# Patient Record
Sex: FEMALE | HISPANIC OR LATINO | Employment: OTHER | URBAN - METROPOLITAN AREA
[De-identification: names, ages, dates, MRNs, and addresses within clinical notes are randomized per-mention and may not be internally consistent; named-entity substitution may affect disease eponyms.]

---

## 2017-10-23 ENCOUNTER — GENERIC CONVERSION - ENCOUNTER (OUTPATIENT)
Dept: OTHER | Facility: OTHER | Age: 72
End: 2017-10-23

## 2018-01-12 NOTE — MISCELLANEOUS
Please contact our office at your convenience  It is important that we speak to you  REGARDING:   Porfavor contacte a  nuestra oficina  Es muy importante que hablemos con usted   SOBRE:          Scheduling an Appointment/ Programar shamar Darshana          Rescheduling  an Appointment/ Re-programar shamar Darshana     Cancelling an Appointment/Cancelar zuniga Darshana     Your Lab/ X-ray Results/Resultados         Updating your Phone number or Address/ Actualizar  zuniga Luis Armando [employer]          X Verify your Primary Providor/ Verificar zuniga Proveedor primario     Other/Otro:    Please Contact Our Office to  Schedule Your Appointment  It is Very Important That You See Your Provider for your  Routine Medical Care  If you are seeing a New Providor,  Please Contact our Office so we can update our Records  Thank You  Comuníquese con nuestra oficina para programar zuniga darshana  Es Muy Importante que usted silva zuniga proveedor para zuniga   [address] [facility] Floor de [place]  Si usted esta viendo un Proveedor  [place], [place] con Mas Mehdi oficina para actualizar    nuestro registros  Jennifer

## 2018-01-13 NOTE — RESULT NOTES
Verified Results  * MAMMO SCREENING BILATERAL W CAD [id number] 10:39AM Rubi Hew     Test Name Result Flag Reference   MAMMO SCREENING BILATERAL W CAD (Report)     Patient History:   Patient is postmenopausal    No known family history of cancer  Patient has never smoked  Patient's BMI is 32 0      Reason for exam: screening (asymptomatic)  Screening     Mammo Screening Bilateral W CAD: February 1, 2016 - Check In #:    [de-identified]   Bilateral CC and MLO view(s) were taken  Technologist: ESTEBAN Pete (ESTEBAN)(M)   There are scattered fibroglandular densities  Asymmetry is    identified within the right outer mid breast for which follow-up    mammography in potential ultrasound is recommended  Left breast    is unremarkable  No suspicious microcalcifications are seen  ASSESSMENT: BiRad:0 - Incomplete: needs additional imaging    evaluation     Recommendation:   Further imaging  A breast health care nurse from our facility will be contacting    the patient regarding the need for additional imaging  Analyzed by CAD     8-10% of cancers will be missed on mammography  Management of a    palpable abnormality must be based on clinical grounds  Patients   will be notified of their results via letter from our facility  Accredited by [employer] and FDA       Transcription Location:  [place] [address]: [id number]     Risk Value(s):   Tyrer-Cuzick 10 Year: 2 657%, Tyrer-Cuzick Lifetime: 4 529%,    Myriad Table: 1 5%, JOEY 5 Year: 1 2%, NCI Lifetime: 3 9%       Plan  Mammogram abnormal    · [facility]; Status:Hold For - Scheduling; Requested  for:[id number];

## 2018-01-15 NOTE — RESULT NOTES
Verified Results  MAMMO DIAGNOSTIC RIGHT W CAD [id number] 11:04AM Abdoulaye Person     Test Name Result Flag Reference   [facility] DIAGNOSTIC RIGHT W CAD (Report)     Patient History:   Patient is postmenopausal    No known family history of cancer  Patient has never smoked  Patient's BMI is 32 0      Reason for exam: additional evaluation requested from abnormal    screening  Mammo Diagnostic Right W CAD: February 4, 2016 - Check In #:    [de-identified]   Spot compression CC, spot compression MLO, and ML view(s) were    taken of the right breast      Technologist: ARVIND Clifford   Prior study comparison: February 1, 2016, mammo screening    bilateral W CAD, performed at [facility]  Follow-up images of the right breast reveal dissipation of    asymmetry suggested on screening mammography consistent with    summation artifact  No suspicious masses or architectural    distortion identified  ASSESSMENT: BiRad:1 - Negative     Recommendation:   Return to routine screening mammogram schedule  Analyzed by CAD     8-10% of cancers will be missed on mammography  Management of a    palpable abnormality must be based on clinical grounds  Patients   will be notified of their results via letter from our facility  Accredited by [employer] and FDA       Transcription Location: ESTEBAN [place] [address]: [id number]     Risk Value(s):   Tyrer-Cuzick 10 Year: 2 657%, Tyrer-Cuzick Lifetime: 4 529%,    Myriad Table: 1 5%, JOEY 5 Year: 1 2%, NCI Lifetime: 3 9%

## 2018-01-17 NOTE — RESULT NOTES
Verified Results  (1) CBC/PLT/DIFF [id number] 07:41AM Rubi [employer]     Test Name Result Flag Reference   WBC COUNT 7 92 Thousand/uL  4 31-10 16   RBC COUNT 4 41 Million/uL  3 81-5 12   HEMOGLOBIN 12 8 g/dL  11 5-15 4   HEMATOCRIT 39 5 %  34 8-46  1   MCV 90 fL  82-98   MCH 29 0 pg  26 8-34 3   MCHC 32 4 g/dL  31 4-37 4   RDW 13 7 %  11 6-15 1   MPV 10 2 fL  8 9-12 7   PLATELET COUNT 132 Thousands/uL  149-390   NEUTROPHILS RELATIVE PERCENT 47 %  43-75   LYMPHOCYTES RELATIVE PERCENT 35 %  14-44   MONOCYTES RELATIVE PERCENT 12 %  4-12   EOSINOPHILS RELATIVE PERCENT 5 %  0-6   BASOPHILS RELATIVE PERCENT 1 %  0-1   NEUTROPHILS ABSOLUTE COUNT 3 75 Thousands/?L  1 85-7 62   LYMPHOCYTES ABSOLUTE COUNT 2 80 Thousands/?L  0 60-4 47   MONOCYTES ABSOLUTE COUNT 0 91 Thousand/?L  0 17-1 22   EOSINOPHILS ABSOLUTE COUNT 0 40 Thousand/?L  0 00-0 61   BASOPHILS ABSOLUTE COUNT 0 06 Thousands/?L  0 00-0 10     (1) URINALYSIS w URINE C/S REFLEX (will reflex a microscopy if leukocytes, occult blood, or nitrites are not within normal limits) [id number] 07:41AM [employer]     Test Name Result Flag Reference   COLOR Yellow     CLARITY Cloudy     PH UA 6 0  4 5-8 0   LEUKOCYTE ESTERASE UA Moderate A Negative   NITRITE UA Positive A Negative   PROTEIN UA Negative mg/dl  Negative   GLUCOSE  (1/10%) mg/dl A Negative   KETONES UA Negative mg/dl  Negative   UROBILINOGEN UA 0 2 E U /dl  0 2, 1 0 E U /dl   BILIRUBIN UA Negative  Negative   BLOOD UA Trace A Negative, Trace-Intact   SPECIFIC GRAVITY UA 1 020  1 003-1 030     (1) URINALYSIS w URINE C/S REFLEX (will reflex a microscopy if leukocytes, occult blood, or nitrites are not within normal limits) [id number] 07:41AM [employer]     Test Name Result Flag Reference   BACTERIA Moderate /hpf A None Seen, Occasional   EPITHELIAL CELLS None Seen /hpf  None Seen, Occasional   RBC UA None Seen /hpf  None Seen   WBC UA Innumerable /hpf A None Seen   WBC CLUMPS Present       (1) COMPREHENSIVE METABOLIC PANEL [id number] 94:18NJ Sadia Alvares     Test Name Result Flag Reference   GLUCOSE,RANDM 167 mg/dL H    If the patient is fasting, the ADA then defines impaired fasting glucose as > 100 mg/dL and diabetes as > or equal to 123 mg/dL  SODIUM 140 mmol/L  136-145   POTASSIUM 3 9 mmol/L  3 5-5 3   CHLORIDE 103 mmol/L  100-108   CARBON DIOXIDE 32 mmol/L  21-32   ANION GAP (CALC) 5 mmol/L  4-13   BLOOD UREA NITROGEN 12 mg/dL  5-25   CREATININE 0 70 mg/dL  0 60-1 30   Standardized to IDMS reference method   CALCIUM 8 6 mg/dL  8 3-10 1   BILI, TOTAL 0 17 mg/dL L 0 20-1 00   ALK PHOSPHATAS 124 U/L H    ALT (SGPT) 22 U/L  12-78   AST(SGOT) 14 U/L  5-45   ALBUMIN 3 4 g/dL L 3 5-5 0   TOTAL PROTEIN 7 5 g/dL  6 4-8 2   eGFR Non-African American      >60 0 ml/min/1 73sq [facility] Disease Education Program recommendations are as follows:  GFR calculation is accurate only with a steady state creatinine  Chronic Kidney disease less than 60 ml/min/1 73 sq  meters  Kidney failure less than 15 ml/min/1 73 sq  meters  (1) LIPID PANEL FASTING W DIRECT LDL REFLEX [id number] 07:41AM Sadia Alvares     Test Name Result Flag Reference   CHOLESTEROL 132 mg/dL     LDL CHOLESTEROL CALCULATED 74 mg/dL  0-100   Triglyceride:         Normal              <150 mg/dl       Borderline High    150-199 mg/dl       High               200-499 mg/dl       Very High          >499 mg/dl  Cholesterol:         Desirable        <200 mg/dl      Borderline High  200-239 mg/dl      High             >239 mg/dl  HDL Cholesterol:        High    >59 mg/dL      Low     <41 mg/dL  LDL Cholesterol:        Optimal          <100 mg/dl        Near Optimal     100-129 mg/dl        Above Optimal          Borderline High   130-159 mg/dl          High              160-189 mg/dl          Very High        >189 mg/dl  LDL CALCULATED:    This screening LDL is a calculated result    It does not have the accuracy of the Direct Measured LDL in the monitoring of patients with hyperlipidemia and/or statin therapy  Direct Measure LDL (XWB552) must be ordered separately in these patients  TRIGLYCERIDES 85 mg/dL  <=150   Specimen collection should occur prior to N-Acetylcysteine or Metamizole administration due to the potential for falsely depressed results  HDL,DIRECT 41 mg/dL  40-60   Specimen collection should occur prior to Metamizole administration due to the potential for falsely depressed results  (1) MICROALBUMIN CREATININE RATIO, RANDOM URINE [id number] 07:41AM Sadia Alvares     Test Name Result Flag Reference   MICROALBUMIN/ CREAT R 26 mg/g creatinine  0-30   MICROALBUMIN,URINE 28 9 mg/L H 0 0-20 0   CREATININE URINE 111 0 mg/dL       (1) HEMOGLOBIN A1C [id number] 07:41AM Sadia Alvares     Test Name Result Flag Reference   HEMOGLOBIN A1C 7 9 % H 4 0-5 6   EST  AVG   GLUCOSE 180 mg/dl     5 7-6 4% impaired fasting glucose  >=6 5% diagnosis of diabetes    Falsely low levels are seen in conditions linked to short RBC life span-  hemolytic anemia, and splenomegaly  Falsely elevated levels are seen in situations where there is an increased production of RBC- receipt of erythropoietin or blood transfusions  Adopted from ADA-Clinical Practice Recommendations     (1) URINALYSIS w URINE C/S REFLEX (will reflex a microscopy if leukocytes, occult blood, or nitrites are not within normal limits) [id number] 07:41AM Sadia Alvares     Test Name Result Flag Reference   CLINICAL REPORT (Report)     Test:        Urine culture  Specimen Type:   Urine  Specimen Date:   5/6/2016 7:41 AM  Result Date:    5/8/2016 11:46 AM  Result Status:   Final result  Resulting Lab:    [address] [facility] [address]            Tel: [phone]      CULTURE                                       ------------------                                   >100,000 cfu/ml Escherichia coli      SUSCEPTIBILITY ------------------                                                       Escherichia coli  METHOD                 AYANA  -------------------------------------  --------------------------  AMPICILLIN ($$)             >16 00 ug/ml Resistant  AMPICILLIN + SULBACTAM ($)       16/8 ug/ml  Intermediate  AZTREONAM ($$$)             <=8 ug/ml   Susceptible  CEFAZOLIN ($)              <=8 00 ug/ml Susceptible  CIPROFLOXACIN ($)            <=1 00 ug/ml Susceptible  GENTAMICIN ($$)             <=4 ug/ml   Susceptible  LEVOFLOXACIN ($)            <=2 00 ug/ml Susceptible  NITROFURANTOIN             <=32 ug/ml  Susceptible  PIPERACILLIN + TAZOBACTAM ($$$)     <=16 ug/ml  Susceptible  TETRACYCLINE              <=4 ug/ml   Susceptible  TOBRAMYCIN ($)             <=4 ug/ml   Susceptible  TRIMETHOPRIM + SULFAMETHOXAZOLE ($$$)  <=2/38 ug/ml Susceptible

## 2018-01-18 NOTE — RESULT NOTES
Verified Results  (1) CBC/PLT/DIFF [id number] 10:07AM Raul Monteiro Order Number: [id number]     Order Number: [id number]     Test Name Result Flag Reference   WBC COUNT 7 18 Thousand/uL  4 31-10 16   RBC COUNT 4 49 Million/uL  3 81-5 12   HEMOGLOBIN 13 3 g/dL  11 5-15 4   HEMATOCRIT 40 4 %  34 8-46  1   MCV 90 fL  82-98   MCH 29 6 pg  26 8-34 3   MCHC 32 9 g/dL  31 4-37 4   RDW 13 9 %  11 6-15 1   MPV 10 5 fL  8 9-12 7   PLATELET COUNT 447 Thousands/uL  149-390   nRBC AUTOMATED 0 /100 WBCs     NEUTROPHILS RELATIVE PERCENT 53 %  43-75   LYMPHOCYTES RELATIVE PERCENT 35 %  14-44   MONOCYTES RELATIVE PERCENT 9 %  4-12   EOSINOPHILS RELATIVE PERCENT 2 %  0-6   BASOPHILS RELATIVE PERCENT 1 %  0-1   NEUTROPHILS ABSOLUTE COUNT 3 85 Thousands/µL  1 85-7 62   LYMPHOCYTES ABSOLUTE COUNT 2 52 Thousands/µL  0 60-4 47   MONOCYTES ABSOLUTE COUNT 0 65 Thousand/µL  0 17-1 22   EOSINOPHILS ABSOLUTE COUNT 0 11 Thousand/µL  0 00-0 61   BASOPHILS ABSOLUTE COUNT 0 05 Thousands/µL  0 00-0 10     (1) COMPREHENSIVE METABOLIC PANEL [id number] 70:73IS Raul Plei Order Number: [id number]      National Kidney Disease Education Program recommendations are as follows:  GFR calculation is accurate only with a steady state creatinine  Chronic Kidney disease less than 60 ml/min/1 73 sq  meters  Kidney failure less than 15 ml/min/1 73 sq  meters  Test Name Result Flag Reference   GLUCOSE,RANDM 152 mg/dL H    If the patient is fasting, the ADA then defines impaired fasting glucose as > 100 mg/dL and diabetes as > or equal to 123 mg/dL     SODIUM 138 mmol/L  136-145   POTASSIUM 4 1 mmol/L  3 5-5 3   CHLORIDE 101 mmol/L  100-108   CARBON DIOXIDE 32 mmol/L  21-32   ANION GAP (CALC) 5 mmol/L  4-13   BLOOD UREA NITROGEN 15 mg/dL  5-25   CREATININE 0 63 mg/dL  0 60-1 30   Standardized to IDMS reference method   CALCIUM 8 8 mg/dL  8 3-10 1   BILI, TOTAL 0 26 mg/dL  0 20-1 00   ALK PHOSPHATAS 128 U/L H    ALT (SGPT) 32 U/L 12-78   AST(SGOT) 16 U/L  5-45   ALBUMIN 3 7 g/dL  3 5-5 0   TOTAL PROTEIN 7 6 g/dL  6 4-8 2   eGFR Non-African American      >60 0 ml/min/1 73sq m     (1) LIPID PANEL, FASTING [id number] 10:07AM Gilford Scarpa Order Number: [id number]      Cholesterol:         Desirable        <200 mg/dl      Borderline High  200-239 mg/dl      High             >239 mg/dl  Triglyceride:         Normal              <150 mg/dl       Borderline High    150-199 mg/dl       High               200-499 mg/dl       Very High          >499 mg/dl  HDL Cholesterol:        High    >59 mg/dL      Low     <41 mg/dL  LDL CALCULATED:    This screening LDL is a calculated result  It does not have the accuracy of the Direct Measured LDL in the monitoring of patients with hyperlipidemia and/or statin therapy  Direct Measure LDL ([id number]) must be ordered separately in these patients       Test Name Result Flag Reference   CHOLESTEROL 162 mg/dL     HDL,DIRECT 54 mg/dL  40-60   LDL CHOLESTEROL CALCULATED 88 mg/dL  0-100   TRIGLYCERIDES 101 mg/dL  <=150     (1) URINALYSIS w URINE C/S REFLEX (will reflex a microscopy if leukocytes, occult blood, or nitrites are not within normal limits) [id number] 10:07AM Gilford Scarpa Order Number: [id number]     Test Name Result Flag Reference   COLOR Yellow     CLARITY Clear     PH UA 6 0  4 5-8 0   LEUKOCYTE ESTERASE UA Negative  Negative   NITRITE UA Negative  Negative   PROTEIN UA Negative mg/dl  Negative   GLUCOSE UA Negative mg/dl  Negative   KETONES UA Negative mg/dl  Negative   UROBILINOGEN UA 0 2 E U /dl  0 2, 1 0 E U /dl   BILIRUBIN UA Negative  Negative   BLOOD UA Negative  Negative, Trace-Intact   SPECIFIC GRAVITY UA <=1 005  1 003-1 030     (1) MICROALBUMIN CREATININE RATIO, RANDOM URINE [id number] 10:07AM Gilford Scarpa Order Number: [id number]     Test Name Result Flag Reference   MICROALBUMIN/ CREAT R 30 mg/g creatinine  0-30   MICROALBUMIN,URINE 6 6 mg/L  0 0-20 0   CREATININE URINE 22 1 mg/dL       (1) VITAMIN D 25-HYDROXY [id number] 10:07AM Radha Christelle Order Number: [id number]     Order Number: [id number]     Test Name Result Flag Reference   VIT D 25-HYDROX 25 9 ng/mL L 30 0-100 0     (1) HEMOGLOBIN A1C [id number] 10:07AM Radha Bourgeois Order Number: [id number]      5 7-6 4% impaired fasting glucose  >=6 5% diagnosis of diabetes    Falsely low levels are seen in conditions linked to short RBC life span-  hemolytic anemia, and splenomegaly  Falsely elevated levels are seen in situations where there is an increased production of RBC- receipt of erythropoietin or blood transfusions  Adopted from ADA-Clinical Practice Recommendations     Test Name Result Flag Reference   HEMOGLOBIN A1C 7 7 % H 4 0-5 6   EST  AVG   GLUCOSE 174 mg/dl

## 2018-10-12 LAB
CREAT ?TM UR-SCNC: 48 UMOL/L
HBA1C MFR BLD HPLC: 8.2 %
MICROALBUMIN UR-MCNC: 25 MG/L (ref 0–20)
MICROALBUMIN/CREAT UR: 52.08 MG/G{CREAT}

## 2018-10-24 ENCOUNTER — TELEPHONE (OUTPATIENT)
Dept: FAMILY MEDICINE CLINIC | Facility: CLINIC | Age: 73
End: 2018-10-24

## 2018-10-25 ENCOUNTER — HOSPITAL ENCOUNTER (EMERGENCY)
Facility: HOSPITAL | Age: 73
Discharge: HOME | End: 2018-10-25
Attending: EMERGENCY MEDICINE
Payer: COMMERCIAL

## 2018-10-25 ENCOUNTER — APPOINTMENT (EMERGENCY)
Dept: CT IMAGING | Facility: HOSPITAL | Age: 73
End: 2018-10-25
Payer: COMMERCIAL

## 2018-10-25 VITALS
TEMPERATURE: 98.2 F | DIASTOLIC BLOOD PRESSURE: 70 MMHG | OXYGEN SATURATION: 96 % | SYSTOLIC BLOOD PRESSURE: 159 MMHG | HEART RATE: 57 BPM | RESPIRATION RATE: 18 BRPM

## 2018-10-25 DIAGNOSIS — R13.10 DYSPHAGIA: Primary | ICD-10-CM

## 2018-10-25 LAB
ALBUMIN SERPL BCP-MCNC: 3.3 G/DL (ref 3.5–5)
ALP SERPL-CCNC: 89 U/L (ref 46–116)
ALT SERPL W P-5'-P-CCNC: 17 U/L (ref 12–78)
ANION GAP SERPL CALCULATED.3IONS-SCNC: 6 MMOL/L (ref 4–13)
AST SERPL W P-5'-P-CCNC: 14 U/L (ref 5–45)
BASOPHILS # BLD AUTO: 0.03 THOUSANDS/ΜL (ref 0–0.1)
BASOPHILS NFR BLD AUTO: 0 % (ref 0–1)
BILIRUB DIRECT SERPL-MCNC: 0.06 MG/DL (ref 0–0.2)
BILIRUB SERPL-MCNC: 0.19 MG/DL (ref 0.2–1)
BILIRUB UR QL STRIP: NEGATIVE
BUN SERPL-MCNC: 9 MG/DL (ref 5–25)
CALCIUM SERPL-MCNC: 9.4 MG/DL (ref 8.3–10.1)
CHLORIDE SERPL-SCNC: 97 MMOL/L (ref 100–108)
CLARITY UR: CLEAR
CO2 SERPL-SCNC: 31 MMOL/L (ref 21–32)
COLOR UR: YELLOW
COLOR, POC: YELLOW
CREAT SERPL-MCNC: 0.82 MG/DL (ref 0.6–1.3)
EOSINOPHIL # BLD AUTO: 0.18 THOUSAND/ΜL (ref 0–0.61)
EOSINOPHIL NFR BLD AUTO: 2 % (ref 0–6)
ERYTHROCYTE [DISTWIDTH] IN BLOOD BY AUTOMATED COUNT: 13.2 % (ref 11.6–15.1)
GFR SERPL CREATININE-BSD FRML MDRD: 72 ML/MIN/1.73SQ M
GLUCOSE SERPL-MCNC: 135 MG/DL (ref 65–140)
GLUCOSE UR STRIP-MCNC: NEGATIVE MG/DL
HCT VFR BLD AUTO: 36.2 % (ref 34.8–46.1)
HGB BLD-MCNC: 11.7 G/DL (ref 11.5–15.4)
HGB UR QL STRIP.AUTO: NEGATIVE
IMM GRANULOCYTES # BLD AUTO: 0.03 THOUSAND/UL (ref 0–0.2)
IMM GRANULOCYTES NFR BLD AUTO: 0 % (ref 0–2)
KETONES UR STRIP-MCNC: NEGATIVE MG/DL
LEUKOCYTE ESTERASE UR QL STRIP: NEGATIVE
LYMPHOCYTES # BLD AUTO: 2.87 THOUSANDS/ΜL (ref 0.6–4.47)
LYMPHOCYTES NFR BLD AUTO: 39 % (ref 14–44)
MAGNESIUM SERPL-MCNC: 1.6 MG/DL (ref 1.6–2.6)
MCH RBC QN AUTO: 29.6 PG (ref 26.8–34.3)
MCHC RBC AUTO-ENTMCNC: 32.3 G/DL (ref 31.4–37.4)
MCV RBC AUTO: 92 FL (ref 82–98)
MONOCYTES # BLD AUTO: 0.84 THOUSAND/ΜL (ref 0.17–1.22)
MONOCYTES NFR BLD AUTO: 11 % (ref 4–12)
NEUTROPHILS # BLD AUTO: 3.46 THOUSANDS/ΜL (ref 1.85–7.62)
NEUTS SEG NFR BLD AUTO: 48 % (ref 43–75)
NITRITE UR QL STRIP: NEGATIVE
NRBC BLD AUTO-RTO: 0 /100 WBCS
PH UR STRIP.AUTO: 5.5 [PH] (ref 4.5–8)
PLATELET # BLD AUTO: 285 THOUSANDS/UL (ref 149–390)
PMV BLD AUTO: 9.4 FL (ref 8.9–12.7)
POTASSIUM SERPL-SCNC: 4.1 MMOL/L (ref 3.5–5.3)
PROT SERPL-MCNC: 7.5 G/DL (ref 6.4–8.2)
PROT UR STRIP-MCNC: NEGATIVE MG/DL
RBC # BLD AUTO: 3.95 MILLION/UL (ref 3.81–5.12)
SODIUM SERPL-SCNC: 134 MMOL/L (ref 136–145)
SP GR UR STRIP.AUTO: <=1.005 (ref 1–1.03)
TSH SERPL DL<=0.05 MIU/L-ACNC: 1.63 UIU/ML (ref 0.36–3.74)
UROBILINOGEN UR QL STRIP.AUTO: 0.2 E.U./DL
WBC # BLD AUTO: 7.41 THOUSAND/UL (ref 4.31–10.16)

## 2018-10-25 PROCEDURE — 99284 EMERGENCY DEPT VISIT MOD MDM: CPT

## 2018-10-25 PROCEDURE — 80076 HEPATIC FUNCTION PANEL: CPT | Performed by: EMERGENCY MEDICINE

## 2018-10-25 PROCEDURE — 96360 HYDRATION IV INFUSION INIT: CPT

## 2018-10-25 PROCEDURE — 83735 ASSAY OF MAGNESIUM: CPT | Performed by: EMERGENCY MEDICINE

## 2018-10-25 PROCEDURE — 84443 ASSAY THYROID STIM HORMONE: CPT | Performed by: EMERGENCY MEDICINE

## 2018-10-25 PROCEDURE — 70491 CT SOFT TISSUE NECK W/DYE: CPT

## 2018-10-25 PROCEDURE — 81003 URINALYSIS AUTO W/O SCOPE: CPT

## 2018-10-25 PROCEDURE — 36415 COLL VENOUS BLD VENIPUNCTURE: CPT | Performed by: EMERGENCY MEDICINE

## 2018-10-25 PROCEDURE — 85025 COMPLETE CBC W/AUTO DIFF WBC: CPT | Performed by: EMERGENCY MEDICINE

## 2018-10-25 PROCEDURE — 80048 BASIC METABOLIC PNL TOTAL CA: CPT | Performed by: EMERGENCY MEDICINE

## 2018-10-25 RX ORDER — RANITIDINE 300 MG/1
300 TABLET ORAL
COMMUNITY
End: 2018-10-31 | Stop reason: SDUPTHER

## 2018-10-25 RX ORDER — LOSARTAN POTASSIUM 25 MG/1
25 TABLET ORAL DAILY
COMMUNITY
End: 2018-10-31 | Stop reason: SDUPTHER

## 2018-10-25 RX ORDER — OXYCODONE HYDROCHLORIDE AND ACETAMINOPHEN 5; 325 MG/1; MG/1
1 TABLET ORAL 2 TIMES DAILY
COMMUNITY
End: 2018-10-31 | Stop reason: SDUPTHER

## 2018-10-25 RX ORDER — FENTANYL 25 UG/H
1 PATCH TRANSDERMAL
COMMUNITY
End: 2019-01-28

## 2018-10-25 RX ORDER — HYDROCODONE BITARTRATE AND ACETAMINOPHEN 5; 325 MG/1; MG/1
1 TABLET ORAL EVERY 6 HOURS PRN
Qty: 10 TABLET | Refills: 0 | Status: SHIPPED | OUTPATIENT
Start: 2018-10-25 | End: 2019-01-28

## 2018-10-25 RX ADMIN — LIDOCAINE HYDROCHLORIDE 15 ML: 20 SOLUTION ORAL; TOPICAL at 21:27

## 2018-10-25 RX ADMIN — SODIUM CHLORIDE 1000 ML: 0.9 INJECTION, SOLUTION INTRAVENOUS at 19:07

## 2018-10-25 RX ADMIN — IOHEXOL 85 ML: 350 INJECTION, SOLUTION INTRAVENOUS at 20:23

## 2018-10-25 NOTE — ED PROVIDER NOTES
History  Chief Complaint   Patient presents with    Difficulty Swallowing     Patient presents with family, report patient arrived to the [address] from [facility] 2 weeks ago  Complaining of increased difficulty swallowing, diagnosed with "throat cancer one month ago"  Family also shares that patient has had increased fatigue  67 YO female with Hx of throat CA presents with dysphagia  Granddaughter gives Hx  States she was diagnosed with CA in [place] 1 month prior  She has travelled to the area for treatment but has not yet established care with specialists  Granddaughter has reached out to the [facility]  Granddaughter states the Pt has been eating and drinking less the last 2 days, on questioning the pt states she has been having increased discomfort with swallowing and she has not been able to take PO due to this  Pt has had difficulty for some time, Granddaughter notes she was previously taking viscous lidocaine which helped her symptoms  She has not had difficulty with breathing, no chest pain or shortness of breath  Pt denies CP/SOB/F/C/N/V/D/C, no dysuria, burning on urination or blood in urine            History provided by:  Patient and relative   used: Yes    Neck Pain   Pain location:  L side  Quality:  Aching  Pain radiates to:  Does not radiate  Pain severity:  Moderate  Pain is:  Unable to specify  Onset quality:  Unable to specify  Timing:  Constant  Progression:  Waxing and waning  Chronicity:  New  Relieved by:  Nothing  Worsened by:  Swallowing  Ineffective treatments:  Analgesics  Associated symptoms: weakness and weight loss    Associated symptoms: no chest pain, no fever, no headaches, no numbness, no paresis, no syncope and no visual change    Weakness:     Severity:  Moderate    Onset quality:  Gradual    Chronicity:  New    Progression:  Waxing and waning  Weight Loss:     Change in the fit of clothing: yes    Risk factors: no hx of head and neck radiation        Prior to Admission Medications   Prescriptions Last Dose Informant Patient Reported? Taking? amLODIPine (NORVASC) 10 mg tablet   Yes Yes   Sig: Take 10 mg by mouth daily  carvedilol (COREG) 12 5 mg tablet  Self Yes Yes   Sig: Take 25 mg by mouth 2 (two) times a day with meals     ergocalciferol (VITAMIN D2) 50,000 units   Yes No   Sig: Take 50,000 Units by mouth once a week    ezetimibe (ZETIA) 10 mg tablet   Yes Yes   Sig: Take 10 mg by mouth daily  fentaNYL (DURAGESIC) 25 mcg/hr  Self Yes Yes   Sig: Place 1 patch on the skin every third day   glipiZIDE (GLUCOTROL) 10 mg tablet   Yes No   Sig: Take 10 mg by mouth 2 (two) times a day before meals  losartan (COZAAR) 25 mg tablet  Self Yes Yes   Sig: Take 25 mg by mouth daily   oxyCODONE-acetaminophen (PERCOCET) 5-325 mg per tablet  Self Yes Yes   Sig: Take 1 tablet by mouth 2 (two) times a day   pantoprazole (PROTONIX) 40 mg tablet   Yes No   Sig: Take 40 mg by mouth daily  ranitidine (ZANTAC) 300 MG tablet  Self Yes Yes   Sig: Take 300 mg by mouth daily at bedtime   sitaGLIPtin-metFORMIN (JANUMET)  MG per tablet   Yes Yes   Sig: Take 1 tablet by mouth 2 (two) times a day with meals  Facility-Administered Medications: None       Past Medical History:   Diagnosis Date    Cancer ([facility] )     Throat    Diabetes mellitus ([facility] )     Hyperlipidemia     Hypertension        History reviewed  No pertinent surgical history  History reviewed  No pertinent family history  I have reviewed and agree with the history as documented  Social History   Substance Use Topics    Smoking status: Never Smoker    Smokeless tobacco: Never Used    Alcohol use No        Review of Systems   Constitutional: Positive for weight loss  Negative for chills, fatigue and fever  HENT: Negative for dental problem  Eyes: Negative for visual disturbance  Respiratory: Negative for shortness of breath      Cardiovascular: Negative for chest pain and syncope  Gastrointestinal: Negative for abdominal pain, diarrhea and vomiting  Genitourinary: Negative for dysuria and frequency  Musculoskeletal: Positive for neck pain  Negative for arthralgias  Skin: Negative for rash  Neurological: Positive for weakness  Negative for dizziness, light-headedness, numbness and headaches  Psychiatric/Behavioral: Negative for agitation, behavioral problems and confusion  All other systems reviewed and are negative  Physical Exam  Physical Exam   Constitutional: She is oriented to person, place, and time  She appears well-developed and well-nourished  HENT:   Head: Normocephalic and atraumatic  Mouth/Throat: Oropharynx is clear and moist    Eyes: Pupils are equal, round, and reactive to light  EOM are normal    Neck: Normal range of motion  Neck supple  No thyromegaly present  No swelling, no carotid bruit B/L, tender over the Left neck  Cardiovascular: Normal rate, regular rhythm and normal heart sounds  Pulmonary/Chest: Effort normal and breath sounds normal  She exhibits no tenderness  Abdominal: Soft  Musculoskeletal: Normal range of motion  Neurological: She is alert and oriented to person, place, and time  Skin: Skin is warm and dry  Psychiatric: She has a normal mood and affect  Her behavior is normal  Thought content normal    Nursing note and vitals reviewed        Vital Signs  ED Triage Vitals [10/25/18 1820]   Temperature Pulse Respirations Blood Pressure SpO2   98 2 °F (36 8 °C) 55 18 145/69 97 %      Temp Source Heart Rate Source Patient Position - Orthostatic VS BP Location FiO2 (%)   Temporal Monitor Sitting Right arm --      Pain Score       4           Vitals:    10/25/18 1820 10/25/18 1921 10/25/18 2042   BP: 145/69 139/69 159/70   Pulse: 55 61 57   Patient Position - Orthostatic VS: Sitting  Lying       Visual Acuity  Visual Acuity      Most Recent Value   L Pupil Size (mm)  2   R Pupil Size (mm)  2          ED Medications  Medications   sodium chloride 0 9 % bolus 1,000 mL (0 mL Intravenous Stopped 10/25/18 2007)   iohexol (OMNIPAQUE) 350 MG/ML injection (MULTI-DOSE) 85 mL (85 mL Intravenous Given 10/25/18 2023)   lidocaine viscous (XYLOCAINE) 2 % mucosal solution 15 mL (15 mL Swish & Spit Given 10/25/18 2127)       Diagnostic Studies  Results Reviewed     Procedure Component Value Units Date/Time    Hepatic function panel [[id number]]  (Abnormal) Collected:  10/25/18 1906    Lab Status:  Final result Specimen:  Blood from Arm, Right Updated:  10/25/18 1945     Total Bilirubin 0 19 (L) mg/dL      Bilirubin, Direct 0 06 mg/dL      Alkaline Phosphatase 89 U/L      AST 14 U/L      ALT 17 U/L      Total Protein 7 5 g/dL      Albumin 3 3 (L) g/dL     TSH [[id number]]  (Normal) Collected:  10/25/18 1906    Lab Status:  Final result Specimen:  Blood from Arm, Right Updated:  10/25/18 1945     TSH 3RD GENERATON 1 633 uIU/mL     Narrative:         Patients undergoing fluorescein dye angiography may retain small amounts of fluorescein in the body for 48-72 hours post procedure  Samples containing fluorescein can produce falsely depressed TSH values  If the patient had this procedure,a specimen should be resubmitted post fluorescein clearance            The recommended reference ranges for TSH during pregnancy are as follows:  First trimester 0 1 to 2 5 uIU/mL  Second trimester  0 2 to 3 0 uIU/mL  Third trimester 0 3 to 3 0 uIU/m      Magnesium [[id number]]  (Normal) Collected:  10/25/18 1906    Lab Status:  Final result Specimen:  Blood from Arm, Right Updated:  10/25/18 1945     Magnesium 1 6 mg/dL     Basic metabolic panel [[id number]]  (Abnormal) Collected:  10/25/18 1906    Lab Status:  Final result Specimen:  Blood from Arm, Right Updated:  10/25/18 1941     Sodium 134 (L) mmol/L      Potassium 4 1 mmol/L      Chloride 97 (L) mmol/L      CO2 31 mmol/L      ANION GAP 6 mmol/L      BUN 9 mg/dL      Creatinine 0 82 mg/dL      Glucose 135 mg/dL      Calcium 9 4 mg/dL      eGFR 72 ml/min/1 73sq m     Narrative:         National Kidney Disease Education Program recommendations are as follows:  GFR calculation is accurate only with a steady state creatinine  Chronic Kidney disease less than 60 ml/min/1 73 sq  meters  Kidney failure less than 15 ml/min/1 73 sq  meters      POCT urinalysis dipstick [[id number]]  (Normal) Resulted:  10/25/18 1940    Lab Status:  Final result Specimen:  Urine Updated:  10/25/18 1940     Color, UA yellow    ED Urine Macroscopic [[id number]] Collected:  10/25/18 1950    Lab Status:  Final result Specimen:  Urine Updated:  10/25/18 1938     Color, UA Yellow     Clarity, UA Clear     pH, UA 5 5     Leukocytes, UA Negative     Nitrite, UA Negative     Protein, UA Negative mg/dl      Glucose, UA Negative mg/dl      Ketones, UA Negative mg/dl      Urobilinogen, UA 0 2 E U /dl      Bilirubin, UA Negative     Blood, UA Negative     Specific Gravity, UA <=1 005    Narrative:       CLINITEK RESULT    CBC and differential [[id number]] Collected:  10/25/18 1906    Lab Status:  Final result Specimen:  Blood from Arm, Right Updated:  10/25/18 1916     WBC 7 41 Thousand/uL      RBC 3 95 Million/uL      Hemoglobin 11 7 g/dL      Hematocrit 36 2 %      MCV 92 fL      MCH 29 6 pg      MCHC 32 3 g/dL      RDW 13 2 %      MPV 9 4 fL      Platelets 499 Thousands/uL      nRBC 0 /100 WBCs      Neutrophils Relative 48 %      Immat GRANS % 0 %      Lymphocytes Relative 39 %      Monocytes Relative 11 %      Eosinophils Relative 2 %      Basophils Relative 0 %      Neutrophils Absolute 3 46 Thousands/µL      Immature Grans Absolute 0 03 Thousand/uL      Lymphocytes Absolute 2 87 Thousands/µL      Monocytes Absolute 0 84 Thousand/µL      Eosinophils Absolute 0 18 Thousand/µL      Basophils Absolute 0 03 Thousands/µL                  CT soft tissue neck with contrast   Final Result by Jenifer Petersen MD (10/25 2035)      Approximate 3 cm left soft palate/oropharynx mass in keeping with the patient's history of known malignancy  No apparent airway compromise  The study was marked in [facility] for immediate notification  Workstation performed: [id number]                    Procedures  Procedures       Phone Contacts  ED Phone Contact    ED Course  ED Course as of Oct 26 0014   Thu Oct 25, 2018   2139 Pt currently feeling better, able to swallow after administration of viscous lidocaine  Explained importance of close follow up  Will provide the numbers for oncology and for ENT  Instructed use of viscous lidocaine and need for high protein shakes  Granddaughter states understanding  Educated on reasons for return  MDM  Number of Diagnoses or Management Options  Dysphagia: new and requires workup  Diagnosis management comments: 1  Dysphagia - Pt with known throat CA, will check electrolytes for kidney function, CT neck with contrast  She has not had shortness of breath, oropharynx is clear and patent  Will try viscous lidocaine  Amount and/or Complexity of Data Reviewed  Clinical lab tests: ordered and reviewed  Tests in the radiology section of CPT®: reviewed and ordered  Obtain history from someone other than the patient: yes  Review and summarize past medical records: yes  Independent visualization of images, tracings, or specimens: yes    Patient Progress  Patient progress: improved    CritCare Time    Disposition  Final diagnoses:   Dysphagia     Time reflects when diagnosis was documented in both MDM as applicable and the Disposition within this note     Time User Action Codes Description Comment    10/25/2018  9:41 PM Rolo AZEVEDO Add [R13 10] Dysphagia       ED Disposition     ED Disposition Condition Comment    Discharge  [address] [facility] discharge to home/self care      Condition at discharge: Improved        Follow-up Information     Follow up With Specialties Details Why Huyen Lupis Ocasio,  Otolaryngology Schedule an appointment as soon as possible for a visit  [phone]  [address] [facility],  Hematology and Oncology, Hematology, Oncology Schedule an appointment as soon as possible for a visit  [facility] [address] [facility]  [phone]            Discharge Medication List as of 10/25/2018  9:46 PM      START taking these medications    Details   HYDROcodone-acetaminophen (NORCO) 5-325 mg per tablet Take 1 tablet by mouth every 6 (six) hours as needed for pain for up to 10 doses Max Daily Amount: 4 tablets, Starting Thu 10/25/2018, Print      lidocaine viscous (XYLOCAINE) 2 % mucosal solution Swish and swallow 10 mL 3 (three) times a day as needed for mild pain or moderate pain, Starting Thu 10/25/2018, Print         CONTINUE these medications which have NOT CHANGED    Details   amLODIPine (NORVASC) 10 mg tablet Take 10 mg by mouth daily  , Until Discontinued, Historical Med      carvedilol (COREG) 12 5 mg tablet Take 25 mg by mouth 2 (two) times a day with meals  , Historical Med      ezetimibe (ZETIA) 10 mg tablet Take 10 mg by mouth daily  , Until Discontinued, Historical Med      fentaNYL (DURAGESIC) 25 mcg/hr Place 1 patch on the skin every third day, Historical Med      losartan (COZAAR) 25 mg tablet Take 25 mg by mouth daily, Historical Med      oxyCODONE-acetaminophen (PERCOCET) 5-325 mg per tablet Take 1 tablet by mouth 2 (two) times a day, Historical Med      ranitidine (ZANTAC) 300 MG tablet Take 300 mg by mouth daily at bedtime, Historical Med      sitaGLIPtin-metFORMIN (JANUMET)  MG per tablet Take 1 tablet by mouth 2 (two) times a day with meals  , Until Discontinued, Historical Med      ergocalciferol (VITAMIN D2) 50,000 units Take 50,000 Units by mouth once a week , Until Discontinued, Historical Med      glipiZIDE (GLUCOTROL) 10 mg tablet Take 10 mg by mouth 2 (two) times a day before meals  , Until Discontinued, Historical Med      pantoprazole (PROTONIX) 40 mg tablet Take 40 mg by mouth daily  , Until Discontinued, Historical Med           No discharge procedures on file      ED Provider  Electronically Signed by           Rose Marie Schmitz MD  10/26/18 0583

## 2018-10-26 NOTE — DISCHARGE INSTRUCTIONS
The number for both the oncologist and the ear, nose and throat doctors are included in these discharge instructions  Call the offices in the morning, tell the staff that your grandmother has been diagnosed with Diffuse Large B-Cell Lymphoma and there is recent imaging from the ER  Tell them she does not have established doctors and she needs to be seen for evaluation  Use the viscous lidocaine for discomfort, the Norco can also be used for discomfort  Your grandmother should be drinking high calorie shakes such as Ensure to make sure she is getting nutrition  Return to the ER if your grandmother continues to not be able to drink despite taking the medications  Disfagia   LO QUE NECESITA SABER:   La disfagia es un trastorno de deglución  El trastorno ocurre cuando usted tiene problemas para que la comida o los líquidos pasen de zuniga boca hacia zuniga esófago y lleguen a zuniga estómago  Puede ocurrir cuando usted come, ingiere líquidos o en cualquier momento que trate de deglutir  INSTRUCCIONES SOBRE EL [facility] HOSPITALARIA:   Regrese a la stanley de emergencias si:   · Usted se ahoga con zuniga propia saliva  · Usted tiene dolor en el pecho  · Le falta el aire  · Usted no puede comer nada ni ingerir ningún líquido  Pregúntele a zuniga [place] vitaminas y minerales son adecuados para usted  · Usted pierde peso sin proponérselo  · Diane signos y [address], o usted tiene nuevos signos o síntomas  · Usted tiene signos o síntomas de deshidratación, yoni un aumento en la sed, orina de color amarillo oscuro, poca o ninguna orina  · Usted se resfría con frecuencia  · Usted tiene preguntas o inquietudes acerca de zuniga condición o cuidado  Nutrición:  Es posible que necesite cambiar la textura de los alimentos que consume para evitar el riesgo de atorarse  Zuniga médico podría mostrarle cómo espesar los líquidos o [employer] alimentos para hacerlos más fáciles de deglutir    Acuda a diane consultas de control con wu médico según le indicaron  Anote diane preguntas para que se acuerde de hacerlas kyung diane visitas  © 2017 [address] Information is for End User's use only and may not be sold, redistributed or otherwise used for commercial purposes  All illustrations and images included in CareNotes® are the copyrighted property of A D A M , Inc  or Rocael Cowart  Esta información es sólo para uso en educación  Wu intención no es darle un consejo médico sobre enfermedades o tratamientos  Colsulte con wu Tegan Bourdon farmacéutico antes de seguir cualquier régimen médico para saber si es seguro y efectivo para usted

## 2018-10-31 ENCOUNTER — TELEPHONE (OUTPATIENT)
Dept: FAMILY MEDICINE CLINIC | Facility: CLINIC | Age: 73
End: 2018-10-31

## 2018-10-31 ENCOUNTER — OFFICE VISIT (OUTPATIENT)
Dept: FAMILY MEDICINE CLINIC | Facility: CLINIC | Age: 73
End: 2018-10-31
Payer: COMMERCIAL

## 2018-10-31 VITALS
SYSTOLIC BLOOD PRESSURE: 110 MMHG | HEIGHT: 60 IN | RESPIRATION RATE: 18 BRPM | DIASTOLIC BLOOD PRESSURE: 64 MMHG | TEMPERATURE: 97.5 F | WEIGHT: 145.06 LBS | BODY MASS INDEX: 28.48 KG/M2 | OXYGEN SATURATION: 98 % | HEART RATE: 68 BPM

## 2018-10-31 DIAGNOSIS — K21.9 GASTROESOPHAGEAL REFLUX DISEASE, ESOPHAGITIS PRESENCE NOT SPECIFIED: ICD-10-CM

## 2018-10-31 DIAGNOSIS — C85.91 LYMPHOMA OF LYMPH NODES OF NECK, UNSPECIFIED LYMPHOMA TYPE (HCC): Primary | ICD-10-CM

## 2018-10-31 DIAGNOSIS — E11.9 TYPE 2 DIABETES MELLITUS WITHOUT COMPLICATION, WITHOUT LONG-TERM CURRENT USE OF INSULIN (HCC): ICD-10-CM

## 2018-10-31 DIAGNOSIS — I10 ESSENTIAL HYPERTENSION: ICD-10-CM

## 2018-10-31 LAB — SL AMB POCT HEMOGLOBIN AIC: 7.5

## 2018-10-31 PROCEDURE — 3074F SYST BP LT 130 MM HG: CPT | Performed by: PHYSICIAN ASSISTANT

## 2018-10-31 PROCEDURE — 83036 HEMOGLOBIN GLYCOSYLATED A1C: CPT | Performed by: PHYSICIAN ASSISTANT

## 2018-10-31 PROCEDURE — 3078F DIAST BP <80 MM HG: CPT | Performed by: PHYSICIAN ASSISTANT

## 2018-10-31 PROCEDURE — 4010F ACE/ARB THERAPY RXD/TAKEN: CPT | Performed by: PHYSICIAN ASSISTANT

## 2018-10-31 PROCEDURE — 3045F PR MOST RECENT HEMOGLOBIN A1C LEVEL 7.0-9.0%: CPT | Performed by: PHYSICIAN ASSISTANT

## 2018-10-31 PROCEDURE — 99214 OFFICE O/P EST MOD 30 MIN: CPT | Performed by: PHYSICIAN ASSISTANT

## 2018-10-31 RX ORDER — RANITIDINE 300 MG/1
300 TABLET ORAL
Qty: 90 TABLET | Refills: 1 | Status: SHIPPED | OUTPATIENT
Start: 2018-10-31 | End: 2018-11-09

## 2018-10-31 RX ORDER — LOSARTAN POTASSIUM 25 MG/1
25 TABLET ORAL DAILY
Qty: 90 TABLET | Refills: 1 | Status: SHIPPED | OUTPATIENT
Start: 2018-10-31 | End: 2018-12-11 | Stop reason: SDUPTHER

## 2018-10-31 RX ORDER — FENTANYL 25 UG/H
1 PATCH TRANSDERMAL
Qty: 10 PATCH | Refills: 0 | Status: ON HOLD | OUTPATIENT
Start: 2018-10-31 | End: 2018-11-09

## 2018-10-31 RX ORDER — OXYCODONE HYDROCHLORIDE AND ACETAMINOPHEN 5; 325 MG/1; MG/1
1 TABLET ORAL 2 TIMES DAILY
Qty: 60 TABLET | Refills: 0 | Status: SHIPPED | OUTPATIENT
Start: 2018-10-31 | End: 2019-01-15 | Stop reason: SDUPTHER

## 2018-10-31 NOTE — TELEPHONE ENCOUNTER
Pt daughter requested me to sent records to [facility] for a upcoming appt 11/9/18      Faxed to charu [phone]

## 2018-10-31 NOTE — PROGRESS NOTES
Assessment/Plan:    Lymphoma of lymph nodes of neck ([facility] )  Unfortunately patient does not have insurance at this time  Will have patient and family follow-up with  for assistance in getting insurance so follow-up appointments could be made  Ultimately patient should be following up with ENT and Oncology  Due to patient's current symptoms, will order CT of chest abdomen and pelvis for further evaluation  Discussed patient's pain medication with Dr Marylene Czech will continue with Percocet and fentanyl at this time  Once insurance and treatment have been established, will refer patient to palliative care for further management of pain  Essential hypertension  Blood pressure is stable today  Will continue with losartan 1 mg daily at this time  If there is other medications she is taking, recommend calling the office so we can send in refills  Type 2 diabetes mellitus without complication, without long-term current use of insulin ([facility])  Lab Results   Component Value Date    HGBA1C 8 5 08/03/2016       No results for input(s): POCGLU in the last 72 hours  Blood Sugar Average: Last 72 hrs:   A1c today was 7 4%  At this time will continue with Janumet  Discussed the importance of diet  Will discuss more about diabetes which patient is receiving treatment for her lymphoma  Gastroesophageal reflux disease  At this time will refill ranitidine  May consider follow-up with GI due to patient's the possible bloody vomitus  Will wait from results of CT  Diagnoses and all orders for this visit:    Lymphoma of lymph nodes of neck, unspecified lymphoma type ([facility] )  -     Cancel: Ambulatory referral to Hematology / Oncology; Future  -     Cancel: Ambulatory Referral to Otolaryngology; Future  -     fentaNYL (DURAGESIC) 25 mcg/hr; Place 1 patch on the skin every third day Max Daily Amount: 1 patch  -     oxyCODONE-acetaminophen (PERCOCET) 5-325 mg per tablet;  Take 1 tablet by mouth 2 (two) times a day Max Daily Amount: 2 tablets  -     CT chest abdomen pelvis w contrast; Future  -     Ambulatory Referral to Otolaryngology; Future  -     Ambulatory referral to Hematology / Oncology; Future    Type 2 diabetes mellitus without complication, without long-term current use of insulin (HCC)  -     sitaGLIPtin-metFORMIN (JANUMET)  MG per tablet; Take 1 tablet by mouth 2 (two) times a day with meals  -     POCT hemoglobin A1c    Essential hypertension  -     losartan (COZAAR) 25 mg tablet; Take 1 tablet (25 mg total) by mouth daily    Gastroesophageal reflux disease, esophagitis presence not specified  -     ranitidine (ZANTAC) 300 MG tablet; Take 1 tablet (300 mg total) by mouth daily at bedtime          Subjective:      Patient ID: Diego Guerrero is a 68 y o  female  52-year-old female presenting with daughter to reestablish care  Patient has been living in [facility] for the last 2 years  States about 2 months ago she was admitted to the hospital for a sore throat  Was discovered at that time she had an enlarged lymph node, which was biopsied and found out to be lymphoma  She was told that her best option would be to come to the [place]  for treatment since her family lives here  Patient states that overall she has not been feeling well  She has been experiencing sore throat, dysphagia, swelling in neck and face, generalized fatigue, fevers, chills, headaches, dizziness, abdominal pain with nausea and vomiting  States that the vomiting has been darkish  Patient has not been eating well  Daughter states that she has lost about 35 lb in the last 2 months  Patient has also been diagnosed with diabetes, hypertension, acid reflux  Needs refills of all her current medications  Does not recall what her previous A1c was  Has not been checking blood sugar on a daily basis    Daughter states that patient needs refills of her medications, but does not recall which she is taking at this time         The following portions of the patient's history were reviewed and updated as appropriate:   She  has a past medical history of Cancer ([facility] ); Diabetes mellitus ([facility] ); Hyperlipidemia; and Hypertension  She   Patient Active Problem List    Diagnosis Date Noted    Lymphoma of lymph nodes of neck ([facility] ) 10/31/2018    Type 2 diabetes mellitus without complication, without long-term current use of insulin ([place] [address] ) 10/31/2018    Essential hypertension 10/31/2018    Gastroesophageal reflux disease 10/31/2018     She  has no past surgical history on file  Her family history is not on file  She  reports that she has never smoked  She has never used smokeless tobacco  She reports that she does not drink alcohol or use drugs  Current Outpatient Prescriptions   Medication Sig Dispense Refill    amLODIPine (NORVASC) 10 mg tablet Take 10 mg by mouth daily   carvedilol (COREG) 12 5 mg tablet Take 25 mg by mouth 2 (two) times a day with meals        ergocalciferol (VITAMIN D2) 50,000 units Take 50,000 Units by mouth once a week   ezetimibe (ZETIA) 10 mg tablet Take 10 mg by mouth daily   fentaNYL (DURAGESIC) 25 mcg/hr Place 1 patch on the skin every third day      fentaNYL (DURAGESIC) 25 mcg/hr Place 1 patch on the skin every third day Max Daily Amount: 1 patch 10 patch 0    glipiZIDE (GLUCOTROL) 10 mg tablet Take 10 mg by mouth 2 (two) times a day before meals        HYDROcodone-acetaminophen (NORCO) 5-325 mg per tablet Take 1 tablet by mouth every 6 (six) hours as needed for pain for up to 10 doses Max Daily Amount: 4 tablets 10 tablet 0    lidocaine viscous (XYLOCAINE) 2 % mucosal solution Swish and swallow 10 mL 3 (three) times a day as needed for mild pain or moderate pain 100 mL 2    losartan (COZAAR) 25 mg tablet Take 1 tablet (25 mg total) by mouth daily 90 tablet 1    oxyCODONE-acetaminophen (PERCOCET) 5-325 mg per tablet Take 1 tablet by mouth 2 (two) times a day Max Daily Amount: 2 tablets 60 tablet 0    pantoprazole (PROTONIX) 40 mg tablet Take 40 mg by mouth daily   ranitidine (ZANTAC) 300 MG tablet Take 1 tablet (300 mg total) by mouth daily at bedtime 90 tablet 1    sitaGLIPtin-metFORMIN (JANUMET)  MG per tablet Take 1 tablet by mouth 2 (two) times a day with meals 90 tablet 1     No current facility-administered medications for this visit  She has No Known Allergies       Review of Systems   Constitutional: Positive for activity change, appetite change, chills, fatigue, fever and unexpected weight change  Negative for diaphoresis  HENT: Positive for sore throat and trouble swallowing  Eyes: Negative for visual disturbance  Respiratory: Positive for chest tightness and shortness of breath  Cardiovascular: Positive for chest pain  Gastrointestinal: Positive for abdominal pain, constipation, nausea and vomiting  Genitourinary: Negative for dysuria and hematuria  Musculoskeletal: Positive for myalgias  Negative for back pain  Skin: Negative for pallor, rash and wound  Neurological: Positive for dizziness, weakness and headaches  Negative for numbness  Hematological: Positive for adenopathy  Psychiatric/Behavioral: Negative for dysphoric mood and sleep disturbance  The patient is not nervous/anxious  Objective:      /64 (BP Location: Right arm, Patient Position: Sitting, Cuff Size: Standard)   Pulse 68   Temp 97 5 °F (36 4 °C) (Tympanic)   Resp 18   Ht 5' (1 524 m)   Wt 65 8 kg (145 lb 1 oz)   SpO2 98%   BMI 28 33 kg/m²          Physical Exam   Constitutional: She is oriented to person, place, and time  Vital signs are normal  She appears well-developed  She appears lethargic  She has a sickly appearance  No distress  HENT:   Right Ear: Hearing, tympanic membrane, external ear and ear canal normal    Left Ear: Hearing, tympanic membrane, external ear and ear canal normal    Nose: No mucosal edema or rhinorrhea     Mouth/Throat: Oropharynx is clear and moist and mucous membranes are normal    Neck: Normal range of motion  Neck supple  No thyromegaly present  Cardiovascular: Normal rate, regular rhythm and normal heart sounds  Exam reveals no gallop and no friction rub  No murmur heard  Pulmonary/Chest: Effort normal and breath sounds normal  No respiratory distress  She has no wheezes  She has no rales  Abdominal: Normal appearance  She exhibits no distension  There is no hepatosplenomegaly  There is generalized tenderness  Musculoskeletal: Normal range of motion  Lymphadenopathy:     She has cervical adenopathy  Neurological: She is oriented to person, place, and time  She appears lethargic  No cranial nerve deficit  Skin: She is not diaphoretic  Psychiatric: She has a normal mood and affect  Her behavior is normal  Thought content normal    Nursing note and vitals reviewed

## 2018-10-31 NOTE — ASSESSMENT & PLAN NOTE
Lab Results   Component Value Date    HGBA1C 8 5 08/03/2016       No results for input(s): POCGLU in the last 72 hours  Blood Sugar Average: Last 72 hrs:   A1c today was 7 4%  At this time will continue with Janumet  Discussed the importance of diet  Will discuss more about diabetes which patient is receiving treatment for her lymphoma

## 2018-10-31 NOTE — ASSESSMENT & PLAN NOTE
Blood pressure is stable today  Will continue with losartan 1 mg daily at this time  If there is other medications she is taking, recommend calling the office so we can send in refills

## 2018-10-31 NOTE — ASSESSMENT & PLAN NOTE
Unfortunately patient does not have insurance at this time  Will have patient and family follow-up with  for assistance in getting insurance so follow-up appointments could be made  Ultimately patient should be following up with ENT and Oncology  Due to patient's current symptoms, will order CT of chest abdomen and pelvis for further evaluation  Discussed patient's pain medication with Dr Marylene Czech will continue with Percocet and fentanyl at this time  Once insurance and treatment have been established, will refer patient to palliative care for further management of pain

## 2018-10-31 NOTE — ASSESSMENT & PLAN NOTE
At this time will refill ranitidine  May consider follow-up with GI due to patient's the possible bloody vomitus  Will wait from results of CT

## 2018-11-09 ENCOUNTER — HOSPITAL ENCOUNTER (OUTPATIENT)
Facility: HOSPITAL | Age: 73
Setting detail: OBSERVATION
Discharge: HOME | End: 2018-11-10
Attending: EMERGENCY MEDICINE | Admitting: FAMILY MEDICINE
Payer: COMMERCIAL

## 2018-11-09 ENCOUNTER — APPOINTMENT (INPATIENT)
Dept: CT IMAGING | Facility: HOSPITAL | Age: 73
End: 2018-11-09
Payer: COMMERCIAL

## 2018-11-09 ENCOUNTER — DOCUMENTATION (OUTPATIENT)
Dept: HEMATOLOGY ONCOLOGY | Facility: CLINIC | Age: 73
End: 2018-11-09

## 2018-11-09 ENCOUNTER — OFFICE VISIT (OUTPATIENT)
Dept: HEMATOLOGY ONCOLOGY | Facility: CLINIC | Age: 73
End: 2018-11-09
Payer: COMMERCIAL

## 2018-11-09 ENCOUNTER — HOSPITAL ENCOUNTER (OUTPATIENT)
Dept: CT IMAGING | Facility: HOSPITAL | Age: 73
Discharge: HOME | End: 2018-11-09

## 2018-11-09 ENCOUNTER — ANESTHESIA EVENT (OUTPATIENT)
Dept: PERIOP | Facility: AMBULARY SURGERY CENTER | Age: 73
End: 2018-11-09

## 2018-11-09 VITALS
OXYGEN SATURATION: 92 % | WEIGHT: 142 LBS | RESPIRATION RATE: 18 BRPM | SYSTOLIC BLOOD PRESSURE: 102 MMHG | HEART RATE: 56 BPM | BODY MASS INDEX: 28.63 KG/M2 | HEIGHT: 59 IN | TEMPERATURE: 97.8 F | DIASTOLIC BLOOD PRESSURE: 68 MMHG

## 2018-11-09 DIAGNOSIS — R13.10 DYSPHAGIA, UNSPECIFIED TYPE: ICD-10-CM

## 2018-11-09 DIAGNOSIS — C85.91 LYMPHOMA OF LYMPH NODES OF NECK, UNSPECIFIED LYMPHOMA TYPE (HCC): ICD-10-CM

## 2018-11-09 DIAGNOSIS — C85.90 LYMPHOMA, UNSPECIFIED BODY REGION, UNSPECIFIED LYMPHOMA TYPE (HCC): Primary | ICD-10-CM

## 2018-11-09 DIAGNOSIS — C83.31 DIFFUSE LARGE B-CELL LYMPHOMA OF LYMPH NODES OF NECK (HCC): Primary | ICD-10-CM

## 2018-11-09 DIAGNOSIS — I10 ESSENTIAL HYPERTENSION: ICD-10-CM

## 2018-11-09 PROBLEM — R06.02 SOB (SHORTNESS OF BREATH): Status: ACTIVE | Noted: 2018-11-09

## 2018-11-09 PROBLEM — R13.12 OROPHARYNGEAL DYSPHAGIA: Status: ACTIVE | Noted: 2018-11-09

## 2018-11-09 LAB
ALBUMIN SERPL BCP-MCNC: 4.2 G/DL (ref 3–5.2)
ALP SERPL-CCNC: 81 U/L (ref 43–122)
ALT SERPL W P-5'-P-CCNC: 26 U/L (ref 9–52)
ANION GAP SERPL CALCULATED.3IONS-SCNC: 7 MMOL/L (ref 5–14)
AST SERPL W P-5'-P-CCNC: 27 U/L (ref 14–36)
BASOPHILS # BLD AUTO: 0.1 THOUSANDS/ΜL (ref 0–0.1)
BASOPHILS NFR BLD AUTO: 1 % (ref 0–1)
BILIRUB SERPL-MCNC: 0.5 MG/DL
BUN SERPL-MCNC: 13 MG/DL (ref 5–25)
CALCIUM SERPL-MCNC: 9.4 MG/DL (ref 8.4–10.2)
CHLORIDE SERPL-SCNC: 100 MMOL/L (ref 97–108)
CO2 SERPL-SCNC: 31 MMOL/L (ref 22–30)
CREAT SERPL-MCNC: 0.71 MG/DL (ref 0.6–1.2)
EOSINOPHIL # BLD AUTO: 0.1 THOUSAND/ΜL (ref 0–0.4)
EOSINOPHIL NFR BLD AUTO: 1 % (ref 0–6)
ERYTHROCYTE [DISTWIDTH] IN BLOOD BY AUTOMATED COUNT: 13.6 %
GFR SERPL CREATININE-BSD FRML MDRD: 85 ML/MIN/1.73SQ M
GLUCOSE SERPL-MCNC: 101 MG/DL (ref 70–99)
GLUCOSE SERPL-MCNC: 177 MG/DL (ref 70–99)
GLUCOSE SERPL-MCNC: 213 MG/DL (ref 70–99)
GLUCOSE SERPL-MCNC: 68 MG/DL (ref 70–99)
HCT VFR BLD AUTO: 37.7 % (ref 36–46)
HGB BLD-MCNC: 12.7 G/DL (ref 12–16)
LIPASE SERPL-CCNC: 46 U/L (ref 23–300)
LYMPHOCYTES # BLD AUTO: 2.2 THOUSANDS/ΜL (ref 0.5–4)
LYMPHOCYTES NFR BLD AUTO: 26 % (ref 20–50)
MCH RBC QN AUTO: 30.6 PG (ref 26–34)
MCHC RBC AUTO-ENTMCNC: 33.6 G/DL (ref 31–36)
MCV RBC AUTO: 91 FL (ref 80–100)
MONOCYTES # BLD AUTO: 0.8 THOUSAND/ΜL (ref 0.2–0.9)
MONOCYTES NFR BLD AUTO: 9 % (ref 1–10)
NEUTROPHILS # BLD AUTO: 5.2 THOUSANDS/ΜL (ref 1.8–7.8)
NEUTS SEG NFR BLD AUTO: 63 % (ref 45–65)
PLATELET # BLD AUTO: 290 THOUSANDS/UL (ref 150–450)
PMV BLD AUTO: 7.9 FL (ref 8.9–12.7)
POTASSIUM SERPL-SCNC: 4 MMOL/L (ref 3.6–5)
PROT SERPL-MCNC: 7.6 G/DL (ref 5.9–8.4)
RBC # BLD AUTO: 4.14 MILLION/UL (ref 4–5.2)
SODIUM SERPL-SCNC: 138 MMOL/L (ref 137–147)
TROPONIN I SERPL-MCNC: <0.01 NG/ML (ref 0–0.03)
WBC # BLD AUTO: 8.3 THOUSAND/UL (ref 4.5–11)

## 2018-11-09 PROCEDURE — 80053 COMPREHEN METABOLIC PANEL: CPT | Performed by: EMERGENCY MEDICINE

## 2018-11-09 PROCEDURE — 88341 IMHCHEM/IMCYTCHM EA ADD ANTB: CPT | Performed by: PATHOLOGY

## 2018-11-09 PROCEDURE — 99220 PR INITIAL OBSERVATION CARE/DAY 70 MINUTES: CPT | Performed by: FAMILY MEDICINE

## 2018-11-09 PROCEDURE — 88311 DECALCIFY TISSUE: CPT | Performed by: PATHOLOGY

## 2018-11-09 PROCEDURE — 36415 COLL VENOUS BLD VENIPUNCTURE: CPT | Performed by: EMERGENCY MEDICINE

## 2018-11-09 PROCEDURE — 93005 ELECTROCARDIOGRAM TRACING: CPT

## 2018-11-09 PROCEDURE — 99285 EMERGENCY DEPT VISIT HI MDM: CPT

## 2018-11-09 PROCEDURE — 83690 ASSAY OF LIPASE: CPT | Performed by: EMERGENCY MEDICINE

## 2018-11-09 PROCEDURE — 38222 DX BONE MARROW BX & ASPIR: CPT | Performed by: INTERNAL MEDICINE

## 2018-11-09 PROCEDURE — 87505 NFCT AGENT DETECTION GI: CPT | Performed by: FAMILY MEDICINE

## 2018-11-09 PROCEDURE — 85025 COMPLETE CBC W/AUTO DIFF WBC: CPT | Performed by: EMERGENCY MEDICINE

## 2018-11-09 PROCEDURE — 88185 FLOWCYTOMETRY/TC ADD-ON: CPT | Performed by: INTERNAL MEDICINE

## 2018-11-09 PROCEDURE — 88365 INSITU HYBRIDIZATION (FISH): CPT | Performed by: PATHOLOGY

## 2018-11-09 PROCEDURE — 88184 FLOWCYTOMETRY/ TC 1 MARKER: CPT | Performed by: INTERNAL MEDICINE

## 2018-11-09 PROCEDURE — 96360 HYDRATION IV INFUSION INIT: CPT

## 2018-11-09 PROCEDURE — 88342 IMHCHEM/IMCYTCHM 1ST ANTB: CPT | Performed by: PATHOLOGY

## 2018-11-09 PROCEDURE — 84484 ASSAY OF TROPONIN QUANT: CPT | Performed by: EMERGENCY MEDICINE

## 2018-11-09 PROCEDURE — 85097 BONE MARROW INTERPRETATION: CPT | Performed by: PATHOLOGY

## 2018-11-09 PROCEDURE — 99205 OFFICE O/P NEW HI 60 MIN: CPT | Performed by: INTERNAL MEDICINE

## 2018-11-09 PROCEDURE — 88305 TISSUE EXAM BY PATHOLOGIST: CPT | Performed by: PATHOLOGY

## 2018-11-09 PROCEDURE — 88313 SPECIAL STAINS GROUP 2: CPT | Performed by: PATHOLOGY

## 2018-11-09 PROCEDURE — 1111F DSCHRG MED/CURRENT MED MERGE: CPT | Performed by: INTERNAL MEDICINE

## 2018-11-09 PROCEDURE — 82948 REAGENT STRIP/BLOOD GLUCOSE: CPT

## 2018-11-09 PROCEDURE — 70491 CT SOFT TISSUE NECK W/DYE: CPT

## 2018-11-09 PROCEDURE — 87493 C DIFF AMPLIFIED PROBE: CPT | Performed by: FAMILY MEDICINE

## 2018-11-09 RX ORDER — FENTANYL 25 UG/H
1 PATCH TRANSDERMAL
Status: DISCONTINUED | OUTPATIENT
Start: 2018-11-09 | End: 2018-11-10 | Stop reason: HOSPADM

## 2018-11-09 RX ORDER — GLIPIZIDE 5 MG/1
10 TABLET ORAL
Status: DISCONTINUED | OUTPATIENT
Start: 2018-11-09 | End: 2018-11-10 | Stop reason: HOSPADM

## 2018-11-09 RX ORDER — EZETIMIBE 10 MG/1
10 TABLET ORAL DAILY
Status: DISCONTINUED | OUTPATIENT
Start: 2018-11-10 | End: 2018-11-10 | Stop reason: HOSPADM

## 2018-11-09 RX ORDER — RANITIDINE 150 MG/1
150 TABLET ORAL 2 TIMES DAILY
COMMUNITY
End: 2018-12-11 | Stop reason: SDUPTHER

## 2018-11-09 RX ORDER — LOSARTAN POTASSIUM 25 MG/1
25 TABLET ORAL DAILY
Status: DISCONTINUED | OUTPATIENT
Start: 2018-11-10 | End: 2018-11-10 | Stop reason: HOSPADM

## 2018-11-09 RX ORDER — CARVEDILOL 12.5 MG/1
25 TABLET ORAL 2 TIMES DAILY WITH MEALS
Status: DISCONTINUED | OUTPATIENT
Start: 2018-11-09 | End: 2018-11-10

## 2018-11-09 RX ORDER — ERGOCALCIFEROL 1.25 MG/1
50000 CAPSULE ORAL WEEKLY
Status: DISCONTINUED | OUTPATIENT
Start: 2018-11-09 | End: 2018-11-10 | Stop reason: HOSPADM

## 2018-11-09 RX ORDER — ONDANSETRON 2 MG/ML
4 INJECTION INTRAMUSCULAR; INTRAVENOUS EVERY 6 HOURS PRN
Status: DISCONTINUED | OUTPATIENT
Start: 2018-11-09 | End: 2018-11-10 | Stop reason: HOSPADM

## 2018-11-09 RX ORDER — ACETAMINOPHEN 325 MG/1
650 TABLET ORAL EVERY 6 HOURS PRN
Status: DISCONTINUED | OUTPATIENT
Start: 2018-11-09 | End: 2018-11-10 | Stop reason: HOSPADM

## 2018-11-09 RX ORDER — HYDROCODONE BITARTRATE AND ACETAMINOPHEN 5; 325 MG/1; MG/1
1 TABLET ORAL EVERY 6 HOURS PRN
Status: DISCONTINUED | OUTPATIENT
Start: 2018-11-09 | End: 2018-11-10 | Stop reason: HOSPADM

## 2018-11-09 RX ORDER — OMEPRAZOLE 40 MG/1
40 CAPSULE, DELAYED RELEASE ORAL DAILY
COMMUNITY
End: 2018-12-11 | Stop reason: SDUPTHER

## 2018-11-09 RX ORDER — FENTANYL 25 UG/H
25 PATCH TRANSDERMAL ONCE
Status: DISCONTINUED | OUTPATIENT
Start: 2018-11-09 | End: 2018-11-10 | Stop reason: HOSPADM

## 2018-11-09 RX ORDER — PANTOPRAZOLE SODIUM 40 MG/1
40 TABLET, DELAYED RELEASE ORAL
Status: DISCONTINUED | OUTPATIENT
Start: 2018-11-10 | End: 2018-11-10 | Stop reason: HOSPADM

## 2018-11-09 RX ORDER — SODIUM CHLORIDE 9 MG/ML
125 INJECTION, SOLUTION INTRAVENOUS CONTINUOUS
Status: DISCONTINUED | OUTPATIENT
Start: 2018-11-09 | End: 2018-11-10 | Stop reason: HOSPADM

## 2018-11-09 RX ORDER — FAMOTIDINE 20 MG/1
20 TABLET, FILM COATED ORAL DAILY
Status: DISCONTINUED | OUTPATIENT
Start: 2018-11-10 | End: 2018-11-10 | Stop reason: HOSPADM

## 2018-11-09 RX ORDER — AMLODIPINE BESYLATE 10 MG/1
10 TABLET ORAL DAILY
Status: DISCONTINUED | OUTPATIENT
Start: 2018-11-10 | End: 2018-11-10

## 2018-11-09 RX ADMIN — HYDROCODONE BITARTRATE AND ACETAMINOPHEN 1 TABLET: 5; 325 TABLET ORAL at 21:33

## 2018-11-09 RX ADMIN — SODIUM CHLORIDE 1000 ML: 9 INJECTION, SOLUTION INTRAVENOUS at 14:18

## 2018-11-09 RX ADMIN — FENTANYL 25 MCG: 25 PATCH TRANSDERMAL at 14:35

## 2018-11-09 RX ADMIN — SODIUM CHLORIDE 125 ML/HR: 9 INJECTION, SOLUTION INTRAVENOUS at 17:29

## 2018-11-09 RX ADMIN — LIDOCAINE HYDROCHLORIDE 10 ML: 20 SOLUTION ORAL; TOPICAL at 17:32

## 2018-11-09 RX ADMIN — ERGOCALCIFEROL 50000 UNITS: 1.25 CAPSULE ORAL at 17:29

## 2018-11-09 RX ADMIN — IOHEXOL 85 ML: 350 INJECTION, SOLUTION INTRAVENOUS at 15:38

## 2018-11-09 RX ADMIN — CARVEDILOL 25 MG: 12.5 TABLET, FILM COATED ORAL at 17:29

## 2018-11-09 NOTE — ED PROCEDURE NOTE
PROCEDURE  ECG 12 Lead Documentation  Date/Time: 11/9/2018 2:35 PM  Performed by: Obinna Logan  Authorized by: Obinna Logan     Indications / Diagnosis:  Dysphagia  ECG reviewed by me, the ED Provider: yes    Patient location:  ED  Rate:     ECG rate:  50  Rhythm:     Rhythm: sinus bradycardia    Ectopy:     Ectopy: none    QRS:     QRS axis:  Normal    QRS intervals:  Normal  Conduction:     Conduction: normal    ST segments:     ST segments:  Normal  T waves:     T waves: normal           Devorah Martinez MD  11/09/18 1511

## 2018-11-09 NOTE — ED PROVIDER NOTES
History  Chief Complaint   Patient presents with    Difficulty Swallowing     pt has hx of throat CA, sent down by Dr Rosina Jimenez office for 2 days of difficulty swallowing and not eating/drinking   c/o increase pain in throat     Patient is a 68year old female with a recent diagnosis of lymphoma who was sent down by Dr Lico Lino for a two day history of dysphagia  Family states patient had one episode of vomiting yesterday and one day of diarrhea today  Per Dr Leonardo Fortune, pt was seen by Dr Lico Lino today and concern for airway compromise and sent to the ER over a direct admit  Pt without any cough, chest pain, fever, chills or sweats  No meds given  Pt was scheduled for a port placement and the beginning of chemo next week  Pt here without any airway complaints  Prior to Admission Medications   Prescriptions Last Dose Informant Patient Reported? Taking? HYDROcodone-acetaminophen (NORCO) 5-325 mg per tablet   No No   Sig: Take 1 tablet by mouth every 6 (six) hours as needed for pain for up to 10 doses Max Daily Amount: 4 tablets   amLODIPine (NORVASC) 10 mg tablet   Yes No   Sig: Take 10 mg by mouth daily  carvedilol (COREG) 12 5 mg tablet  Self Yes No   Sig: Take 25 mg by mouth 2 (two) times a day with meals     ergocalciferol (VITAMIN D2) 50,000 units   Yes No   Sig: Take 50,000 Units by mouth once a week    ezetimibe (ZETIA) 10 mg tablet   Yes No   Sig: Take 10 mg by mouth daily  fentaNYL (DURAGESIC) 25 mcg/hr  Self Yes No   Sig: Place 1 patch on the skin every third day   fentaNYL (DURAGESIC) 25 mcg/hr   No No   Sig: Place 1 patch on the skin every third day Max Daily Amount: 1 patch   glipiZIDE (GLUCOTROL) 10 mg tablet   Yes No   Sig: Take 10 mg by mouth 2 (two) times a day before meals     lidocaine viscous (XYLOCAINE) 2 % mucosal solution   No No   Sig: Swish and swallow 10 mL 3 (three) times a day as needed for mild pain or moderate pain   losartan (COZAAR) 25 mg tablet   No No   Sig: Take 1 tablet (25 mg total) by mouth daily   oxyCODONE-acetaminophen (PERCOCET) 5-325 mg per tablet   No No   Sig: Take 1 tablet by mouth 2 (two) times a day Max Daily Amount: 2 tablets   pantoprazole (PROTONIX) 40 mg tablet   Yes No   Sig: Take 40 mg by mouth daily  ranitidine (ZANTAC) 300 MG tablet   No No   Sig: Take 1 tablet (300 mg total) by mouth daily at bedtime   sitaGLIPtin-metFORMIN (JANUMET)  MG per tablet   No No   Sig: Take 1 tablet by mouth 2 (two) times a day with meals      Facility-Administered Medications: None       Past Medical History:   Diagnosis Date    Cancer ([place] [address] )     Throat    Diabetes mellitus ([place] [address] )     Diffuse large B cell lymphoma ([place] [address] )     Dysphagia     GERD without esophagitis     HTN (hypertension)     Hyperlipidemia     Hypertension     MI (myocardial infarction) ([place] [address] )        Past Surgical History:   Procedure Laterality Date    BONE MARROW BIOPSY      BREAST LUMPECTOMY      CHOLECYSTECTOMY      OTHER SURGICAL HISTORY      tendor tear repair to right shoulder    SHOULDER ARTHROSCOPY         Family History   Problem Relation Age of Onset    Diabetes Mother     Heart disease Sister     Hypertension Brother     Cancer Maternal Grandmother     Cancer Maternal Grandfather      I have reviewed and agree with the history as documented  Social History   Substance Use Topics    Smoking status: Never Smoker    Smokeless tobacco: Never Used    Alcohol use No        Review of Systems   Constitutional: Positive for appetite change  Negative for activity change, fatigue and fever  HENT: Positive for trouble swallowing  Eyes: Negative  Respiratory: Negative  Negative for cough and shortness of breath  Cardiovascular: Negative  Gastrointestinal: Positive for diarrhea and vomiting  Negative for abdominal pain and constipation  Endocrine: Negative  Genitourinary: Negative  Musculoskeletal: Negative  Skin: Negative  Allergic/Immunologic: Negative  Neurological: Negative  Hematological: Negative  Psychiatric/Behavioral: Negative  All other systems reviewed and are negative  Physical Exam  Physical Exam   Constitutional: She is oriented to person, place, and time  She appears well-developed and well-nourished  HENT:   Head: Normocephalic and atraumatic  Right Ear: External ear normal    Left Ear: External ear normal    Nose: Nose normal    Mouth/Throat: Oropharynx is clear and moist    Eyes: Pupils are equal, round, and reactive to light  Conjunctivae are normal    Neck: Normal range of motion  Neck supple  Pain with palpation of throat, no tracheal deviation  Cardiovascular: Normal rate, regular rhythm, normal heart sounds and intact distal pulses  Pulmonary/Chest: Effort normal and breath sounds normal  No respiratory distress  She has no wheezes  She has no rales  She exhibits no tenderness  Abdominal: Soft  Bowel sounds are normal    Musculoskeletal: Normal range of motion  Neurological: She is alert and oriented to person, place, and time  Skin: Skin is warm and dry  Capillary refill takes less than 2 seconds  Psychiatric: She has a normal mood and affect  Her behavior is normal    Nursing note and vitals reviewed        Vital Signs  ED Triage Vitals [11/09/18 1310]   Temp Pulse Respirations Blood Pressure SpO2   -- 55 16 140/81 98 %      Temp src Heart Rate Source Patient Position - Orthostatic VS BP Location FiO2 (%)   -- -- Lying Left arm --      Pain Score       --           Vitals:    11/09/18 1310   BP: 140/81   Pulse: 55   Patient Position - Orthostatic VS: Lying       Visual Acuity      ED Medications  Medications   sodium chloride 0 9 % bolus 1,000 mL (1,000 mL Intravenous New Bag 11/9/18 1418)   fentaNYL (DURAGESIC) 25 mcg/hr TD 72 hr patch 25 mcg (25 mcg Transdermal Medication Applied 11/9/18 1435)       Diagnostic Studies  Results Reviewed     Procedure Component Value Units Date/Time    Troponin I [[id number]]  (Normal) Collected:  11/09/18 1418    Lab Status:  Final result Specimen:  Blood from Arm, Right Updated:  11/09/18 1449     Troponin I <0 01 ng/mL     Lipase [[id number]]  (Normal) Collected:  11/09/18 1418    Lab Status:  Final result Specimen:  Blood from Arm, Right Updated:  11/09/18 1439     Lipase 46 u/L     Comprehensive metabolic panel [[id number]]  (Abnormal) Collected:  11/09/18 1418    Lab Status:  Final result Specimen:  Blood from Arm, Right Updated:  11/09/18 1438     Sodium 138 mmol/L      Potassium 4 0 mmol/L      Chloride 100 mmol/L      CO2 31 (H) mmol/L      ANION GAP 7 mmol/L      BUN 13 mg/dL      Creatinine 0 71 mg/dL      Glucose 101 (H) mg/dL      Calcium 9 4 mg/dL      AST 27 U/L      ALT 26 U/L      Alkaline Phosphatase 81 U/L      Total Protein 7 6 g/dL      Albumin 4 2 g/dL      Total Bilirubin 0 50 mg/dL      eGFR 85 ml/min/1 73sq m     Narrative:         National Kidney Disease Education Program recommendations are as follows:  GFR calculation is accurate only with a steady state creatinine  Chronic Kidney disease less than 60 ml/min/1 73 sq  meters  Kidney failure less than 15 ml/min/1 73 sq  meters      CBC and differential [[id number]]  (Abnormal) Collected:  11/09/18 1418    Lab Status:  Final result Specimen:  Blood from Arm, Right Updated:  11/09/18 1431     WBC 8 30 Thousand/uL      RBC 4 14 Million/uL      Hemoglobin 12 7 g/dL      Hematocrit 37 7 %      MCV 91 fL      MCH 30 6 pg      MCHC 33 6 g/dL      RDW 13 6 %      MPV 7 9 (L) fL      Platelets 458 Thousands/uL      Neutrophils Relative 63 %      Lymphocytes Relative 26 %      Monocytes Relative 9 %      Eosinophils Relative 1 %      Basophils Relative 1 %      Neutrophils Absolute 5 20 Thousands/µL      Lymphocytes Absolute 2 20 Thousands/µL      Monocytes Absolute 0 80 Thousand/µL      Eosinophils Absolute 0 10 Thousand/µL      Basophils Absolute 0 10 Thousands/µL                  CT soft tissue neck with contrast    (Results Pending)              Procedures  Procedures       Phone Contacts  ED Phone Contact    ED Course  ED Course as of Nov 09 1536   Fri Nov 09, 2018   1411 Spoke with Dr Lyn Osborne  Hold on imagine at this time  Was called by Jose A Jackson with ?airway compromise and dyspnea  Explained pt in no acute resp distress at this time  Wants call back when labs are back and will admit  [place] [address] with Dr Lyn Osborne  Will admit  CT neck only at this time  Med/surg                                  MDM  Number of Diagnoses or Management Options  Dysphagia, unspecified type:   Lymphoma, unspecified body region, unspecified lymphoma type [facility]):      Amount and/or Complexity of Data Reviewed  Clinical lab tests: ordered  Tests in the medicine section of CPT®: reviewed and ordered  Decide to obtain previous medical records or to obtain history from someone other than the patient: yes  Obtain history from someone other than the patient: yes  Review and summarize past medical records: yes  Discuss the patient with other providers: yes      CritCare Time    Disposition  Final diagnoses:   Lymphoma, unspecified body region, unspecified lymphoma type ([facility] )   Dysphagia, unspecified type     Time reflects when diagnosis was documented in both MDM as applicable and the Disposition within this note     Time User Action Codes Description Comment    11/9/2018  3:01 PM Estefany Smith Add [C85 90] Lymphoma, unspecified body region, unspecified lymphoma type ([facility] )     11/9/2018  3:01 PM Estefany Smith Add [R13 10] Dysphagia, unspecified type       ED Disposition     ED Disposition Condition Comment    Admit  Case was discussed with Dr Lyn Osborne and the patient's admission status was agreed to be Admission Status: inpatient status to the service of Dr Lyn Osborne   Follow-up Information    None         Patient's Medications   Discharge Prescriptions    No medications on file     No discharge procedures on file      ED Provider  Electronically Signed by           Elizabeth Montgomery MD  11/09/18 1331

## 2018-11-09 NOTE — ASSESSMENT & PLAN NOTE
Patient states that she sometimes gets short of breath which has been progressively worsening and becoming more consistent over the last 2-3 days  Will get a chest x-ray to rule out any other etiology  No airway compression noted in the neck as per CT neck  Will do an ambulatory oxygen study tomorrow and and overnight oxygen study tonight and see if any supplemental oxygen is required  She may just be having pressure effect secondary to having lymphoma in the neck

## 2018-11-09 NOTE — PROGRESS NOTES
Bone marrow biopsy procedure completed at 12:10pm   Patient placed in supine position applying direct pressure to the site  At 12:35pm   dressing to the right iliac crest is clean dry and intact no oozing noted from the site  Applied large Band-Aid  Patient denies pain, dizziness or headache  Blood pressure 120/60 , heart rate 54bpm , O2 on room air 94 %  Patient instructed to keep the site clean and dry for 24 hours and may resume bathing and remove bandage in 24 hours  Educated to call if he/she develops severe pain, significant bleeding from the site or infection/fever  Also instructed to take over-the-counter Tylenol as needed for pain and discomfort especially during the 1st 24-48 hours  Patient discharged home

## 2018-11-09 NOTE — ASSESSMENT & PLAN NOTE
Patient states that she has difficulty swallowing and she gets pain in her throat  She has dysphagia to solids and liquids  She also states that sometimes it because she eats only liquid food she gets loose stools  Will place her on a pureed diet and thin liquids and observe her swallowing for now  Reviewed CT of the neck  No significant obstruction noted to the oral passages    Will place on IV fluid hydration for now and monitor her eating

## 2018-11-09 NOTE — ASSESSMENT & PLAN NOTE
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  Mass  of the left soft palate/left oropharynx measuring 3 9 x 2 8 x 2 8 cm in keeping with this patient's known history of malignancy  Patient should be scheduled for Port-A-Cath placement by surgery soon and be started on chemotherapy soon  She has lost over 35 lb in the last 6 months

## 2018-11-09 NOTE — PROGRESS NOTES
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)  The patient seems to have a new diagnosis of diffuse large B-cell lymphoma of the left tonsil with adenopathy of both sides of her neck  She will need further staging with a PET-CT scan as an outpatient as soon as possible  We will also need to review her pathology by our hematopathologist team and check for gene rearrangements for BCL 2, BCL 6 and myc translocation to rule out triple hit diffuse large B-cell lymphoma which has a very poor prognosis  The patient seems to be very symptomatic with severe dysphagia and odynophagia with frequent vomiting and significant weight loss  She also is short of breath with the possibility of airway compromise for that reason I will send her to the emergency room for further evaluation and admission  During the hospital course she will need to be evaluated for upper airway compromise possibly by the ENT team   She also would need a Port-A-Cath placed as soon as possible to be used for chemotherapy in the near future  I did perform a bone marrow biopsy today after the office visit to accelerate her initial workup   - NM PET CT skull base to mid thigh; Future  - CBC and differential; Future  - Comprehensive metabolic panel; Future  - C-reactive protein; Future  - Sedimentation rate, automated; Future  - LD,Blood; Future  - Iron Panel; Future  - Vitamin B12; Future  - Uric acid; Future  - Hemolysis Smear; Future  - TSH, 3rd generation with Free T4 reflex; Future  - Reticulocytes; Future  - Beta 2 microglobulin, serum; Future  - IgG, IgA, IgM; Future  - Protein electrophoresis, serum; Future  - Transfer to other facility  - Chronic Hepatitis Panel; Future  - Echo complete with contrast if indicated;  Future    Bone Marrow Biopsy and Aspiration Procedure Note Informed consent was obtained and potential risks including bleeding, infection and pain were reviewed with the patient  Posterior iliac crest(s) prepped with Betadine  Lidocaine 2% local anesthesia infiltrated into the subcutaneous tissue  Right bone marrow biopsy and right bone marrow aspirate was obtained  The procedure was tolerated well and there were no complications  Specimens sent for: routine histopathologic stains and sectioning, flow cytometry, cytogenetics and molecular analysis    Physician: Janette Interiano MD      HPI:  This is a 23-year-old  female originally from [facility] with history of type 2 diabetes mellitus, hyperlipidemia, hypertension  The patient apparently complained about significant sore throat in May which was treated with antibiotics by her PCP in [facility] which did not improve her sore throat  She then started to notice significant odynophagia and dysphagia for liquids it is and solid nutrition  Eventually, she had a CT scan of the neck on the 20th of September which showed soft tissue lesion in the left palatine tonsil with abnormal lymph nodes bilaterally in the neck compatible with neoplastic process  She then had a biopsy of the left tonsil/left tonsillectomy on the 25th of September 2018 which was compatible with diffuse large B-cell lymphoma germinal center B phenotype  The immunohistochemical staining came back positive for BCL2, BCL 6 and myc with Ki 67 around 70%  No FISH rearrangements gene study was done for BCL2, BCL 6 are myc translocation  [place] evaluation and treatment of her was seems to be aggressive diffuse large B-cell lymphoma of the left tonsil  The patient was scheduled to get a CT scan of the chest abdomen pelvis today however she came to the office prior to that for further evaluation of her lymphoma    She complained today about severe pain when she swallows liquids or solid food and can barely taking her medication  She also complains about frequent nausea and vomiting and significant weight loss  The more concerning symptom was her shortness of breath which could be due to airway compromise  Interval history:    ROS: Review of Systems   Constitutional: Positive for appetite change, fatigue and unexpected weight change  Negative for activity change, chills, diaphoresis and fever  Weight loss   HENT: Positive for mouth sores, sore throat and trouble swallowing  Negative for congestion, dental problem, ear discharge, ear pain, facial swelling, hearing loss, nosebleeds, postnasal drip and tinnitus  Dysphagia and odynophagia to liquid and solid food   Eyes: Negative for discharge, redness, itching and visual disturbance  Respiratory: Positive for shortness of breath  Negative for cough, chest tightness and wheezing  Cardiovascular: Negative for chest pain, palpitations and leg swelling  Gastrointestinal: Positive for diarrhea, nausea and vomiting  Negative for abdominal distention, abdominal pain, anal bleeding, blood in stool and constipation  Genitourinary: Negative for difficulty urinating, dysuria, flank pain, frequency, hematuria and urgency  Musculoskeletal: Positive for neck pain  Negative for arthralgias, back pain, gait problem, joint swelling and myalgias  Skin: Negative for color change, pallor, rash and wound  Neurological: Negative for dizziness, syncope, speech difficulty, weakness, light-headedness, numbness and headaches  Hematological: Negative for adenopathy  Does not bruise/bleed easily  Psychiatric/Behavioral: Positive for sleep disturbance  Negative for agitation, behavioral problems and confusion  Past Medical History:   Diagnosis Date    Cancer [facility])     Throat    Diabetes mellitus ([facility] [address] )     Hyperlipidemia     Hypertension        History reviewed  No pertinent surgical history      Social History     Social History    Marital status:      Spouse name: N/A    Number of children: N/A    Years of education: N/A     Social History Main Topics    Smoking status: Never Smoker    Smokeless tobacco: Never Used    Alcohol use No    Drug use: No    Sexual activity: Not Asked     Other Topics Concern    None     Social History Narrative    None       History reviewed  No pertinent family history  No Known Allergies      Current Outpatient Prescriptions:     amLODIPine (NORVASC) 10 mg tablet, Take 10 mg by mouth daily  , Disp: , Rfl:     carvedilol (COREG) 12 5 mg tablet, Take 25 mg by mouth 2 (two) times a day with meals  , Disp: , Rfl:     ergocalciferol (VITAMIN D2) 50,000 units, Take 50,000 Units by mouth once a week , Disp: , Rfl:     ezetimibe (ZETIA) 10 mg tablet, Take 10 mg by mouth daily  , Disp: , Rfl:     fentaNYL (DURAGESIC) 25 mcg/hr, Place 1 patch on the skin every third day, Disp: , Rfl:     fentaNYL (DURAGESIC) 25 mcg/hr, Place 1 patch on the skin every third day Max Daily Amount: 1 patch, Disp: 10 patch, Rfl: 0    glipiZIDE (GLUCOTROL) 10 mg tablet, Take 10 mg by mouth 2 (two) times a day before meals  , Disp: , Rfl:     HYDROcodone-acetaminophen (NORCO) 5-325 mg per tablet, Take 1 tablet by mouth every 6 (six) hours as needed for pain for up to 10 doses Max Daily Amount: 4 tablets, Disp: 10 tablet, Rfl: 0    lidocaine viscous (XYLOCAINE) 2 % mucosal solution, Swish and swallow 10 mL 3 (three) times a day as needed for mild pain or moderate pain, Disp: 100 mL, Rfl: 2    losartan (COZAAR) 25 mg tablet, Take 1 tablet (25 mg total) by mouth daily, Disp: 90 tablet, Rfl: 1    oxyCODONE-acetaminophen (PERCOCET) 5-325 mg per tablet, Take 1 tablet by mouth 2 (two) times a day Max Daily Amount: 2 tablets, Disp: 60 tablet, Rfl: 0    pantoprazole (PROTONIX) 40 mg tablet, Take 40 mg by mouth daily  , Disp: , Rfl:     ranitidine (ZANTAC) 300 MG tablet, Take 1 tablet (300 mg total) by mouth daily at bedtime, Disp: 90 tablet, Rfl: 1    sitaGLIPtin-metFORMIN (JANUMET)  MG per tablet, Take 1 tablet by mouth 2 (two) times a day with meals, Disp: 90 tablet, Rfl: 1      Physical Exam:  /68 (BP Location: Left arm, Patient Position: Sitting, Cuff Size: Adult)   Pulse 56   Temp 97 8 °F (36 6 °C) (Tympanic)   Resp 18   Ht 4' 11" (1 499 m)   Wt 64 4 kg (142 lb)   SpO2 92%   BMI 28 68 kg/m²     Physical Exam   Constitutional: She is oriented to person, place, and time  She appears well-developed and well-nourished  No distress  HENT:   Head: Normocephalic and atraumatic  Nose: Nose normal    She has distorted anatomy of the left oropharyngeal space with enlargement of the left tonsil   Eyes: Pupils are equal, round, and reactive to light  Conjunctivae and EOM are normal  Right eye exhibits no discharge  Left eye exhibits no discharge  No scleral icterus  Neck: Normal range of motion  Neck supple  No JVD present  No tracheal deviation present  No thyromegaly present  Cardiovascular: Normal rate, regular rhythm and normal heart sounds  Exam reveals no friction rub  No murmur heard  Pulmonary/Chest: Effort normal and breath sounds normal  No stridor  No respiratory distress  She has no wheezes  She has no rales  She exhibits no tenderness  Abdominal: Soft  Bowel sounds are normal  She exhibits no distension and no mass  There is no hepatosplenomegaly, splenomegaly or hepatomegaly  There is no tenderness  There is no rebound and no guarding  Musculoskeletal: Normal range of motion  She exhibits no edema, tenderness or deformity  Lymphadenopathy:     She has no cervical adenopathy  Neurological: She is alert and oriented to person, place, and time  She has normal reflexes  No cranial nerve deficit  Coordination normal    Skin: Skin is warm and dry  No rash noted  She is not diaphoretic  No erythema  No pallor  Psychiatric: She has a normal mood and affect   Her behavior is normal  Judgment and thought content normal          Labs:  Lab Results   Component Value Date    WBC 7 41 10/25/2018    HGB 11 7 10/25/2018    HCT 36 2 10/25/2018    MCV 92 10/25/2018     10/25/2018     Lab Results   Component Value Date    K 4 1 10/25/2018    CL 97 (L) 10/25/2018    CO2 31 10/25/2018    BUN 9 10/25/2018    CREATININE 0 82 10/25/2018    CALCIUM 9 4 10/25/2018    AST 14 10/25/2018    ALT 17 10/25/2018    ALKPHOS 89 10/25/2018    EGFR 72 10/25/2018     No results found for: TSH    Patient voiced understanding and agreement in the above discussion  Aware to contact our office with questions/symptoms in the interim

## 2018-11-09 NOTE — H&P
H&P- [address] [facility] [birth year], 68 y o  female MRN: [id number]    Unit/Bed#: ED 08 Encounter: [id number]    Primary Care Provider: Destiny Michelle PA-C   Date and time admitted to hospital: 11/9/2018  1:07 PM        Oropharyngeal dysphagia   Assessment & Plan    Patient states that she has difficulty swallowing and she gets pain in her throat  She has dysphagia to solids and liquids  She also states that sometimes it because she eats only liquid food she gets loose stools  Will place her on a pureed diet and thin liquids and observe her swallowing for now  Reviewed CT of the neck  No significant obstruction noted to the oral passages  Will place on IV fluid hydration for now and monitor her eating     * SOB (shortness of breath)   Assessment & Plan    Patient states that she sometimes gets short of breath which has been progressively worsening and becoming more consistent over the last 2-3 days  Will get a chest x-ray to rule out any other etiology  No airway compression noted in the neck as per CT neck  Will do an ambulatory oxygen study tomorrow and and overnight oxygen study tonight and see if any supplemental oxygen is required  She may just be having pressure effect secondary to having lymphoma in the neck  Diffuse large 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 [facility])   Assessment & Plan    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  Mass  of the left soft palate/left oropharynx measuring 3 9 x 2 8 x 2 8 cm in keeping with this patient's known history of malignancy  Patient should be scheduled for Port-A-Cath placement by surgery soon and be started on chemotherapy soon  She has lost over 35 lb in the last 6 months  Gastroesophageal reflux disease   Assessment & Plan    Continue proton pump inhibitor     Essential hypertension   Assessment & Plan    Blood pressure well controlled    Continue losartan, Coreg and Norvasc     Type 2 diabetes mellitus without complication, without long-term current use of insulin ([facility] [address] )   Assessment & Plan    Lab Results   Component Value Date    HGBA1C 7 5 10/31/2018       No results for input(s): POCGLU in the last 72 hours  Blood Sugar Average: Last 72 hrs:  Hold metformin as she got IV contrast today  Place the patient on insulin sliding scale  I have discussed the case with her oncologist Dr Naif Ventura  I have also reviewed previous records  VTE Prophylaxis: Enoxaparin (Lovenox)  / sequential compression device   Code Status:  Full code  POLST: There is no POLST form on file for this patient (pre-hospital)  Discussion with family:  Discussed with son at bedside    Anticipated Length of Stay:  Patient will be admitted on an Observation basis with an anticipated length of stay of  Less than 2 midnights  Justification for Hospital Stay:  Shortness of breath and dysphagia    Total Time for Visit, including Counseling / Coordination of Care: 45 minutes  Greater than 50% of this total time spent on direct patient counseling and coordination of care  Chief Complaint:   Shortness of breath and dysphagia    History of Present Illness:    Chino Marcos is a 68 y o  female who presents with shortness of breath and dysphagia  Patient states that for the last 6 months she has been having difficulty swallowing  She has lost around 35 lb in 6 months  She states that initially she started off with dysphagia to solids but now she has dysphagia to both solids and liquids  For the last 2-3 days she has now also developed difficulty breathing and feels like her neck is closing in at times especially when she lays flat  She continues to also have worsening dysphagia and has not had anything to eat or drink for the last 2 days  She was seen by her oncologist today and found to have an oxygen saturation of 92% and some dyspnea and hence sent to the emergency room for further evaluation  She denies any chest pain, fever, chills, cough or cold  No sick contacts  She was recently diagnosed with diffuse B-cell lymphoma     Review of Systems:    Review of Systems   Constitutional: Positive for activity change, appetite change, fatigue and unexpected weight change  Negative for chills and fever  HENT: Positive for sore throat and trouble swallowing  Negative for hearing loss  Eyes: Negative for photophobia, discharge and visual disturbance  Respiratory: Positive for shortness of breath  Negative for chest tightness  Cardiovascular: Negative for chest pain and palpitations  Gastrointestinal: Negative for abdominal pain, blood in stool and vomiting  Endocrine: Negative for polydipsia and polyuria  Genitourinary: Negative for difficulty urinating, dysuria, flank pain and hematuria  Musculoskeletal: Negative for back pain and gait problem  Skin: Negative for rash  Allergic/Immunologic: Negative for environmental allergies and food allergies  Neurological: Positive for weakness  Negative for dizziness, seizures, syncope and headaches  Hematological: Does not bruise/bleed easily  Psychiatric/Behavioral: Negative for behavioral problems  All other systems reviewed and are negative  Past Medical and Surgical History:     Past Medical History:   Diagnosis Date    Cancer ([place] [address] )     Throat    Diabetes mellitus ([place] [address] )     Diffuse large B cell lymphoma ([place] [address] )     Dysphagia     GERD without esophagitis     HTN (hypertension)     Hyperlipidemia     Hypertension     MI (myocardial infarction) ([place] [address] )        Past Surgical History:   Procedure Laterality Date    BONE MARROW BIOPSY      BREAST LUMPECTOMY      CHOLECYSTECTOMY      OTHER SURGICAL HISTORY      tendor tear repair to right shoulder    SHOULDER ARTHROSCOPY         Meds/Allergies:    Prior to Admission medications    Medication Sig Start Date End Date Taking?  Authorizing Provider   omeprazole (PriLOSEC) 40 MG capsule Take 40 mg by mouth daily   Yes Historical Provider, MD   ranitidine (ZANTAC) 150 mg tablet Take 150 mg by mouth 2 (two) times a day   Yes Historical Provider, MD   amLODIPine (NORVASC) 10 mg tablet Take 10 mg by mouth daily  Historical Provider, MD   carvedilol (COREG) 12 5 mg tablet Take 25 mg by mouth 2 (two) times a day with meals      Historical Provider, MD   ergocalciferol (VITAMIN D2) 50,000 units Take 50,000 Units by mouth once a week  Historical Provider, MD   ezetimibe (ZETIA) 10 mg tablet Take 10 mg by mouth daily  Historical Provider, MD   fentaNYL (DURAGESIC) 25 mcg/hr Place 1 patch on the skin every third day    Historical Provider, MD   fentaNYL (DURAGESIC) 25 mcg/hr Place 1 patch on the skin every third day Max Daily Amount: 1 patch 10/31/18   Linda Enriquez MD   glipiZIDE (GLUCOTROL) 10 mg tablet Take 10 mg by mouth 2 (two) times a day before meals  Historical Provider, MD   HYDROcodone-acetaminophen (NORCO) 5-325 mg per tablet Take 1 tablet by mouth every 6 (six) hours as needed for pain for up to 10 doses Max Daily Amount: 4 tablets 10/25/18   Rhys Gibbs MD   lidocaine viscous (XYLOCAINE) 2 % mucosal solution Swish and swallow 10 mL 3 (three) times a day as needed for mild pain or moderate pain 10/25/18   Rhys Gibbs MD   losartan (COZAAR) 25 mg tablet Take 1 tablet (25 mg total) by mouth daily 10/31/18   Macey Lau PA-C   oxyCODONE-acetaminophen (PERCOCET) 5-325 mg per tablet Take 1 tablet by mouth 2 (two) times a day Max Daily Amount: 2 tablets 10/31/18   Linda Enriquez MD   sitaGLIPtin-metFORMIN (JANUMET)  MG per tablet Take 1 tablet by mouth 2 (two) times a day with meals 10/31/18   Macey Lau PA-C   pantoprazole (PROTONIX) 40 mg tablet Take 40 mg by mouth daily    11/9/18  Historical Provider, MD   ranitidine (ZANTAC) 300 MG tablet Take 1 tablet (300 mg total) by mouth daily at bedtime 10/31/18 11/9/18  Macey Lau PA-C     I have reviewed home medications with patient personally  Allergies: No Known Allergies    Social History:     Marital Status:    History   Alcohol Use No     History   Smoking Status    Never Smoker   Smokeless Tobacco    Never Used     History   Drug Use No       Family History:    Family History   Problem Relation Age of Onset    Diabetes Mother     Heart disease Sister     Hypertension Brother     Cancer Maternal Grandmother     Cancer Maternal Grandfather        Physical Exam:     Vitals:   Blood Pressure: 110/66 (11/09/18 1630)  Pulse: 68 (11/09/18 1630)  Respirations: 16 (11/09/18 1630)  Weight - Scale: 64 4 kg (142 lb) (11/09/18 1310)  SpO2: 99 % (11/09/18 1630)    Physical Exam   Constitutional: She is oriented to person, place, and time  She appears well-developed  She appears distressed  HENT:   Head: Normocephalic and atraumatic  Right Ear: External ear normal    Left Ear: External ear normal    Mouth/Throat: Oropharynx is clear and moist    Eyes: Pupils are equal, round, and reactive to light  Conjunctivae and EOM are normal    Neck: Normal range of motion  Neck supple  Cardiovascular: Normal rate, regular rhythm, normal heart sounds and intact distal pulses  Pulmonary/Chest: Effort normal and breath sounds normal    Abdominal: Soft  Bowel sounds are normal  She exhibits no mass  There is no tenderness  There is no rebound and no guarding  Genitourinary:   Genitourinary Comments: deferred   Musculoskeletal: Normal range of motion  Neurological: She is alert and oriented to person, place, and time  Skin: Skin is warm and dry  No rash noted  Psychiatric: She has a normal mood and affect  Nursing note and vitals reviewed  Additional Data:     Lab Results: I have personally reviewed pertinent reports          Results from last 7 days  Lab Units 11/09/18  1418   WBC Thousand/uL 8 30   HEMOGLOBIN g/dL 12 7   HEMATOCRIT % 37 7   PLATELETS Thousands/uL 290   NEUTROS ABS Thousands/µL 5 20 NEUTROS PCT % 63   LYMPHS PCT % 26   MONOS PCT % 9   EOS PCT % 1       Results from last 7 days  Lab Units 11/09/18  1418   POTASSIUM mmol/L 4 0   CHLORIDE mmol/L 100   CO2 mmol/L 31*   BUN mg/dL 13   CREATININE mg/dL 0 71   ANION GAP mmol/L 7   CALCIUM mg/dL 9 4   ALBUMIN g/dL 4 2   TOTAL BILIRUBIN mg/dL 0 50   ALK PHOS U/L 81   ALT U/L 26   AST U/L 27                       Imaging: I have personally reviewed pertinent reports  CT soft tissue neck with contrast   Final Result by Lindsey Ferreira MD (11/09 1617)      1  Compared to recent CT 10/25/2018, no significant interval change in size of large left tonsillar/oropharyngeal wall mass with extension to the tongue base consistent with known history of lymphoma  No significant change in mass effect and    significant oropharyngeal space narrowing  2   Grossly unchanged enlarged left level 2A and right jugulodigastric lymph nodes from 10/25/2018  Workstation performed: [id number]             EKG, Pathology, and Other Studies Reviewed on Admission:   · EKG: ordered    Allscripts / Epic Records Reviewed: Yes     ** Please Note: This note has been constructed using a voice recognition system   **

## 2018-11-09 NOTE — ASSESSMENT & PLAN NOTE
Lab Results   Component Value Date    HGBA1C 7 5 10/31/2018       No results for input(s): POCGLU in the last 72 hours  Blood Sugar Average: Last 72 hrs:  Hold metformin as she got IV contrast today  Place the patient on insulin sliding scale

## 2018-11-10 VITALS
SYSTOLIC BLOOD PRESSURE: 105 MMHG | DIASTOLIC BLOOD PRESSURE: 54 MMHG | WEIGHT: 141.09 LBS | HEART RATE: 55 BPM | TEMPERATURE: 97.7 F | BODY MASS INDEX: 27.7 KG/M2 | OXYGEN SATURATION: 96 % | HEIGHT: 60 IN | RESPIRATION RATE: 18 BRPM

## 2018-11-10 LAB
ANION GAP SERPL CALCULATED.3IONS-SCNC: 5 MMOL/L (ref 5–14)
BUN SERPL-MCNC: 6 MG/DL (ref 5–25)
C DIFF TOX GENS STL QL NAA+PROBE: NORMAL
CALCIUM SERPL-MCNC: 8.5 MG/DL (ref 8.4–10.2)
CAMPYLOBACTER DNA SPEC NAA+PROBE: NORMAL
CHLORIDE SERPL-SCNC: 105 MMOL/L (ref 97–108)
CO2 SERPL-SCNC: 28 MMOL/L (ref 22–30)
CREAT SERPL-MCNC: 0.5 MG/DL (ref 0.6–1.2)
GFR SERPL CREATININE-BSD FRML MDRD: 97 ML/MIN/1.73SQ M
GLUCOSE SERPL-MCNC: 90 MG/DL (ref 70–99)
GLUCOSE SERPL-MCNC: 98 MG/DL (ref 70–99)
MAGNESIUM SERPL-MCNC: 1.5 MG/DL (ref 1.6–2.3)
POTASSIUM SERPL-SCNC: 3.9 MMOL/L (ref 3.6–5)
SALMONELLA DNA SPEC QL NAA+PROBE: NORMAL
SHIGA TOXIN STX GENE SPEC NAA+PROBE: NORMAL
SHIGELLA DNA SPEC QL NAA+PROBE: NORMAL
SODIUM SERPL-SCNC: 138 MMOL/L (ref 137–147)
TSH SERPL DL<=0.05 MIU/L-ACNC: 1.55 UIU/ML (ref 0.47–4.68)

## 2018-11-10 PROCEDURE — 94762 N-INVAS EAR/PLS OXIMTRY CONT: CPT

## 2018-11-10 PROCEDURE — 84443 ASSAY THYROID STIM HORMONE: CPT | Performed by: FAMILY MEDICINE

## 2018-11-10 PROCEDURE — 83735 ASSAY OF MAGNESIUM: CPT | Performed by: NURSE PRACTITIONER

## 2018-11-10 PROCEDURE — 80048 BASIC METABOLIC PNL TOTAL CA: CPT | Performed by: FAMILY MEDICINE

## 2018-11-10 PROCEDURE — 99217 PR OBSERVATION CARE DISCHARGE MANAGEMENT: CPT | Performed by: NURSE PRACTITIONER

## 2018-11-10 PROCEDURE — 82948 REAGENT STRIP/BLOOD GLUCOSE: CPT

## 2018-11-10 RX ORDER — CARVEDILOL 12.5 MG/1
12.5 TABLET ORAL 2 TIMES DAILY WITH MEALS
Status: DISCONTINUED | OUTPATIENT
Start: 2018-11-10 | End: 2018-11-10 | Stop reason: HOSPADM

## 2018-11-10 RX ORDER — AMLODIPINE BESYLATE 10 MG/1
5 TABLET ORAL DAILY
Refills: 0
Start: 2018-11-10 | End: 2018-12-10 | Stop reason: SDUPTHER

## 2018-11-10 RX ORDER — CARVEDILOL 12.5 MG/1
12.5 TABLET ORAL 2 TIMES DAILY WITH MEALS
Refills: 0
Start: 2018-11-10 | End: 2018-12-11 | Stop reason: SDUPTHER

## 2018-11-10 RX ORDER — AMLODIPINE BESYLATE 5 MG/1
5 TABLET ORAL DAILY
Status: DISCONTINUED | OUTPATIENT
Start: 2018-11-11 | End: 2018-11-10 | Stop reason: HOSPADM

## 2018-11-10 RX ADMIN — PANTOPRAZOLE SODIUM 40 MG: 40 TABLET, DELAYED RELEASE ORAL at 06:51

## 2018-11-10 RX ADMIN — EZETIMIBE 10 MG: 10 TABLET ORAL at 08:21

## 2018-11-10 RX ADMIN — GLIPIZIDE 10 MG: 5 TABLET ORAL at 06:51

## 2018-11-10 RX ADMIN — LIDOCAINE HYDROCHLORIDE 10 ML: 20 SOLUTION ORAL; TOPICAL at 08:26

## 2018-11-10 RX ADMIN — LIDOCAINE HYDROCHLORIDE 10 ML: 20 SOLUTION ORAL; TOPICAL at 11:02

## 2018-11-10 RX ADMIN — ENOXAPARIN SODIUM 40 MG: 40 INJECTION SUBCUTANEOUS at 08:21

## 2018-11-10 RX ADMIN — FAMOTIDINE 20 MG: 20 TABLET ORAL at 08:21

## 2018-11-10 NOTE — NURSING NOTE
Patient discharged  IV removed, belongings accounted for  AVS explained to patient and daughter who interpreted  Patient's questions answered and understanding stated  Patient escorted out with  and nursing staff via wheelchair

## 2018-11-10 NOTE — ASSESSMENT & PLAN NOTE
Blood pressure soft during admission while on IVF, likely due to pain medications   Will decrease Coreg and Norvasc   Continue usual dosage of Losartan  Follow-up BP as outpatient and readjust as needed

## 2018-11-10 NOTE — ASSESSMENT & PLAN NOTE
Patient states that she has difficulty swallowing associated with pain  Worse with medications  Reviewed CT of the neck  No significant obstruction noted to the oral passages  Hydrated and trialed pureed diet and thin liquids  Patient swallowing without difficulty and no further shortness of breath   Ate 100% of breakfast and ambulating around room on room air   Follow-up with port-a-cath placement, PET scan, and outpatient oncologist appt this week

## 2018-11-10 NOTE — DISCHARGE INSTRUCTIONS
Disfagia crónica   LO QUE NECESITA SABER:   La disfagia crónica es dificultad para deglutir (tragar)  Ocurre cuando usted tiene dificultad para bajar la comida o los líquidos del esófago al BJURHOLM  Puede ocurrir cuando usted come, ingiere líquidos o en cualquier momento que trate de deglutir  INSTRUCCIONES SOBRE EL [facility]:   Jhonny Evelyns a brit consultas de control con zuniga médico según le indicaron  Anote brit preguntas para que se acuerde de hacerlas kyung brit visitas  Cambios en zuniga dieta:  Los cambios en zuniga dieta podrían ayudarlo a reducir los problemas de atragantamiento  Zuniga médico podría mostrarle cómo espesar los líquidos o [employer] alimentos para hacerlos más fáciles de deglutir  Terapia de deglución:  La terapia de deglución puede enseñarle diferentes maneras de deglutir usando diferentes posiciones de la branden y del cuerpo  Podrían enseñarle ejercicios para fortalecer los músculos que lo ayudan a deglutir  Pregúntele a zuniga Julee Booker vitaminas y minerales son adecuados para usted  · Usted pierde peso sin proponérselo  · Brit signos y [address], o usted tiene nuevos signos o síntomas  · Usted tiene signos o síntomas de deshidratación, yoni un aumento en la sed, orina de color amarillo oscuro, poca o ninguna orina  · Usted se resfría con frecuencia  · Usted tiene preguntas o inquietudes acerca de zuniga condición o cuidado  Busque atención médica de inmediato o llame al 911 si:   · Usted no puede comer nada ni ingerir ningún líquido  · Usted tiene dolor en el pecho  · Le falta el aire  © 2017 [address] Information is for End User's use only and may not be sold, redistributed or otherwise used for commercial purposes  All illustrations and images included in CareNotes® are the copyrighted property of A D A M , Inc  or Rocael Cowart  Esta información es sólo para uso en educación   Zuniga intención no es darle un consejo [employer] enfermedades o tratamientos  Colsulte con zuniga Candy Bjornstad farmacéutico antes de seguir cualquier régimen médico para saber si es seguro y efectivo para usted  Oropharyngeal dysphagia:  Try a pureed diet and thin liquids      Shortness of breath:  No airway compression noted in the neck as per CT neck     Take Proair inhaler 1 puff every 4 hours as needed for shortness of breath      Lymphoma with weight loss:  Keep your appt for port-a-cath placement this coming Tuesday and PET scan this coming Wednesday     Follow-up with your oncologist

## 2018-11-10 NOTE — SOCIAL WORK
SW unable to find home care agency that will accept patient's insurance, patient's family will take patient to outpatient PT with [facility] for gait unsteadiness  SW attempted to have PT evaluate patient but are unable at this time  Family made aware of the same, and are ok with discharge  CRNP provided with ambulatory referral to outpatient PT at this time  No further questions/concerns for discharge

## 2018-11-10 NOTE — ASSESSMENT & PLAN NOTE
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

## 2018-11-10 NOTE — NURSING NOTE
Patient awake, alert and oriented to PPT, states 6/10 throat pain, medicated for such  Denies chest pain or shortness of breath  HRR  Lungs CTA  Abd soft, NT with +BS, denies N/V or diarrhea  No edema, pulses palpable  Bed in lowest position, call bell within reach

## 2018-11-10 NOTE — UTILIZATION REVIEW
Initial Clinical Review    Admission: Date/Time/Statement: 11/9/18 @ 1645 Observation Written     Orders Placed This Encounter   Procedures    Place in Observation     Standing Status:   Standing     Number of Occurrences:   1     Order Specific Question:   Admitting Physician     Answer:   Maricela Harada [[id number]]     Order Specific Question:   Level of Care     Answer:   Med Surg [16]         ED: Date/Time/Mode of Arrival:   ED Arrival Information     Expected Arrival Acuity Means of Arrival Escorted By Service Admission Type    11/9/2018 11/9/2018 13:07 Urgent Walk-In Self General Medicine Urgent    Arrival Complaint    severe dysphagia and odynophagia, sob          Chief Complaint:   Chief Complaint   Patient presents with    Difficulty Swallowing     pt has hx of throat CA, sent down by Dr Carson Left office for 2 days of difficulty swallowing and not eating/drinking   c/o increase pain in throat       History of Illness: Patient is a 68year old female with a recent diagnosis of lymphoma who was sent down by Dr Zeke Myles for a two day history of dysphagia  Family states patient had one episode of vomiting yesterday and one day of diarrhea today  Per Dr Shelley Card, pt was seen by Dr Zeke Myles today and concern for airway compromise and sent to the ER over a direct admit  Pt without any cough, chest pain, fever, chills or sweats  No meds given  Pt was scheduled for a port placement and the beginning of chemo next week  Pt here without any airway complaints       ED Vital Signs:   ED Triage Vitals   Temperature Pulse Respirations Blood Pressure SpO2   11/09/18 1700 11/09/18 1310 11/09/18 1310 11/09/18 1310 11/09/18 1310   97 8 °F (36 6 °C) 55 16 140/81 98 %      Temp Source Heart Rate Source Patient Position - Orthostatic VS BP Location FiO2 (%)   11/09/18 1700 11/09/18 1630 11/09/18 1310 11/09/18 1310 --   Axillary Monitor Lying Left arm       Pain Score       11/09/18 1543       6        Wt Readings from Last 1 Encounters:   11/09/18 64 kg (141 lb 1 5 oz)       Vital Signs (abnormal):   Date/Time  Temp  Pulse  BP   11/10/18 0100  --  56  101/55   11/10/18 0010  --   54  96/51   11/09/18 2349   97 4 °F (36 3 °C)   54   96/49     Abnormal Labs/Diagnostic Test Results:   CO2 31*   Glucose 101    CT soft tissue neck with contrast   1  Compared to recent CT 10/25/2018, no significant interval change in size of large left tonsillar/oropharyngeal wall mass with extension to the tongue base consistent with known history of lymphoma  No significant change in mass effect and    significant oropharyngeal space narrowing        2   Grossly unchanged enlarged left level 2A and right jugulodigastric lymph nodes from 10/25/2018  ED Treatment:   Medication Administration from 11/09/2018 1259 to 11/09/2018 1655       Date/Time Order Dose Route Action     11/09/2018 1418 sodium chloride 0 9 % bolus 1,000 mL 1,000 mL Intravenous New Bag     11/09/2018 1435 fentaNYL (DURAGESIC) 25 mcg/hr TD 72 hr patch 25 mcg 25 mcg Transdermal Medication Applied     11/09/2018 1538 iohexol (OMNIPAQUE) 350 MG/ML injection (MULTI-DOSE) 85 mL 85 mL Intravenous Given          Past Medical/Surgical History:    Active Ambulatory Problems     Diagnosis Date Noted    Lymphoma of lymph nodes of neck ([facility] ) 10/31/2018    Type 2 diabetes mellitus without complication, without long-term current use of insulin ([facility] ) 10/31/2018    Essential hypertension 10/31/2018    Gastroesophageal reflux disease 10/31/2018    Diffuse large 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 ) 11/09/2018     Resolved Ambulatory Problems     Diagnosis Date Noted    No Resolved Ambulatory Problems     Past Medical History:   Diagnosis Date    Cancer [facility]     Diabetes mellitus ([facility] )     Diffuse large B cell lymphoma ([facility] )     Dysphagia     GERD without esophagitis     HTN (hypertension)     Hyperlipidemia     Hypertension     MI (myocardial infarction) ([facility] )        Admitting Diagnosis: Difficulty swallowing [R13 10]  Dysphagia, unspecified type [R13 10]  Lymphoma, unspecified body region, unspecified lymphoma type [facility] ) [C85 90]    Age/Sex: 68 y o  female    Assessment/Plan:   Oropharyngeal dysphagia   Assessment & Plan     Patient states that she has difficulty swallowing and she gets pain in her throat  She has dysphagia to solids and liquids  She also states that sometimes it because she eats only liquid food she gets loose stools  Will place her on a pureed diet and thin liquids and observe her swallowing for now  Reviewed CT of the neck  No significant obstruction noted to the oral passages  Will place on IV fluid hydration for now and monitor her eating      * SOB (shortness of breath)   Assessment & Plan     Patient states that she sometimes gets short of breath which has been progressively worsening and becoming more consistent over the last 2-3 days  Will get a chest x-ray to rule out any other etiology  No airway compression noted in the neck as per CT neck  Will do an ambulatory oxygen study tomorrow and and overnight oxygen study tonight and see if any supplemental oxygen is required  She may just be having pressure effect secondary to having lymphoma in the neck       Diffuse large 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 [facility])   Assessment & Plan     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  Mass  of the left soft palate/left oropharynx measuring 3 9 x 2 8 x 2 8 cm in keeping with this patient's known history of malignancy  Patient should be scheduled for Port-A-Cath placement by surgery soon and be started on chemotherapy soon  She has lost over 35 lb in the last 6 months       Gastroesophageal reflux disease   Assessment & Plan     Continue proton pump inhibitor      Essential hypertension   Assessment & Plan     Blood pressure well controlled    Continue losartan, Coreg and Norvasc      Type 2 diabetes mellitus without complication, without long-term current use of insulin ([facility] )   Assessment & Plan             Lab Results   Component Value Date     HGBA1C 7 5 10/31/2018         No results for input(s): POCGLU in the last 72 hours      Blood Sugar Average: Last 72 hrs:  Hold metformin as she got IV contrast today  Place the patient on insulin sliding scale            VTE Prophylaxis: Enoxaparin (Lovenox)  / sequential compression device   Anticipated Length of Stay:  Patient will be admitted on an Observation basis with an anticipated length of stay of  Less than 2 midnights     Justification for Hospital Stay:  Shortness of breath and dysphagia    Admission Orders:  Cardiac diet  OOB as jennifer  Accuchecks QAC and QHS with coverage  Sequential compression device  Echo  EKG    Scheduled Meds:   Current Facility-Administered Medications:  acetaminophen 650 mg Oral Q6H PRN   amLODIPine 10 mg Oral Daily   carvedilol 25 mg Oral BID With Meals   enoxaparin 40 mg Subcutaneous Daily   ergocalciferol 50,000 Units Oral Weekly   ezetimibe 10 mg Oral Daily   famotidine 20 mg Oral Daily   fentaNYL 1 patch Transdermal Q72H   fentaNYL 25 mcg Transdermal Once   glipiZIDE 10 mg Oral BID AC   HYDROcodone-acetaminophen 1 tablet Oral Q6H PRN   insulin lispro 2-12 Units Subcutaneous TID AC   lidocaine viscous 10 mL Swish & Swallow TID PRN   losartan 25 mg Oral Daily   ondansetron 4 mg Intravenous Q6H PRN   pantoprazole 40 mg Oral Early Morning   sodium chloride 125 mL/hr Intravenous Continuous     Continuous Infusions:   sodium chloride 125 mL/hr Last Rate: 125 mL/hr (11/09/18 1729)     PRN Meds:   acetaminophen    HYDROcodone-acetaminophen 5-325mg PO x1 thus far    lidocaine viscous    ondansetron

## 2018-11-10 NOTE — DISCHARGE SUMMARY
Discharge- [address] [facility] [birth year], 68 y o  female MRN: [id number]    Unit/Bed#: 5T -01 Encounter: [id number]    Primary Care Provider: Xiomara Goldman PA-C   Date and time admitted to hospital: 11/9/2018  1:07 PM        Oropharyngeal dysphagia   Assessment & Plan    Patient states that she has difficulty swallowing associated with pain  Worse with medications  Reviewed CT of the neck  No significant obstruction noted to the oral passages  Hydrated and trialed pureed diet and thin liquids  Patient swallowing without difficulty and no further shortness of breath  Ate 100% of breakfast and ambulating around room on room air   Follow-up with port-a-cath placement, PET scan, and outpatient oncologist appt this week      Diffuse large 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 (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 mass of the left soft palate/left oropharynx measuring 3 9 x 2 8 x 2 8 cm in keeping with this patient's known history of malignancy  Repeat CT soft tissue neck reveal no change in mass  Start pureed foods and thin liquids, crush medications   Patient has an appt this Tuesday, 11/13, for port-a-cath placement   Patient has an appt this Wednesday, 11/14 for PET scan  F/U with oncologist to begin chemotherapy soon  35 lb weight loss in past 6 months     * SOB (shortness of breath)   Assessment & Plan    Patient states that she sometimes gets short of breath which has been progressively worsening and becoming more consistent over the last 2-3 days  Repeat CT neck revealed no significant interval change in size of large left tonsillar/oropharyngeal wall mass with extension to the tongue base consistent with known history of lymphoma   No significant change in mass effect and significant oropharyngeal space narrowing     Presently, ambulating room on room air without complaints of SOB  Will discharge home with Proair inhaler as needed   Needs close follow-up with oncology       Essential hypertension   Assessment & Plan    Blood pressure soft during admission while on IVF, likely due to pain medications   Will decrease Coreg and Norvasc  Continue usual dosage of Losartan  Follow-up BP as outpatient and readjust as needed      Gastroesophageal reflux disease   Assessment & Plan    Continue proton pump inhibitor     Type 2 diabetes mellitus without complication, without long-term current use of insulin [facility])   Assessment & Plan    Lab Results   Component Value Date    HGBA1C 7 5 10/31/2018       Recent Labs      11/09/18   1705  11/09/18   1811  11/09/18   2037  11/10/18   0632   POCGLU  68*  213*  177*  98       Blood Sugar Average: Last 72 hrs:  (P) 139   HGBA1c well controlled  Continue outpatient regimen  Carb restricted diet  Discharging Physician / Practitioner: Mariangel Calderón  PCP: Johny Cedeno PA-C  Admission Date:   Admission Orders     Ordered        11/09/18 1645  Place in Observation  Once         11/09/18 1502  Inpatient Admission (expected length of stay for this patient is greater than two midnights)  Once             Discharge Date: 11/10/18    Resolved Problems  Date Reviewed: 11/10/2018    None          Consultations During Hospital Stay:  · None    Procedures Performed:     · CT soft tissue neck: No significant interval change in size of large left tonsillar/oropharyngeal wall mass with extension to the tongue base consistent with known history of lymphoma   No significant change in mass effect and   significant oropharyngeal space narrowing  Significant Findings / Test Results:     · As above     Incidental Findings:   · None     Test Results Pending at Discharge (will require follow up):    · None     Outpatient Tests Requested:  · None    Complications:  None    Reason for Admission: Shortness of breath, dysphagia     Hospital Course:     [facility] is a 68 y o  female patient with a recent diagnosis of Diffuse B-Cell Lymphoma who originally presented to the hospital on 11/9/2018 due to dysphagia and shortness of breath  Patient reports difficulty swallowing for past 6 months, worse with medications  Reports an unintentional 35 lb weight loss  She is scheduled to have a port-a-cath placed this coming Tuesday and a PET scan this coming Wednesday  She follows with oncology and is to begin chemotherapy soon  Regarding dysphagia, patient trailed on a pureed diet and ate 1005 of breakfast without difficulty or cough  She is tolerating thin liquids  She is ambulating room on room air without complaints of shortness of breath  She will be discharged home with a ProAir inhaler as needed for shortness of breath and recommendations to continue pureed foods and crush medications in applesauce  Her daughter is a bedside to facilitate communication as patient is primarily [place] speaking  Daughter expressed concerns that the patient's gait is unsteady at times  I requested an inpatient PT evaluation, however, the patient does not desire to stay for the evaluation  I have written for outpatient PT  Please see above list of diagnoses and related plan for additional information  Condition at Discharge: stable     Discharge Day Visit / Exam:     Subjective:  Ambulating around room on room air  No SOB  Washing up in bathroom by self  Dtr at bedside  Patient denies HA, dizziness, SOB, chest pain, abdominal pain, N/V/D  Ate 100% of breakfast  Anxious for discharge home today     Vitals: Blood Pressure: 105/54 (11/10/18 0822)  Pulse: 55 (11/10/18 0822)  Temperature: 97 7 °F (36 5 °C) (11/10/18 0735)  Temp Source: Temporal (11/10/18 0735)  Respirations: 18 (11/10/18 0735)  Height: 5' (152 4 cm) (11/09/18 1700)  Weight - Scale: 64 kg (141 lb 1 5 oz) (11/09/18 1700)  SpO2: 96 % (11/10/18 0735)  Exam:   Physical Exam   Constitutional: She is oriented to person, place, and time  She appears well-developed  No distress  HENT:   Head: Normocephalic  Neck: Normal range of motion  Cardiovascular: Normal rate and regular rhythm  Pulmonary/Chest: Effort normal  No accessory muscle usage  No tachypnea  No respiratory distress  She has decreased breath sounds in the right lower field and the left lower field  She has no wheezes  She has no rhonchi  She has no rales  Abdominal: Soft  Bowel sounds are normal  She exhibits no distension  There is no tenderness  Musculoskeletal: Normal range of motion  She exhibits no edema or tenderness  Neurological: She is alert and oriented to person, place, and time  Skin: Skin is warm and dry  She is not diaphoretic  Psychiatric: She has a normal mood and affect  Her behavior is normal    Nursing note and vitals reviewed  Discussion with Family: Dtr at bedside     Discharge instructions/Information to patient and family:   See after visit summary for information provided to patient and family  Provisions for Follow-Up Care:  See after visit summary for information related to follow-up care and any pertinent home health orders  Disposition:     Home    For Discharges to   [address] SNF:   · Not Applicable to this Patient - Not Applicable to this Patient    Planned Readmission: None      Discharge Statement:  I spent > 30 minutes discharging the patient  This time was spent on the day of discharge  I had direct contact with the patient on the day of discharge  Greater than 50% of the total time was spent examining patient, answering all patient questions, arranging and discussing plan of care with patient as well as directly providing post-discharge instructions  Additional time then spent on discharge activities  Discharge Medications:  See after visit summary for reconciled discharge medications provided to patient and family        ** Please Note: This note has been constructed using a voice recognition system **

## 2018-11-10 NOTE — ASSESSMENT & PLAN NOTE
Lab Results   Component Value Date    HGBA1C 7 5 10/31/2018       Recent Labs      11/09/18   1705  11/09/18   1811  11/09/18   2037  11/10/18   0632   POCGLU  68*  213*  177*  98       Blood Sugar Average: Last 72 hrs:  (P) 139   HGBA1c well controlled  Continue outpatient regimen  Carb restricted diet

## 2018-11-10 NOTE — ASSESSMENT & PLAN NOTE
Patient states that she sometimes gets short of breath which has been progressively worsening and becoming more consistent over the last 2-3 days  Repeat CT neck revealed no significant interval change in size of large left tonsillar/oropharyngeal wall mass with extension to the tongue base consistent with known history of lymphoma   No significant change in mass effect and significant oropharyngeal space narrowing     Presently, ambulating room on room air without complaints of SOB  Will discharge home with Proair inhaler as needed   Needs close follow-up with oncology

## 2018-11-11 LAB
ATRIAL RATE: 50 BPM
P AXIS: 64 DEGREES
PR INTERVAL: 132 MS
QRS AXIS: 20 DEGREES
QRSD INTERVAL: 94 MS
QT INTERVAL: 458 MS
QTC INTERVAL: 417 MS
T WAVE AXIS: 26 DEGREES
VENTRICULAR RATE: 50 BPM

## 2018-11-11 PROCEDURE — 93010 ELECTROCARDIOGRAM REPORT: CPT | Performed by: INTERNAL MEDICINE

## 2018-11-12 ENCOUNTER — LAB REQUISITION (OUTPATIENT)
Dept: LAB | Facility: HOSPITAL | Age: 73
End: 2018-11-12
Payer: COMMERCIAL

## 2018-11-12 ENCOUNTER — TELEPHONE (OUTPATIENT)
Dept: HEMATOLOGY ONCOLOGY | Facility: CLINIC | Age: 73
End: 2018-11-12

## 2018-11-12 ENCOUNTER — TRANSITIONAL CARE MANAGEMENT (OUTPATIENT)
Dept: FAMILY MEDICINE CLINIC | Facility: CLINIC | Age: 73
End: 2018-11-12

## 2018-11-12 DIAGNOSIS — C83.30 DIFFUSE LARGE B-CELL LYMPHOMA (HCC): ICD-10-CM

## 2018-11-12 DIAGNOSIS — R19.7 DIARRHEA, UNSPECIFIED TYPE: Primary | ICD-10-CM

## 2018-11-12 RX ORDER — LOPERAMIDE HYDROCHLORIDE 2 MG/1
2 CAPSULE ORAL AS NEEDED
Qty: 60 CAPSULE | Refills: 1 | Status: SHIPPED | OUTPATIENT
Start: 2018-11-12 | End: 2019-08-01 | Stop reason: SDUPTHER

## 2018-11-12 NOTE — TELEPHONE ENCOUNTER
Patient was called with rescheduled apt days/times  Patient is okay with all rescheduled apts  Patient would like something to be called in to her [facility] pharmacy for diarrhea  Pharmacy # [phone]

## 2018-11-13 ENCOUNTER — ANESTHESIA (OUTPATIENT)
Dept: PERIOP | Facility: AMBULARY SURGERY CENTER | Age: 73
End: 2018-11-13

## 2018-11-13 ENCOUNTER — TELEPHONE (OUTPATIENT)
Dept: RADIOLOGY | Facility: HOSPITAL | Age: 73
End: 2018-11-13

## 2018-11-13 RX ORDER — SODIUM CHLORIDE 9 MG/ML
75 INJECTION, SOLUTION INTRAVENOUS CONTINUOUS
Status: CANCELLED | OUTPATIENT
Start: 2018-11-13

## 2018-11-14 ENCOUNTER — HOSPITAL ENCOUNTER (OUTPATIENT)
Dept: NUCLEAR MEDICINE | Facility: HOSPITAL | Age: 73
Discharge: HOME | End: 2018-11-14
Attending: INTERNAL MEDICINE
Payer: COMMERCIAL

## 2018-11-14 ENCOUNTER — HOSPITAL ENCOUNTER (OUTPATIENT)
Dept: NON INVASIVE DIAGNOSTICS | Facility: HOSPITAL | Age: 73
Discharge: HOME | End: 2018-11-14
Attending: INTERNAL MEDICINE
Payer: COMMERCIAL

## 2018-11-14 ENCOUNTER — HOSPITAL ENCOUNTER (EMERGENCY)
Facility: HOSPITAL | Age: 73
Discharge: HOME | End: 2018-11-14
Attending: EMERGENCY MEDICINE | Admitting: EMERGENCY MEDICINE
Payer: COMMERCIAL

## 2018-11-14 VITALS
HEART RATE: 59 BPM | TEMPERATURE: 99.1 F | OXYGEN SATURATION: 95 % | HEIGHT: 60 IN | RESPIRATION RATE: 20 BRPM | SYSTOLIC BLOOD PRESSURE: 122 MMHG | WEIGHT: 141 LBS | DIASTOLIC BLOOD PRESSURE: 62 MMHG | BODY MASS INDEX: 27.68 KG/M2

## 2018-11-14 DIAGNOSIS — C83.31 DIFFUSE LARGE B-CELL LYMPHOMA OF LYMPH NODES OF NECK (HCC): ICD-10-CM

## 2018-11-14 DIAGNOSIS — R09.89 THROAT TIGHTNESS: Primary | ICD-10-CM

## 2018-11-14 LAB
ALBUMIN SERPL BCP-MCNC: 4.2 G/DL (ref 3–5.2)
ALP SERPL-CCNC: 78 U/L (ref 43–122)
ALT SERPL W P-5'-P-CCNC: 26 U/L (ref 9–52)
ANION GAP SERPL CALCULATED.3IONS-SCNC: 9 MMOL/L (ref 5–14)
AST SERPL W P-5'-P-CCNC: 23 U/L (ref 14–36)
BASOPHILS # BLD AUTO: 0.1 THOUSANDS/ΜL (ref 0–0.1)
BASOPHILS NFR BLD AUTO: 1 % (ref 0–1)
BILIRUB SERPL-MCNC: 0.4 MG/DL
BUN SERPL-MCNC: 15 MG/DL (ref 5–25)
CALCIUM SERPL-MCNC: 9.3 MG/DL (ref 8.4–10.2)
CHLORIDE SERPL-SCNC: 97 MMOL/L (ref 97–108)
CO2 SERPL-SCNC: 29 MMOL/L (ref 22–30)
CREAT SERPL-MCNC: 0.53 MG/DL (ref 0.6–1.2)
EOSINOPHIL # BLD AUTO: 0.2 THOUSAND/ΜL (ref 0–0.4)
EOSINOPHIL NFR BLD AUTO: 2 % (ref 0–6)
ERYTHROCYTE [DISTWIDTH] IN BLOOD BY AUTOMATED COUNT: 13.6 %
GFR SERPL CREATININE-BSD FRML MDRD: 95 ML/MIN/1.73SQ M
GLUCOSE SERPL-MCNC: 113 MG/DL (ref 70–99)
GLUCOSE SERPL-MCNC: 121 MG/DL (ref 70–99)
HCT VFR BLD AUTO: 36.2 % (ref 36–46)
HGB BLD-MCNC: 12.2 G/DL (ref 12–16)
LYMPHOCYTES # BLD AUTO: 2.7 THOUSANDS/ΜL (ref 0.5–4)
LYMPHOCYTES NFR BLD AUTO: 31 % (ref 20–50)
MCH RBC QN AUTO: 30.4 PG (ref 26–34)
MCHC RBC AUTO-ENTMCNC: 33.8 G/DL (ref 31–36)
MCV RBC AUTO: 90 FL (ref 80–100)
MONOCYTES # BLD AUTO: 0.9 THOUSAND/ΜL (ref 0.2–0.9)
MONOCYTES NFR BLD AUTO: 10 % (ref 1–10)
NEUTROPHILS # BLD AUTO: 5 THOUSANDS/ΜL (ref 1.8–7.8)
NEUTS SEG NFR BLD AUTO: 56 % (ref 45–65)
PLATELET # BLD AUTO: 286 THOUSANDS/UL (ref 150–450)
PMV BLD AUTO: 7.8 FL (ref 8.9–12.7)
POTASSIUM SERPL-SCNC: 4.8 MMOL/L (ref 3.6–5)
PROT SERPL-MCNC: 7.9 G/DL (ref 5.9–8.4)
RBC # BLD AUTO: 4.02 MILLION/UL (ref 4–5.2)
SCAN RESULT: NORMAL
SODIUM SERPL-SCNC: 135 MMOL/L (ref 137–147)
WBC # BLD AUTO: 8.9 THOUSAND/UL (ref 4.5–11)

## 2018-11-14 PROCEDURE — 96375 TX/PRO/DX INJ NEW DRUG ADDON: CPT

## 2018-11-14 PROCEDURE — 36415 COLL VENOUS BLD VENIPUNCTURE: CPT | Performed by: EMERGENCY MEDICINE

## 2018-11-14 PROCEDURE — 80053 COMPREHEN METABOLIC PANEL: CPT | Performed by: EMERGENCY MEDICINE

## 2018-11-14 PROCEDURE — 93306 TTE W/DOPPLER COMPLETE: CPT

## 2018-11-14 PROCEDURE — 93005 ELECTROCARDIOGRAM TRACING: CPT

## 2018-11-14 PROCEDURE — 93306 TTE W/DOPPLER COMPLETE: CPT | Performed by: INTERNAL MEDICINE

## 2018-11-14 PROCEDURE — 82948 REAGENT STRIP/BLOOD GLUCOSE: CPT

## 2018-11-14 PROCEDURE — A9552 F18 FDG: HCPCS

## 2018-11-14 PROCEDURE — 96374 THER/PROPH/DIAG INJ IV PUSH: CPT

## 2018-11-14 PROCEDURE — 78815 PET IMAGE W/CT SKULL-THIGH: CPT

## 2018-11-14 PROCEDURE — 99285 EMERGENCY DEPT VISIT HI MDM: CPT

## 2018-11-14 PROCEDURE — 85025 COMPLETE CBC W/AUTO DIFF WBC: CPT | Performed by: EMERGENCY MEDICINE

## 2018-11-14 RX ORDER — DEXAMETHASONE SODIUM PHOSPHATE 4 MG/ML
8 INJECTION, SOLUTION INTRA-ARTICULAR; INTRALESIONAL; INTRAMUSCULAR; INTRAVENOUS; SOFT TISSUE ONCE
Status: COMPLETED | OUTPATIENT
Start: 2018-11-14 | End: 2018-11-14

## 2018-11-14 RX ORDER — DIPHENHYDRAMINE HYDROCHLORIDE 50 MG/ML
25 INJECTION INTRAMUSCULAR; INTRAVENOUS ONCE
Status: COMPLETED | OUTPATIENT
Start: 2018-11-14 | End: 2018-11-14

## 2018-11-14 RX ADMIN — DIPHENHYDRAMINE HYDROCHLORIDE 25 MG: 50 INJECTION INTRAMUSCULAR; INTRAVENOUS at 22:08

## 2018-11-14 RX ADMIN — DEXAMETHASONE SODIUM PHOSPHATE 8 MG: 4 INJECTION, SOLUTION INTRAMUSCULAR; INTRAVENOUS at 22:09

## 2018-11-15 NOTE — DISCHARGE INSTRUCTIONS
Dolor de garganta, cuidados ambulatorios   INFORMACIÓN GENERAL:   Un dolor de garganta  es causado por un virus del resfriado o la gripe por lo general  Un dolor de garganta también puede ser causado por bacterias yoni el estreptococo  Otras causas pueden incluir el tabaquismo, un flujo nasal, alergias o reflujo o acidez estomacal    Busque atención médica de inmediato al presentar los siguientes síntomas:   · Dificultad para respirar o tragar debido a que la garganta está inflamada o adolorida    · Babeo debido a que le duele demasiado tragar    · Un bulto doloroso en zuniga garganta que no desaparece al cabo de 5 días    · Fiebre por encima de los 102? F (39?C) o que dura mas de 3 mary jo    · Confusión    · La garganta o los oídos le sangran  El tratamiento para el dolor de garganta  dependerá de la causa y gravedad  Un dolor de garganta causado por un virus desaparecerá por sí solo, sin necesidad de [place]  Usted necesitará antibióticos si el dolor de garganta es causado por bacteria  Zuniga dolor de garganta Estrellita Seek a sentirse mejor dentro de 3 a 5 días tanto para la infecciones [address] [place] [address] bacterianas  El cuidado para zuniga dolor de garganta:   · Las gárgaras de agua con sal:  Mezcle ¼ de cucharadita de sal en un vaso con agua tibia y kendra gárgaras  [place] podría ayudar a reducir la inflamación en zuniga garganta  · [place] ibuprofeno o acetaminofén:  Estos medicamentos disminuyen el dolor y la fiebre y están disponibles sin receta médica  Consulte con zuniga proveedor de [employer] cuáles medicamentos son los más adecuados para usted Pregunte cuál es la cantidad que debe joshua y con qué frecuencia tomarlos  · Ingiera más líquidos:  Las bebidas frías o tibias podrían ayudarle a aliviar zuniga dolor de garganta  El consumir líquidos puede ayudar a prevenir la deshidratación  · Use un humidificador de devin templado  para ayudar a humedecer el aire en zuniga habitación y calmar zuniga dolor de garganta       · Use pastillas para la tos, hielo y paletas de agua  para aliviar zuniga dolor de garganta  · Descanse zuniga garganta lo más que pueda  Trate de no usar zuniga voz  Pyatt podría irritar zuniga garganta y empeorar diane síntomas  Acuda a zuniga consulta de control con zuniga proveedor de mane según le indicaron:  Escriba las preguntas que tenga para que no olvide hacerlas kyung diane consultas médicas  ACUERDOS SOBRE ZUNIGA CUIDADO:   Usted tiene el derecho de participar en la planificación de zuniga cuidado  Aprenda todo lo que pueda sobre zuniga condición y yoni darle tratamiento  Discuta con diane médicos diane opciones de tratamiento para juntos decidir el cuidado que usted quiere recibir  Usted siempre tiene el derecho a rechazar zuniga tratamiento  Esta información es sólo para uso en educación  Zuniga intención no es darle un consejo médico sobre enfermedades o tratamientos  Colsulte con znuiga [employer] antes de seguir cualquier régimen médico para saber si es seguro y efectivo para usted  © 2014 [address] is for End User's use only and may not be sold, redistributed or otherwise used for commercial purposes  All illustrations and images included in CareNotes® are the copyrighted property of A D A M , Inc  or Rocael Cowart

## 2018-11-15 NOTE — ED PROVIDER NOTES
History  Chief Complaint   Patient presents with    Shortness of Breath     Pt had a PET scan today  after going home from scan, she started saying she ccouldnt breathe and then fell to the floor  Pt said to be concious the entire time  Pt states she feels something in her throat  This is a 69 y/o female that presents to the ED with sensation of throat tightness after having a PET scan today  States that she began feeling this approximately an hour after PET scan  Had some slight itching of body earlier that patient's daughter attributes to fentanyl patch  No rash  No tongue swelling  Patient's daughter states that the patient seemed distressed and fell to the floor but did not have syncopal episode  No chest pain  Patient does not describe SOB but more tightness sensation in throat  Patient describes nasal congestion which seems to be making symptoms more pronounced  Prior to Admission Medications   Prescriptions Last Dose Informant Patient Reported? Taking? HYDROcodone-acetaminophen (NORCO) 5-325 mg per tablet   No No   Sig: Take 1 tablet by mouth every 6 (six) hours as needed for pain for up to 10 doses Max Daily Amount: 4 tablets   amLODIPine (NORVASC) 10 mg tablet   No No   Sig: Take 0 5 tablets (5 mg total) by mouth daily   carvedilol (COREG) 12 5 mg tablet   No No   Sig: Take 1 tablet (12 5 mg total) by mouth 2 (two) times a day with meals   ergocalciferol (VITAMIN D2) 50,000 units   Yes No   Sig: Take 50,000 Units by mouth once a week    ezetimibe (ZETIA) 10 mg tablet   Yes No   Sig: Take 10 mg by mouth daily  fentaNYL (DURAGESIC) 25 mcg/hr  Self Yes No   Sig: Place 1 patch on the skin every third day   glipiZIDE (GLUCOTROL) 10 mg tablet   Yes No   Sig: Take 10 mg by mouth 2 (two) times a day before meals     lidocaine viscous (XYLOCAINE) 2 % mucosal solution   No No   Sig: Swish and swallow 10 mL 3 (three) times a day as needed for mild pain or moderate pain   loperamide (IMODIUM) 2 mg capsule   No No   Sig: Take 1 capsule (2 mg total) by mouth as needed for diarrhea Every 1-2 hours as needed for diarrhea   losartan (COZAAR) 25 mg tablet   No No   Sig: Take 1 tablet (25 mg total) by mouth daily   omeprazole (PriLOSEC) 40 MG capsule   Yes No   Sig: Take 40 mg by mouth daily   oxyCODONE-acetaminophen (PERCOCET) 5-325 mg per tablet   No No   Sig: Take 1 tablet by mouth 2 (two) times a day Max Daily Amount: 2 tablets   ranitidine (ZANTAC) 150 mg tablet   Yes No   Sig: Take 150 mg by mouth 2 (two) times a day   sitaGLIPtin-metFORMIN (JANUMET)  MG per tablet   No No   Sig: Take 1 tablet by mouth 2 (two) times a day with meals      Facility-Administered Medications: None       Past Medical History:   Diagnosis Date    Cancer ([place] [address] )     Throat    Diabetes mellitus ([place] [address] )     Diffuse large B cell lymphoma ([place] [address] )     Dysphagia     GERD without esophagitis     HTN (hypertension)     Hyperlipidemia     Hypertension     MI (myocardial infarction) ([place] [address] )        Past Surgical History:   Procedure Laterality Date    BONE MARROW BIOPSY      BREAST LUMPECTOMY      CHOLECYSTECTOMY      OTHER SURGICAL HISTORY      tendor tear repair to right shoulder    SHOULDER ARTHROSCOPY         Family History   Problem Relation Age of Onset    Diabetes Mother     Heart disease Sister     Hypertension Brother     Cancer Maternal Grandmother     Cancer Maternal Grandfather      I have reviewed and agree with the history as documented  Social History   Substance Use Topics    Smoking status: Never Smoker    Smokeless tobacco: Never Used    Alcohol use No        Review of Systems   Constitutional: Negative for activity change, appetite change and fever  HENT: Positive for sore throat and voice change (chronic)  Negative for congestion, ear pain, facial swelling, rhinorrhea and trouble swallowing  Eyes: Negative for pain and redness     Respiratory: Negative for cough, chest tightness, shortness of breath and wheezing  Cardiovascular: Negative for chest pain and palpitations  Gastrointestinal: Negative for abdominal pain, diarrhea, nausea and vomiting  Endocrine: Negative for polyuria  Genitourinary: Negative for difficulty urinating, dysuria, frequency and urgency  Musculoskeletal: Negative for arthralgias and myalgias  Skin: Negative for color change and rash  Allergic/Immunologic: Negative for immunocompromised state  Neurological: Negative for dizziness, syncope and light-headedness  Hematological: Does not bruise/bleed easily  Psychiatric/Behavioral: Negative for confusion  All other systems reviewed and are negative  Physical Exam  Physical Exam   Constitutional: She is oriented to person, place, and time  She appears well-developed  No distress  HENT:   Head: Normocephalic and atraumatic  Nose: Nose normal    Slightly hoarse voice  Patient with prominence of the left soft palate  No stridor  Breathing without difficulty  Handling secretions  Eyes: Conjunctivae are normal  No scleral icterus  Neck: Normal range of motion  Neck supple  Cardiovascular: Normal rate, regular rhythm, normal heart sounds and intact distal pulses  Pulmonary/Chest: Effort normal and breath sounds normal  No stridor  No respiratory distress  She has no wheezes  Abdominal: Soft  She exhibits no distension  There is no tenderness  There is no rebound and no guarding  Musculoskeletal: She exhibits no edema or deformity  Neurological: She is alert and oriented to person, place, and time  Skin: Skin is warm and dry  No rash noted  Psychiatric: She has a normal mood and affect  Thought content normal    Nursing note and vitals reviewed        Vital Signs  ED Triage Vitals [11/14/18 2128]   Temperature Pulse Respirations Blood Pressure SpO2   99 1 °F (37 3 °C) 68 22 136/70 100 %      Temp Source Heart Rate Source Patient Position - Orthostatic VS BP Location FiO2 (%)   Tympanic Monitor Sitting Left arm --      Pain Score       --           Vitals:    11/14/18 2128   BP: 136/70   Pulse: 68   Patient Position - Orthostatic VS: Sitting       Visual Acuity      ED Medications  Medications   dexamethasone (DECADRON) injection 8 mg (8 mg Intravenous Given 11/14/18 2209)   diphenhydrAMINE (BENADRYL) injection 25 mg (25 mg Intravenous Given 11/14/18 2208)       Diagnostic Studies  Results Reviewed     Procedure Component Value Units Date/Time    Comprehensive metabolic panel [[id number]]  (Abnormal) Collected:  11/14/18 2202    Lab Status:  Final result Specimen:  Blood from Arm, Right Updated:  11/14/18 2223     Sodium 135 (L) mmol/L      Potassium 4 8 mmol/L      Chloride 97 mmol/L      CO2 29 mmol/L      ANION GAP 9 mmol/L      BUN 15 mg/dL      Creatinine 0 53 (L) mg/dL      Glucose 113 (H) mg/dL      Calcium 9 3 mg/dL      AST 23 U/L      ALT 26 U/L      Alkaline Phosphatase 78 U/L      Total Protein 7 9 g/dL      Albumin 4 2 g/dL      Total Bilirubin 0 40 mg/dL      eGFR 95 ml/min/1 73sq m     Narrative:       Hemolysis  National Kidney Disease Education Program recommendations are as follows:  GFR calculation is accurate only with a steady state creatinine  Chronic Kidney disease less than 60 ml/min/1 73 sq  meters  Kidney failure less than 15 ml/min/1 73 sq  meters      CBC and differential [[id number]]  (Abnormal) Collected:  11/14/18 2202    Lab Status:  Final result Specimen:  Blood from Arm, Right Updated:  11/14/18 2219     WBC 8 90 Thousand/uL      RBC 4 02 Million/uL      Hemoglobin 12 2 g/dL      Hematocrit 36 2 %      MCV 90 fL      MCH 30 4 pg      MCHC 33 8 g/dL      RDW 13 6 %      MPV 7 8 (L) fL      Platelets 505 Thousands/uL      Neutrophils Relative 56 %      Lymphocytes Relative 31 %      Monocytes Relative 10 %      Eosinophils Relative 2 %      Basophils Relative 1 %      Neutrophils Absolute 5 00 Thousands/µL      Lymphocytes Absolute 2 70 Thousands/µL      Monocytes Absolute 0 90 Thousand/µL      Eosinophils Absolute 0 20 Thousand/µL      Basophils Absolute 0 10 Thousands/µL                  No orders to display              Procedures  ECG 12 Lead Documentation  Date/Time: 11/14/2018 10:30 PM  Performed by: Sebastián Moore  Authorized by: Sebastián Moore     Indications / Diagnosis:  Throat tightness  ECG reviewed by me, the ED Provider: yes    Patient location:  ED  Rate:     ECG rate:  57    ECG rate assessment: bradycardic    Rhythm:     Rhythm: sinus rhythm    Ectopy:     Ectopy: none    QRS:     QRS axis:  Normal    QRS intervals:  Normal  Conduction:     Conduction: normal    ST segments:     ST segments:  Normal  T waves:     T waves: normal             Phone Contacts  ED Phone Contact    ED Course  ED Course as of Nov 14 2301 Wed Nov 14, 2018 2251 Patient is feeling much better  Drinking well  No sob  No throat tightness  MDM    CritCare Time    Disposition  Final diagnoses:   Throat tightness     Time reflects when diagnosis was documented in both MDM as applicable and the Disposition within this note     Time User Action Codes Description Comment    11/14/2018 10:59 PM Deepali Marie Add [R68 [id number]] Throat tightness       ED Disposition     ED Disposition Condition Comment    Discharge  [facility] discharge to home/self care  Condition at discharge: Stable        Follow-up Information     Follow up With Specialties Details Why Contact Info    Johny Cedeno PA-C Family Medicine, Physician Assistant In 1 week  [address]  [phone]            Patient's Medications   Discharge Prescriptions    No medications on file     No discharge procedures on file      ED Provider  Electronically Signed by           Melinda Ashby MD  11/14/18 1386

## 2018-11-16 ENCOUNTER — OFFICE VISIT (OUTPATIENT)
Dept: HEMATOLOGY ONCOLOGY | Facility: CLINIC | Age: 73
End: 2018-11-16
Payer: COMMERCIAL

## 2018-11-16 ENCOUNTER — HOSPITAL ENCOUNTER (OUTPATIENT)
Dept: RADIOLOGY | Facility: HOSPITAL | Age: 73
Discharge: HOME | End: 2018-11-16
Admitting: RADIOLOGY
Payer: COMMERCIAL

## 2018-11-16 VITALS
WEIGHT: 141 LBS | TEMPERATURE: 96.6 F | HEIGHT: 60 IN | OXYGEN SATURATION: 98 % | SYSTOLIC BLOOD PRESSURE: 136 MMHG | BODY MASS INDEX: 27.68 KG/M2 | DIASTOLIC BLOOD PRESSURE: 62 MMHG | HEART RATE: 60 BPM | RESPIRATION RATE: 18 BRPM

## 2018-11-16 DIAGNOSIS — C83.31 DIFFUSE LARGE B-CELL LYMPHOMA OF LYMPH NODES OF NECK (HCC): ICD-10-CM

## 2018-11-16 DIAGNOSIS — C83.31 DIFFUSE LARGE B-CELL LYMPHOMA OF LYMPH NODES OF NECK (HCC): Primary | ICD-10-CM

## 2018-11-16 DIAGNOSIS — Z51.11 ENCOUNTER FOR ANTINEOPLASTIC CHEMOTHERAPY: ICD-10-CM

## 2018-11-16 LAB
INR PPP: 1.08 (ref 0.86–1.17)
PROTHROMBIN TIME: 14.1 SECONDS (ref 11.8–14.2)

## 2018-11-16 PROCEDURE — 77001 FLUOROGUIDE FOR VEIN DEVICE: CPT | Performed by: RADIOLOGY

## 2018-11-16 PROCEDURE — 76937 US GUIDE VASCULAR ACCESS: CPT | Performed by: RADIOLOGY

## 2018-11-16 PROCEDURE — 99153 MOD SED SAME PHYS/QHP EA: CPT

## 2018-11-16 PROCEDURE — 85610 PROTHROMBIN TIME: CPT | Performed by: RADIOLOGY

## 2018-11-16 PROCEDURE — C1788 PORT, INDWELLING, IMP: HCPCS

## 2018-11-16 PROCEDURE — 77001 FLUOROGUIDE FOR VEIN DEVICE: CPT

## 2018-11-16 PROCEDURE — 99211 OFF/OP EST MAY X REQ PHY/QHP: CPT | Performed by: NURSE PRACTITIONER

## 2018-11-16 PROCEDURE — 99152 MOD SED SAME PHYS/QHP 5/>YRS: CPT | Performed by: RADIOLOGY

## 2018-11-16 PROCEDURE — 99152 MOD SED SAME PHYS/QHP 5/>YRS: CPT

## 2018-11-16 PROCEDURE — 36561 INSERT TUNNELED CV CATH: CPT | Performed by: RADIOLOGY

## 2018-11-16 PROCEDURE — 36561 INSERT TUNNELED CV CATH: CPT

## 2018-11-16 PROCEDURE — 76937 US GUIDE VASCULAR ACCESS: CPT

## 2018-11-16 RX ORDER — FENTANYL CITRATE 50 UG/ML
INJECTION, SOLUTION INTRAMUSCULAR; INTRAVENOUS CODE/TRAUMA/SEDATION MEDICATION
Status: COMPLETED | OUTPATIENT
Start: 2018-11-16 | End: 2018-11-16

## 2018-11-16 RX ORDER — SODIUM CHLORIDE 9 MG/ML
75 INJECTION, SOLUTION INTRAVENOUS CONTINUOUS
Status: DISCONTINUED | OUTPATIENT
Start: 2018-11-16 | End: 2018-11-17 | Stop reason: HOSPADM

## 2018-11-16 RX ORDER — SULFAMETHOXAZOLE AND TRIMETHOPRIM 800; 160 MG/1; MG/1
1 TABLET ORAL 3 TIMES WEEKLY
Qty: 15 TABLET | Refills: 5 | Status: SHIPPED | OUTPATIENT
Start: 2018-11-26 | End: 2018-12-06

## 2018-11-16 RX ORDER — MIDAZOLAM HYDROCHLORIDE 1 MG/ML
INJECTION INTRAMUSCULAR; INTRAVENOUS CODE/TRAUMA/SEDATION MEDICATION
Status: COMPLETED | OUTPATIENT
Start: 2018-11-16 | End: 2018-11-16

## 2018-11-16 RX ORDER — OXYCODONE HYDROCHLORIDE AND ACETAMINOPHEN 5; 325 MG/1; MG/1
1 TABLET ORAL EVERY 4 HOURS PRN
Status: DISCONTINUED | OUTPATIENT
Start: 2018-11-16 | End: 2018-11-17 | Stop reason: HOSPADM

## 2018-11-16 RX ORDER — ONDANSETRON 8 MG/1
8 TABLET, ORALLY DISINTEGRATING ORAL EVERY 8 HOURS PRN
Qty: 20 TABLET | Refills: 5 | Status: SHIPPED | OUTPATIENT
Start: 2018-11-16 | End: 2018-11-27 | Stop reason: SDUPTHER

## 2018-11-16 RX ADMIN — FENTANYL CITRATE 25 MCG: 50 INJECTION INTRAMUSCULAR; INTRAVENOUS at 12:41

## 2018-11-16 RX ADMIN — SODIUM CHLORIDE 75 ML/HR: 0.9 INJECTION, SOLUTION INTRAVENOUS at 09:04

## 2018-11-16 RX ADMIN — OXYCODONE HYDROCHLORIDE AND ACETAMINOPHEN 1 TABLET: 5; 325 TABLET ORAL at 15:02

## 2018-11-16 RX ADMIN — MIDAZOLAM 1 MG: 1 INJECTION INTRAMUSCULAR; INTRAVENOUS at 12:29

## 2018-11-16 RX ADMIN — MIDAZOLAM 0.5 MG: 1 INJECTION INTRAMUSCULAR; INTRAVENOUS at 12:41

## 2018-11-16 RX ADMIN — FENTANYL CITRATE 50 MCG: 50 INJECTION INTRAMUSCULAR; INTRAVENOUS at 12:30

## 2018-11-16 NOTE — BRIEF OP NOTE (RAD/CATH)
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

## 2018-11-16 NOTE — PROGRESS NOTES
Patient education given on dose adjusted R-EPOCH  Patients Granddaughter Mitzi Edenilson present  Discussed mechanism of action, administration, adverse effects, and supportive care measures  Patient provided with  educational brochures on each medication and a thermometer  Patient instructed to call if she develops significant adverse effects, temperature over 100° F, or with any further questions or concerns  Patient encouraged to drink 2 lt of fluid per day (excluding caffeine)  Patient is aware of how to contact provider/nurse during and after office hours  Patient and granddaughter's questions answered to their satisfaction and the patient expresses understanding and acceptance of instructions  She will be admitted to [address] [place] [address] on Monday 11/19/18 for her 1st cycle of chemo

## 2018-11-16 NOTE — DISCHARGE INSTRUCTIONS
Implanted Venous Access Port     WHAT YOU NEED TO KNOW:   An implanted venous access port is a device used to give treatments and take blood  It may also be called a central venous access device (CVAD)  The port is a small container that is placed under your skin, usually in your upper chest  The port is attached to a catheter that enters a large vein  DISCHARGE INSTRUCTIONS:   Resume your normal diet  Small sips of flat soda will help with mild nausea  Prevent an infection:   · Wash your hands often  Use soap and water  Clean your hands before and after you care for your port  Remind everyone who cares for your port to wash their hands  · Check your skin for infection every day  Look for redness, swelling, or fluid oozing from the port site  Care for your port:   1  You may shower beginning 48 hours after procedure  2  Change dressing if it becomes wet  3  Remove dressing after 24 hours  Leave glue in place  4  It is normal for some bruising to occur  5  Use Tylenol for pain  6  Limit use of arm on the side that your port was placed  Lift nothing heavier than 5 pounds for 1 week, and then gradually increase activity as tolerated  7  DO NOT apply ointment, lotion or cream to port site until incision is healed  Allow glue to fall off  DO NOT attempt to peel glue from skin even it it begins to flake  8, After the port incision is healed you may swim, bathe  Notify the Interventional Radiologist if you have any of the followin  Fever above 101 F    2  Increased redness or swelling after 1st day  3  Increased pain after 1st day  4  Any sign of infection (drainage from port site, skin separation, hot to touch)  5  Persistent nausea or vomiting  Contact Interventional Radiology at [phone] [facility] PATIENTS: Contact Interventional Radiology at [phone]) ([address]: Contact Interventional Radiology at [phone])

## 2018-11-16 NOTE — H&P (VIEW-ONLY)
H&P- [address] [place] [address] [birth year], 68 y o  female MRN: [id number]    Unit/Bed#: ED 08 Encounter: [id number]    Primary Care Provider: Macey Lau PA-C   Date and time admitted to hospital: 11/9/2018  1:07 PM        Oropharyngeal dysphagia   Assessment & Plan    Patient states that she has difficulty swallowing and she gets pain in her throat  She has dysphagia to solids and liquids  She also states that sometimes it because she eats only liquid food she gets loose stools  Will place her on a pureed diet and thin liquids and observe her swallowing for now  Reviewed CT of the neck  No significant obstruction noted to the oral passages  Will place on IV fluid hydration for now and monitor her eating     * SOB (shortness of breath)   Assessment & Plan    Patient states that she sometimes gets short of breath which has been progressively worsening and becoming more consistent over the last 2-3 days  Will get a chest x-ray to rule out any other etiology  No airway compression noted in the neck as per CT neck  Will do an ambulatory oxygen study tomorrow and and overnight oxygen study tonight and see if any supplemental oxygen is required  She may just be having pressure effect secondary to having lymphoma in the neck  Diffuse large 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 [facility])   Assessment & Plan    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  Mass  of the left soft palate/left oropharynx measuring 3 9 x 2 8 x 2 8 cm in keeping with this patient's known history of malignancy  Patient should be scheduled for Port-A-Cath placement by surgery soon and be started on chemotherapy soon  She has lost over 35 lb in the last 6 months  Gastroesophageal reflux disease   Assessment & Plan    Continue proton pump inhibitor     Essential hypertension   Assessment & Plan    Blood pressure well controlled    Continue losartan, Coreg and Norvasc     Type 2 diabetes mellitus without complication, without long-term current use of insulin ([facility] [address] )   Assessment & Plan    Lab Results   Component Value Date    HGBA1C 7 5 10/31/2018       No results for input(s): POCGLU in the last 72 hours  Blood Sugar Average: Last 72 hrs:  Hold metformin as she got IV contrast today  Place the patient on insulin sliding scale  I have discussed the case with her oncologist Dr Carlos Reyes  I have also reviewed previous records  VTE Prophylaxis: Enoxaparin (Lovenox)  / sequential compression device   Code Status:  Full code  POLST: There is no POLST form on file for this patient (pre-hospital)  Discussion with family:  Discussed with son at bedside    Anticipated Length of Stay:  Patient will be admitted on an Observation basis with an anticipated length of stay of  Less than 2 midnights  Justification for Hospital Stay:  Shortness of breath and dysphagia    Total Time for Visit, including Counseling / Coordination of Care: 45 minutes  Greater than 50% of this total time spent on direct patient counseling and coordination of care  Chief Complaint:   Shortness of breath and dysphagia    History of Present Illness:    Vicki Levin is a 68 y o  female who presents with shortness of breath and dysphagia  Patient states that for the last 6 months she has been having difficulty swallowing  She has lost around 35 lb in 6 months  She states that initially she started off with dysphagia to solids but now she has dysphagia to both solids and liquids  For the last 2-3 days she has now also developed difficulty breathing and feels like her neck is closing in at times especially when she lays flat  She continues to also have worsening dysphagia and has not had anything to eat or drink for the last 2 days  She was seen by her oncologist today and found to have an oxygen saturation of 92% and some dyspnea and hence sent to the emergency room for further evaluation  She denies any chest pain, fever, chills, cough or cold  No sick contacts  She was recently diagnosed with diffuse B-cell lymphoma     Review of Systems:    Review of Systems   Constitutional: Positive for activity change, appetite change, fatigue and unexpected weight change  Negative for chills and fever  HENT: Positive for sore throat and trouble swallowing  Negative for hearing loss  Eyes: Negative for photophobia, discharge and visual disturbance  Respiratory: Positive for shortness of breath  Negative for chest tightness  Cardiovascular: Negative for chest pain and palpitations  Gastrointestinal: Negative for abdominal pain, blood in stool and vomiting  Endocrine: Negative for polydipsia and polyuria  Genitourinary: Negative for difficulty urinating, dysuria, flank pain and hematuria  Musculoskeletal: Negative for back pain and gait problem  Skin: Negative for rash  Allergic/Immunologic: Negative for environmental allergies and food allergies  Neurological: Positive for weakness  Negative for dizziness, seizures, syncope and headaches  Hematological: Does not bruise/bleed easily  Psychiatric/Behavioral: Negative for behavioral problems  All other systems reviewed and are negative  Past Medical and Surgical History:     Past Medical History:   Diagnosis Date    Cancer ([place] [address] )     Throat    Diabetes mellitus ([place] [address] )     Diffuse large B cell lymphoma ([place] [address] )     Dysphagia     GERD without esophagitis     HTN (hypertension)     Hyperlipidemia     Hypertension     MI (myocardial infarction) ([place] [address] )        Past Surgical History:   Procedure Laterality Date    BONE MARROW BIOPSY      BREAST LUMPECTOMY      CHOLECYSTECTOMY      OTHER SURGICAL HISTORY      tendor tear repair to right shoulder    SHOULDER ARTHROSCOPY         Meds/Allergies:    Prior to Admission medications    Medication Sig Start Date End Date Taking?  Authorizing Provider   omeprazole (PriLOSEC) 40 MG capsule Take 40 mg by mouth daily   Yes Historical Provider, MD   ranitidine (ZANTAC) 150 mg tablet Take 150 mg by mouth 2 (two) times a day   Yes Historical Provider, MD   amLODIPine (NORVASC) 10 mg tablet Take 10 mg by mouth daily  Historical Provider, MD   carvedilol (COREG) 12 5 mg tablet Take 25 mg by mouth 2 (two) times a day with meals      Historical Provider, MD   ergocalciferol (VITAMIN D2) 50,000 units Take 50,000 Units by mouth once a week  Historical Provider, MD   ezetimibe (ZETIA) 10 mg tablet Take 10 mg by mouth daily  Historical Provider, MD   fentaNYL (DURAGESIC) 25 mcg/hr Place 1 patch on the skin every third day    Historical Provider, MD   fentaNYL (DURAGESIC) 25 mcg/hr Place 1 patch on the skin every third day Max Daily Amount: 1 patch 10/31/18   Torie Alvarado MD   glipiZIDE (GLUCOTROL) 10 mg tablet Take 10 mg by mouth 2 (two) times a day before meals  Historical Provider, MD   HYDROcodone-acetaminophen (NORCO) 5-325 mg per tablet Take 1 tablet by mouth every 6 (six) hours as needed for pain for up to 10 doses Max Daily Amount: 4 tablets 10/25/18   Natanael Marsh MD   lidocaine viscous (XYLOCAINE) 2 % mucosal solution Swish and swallow 10 mL 3 (three) times a day as needed for mild pain or moderate pain 10/25/18   Natanael Marsh MD   losartan (COZAAR) 25 mg tablet Take 1 tablet (25 mg total) by mouth daily 10/31/18   Williasm Landeros PA-C   oxyCODONE-acetaminophen (PERCOCET) 5-325 mg per tablet Take 1 tablet by mouth 2 (two) times a day Max Daily Amount: 2 tablets 10/31/18   Torie Alvarado MD   sitaGLIPtin-metFORMIN (JANUMET)  MG per tablet Take 1 tablet by mouth 2 (two) times a day with meals 10/31/18   Williams Landeros PA-C   pantoprazole (PROTONIX) 40 mg tablet Take 40 mg by mouth daily    11/9/18  Historical Provider, MD   ranitidine (ZANTAC) 300 MG tablet Take 1 tablet (300 mg total) by mouth daily at bedtime 10/31/18 11/9/18  Williams Landeros PA-C     I have reviewed home medications with patient personally  Allergies: No Known Allergies    Social History:     Marital Status:    History   Alcohol Use No     History   Smoking Status    Never Smoker   Smokeless Tobacco    Never Used     History   Drug Use No       Family History:    Family History   Problem Relation Age of Onset    Diabetes Mother     Heart disease Sister     Hypertension Brother     Cancer Maternal Grandmother     Cancer Maternal Grandfather        Physical Exam:     Vitals:   Blood Pressure: 110/66 (11/09/18 1630)  Pulse: 68 (11/09/18 1630)  Respirations: 16 (11/09/18 1630)  Weight - Scale: 64 4 kg (142 lb) (11/09/18 1310)  SpO2: 99 % (11/09/18 1630)    Physical Exam   Constitutional: She is oriented to person, place, and time  She appears well-developed  She appears distressed  HENT:   Head: Normocephalic and atraumatic  Right Ear: External ear normal    Left Ear: External ear normal    Mouth/Throat: Oropharynx is clear and moist    Eyes: Pupils are equal, round, and reactive to light  Conjunctivae and EOM are normal    Neck: Normal range of motion  Neck supple  Cardiovascular: Normal rate, regular rhythm, normal heart sounds and intact distal pulses  Pulmonary/Chest: Effort normal and breath sounds normal    Abdominal: Soft  Bowel sounds are normal  She exhibits no mass  There is no tenderness  There is no rebound and no guarding  Genitourinary:   Genitourinary Comments: deferred   Musculoskeletal: Normal range of motion  Neurological: She is alert and oriented to person, place, and time  Skin: Skin is warm and dry  No rash noted  Psychiatric: She has a normal mood and affect  Nursing note and vitals reviewed  Additional Data:     Lab Results: I have personally reviewed pertinent reports          Results from last 7 days  Lab Units 11/09/18  1418   WBC Thousand/uL 8 30   HEMOGLOBIN g/dL 12 7   HEMATOCRIT % 37 7   PLATELETS Thousands/uL 290   NEUTROS ABS Thousands/µL 5 20 NEUTROS PCT % 63   LYMPHS PCT % 26   MONOS PCT % 9   EOS PCT % 1       Results from last 7 days  Lab Units 11/09/18  1418   POTASSIUM mmol/L 4 0   CHLORIDE mmol/L 100   CO2 mmol/L 31*   BUN mg/dL 13   CREATININE mg/dL 0 71   ANION GAP mmol/L 7   CALCIUM mg/dL 9 4   ALBUMIN g/dL 4 2   TOTAL BILIRUBIN mg/dL 0 50   ALK PHOS U/L 81   ALT U/L 26   AST U/L 27                       Imaging: I have personally reviewed pertinent reports  CT soft tissue neck with contrast   Final Result by Rashid Walker MD (11/09 1617)      1  Compared to recent CT 10/25/2018, no significant interval change in size of large left tonsillar/oropharyngeal wall mass with extension to the tongue base consistent with known history of lymphoma  No significant change in mass effect and    significant oropharyngeal space narrowing  2   Grossly unchanged enlarged left level 2A and right jugulodigastric lymph nodes from 10/25/2018  Workstation performed: [id number]             EKG, Pathology, and Other Studies Reviewed on Admission:   · EKG: ordered    [facility] Reviewed: Yes     ** Please Note: This note has been constructed using a voice recognition system   **

## 2018-11-16 NOTE — INTERVAL H&P NOTE
Update:    Patient presents for port placement for chemotherapy  Right IJ patent by latest CT  Discussed risks (bleeding, infection), and benefits with patient using   She wishes to proceed  No allergies  MP 2/3  NPO    Patient re-evaluated   Accept as history and physical     Eduardo Vega MD/November 16, 2018/12:13 PM

## 2018-11-19 ENCOUNTER — HOSPITAL ENCOUNTER (INPATIENT)
Facility: HOSPITAL | Age: 73
LOS: 7 days | Discharge: HOME | DRG: 847 | End: 2018-11-26
Attending: INTERNAL MEDICINE | Admitting: INTERNAL MEDICINE
Payer: MEDICARE

## 2018-11-19 ENCOUNTER — APPOINTMENT (INPATIENT)
Dept: RADIOLOGY | Facility: HOSPITAL | Age: 73
DRG: 847 | End: 2018-11-19
Payer: MEDICARE

## 2018-11-19 DIAGNOSIS — K21.9 GASTROESOPHAGEAL REFLUX DISEASE, ESOPHAGITIS PRESENCE NOT SPECIFIED: ICD-10-CM

## 2018-11-19 DIAGNOSIS — I10 ESSENTIAL HYPERTENSION: ICD-10-CM

## 2018-11-19 DIAGNOSIS — E11.9 TYPE 2 DIABETES MELLITUS WITHOUT COMPLICATION, WITHOUT LONG-TERM CURRENT USE OF INSULIN (HCC): ICD-10-CM

## 2018-11-19 DIAGNOSIS — C83.31 DIFFUSE LARGE B-CELL LYMPHOMA OF LYMPH NODES OF NECK (HCC): Primary | ICD-10-CM

## 2018-11-19 PROBLEM — E87.1 HYPONATREMIA: Status: ACTIVE | Noted: 2018-11-19

## 2018-11-19 LAB
ALBUMIN SERPL BCP-MCNC: 3.5 G/DL (ref 3.5–5)
ALP SERPL-CCNC: 93 U/L (ref 46–116)
ALT SERPL W P-5'-P-CCNC: 25 U/L (ref 12–78)
ANION GAP SERPL CALCULATED.3IONS-SCNC: 5 MMOL/L (ref 4–13)
AST SERPL W P-5'-P-CCNC: 23 U/L (ref 5–45)
BASOPHILS # BLD AUTO: 0.06 THOUSANDS/ΜL (ref 0–0.1)
BASOPHILS NFR BLD AUTO: 1 % (ref 0–1)
BILIRUB SERPL-MCNC: 0.48 MG/DL (ref 0.2–1)
BUN SERPL-MCNC: 11 MG/DL (ref 5–25)
CALCIUM SERPL-MCNC: 8.7 MG/DL (ref 8.3–10.1)
CHLORIDE SERPL-SCNC: 100 MMOL/L (ref 100–108)
CO2 SERPL-SCNC: 27 MMOL/L (ref 21–32)
CREAT SERPL-MCNC: 0.92 MG/DL (ref 0.6–1.3)
EOSINOPHIL # BLD AUTO: 0.19 THOUSAND/ΜL (ref 0–0.61)
EOSINOPHIL NFR BLD AUTO: 2 % (ref 0–6)
ERYTHROCYTE [DISTWIDTH] IN BLOOD BY AUTOMATED COUNT: 13.2 % (ref 11.6–15.1)
GFR SERPL CREATININE-BSD FRML MDRD: 62 ML/MIN/1.73SQ M
GLUCOSE SERPL-MCNC: 127 MG/DL (ref 65–140)
GLUCOSE SERPL-MCNC: 174 MG/DL (ref 65–140)
GLUCOSE SERPL-MCNC: 203 MG/DL (ref 65–140)
HCT VFR BLD AUTO: 38.7 % (ref 34.8–46.1)
HGB BLD-MCNC: 12.4 G/DL (ref 11.5–15.4)
IMM GRANULOCYTES # BLD AUTO: 0.04 THOUSAND/UL (ref 0–0.2)
IMM GRANULOCYTES NFR BLD AUTO: 0 % (ref 0–2)
LYMPHOCYTES # BLD AUTO: 2.51 THOUSANDS/ΜL (ref 0.6–4.47)
LYMPHOCYTES NFR BLD AUTO: 21 % (ref 14–44)
MCH RBC QN AUTO: 29.6 PG (ref 26.8–34.3)
MCHC RBC AUTO-ENTMCNC: 32 G/DL (ref 31.4–37.4)
MCV RBC AUTO: 92 FL (ref 82–98)
MONOCYTES # BLD AUTO: 0.97 THOUSAND/ΜL (ref 0.17–1.22)
MONOCYTES NFR BLD AUTO: 8 % (ref 4–12)
NEUTROPHILS # BLD AUTO: 8.36 THOUSANDS/ΜL (ref 1.85–7.62)
NEUTS SEG NFR BLD AUTO: 68 % (ref 43–75)
NRBC BLD AUTO-RTO: 0 /100 WBCS
PLATELET # BLD AUTO: 319 THOUSANDS/UL (ref 149–390)
PMV BLD AUTO: 9.6 FL (ref 8.9–12.7)
POTASSIUM SERPL-SCNC: 4.2 MMOL/L (ref 3.5–5.3)
PROT SERPL-MCNC: 7.5 G/DL (ref 6.4–8.2)
RBC # BLD AUTO: 4.19 MILLION/UL (ref 3.81–5.12)
SODIUM SERPL-SCNC: 132 MMOL/L (ref 136–145)
WBC # BLD AUTO: 12.13 THOUSAND/UL (ref 4.31–10.16)

## 2018-11-19 PROCEDURE — 71045 X-RAY EXAM CHEST 1 VIEW: CPT

## 2018-11-19 PROCEDURE — G8996 SWALLOW CURRENT STATUS: HCPCS

## 2018-11-19 PROCEDURE — 93005 ELECTROCARDIOGRAM TRACING: CPT

## 2018-11-19 PROCEDURE — G8998 SWALLOW D/C STATUS: HCPCS

## 2018-11-19 PROCEDURE — 80053 COMPREHEN METABOLIC PANEL: CPT | Performed by: INTERNAL MEDICINE

## 2018-11-19 PROCEDURE — 82948 REAGENT STRIP/BLOOD GLUCOSE: CPT

## 2018-11-19 PROCEDURE — 85025 COMPLETE CBC W/AUTO DIFF WBC: CPT | Performed by: INTERNAL MEDICINE

## 2018-11-19 PROCEDURE — 99232 SBSQ HOSP IP/OBS MODERATE 35: CPT | Performed by: INTERNAL MEDICINE

## 2018-11-19 PROCEDURE — 99222 1ST HOSP IP/OBS MODERATE 55: CPT | Performed by: INTERNAL MEDICINE

## 2018-11-19 PROCEDURE — G8997 SWALLOW GOAL STATUS: HCPCS

## 2018-11-19 PROCEDURE — 92610 EVALUATE SWALLOWING FUNCTION: CPT

## 2018-11-19 RX ORDER — CARVEDILOL 12.5 MG/1
12.5 TABLET ORAL 2 TIMES DAILY WITH MEALS
Status: DISCONTINUED | OUTPATIENT
Start: 2018-11-19 | End: 2018-11-26 | Stop reason: HOSPADM

## 2018-11-19 RX ORDER — ALLOPURINOL 100 MG/1
100 TABLET ORAL DAILY
Status: DISCONTINUED | OUTPATIENT
Start: 2018-11-19 | End: 2018-11-26 | Stop reason: HOSPADM

## 2018-11-19 RX ORDER — ONDANSETRON 2 MG/ML
4 INJECTION INTRAMUSCULAR; INTRAVENOUS EVERY 6 HOURS PRN
Status: DISCONTINUED | OUTPATIENT
Start: 2018-11-19 | End: 2018-11-26 | Stop reason: HOSPADM

## 2018-11-19 RX ORDER — MAGNESIUM HYDROXIDE/ALUMINUM HYDROXICE/SIMETHICONE 120; 1200; 1200 MG/30ML; MG/30ML; MG/30ML
30 SUSPENSION ORAL EVERY 6 HOURS PRN
Status: DISCONTINUED | OUTPATIENT
Start: 2018-11-19 | End: 2018-11-26 | Stop reason: HOSPADM

## 2018-11-19 RX ORDER — EZETIMIBE 10 MG/1
10 TABLET ORAL
Status: DISCONTINUED | OUTPATIENT
Start: 2018-11-19 | End: 2018-11-26 | Stop reason: HOSPADM

## 2018-11-19 RX ORDER — ZOLPIDEM TARTRATE 5 MG/1
5 TABLET ORAL
Status: DISCONTINUED | OUTPATIENT
Start: 2018-11-19 | End: 2018-11-26 | Stop reason: HOSPADM

## 2018-11-19 RX ORDER — GLIPIZIDE 10 MG/1
10 TABLET ORAL
Status: DISCONTINUED | OUTPATIENT
Start: 2018-11-19 | End: 2018-11-19

## 2018-11-19 RX ORDER — ACETAMINOPHEN 160 MG/5ML
650 SUSPENSION, ORAL (FINAL DOSE FORM) ORAL EVERY 4 HOURS PRN
Status: DISCONTINUED | OUTPATIENT
Start: 2018-11-19 | End: 2018-11-26 | Stop reason: HOSPADM

## 2018-11-19 RX ORDER — OXYCODONE HYDROCHLORIDE AND ACETAMINOPHEN 5; 325 MG/1; MG/1
1 TABLET ORAL EVERY 6 HOURS PRN
Status: DISCONTINUED | OUTPATIENT
Start: 2018-11-19 | End: 2018-11-26 | Stop reason: HOSPADM

## 2018-11-19 RX ORDER — FENTANYL 12 UG/H
12 PATCH TRANSDERMAL
Status: DISCONTINUED | OUTPATIENT
Start: 2018-11-21 | End: 2018-11-21

## 2018-11-19 RX ORDER — FAMOTIDINE 20 MG/1
20 TABLET, FILM COATED ORAL DAILY
Status: DISCONTINUED | OUTPATIENT
Start: 2018-11-19 | End: 2018-11-23

## 2018-11-19 RX ORDER — FENTANYL 12 UG/H
12 PATCH TRANSDERMAL
Status: DISCONTINUED | OUTPATIENT
Start: 2018-11-19 | End: 2018-11-19

## 2018-11-19 RX ORDER — DIPHENHYDRAMINE HCL 25 MG
50 TABLET ORAL
Status: DISCONTINUED | OUTPATIENT
Start: 2018-11-19 | End: 2018-11-26 | Stop reason: HOSPADM

## 2018-11-19 RX ORDER — SULFAMETHOXAZOLE AND TRIMETHOPRIM 800; 160 MG/1; MG/1
1 TABLET ORAL EVERY 12 HOURS SCHEDULED
Status: DISCONTINUED | OUTPATIENT
Start: 2018-11-19 | End: 2018-11-26 | Stop reason: HOSPADM

## 2018-11-19 RX ORDER — ACETAMINOPHEN 325 MG/1
650 TABLET ORAL ONCE
Status: DISCONTINUED | OUTPATIENT
Start: 2018-11-19 | End: 2018-11-19 | Stop reason: ALTCHOICE

## 2018-11-19 RX ORDER — LORAZEPAM 2 MG/ML
0.5 INJECTION INTRAMUSCULAR ONCE
Status: COMPLETED | OUTPATIENT
Start: 2018-11-19 | End: 2018-11-19

## 2018-11-19 RX ORDER — PANTOPRAZOLE SODIUM 40 MG/1
40 TABLET, DELAYED RELEASE ORAL
Status: DISCONTINUED | OUTPATIENT
Start: 2018-11-19 | End: 2018-11-26 | Stop reason: HOSPADM

## 2018-11-19 RX ORDER — LOPERAMIDE HYDROCHLORIDE 2 MG/1
2 CAPSULE ORAL 3 TIMES DAILY PRN
Status: DISCONTINUED | OUTPATIENT
Start: 2018-11-19 | End: 2018-11-26 | Stop reason: HOSPADM

## 2018-11-19 RX ORDER — SODIUM CHLORIDE 9 MG/ML
75 INJECTION, SOLUTION INTRAVENOUS CONTINUOUS
Status: DISCONTINUED | OUTPATIENT
Start: 2018-11-19 | End: 2018-11-26 | Stop reason: HOSPADM

## 2018-11-19 RX ORDER — AMLODIPINE BESYLATE 5 MG/1
5 TABLET ORAL DAILY
Status: DISCONTINUED | OUTPATIENT
Start: 2018-11-19 | End: 2018-11-26 | Stop reason: HOSPADM

## 2018-11-19 RX ORDER — LOSARTAN POTASSIUM 25 MG/1
25 TABLET ORAL DAILY
Status: DISCONTINUED | OUTPATIENT
Start: 2018-11-19 | End: 2018-11-26 | Stop reason: HOSPADM

## 2018-11-19 RX ADMIN — SODIUM CHLORIDE 75 ML/HR: 0.9 INJECTION, SOLUTION INTRAVENOUS at 21:26

## 2018-11-19 RX ADMIN — SODIUM CHLORIDE 75 ML/HR: 0.9 INJECTION, SOLUTION INTRAVENOUS at 10:07

## 2018-11-19 RX ADMIN — LORAZEPAM 0.5 MG: 2 INJECTION INTRAMUSCULAR; INTRAVENOUS at 15:32

## 2018-11-19 RX ADMIN — INSULIN LISPRO 1 UNITS: 100 INJECTION, SOLUTION INTRAVENOUS; SUBCUTANEOUS at 17:06

## 2018-11-19 RX ADMIN — ALUMINUM HYDROXIDE, MAGNESIUM HYDROXIDE, AND SIMETHICONE 30 ML: 200; 200; 20 SUSPENSION ORAL at 19:04

## 2018-11-19 RX ADMIN — SULFAMETHOXAZOLE AND TRIMETHOPRIM 1 TABLET: 800; 160 TABLET ORAL at 21:22

## 2018-11-19 RX ADMIN — EZETIMIBE 10 MG: 10 TABLET ORAL at 21:22

## 2018-11-19 RX ADMIN — ALLOPURINOL 100 MG: 100 TABLET ORAL at 10:02

## 2018-11-19 RX ADMIN — CARVEDILOL 12.5 MG: 12.5 TABLET, FILM COATED ORAL at 17:06

## 2018-11-19 RX ADMIN — ENOXAPARIN SODIUM 40 MG: 40 INJECTION SUBCUTANEOUS at 10:07

## 2018-11-19 NOTE — CONSULTS
Consult- [facility] [birth year], 68 y o  female MRN: [id number]    Unit/Bed#: [facility] 295-20 Encounter: [id number]    Primary Care Provider: Mason Garvin PA-C   Date and time admitted to hospital: 11/19/2018  8:19 AM      Inpatient consult to Internal Medicine  Consult performed by: Avi Fox ordered by: Hans Lau          * Diffuse large 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 ([facility] )   Assessment & Plan    · New diagnosis of diffuse large B-cell lymphoma of L tonsil with adenopath of both sides of neck  · PET-CT done 11/14 showing L posterior oropharyngeal lesion compatible with malignancy and lymph nodes in neck and L axilla compatible with malignancy  · S/P port placement 11/13  Admitted for 1st cycle of chemotherapy  · Per primary service, oncology     Type 2 diabetes mellitus without complication, without long-term current use of insulin ([facility] )   Assessment & Plan    Lab Results   Component Value Date    HGBA1C 7 5 10/31/2018       No results for input(s): POCGLU in the last 72 hours      Blood Sugar Average: Last 72 hrs:  · most recent A1C 7 5  · Hold oral medications  · Place on SSI/accuchecks  · Diabetic diet      Hyponatremia   Assessment & Plan    · Suspect in setting of poor PO intake secondary to oropharyngeal dysphagia  · Continue IVF hydration  · Serial BMPs     Oropharyngeal dysphagia   Assessment & Plan    · Patient was on modified diet during last admission  · Speech/swallow consult pending, appreciate input  · Likely in setting of primary problem  · Recommend PRN viscous lidocaine      Gastroesophageal reflux disease   Assessment & Plan    · Continue PPI  · PRN mylanta      Essential hypertension   Assessment & Plan    · Continue coreg 12 5 mg BID, cozaar 25 mg daily and norvasc 5 mg daily  · Titrate PRN         VTE Prophylaxis: Fondaparinux (Arixtra) and Enoxaparin (Lovenox)  / sequential compression device     Recommendations for Discharge:  · Will continue to follow     Counseling / Coordination of Care Time: 1 hour  Greater than 50% of total time spent on patient counseling and coordination of care  Collaboration of Care: Were Recommendations Directly Discussed with Primary Treatment Team? - No     History of Present Illness:    Karrie Haley is a 68 y o  female with PMHx of HTN, HLD, DM2 and recent diagnosis of diffuse B cell lymphoma who is originally admitted to the oncology service for first cycle of chemotherapy  We are consulted for medical management  Patient speaks Thai so daughter translates  Patient states she is feeling relatively well just having difficulty with swallowing secondary to sore throat  She has a bit of a cough and chronic SOB  She has no fevers/chills/chest pain/abomdinal pain/nausea or vomiting  She did have episode of diarrhea yesterday for which she took imodium and it resolved  She requires benadryl HS PRN  Review of Systems:    Review of Systems   Constitutional: Negative for chills, fatigue and fever  HENT: Positive for sore throat and trouble swallowing  Respiratory: Positive for cough and shortness of breath  Cardiovascular: Negative  Gastrointestinal: Positive for diarrhea  Genitourinary: Negative  Musculoskeletal: Negative  Neurological: Negative  Psychiatric/Behavioral: Negative  All other systems reviewed and are negative        Past Medical and Surgical History:     Past Medical History:   Diagnosis Date    Cancer [facility])     Throat    Chronic pain disorder     Diabetes mellitus ([facility] )     Diffuse large B cell lymphoma ([facility] )     Dysphagia     GERD without esophagitis     HTN (hypertension)     Hyperlipidemia     Hypertension     MI (myocardial infarction) ([facility] )        Past Surgical History:   Procedure Laterality Date    BONE MARROW BIOPSY      BREAST LUMPECTOMY      CHOLECYSTECTOMY      IR PORT PLACEMENT  11/16/2018    OTHER SURGICAL HISTORY      tendor tear repair to right shoulder    SHOULDER ARTHROSCOPY         Meds/Allergies:    all medications and allergies reviewed    Allergies: No Known Allergies    Social History:     Marital Status:     Substance Use History:   History   Alcohol Use No     History   Smoking Status    Never Smoker   Smokeless Tobacco    Never Used     History   Drug Use No       Family History:    non-contributory    Physical Exam:     Vitals:   Blood Pressure: 132/68 (11/19/18 0839)  Pulse: 77 (11/19/18 0839)  Temperature: 98 4 °F (36 9 °C) (11/19/18 0839)  Respirations: 18 (11/19/18 0839)  Height: 5' (152 4 cm) (11/19/18 0905)  Weight - Scale: 63 5 kg (139 lb 15 9 oz) (11/19/18 0905)  SpO2: 97 % (11/19/18 0839)    Physical Exam   Constitutional: She is oriented to person, place, and time  No distress  Nasal cannula in place  Neck:   Mild erythema of oropharynx    Cardiovascular: Normal rate and regular rhythm  Pulmonary/Chest: Effort normal and breath sounds normal  No respiratory distress  She has no wheezes  R chest wall port noted   Abdominal: Soft  Bowel sounds are normal  She exhibits no distension  There is no tenderness  Musculoskeletal: She exhibits no edema  Lymphadenopathy:     She has cervical adenopathy  Neurological: She is alert and oriented to person, place, and time  Skin: Skin is warm and dry  She is not diaphoretic  Psychiatric: She has a normal mood and affect  Nursing note and vitals reviewed  Additional Data:     Lab Results: I have personally reviewed pertinent reports          Results from last 7 days  Lab Units 11/19/18  0858   WBC Thousand/uL 12 13*   HEMOGLOBIN g/dL 12 4   HEMATOCRIT % 38 7   PLATELETS Thousands/uL 319   NEUTROS PCT % 68   LYMPHS PCT % 21   MONOS PCT % 8   EOS PCT % 2       Results from last 7 days  Lab Units 11/19/18  0858   SODIUM mmol/L 132*   POTASSIUM mmol/L 4 2   CHLORIDE mmol/L 100   CO2 mmol/L 27   BUN mg/dL 11   CREATININE mg/dL 0 92   ANION GAP mmol/L 5   CALCIUM mg/dL 8 7   ALBUMIN g/dL 3 5   TOTAL BILIRUBIN mg/dL 0 48   ALK PHOS U/L 93   ALT U/L 25   AST U/L 23   GLUCOSE RANDOM mg/dL 174*       Results from last 7 days  Lab Units 11/16/18  0856   INR  1 08         Lab Results   Component Value Date/Time    HGBA1C 7 5 10/31/2018 01:22 PM    HGBA1C 8 20 10/12/2018    HGBA1C 8 5 08/03/2016 09:39 AM    HGBA1C 7 9 (H) 05/06/2016 07:41 AM    HGBA1C 7 7 (H) 02/15/2016 10:07 AM       Results from last 7 days  Lab Units 11/14/18  1208   POC GLUCOSE mg/dl 121*           Imaging: I have personally reviewed pertinent reports  No orders to display       EKG, Pathology, and Other Studies Reviewed on Admission:   · EKG: none today    ** Please Note: This note has been constructed using a voice recognition system   **

## 2018-11-19 NOTE — ASSESSMENT & PLAN NOTE
· Suspect in setting of poor PO intake secondary to oropharyngeal dysphagia  · Continue IVF hydration  · Serial BMPs

## 2018-11-19 NOTE — SPEECH THERAPY NOTE
Speech-Language Pathology Bedside Swallow Evaluation      Patient Name: Vickie OSORIO Date: 11/19/2018     Problem List  Patient Active Problem List   Diagnosis    Type 2 diabetes mellitus without complication, without long-term current use of insulin ([facility] )    Essential hypertension    Gastroesophageal reflux disease    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)    Oropharyngeal dysphagia    Hyponatremia       Past Medical History  Past Medical History:   Diagnosis Date    Cancer ([place] [address] )     Throat    Chronic pain disorder     Diabetes mellitus ([place] [address] )     Diffuse large B cell lymphoma ([facility] )     Dysphagia     GERD without esophagitis     HTN (hypertension)     Hyperlipidemia     Hypertension     MI (myocardial infarction) ([facility] )        Past Surgical History  Past Surgical History:   Procedure Laterality Date    BONE MARROW BIOPSY      BREAST LUMPECTOMY      CHOLECYSTECTOMY      IR PORT PLACEMENT  11/16/2018    OTHER SURGICAL HISTORY      tendor tear repair to right shoulder    SHOULDER ARTHROSCOPY         Summary   Pt presented with s/s suggestive of mild-moderate oral and suspected mild-moderate pharyngeal dysphagia  Symptoms or concerns included decreased mastication and intermittent brief bolus holding, as well as suspected pharyngeal swallow delay  Pt stated she can only swallow pureed foods, and even with puree she sometimes has discomfort when she swallows  No s/s aspiration noted, however question functionality of swallow to maintain nutritional needs by PO diet  Discussed with RN, pending decisions for treatment pt may need PEG for primary nutrition  For now, recommend continuing with puree and thin liquid   Re-consult ST when treatment plan is established (chemo, radiation, etc )    Risk for Aspiration: low, however d/t pain/discomfort swallowing may become non-functional for PO diet    Recommendations: puree/level 1 diet and thin liquids Recommended Form of Meds: as tolerated     Aspiration precautions and compensatory swallowing strategies: upright posture, slow rate of feeding, small bites/sips and alternating bites and sips      Current Medical Status per Leila Luong's hospitalist consult 11/19/2018  Karrie Haley is a 68 y o  female with PMHx of HTN, HLD, DM2 and recent diagnosis of diffuse B cell lymphoma who is originally admitted to the oncology service for first cycle of chemotherapy  We are consulted for medical management  * 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)   Assessment & Plan     · New diagnosis of diffuse large B-cell lymphoma of L tonsil with adenopath of both sides of neck  · PET-CT done 11/14 showing L posterior oropharyngeal lesion compatible with malignancy and lymph nodes in neck and L axilla compatible with malignancy  · S/P port placement 11/13  Admitted for 1st cycle of chemotherapy  · Per primary service, oncology   Type 2 diabetes mellitus without complication, without long-term current use of insulin ([facility] )   Assessment & Plan             Lab Results   Component Value Date     HGBA1C 7 5 10/31/2018   No results for input(s): POCGLU in the last 72 hours    Blood Sugar Average: Last 72 hrs:  · most recent A1C 7 5  · Hold oral medications  · Place on SSI/accuchecks  · Diabetic diet    Hyponatremia   Assessment & Plan     · Suspect in setting of poor PO intake secondary to oropharyngeal dysphagia  · Continue IVF hydration  · Serial BMPs   Oropharyngeal dysphagia   Assessment & Plan     · Patient was on modified diet during last admission  · Speech/swallow consult pending, appreciate input  · Likely in setting of primary problem  · Recommend PRN viscous lidocaine    Gastroesophageal reflux disease   Assessment & Plan     · Continue PPI  · PRN mylanta    Essential hypertension   Assessment & Plan     · Continue coreg 12 5 mg BID, cozaar 25 mg daily and norvasc 5 mg daily  · Titrate PRN       Past medical history:  Please see H&P for details    Special Studies:  CT of neck 11/9/2018 IMPRESSION:  1  Compared to recent CT 10/25/2018, no significant interval change in size of large left tonsillar/oropharyngeal wall mass with extension to the tongue base consistent with known history of lymphoma  No significant change in mass effect and   significant oropharyngeal space narrowing  2   Grossly unchanged enlarged left level 2A and right jugulodigastric lymph nodes from 10/25/2018  Social/Education/Vocational Hx:  Pt lives with family      Swallow Information   Current Risks for Dysphagia & Aspiration: new dx of pharyngeal dysphagia     Current Symptoms/Concerns: Pain in swallowing solid foods    Current Diet: regular diet and thin liquids, although pt was on a puree diet during previous admission     Baseline Diet: prior to diagnosis of cancer pt presented with dysphagia and oninophagia, and was not eating very well on regular diet      Baseline Assessment   Behavior/Cognition: alert    Speech/Language Status: able to participate in conversation, able to follow commands - translation phone used as pt only speaks Maltese     Patient Positioning: upright in bed    Pain Status/Interventions/Response to Interventions: Visible discomfort with swallow       Swallow Mechanism Exam   Facial: symmetrical  Labial: WFL  Lingual: WFL  Velum: Asymmetrical, reddening and apparent swelling noted on L side of soft palate  Mandible: adequate ROM  Dentition: adequate  Vocal quality:clear/adequate   Volitional Cough: adequate       Consistencies Assessed and Performance   Consistencies Administered: thin liquids and puree  Specific materials administered included Water, coffee, pudding, applesauce    Oral Stage: mild-moderate  Slowed bolus formation and transfer with puree  Brief bolus holding with liquids, suspect hesitation d/t discomfort with swallowing    No overt s/s reduced oral control  Pharyngeal Stage: mild-moderate  Swallow Mechanics:  Swallowing initiation appeared delayed  Laryngeal rise was palpated and judged to be within functional limits  No coughing, throat clearing, change in vocal quality or respiratory status noted today  Pt noted to wince with swallow, and appeared to fatigue with progression of PO intake      Esophageal Concerns: none reported    Strategies and Efficacy: slow rate, small bites/sips      Summary and Recommendations (see above)    Results Reviewed with: patient and RN     Consider referral to: re-consult ST pending plan for CA treatment    Treatment Recommended: No treatment needed at this time, however if pt requires radiation to the neck ST will be recommended

## 2018-11-19 NOTE — PROGRESS NOTES
Patient complaining "I can't breathe, I feel like there's something in my throat " She is sitting at the side of the bed with a basin spitting up clear mucous  Patient's O2 sat 100%  Notified Bettina Velez, confirmed these issues have been chronic and patient has had recent CT which did not show airway compromise  Will give ativan for anxiety and order chest xray  Emotional support provided to patient

## 2018-11-19 NOTE — H&P
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 as to potential double hit and initiation of chemotherapy    HPI: Adam Mae is a 68y o  year old female who presents with throat soreness since April 2018  There has been associated difficulty with swallowing  She has been tolerating puree food and she has slight difficulty in swallowing liquids  She has lost over 30 lb, her weight in April 2018 was about 170 lb, currently her weight is 139 lb  Hematology/oncology history:    Left tonsillectomy was [facility] on September 26, 2018 revealing diffuse large B-cell lymphoma, GCB phenotype, with Ki-67 70%    On PET-CT November 14, 2018 there is FDG avid 4 3 x 3 6 cm left posterior oropharyngeal lesion and FDG avid nodes in the neck, 2 4 x 2 7 cm of the left upper cervical chain, 2 0 x 1 4 cm of the right upper cervical chain, and left axilla, 3 4 x 2 7 cm, compatible with malignancy, mild FDG uptake in the distal esophagus, SUV max 3 1 may be inflammatory, a tiny focus of FDG uptake in the skin of the right upper quadrant abdomen abdominal wall SUV max 1 3  IR guided Right chest port placement on November 16, 2018  ROS:   General: Feels well, no chills or swaets  Head and Neck: No nosebleeds, see HPI  Cardiovascular: No chest pain, no lower extremity edema  Respriatory: No cough  She notes shortness of breath when she is attempting to swallow  GI: Appetite is good, she has chronic acid reflux aggravated by taking citrus fruit juices, however, no abdominal pain otherwise, bowel habits formed and regular  She has noted loose bowel movements related to taking Ensure  : No urinary frequency  Musculoskeletal: No back pains or joint pain    Skin: No skin rash  Neurological: No headache, no numbness, no weakness, she notes some difficulty with balance in the last several months and has been furniture surfing  Hematologic: No easy bruising  Psychiatric: No emotional problems    Historical Information   Past Medical History:   Diagnosis Date    [facility] )     Throat    Chronic pain disorder     Diabetes mellitus ([place] [address] )     Diffuse large B cell lymphoma ([place] [address] )     Dysphagia     GERD without esophagitis     HTN (hypertension)     Hyperlipidemia     Hypertension     MI (myocardial infarction) ([place] [address] )      Past Surgical History:   Procedure Laterality Date    BONE MARROW BIOPSY      BREAST LUMPECTOMY      CHOLECYSTECTOMY      IR PORT PLACEMENT  11/16/2018    OTHER SURGICAL HISTORY      tendor tear repair to right shoulder    SHOULDER ARTHROSCOPY       Social History   History   Alcohol Use No     History   Drug Use No     History   Smoking Status    Never Smoker   Smokeless Tobacco    Never Used     She has been a [facility] of [facility] and came to live with her daughter Hilary Ryan on August 13, 2018      Family History:   Family History   Problem Relation Age of Onset    Diabetes Mother     Heart disease Sister     Hypertension Brother     Cancer Maternal Grandmother     Cancer Maternal Grandfather        Meds/Allergies   current meds:   Current Facility-Administered Medications   Medication Dose Route Frequency    acetaminophen (TYLENOL) oral suspension 650 mg  650 mg Oral Q4H PRN    allopurinol (ZYLOPRIM) tablet 100 mg  100 mg Oral Daily    aluminum-magnesium hydroxide-simethicone (MYLANTA) 200-200-20 mg/5 mL oral suspension 30 mL  30 mL Oral Q6H PRN    amLODIPine (NORVASC) tablet 5 mg  5 mg Oral Daily    carvedilol (COREG) tablet 12 5 mg  12 5 mg Oral BID With Meals    enoxaparin (LOVENOX) subcutaneous injection 40 mg  40 mg Subcutaneous Daily    ezetimibe (ZETIA) tablet 10 mg  10 mg Oral HS    famotidine (PEPCID) tablet 20 mg  20 mg Oral Daily    [START ON 11/21/2018] fentaNYL (DURAGESIC) 12 mcg/hr TD 72 hr patch 12 mcg  12 mcg Transdermal Q72H    insulin lispro (HumaLOG) 100 units/mL subcutaneous injection 1-5 Units  1-5 Units Subcutaneous TID AC    insulin lispro (HumaLOG) 100 units/mL subcutaneous injection 1-5 Units  1-5 Units Subcutaneous HS    lidocaine viscous (XYLOCAINE) 2 % mucosal solution 15 mL  15 mL Swish & Spit 4x Daily PRN    losartan (COZAAR) tablet 25 mg  25 mg Oral Daily    ondansetron (ZOFRAN) injection 4 mg  4 mg Intravenous Q6H PRN    oxyCODONE-acetaminophen (PERCOCET) 5-325 mg per tablet 1 tablet  1 tablet Oral Q6H PRN    pantoprazole (PROTONIX) EC tablet 40 mg  40 mg Oral Early Morning    sodium chloride 0 9 % infusion  75 mL/hr Intravenous Continuous    sulfamethoxazole-trimethoprim (BACTRIM DS) 800-160 mg per tablet 1 tablet  1 tablet Oral Q12H [facility]    zolpidem (AMBIEN) tablet 5 mg  5 mg Oral HS PRN    and PTA meds:  Prescriptions Prior to Admission   Medication    amLODIPine (NORVASC) 10 mg tablet    carvedilol (COREG) 12 5 mg tablet    ergocalciferol (VITAMIN D2) 50,000 units    ezetimibe (ZETIA) 10 mg tablet    fentaNYL (DURAGESIC) 25 mcg/hr    glipiZIDE (GLUCOTROL) 10 mg tablet    HYDROcodone-acetaminophen (NORCO) 5-325 mg per tablet    lidocaine viscous (XYLOCAINE) 2 % mucosal solution    loperamide (IMODIUM) 2 mg capsule    losartan (COZAAR) 25 mg tablet    omeprazole (PriLOSEC) 40 MG capsule    ondansetron (ZOFRAN-ODT) 8 mg disintegrating tablet    oxyCODONE-acetaminophen (PERCOCET) 5-325 mg per tablet    ranitidine (ZANTAC) 150 mg tablet    sitaGLIPtin-metFORMIN (JANUMET)  MG per tablet    [START ON 11/26/2018] sulfamethoxazole-trimethoprim (BACTRIM DS) 800-160 mg per tablet     No Known Allergies    Objective   Vitals: Blood pressure 132/68, pulse 77, temperature 98 4 °F (36 9 °C), resp  rate 18, height 5' (1 524 m), weight 63 5 kg (139 lb 15 9 oz), SpO2 97 %      Intake/Output Summary (Last 24 hours) at 11/19/18 1329  Last data filed at 11/19/18 1252   Gross per 24 hour   Intake                0 ml   Output              700 ml   Net             -700 ml     Invasive Devices     Central Venous Catheter Line            Port A Cath 11/19/18 Right Chest less than 1 day              Physical Exam: General appearance: Appears well  Head: Normocephalic  Eyes: Extraocular movements intact  Ears: No gross hearing deficit  Oropharynx: Clear  Neck: Supple, There are tender slightly enlarged nodes of the upper anterior neck bilaterally  Chest:  Right chest port site is healing well  There is a movable 2 5 cm lymph node of the left axilla  No right axillary adenopathy  Lungs: Clear to auscultation bilaterally  Heart: Regular rate and rhythm  Abdomen: No tenderness or distention, no hepatic or splenic enlargement;  No inguinal  lymphadenopathy  Extremities: No lower extremity edema bilaterally  Skin: No rashes  Neurologic: Grossly intact, no focal neurological deficit (the patient was examined in bed and gait was not observed )  Psychiatric: Oriented to person, place and time, normal mood and affect    Lab Results:   Admission on 11/19/2018   Component Date Value    WBC 11/19/2018 12 13*    RBC 11/19/2018 4 19     Hemoglobin 11/19/2018 12 4     Hematocrit 11/19/2018 38 7     MCV 11/19/2018 92     MCH 11/19/2018 29 6     MCHC 11/19/2018 32 0     RDW 11/19/2018 13 2     MPV 11/19/2018 9 6     Platelets 36/73/0903 319     nRBC 11/19/2018 0     Neutrophils Relative 11/19/2018 68     Immat GRANS % 11/19/2018 0     Lymphocytes Relative 11/19/2018 21     Monocytes Relative 11/19/2018 8     Eosinophils Relative 11/19/2018 2     Basophils Relative 11/19/2018 1     Neutrophils Absolute 11/19/2018 8 36*    Immature Grans Absolute 11/19/2018 0 04     Lymphocytes Absolute 11/19/2018 2 51     Monocytes Absolute 11/19/2018 0 97     Eosinophils Absolute 11/19/2018 0 19     Basophils Absolute 11/19/2018 0 06     Sodium 11/19/2018 132*    Potassium 11/19/2018 4 2     Chloride 11/19/2018 100     CO2 11/19/2018 27     ANION GAP 11/19/2018 5     BUN 11/19/2018 11     Creatinine 11/19/2018 0 92     Glucose 11/19/2018 174*    Calcium 11/19/2018 8 7     AST 11/19/2018 23     ALT 11/19/2018 25     Alkaline Phosphatase 11/19/2018 93     Total Protein 11/19/2018 7 5     Albumin 11/19/2018 3 5     Total Bilirubin 11/19/2018 0 48     eGFR 11/19/2018 62      Echocardiogram from November 14, 2018: There is normal left ventricular function with EF 66%, no regional wall motion abnormalities, there is grade 1 left ventricular diastolic dysfunction    Imaging Studies: I have personally reviewed pertinent reports  Pathology, and Other Studies: I have personally reviewed pertinent reports  VTE Prophylaxis: Enoxaparin (Lovenox)    Assessment:    1  Diffuse large B-cell lymphoma, GCB phenotype of the left tonsil, post left tonsillectomy September 26, 2018  There is left posterior oropharyngeal lesion 4 3 x 3 6 cm and lymphadenopathy of the upper anterior neck bilaterally and left axilla on PET-CT of November 14, 2018  There has been throat soreness with associated odynophagia and weight loss of over 30 lb in the past 6 months  2  Gait disturbance of recent onset, the patient has been furniture surfing  This is possibly related to significant rapid weight loss  Rule out CNS lymphoma  Plan:    Otolaryngology consultation has been requested to obtain additional tissue for lymphoma diagnosis and to assess for a double hit or triple hit lymphoma  I spoke with  Dr Irma Hutchins by telephone this morning who indicated that he would see the patient today and that arrangements will be made for a biopsy to be done on Wednesday November 21, 2018  [facility] Contrast enhanced MRI of the brain is recommended as to potential lymphoma thus involvement of the CNS  Lumbar puncture may be required as well      The patient is scheduled for R-EPOCH chemotherapy as has been discussed by Dr Rhys Rodriguez, if there is lack of evidence of double hit or triple hit lymphoma, then the regimen could be changed to R-CHOP  The other option is to begin with R-CHOP and in the case of double hit or triple hit lymphoma then change to [place]  The patient and her daughter Shashank Escobar prefer to follow Dr Surekha Thomas plan to begin with the R-EPOCH regimen as has been discussed prior to this hospital admission  Code Status: Level 1 - Full Code  Advance Directive and Living Will:      Power of :    POLST:      Counseling / Coordination of Care  Total floor / unit time spent today 45 minutes  Greater than 50% of total time was spent with the patient and / or family counseling and / or coordination of care  A description of the counseling / coordination of care:  Diagnostic tests, impressions and recommendations were reviewed with the patient and with her daughter Giovanna Camargo who served as  from [place] to [facility] [place]  Sakina Carrera of the nursing staff served as an  from [place] [facility] [place] in interviewing the patient earlier today

## 2018-11-19 NOTE — ASSESSMENT & PLAN NOTE
Lab Results   Component Value Date    HGBA1C 7 5 10/31/2018       No results for input(s): POCGLU in the last 72 hours      Blood Sugar Average: Last 72 hrs:  · most recent A1C 7 5  · Hold oral medications  · Place on SSI/accuchecks  · Diabetic diet

## 2018-11-19 NOTE — SOCIAL WORK
Met with patient and daughter explained CM program and CM role  Resides in a house, [address], bed/bathroom 1st floor with daughter and grandchildren  Was independent prior to admission for ADL's and ambulation  PCP ESTEBAN De Oliveira  Has prescription plan, uses [address]  She does not drive, daughter provides transportation  DME/HHC/IP Rehab--no utilization of these services  Denies mental health illness, IP or OP psyche care, drug and/or alcohol abuse  Primary contact daughter [phone]  Daughter Veronique Leon will provide transport home    CM reviewed d/c planning process including the following: identifying help at home, patient preference for d/c planning needs, Discharge Lounge, [facility] Meds to Bed program, availability of treatment team to discuss questions or concerns patient and/or family may have regarding understanding medications and recognizing signs and symptoms once discharged  CM also encouraged patient to follow up with all recommended appointments after discharge  Patient advised of importance for patient and family to participate in managing patients medical well being  Patient/caregiver received discharge checklist  Content reviewed  Patient/caregiver encouraged to participate in discharge plan of care prior to discharge home

## 2018-11-19 NOTE — ASSESSMENT & PLAN NOTE
· Patient was on modified diet during last admission  · Speech/swallow consult pending, appreciate input  · Likely in setting of primary problem  · Recommend PRN viscous lidocaine

## 2018-11-19 NOTE — ASSESSMENT & PLAN NOTE
· New diagnosis of diffuse large B-cell lymphoma of L tonsil with adenopath of both sides of neck  · PET-CT done 11/14 showing L posterior oropharyngeal lesion compatible with malignancy and lymph nodes in neck and L axilla compatible with malignancy  · S/P port placement 11/13   Admitted for 1st cycle of chemotherapy  · Per primary service, oncology

## 2018-11-20 ENCOUNTER — APPOINTMENT (INPATIENT)
Dept: RADIOLOGY | Facility: HOSPITAL | Age: 73
DRG: 847 | End: 2018-11-20
Payer: MEDICARE

## 2018-11-20 PROBLEM — F41.8 ANXIETY ABOUT HEALTH: Status: ACTIVE | Noted: 2018-11-20

## 2018-11-20 PROBLEM — R45.89 ANXIETY ABOUT HEALTH: Status: ACTIVE | Noted: 2018-11-20

## 2018-11-20 LAB
GLUCOSE SERPL-MCNC: 148 MG/DL (ref 65–140)
GLUCOSE SERPL-MCNC: 150 MG/DL (ref 65–140)
GLUCOSE SERPL-MCNC: 173 MG/DL (ref 65–140)
GLUCOSE SERPL-MCNC: 206 MG/DL (ref 65–140)

## 2018-11-20 PROCEDURE — 88341 IMHCHEM/IMCYTCHM EA ADD ANTB: CPT | Performed by: PATHOLOGY

## 2018-11-20 PROCEDURE — 88368 INSITU HYBRIDIZATION MANUAL: CPT | Performed by: PATHOLOGY

## 2018-11-20 PROCEDURE — 99233 SBSQ HOSP IP/OBS HIGH 50: CPT | Performed by: INTERNAL MEDICINE

## 2018-11-20 PROCEDURE — 88342 IMHCHEM/IMCYTCHM 1ST ANTB: CPT | Performed by: PATHOLOGY

## 2018-11-20 PROCEDURE — 88305 TISSUE EXAM BY PATHOLOGIST: CPT | Performed by: PATHOLOGY

## 2018-11-20 PROCEDURE — A9585 GADOBUTROL INJECTION: HCPCS | Performed by: INTERNAL MEDICINE

## 2018-11-20 PROCEDURE — 70553 MRI BRAIN STEM W/O & W/DYE: CPT

## 2018-11-20 PROCEDURE — 82948 REAGENT STRIP/BLOOD GLUCOSE: CPT

## 2018-11-20 PROCEDURE — 99232 SBSQ HOSP IP/OBS MODERATE 35: CPT | Performed by: PHYSICIAN ASSISTANT

## 2018-11-20 RX ORDER — LORAZEPAM 0.5 MG/1
0.5 TABLET ORAL EVERY 8 HOURS PRN
Status: DISCONTINUED | OUTPATIENT
Start: 2018-11-20 | End: 2018-11-26 | Stop reason: HOSPADM

## 2018-11-20 RX ADMIN — INSULIN LISPRO 1 UNITS: 100 INJECTION, SOLUTION INTRAVENOUS; SUBCUTANEOUS at 17:58

## 2018-11-20 RX ADMIN — GADOBUTROL 7 ML: 604.72 INJECTION INTRAVENOUS at 09:30

## 2018-11-20 RX ADMIN — FAMOTIDINE 20 MG: 20 TABLET ORAL at 08:37

## 2018-11-20 RX ADMIN — EZETIMIBE 10 MG: 10 TABLET ORAL at 21:04

## 2018-11-20 RX ADMIN — SODIUM CHLORIDE 75 ML/HR: 0.9 INJECTION, SOLUTION INTRAVENOUS at 15:13

## 2018-11-20 RX ADMIN — LOSARTAN POTASSIUM 25 MG: 25 TABLET, FILM COATED ORAL at 08:40

## 2018-11-20 RX ADMIN — PANTOPRAZOLE SODIUM 40 MG: 40 TABLET, DELAYED RELEASE ORAL at 05:56

## 2018-11-20 RX ADMIN — SULFAMETHOXAZOLE AND TRIMETHOPRIM 1 TABLET: 800; 160 TABLET ORAL at 21:04

## 2018-11-20 RX ADMIN — SULFAMETHOXAZOLE AND TRIMETHOPRIM 1 TABLET: 800; 160 TABLET ORAL at 08:40

## 2018-11-20 RX ADMIN — DIPHENHYDRAMINE HCL 50 MG: 25 TABLET ORAL at 21:04

## 2018-11-20 RX ADMIN — LIDOCAINE HYDROCHLORIDE 15 ML: 20 SOLUTION ORAL; TOPICAL at 08:38

## 2018-11-20 RX ADMIN — INSULIN LISPRO 1 UNITS: 100 INJECTION, SOLUTION INTRAVENOUS; SUBCUTANEOUS at 12:41

## 2018-11-20 RX ADMIN — CARVEDILOL 12.5 MG: 12.5 TABLET, FILM COATED ORAL at 08:37

## 2018-11-20 RX ADMIN — CARVEDILOL 12.5 MG: 12.5 TABLET, FILM COATED ORAL at 17:09

## 2018-11-20 RX ADMIN — ALLOPURINOL 100 MG: 100 TABLET ORAL at 08:37

## 2018-11-20 RX ADMIN — LORAZEPAM 0.5 MG: 0.5 TABLET ORAL at 15:11

## 2018-11-20 RX ADMIN — AMLODIPINE BESYLATE 5 MG: 5 TABLET ORAL at 08:38

## 2018-11-20 RX ADMIN — ALUMINUM HYDROXIDE, MAGNESIUM HYDROXIDE, AND SIMETHICONE 30 ML: 200; 200; 20 SUSPENSION ORAL at 17:08

## 2018-11-20 RX ADMIN — ENOXAPARIN SODIUM 40 MG: 40 INJECTION SUBCUTANEOUS at 08:38

## 2018-11-20 NOTE — ASSESSMENT & PLAN NOTE
· Patient was on modified diet during last admission  · Continue with modified diet    · Likely in setting of primary problem  · Recommend PRN viscous lidocaine

## 2018-11-20 NOTE — UTILIZATION REVIEW
Notification of Inpatient Admission/Inpatient Authorization Request  This is a Notification of Inpatient Admission/Request for Inpatient Authorization for our facility [place] [address]  Be advised that this patient was admitted to our facility under Inpatient Status  Please contact the Utilization Review Department where the patient is receiving care services for additional admission information  Place of Service Code: 24   Place of Service Name: [facility]  Presentation Date & Time: 11/19/2018  8:19 AM  Inpatient Admission Date & Time: 11/19/18 6716  Discharge Date & Time: No discharge date for patient encounter  Discharge Disposition (if discharged): Home/Self Care  Attending Physician:   GM Ley  Specialty- Oncology/Hematology,   Medicare Number- [id number]  Medicaid Number - [id number]  [facility] Number - [facility] ID- [id number]     Primary Office:  [address] [facility] [address] [address], [address]   Phone 1: [phone]  Phone 2:   Fax: [phone]       Admission Orders     Ordered        11/19/18 0925  Inpatient Admission  Once             Facility: [address])  Address: [address] [place] [address]  Phone: [phone] Tax ID: [id number]  NPI: [id number]  Medicare ID: [id number]    [address]  Utilization Review Department  Phone: [phone]; Fax [phone]  ATTENTION: Please call with any questions or concerns to [phone]  and carefully listen to the prompts so that you are directed to the right person  Send all requests for admission clinical reviews, approved or denied determinations and any other requests to fax [phone]   All voicemails are confidential

## 2018-11-20 NOTE — ASSESSMENT & PLAN NOTE
Lab Results   Component Value Date    HGBA1C 7 5 10/31/2018     Recent Labs      11/19/18   1553  11/19/18   2112  11/20/18   0809  11/20/18   1150   POCGLU  203*  127  148*  173*     Blood Sugar Average: Last 72 hrs:  · (P) 162 75most recent A1C 7 5  · Hold oral medications during hospitalization  Resume at discharge    · Place on SSI/accuchecks  · Diabetic diet

## 2018-11-20 NOTE — MALNUTRITION/BMI
This medical record reflects one or more clinical indicators suggestive of malnutrition   Malnutrition Findings:   Malnutrition type: Acute illness (in the context of dysphagia )  Degree of Malnutrition: Other severe protein calorie malnutrition (evidenced by reported wt loss of 35lbs (17%) over past 2 months and intake meeting less than 75% estimated needs for greater than 1 month; treated with oral diet and nutrition supplemen ts)  Malnutrition Characteristics: Inadequate energy, Weight loss        See Nutrition note dated 11/20/18 for additional details  Completed nutrition assessment is viewable in the nutrition documentation

## 2018-11-20 NOTE — PROGRESS NOTES
Progress Note - Reinaldo Sahu [birth year], 68 y o  female MRN: [id number]  Unit/Bed#: [facility] 323-53 Encounter: [id number]  Primary Care Provider: Destiny Michelle PA-C   Date and time admitted to hospital: 11/19/2018  8:19 AM    * Diffuse large 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 ([address] )   Assessment & Plan    · New diagnosis of diffuse large B-cell lymphoma of L tonsil with adenopath of both sides of neck  · PET-CT done 11/14 showing L posterior oropharyngeal lesion compatible with malignancy and lymph nodes in neck and L axilla compatible with malignancy  · S/P port placement 11/13  Admitted for 1st cycle of chemotherapy  · Per primary service, oncology     Anxiety about health   Assessment & Plan    · PRN Ativan     Hyponatremia   Assessment & Plan    · Suspect in setting of poor PO intake secondary to oropharyngeal dysphagia  · Continue IVF hydration  · Check BMP in AM      Oropharyngeal dysphagia   Assessment & Plan    · Patient was on modified diet during last admission  · Continue with modified diet  · Likely in setting of primary problem  · Recommend PRN viscous lidocaine      Gastroesophageal reflux disease   Assessment & Plan    · Continue PPI  · PRN mylanta      Essential hypertension   Assessment & Plan    · Continue coreg 12 5 mg BID, cozaar 25 mg daily and norvasc 5 mg daily  · Titrate PRN     Type 2 diabetes mellitus without complication, without long-term current use of insulin [facility])   Assessment & Plan    Lab Results   Component Value Date    HGBA1C 7 5 10/31/2018     Recent Labs      11/19/18   1553  11/19/18   2112  11/20/18   0809  11/20/18   1150   POCGLU  203*  127  148*  173*     Blood Sugar Average: Last 72 hrs:  · (P) 162 75most recent A1C 7 5  · Hold oral medications during hospitalization  Resume at discharge    · Place on SSI/accuchecks  · Diabetic diet        VTE Pharmacologic Prophylaxis:   Pharmacologic: Enoxaparin (Lovenox)  Mechanical: Mechanical VTE prophylaxis in place  Patient Centered Rounds: I have performed bedside rounds with nursing staff today  Discussions with Specialists or Other Care Team Provider: None  Education and Discussions with Family / Patient: Patient's daughter is at the bedside and offers translation  Time Spent for Care: 20 minutes  More than 50% of total time spent on counseling and coordination of care as described above  Current Length of Stay: 1 day(s)  Current Patient Status: Inpatient   Certification Statement: The patient will continue to require additional inpatient hospital stay due to initation of chemotherapy  Discharge Plan: Per primary service  Code Status: Level 1 - Full Code    Subjective:   Patient is complaining of sensation of something in her throat preventing her from taking a deep breath  She appears quite anxious  She is also complaining of belching  Objective:   Vitals:   Temp (24hrs), Av °F (36 7 °C), Min:97 3 °F (36 3 °C), Max:98 4 °F (36 9 °C)    Temp:  [97 3 °F (36 3 °C)-98 4 °F (36 9 °C)] 98 1 °F (36 7 °C)  HR:  [59-90] 77  Resp:  [18-22] 20  BP: (120-157)/(60-83) 140/72  SpO2:  [94 %-99 %] 94 %  Body mass index is 27 34 kg/m²  Input and Output Summary (last 24 hours): Intake/Output Summary (Last 24 hours) at 18 1501  Last data filed at 18 1330   Gross per 24 hour   Intake          2951 25 ml   Output             3375 ml   Net          -423 75 ml       Physical Exam:     Physical Exam   HENT:   Head: Normocephalic and atraumatic  Mouth/Throat: Mucous membranes are normal  Posterior oropharyngeal erythema present  Eyes: No scleral icterus  Cardiovascular: Normal rate and regular rhythm  No murmur heard  Pulmonary/Chest: Breath sounds normal  She has no wheezes  She has no rales  She exhibits no tenderness  Abdominal: Soft  Bowel sounds are normal  She exhibits no distension  There is no tenderness  Musculoskeletal: Normal range of motion  She exhibits no edema     Skin: Skin is warm and dry  No rash noted  Psychiatric: She has a normal mood and affect  Vitals reviewed  Additional Data:   Labs:    Results from last 7 days  Lab Units 11/19/18  0858   WBC Thousand/uL 12 13*   HEMOGLOBIN g/dL 12 4   HEMATOCRIT % 38 7   PLATELETS Thousands/uL 319   NEUTROS PCT % 68   LYMPHS PCT % 21   MONOS PCT % 8   EOS PCT % 2       Results from last 7 days  Lab Units 11/19/18  0858   POTASSIUM mmol/L 4 2   CHLORIDE mmol/L 100   CO2 mmol/L 27   BUN mg/dL 11   CREATININE mg/dL 0 92   CALCIUM mg/dL 8 7   ALK PHOS U/L 93   ALT U/L 25   AST U/L 23       Results from last 7 days  Lab Units 11/16/18  0856   INR  1 08       * I Have Reviewed All Lab Data Listed Above  * Additional Pertinent Lab Tests Reviewed:  All Labs Within Last 24 Hours Reviewed    Imaging:    Imaging Reports Reviewed Today Include: None    Cultures:   Blood Culture: No results found for: BLOODCX  Urine Culture:   Lab Results   Component Value Date    URINECX >100,000 cfu/ml Escherichia coli 05/06/2016     Sputum Culture: No components found for: SPUTUMCX  Wound Culture: No results found for: WOUNDCULT    Last 24 Hours Medication List:     Current Facility-Administered Medications:  acetaminophen 650 mg Oral Q4H PRN MOIRA Vázquez    allopurinol 100 mg Oral Daily MOIRA Reeder    aluminum-magnesium hydroxide-simethicone 30 mL Oral Q6H PRN MOIRA Vázquez    amLODIPine 5 mg Oral Daily MOIRA Vázquez    carvedilol 12 5 mg Oral BID With Meals MOIRA Vázquez    diphenhydrAMINE 50 mg Oral HS PRN Shruthi Lechuga PA-C    enoxaparin 40 mg Subcutaneous Daily MOIRA Vázquez    ezetimibe 10 mg Oral HS MOIRA Reeder    famotidine 20 mg Oral Daily MOIRA Vázquez    [START ON 11/21/2018] fentaNYL 12 mcg Transdermal Q72H MOIRA Vázquez    insulin lispro 1-5 Units Subcutaneous TID AC Madison Luong PA-C    insulin lispro 1-5 Units Subcutaneous HS Shruthi Lechuga PA-C lidocaine viscous 15 mL Swish & Spit 4x Daily PRN Kira Hallman PA-C    loperamide 2 mg Oral TID PRN Kira Hallman PA-C    LORazepam 0 5 mg Oral Q8H PRN Radha Baxter PA-C    losartan 25 mg Oral Daily MOIRA Chavez    ondansetron 4 mg Intravenous Q6H PRN MOIRA Downs    oxyCODONE-acetaminophen 1 tablet Oral Q6H PRN MOIRA Downs    pantoprazole 40 mg Oral Early Morning MOIRA Chavez    sodium chloride 75 mL/hr Intravenous Continuous MOIRA Downs Last Rate: 75 mL/hr (11/20/18 1117)   sulfamethoxazole-trimethoprim 1 tablet Oral Q12H [address] MOIRA Chavez    zolpidem 5 mg Oral HS PRN MOIRA Downs         Today, Patient Was Seen By: Radha Baxter PA-C    ** Please Note: Dragon 360 Dictation voice to text software may have been used in the creation of this document   **

## 2018-11-20 NOTE — UTILIZATION REVIEW
Initial Clinical Review    [address] [facility] Utilization Review Department  Phone: [phone]; Fax [phone]  [email or web address]  ATTENTION: Please call with any questions or concerns to [phone]  and carefully listen to the prompts so that you are directed to the right person  Send all requests for admission clinical reviews, approved or denied determinations and any other requests to fax [phone]  All voicemails are confidential     Admission: Date/Time/Statement: 11/19/18 @ 0819     Orders Placed This Encounter   Procedures    Inpatient Admission     Standing Status:   Standing     Number of Occurrences:   1     Order Specific Question:   Admitting Physician     Answer:   Drake Romero [[id number]]     Order Specific Question:   Level of Care     Answer:   Level 2 Stepdown / HOT [14]     Order Specific Question:   Estimated length of stay     Answer:   More than 2 Midnights     Order Specific Question:   Certification     Answer:   I certify that inpatient services are medically necessary for this patient for a duration of greater than two midnights  See H&P and MD Progress Notes for additional information about the patient's course of treatment  Chief Complaint:   Further evaluation of 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 as to potential double hit and initiation of chemotherapy    History of Illness:  Aryan Sung is a 68y o  year old female who presents with throat soreness since April 2018  There has been associated difficulty with swallowing  She has been tolerating puree food and she has slight difficulty in swallowing liquids    She has lost over 30 lb, her weight in April 2018 was about 170 lb, currently her weight is 139 lb      Hematology/oncology history:     Left tonsillectomy was done at the [facility] on September 26, 2018 revealing diffuse large B-cell lymphoma, GCB phenotype, with Ki-67 70%     On PET-CT November 14, 2018 there is FDG avid 4 3 x 3 6 cm left posterior oropharyngeal lesion and FDG avid nodes in the neck, 2 4 x 2 7 cm of the left upper cervical chain, 2 0 x 1 4 cm of the right upper cervical chain, and left axilla, 3 4 x 2 7 cm, compatible with malignancy, mild FDG uptake in the distal esophagus, SUV max 3 1 may be inflammatory, a tiny focus of FDG uptake in the skin of the right upper quadrant abdomen abdominal wall SUV max 1 3      IR guided Right chest port placement on November 16, 2018         Vital Signs:   Temperature Pulse Respirations Blood Pressure SpO2   98 4 °F (36 9 °C) 77 18 132/68 97 %      Temp src Heart Rate Source Patient Position - Orthostatic VS BP Location FiO2 (%)   -- -- -- -- --      Pain Score       No Pain        Wt Readings from Last 1 Encounters:   11/19/18 63 5 kg (139 lb 15 9 oz)         Abnormal Labs/Diagnostic Test Results: 11/19 - Na 132 - Glu 174- Wbc 12 13    MRI Brain - 11/20 - 1   No acute intracranial pathology  No MR evidence to suggest intracranial CNS lymphoma  2   Chronic white matter microangiopathic changes  3   Partial visualization of known left tonsillar/oropharyngeal wall lesion and left level 2 lymphadenopathy      CXR - no acute      Past Medical/Surgical History:     Diagnosis    Cancer ([facility] )    Chronic pain disorder    [facility]    [facility] large [facility]    Dysphagia    GERD without esophagitis    HTN (hypertension)    Hyperlipidemia    Hypertension    [facility]       Admitting Diagnosis: Lymphoma ([facility] ) [C85 90]    Age/Sex: 68 y o  female    Assessment/Plan:   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)     · New diagnosis of diffuse large B-cell lymphoma of L tonsil with adenopath of both sides of neck  · PET-CT done 11/14 showing L posterior oropharyngeal lesion compatible with malignancy and lymph nodes in neck and L axilla compatible with malignancy  · S/P port placement 11/13   Admitted for 1st cycle of chemotherapy      Type 2 diabetes mellitus without complication, without long-term current use of insulin (HCC)             Lab Results   Component Value Date     HGBA1C 7 5 10/31/2018         No results for input(s): POCGLU in the last 72 hours      Blood Sugar Average: Last 72 hrs:  · most recent A1C 7 5  · Hold oral medications  · Place on SSI/accuchecks  · Diabetic diet       Hyponatremia     · Suspect in setting of poor PO intake secondary to oropharyngeal dysphagia  · Continue IVF hydration  · Serial BMPs      Oropharyngeal dysphagia     · Patient was on modified diet during last admission  · Speech/swallow consult pending, appreciate input  · Likely in setting of primary problem  · Recommend PRN viscous lidocaine       Gastroesophageal reflux disease     · Continue PPI  · PRN mylanta       Essential hypertension     · Continue coreg 12 5 mg BID, cozaar 25 mg daily and norvasc 5 mg daily  · Titrate PRN            Admission Orders:  Scheduled Meds:   Current Facility-Administered Medications:  acetaminophen 650 mg Oral Q4H PRN    allopurinol 100 mg Oral Daily    aluminum-magnesium hydroxide-simethicone 30 mL Oral Q6H PRN 11/19 x 1    amLODIPine 5 mg Oral Daily    carvedilol 12 5 mg Oral BID With Meals    diphenhydrAMINE 50 mg Oral HS PRN    enoxaparin 40 mg Subcutaneous Daily    ezetimibe 10 mg Oral HS    famotidine 20 mg Oral Daily    [START ON 11/21/2018] fentaNYL 12 mcg Transdermal Q72H    insulin lispro 1-5 Units Subcutaneous TID AC    insulin lispro 1-5 Units Subcutaneous HS    lidocaine viscous 15 mL Swish & Spit 4x Daily PRN 11/20 x 1    loperamide 2 mg Oral TID PRN    losartan 25 mg Oral Daily    ondansetron 4 mg Intravenous Q6H PRN    oxyCODONE-acetaminophen 1 tablet Oral Q6H PRN    pantoprazole 40 mg Oral Early Morning    sulfamethoxazole-trimethoprim 1 tablet Oral Q12H CAROLINE    zolpidem 5 mg Oral HS PRN    Adriamycin - Toprsar Intravenous   Started on 11/21   Rituxan   Intravenous  Once  Started on 11/21                          Continuous Infusions:   sodium chloride 75 mL/hr     Nursing orders - VSq 4 - activity as tolerated - SCD's to le's- Diet dysphagia - pureed - thin liquids- cons carb     ENT - plan biopsy  On 11/20 - to assess for double hit or triple hit lymphoma  done am 11/20 sent to pathology

## 2018-11-20 NOTE — RESTORATIVE TECHNICIAN NOTE
Restorative Specialist Mobility Note       Activity: Ambulate in castro, Ambulate in room, Bathroom privileges, Dangle, Stand at bedside (Educated/encouraged pt to ambulate with assistance 3-4 x's/day  Bed alarm on   Pt callbell, phone/tray within reach )     Assistive Device: Other (Comment) (HHA x1, NC O2 on 2L)          Sumit Trejo BS, Restorative Technician, [employer]

## 2018-11-20 NOTE — ASSESSMENT & PLAN NOTE
· Suspect in setting of poor PO intake secondary to oropharyngeal dysphagia  · Continue IVF hydration  · Check BMP in AM

## 2018-11-20 NOTE — PROGRESS NOTES
Progress Note - Dhaval Sandoval 68 y o  female MRN: [id number]    Unit/Bed#: [facility] 718-26 Encounter: [id number]      Subjective:     She was seen by speech therapy and is considered low risk for aspiration  Puree level 1 diet and thin liquids was recommended  Left tonsil neoplasm biopsy was done under local anesthesia today by Lesly Palacio DO    ROS:  General: Feels well, no chills or swaets  Head and Neck: No nosebleeds, see HPI  Cardiovascular: No chest pain, no lower extremity edema  Respriatory: No cough  She notes shortness of breath when she is attempting to swallow  GI: Appetite is good, she has chronic acid reflux aggravated by taking citrus fruit juices, however, no abdominal pain otherwise, bowel habits formed and regular  She has noted loose bowel movements related to taking Ensure  : No urinary frequency  Musculoskeletal: No back pains or joint pain  Skin: No skin rash  Neurological: No headache, no numbness, no weakness, she notes some difficulty with balance in the last several months and has been furniture surfing  Hematologic: No easy bruising  Psychiatric: No emotional problems    Objective:     Vitals: Blood pressure 127/66, pulse 68, temperature 98 2 °F (36 8 °C), resp  rate 20, height 5' (1 524 m), weight 63 5 kg (139 lb 15 9 oz), SpO2 99 %  ,Body mass index is 27 34 kg/m²  Intake/Output Summary (Last 24 hours) at 11/20/18 1712  Last data filed at 11/20/18 1513   Gross per 24 hour   Intake          3246 25 ml   Output             2925 ml   Net           321 25 ml       Physical Exam: General appearance: Appears well  Head: Normocephalic  Eyes: Extraocular movements intact  Ears: No gross hearing deficit  Oropharynx: Clear  Neck: Supple, There are tender slightly enlarged nodes of the upper anterior neck bilaterally  Chest:  Right chest port site is healing well  There is a movable 2 5 cm lymph node of the left axilla    No right axillary adenopathy  Lungs: Clear to auscultation bilaterally  Heart: Regular rate and rhythm  Abdomen: No tenderness or distention, no hepatic or splenic enlargement; No inguinal  lymphadenopathy  Extremities: No lower extremity edema bilaterally  Skin: No rashes  Neurologic: Grossly intact, no focal neurological deficit (the patient was examined in bed and gait was not observed )  Psychiatric: Oriented to person, place and time, normal mood and affect    Invasive Devices     Central Venous Catheter Line            Port A Cath 11/19/18 Right Chest 1 day              Lab     MRI of the brain with and without contrast from November 20, 2018: There is chronic white matter microangiopathic changes, no MR evidence to suggest intracranial CNS lymphoma     Left tonsillectomy specimen from September 25, 2018 was reviewed by Dr Vinny Brenner on November 15, 2018:  Large B-cell lymphoma with germinal cell phenotype and at least 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 by IHC, with high proliferative rate bike KI-67, these findings raise the possibility of large B-cell lymphoma with genetic alterations involving C myc and BCL2 and or BCL 6, repeat tissue sampling is recommended    Assessment:    1  Diffuse large B-cell lymphoma, GCB phenotype of the left tonsil, post left tonsillectomy September 26, 2018  There is left posterior oropharyngeal lesion 4 3 x 3 6 cm and lymphadenopathy of the upper anterior neck bilaterally and left axilla on PET-CT of November 14, 2018  There has been throat soreness with associated dysphagia and weight loss of over 30 lb in the past 6 months as well as shortness of breath aggravated by swallowing      2  Gait disturbance of recent onset, the patient has been furniture surfing  This is possibly related to significant rapid weight loss  Rule out CNS lymphoma  Plan:    Pulmonary medicine consultation is requested as to shortness of breath aggravated by swallowing      Neurology consultation is requested as to unexplained gait dysfunction, possibly lumbar puncture may be required to assess for CNS involvement by lymphoma  The patient is scheduled for R-EPOCH chemotherapy as has been discussed by Dr Alise Murrell, if there is lack of evidence of double hit or triple hit lymphoma, then the regimen could be changed to R-CHOP  The other option is to begin with R-CHOP and in the case of double hit or triple hit lymphoma then change to [place]  The patient and her daughter Kavita Thomas prefer to follow Dr Claire Pulido plan to begin with the R-EPOCH regimen as has been discussed prior to this hospital admission  Counseling / Coordination of Care  Total floor / unit time spent today 30 minutes  Greater than 50% of total time was spent with the patient and / or family counseling and / or coordination of care  A description of the counseling / coordination of care:  Diagnostic tests, impressions and recommendations were reviewed with the patient and her daughter Kavita Thomas at the bedside today  Stand In translation service was used to discuss chemotherapy treatment options with the patient, Singh Scott served to translate from includes to [place] #[id number]

## 2018-11-20 NOTE — PROGRESS NOTES
Patient seen and examined this am  Discussed biopsy of left tonsil neoplasm (lymphoma) in detail with assistance by nursing staff for language interpretation  Risks, benefits, alternatives explained; all questions answered  Written consent obtained from patient  See procedure note  Specimen transported fresh in saline to pathology department by me  Patient recommended to f/u with ENT as outpatient as needed  To begin chemotherapy regimen per hem/onc recommendations  Thank you for allowing me to participate in the care of Ms Kirby, please call with any questions      Marilou Romero DO  Otolaryngology - Head and Neck Surgery  Otology & Neurotology  [phone]

## 2018-11-20 NOTE — PROCEDURES
Biopsy of left tonsil neoplasm performed under local anesthesia  Under direct visualization, 1cc of 1% lidocaine with 1:100,000 epinephrine was injected into the tumor  After adequate time was allowed for anesthesia, multiple biopsies were obtained using cupped forceps  Minimal bleeding occurred and stopped spontaneously with cold water gargles  Patient tolerated the procedure well and there were no complications      Alexandre Arambula DO  Otolaryngology - Head and Neck Surgery  Otology & Neurotology

## 2018-11-20 NOTE — CONSULTS
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    Plan:  Discussed plan of care with Dr Madison Bolaños this morning  Additional tissue is needed in order to obtain additional markers to assess for double hit or triple hit lymphoma  Discussed with patient at bedside this evening with assistance of nursing staff for interpretation  Patient agreeable to additional biopsy  Will plan for biopsy under local anesthesia on floor tomorrow AM as large oropharyngeal mass allows for readily accessible tissue to biopsy  Unable to biopsy this evening as tissue will need to be sent fresh in saline and pathological exam not available tonight  Patient may continue regular diet prior to planned biopsy tomorrow am     History of Present Illness   Physician Requesting Consult: Michelle Castrejon MD  Reason for Consult / Principal Problem: Diffuse large B-cell lymphoma  HPI: Keyana Garrett is a 68y o  year old female who presents with diffuse large B-cell lymphoma  The patient presented with a sore throat and dysphagia in April 2018, which progressively worsened, and led to a 30lb weight loss  She underwent left tonsillectomy at [facility] in [facility] on September 26, 2018 revealing [facility] lymphoma  She has since been evaluated with Hem/Onc and chemotherapy arranged  ENT consulted today to evaluate patient for additional biopsy additional markers if present may change her chemo regimen  Patient breathing easy at time of evaluation, tolerating oral secretions well, and demonstrates no stridor  History obtained with assistance of nursing staff for language interpretation as patient speaks Trinidadian only  Consults    Review of Systems   HENT: Positive for sore throat and trouble swallowing      All other systems reviewed and are negative  Historical Information   Past Medical History:   Diagnosis Date    Cancer ([facility] )     Throat    Chronic pain disorder     Diabetes mellitus ([place] [address] )     Diffuse large B cell lymphoma ([place] [address] )     Dysphagia     GERD without esophagitis     HTN (hypertension)     Hyperlipidemia     Hypertension     MI (myocardial infarction) ([place] [address] )      Past Surgical History:   Procedure Laterality Date    BONE MARROW BIOPSY      BREAST LUMPECTOMY      CHOLECYSTECTOMY      IR PORT PLACEMENT  11/16/2018    OTHER SURGICAL HISTORY      tendor tear repair to right shoulder    SHOULDER ARTHROSCOPY       Social History   History   Alcohol Use No     History   Drug Use No     History   Smoking Status    Never Smoker   Smokeless Tobacco    Never Used     Family History: non-contributory    Meds/Allergies   all current active meds have been reviewed    No Known Allergies    Objective       Intake/Output Summary (Last 24 hours) at 11/19/18 1956  Last data filed at 11/19/18 1855   Gross per 24 hour   Intake              480 ml   Output             2150 ml   Net            -1670 ml       Invasive Devices     Central Venous Catheter Line            Port A Cath 11/19/18 Right Chest less than 1 day                Physical Exam   Constitutional: She is oriented to person, place, and time  She appears well-developed and well-nourished  HENT:   Head: Normocephalic and atraumatic  Right Ear: External ear normal    Left Ear: External ear normal    Mouth/Throat: Oropharynx is clear and moist    Large left oropharyngeal ulcerated mass, tender and firm to palpation   Eyes: Pupils are equal, round, and reactive to light  Conjunctivae are normal    Neck: Normal range of motion  Neck supple  Cardiovascular: Normal rate  Pulmonary/Chest: Effort normal    Neurological: She is alert and oriented to person, place, and time  Lab Results: I have personally reviewed pertinent lab results      Imaging Studies: I have personally reviewed pertinent reports  and I have personally reviewed pertinent films in PACS  EKG, Pathology, and Other Studies: I have personally reviewed pertinent reports  Code Status: Level 1 - Full Code  Advance Directive and Living Will:      Power of :    POLST:      Counseling/Coordination of Care: Total floor / unit time spent today 45 minutes  Greater than 50% of total time was spent with the patient and / or family counseling and / or coordination of care   A description of the counseling / coordination of care: Discussed biopsy, risks, benefits, and alternatives; patient agreed to proceed with left tonsil biopsy tomorrow am

## 2018-11-21 ENCOUNTER — TELEPHONE (OUTPATIENT)
Dept: HEMATOLOGY ONCOLOGY | Facility: CLINIC | Age: 73
End: 2018-11-21

## 2018-11-21 DIAGNOSIS — C83.31 DIFFUSE LARGE B-CELL LYMPHOMA OF LYMPH NODES OF NECK (HCC): Primary | ICD-10-CM

## 2018-11-21 LAB
GLUCOSE SERPL-MCNC: 156 MG/DL (ref 65–140)
GLUCOSE SERPL-MCNC: 179 MG/DL (ref 65–140)
GLUCOSE SERPL-MCNC: 231 MG/DL (ref 65–140)
GLUCOSE SERPL-MCNC: 254 MG/DL (ref 65–140)

## 2018-11-21 PROCEDURE — 82948 REAGENT STRIP/BLOOD GLUCOSE: CPT

## 2018-11-21 PROCEDURE — 3E03305 INTRODUCTION OF OTHER ANTINEOPLASTIC INTO PERIPHERAL VEIN, PERCUTANEOUS APPROACH: ICD-10-PCS | Performed by: INTERNAL MEDICINE

## 2018-11-21 PROCEDURE — 99221 1ST HOSP IP/OBS SF/LOW 40: CPT | Performed by: PSYCHIATRY & NEUROLOGY

## 2018-11-21 PROCEDURE — 99232 SBSQ HOSP IP/OBS MODERATE 35: CPT | Performed by: INTERNAL MEDICINE

## 2018-11-21 RX ORDER — LORAZEPAM 2 MG/ML
0.2 INJECTION INTRAMUSCULAR ONCE
Status: COMPLETED | OUTPATIENT
Start: 2018-11-21 | End: 2018-11-21

## 2018-11-21 RX ORDER — ACETAMINOPHEN 325 MG/1
650 TABLET ORAL ONCE
Status: COMPLETED | OUTPATIENT
Start: 2018-11-21 | End: 2018-11-21

## 2018-11-21 RX ORDER — SODIUM CHLORIDE 9 MG/ML
500 INJECTION, SOLUTION INTRAVENOUS CONTINUOUS
Status: DISCONTINUED | OUTPATIENT
Start: 2018-11-25 | End: 2018-11-25

## 2018-11-21 RX ADMIN — PREDNISONE 97 MG: 20 TABLET ORAL at 17:17

## 2018-11-21 RX ADMIN — FAMOTIDINE 20 MG: 20 TABLET ORAL at 10:01

## 2018-11-21 RX ADMIN — CARVEDILOL 12.5 MG: 12.5 TABLET, FILM COATED ORAL at 10:01

## 2018-11-21 RX ADMIN — LORAZEPAM 0.2 MG: 2 INJECTION INTRAMUSCULAR; INTRAVENOUS at 07:02

## 2018-11-21 RX ADMIN — INSULIN LISPRO 2 UNITS: 100 INJECTION, SOLUTION INTRAVENOUS; SUBCUTANEOUS at 09:55

## 2018-11-21 RX ADMIN — DIPHENHYDRAMINE HYDROCHLORIDE 25 MG: 50 INJECTION, SOLUTION INTRAMUSCULAR; INTRAVENOUS at 10:21

## 2018-11-21 RX ADMIN — LOSARTAN POTASSIUM 25 MG: 25 TABLET, FILM COATED ORAL at 10:02

## 2018-11-21 RX ADMIN — INSULIN LISPRO 1 UNITS: 100 INJECTION, SOLUTION INTRAVENOUS; SUBCUTANEOUS at 12:16

## 2018-11-21 RX ADMIN — SULFAMETHOXAZOLE AND TRIMETHOPRIM 1 TABLET: 800; 160 TABLET ORAL at 20:44

## 2018-11-21 RX ADMIN — PANTOPRAZOLE SODIUM 40 MG: 40 TABLET, DELAYED RELEASE ORAL at 10:04

## 2018-11-21 RX ADMIN — SODIUM CHLORIDE 75 ML/HR: 0.9 INJECTION, SOLUTION INTRAVENOUS at 04:01

## 2018-11-21 RX ADMIN — RITUXIMAB 600 MG: 10 INJECTION, SOLUTION INTRAVENOUS at 10:54

## 2018-11-21 RX ADMIN — INSULIN LISPRO 1 UNITS: 100 INJECTION, SOLUTION INTRAVENOUS; SUBCUTANEOUS at 17:26

## 2018-11-21 RX ADMIN — OXYCODONE HYDROCHLORIDE AND ACETAMINOPHEN 1 TABLET: 5; 325 TABLET ORAL at 20:43

## 2018-11-21 RX ADMIN — SULFAMETHOXAZOLE AND TRIMETHOPRIM 1 TABLET: 800; 160 TABLET ORAL at 10:02

## 2018-11-21 RX ADMIN — ALLOPURINOL 100 MG: 100 TABLET ORAL at 10:01

## 2018-11-21 RX ADMIN — ENOXAPARIN SODIUM 40 MG: 40 INJECTION SUBCUTANEOUS at 10:02

## 2018-11-21 RX ADMIN — AMLODIPINE BESYLATE 5 MG: 5 TABLET ORAL at 10:02

## 2018-11-21 RX ADMIN — VINCRISTINE SULFATE: 1 INJECTION, SOLUTION INTRAVENOUS at 17:02

## 2018-11-21 RX ADMIN — ONDANSETRON 4 MG: 2 INJECTION INTRAMUSCULAR; INTRAVENOUS at 06:37

## 2018-11-21 RX ADMIN — ONDANSETRON 16 MG: 2 INJECTION INTRAMUSCULAR; INTRAVENOUS at 16:45

## 2018-11-21 RX ADMIN — EZETIMIBE 10 MG: 10 TABLET ORAL at 22:51

## 2018-11-21 RX ADMIN — SODIUM CHLORIDE 150 MG: 0.9 INJECTION, SOLUTION INTRAVENOUS at 16:00

## 2018-11-21 RX ADMIN — ACETAMINOPHEN 650 MG: 325 TABLET, FILM COATED ORAL at 10:09

## 2018-11-21 RX ADMIN — PREDNISONE 97 MG: 20 TABLET ORAL at 11:15

## 2018-11-21 RX ADMIN — INSULIN LISPRO 2 UNITS: 100 INJECTION, SOLUTION INTRAVENOUS; SUBCUTANEOUS at 22:51

## 2018-11-21 RX ADMIN — LORAZEPAM 0.5 MG: 0.5 TABLET ORAL at 20:43

## 2018-11-21 RX ADMIN — FENTANYL 12 MCG: 12.5 PATCH TRANSDERMAL at 10:04

## 2018-11-21 RX ADMIN — CARVEDILOL 12.5 MG: 12.5 TABLET, FILM COATED ORAL at 17:18

## 2018-11-21 NOTE — NURSING NOTE
+ blood return from port a ca th bofore and after rituxan, patient tolerated treatment vital signs stable

## 2018-11-21 NOTE — CONSULTS
Consultation - Pulmonary Medicine   [facility] 68 y o  female MRN: [id number]  Unit/Bed#: [facility] 619-01 Encounter: [id number]      Assessment:  1  Large B-cell lymphoma involving the left tonsil and neck lymph nodes  2  History of diabetes  3  No history of pulmonary disease in the past and nonsmoker  Plan:   1  Discussed with Dr Sammie Stoner from Oncology, her lymphoma is expected to be chemo sensitive, and likely will result in decrease of the size of the pharyngeal mass  2  The patient is subjectively feeling short of breath and dysphagia but objectively she has no stridor and cleared by speech pathology  3  I will hold off on placement of tracheostomy given the the patient will be started on chemotherapy today  4  Anticipate improvement of symptoms with response to chemotherapy  5  Consider systemic steroids to reduce the inflammatory swelling in the hypopharyngeal area  History of Present Illness   Physician Requesting Consult: Dirk Montgomery MD  Reason for Consult / Principal Problem:  Pharyngeal mass partially occluding the airway  Hx and PE limited by:  Language barrier  HPI: [facility] is a 68y o  year old female who presents with increasing shortness of breath and difficulty laying down in supine position, the patient does have also difficulty swallowing as well  The patient was admitted to the hospital after she was found to have left tonsillar mass  She underwent a biopsy by ENT which showed B-cell lymphoma  The patient was seen by Oncology and evaluated also for chemotherapy  The patient denies any shortness of breath with ambulation, she is using the oxygen at 2 L but her O2 sat is 99% on room air as well  She was seen by speech pathology and cleared for swallowing  The patient did not have any chest pain, she does have history of GERD  She does not have any heartburn at the moment  No epigastric pain reported    She does have pain in the back of her throat  She denies any postnasal drip  No increased swelling in her lower extremities and no weight loss  She has no cardiac history or pulmonary history in the past   She is diabetic  Consults    Review of Systems    Historical Information   Past Medical History:   Diagnosis Date    Cancer ([place] [address] )     Throat    Chronic pain disorder     Diabetes mellitus ([place] [address] )     Diffuse large B cell lymphoma ([place] [address] )     Dysphagia     GERD without esophagitis     HTN (hypertension)     Hyperlipidemia     Hypertension     MI (myocardial infarction) ([place] [address] )      Past Surgical History:   Procedure Laterality Date    BONE MARROW BIOPSY      BREAST LUMPECTOMY      CHOLECYSTECTOMY      IR PORT PLACEMENT  11/16/2018    OTHER SURGICAL HISTORY      tendor tear repair to right shoulder    SHOULDER ARTHROSCOPY       Social History   History   Alcohol Use No     History   Drug Use No     History   Smoking Status    Never Smoker   Smokeless Tobacco    Never Used     Occupational History:  Nonsmoker lifetime no history of secondhand exposure to smoking  No industrial exposure either  Family History: non-contributory    Meds/Allergies   all current active meds have been reviewed    No Known Allergies    Objective   Vitals: Blood pressure 118/59, pulse 61, temperature 98 8 °F (37 1 °C), resp  rate 14, height 4' 8" (1 422 m), weight 63 4 kg (139 lb 12 4 oz), SpO2 99 %  ,Body mass index is 31 34 kg/m²      Intake/Output Summary (Last 24 hours) at 11/21/18 1106  Last data filed at 11/21/18 1045   Gross per 24 hour   Intake          2863 75 ml   Output             3100 ml   Net          -236 25 ml     Invasive Devices     Central Venous Catheter Line            Port A Cath 11/19/18 Right Chest 2 days                Physical Exam palpable masses noted at the submandibular area on the right side, she has no stridor, or mass was noted left tonsillar, heart examination S1-S2 regular rate and rhythm, lungs are clear, abdomen is benign, no edema  Lab Results: I have personally reviewed pertinent lab results  Imaging Studies: I have personally reviewed pertinent reports  EKG, Pathology, and Other Studies: I have personally reviewed pertinent reports      VTE Prophylaxis: Sequential compression device Sandra Albert)     Code Status: Level 1 - Full Code  Advance Directive and Living Will:      Power of :    POLST:      None

## 2018-11-21 NOTE — TELEPHONE ENCOUNTER
Left message for patient granddaughter Darling Owen informing her of the change with patient's Neulasta injection  Injection has been changed from 11/24/2018 [facility] to 11/27/2018 [facility] since there was a 2 day delay in patients starting chemo treatment  Encouraged her to call back with any further questions or concerns

## 2018-11-21 NOTE — PROGRESS NOTES
Progress Note Radha AnthonyrobertaRosaliakirit 68 y o  female MRN: [id number]    Unit/Bed#: [facility] 619-01 Encounter: [id number]      Subjective: The patient notes less dyspnea today  She mild throat soreness post biopsy  She has been taking meals and medications orally without difficulty  The rituximab infusion was well tolerated without hypersensitivity infusion reaction  However at completion of the infusion the patient noted tingling of the upper and lower extremities  Subsequently, she began with involuntary myoclonic jerks of the upper and lower extremities  Blood sugar at that time was 179  Accordingly, chemotherapy infusion with doxorubicin, etoposide, and vincristine was withheld  Seizure precautions were initiated  Neurology consultation is much appreciated: Ambulatory dysfunction is most likely based upon longstanding peripheral neuropathy secondary to diabetes mellitus  Pulmonary medicine consultation is much appreciated:  As lymphomas expected to be chemosensitive and will likely decrease the size of the pharyngeal mass in the next several weeks, placement of tracheostomy is to be withheld  Corticosteroids are to be given as part of the chemotherapy regimen and will serve also to reduce inflammatory swelling of the hypo pharyngeal area  ROS:  General: Feels well, no chills or swaets  Head and Neck: No nosebleeds, see HPI  Cardiovascular: No chest pain, no lower extremity edema  Respriatory: No cough   She notes shortness of breath when she is attempting to swallow, less so today  GI: Appetite is good, she has chronic acid reflux aggravated by taking citrus fruit juices, however, no abdominal pain otherwise, bowel habits formed and regular     : No urinary frequency  Musculoskeletal: No back pains or joint pain  Skin: No skin rash  Neurological:  See HPI    No headache  Hematologic: No easy bruising  Psychiatric: No emotional problems    Objective:     Vitals: Blood pressure 127/65, pulse 80, temperature 98 4 °F (36 9 °C), resp  rate 20, height 4' 8" (1 422 m), weight 63 4 kg (139 lb 12 4 oz), SpO2 100 %  ,Body mass index is 31 34 kg/m²  Intake/Output Summary (Last 24 hours) at 11/21/18 1731  Last data filed at 11/21/18 1709   Gross per 24 hour   Intake          3776 25 ml   Output             4490 ml   Net          -713 75 ml       Physical Exam: General appearance: Appears well  Head: Normocephalic  Eyes: Extraocular movements intact  Ears: No gross hearing deficit  Oropharynx: Clear  Neck: Supple, There are tender slightly enlarged nodes of the upper anterior neck bilaterally  Chest:  Right chest port site is healing well   There is a movable 2 5 cm lymph node of the left axilla   No right axillary adenopathy  Lungs: Clear to auscultation bilaterally  Heart: Regular rate and rhythm  Abdomen: No tenderness or distention, no hepatic or splenic enlargement; No inguinal lymphadenopathy  Extremities: No lower extremity edema bilaterally  Skin: No rashes  Neurologic:  Grossly intact, no focal neurological deficit (the patient was examined in bed and gait was not observed )  Myoclonic jerks were noted post rituximab infusion  Psychiatric: Oriented to person, place and time, normal mood and affect    Invasive Devices     Central Venous Catheter Line            Port A Cath 11/19/18 Right Chest 2 days              Assessment:    1  Diffuse large B-cell lymphoma, GCB phenotype of the left tonsil, post left tonsillectomy September 26, 2018   There is left posterior oropharyngeal lesion 4 3 x 3 6 cm and lymphadenopathy of the upper anterior neck bilaterally and left axilla on PET-CT of November 14, 2018  There has been throat soreness with associated dysphagia and weight loss of over 30 lb in the past 6 months as well as shortness of breath aggravated by swallowing    The patient received rituximab today, further R-EPOCH chemotherapy is on hold pending resolution of myoclonic jerks     2  Etiology of the myoclonic jerks is unclear, possibly related to transdermal fentanyl, not unexpected reaction to rituximab      Plan:    Chemotherapy is to be withheld tonight, seizure precautions were ordered and transdermalfentanyl discontinued  If myoclonic jerks resolve, then the chemotherapy is to be resumed on November 22, 2018  Counseling / Coordination of Care  Total floor / unit time spent today 30 minutes  Greater than 50% of total time was spent with the patient and / or family counseling and / or coordination of care  A description of the counseling / coordination of care:  Diagnostic tests, impressions and recommendations were reviewed with the patient  Shalini of the nursing staff served as an  from [place] to [address] [place] [address]

## 2018-11-21 NOTE — CONSULTS
Consultation - Neurology   [facility] 68 y o  female MRN: [id number]  Unit/Bed#: [facility] 619-01 Encounter: [id number]      Assessment/Plan   1)  Ambulatory dysfunction  - likely secondary to long-standing peripheral neuropathy secondary to diabetes  No acute concern at this time for underlying malignant CNS process    -MRI brain with and without contrast without acute intracranial abnormality, FLAIR changes or enhancement   -TSH within normal limits, B12, folate, HGB A1c, lipid profile, vitamin-D pending   -PT/OT   -patient may follow up non emergently with general neurology, appointment requested   -no additional acute inpatient neurological recommendations at this time, please call with questions    History of Present Illness     Reason for Consult / Principal Problem: 1  Hx and PE limited by:  None, patient examined in Armenian  HPI: Harriett Miramontes is a 68 y o   female with a past medical history of newly diagnosed diffuse large B-cell lymphoma of the lymph nodes of the neck, HTN, and dm 2 who presents to [facility] for her 1st administration of chemotherapy status post port placement  Neurology is seeing this patient consultation because of reported difficulty with ambulation and balance for the past 3-4 years  Patient reports that this has worsened in the last few years because of my diabetes and I can't feel my feet  Reports a strong family history of diabetes with subsequent neuropathy  On exam today, the patient is alert, cooperative and interactive  Nontoxic appearance  A 12 point review systems was completed and is negative with exception of the above  The patient demonstrates a nonlateralizing neurological exam as detailed below          Inpatient consult to Neurology  Consult performed by: [facility]  Consult ordered by: Ynes Jones          Review of Systems   See HPI     Historical Information   Past Medical History:   Diagnosis Date    Cancer ([place] [address] )     Throat    Chronic pain disorder     Diabetes mellitus ([place] [address] )     Diffuse large B cell lymphoma ([place] [address] )     Dysphagia     GERD without esophagitis     HTN (hypertension)     Hyperlipidemia     Hypertension     MI (myocardial infarction) ([place] [address] )      Past Surgical History:   Procedure Laterality Date    BONE MARROW BIOPSY      BREAST LUMPECTOMY      CHOLECYSTECTOMY      IR PORT PLACEMENT  11/16/2018    OTHER SURGICAL HISTORY      tendor tear repair to right shoulder    SHOULDER ARTHROSCOPY       Social History   History   Alcohol Use No     History   Drug Use No     History   Smoking Status    Never Smoker   Smokeless Tobacco    Never Used     Family History: non-contributory    Review of previous medical records was completed       Meds/Allergies   Scheduled Meds:  Current Facility-Administered Medications:  acetaminophen 650 mg Oral Q4H PRN Tonja Finely, CRNP    allopurinol 100 mg Oral Daily Tonja Finely, CRNP    aluminum-magnesium hydroxide-simethicone 30 mL Oral Q6H PRN Tonja Finely, CRNP    amLODIPine 5 mg Oral Daily Tonja Finely, CRNP    carvedilol 12 5 mg Oral BID With Meals Tonja Finely, CRNP    diphenhydrAMINE 50 mg Oral HS PRN José Miguel Olsen PA-C    enoxaparin 40 mg Subcutaneous Daily Tonja Finely, CRNP    ezetimibe 10 mg Oral HS Marnie Espinoza, CRNP    famotidine 20 mg Oral Daily Tonja Finely, CRNP    fentaNYL 12 mcg Transdermal Q72H Tonja Finely, CRNP    insulin lispro 1-5 Units Subcutaneous TID AC aMdison Luong PA-C    insulin lispro 1-5 Units Subcutaneous HS Madison Luong PA-C    lidocaine viscous 15 mL Swish & Spit 4x Daily PRN José Miguel Olsen PA-C    loperamide 2 mg Oral TID PRN José Miguel Olsen PA-C    LORazepam 0 5 mg Oral Q8H PRN Rylie HaNAYELI fountain    losartan 25 mg Oral Daily Tonja Finely, CRNP    ondansetron 4 mg Intravenous Q6H PRN Tonja Finely, CRNP    oxyCODONE-acetaminophen 1 tablet Oral Q6H PRN Tonja Finely, CRNP pantoprazole 40 mg Oral Early Morning MOIRA Ken    predniSONE 97 mg Oral BID With Meals Kacey Rajan MD    sodium chloride 75 mL/hr Intravenous Continuous Letty Brenton, CRNP Last Rate: 75 mL/hr (11/21/18 0401)   sulfamethoxazole-trimethoprim 1 tablet Oral Q12H [facility] HOME MOIRA Ken    zolpidem 5 mg Oral HS PRN Letty Brenton, CRNP      Continuous Infusions:  sodium chloride 75 mL/hr Last Rate: 75 mL/hr (11/21/18 0401)     PRN Meds:   acetaminophen    aluminum-magnesium hydroxide-simethicone    diphenhydrAMINE    lidocaine viscous    loperamide    LORazepam    ondansetron    oxyCODONE-acetaminophen    zolpidem      No Known Allergies    Objective   Vitals:Blood pressure (!) 123/101, pulse 60, temperature 98 4 °F (36 9 °C), resp  rate 17, height 4' 8" (1 422 m), weight 63 4 kg (139 lb 12 4 oz), SpO2 100 %  ,Body mass index is 31 34 kg/m²  Intake/Output Summary (Last 24 hours) at 11/21/18 1204  Last data filed at 11/21/18 1045   Gross per 24 hour   Intake          2863 75 ml   Output             3100 ml   Net          -236 25 ml       Invasive Devices: Invasive Devices     Central Venous Catheter Line            Port A Cath 11/19/18 Right Chest 2 days                Physical Exam   Constitutional: She is oriented to person, place, and time  She appears well-developed and well-nourished  No distress  Non toxic   HENT:   Head: Normocephalic and atraumatic  Right Ear: External ear normal    Left Ear: External ear normal    Nose: Nose normal    Mouth/Throat: No oropharyngeal exudate  Eyes: Conjunctivae are normal  Right eye exhibits no discharge  Left eye exhibits no discharge  No scleral icterus  Neck: Normal range of motion  Neck supple  No tracheal deviation present  No thyromegaly present  Pulmonary/Chest: Effort normal and breath sounds normal    Musculoskeletal: Normal range of motion  She exhibits no edema, tenderness or deformity     Neurological: She is oriented to person, place, and time  She has normal strength  She has a normal Finger-Nose-Finger Test    Reflex Scores:       Patellar reflexes are Right patellar reflex grade: Trace  and Left patellar reflex grade: Trace          Achilles reflexes are 0 on the right side and 0 on the left side  Skin: Skin is warm and dry  No rash noted  She is not diaphoretic  No erythema  No pallor  Psychiatric: She has a normal mood and affect  Her speech is normal and behavior is normal    Nursing note and vitals reviewed  Neurologic Exam     Mental Status   Oriented to person, place, and time  Follows 2 step commands  Attention: normal  Concentration: normal    Speech: speech is normal   Level of consciousness: alert  Knowledge: good  Normal comprehension  Cranial Nerves   Cranial nerves II through XII intact  Motor Exam   Muscle bulk: normal  Overall muscle tone: normal    Strength   Strength 5/5 throughout  Sensory Exam   Light touch normal    Right leg proprioception: normal  Left leg proprioception: normal    Gait, Coordination, and Reflexes     Gait  Gait: (Wide based gait)    Coordination   Finger to nose coordination: normal    Tremor   Resting tremor: absent    Reflexes   Right patellar reflex grade: Trace  Left patellar reflex grade: Trace  Right achilles: 0  Left achilles: 0  Right plantar: equivocal  Left plantar: normal  Right ankle clonus: absent  Left ankle clonus: absent      Lab Results: I have personally reviewed pertinent reports       Recent Results (from the past 24 hour(s))   Fingerstick Glucose (POCT)    Collection Time: 11/20/18  5:16 PM   Result Value Ref Range    POC Glucose 206 (H) 65 - 140 mg/dl   Fingerstick Glucose (POCT)    Collection Time: 11/20/18  9:27 PM   Result Value Ref Range    POC Glucose 150 (H) 65 - 140 mg/dl   Fingerstick Glucose (POCT)    Collection Time: 11/21/18  7:29 AM   Result Value Ref Range    POC Glucose 231 (H) 65 - 140 mg/dl   Fingerstick Glucose (POCT) Collection Time: 11/21/18 11:51 AM   Result Value Ref Range    POC Glucose 156 (H) 65 - 140 mg/dl   ]    Imaging Studies: I have personally reviewed pertinent reports  and I have personally reviewed pertinent films in PACS  EKG, Pathology, and Other Studies: I have personally reviewed pertinent reports      VTE Prophylaxis: Sequential compression device (Venodyne)  and Enoxaparin (Lovenox)    Code Status: Level 1 - Full Code  Advance Directive and Living Will:      Power of :    POLST:

## 2018-11-21 NOTE — RESTORATIVE TECHNICIAN NOTE
Restorative Specialist Mobility Note       Activity: Ambulate in castor, Ambulate in room, Bathroom privileges, Chair, Stand at bedside, Dangle (Educated/encouraged pt to ambulate with assistance 3-4 x's/day  Chair alarm on   Pt callbell, phone/tray within reach )     Assistive Device: Other (Comment) (HHA x1 with NC O2 on 2L)       Sumit Trejo BS, Restorative Technician, [employer]

## 2018-11-21 NOTE — NURSING NOTE
Notified Dr John Thomas c/o generalized numbness and tingling , jerking sensation rituxan completed at l4:35, vs 98 4, 80, 20, 127/65   Per physician continue to monitor will place orders for seizure precautions

## 2018-11-21 NOTE — NURSING NOTE
Pt was in bathroom dry heaving and having diarrhea  Gave her zofran and paged SLIM to geta OT IV dose of ativan for her  Pt gets really anxious when she starts dry heaving  This was all around 0640 this morning  She is currently sitting in chair

## 2018-11-21 NOTE — PROGRESS NOTES
Patient not physically seen or examined today  Chart is thoroughly reviewed  Vital signs are stable  Blood sugars are stable  Would recommend checking BMP in the AM to monitor sodium levels

## 2018-11-22 PROBLEM — R25.1 TREMOR: Status: ACTIVE | Noted: 2018-11-22

## 2018-11-22 PROBLEM — E43 SEVERE PROTEIN-CALORIE MALNUTRITION (HCC): Status: ACTIVE | Noted: 2018-11-22

## 2018-11-22 PROBLEM — R11.0 NAUSEA: Status: ACTIVE | Noted: 2018-11-22

## 2018-11-22 PROBLEM — E87.1 HYPONATREMIA: Status: RESOLVED | Noted: 2018-11-19 | Resolved: 2018-11-22

## 2018-11-22 LAB
ALBUMIN SERPL BCP-MCNC: 3 G/DL (ref 3.5–5)
ALP SERPL-CCNC: 81 U/L (ref 46–116)
ALT SERPL W P-5'-P-CCNC: 25 U/L (ref 12–78)
ANION GAP SERPL CALCULATED.3IONS-SCNC: 5 MMOL/L (ref 4–13)
AST SERPL W P-5'-P-CCNC: 11 U/L (ref 5–45)
BASOPHILS # BLD AUTO: 0 THOUSANDS/ΜL (ref 0–0.1)
BASOPHILS NFR BLD AUTO: 0 % (ref 0–1)
BILIRUB SERPL-MCNC: 0.13 MG/DL (ref 0.2–1)
BUN SERPL-MCNC: 8 MG/DL (ref 5–25)
CALCIUM SERPL-MCNC: 8.4 MG/DL (ref 8.3–10.1)
CHLORIDE SERPL-SCNC: 108 MMOL/L (ref 100–108)
CO2 SERPL-SCNC: 26 MMOL/L (ref 21–32)
CREAT SERPL-MCNC: 0.56 MG/DL (ref 0.6–1.3)
EOSINOPHIL # BLD AUTO: 0 THOUSAND/ΜL (ref 0–0.61)
EOSINOPHIL NFR BLD AUTO: 0 % (ref 0–6)
ERYTHROCYTE [DISTWIDTH] IN BLOOD BY AUTOMATED COUNT: 13.3 % (ref 11.6–15.1)
GFR SERPL CREATININE-BSD FRML MDRD: 93 ML/MIN/1.73SQ M
GLUCOSE SERPL-MCNC: 136 MG/DL (ref 65–140)
GLUCOSE SERPL-MCNC: 138 MG/DL (ref 65–140)
GLUCOSE SERPL-MCNC: 165 MG/DL (ref 65–140)
GLUCOSE SERPL-MCNC: 210 MG/DL (ref 65–140)
GLUCOSE SERPL-MCNC: 239 MG/DL (ref 65–140)
HCT VFR BLD AUTO: 34.6 % (ref 34.8–46.1)
HGB BLD-MCNC: 11.1 G/DL (ref 11.5–15.4)
IMM GRANULOCYTES # BLD AUTO: 0.07 THOUSAND/UL (ref 0–0.2)
IMM GRANULOCYTES NFR BLD AUTO: 1 % (ref 0–2)
LYMPHOCYTES # BLD AUTO: 0.42 THOUSANDS/ΜL (ref 0.6–4.47)
LYMPHOCYTES NFR BLD AUTO: 4 % (ref 14–44)
MAGNESIUM SERPL-MCNC: 2 MG/DL (ref 1.6–2.6)
MCH RBC QN AUTO: 29.4 PG (ref 26.8–34.3)
MCHC RBC AUTO-ENTMCNC: 32.1 G/DL (ref 31.4–37.4)
MCV RBC AUTO: 92 FL (ref 82–98)
MONOCYTES # BLD AUTO: 0.21 THOUSAND/ΜL (ref 0.17–1.22)
MONOCYTES NFR BLD AUTO: 2 % (ref 4–12)
NEUTROPHILS # BLD AUTO: 10.47 THOUSANDS/ΜL (ref 1.85–7.62)
NEUTS SEG NFR BLD AUTO: 93 % (ref 43–75)
NRBC BLD AUTO-RTO: 0 /100 WBCS
PLATELET # BLD AUTO: 319 THOUSANDS/UL (ref 149–390)
PMV BLD AUTO: 10 FL (ref 8.9–12.7)
POTASSIUM SERPL-SCNC: 4 MMOL/L (ref 3.5–5.3)
PROT SERPL-MCNC: 7 G/DL (ref 6.4–8.2)
RBC # BLD AUTO: 3.78 MILLION/UL (ref 3.81–5.12)
SODIUM SERPL-SCNC: 139 MMOL/L (ref 136–145)
WBC # BLD AUTO: 11.17 THOUSAND/UL (ref 4.31–10.16)

## 2018-11-22 PROCEDURE — 99232 SBSQ HOSP IP/OBS MODERATE 35: CPT | Performed by: INTERNAL MEDICINE

## 2018-11-22 PROCEDURE — 80053 COMPREHEN METABOLIC PANEL: CPT | Performed by: INTERNAL MEDICINE

## 2018-11-22 PROCEDURE — 83735 ASSAY OF MAGNESIUM: CPT | Performed by: INTERNAL MEDICINE

## 2018-11-22 PROCEDURE — 99232 SBSQ HOSP IP/OBS MODERATE 35: CPT | Performed by: GENERAL PRACTICE

## 2018-11-22 PROCEDURE — 82948 REAGENT STRIP/BLOOD GLUCOSE: CPT

## 2018-11-22 PROCEDURE — 85025 COMPLETE CBC W/AUTO DIFF WBC: CPT | Performed by: INTERNAL MEDICINE

## 2018-11-22 RX ADMIN — AMLODIPINE BESYLATE 5 MG: 5 TABLET ORAL at 08:35

## 2018-11-22 RX ADMIN — LORAZEPAM 0.5 MG: 0.5 TABLET ORAL at 11:53

## 2018-11-22 RX ADMIN — LOSARTAN POTASSIUM 25 MG: 25 TABLET, FILM COATED ORAL at 08:35

## 2018-11-22 RX ADMIN — INSULIN LISPRO 2 UNITS: 100 INJECTION, SOLUTION INTRAVENOUS; SUBCUTANEOUS at 17:37

## 2018-11-22 RX ADMIN — FAMOTIDINE 20 MG: 20 TABLET ORAL at 08:35

## 2018-11-22 RX ADMIN — ONDANSETRON 16 MG: 2 INJECTION INTRAMUSCULAR; INTRAVENOUS at 12:55

## 2018-11-22 RX ADMIN — SULFAMETHOXAZOLE AND TRIMETHOPRIM 1 TABLET: 800; 160 TABLET ORAL at 08:35

## 2018-11-22 RX ADMIN — EZETIMIBE 10 MG: 10 TABLET ORAL at 21:53

## 2018-11-22 RX ADMIN — ONDANSETRON 4 MG: 2 INJECTION INTRAMUSCULAR; INTRAVENOUS at 11:01

## 2018-11-22 RX ADMIN — CARVEDILOL 12.5 MG: 12.5 TABLET, FILM COATED ORAL at 17:36

## 2018-11-22 RX ADMIN — SULFAMETHOXAZOLE AND TRIMETHOPRIM 1 TABLET: 800; 160 TABLET ORAL at 21:53

## 2018-11-22 RX ADMIN — CARVEDILOL 12.5 MG: 12.5 TABLET, FILM COATED ORAL at 08:35

## 2018-11-22 RX ADMIN — INSULIN LISPRO 1 UNITS: 100 INJECTION, SOLUTION INTRAVENOUS; SUBCUTANEOUS at 12:55

## 2018-11-22 RX ADMIN — PANTOPRAZOLE SODIUM 40 MG: 40 TABLET, DELAYED RELEASE ORAL at 05:29

## 2018-11-22 RX ADMIN — SODIUM CHLORIDE 75 ML/HR: 0.9 INJECTION, SOLUTION INTRAVENOUS at 21:59

## 2018-11-22 RX ADMIN — SODIUM CHLORIDE 75 ML/HR: 0.9 INJECTION, SOLUTION INTRAVENOUS at 08:43

## 2018-11-22 RX ADMIN — INSULIN LISPRO 2 UNITS: 100 INJECTION, SOLUTION INTRAVENOUS; SUBCUTANEOUS at 21:53

## 2018-11-22 RX ADMIN — ENOXAPARIN SODIUM 40 MG: 40 INJECTION SUBCUTANEOUS at 08:35

## 2018-11-22 RX ADMIN — ALLOPURINOL 100 MG: 100 TABLET ORAL at 08:35

## 2018-11-22 RX ADMIN — PREDNISONE 97 MG: 20 TABLET ORAL at 17:37

## 2018-11-22 RX ADMIN — PREDNISONE 97 MG: 20 TABLET ORAL at 08:35

## 2018-11-22 NOTE — PROGRESS NOTES
Progress Note - Pulmonary   Lorraine Sandoval 68 y o  female MRN: [id number]  Unit/Bed#: [facility] 619-01 Encounter: [id number]    Assessment:  1  Large B-cell lymphoma involving the left tonsil with minimal airway compromise  2  Cervical lymphadenopathy  3  History of diabetes  4  Nonsmoker  Plan:  1  The patient seems to tolerate chemotherapy well  2  Reported myoclonus, etiology is unknown  3  At this point no need for tracheostomy  4  I do believe the patient would respond to chemotherapy and her symptoms will improve over time  5  Patient is 100% on 2 L nasal cannula currently  Asymptomatic with no stridor  Thank you very kind referral, will follow as needed  Chief Complaint:   Review of system was limited due to language barrier, however the patient denies any pain, denies any difficulty swallowing today, unclear to me whether it is psychologic  No nausea but no vomiting, no recurrence of tremor or abnormal muscle movement  Subjective:   As above  Objective:     Vitals: Blood pressure 127/65, pulse 80, temperature 98 4 °F (36 9 °C), resp  rate 20, height 4' 8" (1 422 m), weight 63 4 kg (139 lb 12 4 oz), SpO2 100 %  ,Body mass index is 31 34 kg/m²  Intake/Output Summary (Last 24 hours) at 11/22/18 1401  Last data filed at 11/22/18 1316   Gross per 24 hour   Intake          2773 75 ml   Output             2850 ml   Net           -76 25 ml       Invasive Devices     Central Venous Catheter Line            Port A Cath 11/19/18 Right Chest 3 days                Physical Exam:  S1-S2, regular rate and rhythm, 100% on 2 L, no stridor, lungs are clear, abdomen is benign, no edema  Lymphadenopathy in the neck area noted  Labs: I have personally reviewed pertinent lab results  Imaging and other studies: I have personally reviewed pertinent reports

## 2018-11-22 NOTE — NURSING NOTE
Daughter called RN stating pt called her and stated she was in 10/10 pain in generalized areas of body and wanted something for anxiety  Patient stated "pain, pain" when nurse questioned her  Ativan and percocet given

## 2018-11-22 NOTE — ASSESSMENT & PLAN NOTE
Likely related to tonsillar mass  If still having nausea despite zofran - would recommend palliative consult

## 2018-11-22 NOTE — PROGRESS NOTES
Progress Note - Evy AnthonyrobertaRosaliakirit 68 y o  female MRN: [id number]    Unit/Bed#: [facility] 699-68 Encounter: [id number]      Assessment:  In summary, this is a 67-year-old female history of diffuse large B-cell lymphoma, recently diagnosed, as outlined  She had some myoclonic jerks yesterday  Talking with her granddaughter she tells me that this had happened at home previously  She has been on fentanyl for several weeks already  I believe that this was related to advanced age, fentanyl, deconditioning, etc   I do not believe that was related to disease or treatment  Based on this analysis I would resume chemotherapy as previously prescribed  Labs are acceptable  The patient is generally feeling well  In fact she states that her pain is better controlled and that overall she has more energy than she did previously  Perhaps this is a result of steroid therapy administered so far  I reviewed the above with the patient and her family  Her granddaughter acted as  throughout the course of the interview today  Plan:  Resume chemotherapy  Subjective:   Patient is feeling better  Objective:     Vitals: Blood pressure 127/65, pulse 80, temperature 98 4 °F (36 9 °C), resp  rate 20, height 4' 8" (1 422 m), weight 63 4 kg (139 lb 12 4 oz), SpO2 100 %  ,Body mass index is 31 34 kg/m²  Intake/Output Summary (Last 24 hours) at 11/22/18 1133  Last data filed at 11/22/18 1100   Gross per 24 hour   Intake          2893 75 ml   Output             3150 ml   Net          -256 25 ml       Physical Exam:   General Appearance:    Alert, oriented        Eyes:    PERRL   Ears:    Normal external ear canals, both ears   Nose:   Nares normal, septum midline   Throat:   Mucosa moist  Pharynx without injection      Neck:   Supple       Lungs:     Clear to auscultation bilaterally   Chest Wall:    No tenderness or deformity    Heart:    Regular rate and rhythm       Abdomen:     Soft, non-tender, bowel sounds +, no organomegaly           Extremities:   Extremities no cyanosis or edema       Skin:   no rash or icterus  Lymph nodes:   Cervical, supraclavicular, and axillary nodes normal   Neurologic:   CNII-XII intact, normal strength, sensation and reflexes     throughout          Invasive Devices     Central Venous Catheter Line            Port A Cath 11/19/18 Right Chest 3 days                Lab, Imaging and other studies: I have personally reviewed pertinent reports

## 2018-11-22 NOTE — NURSING NOTE
Dr Geoff Arauz wants us to not give chemo tonight,  He stated he s/w dr Ferny Figueroa already who is working Thursday about pt's reaction  Awaiting verification from pharmacy to see if we can still use this bag of chemo or not for tomorrow, will know by morning

## 2018-11-22 NOTE — PROGRESS NOTES
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 with adenopath of both sides of neck  · PET-CT done 11/14 showing L posterior oropharyngeal lesion compatible with malignancy and lymph nodes in neck and L axilla compatible with malignancy  · S/P port placement 11/13  Admitted for 1st cycle of chemotherapy  · Per primary service, oncology     Tremor   Assessment & Plan    Likely related to fentanyl TD which is stopped  Improved with steroids and stopping fentanyl     Nausea   Assessment & Plan    Likely related to tonsillar mass  If still having nausea despite zofran - would recommend palliative consult     Anxiety about health   Assessment & Plan    · PRN Ativan     Oropharyngeal dysphagia   Assessment & Plan    · Patient was on modified diet during last admission  · Continue with modified diet  · Likely in setting of primary problem  · Recommend PRN viscous lidocaine      Gastroesophageal reflux disease   Assessment & Plan    · Continue PPI  · PRN mylanta      Essential hypertension   Assessment & Plan    · Continue coreg 12 5 mg BID, cozaar 25 mg daily and norvasc 5 mg daily  · Titrate PRN     Type 2 diabetes mellitus without complication, without long-term current use of insulin [facility])   Assessment & Plan    Lab Results   Component Value Date    HGBA1C 7 5 10/31/2018     Recent Labs      11/21/18   1714  11/21/18   2057  11/22/18   0745  11/22/18   1156   POCGLU  179*  254*  136  165*     Blood Sugar Average: Last 72 hrs:  · (P) 439 [id number] recent A1C 7 5  · Hold oral medications during hospitalization  Resume at discharge    · Place on SSI/accuchecks  · Diabetic diet      Hyponatremiaresolved as of 2018   Assessment & Plan    · Suspect in setting of poor PO intake secondary to oropharyngeal dysphagia  · Resolved w/ IVF hydration  ·        VTE Pharmacologic Prophylaxis:   Pharmacologic: Enoxaparin (Lovenox)  Mechanical VTE Prophylaxis in Place: Yes    Patient Centered Rounds: I have performed bedside rounds with nursing staff today  Discussions with Specialists or Other Care Team Provider: Dr Shannon Braxton    Education and Discussions with Family / Patient: pt and family    Time Spent for Care: 30 minutes  More than 50% of total time spent on counseling and coordination of care as described above  Current Length of Stay: 3 day(s)    Current Patient Status: Inpatient       Discharge Plan: per primary  I will see prn    Code Status: Level 1 - Full Code      Subjective:   Nausea 2/2 throat pain and anxiety  Cryacom used  Objective:     Vitals:   Temp (24hrs), Av 5 °F (36 9 °C), Min:98 4 °F (36 9 °C), Max:98 8 °F (37 1 °C)    Temp:  [98 4 °F (36 9 °C)-98 8 °F (37 1 °C)] 98 8 °F (37 1 °C)  HR:  [59-80] 59  Resp:  [18-20] 18  BP: (120-127)/(58-65) 120/58  SpO2:  [99 %-100 %] 99 %  Body mass index is 31 34 kg/m²  Input and Output Summary (last 24 hours): Intake/Output Summary (Last 24 hours) at 18 1647  Last data filed at 18 1409   Gross per 24 hour   Intake          2721 25 ml   Output             2600 ml   Net           121 25 ml       Physical Exam:     Physical Exam   Constitutional: She is oriented to person, place, and time  No distress  HENT:   Head: Normocephalic and atraumatic  Eyes: Conjunctivae and EOM are normal    Neck: Normal range of motion  Neck supple  Cardiovascular: Normal rate and regular rhythm  Pulmonary/Chest: Effort normal and breath sounds normal  She has no wheezes  She has no rales  Abdominal: Soft  Bowel sounds are normal  She exhibits no distension  There is no tenderness  Musculoskeletal: Normal range of motion  She exhibits no edema  Neurological: She is alert and oriented to person, place, and time  Skin: Skin is warm and dry  She is not diaphoretic  Additional Data:     Labs:      Results from last 7 days  Lab Units 11/22/18  0530   WBC Thousand/uL 11 17*   HEMOGLOBIN g/dL 11 1*   HEMATOCRIT % 34 6*   PLATELETS Thousands/uL 319   NEUTROS PCT % 93*   LYMPHS PCT % 4*   MONOS PCT % 2*   EOS PCT % 0       Results from last 7 days  Lab Units 11/22/18  0530   POTASSIUM mmol/L 4 0   CHLORIDE mmol/L 108   CO2 mmol/L 26   BUN mg/dL 8   CREATININE mg/dL 0 56*   CALCIUM mg/dL 8 4   ALK PHOS U/L 81   ALT U/L 25   AST U/L 11       Results from last 7 days  Lab Units 11/16/18  0856   INR  1 08       * I Have Reviewed All Lab Data Listed Above  * Additional Pertinent Lab Tests Reviewed:  [address] Admission Reviewed      Recent Cultures (last 7 days):           Last 24 Hours Medication List:     Current Facility-Administered Medications:  acetaminophen 650 mg Oral Q4H PRN Calos BalsaurabhrineMOIRA    allopurinol 100 mg Oral Daily Calos Garciarine, MOIRA    aluminum-magnesium hydroxide-simethicone 30 mL Oral Q6H PRN Calos BalzarineMOIRA    amLODIPine 5 mg Oral Daily Calos Lyon, MOIRA    carvedilol 12 5 mg Oral BID With Meals MOIRA Ryan    [START ON 11/26/2018] cyclophosphamide (CYTOXAN) IVPB 1,208 mg Intravenous Once Lc Abdullahi MD    diphenhydrAMINE 50 mg Oral HS PRN Ines Gerard PA-C    etoposide-DOXOrubicin-vincristine infusion  Intravenous Q24H Lc Abdullahi MD Last Rate: 23 4 mL/hr at 11/22/18 1322   enoxaparin 40 mg Subcutaneous Daily MOIRA Ryan    ezetimibe 10 mg Oral HS MOIRA Wilson    famotidine 20 mg Oral Daily Calos DumontzarineMOIRA    insulin lispro 1-5 Units Subcutaneous TID AC Madison Luong PA-C    insulin lispro 1-5 Units Subcutaneous HS Madison Luong PA-C    lidocaine viscous 15 mL Swish & Spit 4x Daily PRN Raegan Corona PA-C    loperamide 2 mg Oral TID PRN Raegan Corona PA-C    LORazepam 0 5 mg Oral Q8H PRN Yves Gonzales PA-C    losartan 25 mg Oral Daily MOIRA Wheatley    ondansetron (ZOFRAN) IVPB (ONC use only) 16 mg Intravenous Q24H Kayli Melendez MD Last Rate: Stopped (11/21/18 7487)   ondansetron 4 mg Intravenous Q6H PRN MOIRA Eubanks    oxyCODONE-acetaminophen 1 tablet Oral Q6H PRN MOIRA Eubanks    pantoprazole 40 mg Oral Early Morning MOIRA Wheatley    predniSONE 97 mg Oral BID With Meals Kayli Melendez MD    sodium chloride 75 mL/hr Intravenous Continuous MOIRA Eubanks Last Rate: 75 mL/hr (11/22/18 0843)   [facility] ON 11/25/2018] sodium chloride 500 mL/hr Intravenous Continuous Kayli Melendez MD    sulfamethoxazole-trimethoprim 1 tablet Oral Q12H [facility] MOIRA Wheatley    zolpidem 5 mg Oral HS PRN MOIRA Eubanks         Today, Patient Was Seen By: Flower Ch DO    ** Please Note: Dictation voice to text software may have been used in the creation of this document   **

## 2018-11-22 NOTE — ASSESSMENT & PLAN NOTE
Lab Results   Component Value Date    HGBA1C 7 5 10/31/2018     Recent Labs      11/21/18   1714  11/21/18   2057  11/22/18   0745  11/22/18   1156   POCGLU  179*  254*  136  165*     Blood Sugar Average: Last 72 hrs:  · (P) 339 [id number] recent A1C 7 5  · Hold oral medications during hospitalization  Resume at discharge    · Place on SSI/accuchecks  · Diabetic diet

## 2018-11-22 NOTE — ASSESSMENT & PLAN NOTE
· Suspect in setting of poor PO intake secondary to oropharyngeal dysphagia  · Resolved w/ IVF hydration  ·

## 2018-11-23 LAB
ATRIAL RATE: 72 BPM
GLUCOSE SERPL-MCNC: 158 MG/DL (ref 65–140)
GLUCOSE SERPL-MCNC: 196 MG/DL (ref 65–140)
GLUCOSE SERPL-MCNC: 210 MG/DL (ref 65–140)
GLUCOSE SERPL-MCNC: 241 MG/DL (ref 65–140)
P AXIS: 62 DEGREES
PR INTERVAL: 138 MS
QRS AXIS: 14 DEGREES
QRSD INTERVAL: 80 MS
QT INTERVAL: 384 MS
QTC INTERVAL: 420 MS
SCAN RESULT: NORMAL
T WAVE AXIS: 21 DEGREES
VENTRICULAR RATE: 72 BPM

## 2018-11-23 PROCEDURE — 82948 REAGENT STRIP/BLOOD GLUCOSE: CPT

## 2018-11-23 PROCEDURE — 93010 ELECTROCARDIOGRAM REPORT: CPT | Performed by: INTERNAL MEDICINE

## 2018-11-23 PROCEDURE — 99232 SBSQ HOSP IP/OBS MODERATE 35: CPT | Performed by: INTERNAL MEDICINE

## 2018-11-23 RX ADMIN — ENOXAPARIN SODIUM 40 MG: 40 INJECTION SUBCUTANEOUS at 08:07

## 2018-11-23 RX ADMIN — ALLOPURINOL 100 MG: 100 TABLET ORAL at 08:08

## 2018-11-23 RX ADMIN — FAMOTIDINE 20 MG: 20 TABLET ORAL at 08:08

## 2018-11-23 RX ADMIN — PREDNISONE 97 MG: 20 TABLET ORAL at 17:54

## 2018-11-23 RX ADMIN — ALUMINUM HYDROXIDE, MAGNESIUM HYDROXIDE, AND SIMETHICONE 30 ML: 200; 200; 20 SUSPENSION ORAL at 21:48

## 2018-11-23 RX ADMIN — INSULIN LISPRO 2 UNITS: 100 INJECTION, SOLUTION INTRAVENOUS; SUBCUTANEOUS at 17:55

## 2018-11-23 RX ADMIN — EZETIMIBE 10 MG: 10 TABLET ORAL at 21:48

## 2018-11-23 RX ADMIN — LOSARTAN POTASSIUM 25 MG: 25 TABLET, FILM COATED ORAL at 08:09

## 2018-11-23 RX ADMIN — PANTOPRAZOLE SODIUM 40 MG: 40 TABLET, DELAYED RELEASE ORAL at 06:07

## 2018-11-23 RX ADMIN — ONDANSETRON 16 MG: 2 INJECTION INTRAMUSCULAR; INTRAVENOUS at 14:49

## 2018-11-23 RX ADMIN — SULFAMETHOXAZOLE AND TRIMETHOPRIM 1 TABLET: 800; 160 TABLET ORAL at 08:09

## 2018-11-23 RX ADMIN — SULFAMETHOXAZOLE AND TRIMETHOPRIM 1 TABLET: 800; 160 TABLET ORAL at 21:48

## 2018-11-23 RX ADMIN — OXYCODONE HYDROCHLORIDE AND ACETAMINOPHEN 1 TABLET: 5; 325 TABLET ORAL at 08:08

## 2018-11-23 RX ADMIN — INSULIN LISPRO 1 UNITS: 100 INJECTION, SOLUTION INTRAVENOUS; SUBCUTANEOUS at 08:16

## 2018-11-23 RX ADMIN — OXYCODONE HYDROCHLORIDE AND ACETAMINOPHEN 1 TABLET: 5; 325 TABLET ORAL at 19:56

## 2018-11-23 RX ADMIN — CARVEDILOL 12.5 MG: 12.5 TABLET, FILM COATED ORAL at 17:54

## 2018-11-23 RX ADMIN — ONDANSETRON 4 MG: 2 INJECTION INTRAMUSCULAR; INTRAVENOUS at 19:55

## 2018-11-23 RX ADMIN — INSULIN LISPRO 2 UNITS: 100 INJECTION, SOLUTION INTRAVENOUS; SUBCUTANEOUS at 12:21

## 2018-11-23 RX ADMIN — AMLODIPINE BESYLATE 5 MG: 5 TABLET ORAL at 08:08

## 2018-11-23 RX ADMIN — PREDNISONE 97 MG: 20 TABLET ORAL at 08:08

## 2018-11-23 RX ADMIN — VINCRISTINE SULFATE: 1 INJECTION, SOLUTION INTRAVENOUS at 15:25

## 2018-11-23 RX ADMIN — LORAZEPAM 0.5 MG: 0.5 TABLET ORAL at 12:26

## 2018-11-23 RX ADMIN — CARVEDILOL 12.5 MG: 12.5 TABLET, FILM COATED ORAL at 08:08

## 2018-11-23 RX ADMIN — INSULIN LISPRO 1 UNITS: 100 INJECTION, SOLUTION INTRAVENOUS; SUBCUTANEOUS at 21:48

## 2018-11-23 RX ADMIN — DIPHENHYDRAMINE HCL 50 MG: 25 TABLET ORAL at 00:41

## 2018-11-23 NOTE — UTILIZATION REVIEW
Continued Stay Review    [address]  Utilization Review Department  Phone: [phone]; Fax [phone]  [email or web address]  ATTENTION: Please call with any questions or concerns to [phone]  and carefully listen to the prompts so that you are directed to the right person  Send all requests for admission clinical reviews, approved or denied determinations and any other requests to fax [phone]   All voicemails are confidential     Date: 11/23/2018    Vital Signs: /70   Pulse 94   Temp 98 4 °F (36 9 °C)   Resp 18   Ht 4' 8" (1 422 m)   Wt 63 4 kg (139 lb 12 4 oz)   SpO2 96%   BMI 31 34 kg/m²     Medications:   Scheduled Meds:   Current Facility-Administered Medications:  acetaminophen 650 mg Oral Q4H PRN    allopurinol 100 mg Oral Daily    aluminum-magnesium hydroxide-simethicone 30 mL Oral Q6H PRN    amLODIPine 5 mg Oral Daily    carvedilol 12 5 mg Oral BID With Meals    [START ON 11/26/2018] cyclophosphamide (CYTOXAN) IVPB 1,208 mg Intravenous Once    diphenhydrAMINE 50 mg Oral HS PRN    etoposide-DOXOrubicin-vincristine infusion  Intravenous Q24H  Rate: 23 4 mL/hr at 11/22/18 1322   enoxaparin 40 mg Subcutaneous Daily    ezetimibe 10 mg Oral HS    insulin lispro 1-5 Units Subcutaneous TID AC    insulin lispro 1-5 Units Subcutaneous HS    lidocaine viscous 15 mL Swish & Spit 4x Daily PRN    loperamide 2 mg Oral TID PRN    LORazepam 0 5 mg Oral Q8H PRN    losartan 25 mg Oral Daily    ondansetron (ZOFRAN) IVPB (ONC use only) 16 mg Intravenous Q24H    ondansetron 4 mg Intravenous Q6H PRN    oxyCODONE-acetaminophen 1 tablet Oral Q6H PRN    pantoprazole 40 mg Oral Early Morning    predniSONE 97 mg Oral BID With Meals    sodium chloride 75 mL/hr Intravenous Continuous Last Rate: 75 mL/hr (11/22/18 0490)   [START ON 11/25/2018] sodium chloride 500 mL/hr Intravenous Continuous    sulfamethoxazole-trimethoprim 1 tablet Oral Q12H [address]    zolpidem 5 mg Oral HS PRN      Continuous Infusions:   sodium chloride 75 mL/hr   [START ON 11/25/2018] sodium chloride 500 mL/hr     Nursing orders -  VS q 4 - seizure precautions - activity as tolerated  - SCD's to le's - Diet pureed - thin liquids - cons carb     Abnormal Labs/Diagnostic Results: 11/22 - Bun/cr 8/0 56 - Albumin 3 0 - T Bili 0 13 - Wbc 11 17 - H/H 11 1/34 6  Age/Sex: 68 y o  female     Assessment/Plan:   Assessment and plan:  1   Diffuse large B-cell lymphoma of the left tonsil, with cervical lymphadenopathy, left axillary lymphadenopathy, double hit, KI 67 of 70% germinal cell subtype     She received rituximab with  Seizure like activity however upon further questioning the patient had this movement at home as well      She is tolerating EPO CH chemotherapy, proceed with dose 1 , day 2      2  Polyuria, most likely secondary to previous IV hydration, vitals are stable, creatinine from yesterday is stable     Discharge Plan:  TBD

## 2018-11-23 NOTE — PROGRESS NOTES
Oncology Progress Note  Sherif Patton 68 y o  female MRN: [id number]  Unit/Bed#: [facility] 619-01 Encounter: [id number]      /70   Pulse 94   Temp 98 4 °F (36 9 °C)   Resp 18   Ht 4' 8" (1 422 m)   Wt 63 4 kg (139 lb 12 4 oz)   SpO2 96%   BMI 31 34 kg/m²     Subjective:  She reported polyuria however she denies any headache blurred vision odynophagia dysphagia chest pain abdominal pain dysuria hematuria melena hematochezia, she is tolerating chemotherapy very well no additional seizure like activity    Objective:  General Appearance:    Alert, oriented        Eyes:    PERRL   Ears:    Normal external ear canals, both ears   Nose:   Nares normal, septum midline   Throat:   Mucosa moist  Pharynx without injection  Neck:   Supple       Lungs:     Clear to auscultation bilaterally   Chest Wall:    No tenderness or deformity    Heart:    Regular rate and rhythm       Abdomen:     Soft, non-tender, bowel sounds +, no organomegaly           Extremities:   Extremities no cyanosis, +1 edema       Skin:   no rash or icterus      Lymph nodes:   Cervical, supraclavicular, and axillary nodes normal   Neurologic:   CNII-XII intact, normal strength, sensation and reflexes     throughout        Recent Results (from the past 48 hour(s))   Fingerstick Glucose (POCT)    Collection Time: 11/21/18 11:51 AM   Result Value Ref Range    POC Glucose 156 (H) 65 - 140 mg/dl   Fingerstick Glucose (POCT)    Collection Time: 11/21/18  5:14 PM   Result Value Ref Range    POC Glucose 179 (H) 65 - 140 mg/dl   Fingerstick Glucose (POCT)    Collection Time: 11/21/18  8:57 PM   Result Value Ref Range    POC Glucose 254 (H) 65 - 140 mg/dl   CBC and differential    Collection Time: 11/22/18  5:30 AM   Result Value Ref Range    WBC 11 17 (H) 4 31 - 10 16 Thousand/uL    RBC 3 78 (L) 3 81 - 5 12 Million/uL    Hemoglobin 11 1 (L) 11 5 - 15 4 g/dL    Hematocrit 34 6 (L) 34 8 - 46 1 %    MCV 92 82 - 98 fL    MCH 29 4 26 8 - 34 3 pg MCHC 32 1 31 4 - 37 4 g/dL    RDW 13 3 11 6 - 15 1 %    MPV 10 0 8 9 - 12 7 fL    Platelets 182 768 - 250 Thousands/uL    nRBC 0 /100 WBCs    Neutrophils Relative 93 (H) 43 - 75 %    Immat GRANS % 1 0 - 2 %    Lymphocytes Relative 4 (L) 14 - 44 %    Monocytes Relative 2 (L) 4 - 12 %    Eosinophils Relative 0 0 - 6 %    Basophils Relative 0 0 - 1 %    Neutrophils Absolute 10 47 (H) 1 85 - 7 62 Thousands/µL    Immature Grans Absolute 0 07 0 00 - 0 20 Thousand/uL    Lymphocytes Absolute 0 42 (L) 0 60 - 4 47 Thousands/µL    Monocytes Absolute 0 21 0 17 - 1 22 Thousand/µL    Eosinophils Absolute 0 00 0 00 - 0 61 Thousand/µL    Basophils Absolute 0 00 0 00 - 0 10 Thousands/µL   Comprehensive metabolic panel    Collection Time: 11/22/18  5:30 AM   Result Value Ref Range    Sodium 139 136 - 145 mmol/L    Potassium 4 0 3 5 - 5 3 mmol/L    Chloride 108 100 - 108 mmol/L    CO2 26 21 - 32 mmol/L    ANION GAP 5 4 - 13 mmol/L    BUN 8 5 - 25 mg/dL    Creatinine 0 56 (L) 0 60 - 1 30 mg/dL    Glucose 138 65 - 140 mg/dL    Calcium 8 4 8 3 - 10 1 mg/dL    AST 11 5 - 45 U/L    ALT 25 12 - 78 U/L    Alkaline Phosphatase 81 46 - 116 U/L    Total Protein 7 0 6 4 - 8 2 g/dL    Albumin 3 0 (L) 3 5 - 5 0 g/dL    Total Bilirubin 0 13 (L) 0 20 - 1 00 mg/dL    eGFR 93 ml/min/1 73sq m   Magnesium    Collection Time: 11/22/18  5:30 AM   Result Value Ref Range    Magnesium 2 0 1 6 - 2 6 mg/dL   Fingerstick Glucose (POCT)    Collection Time: 11/22/18  7:45 AM   Result Value Ref Range    POC Glucose 136 65 - 140 mg/dl   Fingerstick Glucose (POCT)    Collection Time: 11/22/18 11:56 AM   Result Value Ref Range    POC Glucose 165 (H) 65 - 140 mg/dl   Fingerstick Glucose (POCT)    Collection Time: 11/22/18  4:47 PM   Result Value Ref Range    POC Glucose 210 (H) 65 - 140 mg/dl   Fingerstick Glucose (POCT)    Collection Time: 11/22/18  9:14 PM   Result Value Ref Range    POC Glucose 239 (H) 65 - 140 mg/dl   Fingerstick Glucose (POCT)    Collection Time: 11/23/18  8:16 AM   Result Value Ref Range    POC Glucose 158 (H) 65 - 140 mg/dl         Xr Chest Portable    Result Date: 11/19/2018  Narrative: CHEST INDICATION:   SOB  COMPARISON:  5/11/2016 EXAM PERFORMED/VIEWS:  XR CHEST PORTABLE FINDINGS:  A right-sided infusion port catheter device is present with the catheter tip just above the caval atrial junction in satisfactory position  Cardiomediastinal silhouette appears unremarkable  The lungs are clear  No pneumothorax or pleural effusion  Osseous structures appear within normal limits for patient age  Impression: No acute cardiopulmonary disease  Workstation performed: [id number]     Ct Soft Tissue Neck With Contrast    Result Date: 11/9/2018  Narrative: CT NECK WITH CONTRAST INDICATION:   new lymphoma dx, dysphagia    COMPARISON:  None  TECHNIQUE:  Axial, sagittal, and coronal 2D reformatted images were created from the axial source data and submitted for interpretation  Radiation dose length product (DLP) for this visit:  230 mGy-cm   This examination, like all CT scans performed in the [facility], was performed utilizing techniques to minimize radiation dose exposure, including the use of iterative reconstruction and automated exposure control  IV Contrast:  85 mL of iohexol (OMNIPAQUE) IMAGE QUALITY:  Diagnostic  FINDINGS: VISUALIZED BRAIN PARENCHYMA:  No acute intracranial pathology of the visualized brain parenchyma  VISUALIZED ORBITS AND PARANASAL SINUSES:  Normal  NASAL CAVITY AND NASOPHARYNX:  Normal  SUPRAHYOID NECK: No significant interval change in large enhancing mass centered at the left tonsil/oropharyngeal wall with extension to the tongue base measuring approximately 2 5 x 3 8 x 4 cm (AP by transverse by craniocaudal), not significantly change  in size from CT 10/25/2018  INFRAHYOID NECK:  Aryepiglottic folds and piriform sinuses are normal   Normal glottis and subglottic airway  THYROID GLAND:  Unremarkable   PAROTID AND SUBMANDIBULAR GLANDS:  Normal  LYMPH NODES:  Grossly unchanged approximately 2 x 1 7 cm left level 2A lymph node (series 3 image 37)  Stable 1 7 x 1 4 cm right jugulodigastric lymph node (series 3 image 41)  VASCULAR STRUCTURES:  Normal enhancement of the cervical vasculature  THORACIC INLET:  Lung apices and upper mediastinum are unremarkable  BONY STRUCTURES: No acute fracture or destructive osseous lesion  Impression: 1  Compared to recent CT 10/25/2018, no significant interval change in size of large left tonsillar/oropharyngeal wall mass with extension to the tongue base consistent with known history of lymphoma  No significant change in mass effect and significant oropharyngeal space narrowing  2   Grossly unchanged enlarged left level 2A and right jugulodigastric lymph nodes from 10/25/2018  Workstation performed: [id number]     Ct Soft Tissue Neck With Contrast    Result Date: 10/25/2018  Narrative: CT NECK WITH CONTRAST INDICATION:   Throat CA, dysphagia  "Patient presents with family, report patient arrived to the [address] [facility] from [facility] 2 weeks ago  Complaining of increased difficulty swallowing, diagnosed with "throat cancer one month ago"  Family also shares that patient has had increased fatigue COMPARISON:  None  TECHNIQUE:  Axial, sagittal, and coronal 2D reformatted images were created from the axial source data and submitted for interpretation  Radiation dose length product (DLP) for this visit:  657 mGy-cm   This examination, like all CT scans performed in the [facility], was performed utilizing techniques to minimize radiation dose exposure, including the use of iterative reconstruction and automated exposure control  IV Contrast:  85 mL of iohexol (OMNIPAQUE) IMAGE QUALITY:  Diagnostic  FINDINGS: VISUALIZED BRAIN PARENCHYMA:  No acute intracranial pathology of the visualized brain parenchyma   VISUALIZED ORBITS AND PARANASAL SINUSES:  Normal  NASAL CAVITY AND NASOPHARYNX: Normal  SUPRAHYOID NECK:  Mass  of the left soft palate/left oropharynx measuring 3 9 x 2 8 x 2 8 cm in keeping with this patient's known history of malignancy  INFRAHYOID NECK:  Aryepiglottic folds and piriform sinuses are normal   Normal glottis and subglottic airway  THYROID GLAND:  Unremarkable  PAROTID AND SUBMANDIBULAR GLANDS:  Normal  LYMPH NODES:  No pathologic or enlarged adenopathy  VASCULAR STRUCTURES:  Normal enhancement of the cervical vasculature  THORACIC INLET:  Lung apices and upper mediastinum are unremarkable  BONY STRUCTURES: No acute fracture or destructive osseous lesion  Impression: Approximate 3 cm left soft palate/oropharynx mass in keeping with the patient's history of known malignancy  No apparent airway compromise  The study was marked in [facility] for immediate notification  Workstation performed: [id number]     Mri Brain W Wo Contrast    Result Date: 11/20/2018  Narrative: MRI BRAIN WITH AND WITHOUT CONTRAST INDICATION: h/o diffuse large b-cell lymphoma, gait instabiltiy, assess for CNS involvement  COMPARISON:  None  TECHNIQUE: Sagittal T1, axial T2, axial FLAIR, axial T1, axial [place], axial diffusion  Sagittal, axial and coronal T1 postcontrast   Axial BRAVO post contrast   IV Contrast:  7 mL of gadobutrol injection (MULTI-DOSE)  IMAGE QUALITY:   Diagnostic  FINDINGS: BRAIN mild periventricular and subcortical white matter nonenhancing FLAIR hyperintense foci, most compatible with chronic microangiopathic changes  There is no intracranial hemorrhage  There is no evidence of acute infarction and diffusion imaging is unremarkable  Postcontrast imaging of the brain demonstrates no abnormal enhancement  VENTRICLES:  Normal  SELLA AND PITUITARY GLAND:  Incidentally noted partially empty sella ORBITS:  Normal  PARANASAL SINUSES:  Normal  VASCULATURE:  Evaluation of the major intracranial vasculature demonstrates appropriate flow voids   CALVARIUM AND SKULL BASE:  Normal  EXTRACRANIAL SOFT TISSUES:  Partial visualization of known left tonsillar/oropharyngeal wall lesion and left level 2 lymphadenopathy     Impression: 1  No acute intracranial pathology  No MR evidence to suggest intracranial CNS lymphoma  2   Chronic white matter microangiopathic changes  3   Partial visualization of known left tonsillar/oropharyngeal wall lesion and left level 2 lymphadenopathy  Workstation performed: [id number]     Nm Pet Ct Skull Base To Mid Thigh    Result Date: 11/14/2018  Narrative: PET/CT SCAN INDICATION: Large B-cell lymphoma  New diagnosis  Neck lymphadenopathy  C83 31: Diffuse large b-cell lymphoma, lymph nodes of head, face, and neck MODIFIER: PI COMPARISON: CT neck of 11/9/2018 CELL TYPE:  Left tonsillectomy 9/25/2018 which revealed large B-cell lymphoma germinal center B phenotype  TECHNIQUE:   10 155 mCi F-18-FDG administered IV  Multiplanar attenuation corrected and non attenuation corrected PET images are available for interpretation, and contiguous, low dose, axial CT sections were obtained from the skull base through the femurs   Intravenous contrast material was not utilized  This examination, like all CT scans performed in the [facility], was performed utilizing techniques to minimize radiation dose exposure, including the use of iterative reconstruction and automated exposure control  Fasting serum glucose: 121 mg/dl FINDINGS: VISUALIZED BRAIN:   No acute abnormalities are seen  HEAD/NECK:   Intense FDG uptake at the left posterior oropharyngeal lesion, SUV max of 19 2  Irregular soft tissue thickening noted here on CT measuring approximately 4 3 x 3 6 cm, image 15 series 4  Scattered FDG avid lymph nodes noted along the cervical chains bilaterally  Left upper cervical chain lymph node demonstrates SUV max of 11 5  This measures 2 4 x 1 7 cm image 16 series 4  Right upper cervical chain lymph node demonstrates SUV max of 10 7    This measures 2 0 x 1 4 cm image 18 series 4  Tiny focus of FDG uptake noted in the right supraclavicular region, SUV max of 2 2 with 5 mm lymph node here on CT image 30 series 4  CT images: Otherwise unremarkable  CHEST:   Single FDG avid left axillary lymph node, SUV max of 16 1  This measures 3 4 x 2 7 cm image 36 series 4  No abnormal focal FDG uptake in the mediastinal or perihilar regions  No FDG avid right axillary lymph nodes  Mild FDG uptake at the distal esophagus, SUV max of 3 1, may be inflammatory  CT images: Scattered patchy atelectasis noted in the right lower lobe  ABDOMEN:   Tiny focus of FDG uptake along the skin of the right upper quadrant anterior abdominal wall, SUV max of 1 3  Mild skin thickening suggested here on CT  See image 82 series 4  Otherwise no suspicious FDG avid soft tissue lesions  CT images: Prior cholecystectomy  Scattered colonic diverticulosis  PELVIS: Mild FDG uptake in a nonenlarged right inguinal lymph node, SUV max of 1 9, probably reactive  CT images: Otherwise unremarkable  OSSEOUS STRUCTURES: No FDG avid lesions are seen  CT images: No significant findings  Impression: 1  FDG avid left posterior oropharyngeal lesion compatible with malignancy  2   FDG avid lymph nodes in the neck and left axilla compatible with malignancy  3  No FDG avid lymph nodes in the abdomen or pelvis suspicious for malignancy  4   Tiny focus of FDG uptake along the skin of the right upper quadrant anterior abdominal wall with mild skin thickening suggested here on CT  Correlate clinically for skin lesion  Workstation performed: [id number]     Ir Port Placement    Result Date: 11/16/2018  Narrative: Chest port placement  Clinical History: Lymphoma  Plans for chemotherapy  Fluoro time: 0 7 minutes Number of Images: 8 Conscious sedation time: 60 minutes Technique: The patient was brought to the interventional radiology preprocedure area and identified verbally and by wristband    After discussion of the procedure and its details, risks, benefits, and alternatives both verbal and written informed consent was obtained from the patient and placed in the chart  The patient was then brought to the interventional radiology angiography suite and placed supine on the table  The right internal jugular vein was evaluated as a potential access site with ultrasound  The vessel was found to be patent and compressible  An 8-Yoruba Midsize Dignity  port was selected for the procedure  The right neck and upper chest were prepped and draped in the usual sterile fashion  All elements of maximal sterile barrier technique were followed (cap, mask, sterile gown, sterile gloves, large sterile sheet, hand hygiene and 2% chlorhexidine for cutaneous antisepsis)  Sterile ultrasound technique with sterile gel and sterile probe covers were also utilized  A preprocedure timeout was performed per standard department protocol  Under ultrasound guidance, lidocaine was administered to the skin and a small skin incision was made  Under ultrasound guidance, the right internal jugular vein was accessed using single wall Seldinger technique  Static images of real time needle entry into the vessel were obtained  A 0 018 wire was then advanced through the needle into the central venous system  The needle was removed, and a 5 Yoruba coaxial dilator was inserted  Next, attention was turned to the right chest and a site for the port pocket was selected  This area was then anesthetized with lidocaine, as well as the projected tunnel  A subcutaneous pocket was created in the skin of the upper chest for the port reservoir utilizing a surgical incision  The port catheter was then tunneled under the skin of the upper chest  Attention was then turned to the 5-Yoruba coaxial dilator  The microwire and inner dilator were removed  A heavy wire was inserted through the outer dilator and a 9 Yoruba peel-away sheath was inserted over the wire    The catheter was advanced through the peel-away and the peel-away was removed  The catheter tip was then positioned in the right atrium under fluoroscopic control  The catheter was connected to the port, and the port inserted into the subcutaneous pocket  The port was then flushed and aspirated successfully and 100 unit heparin/cc solution was administered into the lumen  The pocket was closed with 3-0 Vicryl suture and Histoacryl  A sterile dressing was applied  The neck incision was closed with 3-0 Vicryl suture and Histoacryl  Findings: Patent and compressible right internal jugular vein  Successful ultrasound and fluoroscopically guided placement of an 8-Beng[place] Midsize Dignity chest port via the right internal jugular vein  The tip of the catheter projects in the right atrium and may be used immediately  Impression: Impression: Ultrasound and fluoroscopically guided placement of a chest port via the right internal jugular vein  Workstation performed: [id number]         Assessment and plan:  1  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   Seizure like activity however upon further questioning the patient had this movement at home to    She is tolerating EPO CH chemotherapy, proceed with dose 1 , day 2     2  Polyuria, most likely secondary to previous IV hydration, vitals are stable, creatinine from yesterday is stable

## 2018-11-24 ENCOUNTER — HOSPITAL ENCOUNTER (OUTPATIENT)
Dept: INFUSION CENTER | Facility: HOSPITAL | Age: 73
Discharge: HOME | End: 2018-11-24

## 2018-11-24 LAB
GLUCOSE SERPL-MCNC: 120 MG/DL (ref 65–140)
GLUCOSE SERPL-MCNC: 162 MG/DL (ref 65–140)
GLUCOSE SERPL-MCNC: 233 MG/DL (ref 65–140)
GLUCOSE SERPL-MCNC: 246 MG/DL (ref 65–140)

## 2018-11-24 PROCEDURE — 99232 SBSQ HOSP IP/OBS MODERATE 35: CPT | Performed by: INTERNAL MEDICINE

## 2018-11-24 PROCEDURE — 82948 REAGENT STRIP/BLOOD GLUCOSE: CPT

## 2018-11-24 RX ORDER — LORAZEPAM 2 MG/ML
0.5 INJECTION INTRAMUSCULAR ONCE
Status: COMPLETED | OUTPATIENT
Start: 2018-11-24 | End: 2018-11-24

## 2018-11-24 RX ADMIN — PREDNISONE 97 MG: 20 TABLET ORAL at 08:40

## 2018-11-24 RX ADMIN — INSULIN LISPRO 2 UNITS: 100 INJECTION, SOLUTION INTRAVENOUS; SUBCUTANEOUS at 21:55

## 2018-11-24 RX ADMIN — ONDANSETRON 16 MG: 2 INJECTION INTRAMUSCULAR; INTRAVENOUS at 15:36

## 2018-11-24 RX ADMIN — DIPHENHYDRAMINE HCL 50 MG: 25 TABLET ORAL at 21:54

## 2018-11-24 RX ADMIN — SULFAMETHOXAZOLE AND TRIMETHOPRIM 1 TABLET: 800; 160 TABLET ORAL at 08:40

## 2018-11-24 RX ADMIN — INSULIN LISPRO 2 UNITS: 100 INJECTION, SOLUTION INTRAVENOUS; SUBCUTANEOUS at 17:17

## 2018-11-24 RX ADMIN — ONDANSETRON 4 MG: 2 INJECTION INTRAMUSCULAR; INTRAVENOUS at 09:19

## 2018-11-24 RX ADMIN — ALUMINUM HYDROXIDE, MAGNESIUM HYDROXIDE, AND SIMETHICONE 30 ML: 200; 200; 20 SUSPENSION ORAL at 19:28

## 2018-11-24 RX ADMIN — AMLODIPINE BESYLATE 5 MG: 5 TABLET ORAL at 08:40

## 2018-11-24 RX ADMIN — ENOXAPARIN SODIUM 40 MG: 40 INJECTION SUBCUTANEOUS at 08:40

## 2018-11-24 RX ADMIN — VINCRISTINE SULFATE: 1 INJECTION, SOLUTION INTRAVENOUS at 16:20

## 2018-11-24 RX ADMIN — ONDANSETRON 4 MG: 2 INJECTION INTRAMUSCULAR; INTRAVENOUS at 17:11

## 2018-11-24 RX ADMIN — CARVEDILOL 12.5 MG: 12.5 TABLET, FILM COATED ORAL at 17:06

## 2018-11-24 RX ADMIN — PANTOPRAZOLE SODIUM 40 MG: 40 TABLET, DELAYED RELEASE ORAL at 05:49

## 2018-11-24 RX ADMIN — SODIUM CHLORIDE 75 ML/HR: 0.9 INJECTION, SOLUTION INTRAVENOUS at 09:20

## 2018-11-24 RX ADMIN — INSULIN LISPRO 1 UNITS: 100 INJECTION, SOLUTION INTRAVENOUS; SUBCUTANEOUS at 12:38

## 2018-11-24 RX ADMIN — LOSARTAN POTASSIUM 25 MG: 25 TABLET, FILM COATED ORAL at 08:40

## 2018-11-24 RX ADMIN — EZETIMIBE 10 MG: 10 TABLET ORAL at 21:54

## 2018-11-24 RX ADMIN — PREDNISONE 97 MG: 20 TABLET ORAL at 17:06

## 2018-11-24 RX ADMIN — ALUMINUM HYDROXIDE, MAGNESIUM HYDROXIDE, AND SIMETHICONE 30 ML: 200; 200; 20 SUSPENSION ORAL at 12:37

## 2018-11-24 RX ADMIN — ALLOPURINOL 100 MG: 100 TABLET ORAL at 08:40

## 2018-11-24 RX ADMIN — LORAZEPAM 0.5 MG: 2 INJECTION INTRAMUSCULAR; INTRAVENOUS at 19:57

## 2018-11-24 RX ADMIN — SULFAMETHOXAZOLE AND TRIMETHOPRIM 1 TABLET: 800; 160 TABLET ORAL at 21:54

## 2018-11-24 RX ADMIN — CARVEDILOL 12.5 MG: 12.5 TABLET, FILM COATED ORAL at 08:40

## 2018-11-24 NOTE — PROGRESS NOTES
Oncology Progress Note  Sherif Patton 68 y o  female MRN: [id number]  Unit/Bed#: [facility] 619-01 Encounter: [id number]      /68 (BP Location: Right arm)   Pulse 63   Temp 98 6 °F (37 °C) (Oral)   Resp 18   Ht 4' 8" (1 422 m)   Wt 63 4 kg (139 lb 12 4 oz)   SpO2 98%   BMI 31 34 kg/m²     Subjective:  I could not sleep last night, I had a bowel movement this morning, I have to go to the bathroom to urinate many times a day    Objective:  She denies any fever chills nausea vomiting subjective lymphadenopathy abdominal pain dysuria hematuria melena hematochezia or chest pain  General Appearance:    Alert, oriented        Eyes:    PERRL   Ears:    Normal external ear canals, both ears   Nose:   Nares normal, septum midline   Throat:   Mucosa moist  Pharynx without injection, the left tonsil mass is gone  Neck:   Supple       Lungs:     Clear to auscultation bilaterally   Chest Wall:    No tenderness or deformity    Heart:    Regular rate and rhythm       Abdomen:     Soft, non-tender, bowel sounds +, no organomegaly           Extremities:   Extremities no cyanosis or edema       Skin:   no rash or icterus      Lymph nodes:   Cervical, supraclavicular, and axillary nodes normal   Neurologic:   CNII-XII intact, normal strength, sensation and reflexes     throughout        Recent Results (from the past 48 hour(s))   Fingerstick Glucose (POCT)    Collection Time: 11/22/18 11:56 AM   Result Value Ref Range    POC Glucose 165 (H) 65 - 140 mg/dl   Fingerstick Glucose (POCT)    Collection Time: 11/22/18  4:47 PM   Result Value Ref Range    POC Glucose 210 (H) 65 - 140 mg/dl   Fingerstick Glucose (POCT)    Collection Time: 11/22/18  9:14 PM   Result Value Ref Range    POC Glucose 239 (H) 65 - 140 mg/dl   Fingerstick Glucose (POCT)    Collection Time: 11/23/18  8:16 AM   Result Value Ref Range    POC Glucose 158 (H) 65 - 140 mg/dl   Fingerstick Glucose (POCT)    Collection Time: 11/23/18 11:36 AM   Result Value Ref Range    POC Glucose 241 (H) 65 - 140 mg/dl   Fingerstick Glucose (POCT)    Collection Time: 11/23/18  5:05 PM   Result Value Ref Range    POC Glucose 210 (H) 65 - 140 mg/dl   Fingerstick Glucose (POCT)    Collection Time: 11/23/18  9:03 PM   Result Value Ref Range    POC Glucose 196 (H) 65 - 140 mg/dl   Fingerstick Glucose (POCT)    Collection Time: 11/24/18  8:06 AM   Result Value Ref Range    POC Glucose 120 65 - 140 mg/dl         Xr Chest Portable    Result Date: 11/19/2018  Narrative: CHEST INDICATION:   SOB  COMPARISON:  5/11/2016 EXAM PERFORMED/VIEWS:  XR CHEST PORTABLE FINDINGS:  A right-sided infusion port catheter device is present with the catheter tip just above the caval atrial junction in satisfactory position  Cardiomediastinal silhouette appears unremarkable  The lungs are clear  No pneumothorax or pleural effusion  Osseous structures appear within normal limits for patient age  Impression: No acute cardiopulmonary disease  Workstation performed: [id number]     Ct Soft Tissue Neck With Contrast    Result Date: 11/9/2018  Narrative: CT NECK WITH CONTRAST INDICATION:   new lymphoma dx, dysphagia    COMPARISON:  None  TECHNIQUE:  Axial, sagittal, and coronal 2D reformatted images were created from the axial source data and submitted for interpretation  Radiation dose length product (DLP) for this visit:  230 mGy-cm   This examination, like all CT scans performed in the [facility], was performed utilizing techniques to minimize radiation dose exposure, including the use of iterative reconstruction and automated exposure control  IV Contrast:  85 mL of iohexol (OMNIPAQUE) IMAGE QUALITY:  Diagnostic  FINDINGS: VISUALIZED BRAIN PARENCHYMA:  No acute intracranial pathology of the visualized brain parenchyma   VISUALIZED ORBITS AND PARANASAL SINUSES:  Normal  NASAL CAVITY AND NASOPHARYNX:  Normal  SUPRAHYOID NECK: No significant interval change in large enhancing mass centered at the left tonsil/oropharyngeal wall with extension to the tongue base measuring approximately 2 5 x 3 8 x 4 cm (AP by transverse by craniocaudal), not significantly change  in size from CT 10/25/2018  INFRAHYOID NECK:  Aryepiglottic folds and piriform sinuses are normal   Normal glottis and subglottic airway  THYROID GLAND:  Unremarkable  PAROTID AND SUBMANDIBULAR GLANDS:  Normal  LYMPH NODES:  Grossly unchanged approximately 2 x 1 7 cm left level 2A lymph node (series 3 image 37)  Stable 1 7 x 1 4 cm right jugulodigastric lymph node (series 3 image 41)  VASCULAR STRUCTURES:  Normal enhancement of the cervical vasculature  THORACIC INLET:  Lung apices and upper mediastinum are unremarkable  BONY STRUCTURES: No acute fracture or destructive osseous lesion  Impression: 1  Compared to recent CT 10/25/2018, no significant interval change in size of large left tonsillar/oropharyngeal wall mass with extension to the tongue base consistent with known history of lymphoma  No significant change in mass effect and significant oropharyngeal space narrowing  2   Grossly unchanged enlarged left level 2A and right jugulodigastric lymph nodes from 10/25/2018  Workstation performed: [id number]     Ct Soft Tissue Neck With Contrast    Result Date: 10/25/2018  Narrative: CT NECK WITH CONTRAST INDICATION:   Throat CA, dysphagia  "Patient presents with family, report patient arrived to the [address] [facility] from [facility] 2 weeks ago  Complaining of increased difficulty swallowing, diagnosed with "throat cancer one month ago"  Family also shares that patient has had increased fatigue COMPARISON:  None  TECHNIQUE:  Axial, sagittal, and coronal 2D reformatted images were created from the axial source data and submitted for interpretation  Radiation dose length product (DLP) for this visit:  657 mGy-cm     This examination, like all CT scans performed in the [facility], was performed utilizing techniques to minimize radiation dose exposure, including the use of iterative reconstruction and automated exposure control  IV Contrast:  85 mL of iohexol (OMNIPAQUE) IMAGE QUALITY:  Diagnostic  FINDINGS: VISUALIZED BRAIN PARENCHYMA:  No acute intracranial pathology of the visualized brain parenchyma  VISUALIZED ORBITS AND PARANASAL SINUSES:  Normal  NASAL CAVITY AND NASOPHARYNX:  Normal  SUPRAHYOID NECK:  Mass  of the left soft palate/left oropharynx measuring 3 9 x 2 8 x 2 8 cm in keeping with this patient's known history of malignancy  INFRAHYOID NECK:  Aryepiglottic folds and piriform sinuses are normal   Normal glottis and subglottic airway  THYROID GLAND:  Unremarkable  PAROTID AND SUBMANDIBULAR GLANDS:  Normal  LYMPH NODES:  No pathologic or enlarged adenopathy  VASCULAR STRUCTURES:  Normal enhancement of the cervical vasculature  THORACIC INLET:  Lung apices and upper mediastinum are unremarkable  BONY STRUCTURES: No acute fracture or destructive osseous lesion  Impression: Approximate 3 cm left soft palate/oropharynx mass in keeping with the patient's history of known malignancy  No apparent airway compromise  The study was marked in [facility] for immediate notification  Workstation performed: [id number]     Mri Brain W Wo Contrast    Result Date: 11/20/2018  Narrative: MRI BRAIN WITH AND WITHOUT CONTRAST INDICATION: h/o diffuse large b-cell lymphoma, gait instabiltiy, assess for CNS involvement  COMPARISON:  None  TECHNIQUE: Sagittal T1, axial T2, axial FLAIR, axial T1, axial Patterson, axial diffusion  Sagittal, axial and coronal T1 postcontrast   Axial BRAVO post contrast   IV Contrast:  7 mL of gadobutrol injection (MULTI-DOSE)  IMAGE QUALITY:   Diagnostic  FINDINGS: BRAIN mild periventricular and subcortical white matter nonenhancing FLAIR hyperintense foci, most compatible with chronic microangiopathic changes  There is no intracranial hemorrhage    There is no evidence of acute infarction and diffusion imaging is unremarkable  Postcontrast imaging of the brain demonstrates no abnormal enhancement  VENTRICLES:  Normal  SELLA AND PITUITARY GLAND:  Incidentally noted partially empty sella ORBITS:  Normal  PARANASAL SINUSES:  Normal  VASCULATURE:  Evaluation of the major intracranial vasculature demonstrates appropriate flow voids  CALVARIUM AND SKULL BASE:  Normal  EXTRACRANIAL SOFT TISSUES:  Partial visualization of known left tonsillar/oropharyngeal wall lesion and left level 2 lymphadenopathy     Impression: 1  No acute intracranial pathology  No MR evidence to suggest intracranial CNS lymphoma  2   Chronic white matter microangiopathic changes  3   Partial visualization of known left tonsillar/oropharyngeal wall lesion and left level 2 lymphadenopathy  Workstation performed: [id number]     Nm Pet Ct Skull Base To Mid Thigh    Result Date: 11/14/2018  Narrative: PET/CT SCAN INDICATION: Large B-cell lymphoma  New diagnosis  Neck lymphadenopathy  C83 31: Diffuse large b-cell lymphoma, lymph nodes of head, face, and neck MODIFIER: PI COMPARISON: CT neck of 11/9/2018 CELL TYPE:  Left tonsillectomy 9/25/2018 which revealed large B-cell lymphoma germinal center B phenotype  TECHNIQUE:   10 155 mCi F-18-FDG administered IV  Multiplanar attenuation corrected and non attenuation corrected PET images are available for interpretation, and contiguous, low dose, axial CT sections were obtained from the skull base through the femurs   Intravenous contrast material was not utilized  This examination, like all CT scans performed in the [facility], was performed utilizing techniques to minimize radiation dose exposure, including the use of iterative reconstruction and automated exposure control  Fasting serum glucose: 121 mg/dl FINDINGS: VISUALIZED BRAIN:   No acute abnormalities are seen  HEAD/NECK:   Intense FDG uptake at the left posterior oropharyngeal lesion, SUV max of 19 2    Irregular soft tissue thickening noted here on CT measuring approximately 4 3 x 3 6 cm, image 15 series 4  Scattered FDG avid lymph nodes noted along the cervical chains bilaterally  Left upper cervical chain lymph node demonstrates SUV max of 11 5  This measures 2 4 x 1 7 cm image 16 series 4  Right upper cervical chain lymph node demonstrates SUV max of 10 7  This measures 2 0 x 1 4 cm image 18 series 4  Tiny focus of FDG uptake noted in the right supraclavicular region, SUV max of 2 2 with 5 mm lymph node here on CT image 30 series 4  CT images: Otherwise unremarkable  CHEST:   Single FDG avid left axillary lymph node, SUV max of 16 1  This measures 3 4 x 2 7 cm image 36 series 4  No abnormal focal FDG uptake in the mediastinal or perihilar regions  No FDG avid right axillary lymph nodes  Mild FDG uptake at the distal esophagus, SUV max of 3 1, may be inflammatory  CT images: Scattered patchy atelectasis noted in the right lower lobe  ABDOMEN:   Tiny focus of FDG uptake along the skin of the right upper quadrant anterior abdominal wall, SUV max of 1 3  Mild skin thickening suggested here on CT  See image 82 series 4  Otherwise no suspicious FDG avid soft tissue lesions  CT images: Prior cholecystectomy  Scattered colonic diverticulosis  PELVIS: Mild FDG uptake in a nonenlarged right inguinal lymph node, SUV max of 1 9, probably reactive  CT images: Otherwise unremarkable  OSSEOUS STRUCTURES: No FDG avid lesions are seen  CT images: No significant findings  Impression: 1  FDG avid left posterior oropharyngeal lesion compatible with malignancy  2   FDG avid lymph nodes in the neck and left axilla compatible with malignancy  3  No FDG avid lymph nodes in the abdomen or pelvis suspicious for malignancy  4   Tiny focus of FDG uptake along the skin of the right upper quadrant anterior abdominal wall with mild skin thickening suggested here on CT  Correlate clinically for skin lesion    Workstation performed: [id number]     [address] Placement    Result Date: 11/16/2018  Narrative: Chest port placement  Clinical History: Lymphoma  Plans for chemotherapy  Fluoro time: 0 7 minutes Number of Images: 8 Conscious sedation time: 60 minutes Technique: The patient was brought to the interventional radiology preprocedure area and identified verbally and by wristband  After discussion of the procedure and its details, risks, benefits, and alternatives both verbal and written informed consent was obtained from the patient and placed in the chart  The patient was then brought to the interventional radiology angiography suite and placed supine on the table  The right internal jugular vein was evaluated as a potential access site with ultrasound  The vessel was found to be patent and compressible  An 8-Samoan Midsize Dignity  port was selected for the procedure  The right neck and upper chest were prepped and draped in the usual sterile fashion  All elements of maximal sterile barrier technique were followed (cap, mask, sterile gown, sterile gloves, large sterile sheet, hand hygiene and 2% chlorhexidine for cutaneous antisepsis)  Sterile ultrasound technique with sterile gel and sterile probe covers were also utilized  A preprocedure timeout was performed per standard department protocol  Under ultrasound guidance, lidocaine was administered to the skin and a small skin incision was made  Under ultrasound guidance, the right internal jugular vein was accessed using single wall Seldinger technique  Static images of real time needle entry into the vessel were obtained  A 0 018 wire was then advanced through the needle into the central venous system  The needle was removed, and a 5 Samoan coaxial dilator was inserted  Next, attention was turned to the right chest and a site for the port pocket was selected  This area was then anesthetized with lidocaine, as well as the projected tunnel    A subcutaneous pocket was created in the skin of the upper chest for the port reservoir utilizing a surgical incision  The port catheter was then tunneled under the skin of the upper chest  Attention was then turned to the 5-Montenegrin coaxial dilator  The microwire and inner dilator were removed  A heavy wire was inserted through the outer dilator and a 9 Montenegrin peel-away sheath was inserted over the wire  The catheter was advanced through the peel-away and the peel-away was removed  The catheter tip was then positioned in the right atrium under fluoroscopic control  The catheter was connected to the port, and the port inserted into the subcutaneous pocket  The port was then flushed and aspirated successfully and 100 unit heparin/cc solution was administered into the lumen  The pocket was closed with 3-0 Vicryl suture and Histoacryl  A sterile dressing was applied  The neck incision was closed with 3-0 Vicryl suture and Histoacryl  Findings: Patent and compressible right internal jugular vein  Successful ultrasound and fluoroscopically guided placement of an 8-Montenegrin Midsize Dignity chest port via the right internal jugular vein  The tip of the catheter projects in the right atrium and may be used immediately  Impression: Impression: Ultrasound and fluoroscopically guided placement of a chest port via the right internal jugular vein  Workstation performed: [id number]         Assessment and plan:  1  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

## 2018-11-25 LAB
GLUCOSE SERPL-MCNC: 123 MG/DL (ref 65–140)
GLUCOSE SERPL-MCNC: 232 MG/DL (ref 65–140)
GLUCOSE SERPL-MCNC: 265 MG/DL (ref 65–140)
GLUCOSE SERPL-MCNC: 276 MG/DL (ref 65–140)

## 2018-11-25 PROCEDURE — 82948 REAGENT STRIP/BLOOD GLUCOSE: CPT

## 2018-11-25 PROCEDURE — 99232 SBSQ HOSP IP/OBS MODERATE 35: CPT | Performed by: INTERNAL MEDICINE

## 2018-11-25 RX ORDER — SENNOSIDES 8.6 MG
2 TABLET ORAL 2 TIMES DAILY
Status: DISCONTINUED | OUTPATIENT
Start: 2018-11-25 | End: 2018-11-26 | Stop reason: HOSPADM

## 2018-11-25 RX ORDER — SODIUM CHLORIDE 9 MG/ML
500 INJECTION, SOLUTION INTRAVENOUS CONTINUOUS
Status: DISCONTINUED | OUTPATIENT
Start: 2018-11-26 | End: 2018-11-26

## 2018-11-25 RX ADMIN — ALUMINUM HYDROXIDE, MAGNESIUM HYDROXIDE, AND SIMETHICONE 30 ML: 200; 200; 20 SUSPENSION ORAL at 14:09

## 2018-11-25 RX ADMIN — CARVEDILOL 12.5 MG: 12.5 TABLET, FILM COATED ORAL at 16:48

## 2018-11-25 RX ADMIN — LOSARTAN POTASSIUM 25 MG: 25 TABLET, FILM COATED ORAL at 08:25

## 2018-11-25 RX ADMIN — VINCRISTINE SULFATE: 1 INJECTION, SOLUTION INTRAVENOUS at 17:18

## 2018-11-25 RX ADMIN — LORAZEPAM 0.5 MG: 0.5 TABLET ORAL at 14:08

## 2018-11-25 RX ADMIN — ALLOPURINOL 100 MG: 100 TABLET ORAL at 08:23

## 2018-11-25 RX ADMIN — CARVEDILOL 12.5 MG: 12.5 TABLET, FILM COATED ORAL at 08:23

## 2018-11-25 RX ADMIN — PANTOPRAZOLE SODIUM 40 MG: 40 TABLET, DELAYED RELEASE ORAL at 05:44

## 2018-11-25 RX ADMIN — ONDANSETRON 16 MG: 2 INJECTION INTRAMUSCULAR; INTRAVENOUS at 16:47

## 2018-11-25 RX ADMIN — ONDANSETRON 4 MG: 2 INJECTION INTRAMUSCULAR; INTRAVENOUS at 08:27

## 2018-11-25 RX ADMIN — SULFAMETHOXAZOLE AND TRIMETHOPRIM 1 TABLET: 800; 160 TABLET ORAL at 08:26

## 2018-11-25 RX ADMIN — SENNOSIDES 17.2 MG: 8.6 TABLET, FILM COATED ORAL at 17:18

## 2018-11-25 RX ADMIN — SODIUM CHLORIDE 75 ML/HR: 0.9 INJECTION, SOLUTION INTRAVENOUS at 12:02

## 2018-11-25 RX ADMIN — AMLODIPINE BESYLATE 5 MG: 5 TABLET ORAL at 08:23

## 2018-11-25 RX ADMIN — ENOXAPARIN SODIUM 40 MG: 40 INJECTION SUBCUTANEOUS at 08:23

## 2018-11-25 RX ADMIN — ZOLPIDEM TARTRATE 5 MG: 5 TABLET ORAL at 23:16

## 2018-11-25 RX ADMIN — INSULIN LISPRO 2 UNITS: 100 INJECTION, SOLUTION INTRAVENOUS; SUBCUTANEOUS at 21:15

## 2018-11-25 RX ADMIN — INSULIN LISPRO 3 UNITS: 100 INJECTION, SOLUTION INTRAVENOUS; SUBCUTANEOUS at 12:02

## 2018-11-25 RX ADMIN — ONDANSETRON 4 MG: 2 INJECTION INTRAMUSCULAR; INTRAVENOUS at 23:07

## 2018-11-25 RX ADMIN — PREDNISONE 97 MG: 20 TABLET ORAL at 16:47

## 2018-11-25 RX ADMIN — INSULIN LISPRO 2 UNITS: 100 INJECTION, SOLUTION INTRAVENOUS; SUBCUTANEOUS at 16:56

## 2018-11-25 RX ADMIN — PREDNISONE 97 MG: 20 TABLET ORAL at 08:23

## 2018-11-25 RX ADMIN — EZETIMIBE 10 MG: 10 TABLET ORAL at 21:14

## 2018-11-25 RX ADMIN — SULFAMETHOXAZOLE AND TRIMETHOPRIM 1 TABLET: 800; 160 TABLET ORAL at 21:14

## 2018-11-25 NOTE — PROGRESS NOTES
Oncology Progress Note  Orlando Le 68 y o  female MRN: [id number]  Unit/Bed#: [facility] 619-01 Encounter: [id number]      /78   Pulse 83   Temp 99 °F (37 2 °C)   Resp 18   Ht 4' 8" (1 422 m)   Wt 63 4 kg (139 lb 12 4 oz)   SpO2 98%   BMI 31 34 kg/m²     Subjective:  She felt nauseous last night, resolved on IV Ativan    Objective:  She feels tired denies any headache blurred vision nausea vomiting diarrhea she reported constipation from yesterday denies any tingling, numbness, fever, chills  General Appearance:    Alert, oriented        Eyes:    PERRL   Ears:    Normal external ear canals, both ears   Nose:   Nares normal, septum midline   Throat:   Mucosa moist  Pharynx without injection  Neck:   Supple       Lungs:     Clear to auscultation bilaterally   Chest Wall:    No tenderness or deformity    Heart:    Regular rate and rhythm       Abdomen:     Soft, non-tender, bowel sounds +, no organomegaly           Extremities:   Extremities no cyanosis or edema       Skin:   no rash or icterus      Lymph nodes:   Cervical, supraclavicular, and axillary nodes normal   Neurologic:   CNII-XII intact, normal strength, sensation and reflexes     throughout        Recent Results (from the past 48 hour(s))   Fingerstick Glucose (POCT)    Collection Time: 11/23/18 11:36 AM   Result Value Ref Range    POC Glucose 241 (H) 65 - 140 mg/dl   Fingerstick Glucose (POCT)    Collection Time: 11/23/18  5:05 PM   Result Value Ref Range    POC Glucose 210 (H) 65 - 140 mg/dl   Fingerstick Glucose (POCT)    Collection Time: 11/23/18  9:03 PM   Result Value Ref Range    POC Glucose 196 (H) 65 - 140 mg/dl   Fingerstick Glucose (POCT)    Collection Time: 11/24/18  8:06 AM   Result Value Ref Range    POC Glucose 120 65 - 140 mg/dl   Fingerstick Glucose (POCT)    Collection Time: 11/24/18 12:13 PM   Result Value Ref Range    POC Glucose 162 (H) 65 - 140 mg/dl   Fingerstick Glucose (POCT)    Collection Time: 11/24/18 5:16 PM   Result Value Ref Range    POC Glucose 246 (H) 65 - 140 mg/dl   Fingerstick Glucose (POCT)    Collection Time: 11/24/18  8:54 PM   Result Value Ref Range    POC Glucose 233 (H) 65 - 140 mg/dl   Fingerstick Glucose (POCT)    Collection Time: 11/25/18  8:16 AM   Result Value Ref Range    POC Glucose 123 65 - 140 mg/dl         Xr Chest Portable    Result Date: 11/19/2018  Narrative: CHEST INDICATION:   SOB  COMPARISON:  5/11/2016 EXAM PERFORMED/VIEWS:  XR CHEST PORTABLE FINDINGS:  A right-sided infusion port catheter device is present with the catheter tip just above the caval atrial junction in satisfactory position  Cardiomediastinal silhouette appears unremarkable  The lungs are clear  No pneumothorax or pleural effusion  Osseous structures appear within normal limits for patient age  Impression: No acute cardiopulmonary disease  Workstation performed: [id number]     Ct Soft Tissue Neck With Contrast    Result Date: 11/9/2018  Narrative: CT NECK WITH CONTRAST INDICATION:   new lymphoma dx, dysphagia    COMPARISON:  None  TECHNIQUE:  Axial, sagittal, and coronal 2D reformatted images were created from the axial source data and submitted for interpretation  Radiation dose length product (DLP) for this visit:  230 mGy-cm   This examination, like all CT scans performed in the [facility], was performed utilizing techniques to minimize radiation dose exposure, including the use of iterative reconstruction and automated exposure control  IV Contrast:  85 mL of iohexol (OMNIPAQUE) IMAGE QUALITY:  Diagnostic  FINDINGS: VISUALIZED BRAIN PARENCHYMA:  No acute intracranial pathology of the visualized brain parenchyma   VISUALIZED ORBITS AND PARANASAL SINUSES:  Normal  NASAL CAVITY AND NASOPHARYNX:  Normal  SUPRAHYOID NECK: No significant interval change in large enhancing mass centered at the left tonsil/oropharyngeal wall with extension to the tongue base measuring approximately 2 5 x 3 8 x 4 cm (AP by transverse by craniocaudal), not significantly change  in size from CT 10/25/2018  INFRAHYOID NECK:  Aryepiglottic folds and piriform sinuses are normal   Normal glottis and subglottic airway  THYROID GLAND:  Unremarkable  PAROTID AND SUBMANDIBULAR GLANDS:  Normal  LYMPH NODES:  Grossly unchanged approximately 2 x 1 7 cm left level 2A lymph node (series 3 image 37)  Stable 1 7 x 1 4 cm right jugulodigastric lymph node (series 3 image 41)  VASCULAR STRUCTURES:  Normal enhancement of the cervical vasculature  THORACIC INLET:  Lung apices and upper mediastinum are unremarkable  BONY STRUCTURES: No acute fracture or destructive osseous lesion  Impression: 1  Compared to recent CT 10/25/2018, no significant interval change in size of large left tonsillar/oropharyngeal wall mass with extension to the tongue base consistent with known history of lymphoma  No significant change in mass effect and significant oropharyngeal space narrowing  2   Grossly unchanged enlarged left level 2A and right jugulodigastric lymph nodes from 10/25/2018  Workstation performed: [id number]     Mri Brain W Wo Contrast    Result Date: 11/20/2018  Narrative: MRI BRAIN WITH AND WITHOUT CONTRAST INDICATION: h/o diffuse large b-cell lymphoma, gait instabiltiy, assess for CNS involvement  COMPARISON:  None  TECHNIQUE: Sagittal T1, axial T2, axial FLAIR, axial T1, axial [place], axial diffusion  Sagittal, axial and coronal T1 postcontrast   Axial BRAVO post contrast   IV Contrast:  7 mL of gadobutrol injection (MULTI-DOSE)  IMAGE QUALITY:   Diagnostic  FINDINGS: BRAIN mild periventricular and subcortical white matter nonenhancing FLAIR hyperintense foci, most compatible with chronic microangiopathic changes  There is no intracranial hemorrhage  There is no evidence of acute infarction and diffusion imaging is unremarkable  Postcontrast imaging of the brain demonstrates no abnormal enhancement   VENTRICLES:  Normal  SELLA AND PITUITARY GLAND:  Incidentally noted partially empty sella ORBITS:  Normal  PARANASAL SINUSES:  Normal  VASCULATURE:  Evaluation of the major intracranial vasculature demonstrates appropriate flow voids  CALVARIUM AND SKULL BASE:  Normal  EXTRACRANIAL SOFT TISSUES:  Partial visualization of known left tonsillar/oropharyngeal wall lesion and left level 2 lymphadenopathy     Impression: 1  No acute intracranial pathology  No MR evidence to suggest intracranial CNS lymphoma  2   Chronic white matter microangiopathic changes  3   Partial visualization of known left tonsillar/oropharyngeal wall lesion and left level 2 lymphadenopathy  Workstation performed: [id number]     Nm Pet Ct Skull Base To Mid Thigh    Result Date: 11/14/2018  Narrative: PET/CT SCAN INDICATION: Large B-cell lymphoma  New diagnosis  Neck lymphadenopathy  C83 31: Diffuse large b-cell lymphoma, lymph nodes of head, face, and neck MODIFIER: PI COMPARISON: CT neck of 11/9/2018 CELL TYPE:  Left tonsillectomy 9/25/2018 which revealed large B-cell lymphoma germinal center B phenotype  TECHNIQUE:   10 155 mCi F-18-FDG administered IV  Multiplanar attenuation corrected and non attenuation corrected PET images are available for interpretation, and contiguous, low dose, axial CT sections were obtained from the skull base through the femurs   Intravenous contrast material was not utilized  This examination, like all CT scans performed in the [facility], was performed utilizing techniques to minimize radiation dose exposure, including the use of iterative reconstruction and automated exposure control  Fasting serum glucose: 121 mg/dl FINDINGS: VISUALIZED BRAIN:   No acute abnormalities are seen  HEAD/NECK:   Intense FDG uptake at the left posterior oropharyngeal lesion, SUV max of 19 2  Irregular soft tissue thickening noted here on CT measuring approximately 4 3 x 3 6 cm, image 15 series 4   Scattered FDG avid lymph nodes noted along the cervical chains bilaterally  Left upper cervical chain lymph node demonstrates SUV max of 11 5  This measures 2 4 x 1 7 cm image 16 series 4  Right upper cervical chain lymph node demonstrates SUV max of 10 7  This measures 2 0 x 1 4 cm image 18 series 4  Tiny focus of FDG uptake noted in the right supraclavicular region, SUV max of 2 2 with 5 mm lymph node here on CT image 30 series 4  CT images: Otherwise unremarkable  CHEST:   Single FDG avid left axillary lymph node, SUV max of 16 1  This measures 3 4 x 2 7 cm image 36 series 4  No abnormal focal FDG uptake in the mediastinal or perihilar regions  No FDG avid right axillary lymph nodes  Mild FDG uptake at the distal esophagus, SUV max of 3 1, may be inflammatory  CT images: Scattered patchy atelectasis noted in the right lower lobe  ABDOMEN:   Tiny focus of FDG uptake along the skin of the right upper quadrant anterior abdominal wall, SUV max of 1 3  Mild skin thickening suggested here on CT  See image 82 series 4  Otherwise no suspicious FDG avid soft tissue lesions  CT images: Prior cholecystectomy  Scattered colonic diverticulosis  PELVIS: Mild FDG uptake in a nonenlarged right inguinal lymph node, SUV max of 1 9, probably reactive  CT images: Otherwise unremarkable  OSSEOUS STRUCTURES: No FDG avid lesions are seen  CT images: No significant findings  Impression: 1  FDG avid left posterior oropharyngeal lesion compatible with malignancy  2   FDG avid lymph nodes in the neck and left axilla compatible with malignancy  3  No FDG avid lymph nodes in the abdomen or pelvis suspicious for malignancy  4   Tiny focus of FDG uptake along the skin of the right upper quadrant anterior abdominal wall with mild skin thickening suggested here on CT  Correlate clinically for skin lesion  Workstation performed: [id number]     Ir Port Placement    Result Date: 11/16/2018  Narrative: Chest port placement  Clinical History: Lymphoma  Plans for chemotherapy  Fluoro time: 0 7 minutes Number of Images: 8 Conscious sedation time: 60 minutes Technique: The patient was brought to the interventional radiology preprocedure area and identified verbally and by wristband  After discussion of the procedure and its details, risks, benefits, and alternatives both verbal and written informed consent was obtained from the patient and placed in the chart  The patient was then brought to the interventional radiology angiography suite and placed supine on the table  The right internal jugular vein was evaluated as a potential access site with ultrasound  The vessel was found to be patent and compressible  An 8-Bolivian Midsize Dignity  port was selected for the procedure  The right neck and upper chest were prepped and draped in the usual sterile fashion  All elements of maximal sterile barrier technique were followed (cap, mask, sterile gown, sterile gloves, large sterile sheet, hand hygiene and 2% chlorhexidine for cutaneous antisepsis)  Sterile ultrasound technique with sterile gel and sterile probe covers were also utilized  A preprocedure timeout was performed per standard department protocol  Under ultrasound guidance, lidocaine was administered to the skin and a small skin incision was made  Under ultrasound guidance, the right internal jugular vein was accessed using single wall Seldinger technique  Static images of real time needle entry into the vessel were obtained  A 0 018 wire was then advanced through the needle into the central venous system  The needle was removed, and a 5 Bolivian coaxial dilator was inserted  Next, attention was turned to the right chest and a site for the port pocket was selected  This area was then anesthetized with lidocaine, as well as the projected tunnel  A subcutaneous pocket was created in the skin of the upper chest for the port reservoir utilizing a surgical incision   The port catheter was then tunneled under the skin of the upper chest  Attention was then turned to the 5-Macedonian coaxial dilator  The microwire and inner dilator were removed  A heavy wire was inserted through the outer dilator and a 9 Macedonian peel-away sheath was inserted over the wire  The catheter was advanced through the peel-away and the peel-away was removed  The catheter tip was then positioned in the right atrium under fluoroscopic control  The catheter was connected to the port, and the port inserted into the subcutaneous pocket  The port was then flushed and aspirated successfully and 100 unit heparin/cc solution was administered into the lumen  The pocket was closed with 3-0 Vicryl suture and Histoacryl  A sterile dressing was applied  The neck incision was closed with 3-0 Vicryl suture and Histoacryl  Findings: Patent and compressible right internal jugular vein  Successful ultrasound and fluoroscopically guided placement of an 8-Macedonian Midsize Dignity chest port via the right internal jugular vein  The tip of the catheter projects in the right atrium and may be used immediately  Impression: Impression: Ultrasound and fluoroscopically guided placement of a chest port via the right internal jugular vein  Workstation performed: [id number]         Assessment and plan:  1  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

## 2018-11-26 VITALS
DIASTOLIC BLOOD PRESSURE: 71 MMHG | BODY MASS INDEX: 31.44 KG/M2 | OXYGEN SATURATION: 99 % | HEIGHT: 56 IN | RESPIRATION RATE: 20 BRPM | WEIGHT: 139.77 LBS | TEMPERATURE: 99 F | SYSTOLIC BLOOD PRESSURE: 133 MMHG | HEART RATE: 70 BPM

## 2018-11-26 LAB
ATRIAL RATE: 57 BPM
GLUCOSE SERPL-MCNC: 141 MG/DL (ref 65–140)
GLUCOSE SERPL-MCNC: 158 MG/DL (ref 65–140)
GLUCOSE SERPL-MCNC: 184 MG/DL (ref 65–140)
P AXIS: 52 DEGREES
PR INTERVAL: 134 MS
QRS AXIS: 16 DEGREES
QRSD INTERVAL: 74 MS
QT INTERVAL: 426 MS
QTC INTERVAL: 414 MS
T WAVE AXIS: 22 DEGREES
VENTRICULAR RATE: 57 BPM

## 2018-11-26 PROCEDURE — 82948 REAGENT STRIP/BLOOD GLUCOSE: CPT

## 2018-11-26 PROCEDURE — 99239 HOSP IP/OBS DSCHRG MGMT >30: CPT | Performed by: INTERNAL MEDICINE

## 2018-11-26 PROCEDURE — 93010 ELECTROCARDIOGRAM REPORT: CPT | Performed by: INTERNAL MEDICINE

## 2018-11-26 RX ORDER — SODIUM CHLORIDE 9 MG/ML
500 INJECTION, SOLUTION INTRAVENOUS CONTINUOUS
Status: DISCONTINUED | OUTPATIENT
Start: 2018-11-26 | End: 2018-11-26

## 2018-11-26 RX ADMIN — SODIUM CHLORIDE 2000 ML: 0.9 INJECTION, SOLUTION INTRAVENOUS at 12:31

## 2018-11-26 RX ADMIN — ENOXAPARIN SODIUM 40 MG: 40 INJECTION SUBCUTANEOUS at 09:37

## 2018-11-26 RX ADMIN — SULFAMETHOXAZOLE AND TRIMETHOPRIM 1 TABLET: 800; 160 TABLET ORAL at 09:34

## 2018-11-26 RX ADMIN — ALUMINUM HYDROXIDE, MAGNESIUM HYDROXIDE, AND SIMETHICONE 30 ML: 200; 200; 20 SUSPENSION ORAL at 09:40

## 2018-11-26 RX ADMIN — SENNOSIDES 17.2 MG: 8.6 TABLET, FILM COATED ORAL at 17:07

## 2018-11-26 RX ADMIN — INSULIN LISPRO 1 UNITS: 100 INJECTION, SOLUTION INTRAVENOUS; SUBCUTANEOUS at 09:30

## 2018-11-26 RX ADMIN — ONDANSETRON 4 MG: 2 INJECTION INTRAMUSCULAR; INTRAVENOUS at 12:20

## 2018-11-26 RX ADMIN — ONDANSETRON 16 MG: 2 INJECTION INTRAMUSCULAR; INTRAVENOUS at 16:41

## 2018-11-26 RX ADMIN — CARVEDILOL 12.5 MG: 12.5 TABLET, FILM COATED ORAL at 09:35

## 2018-11-26 RX ADMIN — SODIUM CHLORIDE 75 ML/HR: 0.9 INJECTION, SOLUTION INTRAVENOUS at 06:12

## 2018-11-26 RX ADMIN — PANTOPRAZOLE SODIUM 40 MG: 40 TABLET, DELAYED RELEASE ORAL at 06:11

## 2018-11-26 RX ADMIN — AMLODIPINE BESYLATE 5 MG: 5 TABLET ORAL at 09:36

## 2018-11-26 RX ADMIN — ALUMINUM HYDROXIDE, MAGNESIUM HYDROXIDE, AND SIMETHICONE 30 ML: 200; 200; 20 SUSPENSION ORAL at 18:26

## 2018-11-26 RX ADMIN — ALLOPURINOL 100 MG: 100 TABLET ORAL at 09:33

## 2018-11-26 RX ADMIN — INSULIN LISPRO 1 UNITS: 100 INJECTION, SOLUTION INTRAVENOUS; SUBCUTANEOUS at 12:18

## 2018-11-26 RX ADMIN — SENNOSIDES 17.2 MG: 8.6 TABLET, FILM COATED ORAL at 09:34

## 2018-11-26 RX ADMIN — LOSARTAN POTASSIUM 25 MG: 25 TABLET, FILM COATED ORAL at 09:33

## 2018-11-26 RX ADMIN — CYCLOPHOSPHAMIDE 1208 MG: 1 INJECTION, POWDER, FOR SOLUTION INTRAVENOUS; ORAL at 17:12

## 2018-11-26 RX ADMIN — CARVEDILOL 12.5 MG: 12.5 TABLET, FILM COATED ORAL at 17:07

## 2018-11-26 RX ADMIN — Medication 300 UNITS: at 17:17

## 2018-11-26 NOTE — DISCHARGE SUMMARY
Discharge Summary - Keyana Garrett 68 y o  female MRN: [id number]    Unit/Bed#: [facility] 223-98 Encounter: [id number]    Admission Date: 11/19/2018     Admitting Diagnosis: Lymphoma ([address] ) [C85 90]    HPI:  27-year-old  female who does not speak English was diagnosed with diffuse large B-cell lymphoma of the cervical area as well as left tonsillar area, double hit, germinal cell subtype, Ki-67 of 70%, the patient was admitted to the hospital with 1st round of [address],  Procedures Performed: Chemotherapy    Hospital Course:  After she received rituximab she had seizure-like activity? However upon further questioning the patient had this seizure-like activity before at home       Significant Findings, Care, Treatment and Services Provided:  Diabetes mellitus type 2, hypertension    Complications:  None    Discharge Diagnosis:  Diffuse large B-cell lymphoma stage II    Condition at Discharge:  Stable    Discharge instructions/Information to patient and family:   See after visit summary for information provided to patient and family  Provisions for Follow-Up Care:  See after visit summary for information related to follow-up care and any pertinent home health orders  Disposition:  Discharge the patient home, patient received Neulasta tomorrow morning, she is on Zofran p r n  For nausea and vomiting, patient to have CBC on weekly basis and to follow up by Dr Esme Keys     Planned Readmission:  In 21-28 days    Discharge Statement   I spent 35  minutes discharging the patient  This time was spent on the day of discharge  I had direct contact with the patient on the day of discharge  Discharge Medications:  See after visit summary for reconciled discharge medications provided to patient and family

## 2018-11-26 NOTE — UTILIZATION REVIEW
Continued Stay Review    [address]  Utilization Review Department  Phone: [phone]; Fax [phone]  [email or web address]  ATTENTION: Please call with any questions or concerns to [phone]  and carefully listen to the prompts so that you are directed to the right person  Send all requests for admission clinical reviews, approved or denied determinations and any other requests to fax [phone]   All voicemails are confidential     Date: 11/26/2018    Vital Signs: /82   Pulse 75   Temp 98 6 °F (37 °C)   Resp 17   Ht 4' 8" (1 422 m)   Wt 63 4 kg (139 lb 12 4 oz)   SpO2 98%   BMI 31 34 kg/m²     Medications:   Scheduled Meds:   Current Facility-Administered Medications:  acetaminophen 650 mg Oral Q4H PRN    allopurinol 100 mg Oral Daily    aluminum-magnesium hydroxide-simethicone 30 mL Oral Q6H PRN 11/24 x 2  11/25 x 1  11/26 x 1    amLODIPine 5 mg Oral Daily    carvedilol 12 5 mg Oral BID With Meals    cyclophosphamide (CYTOXAN) IVPB 1,208 mg Intravenous Once 11/26   diphenhydrAMINE 50 mg Oral HS PRN 11/24 x 1    etoposide-DOXOrubicin-vincristine infusion  Intravenous Q24H  Rate: 23 4 mL/hr at 11/25/18 1718   enoxaparin 40 mg Subcutaneous Daily    ezetimibe 10 mg Oral HS    insulin lispro 1-5 Units Subcutaneous TID AC    insulin lispro 1-5 Units Subcutaneous HS    lidocaine viscous 15 mL Swish & Spit 4x Daily PRN    loperamide 2 mg Oral TID PRN    LORazepam 0 5 mg Oral Q8H PRN 11/25 x 1   losartan 25 mg Oral Daily    ondansetron (ZOFRAN) IVPB (ONC use only) 16 mg Intravenous Q24H    ondansetron 4 mg Intravenous Q6H PRN 11/24 x 2  11/25 x 2    oxyCODONE-acetaminophen 1 tablet Oral Q6H PRN    pantoprazole 40 mg Oral Early Morning    senna 2 tablet Oral BID    sulfamethoxazole-trimethoprim 1 tablet Oral Q12H CAROLINE    zolpidem 5 mg Oral HS PRN      Continuous Infusions:   sodium chloride 75 mL/hr   sodium chloride 500 mL/hr     Nursing Orders - VS q 4 - Seizure precautions - Activity as tolerated - SCD's to le's- Diet dysphagia - pureed  Cons carb - thin liquids   Abnormal Labs/Diagnostic Results:11/22 - Bun/cr 5/00 56 - Wbc 11 17 - H/H 11 1/34  6- A neut 10 47    Age/Sex: 68 y o  female     Assessment/Plan: 1  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

## 2018-11-27 ENCOUNTER — OFFICE VISIT (OUTPATIENT)
Dept: FAMILY MEDICINE CLINIC | Facility: CLINIC | Age: 73
End: 2018-11-27
Payer: MEDICARE

## 2018-11-27 ENCOUNTER — HOSPITAL ENCOUNTER (OUTPATIENT)
Dept: INFUSION CENTER | Facility: HOSPITAL | Age: 73
Discharge: HOME | End: 2018-11-27
Payer: COMMERCIAL

## 2018-11-27 ENCOUNTER — TELEPHONE (OUTPATIENT)
Dept: FAMILY MEDICINE CLINIC | Facility: CLINIC | Age: 73
End: 2018-11-27

## 2018-11-27 ENCOUNTER — TRANSITIONAL CARE MANAGEMENT (OUTPATIENT)
Dept: FAMILY MEDICINE CLINIC | Facility: CLINIC | Age: 73
End: 2018-11-27

## 2018-11-27 VITALS
DIASTOLIC BLOOD PRESSURE: 80 MMHG | HEART RATE: 112 BPM | RESPIRATION RATE: 18 BRPM | BODY MASS INDEX: 31.27 KG/M2 | TEMPERATURE: 97.6 F | WEIGHT: 139 LBS | SYSTOLIC BLOOD PRESSURE: 120 MMHG | HEIGHT: 56 IN | OXYGEN SATURATION: 97 %

## 2018-11-27 VITALS — TEMPERATURE: 98.4 F

## 2018-11-27 DIAGNOSIS — Z51.11 ENCOUNTER FOR ANTINEOPLASTIC CHEMOTHERAPY: ICD-10-CM

## 2018-11-27 DIAGNOSIS — C83.31 DIFFUSE LARGE B-CELL LYMPHOMA OF LYMPH NODES OF NECK (HCC): ICD-10-CM

## 2018-11-27 DIAGNOSIS — R11.0 NAUSEA: ICD-10-CM

## 2018-11-27 DIAGNOSIS — F41.8 ANXIETY ABOUT HEALTH: Primary | ICD-10-CM

## 2018-11-27 PROCEDURE — 99495 TRANSJ CARE MGMT MOD F2F 14D: CPT | Performed by: PHYSICIAN ASSISTANT

## 2018-11-27 PROCEDURE — 96372 THER/PROPH/DIAG INJ SC/IM: CPT

## 2018-11-27 RX ORDER — HYDROXYZINE 50 MG/1
50 TABLET, FILM COATED ORAL 3 TIMES DAILY PRN
Qty: 90 TABLET | Refills: 2 | Status: SHIPPED | OUTPATIENT
Start: 2018-11-27 | End: 2019-05-24

## 2018-11-27 RX ORDER — ONDANSETRON 8 MG/1
8 TABLET, ORALLY DISINTEGRATING ORAL EVERY 8 HOURS PRN
Qty: 90 TABLET | Refills: 0 | Status: SHIPPED | OUTPATIENT
Start: 2018-11-27 | End: 2019-01-02 | Stop reason: SDUPTHER

## 2018-11-27 RX ADMIN — PEGFILGRASTIM 6 MG: 6 INJECTION SUBCUTANEOUS at 12:17

## 2018-11-27 NOTE — ASSESSMENT & PLAN NOTE
Zofran appears to be helping with the nausea and vomiting  Will continue with medication as previously prescribed  Was noted in the hospital that if Zofran does not work, may recommend following up with palliative care

## 2018-11-27 NOTE — PROGRESS NOTES
Patient here for Neulasta post chemo inpatient  Patient tolerated well to right upper arm SQ without complications    Education provided to daughter

## 2018-11-27 NOTE — PROGRESS NOTES
Assessment/Plan:     Anxiety about health  Discussed with patient and daughter possible medications that can be used to help with anxiety  Informed patient that side effects of benzodiazepines, and at this time daughter would like to try a different medication  Will send over prescription for hydroxyzine to be used 3 times a day as needed for anxiety and insomnia  Did discussed that we may also have to start on a daily medication to see if this will help with symptoms  If needed may also refer to Psychiatry  Will was as symptoms start to improve, anxiety may decrease, and we can start discontinuing medications  Nausea  Zofran appears to be helping with the nausea and vomiting  Will continue with medication as previously prescribed  Was noted in the hospital that if Zofran does not work, may recommend following up with palliative care  Diagnoses and all orders for this visit:    Anxiety about health  -     hydrOXYzine HCL (ATARAX) 50 mg tablet; Take 1 tablet (50 mg total) by mouth 3 (three) times a day as needed for anxiety (and insomnia)    Nausea    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)  -     ondansetron (ZOFRAN-ODT) 8 mg disintegrating tablet; Take 1 tablet (8 mg total) by mouth every 8 (eight) hours as needed for nausea or vomiting    Encounter for antineoplastic chemotherapy  -     ondansetron (ZOFRAN-ODT) 8 mg disintegrating tablet; Take 1 tablet (8 mg total) by mouth every 8 (eight) hours as needed for nausea or vomiting         Subjective:     Patient ID: Teagan Lee is a 68 y o  female  73-year-old female presenting with daughter for follow-up of recent hospitalization for diffuse large B-cell lymphoma  Patient has recently started chemotherapy  Was admitted with concerns of shortness of breath and dysphagia  This time there is no concern for any lung abnormality, and there is no obstruction causing the dysphagia    Patient was cleared by speech therapy  It is believed that her symptoms are related to her anxiety  When patient starts to get anxious, shortness of breath occurs  Was given medication in the hospital which seemed to have greatly improved her symptoms  Patient has also been experiencing increased nausea recently  Was prescribed Zofran in the hospital, and again this has also improved her symptoms  Overall patient states that she is doing well today  Daughter has questions about starting patient on medical marijuana whether this would be a viable solution to help with some of her symptoms  Review of Systems   Constitutional: Positive for activity change, appetite change, chills, fatigue, fever and unexpected weight change  Negative for diaphoresis  HENT: Positive for sore throat and trouble swallowing  Eyes: Negative for visual disturbance  Respiratory: Positive for chest tightness and shortness of breath  Cardiovascular: Positive for chest pain  Gastrointestinal: Positive for abdominal pain, nausea and vomiting  Negative for constipation and diarrhea  Genitourinary: Negative for dysuria and hematuria  Musculoskeletal: Positive for myalgias  Negative for back pain  Skin: Negative for pallor, rash and wound  Neurological: Positive for dizziness, weakness and headaches  Negative for numbness  Hematological: Positive for adenopathy  Psychiatric/Behavioral: Negative for dysphoric mood and sleep disturbance  The patient is not nervous/anxious  Objective:     Physical Exam   Constitutional: She is oriented to person, place, and time  Vital signs are normal  She appears well-developed and well-nourished  She appears lethargic  She has a sickly appearance  No distress  HENT:   Right Ear: Hearing, tympanic membrane, external ear and ear canal normal    Left Ear: Hearing, tympanic membrane, external ear and ear canal normal    Nose: No mucosal edema or rhinorrhea     Mouth/Throat: Oropharynx is clear and moist and mucous membranes are normal    Neck: Normal range of motion  Neck supple  No thyromegaly present  Cardiovascular: Normal rate, regular rhythm and normal heart sounds  Exam reveals no gallop and no friction rub  No murmur heard  Pulmonary/Chest: Effort normal and breath sounds normal  No respiratory distress  She has no wheezes  She has no rales  Abdominal: Soft  Normal appearance and bowel sounds are normal  She exhibits no distension  There is no hepatosplenomegaly  There is no tenderness  There is no rebound and no guarding  Musculoskeletal: Normal range of motion  Lymphadenopathy:     She has cervical adenopathy  Neurological: She is oriented to person, place, and time  She appears lethargic  No cranial nerve deficit  Skin: She is not diaphoretic  Psychiatric: She has a normal mood and affect  Her behavior is normal  Thought content normal    Nursing note and vitals reviewed  Vitals:    11/27/18 1113   BP: 120/80   BP Location: Left arm   Patient Position: Sitting   Cuff Size: Standard   Pulse: (!) 112   Resp: 18   Temp: 97 6 °F (36 4 °C)   TempSrc: Tympanic   SpO2: 97%   Weight: 63 kg (139 lb)   Height: 4' 8" (1 422 m)       Transitional Care Management Review:  Vickie Owen is a 68 y o  female here for TCM follow up  During the TCM phone call patient stated:    TCM Call (since 10/27/2018)     Date and time call was made  11/27/2018  9:04 AM    Hospital care reviewed  Records reviewed    Patient was hospitialized at  [facility]    Date of Admission  11/19/18    Date of discharge  11/26/18    Diagnosis  DIFFUSE LARGE B-CELL LYMPHOMA OF LYMPH NODE OF THE NECK    Disposition  Home    Current Symptoms  Neausea (ANXIETY, CAN'T SLEEP WEEL AT NIGHT)    Neausea severity  Moderate      TCM Call (since 10/27/2018)     Should patient be enrolled in anticoag monitoring? No    Scheduled for follow up?   Yes    Patients specialists  Other (comment)    Other specialists names  [facility] SCHEDULE FOR 12/6/18 11AM    Did you obtain your prescribed medications  Yes    Do you need help managing your prescriptions or medications  No    Is transportation to your appointment needed  No    I have advised the patient to call PCP with any new or worsening symptoms  CHRISTOPHER NUR [facility]    Living Arrangements  Children    Counseling  Caregiver    Comments  I SPOKE WITH PT'S DAUGHTER SHELLEY REDMOND          David Berry PA-C

## 2018-11-27 NOTE — PLAN OF CARE
Problem: Potential for Falls  Goal: Patient will remain free of falls  INTERVENTIONS:  - Assess patient frequently for physical needs  -  Identify cognitive and physical deficits and behaviors that affect risk of falls    -  [place] fall precautions as indicated by assessment   - Educate patient/family on patient safety including physical limitations  - Instruct patient to call for assistance with activity based on assessment  - Modify environment to reduce risk of injury  - Consider OT/PT consult to assist with strengthening/mobility   Outcome: Progressing

## 2018-11-27 NOTE — ASSESSMENT & PLAN NOTE
Discussed with patient and daughter possible medications that can be used to help with anxiety  Informed patient that side effects of benzodiazepines, and at this time daughter would like to try a different medication  Will send over prescription for hydroxyzine to be used 3 times a day as needed for anxiety and insomnia  Did discussed that we may also have to start on a daily medication to see if this will help with symptoms  If needed may also refer to Psychiatry  Will was as symptoms start to improve, anxiety may decrease, and we can start discontinuing medications

## 2018-12-06 ENCOUNTER — OFFICE VISIT (OUTPATIENT)
Dept: HEMATOLOGY ONCOLOGY | Facility: CLINIC | Age: 73
End: 2018-12-06
Payer: COMMERCIAL

## 2018-12-06 VITALS
BODY MASS INDEX: 30.59 KG/M2 | HEIGHT: 56 IN | RESPIRATION RATE: 18 BRPM | DIASTOLIC BLOOD PRESSURE: 60 MMHG | OXYGEN SATURATION: 96 % | TEMPERATURE: 97.9 F | HEART RATE: 72 BPM | WEIGHT: 136 LBS | SYSTOLIC BLOOD PRESSURE: 104 MMHG

## 2018-12-06 DIAGNOSIS — C83.31 DIFFUSE LARGE B-CELL LYMPHOMA OF LYMPH NODES OF NECK (HCC): Primary | ICD-10-CM

## 2018-12-06 PROCEDURE — 99215 OFFICE O/P EST HI 40 MIN: CPT | Performed by: INTERNAL MEDICINE

## 2018-12-06 RX ORDER — ALPRAZOLAM 0.25 MG/1
0.25 TABLET ORAL
Qty: 30 TABLET | Refills: 0 | Status: SHIPPED | OUTPATIENT
Start: 2018-12-06 | End: 2019-02-06 | Stop reason: ALTCHOICE

## 2018-12-06 RX ORDER — PREDNISONE 20 MG/1
100 TABLET ORAL DAILY
Qty: 25 TABLET | Refills: 5 | Status: SHIPPED | OUTPATIENT
Start: 2018-12-06 | End: 2018-12-11

## 2018-12-06 NOTE — PROGRESS NOTES
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  However, the gene rearrangements study came back negative for the BCL or C myc mutations  The patient was told that for the double expressed the recommendation is to pursue R-CHOP instead of more aggressive treatment since we do not have any data to suggest that [place] would be superior  The patient also was told that the reason for the more aggressive initial cycle of chemotherapy is due to the fact that she had airway compromising mass with still pending gene rearrangements studies  The patient will be switched to R-CHOP cycle 2 with the supportive of Neulasta on the 12 of December  We will aim to pursue another PET scan after cycle 3  For her anxiety and insomnia I prescribed Xanax to be used only on as-needed basis  - ALPRAZolam (XANAX) 0 25 mg tablet; Take 1 tablet (0 25 mg total) by mouth daily at bedtime as needed for anxiety or sleep  Dispense: 30 tablet; Refill: 0        HPI:  The patient came today for a follow-up visit  She was treated with cycle 1 adjusted dose R-EPOCH on the 22nd of November for her stage IIb high-grade large B-cell lymphoma of the left tonsil  During the hospital course the patient had another biopsy of the left tonsil to better understand the exact aggressiveness of her large B-cell lymphoma  The biopsy on the 20th of November showed large B-cell lymphoma with BCL -2 and C myc level expressed surface a type without the myc or BCL gene rearrangement    Her bone marrow biopsy on the 9th of November was negative for B-cell lymphoma involvement with normal bone marrow trilineage maturation  The patient was treated with 1 cycle of R-EPOCH since her airway was compromise with the large tonsillar mass and a biopsy which was reviewed by our hematopathologist on the 15 November compatible with 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 according to the [facility] with pending gene rearrangement studies  The patient tolerated the chemotherapy cycle 1 relatively well with some neurological symptoms possibly due to medication side effect  She was also evaluated with an MRI of the brain during the hospital course which was negative for any hint of lymphoma involvement  PET scan on the 14th of November showed IMPRESSION:     1  FDG avid left posterior oropharyngeal lesion compatible with malignancy  2   FDG avid lymph nodes in the neck and left axilla compatible with malignancy  3  No FDG avid lymph nodes in the abdomen or pelvis suspicious for malignancy  4   Tiny focus of FDG uptake along the skin of the right upper quadrant anterior abdominal wall with mild skin thickening suggested here on CT  Correlate clinically for skin lesion  The patient now feels much better with relatively no pain in the neck area, with still some dysphagia and fatigue  She also complained about significant insomnia and anxiety symptoms  Interval history:    ROS: Review of Systems   Constitutional: Positive for fatigue  Negative for activity change, appetite change, chills, diaphoresis, fever and unexpected weight change  HENT: Positive for mouth sores and trouble swallowing  Negative for congestion, dental problem, ear discharge, ear pain, facial swelling, hearing loss, nosebleeds, postnasal drip, sore throat and tinnitus  Eyes: Negative for discharge, redness, itching and visual disturbance  Respiratory: Positive for shortness of breath  Negative for cough, chest tightness and wheezing  Cardiovascular: Negative for chest pain, palpitations and leg swelling     Gastrointestinal: Positive for constipation and nausea  Negative for abdominal distention, abdominal pain, anal bleeding, blood in stool, diarrhea and vomiting  Genitourinary: Negative for difficulty urinating, dysuria, flank pain, frequency, hematuria and urgency  Musculoskeletal: Negative for arthralgias, back pain, gait problem, joint swelling, myalgias and neck pain  Skin: Negative for color change, pallor, rash and wound  Neurological: Positive for dizziness and numbness  Negative for syncope, speech difficulty, weakness, light-headedness and headaches  Hematological: Negative for adenopathy  Does not bruise/bleed easily  Psychiatric/Behavioral: Positive for sleep disturbance  Negative for agitation, behavioral problems and confusion  Past Medical History:   Diagnosis Date    Cancer [facility])     Throat    Chronic pain disorder     Diabetes mellitus ([place] [address] )     Diffuse large B cell lymphoma ([place] [address] )     Dysphagia     GERD without esophagitis     HTN (hypertension)     Hyperlipidemia     Hypertension     MI (myocardial infarction) ([place] [address] )        Past Surgical History:   Procedure Laterality Date    BONE MARROW BIOPSY      BREAST LUMPECTOMY      CHOLECYSTECTOMY      IR PORT PLACEMENT  11/16/2018    OTHER SURGICAL HISTORY      tendor tear repair to right shoulder    SHOULDER ARTHROSCOPY         Social History     Social History    Marital status:       Spouse name: N/A    Number of children: N/A    Years of education: N/A     Social History Main Topics    Smoking status: Never Smoker    Smokeless tobacco: Never Used    Alcohol use No    Drug use: No    Sexual activity: Not on file     Other Topics Concern    Not on file     Social History Narrative    No narrative on file       Family History   Problem Relation Age of Onset    Diabetes Mother     Heart disease Sister     Hypertension Brother     Cancer Maternal Grandmother     Cancer Maternal Grandfather        No Known Allergies      Current Outpatient Prescriptions:   amLODIPine (NORVASC) 10 mg tablet, Take 0 5 tablets (5 mg total) by mouth daily, Disp: , Rfl: 0    carvedilol (COREG) 12 5 mg tablet, Take 1 tablet (12 5 mg total) by mouth 2 (two) times a day with meals, Disp: , Rfl: 0    ergocalciferol (VITAMIN D2) 50,000 units, Take 50,000 Units by mouth once a week , Disp: , Rfl:     ezetimibe (ZETIA) 10 mg tablet, Take 10 mg by mouth daily  , Disp: , Rfl:     fentaNYL (DURAGESIC) 25 mcg/hr, Place 1 patch on the skin every third day, Disp: , Rfl:     glipiZIDE (GLUCOTROL) 10 mg tablet, Take 10 mg by mouth 2 (two) times a day before meals  , Disp: , Rfl:     HYDROcodone-acetaminophen (NORCO) 5-325 mg per tablet, Take 1 tablet by mouth every 6 (six) hours as needed for pain for up to 10 doses Max Daily Amount: 4 tablets, Disp: 10 tablet, Rfl: 0    hydrOXYzine HCL (ATARAX) 50 mg tablet, Take 1 tablet (50 mg total) by mouth 3 (three) times a day as needed for anxiety (and insomnia), Disp: 90 tablet, Rfl: 2    lidocaine viscous (XYLOCAINE) 2 % mucosal solution, Swish and swallow 10 mL 3 (three) times a day as needed for mild pain or moderate pain, Disp: 100 mL, Rfl: 2    loperamide (IMODIUM) 2 mg capsule, Take 1 capsule (2 mg total) by mouth as needed for diarrhea Every 1-2 hours as needed for diarrhea, Disp: 60 capsule, Rfl: 1    losartan (COZAAR) 25 mg tablet, Take 1 tablet (25 mg total) by mouth daily, Disp: 90 tablet, Rfl: 1    omeprazole (PriLOSEC) 40 MG capsule, Take 40 mg by mouth daily, Disp: , Rfl:     ondansetron (ZOFRAN-ODT) 8 mg disintegrating tablet, Take 1 tablet (8 mg total) by mouth every 8 (eight) hours as needed for nausea or vomiting, Disp: 90 tablet, Rfl: 0    oxyCODONE-acetaminophen (PERCOCET) 5-325 mg per tablet, Take 1 tablet by mouth 2 (two) times a day Max Daily Amount: 2 tablets, Disp: 60 tablet, Rfl: 0    ranitidine (ZANTAC) 150 mg tablet, Take 150 mg by mouth 2 (two) times a day, Disp: , Rfl:     sitaGLIPtin-metFORMIN (JANUMET)  MG per tablet, Take 1 tablet by mouth 2 (two) times a day with meals, Disp: 90 tablet, Rfl: 1    ALPRAZolam (XANAX) 0 25 mg tablet, Take 1 tablet (0 25 mg total) by mouth daily at bedtime as needed for anxiety or sleep, Disp: 30 tablet, Rfl: 0      Physical Exam:  /60 (BP Location: Left arm, Patient Position: Sitting, Cuff Size: Adult)   Pulse 72   Temp 97 9 °F (36 6 °C) (Tympanic)   Resp 18   Ht 4' 8" (1 422 m)   Wt 61 7 kg (136 lb)   SpO2 96%   BMI 30 49 kg/m²     Physical Exam   Constitutional: She is oriented to person, place, and time  She appears well-developed and well-nourished  No distress  HENT:   Head: Normocephalic and atraumatic  Nose: Nose normal    Decreased mass the of the left tonsil, still has white ulcerated mass in that region   Eyes: Pupils are equal, round, and reactive to light  Conjunctivae and EOM are normal  Right eye exhibits no discharge  Left eye exhibits no discharge  No scleral icterus  Neck: Normal range of motion  Neck supple  No JVD present  No tracheal deviation present  No thyromegaly present  Cardiovascular: Normal rate, regular rhythm and normal heart sounds  Exam reveals no friction rub  No murmur heard  Pulmonary/Chest: Effort normal and breath sounds normal  No stridor  No respiratory distress  She has no wheezes  She has no rales  She exhibits no tenderness  Abdominal: Soft  Bowel sounds are normal  She exhibits no distension and no mass  There is no hepatosplenomegaly, splenomegaly or hepatomegaly  There is no tenderness  There is no rebound and no guarding  Musculoskeletal: Normal range of motion  She exhibits no edema, tenderness or deformity  Lymphadenopathy:     She has no cervical adenopathy  Neurological: She is alert and oriented to person, place, and time  She has normal reflexes  No cranial nerve deficit  Coordination normal    Skin: Skin is warm and dry  No rash noted  She is not diaphoretic  No erythema  No pallor     Psychiatric: She has a normal mood and affect  Her behavior is normal  Judgment and thought content normal          Labs:  Lab Results   Component Value Date    WBC 11 17 (H) 11/22/2018    HGB 11 1 (L) 11/22/2018    HCT 34 6 (L) 11/22/2018    MCV 92 11/22/2018     11/22/2018     Lab Results   Component Value Date    K 4 0 11/22/2018     11/22/2018    CO2 26 11/22/2018    BUN 8 11/22/2018    CREATININE 0 56 (L) 11/22/2018    CALCIUM 8 4 11/22/2018    AST 11 11/22/2018    ALT 25 11/22/2018    ALKPHOS 81 11/22/2018    EGFR 93 11/22/2018     No results found for: TSH    Patient voiced understanding and agreement in the above discussion  Aware to contact our office with questions/symptoms in the interim

## 2018-12-10 ENCOUNTER — TRANSCRIBE ORDERS (OUTPATIENT)
Dept: ADMINISTRATIVE | Facility: HOSPITAL | Age: 73
End: 2018-12-10

## 2018-12-10 ENCOUNTER — APPOINTMENT (OUTPATIENT)
Dept: LAB | Facility: HOSPITAL | Age: 73
End: 2018-12-10
Attending: INTERNAL MEDICINE
Payer: COMMERCIAL

## 2018-12-10 DIAGNOSIS — I10 ESSENTIAL HYPERTENSION: ICD-10-CM

## 2018-12-10 LAB
ERYTHROCYTE [DISTWIDTH] IN BLOOD BY AUTOMATED COUNT: 14.1 %
HCT VFR BLD AUTO: 35.3 % (ref 36–46)
HGB BLD-MCNC: 11.5 G/DL (ref 12–16)
LG PLATELETS BLD QL SMEAR: PRESENT
LYMPHOCYTES # BLD AUTO: 18 % (ref 25–45)
LYMPHOCYTES # BLD AUTO: 2.41 THOUSAND/UL (ref 0.5–4)
MCH RBC QN AUTO: 29 PG (ref 26–34)
MCHC RBC AUTO-ENTMCNC: 32.5 G/DL (ref 31–36)
MCV RBC AUTO: 89 FL (ref 80–100)
MONOCYTES # BLD AUTO: 1.34 THOUSAND/UL (ref 0.2–0.9)
MONOCYTES NFR BLD AUTO: 10 % (ref 1–10)
NEUTS SEG # BLD: 9.65 THOUSAND/UL (ref 1.8–7.8)
NEUTS SEG NFR BLD AUTO: 72 %
PLATELET # BLD AUTO: 339 THOUSANDS/UL (ref 150–450)
PLATELET BLD QL SMEAR: ADEQUATE
PMV BLD AUTO: 8.9 FL (ref 8.9–12.7)
RBC # BLD AUTO: 3.96 MILLION/UL (ref 4–5.2)
RBC MORPH BLD: NORMAL
TOTAL CELLS COUNTED SPEC: 100
WBC # BLD AUTO: 13.4 THOUSAND/UL (ref 4.5–11)

## 2018-12-10 PROCEDURE — 36415 COLL VENOUS BLD VENIPUNCTURE: CPT | Performed by: INTERNAL MEDICINE

## 2018-12-10 PROCEDURE — 85027 COMPLETE CBC AUTOMATED: CPT | Performed by: INTERNAL MEDICINE

## 2018-12-10 PROCEDURE — 85007 BL SMEAR W/DIFF WBC COUNT: CPT | Performed by: INTERNAL MEDICINE

## 2018-12-11 ENCOUNTER — TELEPHONE (OUTPATIENT)
Dept: FAMILY MEDICINE CLINIC | Facility: CLINIC | Age: 73
End: 2018-12-11

## 2018-12-11 ENCOUNTER — APPOINTMENT (OUTPATIENT)
Dept: LAB | Facility: HOSPITAL | Age: 73
End: 2018-12-11
Attending: INTERNAL MEDICINE
Payer: COMMERCIAL

## 2018-12-11 DIAGNOSIS — K21.9 GASTROESOPHAGEAL REFLUX DISEASE, ESOPHAGITIS PRESENCE NOT SPECIFIED: Primary | ICD-10-CM

## 2018-12-11 DIAGNOSIS — C83.31 DIFFUSE LARGE B-CELL LYMPHOMA OF LYMPH NODES OF NECK (HCC): ICD-10-CM

## 2018-12-11 DIAGNOSIS — E11.9 TYPE 2 DIABETES MELLITUS WITHOUT COMPLICATION, WITHOUT LONG-TERM CURRENT USE OF INSULIN (HCC): ICD-10-CM

## 2018-12-11 DIAGNOSIS — I10 ESSENTIAL HYPERTENSION: ICD-10-CM

## 2018-12-11 LAB
ALBUMIN SERPL BCP-MCNC: 4 G/DL (ref 3–5.2)
ALP SERPL-CCNC: 98 U/L (ref 43–122)
ALT SERPL W P-5'-P-CCNC: 33 U/L (ref 9–52)
ANION GAP SERPL CALCULATED.3IONS-SCNC: 7 MMOL/L (ref 5–14)
AST SERPL W P-5'-P-CCNC: 21 U/L (ref 14–36)
BILIRUB SERPL-MCNC: 0.1 MG/DL
BUN SERPL-MCNC: 12 MG/DL (ref 5–25)
CALCIUM SERPL-MCNC: 9.4 MG/DL (ref 8.4–10.2)
CHLORIDE SERPL-SCNC: 99 MMOL/L (ref 97–108)
CO2 SERPL-SCNC: 30 MMOL/L (ref 22–30)
CREAT SERPL-MCNC: 0.66 MG/DL (ref 0.6–1.2)
GFR SERPL CREATININE-BSD FRML MDRD: 89 ML/MIN/1.73SQ M
GLUCOSE SERPL-MCNC: 180 MG/DL (ref 70–99)
LDH SERPL-CCNC: 583 U/L (ref 313–618)
MAGNESIUM SERPL-MCNC: 1.7 MG/DL (ref 1.6–2.3)
POTASSIUM SERPL-SCNC: 4.2 MMOL/L (ref 3.6–5)
PROT SERPL-MCNC: 7.1 G/DL (ref 5.9–8.4)
SODIUM SERPL-SCNC: 136 MMOL/L (ref 137–147)

## 2018-12-11 PROCEDURE — 83735 ASSAY OF MAGNESIUM: CPT

## 2018-12-11 PROCEDURE — 36415 COLL VENOUS BLD VENIPUNCTURE: CPT | Performed by: INTERNAL MEDICINE

## 2018-12-11 PROCEDURE — 80053 COMPREHEN METABOLIC PANEL: CPT | Performed by: INTERNAL MEDICINE

## 2018-12-11 PROCEDURE — 83615 LACTATE (LD) (LDH) ENZYME: CPT

## 2018-12-11 RX ORDER — AMLODIPINE BESYLATE 5 MG/1
5 TABLET ORAL DAILY
Qty: 90 TABLET | Refills: 1 | Status: SHIPPED | OUTPATIENT
Start: 2018-12-11 | End: 2019-08-01 | Stop reason: SDUPTHER

## 2018-12-11 RX ORDER — HEPARIN SODIUM (PORCINE) LOCK FLUSH IV SOLN 100 UNIT/ML 100 UNIT/ML
300 SOLUTION INTRAVENOUS AS NEEDED
Status: DISPENSED | OUTPATIENT
Start: 2018-12-12 | End: 2018-12-13

## 2018-12-11 RX ORDER — SODIUM CHLORIDE 9 MG/ML
20 INJECTION, SOLUTION INTRAVENOUS ONCE
Status: COMPLETED | OUTPATIENT
Start: 2018-12-12 | End: 2018-12-12

## 2018-12-11 RX ORDER — OMEPRAZOLE 40 MG/1
40 CAPSULE, DELAYED RELEASE ORAL DAILY
Qty: 90 CAPSULE | Refills: 1 | Status: SHIPPED | OUTPATIENT
Start: 2018-12-11 | End: 2019-02-06 | Stop reason: SDUPTHER

## 2018-12-11 RX ORDER — DOXORUBICIN HYDROCHLORIDE 2 MG/ML
80 INJECTION, SOLUTION INTRAVENOUS ONCE
Status: COMPLETED | OUTPATIENT
Start: 2018-12-12 | End: 2018-12-12

## 2018-12-11 RX ORDER — ACETAMINOPHEN 325 MG/1
650 TABLET ORAL ONCE
Status: COMPLETED | OUTPATIENT
Start: 2018-12-12 | End: 2018-12-12

## 2018-12-11 RX ORDER — CARVEDILOL 12.5 MG/1
12.5 TABLET ORAL 2 TIMES DAILY WITH MEALS
Qty: 180 TABLET | Refills: 1
Start: 2018-12-11 | End: 2019-02-07 | Stop reason: SDUPTHER

## 2018-12-11 RX ORDER — GLIPIZIDE 10 MG/1
10 TABLET ORAL
Qty: 180 TABLET | Refills: 0 | Status: SHIPPED | OUTPATIENT
Start: 2018-12-11 | End: 2019-02-07 | Stop reason: SDUPTHER

## 2018-12-11 RX ORDER — LOSARTAN POTASSIUM 25 MG/1
25 TABLET ORAL DAILY
Qty: 90 TABLET | Refills: 1 | Status: SHIPPED | OUTPATIENT
Start: 2018-12-11 | End: 2019-08-01 | Stop reason: SDUPTHER

## 2018-12-11 RX ORDER — RANITIDINE 300 MG/1
300 TABLET ORAL DAILY
Qty: 90 TABLET | Refills: 1 | Status: SHIPPED | OUTPATIENT
Start: 2018-12-11 | End: 2019-02-25

## 2018-12-11 RX ORDER — EZETIMIBE 10 MG/1
10 TABLET ORAL DAILY
Qty: 90 TABLET | Refills: 1 | Status: SHIPPED | OUTPATIENT
Start: 2018-12-11 | End: 2019-08-01 | Stop reason: SDUPTHER

## 2018-12-12 ENCOUNTER — HOSPITAL ENCOUNTER (OUTPATIENT)
Dept: INFUSION CENTER | Facility: HOSPITAL | Age: 73
Discharge: HOME | End: 2018-12-12
Payer: COMMERCIAL

## 2018-12-12 VITALS
WEIGHT: 135.36 LBS | HEIGHT: 60 IN | RESPIRATION RATE: 16 BRPM | SYSTOLIC BLOOD PRESSURE: 132 MMHG | TEMPERATURE: 98.2 F | DIASTOLIC BLOOD PRESSURE: 64 MMHG | HEART RATE: 78 BPM | BODY MASS INDEX: 26.58 KG/M2

## 2018-12-12 PROCEDURE — 96417 CHEMO IV INFUS EACH ADDL SEQ: CPT

## 2018-12-12 PROCEDURE — 96411 CHEMO IV PUSH ADDL DRUG: CPT

## 2018-12-12 PROCEDURE — 96367 TX/PROPH/DG ADDL SEQ IV INF: CPT

## 2018-12-12 PROCEDURE — 96415 CHEMO IV INFUSION ADDL HR: CPT

## 2018-12-12 PROCEDURE — 96413 CHEMO IV INFUSION 1 HR: CPT

## 2018-12-12 RX ADMIN — DIPHENHYDRAMINE HYDROCHLORIDE 50 MG: 50 INJECTION, SOLUTION INTRAMUSCULAR; INTRAVENOUS at 09:58

## 2018-12-12 RX ADMIN — Medication 300 UNITS: at 15:41

## 2018-12-12 RX ADMIN — DOXORUBICIN HYDROCHLORIDE 80 MG: 2 INJECTION, SOLUTION INTRAVENOUS at 14:49

## 2018-12-12 RX ADMIN — SODIUM CHLORIDE 20 ML/HR: 9 INJECTION, SOLUTION INTRAVENOUS at 08:35

## 2018-12-12 RX ADMIN — DEXAMETHASONE SODIUM PHOSPHATE: 10 INJECTION, SOLUTION INTRAMUSCULAR; INTRAVENOUS at 09:34

## 2018-12-12 RX ADMIN — ACETAMINOPHEN 650 MG: 325 TABLET ORAL at 09:56

## 2018-12-12 RX ADMIN — VINCRISTINE SULFATE 2 MG: 1 INJECTION, SOLUTION INTRAVENOUS at 15:05

## 2018-12-12 RX ADMIN — SODIUM CHLORIDE 150 MG: 9 INJECTION, SOLUTION INTRAVENOUS at 09:00

## 2018-12-12 RX ADMIN — CYCLOPHOSPHAMIDE 1208 MG: 1 INJECTION, POWDER, FOR SOLUTION INTRAVENOUS; ORAL at 14:11

## 2018-12-12 RX ADMIN — RITUXIMAB 600 MG: 10 INJECTION, SOLUTION INTRAVENOUS at 10:38

## 2018-12-12 NOTE — PLAN OF CARE
Problem: Potential for Falls  Goal: Patient will remain free of falls  INTERVENTIONS:  - Assess patient frequently for physical needs  -  Identify cognitive and physical deficits and behaviors that affect risk of falls    -  [place] fall precautions as indicated by assessment   - Educate patient/family on patient safety including physical limitations  - Instruct patient to call for assistance with activity based on assessment  - Modify environment to reduce risk of injury  - Consider OT/PT consult to assist with strengthening/mobility   Outcome: Progressing

## 2018-12-12 NOTE — PROGRESS NOTES
Patient here for cycle 2 of RCHOP  Confirmed patient took prednisone script and will take for the next 5 days  Had first inpatient with possible Rituxan reaction  Daughter states patient had rigors  Patient tolerated treatment well today with no complications  Ran rituxan at first dose rate today  Confirmed neulasta tomorrow 3:30pm   AVS provided

## 2018-12-13 ENCOUNTER — HOSPITAL ENCOUNTER (OUTPATIENT)
Dept: INFUSION CENTER | Facility: HOSPITAL | Age: 73
Discharge: HOME | End: 2018-12-13
Payer: COMMERCIAL

## 2018-12-13 VITALS — TEMPERATURE: 99.1 F

## 2018-12-13 PROCEDURE — 96372 THER/PROPH/DIAG INJ SC/IM: CPT

## 2018-12-13 RX ADMIN — PEGFILGRASTIM 6 MG: 6 INJECTION SUBCUTANEOUS at 15:30

## 2018-12-22 ENCOUNTER — APPOINTMENT (EMERGENCY)
Dept: RADIOLOGY | Facility: HOSPITAL | Age: 73
End: 2018-12-22
Payer: COMMERCIAL

## 2018-12-22 ENCOUNTER — HOSPITAL ENCOUNTER (EMERGENCY)
Facility: HOSPITAL | Age: 73
Discharge: HOME | End: 2018-12-22
Attending: EMERGENCY MEDICINE | Admitting: EMERGENCY MEDICINE
Payer: COMMERCIAL

## 2018-12-22 VITALS
OXYGEN SATURATION: 98 % | SYSTOLIC BLOOD PRESSURE: 127 MMHG | TEMPERATURE: 98.3 F | BODY MASS INDEX: 27.31 KG/M2 | DIASTOLIC BLOOD PRESSURE: 70 MMHG | WEIGHT: 139.11 LBS | HEART RATE: 95 BPM | RESPIRATION RATE: 20 BRPM

## 2018-12-22 DIAGNOSIS — J06.9 URI (UPPER RESPIRATORY INFECTION): Primary | ICD-10-CM

## 2018-12-22 DIAGNOSIS — C83.31 DIFFUSE LARGE B-CELL LYMPHOMA OF LYMPH NODES OF NECK (HCC): ICD-10-CM

## 2018-12-22 LAB
ALBUMIN SERPL BCP-MCNC: 4 G/DL (ref 3–5.2)
ALP SERPL-CCNC: 108 U/L (ref 43–122)
ALT SERPL W P-5'-P-CCNC: 32 U/L (ref 9–52)
ANION GAP SERPL CALCULATED.3IONS-SCNC: 8 MMOL/L (ref 5–14)
AST SERPL W P-5'-P-CCNC: 22 U/L (ref 14–36)
BACTERIA UR QL AUTO: ABNORMAL /HPF
BILIRUB SERPL-MCNC: 0.1 MG/DL
BILIRUB UR QL STRIP: NEGATIVE
BUN SERPL-MCNC: 5 MG/DL (ref 5–25)
CALCIUM SERPL-MCNC: 9.2 MG/DL (ref 8.4–10.2)
CHLORIDE SERPL-SCNC: 98 MMOL/L (ref 97–108)
CLARITY UR: CLEAR
CO2 SERPL-SCNC: 29 MMOL/L (ref 22–30)
COLOR UR: YELLOW
CREAT SERPL-MCNC: 0.81 MG/DL (ref 0.6–1.2)
EOSINOPHIL # BLD AUTO: 0.38 THOUSAND/UL (ref 0–0.4)
EOSINOPHIL NFR BLD MANUAL: 2 % (ref 0–6)
ERYTHROCYTE [DISTWIDTH] IN BLOOD BY AUTOMATED COUNT: 14.2 %
GFR SERPL CREATININE-BSD FRML MDRD: 73 ML/MIN/1.73SQ M
GLUCOSE SERPL-MCNC: 158 MG/DL (ref 70–99)
GLUCOSE UR STRIP-MCNC: ABNORMAL MG/DL
HCT VFR BLD AUTO: 32.8 % (ref 36–46)
HGB BLD-MCNC: 10.6 G/DL (ref 12–16)
HGB UR QL STRIP.AUTO: NEGATIVE
HYPERCHROMIA BLD QL SMEAR: PRESENT
KETONES UR STRIP-MCNC: NEGATIVE MG/DL
LEUKOCYTE ESTERASE UR QL STRIP: 25
LYMPHOCYTES # BLD AUTO: 1.89 THOUSAND/UL (ref 0.5–4)
LYMPHOCYTES # BLD AUTO: 10 % (ref 25–45)
MCH RBC QN AUTO: 29.2 PG (ref 26–34)
MCHC RBC AUTO-ENTMCNC: 32.4 G/DL (ref 31–36)
MCV RBC AUTO: 90 FL (ref 80–100)
METAMYELOCYTES NFR BLD MANUAL: 4 % (ref 0–1)
MONOCYTES # BLD AUTO: 0.95 THOUSAND/UL (ref 0.2–0.9)
MONOCYTES NFR BLD AUTO: 5 % (ref 1–10)
MUCOUS THREADS UR QL AUTO: ABNORMAL
MYELOCYTES NFR BLD MANUAL: 5 % (ref 0–1)
NEUTS BAND NFR BLD MANUAL: 34 % (ref 0–8)
NEUTS SEG # BLD: 13.99 THOUSAND/UL (ref 1.8–7.8)
NEUTS SEG NFR BLD AUTO: 40 %
NITRITE UR QL STRIP: NEGATIVE
NON-SQ EPI CELLS URNS QL MICRO: ABNORMAL /HPF
PH UR STRIP.AUTO: 6 [PH] (ref 4.5–8)
PLATELET # BLD AUTO: 230 THOUSANDS/UL (ref 150–450)
PLATELET BLD QL SMEAR: ADEQUATE
PMV BLD AUTO: 8.5 FL (ref 8.9–12.7)
POTASSIUM SERPL-SCNC: 3.9 MMOL/L (ref 3.6–5)
PROT SERPL-MCNC: 7.3 G/DL (ref 5.9–8.4)
PROT UR STRIP-MCNC: ABNORMAL MG/DL
RBC # BLD AUTO: 3.64 MILLION/UL (ref 4–5.2)
RBC #/AREA URNS AUTO: ABNORMAL /HPF
RBC MORPH BLD: ABNORMAL
SODIUM SERPL-SCNC: 135 MMOL/L (ref 137–147)
SP GR UR STRIP.AUTO: 1.01 (ref 1–1.04)
TOTAL CELLS COUNTED SPEC: 100
TOXIC GRANULES BLD QL SMEAR: PRESENT
UROBILINOGEN UA: NEGATIVE MG/DL
WBC # BLD AUTO: 18.9 THOUSAND/UL (ref 4.5–11)
WBC #/AREA URNS AUTO: ABNORMAL /HPF

## 2018-12-22 PROCEDURE — 93005 ELECTROCARDIOGRAM TRACING: CPT

## 2018-12-22 PROCEDURE — 85027 COMPLETE CBC AUTOMATED: CPT | Performed by: EMERGENCY MEDICINE

## 2018-12-22 PROCEDURE — 81001 URINALYSIS AUTO W/SCOPE: CPT | Performed by: EMERGENCY MEDICINE

## 2018-12-22 PROCEDURE — 80053 COMPREHEN METABOLIC PANEL: CPT | Performed by: EMERGENCY MEDICINE

## 2018-12-22 PROCEDURE — 99284 EMERGENCY DEPT VISIT MOD MDM: CPT

## 2018-12-22 PROCEDURE — 36415 COLL VENOUS BLD VENIPUNCTURE: CPT | Performed by: EMERGENCY MEDICINE

## 2018-12-22 PROCEDURE — 85007 BL SMEAR W/DIFF WBC COUNT: CPT | Performed by: EMERGENCY MEDICINE

## 2018-12-22 PROCEDURE — 71046 X-RAY EXAM CHEST 2 VIEWS: CPT

## 2018-12-22 RX ORDER — BENZONATATE 100 MG/1
100 CAPSULE ORAL ONCE
Status: COMPLETED | OUTPATIENT
Start: 2018-12-22 | End: 2018-12-22

## 2018-12-22 RX ORDER — BENZONATATE 100 MG/1
100 CAPSULE ORAL EVERY 8 HOURS
Qty: 21 CAPSULE | Refills: 0 | Status: SHIPPED | OUTPATIENT
Start: 2018-12-22 | End: 2019-02-21

## 2018-12-22 RX ORDER — IPRATROPIUM BROMIDE AND ALBUTEROL SULFATE 2.5; .5 MG/3ML; MG/3ML
3 SOLUTION RESPIRATORY (INHALATION)
Status: DISCONTINUED | OUTPATIENT
Start: 2018-12-22 | End: 2018-12-22 | Stop reason: HOSPADM

## 2018-12-22 RX ORDER — ALBUTEROL SULFATE 90 UG/1
2 AEROSOL, METERED RESPIRATORY (INHALATION) EVERY 4 HOURS PRN
Qty: 1 INHALER | Refills: 0 | Status: SHIPPED | OUTPATIENT
Start: 2018-12-22 | End: 2019-08-01 | Stop reason: SDUPTHER

## 2018-12-22 RX ADMIN — BENZONATATE 100 MG: 100 CAPSULE ORAL at 18:06

## 2018-12-22 RX ADMIN — IPRATROPIUM BROMIDE AND ALBUTEROL SULFATE 3 ML: 2.5; .5 SOLUTION RESPIRATORY (INHALATION) at 18:06

## 2018-12-22 NOTE — ED PROVIDER NOTES
History  Chief Complaint   Patient presents with    Cough     cough, SOB patient SOB walking from Triage to room      59-year-old female presents upper respiratory infection type symptoms  She has had a couple days of runny nose and now with cough and wheezing  There is no report of fevers, GI symptoms or other acute issues  She has mild chest discomfort only with coughing  Cough   Cough characteristics:  Non-productive  Severity:  Moderate  Onset quality:  Gradual  Timing:  Intermittent  Progression:  Waxing and waning  Relieved by:  Nothing  Worsened by:  Nothing  Ineffective treatments:  None tried  Associated symptoms: chest pain (With coughing), rhinorrhea, shortness of breath and wheezing    Associated symptoms: no chills, no diaphoresis, no ear fullness, no ear pain, no eye discharge, no fever, no headaches, no myalgias, no rash and no sore throat        Prior to Admission Medications   Prescriptions Last Dose Informant Patient Reported? Taking?    ALPRAZolam (XANAX) 0 25 mg tablet   No No   Sig: Take 1 tablet (0 25 mg total) by mouth daily at bedtime as needed for anxiety or sleep   HYDROcodone-acetaminophen (NORCO) 5-325 mg per tablet   No No   Sig: Take 1 tablet by mouth every 6 (six) hours as needed for pain for up to 10 doses Max Daily Amount: 4 tablets   amLODIPine (NORVASC) 5 mg tablet   No No   Sig: Take 1 tablet (5 mg total) by mouth daily   carvedilol (COREG) 12 5 mg tablet   No No   Sig: Take 1 tablet (12 5 mg total) by mouth 2 (two) times a day with meals   ergocalciferol (VITAMIN D2) 50,000 units   Yes No   Sig: Take 50,000 Units by mouth once a week    ezetimibe (ZETIA) 10 mg tablet   No No   Sig: Take 1 tablet (10 mg total) by mouth daily   fentaNYL (DURAGESIC) 25 mcg/hr  Self Yes No   Sig: Place 1 patch on the skin every third day   glipiZIDE (GLUCOTROL) 10 mg tablet   No No   Sig: Take 1 tablet (10 mg total) by mouth 2 (two) times a day before meals   hydrOXYzine HCL (ATARAX) 50 mg tablet   No No   Sig: Take 1 tablet (50 mg total) by mouth 3 (three) times a day as needed for anxiety (and insomnia)   lidocaine viscous (XYLOCAINE) 2 % mucosal solution   No No   Sig: Swish and swallow 10 mL 3 (three) times a day as needed for mild pain or moderate pain   loperamide (IMODIUM) 2 mg capsule   No No   Sig: Take 1 capsule (2 mg total) by mouth as needed for diarrhea Every 1-2 hours as needed for diarrhea   losartan (COZAAR) 25 mg tablet   No No   Sig: Take 1 tablet (25 mg total) by mouth daily   omeprazole (PriLOSEC) 40 MG capsule   No No   Sig: Take 1 capsule (40 mg total) by mouth daily   ondansetron (ZOFRAN-ODT) 8 mg disintegrating tablet   No No   Sig: Take 1 tablet (8 mg total) by mouth every 8 (eight) hours as needed for nausea or vomiting   oxyCODONE-acetaminophen (PERCOCET) 5-325 mg per tablet   No No   Sig: Take 1 tablet by mouth 2 (two) times a day Max Daily Amount: 2 tablets   ranitidine (ZANTAC) 300 MG tablet   No No   Sig: Take 1 tablet (300 mg total) by mouth daily   sitaGLIPtin-metFORMIN (JANUMET)  MG per tablet   No No   Sig: Take 1 tablet by mouth 2 (two) times a day with meals      Facility-Administered Medications: None       Past Medical History:   Diagnosis Date    Cancer ([place] [address] )     Throat    Chronic pain disorder     Diabetes mellitus ([place] [address] )     Diffuse large B cell lymphoma ([place] [address] )     Dysphagia     GERD without esophagitis     HTN (hypertension)     Hyperlipidemia     Hypertension     MI (myocardial infarction) ([place] [address] )        Past Surgical History:   Procedure Laterality Date    BONE MARROW BIOPSY      BREAST LUMPECTOMY      CHOLECYSTECTOMY      IR PORT PLACEMENT  11/16/2018    OTHER SURGICAL HISTORY      tendor tear repair to right shoulder    SHOULDER ARTHROSCOPY         Family History   Problem Relation Age of Onset    Diabetes Mother     Heart disease Sister     Hypertension Brother     Cancer Maternal Grandmother     Cancer Maternal Grandfather      I have reviewed and agree with the history as documented  Social History   Substance Use Topics    Smoking status: Never Smoker    Smokeless tobacco: Never Used    Alcohol use No        Review of Systems   Constitutional: Negative for appetite change, chills, diaphoresis, fatigue and fever  HENT: Positive for rhinorrhea  Negative for ear pain, postnasal drip, sinus pain, sore throat and trouble swallowing  Eyes: Negative for discharge, redness and itching  Respiratory: Positive for cough, shortness of breath and wheezing  Negative for chest tightness  Cardiovascular: Positive for chest pain (With coughing)  Negative for leg swelling  Gastrointestinal: Negative for abdominal pain, constipation, diarrhea, nausea and vomiting  Endocrine: Negative  Genitourinary: Negative for difficulty urinating and dysuria  Musculoskeletal: Negative for back pain and myalgias  Skin: Negative for rash  Allergic/Immunologic: Negative  Neurological: Negative for dizziness, numbness and headaches  Hematological: Negative  Psychiatric/Behavioral: Negative  Physical Exam  Physical Exam   Constitutional: She is oriented to person, place, and time  She appears well-developed and well-nourished  HENT:   Head: Normocephalic and atraumatic  Nose: Mucosal edema and rhinorrhea present  Mouth/Throat: Oropharynx is clear and moist    Eyes: Pupils are equal, round, and reactive to light  Conjunctivae and EOM are normal    Neck: Normal range of motion  Neck supple  Cardiovascular: Normal rate, regular rhythm and normal heart sounds  Pulmonary/Chest: Effort normal  No respiratory distress  She has wheezes (Diffuse)  Abdominal: Soft  Bowel sounds are normal  There is no tenderness  There is no guarding  Musculoskeletal: She exhibits no edema, tenderness or deformity  Neurological: She is alert and oriented to person, place, and time  No sensory deficit  She exhibits normal muscle tone  Coordination normal    Skin: Skin is warm and dry  Capillary refill takes less than 2 seconds  No rash noted  Psychiatric: She has a normal mood and affect  Her behavior is normal    Nursing note and vitals reviewed  Vital Signs  ED Triage Vitals [12/22/18 1723]   Temperature Pulse Respirations Blood Pressure SpO2   98 3 °F (36 8 °C) 86 (!) 24 122/63 98 %      Temp Source Heart Rate Source Patient Position - Orthostatic VS BP Location FiO2 (%)   Tympanic Monitor Sitting Left arm --      Pain Score       No Pain           Vitals:    12/22/18 1723 12/22/18 2027   BP: 122/63 127/70   Pulse: 86 95   Patient Position - Orthostatic VS: Sitting Sitting       Visual Acuity      ED Medications  Medications   ipratropium-albuterol (DUO-NEB) 0 5-2 5 mg/3 mL inhalation solution 3 mL (3 mL Nebulization Given 12/22/18 1806)   benzonatate (TESSALON PERLES) capsule 100 mg (100 mg Oral Given 12/22/18 1806)       Diagnostic Studies  Results Reviewed     Procedure Component Value Units Date/Time    Comprehensive metabolic panel [[id number]]  (Abnormal) Collected:  12/22/18 1831    Lab Status:  Final result Specimen:  Blood from Arm, Right Updated:  12/22/18 1852     Sodium 135 (L) mmol/L      Potassium 3 9 mmol/L      Chloride 98 mmol/L      CO2 29 mmol/L      ANION GAP 8 mmol/L      BUN 5 mg/dL      Creatinine 0 81 mg/dL      Glucose 158 (H) mg/dL      Calcium 9 2 mg/dL      AST 22 U/L      ALT 32 U/L      Alkaline Phosphatase 108 U/L      Total Protein 7 3 g/dL      Albumin 4 0 g/dL      Total Bilirubin 0 10 mg/dL      eGFR 73 ml/min/1 73sq m     Narrative:         National Kidney Disease Education Program recommendations are as follows:  GFR calculation is accurate only with a steady state creatinine  Chronic Kidney disease less than 60 ml/min/1 73 sq  meters  Kidney failure less than 15 ml/min/1 73 sq  meters      Urine Microscopic [[id number]]  (Abnormal) Collected:  12/22/18 1825    Lab Status:  Final result Specimen:  Urine from Urine, Clean Catch Updated:  12/22/18 1848     RBC, UA 0-1 (A) /hpf      WBC, UA 0-5 /hpf      Epithelial Cells Occasional /hpf      Bacteria, UA Occasional /hpf      MUCUS THREADS Occasional (A)    CBC and differential [[id number]]  (Abnormal) Collected:  12/22/18 1831    Lab Status:  Final result Specimen:  Blood from Arm, Right Updated:  12/22/18 1843     WBC 18 90 (H) Thousand/uL      RBC 3 64 (L) Million/uL      Hemoglobin 10 6 (L) g/dL      Hematocrit 32 8 (L) %      MCV 90 fL      MCH 29 2 pg      MCHC 32 4 g/dL      RDW 14 2 %      MPV 8 5 (L) fL      Platelets 581 Thousands/uL     UA w Reflex to Microscopic [[id number]]  (Abnormal) Collected:  12/22/18 1825    Lab Status:  Final result Specimen:  Urine from Urine, Clean Catch Updated:  12/22/18 1836     Color, UA Yellow     Clarity, UA Clear     Specific Gravity, UA 1 010     pH, UA 6 0     Leukocytes, UA 25 0 (A)     Nitrite, UA Negative     Protein, UA 15 (Trace) (A) mg/dl      Glucose,  (1/10%) (A) mg/dl      Ketones, UA Negative mg/dl      Bilirubin, UA Negative     Blood, UA Negative     UROBILINOGEN UA Negative mg/dL                  XR chest 2 views    (Results Pending)              Procedures  ECG 12 Lead Documentation  Date/Time: 12/22/2018 6:18 PM  Performed by: Maryanne Vogt  Authorized by: Maryanne Vogt     Rate:     ECG rate:  76    ECG rate assessment: normal    Rhythm:     Rhythm: sinus rhythm    Ectopy:     Ectopy: none    QRS:     QRS axis:  Normal  Conduction:     Conduction: normal    ST segments:     ST segments:  Normal  T waves:     T waves: non-specific             Phone Contacts  ED Phone Contact    ED Course                               MDM  Number of Diagnoses or Management Options  Diffuse large 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 ([facility] [address] ):   URI (upper respiratory infection):   Diagnosis management comments: 78-year-old female presents with URI symptoms    She has a history of recently being diagnosed with diffuse large B-cell lymphoma  She only recently began treatment with Neulasta  For the past several days she has had mild wheezing along with runny nose and a mild cough  On examination here, the patient appears in no acute distress and is jovial with her family members  Her symptoms improved after a nebulization treatment  Discussed workup results with the covering hematologist/oncologist Dr Alexandra Brannon  He agrees that the leukocytosis is appropriate for her stage of treatment and given that she is afebrile she is improved at this point, she is able to be discharged with outpatient follow-up  Amount and/or Complexity of Data Reviewed  Clinical lab tests: ordered and reviewed  Tests in the radiology section of CPT®: ordered and reviewed  Tests in the medicine section of CPT®: ordered and reviewed  Decide to obtain previous medical records or to obtain history from someone other than the patient: yes  Obtain history from someone other than the patient: yes  Discuss the patient with other providers: yes  Independent visualization of images, tracings, or specimens: yes      CritCare Time    Disposition  Final diagnoses:   URI (upper respiratory infection)   Diffuse large 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 ([facility] [address] )     Time reflects when diagnosis was documented in both MDM as applicable and the Disposition within this note     Time User Action Codes Description Comment    12/22/2018  8:15 PM Carol Maguire Add [J06 9] URI (upper respiratory infection)     12/22/2018  8:15 PM Carol Maguire Add [C83 31] Diffuse large 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 [facility])       ED Disposition     ED Disposition Condition Comment    Discharge  [address] discharge to home/self care      Condition at discharge: Good        Follow-up Information     Follow up With Specialties Details Why Contact Info    Tiffany Liang PA-C Family Medicine, Physician Assistant Call  [id number] [facility] [facility] [place], [facility] [address]  [phone]            Discharge Medication List as of 12/22/2018  8:16 PM      CONTINUE these medications which have NOT CHANGED    Details   ALPRAZolam (XANAX) 0 25 mg tablet Take 1 tablet (0 25 mg total) by mouth daily at bedtime as needed for anxiety or sleep, Starting Thu 12/6/2018, Normal      amLODIPine (NORVASC) 5 mg tablet Take 1 tablet (5 mg total) by mouth daily, Starting Tue 12/11/2018, Normal      carvedilol (COREG) 12 5 mg tablet Take 1 tablet (12 5 mg total) by mouth 2 (two) times a day with meals, Starting Tue 12/11/2018, No Print      ergocalciferol (VITAMIN D2) 50,000 units Take 50,000 Units by mouth once a week , Until Discontinued, Historical Med      ezetimibe (ZETIA) 10 mg tablet Take 1 tablet (10 mg total) by mouth daily, Starting Tue 12/11/2018, Normal      fentaNYL (DURAGESIC) 25 mcg/hr Place 1 patch on the skin every third day, Historical Med      glipiZIDE (GLUCOTROL) 10 mg tablet Take 1 tablet (10 mg total) by mouth 2 (two) times a day before meals, Starting Tue 12/11/2018, Normal      HYDROcodone-acetaminophen (NORCO) 5-325 mg per tablet Take 1 tablet by mouth every 6 (six) hours as needed for pain for up to 10 doses Max Daily Amount: 4 tablets, Starting Thu 10/25/2018, Print      hydrOXYzine HCL (ATARAX) 50 mg tablet Take 1 tablet (50 mg total) by mouth 3 (three) times a day as needed for anxiety (and insomnia), Starting Tue 11/27/2018, Normal      lidocaine viscous (XYLOCAINE) 2 % mucosal solution Swish and swallow 10 mL 3 (three) times a day as needed for mild pain or moderate pain, Starting Thu 10/25/2018, Print      loperamide (IMODIUM) 2 mg capsule Take 1 capsule (2 mg total) by mouth as needed for diarrhea Every 1-2 hours as needed for diarrhea, Starting Mon 11/12/2018, Normal      losartan (COZAAR) 25 mg tablet Take 1 tablet (25 mg total) by mouth daily, Starting Tue 12/11/2018, Normal      omeprazole (PriLOSEC) 40 MG capsule Take 1 capsule (40 mg total) by mouth daily, Starting Tue 12/11/2018, Normal      ondansetron (ZOFRAN-ODT) 8 mg disintegrating tablet Take 1 tablet (8 mg total) by mouth every 8 (eight) hours as needed for nausea or vomiting, Starting Tue 11/27/2018, Normal      oxyCODONE-acetaminophen (PERCOCET) 5-325 mg per tablet Take 1 tablet by mouth 2 (two) times a day Max Daily Amount: 2 tablets, Starting Wed 10/31/2018, Normal      ranitidine (ZANTAC) 300 MG tablet Take 1 tablet (300 mg total) by mouth daily, Starting Tue 12/11/2018, Normal      sitaGLIPtin-metFORMIN (JANUMET)  MG per tablet Take 1 tablet by mouth 2 (two) times a day with meals, Starting Tue 12/11/2018, Normal           No discharge procedures on file      ED Provider  Electronically Signed by           Alba Jo DO  12/22/18 [id number]

## 2018-12-23 NOTE — DISCHARGE INSTRUCTIONS
Infección respiratoria superior, cuidados ambulatorios   INFORMACIÓN GENERAL:   [place]amar infección respiratoria superior  también se conoce yoni el resfriado común  Esta puede afectar zuniga nariz, garganta, oídos y los senos frontales de la david  Los síntomas más comunes incluyen los siguientes:   · Secreción nasal o nariz tapada    · Estornudos y tos    · [place] irritada o ronquera    · Ojos enrojecidos, acuosos e irritados    · Cansancio o desasosiego    · Cirilo Limes y fiebre    · Dolor de branden, miguel corporales o músculos adoloridos  Busque cuidados inmediatos para los siguientes síntomas:   · Miguel de branden o mae rígido    · Luces brillantes que lastiman diane ojos    · Dolor de pecho o dificultad para respirar  El tratamiento para shamar infección respiratoria superior  puede llegar a incluir cualquiera de los siguientes:  · Los descongestionantes  ayudan a disminuir la congestión nasal y ayudarlo a respirar  No use aerosol descongestionante por más de unos pocos días  · Los supresores de tos  ayudan a disminuir la tos  Pregúntele a zuniga médico cuál tipo de medicamento para la tos es más adecuada para usted  Algunos supresores de tos requieren [employer], otros no  · Los antiiflamatorios no esteroideos (AINEs) ayudan a reducir inflamación y dolor o [place]  Jaimie medicamento esta disponible con o sin shamar receta médica  Los AINEs podrían causar sangrado estomacal o problemas en los riñones en [place]  Si usted esta tomando un anticoágulante, siempre  pregunte a zuniga proveedor de mane si los AINEs son seguros para usted  Antes de [employer], [place] siempre la etiqueta de información y siga diane indicaciones  Cuide de zuniga infección respiratoria superior:   · Descanse  hasta que no tenga fiebre o se siente mejor  · [place] líquidos según indicaciones para prevenir la deshidratación  [place] 8 a 10 tazas de líquidos al día  Los líquidos buenos incluyen el agua, ginger ale, té o jugos de frutas  · Reuben gárgaras  con agua tibia para que foster garganta irritada se sienta mejor  Reuben agua salada añadiendo 1/4 de cucharadita de sal a 1 taza de agua tibia  Usted puede también chupar dulces duros o pastillas para la garganta  · Las gotas nasales de salina  ayudan a aliviar foster congestión y pueden comprarse sin shamar receta  · [place] un baño o ducha tibia  para ayudarle a disminuir los miguel y [place] a respirar mejor  · Use un humidificador de devin frío  para aumentar la humedad en el aire y hacer el respirar más fácil  No use un humidificador de devin tibio  Prevenga el contagio de gérmenes:   · Evite a otras personas por los primeros 2 a 3 días de foster resfriado  Los gérmenes se propagan fácilmente kyung [place]  · No comparta alimentos, bebidas,  toallas o artículos personas con [employer]  · Lávese las kade con frecuencia  Use agua y [place]  [employer] las kade después de usar el baño, cambiar el pañal de un philip o estornudar  Lávese las kade antes de preparar o consumir alimentos  Cúbrase la boca y Alicia [place] and Delfino con shamar toallita desechable cuando estornuda o tose  Programe shamar jan con foster proveedor de [employer] se le haya indicado: Anote diane preguntas para que se acuerde de hacerlas kyung diane visitas  ACUERDOS SOBRE FOSTER CUIDADO:   Usted tiene el derecho de participar en la planificación de foster cuidado  Aprenda todo lo que pueda sobre foster condición y yoni darle tratamiento  Discuta con diane médicos diane opciones de tratamiento para juntos decidir el cuidado que usted quiere recibir  Usted siempre tiene el derecho a rechazar foster tratamiento  Esta información es sólo para uso en educación  Foster intención no es darle un consejo médico sobre enfermedades o tratamientos  Colsulte con foster Pema Esther farmacéutico antes de seguir cualquier régimen médico para saber si es seguro y efectivo para usted    © 2014 [address] is for End User's use only and may not be sold, redistributed or otherwise used for commercial purposes  All illustrations and images included in CareNotes® are the copyrighted property of A D A M , Inc  or Rocael Cowart  Síndrome viral   CUIDADO AMBULATORIO:   Síndrome viral  es un término que se [place] para los síntomas generales de shamar infección viral que no tiene shamar causa aparente  Los virus son propagados fácilmente de [place] Renee persona a otra a través del aire y [place] los objetos que se comparten  Los síntomas más comunes incluyen los siguientes:   · Alfie y escalofríos    · Congestión o goteo nasal     · Tos, dolor de garganta y voz ronca     · Dolor de [place], o dolor y presión alrededor de brit ojos     · Dolor muscular y de las articulaciones     · Falta de aliento o sibilancia     · Dolor abdominal, calambres y diarrea     · [place], vómitos o pérdida del apetito  Llame al 911 en hay de presentar lo siguiente:   · Usted sufre shamar convulsión  · No es posible despertarlo  · Usted tiene dolor torácico y dificultad para respirar  Busque atención médica de inmediato si:   · Usted tiene rigidez en el mae, dolor de branden intenso y sensibilidad a la jesusita  · Usted se siente débil, mareado o confundido  · Usted yaniv de orinar u orina menos de lo normal      · Usted tose [place] o shamar mucosidad espesa amarilla o ammon  · Usted tiene dolor abdominal severo o zuniga abdomen está más vida de lo habitual   Pregúntele a zuniga médico qué vitaminas y minerales son adecuados para usted  · Brit síntomas no mejoran con el tratamiento o empeoran después de 3 días  · Usted tiene salpullido o dolor de oído  · Usted siente ardor al Antione Argueta  · Usted tiene preguntas o inquietudes acerca de zuniga condición o cuidado  El tratamiento para el síndrome viral  podría incluir medicamentos para controlar brit síntomas  Es posible que usted necesite alguno de los siguientes:  · El acetaminofén  jeanmarie el dolor y baja la fiebre   Está disponible sin receta médica  Pregunte cuánto medicamento debe joshua y con qué frecuencia  [id number]  El acetaminofén puede causar daño en el hígado cuando no se perez de forma correcta  · AINEs (Analgésicos antiinflamatorios no esteroides) yoni el ibuprofeno, ayudan a disminuir la inflamación, el dolor y la [place]  Los AINEs pueden causar sangrado estomacal o problemas renales en ciertas personas  Si usted perez un medicamento anticoagulante, siempre pregúntele a zuniga médico si los KATELYN son seguros para usted  Siempre ge[place] la etiqueta de chaim medicamento y [place] instrucciones  · El medicamento para el resfriado  ayuda a disminuir la inflamación, a controlar la tos y a aliviar la congestión del pecho o nasal      · El rociador nasal de agua salina  ayuda a disminuir la congestión nasal      · [place] diane medicamentos yoni se le haya indicado  Consulte con zuniga médico si usted amelia que zuniga medicamento no le está ayudando o si presenta efectos secundarios  Infórmele si es alérgico a algún medicamento  Mantenga shamar lista actualizada de los [place], las vitaminas y los productos herbales que perez  Incluya los siguientes datos de los medicamentos: cantidad, frecuencia y motivo de administración  Traiga con usted la lista o los envases de la píldoras a diane citas de seguimiento  Lleve la lista de los medicamentos con usted en hay de shamar emergencia  El manejo de zuniga síntomas:   · Consuma líquidos según le indicaron  para evitar la deshidratación  Pregunte cuánto líquido debe joshua cada día y cuáles líquidos son los más adecuados para usted  Pregunte si usted debería joshua shamar solución de rehidratación oral (SRO)  [address] cantidades exactas de agua, sal y azúcar que usted necesita para reemplazar los líquidos corporales  [place] podría ayudarlo a evitar la deshidratación a causa del vómito y de la diarrea  No tome líquidos con cafeína  Los líquidos con cafeína pueden empeorar la deshidratación       · Descanse lo suficiente  para ayudar a que wu cuerpo sane  [place] siestas kyung el día  Pregunte a wu médico cuándo puede regresar a wu trabajo y a diane actividades cotidianas  · Use un humidificador de devin frío  para ayudarlo a respirar más fácilmente si usted tiene congestión nasal o del pecho  Pregunte a wu médico cómo usar un humidificador de vapor frío  · Coma miel o use caramelos para la tos  para ayudar a disminuir la molestia de la garganta  Pregunte a wu médico cuánta miel debería comer cada día  Los caramelos para la tos están disponibles sin receta médica  Siga las indicaciones para joshua los caramelos para la tos  · No fume y permanezca lejos de otras personas que fuman  La nicotina y otras sustancias químicas que contienen los cigarrillos y cigarros pueden dañar los pulmones  El fumar también puede retrasar la sanación  Pida información a wu médico si usted actualmente fuma y necesita ayuda para dejar de fumar  Los cigarrillos electrónicos o tabaco sin humo todavía contienen nicotina  Consulte con wu médico antes de [employer]  · Lávese las kade frecuentemente  para evitar la propagación de gérmenes a otras personas  Utilice agua y [place]  Use gel antibacterial cuando no tenga jabón ni agua disponibles  [employer] las kade después de usar el baño, toser o estornudar  Lávese las kade antes de comer o preparar alimentos  Acuda a diane consultas de control con wu médico según le indicaron  Anote diane preguntas para que se acuerde de hacerlas kyung diane visitas  © 2017 [address] Information is for End User's use only and may not be sold, redistributed or otherwise used for commercial purposes  All illustrations and images included in CareNotes® are the copyrighted property of A D A M , Inc  or Rocael Supa  Esta información es sólo para uso en educación  Wu intención no es darle un consejo médico sobre enfermedades o tratamientos   Colsulte con Clif SANTIZO' , enfermera o farmacéutico antes de seguir cualquier régimen médico para saber si es seguro y efectivo para usted

## 2018-12-24 LAB
ATRIAL RATE: 76 BPM
P AXIS: 70 DEGREES
PR INTERVAL: 116 MS
QRS AXIS: 30 DEGREES
QRSD INTERVAL: 80 MS
QT INTERVAL: 376 MS
QTC INTERVAL: 423 MS
T WAVE AXIS: 34 DEGREES
VENTRICULAR RATE: 76 BPM

## 2018-12-24 PROCEDURE — 93010 ELECTROCARDIOGRAM REPORT: CPT | Performed by: INTERNAL MEDICINE

## 2018-12-25 LAB
ATRIAL RATE: 74 BPM
P AXIS: 63 DEGREES
PR INTERVAL: 114 MS
QRS AXIS: 30 DEGREES
QRSD INTERVAL: 84 MS
QT INTERVAL: 378 MS
QTC INTERVAL: 419 MS
T WAVE AXIS: 31 DEGREES
VENTRICULAR RATE: 74 BPM

## 2018-12-25 PROCEDURE — 93010 ELECTROCARDIOGRAM REPORT: CPT | Performed by: INTERNAL MEDICINE

## 2018-12-31 RX ORDER — HEPARIN SODIUM (PORCINE) LOCK FLUSH IV SOLN 100 UNIT/ML 100 UNIT/ML
300 SOLUTION INTRAVENOUS AS NEEDED
Status: DISPENSED | OUTPATIENT
Start: 2019-01-02 | End: 2019-01-03

## 2018-12-31 RX ORDER — ACETAMINOPHEN 325 MG/1
650 TABLET ORAL ONCE
Status: COMPLETED | OUTPATIENT
Start: 2019-01-02 | End: 2019-01-02

## 2018-12-31 RX ORDER — SODIUM CHLORIDE 9 MG/ML
20 INJECTION, SOLUTION INTRAVENOUS ONCE
Status: COMPLETED | OUTPATIENT
Start: 2019-01-02 | End: 2019-01-02

## 2018-12-31 RX ORDER — DOXORUBICIN HYDROCHLORIDE 2 MG/ML
79 INJECTION, SOLUTION INTRAVENOUS ONCE
Status: COMPLETED | OUTPATIENT
Start: 2019-01-02 | End: 2019-01-02

## 2019-01-02 ENCOUNTER — HOSPITAL ENCOUNTER (OUTPATIENT)
Dept: INFUSION CENTER | Facility: HOSPITAL | Age: 74
Discharge: HOME | End: 2019-01-02
Payer: COMMERCIAL

## 2019-01-02 ENCOUNTER — OFFICE VISIT (OUTPATIENT)
Dept: HEMATOLOGY ONCOLOGY | Facility: CLINIC | Age: 74
End: 2019-01-02
Payer: COMMERCIAL

## 2019-01-02 VITALS
BODY MASS INDEX: 27.62 KG/M2 | RESPIRATION RATE: 18 BRPM | DIASTOLIC BLOOD PRESSURE: 62 MMHG | TEMPERATURE: 98.4 F | HEIGHT: 59 IN | SYSTOLIC BLOOD PRESSURE: 114 MMHG | OXYGEN SATURATION: 96 % | WEIGHT: 137 LBS | HEART RATE: 80 BPM

## 2019-01-02 VITALS
BODY MASS INDEX: 27.6 KG/M2 | DIASTOLIC BLOOD PRESSURE: 62 MMHG | SYSTOLIC BLOOD PRESSURE: 114 MMHG | TEMPERATURE: 98.4 F | HEART RATE: 80 BPM | WEIGHT: 136.91 LBS | HEIGHT: 59 IN | RESPIRATION RATE: 16 BRPM

## 2019-01-02 DIAGNOSIS — Z51.11 ENCOUNTER FOR ANTINEOPLASTIC CHEMOTHERAPY: ICD-10-CM

## 2019-01-02 DIAGNOSIS — C83.31 DIFFUSE LARGE B-CELL LYMPHOMA OF LYMPH NODES OF NECK (HCC): ICD-10-CM

## 2019-01-02 LAB
ALBUMIN SERPL BCP-MCNC: 3.8 G/DL (ref 3–5.2)
ALP SERPL-CCNC: 169 U/L (ref 43–122)
ALT SERPL W P-5'-P-CCNC: 38 U/L (ref 9–52)
ANION GAP SERPL CALCULATED.3IONS-SCNC: 9 MMOL/L (ref 5–14)
AST SERPL W P-5'-P-CCNC: 17 U/L (ref 14–36)
BASOPHILS # BLD AUTO: 0.1 THOUSANDS/ΜL (ref 0–0.1)
BASOPHILS NFR BLD AUTO: 1 % (ref 0–1)
BILIRUB SERPL-MCNC: 0.1 MG/DL
BUN SERPL-MCNC: 17 MG/DL (ref 5–25)
CALCIUM SERPL-MCNC: 9.3 MG/DL (ref 8.4–10.2)
CHLORIDE SERPL-SCNC: 101 MMOL/L (ref 97–108)
CO2 SERPL-SCNC: 24 MMOL/L (ref 22–30)
CREAT SERPL-MCNC: 0.59 MG/DL (ref 0.6–1.2)
EOSINOPHIL # BLD AUTO: 0.1 THOUSAND/ΜL (ref 0–0.4)
EOSINOPHIL NFR BLD AUTO: 1 % (ref 0–6)
ERYTHROCYTE [DISTWIDTH] IN BLOOD BY AUTOMATED COUNT: 16.2 %
GFR SERPL CREATININE-BSD FRML MDRD: 92 ML/MIN/1.73SQ M
GLUCOSE SERPL-MCNC: 397 MG/DL (ref 70–99)
HCT VFR BLD AUTO: 34.2 % (ref 36–46)
HGB BLD-MCNC: 11.1 G/DL (ref 12–16)
LDH SERPL-CCNC: 390 U/L (ref 313–618)
LYMPHOCYTES # BLD AUTO: 0.7 THOUSANDS/ΜL (ref 0.5–4)
LYMPHOCYTES NFR BLD AUTO: 7 % (ref 25–45)
MCH RBC QN AUTO: 29.7 PG (ref 26–34)
MCHC RBC AUTO-ENTMCNC: 32.4 G/DL (ref 31–36)
MCV RBC AUTO: 92 FL (ref 80–100)
MONOCYTES # BLD AUTO: 0.5 THOUSAND/ΜL (ref 0.2–0.9)
MONOCYTES NFR BLD AUTO: 5 % (ref 1–10)
NEUTROPHILS # BLD AUTO: 8.6 THOUSANDS/ΜL (ref 1.8–7.8)
NEUTS SEG NFR BLD AUTO: 86 % (ref 45–65)
PLATELET # BLD AUTO: 532 THOUSANDS/UL (ref 150–450)
PMV BLD AUTO: 7.7 FL (ref 8.9–12.7)
POTASSIUM SERPL-SCNC: 4.6 MMOL/L (ref 3.6–5)
PROT SERPL-MCNC: 6.9 G/DL (ref 5.9–8.4)
RBC # BLD AUTO: 3.72 MILLION/UL (ref 4–5.2)
SODIUM SERPL-SCNC: 134 MMOL/L (ref 137–147)
WBC # BLD AUTO: 10.1 THOUSAND/UL (ref 4.5–11)

## 2019-01-02 PROCEDURE — 96367 TX/PROPH/DG ADDL SEQ IV INF: CPT

## 2019-01-02 PROCEDURE — 83615 LACTATE (LD) (LDH) ENZYME: CPT | Performed by: INTERNAL MEDICINE

## 2019-01-02 PROCEDURE — 96411 CHEMO IV PUSH ADDL DRUG: CPT

## 2019-01-02 PROCEDURE — 96415 CHEMO IV INFUSION ADDL HR: CPT

## 2019-01-02 PROCEDURE — 80053 COMPREHEN METABOLIC PANEL: CPT | Performed by: INTERNAL MEDICINE

## 2019-01-02 PROCEDURE — 99214 OFFICE O/P EST MOD 30 MIN: CPT | Performed by: INTERNAL MEDICINE

## 2019-01-02 PROCEDURE — 96413 CHEMO IV INFUSION 1 HR: CPT

## 2019-01-02 PROCEDURE — 96417 CHEMO IV INFUS EACH ADDL SEQ: CPT

## 2019-01-02 PROCEDURE — 85025 COMPLETE CBC W/AUTO DIFF WBC: CPT | Performed by: INTERNAL MEDICINE

## 2019-01-02 RX ORDER — LEVOFLOXACIN 500 MG/1
500 TABLET, FILM COATED ORAL EVERY 24 HOURS
Qty: 7 TABLET | Refills: 0 | Status: SHIPPED | OUTPATIENT
Start: 2019-01-02 | End: 2019-01-09

## 2019-01-02 RX ORDER — ONDANSETRON 8 MG/1
8 TABLET, ORALLY DISINTEGRATING ORAL EVERY 8 HOURS PRN
Qty: 90 TABLET | Refills: 0 | Status: SHIPPED | OUTPATIENT
Start: 2019-01-02 | End: 2021-01-15

## 2019-01-02 RX ORDER — PREDNISONE 20 MG/1
20 TABLET ORAL DAILY
COMMUNITY
End: 2019-03-18 | Stop reason: ALTCHOICE

## 2019-01-02 RX ADMIN — DIPHENHYDRAMINE HYDROCHLORIDE 50 MG: 50 INJECTION, SOLUTION INTRAMUSCULAR; INTRAVENOUS at 11:35

## 2019-01-02 RX ADMIN — RITUXIMAB 592 MG: 10 INJECTION, SOLUTION INTRAVENOUS at 12:04

## 2019-01-02 RX ADMIN — SODIUM CHLORIDE 20 ML/HR: 9 INJECTION, SOLUTION INTRAVENOUS at 09:30

## 2019-01-02 RX ADMIN — DOXORUBICIN HYDROCHLORIDE 79 MG: 2 INJECTION, SOLUTION INTRAVENOUS at 14:29

## 2019-01-02 RX ADMIN — DEXAMETHASONE SODIUM PHOSPHATE: 10 INJECTION, SOLUTION INTRAMUSCULAR; INTRAVENOUS at 11:13

## 2019-01-02 RX ADMIN — ACETAMINOPHEN 650 MG: 325 TABLET ORAL at 11:34

## 2019-01-02 RX ADMIN — SODIUM CHLORIDE 150 MG: 9 INJECTION, SOLUTION INTRAVENOUS at 10:43

## 2019-01-02 RX ADMIN — CYCLOPHOSPHAMIDE 1185 MG: 1 INJECTION, POWDER, FOR SOLUTION INTRAVENOUS; ORAL at 15:16

## 2019-01-02 RX ADMIN — VINCRISTINE SULFATE 2 MG: 1 INJECTION, SOLUTION INTRAVENOUS at 14:44

## 2019-01-02 NOTE — PROGRESS NOTES
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)  The patient will be started on cycle 3 of R-CHOP as scheduled today with the support of Bryan  We will put her prophylactically on Levaquin 500 mg once a day for a week since she did complain about some cough and possible upper respiratory airway infection  We will also obtain blood work today prior to her planned treatment  She will get a PET-CT scan prior to her next visit to evaluate the response of the current chemotherapy  - ondansetron (ZOFRAN-ODT) 8 mg disintegrating tablet; Take 1 tablet (8 mg total) by mouth every 8 (eight) hours as needed for nausea or vomiting  Dispense: 90 tablet; Refill: 0  - levofloxacin (LEVAQUIN) 500 mg tablet; Take 1 tablet (500 mg total) by mouth every 24 hours for 7 days  Dispense: 7 tablet; Refill: 0  - CBC and differential; Future  - Comprehensive metabolic panel; Future  - LD,Blood; Future  - Magnesium  - NM PET CT skull base to mid thigh; Future    2  Encounter for antineoplastic chemotherapy  She was asked to use Zofran as needed for her nausea and get in touch with her primary care physician regarding her insomnia  - ondansetron (ZOFRAN-ODT) 8 mg disintegrating tablet; Take 1 tablet (8 mg total) by mouth every 8 (eight) hours as needed for nausea or vomiting  Dispense: 90 tablet; Refill: 0        HPI:  The patient came today for a follow-up visit  She tolerated cycle 2 of R-CHOP relatively well, she is due for cycle 3 today  She was seen in the emergency room on the 22nd of December for upper respiratory airway infection  She did have a chest x-ray which was normal   The patient feels much better now  She denied high temperature or productive cough she does have dry cough with shortness of breath on exertion    Oncology History    The patient complained about significant sore throat in May which was treated with antibiotics by her PCP in [facility] which did not improve her sore throat  She then started to notice significant odynophagia and dysphagia for liquids it is and solid nutrition  Eventually, she had a CT scan of the neck on the 20th of September which showed soft tissue lesion in the left palatine tonsil with abnormal lymph nodes bilaterally in the neck compatible with neoplastic process  She then had a biopsy of the left tonsil/left tonsillectomy on the 25th of September 2018 which was compatible with diffuse large B-cell lymphoma germinal center B phenotype  The immunohistochemical staining came back positive for BCL2, BCL 6 and myc with Ki 67 around 70%  No FISH rearrangements gene study was done for BCL2, BCL 6 are myc translocation  The patient was treated with 1 cycle of R-EPOCH since her airway was compromise with the large tonsillar mass and a biopsy which was reviewed by our hematopathologist on the 15 November compatible with 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 according to the [facility] with pending gene rearrangement studies  During the hospital course the patient had another biopsy of the left tonsil to better understand the exact aggressiveness of her large B-cell lymphoma  The biopsy on the 20th of November showed large B-cell lymphoma with BCL -2 and C myc level expressed surface a type without the myc or BCL gene rearrangement  Her bone marrow biopsy on the 9th of November was negative for B-cell lymphoma involvement with normal bone marrow trilineage maturation  She was also evaluated with an MRI of the brain during the hospital course which was negative for any hint of lymphoma involvement  PET scan on the 14th of November showed IMPRESSION:     1  FDG avid left posterior oropharyngeal lesion compatible with malignancy  2   FDG avid lymph nodes in the neck and left axilla compatible with malignancy  3    No FDG avid lymph nodes in the abdomen or pelvis suspicious for malignancy  4   Tiny focus of FDG uptake along the skin of the right upper quadrant anterior abdominal wall with mild skin thickening suggested here on CT        Diffuse large 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 ([address] )    11/9/2018 Initial Diagnosis     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)        Chemotherapy     1  Dose adjusted R-EPOCH 11/21/18 (1 cycle)- switched to RCHOP after repeat biopsy/pathology reviewed  2  R-CHOP started 12/12/18            Interval history:    ROS: Review of Systems   Constitutional: Positive for appetite change and fatigue  Negative for activity change, chills, diaphoresis, fever and unexpected weight change  Hot flashes   HENT: Positive for mouth sores and trouble swallowing  Negative for congestion, dental problem, ear discharge, ear pain, facial swelling, hearing loss, nosebleeds, postnasal drip, sore throat and tinnitus  Eyes: Negative for discharge, redness, itching and visual disturbance  Respiratory: Positive for cough and shortness of breath  Negative for chest tightness and wheezing  Cardiovascular: Negative for chest pain, palpitations and leg swelling  Gastrointestinal: Positive for constipation  Negative for abdominal distention, abdominal pain, anal bleeding, blood in stool, diarrhea, nausea and vomiting  Genitourinary: Negative for difficulty urinating, dysuria, flank pain, frequency, hematuria and urgency  Musculoskeletal: Negative for arthralgias, back pain, gait problem, joint swelling, myalgias and neck pain  Skin: Negative for color change, pallor, rash and wound  Neurological: Positive for headaches  Negative for dizziness, syncope, speech difficulty, weakness, light-headedness and numbness  Hematological: Negative for adenopathy  Does not bruise/bleed easily  Psychiatric/Behavioral: Positive for sleep disturbance   Negative for agitation, behavioral problems and confusion  Past Medical History:   Diagnosis Date    Cancer [facility])     Throat    Chronic pain disorder     Diabetes mellitus ([facility] )     Diffuse large B cell lymphoma ([facility] )     Dysphagia     GERD without esophagitis     HTN (hypertension)     Hyperlipidemia     Hypertension     MI (myocardial infarction) ([facility] )        Past Surgical History:   Procedure Laterality Date    BONE MARROW BIOPSY      BREAST LUMPECTOMY      CHOLECYSTECTOMY      IR PORT PLACEMENT  11/16/2018    OTHER SURGICAL HISTORY      tendor tear repair to right shoulder    SHOULDER ARTHROSCOPY         Social History     Social History    Marital status:       Spouse name: N/A    Number of children: N/A    Years of education: N/A     Social History Main Topics    Smoking status: Never Smoker    Smokeless tobacco: Never Used    Alcohol use No    Drug use: No    Sexual activity: Not Asked     Other Topics Concern    None     Social History Narrative    None       Family History   Problem Relation Age of Onset    Diabetes Mother     Heart disease Sister     Hypertension Brother     Cancer Maternal Grandmother     Cancer Maternal Grandfather        No Known Allergies      Current Outpatient Prescriptions:     albuterol (PROVENTIL HFA,VENTOLIN HFA) 90 mcg/act inhaler, Inhale 2 puffs every 4 (four) hours as needed for wheezing, Disp: 1 Inhaler, Rfl: 0    ALPRAZolam (XANAX) 0 25 mg tablet, Take 1 tablet (0 25 mg total) by mouth daily at bedtime as needed for anxiety or sleep, Disp: 30 tablet, Rfl: 0    amLODIPine (NORVASC) 5 mg tablet, Take 1 tablet (5 mg total) by mouth daily, Disp: 90 tablet, Rfl: 1    benzonatate (TESSALON PERLES) 100 mg capsule, Take 1 capsule (100 mg total) by mouth every 8 (eight) hours, Disp: 21 capsule, Rfl: 0    carvedilol (COREG) 12 5 mg tablet, Take 1 tablet (12 5 mg total) by mouth 2 (two) times a day with meals, Disp: 180 tablet, Rfl: 1    ergocalciferol (VITAMIN D2) 50,000 units, Take 50,000 Units by mouth once a week , Disp: , Rfl:     ezetimibe (ZETIA) 10 mg tablet, Take 1 tablet (10 mg total) by mouth daily, Disp: 90 tablet, Rfl: 1    fentaNYL (DURAGESIC) 25 mcg/hr, Place 1 patch on the skin every third day, Disp: , Rfl:     glipiZIDE (GLUCOTROL) 10 mg tablet, Take 1 tablet (10 mg total) by mouth 2 (two) times a day before meals, Disp: 180 tablet, Rfl: 0    HYDROcodone-acetaminophen (NORCO) 5-325 mg per tablet, Take 1 tablet by mouth every 6 (six) hours as needed for pain for up to 10 doses Max Daily Amount: 4 tablets, Disp: 10 tablet, Rfl: 0    hydrOXYzine HCL (ATARAX) 50 mg tablet, Take 1 tablet (50 mg total) by mouth 3 (three) times a day as needed for anxiety (and insomnia), Disp: 90 tablet, Rfl: 2    lidocaine viscous (XYLOCAINE) 2 % mucosal solution, Swish and swallow 10 mL 3 (three) times a day as needed for mild pain or moderate pain, Disp: 100 mL, Rfl: 2    loperamide (IMODIUM) 2 mg capsule, Take 1 capsule (2 mg total) by mouth as needed for diarrhea Every 1-2 hours as needed for diarrhea, Disp: 60 capsule, Rfl: 1    losartan (COZAAR) 25 mg tablet, Take 1 tablet (25 mg total) by mouth daily, Disp: 90 tablet, Rfl: 1    omeprazole (PriLOSEC) 40 MG capsule, Take 1 capsule (40 mg total) by mouth daily, Disp: 90 capsule, Rfl: 1    ondansetron (ZOFRAN-ODT) 8 mg disintegrating tablet, Take 1 tablet (8 mg total) by mouth every 8 (eight) hours as needed for nausea or vomiting, Disp: 90 tablet, Rfl: 0    oxyCODONE-acetaminophen (PERCOCET) 5-325 mg per tablet, Take 1 tablet by mouth 2 (two) times a day Max Daily Amount: 2 tablets, Disp: 60 tablet, Rfl: 0    predniSONE 20 mg tablet, Take 20 mg by mouth daily Take 5 tablets by mouth every day for 5 days every 3 weeks with chemotherapy, Disp: , Rfl:     ranitidine (ZANTAC) 300 MG tablet, Take 1 tablet (300 mg total) by mouth daily, Disp: 90 tablet, Rfl: 1    sitaGLIPtin-metFORMIN (JANUMET)  MG per tablet, Take 1 tablet by mouth 2 (two) times a day with meals, Disp: 90 tablet, Rfl: 0    levofloxacin (LEVAQUIN) 500 mg tablet, Take 1 tablet (500 mg total) by mouth every 24 hours for 7 days, Disp: 7 tablet, Rfl: 0  No current facility-administered medications for this visit  Facility-Administered Medications Ordered in Other Visits:     acetaminophen (TYLENOL) tablet 650 mg, 650 mg, Oral, Once, Jc Clayton MD    cyclophosphamide (CYTOXAN) 1,185 mg in sodium chloride 0 9 % 250 mL IVPB, 1,185 mg, Intravenous, Once, Jc Clayton MD    diphenhydrAMINE (BENADRYL) 50 mg in sodium chloride 0 9 % 50 mL IVPB, 50 mg, Intravenous, Once, Jc Clayton MD    DOXOrubicin (ADRIAMYCIN) injection 79 mg, 79 mg, Intravenous, Once, Jc Clayton MD    fosaprepitant (EMEND) 150 mg in sodium chloride 0 9 % 250 mL IVPB, 150 mg, Intravenous, Once, Jc Clayton MD    heparin flush (porcine) 100 units/mL injection 300 Units, 300 Units, Intracatheter, PRN, Jc Clayton MD    ondansetron (ZOFRAN) 16 mg, dexamethasone (DECADRON) 10 mg in sodium chloride 0 9 % 50 mL IVPB, , Intravenous, Once, Jc Clayton MD    riTUXimab (RITUXAN) 592 mg in sodium chloride 0 9 % 236 8 mL subsequent titrated chemo infusion, 592 mg, Intravenous, Once, Jc Clayton MD    sodium chloride 0 9 % infusion, 20 mL/hr, Intravenous, Once, Jc Clayton MD    vinCRIStine (ONCOVIN) 2 mg in sodium chloride 0 9 % 50 mL chemo infusion, 2 mg, Intravenous, Once, Jc Clayton MD      Physical Exam:  /62 (BP Location: Left arm, Patient Position: Sitting, Cuff Size: Adult)   Pulse 80   Temp 98 4 °F (36 9 °C) (Tympanic)   Resp 18   Ht 4' 11" (1 499 m)   Wt 62 1 kg (137 lb)   SpO2 96%   BMI 27 67 kg/m²     Physical Exam   Constitutional: She is oriented to person, place, and time  She appears well-developed and well-nourished  No distress  HENT:   Head: Normocephalic and atraumatic     Nose: Nose normal    Mouth/Throat: Oropharynx is clear and moist    Eyes: Pupils are equal, round, and reactive to light  Conjunctivae and EOM are normal  Right eye exhibits no discharge  Left eye exhibits no discharge  No scleral icterus  Neck: Normal range of motion  Neck supple  No JVD present  No tracheal deviation present  No thyromegaly present  Cardiovascular: Normal rate, regular rhythm and normal heart sounds  Exam reveals no friction rub  No murmur heard  Pulmonary/Chest: Effort normal and breath sounds normal  No stridor  No respiratory distress  She has no wheezes  She has no rales  She exhibits no tenderness  Abdominal: Soft  Bowel sounds are normal  She exhibits no distension and no mass  There is no hepatosplenomegaly, splenomegaly or hepatomegaly  There is no tenderness  There is no rebound and no guarding  Musculoskeletal: Normal range of motion  She exhibits no edema, tenderness or deformity  Lymphadenopathy:     She has no cervical adenopathy  Neurological: She is alert and oriented to person, place, and time  She has normal reflexes  No cranial nerve deficit  Coordination normal    Skin: Skin is warm and dry  No rash noted  She is not diaphoretic  No erythema  No pallor  Psychiatric: She has a normal mood and affect  Her behavior is normal  Judgment and thought content normal          Labs:  Lab Results   Component Value Date    WBC 10 10 01/02/2019    HGB 11 1 (L) 01/02/2019    HCT 34 2 (L) 01/02/2019    MCV 92 01/02/2019     (H) 01/02/2019     Lab Results   Component Value Date    K 4 6 01/02/2019     01/02/2019    CO2 24 01/02/2019    BUN 17 01/02/2019    CREATININE 0 59 (L) 01/02/2019    CALCIUM 9 3 01/02/2019    AST 17 01/02/2019    ALT 38 01/02/2019    ALKPHOS 169 (H) 01/02/2019    EGFR 92 01/02/2019     No results found for: TSH    Patient voiced understanding and agreement in the above discussion  Aware to contact our office with questions/symptoms in the interim

## 2019-01-02 NOTE — PLAN OF CARE
Problem: Potential for Falls  Goal: Patient will remain free of falls  INTERVENTIONS:  - Assess patient frequently for physical needs  -  Identify cognitive and physical deficits and behaviors that affect risk of falls    -  [place] fall precautions as indicated by assessment   - Educate patient/family on patient safety including physical limitations  - Instruct patient to call for assistance with activity based on assessment  - Modify environment to reduce risk of injury  - Consider OT/PT consult to assist with strengthening/mobility   Outcome: Progressing

## 2019-01-02 NOTE — PROGRESS NOTES
Pt here for cycle 3 of chemo  Tolerated well without complications   Patient aware to return tomorrow at 3:30pm for Neulasta

## 2019-01-03 ENCOUNTER — HOSPITAL ENCOUNTER (OUTPATIENT)
Dept: INFUSION CENTER | Facility: HOSPITAL | Age: 74
Discharge: HOME | End: 2019-01-03
Payer: COMMERCIAL

## 2019-01-03 VITALS — TEMPERATURE: 97.1 F

## 2019-01-03 PROCEDURE — 96372 THER/PROPH/DIAG INJ SC/IM: CPT

## 2019-01-03 RX ADMIN — PEGFILGRASTIM 6 MG: 6 INJECTION SUBCUTANEOUS at 15:58

## 2019-01-03 NOTE — PLAN OF CARE
Problem: Potential for Falls  Goal: Patient will remain free of falls  INTERVENTIONS:  - Assess patient frequently for physical needs  -  Identify cognitive and physical deficits and behaviors that affect risk of falls    -  [place] fall precautions as indicated by assessment   - Educate patient/family on patient safety including physical limitations  - Instruct patient to call for assistance with activity based on assessment  - Modify environment to reduce risk of injury  - Consider OT/PT consult to assist with strengthening/mobility   Outcome: Progressing

## 2019-01-03 NOTE — PROGRESS NOTES
Pt here for neulasta  Offers no complaints today from treatment yesterday  Tolerated neulasta 6mg to left upper arm SQ without complications  Confirmed next appts

## 2019-01-15 ENCOUNTER — TELEPHONE (OUTPATIENT)
Dept: FAMILY MEDICINE CLINIC | Facility: CLINIC | Age: 74
End: 2019-01-15

## 2019-01-15 DIAGNOSIS — C85.91 LYMPHOMA OF LYMPH NODES OF NECK, UNSPECIFIED LYMPHOMA TYPE (HCC): ICD-10-CM

## 2019-01-16 ENCOUNTER — TELEPHONE (OUTPATIENT)
Dept: FAMILY MEDICINE CLINIC | Facility: CLINIC | Age: 74
End: 2019-01-16

## 2019-01-16 DIAGNOSIS — G89.3 CHRONIC PAIN DUE TO NEOPLASM: ICD-10-CM

## 2019-01-16 DIAGNOSIS — C83.31 DIFFUSE LARGE B-CELL LYMPHOMA OF LYMPH NODES OF NECK (HCC): Primary | ICD-10-CM

## 2019-01-16 RX ORDER — OXYCODONE HYDROCHLORIDE AND ACETAMINOPHEN 5; 325 MG/1; MG/1
1 TABLET ORAL 2 TIMES DAILY
Qty: 60 TABLET | Refills: 0 | Status: SHIPPED | OUTPATIENT
Start: 2019-01-16 | End: 2019-01-28

## 2019-01-17 NOTE — TELEPHONE ENCOUNTER
Handed form to you  Does have appointment with palliative care at the end of the month which they may be prescribing the med in the future  In the meantime, may have to have her come in an do a UDS and contract

## 2019-01-21 ENCOUNTER — HOSPITAL ENCOUNTER (OUTPATIENT)
Dept: NUCLEAR MEDICINE | Facility: HOSPITAL | Age: 74
Discharge: HOME | End: 2019-01-21
Attending: INTERNAL MEDICINE
Payer: COMMERCIAL

## 2019-01-21 ENCOUNTER — CLINICAL SUPPORT (OUTPATIENT)
Dept: FAMILY MEDICINE CLINIC | Facility: CLINIC | Age: 74
End: 2019-01-21

## 2019-01-21 ENCOUNTER — TRANSCRIBE ORDERS (OUTPATIENT)
Dept: ADMINISTRATIVE | Facility: HOSPITAL | Age: 74
End: 2019-01-21

## 2019-01-21 ENCOUNTER — APPOINTMENT (OUTPATIENT)
Dept: LAB | Facility: HOSPITAL | Age: 74
End: 2019-01-21
Attending: INTERNAL MEDICINE
Payer: COMMERCIAL

## 2019-01-21 DIAGNOSIS — F11.90 CHRONIC NARCOTIC USE: Primary | ICD-10-CM

## 2019-01-21 DIAGNOSIS — C83.31 DIFFUSE LARGE B-CELL LYMPHOMA OF LYMPH NODES OF NECK (HCC): ICD-10-CM

## 2019-01-21 LAB
ALBUMIN SERPL BCP-MCNC: 3.7 G/DL (ref 3–5.2)
ALP SERPL-CCNC: 86 U/L (ref 43–122)
ALT SERPL W P-5'-P-CCNC: 36 U/L (ref 9–52)
ANION GAP SERPL CALCULATED.3IONS-SCNC: 5 MMOL/L (ref 5–14)
AST SERPL W P-5'-P-CCNC: 17 U/L (ref 14–36)
BILIRUB SERPL-MCNC: 0.3 MG/DL
BUN SERPL-MCNC: 15 MG/DL (ref 5–25)
CALCIUM SERPL-MCNC: 9.4 MG/DL (ref 8.4–10.2)
CHLORIDE SERPL-SCNC: 104 MMOL/L (ref 97–108)
CO2 SERPL-SCNC: 30 MMOL/L (ref 22–30)
CREAT SERPL-MCNC: 0.61 MG/DL (ref 0.6–1.2)
ERYTHROCYTE [DISTWIDTH] IN BLOOD BY AUTOMATED COUNT: 19.1 %
GFR SERPL CREATININE-BSD FRML MDRD: 91 ML/MIN/1.73SQ M
GLUCOSE P FAST SERPL-MCNC: 93 MG/DL (ref 70–99)
GLUCOSE SERPL-MCNC: 82 MG/DL (ref 70–99)
HCT VFR BLD AUTO: 33.8 % (ref 36–46)
HGB BLD-MCNC: 11.2 G/DL (ref 12–16)
HYPERCHROMIA BLD QL SMEAR: PRESENT
LDH SERPL-CCNC: 459 U/L (ref 313–618)
LYMPHOCYTES # BLD AUTO: 1.55 THOUSAND/UL (ref 0.5–4)
LYMPHOCYTES # BLD AUTO: 15 % (ref 25–45)
MAGNESIUM SERPL-MCNC: 1.7 MG/DL (ref 1.6–2.3)
MCH RBC QN AUTO: 31 PG (ref 26–34)
MCHC RBC AUTO-ENTMCNC: 33.2 G/DL (ref 31–36)
MCV RBC AUTO: 94 FL (ref 80–100)
MONOCYTES # BLD AUTO: 1.03 THOUSAND/UL (ref 0.2–0.9)
MONOCYTES NFR BLD AUTO: 10 % (ref 1–10)
NEUTS BAND NFR BLD MANUAL: 12 % (ref 0–8)
NEUTS SEG # BLD: 7.73 THOUSAND/UL (ref 1.8–7.8)
NEUTS SEG NFR BLD AUTO: 63 %
PLATELET # BLD AUTO: 335 THOUSANDS/UL (ref 150–450)
PLATELET BLD QL SMEAR: ADEQUATE
PMV BLD AUTO: 8 FL (ref 8.9–12.7)
POTASSIUM SERPL-SCNC: 4 MMOL/L (ref 3.6–5)
PROT SERPL-MCNC: 6.7 G/DL (ref 5.9–8.4)
RBC # BLD AUTO: 3.62 MILLION/UL (ref 4–5.2)
RBC MORPH BLD: ABNORMAL
SODIUM SERPL-SCNC: 139 MMOL/L (ref 137–147)
TOTAL CELLS COUNTED SPEC: 100
WBC # BLD AUTO: 10.3 THOUSAND/UL (ref 4.5–11)

## 2019-01-21 PROCEDURE — 36415 COLL VENOUS BLD VENIPUNCTURE: CPT

## 2019-01-21 PROCEDURE — 80053 COMPREHEN METABOLIC PANEL: CPT

## 2019-01-21 PROCEDURE — 82948 REAGENT STRIP/BLOOD GLUCOSE: CPT

## 2019-01-21 PROCEDURE — 85007 BL SMEAR W/DIFF WBC COUNT: CPT

## 2019-01-21 PROCEDURE — 85027 COMPLETE CBC AUTOMATED: CPT

## 2019-01-21 PROCEDURE — 83615 LACTATE (LD) (LDH) ENZYME: CPT | Performed by: INTERNAL MEDICINE

## 2019-01-21 PROCEDURE — 80307 DRUG TEST PRSMV CHEM ANLYZR: CPT | Performed by: PHYSICIAN ASSISTANT

## 2019-01-21 PROCEDURE — 78815 PET IMAGE W/CT SKULL-THIGH: CPT

## 2019-01-21 PROCEDURE — A9552 F18 FDG: HCPCS

## 2019-01-21 PROCEDURE — 83735 ASSAY OF MAGNESIUM: CPT | Performed by: INTERNAL MEDICINE

## 2019-01-21 NOTE — PROGRESS NOTES
Pt is here for UDS and sign narcotic contract  Pt states last time she took oxycodone was January 10th 2019 [facility]

## 2019-01-22 LAB
AMPHETAMINES UR QL SCN: NEGATIVE NG/ML
BARBITURATES UR QL SCN: NEGATIVE NG/ML
BENZODIAZ UR QL SCN: NEGATIVE NG/ML
BZE UR QL: NEGATIVE NG/ML
CANNABINOIDS UR QL SCN: NEGATIVE NG/ML
METHADONE UR QL SCN: NEGATIVE NG/ML
OPIATES UR QL: NEGATIVE NG/ML
OXYCODONE+OXYMORPHONE UR QL SCN: NEGATIVE NG/ML
PCP UR QL: NEGATIVE NG/ML
PROPOXYPH UR QL: NEGATIVE NG/ML

## 2019-01-22 RX ORDER — DOXORUBICIN HYDROCHLORIDE 2 MG/ML
80 INJECTION, SOLUTION INTRAVENOUS ONCE
Status: COMPLETED | OUTPATIENT
Start: 2019-01-23 | End: 2019-01-23

## 2019-01-22 RX ORDER — ACETAMINOPHEN 325 MG/1
650 TABLET ORAL ONCE
Status: COMPLETED | OUTPATIENT
Start: 2019-01-23 | End: 2019-01-23

## 2019-01-22 RX ORDER — SODIUM CHLORIDE 9 MG/ML
20 INJECTION, SOLUTION INTRAVENOUS ONCE
Status: COMPLETED | OUTPATIENT
Start: 2019-01-23 | End: 2019-01-23

## 2019-01-22 RX ORDER — PREDNISONE 20 MG/1
100 TABLET ORAL ONCE
Status: DISCONTINUED | OUTPATIENT
Start: 2019-01-23 | End: 2019-01-27 | Stop reason: HOSPADM

## 2019-01-23 ENCOUNTER — OFFICE VISIT (OUTPATIENT)
Dept: HEMATOLOGY ONCOLOGY | Facility: CLINIC | Age: 74
End: 2019-01-23
Payer: COMMERCIAL

## 2019-01-23 ENCOUNTER — HOSPITAL ENCOUNTER (OUTPATIENT)
Dept: INFUSION CENTER | Facility: HOSPITAL | Age: 74
Discharge: HOME | End: 2019-01-23
Payer: COMMERCIAL

## 2019-01-23 VITALS
BODY MASS INDEX: 27.26 KG/M2 | DIASTOLIC BLOOD PRESSURE: 72 MMHG | WEIGHT: 135.2 LBS | TEMPERATURE: 98.6 F | HEART RATE: 73 BPM | SYSTOLIC BLOOD PRESSURE: 120 MMHG | RESPIRATION RATE: 18 BRPM | OXYGEN SATURATION: 97 % | HEIGHT: 59 IN

## 2019-01-23 VITALS
WEIGHT: 135.14 LBS | HEART RATE: 69 BPM | DIASTOLIC BLOOD PRESSURE: 65 MMHG | HEIGHT: 59 IN | BODY MASS INDEX: 27.24 KG/M2 | TEMPERATURE: 99.2 F | RESPIRATION RATE: 16 BRPM | SYSTOLIC BLOOD PRESSURE: 135 MMHG

## 2019-01-23 DIAGNOSIS — C83.31 DIFFUSE LARGE B-CELL LYMPHOMA OF LYMPH NODES OF NECK (HCC): Primary | ICD-10-CM

## 2019-01-23 PROCEDURE — 96367 TX/PROPH/DG ADDL SEQ IV INF: CPT

## 2019-01-23 PROCEDURE — 99214 OFFICE O/P EST MOD 30 MIN: CPT | Performed by: INTERNAL MEDICINE

## 2019-01-23 PROCEDURE — 96415 CHEMO IV INFUSION ADDL HR: CPT

## 2019-01-23 PROCEDURE — 96375 TX/PRO/DX INJ NEW DRUG ADDON: CPT

## 2019-01-23 PROCEDURE — 96417 CHEMO IV INFUS EACH ADDL SEQ: CPT

## 2019-01-23 PROCEDURE — 96411 CHEMO IV PUSH ADDL DRUG: CPT

## 2019-01-23 PROCEDURE — 96413 CHEMO IV INFUSION 1 HR: CPT

## 2019-01-23 RX ORDER — SODIUM CHLORIDE 9 MG/ML
20 INJECTION, SOLUTION INTRAVENOUS ONCE
Status: DISCONTINUED | OUTPATIENT
Start: 2019-01-24 | End: 2019-01-28 | Stop reason: HOSPADM

## 2019-01-23 RX ORDER — LEVOFLOXACIN 500 MG/1
500 TABLET, FILM COATED ORAL EVERY 24 HOURS
Qty: 7 TABLET | Refills: 0 | Status: SHIPPED | OUTPATIENT
Start: 2019-01-23 | End: 2019-01-30

## 2019-01-23 RX ADMIN — ACETAMINOPHEN 650 MG: 325 TABLET ORAL at 09:55

## 2019-01-23 RX ADMIN — CYCLOPHOSPHAMIDE 1192 MG: 1 INJECTION, POWDER, FOR SOLUTION INTRAVENOUS; ORAL at 13:21

## 2019-01-23 RX ADMIN — DIPHENHYDRAMINE HYDROCHLORIDE 50 MG: 50 INJECTION, SOLUTION INTRAMUSCULAR; INTRAVENOUS at 09:56

## 2019-01-23 RX ADMIN — DOXORUBICIN HYDROCHLORIDE 80 MG: 2 INJECTION, SOLUTION INTRAVENOUS at 13:58

## 2019-01-23 RX ADMIN — VINCRISTINE SULFATE 2 MG: 1 INJECTION, SOLUTION INTRAVENOUS at 14:12

## 2019-01-23 RX ADMIN — SODIUM CHLORIDE 20 ML/HR: 0.9 INJECTION, SOLUTION INTRAVENOUS at 09:50

## 2019-01-23 RX ADMIN — RITUXIMAB 596 MG: 10 INJECTION, SOLUTION INTRAVENOUS at 11:04

## 2019-01-23 RX ADMIN — DEXAMETHASONE SODIUM PHOSPHATE: 10 INJECTION, SOLUTION INTRAMUSCULAR; INTRAVENOUS at 10:10

## 2019-01-23 RX ADMIN — SODIUM CHLORIDE 150 MG: 9 INJECTION, SOLUTION INTRAVENOUS at 10:27

## 2019-01-23 NOTE — PLAN OF CARE
Problem: Potential for Falls  Goal: Patient will remain free of falls  INTERVENTIONS:  - Assess patient frequently for physical needs  -  Identify cognitive and physical deficits and behaviors that affect risk of falls    -  [place] fall precautions as indicated by assessment   - Educate patient/family on patient safety including physical limitations  - Instruct patient to call for assistance with activity based on assessment  - Modify environment to reduce risk of injury  - Consider OT/PT consult to assist with strengthening/mobility   Outcome: Progressing

## 2019-01-23 NOTE — PROGRESS NOTES
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)  The patient was educated about the PET-CT scan findings which showed very good response with still minimal activity in the oropharyngeal area  She seems to be fit for cycle 4 of chemotherapy with R-CHOP which will be given today pending her blood work  She will restarted again on prophylactic course of antibiotic with Levaquin for 1 week since she does have some inflammatory changes in both lower lobes of the lungs  She will be seen again in about 10 or her 14 days from now for delfina visit  - CBC and differential; Future  - Comprehensive metabolic panel; Future  - LD,Blood; Future  - levofloxacin (LEVAQUIN) 500 mg tablet; Take 1 tablet (500 mg total) by mouth every 24 hours for 7 days  Dispense: 7 tablet; Refill: 0        HPI:  The patient came in today for a follow-up visit  She received so far 1 cycle of R-EPOCH and 2 cycles of R-CHOP for her diffuse large B-cell lymphoma which she tolerated relatively well  She then had a PET-CT scan on the 21st of January which showed  IMPRESSION:  1  Significantly decreased size and hypermetabolism of left posterior oropharyngeal mass, compatible with response to therapy  Minimal FDG activity may be physiologic, although continued follow-up is recommended to exclude residual viable tumor  Findings likely correspond to a Deauville score of at most 3   2   Essentially resolved hypermetabolic cervical and left axillary metastatic adenopathy  3   No new hypermetabolic metastases  4   Hypermetabolic patchy infiltrates in the left lower lung and right upper lung are likely inflammatory/infectious  Continued follow-up recommended to ensure resolution    Consider CT follow-up in 3 months    Patient complained about some productive cough without high temperature  She was treated recently with 1 week course of Levaquin which improved her coughing  Oncology History    The patient complained about significant sore throat in May which was treated with antibiotics by her PCP in [facility] which did not improve her sore throat  She then started to notice significant odynophagia and dysphagia for liquids it is and solid nutrition  Eventually, she had a CT scan of the neck on the 20th of September which showed soft tissue lesion in the left palatine tonsil with abnormal lymph nodes bilaterally in the neck compatible with neoplastic process  She then had a biopsy of the left tonsil/left tonsillectomy on the 25th of September 2018 which was compatible with diffuse large B-cell lymphoma germinal center B phenotype  The immunohistochemical staining came back positive for BCL2, BCL 6 and myc with Ki 67 around 70%  No FISH rearrangements gene study was done for BCL2, BCL 6 are myc translocation  The patient was treated with 1 cycle of R-EPOCH since her airway was compromise with the large tonsillar mass and a biopsy which was reviewed by our hematopathologist on the 15 November compatible with 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 with pending gene rearrangement studies  During the hospital course the patient had another biopsy of the left tonsil to better understand the exact aggressiveness of her large B-cell lymphoma  The biopsy on the 20th of November showed large B-cell lymphoma with BCL -2 and C myc level expressed surface a type without the myc or BCL gene rearrangement  Her bone marrow biopsy on the 9th of November was negative for B-cell lymphoma involvement with normal bone marrow trilineage maturation  She was also evaluated with an MRI of the brain during the hospital course which was negative for any hint of lymphoma involvement  PET scan on the 14th of November showed IMPRESSION:     1    FDG avid left posterior oropharyngeal lesion compatible with malignancy  2   FDG avid lymph nodes in the neck and left axilla compatible with malignancy  3  No FDG avid lymph nodes in the abdomen or pelvis suspicious for malignancy  4   Tiny focus of FDG uptake along the skin of the right upper quadrant anterior abdominal wall with mild skin thickening suggested here on CT        Diffuse large 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 ([address] )    11/9/2018 Initial Diagnosis     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)        Chemotherapy     1  Dose adjusted R-EPOCH 11/21/18 (1 cycle)- switched to RCHOP after repeat biopsy/pathology reviewed  2  R-CHOP started 12/12/18            Interval history:    ROS: Review of Systems   Constitutional: Positive for appetite change  Negative for activity change, chills, diaphoresis, fatigue, fever and unexpected weight change  HENT: Negative for congestion, dental problem, ear discharge, ear pain, facial swelling, hearing loss, mouth sores, nosebleeds, postnasal drip, sore throat, tinnitus and trouble swallowing  Eyes: Negative for discharge, redness, itching and visual disturbance  Respiratory: Positive for cough and shortness of breath  Negative for chest tightness and wheezing  Cardiovascular: Negative for chest pain, palpitations and leg swelling  Gastrointestinal: Positive for constipation and nausea  Negative for abdominal distention, abdominal pain, anal bleeding, blood in stool, diarrhea and vomiting  Genitourinary: Positive for dysuria  Negative for difficulty urinating, flank pain, frequency, hematuria and urgency  Musculoskeletal: Negative for arthralgias, back pain, gait problem, joint swelling, myalgias and neck pain  Skin: Positive for rash  Negative for color change, pallor and wound  Neurological: Positive for dizziness, numbness and headaches  Negative for syncope, speech difficulty, weakness and light-headedness  Hematological: Negative for adenopathy   Does not bruise/bleed easily  Psychiatric/Behavioral: Positive for sleep disturbance  Negative for agitation, behavioral problems and confusion  Past Medical History:   Diagnosis Date    Cancer [facility])     Throat    Chronic pain disorder     Diabetes mellitus ([place] [address] )     Diffuse large B cell lymphoma ([place] [address] )     Dysphagia     GERD without esophagitis     HTN (hypertension)     Hyperlipidemia     Hypertension     MI (myocardial infarction) ([place] [address] )        Past Surgical History:   Procedure Laterality Date    BONE MARROW BIOPSY      BREAST LUMPECTOMY      CHOLECYSTECTOMY      IR PORT PLACEMENT  11/16/2018    OTHER SURGICAL HISTORY      tendor tear repair to right shoulder    SHOULDER ARTHROSCOPY         Social History     Social History    Marital status:       Spouse name: N/A    Number of children: N/A    Years of education: N/A     Social History Main Topics    Smoking status: Never Smoker    Smokeless tobacco: Never Used    Alcohol use No    Drug use: No    Sexual activity: Not Asked     Other Topics Concern    None     Social History Narrative    None       Family History   Problem Relation Age of Onset    Diabetes Mother     Heart disease Sister     Hypertension Brother     Cancer Maternal Grandmother     Cancer Maternal Grandfather        No Known Allergies      Current Outpatient Prescriptions:     albuterol (PROVENTIL HFA,VENTOLIN HFA) 90 mcg/act inhaler, Inhale 2 puffs every 4 (four) hours as needed for wheezing, Disp: 1 Inhaler, Rfl: 0    ALPRAZolam (XANAX) 0 25 mg tablet, Take 1 tablet (0 25 mg total) by mouth daily at bedtime as needed for anxiety or sleep, Disp: 30 tablet, Rfl: 0    amLODIPine (NORVASC) 5 mg tablet, Take 1 tablet (5 mg total) by mouth daily, Disp: 90 tablet, Rfl: 1    benzonatate (TESSALON PERLES) 100 mg capsule, Take 1 capsule (100 mg total) by mouth every 8 (eight) hours, Disp: 21 capsule, Rfl: 0    carvedilol (COREG) 12 5 mg tablet, Take 1 tablet (12 5 mg total) by mouth 2 (two) times a day with meals, Disp: 180 tablet, Rfl: 1    ergocalciferol (VITAMIN D2) 50,000 units, Take 50,000 Units by mouth once a week , Disp: , Rfl:     ezetimibe (ZETIA) 10 mg tablet, Take 1 tablet (10 mg total) by mouth daily, Disp: 90 tablet, Rfl: 1    fentaNYL (DURAGESIC) 25 mcg/hr, Place 1 patch on the skin every third day, Disp: , Rfl:     glipiZIDE (GLUCOTROL) 10 mg tablet, Take 1 tablet (10 mg total) by mouth 2 (two) times a day before meals, Disp: 180 tablet, Rfl: 0    HYDROcodone-acetaminophen (NORCO) 5-325 mg per tablet, Take 1 tablet by mouth every 6 (six) hours as needed for pain for up to 10 doses Max Daily Amount: 4 tablets, Disp: 10 tablet, Rfl: 0    hydrOXYzine HCL (ATARAX) 50 mg tablet, Take 1 tablet (50 mg total) by mouth 3 (three) times a day as needed for anxiety (and insomnia), Disp: 90 tablet, Rfl: 2    lidocaine viscous (XYLOCAINE) 2 % mucosal solution, Swish and swallow 10 mL 3 (three) times a day as needed for mild pain or moderate pain, Disp: 100 mL, Rfl: 2    loperamide (IMODIUM) 2 mg capsule, Take 1 capsule (2 mg total) by mouth as needed for diarrhea Every 1-2 hours as needed for diarrhea, Disp: 60 capsule, Rfl: 1    losartan (COZAAR) 25 mg tablet, Take 1 tablet (25 mg total) by mouth daily, Disp: 90 tablet, Rfl: 1    omeprazole (PriLOSEC) 40 MG capsule, Take 1 capsule (40 mg total) by mouth daily, Disp: 90 capsule, Rfl: 1    ondansetron (ZOFRAN-ODT) 8 mg disintegrating tablet, Take 1 tablet (8 mg total) by mouth every 8 (eight) hours as needed for nausea or vomiting, Disp: 90 tablet, Rfl: 0    oxyCODONE-acetaminophen (PERCOCET) 5-325 mg per tablet, Take 1 tablet by mouth 2 (two) times a day Max Daily Amount: 2 tablets, Disp: 60 tablet, Rfl: 0    predniSONE 20 mg tablet, Take 20 mg by mouth daily Take 5 tablets by mouth every day for 5 days every 3 weeks with chemotherapy, Disp: , Rfl:     ranitidine (ZANTAC) 300 MG tablet, Take 1 tablet (300 mg total) by mouth daily, Disp: 90 tablet, Rfl: 1    sitaGLIPtin-metFORMIN (JANUMET)  MG per tablet, Take 1 tablet by mouth 2 (two) times a day with meals, Disp: 90 tablet, Rfl: 0    levofloxacin (LEVAQUIN) 500 mg tablet, Take 1 tablet (500 mg total) by mouth every 24 hours for 7 days, Disp: 7 tablet, Rfl: 0  No current facility-administered medications for this visit  Facility-Administered Medications Ordered in Other Visits:     cyclophosphamide (CYTOXAN) 1,192 mg in sodium chloride 0 9 % 250 mL IVPB, 1,192 mg, Intravenous, Once, Sidney Cotton MD    diphenhydrAMINE (BENADRYL) 50 mg in sodium chloride 0 9 % 50 mL IVPB, 50 mg, Intravenous, Once, Sidney Cotton MD, Last Rate: 150 mL/hr at 01/23/19 0956, 50 mg at 01/23/19 0956    DOXOrubicin (ADRIAMYCIN) injection 80 mg, 80 mg, Intravenous, Once, Sidney Cotton MD    fosaprepitant (EMEND) 150 mg in sodium chloride 0 9 % 250 mL IVPB, 150 mg, Intravenous, Once, Sidney Cotton MD    ondansetron (ZOFRAN) 16 mg, dexamethasone (DECADRON) 10 mg in sodium chloride 0 9 % 50 mL IVPB, , Intravenous, Once, Sidney Cotton MD    predniSONE tablet 100 mg, 100 mg, Oral, Once, Sidney Cotton MD    riTUXimab (RITUXAN) 596 mg in sodium chloride 0 9 % 238 4 mL subsequent titrated chemo infusion, 596 mg, Intravenous, Once, Sidney Cotton MD    sodium chloride 0 9 % infusion, 20 mL/hr, Intravenous, Once, Sidney Cotton MD    vinCRIStine (ONCOVIN) 2 mg in sodium chloride 0 9 % 50 mL chemo infusion, 2 mg, Intravenous, Once, Sidney Cotton MD      Physical Exam:  /72 (BP Location: Left arm, Cuff Size: Standard)   Pulse 73   Temp 98 6 °F (37 °C) (Tympanic)   Resp 18   Ht 4' 11 02" (1 499 m)   Wt 61 3 kg (135 lb 3 2 oz)   SpO2 97%   BMI 27 29 kg/m²     Physical Exam   Constitutional: She is oriented to person, place, and time  She appears well-developed and well-nourished  No distress  HENT:   Head: Normocephalic and atraumatic     Nose: Nose normal    Mouth/Throat: Oropharynx is clear and moist    Eyes: Pupils are equal, round, and reactive to light  Conjunctivae and EOM are normal  Right eye exhibits no discharge  Left eye exhibits no discharge  No scleral icterus  Neck: Normal range of motion  Neck supple  No JVD present  No tracheal deviation present  No thyromegaly present  Cardiovascular: Normal rate, regular rhythm and normal heart sounds  Exam reveals no friction rub  No murmur heard  Pulmonary/Chest: Effort normal and breath sounds normal  No stridor  No respiratory distress  She has no wheezes  She has no rales  She exhibits no tenderness  Abdominal: Soft  Bowel sounds are normal  She exhibits no distension and no mass  There is no hepatosplenomegaly, splenomegaly or hepatomegaly  There is no tenderness  There is no rebound and no guarding  Musculoskeletal: Normal range of motion  She exhibits no edema, tenderness or deformity  Lymphadenopathy:     She has no cervical adenopathy  Neurological: She is alert and oriented to person, place, and time  She has normal reflexes  No cranial nerve deficit  Coordination normal    Skin: Skin is warm and dry  No rash noted  She is not diaphoretic  No erythema  No pallor  Psychiatric: She has a normal mood and affect  Her behavior is normal  Judgment and thought content normal          Labs:  Lab Results   Component Value Date    WBC 10 30 01/21/2019    HGB 11 2 (L) 01/21/2019    HCT 33 8 (L) 01/21/2019    MCV 94 01/21/2019     01/21/2019     Lab Results   Component Value Date    K 4 0 01/21/2019     01/21/2019    CO2 30 01/21/2019    BUN 15 01/21/2019    CREATININE 0 61 01/21/2019    GLUF 93 01/21/2019    CALCIUM 9 4 01/21/2019    AST 17 01/21/2019    ALT 36 01/21/2019    ALKPHOS 86 01/21/2019    EGFR 91 01/21/2019     No results found for: TSH    Patient voiced understanding and agreement in the above discussion  Aware to contact our office with questions/symptoms in the interim

## 2019-01-24 ENCOUNTER — HOSPITAL ENCOUNTER (OUTPATIENT)
Dept: INFUSION CENTER | Facility: HOSPITAL | Age: 74
Discharge: HOME | End: 2019-01-24
Payer: COMMERCIAL

## 2019-01-24 PROCEDURE — 96372 THER/PROPH/DIAG INJ SC/IM: CPT

## 2019-01-24 RX ADMIN — PEGFILGRASTIM 6 MG: 6 INJECTION SUBCUTANEOUS at 13:13

## 2019-01-24 NOTE — PROGRESS NOTES
Pt admitted to infusion dept for neulasta  Tolerated same well  Temp 99 0 with hat on  97 8 after removing warm hat  Discharged ambulatory with dtr

## 2019-01-28 ENCOUNTER — SOCIAL WORK (OUTPATIENT)
Dept: PALLIATIVE MEDICINE | Facility: CLINIC | Age: 74
End: 2019-01-28
Payer: COMMERCIAL

## 2019-01-28 ENCOUNTER — OFFICE VISIT (OUTPATIENT)
Dept: PALLIATIVE MEDICINE | Facility: CLINIC | Age: 74
End: 2019-01-28
Payer: COMMERCIAL

## 2019-01-28 VITALS
BODY MASS INDEX: 27.34 KG/M2 | DIASTOLIC BLOOD PRESSURE: 60 MMHG | SYSTOLIC BLOOD PRESSURE: 125 MMHG | HEIGHT: 59 IN | RESPIRATION RATE: 18 BRPM | WEIGHT: 135.6 LBS | HEART RATE: 83 BPM | OXYGEN SATURATION: 94 % | TEMPERATURE: 97.5 F

## 2019-01-28 DIAGNOSIS — G47.01 INSOMNIA DUE TO MEDICAL CONDITION: ICD-10-CM

## 2019-01-28 DIAGNOSIS — G89.3 CHRONIC PAIN DUE TO NEOPLASM: ICD-10-CM

## 2019-01-28 DIAGNOSIS — E43 SEVERE PROTEIN-CALORIE MALNUTRITION (HCC): ICD-10-CM

## 2019-01-28 DIAGNOSIS — Z71.89 COORDINATION OF COMPLEX CARE: Primary | ICD-10-CM

## 2019-01-28 DIAGNOSIS — G89.3 NEOPLASM RELATED PAIN: Primary | ICD-10-CM

## 2019-01-28 DIAGNOSIS — R26.2 AMBULATORY DYSFUNCTION: ICD-10-CM

## 2019-01-28 DIAGNOSIS — Z51.5 PALLIATIVE CARE PATIENT: ICD-10-CM

## 2019-01-28 DIAGNOSIS — F41.8 ANXIETY ABOUT HEALTH: ICD-10-CM

## 2019-01-28 DIAGNOSIS — C83.31 DIFFUSE LARGE B-CELL LYMPHOMA OF LYMPH NODES OF NECK (HCC): ICD-10-CM

## 2019-01-28 PROCEDURE — 99205 OFFICE O/P NEW HI 60 MIN: CPT | Performed by: INTERNAL MEDICINE

## 2019-01-28 RX ORDER — GABAPENTIN 100 MG/1
100 CAPSULE ORAL
Qty: 30 CAPSULE | Refills: 1 | Status: SHIPPED | OUTPATIENT
Start: 2019-01-28 | End: 2019-03-22 | Stop reason: SDUPTHER

## 2019-01-28 NOTE — PROGRESS NOTES
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)    3  Chronic pain due to neoplasm    4  Severe protein-calorie malnutrition ([address] )    5  Anxiety about health    6  Palliative care patient    7  Insomnia due to medical condition      - Gabapentin started at 100 mg PO HS for pain, insomnia  - D/C opioids  - Nausea - continue Zofran, could consider alternate agent if not getting any relief  - Constipation - continue use of Miralax, encouraged to use daily until regular  - Claritin daily x3 surrounding Neulasta shots   - Meals on Wheel referral  - Skin Hygiene - counseled on this and encouraged use of lotion twice daily and avoid hot water  - Boost supplements (vanilla flavored)- will check to see if diabetic formulation available  - Will order a walker for ambulatory dysfunction and increasing falls  Also, PT evaluation for evaluation and treatment  - Romansh version of 5 Wishes provided for review and will work on at next appointment  - RTC in 1 month    Requested Prescriptions     Signed Prescriptions Disp Refills    gabapentin (NEURONTIN) 100 mg capsule 30 capsule 1     Sig: Take 1 capsule (100 mg total) by mouth daily at bedtime     Medications Discontinued During This Encounter   Medication Reason    HYDROcodone-acetaminophen (NORCO) 5-325 mg per tablet     fentaNYL (DURAGESIC) 25 mcg/hr     oxyCODONE-acetaminophen (PERCOCET) 5-325 mg per tablet        Representatives have queried the patient's controlled substance dispensing history in the Prescription Drug Monitoring Program in compliance with regulations before I have prescribed any controlled substances  The prescription history is consistent with prescribed therapy and our practice policies        55+ minutes were spent face to face with Octavio Santos and her family with greater than 50% of the time spent in counseling or coordination of care including discussions of diagnostic results, impression, and recommendations, risks and benefits of treatment, instructions for disease self management, treatment instructions, follow up requirements, risk factors and risk reduction of disease, patient and family counseling/involvement in care and compliance with treatment regimen   All of the patient's questions were answered during this discussion  No Follow-up on file  Subjective:   Chief Complaint  New consultation for:  symptom management, goal of care assessment and decisional support, disease process education and discussion of prognosis, advance care planning, emotional support in the setting of serious illness  HPI     Rubi Handley is a 68 y o  female with diffuse large B-cell lymphoma  She is here today with her family and is primarily [address] speaking  She moved from [place] for cancer treatments  She states that she rarely uses her pain medication (last Percocet fill per PDMP was 10/31/18) and prefers not to use them as they worsen her chronic constipation  She occasionally uses Miralax for her constipation but does so very inconsistently  She has trouble sleeping  Especially around chemotherapy as she gets steroids with her regimen  She notes diffuse bone pains with her Neulasta shot  She has been falling more frequently but does not use an ambulation device  She is interested in meals on wheels service  Time spent addressing all questions/concerns  No advanced care planning has been done  Interested in a support group  The following portions of the medical history were reviewed: past medical history, problem list, medication list, and social history      Current Outpatient Prescriptions:     albuterol (PROVENTIL HFA,VENTOLIN HFA) 90 mcg/act inhaler, Inhale 2 puffs every 4 (four) hours as needed for wheezing, Disp: 1 Inhaler, Rfl: 0    ALPRAZolam (XANAX) 0 25 mg tablet, Take 1 tablet (0 25 mg total) by mouth daily at bedtime as needed for anxiety or sleep, Disp: 30 tablet, Rfl: 0    amLODIPine (NORVASC) 5 mg tablet, Take 1 tablet (5 mg total) by mouth daily, Disp: 90 tablet, Rfl: 1    benzonatate (TESSALON PERLES) 100 mg capsule, Take 1 capsule (100 mg total) by mouth every 8 (eight) hours, Disp: 21 capsule, Rfl: 0    carvedilol (COREG) 12 5 mg tablet, Take 1 tablet (12 5 mg total) by mouth 2 (two) times a day with meals, Disp: 180 tablet, Rfl: 1    ergocalciferol (VITAMIN D2) 50,000 units, Take 50,000 Units by mouth once a week , Disp: , Rfl:     ezetimibe (ZETIA) 10 mg tablet, Take 1 tablet (10 mg total) by mouth daily, Disp: 90 tablet, Rfl: 1    gabapentin (NEURONTIN) 100 mg capsule, Take 1 capsule (100 mg total) by mouth daily at bedtime, Disp: 30 capsule, Rfl: 1    glipiZIDE (GLUCOTROL) 10 mg tablet, Take 1 tablet (10 mg total) by mouth 2 (two) times a day before meals, Disp: 180 tablet, Rfl: 0    hydrOXYzine HCL (ATARAX) 50 mg tablet, Take 1 tablet (50 mg total) by mouth 3 (three) times a day as needed for anxiety (and insomnia), Disp: 90 tablet, Rfl: 2    levofloxacin (LEVAQUIN) 500 mg tablet, Take 1 tablet (500 mg total) by mouth every 24 hours for 7 days, Disp: 7 tablet, Rfl: 0    lidocaine viscous (XYLOCAINE) 2 % mucosal solution, Swish and swallow 10 mL 3 (three) times a day as needed for mild pain or moderate pain, Disp: 100 mL, Rfl: 2    loperamide (IMODIUM) 2 mg capsule, Take 1 capsule (2 mg total) by mouth as needed for diarrhea Every 1-2 hours as needed for diarrhea, Disp: 60 capsule, Rfl: 1    losartan (COZAAR) 25 mg tablet, Take 1 tablet (25 mg total) by mouth daily, Disp: 90 tablet, Rfl: 1    omeprazole (PriLOSEC) 40 MG capsule, Take 1 capsule (40 mg total) by mouth daily, Disp: 90 capsule, Rfl: 1    ondansetron (ZOFRAN-ODT) 8 mg disintegrating tablet, Take 1 tablet (8 mg total) by mouth every 8 (eight) hours as needed for nausea or vomiting, Disp: 90 tablet, Rfl: 0    predniSONE 20 mg tablet, Take 20 mg by mouth daily Take 5 tablets by mouth every day for 5 days every 3 weeks with chemotherapy, Disp: , Rfl:     ranitidine (ZANTAC) 300 MG tablet, Take 1 tablet (300 mg total) by mouth daily, Disp: 90 tablet, Rfl: 1    sitaGLIPtin-metFORMIN (JANUMET)  MG per tablet, Take 1 tablet by mouth 2 (two) times a day with meals, Disp: 90 tablet, Rfl: 0  No current facility-administered medications for this visit  Review of Systems   Gastrointestinal: Positive for abdominal pain, constipation and nausea  All other systems reviewed and are negative  All other systems negative    Objective:  Vital Signs  /60 (BP Location: Left arm, Patient Position: Sitting, Cuff Size: Standard)   Pulse 83   Temp 97 5 °F (36 4 °C) (Oral)   Resp 18   Ht 4' 11" (1 499 m)   Wt 61 5 kg (135 lb 9 6 oz)   SpO2 94%   BMI 27 39 kg/m²    Physical Exam   Constitutional: She is oriented to person, place, and time  She appears well-nourished  No distress  Chronically ill appearing   HENT:   Head: Normocephalic and atraumatic  Right Ear: External ear normal    Left Ear: External ear normal    Nose: Nose normal    Mouth/Throat: Oropharynx is clear and moist    Eyes: EOM are normal  Right eye exhibits no discharge  Left eye exhibits no discharge  No scleral icterus  Neck: Neck supple  No JVD present  No tracheal deviation present  Cardiovascular: Normal rate, regular rhythm and intact distal pulses  Pulmonary/Chest: Effort normal and breath sounds normal  No respiratory distress  She has no wheezes  She has no rales  Abdominal: Bowel sounds are normal  She exhibits no distension and no mass  There is no tenderness  Musculoskeletal: She exhibits no edema, tenderness or deformity  Neurological: She is alert and oriented to person, place, and time  No cranial nerve deficit  Coordination normal    Skin: Skin is warm and dry  She is not diaphoretic     Dry, poor skin integrity    Psychiatric: She has a normal mood and affect  Her behavior is normal  Judgment and thought content normal    Nursing note and vitals reviewed

## 2019-01-30 NOTE — PROGRESS NOTES
LSW joined Dr Jitendra Hinds for patient's first appointment with Palliative Medicine  Patient's family accompanied her to the appointment and provided interpretation for patient and provider  The patient has already been connected with the local [facility] [employer] and is working towards getting Waiver Services started  Patient's granddaughter provides her with assistance with the majority of her ADLs  Patient is in need of a walker due to fatigue and altered mobility  Prescription for Rolator faxed to [address] [facility]  Patient will also begin in-home physical therapy    Granddaughter requests assistance with patient's meals  The granddaughter cooks all of her meals  LSW faxed a referral to Meals on Wheels  Granddaughter also is concerned that patient isn't getting enough nutrition  LSW will fax in a proscription for Boost Glucerna for patient 
No

## 2019-02-06 ENCOUNTER — OFFICE VISIT (OUTPATIENT)
Dept: HEMATOLOGY ONCOLOGY | Facility: CLINIC | Age: 74
End: 2019-02-06
Payer: COMMERCIAL

## 2019-02-06 VITALS
HEIGHT: 59 IN | OXYGEN SATURATION: 97 % | HEART RATE: 71 BPM | TEMPERATURE: 99 F | BODY MASS INDEX: 27.05 KG/M2 | RESPIRATION RATE: 16 BRPM | SYSTOLIC BLOOD PRESSURE: 108 MMHG | WEIGHT: 134.2 LBS | DIASTOLIC BLOOD PRESSURE: 56 MMHG

## 2019-02-06 DIAGNOSIS — R11.0 NAUSEA: ICD-10-CM

## 2019-02-06 DIAGNOSIS — F32.A DEPRESSION, UNSPECIFIED DEPRESSION TYPE: ICD-10-CM

## 2019-02-06 DIAGNOSIS — G47.09 OTHER INSOMNIA: ICD-10-CM

## 2019-02-06 DIAGNOSIS — K21.9 GASTROESOPHAGEAL REFLUX DISEASE, ESOPHAGITIS PRESENCE NOT SPECIFIED: ICD-10-CM

## 2019-02-06 DIAGNOSIS — R21 RASH: ICD-10-CM

## 2019-02-06 DIAGNOSIS — C83.31 DIFFUSE LARGE B-CELL LYMPHOMA OF LYMPH NODES OF NECK (HCC): Primary | ICD-10-CM

## 2019-02-06 PROCEDURE — 99215 OFFICE O/P EST HI 40 MIN: CPT | Performed by: NURSE PRACTITIONER

## 2019-02-06 RX ORDER — OMEPRAZOLE 40 MG/1
40 CAPSULE, DELAYED RELEASE ORAL 2 TIMES DAILY
Qty: 60 CAPSULE | Refills: 1 | Status: SHIPPED | OUTPATIENT
Start: 2019-02-06 | End: 2019-02-25

## 2019-02-06 RX ORDER — PROCHLORPERAZINE MALEATE 10 MG
10 TABLET ORAL EVERY 6 HOURS PRN
Qty: 30 TABLET | Refills: 1 | Status: SHIPPED | OUTPATIENT
Start: 2019-02-06 | End: 2019-02-25

## 2019-02-06 NOTE — PROGRESS NOTES
Hematology/Oncology Outpatient Follow-up  Janelle Sandoval 68 y o  female 1945 [id number]    Date:  2/6/2019      Assessment and Plan:  1  Diffuse large 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 ([facility] [address] )  Patient is tolerating her treatment with some adverse effects which are manageable  She will be back next week for follow-up and repeat blood work prior to cycle #5  R-CHOP  - prochlorperazine (COMPAZINE) 10 mg tablet; Take 1 tablet (10 mg total) by mouth every 6 (six) hours as needed for nausea or vomiting  Dispense: 30 tablet; Refill: 1    2  Gastroesophageal reflux disease, esophagitis presence not specified  Patient has a chronic history of GERD which appears to be worsening while on chemotherapy  We will temporarily increase her PPI omeprazole 40 mg to b i d  dosing to see if this will improve her symptoms  She was also encouraged to continue her Zantac 300 mg p o  daily as ordered and take over-the-counter Tums p r n  If there is no improvement or worsening of her symptoms we may have to send her to GI for further evaluation/treatment  The increased reflux may also be contributing to her nausea  She was encouraged to avoid spicy/acidic/tomato based foods  - omeprazole (PriLOSEC) 40 MG capsule; Take 1 capsule (40 mg total) by mouth 2 (two) times a day  Dispense: 60 capsule; Refill: 1    3  Nausea  Hopefully nausea will improve with better control of GERD  Patient encouraged to continue her Zofran 0DT as needed every 8 hours for nausea  We will also start patient on Compazine 10 mg every 6 hours which she can alternate with Zofran as needed for severe nausea  Patient and her daughter instructed on use and cautioned that new nausea medication may cause increased drowsiness  - prochlorperazine (COMPAZINE) 10 mg tablet; Take 1 tablet (10 mg total) by mouth every 6 (six) hours as needed for nausea or vomiting  Dispense: 30 tablet; Refill: 1    4   Depression, unspecified depression type  Patient's granddaughter is concerned about patient's mental health  She states that she is often foggy, angry, agitated and lashing out at her  She feels that this is also the reason for patient's sleepless nights  Recommended starting psychotherapy/counseling to help her discuss her feelings and learn to cope with her diagnosis/anxiety/feelings  Suggested making appointment at [facility] office next door in the [address] building  They have adult Danish speaking counselors available  5  Other insomnia  Patient will follow up with palliative care for further recommendations  Continue gabapentin 100 mg at HS for now as ordered  May be related to depression and anxiety  Encouraged patient to try over-the-counter Benadryl at HS to see if this would help  6  Rash  Patient with mild (grade 1) pruritic rash to left wrist, posterior neck, left upper arm, and thighs  Likely due to chemotherapy, will continue to monitor  Encourage patient to apply Benadryl cream as needed for itching  She could also take over-the-counter Benadryl tablets for severe itching which may also help her to sleep at nighttime  HPI:  Patient presents today for delfina visit  Patient is Danish-speaking only, interpretation via [employer] interpretation system  She completed cycle #4  R-CHOP on 01/23/2019  She is due for cycle #5  next week on 02/13/2019  Patient states that she was seen by the palliative care team last week and was started on gabapentin at [facility] for insomnia which has not been effective  She is only sleeping approximately 2-3 hours per night  According to her granddaughter she has tried melatonin and camomile tea at HS the past which has been ineffective  Her granddaughter feels her insomnia is due to depression and anxiety  Patient also mentions that she has been having a lot of reflux despite taking omeprazole 40 mg daily and Zantac 300 mg daily    She has also been experiencing a lot of nausea and abdominal discomfort after eating meals  She is taking her p r n  Zofran 0DT for nausea which is effective in controlling the nausea  She also mentions that she has an aversion to strong food smells when her granddaughter is cooking which increases her nausea  Appetite has been poor, she occasionally will drink a Glucerna nutritional supplement, does admit to drinking a lot of fluids and hydrate herself well  Oncology History    The patient complained about significant sore throat in May which was treated with antibiotics by her PCP in [facility] which did not improve her sore throat  She then started to notice significant odynophagia and dysphagia for liquids it is and solid nutrition  Eventually, she had a CT scan of the neck on the 20th of September which showed soft tissue lesion in the left palatine tonsil with abnormal lymph nodes bilaterally in the neck compatible with neoplastic process  She then had a biopsy of the left tonsil/left tonsillectomy on the 25th of September 2018 which was compatible with diffuse large B-cell lymphoma germinal center B phenotype  The immunohistochemical staining came back positive for BCL2, BCL 6 and myc with Ki 67 around 70%  No FISH rearrangements gene study was done for BCL2, BCL 6 are myc translocation  The patient was treated with 1 cycle of R-EPOCH since her airway was compromise with the large tonsillar mass and a biopsy which was reviewed by our hematopathologist on the 15 November compatible with 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 according to the [facility] with pending gene rearrangement studies  During the hospital course the patient had another biopsy of the left tonsil to better understand the exact aggressiveness of her large B-cell lymphoma  The biopsy on the 20th of November showed large B-cell lymphoma with BCL -2 and C myc level expressed surface a type without the myc or BCL gene rearrangement    Her bone marrow biopsy on the 9th of November was negative for B-cell lymphoma involvement with normal bone marrow trilineage maturation  She was also evaluated with an MRI of the brain during the hospital course which was negative for any hint of lymphoma involvement  PET scan on the 14th of November showed IMPRESSION:     1  FDG avid left posterior oropharyngeal lesion compatible with malignancy  2   FDG avid lymph nodes in the neck and left axilla compatible with malignancy  3  No FDG avid lymph nodes in the abdomen or pelvis suspicious for malignancy  4   Tiny focus of FDG uptake along the skin of the right upper quadrant anterior abdominal wall with mild skin thickening suggested here on CT        Diffuse large 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 ([address] )    11/9/2018 Initial Diagnosis     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)        Chemotherapy     1  Dose adjusted R-EPOCH 11/21/18 (1 cycle)- switched to RCHOP after repeat biopsy/pathology reviewed  2  R-CHOP started 12/12/18            Interval history:    ROS: Review of Systems   Constitutional: Positive for activity change, appetite change and fatigue (mild)  Negative for chills, fever and unexpected weight change  HENT: Negative for congestion, mouth sores, nosebleeds, sore throat and trouble swallowing  Eyes: Negative  Respiratory: Positive for shortness of breath (with exertion)  Negative for cough and chest tightness  Cardiovascular: Negative for chest pain, palpitations and leg swelling  Gastrointestinal: Positive for constipation (mild) and nausea (moderate)  Negative for abdominal distention, abdominal pain, blood in stool, diarrhea and vomiting         +reflux/indigestion, frequently   Genitourinary: Negative for difficulty urinating, dysuria, frequency, hematuria and urgency  Musculoskeletal: Negative for arthralgias, back pain, gait problem, joint swelling and myalgias  Skin: Positive for rash (wrists, post neck, upper arms, thighs/+itch)   Negative for color change and pallor  Neurological: Positive for dizziness and headaches (mild)  Negative for weakness, light-headedness and numbness  Hematological: Negative for adenopathy  Does not bruise/bleed easily  Psychiatric/Behavioral: Positive for agitation (per grandaughter), decreased concentration (per daughter), dysphoric mood and sleep disturbance  The patient is nervous/anxious  Rates stress 7/10       Past Medical History:   Diagnosis Date    Cancer ([place] [address] )     Throat    Chronic pain disorder     Diabetes mellitus ([place] [address] )     Diffuse large B cell lymphoma ([place] [address] )     Dysphagia     GERD without esophagitis     HTN (hypertension)     Hyperlipidemia     Hypertension     MI (myocardial infarction) ([place] [address] )        Past Surgical History:   Procedure Laterality Date    BONE MARROW BIOPSY      BREAST LUMPECTOMY      CHOLECYSTECTOMY      IR PORT PLACEMENT  11/16/2018    OTHER SURGICAL HISTORY      tendor tear repair to right shoulder    SHOULDER ARTHROSCOPY         Social History     Social History    Marital status:       Spouse name: N/A    Number of children: N/A    Years of education: N/A     Social History Main Topics    Smoking status: Never Smoker    Smokeless tobacco: Never Used    Alcohol use No    Drug use: No    Sexual activity: Not Asked     Other Topics Concern    None     Social History Narrative    None       Family History   Problem Relation Age of Onset    Diabetes Mother     Heart disease Sister     Hypertension Brother     Cancer Maternal Grandmother     Cancer Maternal Grandfather        No Known Allergies      Current Outpatient Prescriptions:     albuterol (PROVENTIL HFA,VENTOLIN HFA) 90 mcg/act inhaler, Inhale 2 puffs every 4 (four) hours as needed for wheezing, Disp: 1 Inhaler, Rfl: 0    amLODIPine (NORVASC) 5 mg tablet, Take 1 tablet (5 mg total) by mouth daily, Disp: 90 tablet, Rfl: 1    benzonatate (TESSALON PERLES) 100 mg capsule, Take 1 capsule (100 mg total) by mouth every 8 (eight) hours, Disp: 21 capsule, Rfl: 0    carvedilol (COREG) 12 5 mg tablet, Take 1 tablet (12 5 mg total) by mouth 2 (two) times a day with meals, Disp: 180 tablet, Rfl: 1    ergocalciferol (VITAMIN D2) 50,000 units, Take 50,000 Units by mouth once a week , Disp: , Rfl:     ezetimibe (ZETIA) 10 mg tablet, Take 1 tablet (10 mg total) by mouth daily, Disp: 90 tablet, Rfl: 1    gabapentin (NEURONTIN) 100 mg capsule, Take 1 capsule (100 mg total) by mouth daily at bedtime, Disp: 30 capsule, Rfl: 1    glipiZIDE (GLUCOTROL) 10 mg tablet, Take 1 tablet (10 mg total) by mouth 2 (two) times a day before meals, Disp: 180 tablet, Rfl: 0    hydrOXYzine HCL (ATARAX) 50 mg tablet, Take 1 tablet (50 mg total) by mouth 3 (three) times a day as needed for anxiety (and insomnia), Disp: 90 tablet, Rfl: 2    lidocaine viscous (XYLOCAINE) 2 % mucosal solution, Swish and swallow 10 mL 3 (three) times a day as needed for mild pain or moderate pain, Disp: 100 mL, Rfl: 2    loperamide (IMODIUM) 2 mg capsule, Take 1 capsule (2 mg total) by mouth as needed for diarrhea Every 1-2 hours as needed for diarrhea, Disp: 60 capsule, Rfl: 1    losartan (COZAAR) 25 mg tablet, Take 1 tablet (25 mg total) by mouth daily, Disp: 90 tablet, Rfl: 1    omeprazole (PriLOSEC) 40 MG capsule, Take 1 capsule (40 mg total) by mouth 2 (two) times a day, Disp: 60 capsule, Rfl: 1    ondansetron (ZOFRAN-ODT) 8 mg disintegrating tablet, Take 1 tablet (8 mg total) by mouth every 8 (eight) hours as needed for nausea or vomiting, Disp: 90 tablet, Rfl: 0    predniSONE 20 mg tablet, Take 20 mg by mouth daily Take 5 tablets by mouth every day for 5 days every 3 weeks with chemotherapy, Disp: , Rfl:     ranitidine (ZANTAC) 300 MG tablet, Take 1 tablet (300 mg total) by mouth daily, Disp: 90 tablet, Rfl: 1    sitaGLIPtin-metFORMIN (JANUMET)  MG per tablet, Take 1 tablet by mouth 2 (two) times a day with meals, Disp: 90 tablet, Rfl: 0    prochlorperazine (COMPAZINE) 10 mg tablet, Take 1 tablet (10 mg total) by mouth every 6 (six) hours as needed for nausea or vomiting, Disp: 30 tablet, Rfl: 1      Physical Exam:  /56 (BP Location: Left arm, Cuff Size: Adult)   Pulse 71   Temp 99 °F (37 2 °C) (Tympanic)   Resp 16   Ht 4' 11 02" (1 499 m)   Wt 60 9 kg (134 lb 3 2 oz)   SpO2 97%   BMI 27 09 kg/m²     Physical Exam   Constitutional: She is oriented to person, place, and time  She appears well-developed and well-nourished  No distress  HENT:   Head: Normocephalic and atraumatic  Mouth/Throat: Oropharynx is clear and moist  No oropharyngeal exudate  Eyes: Pupils are equal, round, and reactive to light  Conjunctivae are normal  No scleral icterus  Neck: Normal range of motion  Neck supple  No thyromegaly present  Cardiovascular: Normal rate, regular rhythm, normal heart sounds and intact distal pulses  No murmur heard  Pulmonary/Chest: Effort normal and breath sounds normal  No respiratory distress  Abdominal: Soft  Bowel sounds are normal  She exhibits no distension  There is no hepatosplenomegaly  There is no tenderness  Musculoskeletal: Normal range of motion  She exhibits no edema  Lymphadenopathy:     She has no cervical adenopathy  She has no axillary adenopathy  Neurological: She is alert and oriented to person, place, and time  Skin: Skin is warm and dry  Rash (Papular, small pink lesions noted to left anterior wrist, posterior neck, left upper arm ) noted  She is not diaphoretic  No erythema  No pallor  Psychiatric: Her behavior is normal  Judgment and thought content normal  Her mood appears anxious  Her affect is blunt  Vitals reviewed          Labs:  Lab Results   Component Value Date    WBC 10 30 01/21/2019    HGB 11 2 (L) 01/21/2019    HCT 33 8 (L) 01/21/2019    MCV 94 01/21/2019     01/21/2019     Lab Results   Component Value Date    K 4 0 01/21/2019     01/21/2019    CO2 30 01/21/2019    BUN 15 01/21/2019    CREATININE 0 61 01/21/2019    GLUF 93 01/21/2019    CALCIUM 9 4 01/21/2019    AST 17 01/21/2019    ALT 36 01/21/2019    ALKPHOS 86 01/21/2019    EGFR 91 01/21/2019         Patient voiced understanding and agreement in the above discussion  Aware to contact our office with questions/symptoms in the interim

## 2019-02-07 ENCOUNTER — OFFICE VISIT (OUTPATIENT)
Dept: FAMILY MEDICINE CLINIC | Facility: CLINIC | Age: 74
End: 2019-02-07

## 2019-02-07 VITALS
HEIGHT: 59 IN | SYSTOLIC BLOOD PRESSURE: 114 MMHG | TEMPERATURE: 98.4 F | OXYGEN SATURATION: 96 % | DIASTOLIC BLOOD PRESSURE: 64 MMHG | WEIGHT: 137 LBS | RESPIRATION RATE: 18 BRPM | BODY MASS INDEX: 27.62 KG/M2 | HEART RATE: 67 BPM

## 2019-02-07 DIAGNOSIS — F41.8 ANXIETY ABOUT HEALTH: ICD-10-CM

## 2019-02-07 DIAGNOSIS — F32.A DEPRESSION, UNSPECIFIED DEPRESSION TYPE: ICD-10-CM

## 2019-02-07 DIAGNOSIS — E11.9 TYPE 2 DIABETES MELLITUS WITHOUT COMPLICATION, WITHOUT LONG-TERM CURRENT USE OF INSULIN (HCC): ICD-10-CM

## 2019-02-07 DIAGNOSIS — G47.09 OTHER INSOMNIA: Primary | ICD-10-CM

## 2019-02-07 DIAGNOSIS — I10 ESSENTIAL HYPERTENSION: ICD-10-CM

## 2019-02-07 LAB — SL AMB POCT HEMOGLOBIN AIC: 9.3 (ref ?–6.5)

## 2019-02-07 PROCEDURE — 83036 HEMOGLOBIN GLYCOSYLATED A1C: CPT | Performed by: PHYSICIAN ASSISTANT

## 2019-02-07 PROCEDURE — 99214 OFFICE O/P EST MOD 30 MIN: CPT | Performed by: PHYSICIAN ASSISTANT

## 2019-02-07 RX ORDER — GLIPIZIDE 10 MG/1
10 TABLET ORAL
Qty: 180 TABLET | Refills: 0 | Status: SHIPPED | OUTPATIENT
Start: 2019-02-07 | End: 2019-02-25

## 2019-02-07 RX ORDER — LANCETS 28 GAUGE
EACH MISCELLANEOUS
Qty: 100 EACH | Refills: 5 | Status: SHIPPED | OUTPATIENT
Start: 2019-02-07 | End: 2020-04-24 | Stop reason: CLARIF

## 2019-02-07 RX ORDER — CARVEDILOL 12.5 MG/1
12.5 TABLET ORAL 2 TIMES DAILY WITH MEALS
Qty: 180 TABLET | Refills: 1
Start: 2019-02-07 | End: 2019-08-29 | Stop reason: SDUPTHER

## 2019-02-07 RX ORDER — BLOOD-GLUCOSE METER
KIT MISCELLANEOUS
Qty: 1 EACH | Refills: 0 | Status: SHIPPED | OUTPATIENT
Start: 2019-02-07 | End: 2020-04-24 | Stop reason: CLARIF

## 2019-02-07 NOTE — PROGRESS NOTES
Assessment/Plan:    Type 2 diabetes mellitus without complication, without long-term current use of insulin (HCC)  Lab Results   Component Value Date    HGBA1C 9 3 (A) 02/07/2019       No results for input(s): POCGLU in the last 72 hours  Blood Sugar Average: Last 72 hrs:  Discussed with patient and daughter that A1c has increased from 7 5% up to 9 3%  This could be due to a combination of things  Did inform them that patient's diet is high in carbohydrates, and this will increase patient's blood sugar  Unfortunately due to patient's symptoms and appetite, she cannot tolerate many foods  Discussed trying to increase vegetables in diet  Recommend trying peanut butter as a source of protein  Can continue with Glucerna  Also informed him that high doses of prednisone being used with chemotherapy could also be causing the elevated blood sugar  Will not make adjustments to medication at this time, and re-evaluate after treatment is completed  Anxiety about health  Informed daughter and patient that her symptoms are normal in her current situation  Strongly encouraged patient to follow up with Psychiatry and therapist for further evaluation  If there is any difficulty with getting in with behavior health, will refer to our  who can help assist in finding a Vincentian-speaking provider  Other insomnia  At this time patient is taking gabapentin and Benadryl for her insomnia  Medications appear to be working as last night  Will continue with current medications  Patient's anxiety depression may all be contributing to this, and following up with Psychiatry may be beneficial     Essential hypertension  Blood pressure is stable  Will continue with carvedilol 12 5 mg twice a day, losartan 25 mg daily, amlodipine 5 mg daily         Diagnoses and all orders for this visit:    Other insomnia    Depression, unspecified depression type    Anxiety about health    Essential hypertension  -     carvedilol (COREG) 12 5 mg tablet; Take 1 tablet (12 5 mg total) by mouth 2 (two) times a day with meals    Type 2 diabetes mellitus without complication, without long-term current use of insulin (HCC)  -     glucose blood (FREESTYLE LITE) test strip; Check blood sugar twice a day  -     Blood Glucose Monitoring Suppl ([address]) JORDAN; Check blood sugar twice a day  -     Lancets (FREESTYLE) lancets; Check blood sugar twice a day  -     sitaGLIPtin-metFORMIN (JANUMET)  MG per tablet; Take 1 tablet by mouth 2 (two) times a day with meals  -     glipiZIDE (GLUCOTROL) 10 mg tablet; Take 1 tablet (10 mg total) by mouth 2 (two) times a day before meals  -     POCT hemoglobin A1c          Subjective:      Patient ID: Rubén Blancas is a 68 y o  female  68year-old female presenting with daughter who translated, for concerns of insomnia, depression, and anxiety  Patient has had difficulty transitioning with her current treatment for her lymphoma  Daughter states that patient has been very labile  May seem happy at 1 moment, been will be crying at the next moment  Patient has also been very anxious throughout the day  She has been having difficulty sleeping  Diet will vary from day-to-day, however this is also due to on and off the odynophagia  Was recommended by Oncology the patient follow up with Psychiatry  At 1st patient was a against that as she did want to talk to anyone about what was going on  However recently patient has been more open to discussing with others what is going on with her, is willing to seek counseling and a psychiatrist   Patient has mentioned in the past that she would be better off dead, but denies any suicidal ideation at this time  Daughter is also asking for a refill of carvedilol  States that she has gone to the pharmacy, in they have not had the medication in stock for her    Is asking if we can contact pharmacist make sure that they did get the prescription  Patient is currently on Janumet and glyburide for her diabetes  Does not have a glucometer, and needs 1 sent to the pharmacy  Because of this has been able to check her blood sugar levels  Patient's diet can vary from day-to-day depending on how she is feeling will take food she can tolerate  Daughter did state that patient does eat a lot of foods high in carbohydrates  Typically she is eating rice, potatoes, breads, drinking sample, and Glucerna  Hardly eats any protein at all  Patient is also on prednisone during chemotherapy  Denies any headaches, dizziness, changes in vision, chest pain, palpitations, numbness tingling hands or feet  Patient's daughter is also concerned with patient's insomnia  Was initially prescribed gabapentin to see if this would help with sleep, but there was no benefit  Daughter then mention this to Oncology, and they recommended continue with the gabapentin, and add Benadryl at night to see if this will help with sleep  Daughter states that she try disc last night, and patient slept through the whole night  At this time it appears symptoms are under control  The following portions of the patient's history were reviewed and updated as appropriate:   She  has a past medical history of Cancer ([address] ); Chronic pain disorder; Diabetes mellitus ([address] ); Diffuse large B cell lymphoma ([address] ); Dysphagia; GERD without esophagitis; HTN (hypertension); Hyperlipidemia; Hypertension; and MI (myocardial infarction) ([address] )    She   Patient Active Problem List    Diagnosis Date Noted    Depression 02/06/2019    Other insomnia 02/06/2019    Rash 02/06/2019    Palliative care patient 01/28/2019    Neoplasm related pain 01/28/2019    Chronic pain due to neoplasm 01/28/2019    Nausea 11/22/2018    Tremor 11/22/2018    Severe protein-calorie malnutrition ([address] ) 11/22/2018    Anxiety about health 11/20/2018    Diffuse large 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 ([address] ) 11/09/2018    Oropharyngeal dysphagia 11/09/2018    Type 2 diabetes mellitus without complication, without long-term current use of insulin ([facility] [address] ) 10/31/2018    Essential hypertension 10/31/2018    Gastroesophageal reflux disease 10/31/2018     She  has a past surgical history that includes Cholecystectomy; Breast lumpectomy; Other surgical history; Bone marrow biopsy; Shoulder arthroscopy; and IR port Placement (11/16/2018)  Her family history includes Cancer in her maternal grandfather and maternal grandmother; Diabetes in her mother; Heart disease in her sister; Hypertension in her brother  She  reports that she has never smoked  She has never used smokeless tobacco  She reports that she does not drink alcohol or use drugs  Current Outpatient Prescriptions   Medication Sig Dispense Refill    albuterol (PROVENTIL HFA,VENTOLIN HFA) 90 mcg/act inhaler Inhale 2 puffs every 4 (four) hours as needed for wheezing 1 Inhaler 0    amLODIPine (NORVASC) 5 mg tablet Take 1 tablet (5 mg total) by mouth daily 90 tablet 1    benzonatate (TESSALON PERLES) 100 mg capsule Take 1 capsule (100 mg total) by mouth every 8 (eight) hours 21 capsule 0    Blood Glucose Monitoring Suppl (FREESTYLE LITE) JORDAN Check blood sugar twice a day 1 each 0    carvedilol (COREG) 12 5 mg tablet Take 1 tablet (12 5 mg total) by mouth 2 (two) times a day with meals 180 tablet 1    ergocalciferol (VITAMIN D2) 50,000 units Take 50,000 Units by mouth once a week        ezetimibe (ZETIA) 10 mg tablet Take 1 tablet (10 mg total) by mouth daily 90 tablet 1    gabapentin (NEURONTIN) 100 mg capsule Take 1 capsule (100 mg total) by mouth daily at bedtime 30 capsule 1    glipiZIDE (GLUCOTROL) 10 mg tablet Take 1 tablet (10 mg total) by mouth 2 (two) times a day before meals 180 tablet 0    glucose blood (FREESTYLE LITE) test strip Check blood sugar twice a day 100 each 5    hydrOXYzine HCL (ATARAX) 50 mg tablet Take 1 tablet (50 mg total) by mouth 3 (three) times a day as needed for anxiety (and insomnia) 90 tablet 2    Lancets (FREESTYLE) lancets Check blood sugar twice a day  100 each 5    lidocaine viscous (XYLOCAINE) 2 % mucosal solution Swish and swallow 10 mL 3 (three) times a day as needed for mild pain or moderate pain 100 mL 2    loperamide (IMODIUM) 2 mg capsule Take 1 capsule (2 mg total) by mouth as needed for diarrhea Every 1-2 hours as needed for diarrhea 60 capsule 1    losartan (COZAAR) 25 mg tablet Take 1 tablet (25 mg total) by mouth daily 90 tablet 1    omeprazole (PriLOSEC) 40 MG capsule Take 1 capsule (40 mg total) by mouth 2 (two) times a day 60 capsule 1    ondansetron (ZOFRAN-ODT) 8 mg disintegrating tablet Take 1 tablet (8 mg total) by mouth every 8 (eight) hours as needed for nausea or vomiting 90 tablet 0    predniSONE 20 mg tablet Take 20 mg by mouth daily Take 5 tablets by mouth every day for 5 days every 3 weeks with chemotherapy      prochlorperazine (COMPAZINE) 10 mg tablet Take 1 tablet (10 mg total) by mouth every 6 (six) hours as needed for nausea or vomiting 30 tablet 1    ranitidine (ZANTAC) 300 MG tablet Take 1 tablet (300 mg total) by mouth daily 90 tablet 1    sitaGLIPtin-metFORMIN (JANUMET)  MG per tablet Take 1 tablet by mouth 2 (two) times a day with meals 180 tablet 0     No current facility-administered medications for this visit  She has No Known Allergies       Review of Systems   Constitutional: Negative for activity change, appetite change, chills, diaphoresis, fatigue, fever and unexpected weight change  HENT: Positive for sore throat and trouble swallowing  Eyes: Negative for visual disturbance  Respiratory: Negative for cough, chest tightness, shortness of breath and wheezing  Cardiovascular: Negative for chest pain, palpitations and leg swelling  Gastrointestinal: Negative for abdominal pain, constipation, diarrhea, nausea and vomiting     Endocrine: Negative for polydipsia, polyphagia and polyuria  Neurological: Negative for dizziness, weakness, light-headedness, numbness and headaches  Psychiatric/Behavioral: Positive for dysphoric mood and sleep disturbance  Negative for agitation, hallucinations and suicidal ideas  The patient is nervous/anxious  Objective:      /64 (BP Location: Right arm, Patient Position: Sitting, Cuff Size: Standard)   Pulse 67   Temp 98 4 °F (36 9 °C) (Tympanic)   Resp 18   Ht 4' 11" (1 499 m)   Wt 62 1 kg (137 lb)   SpO2 96%   BMI 27 67 kg/m²          Physical Exam   Constitutional: She is oriented to person, place, and time  She appears well-developed  No distress  Neck: Normal range of motion  Neck supple  Cardiovascular: Normal rate, regular rhythm and normal heart sounds  Exam reveals no gallop and no friction rub  No murmur heard  Pulmonary/Chest: Effort normal and breath sounds normal  No respiratory distress  She has no wheezes  She has no rales  Abdominal: Soft  Bowel sounds are normal  She exhibits no distension  There is no tenderness  There is no rebound and no guarding  Musculoskeletal: She exhibits no edema  Lymphadenopathy:     She has cervical adenopathy  Neurological: She is alert and oriented to person, place, and time  No cranial nerve deficit  Skin: She is not diaphoretic  Psychiatric: Her speech is normal and behavior is normal  Thought content normal  She exhibits a depressed mood  Nursing note and vitals reviewed

## 2019-02-08 NOTE — ASSESSMENT & PLAN NOTE
At this time patient is taking gabapentin and Benadryl for her insomnia  Medications appear to be working as last night  Will continue with current medications    Patient's anxiety depression may all be contributing to this, and following up with Psychiatry may be beneficial

## 2019-02-08 NOTE — ASSESSMENT & PLAN NOTE
Blood pressure is stable  Will continue with carvedilol 12 5 mg twice a day, losartan 25 mg daily, amlodipine 5 mg daily

## 2019-02-08 NOTE — ASSESSMENT & PLAN NOTE
Lab Results   Component Value Date    HGBA1C 9 3 (A) 02/07/2019       No results for input(s): POCGLU in the last 72 hours  Blood Sugar Average: Last 72 hrs:  Discussed with patient and daughter that A1c has increased from 7 5% up to 9 3%  This could be due to a combination of things  Did inform them that patient's diet is high in carbohydrates, and this will increase patient's blood sugar  Unfortunately due to patient's symptoms and appetite, she cannot tolerate many foods  Discussed trying to increase vegetables in diet  Recommend trying peanut butter as a source of protein  Can continue with Glucerna  Also informed him that high doses of prednisone being used with chemotherapy could also be causing the elevated blood sugar  Will not make adjustments to medication at this time, and re-evaluate after treatment is completed

## 2019-02-08 NOTE — ASSESSMENT & PLAN NOTE
Informed daughter and patient that her symptoms are normal in her current situation  Strongly encouraged patient to follow up with Psychiatry and therapist for further evaluation  If there is any difficulty with getting in with behavior health, will refer to our  who can help assist in finding a Korean-speaking provider

## 2019-02-11 ENCOUNTER — APPOINTMENT (OUTPATIENT)
Dept: LAB | Facility: HOSPITAL | Age: 74
End: 2019-02-11
Attending: INTERNAL MEDICINE
Payer: COMMERCIAL

## 2019-02-11 DIAGNOSIS — C83.31 DIFFUSE LARGE B-CELL LYMPHOMA OF LYMPH NODES OF NECK (HCC): Primary | ICD-10-CM

## 2019-02-11 DIAGNOSIS — C83.31 DIFFUSE LARGE B-CELL LYMPHOMA OF LYMPH NODES OF NECK (HCC): ICD-10-CM

## 2019-02-11 LAB
ALBUMIN SERPL BCP-MCNC: 4.1 G/DL (ref 3–5.2)
ALP SERPL-CCNC: 114 U/L (ref 43–122)
ALT SERPL W P-5'-P-CCNC: 26 U/L (ref 9–52)
ANION GAP SERPL CALCULATED.3IONS-SCNC: 6 MMOL/L (ref 5–14)
AST SERPL W P-5'-P-CCNC: 24 U/L (ref 14–36)
BASOPHILS # BLD AUTO: 0.12 THOUSAND/UL (ref 0–0.1)
BASOPHILS NFR MAR MANUAL: 1 % (ref 0–1)
BILIRUB SERPL-MCNC: 0.3 MG/DL
BLD SMEAR INTERP: NORMAL
BUN SERPL-MCNC: 15 MG/DL (ref 5–25)
CALCIUM SERPL-MCNC: 9.4 MG/DL (ref 8.4–10.2)
CHLORIDE SERPL-SCNC: 103 MMOL/L (ref 97–108)
CO2 SERPL-SCNC: 28 MMOL/L (ref 22–30)
CREAT SERPL-MCNC: 0.55 MG/DL (ref 0.6–1.2)
CRP SERPL QL: 3.3 MG/L
EOSINOPHIL # BLD AUTO: 0.12 THOUSAND/UL (ref 0–0.4)
EOSINOPHIL NFR BLD MANUAL: 1 % (ref 0–6)
ERYTHROCYTE [DISTWIDTH] IN BLOOD BY AUTOMATED COUNT: 19.5 %
ERYTHROCYTE [SEDIMENTATION RATE] IN BLOOD: 44 MM/HOUR (ref 1–20)
FERRITIN SERPL-MCNC: 238 NG/ML (ref 8–388)
GFR SERPL CREATININE-BSD FRML MDRD: 94 ML/MIN/1.73SQ M
GLUCOSE P FAST SERPL-MCNC: 100 MG/DL (ref 70–99)
HCT VFR BLD AUTO: 34.7 % (ref 36–46)
HGB BLD-MCNC: 11.1 G/DL (ref 12–16)
IGA SERPL-MCNC: 204 MG/DL (ref 70–400)
IGG SERPL-MCNC: 802 MG/DL (ref 700–1600)
IGM SERPL-MCNC: 21 MG/DL (ref 40–230)
IRON SATN MFR SERPL: 21 %
IRON SERPL-MCNC: 56 UG/DL (ref 50–170)
LDH SERPL-CCNC: 530 U/L (ref 313–618)
LYMPHOCYTES # BLD AUTO: 0.94 THOUSAND/UL (ref 0.5–4)
LYMPHOCYTES # BLD AUTO: 8 % (ref 25–45)
MCH RBC QN AUTO: 30.6 PG (ref 26–34)
MCHC RBC AUTO-ENTMCNC: 32 G/DL (ref 31–36)
MCV RBC AUTO: 95 FL (ref 80–100)
MONOCYTES # BLD AUTO: 1.29 THOUSAND/UL (ref 0.2–0.9)
MONOCYTES NFR BLD AUTO: 11 % (ref 1–10)
NEUTS BAND NFR BLD MANUAL: 25 % (ref 0–8)
NEUTS SEG # BLD: 9.13 THOUSAND/UL (ref 1.8–7.8)
NEUTS SEG NFR BLD AUTO: 53 %
PLATELET # BLD AUTO: 461 THOUSANDS/UL (ref 150–450)
PLATELET BLD QL SMEAR: ABNORMAL
PMV BLD AUTO: 7.6 FL (ref 8.9–12.7)
POTASSIUM SERPL-SCNC: 4.2 MMOL/L (ref 3.6–5)
PROT SERPL-MCNC: 6.8 G/DL (ref 5.9–8.4)
RBC # BLD AUTO: 3.64 MILLION/UL (ref 4–5.2)
RBC MORPH BLD: ABNORMAL
RETICS # CALC: 2.29 % (ref 0.87–2.63)
SODIUM SERPL-SCNC: 137 MMOL/L (ref 137–147)
TARGETS BLD QL SMEAR: PRESENT
TIBC SERPL-MCNC: 263 UG/DL (ref 250–450)
TOTAL CELLS COUNTED SPEC: 100
TSH SERPL DL<=0.05 MIU/L-ACNC: 1.33 UIU/ML (ref 0.47–4.68)
URATE SERPL-MCNC: 3.9 MG/DL (ref 2.7–7.5)
VARIANT LYMPHS # BLD AUTO: 1 % (ref 0–0)
VIT B12 SERPL-MCNC: 4284 PG/ML (ref 100–900)
WBC # BLD AUTO: 11.7 THOUSAND/UL (ref 4.5–11)

## 2019-02-11 PROCEDURE — 80053 COMPREHEN METABOLIC PANEL: CPT

## 2019-02-11 PROCEDURE — 36415 COLL VENOUS BLD VENIPUNCTURE: CPT

## 2019-02-11 PROCEDURE — 83540 ASSAY OF IRON: CPT

## 2019-02-11 PROCEDURE — 82784 ASSAY IGA/IGD/IGG/IGM EACH: CPT

## 2019-02-11 PROCEDURE — 86803 HEPATITIS C AB TEST: CPT

## 2019-02-11 PROCEDURE — 82607 VITAMIN B-12: CPT

## 2019-02-11 PROCEDURE — 85652 RBC SED RATE AUTOMATED: CPT

## 2019-02-11 PROCEDURE — 86705 HEP B CORE ANTIBODY IGM: CPT

## 2019-02-11 PROCEDURE — 87340 HEPATITIS B SURFACE AG IA: CPT

## 2019-02-11 PROCEDURE — 84165 PROTEIN E-PHORESIS SERUM: CPT

## 2019-02-11 PROCEDURE — 86704 HEP B CORE ANTIBODY TOTAL: CPT

## 2019-02-11 PROCEDURE — 85045 AUTOMATED RETICULOCYTE COUNT: CPT

## 2019-02-11 PROCEDURE — 86140 C-REACTIVE PROTEIN: CPT

## 2019-02-11 PROCEDURE — 84443 ASSAY THYROID STIM HORMONE: CPT

## 2019-02-11 PROCEDURE — 84165 PROTEIN E-PHORESIS SERUM: CPT | Performed by: PATHOLOGY

## 2019-02-11 PROCEDURE — 82728 ASSAY OF FERRITIN: CPT

## 2019-02-11 PROCEDURE — 82232 ASSAY OF BETA-2 PROTEIN: CPT

## 2019-02-11 PROCEDURE — 85027 COMPLETE CBC AUTOMATED: CPT

## 2019-02-11 PROCEDURE — 85007 BL SMEAR W/DIFF WBC COUNT: CPT

## 2019-02-11 PROCEDURE — 83550 IRON BINDING TEST: CPT

## 2019-02-11 PROCEDURE — 84550 ASSAY OF BLOOD/URIC ACID: CPT

## 2019-02-11 PROCEDURE — 83615 LACTATE (LD) (LDH) ENZYME: CPT

## 2019-02-12 LAB
B2 MICROGLOB SERPL-MCNC: 2.1 MG/L (ref 0.6–2.4)
HBV CORE AB SER QL: NORMAL
HBV CORE IGM SER QL: NORMAL
HBV SURFACE AG SER QL: NORMAL
HCV AB SER QL: NORMAL

## 2019-02-12 RX ORDER — ACETAMINOPHEN 325 MG/1
650 TABLET ORAL ONCE
Status: COMPLETED | OUTPATIENT
Start: 2019-02-13 | End: 2019-02-13

## 2019-02-12 RX ORDER — SODIUM CHLORIDE 9 MG/ML
20 INJECTION, SOLUTION INTRAVENOUS ONCE
Status: COMPLETED | OUTPATIENT
Start: 2019-02-13 | End: 2019-02-13

## 2019-02-12 RX ORDER — DOXORUBICIN HYDROCHLORIDE 2 MG/ML
79 INJECTION, SOLUTION INTRAVENOUS ONCE
Status: COMPLETED | OUTPATIENT
Start: 2019-02-13 | End: 2019-02-13

## 2019-02-13 ENCOUNTER — OFFICE VISIT (OUTPATIENT)
Dept: HEMATOLOGY ONCOLOGY | Facility: CLINIC | Age: 74
End: 2019-02-13
Payer: COMMERCIAL

## 2019-02-13 ENCOUNTER — HOSPITAL ENCOUNTER (OUTPATIENT)
Dept: INFUSION CENTER | Facility: HOSPITAL | Age: 74
Discharge: HOME | End: 2019-02-13
Payer: COMMERCIAL

## 2019-02-13 VITALS
BODY MASS INDEX: 27.7 KG/M2 | OXYGEN SATURATION: 95 % | SYSTOLIC BLOOD PRESSURE: 120 MMHG | WEIGHT: 137.4 LBS | HEART RATE: 82 BPM | HEIGHT: 59 IN | DIASTOLIC BLOOD PRESSURE: 60 MMHG | TEMPERATURE: 98.9 F | RESPIRATION RATE: 18 BRPM

## 2019-02-13 VITALS
HEIGHT: 59 IN | HEART RATE: 64 BPM | TEMPERATURE: 98.4 F | RESPIRATION RATE: 18 BRPM | DIASTOLIC BLOOD PRESSURE: 58 MMHG | BODY MASS INDEX: 27.69 KG/M2 | WEIGHT: 137.35 LBS | SYSTOLIC BLOOD PRESSURE: 119 MMHG

## 2019-02-13 DIAGNOSIS — C83.31 DIFFUSE LARGE B-CELL LYMPHOMA OF LYMPH NODES OF NECK (HCC): Primary | ICD-10-CM

## 2019-02-13 PROCEDURE — 96415 CHEMO IV INFUSION ADDL HR: CPT

## 2019-02-13 PROCEDURE — 96417 CHEMO IV INFUS EACH ADDL SEQ: CPT

## 2019-02-13 PROCEDURE — 96413 CHEMO IV INFUSION 1 HR: CPT

## 2019-02-13 PROCEDURE — 36593 DECLOT VASCULAR DEVICE: CPT

## 2019-02-13 PROCEDURE — 96367 TX/PROPH/DG ADDL SEQ IV INF: CPT

## 2019-02-13 PROCEDURE — 99214 OFFICE O/P EST MOD 30 MIN: CPT | Performed by: INTERNAL MEDICINE

## 2019-02-13 PROCEDURE — 96411 CHEMO IV PUSH ADDL DRUG: CPT

## 2019-02-13 RX ADMIN — VINCRISTINE SULFATE 2 MG: 1 INJECTION, SOLUTION INTRAVENOUS at 15:39

## 2019-02-13 RX ADMIN — CYCLOPHOSPHAMIDE 1185 MG: 1 INJECTION, POWDER, FOR SOLUTION INTRAVENOUS; ORAL at 14:46

## 2019-02-13 RX ADMIN — WATER 10 ML: 1 INJECTION INTRAMUSCULAR; INTRAVENOUS; SUBCUTANEOUS at 10:07

## 2019-02-13 RX ADMIN — ALTEPLASE 2 MG: 2.2 INJECTION, POWDER, LYOPHILIZED, FOR SOLUTION INTRAVENOUS at 10:04

## 2019-02-13 RX ADMIN — SODIUM CHLORIDE 20 ML/HR: 9 INJECTION, SOLUTION INTRAVENOUS at 10:40

## 2019-02-13 RX ADMIN — SODIUM CHLORIDE 150 MG: 9 INJECTION, SOLUTION INTRAVENOUS at 11:00

## 2019-02-13 RX ADMIN — RITUXIMAB 592 MG: 10 INJECTION, SOLUTION INTRAVENOUS at 12:23

## 2019-02-13 RX ADMIN — ACETAMINOPHEN 650 MG: 325 TABLET ORAL at 10:43

## 2019-02-13 RX ADMIN — DOXORUBICIN HYDROCHLORIDE 79 MG: 2 INJECTION, SOLUTION INTRAVENOUS at 15:28

## 2019-02-13 RX ADMIN — DIPHENHYDRAMINE HYDROCHLORIDE 50 MG: 50 INJECTION, SOLUTION INTRAMUSCULAR; INTRAVENOUS at 11:56

## 2019-02-13 RX ADMIN — DEXAMETHASONE SODIUM PHOSPHATE: 10 INJECTION, SOLUTION INTRAMUSCULAR; INTRAVENOUS at 11:34

## 2019-02-13 NOTE — PROGRESS NOTES
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)  The patient will be continue on the current treatment with R-CHOP cycle 5 today with the supportive Neulasta  She will be seen again in about 10 days for delfina visit  We will then complete her treatment with cycle 5 in 3 weeks from today followed by a PET-CT scan  - CBC and differential; Future  - Comprehensive metabolic panel; Future  - Magnesium; Future        HPI:  The patient came in today for a follow-up visit  Her granddaughter was with her and was interpreting the whole time  The patient tolerated cycle 4 relatively well with some side effects including nausea  She is doing well on the Compazine  Her blood work on the 11th of February showed white cell count of 11 7 with hemoglobin of 11 1 and platelet count of 009  Her creatinine is 0 5 with normal liver enzymes  Oncology History    The patient complained about significant sore throat in May which was treated with antibiotics by her PCP in [facility] which did not improve her sore throat  She then started to notice significant odynophagia and dysphagia for liquids it is and solid nutrition  Eventually, she had a CT scan of the neck on the 20th of September which showed soft tissue lesion in the left palatine tonsil with abnormal lymph nodes bilaterally in the neck compatible with neoplastic process  She then had a biopsy of the left tonsil/left tonsillectomy on the 25th of September 2018 which was compatible with diffuse large B-cell lymphoma germinal center B phenotype  The immunohistochemical staining came back positive for BCL2, BCL 6 and myc with Ki 67 around 70%  No FISH rearrangements gene study was done for BCL2, BCL 6 are myc translocation  The patient was treated with 1 cycle of R-EPOCH since her airway was compromise with the large tonsillar mass and a biopsy which was reviewed by our hematopathologist on the 15 November compatible with 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 according to the [facility] with pending gene rearrangement studies  During the hospital course the patient had another biopsy of the left tonsil to better understand the exact aggressiveness of her large B-cell lymphoma  The biopsy on the 20th of November showed large B-cell lymphoma with BCL -2 and C myc level expressed surface a type without the myc or BCL gene rearrangement  Her bone marrow biopsy on the 9th of November was negative for B-cell lymphoma involvement with normal bone marrow trilineage maturation  She was also evaluated with an MRI of the brain during the hospital course which was negative for any hint of lymphoma involvement  PET scan on the 14th of November showed IMPRESSION:     1  FDG avid left posterior oropharyngeal lesion compatible with malignancy  2   FDG avid lymph nodes in the neck and left axilla compatible with malignancy  3  No FDG avid lymph nodes in the abdomen or pelvis suspicious for malignancy  4   Tiny focus of FDG uptake along the skin of the right upper quadrant anterior abdominal wall with mild skin thickening suggested here on CT        Diffuse large 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 ([address] )    11/9/2018 Initial Diagnosis     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)        Chemotherapy     1  Dose adjusted R-EPOCH 11/21/18 (1 cycle)- switched to RCHOP after repeat biopsy/pathology reviewed  2  R-CHOP started 12/12/18            Interval history:    ROS: Review of Systems   Constitutional: Positive for appetite change  Negative for activity change, chills, diaphoresis, fatigue, fever and unexpected weight change  HENT: Negative for congestion, dental problem, ear discharge, ear pain, facial swelling, hearing loss, mouth sores, nosebleeds, postnasal drip, sore throat, tinnitus and trouble swallowing  Eyes: Negative for discharge, redness, itching and visual disturbance  Respiratory: Negative for cough, chest tightness, shortness of breath and wheezing  Cardiovascular: Negative for chest pain, palpitations and leg swelling  Gastrointestinal: Positive for constipation and nausea  Negative for abdominal distention, abdominal pain, anal bleeding, blood in stool, diarrhea and vomiting  Genitourinary: Negative for difficulty urinating, dysuria, flank pain, frequency, hematuria and urgency  Musculoskeletal: Negative for arthralgias, back pain, gait problem, joint swelling, myalgias and neck pain  Skin: Positive for rash  Negative for color change, pallor and wound  Neurological: Positive for headaches  Negative for dizziness, syncope, speech difficulty, weakness, light-headedness and numbness  Hematological: Negative for adenopathy  Does not bruise/bleed easily  Psychiatric/Behavioral: Negative for agitation, behavioral problems, confusion and sleep disturbance  Past Medical History:   Diagnosis Date    Cancer [facility])     Throat    Chronic pain disorder     Diabetes mellitus ([place] [address] )     Diffuse large B cell lymphoma ([place] [address] )     Dysphagia     GERD without esophagitis     HTN (hypertension)     Hyperlipidemia     Hypertension     MI (myocardial infarction) ([place] [address] )        Past Surgical History:   Procedure Laterality Date    BONE MARROW BIOPSY      BREAST LUMPECTOMY      CHOLECYSTECTOMY      IR PORT PLACEMENT  11/16/2018    OTHER SURGICAL HISTORY      tendor tear repair to right shoulder    SHOULDER ARTHROSCOPY         Social History     Socioeconomic History    Marital status:       Spouse name: None    Number of children: None    Years of education: None    Highest education level: None   Occupational History    None   Social Needs    Financial resource strain: None    Food insecurity:     Worry: None     Inability: None    Transportation needs:     Medical: None Non-medical: None   Tobacco Use    Smoking status: Never Smoker    Smokeless tobacco: Never Used   Substance and Sexual Activity    Alcohol use: No    Drug use: No    Sexual activity: None   Lifestyle    Physical activity:     Days per week: None     Minutes per session: None    Stress: None   Relationships    Social connections:     Talks on phone: None     Gets together: None     Attends Church service: None     Active member of club or organization: None     Attends meetings of clubs or organizations: None     Relationship status: None    Intimate partner violence:     Fear of current or ex partner: None     Emotionally abused: None     Physically abused: None     Forced sexual activity: None   Other Topics Concern    None   Social History Narrative    None       Family History   Problem Relation Age of Onset    Diabetes Mother     Heart disease Sister     Hypertension Brother     Cancer Maternal Grandmother     Cancer Maternal Grandfather        No Known Allergies      Current Outpatient Medications:     albuterol (PROVENTIL HFA,VENTOLIN HFA) 90 mcg/act inhaler, Inhale 2 puffs every 4 (four) hours as needed for wheezing, Disp: 1 Inhaler, Rfl: 0    amLODIPine (NORVASC) 5 mg tablet, Take 1 tablet (5 mg total) by mouth daily, Disp: 90 tablet, Rfl: 1    benzonatate (TESSALON PERLES) 100 mg capsule, Take 1 capsule (100 mg total) by mouth every 8 (eight) hours, Disp: 21 capsule, Rfl: 0    Blood Glucose Monitoring Suppl (FREESTYLE LITE) JORDAN, Check blood sugar twice a day, Disp: 1 each, Rfl: 0    carvedilol (COREG) 12 5 mg tablet, Take 1 tablet (12 5 mg total) by mouth 2 (two) times a day with meals, Disp: 180 tablet, Rfl: 1    ergocalciferol (VITAMIN D2) 50,000 units, Take 50,000 Units by mouth once a week , Disp: , Rfl:     ezetimibe (ZETIA) 10 mg tablet, Take 1 tablet (10 mg total) by mouth daily, Disp: 90 tablet, Rfl: 1    gabapentin (NEURONTIN) 100 mg capsule, Take 1 capsule (100 mg total) by mouth daily at bedtime, Disp: 30 capsule, Rfl: 1    glipiZIDE (GLUCOTROL) 10 mg tablet, Take 1 tablet (10 mg total) by mouth 2 (two) times a day before meals, Disp: 180 tablet, Rfl: 0    glucose blood (FREESTYLE LITE) test strip, Check blood sugar twice a day, Disp: 100 each, Rfl: 5    hydrOXYzine HCL (ATARAX) 50 mg tablet, Take 1 tablet (50 mg total) by mouth 3 (three) times a day as needed for anxiety (and insomnia), Disp: 90 tablet, Rfl: 2    Lancets (FREESTYLE) lancets, Check blood sugar twice a day , Disp: 100 each, Rfl: 5    lidocaine viscous (XYLOCAINE) 2 % mucosal solution, Swish and swallow 10 mL 3 (three) times a day as needed for mild pain or moderate pain, Disp: 100 mL, Rfl: 2    loperamide (IMODIUM) 2 mg capsule, Take 1 capsule (2 mg total) by mouth as needed for diarrhea Every 1-2 hours as needed for diarrhea, Disp: 60 capsule, Rfl: 1    losartan (COZAAR) 25 mg tablet, Take 1 tablet (25 mg total) by mouth daily, Disp: 90 tablet, Rfl: 1    omeprazole (PriLOSEC) 40 MG capsule, Take 1 capsule (40 mg total) by mouth 2 (two) times a day, Disp: 60 capsule, Rfl: 1    ondansetron (ZOFRAN-ODT) 8 mg disintegrating tablet, Take 1 tablet (8 mg total) by mouth every 8 (eight) hours as needed for nausea or vomiting, Disp: 90 tablet, Rfl: 0    predniSONE 20 mg tablet, Take 20 mg by mouth daily Take 5 tablets by mouth every day for 5 days every 3 weeks with chemotherapy, Disp: , Rfl:     prochlorperazine (COMPAZINE) 10 mg tablet, Take 1 tablet (10 mg total) by mouth every 6 (six) hours as needed for nausea or vomiting, Disp: 30 tablet, Rfl: 1    ranitidine (ZANTAC) 300 MG tablet, Take 1 tablet (300 mg total) by mouth daily, Disp: 90 tablet, Rfl: 1    sitaGLIPtin-metFORMIN (JANUMET)  MG per tablet, Take 1 tablet by mouth 2 (two) times a day with meals, Disp: 180 tablet, Rfl: 0  No current facility-administered medications for this visit       Facility-Administered Medications Ordered in Other Visits:     acetaminophen (TYLENOL) tablet 650 mg, 650 mg, Oral, Once, Andrew Noel MD    cyclophosphamide (CYTOXAN) 1,185 mg in sodium chloride 0 9 % 250 mL IVPB, 1,185 mg, Intravenous, Once, Andrew Noel MD    diphenhydrAMINE (BENADRYL) 50 mg in sodium chloride 0 9 % 50 mL IVPB, 50 mg, Intravenous, Once, Andrew Noel MD    DOXOrubicin (ADRIAMYCIN) injection 79 mg, 79 mg, Intravenous, Once, Andrew Noel MD    fosaprepitant (EMEND) 150 mg in sodium chloride 0 9 % 250 mL IVPB, 150 mg, Intravenous, Once, Andrew Noel MD    ondansetron (ZOFRAN) 16 mg, dexamethasone (DECADRON) 10 mg in sodium chloride 0 9 % 50 mL IVPB, , Intravenous, Once, Andrew Noel MD    riTUXimab (RITUXAN) 592 mg in sodium chloride 0 9 % 236 8 mL subsequent titrated chemo infusion, 592 mg, Intravenous, Once, Andrew Noel MD    sodium chloride 0 9 % infusion, 20 mL/hr, Intravenous, Once, Andrew Noel MD    vinCRIStine (ONCOVIN) 2 mg in sodium chloride 0 9 % 50 mL chemo infusion, 2 mg, Intravenous, Once, Andrew Noel MD      Physical Exam:  /60 (BP Location: Left arm, Cuff Size: Adult)   Pulse 82   Temp 98 9 °F (37 2 °C) (Tympanic)   Resp 18   Ht 4' 11 02" (1 499 m)   Wt 62 3 kg (137 lb 6 4 oz)   SpO2 95%   BMI 27 73 kg/m²     Physical Exam   Constitutional: She is oriented to person, place, and time  She appears well-developed and well-nourished  No distress  HENT:   Head: Normocephalic and atraumatic  Nose: Nose normal    Mouth/Throat: Oropharynx is clear and moist    Eyes: Pupils are equal, round, and reactive to light  Conjunctivae and EOM are normal  Right eye exhibits no discharge  Left eye exhibits no discharge  No scleral icterus  Neck: Normal range of motion  Neck supple  No JVD present  No tracheal deviation present  No thyromegaly present  Cardiovascular: Normal rate, regular rhythm and normal heart sounds  Exam reveals no friction rub  No murmur heard    Pulmonary/Chest: Effort normal and breath sounds normal  No stridor  No respiratory distress  She has no wheezes  She has no rales  She exhibits no tenderness  Abdominal: Soft  Bowel sounds are normal  She exhibits no distension and no mass  There is no hepatosplenomegaly, splenomegaly or hepatomegaly  There is no tenderness  There is no rebound and no guarding  Musculoskeletal: Normal range of motion  She exhibits no edema, tenderness or deformity  Lymphadenopathy:     She has no cervical adenopathy  Neurological: She is alert and oriented to person, place, and time  She has normal reflexes  No cranial nerve deficit  Coordination normal    Skin: Skin is warm and dry  No rash noted  She is not diaphoretic  No erythema  No pallor  Psychiatric: She has a normal mood and affect  Her behavior is normal  Judgment and thought content normal          Labs:  Lab Results   Component Value Date    WBC 11 70 (H) 02/11/2019    HGB 11 1 (L) 02/11/2019    HCT 34 7 (L) 02/11/2019    MCV 95 02/11/2019     (H) 02/11/2019     Lab Results   Component Value Date    K 4 2 02/11/2019     02/11/2019    CO2 28 02/11/2019    BUN 15 02/11/2019    CREATININE 0 55 (L) 02/11/2019    GLUF 100 (H) 02/11/2019    CALCIUM 9 4 02/11/2019    AST 24 02/11/2019    ALT 26 02/11/2019    ALKPHOS 114 02/11/2019    EGFR 94 02/11/2019     No results found for: TSH    Patient voiced understanding and agreement in the above discussion  Aware to contact our office with questions/symptoms in the interim

## 2019-02-13 NOTE — PROGRESS NOTES
TPA effective after 40 minutes  Pt tolerated treatment without any adverse reaction  Port flushed and de-accessed per routine  Excellent blood return pre and post adriamycin  Provided avs to pt  Awaiting family for dc

## 2019-02-13 NOTE — PROGRESS NOTES
Pt admitted to infusion department today for chemotherapy  Port accessed without difficulty  No blood return noted  tpa instilled via port  Pt without any changes in medical status per daughter ()  No falls recently "which is not normal for her"  Encouraged use of walker which pt refuses to use for balance

## 2019-02-14 ENCOUNTER — HOSPITAL ENCOUNTER (OUTPATIENT)
Dept: INFUSION CENTER | Facility: HOSPITAL | Age: 74
Discharge: HOME | End: 2019-02-14
Payer: COMMERCIAL

## 2019-02-14 VITALS — TEMPERATURE: 98 F

## 2019-02-14 PROCEDURE — 96372 THER/PROPH/DIAG INJ SC/IM: CPT

## 2019-02-14 RX ADMIN — PEGFILGRASTIM 6 MG: 6 INJECTION SUBCUTANEOUS at 14:07

## 2019-02-15 LAB
ALBUMIN SERPL ELPH-MCNC: 3.76 G/DL (ref 3.5–5)
ALBUMIN SERPL ELPH-MCNC: 57.9 % (ref 52–65)
ALPHA1 GLOB SERPL ELPH-MCNC: 0.31 G/DL (ref 0.1–0.4)
ALPHA1 GLOB SERPL ELPH-MCNC: 4.7 % (ref 2.5–5)
ALPHA2 GLOB SERPL ELPH-MCNC: 0.89 G/DL (ref 0.4–1.2)
ALPHA2 GLOB SERPL ELPH-MCNC: 13.7 % (ref 7–13)
BETA GLOB ABNORMAL SERPL ELPH-MCNC: 0.42 G/DL (ref 0.4–0.8)
BETA1 GLOB SERPL ELPH-MCNC: 6.4 % (ref 5–13)
BETA2 GLOB SERPL ELPH-MCNC: 5.3 % (ref 2–8)
BETA2+GAMMA GLOB SERPL ELPH-MCNC: 0.34 G/DL (ref 0.2–0.5)
GAMMA GLOB ABNORMAL SERPL ELPH-MCNC: 0.78 G/DL (ref 0.5–1.6)
GAMMA GLOB SERPL ELPH-MCNC: 12 % (ref 12–22)
IGG/ALB SER: 1.38 {RATIO} (ref 1.1–1.8)
PROT PATTERN SERPL ELPH-IMP: ABNORMAL
PROT SERPL-MCNC: 6.5 G/DL (ref 6.4–8.2)

## 2019-02-21 ENCOUNTER — APPOINTMENT (EMERGENCY)
Dept: RADIOLOGY | Facility: HOSPITAL | Age: 74
DRG: 640 | End: 2019-02-21
Payer: COMMERCIAL

## 2019-02-21 ENCOUNTER — HOSPITAL ENCOUNTER (INPATIENT)
Facility: HOSPITAL | Age: 74
LOS: 2 days | Discharge: HOME | DRG: 640 | End: 2019-02-23
Attending: EMERGENCY MEDICINE | Admitting: INTERNAL MEDICINE
Payer: COMMERCIAL

## 2019-02-21 DIAGNOSIS — R53.1 WEAKNESS: ICD-10-CM

## 2019-02-21 DIAGNOSIS — R11.0 NAUSEA: ICD-10-CM

## 2019-02-21 DIAGNOSIS — Z51.5 PALLIATIVE CARE PATIENT: ICD-10-CM

## 2019-02-21 DIAGNOSIS — C83.31 DIFFUSE LARGE B-CELL LYMPHOMA OF LYMPH NODES OF NECK (HCC): ICD-10-CM

## 2019-02-21 DIAGNOSIS — R19.7 DIARRHEA: Primary | ICD-10-CM

## 2019-02-21 LAB
ALBUMIN SERPL BCP-MCNC: 3.7 G/DL (ref 3–5.2)
ALP SERPL-CCNC: 81 U/L (ref 43–122)
ALT SERPL W P-5'-P-CCNC: 26 U/L (ref 9–52)
ANION GAP SERPL CALCULATED.3IONS-SCNC: 11 MMOL/L (ref 5–14)
ANISOCYTOSIS BLD QL SMEAR: PRESENT
APTT PPP: 32 SECONDS (ref 23–34)
AST SERPL W P-5'-P-CCNC: 16 U/L (ref 14–36)
BACTERIA UR QL AUTO: ABNORMAL /HPF
BILIRUB SERPL-MCNC: 0.3 MG/DL
BILIRUB UR QL STRIP: NEGATIVE
BUN SERPL-MCNC: 12 MG/DL (ref 5–25)
CALCIUM SERPL-MCNC: 9.4 MG/DL (ref 8.4–10.2)
CHLORIDE SERPL-SCNC: 94 MMOL/L (ref 97–108)
CLARITY UR: CLEAR
CO2 SERPL-SCNC: 26 MMOL/L (ref 22–30)
COLOR UR: ABNORMAL
CREAT SERPL-MCNC: 0.86 MG/DL (ref 0.6–1.2)
DOHLE BOD BLD QL SMEAR: PRESENT
EOSINOPHIL # BLD AUTO: 0.37 THOUSAND/UL (ref 0–0.4)
EOSINOPHIL NFR BLD MANUAL: 6 % (ref 0–6)
ERYTHROCYTE [DISTWIDTH] IN BLOOD BY AUTOMATED COUNT: 16.8 %
FLUAV + FLUBV RNA ISLT NAA+PROBE: NOT DETECTED
FLUAV + FLUBV RNA ISLT NAA+PROBE: NOT DETECTED
GFR SERPL CREATININE-BSD FRML MDRD: 68 ML/MIN/1.73SQ M
GLUCOSE SERPL-MCNC: 169 MG/DL (ref 65–140)
GLUCOSE SERPL-MCNC: 192 MG/DL (ref 70–99)
GLUCOSE SERPL-MCNC: 229 MG/DL (ref 65–140)
GLUCOSE SERPL-MCNC: 65 MG/DL (ref 65–140)
GLUCOSE UR STRIP-MCNC: NEGATIVE MG/DL
HCT VFR BLD AUTO: 30.7 % (ref 36–46)
HGB BLD-MCNC: 10.1 G/DL (ref 12–16)
HGB UR QL STRIP.AUTO: 10
HYALINE CASTS #/AREA URNS LPF: ABNORMAL /LPF
INR PPP: 1.04 (ref 0.89–1.1)
KETONES UR STRIP-MCNC: NEGATIVE MG/DL
LACTATE SERPL-SCNC: 1.4 MMOL/L (ref 0.7–2)
LACTATE SERPL-SCNC: 2.9 MMOL/L (ref 0.7–2)
LEUKOCYTE ESTERASE UR QL STRIP: 100
LYMPHOCYTES # BLD AUTO: 1.1 THOUSAND/UL (ref 0.5–4)
LYMPHOCYTES # BLD AUTO: 18 % (ref 25–45)
MCH RBC QN AUTO: 32.1 PG (ref 26–34)
MCHC RBC AUTO-ENTMCNC: 32.9 G/DL (ref 31–36)
MCV RBC AUTO: 98 FL (ref 80–100)
METAMYELOCYTES NFR BLD MANUAL: 2 % (ref 0–1)
MONOCYTES # BLD AUTO: 0.85 THOUSAND/UL (ref 0.2–0.9)
MONOCYTES NFR BLD AUTO: 14 % (ref 1–10)
MYELOCYTES NFR BLD MANUAL: 3 % (ref 0–1)
NEUTS BAND NFR BLD MANUAL: 38 % (ref 0–8)
NEUTS SEG # BLD: 3.36 THOUSAND/UL (ref 1.8–7.8)
NEUTS SEG NFR BLD AUTO: 17 %
NITRITE UR QL STRIP: NEGATIVE
NON-SQ EPI CELLS URNS QL MICRO: ABNORMAL /HPF
PH UR STRIP.AUTO: 5 [PH] (ref 4.5–8)
PLATELET # BLD AUTO: 149 THOUSANDS/UL (ref 150–450)
PLATELET BLD QL SMEAR: ABNORMAL
PMV BLD AUTO: 8.9 FL (ref 8.9–12.7)
POLYCHROMASIA BLD QL SMEAR: PRESENT
POTASSIUM SERPL-SCNC: 4 MMOL/L (ref 3.6–5)
PROMYELOCYTES NFR BLD MANUAL: 2 % (ref 0–0)
PROT SERPL-MCNC: 6.4 G/DL (ref 5.9–8.4)
PROT UR STRIP-MCNC: ABNORMAL MG/DL
PROTHROMBIN TIME: 11 SECONDS (ref 9.5–11.6)
RBC # BLD AUTO: 3.15 MILLION/UL (ref 4–5.2)
RBC #/AREA URNS AUTO: ABNORMAL /HPF
RBC MORPH BLD: ABNORMAL
SODIUM SERPL-SCNC: 131 MMOL/L (ref 137–147)
SP GR UR STRIP.AUTO: 1.01 (ref 1–1.04)
TOTAL CELLS COUNTED SPEC: 100
TOXIC GRANULES BLD QL SMEAR: PRESENT
TROPONIN I SERPL-MCNC: <0.01 NG/ML (ref 0–0.03)
UROBILINOGEN UA: NEGATIVE MG/DL
WBC # BLD AUTO: 6.1 THOUSAND/UL (ref 4.5–11)
WBC #/AREA URNS AUTO: ABNORMAL /HPF

## 2019-02-21 PROCEDURE — 82948 REAGENT STRIP/BLOOD GLUCOSE: CPT

## 2019-02-21 PROCEDURE — 81003 URINALYSIS AUTO W/O SCOPE: CPT | Performed by: EMERGENCY MEDICINE

## 2019-02-21 PROCEDURE — 93005 ELECTROCARDIOGRAM TRACING: CPT

## 2019-02-21 PROCEDURE — 87040 BLOOD CULTURE FOR BACTERIA: CPT | Performed by: EMERGENCY MEDICINE

## 2019-02-21 PROCEDURE — 85007 BL SMEAR W/DIFF WBC COUNT: CPT | Performed by: EMERGENCY MEDICINE

## 2019-02-21 PROCEDURE — 36415 COLL VENOUS BLD VENIPUNCTURE: CPT | Performed by: EMERGENCY MEDICINE

## 2019-02-21 PROCEDURE — 80053 COMPREHEN METABOLIC PANEL: CPT | Performed by: EMERGENCY MEDICINE

## 2019-02-21 PROCEDURE — 99285 EMERGENCY DEPT VISIT HI MDM: CPT

## 2019-02-21 PROCEDURE — 85027 COMPLETE CBC AUTOMATED: CPT | Performed by: EMERGENCY MEDICINE

## 2019-02-21 PROCEDURE — 85730 THROMBOPLASTIN TIME PARTIAL: CPT | Performed by: EMERGENCY MEDICINE

## 2019-02-21 PROCEDURE — 99222 1ST HOSP IP/OBS MODERATE 55: CPT | Performed by: INTERNAL MEDICINE

## 2019-02-21 PROCEDURE — 87502 INFLUENZA DNA AMP PROBE: CPT | Performed by: EMERGENCY MEDICINE

## 2019-02-21 PROCEDURE — 84484 ASSAY OF TROPONIN QUANT: CPT | Performed by: EMERGENCY MEDICINE

## 2019-02-21 PROCEDURE — 96361 HYDRATE IV INFUSION ADD-ON: CPT

## 2019-02-21 PROCEDURE — 71045 X-RAY EXAM CHEST 1 VIEW: CPT

## 2019-02-21 PROCEDURE — 83605 ASSAY OF LACTIC ACID: CPT | Performed by: EMERGENCY MEDICINE

## 2019-02-21 PROCEDURE — 96360 HYDRATION IV INFUSION INIT: CPT

## 2019-02-21 PROCEDURE — 85610 PROTHROMBIN TIME: CPT | Performed by: EMERGENCY MEDICINE

## 2019-02-21 PROCEDURE — 81001 URINALYSIS AUTO W/SCOPE: CPT | Performed by: EMERGENCY MEDICINE

## 2019-02-21 RX ORDER — BENZONATATE 100 MG/1
100 CAPSULE ORAL EVERY 8 HOURS
Status: DISCONTINUED | OUTPATIENT
Start: 2019-02-21 | End: 2019-02-21

## 2019-02-21 RX ORDER — ALBUTEROL SULFATE 90 UG/1
2 AEROSOL, METERED RESPIRATORY (INHALATION) EVERY 4 HOURS PRN
Status: DISCONTINUED | OUTPATIENT
Start: 2019-02-21 | End: 2019-02-23 | Stop reason: HOSPADM

## 2019-02-21 RX ORDER — EZETIMIBE 10 MG/1
10 TABLET ORAL DAILY
Status: DISCONTINUED | OUTPATIENT
Start: 2019-02-22 | End: 2019-02-23 | Stop reason: HOSPADM

## 2019-02-21 RX ORDER — PANTOPRAZOLE SODIUM 40 MG/1
40 TABLET, DELAYED RELEASE ORAL
Status: DISCONTINUED | OUTPATIENT
Start: 2019-02-22 | End: 2019-02-23 | Stop reason: HOSPADM

## 2019-02-21 RX ORDER — ERGOCALCIFEROL 1.25 MG/1
50000 CAPSULE ORAL WEEKLY
Status: DISCONTINUED | OUTPATIENT
Start: 2019-02-21 | End: 2019-02-21

## 2019-02-21 RX ORDER — ONDANSETRON 4 MG/1
8 TABLET, ORALLY DISINTEGRATING ORAL EVERY 8 HOURS PRN
Status: DISCONTINUED | OUTPATIENT
Start: 2019-02-21 | End: 2019-02-23 | Stop reason: HOSPADM

## 2019-02-21 RX ORDER — FAMOTIDINE 20 MG/1
20 TABLET, FILM COATED ORAL
Status: DISCONTINUED | OUTPATIENT
Start: 2019-02-21 | End: 2019-02-23 | Stop reason: HOSPADM

## 2019-02-21 RX ORDER — PREDNISONE 20 MG/1
20 TABLET ORAL DAILY
Status: DISCONTINUED | OUTPATIENT
Start: 2019-02-22 | End: 2019-02-21

## 2019-02-21 RX ORDER — LOSARTAN POTASSIUM 25 MG/1
25 TABLET ORAL DAILY
Status: DISCONTINUED | OUTPATIENT
Start: 2019-02-22 | End: 2019-02-23 | Stop reason: HOSPADM

## 2019-02-21 RX ORDER — RANITIDINE HYDROCHLORIDE 15 MG/ML
150 SOLUTION ORAL
Status: DISCONTINUED | OUTPATIENT
Start: 2019-02-21 | End: 2019-02-21 | Stop reason: CLARIF

## 2019-02-21 RX ORDER — CARVEDILOL 12.5 MG/1
12.5 TABLET ORAL 2 TIMES DAILY WITH MEALS
Status: DISCONTINUED | OUTPATIENT
Start: 2019-02-21 | End: 2019-02-23 | Stop reason: HOSPADM

## 2019-02-21 RX ORDER — AMLODIPINE BESYLATE 5 MG/1
5 TABLET ORAL DAILY
Status: DISCONTINUED | OUTPATIENT
Start: 2019-02-22 | End: 2019-02-23 | Stop reason: HOSPADM

## 2019-02-21 RX ORDER — GABAPENTIN 100 MG/1
100 CAPSULE ORAL
Status: DISCONTINUED | OUTPATIENT
Start: 2019-02-21 | End: 2019-02-23 | Stop reason: HOSPADM

## 2019-02-21 RX ORDER — LOPERAMIDE HYDROCHLORIDE 2 MG/1
2 CAPSULE ORAL AS NEEDED
Status: DISCONTINUED | OUTPATIENT
Start: 2019-02-21 | End: 2019-02-23 | Stop reason: HOSPADM

## 2019-02-21 RX ORDER — SODIUM CHLORIDE 9 MG/ML
100 INJECTION, SOLUTION INTRAVENOUS CONTINUOUS
Status: DISCONTINUED | OUTPATIENT
Start: 2019-02-21 | End: 2019-02-23 | Stop reason: HOSPADM

## 2019-02-21 RX ADMIN — GABAPENTIN 100 MG: 100 CAPSULE ORAL at 21:53

## 2019-02-21 RX ADMIN — ONDANSETRON 8 MG: 4 TABLET, ORALLY DISINTEGRATING ORAL at 17:12

## 2019-02-21 RX ADMIN — FAMOTIDINE 20 MG: 20 TABLET ORAL at 21:53

## 2019-02-21 RX ADMIN — SODIUM CHLORIDE 500 ML: 9 INJECTION, SOLUTION INTRAVENOUS at 14:26

## 2019-02-21 RX ADMIN — INSULIN LISPRO 2 UNITS: 100 INJECTION, SOLUTION INTRAVENOUS; SUBCUTANEOUS at 21:54

## 2019-02-21 RX ADMIN — SODIUM CHLORIDE 500 ML: 9 INJECTION, SOLUTION INTRAVENOUS at 12:48

## 2019-02-21 RX ADMIN — SODIUM CHLORIDE 100 ML/HR: 9 INJECTION, SOLUTION INTRAVENOUS at 17:09

## 2019-02-21 RX ADMIN — LOPERAMIDE HYDROCHLORIDE 2 MG: 2 CAPSULE ORAL at 21:52

## 2019-02-21 NOTE — ASSESSMENT & PLAN NOTE
Patient long-standing for hypertension  Blood pressure is stable  Continue present medication  Avoid hypotension

## 2019-02-21 NOTE — ASSESSMENT & PLAN NOTE
Malnutrition Findings:     most probably secondary to malignancy and chemotherapy  P o  Intake to our  Diet patient consult  Add Ensure protein drinks       BMI Findings: Body mass index is 26 57 kg/m²

## 2019-02-21 NOTE — H&P
VTE Prophylaxis: Enoxaparin (Lovenox)  / sequential compression device   Code Status:  Full code  POLST:   Discussion with family:  Patient and granddaughter    Anticipated Length of Stay:  Patient will be admitted on an Inpatient basis with an anticipated length of stay of  2 midnight 2 midnights  Justification for Hospital Stay:  For diarrhea hypernatremia post chemotherapy    Total Time for Visit, including Counseling / Coordination of Care: 45 minutes  Greater than 50% of this total time spent on direct patient counseling and coordination of care  Chief Complaint:   Patient complained generalized weakness poor appetite and has started having nausea vomiting abdominal she is more abdominal discomfort and diarrhea vomiting is much improved but had multiple diarrhea yesterday today also since morning about 3 loose stool    History of Present Illness:    Adam Mae is a 68 y o  female who presents with generalized weakness and diarrhea found to be hypernatremia patient just received chemotherapy last week but afebrile discussed with the oncologist did not feel infection needs to watch for diarrhea and treat symptomatic not to start antibiotic unless blood culture come back abnormal patient is dehydrated IV fluid culture of stool bloody urine and treat symptomatic wait for culture  Review of Systems:    Review of Systems   Constitutional: Positive for activity change, appetite change and fatigue  Negative for chills and fever  HENT: Positive for mouth sores  Negative for hearing loss, sore throat and trouble swallowing  Eyes: Negative for photophobia, discharge and visual disturbance  Respiratory: Negative for chest tightness and shortness of breath  Cardiovascular: Negative for chest pain and palpitations  Gastrointestinal: Positive for diarrhea, nausea and vomiting  Negative for abdominal pain and blood in stool  Endocrine: Negative for polydipsia and polyuria  Genitourinary: Negative for difficulty urinating, dysuria, flank pain and hematuria  Musculoskeletal: Positive for myalgias  Negative for back pain and gait problem  Skin: Negative for rash  Allergic/Immunologic: Negative for environmental allergies and food allergies  Neurological: Positive for dizziness and weakness  Negative for seizures, syncope and headaches  Hematological: Positive for adenopathy  Does not bruise/bleed easily  Psychiatric/Behavioral: Negative for behavioral problems  The patient is nervous/anxious  All other systems reviewed and are negative  Past Medical and Surgical History:     Past Medical History:   Diagnosis Date    Cancer [facility])     Throat    Chronic pain disorder     Diabetes mellitus ([facility] )     Diffuse large B cell lymphoma ([place] [address] )     Dysphagia     GERD without esophagitis     HTN (hypertension)     Hyperlipidemia     Hypertension     MI (myocardial infarction) ([place] [address] )        Past Surgical History:   Procedure Laterality Date    BONE MARROW BIOPSY      BREAST LUMPECTOMY      CHOLECYSTECTOMY      IR PORT PLACEMENT  11/16/2018    OTHER SURGICAL HISTORY      tendor tear repair to right shoulder    SHOULDER ARTHROSCOPY         Meds/Allergies:    Prior to Admission medications    Medication Sig Start Date End Date Taking?  Authorizing Provider   albuterol (PROVENTIL HFA,VENTOLIN HFA) 90 mcg/act inhaler Inhale 2 puffs every 4 (four) hours as needed for wheezing 12/22/18   Sondra Aguilare, DO   amLODIPine (NORVASC) 5 mg tablet Take 1 tablet (5 mg total) by mouth daily 12/11/18   Saleem Mishra PA-C   benzonatate (TESSALON PERLES) 100 mg capsule Take 1 capsule (100 mg total) by mouth every 8 (eight) hours 12/22/18   Sondra Mcgowan, DO   Blood Glucose Monitoring Suppl (FREESTYLE LITE) JORDAN Check blood sugar twice a day 2/7/19   Saleem Mishra PA-C   carvedilol (COREG) 12 5 mg tablet Take 1 tablet (12 5 mg total) by mouth 2 (two) times a day with meals 2/7/19 Emerson Kothari PA-C   ergocalciferol (VITAMIN D2) 50,000 units Take 50,000 Units by mouth once a week  Historical Provider, MD   ezetimibe (ZETIA) 10 mg tablet Take 1 tablet (10 mg total) by mouth daily 12/11/18   Emerson Kothari PA-C   gabapentin (NEURONTIN) 100 mg capsule Take 1 capsule (100 mg total) by mouth daily at bedtime 1/28/19   Lizzette Wiley DO   glipiZIDE (GLUCOTROL) 10 mg tablet Take 1 tablet (10 mg total) by mouth 2 (two) times a day before meals 2/7/19   Emerson Kothari PA-C   glucose blood (FREESTYLE LITE) test strip Check blood sugar twice a day 2/7/19   Emerson Kothari PA-C   hydrOXYzine HCL (ATARAX) 50 mg tablet Take 1 tablet (50 mg total) by mouth 3 (three) times a day as needed for anxiety (and insomnia) 11/27/18   Emerson Kothari PA-C   Lancets (FREESTYLE) lancets Check blood sugar twice a day   2/7/19   Emerson Kothari PA-C   lidocaine viscous (XYLOCAINE) 2 % mucosal solution Swish and swallow 10 mL 3 (three) times a day as needed for mild pain or moderate pain 10/25/18   Tomi Frankel, MD   loperamide (IMODIUM) 2 mg capsule Take 1 capsule (2 mg total) by mouth as needed for diarrhea Every 1-2 hours as needed for diarrhea 11/12/18   MOIRA Felipe   losartan (COZAAR) 25 mg tablet Take 1 tablet (25 mg total) by mouth daily 12/11/18   Emerson Kothari PA-C   omeprazole (PriLOSEC) 40 MG capsule Take 1 capsule (40 mg total) by mouth 2 (two) times a day 2/6/19   MOIRA Felipe   ondansetron (ZOFRAN-ODT) 8 mg disintegrating tablet Take 1 tablet (8 mg total) by mouth every 8 (eight) hours as needed for nausea or vomiting 1/2/19   Dariel Bustos MD   predniSONE 20 mg tablet Take 20 mg by mouth daily Take 5 tablets by mouth every day for 5 days every 3 weeks with chemotherapy    Historical Provider, MD   prochlorperazine (COMPAZINE) 10 mg tablet Take 1 tablet (10 mg total) by mouth every 6 (six) hours as needed for nausea or vomiting 2/6/19   MOIRA Felipe   ranitidine (ZANTAC) 300 MG tablet Take 1 tablet (300 mg total) by mouth daily 12/11/18   Tiffany Liang PA-C   sitaGLIPtin-metFORMIN (JANUMET)  MG per tablet Take 1 tablet by mouth 2 (two) times a day with meals 2/7/19   Tiffany Liang PA-C     I have reviewed home medications with patient personally  Allergies: No Known Allergies    Social History:     Marital Status:    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  Patient Pre-hospital Living Situation:  With Family  Patient Pre-hospital Level of Mobility:  Limited activity  Patient Pre-hospital Diet Restrictions:  Diabetic  Substance Use History:   Social History     Substance and Sexual Activity   Alcohol Use No     Social History     Tobacco Use   Smoking Status Never Smoker   Smokeless Tobacco Never Used     Social History     Substance and Sexual Activity   Drug Use No       Family History:    non-contributory    Physical Exam:     Vitals:   Blood Pressure: 112/59 (02/21/19 1425)  Pulse: 91 (02/21/19 1425)  Temperature: 98 8 °F (37 1 °C) (02/21/19 1425)  Temp Source: Oral (02/21/19 1425)  Respirations: 20 (02/21/19 1425)  Height: 4' 11" (149 9 cm) (02/21/19 1153)  Weight - Scale: 59 7 kg (131 lb 9 oz) (02/21/19 1153)  SpO2: 97 % (02/21/19 1425)    Physical Exam   Constitutional: She is oriented to person, place, and time  She appears well-developed and well-nourished  HENT:   Head: Normocephalic and atraumatic  Right Ear: External ear normal    Left Ear: External ear normal    Mouth/Throat: Oropharynx is clear and moist    Eyes: Pupils are equal, round, and reactive to light  Conjunctivae and EOM are normal    Neck: Normal range of motion  Neck supple  Cardiovascular: Normal rate, regular rhythm, normal heart sounds and intact distal pulses  Pulmonary/Chest: Effort normal and breath sounds normal    Right chest wall port is placed no acute infection no tenderness no redness around area   Abdominal: Soft  Bowel sounds are normal  She exhibits no mass  There is no tenderness  There is no rebound and no guarding  Abdomen mild discomfort no acute guarding no tenderness organomegaly liver spleen not palpable   Genitourinary:   Genitourinary Comments: deferred   Musculoskeletal: Normal range of motion  Neurological: She is alert and oriented to person, place, and time  She has normal reflexes  Skin: Skin is warm and dry  No rash noted  Psychiatric: She has a normal mood and affect  Nursing note and vitals reviewed  Additional Data:     Lab Results: I have personally reviewed pertinent reports  Results from last 7 days   Lab Units 02/21/19  1241   WBC Thousand/uL 6 10   HEMOGLOBIN g/dL 10 1*   HEMATOCRIT % 30 7*   PLATELETS Thousands/uL 149*   BANDS PCT % 38*   LYMPHO PCT % 18*   MONO PCT % 14*   EOS PCT % 6     Results from last 7 days   Lab Units 02/21/19  1241   SODIUM mmol/L 131*   POTASSIUM mmol/L 4 0   CHLORIDE mmol/L 94*   CO2 mmol/L 26   BUN mg/dL 12   CREATININE mg/dL 0 86   ANION GAP mmol/L 11   CALCIUM mg/dL 9 4   ALBUMIN g/dL 3 7   TOTAL BILIRUBIN mg/dL 0 30   ALK PHOS U/L 81   ALT U/L 26   AST U/L 16   GLUCOSE RANDOM mg/dL 192*     Results from last 7 days   Lab Units 02/21/19  1241   INR  1 04             Results from last 7 days   Lab Units 02/21/19  1512 02/21/19  1241   LACTIC ACID mmol/L 1 4 2 9*       Imaging: I have personally reviewed pertinent reports  XR chest 1 view portable   Final Result by Leni Del Real MD (02/21 1514)      No acute cardiopulmonary disease  Workstation performed: [id number]             EKG, Pathology, and Other Studies Reviewed on Admission:   · EKG:  Normal sinus rhythm    Allscripts / Epic Records Reviewed: Yes     ** Please Note: This note has been constructed using a voice recognition system   **    H&P- Jeffy Walton Jaime [birth year], 68 y o  female MRN: [id number]    Unit/Bed#: ED 09 Encounter: [id number]    Primary Care Provider: Donzella Jeans, PA-C   Date and time admitted to hospital: 2/21/2019 11:47 AM        Severe protein-calorie malnutrition ([facility] [address] )  Assessment & Plan  Malnutrition Findings:     most probably secondary to malignancy and chemotherapy  P o  Intake to our  Diet patient consult  Add Ensure protein drinks       BMI Findings: Body mass index is 26 57 kg/m²  Oropharyngeal dysphagia  Assessment & Plan  Patient does use some mouthwash  I did not see on the tongue white coating  Will continue mouthwash    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)  Assessment & Plan  Patient diagnosed B-cell lymphoma biopsy neck lymph node  Under chemotherapy at present time she already received 4 courses of chemotherapy  Otherwise tolerating fairly well this week got sick generalized weakness poor p o  Take  And started diarrhea she had 3 for diarrhea yesterday least 5 diarrhea since morning  Denied abdominal no blood in the stool Will discuss with Dr Radha Smart if needed consult      Essential hypertension  Assessment & Plan  Patient long-standing for hypertension  Blood pressure is stable  Continue present medication  Avoid hypotension    Type 2 diabetes mellitus without complication, without long-term current use of insulin ([facility] [address] )  Assessment & Plan  Lab Results   Component Value Date    HGBA1C 9 3 (A) 02/07/2019    Patient never took insulin taking oral medication  At present time will put her on coverage  Keep blood sugar well controlled    No results for input(s): POCGLU in the last 72 hours      Blood Sugar Average: Last 72 hrs:      * Diarrhea  Assessment & Plan  Patient who is under chemotherapy for lymphoma does not feeling well for last few days last week received 4th chemotherapy  Appetite was poor nauseated and having diarrhea generalized weakness and tiredness  Granddaughter called Dr Radha Smart command to go to ER found to be dehydrated and generalized weakness and diarrhea  Slightly elevated leukocyte count  But afebrile discussed with oncologist was fell most probably secondary to chemotherapy  Blood culture urine culture order wait for culture report at present time afebrile elevated white count may be secondary to after chemotherapy Neulasta was given  Stool culture symptomatic treatment for diarrhea  IV fluid  Imodium p r n      Hyponatremiaresolved as of 11/22/2018  Assessment & Plan  Patient serum sodium 131  Will continue normal saline  Persist may need workup may be also secondary to diarrhea  Will give normal saline  Repeat in the morning if needed then needs to workup for hyp hypo natremia

## 2019-02-21 NOTE — ASSESSMENT & PLAN NOTE
Lab Results   Component Value Date    HGBA1C 9 3 (A) 02/07/2019    Patient never took insulin taking oral medication  At present time will put her on coverage  Keep blood sugar well controlled    No results for input(s): POCGLU in the last 72 hours      Blood Sugar Average: Last 72 hrs:

## 2019-02-21 NOTE — ED PROVIDER NOTES
History  Chief Complaint   Patient presents with    Pain With Breathing     pt states she has pain with breathing and diarrhea since yesterday  pt states she had chemo infusion therapy last Wednesday and has not been feeling well since then  73-year-old female presents with multiple complaints  She has a history of lymphoma and five days ago underwent her fifth round of chemotherapy  Since that time she has developed generalized weakness fatigue along with subjective fevers  She has a cough with diffuse chest discomfort and a sore throat  Fatigue   Severity:  Moderate  Onset quality:  Gradual  Timing:  Constant  Progression:  Worsening  Relieved by:  Nothing  Worsened by:  Nothing  Ineffective treatments:  None tried  Associated symptoms: anorexia, chest pain, cough, diarrhea, difficulty walking, fever (subjective), foul-smelling urine (Dark), lethargy, nausea, shortness of breath and vomiting (Once yesterday)    Associated symptoms: no abdominal pain, no aphasia, no dizziness, no dysphagia, no dysuria, no numbness in extremities, no falls, no frequency, no headaches, no loss of consciousness, no melena, no myalgias, no near-syncope and no syncope        Prior to Admission Medications   Prescriptions Last Dose Informant Patient Reported? Taking? Blood Glucose Monitoring Suppl (FREESTYLE LITE) JORDAN   No No   Sig: Check blood sugar twice a day   Lancets (FREESTYLE) lancets   No No   Sig: Check blood sugar twice a day     albuterol (PROVENTIL HFA,VENTOLIN HFA) 90 mcg/act inhaler   No No   Sig: Inhale 2 puffs every 4 (four) hours as needed for wheezing   amLODIPine (NORVASC) 5 mg tablet   No No   Sig: Take 1 tablet (5 mg total) by mouth daily   benzonatate (TESSALON PERLES) 100 mg capsule   No No   Sig: Take 1 capsule (100 mg total) by mouth every 8 (eight) hours   carvedilol (COREG) 12 5 mg tablet   No No   Sig: Take 1 tablet (12 5 mg total) by mouth 2 (two) times a day with meals   ergocalciferol (VITAMIN D2) 50,000 units   Yes No   Sig: Take 50,000 Units by mouth once a week    ezetimibe (ZETIA) 10 mg tablet   No No   Sig: Take 1 tablet (10 mg total) by mouth daily   gabapentin (NEURONTIN) 100 mg capsule   No No   Sig: Take 1 capsule (100 mg total) by mouth daily at bedtime   glipiZIDE (GLUCOTROL) 10 mg tablet   No No   Sig: Take 1 tablet (10 mg total) by mouth 2 (two) times a day before meals   glucose blood (FREESTYLE LITE) test strip   No No   Sig: Check blood sugar twice a day   hydrOXYzine HCL (ATARAX) 50 mg tablet   No No   Sig: Take 1 tablet (50 mg total) by mouth 3 (three) times a day as needed for anxiety (and insomnia)   lidocaine viscous (XYLOCAINE) 2 % mucosal solution   No No   Sig: Swish and swallow 10 mL 3 (three) times a day as needed for mild pain or moderate pain   loperamide (IMODIUM) 2 mg capsule   No No   Sig: Take 1 capsule (2 mg total) by mouth as needed for diarrhea Every 1-2 hours as needed for diarrhea   losartan (COZAAR) 25 mg tablet   No No   Sig: Take 1 tablet (25 mg total) by mouth daily   omeprazole (PriLOSEC) 40 MG capsule   No No   Sig: Take 1 capsule (40 mg total) by mouth 2 (two) times a day   ondansetron (ZOFRAN-ODT) 8 mg disintegrating tablet   No No   Sig: Take 1 tablet (8 mg total) by mouth every 8 (eight) hours as needed for nausea or vomiting   predniSONE 20 mg tablet   Yes No   Sig: Take 20 mg by mouth daily Take 5 tablets by mouth every day for 5 days every 3 weeks with chemotherapy   prochlorperazine (COMPAZINE) 10 mg tablet   No No   Sig: Take 1 tablet (10 mg total) by mouth every 6 (six) hours as needed for nausea or vomiting   ranitidine (ZANTAC) 300 MG tablet   No No   Sig: Take 1 tablet (300 mg total) by mouth daily   sitaGLIPtin-metFORMIN (JANUMET)  MG per tablet   No No   Sig: Take 1 tablet by mouth 2 (two) times a day with meals      Facility-Administered Medications: None       Past Medical History:   Diagnosis Date    [facility]     Throat    Chronic pain disorder     Diabetes mellitus ([facility] )     Diffuse large B cell lymphoma ([facility] )     Dysphagia     GERD without esophagitis     HTN (hypertension)     Hyperlipidemia     Hypertension     MI (myocardial infarction) ([facility] )        Past Surgical History:   Procedure Laterality Date    BONE MARROW BIOPSY      BREAST LUMPECTOMY      CHOLECYSTECTOMY      IR PORT PLACEMENT  11/16/2018    OTHER SURGICAL HISTORY      tendor tear repair to right shoulder    SHOULDER ARTHROSCOPY         Family History   Problem Relation Age of Onset    Diabetes Mother     Heart disease Sister     Hypertension Brother     Cancer Maternal Grandmother     Cancer Maternal Grandfather      I have reviewed and agree with the history as documented  Social History     Tobacco Use    Smoking status: Never Smoker    Smokeless tobacco: Never Used   Substance Use Topics    Alcohol use: No    Drug use: No        Review of Systems   Constitutional: Positive for fatigue and fever (subjective)  Negative for appetite change and chills  HENT: Positive for sore throat  Negative for postnasal drip, sinus pain and trouble swallowing  Eyes: Negative for redness and itching  Respiratory: Positive for cough and shortness of breath  Negative for chest tightness and wheezing  Cardiovascular: Positive for chest pain  Negative for leg swelling, syncope and near-syncope  Gastrointestinal: Positive for anorexia, diarrhea, nausea and vomiting (Once yesterday)  Negative for abdominal pain, constipation, dysphagia and melena  Endocrine: Negative  Genitourinary: Negative for difficulty urinating, dysuria and frequency  Musculoskeletal: Negative for back pain, falls and myalgias  Skin: Negative for rash  Allergic/Immunologic: Negative  Neurological: Negative for dizziness, loss of consciousness, numbness and headaches  Hematological: Negative  Psychiatric/Behavioral: Negative          Physical Exam  Physical Exam Constitutional: She is oriented to person, place, and time  She appears well-developed and well-nourished  HENT:   Head: Normocephalic and atraumatic  Nose: Nose normal    Mouth/Throat: Oropharynx is clear and moist    Eyes: Pupils are equal, round, and reactive to light  Conjunctivae and EOM are normal    Neck: Normal range of motion  Neck supple  Cardiovascular: Normal rate, regular rhythm and normal heart sounds  Pulmonary/Chest: Effort normal and breath sounds normal  No respiratory distress  She has no wheezes  Abdominal: Soft  Bowel sounds are normal  There is no tenderness  There is no guarding  Musculoskeletal: She exhibits no edema, tenderness or deformity  Neurological: She is alert and oriented to person, place, and time  Skin: Skin is warm and dry  Capillary refill takes less than 2 seconds  No rash noted  Psychiatric: She has a normal mood and affect  Her behavior is normal    Nursing note and vitals reviewed  Vital Signs  ED Triage Vitals [02/21/19 1153]   Temperature Pulse Respirations Blood Pressure SpO2   100 °F (37 8 °C) 95 14 141/83 95 %      Temp Source Heart Rate Source Patient Position - Orthostatic VS BP Location FiO2 (%)   Tympanic Monitor Sitting Left arm --      Pain Score       --           Vitals:    02/21/19 1153 02/21/19 1425   BP: 141/83    Pulse: 95 91   Patient Position - Orthostatic VS: Sitting Lying       Visual Acuity      ED Medications  Medications   sodium chloride 0 9 % bolus 500 mL (0 mL Intravenous Stopped 2/21/19 1348)   sodium chloride 0 9 % bolus 500 mL (500 mL Intravenous New Bag 2/21/19 1426)       Diagnostic Studies  Results Reviewed     Procedure Component Value Units Date/Time    Lactic acid, plasma [[id number]]  (Normal) Collected:  02/21/19 1512    Lab Status:  Final result Specimen:  Blood from Arm, Left Updated:  02/21/19 1527     LACTIC ACID 1 4 mmol/L     Narrative:       Result may be elevated if tourniquet was used during collection  Urine Microscopic [[id number]]  (Abnormal) Collected:  02/21/19 1356    Lab Status:  Final result Specimen:  Urine, Clean Catch Updated:  02/21/19 1446     RBC, UA 0-1 /hpf      WBC, UA 2-4 /hpf      Epithelial Cells Moderate /hpf      Bacteria, UA Occasional /hpf      Hyaline Casts, UA 1-2 /lpf     UA w Reflex to Microscopic w Reflex to Culture [[id number]]  (Abnormal) Collected:  02/21/19 1356    Lab Status:  Final result Specimen:  Urine, Clean Catch Updated:  02/21/19 1418     Color, UA Tami     Clarity, UA Clear     Specific Gravity, UA 1 015     pH, UA 5 0     Leukocytes,  0     Nitrite, UA Negative     Protein,  (2+) mg/dl      Glucose, UA Negative mg/dl      Ketones, UA Negative mg/dl      Bilirubin, UA Negative     Blood, UA 10 0     UROBILINOGEN UA Negative mg/dL     Troponin I [[id number]]  (Normal) Collected:  02/21/19 1241    Lab Status:  Final result Specimen:  Blood from Arm, Right Updated:  02/21/19 1338     Troponin I <0 01 ng/mL     Comprehensive metabolic panel [[id number]]  (Abnormal) Collected:  02/21/19 1241    Lab Status:  Final result Specimen:  Blood from Arm, Right Updated:  02/21/19 1323     Sodium 131 mmol/L      Potassium 4 0 mmol/L      Chloride 94 mmol/L      CO2 26 mmol/L      ANION GAP 11 mmol/L      BUN 12 mg/dL      Creatinine 0 86 mg/dL      Glucose 192 mg/dL      Calcium 9 4 mg/dL      AST 16 U/L      ALT 26 U/L      Alkaline Phosphatase 81 U/L      Total Protein 6 4 g/dL      Albumin 3 7 g/dL      Total Bilirubin 0 30 mg/dL      eGFR 68 ml/min/1 73sq m     Narrative:       National Kidney Disease Education Program recommendations are as follows:  GFR calculation is accurate only with a steady state creatinine  Chronic Kidney disease less than 60 ml/min/1 73 sq  meters  Kidney failure less than 15 ml/min/1 73 sq  meters      Rapid Flu-Viral RNA amplification [facility] HEART ONLY) [[id number]]  (Normal) Collected:  02/21/19 1244    Lab Status:  Final result Specimen: Nares from Nose Updated:  02/21/19 1313     INFLUENZA A AMPLIFIED RNA Not Detected     INFLUENZA B AMPLIFIED Not Detected    Lactic acid, plasma [[id number]]  (Abnormal) Collected:  02/21/19 1241    Lab Status:  Final result Specimen:  Blood from Arm, Right Updated:  02/21/19 1307     LACTIC ACID 2 9 mmol/L     Narrative:       Result may be elevated if tourniquet was used during collection  Protime-INR [[id number]]  (Normal) Collected:  02/21/19 1241    Lab Status:  Final result Specimen:  Blood from Arm, Right Updated:  02/21/19 1306     Protime 11 0 seconds      INR 1 04    APTT [[id number]]  (Normal) Collected:  02/21/19 1241    Lab Status:  Final result Specimen:  Blood from Arm, Right Updated:  02/21/19 1306     PTT 32 seconds     CBC and differential [[id number]]  (Abnormal) Collected:  02/21/19 1241    Lab Status:  Final result Specimen:  Blood from Arm, Right Updated:  02/21/19 1258     WBC 6 10 Thousand/uL      RBC 3 15 Million/uL      Hemoglobin 10 1 g/dL      Hematocrit 30 7 %      MCV 98 fL      MCH 32 1 pg      MCHC 32 9 g/dL      RDW 16 8 %      MPV 8 9 fL      Platelets 519 Thousands/uL     Blood culture #2 [[id number]] Collected:  02/21/19 1249    Lab Status: In process Specimen:  Blood from Arm, Left Updated:  02/21/19 1253    Blood culture #1 [[id number]] Collected:  02/21/19 1244    Lab Status: In process Specimen:  Blood from Arm, Right Updated:  02/21/19 1250                 XR chest 1 view portable   Final Result by Georgette Rollins MD (02/21 0871)      No acute cardiopulmonary disease  Workstation performed: [id number]                    Procedures  ECG 12 Lead Documentation  Date/Time: 2/21/2019 1:05 PM  Performed by: Marcos Lambert DO  Authorized by:  Marcos Lambert DO     Rate:     ECG rate:  103    ECG rate assessment: tachycardic    Rhythm:     Rhythm: sinus tachycardia    QRS:     QRS axis:  Normal  Conduction:     Conduction: normal    ST segments:     ST segments: Normal  T waves:     T waves: flattening             Phone Contacts  ED Phone Contact    ED Course  ED Course as of Feb 21 1530   u Feb 21, 2019   1402 Discussed with Dr Sera Jaeger  He reports that the patient has been responding very well to her treatments  Patient also received a dose of Neulasta  He states that since the patient is not neutropenic that he would hold off on any antibiotics unless there is a clear indication to initiate them  MDM      Disposition  Final diagnoses:   Diarrhea   Nausea   Weakness     Time reflects when diagnosis was documented in both MDM as applicable and the Disposition within this note     Time User Action Codes Description Comment    2/21/2019  3:17 PM Yesenia Mishel Add [R19 7] Diarrhea     2/21/2019  3:18 PM Yesenia Mishel Add [R11 0] Nausea     2/21/2019  3:18 PM Yesenia Florentino Add [R53 1] Weakness       ED Disposition     ED Disposition Condition Date/Time Comment    Admit Stable Thu Feb 21, 2019  3:17 PM Case was discussed with Dr Davey Maki and the patient's admission status was agreed to be Admission Status: inpatient status to the service of Dr Davey Maki   Follow-up Information    None         Patient's Medications   Discharge Prescriptions    No medications on file     No discharge procedures on file      ED Provider  Electronically Signed by           Vinny Dutton DO  02/21/19 6121

## 2019-02-21 NOTE — ED NOTES
Pt stating she is feeling nauseous   Ate half of [address], RN  02/21/19 3823

## 2019-02-21 NOTE — ASSESSMENT & PLAN NOTE
Patient diagnosed B-cell lymphoma biopsy neck lymph node  Under chemotherapy at present time she already received 4 courses of chemotherapy  Otherwise tolerating fairly well this week got sick generalized weakness poor p o   Take  And started diarrhea she had 3 for diarrhea yesterday least 5 diarrhea since morning  Denied abdominal no blood in the stool Will discuss with Dr Fatimah Kuo if needed consult

## 2019-02-21 NOTE — ASSESSMENT & PLAN NOTE
Patient serum sodium 131  Will continue normal saline  Persist may need workup may be also secondary to diarrhea  Will give normal saline  Repeat in the morning if needed then needs to workup for hyp hypo natremia

## 2019-02-21 NOTE — ASSESSMENT & PLAN NOTE
Patient who is under chemotherapy for lymphoma does not feeling well for last few days last week received 4th chemotherapy  Appetite was poor nauseated and having diarrhea generalized weakness and tiredness  Granddaughter called Dr Mariam Hernandez command to go to ER found to be dehydrated and generalized weakness and diarrhea  Slightly elevated leukocyte count  But afebrile discussed with oncologist was fell most probably secondary to chemotherapy  Blood culture urine culture order wait for culture report at present time afebrile elevated white count may be secondary to after chemotherapy Neulasta was given  Stool culture symptomatic treatment for diarrhea  IV fluid  Imodium p r n

## 2019-02-22 LAB
ANION GAP SERPL CALCULATED.3IONS-SCNC: 5 MMOL/L (ref 5–14)
ANISOCYTOSIS BLD QL SMEAR: PRESENT
ATRIAL RATE: 103 BPM
BASOPHILS # BLD AUTO: 0.23 THOUSAND/UL (ref 0–0.1)
BASOPHILS NFR MAR MANUAL: 2 % (ref 0–1)
BUN SERPL-MCNC: 5 MG/DL (ref 5–25)
CALCIUM SERPL-MCNC: 9 MG/DL (ref 8.4–10.2)
CHLORIDE SERPL-SCNC: 100 MMOL/L (ref 97–108)
CO2 SERPL-SCNC: 28 MMOL/L (ref 22–30)
CREAT SERPL-MCNC: 0.5 MG/DL (ref 0.6–1.2)
EOSINOPHIL # BLD AUTO: 0.23 THOUSAND/UL (ref 0–0.4)
EOSINOPHIL NFR BLD MANUAL: 2 % (ref 0–6)
ERYTHROCYTE [DISTWIDTH] IN BLOOD BY AUTOMATED COUNT: 17.1 %
GFR SERPL CREATININE-BSD FRML MDRD: 97 ML/MIN/1.73SQ M
GLUCOSE SERPL-MCNC: 111 MG/DL (ref 70–99)
GLUCOSE SERPL-MCNC: 139 MG/DL (ref 65–140)
GLUCOSE SERPL-MCNC: 229 MG/DL (ref 65–140)
GLUCOSE SERPL-MCNC: 235 MG/DL (ref 65–140)
HCT VFR BLD AUTO: 26.5 % (ref 36–46)
HGB BLD-MCNC: 8.5 G/DL (ref 12–16)
LG PLATELETS BLD QL SMEAR: PRESENT
LYMPHOCYTES # BLD AUTO: 0.91 THOUSAND/UL (ref 0.5–4)
LYMPHOCYTES # BLD AUTO: 8 % (ref 25–45)
MAGNESIUM SERPL-MCNC: 1.5 MG/DL (ref 1.6–2.3)
MCH RBC QN AUTO: 30.9 PG (ref 26–34)
MCHC RBC AUTO-ENTMCNC: 31.9 G/DL (ref 31–36)
MCV RBC AUTO: 97 FL (ref 80–100)
METAMYELOCYTES NFR BLD MANUAL: 3 % (ref 0–1)
MONOCYTES # BLD AUTO: 1.82 THOUSAND/UL (ref 0.2–0.9)
MONOCYTES NFR BLD AUTO: 16 % (ref 1–10)
MYELOCYTES NFR BLD MANUAL: 1 % (ref 0–1)
NEUTS BAND NFR BLD MANUAL: 32 % (ref 0–8)
NEUTS SEG # BLD: 7.75 THOUSAND/UL (ref 1.8–7.8)
NEUTS SEG NFR BLD AUTO: 36 %
P AXIS: 58 DEGREES
PLATELET # BLD AUTO: 167 THOUSANDS/UL (ref 150–450)
PLATELET BLD QL SMEAR: ADEQUATE
PMV BLD AUTO: 9.1 FL (ref 8.9–12.7)
POLYCHROMASIA BLD QL SMEAR: PRESENT
POTASSIUM SERPL-SCNC: 3.5 MMOL/L (ref 3.6–5)
PR INTERVAL: 118 MS
QRS AXIS: 37 DEGREES
QRSD INTERVAL: 76 MS
QT INTERVAL: 338 MS
QTC INTERVAL: 442 MS
RBC # BLD AUTO: 2.73 MILLION/UL (ref 4–5.2)
RBC MORPH BLD: ABNORMAL
SODIUM SERPL-SCNC: 133 MMOL/L (ref 137–147)
T WAVE AXIS: 34 DEGREES
TOTAL CELLS COUNTED SPEC: 100
TOXIC GRANULES BLD QL SMEAR: PRESENT
VENTRICULAR RATE: 103 BPM
WBC # BLD AUTO: 11.4 THOUSAND/UL (ref 4.5–11)

## 2019-02-22 PROCEDURE — 82948 REAGENT STRIP/BLOOD GLUCOSE: CPT

## 2019-02-22 PROCEDURE — 85007 BL SMEAR W/DIFF WBC COUNT: CPT | Performed by: INTERNAL MEDICINE

## 2019-02-22 PROCEDURE — 85027 COMPLETE CBC AUTOMATED: CPT | Performed by: INTERNAL MEDICINE

## 2019-02-22 PROCEDURE — 99232 SBSQ HOSP IP/OBS MODERATE 35: CPT | Performed by: INTERNAL MEDICINE

## 2019-02-22 PROCEDURE — 83735 ASSAY OF MAGNESIUM: CPT | Performed by: INTERNAL MEDICINE

## 2019-02-22 PROCEDURE — 93010 ELECTROCARDIOGRAM REPORT: CPT | Performed by: INTERNAL MEDICINE

## 2019-02-22 PROCEDURE — G8979 MOBILITY GOAL STATUS: HCPCS

## 2019-02-22 PROCEDURE — 80048 BASIC METABOLIC PNL TOTAL CA: CPT | Performed by: INTERNAL MEDICINE

## 2019-02-22 PROCEDURE — 97162 PT EVAL MOD COMPLEX 30 MIN: CPT

## 2019-02-22 PROCEDURE — G8978 MOBILITY CURRENT STATUS: HCPCS

## 2019-02-22 RX ORDER — POTASSIUM CHLORIDE 20 MEQ/1
40 TABLET, EXTENDED RELEASE ORAL ONCE
Status: COMPLETED | OUTPATIENT
Start: 2019-02-22 | End: 2019-02-22

## 2019-02-22 RX ORDER — ACETAMINOPHEN 325 MG/1
650 TABLET ORAL EVERY 4 HOURS PRN
Status: DISCONTINUED | OUTPATIENT
Start: 2019-02-22 | End: 2019-02-23 | Stop reason: HOSPADM

## 2019-02-22 RX ADMIN — INSULIN LISPRO 2 UNITS: 100 INJECTION, SOLUTION INTRAVENOUS; SUBCUTANEOUS at 16:44

## 2019-02-22 RX ADMIN — SODIUM CHLORIDE 100 ML/HR: 9 INJECTION, SOLUTION INTRAVENOUS at 18:35

## 2019-02-22 RX ADMIN — POTASSIUM CHLORIDE 40 MEQ: 20 TABLET, EXTENDED RELEASE ORAL at 12:52

## 2019-02-22 RX ADMIN — SODIUM CHLORIDE 100 ML/HR: 9 INJECTION, SOLUTION INTRAVENOUS at 06:01

## 2019-02-22 RX ADMIN — CARVEDILOL 12.5 MG: 12.5 TABLET, FILM COATED ORAL at 16:44

## 2019-02-22 RX ADMIN — EZETIMIBE 10 MG: 10 TABLET ORAL at 08:48

## 2019-02-22 RX ADMIN — AMLODIPINE BESYLATE 5 MG: 5 TABLET ORAL at 08:48

## 2019-02-22 RX ADMIN — ACETAMINOPHEN 650 MG: 325 TABLET ORAL at 17:31

## 2019-02-22 RX ADMIN — GABAPENTIN 100 MG: 100 CAPSULE ORAL at 21:58

## 2019-02-22 RX ADMIN — FAMOTIDINE 20 MG: 20 TABLET ORAL at 21:58

## 2019-02-22 RX ADMIN — INSULIN LISPRO 2 UNITS: 100 INJECTION, SOLUTION INTRAVENOUS; SUBCUTANEOUS at 11:53

## 2019-02-22 RX ADMIN — LIDOCAINE HYDROCHLORIDE 10 ML: 20 SOLUTION ORAL; TOPICAL at 08:50

## 2019-02-22 RX ADMIN — LOSARTAN POTASSIUM 25 MG: 25 TABLET, FILM COATED ORAL at 08:48

## 2019-02-22 RX ADMIN — CARVEDILOL 12.5 MG: 12.5 TABLET, FILM COATED ORAL at 08:47

## 2019-02-22 RX ADMIN — ENOXAPARIN SODIUM 40 MG: 40 INJECTION SUBCUTANEOUS at 08:50

## 2019-02-22 RX ADMIN — LIDOCAINE HYDROCHLORIDE 10 ML: 20 SOLUTION ORAL; TOPICAL at 15:31

## 2019-02-22 RX ADMIN — PANTOPRAZOLE SODIUM 40 MG: 40 TABLET, DELAYED RELEASE ORAL at 06:00

## 2019-02-22 NOTE — MALNUTRITION/BMI
This medical record reflects one or more clinical indicators suggestive of malnutrition and/or morbid obesity  Malnutrition Findings:   Malnutrition type: Chronic illness  Degree of Malnutrition: Malnutrition of moderate degree  Malnutrition Characteristics: Inadequate energy, Weight loss, Muscle loss, Other (comment)(temporal wasting)    BMI Findings: Body mass index is 25 96 kg/m²  See Nutrition note dated 2/22/19 for additional details  Completed nutrition assessment is viewable in the nutrition documentation

## 2019-02-22 NOTE — ASSESSMENT & PLAN NOTE
Patient diagnosed B-cell lymphoma biopsy neck lymph node  Under chemotherapy at present time she already received 4 courses of chemotherapy  Otherwise tolerating fairly well this week got sick generalized weakness poor p o   Take  And started diarrhea she had 3 for diarrhea yesterday least 5 diarrhea since morning  Denied abdominal no blood in the stool Will discuss with Dr Terell Carrillo if needed consult  Low-grade fever blood culture pending influenza negative  Will order Magic wash for moved rinsing mouth complain irritation in  Throat is not red or tenderness white count is normal  Exact source of infection is not known at present time continue to observe cultures are pending low-grade fever  Diarrhea is improved continue hydration  May be secondary to chemotherapy patient does not look toxic

## 2019-02-22 NOTE — PROGRESS NOTES
RBTVO Dr Kenny Naidu, ok to give famotidine 20 mg po q hs instead of ranitidine syrup 150 mg po q hs

## 2019-02-22 NOTE — NURSING NOTE
Patient is A+Ox3, VSS   C/O of throat pain/discomfort, Xylocaine given with some relief  Patient is tolerating diet, ate 100%  Standby assist to BR  IV to R AC occluded, new IV placed to L hand  IVFs infusing as ordered  Patient is sitting up comfortably in bed, call bell in reach  Will continue to monitor closely

## 2019-02-22 NOTE — PROGRESS NOTES
VTE Pharmacologic Prophylaxis:   Pharmacologic: Enoxaparin (Lovenox)  Mechanical VTE Prophylaxis in Place: Yes    Patient Centered Rounds: I have performed bedside rounds with nursing staff today  Discussions with Specialists or Other Care Team Provider:  None    Education and Discussions with Family / Patient:  Patient with     Time Spent for Care: 30 minutes  More than 50% of total time spent on counseling and coordination of care as described above  Current Length of Stay: 1 day(s)    Current Patient Status: Inpatient   Certification Statement: The patient will continue to require additional inpatient hospital stay due to Febrile illness on chemotherapy    Discharge Plan:  Home next 24 hour cultures are negative    Code Status: Level 1 - Full Code      Subjective:   Feels little bit better but still not good enough to go home feel very tired appetite is not good but able to eat some better than at home diarrhea is better complaining of sore throat abdominal pain is improved but no acute guarding or tenderness no chest pain no shortness of breath    Objective:     Vitals:   Temp (24hrs), Av 7 °F (37 1 °C), Min:97 9 °F (36 6 °C), Max:99 6 °F (37 6 °C)    Temp:  [97 9 °F (36 6 °C)-99 6 °F (37 6 °C)] 99 6 °F (37 6 °C)  HR:  [90-98] 90  Resp:  [14-20] 18  BP: (104-122)/(50-72) 118/66  SpO2:  [95 %-97 %] 96 %  Body mass index is 25 96 kg/m²  Input and Output Summary (last 24 hours): Intake/Output Summary (Last 24 hours) at 2019 1234  Last data filed at 2019 1221  Gross per 24 hour   Intake 3420 ml   Output 2640 ml   Net 780 ml       Physical Exam:     Physical Exam   Constitutional: She is oriented to person, place, and time  She appears well-developed and well-nourished  HENT:   Head: Normocephalic and atraumatic     Right Ear: External ear normal    Left Ear: External ear normal    Mouth/Throat: Oropharynx is clear and moist    Throat is clear  On tongue no evidence of Mary Alice albicans for infection   Eyes: Pupils are equal, round, and reactive to light  Conjunctivae and EOM are normal    Neck: Normal range of motion  Neck supple  Cardiovascular: Normal rate, regular rhythm, normal heart sounds and intact distal pulses  Pulmonary/Chest: Effort normal and breath sounds normal    Right chest wall Port-A-Cath site is clean no redness no tenderness   Abdominal: Soft  Bowel sounds are normal  She exhibits no mass  There is no tenderness  There is no rebound and no guarding  Genitourinary:   Genitourinary Comments: deferred   Musculoskeletal: Normal range of motion  Neurological: She is alert and oriented to person, place, and time  She has normal reflexes  Skin: Skin is warm and dry  No rash noted  Psychiatric: She has a normal mood and affect  Nursing note and vitals reviewed  Additional Data:     Labs:    Results from last 7 days   Lab Units 02/22/19  0436   WBC Thousand/uL 11 40*   HEMOGLOBIN g/dL 8 5*   HEMATOCRIT % 26 5*   PLATELETS Thousands/uL 167   BANDS PCT % 32*   LYMPHO PCT % 8*   MONO PCT % 16*   EOS PCT % 2     Results from last 7 days   Lab Units 02/22/19  0436 02/21/19  1241   SODIUM mmol/L 133* 131*   POTASSIUM mmol/L 3 5* 4 0   CHLORIDE mmol/L 100 94*   CO2 mmol/L 28 26   BUN mg/dL 5 12   CREATININE mg/dL 0 50* 0 86   ANION GAP mmol/L 5 11   CALCIUM mg/dL 9 0 9 4   ALBUMIN g/dL  --  3 7   TOTAL BILIRUBIN mg/dL  --  0 30   ALK PHOS U/L  --  81   ALT U/L  --  26   AST U/L  --  16   GLUCOSE RANDOM mg/dL 111* 192*     Results from last 7 days   Lab Units 02/21/19  1241   INR  1 04     Results from last 7 days   Lab Units 02/22/19  1112 02/22/19  0548 02/21/19  2104 02/21/19  1804 02/21/19  1655   POC GLUCOSE mg/dl 229* 139 229* 169* 65         Results from last 7 days   Lab Units 02/21/19  1512 02/21/19  1241   LACTIC ACID mmol/L 1 4 2 9*           * I Have Reviewed All Lab Data Listed Above  * Additional Pertinent Lab Tests Reviewed:  All Labs For Current Hospital Admission Reviewed    Imaging:    Imaging Reports Reviewed Today Include:  None today  Imaging Personally Reviewed by Myself Includes: All reviewed    Recent Cultures (last 7 days):           Last 24 Hours Medication List:     Current Facility-Administered Medications:  al mag oxide-diphenhydramine-lidocaine viscous 10 mL Swish & Swallow Q4H PRN Da De Anda MD    albuterol 2 puff Inhalation Q4H PRN Da De Anda MD    amLODIPine 5 mg Oral Daily Da De Anda MD    carvedilol 12 5 mg Oral BID With Meals Da De Anda MD    enoxaparin 40 mg Subcutaneous Daily Da De Anda MD    ezetimibe 10 mg Oral Daily Da De Anda MD    famotidine 20 mg Oral HS Frantzgretchen Bolivarmasonlouisa    gabapentin 100 mg Oral HS Da De Anda MD    insulin lispro 1-5 Units Subcutaneous TID AC Da De Anda MD    insulin lispro 1-5 Units Subcutaneous HS Da De Anda MD    lidocaine viscous 10 mL Swish & Swallow TID PRN Da De Anda MD    loperamide 2 mg Oral PRN Da De Anda MD    losartan 25 mg Oral Daily Da De Anda MD    ondansetron 8 mg Oral Q8H PRN Da De Anda MD    pantoprazole 40 mg Oral Early Morning Da De Anda MD    sodium chloride 100 mL/hr Intravenous Continuous Da De Anda MD Last Rate: 100 mL/hr (02/22/19 0601)        Today, Patient Was Seen By: Da De Anda MD    ** Please Note: Dictation voice to text software may have been used in the creation of this document  **        Progress Note Micki Sandoval [birth year], 68 y o  female MRN: [id number]    Unit/Bed#: [facility] 508-01 Encounter: [id number]    Primary Care Provider: Eduardo Westfall PA-C   Date and time admitted to hospital: 2/21/2019 11:47 AM        Severe protein-calorie malnutrition ([address] )  Assessment & Plan  Malnutrition Findings:     most probably secondary to malignancy and chemotherapy  P o  Intake to poor  Diet patient consult  Add Ensure protein drinks  P o   Intake fair today he had breakfast and lunch       BMI Findings: Body mass index is 25 96 kg/m²  Oropharyngeal dysphagia  Assessment & Plan  Patient does use some mouthwash  I did not see on the tongue white coating  Will continue mouthwash    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)  Assessment & Plan  Patient diagnosed B-cell lymphoma biopsy neck lymph node  Under chemotherapy at present time she already received 4 courses of chemotherapy  Otherwise tolerating fairly well this week got sick generalized weakness poor p o   Take  And started diarrhea she had 3 for diarrhea yesterday least 5 diarrhea since morning  Denied abdominal no blood in the stool Will discuss with Dr Kaykay Jaramillo if needed consult  Low-grade fever blood culture pending influenza negative  Will order Magic wash for moved rinsing mouth complain irritation in  Throat is not red or tenderness white count is normal  Exact source of infection is not known at present time continue to observe cultures are pending low-grade fever  Diarrhea is improved continue hydration  May be secondary to chemotherapy patient does not look toxic      Essential hypertension  Assessment & Plan  Patient long-standing for hypertension  Blood pressure is stable  Continue present medication  Avoid hypotension    Type 2 diabetes mellitus without complication, without long-term current use of insulin ([facility] )  Assessment & Plan  Lab Results   Component Value Date    HGBA1C 9 3 (A) 02/07/2019    Patient never took insulin taking oral medication  At present time will put her on coverage  Keep blood sugar well controlled    Recent Labs     02/21/19  1804 02/21/19  2104 02/22/19  0548 02/22/19  1112   POCGLU 169* 229* 139 229*       Blood Sugar Average: Last 72 hrs:  (P) 166 2    * Diarrhea  Assessment & Plan  Patient who is under chemotherapy for lymphoma does not feeling well for last few days last week received 4th chemotherapy  Appetite was poor nauseated and having diarrhea generalized weakness and tiredness  Granddaughter called Dr Ruiz Mend command to go to ER found to be dehydrated and generalized weakness and diarrhea  Slightly elevated leukocyte count  But afebrile discussed with oncologist was fell most probably secondary to chemotherapy  Blood culture urine culture order wait for culture report at present time afebrile elevated white count may be secondary to after chemotherapy Neulasta was given  Stool culture symptomatic treatment for diarrhea  IV fluid  Imodium p r n  No diarrhea today yesterday had few at evening  Continue IV fluid Imodium p r n   Culture result pending

## 2019-02-22 NOTE — PLAN OF CARE
Problem: PHYSICAL THERAPY ADULT  Goal: Performs mobility at highest level of function for planned discharge setting  See evaluation for individualized goals  Description  Treatment/Interventions: Functional transfer training, Elevations, Therapeutic exercise, Endurance training, Patient/family training, Bed mobility, Gait training, Spoke to nursing  Equipment Recommended: Other (Comment)(Pt was requesting shower seat/chair)       See flowsheet documentation for full assessment, interventions and recommendations  Note:   Prognosis: Good  Problem List: Decreased endurance, Impaired balance, Decreased mobility, Pain  Yuki Araujo is a 68year old admitted to [facility] with diagnosis of diarrhea  Pt presents to the ED with generalized weakness and diarrhea  Work up found patient to be hypernatremic  Pt had just received chemotherapy last week  PMH significant for HTN, diffuse large B 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 oropharyngeal dysphagia, anxiety, nausea, tremor  [employer] interpretor used during [place] # [id number]  On evaluation Pt reports 7/10 pain in left side of neck and throat  Nursing is aware and pain meds are on board  Pt demonstrated ROM/Strenght WFL BLE's  Pt gait  Was stable in her room with use of furniture for support at time when ambulating  Pt  Also had IV infusing and therapist assisted with IV pole  Pt vital signs were stable following ambulation  Vitals post ambulation /63, HR 90 bpm  No SOB or c/o chest pain with mobility  Resting /63, HR 94 bpm   Pt will benefit from skilled PT during hospital stay to maximize functional mobility skills to allow for safe discharge to home  In summary the patient's history, examination of body system(s), activity limitations, participation restrictions, and collaboration of care are the guiding factors that were used to determine the clinical descion   The clinical presentation is evolving  and therefore the assigned level of complexity is: Moderate  Recommendation: Home with family support     PT - OK to Discharge: Yes    See flowsheet documentation for full assessment

## 2019-02-22 NOTE — NURSING NOTE
Patient vital signs stable, NSR on the monitor  Resting comfortably in bed, patient's eyes are closed and chest movement noted  No reports of pain or SOB at this time  Equal chest expansion, and no labored breathing noted  Assessment unchanged at this time  Call bell within reach, bed in lowest position, will continue to monitor

## 2019-02-22 NOTE — ASSESSMENT & PLAN NOTE
Lab Results   Component Value Date    HGBA1C 9 3 (A) 02/07/2019    Patient never took insulin taking oral medication  At present time will put her on coverage  Keep blood sugar well controlled    Recent Labs     02/21/19  1804 02/21/19  2104 02/22/19  0548 02/22/19  1112   POCGLU 169* 229* 139 229*       Blood Sugar Average: Last 72 hrs:  (P) 166 2

## 2019-02-22 NOTE — UTILIZATION REVIEW
Initial Clinical Review    Admission:  2/21/19 @ 1519     Orders Placed This Encounter   Procedures    Inpatient Admission (expected length of stay for this patient Order details is greater than two midnights)     Standing Status:   Standing     Number of Occurrences:   1     Order Specific Question:   Admitting Physician     Answer:   Palmira Orellana     Order Specific Question:   Level of Care     Answer:   Med Surg [16]     Order Specific Question:   Estimated length of stay     Answer:   More than 2 Midnights     Order Specific Question:   Certification     Answer:   I certify that inpatient services are medically necessary for this patient for a duration of greater than two midnights  See H&P and MD Progress Notes for additional information about the patient's course of treatment  ED: Date/Time/Mode of Arrival:   ED Arrival Information     Expected Arrival Acuity Means of Arrival Escorted By Service Admission Type    - 2/21/2019 11:40 Urgent Walk-In Self General Medicine Urgent    Arrival Complaint    fever/chest pain        Chief Complaint:   Chief Complaint   Patient presents with    Pain With Breathing     pt states she has pain with breathing and diarrhea since yesterday  pt states she had chemo infusion therapy last Wednesday and has not been feeling well since then  History of Illness: 71-year-old female presents with multiple complaints  She has a history of lymphoma and five days ago underwent her fifth round of chemotherapy  Since that time she has developed generalized weakness fatigue along with subjective fevers    Right chest wall port is placed no acute infection no tenderness no redness around area     Discussed with Pncologist   patient has been responding very well to her treatments  Patient also received a dose of Neulasta  He states that since the patient is not neutropenic that he would hold off on any antibiotics unless there is a clear indication to initiate them     60 3 kg (132 lb 15 oz)  Body mass index is 26 57 kg/m²    02/21/19 1842  97 9 °F (36 6 °C)  98  20  112/50  95 %  None (Room air)  Lying   02/21/19 1800    97  20  122/63  97 %  None (Room air)  Lying   02/21/19 1600    97  14  120/72  96 %  None (Room air)  Lying   02/21/19 1425  98 8 °F (37 1 °C)  91  20  112/59  97 %  None (Room air)  Lying   02/21/19 1153  100 °F (37 8 °C)  95  14  141/83  95 %  None (Room air)  Sitting     Pertinent Labs/Diagnostic Test Results:   Na   131    133  K    4 0     3 5  crt  0 50  Bun  12     5  gfr   68     97  Mg  1 5  Lactic acid   2 9   1 4  Wbc  6 10   11 40  Rbc   3 15    2 73  hgb    10 1   8 5  hct    30 7   26 5  Bands    38   32  UA   sg  1 015 ,    1-2 hyaline casts  ekg - sinu stach       ED Treatment:   Medication Administration from 02/21/2019 1139 to 02/21/2019 1842       Date/Time Order Dose Route Action Action by Comments                02/21/2019 1248 sodium chloride 0 9 % bolus 500 mL 500 mL Intravenous [address] Adam Triplett RN                 02/21/2019 1426 sodium chloride 0 9 % bolus 500 mL 500 mL Intravenous [address] Adam Triplett RN                            02/21/2019 1712 ondansetron (ZOFRAN-ODT) dispersible tablet 8 mg 8 mg Oral Given Geneva Johnson RN                 02/21/2019 1709 sodium chloride 0 9 % infusion 100 mL/hr Intravenous New Bag Geneva Johnson RN         Past Medical/Surgical History:    Active Ambulatory Problems     Diagnosis Date Noted    Type 2 diabetes mellitus without complication, without long-term current use of insulin ([facility] ) 10/31/2018    Essential hypertension 10/31/2018    Gastroesophageal reflux disease 10/31/2018    Diffuse large 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 ([facility] ) 11/09/2018    Oropharyngeal dysphagia 11/09/2018    Anxiety about health 11/20/2018    Nausea 11/22/2018    Tremor 11/22/2018    Severe protein-calorie malnutrition ([address] ) 11/22/2018    Palliative care patient 01/28/2019    Neoplasm related pain 01/28/2019    Chronic pain due to neoplasm 01/28/2019    Depression 02/06/2019    Other insomnia 02/06/2019    Rash 02/06/2019     Resolved Ambulatory Problems     Diagnosis Date Noted    Hyponatremia 11/19/2018     Past Medical History:   Diagnosis Date    [facility])     Chronic pain disorder     Diabetes mellitus ([facility] )     Diffuse large B cell lymphoma ([facility] )     Dysphagia     GERD without esophagitis     HTN (hypertension)     Hyperlipidemia     Hypertension     MI (myocardial infarction) ([facility] )      Admitting Diagnosis: Diarrhea [R19 7]  Nausea [R11 0]  Weakness [R53 1]  Chest pain varying with breathing [R07 9]      Assessment/Plan:      Severe protein-calorie malnutrition (HCC)   secondary to malignancy and chemotherapy  I/O, encourage po intake ;  Dietary consult;   Supplemental Ensure      Oropharyngeal dysphagia   I did not see on the tongue white coating  Will continue mouthwash (as at home)      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)/   Diarrhea s/p chemo  had 3 diarrhea yesterday and at least 5 diarrhea episodes THIS morning  Will discuss with oncologist if needs GI consult     Admission Orders:  Admit medical  Sequential compression device to b/l LE  PT/OT eval and treat  Stool studies   Daily wgt;  I/O q shift  accucks qid w/sliding scale insulin  Up w/assist;  Lo carb diet  IVF NS @  100  zofran po prn - x1  Imodium prn - x1    2/22/2019  99 2  96    20   104/54   95% ra  KDur 40 meq po  Viscous lidocaine swish/swallow prn - x1    Scheduled Meds:   Current Facility-Administered Medications:  al mag oxide-diphenhydramine-lidocaine viscous 10 mL Swish & Swallow Q4H PRN Rand Mayen MD    albuterol 2 puff Inhalation Q4H PRN Rand Mayen MD    amLODIPine 5 mg Oral Daily Rand Mayen MD    carvedilol 12 5 mg Oral BID With Meals Rand Mayen MD    enoxaparin 40 mg Subcutaneous Daily Rand Mayen MD    ezetimibe 10 mg Oral Daily Rand Mayen MD famotidine 20 mg Oral HS Frantzalexy Bolivarmaye    gabapentin 100 mg Oral HS Karri Bojorquez MD    insulin lispro 1-5 Units Subcutaneous TID AC Karri Bojorquez MD    insulin lispro 1-5 Units Subcutaneous HS Karri Bojorquez MD    lidocaine viscous 10 mL Swish & Swallow TID PRN Karri Bojorquez MD    loperamide 2 mg Oral PRN Karri Bojorquez MD    losartan 25 mg Oral Daily Karri Bojorquez MD    ondansetron 8 mg Oral Q8H PRN Karri Bojorquez MD    pantoprazole 40 mg Oral Early Morning Karri Bojorquez MD    sodium chloride 100 mL/hr Intravenous Continuous Karri Bojorquez MD Last Rate: 100 mL/hr (02/22/19 0601)     Continuous Infusions:   sodium chloride 100 mL/hr Last Rate: 100 mL/hr (02/22/19 0601)

## 2019-02-22 NOTE — ASSESSMENT & PLAN NOTE
Patient who is under chemotherapy for lymphoma does not feeling well for last few days last week received 4th chemotherapy  Appetite was poor nauseated and having diarrhea generalized weakness and tiredness  Granddaughter called Dr Mariam Hernandez command to go to ER found to be dehydrated and generalized weakness and diarrhea  Slightly elevated leukocyte count  But afebrile discussed with oncologist was fell most probably secondary to chemotherapy  Blood culture urine culture order wait for culture report at present time afebrile elevated white count may be secondary to after chemotherapy Neulasta was given  Stool culture symptomatic treatment for diarrhea  IV fluid  Imodium p r n  No diarrhea today yesterday had few at evening  Continue IV fluid Imodium p r n   Culture result pending

## 2019-02-22 NOTE — NURSING NOTE
Patient has temp 101  6  Doctor notified and Tylenol order obtained and given to patient  Patient has a persistent NP cough and is congested  Patient is drinking fluids  Will continue to monitor closely  Pt is resting comfortably in bed, call bell in reach

## 2019-02-22 NOTE — ASSESSMENT & PLAN NOTE
Malnutrition Findings:     most probably secondary to malignancy and chemotherapy  P o  Intake to poor  Diet patient consult  Add Ensure protein drinks  P o  Intake fair today he had breakfast and lunch       BMI Findings: Body mass index is 25 96 kg/m²

## 2019-02-22 NOTE — PHYSICAL THERAPY NOTE
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)    Oropharyngeal dysphagia    Anxiety about health    Nausea    Tremor    Severe protein-calorie malnutrition ([facility] )    Palliative care patient    Neoplasm related pain    Chronic pain due to neoplasm    Depression    Other insomnia    Rash    Diarrhea       Past Medical History  Past Medical History:   Diagnosis Date    Cancer ([facility] )     Throat    Chronic pain disorder     [facility]     Diffuse large B cell lymphoma [facility]     Dysphagia     GERD without esophagitis     HTN (hypertension)     Hyperlipidemia     Hypertension     MI (myocardial infarction) ([facility] )        Past Surgical History  Past Surgical History:   Procedure Laterality Date    BONE MARROW BIOPSY      BREAST LUMPECTOMY      CHOLECYSTECTOMY      IR PORT PLACEMENT  11/16/2018    OTHER SURGICAL HISTORY      tendor tear repair to right shoulder    SHOULDER ARTHROSCOPY              02/22/19 1150   Note Type   Note type Eval/Treat   Pain Assessment   Pain Assessment 0-10   Pain Score 7   Pain Type Acute pain   Pain Location Throat;Neck   Pain Orientation Left   Pain Descriptors Aching;Burning   Pain Frequency Intermittent   Pain Onset Sudden   Patient's Stated Pain Goal No pain   Home Living   Type of Home House  (@ with 2 KENDAL one Rail)   Home Layout Two level;Stairs to enter with rails;Bed/bath upstairs   Bathroom Shower/Tub Tub/shower unit  (Pt states she would like a shower seat)   Home Equipment   (none per patient) Prior Function   Level of [place]   (per patient independent with ambulation)   Lives With Daughter; Other (Comment)  (grand dtr)   Rece[facility] From [facility] in the last 6 months 0   Restrictions/Precautions   Weight Bearing Precautions Per Order No   Other Precautions Immunosuppressed;Multiple lines;Pain;Telemetry   General   Family/Caregiver Present No   Cognition   Overall Cognitive Status WFL   Arousal/Participation Cooperative   Orientation Level Oriented to person;Oriented to place  (knew month, stated year as 1999)   RUE Assessment   RUE Assessment WFL   LUE Assessment   LUE Assessment WFL   RLE Assessment   RLE Assessment WFL   LLE Assessment   LLE Assessment WFL   Coordination   Movements are Fluid and Coordinated 1   Light Touch   RLE Light Touch Grossly intact   LLE Light Touch Grossly intact   Bed Mobility   Rolling R 7  Independent   Rolling L 7  Independent   Supine to Sit 4  Minimal assistance   Transfers   Sit to Stand 5  Supervision  (CGA)   Stand to Sit 5  Supervision   Stand pivot   (CGA and VC for direction due to IV infusing)   Ambulation/Elevation   Gait pattern   (decreased paras, step length BLE, UE support on furniture )   Gait Assistance   (CGA/IV pole assist)   Assistive Device None  (pt was using furniture cruising)   Distance 35 feet   Stair Management Assistance Not tested   Balance   Static Sitting Good   Dynamic Sitting Good   Static Standing Fair +   Dynamic Standing Fair   Ambulatory Fair   Activity Tolerance   Activity Tolerance Patient tolerated treatment well   Nurse Made Aware   (RN clears patient for evaluation)   Assessment   Prognosis Good   Problem List Decreased endurance; Impaired balance;Decreased mobility;Pain   Goals   Patient Goals "to get better "   [facility] Expiration Date 02/28/19   Short Term Goal #1 1  Pt will be independent in all bed mobility skills including cover mgt  2  Pt will perform all functional transfers independently   3  Pt will ambulate 150 feet without assistive device supervision  4  Pt will ascend and descend FF of steps with one rail for safe mobility to bed/bath with supervision  5  Pt will demonstrate good balance obtained by functional and objective measures to decrease risk for fall  Treatment Day 0   Plan   Treatment/Interventions Functional transfer training;Elevations; Therapeutic exercise; Endurance training;Patient/family training;Bed mobility;Gait training;Spoke to nursing   PT Frequency Other (Comment)  (3-5x/week)   Recommendation   Recommendation Home with family support   Equipment Recommended Other (Comment)  (Pt was requesting shower seat/chair)   PT - OK to Discharge Yes   Barthel Index   Feeding 10   Bathing 0   Grooming Score 0   Dressing Score 5   Bladder Score 10   Bowels Score 10   Toilet Use Score 5   Transfers (Bed/Chair) Score 10   Mobility (Level Surface) Score 0   Stairs Score 0   Barthel Index Score 50     ASSESSMENT: Eliazar Gamino is a 68year old admitted to [address] [place] [address] with diagnosis of diarrhea  Pt presents to the ED with generalized weakness and diarrhea  Work up found patient to be hypernatremic  Pt had just received chemotherapy last week  PMH significant for HTN, diffuse large B 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 oropharyngeal dysphagia, anxiety, nausea, tremor  Starta interpretor used during [place] # [id number]  On evaluation Pt reports 7/10 pain in left side of neck and throat  Nursing is aware and pain meds are on board  Pt demonstrated ROM/Strenght WFL BLE's  Pt gait  Was stable in her room with use of furniture for support at time when ambulating  Pt  Also had IV infusing and therapist assisted with IV pole  Pt vital signs were stable following ambulation  Vitals post ambulation /63, HR 90 bpm  No SOB or c/o chest pain with mobility   Resting /63, HR 94 bpm   Pt will benefit from skilled PT during hospital stay to maximize functional mobility skills to allow for safe discharge to home  In summary the patient's history, examination of body system(s), activity limitations, participation restrictions, and collaboration of care are the guiding factors that were used to determine the clinical descion  The clinical presentation is evolving  and therefore the assigned level of complexity is: Moderate      [place] Lax, PT

## 2019-02-23 VITALS
SYSTOLIC BLOOD PRESSURE: 82 MMHG | OXYGEN SATURATION: 100 % | DIASTOLIC BLOOD PRESSURE: 50 MMHG | BODY MASS INDEX: 25.97 KG/M2 | WEIGHT: 132.28 LBS | HEART RATE: 100 BPM | TEMPERATURE: 99 F | RESPIRATION RATE: 20 BRPM | HEIGHT: 60 IN

## 2019-02-23 PROBLEM — Z92.21 STATUS POST CHEMOTHERAPY: Status: ACTIVE | Noted: 2019-02-23

## 2019-02-23 PROBLEM — R19.7 DIARRHEA: Status: RESOLVED | Noted: 2019-02-21 | Resolved: 2019-02-23

## 2019-02-23 LAB
EOSINOPHIL # BLD AUTO: 0.35 THOUSAND/UL (ref 0–0.4)
EOSINOPHIL NFR BLD MANUAL: 2 % (ref 0–6)
ERYTHROCYTE [DISTWIDTH] IN BLOOD BY AUTOMATED COUNT: 17 %
GLUCOSE SERPL-MCNC: 139 MG/DL (ref 65–140)
GLUCOSE SERPL-MCNC: 150 MG/DL (ref 65–140)
GLUCOSE SERPL-MCNC: 236 MG/DL (ref 65–140)
HCT VFR BLD AUTO: 27.8 % (ref 36–46)
HGB BLD-MCNC: 9.2 G/DL (ref 12–16)
HYPERCHROMIA BLD QL SMEAR: PRESENT
LACTATE SERPL-SCNC: 1.8 MMOL/L (ref 0.7–2)
LYMPHOCYTES # BLD AUTO: 15 % (ref 25–45)
LYMPHOCYTES # BLD AUTO: 2.6 THOUSAND/UL (ref 0.5–4)
MCH RBC QN AUTO: 32.4 PG (ref 26–34)
MCHC RBC AUTO-ENTMCNC: 33 G/DL (ref 31–36)
MCV RBC AUTO: 98 FL (ref 80–100)
METAMYELOCYTES NFR BLD MANUAL: 1 % (ref 0–1)
MONOCYTES # BLD AUTO: 1.9 THOUSAND/UL (ref 0.2–0.9)
MONOCYTES NFR BLD AUTO: 11 % (ref 1–10)
MYELOCYTES NFR BLD MANUAL: 4 % (ref 0–1)
NEUTS BAND NFR BLD MANUAL: 20 % (ref 0–8)
NEUTS SEG # BLD: 11.59 THOUSAND/UL (ref 1.8–7.8)
NEUTS SEG NFR BLD AUTO: 47 %
NRBC BLD AUTO-RTO: 1 /100 WBC (ref 0–2)
PLATELET # BLD AUTO: 195 THOUSANDS/UL (ref 150–450)
PLATELET BLD QL SMEAR: ADEQUATE
PMV BLD AUTO: 8.7 FL (ref 8.9–12.7)
RBC # BLD AUTO: 2.84 MILLION/UL (ref 4–5.2)
RBC MORPH BLD: ABNORMAL
TOTAL CELLS COUNTED SPEC: 100
WBC # BLD AUTO: 17.3 THOUSAND/UL (ref 4.5–11)

## 2019-02-23 PROCEDURE — 85027 COMPLETE CBC AUTOMATED: CPT | Performed by: INTERNAL MEDICINE

## 2019-02-23 PROCEDURE — 99239 HOSP IP/OBS DSCHRG MGMT >30: CPT | Performed by: FAMILY MEDICINE

## 2019-02-23 PROCEDURE — 82948 REAGENT STRIP/BLOOD GLUCOSE: CPT

## 2019-02-23 PROCEDURE — 85007 BL SMEAR W/DIFF WBC COUNT: CPT | Performed by: INTERNAL MEDICINE

## 2019-02-23 PROCEDURE — 83605 ASSAY OF LACTIC ACID: CPT | Performed by: PHYSICIAN ASSISTANT

## 2019-02-23 RX ORDER — FLUTICASONE PROPIONATE 50 MCG
2 SPRAY, SUSPENSION (ML) NASAL DAILY
Qty: 1 BOTTLE | Refills: 0 | Status: SHIPPED | OUTPATIENT
Start: 2019-02-23 | End: 2019-08-01 | Stop reason: SDUPTHER

## 2019-02-23 RX ORDER — BENZONATATE 100 MG/1
200 CAPSULE ORAL 3 TIMES DAILY
Status: DISCONTINUED | OUTPATIENT
Start: 2019-02-23 | End: 2019-02-23 | Stop reason: HOSPADM

## 2019-02-23 RX ORDER — BENZONATATE 200 MG/1
200 CAPSULE ORAL 3 TIMES DAILY
Qty: 20 CAPSULE | Refills: 0 | Status: SHIPPED | OUTPATIENT
Start: 2019-02-23 | End: 2021-11-17 | Stop reason: SDUPTHER

## 2019-02-23 RX ADMIN — EZETIMIBE 10 MG: 10 TABLET ORAL at 09:18

## 2019-02-23 RX ADMIN — BENZONATATE 200 MG: 100 CAPSULE ORAL at 10:09

## 2019-02-23 RX ADMIN — AMLODIPINE BESYLATE 5 MG: 5 TABLET ORAL at 09:18

## 2019-02-23 RX ADMIN — INSULIN LISPRO 1 UNITS: 100 INJECTION, SOLUTION INTRAVENOUS; SUBCUTANEOUS at 05:58

## 2019-02-23 RX ADMIN — ENOXAPARIN SODIUM 40 MG: 40 INJECTION SUBCUTANEOUS at 09:18

## 2019-02-23 RX ADMIN — LOSARTAN POTASSIUM 25 MG: 25 TABLET, FILM COATED ORAL at 09:18

## 2019-02-23 RX ADMIN — INSULIN LISPRO 2 UNITS: 100 INJECTION, SOLUTION INTRAVENOUS; SUBCUTANEOUS at 12:06

## 2019-02-23 RX ADMIN — SODIUM CHLORIDE 100 ML/HR: 9 INJECTION, SOLUTION INTRAVENOUS at 05:56

## 2019-02-23 RX ADMIN — CARVEDILOL 12.5 MG: 12.5 TABLET, FILM COATED ORAL at 09:18

## 2019-02-23 RX ADMIN — PANTOPRAZOLE SODIUM 40 MG: 40 TABLET, DELAYED RELEASE ORAL at 05:51

## 2019-02-23 NOTE — NURSING NOTE
Patient discharged home, IV removed  Discharge instructions given to patient and daughter with stated understanding  Patient left unit via w/c with belongings in stable condition

## 2019-02-23 NOTE — SOCIAL WORK
Pt medically cleared for d/c home with OP f/u  Referral made to [facility] for showerchair which is covered at 100% by insurance- to be delivered via [employer] to pt's house in 3-5 days-informed pt and daughter of same  Daughter to transport home  No further d/c needs identified at this time

## 2019-02-23 NOTE — ASSESSMENT & PLAN NOTE
· Patient has had anticipated reactive low-grade fever and white blood cell elevation during admission  · She is nontoxic on exam with no source of infection identified  · Rapid urine negative, CXR negative, blood cultures negative  · Stool pending  · Vitals remained stable and she is afebrile today    · Lactic acid was elevated on admission but has since trended down

## 2019-02-23 NOTE — DISCHARGE SUMMARY
[address] Internal Medicine  Discharge- [facility] [birth year], 68 y o  female MRN: [id number]  Unit/Bed#: 5T -01 Encounter: [id number]  Primary Care Provider: Delaney Last PA-C   Date and time admitted to hospital: 2/21/2019 11:47 AM    Status post chemotherapy  Assessment & Plan  · Patient has had anticipated reactive low-grade fever and white blood cell elevation during admission  · She is nontoxic on exam with no source of infection identified  · Rapid urine negative, CXR negative, blood cultures negative  · Stool pending  · Vitals remained stable and she is afebrile today  · Lactic acid was elevated on admission but has since trended down      Severe protein-calorie malnutrition 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)  Assessment & Plan  · Patient diagnosed B-cell lymphoma biopsy neck lymph node  · Under chemotherapy at present time she already received 4 courses of chemotherapy  · Otherwise tolerating fairly well this week got sick generalized weakness poor p o   Take  · And started diarrhea she had 3 for diarrhea 2/21   · Denied abdominal no blood in the stool  · Low-grade fever blood culture pending influenza negative  ·  Blood culture negative at 24 hours  · Magic mouthwash  · Throat is not red or tender  · Exact source of infection is not known at present time continue to observe cultures are pending low-grade fever  · Diarrhea is improved continue hydration  · May be secondary to chemotherapy patient does not look toxic      Essential hypertension  Assessment & Plan  Patient long-standing for hypertension  Blood pressure is stable  Continue present medication  Avoid hypotension    Type 2 diabetes mellitus without complication, without long-term current use of insulin (HCC)  Assessment & Plan  Lab Results   Component Value Date    HGBA1C 9 3 (A) 02/07/2019    Patient never took insulin taking oral medication  At present time will put her on coverage  Keep blood sugar well controlled    Recent Labs     02/22/19  1112 02/22/19  1559 02/22/19 2023 02/23/19  0550   POCGLU 229* 235* 139 150*       Blood Sugar Average: Last 72 hrs:  (P) 169 375    * Diarrhea  Assessment & Plan  · Patient who is under chemotherapy for lymphoma does not feeling well for last few days last week received 4th chemotherapy  · Appetite was poor nauseated and having diarrhea generalized weakness and tiredness  · Granddaughter called Dr Fatimah kwan to go to ER found to be dehydrated and generalized weakness and diarrhea  · Slightly elevated leukocyte count, had low grade fever which has resolved  · Discussed with oncologist was fell most probably secondary to chemotherapy  · Blood culture urine culture order wait for culture report at present time afebrile elevated white count may be secondary to after chemotherapy Neulasta was given  · Stool culture pending  · IV fluid  · Imodium p r n  No diarrhea today  · Continue IV fluid Imodium p r n         Discharging Physician / Practitioner: Perez Rico PA-C  PCP: Destiny Michelle PA-C  Admission Date:   Admission Orders (From admission, onward)    Ordered        02/21/19 1519  Inpatient Admission (expected length of stay for this patient Order details is greater than two midnights)  Once     Order ID Start Status   [id number] 02/21/19 1520 Completed              Discharge Date: 02/23/19    Resolved Problems  Date Reviewed: 2/22/2019          Resolved Hyponatremia 11/22/2018     Resolved by  Freddie Black MD          Consultations During Hospital Stay:  · None    Procedures Performed:   · None    Significant Findings / Test Results:   · CXR 2/21:  No acute cardiopulmonary disease    Incidental Findings:   · None    Test Results Pending at Discharge (will require follow up): · Stool culture final results, blood culture results     Outpatient Tests Requested:  · Continue follow-up with Oncology    Complications:  None    Reason for Admission:  Diarrhea    Hospital Course:     Rosa Zapata is a 68 y o  female patient with a history of B-cell lymphoma of and neck lymph nodes, status post chemotherapy, HTN, DM originally presented to the hospital on 2/21/2019 due to diarrhea  She reports not feeling well for a few days after her last chemotherapy treatment  She reported associated poor appetite and nausea  Initial labs revealed dehydration  Chest x-ray and urine  were negative for any acute disease  White blood cells were slightly elevated and she was also found to have a low-grade fever  Per discussion with Oncology this is likely secondary to chemotherapy treatment with Neulasta  Urine, blood, stool cultures were also taken and during this far negative   P r n  Imodium was given and patient reported significant improvement with IV fluids  Fever resolved  Patient feels well and stable for discharge  Please see above list of diagnoses and related plan for additional information  Condition at Discharge: stable     Discharge Day Visit / Exam:     Subjective:  Patient says he feels well today aside from some mouth and throat soreness which she has at baseline  Magic mouthwash continued to be given  She also has a nonproductive cough      Vitals: Blood Pressure: 133/73 (02/23/19 0719)  Pulse: 95 (02/23/19 0719)  Temperature: 98 7 °F (37 1 °C) (02/23/19 0719)  Temp Source: Temporal (02/23/19 0719)  Respirations: 18 (02/23/19 2575)  Height: 5' (152 4 cm) (02/22/19 1221)  Weight - Scale: 60 kg (132 lb 4 4 oz) (02/23/19 0600)  SpO2: 96 % (02/23/19 0719)  Exam:   Physical Exam   Constitutional: She appears well-developed and well-nourished  No distress  HENT:   Head: Normocephalic and atraumatic  Mouth/Throat: Oropharynx is clear and moist  No oropharyngeal exudate or tonsillar abscesses  Eyes: Conjunctivae are normal  No scleral icterus  Cardiovascular: Normal rate and regular rhythm  No murmur heard  Pulmonary/Chest: Effort normal and breath sounds normal  No respiratory distress  She has no wheezes  She has no rales  Abdominal: Soft  She exhibits no distension  There is no tenderness  Musculoskeletal: She exhibits no edema  Neurological: She is alert  Skin: Skin is warm and dry  No erythema  Psychiatric: She has a normal mood and affect  Nursing note and vitals reviewed  Discussion with Family:  Discussed care plan with patient at bedside  Answered all questions to the best mild debility  Patient was pleasant and had no complaints or questions aside from wanting cough medicine  Discharge instructions/Information to patient and family:   See after visit summary for information provided to patient and family  Provisions for Follow-Up Care:  See after visit summary for information related to follow-up care and any pertinent home health orders  Disposition:     Home    For Discharges to [facility] SNF:   · Not Applicable to this Patient - Not Applicable to this Patient    Planned Readmission:  None     Discharge Statement:  I spent 60 minutes discharging the patient  This time was spent on the day of discharge  I had direct contact with the patient on the day of discharge  Greater than 50% of the total time was spent examining patient, answering all patient questions, arranging and discussing plan of care with patient as well as directly providing post-discharge instructions    Additional time then spent on discharge activities  Discharge Medications:  See after visit summary for reconciled discharge medications provided to patient and family        ** Please Note: This note has been constructed using a voice recognition system **

## 2019-02-23 NOTE — ASSESSMENT & PLAN NOTE
· Patient who is under chemotherapy for lymphoma does not feeling well for last few days last week received 4th chemotherapy  · Appetite was poor nauseated and having diarrhea generalized weakness and tiredness  · Granddaughter called Dr Jnoathan Palumbo command to go to ER found to be dehydrated and generalized weakness and diarrhea  · Slightly elevated leukocyte count, had low grade fever which has resolved  · Discussed with oncologist was fell most probably secondary to chemotherapy  · Blood culture urine culture order wait for culture report at present time afebrile elevated white count may be secondary to after chemotherapy Neulasta was given  · Stool culture pending  · IV fluid  · Imodium p r n  No diarrhea today  · Continue IV fluid Imodium p r n

## 2019-02-23 NOTE — DISCHARGE INSTRUCTIONS
Diarrea aguda   LO QUE NECESITA SABER:   ¿Qué es la [place]? La diarrea aguda comienza rápidamente y dura poco tiempo, usualmente de 1 a 3 días  Puede durar hasta 2 semanas  ¿Cuáles son las causas de la diarrea aguda? · La bacteria, yoni la E coli o salmonela    · Virus yoni el rotavirus y el norovirus    · Un parásito, yoni la giardia    · Medicamentos, yoni laxantes, antiácidos o antibióticos    · Alergia a la lactosa, la soja o el gluten    · [place] alimentos o beber agua que contiene gérmenes    · Tratamientos médicos, yoni quimioterapia o radiación  ¿Qué otros signos o síntomas podría presentar la diarrea aguda? Que tenga 3 o más episodios de [place]  Puede ser difícil de controlar zuniga diarrea  Usted también podría presentar alguno de los siguientes:  · Lonia Rodriguez y escalofríos    · Dolor de branden o dolor abdominal    · Náuseas y vómitos    · Síntomas de deshidratación yoni sed, disminución de la orina, piel seca, ojos hundidos, o latido cardíaco rápido y nat  ¿Qué necesita saber mi médico acerca de mi diarrea aguda? Zuniga médico le preguntará acerca de diane síntomas  Le preguntará qué comió recientemente y si ha viajado a otros países  Infórmele al [employer] acerca de los medicamentos que [place] o si ha estado alrededor de alguien que está enfermo  Zuniga médico podría revisar si tiene signos de deshidratación  ¿Cómo se trata la diarrea aguda? La diarrea aguda generalmente mejora sin tratamiento  Puede que necesite alguno de los siguientes si zuniga diarrea es grave o dura más de unos pocos días:  · El medicamento para la diarrea  es un medicamento de venta alejandrina que ayuda a disminuir o a detener zuniga diarrea  · Antibióticos  podrían ser administrados para tratar shamar infección causada por bacterias  · Los medicamentos para los parásitos  podrían ser administrados para tratar shamar infección causada por parásitos  ¿Cómo se puede controlar la diarrea aguda? · [address] se le haya indicado    Los líquidos evitarán la deshidratación que es provocada por la diarrea  Pregunte a zuniga médico sobre la cantidad de líquido que necesita joshua todos los días y cuáles le recomienda  Es posible que necesite joshua shamar solución de rehidratación oral (SRO)  [address] cantidades exactas de agua, sal y azúcar que usted necesita para reemplazar los líquidos corporales  Se puede comprar sales para rehidratación oral en la mayoría de los supermercados y [place]  · Coma alimentos que pan fáciles de digerir  Algunos ejemplos incluyen arroz, lentejas, cereales, plátanos, patatas y pan  También se incluyen algunas frutas (plátano, melón), verduras vivian cocidas y allison [place]  Evite alimentos altos en [place] y azúcar  También evite la cafeína, el alcohol, los lácteos y las allison ledbetter hasta que la diarrea haya desaparecido  ¿Cómo se puede evitar la diarrea aguda? · Lávese las kade frecuentemente  Utilice agua y [place]  Lávese las kade antes de comer o preparar alimentos  También lávese diane kade después del ir al baño  Utilice alcohol en gel si no tiene [place] y [place]  · Mantenga las superficies del baño limpias  para ayudar a evitar la propagación de gérmenes que provocan la diarrea [place]  · [address] frutas y verduras antes de consumirlas  [place] puede ayudar a [place] gérmenes causantes de diarrea  Si es posible, retire la piel de frutas y verduras o cocínelas vivian antes de comerlas  · Cocine la carne yoni se indica  ¨ Cocine la carne picada  a 160 °F      ¨ Cocine la carne de ave picada, la carne de ave entera o los azul de carne de ave  a 165 °F yoni mínimo  Retire la carne del chandrika  Deje reposar kyung 3 minutos antes de comerla  ¨ Cocine los azul enteros de carne que no sea de ave  a 145 °F yoni mínimo  Retire la carne del chandrika  Deje reposar kyung 3 minutos antes de comerla  · [employer] platos que tocaron la carne cruda con [place] con jabón    Caesar [place] tablas de cortar, utensilios, platos y recipientes  · Coloque carne cruda o cocida en el refrigerador tan pronto yoni sea posible  Las bacterias pueden crecer en la carne que permanece a temperatura ambiente kyung [place]  · No coma ostras, almejas o mejillones crudos o poco cocidos  Estos alimentos pueden estar contaminados y causar infección  · [place] agua filtrada o tratada solo cuando viaja  No ponga hielo en brit bebidas  Marissa agua embotellada siempre que sea posible  ¿Cuándo tammy buscar atención inmediata? · Se siente confundido  · Zuniga ritmo cardíaco es más lento que lo normal      · Brit ojos se gloria demasiado hundidos o no le salen lágrimas cuando llora  · Usted orina menos de lo normal o zuniga orina es de color amarillo oscuro  · Brit evacuaciones intestinales tienen mucosidad o [place]  · Usted tiene dolor abdominal intenso  · Usted no puede joshua ningún líquido  ¿Cuándo tammy comunicarme con mi médico?   · Brit síntomas no mejoran con el tratamiento  · Usted tiene fiebre por encima de los 38 50? (38 5?)  · Tiene dificultad para ingerir alimentos y líquidos porque tiene vómitos  · Usted tiene sed o la boca seca  · Zuniga diarrea no mejora dentro de 7 días  · Usted tiene preguntas o inquietudes acerca de zuniga condición o cuidado  ACUERDOS SOBRE ZUNIGA CUIDADO:   Usted tiene el derecho de ayudar a planear zuniga cuidado  Aprenda todo lo que pueda sobre zuniga condición y yoni darle tratamiento  Discuta brit opciones de tratamiento con brit médicos para decidir el cuidado que usted desea recibir  Usted siempre tiene el derecho de rechazar el tratamiento  Esta información es sólo para uso en educación  Zuniga intención no es darle un consejo médico sobre enfermedades o tratamientos  Colsulte con zuniga Alin Asael farmacéutico antes de seguir cualquier régimen médico para saber si es seguro y efectivo para usted    © 2017 [address] Information is for End User's use only and may not be sold, redistributed or otherwise used for commercial purposes  All illustrations and images included in CareNotes® are the copyrighted property of A D A M , Inc  or Rocael Cowart  Náuseas y vómitos causados por la quimioterapia   LO QUE NECESITA SABER:   Las náuseas y el vómito son [place] comunes de la quimioterapia  Puede que pan peores con ciertos tipos de quimioterapia  El riesgo que usted presente náuseas y vómito depende del tipo de quimioterapia y la dosis (cantidad) de quimioterapia que usted recibe  Las náuseas y el vómito pueden causar deshidratación, baja presión arterial, fatiga, dificultad para concentrarse, falta de apetito y pérdida de [place]  INSTRUCCIONES SOBRE EL NATALIE HOSPITALARIA:   Pregúntele a zuniga Dave Parody vitaminas y minerales son adecuados para usted  · Los medicamentos que usted perez para las náuseas y el vómito no están funcionando  · Usted tiene vómitos y no puede retener ningún alimento o líquido en el estómago  · Usted no puede tomarse diane medicamentos  · Usted está bajando de [place]  · Usted tiene preguntas o inquietudes acerca de zuniga condición o cuidado  Distintos tipos de náuseas y vómito causados por la quimioterapia:   · Náuseas y vómitos agudos  ocurren de unos minutos a unas horas después de recibir la quimioterapia  En general son peores [address] primeras 4 a 6 horas después del tratamiento y [address] 24 horas  · Las náuseas y el vómito atrasados  generalmente no se inicia hasta 25 horas o más después de recibir quimioterapia  Puede durar varios mary jo  · Las náuseas y el vómito anticipados  es shamar respuesta adquirida a la quimioterapia  Se producen a causa de las náuseas y el vómito ocurridos anteriormente después de la quimioterapia  Zuniga cerebro espera que las náuseas y el vómito vuelvan a ocurrir aún antes de recibir el tratamiento   [place] puede ocurrir [employer] usted se está preparando para zuniga próximo tratamiento, kyung el tratamiento o después  Tratamiento para las náuseas y vómito causados por la quimioterapia:   · [place] medicamentos para las náuseas y el vómito según indicaciones,  aunque no tenga síntomas  Es posible que tenga que continuar tomando estos medicamentos siempre que exista la posibilidad de que la quimioterapia le cause náuseas y vómito  Por ejemplo, es probable que usted necesite joshua estos medicamentos por varios días después de zuniga última dosis de quimioterapia  · Es probable que usted necesite probar varios tipos distintos de medicamentos o joshua más de un tipo  Es posible que haya algunos medicamentos que funcionen mejor que otros  También es probable que usted necesite más de un tipo de medicamento para prevenir o controlar diane náuseas o vómito  · Los tratamientos alternos, yoni la resonancia guiada o la relajación muscular progresiva podrían también ayudar  Pida más información sobre éstos y otros tratamientos alternos  El control de las náuseas y vómito causados por la quimioterapia:   · Evite consumir alimentos que pueden empeorar diane náuseas o vómito  Estos alimentos incluyen comidas altas en grasas, altas en fibra, comidas fritas, saladas, dulces y picantes  · Evite comidas con olores randall  Lo más probable es que usted necesite pedirle a otras personas que cocinen para que usted y así evite los olores de la comida [place] se cocina  · Consuma meriendas pequeñas y frecuentes kyung el día en vez de shamar comida abundante  Es probable que usted tenga menos náuseas y vómito si consume comidas pequeñas  · Consuma alimentos blandos  Los alimentos blandos y [employer] pueden ser más fáciles de tolerar  Algunos ejemplos incluyen los caldos myron, el tre asado, las calvin, el arroz, las galletas estilo soda, los pretzels y las tostadas secas  Otros alimentos blandos son la compota de [place], las bananas, el sorbete y el yogurt      · Encuentre la mejor hora para comer y joshua  en los días en que recibe quimioterapia  Es probable que le resulte útil comerse algo ligero o shamar merienda antes de la quimioterapia  Espere por lo menos 1 hora después de la quimioterapia para comer o joshua algo  Acuda a diane consultas de control con wu médico según le indicaron  Anote diane preguntas para que se acuerde de hacerlas kyung diane visitas  © 2017 [address] Information is for End User's use only and may not be sold, redistributed or otherwise used for commercial purposes  All illustrations and images included in CareNotes® are the copyrighted property of A D A M , Inc  or Rocael Cowart  Esta información es sólo para uso en educación  Wu intención no es darle un consejo médico sobre enfermedades o tratamientos  Colsulte con wu Charna Nunokler farmacéutico antes de seguir cualquier régimen médico para saber si es seguro y efectivo para usted

## 2019-02-23 NOTE — ASSESSMENT & PLAN NOTE
· Patient diagnosed B-cell lymphoma biopsy neck lymph node  · Under chemotherapy at present time she already received 4 courses of chemotherapy  · Otherwise tolerating fairly well this week got sick generalized weakness poor p o   Take  · And started diarrhea she had 3 for diarrhea 2/21   · Denied abdominal no blood in the stool  · Low-grade fever blood culture pending influenza negative  ·  Blood culture negative at 24 hours  · Magic mouthwash  · Throat is not red or tender  · Exact source of infection is not known at present time continue to observe cultures are pending low-grade fever  · Diarrhea is improved continue hydration  · May be secondary to chemotherapy patient does not look toxic

## 2019-02-23 NOTE — ASSESSMENT & PLAN NOTE
Lab Results   Component Value Date    HGBA1C 9 3 (A) 02/07/2019    Patient never took insulin taking oral medication  At present time will put her on coverage  Keep blood sugar well controlled    Recent Labs     02/22/19  1112 02/22/19  1559 02/22/19 2023 02/23/19  0550   POCGLU 229* 235* 139 150*       Blood Sugar Average: Last 72 hrs:  (P) [phone]

## 2019-02-23 NOTE — ASSESSMENT & PLAN NOTE
Malnutrition Findings:   Malnutrition type: Chronic illness most probably secondary to malignancy and chemotherapy  P o  Intake to poor  Diet patient consult  Add Ensure protein drinks  P o  Intake fair today he had breakfast and lunch  Degree of Malnutrition: Malnutrition of moderate degree    BMI Findings: Body mass index is 25 83 kg/m²

## 2019-02-23 NOTE — NURSING NOTE
Patient is A+Ox3, VSS, Aferbile, denies pain  Denies throat pain at present  IVFs infusing as ordered  Tolerating diet, ate 100%  Patient tolerating ambulating in room independently with steady gait, made her bed  Patient is sitting up in bed comfortably with call bell in reach  Will continue to monitor closely

## 2019-02-25 ENCOUNTER — HOSPITAL ENCOUNTER (INPATIENT)
Facility: HOSPITAL | Age: 74
LOS: 2 days | Discharge: HOME | DRG: 871 | End: 2019-02-27
Attending: EMERGENCY MEDICINE | Admitting: FAMILY MEDICINE
Payer: COMMERCIAL

## 2019-02-25 ENCOUNTER — OFFICE VISIT (OUTPATIENT)
Dept: HEMATOLOGY ONCOLOGY | Facility: CLINIC | Age: 74
End: 2019-02-25
Payer: COMMERCIAL

## 2019-02-25 ENCOUNTER — APPOINTMENT (INPATIENT)
Dept: NON INVASIVE DIAGNOSTICS | Facility: HOSPITAL | Age: 74
DRG: 871 | End: 2019-02-25
Payer: COMMERCIAL

## 2019-02-25 ENCOUNTER — APPOINTMENT (EMERGENCY)
Dept: CT IMAGING | Facility: HOSPITAL | Age: 74
DRG: 871 | End: 2019-02-25
Payer: COMMERCIAL

## 2019-02-25 ENCOUNTER — TRANSITIONAL CARE MANAGEMENT (OUTPATIENT)
Dept: FAMILY MEDICINE CLINIC | Facility: CLINIC | Age: 74
End: 2019-02-25

## 2019-02-25 ENCOUNTER — APPOINTMENT (EMERGENCY)
Dept: RADIOLOGY | Facility: HOSPITAL | Age: 74
DRG: 871 | End: 2019-02-25
Payer: COMMERCIAL

## 2019-02-25 VITALS
OXYGEN SATURATION: 93 % | BODY MASS INDEX: 26.41 KG/M2 | RESPIRATION RATE: 18 BRPM | HEIGHT: 59 IN | TEMPERATURE: 102.5 F | SYSTOLIC BLOOD PRESSURE: 110 MMHG | DIASTOLIC BLOOD PRESSURE: 60 MMHG | WEIGHT: 131 LBS | HEART RATE: 70 BPM

## 2019-02-25 DIAGNOSIS — R50.9 FEVER: ICD-10-CM

## 2019-02-25 DIAGNOSIS — C83.31 DIFFUSE LARGE B-CELL LYMPHOMA OF LYMPH NODES OF NECK (HCC): Primary | ICD-10-CM

## 2019-02-25 DIAGNOSIS — J18.9 PNEUMONIA: Primary | ICD-10-CM

## 2019-02-25 DIAGNOSIS — R50.9 FEVER, UNSPECIFIED FEVER CAUSE: ICD-10-CM

## 2019-02-25 DIAGNOSIS — R55 SYNCOPE, UNSPECIFIED SYNCOPE TYPE: ICD-10-CM

## 2019-02-25 PROBLEM — A41.9 SEPSIS (HCC): Status: ACTIVE | Noted: 2019-02-25

## 2019-02-25 PROBLEM — E87.0 HYPERNATREMIA: Status: ACTIVE | Noted: 2019-02-25

## 2019-02-25 LAB
ALBUMIN SERPL BCP-MCNC: 3.7 G/DL (ref 3–5.2)
ALP SERPL-CCNC: 87 U/L (ref 43–122)
ALT SERPL W P-5'-P-CCNC: 34 U/L (ref 9–52)
ANION GAP SERPL CALCULATED.3IONS-SCNC: 12 MMOL/L (ref 5–14)
ANISOCYTOSIS BLD QL SMEAR: PRESENT
APTT PPP: 32 SECONDS (ref 23–34)
AST SERPL W P-5'-P-CCNC: 64 U/L (ref 14–36)
BACTERIA UR QL AUTO: ABNORMAL /HPF
BILIRUB SERPL-MCNC: 0.6 MG/DL
BILIRUB UR QL STRIP: NEGATIVE
BUN SERPL-MCNC: 8 MG/DL (ref 5–25)
CALCIUM SERPL-MCNC: 8.5 MG/DL (ref 8.4–10.2)
CHLORIDE SERPL-SCNC: 89 MMOL/L (ref 97–108)
CLARITY UR: CLEAR
CO2 SERPL-SCNC: 26 MMOL/L (ref 22–30)
COLOR UR: ABNORMAL
CREAT SERPL-MCNC: 0.59 MG/DL (ref 0.6–1.2)
ERYTHROCYTE [DISTWIDTH] IN BLOOD BY AUTOMATED COUNT: 17.1 %
GFR SERPL CREATININE-BSD FRML MDRD: 92 ML/MIN/1.73SQ M
GLUCOSE SERPL-MCNC: 101 MG/DL (ref 65–140)
GLUCOSE SERPL-MCNC: 323 MG/DL (ref 70–99)
GLUCOSE SERPL-MCNC: 349 MG/DL (ref 65–140)
GLUCOSE UR STRIP-MCNC: ABNORMAL MG/DL
HCT VFR BLD AUTO: 27 % (ref 36–46)
HGB BLD-MCNC: 8.9 G/DL (ref 12–16)
HGB UR QL STRIP.AUTO: 10
INR PPP: 1.1 (ref 0.89–1.1)
KETONES UR STRIP-MCNC: NEGATIVE MG/DL
LACTATE SERPL-SCNC: 1.8 MMOL/L (ref 0.7–2)
LACTATE SERPL-SCNC: 2.9 MMOL/L (ref 0.7–2)
LEUKOCYTE ESTERASE UR QL STRIP: NEGATIVE
LYMPHOCYTES # BLD AUTO: 1.31 THOUSAND/UL (ref 0.5–4)
LYMPHOCYTES # BLD AUTO: 5 % (ref 25–45)
MCH RBC QN AUTO: 32.1 PG (ref 26–34)
MCHC RBC AUTO-ENTMCNC: 33.1 G/DL (ref 31–36)
MCV RBC AUTO: 97 FL (ref 80–100)
METAMYELOCYTES NFR BLD MANUAL: 2 % (ref 0–1)
MONOCYTES # BLD AUTO: 1.05 THOUSAND/UL (ref 0.2–0.9)
MONOCYTES NFR BLD AUTO: 4 % (ref 1–10)
MUCOUS THREADS UR QL AUTO: ABNORMAL
MYELOCYTES NFR BLD MANUAL: 1 % (ref 0–1)
NEUTS BAND NFR BLD MANUAL: 45 % (ref 0–8)
NEUTS SEG # BLD: 22.27 THOUSAND/UL (ref 1.8–7.8)
NEUTS SEG NFR BLD AUTO: 40 %
NITRITE UR QL STRIP: NEGATIVE
NON-SQ EPI CELLS URNS QL MICRO: ABNORMAL /HPF
NRBC BLD AUTO-RTO: 1 /100 WBC (ref 0–2)
OSMOLALITY UR/SERPL-RTO: 288 MMOL/KG (ref 282–298)
PH UR STRIP.AUTO: 6 [PH] (ref 4.5–8)
PLATELET # BLD AUTO: 270 THOUSANDS/UL (ref 150–450)
PLATELET BLD QL SMEAR: ADEQUATE
PMV BLD AUTO: 8.2 FL (ref 8.9–12.7)
POTASSIUM SERPL-SCNC: 4.6 MMOL/L (ref 3.6–5)
PROCALCITONIN SERPL-MCNC: 0.38 NG/ML
PROMYELOCYTES NFR BLD MANUAL: 3 % (ref 0–0)
PROT SERPL-MCNC: 6.8 G/DL (ref 5.9–8.4)
PROT UR STRIP-MCNC: NEGATIVE MG/DL
PROTHROMBIN TIME: 11.6 SECONDS (ref 9.5–11.6)
RBC # BLD AUTO: 2.78 MILLION/UL (ref 4–5.2)
RBC #/AREA URNS AUTO: ABNORMAL /HPF
RBC MORPH BLD: ABNORMAL
SODIUM SERPL-SCNC: 127 MMOL/L (ref 137–147)
SP GR UR STRIP.AUTO: 1 (ref 1–1.04)
TOTAL CELLS COUNTED SPEC: 100
TOXIC GRANULES BLD QL SMEAR: PRESENT
TROPONIN I SERPL-MCNC: 0.03 NG/ML (ref 0–0.03)
UROBILINOGEN UA: NEGATIVE MG/DL
WBC # BLD AUTO: 26.2 THOUSAND/UL (ref 4.5–11)
WBC #/AREA URNS AUTO: ABNORMAL /HPF
WBC TOXIC VACUOLES BLD QL SMEAR: PRESENT

## 2019-02-25 PROCEDURE — 82948 REAGENT STRIP/BLOOD GLUCOSE: CPT

## 2019-02-25 PROCEDURE — 85610 PROTHROMBIN TIME: CPT | Performed by: EMERGENCY MEDICINE

## 2019-02-25 PROCEDURE — 93970 EXTREMITY STUDY: CPT | Performed by: SURGERY

## 2019-02-25 PROCEDURE — 70450 CT HEAD/BRAIN W/O DYE: CPT

## 2019-02-25 PROCEDURE — 83930 ASSAY OF BLOOD OSMOLALITY: CPT | Performed by: INTERNAL MEDICINE

## 2019-02-25 PROCEDURE — 93970 EXTREMITY STUDY: CPT

## 2019-02-25 PROCEDURE — 99215 OFFICE O/P EST HI 40 MIN: CPT | Performed by: NURSE PRACTITIONER

## 2019-02-25 PROCEDURE — 83605 ASSAY OF LACTIC ACID: CPT | Performed by: EMERGENCY MEDICINE

## 2019-02-25 PROCEDURE — 84484 ASSAY OF TROPONIN QUANT: CPT | Performed by: EMERGENCY MEDICINE

## 2019-02-25 PROCEDURE — 81001 URINALYSIS AUTO W/SCOPE: CPT | Performed by: EMERGENCY MEDICINE

## 2019-02-25 PROCEDURE — 99223 1ST HOSP IP/OBS HIGH 75: CPT | Performed by: INTERNAL MEDICINE

## 2019-02-25 PROCEDURE — 71046 X-RAY EXAM CHEST 2 VIEWS: CPT

## 2019-02-25 PROCEDURE — 83935 ASSAY OF URINE OSMOLALITY: CPT | Performed by: INTERNAL MEDICINE

## 2019-02-25 PROCEDURE — 85027 COMPLETE CBC AUTOMATED: CPT | Performed by: EMERGENCY MEDICINE

## 2019-02-25 PROCEDURE — 85007 BL SMEAR W/DIFF WBC COUNT: CPT | Performed by: EMERGENCY MEDICINE

## 2019-02-25 PROCEDURE — 80053 COMPREHEN METABOLIC PANEL: CPT | Performed by: EMERGENCY MEDICINE

## 2019-02-25 PROCEDURE — 83605 ASSAY OF LACTIC ACID: CPT | Performed by: INTERNAL MEDICINE

## 2019-02-25 PROCEDURE — 93005 ELECTROCARDIOGRAM TRACING: CPT

## 2019-02-25 PROCEDURE — 87040 BLOOD CULTURE FOR BACTERIA: CPT | Performed by: EMERGENCY MEDICINE

## 2019-02-25 PROCEDURE — 99285 EMERGENCY DEPT VISIT HI MDM: CPT

## 2019-02-25 PROCEDURE — 85730 THROMBOPLASTIN TIME PARTIAL: CPT | Performed by: EMERGENCY MEDICINE

## 2019-02-25 PROCEDURE — 84300 ASSAY OF URINE SODIUM: CPT | Performed by: INTERNAL MEDICINE

## 2019-02-25 PROCEDURE — 84145 PROCALCITONIN (PCT): CPT | Performed by: EMERGENCY MEDICINE

## 2019-02-25 PROCEDURE — 36415 COLL VENOUS BLD VENIPUNCTURE: CPT | Performed by: EMERGENCY MEDICINE

## 2019-02-25 PROCEDURE — 96360 HYDRATION IV INFUSION INIT: CPT

## 2019-02-25 PROCEDURE — 1111F DSCHRG MED/CURRENT MED MERGE: CPT | Performed by: NURSE PRACTITIONER

## 2019-02-25 RX ORDER — AMLODIPINE BESYLATE 5 MG/1
5 TABLET ORAL DAILY
Status: DISCONTINUED | OUTPATIENT
Start: 2019-02-26 | End: 2019-02-26

## 2019-02-25 RX ORDER — PREDNISONE 20 MG/1
20 TABLET ORAL DAILY
Status: DISCONTINUED | OUTPATIENT
Start: 2019-02-26 | End: 2019-02-27 | Stop reason: HOSPADM

## 2019-02-25 RX ORDER — LOSARTAN POTASSIUM 25 MG/1
25 TABLET ORAL DAILY
Status: DISCONTINUED | OUTPATIENT
Start: 2019-02-26 | End: 2019-02-26

## 2019-02-25 RX ORDER — LOPERAMIDE HYDROCHLORIDE 2 MG/1
2 CAPSULE ORAL AS NEEDED
Status: DISCONTINUED | OUTPATIENT
Start: 2019-02-25 | End: 2019-02-27 | Stop reason: HOSPADM

## 2019-02-25 RX ORDER — ALBUTEROL SULFATE 90 UG/1
2 AEROSOL, METERED RESPIRATORY (INHALATION) EVERY 4 HOURS PRN
Status: DISCONTINUED | OUTPATIENT
Start: 2019-02-25 | End: 2019-02-27 | Stop reason: HOSPADM

## 2019-02-25 RX ORDER — ACETAMINOPHEN 325 MG/1
650 TABLET ORAL EVERY 6 HOURS PRN
Status: DISCONTINUED | OUTPATIENT
Start: 2019-02-25 | End: 2019-02-27 | Stop reason: HOSPADM

## 2019-02-25 RX ORDER — GABAPENTIN 100 MG/1
100 CAPSULE ORAL
Status: DISCONTINUED | OUTPATIENT
Start: 2019-02-25 | End: 2019-02-27 | Stop reason: HOSPADM

## 2019-02-25 RX ORDER — HYDROXYZINE HYDROCHLORIDE 25 MG/1
50 TABLET, FILM COATED ORAL 3 TIMES DAILY PRN
Status: DISCONTINUED | OUTPATIENT
Start: 2019-02-25 | End: 2019-02-27 | Stop reason: HOSPADM

## 2019-02-25 RX ORDER — ONDANSETRON 8 MG/1
8 TABLET, ORALLY DISINTEGRATING ORAL EVERY 8 HOURS PRN
Status: DISCONTINUED | OUTPATIENT
Start: 2019-02-25 | End: 2019-02-27 | Stop reason: HOSPADM

## 2019-02-25 RX ORDER — EZETIMIBE 10 MG/1
10 TABLET ORAL DAILY
Status: DISCONTINUED | OUTPATIENT
Start: 2019-02-26 | End: 2019-02-27 | Stop reason: HOSPADM

## 2019-02-25 RX ORDER — BENZONATATE 100 MG/1
200 CAPSULE ORAL 3 TIMES DAILY
Status: DISCONTINUED | OUTPATIENT
Start: 2019-02-25 | End: 2019-02-27 | Stop reason: HOSPADM

## 2019-02-25 RX ORDER — CARVEDILOL 12.5 MG/1
12.5 TABLET ORAL 2 TIMES DAILY WITH MEALS
Status: DISCONTINUED | OUTPATIENT
Start: 2019-02-25 | End: 2019-02-26

## 2019-02-25 RX ORDER — INSULIN GLARGINE 100 [IU]/ML
15 INJECTION, SOLUTION SUBCUTANEOUS
Status: DISCONTINUED | OUTPATIENT
Start: 2019-02-25 | End: 2019-02-27 | Stop reason: HOSPADM

## 2019-02-25 RX ORDER — VANCOMYCIN HYDROCHLORIDE 1 G/200ML
1000 INJECTION, SOLUTION INTRAVENOUS EVERY 12 HOURS
Status: DISCONTINUED | OUTPATIENT
Start: 2019-02-26 | End: 2019-02-27 | Stop reason: HOSPADM

## 2019-02-25 RX ORDER — CEFEPIME HYDROCHLORIDE 2 G/50ML
2000 INJECTION, SOLUTION INTRAVENOUS ONCE
Status: DISCONTINUED | OUTPATIENT
Start: 2019-02-25 | End: 2019-02-25 | Stop reason: SDUPTHER

## 2019-02-25 RX ORDER — GUAIFENESIN 600 MG
600 TABLET, EXTENDED RELEASE 12 HR ORAL EVERY 12 HOURS SCHEDULED
Status: DISCONTINUED | OUTPATIENT
Start: 2019-02-25 | End: 2019-02-27 | Stop reason: HOSPADM

## 2019-02-25 RX ORDER — FLUTICASONE PROPIONATE 50 MCG
2 SPRAY, SUSPENSION (ML) NASAL DAILY
Status: DISCONTINUED | OUTPATIENT
Start: 2019-02-26 | End: 2019-02-27 | Stop reason: HOSPADM

## 2019-02-25 RX ORDER — ACETAMINOPHEN 325 MG/1
650 TABLET ORAL ONCE
Status: COMPLETED | OUTPATIENT
Start: 2019-02-25 | End: 2019-02-25

## 2019-02-25 RX ORDER — SODIUM CHLORIDE 9 MG/ML
100 INJECTION, SOLUTION INTRAVENOUS CONTINUOUS
Status: DISCONTINUED | OUTPATIENT
Start: 2019-02-25 | End: 2019-02-27 | Stop reason: HOSPADM

## 2019-02-25 RX ORDER — VANCOMYCIN HYDROCHLORIDE 1 G/200ML
1000 INJECTION, SOLUTION INTRAVENOUS ONCE
Status: COMPLETED | OUTPATIENT
Start: 2019-02-25 | End: 2019-02-25

## 2019-02-25 RX ORDER — LEVALBUTEROL INHALATION SOLUTION 0.63 MG/3ML
0.63 SOLUTION RESPIRATORY (INHALATION)
Status: DISCONTINUED | OUTPATIENT
Start: 2019-02-25 | End: 2019-02-27 | Stop reason: HOSPADM

## 2019-02-25 RX ORDER — ERGOCALCIFEROL 1.25 MG/1
50000 CAPSULE ORAL WEEKLY
Status: DISCONTINUED | OUTPATIENT
Start: 2019-02-25 | End: 2019-02-27 | Stop reason: HOSPADM

## 2019-02-25 RX ADMIN — GABAPENTIN 100 MG: 100 CAPSULE ORAL at 21:48

## 2019-02-25 RX ADMIN — SODIUM CHLORIDE 125 ML/HR: 9 INJECTION, SOLUTION INTRAVENOUS at 13:53

## 2019-02-25 RX ADMIN — ACETAMINOPHEN 650 MG: 325 TABLET ORAL at 10:58

## 2019-02-25 RX ADMIN — GUAIFENESIN 600 MG: 600 TABLET, EXTENDED RELEASE ORAL at 20:48

## 2019-02-25 RX ADMIN — CEFEPIME 2000 MG: 2 INJECTION, POWDER, FOR SOLUTION INTRAVENOUS at 14:44

## 2019-02-25 RX ADMIN — CARVEDILOL 12.5 MG: 12.5 TABLET, FILM COATED ORAL at 18:18

## 2019-02-25 RX ADMIN — SODIUM CHLORIDE 100 ML/HR: 9 INJECTION, SOLUTION INTRAVENOUS at 23:48

## 2019-02-25 RX ADMIN — ACETAMINOPHEN 650 MG: 325 TABLET ORAL at 20:47

## 2019-02-25 RX ADMIN — SODIUM CHLORIDE 1000 ML: 9 INJECTION, SOLUTION INTRAVENOUS at 10:58

## 2019-02-25 RX ADMIN — INSULIN GLARGINE 15 UNITS: 100 INJECTION, SOLUTION SUBCUTANEOUS at 21:47

## 2019-02-25 RX ADMIN — ONDANSETRON 8 MG: 8 TABLET, ORALLY DISINTEGRATING ORAL at 22:42

## 2019-02-25 RX ADMIN — ERGOCALCIFEROL 50000 UNITS: 1.25 CAPSULE ORAL at 18:18

## 2019-02-25 RX ADMIN — VANCOMYCIN HYDROCHLORIDE 1000 MG: 1 INJECTION, SOLUTION INTRAVENOUS at 13:16

## 2019-02-25 RX ADMIN — INSULIN LISPRO 8 UNITS: 100 INJECTION, SOLUTION INTRAVENOUS; SUBCUTANEOUS at 21:49

## 2019-02-25 RX ADMIN — BENZONATATE 200 MG: 100 CAPSULE ORAL at 20:48

## 2019-02-25 NOTE — ASSESSMENT & PLAN NOTE
Patient with essential hypertension  Continue Coreg  Blood pressure low side hold amlodipine and losartan  Continue closely monitor blood pressure

## 2019-02-25 NOTE — ASSESSMENT & PLAN NOTE
Patient with hyponatremia serum galmar707  Will check plasma and urine osmolarity urine sodium  May be secondary to diarrhea with nausea vomiting and fluid  At present time continue IV fluid normal saline

## 2019-02-25 NOTE — SEPSIS NOTE
Sepsis Note   DIPESH Sandoval 68 y o  female MRN: [id number]  Unit/Bed#: ED 06 Encounter: [id number]      Initial Sepsis Screening     Row Name 02/25/19 1336                Is the patient's history suggestive of a new or worsening infection? Yes (Proceed)  (Abnormal)   -AK        Suspected source of infection  pneumonia  -AK        Are two or more of the following signs & symptoms of infection both present and new to the patient? Yes (Proceed)  (Abnormal)   -AK        Indicate SIRS criteria  Hyperthemia > 38 3C (100 9F); Leukocytosis (WBC > 51241 IJL);WBC > 10% bands  -AK        If the answer is yes to both questions, suspicion of sepsis is present          If severe sepsis is present AND tissue hypoperfusion perists in the hour after fluid resuscitation or lactate > 4, the patient meets criteria for SEPTIC SHOCK          Are any of the following organ dysfunction criteria present within 6 hours of suspected infection and SIRS criteria that are NOT considered to be chronic conditions? Yes  (Abnormal)   -AK        Organ dysfunction  Lactate > 2 0 mmol/L  -AK        Date of presentation of severe sepsis  02/25/19  -AK        Time of presentation of severe sepsis  1020  -AK        Tissue hypoperfusion persists in the hour after crystalloid fluid administration, evidenced, by either:          Was hypotension present within one hour of the conclusion of crystalloid fluid administration?   No  -AK        Date of presentation of septic shock          Time of presentation of septic shock            User Key  (r) = Recorded By, (t) = Taken By, (c) = Cosigned By    Initials Name Provider Type    [address] [place] [address] Ne, DO Physician

## 2019-02-25 NOTE — H&P
VTE Prophylaxis: Enoxaparin (Lovenox)  / sequential compression device   Code Status:  Full code  POLST:   Discussion with family:  Patient    Anticipated Length of Stay:  Patient will be admitted on an Inpatient basis with an anticipated length of stay of  2 midnights 2 midnights  Justification for Hospital Stay:  Sepsis    Total Time for Visit, including Counseling / Coordination of Care: 45 minutes  Greater than 50% of this total time spent on direct patient counseling and coordination of care  Chief Complaint:   Patient went to see her doctor Dr Eileen Ribera of to have fever with mild cough sent to ER x-ray chest showed right lower lobe pneumonia due to chemotherapy leukocytosis febrile illness patient was admitted with sepsis with right lower lobe pneumonia antibiotic is started 2 days before she was admitted with diarrhea low-grade fever blood culture was negative and diarrhea improved she was discharged came back with septic picture started antibiotic consult ID    History of Present Illness:    Renita Ortiz is a 68 y o  female who presents with fever coughing leukocytosis and x-ray shows pneumonia patient seen by her oncologist refer to ER because of temperature found to be x-ray showed pneumonia patient with fever started antibiotic culture done patient was meet the criteria of sepsis IV fluid given does have hyponatremia may be secondary to fluid overload prior to that she was given fluid and was having diarrhea continue normal saline IV antibiotic consult ID  Review of Systems:    Review of Systems   Constitutional: Positive for appetite change, chills, fatigue and fever  HENT: Positive for congestion  Negative for hearing loss, sore throat and trouble swallowing  Eyes: Negative  Negative for photophobia, discharge and visual disturbance  Respiratory: Positive for cough, shortness of breath and wheezing  Negative for chest tightness  Cardiovascular: Positive for chest pain  Negative for palpitations  Gastrointestinal: Positive for abdominal pain, diarrhea, nausea and vomiting  Negative for blood in stool  Endocrine: Negative  Negative for polydipsia and polyuria  Genitourinary: Negative  Negative for difficulty urinating, dysuria, flank pain and hematuria  Musculoskeletal: Positive for back pain  Negative for gait problem  Skin: Negative  Negative for rash  Allergic/Immunologic: Negative for environmental allergies and food allergies  Neurological: Positive for weakness  Negative for dizziness, seizures, syncope and headaches  Hematological: Negative  Does not bruise/bleed easily  Psychiatric/Behavioral: Negative for behavioral problems  The patient is nervous/anxious  All other systems reviewed and are negative  Past Medical and Surgical History:     Past Medical History:   Diagnosis Date    Cancer [facility])     Throat    Chronic pain disorder     Diabetes mellitus ([facility] )     Diffuse large B cell lymphoma ([facility] )     Dysphagia     GERD without esophagitis     HTN (hypertension)     Hyperlipidemia     Hypertension     MI (myocardial infarction) ([facility] )        Past Surgical History:   Procedure Laterality Date    BONE MARROW BIOPSY      BREAST LUMPECTOMY      CHOLECYSTECTOMY      IR PORT PLACEMENT  11/16/2018    OTHER SURGICAL HISTORY      tendor tear repair to right shoulder    SHOULDER ARTHROSCOPY         Meds/Allergies:    Prior to Admission medications    Medication Sig Start Date End Date Taking?  Authorizing Provider   al Marino Gula oxide-diphenhydramine-lidocaine viscous (MAGIC MOUTHWASH) 1:1:1 suspension Swish and swallow 10 mL every 4 (four) hours as needed for mouth pain or discomfort 2/23/19   Marcela LOBATO PA-C   albuterol (PROVENTIL HFA,VENTOLIN HFA) 90 mcg/act inhaler Inhale 2 puffs every 4 (four) hours as needed for wheezing 12/22/18   Jelly Mcfadden DO   amLODIPine (NORVASC) 5 mg tablet Take 1 tablet (5 mg total) by mouth daily 12/11/18 Huong Berger PA-C   benzonatate (TESSALON) 200 MG capsule Take 1 capsule (200 mg total) by mouth 3 (three) times a day 2/23/19   Ju LOBATO PA-C   Blood Glucose Monitoring Suppl (FREESTYLE LITE) JORDAN Check blood sugar twice a day 2/7/19   Huong Berger PA-C   carvedilol (COREG) 12 5 mg tablet Take 1 tablet (12 5 mg total) by mouth 2 (two) times a day with meals 2/7/19   Huong Berger PA-C   ergocalciferol (VITAMIN D2) 50,000 units Take 50,000 Units by mouth once a week  Historical Provider, MD   ezetimibe (ZETIA) 10 mg tablet Take 1 tablet (10 mg total) by mouth daily 12/11/18   Huong Berger PA-C   fluticasone [facility]) 50 mcg/act nasal spray 2 sprays into each nostril daily 2/23/19   Yessenia LOBATO PA-C   gabapentin (NEURONTIN) 100 mg capsule Take 1 capsule (100 mg total) by mouth daily at bedtime 1/28/19   Dayna Wiley DO   hydrOXYzine HCL (ATARAX) 50 mg tablet Take 1 tablet (50 mg total) by mouth 3 (three) times a day as needed for anxiety (and insomnia) 11/27/18   Huong Berger PA-C   Lancets (FREESTYLE) lancets Check blood sugar twice a day   2/7/19   Huong Berger PA-C   lidocaine viscous (XYLOCAINE) 2 % mucosal solution Swish and swallow 10 mL 3 (three) times a day as needed for mild pain or moderate pain 10/25/18   Ailyn Gage MD   loperamide (IMODIUM) 2 mg capsule Take 1 capsule (2 mg total) by mouth as needed for diarrhea Every 1-2 hours as needed for diarrhea 11/12/18   MOIRA Mg   losartan (COZAAR) 25 mg tablet Take 1 tablet (25 mg total) by mouth daily 12/11/18   Huong Berger PA-C   ondansetron (ZOFRAN-ODT) 8 mg disintegrating tablet Take 1 tablet (8 mg total) by mouth every 8 (eight) hours as needed for nausea or vomiting 1/2/19   Ruben Owens MD   predniSONE 20 mg tablet Take 20 mg by mouth daily Take 5 tablets by mouth every day for 5 days every 3 weeks with chemotherapy    Historical Provider, MD   glipiZIDE (GLUCOTROL) 10 mg tablet Take 1 tablet (10 mg total) by mouth 2 (two) times a day before meals 2/7/19 2/25/19  Sandra Goodson PA-C   glucose blood (FREESTYLE LITE) test strip Check blood sugar twice a day 2/7/19 2/25/19  Sandra Goodson PA-C   omeprazole (PriLOSEC) 40 MG capsule Take 1 capsule (40 mg total) by mouth 2 (two) times a day 2/6/19 2/25/19  MOIRA Telles   prochlorperazine (COMPAZINE) 10 mg tablet Take 1 tablet (10 mg total) by mouth every 6 (six) hours as needed for nausea or vomiting 2/6/19 2/25/19  MOIRA Telles   ranitidine (ZANTAC) 300 MG tablet Take 1 tablet (300 mg total) by mouth daily 12/11/18 2/25/19  Sandra Goodson PA-C   sitaGLIPtin-metFORMIN (JANUMET)  MG per tablet Take 1 tablet by mouth 2 (two) times a day with meals 2/7/19 2/25/19  Sandra Goodson PA-C     I have reviewed home medications with patient personally  Allergies: No Known Allergies    Social History:     Marital Status:    Occupation:  Retired  Patient Pre-hospital Living Situation:  At home with family  Patient Pre-hospital Level of Mobility:  Limited  Patient Pre-hospital Diet Restrictions:  Diabetic  Substance Use History:   Social History     Substance and Sexual Activity   Alcohol Use Never    Frequency: Never    Drinks per session: Patient refused    Binge frequency: Never    Comment: 0     Social History     Tobacco Use   Smoking Status Never Smoker   Smokeless Tobacco Never Used     Social History     Substance and Sexual Activity   Drug Use No       Family History:    non-contributory    Physical Exam:     Vitals:   Blood Pressure: 105/55 (02/25/19 1557)  Pulse: 82 (02/25/19 1557)  Temperature: 97 8 °F (36 6 °C) (02/25/19 1348)  Temp Source: Oral (02/25/19 1348)  Respirations: 16 (02/25/19 1557)  SpO2: 95 % (02/25/19 1557)    Physical Exam   Constitutional: She is oriented to person, place, and time  Pale color malnourished   HENT:   Head: Normocephalic and atraumatic     Right Ear: External ear normal    Left Ear: External ear normal  Mouth/Throat: Oropharynx is clear and moist    Eyes: Pupils are equal, round, and reactive to light  Conjunctivae and EOM are normal    Neck: Normal range of motion  Neck supple  Cardiovascular: Normal rate, regular rhythm, normal heart sounds and intact distal pulses  Pulmonary/Chest: Effort normal  She has rales  Decreased breath some right base   Abdominal: Soft  Bowel sounds are normal  She exhibits no mass  There is no tenderness  There is no rebound and no guarding  Genitourinary:   Genitourinary Comments: deferred   Musculoskeletal: Normal range of motion  Neurological: She is alert and oriented to person, place, and time  She has normal reflexes  Skin: Skin is warm and dry  Capillary refill takes less than 2 seconds  No rash noted  Psychiatric: She has a normal mood and affect  Nursing note and vitals reviewed  Additional Data:     Lab Results: I have personally reviewed pertinent reports        Results from last 7 days   Lab Units 02/25/19  1048 02/23/19  0508   WBC Thousand/uL 26 20* 17 30*   HEMOGLOBIN g/dL 8 9* 9 2*   HEMATOCRIT % 27 0* 27 8*   PLATELETS Thousands/uL 270 195   BANDS PCT % 45* 20*   LYMPHO PCT % 5* 15*   MONO PCT % 4 11*   EOS PCT %  --  2     Results from last 7 days   Lab Units 02/25/19  1048   SODIUM mmol/L 127*   POTASSIUM mmol/L 4 6   CHLORIDE mmol/L 89*   CO2 mmol/L 26   BUN mg/dL 8   CREATININE mg/dL 0 59*   ANION GAP mmol/L 12   CALCIUM mg/dL 8 5   ALBUMIN g/dL 3 7   TOTAL BILIRUBIN mg/dL 0 60   ALK PHOS U/L 87   ALT U/L 34   AST U/L 64*   GLUCOSE RANDOM mg/dL 323*     Results from last 7 days   Lab Units 02/25/19  1048   INR  1 10     Results from last 7 days   Lab Units 02/23/19  1107 02/23/19  0550 02/22/19  2023 02/22/19  1559 02/22/19  1112 02/22/19  0548 02/21/19  2104 02/21/19  1804 02/21/19  1655   POC GLUCOSE mg/dl 236* 150* 139 235* 229* 139 229* 169* 65         Results from last 7 days   Lab Units 02/25/19  1048 02/23/19  1025 02/21/19  1512 02/21/19  1241   LACTIC ACID mmol/L 2 9* 1 8 1 4 2 9*       Imaging: I have personally reviewed pertinent reports  CT head without contrast   Final Result by Ulysses Bruno MD (02/25 1237)      No acute intracranial abnormality  Mild mucosal thickening within the paranasal sinuses                  Workstation performed: [id number]         XR chest 2 views   Final Result by Enrike Caldwell DO (02/25 1238)      Right-sided airspace opacity suggestive of pneumonia  Follow-up to resolution recommended  The study was marked in [facility] for immediate notification  Workstation performed: [id number]         VAS lower limb venous duplex study, unilateral/limited    (Results Pending)       EKG, Pathology, and Other Studies Reviewed on Admission:   · EKG:  Normal sinus rhythm    Allscripts / Epic Records Reviewed: Yes     ** Please Note: This note has been constructed using a voice recognition system   **    H&P- Lashell Sandhya Jaime [birth year], 68 y o  female MRN: [id number]    Unit/Bed#: ED 06 Encounter: [id number]    Primary Care Provider: Malia Costa PA-C   Date and time admitted to hospital: 2/25/2019 10:13 AM        * Sepsis [facility])  Assessment & Plan  Patient met criteria of sepsis febrile with leukocytosis  Elevated lactic acid  Elevated white count  X-ray shows pneumonia  IV fluid the antibiotic as patient is known lymphoma recover with gram-negative cefepime Vanco  Consult ID  Blood culture drawn blood culture 2 days ago negative urine culture also negative  Sputum for culture order mycoplasma and Legionella titer  Acees area port looks clean no redness no tenderness will not removed under S patient clinically does not get improved  Repeat lactic acid in 3 hours    Hypernatremia  Assessment & Plan  Patient with hyponatremia serum [id number]  Will check plasma and urine osmolarity urine sodium  May be secondary to diarrhea with nausea vomiting and fluid  At present time continue IV fluid normal saline    Pneumonia due to infectious organism  Assessment & Plan  Repeat x-ray chest shows right lower lobe pneumonia patient with lymphoma and on chemotherapy Will cover gram-negative cefepime and vanc gram-positive coverage will add Levaquin for legionnaire and will culture it patient with hypernatremia diarrhea and fever with pneumonia if negative will DC Levaquin  Consult ID  Bronchodilator  Sputum culture  Pulmonary toilet  IV fluid at present time will continue cefepime and Vanco  First x-ray done 2 days ago was negative repeat x-ray positive for right middle lobe pneumonia  Add Mucomyst unable to bring any phlegm    Severe protein-calorie malnutrition (HCC)  Assessment & Plan  Malnutrition Findings:        encourage PO intake will add Ensure p o  Intake poor    BMI Findings: There is no height or weight on file to calculate BMI         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)  Assessment & Plan  Patient with diagnosed with large B-cell lymphoma status post lymph node biopsy confirmed the diagnosis chemotherapy  Receive almost for chemotherapy doses recently when she received the 4th dose she did not feel well started nausea vomiting and diarrhea  Mild cough and febrile she was seen in office found to have a fever she was admitted last time state in 2 days in-hospital became I febrile at what time diet in dehydrated x-ray was negative blood culture was negative she improved was discharged she is spike again fever  X-ray shows now right mid lobe pneumonia will mild cough  Nausea vomiting diarrhea improved  She came with sepsis  IV fluid given antibiotic is started  Consult Dr Loulou Galvez and  Consult ID    Essential hypertension  Assessment & Plan  Patient with essential hypertension  Continue Coreg  Blood pressure low side hold amlodipine and losartan  Continue closely monitor blood pressure    Type 2 diabetes mellitus without complication, without long-term current use of insulin [facility])  Assessment & Plan  Lab Results   Component Value Date    HGBA1C 9 3 (A) 02/07/2019    Patient type 2 diabetes without long-term use of insulin without complication  Will put insulin coverage    Recent Labs     02/22/19 2023 02/23/19  0550 02/23/19  1107   POCGLU 139 150* 236*       Blood Sugar Average: Last 72 hrs:

## 2019-02-25 NOTE — ASSESSMENT & PLAN NOTE
Malnutrition Findings:        encourage PO intake will add Ensure p o  Intake poor    BMI Findings: There is no height or weight on file to calculate BMI

## 2019-02-25 NOTE — NURSING NOTE
Pt received this pm, resting in bed, family at bedside, a+o, [place] speaking, no distress, denies pain, will monitor

## 2019-02-25 NOTE — ASSESSMENT & PLAN NOTE
Repeat x-ray chest shows right lower lobe pneumonia patient with lymphoma and on chemotherapy Will cover gram-negative cefepime and vanc gram-positive coverage will add Levaquin for legionnaire and will culture it patient with hypernatremia diarrhea and fever with pneumonia if negative will DC Levaquin  Consult ID  Bronchodilator  Sputum culture  Pulmonary toilet  IV fluid at present time will continue cefepime and Vanco  First x-ray done 2 days ago was negative repeat x-ray positive for right middle lobe pneumonia  Add Mucomyst unable to bring any phlegm

## 2019-02-25 NOTE — ASSESSMENT & PLAN NOTE
Patient with diagnosed with large B-cell lymphoma status post lymph node biopsy confirmed the diagnosis chemotherapy  Receive almost for chemotherapy doses recently when she received the 4th dose she did not feel well started nausea vomiting and diarrhea  Mild cough and febrile she was seen in office found to have a fever she was admitted last time state in 2 days in-hospital became I febrile at what time diet in dehydrated x-ray was negative blood culture was negative she improved was discharged she is spike again fever  X-ray shows now right mid lobe pneumonia will mild cough  Nausea vomiting diarrhea improved  She came with sepsis  IV fluid given antibiotic is started  Consult Dr Matthias Denson and  Consult ID

## 2019-02-25 NOTE — PROGRESS NOTES
Hematology/Oncology Outpatient Follow-up  Yobany Jacob Jaime 68 y o  female 1945 [id number]    Date:  2/25/2019      Assessment and Plan:  1  Diffuse large 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 ([facility] [address] )  Patient completed cycle #5, R-CHOP 02/13/2019  Her final cycle #6 is due 03/06/2019  Patient appears very ill today with high temperature as well as new neurological symptoms  She will need to be admitted for further evaluation/treatment of an infectious cause as well as workup of her neurological symptoms to rule out metastatic involvement of her lymphoma in the brain  Report call to Dr Titus Lester in the emergency department  We will arrange for tentative follow-up appointment and repeat blood work next week to re-evaluate patient prior to her final cycle of treatment  - Comprehensive metabolic panel; Future  - CBC and differential; Future  - LD,Blood; Future  - Transfer to other facility    2  Fever, unspecified fever cause  Patient is immunocompromised due to her recent chemotherapy with high temperature 102 5°  Her last CBC does not seem to reveal any neutropenia  She was recently discharged from the hospital on Saturday and was not treated with any antibiotics during her hospital course as no infectious cause was found  She will need to be further worked up and treated for an infectious cause  - Transfer to other facility    3  Syncope, unspecified syncope type  Patient reports a syncopal episode yesterday with fall  She also admits to severe weakness and blurry vision in the am prior to the fall  New neurological symptoms are concerning for metastatic disease in the brain  Will need to be further worked up during her hospital admission     - Transfer to other facility    HPI:  Patient presents today for a delfina visit  She was just discharged from the hospital on Saturday 02/23/2019    She was admitted on 2/21/19 with complaints of chest pain, nausea poor appetite low-grade fever and diarrhea  No infectious cause was found during admission therefore the patient was not treated with antibiotics during her hospital course, just supportive care  Today patient presents with high temperature of a 102 5°  Her granddaughter states that she has been very weak and fell in the bathroom yesterday, her  had to break down the door to get to her  Patient admits that she fainted  Denies any trauma from the fall  Does have complaints of generalized myalgias and arthralgias  She also admits that she was experiencing double vision prior to the fall yesterday but denies this at present  Oncology History    The patient complained about significant sore throat in May which was treated with antibiotics by her PCP in [facility] which did not improve her sore throat  She then started to notice significant odynophagia and dysphagia for liquids it is and solid nutrition  Eventually, she had a CT scan of the neck on the 20th of September which showed soft tissue lesion in the left palatine tonsil with abnormal lymph nodes bilaterally in the neck compatible with neoplastic process  She then had a biopsy of the left tonsil/left tonsillectomy on the 25th of September 2018 which was compatible with diffuse large B-cell lymphoma germinal center B phenotype  The immunohistochemical staining came back positive for BCL2, BCL 6 and myc with Ki 67 around 70%  No FISH rearrangements gene study was done for BCL2, BCL 6 are myc translocation  The patient was treated with 1 cycle of R-EPOCH since her airway was compromise with the large tonsillar mass and a biopsy which was reviewed by our hematopathologist on the 15 November compatible with 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 according to the [facility] with pending gene rearrangement studies  During the hospital course the patient had another biopsy of the left tonsil to better understand the exact aggressiveness of her large B-cell lymphoma    The biopsy on the 20th of November showed large B-cell lymphoma with BCL -2 and C myc level expressed surface a type without the myc or BCL gene rearrangement  Her bone marrow biopsy on the 9th of November was negative for B-cell lymphoma involvement with normal bone marrow trilineage maturation  She was also evaluated with an MRI of the brain during the hospital course which was negative for any hint of lymphoma involvement  PET scan on the 14th of November showed IMPRESSION:     1  FDG avid left posterior oropharyngeal lesion compatible with malignancy  2   FDG avid lymph nodes in the neck and left axilla compatible with malignancy  3  No FDG avid lymph nodes in the abdomen or pelvis suspicious for malignancy  4   Tiny focus of FDG uptake along the skin of the right upper quadrant anterior abdominal wall with mild skin thickening suggested here on CT        Diffuse large 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 ([address] )    11/9/2018 Initial Diagnosis     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)          Chemotherapy     1  Dose adjusted R-EPOCH 11/21/18 (1 cycle)- switched to RCHOP after repeat biopsy/pathology reviewed  2  R-CHOP started 12/12/18            Interval history:    ROS: Review of Systems   Constitutional: Positive for activity change, appetite change (actually improved over the past 2 days), fatigue (severe) and unexpected weight change  Negative for chills and fever  Reports generalized pain 7/10   HENT: Negative for congestion, mouth sores, nosebleeds, sore throat and trouble swallowing  Eyes: Negative  Respiratory: Positive for cough (harsh, NP)  Negative for chest tightness and shortness of breath  Cardiovascular: Negative for chest pain, palpitations and leg swelling  Gastrointestinal: Positive for nausea (mild, much improved since last admission)  Negative for abdominal distention, abdominal pain, blood in stool, constipation, diarrhea and vomiting     Genitourinary: Positive for dysuria (moderate)  Negative for difficulty urinating, frequency, hematuria and urgency  Musculoskeletal: Positive for arthralgias (8/10) and myalgias (7/10)  Negative for back pain, gait problem and joint swelling  Skin: Positive for rash  Negative for color change and pallor  Neurological: Positive for dizziness (severe), syncope, weakness and headaches  Negative for light-headedness and numbness  Hematological: Negative for adenopathy  Does not bruise/bleed easily  Psychiatric/Behavioral: Positive for dysphoric mood and sleep disturbance (frequently)  The patient is not nervous/anxious  Rates stress 8/10       Past Medical History:   Diagnosis Date    Cancer ([place] [address] )     Throat    Chronic pain disorder     Diabetes mellitus ([place] [address] )     Diffuse large B cell lymphoma ([place] [address] )     Dysphagia     GERD without esophagitis     HTN (hypertension)     Hyperlipidemia     Hypertension     MI (myocardial infarction) ([place] [address] )        Past Surgical History:   Procedure Laterality Date    BONE MARROW BIOPSY      BREAST LUMPECTOMY      CHOLECYSTECTOMY      IR PORT PLACEMENT  11/16/2018    OTHER SURGICAL HISTORY      tendor tear repair to right shoulder    SHOULDER ARTHROSCOPY         Social History     Socioeconomic History    Marital status:       Spouse name: None    Number of children: None    Years of education: None    Highest education level: None   Occupational History    None   Social Needs    Financial resource strain: None    Food insecurity:     Worry: None     Inability: None    Transportation needs:     Medical: None     Non-medical: None   Tobacco Use    Smoking status: Never Smoker    Smokeless tobacco: Never Used   Substance and Sexual Activity    Alcohol use: Never     Frequency: Never     Drinks per session: Patient refused     Binge frequency: Never     Comment: 0    Drug use: No    Sexual activity: Not Currently     Partners: Male   Lifestyle    Physical activity:     Days per week: None     Minutes per session: None    Stress: None   Relationships    Social connections:     Talks on phone: None     Gets together: None     Attends Sabianist service: None     Active member of club or organization: None     Attends meetings of clubs or organizations: None     Relationship status: None    Intimate partner violence:     Fear of current or ex partner: None     Emotionally abused: None     Physically abused: None     Forced sexual activity: None   Other Topics Concern    None   Social History Narrative    None       Family History   Problem Relation Age of Onset    Diabetes Mother     Heart disease Sister     Hypertension Brother     Cancer Maternal Grandmother     Cancer Maternal Grandfather        No Known Allergies      Current Outpatient Medications:     al mag oxide-diphenhydramine-lidocaine viscous (MAGIC MOUTHWASH) 1:1:1 suspension, Swish and swallow 10 mL every 4 (four) hours as needed for mouth pain or discomfort, Disp: 180 mL, Rfl: 0    albuterol (PROVENTIL HFA,VENTOLIN HFA) 90 mcg/act inhaler, Inhale 2 puffs every 4 (four) hours as needed for wheezing, Disp: 1 Inhaler, Rfl: 0    amLODIPine (NORVASC) 5 mg tablet, Take 1 tablet (5 mg total) by mouth daily, Disp: 90 tablet, Rfl: 1    benzonatate (TESSALON) 200 MG capsule, Take 1 capsule (200 mg total) by mouth 3 (three) times a day, Disp: 20 capsule, Rfl: 0    Blood Glucose Monitoring Suppl (FREESTYLE LITE) JORDAN, Check blood sugar twice a day, Disp: 1 each, Rfl: 0    carvedilol (COREG) 12 5 mg tablet, Take 1 tablet (12 5 mg total) by mouth 2 (two) times a day with meals, Disp: 180 tablet, Rfl: 1    ergocalciferol (VITAMIN D2) 50,000 units, Take 50,000 Units by mouth once a week , Disp: , Rfl:     ezetimibe (ZETIA) 10 mg tablet, Take 1 tablet (10 mg total) by mouth daily, Disp: 90 tablet, Rfl: 1    fluticasone (FLONASE) 50 mcg/act nasal spray, 2 sprays into each nostril daily, Disp: 1 Bottle, Rfl: 0    gabapentin (NEURONTIN) 100 mg capsule, Take 1 capsule (100 mg total) by mouth daily at bedtime, Disp: 30 capsule, Rfl: 1    glipiZIDE (GLUCOTROL) 10 mg tablet, Take 1 tablet (10 mg total) by mouth 2 (two) times a day before meals, Disp: 180 tablet, Rfl: 0    glucose blood (FREESTYLE LITE) test strip, Check blood sugar twice a day, Disp: 100 each, Rfl: 5    hydrOXYzine HCL (ATARAX) 50 mg tablet, Take 1 tablet (50 mg total) by mouth 3 (three) times a day as needed for anxiety (and insomnia), Disp: 90 tablet, Rfl: 2    Lancets (FREESTYLE) lancets, Check blood sugar twice a day , Disp: 100 each, Rfl: 5    lidocaine viscous (XYLOCAINE) 2 % mucosal solution, Swish and swallow 10 mL 3 (three) times a day as needed for mild pain or moderate pain, Disp: 100 mL, Rfl: 2    loperamide (IMODIUM) 2 mg capsule, Take 1 capsule (2 mg total) by mouth as needed for diarrhea Every 1-2 hours as needed for diarrhea, Disp: 60 capsule, Rfl: 1    losartan (COZAAR) 25 mg tablet, Take 1 tablet (25 mg total) by mouth daily, Disp: 90 tablet, Rfl: 1    omeprazole (PriLOSEC) 40 MG capsule, Take 1 capsule (40 mg total) by mouth 2 (two) times a day, Disp: 60 capsule, Rfl: 1    ondansetron (ZOFRAN-ODT) 8 mg disintegrating tablet, Take 1 tablet (8 mg total) by mouth every 8 (eight) hours as needed for nausea or vomiting, Disp: 90 tablet, Rfl: 0    predniSONE 20 mg tablet, Take 20 mg by mouth daily Take 5 tablets by mouth every day for 5 days every 3 weeks with chemotherapy, Disp: , Rfl:     prochlorperazine (COMPAZINE) 10 mg tablet, Take 1 tablet (10 mg total) by mouth every 6 (six) hours as needed for nausea or vomiting, Disp: 30 tablet, Rfl: 1    ranitidine (ZANTAC) 300 MG tablet, Take 1 tablet (300 mg total) by mouth daily, Disp: 90 tablet, Rfl: 1    sitaGLIPtin-metFORMIN (JANUMET)  MG per tablet, Take 1 tablet by mouth 2 (two) times a day with meals, Disp: 180 tablet, Rfl: 0      Physical Exam:  /60 (BP Location: Left arm, Cuff Size: Adult)   Pulse 70   Temp (!) 102 5 °F (39 2 °C) (Tympanic)   Resp 18   Ht 4' 11 02" (1 499 m)   Wt 59 4 kg (131 lb)   SpO2 93%   BMI 26 44 kg/m²     Physical Exam   Constitutional: She is oriented to person, place, and time  She appears distressed  ill, frail appearing   HENT:   Head: Normocephalic and atraumatic  Mouth/Throat: Oropharynx is clear and moist  No oropharyngeal exudate  Eyes: Pupils are equal, round, and reactive to light  Conjunctivae and EOM are normal  No scleral icterus  Eye lids appear swollen   Neck: Normal range of motion  Neck supple  No thyromegaly present  Cardiovascular: Normal rate, regular rhythm, normal heart sounds and intact distal pulses  No murmur heard  Pulmonary/Chest: Effort normal and breath sounds normal  No respiratory distress  Harsh coughing spell noted   Abdominal: Soft  Bowel sounds are normal  She exhibits no distension  There is no hepatosplenomegaly  There is no tenderness  Musculoskeletal: Normal range of motion  She exhibits no edema  Lymphadenopathy:     She has no cervical adenopathy  She has no axillary adenopathy  Neurological: She is alert and oriented to person, place, and time  Skin: Skin is warm and dry  She is not diaphoretic  No erythema  No pallor  Psychiatric: She has a normal mood and affect  Her behavior is normal  Judgment and thought content normal    Vitals reviewed          Labs:  Lab Results   Component Value Date    WBC 17 30 (H) 02/23/2019    HGB 9 2 (L) 02/23/2019    HCT 27 8 (L) 02/23/2019    MCV 98 02/23/2019     02/23/2019     Lab Results   Component Value Date    K 3 5 (L) 02/22/2019     02/22/2019    CO2 28 02/22/2019    BUN 5 02/22/2019    CREATININE 0 50 (L) 02/22/2019    GLUF 100 (H) 02/11/2019    CALCIUM 9 0 02/22/2019    AST 16 02/21/2019    ALT 26 02/21/2019    ALKPHOS 81 02/21/2019    EGFR 97 02/22/2019         Patient voiced understanding and agreement in the above discussion  Aware to contact our office with questions/symptoms in the interim

## 2019-02-25 NOTE — ASSESSMENT & PLAN NOTE
Patient met criteria of sepsis febrile with leukocytosis  Elevated lactic acid  Elevated white count  X-ray shows pneumonia  IV fluid the antibiotic as patient is known lymphoma recover with gram-negative cefepime Vanco  Consult ID  Blood culture drawn blood culture 2 days ago negative urine culture also negative  Sputum for culture order mycoplasma and Legionella titer  Acees area port looks clean no redness no tenderness will not removed under S patient clinically does not get improved  Repeat lactic acid in 3 hours

## 2019-02-25 NOTE — UTILIZATION REVIEW
Notification of Discharge  This is a Notification of Discharge from our facility [address]  Please be advised that this patient has been discharge from our facility  Below you will find the admission and discharge date and time including the patients disposition  PRESENTATION DATE: 2/21/2019 11:47 AM  IP ADMISSION DATE: 2/21/19 1519  DISCHARGE DATE: 2/23/2019  4:16 PM  DISPOSITION: [address] Utilization Review Department  Phone: [phone]; Fax [phone]  [email or web address]  ATTENTION: Please call with any questions or concerns to [phone]  and carefully listen to the prompts so that you are directed to the right person  Send all requests for admission clinical reviews, approved or denied determinations and any other requests to fax [phone]   All voicemails are confidential

## 2019-02-25 NOTE — ASSESSMENT & PLAN NOTE
Lab Results   Component Value Date    HGBA1C 9 3 (A) 02/07/2019    Patient type 2 diabetes without long-term use of insulin without complication  Will put insulin coverage    Recent Labs     02/22/19 2023 02/23/19  0550 02/23/19  1107   POCGLU 139 150* 236*       Blood Sugar Average: Last 72 hrs:

## 2019-02-25 NOTE — ED PROVIDER NOTES
History  Chief Complaint   Patient presents with    Fever - 9 weeks to 74 years     pt recently admitted for diarrhea, fever, and cough  states that diarrhea is better but she feels weak  staets taht she placed herself on the floor last night after feeling weak and dizzy but did not pass out     27-year-old female presents from the infusion center with fever and possible syncopal episode  Patient has a history of lymphoma and underwent her last round of chemotherapy on the thirteenth  She was evaluated here soon after with wall nausea, vomiting, diarrhea  She was admitted due to low-grade temperatures but no source of infection was ever found  The patient reports that since discharge she has continued to feel warm  Her nausea, vomiting, diarrhea have all improved  Patient denies that she actually had true syncopal episode  She states that last evening she being became weak and she gently lowered herself down to the ground until she felt better  She continues to have a dry cough but denies any other acute issues or concerns  Fever - 9 weeks to 74 years   Temp source:  Tympanic  Onset quality:  Gradual  Timing:  Intermittent  Progression:  Waxing and waning  Chronicity:  Recurrent  Relieved by:  Nothing  Worsened by:  Nothing  Ineffective treatments:  None tried  Associated symptoms: cough (Dry), diarrhea (Improved) and nausea (Improved)    Associated symptoms: no chest pain, no chills, no confusion, no congestion, no dysuria, no ear pain, no headaches, no myalgias, no rash, no rhinorrhea, no somnolence, no sore throat and no vomiting        Prior to Admission Medications   Prescriptions Last Dose Informant Patient Reported? Taking? Blood Glucose Monitoring Suppl (FREESTYLE LITE) JORDAN   No No   Sig: Check blood sugar twice a day   Lancets (FREESTYLE) lancets   No No   Sig: Check blood sugar twice a day     al mag oxide-diphenhydramine-lidocaine viscous (MAGIC MOUTHWASH) 1:1:1 suspension   No No   Sig: Swish and swallow 10 mL every 4 (four) hours as needed for mouth pain or discomfort   albuterol (PROVENTIL HFA,VENTOLIN HFA) 90 mcg/act inhaler   No No   Sig: Inhale 2 puffs every 4 (four) hours as needed for wheezing   amLODIPine (NORVASC) 5 mg tablet   No No   Sig: Take 1 tablet (5 mg total) by mouth daily   benzonatate (TESSALON) 200 MG capsule   No No   Sig: Take 1 capsule (200 mg total) by mouth 3 (three) times a day   carvedilol (COREG) 12 5 mg tablet   No No   Sig: Take 1 tablet (12 5 mg total) by mouth 2 (two) times a day with meals   ergocalciferol (VITAMIN D2) 50,000 units   Yes No   Sig: Take 50,000 Units by mouth once a week    ezetimibe (ZETIA) 10 mg tablet   No No   Sig: Take 1 tablet (10 mg total) by mouth daily   fluticasone (FLONASE) 50 mcg/act nasal spray   No No   Si sprays into each nostril daily   gabapentin (NEURONTIN) 100 mg capsule   No No   Sig: Take 1 capsule (100 mg total) by mouth daily at bedtime   hydrOXYzine HCL (ATARAX) 50 mg tablet   No No   Sig: Take 1 tablet (50 mg total) by mouth 3 (three) times a day as needed for anxiety (and insomnia)   lidocaine viscous (XYLOCAINE) 2 % mucosal solution   No No   Sig: Swish and swallow 10 mL 3 (three) times a day as needed for mild pain or moderate pain   loperamide (IMODIUM) 2 mg capsule   No No   Sig: Take 1 capsule (2 mg total) by mouth as needed for diarrhea Every 1-2 hours as needed for diarrhea   losartan (COZAAR) 25 mg tablet   No No   Sig: Take 1 tablet (25 mg total) by mouth daily   ondansetron (ZOFRAN-ODT) 8 mg disintegrating tablet   No No   Sig: Take 1 tablet (8 mg total) by mouth every 8 (eight) hours as needed for nausea or vomiting   predniSONE 20 mg tablet   Yes No   Sig: Take 20 mg by mouth daily Take 5 tablets by mouth every day for 5 days every 3 weeks with chemotherapy      Facility-Administered Medications: None       Past Medical History:   Diagnosis Date    Cancer (HCC)     Throat    Chronic pain disorder     Diabetes mellitus ([facility] )     Diffuse large B cell lymphoma ([facility] )     Dysphagia     GERD without esophagitis     HTN (hypertension)     Hyperlipidemia     Hypertension     MI (myocardial infarction) ([facility] )        Past Surgical History:   Procedure Laterality Date    BONE MARROW BIOPSY      BREAST LUMPECTOMY      CHOLECYSTECTOMY      IR PORT PLACEMENT  11/16/2018    OTHER SURGICAL HISTORY      tendor tear repair to right shoulder    SHOULDER ARTHROSCOPY         Family History   Problem Relation Age of Onset    Diabetes Mother     Heart disease Sister     Hypertension Brother     Cancer Maternal Grandmother     Cancer Maternal Grandfather      I have reviewed and agree with the history as documented  Social History     Tobacco Use    Smoking status: Never Smoker    Smokeless tobacco: Never Used   Substance Use Topics    Alcohol use: Never     Frequency: Never     Drinks per session: Patient refused     Binge frequency: Never     Comment: 0    Drug use: No        Review of Systems   Constitutional: Positive for fever  Negative for appetite change, chills and fatigue  HENT: Negative for congestion, ear pain, postnasal drip, rhinorrhea, sinus pain, sore throat and trouble swallowing  Eyes: Negative for redness and itching  Respiratory: Positive for cough (Dry)  Negative for chest tightness, shortness of breath and wheezing  Cardiovascular: Negative for chest pain and leg swelling  Gastrointestinal: Positive for diarrhea (Improved) and nausea (Improved)  Negative for abdominal pain, constipation and vomiting  Endocrine: Negative  Genitourinary: Negative for difficulty urinating and dysuria  Musculoskeletal: Negative for back pain and myalgias  Skin: Negative for rash  Allergic/Immunologic: Negative  Neurological: Negative for dizziness, numbness and headaches  Hematological: Negative  Psychiatric/Behavioral: Negative  Negative for confusion         Physical Exam  Physical Exam   Constitutional: She is oriented to person, place, and time  She appears well-developed and well-nourished  HENT:   Head: Normocephalic and atraumatic  Nose: Nose normal    Mouth/Throat: Oropharynx is clear and moist    Eyes: Pupils are equal, round, and reactive to light  Conjunctivae and EOM are normal    Neck: Normal range of motion  Neck supple  Cardiovascular: Normal rate, regular rhythm and normal heart sounds  Pulmonary/Chest: Effort normal and breath sounds normal  No respiratory distress  She has no wheezes  Abdominal: Soft  Bowel sounds are normal  There is no tenderness  There is no guarding  Musculoskeletal: She exhibits no edema, tenderness or deformity  Neurological: She is alert and oriented to person, place, and time  She has normal strength  No cranial nerve deficit or sensory deficit  She exhibits normal muscle tone  GCS eye subscore is 4  GCS verbal subscore is 5  GCS motor subscore is 6  Skin: Skin is warm and dry  Capillary refill takes less than 2 seconds  No rash noted  Psychiatric: She has a normal mood and affect  Her behavior is normal    Nursing note and vitals reviewed        Vital Signs  ED Triage Vitals [02/25/19 1021]   Temperature Pulse Respirations Blood Pressure SpO2   (!) 101 °F (38 3 °C) 92 16 110/67 96 %      Temp Source Heart Rate Source Patient Position - Orthostatic VS BP Location FiO2 (%)   Oral Monitor Lying Left arm --      Pain Score       --           Vitals:    02/25/19 1312 02/25/19 1348 02/25/19 1457 02/25/19 1557   BP: 99/58 98/54 99/53 105/55   Pulse: 87 82 79 82   Patient Position - Orthostatic VS: Lying Lying Lying Lying       Visual Acuity      ED Medications  Medications   sodium chloride 0 9 % infusion (125 mL/hr Intravenous New Bag 2/25/19 1353)   sodium chloride 0 9 % bolus 1,000 mL (0 mL Intravenous Stopped 2/25/19 1158)   acetaminophen (TYLENOL) tablet 650 mg (650 mg Oral Given 2/25/19 1058)   vancomycin (VANCOCIN) IVPB (premix) 1,000 mg (0 mg Intravenous Stopped 2/25/19 1416)   ceFEPime (MAXIPIME) 2,000 mg in sodium chloride 0 9 % 50 mL IVPB (0 mg Intravenous Stopped 2/25/19 1557)       Diagnostic Studies  Results Reviewed     Procedure Component Value Units Date/Time    Protime-INR [[id number]]  (Normal) Collected:  02/25/19 1048    Lab Status:  Final result Specimen:  Blood from Arm, Right Updated:  02/25/19 1459     Protime 11 6 seconds      INR 1 10    APTT [[id number]]  (Normal) Collected:  02/25/19 1048    Lab Status:  Final result Specimen:  Blood from Arm, Right Updated:  02/25/19 1458     PTT 32 seconds     Procalcitonin [[id number]] Collected:  02/25/19 1048    Lab Status:   In process Specimen:  Blood from Arm, Right Updated:  02/25/19 1337    Urine Microscopic [[id number]]  (Abnormal) Collected:  02/25/19 1155    Lab Status:  Final result Specimen:  Urine, Other Updated:  02/25/19 1239     RBC, UA 0-1 /hpf      WBC, UA None Seen /hpf      Epithelial Cells Occasional /hpf      Bacteria, UA None Seen /hpf      MUCUS THREADS Occasional    UA w Reflex to Microscopic [[id number]]  (Abnormal) Collected:  02/25/19 1155    Lab Status:  Final result Specimen:  Urine, Other Updated:  02/25/19 1211     Color, UA Straw     Clarity, UA Clear     Specific [place], UA 1 005     pH, UA 6 0     Leukocytes, UA Negative     Nitrite, UA Negative     Protein, UA Negative mg/dl      Glucose, UA >=1000 (1%) mg/dl      Ketones, UA Negative mg/dl      Bilirubin, UA Negative     Blood, UA 10 0     UROBILINOGEN UA Negative mg/dL     Troponin I [[id number]]  (Normal) Collected:  02/25/19 1048    Lab Status:  Final result Specimen:  Blood from Arm, Right Updated:  02/25/19 1124     Troponin I 0 03 ng/mL     Narrative:       Hemolysis    CBC and differential [[id number]]  (Abnormal) Collected:  02/25/19 1048    Lab Status:  Final result Specimen:  Blood from Arm, Right Updated:  02/25/19 1114     WBC 26 20 Thousand/uL      RBC 2 78 Million/uL Hemoglobin 8 9 g/dL      Hematocrit 27 0 %      MCV 97 fL      MCH 32 1 pg      MCHC 33 1 g/dL      RDW 17 1 %      MPV 8 2 fL      Platelets 532 Thousands/uL     Comprehensive metabolic panel [[id number]]  (Abnormal) Collected:  02/25/19 1048    Lab Status:  Final result Specimen:  Blood from Arm, Right Updated:  02/25/19 1113     Sodium 127 mmol/L      Potassium 4 6 mmol/L      Chloride 89 mmol/L      CO2 26 mmol/L      ANION GAP 12 mmol/L      BUN 8 mg/dL      Creatinine 0 59 mg/dL      Glucose 323 mg/dL      Calcium 8 5 mg/dL      AST 64 U/L      ALT 34 U/L      Alkaline Phosphatase 87 U/L      Total Protein 6 8 g/dL      Albumin 3 7 g/dL      Total Bilirubin 0 60 mg/dL      eGFR 92 ml/min/1 73sq m     Narrative:       Hemolysis  National Kid[employer] Disease Education Program recommendations are as follows:  GFR calculation is accurate only with a steady state creatinine  Chronic Kidney disease less than 60 ml/min/1 73 sq  meters  Kidney failure less than 15 ml/min/1 73 sq  meters  Lactic acid, plasma [[id number]]  (Abnormal) Collected:  02/25/19 1048    Lab Status:  Final result Specimen:  Blood from Arm, Right Updated:  02/25/19 1112     LACTIC ACID 2 9 mmol/L     Narrative:       Result may be elevated if tourniquet was used during collection  Blood culture #1 [[id number]] Collected:  02/25/19 1048    Lab Status: In process Specimen:  Blood from Arm, Right Updated:  02/25/19 1055    Blood culture #2 [[id number]] Collected:  02/25/19 1046    Lab Status: In process Specimen:  Blood from Arm, Left Updated:  02/25/19 1055                 CT head without contrast   Final Result by Gabe May MD (02/25 1237)      No acute intracranial abnormality  Mild mucosal thickening within the paranasal sinuses                  Workstation performed: [id number]         XR chest 2 views   Final Result by Belinda Falk DO (02/25 1238)      Right-sided airspace opacity suggestive of pneumonia    Follow-up to resolution recommended  The study was marked in [facility] for immediate notification  Workstation performed: [id number]         VAS lower limb venous duplex study, unilateral/limited    (Results Pending)              Procedures  ECG 12 Lead Documentation  Date/Time: 2/25/2019 11:03 AM  Performed by: Prem Miller DO  Authorized by: Prem Miller DO     Rate:     ECG rate:  98    ECG rate assessment: normal    Rhythm:     Rhythm: sinus rhythm    Ectopy:     Ectopy: none    QRS:     QRS axis:  Normal  Conduction:     Conduction: normal    ST segments:     ST segments:  Normal  T waves:     T waves: normal             Phone Contacts  ED Phone Contact    ED Course                   Initial Sepsis Screening     [address] Name 02/25/19 1336                Is the patient's history suggestive of a new or worsening infection? Yes (Proceed)  (Abnormal)   -AK        Suspected source of infection  pneumonia  -AK        Are two or more of the following signs & symptoms of infection both present and new to the patient? Yes (Proceed)  (Abnormal)   -AK        Indicate SIRS criteria  Hyperthemia > 38 3C (100 9F); Leukocytosis (WBC > 24541 IJL);WBC > 10% bands  -AK        If the answer is yes to both questions, suspicion of sepsis is present          If severe sepsis is present AND tissue hypoperfusion perists in the hour after fluid resuscitation or lactate > 4, the patient meets criteria for SEPTIC SHOCK          Are any of the following organ dysfunction criteria present within 6 hours of suspected infection and SIRS criteria that are NOT considered to be chronic conditions?   Yes  (Abnormal)   -AK        Organ dysfunction  Lactate > 2 0 mmol/L  -AK        Date of presentation of severe sepsis  02/25/19  -AK        Time of presentation of severe sepsis  1020  -AK        Tissue hypoperfusion persists in the hour after crystalloid fluid administration, evidenced, by either:          Was hypotension present within one hour of the conclusion of crystalloid fluid administration? No  -AK        Date of presentation of septic shock          Time of presentation of septic shock            User Key  (r) = Recorded By, (t) = Taken By, (c) = Cosigned By    Initials Name Provider Type    [address] [place] [address], DO Physician                  MDM    Disposition  Final diagnoses:   Pneumonia   Fever     Time reflects when diagnosis was documented in both MDM as applicable and the Disposition within this note     Time User Action Codes Description Comment    2/25/2019  1:35 PM Jose Steve [J18 [id number]] Pneumonia     2/25/2019  1:35 PM Jose Steve [[id number]] Fever       ED Disposition     ED Disposition Condition Date/Time Comment    Admit Stable Mon Feb 25, 2019  1:35 PM Case was discussed with Dr Reagan Carr and the patient's admission status was agreed to be Admission Status: inpatient status to the service of Dr Reagan Carr  Follow-up Information    None         Patient's Medications   Discharge Prescriptions    No medications on file     No discharge procedures on file      ED Provider  Electronically Signed by           Tayo Lopez DO  02/25/19 9785

## 2019-02-25 NOTE — PLAN OF CARE
Problem: Potential for Falls  Goal: Patient will remain free of falls  Description  INTERVENTIONS:  - Assess patient frequently for physical needs  -  Identify cognitive and physical deficits and behaviors that affect risk of falls  -  [place] fall precautions as indicated by assessment   - Educate patient/family on patient safety including physical limitations  - Instruct patient to call for assistance with activity based on assessment  - Modify environment to reduce risk of injury  - Consider OT/PT consult to assist with strengthening/mobility  Outcome: Progressing     Problem: Nutrition/Hydration-ADULT  Goal: Nutrient/Hydration intake appropriate for improving, restoring or maintaining nutritional needs  Description  Monitor and assess patient's nutrition/hydration status for malnutrition (ex- brittle hair, bruises, dry skin, pale skin and conjunctiva, muscle wasting, smooth red tongue, and disorientation)  Collaborate with interdisciplinary team and initiate plan and interventions as ordered  Monitor patient's weight and dietary intake as ordered or per policy  Utilize nutrition screening tool and intervene per policy  Determine patient's food preferences and provide high-protein, high-caloric foods as appropriate       INTERVENTIONS:  - Monitor oral intake, urinary output, labs, and treatment plans  - Assess nutrition and hydration status and recommend course of action  - Evaluate amount of meals eaten  - Assist patient with eating if necessary   - Allow adequate time for meals  - Recommend/ encourage appropriate diets, oral nutritional supplements, and vitamin/mineral supplements  - Order, calculate, and assess calorie counts as needed  - Recommend, monitor, and adjust tube feedings and TPN/PPN based on assessed needs  - Assess need for intravenous fluids  - Provide specific nutrition/hydration education as appropriate  - Include patient/family/caregiver in decisions related to nutrition  Outcome: Progressing     Problem: PAIN - ADULT  Goal: Verbalizes/displays adequate comfort level or baseline comfort level  Description  Interventions:  - Encourage patient to monitor pain and request assistance  - Assess pain using appropriate pain scale  - Administer analgesics based on type and severity of pain and evaluate response  - Implement non-pharmacological measures as appropriate and evaluate response  - Consider cultural and social influences on pain and pain management  - Notify physician/advanced practitioner if interventions unsuccessful or patient reports new pain  Outcome: Progressing     Problem: INFECTION - ADULT  Goal: Absence or prevention of progression during hospitalization  Description  INTERVENTIONS:  - Assess and monitor for signs and symptoms of infection  - Monitor lab/diagnostic results  - Monitor all insertion sites, i e  indwelling lines, tubes, and drains  - Monitor endotracheal (as able) and nasal secretions for changes in amount and color  - D Lo appropriate cooling/warming therapies per order  - Administer medications as ordered  - Instruct and encourage patient and family to use good hand hygiene technique  - Identify and instruct in appropriate isolation precautions for identified infection/condition  Outcome: Progressing  Goal: Absence of fever/infection during neutropenic period  Description  INTERVENTIONS:  - Monitor WBC  - Implement neutropenic guidelines  Outcome: Progressing     Problem: SAFETY ADULT  Goal: Patient will remain free of falls  Description  INTERVENTIONS:  - Assess patient frequently for physical needs  -  Identify cognitive and physical deficits and behaviors that affect risk of falls    -  [place] fall precautions as indicated by assessment   - Educate patient/family on patient safety including physical limitations  - Instruct patient to call for assistance with activity based on assessment  - Modify environment to reduce risk of injury  - Consider OT/PT consult to assist with strengthening/mobility  Outcome: Progressing  Goal: Maintain or return to baseline ADL function  Description  INTERVENTIONS:  -  Assess patient's ability to carry out ADLs; assess patient's baseline for ADL function and identify physical deficits which impact ability to perform ADLs (bathing, care of mouth/teeth, toileting, grooming, dressing, etc )  - Assess/evaluate cause of self-care deficits   - Assess range of motion  - Assess patient's mobility; develop plan if impaired  - Assess patient's need for assistive devices and provide as appropriate  - Encourage maximum independence but intervene and supervise when necessary  ¯ Involve family in performance of ADLs  ¯ Assess for home care needs following discharge   ¯ Request OT consult to assist with ADL evaluation and planning for discharge  ¯ Provide patient education as appropriate  Outcome: Progressing  Goal: Maintain or return mobility status to optimal level  Description  INTERVENTIONS:  - Assess patient's baseline mobility status (ambulation, transfers, stairs, etc )    - Identify cognitive and physical deficits and behaviors that affect mobility  - Identify mobility aids required to assist with transfers and/or ambulation (gait belt, sit-to-stand, lift, walker, cane, etc )  - [place] fall precautions as indicated by assessment  - Record patient progress and toleration of activity level on Mobility SBAR; progress patient to next Phase/Stage  - Instruct patient to call for assistance with activity based on assessment  - Request Rehabilitation consult to assist with strengthening/weightbearing, etc   Outcome: Progressing     Problem: DISCHARGE PLANNING  Goal: Discharge to home or other facility with appropriate resources  Description  INTERVENTIONS:  - Identify barriers to discharge w/patient and caregiver  - Arrange for needed discharge resources and transportation as appropriate  - Identify discharge learning needs (meds, wound care, etc )  - Arrange for interpretive services to assist at discharge as needed  - Refer to Case Management Department for coordinating discharge planning if the patient needs post-hospital services based on physician/advanced practitioner order or complex needs related to functional status, cognitive ability, or social support system  Outcome: Progressing     Problem: Knowledge Deficit  Goal: Patient/family/caregiver demonstrates understanding of disease process, treatment plan, medications, and discharge instructions  Description  Complete learning assessment and assess knowledge base    Interventions:  - Provide teaching at level of understanding  - Provide teaching via preferred learning methods  Outcome: Progressing

## 2019-02-26 PROBLEM — E87.0 HYPERNATREMIA: Status: RESOLVED | Noted: 2019-02-25 | Resolved: 2019-02-26

## 2019-02-26 LAB
ANION GAP SERPL CALCULATED.3IONS-SCNC: 7 MMOL/L (ref 5–14)
ATRIAL RATE: 98 BPM
BACTERIA BLD CULT: NORMAL
BACTERIA BLD CULT: NORMAL
BASOPHILS # BLD AUTO: 0.1 THOUSANDS/ΜL (ref 0–0.1)
BASOPHILS NFR BLD AUTO: 1 % (ref 0–1)
BUN SERPL-MCNC: 6 MG/DL (ref 5–25)
CALCIUM SERPL-MCNC: 8.4 MG/DL (ref 8.4–10.2)
CHLORIDE SERPL-SCNC: 98 MMOL/L (ref 97–108)
CO2 SERPL-SCNC: 28 MMOL/L (ref 22–30)
CREAT SERPL-MCNC: 0.51 MG/DL (ref 0.6–1.2)
EOSINOPHIL # BLD AUTO: 0.1 THOUSAND/ΜL (ref 0–0.4)
EOSINOPHIL NFR BLD AUTO: 1 % (ref 0–6)
ERYTHROCYTE [DISTWIDTH] IN BLOOD BY AUTOMATED COUNT: 16.8 %
GFR SERPL CREATININE-BSD FRML MDRD: 96 ML/MIN/1.73SQ M
GLUCOSE SERPL-MCNC: 122 MG/DL (ref 70–99)
GLUCOSE SERPL-MCNC: 132 MG/DL (ref 65–140)
GLUCOSE SERPL-MCNC: 200 MG/DL (ref 65–140)
GLUCOSE SERPL-MCNC: 258 MG/DL (ref 65–140)
GLUCOSE SERPL-MCNC: 375 MG/DL (ref 65–140)
HCT VFR BLD AUTO: 25.5 % (ref 36–46)
HGB BLD-MCNC: 8.4 G/DL (ref 12–16)
L PNEUMO1 AG UR QL IA.RAPID: NEGATIVE
LYMPHOCYTES # BLD AUTO: 0.9 THOUSANDS/ΜL (ref 0.5–4)
LYMPHOCYTES NFR BLD AUTO: 4 % (ref 25–45)
MCH RBC QN AUTO: 31.8 PG (ref 26–34)
MCHC RBC AUTO-ENTMCNC: 33 G/DL (ref 31–36)
MCV RBC AUTO: 96 FL (ref 80–100)
MONOCYTES # BLD AUTO: 2.1 THOUSAND/ΜL (ref 0.2–0.9)
MONOCYTES NFR BLD AUTO: 10 % (ref 1–10)
NEUTROPHILS # BLD AUTO: 18.9 THOUSANDS/ΜL (ref 1.8–7.8)
NEUTS SEG NFR BLD AUTO: 85 % (ref 45–65)
OSMOLALITY UR: 275 MMOL/KG
P AXIS: 65 DEGREES
PLATELET # BLD AUTO: 293 THOUSANDS/UL (ref 150–450)
PMV BLD AUTO: 8.1 FL (ref 8.9–12.7)
POTASSIUM SERPL-SCNC: 3.2 MMOL/L (ref 3.6–5)
PR INTERVAL: 130 MS
QRS AXIS: 40 DEGREES
QRSD INTERVAL: 72 MS
QT INTERVAL: 348 MS
QTC INTERVAL: 444 MS
RBC # BLD AUTO: 2.64 MILLION/UL (ref 4–5.2)
S PNEUM AG UR QL: NEGATIVE
SODIUM 24H UR-SCNC: 109 MOL/L
SODIUM SERPL-SCNC: 133 MMOL/L (ref 137–147)
T WAVE AXIS: 46 DEGREES
VENTRICULAR RATE: 98 BPM
WBC # BLD AUTO: 22.1 THOUSAND/UL (ref 4.5–11)

## 2019-02-26 PROCEDURE — 87449 NOS EACH ORGANISM AG IA: CPT | Performed by: FAMILY MEDICINE

## 2019-02-26 PROCEDURE — 94760 N-INVAS EAR/PLS OXIMETRY 1: CPT

## 2019-02-26 PROCEDURE — 80048 BASIC METABOLIC PNL TOTAL CA: CPT | Performed by: INTERNAL MEDICINE

## 2019-02-26 PROCEDURE — 99232 SBSQ HOSP IP/OBS MODERATE 35: CPT | Performed by: FAMILY MEDICINE

## 2019-02-26 PROCEDURE — 94664 DEMO&/EVAL PT USE INHALER: CPT

## 2019-02-26 PROCEDURE — 85025 COMPLETE CBC W/AUTO DIFF WBC: CPT | Performed by: INTERNAL MEDICINE

## 2019-02-26 PROCEDURE — 94640 AIRWAY INHALATION TREATMENT: CPT

## 2019-02-26 PROCEDURE — 93010 ELECTROCARDIOGRAM REPORT: CPT | Performed by: INTERNAL MEDICINE

## 2019-02-26 PROCEDURE — 82948 REAGENT STRIP/BLOOD GLUCOSE: CPT

## 2019-02-26 RX ORDER — POTASSIUM CHLORIDE 20 MEQ/1
40 TABLET, EXTENDED RELEASE ORAL ONCE
Status: COMPLETED | OUTPATIENT
Start: 2019-02-26 | End: 2019-02-26

## 2019-02-26 RX ORDER — CARVEDILOL 6.25 MG/1
6.25 TABLET ORAL 2 TIMES DAILY WITH MEALS
Status: DISCONTINUED | OUTPATIENT
Start: 2019-02-26 | End: 2019-02-27 | Stop reason: HOSPADM

## 2019-02-26 RX ADMIN — VANCOMYCIN HYDROCHLORIDE 1000 MG: 1 INJECTION, SOLUTION INTRAVENOUS at 12:00

## 2019-02-26 RX ADMIN — CEFEPIME 2000 MG: 2 INJECTION, POWDER, FOR SOLUTION INTRAVENOUS at 02:17

## 2019-02-26 RX ADMIN — GABAPENTIN 100 MG: 100 CAPSULE ORAL at 22:05

## 2019-02-26 RX ADMIN — ENOXAPARIN SODIUM 40 MG: 40 INJECTION SUBCUTANEOUS at 08:20

## 2019-02-26 RX ADMIN — BENZONATATE 200 MG: 100 CAPSULE ORAL at 08:20

## 2019-02-26 RX ADMIN — LEVALBUTEROL HYDROCHLORIDE 0.63 MG: 0.63 SOLUTION RESPIRATORY (INHALATION) at 21:15

## 2019-02-26 RX ADMIN — LEVALBUTEROL HYDROCHLORIDE 0.63 MG: 0.63 SOLUTION RESPIRATORY (INHALATION) at 13:14

## 2019-02-26 RX ADMIN — ACETAMINOPHEN 650 MG: 325 TABLET ORAL at 06:17

## 2019-02-26 RX ADMIN — GUAIFENESIN 600 MG: 600 TABLET, EXTENDED RELEASE ORAL at 20:42

## 2019-02-26 RX ADMIN — BENZONATATE 200 MG: 100 CAPSULE ORAL at 16:53

## 2019-02-26 RX ADMIN — SODIUM CHLORIDE 100 ML/HR: 9 INJECTION, SOLUTION INTRAVENOUS at 11:51

## 2019-02-26 RX ADMIN — VANCOMYCIN HYDROCHLORIDE 1000 MG: 1 INJECTION, SOLUTION INTRAVENOUS at 00:54

## 2019-02-26 RX ADMIN — CARVEDILOL 6.25 MG: 6.25 TABLET, FILM COATED ORAL at 16:53

## 2019-02-26 RX ADMIN — INSULIN LISPRO 10 UNITS: 100 INJECTION, SOLUTION INTRAVENOUS; SUBCUTANEOUS at 16:54

## 2019-02-26 RX ADMIN — PREDNISONE 20 MG: 20 TABLET ORAL at 08:20

## 2019-02-26 RX ADMIN — POTASSIUM CHLORIDE 40 MEQ: 20 TABLET, EXTENDED RELEASE ORAL at 06:48

## 2019-02-26 RX ADMIN — SODIUM CHLORIDE 100 ML/HR: 9 INJECTION, SOLUTION INTRAVENOUS at 22:09

## 2019-02-26 RX ADMIN — BENZONATATE 200 MG: 100 CAPSULE ORAL at 20:42

## 2019-02-26 RX ADMIN — CEFEPIME 2000 MG: 2 INJECTION, POWDER, FOR SOLUTION INTRAVENOUS at 15:25

## 2019-02-26 RX ADMIN — INSULIN LISPRO 6 UNITS: 100 INJECTION, SOLUTION INTRAVENOUS; SUBCUTANEOUS at 22:05

## 2019-02-26 RX ADMIN — INSULIN GLARGINE 15 UNITS: 100 INJECTION, SOLUTION SUBCUTANEOUS at 21:21

## 2019-02-26 RX ADMIN — INSULIN LISPRO 4 UNITS: 100 INJECTION, SOLUTION INTRAVENOUS; SUBCUTANEOUS at 11:58

## 2019-02-26 RX ADMIN — GUAIFENESIN 600 MG: 600 TABLET, EXTENDED RELEASE ORAL at 08:21

## 2019-02-26 RX ADMIN — EZETIMIBE 10 MG: 10 TABLET ORAL at 08:21

## 2019-02-26 RX ADMIN — FLUTICASONE PROPIONATE 2 SPRAY: 50 SPRAY, METERED NASAL at 08:30

## 2019-02-26 NOTE — RESPIRATORY THERAPY NOTE
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)    Hyponatremia    Severe protein-calorie malnutrition (HCC)    HCAP (healthcare-associated pneumonia)      Home Pulmonary Medications:  Albuterol inhaler prn       Past Medical History:   Diagnosis Date    Cancer ([place] [address] )     Throat    Chronic pain disorder     Diabetes mellitus ([place] [address] )     Diffuse large B cell lymphoma ([place] [address] )     Dysphagia     GERD without esophagitis     HTN (hypertension)     Hyperlipidemia     Hypertension     MI (myocardial infarction) ([place] [address] )      Social History     Socioeconomic History    Marital status:       Spouse name: None    Number of children: None    Years of education: None    Highest education level: None   Occupational History    None   Social Needs    Financial resource strain: None    Food insecurity:     Worry: None     Inability: None    Transportation needs:     Medical: None     Non-medical: None   Tobacco Use    Smoking status: Never Smoker    Smokeless tobacco: Never Used   Substance and Sexual Activity    Alcohol use: Never     Frequency: Never     Drinks per session: Patient refused     Binge frequency: Never     Comment: 0    Drug use: No    Sexual activity: Not Currently     Partners: Male   Lifestyle    Physical activity:     Days per week: None     Minutes per session: None    Stress: None   Relationships    Social connections:     Talks on phone: None     Gets together: None     Attends Jain service: None     Active member of club or organization: None     Attends meetings of clubs or organizations: None     Relationship status: None    Intimate partner violence: Fear of current or ex partner: None     Emotionally abused: None     Physically abused: None     Forced sexual activity: None   Other Topics Concern    None   Social History Narrative    None       Subjective      Pt spainsh speaking only  Objective  No respiratory distress noted  Physical Exam:   Assessment Type: During-treatment  General Appearance: Alert, Awake  Respiratory Pattern: Normal  Chest Assessment: Chest expansion symmetrical  Bilateral Breath Sounds: Clear, Diminished  Cough: Non-productive    Vitals:  Blood pressure 108/60, pulse 99, temperature 98 3 °F (36 8 °C), temperature source Temporal, resp  rate 18, height 5' (1 524 m), weight 60 4 kg (133 lb 2 5 oz), SpO2 95 %, not currently breastfeeding  Imaging and other studies: I have personally reviewed pertinent reports  Plan    Respiratory Plan: (P) Home Bronchodilator Patient pathway      Per MD keep nebs tid for now

## 2019-02-26 NOTE — SOCIAL WORK
Pt readmitted found to have CAP on IV Abx  Pt Czech speaking use of  for assesment as well as son understanding enough english to participate in answering questions  Pt lives with granddaughter in a [facility] having 3STE and FF to bed/bath- denies difficulty navigating steps  Independent with ADL's with granddaughter assisting when becomes fatigued- ambulates with RW independently  Granddaughter does household chores, cooks and transports to appointments  DME in house:  RW; [employer]  Denies legal issues, has h/o depression but not on medication nor does she receive OP treatment; has POA (Granddaughter- not on chart)  Pt's PCP is Dr Ree Elizabeth and she states she uses [facility] on [facility] for prescriptions  Family will transport when medically stable  No questions/concerns voiced  Will continue to follow throughout hospitalization to assist with plan of care/DC needs

## 2019-02-26 NOTE — NURSING NOTE
VSS  Pt afebrile  Monitor shows normal sinus rhythm to low sinus tach  Pt  Resting comfortably  No change in previous assessment  Will continue to monitor

## 2019-02-26 NOTE — ASSESSMENT & PLAN NOTE
Malnutrition Findings:        encourage PO intake will add Ensure p o  Intake poor    BMI Findings: Body mass index is 26 01 kg/m²

## 2019-02-26 NOTE — PLAN OF CARE
Problem: Potential for Falls  Goal: Patient will remain free of falls  Description  INTERVENTIONS:  - Assess patient frequently for physical needs  -  Identify cognitive and physical deficits and behaviors that affect risk of falls  -  [place] fall precautions as indicated by assessment   - Educate patient/family on patient safety including physical limitations  - Instruct patient to call for assistance with activity based on assessment  - Modify environment to reduce risk of injury  - Consider OT/PT consult to assist with strengthening/mobility  Outcome: Progressing     Problem: Nutrition/Hydration-ADULT  Goal: Nutrient/Hydration intake appropriate for improving, restoring or maintaining nutritional needs  Description  Monitor and assess patient's nutrition/hydration status for malnutrition (ex- brittle hair, bruises, dry skin, pale skin and conjunctiva, muscle wasting, smooth red tongue, and disorientation)  Collaborate with interdisciplinary team and initiate plan and interventions as ordered  Monitor patient's weight and dietary intake as ordered or per policy  Utilize nutrition screening tool and intervene per policy  Determine patient's food preferences and provide high-protein, high-caloric foods as appropriate       INTERVENTIONS:  - Monitor oral intake, urinary output, labs, and treatment plans  - Assess nutrition and hydration status and recommend course of action  - Evaluate amount of meals eaten  - Assist patient with eating if necessary   - Allow adequate time for meals  - Recommend/ encourage appropriate diets, oral nutritional supplements, and vitamin/mineral supplements  - Order, calculate, and assess calorie counts as needed  - Recommend, monitor, and adjust tube feedings and TPN/PPN based on assessed needs  - Assess need for intravenous fluids  - Provide specific nutrition/hydration education as appropriate  - Include patient/family/caregiver in decisions related to nutrition  Outcome: Progressing     Problem: PAIN - ADULT  Goal: Verbalizes/displays adequate comfort level or baseline comfort level  Description  Interventions:  - Encourage patient to monitor pain and request assistance  - Assess pain using appropriate pain scale  - Administer analgesics based on type and severity of pain and evaluate response  - Implement non-pharmacological measures as appropriate and evaluate response  - Consider cultural and social influences on pain and pain management  - Notify physician/advanced practitioner if interventions unsuccessful or patient reports new pain  Outcome: Progressing     Problem: INFECTION - ADULT  Goal: Absence or prevention of progression during hospitalization  Description  INTERVENTIONS:  - Assess and monitor for signs and symptoms of infection  - Monitor lab/diagnostic results  - Monitor all insertion sites, i e  indwelling lines, tubes, and drains  - Monitor endotracheal (as able) and nasal secretions for changes in amount and color  - [place] appropriate cooling/warming therapies per order  - Administer medications as ordered  - Instruct and encourage patient and family to use good hand hygiene technique  - Identify and instruct in appropriate isolation precautions for identified infection/condition  Outcome: Progressing  Goal: Absence of fever/infection during neutropenic period  Description  INTERVENTIONS:  - Monitor WBC  - Implement neutropenic guidelines  Outcome: Progressing     Problem: SAFETY ADULT  Goal: Patient will remain free of falls  Description  INTERVENTIONS:  - Assess patient frequently for physical needs  -  Identify cognitive and physical deficits and behaviors that affect risk of falls    -  [place] fall precautions as indicated by assessment   - Educate patient/family on patient safety including physical limitations  - Instruct patient to call for assistance with activity based on assessment  - Modify environment to reduce risk of injury  - Consider OT/PT consult to assist with strengthening/mobility  Outcome: Progressing  Goal: Maintain or return to baseline ADL function  Description  INTERVENTIONS:  -  Assess patient's ability to carry out ADLs; assess patient's baseline for ADL function and identify physical deficits which impact ability to perform ADLs (bathing, care of mouth/teeth, toileting, grooming, dressing, etc )  - Assess/evaluate cause of self-care deficits   - Assess range of motion  - Assess patient's mobility; develop plan if impaired  - Assess patient's need for assistive devices and provide as appropriate  - Encourage maximum independence but intervene and supervise when necessary  ¯ Involve family in performance of ADLs  ¯ Assess for home care needs following discharge   ¯ Request OT consult to assist with ADL evaluation and planning for discharge  ¯ Provide patient education as appropriate  Outcome: Progressing  Goal: Maintain or return mobility status to optimal level  Description  INTERVENTIONS:  - Assess patient's baseline mobility status (ambulation, transfers, stairs, etc )    - Identify cognitive and physical deficits and behaviors that affect mobility  - Identify mobility aids required to assist with transfers and/or ambulation (gait belt, sit-to-stand, lift, walker, cane, etc )  - [place] fall precautions as indicated by assessment  - Record patient progress and toleration of activity level on Mobility SBAR; progress patient to next Phase/Stage  - Instruct patient to call for assistance with activity based on assessment  - Request Rehabilitation consult to assist with strengthening/weightbearing, etc   Outcome: Progressing     Problem: DISCHARGE PLANNING  Goal: Discharge to home or other facility with appropriate resources  Description  INTERVENTIONS:  - Identify barriers to discharge w/patient and caregiver  - Arrange for needed discharge resources and transportation as appropriate  - Identify discharge learning needs (meds, wound care, etc )  - Arrange for interpretive services to assist at discharge as needed  - Refer to Case Management Department for coordinating discharge planning if the patient needs post-hospital services based on physician/advanced practitioner order or complex needs related to functional status, cognitive ability, or social support system  Outcome: Progressing     Problem: Knowledge Deficit  Goal: Patient/family/caregiver demonstrates understanding of disease process, treatment plan, medications, and discharge instructions  Description  Complete learning assessment and assess knowledge base    Interventions:  - Provide teaching at level of understanding  - Provide teaching via preferred learning methods  Outcome: Progressing

## 2019-02-26 NOTE — PLAN OF CARE
Problem: DISCHARGE PLANNING - CARE MANAGEMENT  Goal: Discharge to post-acute care or home with appropriate resources  Description  INTERVENTIONS:  - Conduct assessment to determine patient/family and health care team treatment goals, and need for post-acute services based on payer coverage, community resources, and patient preferences, and barriers to discharge  - Address psychosocial, clinical, and financial barriers to discharge as identified in assessment in conjunction with the patient/family and health care team  - Arrange appropriate level of post-acute services according to patient?s   needs and preference and payer coverage in collaboration with the physician and health care team  - Communicate with and update the patient/family, physician, and health care team regarding progress on the discharge plan  - Arrange appropriate transportation to post-acute venues  -CM to follow throughout hospitalization to assist with d/c needs as they arise  Outcome: Progressing

## 2019-02-26 NOTE — UTILIZATION REVIEW
Initial Clinical Review    Admission: Date/Time/Statement: 2/25/19 @ 1255 INPATIENT  Orders Placed This Encounter   Procedures    Inpatient Admission (expected length of stay for this patient Order details is greater than two midnights)     Standing Status:   Standing     Number of Occurrences:   1     Order Specific Question:   Admitting Physician     Answer:   Tamar Cope [[id number]]     Order Specific Question:   Level of Care     Answer:   Med Surg [[id number]]     Order Specific Question:   Estimated length of stay     Answer:   More than 2 Midnights     Order Specific Question:   Certification     Answer:   I certify that inpatient services are medically necessary for this patient for a duration of greater than two midnights  See H&P and MD Progress Notes for additional information about the patient's course of treatment  ED: Date/Time/Mode of Arrival:   ED Arrival Information     Expected Arrival Acuity Means of Arrival Escorted By Service Admission Type    2/25/2019 2/25/2019 10:13 Urgent Wheelchair Other General Medicine Urgent    Arrival Complaint    Fever        Chief Complaint   Patient presents with    Fever - 9 weeks to 74 years     pt recently admitted for diarrhea, fever, and cough  states that diarrhea is better but she feels weak  staets taht she placed herself on the floor last night after feeling weak and dizzy but did not pass out     Assessment/Plan: 67 y/o female with hx B cell lymphoma presents to ED from infusion center with fever and possible syncopal episode  Pt was found to have temp of 102 5 at infusion center  Reports feeling very weak and lowering herself to the ground last evening  Had recent hospitalization for nausea, vomiting, diarrhea  On exam, pt has decreased breath sounds and rales  Admitted as inpatient due to sepsis, pneumonia, hyponatremia  Continue IVF, IV antibiotics  Consult ID  Sputum cx  Repeat labs  Pulmonary toilet      ED Triage Vitals   Temperature Pulse Respirations Blood Pressure SpO2   02/25/19 1021 02/25/19 1021 02/25/19 1021 02/25/19 1021 02/25/19 1021   (!) 101 °F (38 3 °C) 92 16 110/67 96 %      Temp Source Heart Rate Source Patient Position - Orthostatic VS BP Location FiO2 (%)   02/25/19 1021 02/25/19 1021 02/25/19 1021 02/25/19 1021 --   Oral Monitor Lying Left arm       Pain Score       02/25/19 1742       No Pain        Wt Readings from Last 1 Encounters:   02/26/19 60 4 kg (133 lb 2 5 oz)     Vital Signs (abnormal):   02/26/19 0541 100 8 °F (38 2 °C)  105 18 137/68 92 % None (Room air)   02/25/19 2145 100 7 °F (38 2 °C) 97       02/25/19 1923 99 9 °F (37 7 °C) 98 18 136/55 96 % None (Room air)   02/25/19 1717 98 4 °F (36 9 °C) 87 18 152/65 98 % None (Room air)   02/25/19 1457  79  99/53 96 % None (Room air)   02/25/19 1348 97 8 °F (36 6 °C) 82 18 98/54 96 % None (Room air)     Pertinent Labs/Diagnostic Test Results:   Blood gluc 323, 101, 349  Na 127  K+ 4 6  Trop 0 03  Lactic acid 2 9  WBC 26 20  H/H 8 9/27 0  Segs 40  Bands 45  Procalc 0 38  UA >100 glucose, 10 0 blood, 0-1 RBC  EKG:  Normal sinus rhythm  CXR:  Patchy right midlung field opacity suggestive of pneumonia  CT head:  No acute intracranial abnormality     Mild mucosal thickening within the paranasal sinuses  LE venous duplex:Impression:  RIGHT LOWER LIMB:  No evidence of acute or chronic deep vein thrombosis  No evidence of superficial thrombophlebitis noted  Doppler evaluation shows a normal response to augmentation maneuvers  Popliteal, posterior tibial and anterior tibial arterial Doppler waveforms are  triphasic  LEFT LOWER LIMB:  No evidence of acute or chronic deep vein thrombosis  No evidence of superficial thrombophlebitis noted  Doppler evaluation shows a normal response to augmentation maneuvers  Popliteal, posterior tibial and anterior tibial arterial Doppler waveforms are  triphasic       ED Treatment:   Medication Administration from 02/25/2019 1004 to 02/25/2019 1715       Date/Time Order Dose Route Action     02/25/2019 1058 sodium chloride 0 9 % bolus 1,000 mL 1,000 mL Intravenous New Bag     02/25/2019 1058 acetaminophen (TYLENOL) tablet 650 mg 650 mg Oral Given     02/25/2019 1316 vancomycin (VANCOCIN) IVPB (premix) 1,000 mg 1,000 mg Intravenous New Bag     02/25/2019 1353 sodium chloride 0 9 % infusion 125 mL/hr Intravenous New Bag     02/25/2019 1444 ceFEPime (MAXIPIME) 2,000 mg in sodium chloride 0 9 % 50 mL IVPB 2,000 mg Intravenous New Bag        Past Medical/Surgical History:    Active Ambulatory Problems     Diagnosis Date Noted    Type 2 diabetes [facility] without complication[facility] long[facility]term [facility] 10/31/2018    Essential hypertension 10/31/2018    Gastroesophageal reflux disease 10/31/2018    Diffuse large 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 ([facility] ) 11/09/2018    Oropharyngeal dysphagia 11/09/2018    Hyponatremia 11/19/2018    Anxiety about health 11/20/2018    Nausea 11/22/2018    Tremor 11/22/2018    Severe protein-calorie malnutrition ([facility] ) 11/22/2018    Palliative care patient 01/28/2019    Neoplasm related pain 01/28/2019    Chronic pain due to neoplasm 01/28/2019    Depression 02/06/2019    Other insomnia 02/06/2019    Rash 02/06/2019    Status post chemotherapy 02/23/2019    Fever 02/25/2019    Syncope 02/25/2019    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 [facility])      Past Medical History:   Diagnosis Date    Cancer ([facility] )     Chronic pain disorder     Diabetes mellitus ([facility] )     Diffuse large B cell lymphoma ([facility] )     Dysphagia     GERD without esophagitis     HTN (hypertension)     Hyperlipidemia     Hypertension     MI (myocardial infarction) ([facility] )      Admitting Diagnosis: Pneumonia [J18 9]  Fever [R50 9]  Age/Sex: 68 y o  female    Admission Orders:  Accuchecks  VS  Tele  Daily weight  I/O  SCDs  Vanco trough  Labs  Urine legionella/strep pneumo  Diabetic diet    Scheduled Meds: Current Facility-Administered Medications:  acetaminophen 650 mg Oral Q6H PRN x2   al mag oxide-diphenhydramine-lidocaine viscous 10 mL Swish & Swallow Q4H PRN   albuterol 2 puff Inhalation Q4H PRN   benzonatate 200 mg Oral TID   carvedilol 6 25 mg Oral BID With Meals   cefepime 2,000 mg Intravenous Q12H   enoxaparin 40 mg Subcutaneous Daily   ergocalciferol 50,000 Units Oral Weekly   ezetimibe 10 mg Oral Daily   fluticasone 2 spray Nasal Daily   gabapentin 100 mg Oral HS   guaiFENesin 600 mg Oral Q12H CAROLINE   hydrOXYzine HCL 50 mg Oral TID PRN   insulin glargine 15 Units Subcutaneous HS   insulin lispro 2-12 Units Subcutaneous TID AC   insulin lispro 2-12 Units Subcutaneous HS   levalbuterol 0 63 mg Nebulization TID   lidocaine viscous 10 mL Swish & Swallow TID PRN   loperamide 2 mg Oral PRN   ondansetron 8 mg Oral Q8H PRN x1   predniSONE 20 mg Oral Daily   sodium chloride 100 mL/hr Intravenous Continuous   vancomycin 1,000 mg Intravenous Q12H     Network Utilization Review Department  Phone: [phone]; Fax [phone]  [email or web address]  ATTENTION: Please call with any questions or concerns to [phone]  and carefully listen to the prompts so that you are directed to the right person  Send all requests for admission clinical reviews, approved or denied determinations and any other requests to fax [phone]   All voicemails are confidential

## 2019-02-26 NOTE — ASSESSMENT & PLAN NOTE
· Suspect hypovolemic it has improved with IV fluids  Sodium studies urine sodium is still pending    Continue IV fluids

## 2019-02-26 NOTE — ASSESSMENT & PLAN NOTE
Lab Results   Component Value Date    HGBA1C 9 3 (A) 02/07/2019    Patient type 2 diabetes without long-term use of insulin without complication  Will put insulin coverage    Recent Labs     02/23/19  1107 02/25/19  1729 02/25/19  2045 02/26/19  0529   POCGLU 236* 101 349* 132       Blood Sugar Average: Last 72 hrs:  (P) 194   Sugar stable today continue Lantus 15 at night and sliding scale reassess tomorrow

## 2019-02-26 NOTE — NURSING NOTE
Blood sdaln=656  REANNA Rowell paged and made aware of same  New orders noted  PA also made aware that temp=100 7 after Tylenol given  Monitor now shows normal sinus rhythm to low sinus tach with HR 90-100s

## 2019-02-26 NOTE — ASSESSMENT & PLAN NOTE
Patient with essential hypertension  Continue Coreg but decreased to 6 25 mg p o  B i d   And holding parameters  Patient blood pressure is marginal discontinue Norvasc discontinue losartan

## 2019-02-26 NOTE — NURSING NOTE
Temp=99 8  Monitor shows sinus tach with occasional PVC  REANNA Guardado made aware of same  Tylenol order received and given  Pt  Denies SOB or chest pain  Lung sounds clear but decreased in bases  Pt  Remains with dry non-productive cough  Pt  OOB to bathroom to void  Pt  Encouraged to utilize call bell  Bed alarm placed on bed for pt  Safety  Pt  Denies any pain or discomfort  Call bell within reach  Will continue to monitor

## 2019-02-26 NOTE — ASSESSMENT & PLAN NOTE
· Patient completed cycle #5, R-CHOP 02/13/2019    Her final cycle #6 is due 03/06/2019 will be on hold she has an acute infection that needs to be treated    · Follows with Dr Kati Colon

## 2019-02-26 NOTE — ASSESSMENT & PLAN NOTE
Patient met criteria of sepsis febrile with leukocytosis- still present but improving  Lactic acid resolved  Secondary to healthcare acquired pneumonia secondary to recent hospitalization also the patient is immunocompromised continue cefepime and vancomycin repeat blood work tomorrow blood cultures are negative so far anticipate to switch over to PO 24-48 hours

## 2019-02-26 NOTE — PROGRESS NOTES
Progress Note - Maral Ruiz [birth year], 68 y o  female MRN: [id number]    Unit/Bed#: [facility] 502-01 Encounter: [id number]    Primary Care Provider: Eligio Singh PA-C   Date and time admitted to hospital: 2/25/2019 10:13 AM        HCAP (healthcare-associated pneumonia)  Assessment & Plan  · Recent hospitalization  · Continue cefepime and Vanco  · White count improving  · He had Neulasta 15th of February  · I will add urine Legionella/strep  · Cultures  · Nebulizer  · Reassess tomorrow  · Anticipate switched to p o  Antibiotics 24-48 hours    Severe protein-calorie malnutrition (HCC)  Assessment & Plan  Malnutrition Findings:        encourage PO intake will add Ensure p o  Intake poor    BMI Findings: Body mass index is 26 01 kg/m²  Hyponatremia  Assessment & Plan  · Suspect hypovolemic it has improved with IV fluids  Sodium studies urine sodium is still pending  Continue IV fluids    Diffuse large 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 ([facility] [address] )  Assessment & Plan  · Patient completed cycle #5, R-CHOP 02/13/2019  Her final cycle #6 is due 03/06/2019 will be on hold she has an acute infection that needs to be treated    · Follows with Dr Ana Mackenzie hypertension  Assessment & Plan  Patient with essential hypertension  Continue Coreg but decreased to 6 25 mg p o  B i d   And holding parameters  Patient blood pressure is marginal discontinue Norvasc discontinue losartan      Type 2 diabetes mellitus without complication, without long-term current use of insulin [facility])  Assessment & Plan  Lab Results   Component Value Date    HGBA1C 9 3 (A) 02/07/2019    Patient type 2 diabetes without long-term use of insulin without complication  Will put insulin coverage    Recent Labs     02/23/19  1107 02/25/19  1729 02/25/19  2045 02/26/19  0529   POCGLU 236* 101 349* 132       Blood Sugar Average: Last 72 hrs:  (P) 194   Sugar stable today continue Lantus 15 at night and sliding scale reassess tomorrow  * Sepsis [facility])  Assessment & Plan  Patient met criteria of sepsis febrile with leukocytosis- still present but improving  Lactic acid resolved  Secondary to healthcare acquired pneumonia secondary to recent hospitalization also the patient is immunocompromised continue cefepime and vancomycin repeat blood work tomorrow blood cultures are negative so far anticipate to switch over to PO 24-48 hours        VTE Pharmacologic Prophylaxis:   Pharmacologic: Enoxaparin (Lovenox)  Mechanical VTE Prophylaxis in Place: Yes    Patient Centered Rounds: I have performed bedside rounds with nursing staff today  Discussions with Specialists or Other Care Team Provider: nursing     Education and Discussions with Family / Patient: patient    Time Spent for Care: 30 minutes  More than 50% of total time spent on counseling and coordination of care as described above  Current Length of Stay: 1 day(s)    Current Patient Status: Inpatient   Certification Statement: The patient will continue to require additional inpatient hospital stay due to Pneumonia sepsis    Discharge Plan:  Not clear to be discharged today    Code Status: Level 1 - Full Code      Subjective:   Patient seen and examined, she is feeling much better there is no dizziness Stay is no blurry visions she still has some cough although nonproductive  No diarrhea  Objective:     Vitals:   Temp (24hrs), Av °F (37 2 °C), Min:96 5 °F (35 8 °C), Max:101 °F (38 3 °C)    Temp:  [96 5 °F (35 8 °C)-101 °F (38 3 °C)] 98 3 °F (36 8 °C)  HR:  [] 99  Resp:  [16-18] 18  BP: ()/(50-68) 108/60  SpO2:  [90 %-98 %] 94 %  Body mass index is 26 01 kg/m²  Input and Output Summary (last 24 hours):        Intake/Output Summary (Last 24 hours) at 2019 1012  Last data filed at 2019 0825  Gross per 24 hour   Intake 4140 ml   Output 1600 ml   Net 2540 ml       Physical Exam:     Physical Exam   Constitutional: She is oriented to person, place, and time  She appears well-developed and well-nourished  HENT:   Head: Normocephalic and atraumatic  Eyes: Pupils are equal, round, and reactive to light  EOM are normal    Neck: Normal range of motion  Cardiovascular: Normal rate, regular rhythm and normal heart sounds  Pulmonary/Chest: Effort normal    Decreased right lower lobe   Abdominal: Soft  Bowel sounds are normal    Musculoskeletal: Normal range of motion  She exhibits no edema  Neurological: She is alert and oriented to person, place, and time  She has normal reflexes  Skin: Skin is warm  Psychiatric: She has a normal mood and affect           Additional Data:     Labs:    Results from last 7 days   Lab Units 02/26/19  0441 02/25/19  1048   WBC Thousand/uL 22 10* 26 20*   HEMOGLOBIN g/dL 8 4* 8 9*   HEMATOCRIT % 25 5* 27 0*   PLATELETS Thousands/uL 293 270   BANDS PCT %  --  45*   NEUTROS PCT % 85*  --    LYMPHS PCT % 4*  --    LYMPHO PCT %  --  5*   MONOS PCT % 10  --    MONO PCT %  --  4   EOS PCT % 1  --      Results from last 7 days   Lab Units 02/26/19  0441 02/25/19  1048   SODIUM mmol/L 133* 127*   POTASSIUM mmol/L 3 2* 4 6   CHLORIDE mmol/L 98 89*   CO2 mmol/L 28 26   BUN mg/dL 6 8   CREATININE mg/dL 0 51* 0 59*   ANION GAP mmol/L 7 12   CALCIUM mg/dL 8 4 8 5   ALBUMIN g/dL  --  3 7   TOTAL BILIRUBIN mg/dL  --  0 60   ALK PHOS U/L  --  87   ALT U/L  --  34   AST U/L  --  64*   GLUCOSE RANDOM mg/dL 122* 323*     Results from last 7 days   Lab Units 02/25/19  1048   INR  1 10     Results from last 7 days   Lab Units 02/26/19  0529 02/25/19  2045 02/25/19  1729 02/23/19  1107 02/23/19  0550 02/22/19  2023 02/22/19  1559 02/22/19  1112 02/22/19  0548 02/21/19  2104 02/21/19  1804 02/21/19  1655   POC GLUCOSE mg/dl 132 349* 101 236* 150* 139 235* 229* 139 229* 169* 65         Results from last 7 days   Lab Units 02/25/19  1741 02/25/19  1048 02/23/19  1025 02/21/19  1512   LACTIC ACID mmol/L 1 8 2 9* 1 8 1 4   PROCALCITONIN ng/ml  --  0 38* --   --            * I Have Reviewed All Lab Data Listed Above  * Additional Pertinent Lab Tests Reviewed: All Labs Within Last 24 Hours Reviewed    Imaging:    Imaging Reports Reviewed Today Include: cxr  Imaging Personally Reviewed by Myself Includes:      Recent Cultures (last 7 days):     Results from last 7 days   Lab Units 02/21/19  1249 02/21/19  1244   BLOOD CULTURE  No Growth After 4 Days  No Growth After 4 Days         Last 24 Hours Medication List:     Current Facility-Administered Medications:  acetaminophen 650 mg Oral Q6H PRN Praveena Burrows PA-C    al mag oxide-diphenhydramine-lidocaine viscous 10 mL Swish & Swallow Q4H PRN Fredy Starkey MD    albuterol 2 puff Inhalation Q4H PRN Fredy Starkey MD    benzonatate 200 mg Oral TID Fredy Starkey MD    carvedilol 6 25 mg Oral BID With Meals Juvenal Siddiqi MD    IVPB builder 2,000 mg Intravenous Q12H Fredy Starkey MD Last Rate: Stopped (02/26/19 8416)   enoxaparin 40 mg Subcutaneous Daily Fredy Starkey MD    ergocalciferol 50,000 Units Oral Weekly Fredy Starkey MD    ezetimibe 10 mg Oral Daily Fredy Starkey MD    fluticasone 2 spray Nasal Daily Fredy Starkey MD    gabapentin 100 mg Oral HS Fredy Starkey MD    guaiFENesin 600 mg Oral Q12H Anita Paez MD    hydrOXYzine HCL 50 mg Oral TID PRN Fredy Starkey MD    insulin glargine 15 Units Subcutaneous HS Freyd Starkey MD    insulin lispro 2-12 Units Subcutaneous TID AC Fredy Starkey MD    insulin lispro 2-12 Units Subcutaneous HS Praveena Burrows PA-C    levalbuterol 0 63 mg Nebulization TID Carmelo Ayala MD    lidocaine viscous 10 mL Swish & Swallow TID PRN Carmelo Ayala MD    loperamide 2 mg Oral PRN Fredy Starkey MD    ondansetron 8 mg Oral Q8H PRN Fredy Starkey MD    predniSONE 20 mg Oral Daily Fredy Starkey MD    sodium chloride 100 mL/hr Intravenous Continuous Fredy Starkey MD Last Rate: 100 mL/hr (02/25/19 3344)   vancomycin 1,000 mg Intravenous Q12H Jasbir Weber MD Last Rate: Stopped (02/26/19 0154)        Today, Patient Was Seen By: Lilia Johansen MD    ** Please Note: Dictation voice to text software may have been used in the creation of this document   **

## 2019-02-27 ENCOUNTER — TRANSITIONAL CARE MANAGEMENT (OUTPATIENT)
Dept: FAMILY MEDICINE CLINIC | Facility: CLINIC | Age: 74
End: 2019-02-27

## 2019-02-27 VITALS
TEMPERATURE: 96.6 F | RESPIRATION RATE: 18 BRPM | HEART RATE: 94 BPM | DIASTOLIC BLOOD PRESSURE: 62 MMHG | HEIGHT: 60 IN | BODY MASS INDEX: 25.88 KG/M2 | OXYGEN SATURATION: 98 % | SYSTOLIC BLOOD PRESSURE: 123 MMHG | WEIGHT: 131.84 LBS

## 2019-02-27 LAB
ANION GAP SERPL CALCULATED.3IONS-SCNC: 4 MMOL/L (ref 5–14)
ANISOCYTOSIS BLD QL SMEAR: PRESENT
BUN SERPL-MCNC: 6 MG/DL (ref 5–25)
CALCIUM SERPL-MCNC: 9 MG/DL (ref 8.4–10.2)
CHLORIDE SERPL-SCNC: 99 MMOL/L (ref 97–108)
CO2 SERPL-SCNC: 31 MMOL/L (ref 22–30)
CREAT SERPL-MCNC: 0.45 MG/DL (ref 0.6–1.2)
ERYTHROCYTE [DISTWIDTH] IN BLOOD BY AUTOMATED COUNT: 17.1 %
GFR SERPL CREATININE-BSD FRML MDRD: 100 ML/MIN/1.73SQ M
GLUCOSE SERPL-MCNC: 316 MG/DL (ref 65–140)
GLUCOSE SERPL-MCNC: 89 MG/DL (ref 65–140)
GLUCOSE SERPL-MCNC: 92 MG/DL (ref 70–99)
HCT VFR BLD AUTO: 27.4 % (ref 36–46)
HGB BLD-MCNC: 9 G/DL (ref 12–16)
LYMPHOCYTES # BLD AUTO: 1.28 THOUSAND/UL (ref 0.5–4)
LYMPHOCYTES # BLD AUTO: 6 % (ref 25–45)
MCH RBC QN AUTO: 31.8 PG (ref 26–34)
MCHC RBC AUTO-ENTMCNC: 33 G/DL (ref 31–36)
MCV RBC AUTO: 96 FL (ref 80–100)
METAMYELOCYTES NFR BLD MANUAL: 1 % (ref 0–1)
MONOCYTES # BLD AUTO: 1.07 THOUSAND/UL (ref 0.2–0.9)
MONOCYTES NFR BLD AUTO: 5 % (ref 1–10)
MYELOCYTES NFR BLD MANUAL: 1 % (ref 0–1)
NEUTS BAND NFR BLD MANUAL: 5 % (ref 0–8)
NEUTS SEG # BLD: 18.53 THOUSAND/UL (ref 1.8–7.8)
NEUTS SEG NFR BLD AUTO: 82 %
PLATELET # BLD AUTO: 450 THOUSANDS/UL (ref 150–450)
PLATELET BLD QL SMEAR: ABNORMAL
PMV BLD AUTO: 8.3 FL (ref 8.9–12.7)
POTASSIUM SERPL-SCNC: 3.4 MMOL/L (ref 3.6–5)
RBC # BLD AUTO: 2.84 MILLION/UL (ref 4–5.2)
RBC MORPH BLD: ABNORMAL
SODIUM SERPL-SCNC: 134 MMOL/L (ref 137–147)
TOTAL CELLS COUNTED SPEC: 100
WBC # BLD AUTO: 21.3 THOUSAND/UL (ref 4.5–11)

## 2019-02-27 PROCEDURE — 97163 PT EVAL HIGH COMPLEX 45 MIN: CPT

## 2019-02-27 PROCEDURE — 99239 HOSP IP/OBS DSCHRG MGMT >30: CPT | Performed by: INTERNAL MEDICINE

## 2019-02-27 PROCEDURE — 80048 BASIC METABOLIC PNL TOTAL CA: CPT | Performed by: FAMILY MEDICINE

## 2019-02-27 PROCEDURE — G8978 MOBILITY CURRENT STATUS: HCPCS

## 2019-02-27 PROCEDURE — 94640 AIRWAY INHALATION TREATMENT: CPT

## 2019-02-27 PROCEDURE — 85007 BL SMEAR W/DIFF WBC COUNT: CPT | Performed by: FAMILY MEDICINE

## 2019-02-27 PROCEDURE — G8979 MOBILITY GOAL STATUS: HCPCS

## 2019-02-27 PROCEDURE — 82948 REAGENT STRIP/BLOOD GLUCOSE: CPT

## 2019-02-27 PROCEDURE — 94760 N-INVAS EAR/PLS OXIMETRY 1: CPT

## 2019-02-27 PROCEDURE — G8980 MOBILITY D/C STATUS: HCPCS

## 2019-02-27 PROCEDURE — 85027 COMPLETE CBC AUTOMATED: CPT | Performed by: FAMILY MEDICINE

## 2019-02-27 PROCEDURE — 97166 OT EVAL MOD COMPLEX 45 MIN: CPT

## 2019-02-27 PROCEDURE — G8987 SELF CARE CURRENT STATUS: HCPCS

## 2019-02-27 PROCEDURE — G8988 SELF CARE GOAL STATUS: HCPCS

## 2019-02-27 RX ORDER — GUAIFENESIN 600 MG
600 TABLET, EXTENDED RELEASE 12 HR ORAL EVERY 12 HOURS SCHEDULED
Qty: 10 TABLET | Refills: 0 | Status: SHIPPED | OUTPATIENT
Start: 2019-02-27 | End: 2019-05-24

## 2019-02-27 RX ORDER — CEFDINIR 300 MG/1
300 CAPSULE ORAL EVERY 12 HOURS SCHEDULED
Qty: 10 CAPSULE | Refills: 0 | Status: SHIPPED | OUTPATIENT
Start: 2019-02-27 | End: 2019-03-04

## 2019-02-27 RX ORDER — POTASSIUM CHLORIDE 20 MEQ/1
40 TABLET, EXTENDED RELEASE ORAL ONCE
Status: COMPLETED | OUTPATIENT
Start: 2019-02-27 | End: 2019-02-27

## 2019-02-27 RX ORDER — AZITHROMYCIN 500 MG/1
500 TABLET, FILM COATED ORAL DAILY
Qty: 5 TABLET | Refills: 0 | Status: SHIPPED | OUTPATIENT
Start: 2019-02-27 | End: 2019-03-04

## 2019-02-27 RX ADMIN — VANCOMYCIN HYDROCHLORIDE 1000 MG: 1 INJECTION, SOLUTION INTRAVENOUS at 00:18

## 2019-02-27 RX ADMIN — PREDNISONE 20 MG: 20 TABLET ORAL at 08:14

## 2019-02-27 RX ADMIN — EZETIMIBE 10 MG: 10 TABLET ORAL at 08:06

## 2019-02-27 RX ADMIN — CARVEDILOL 6.25 MG: 6.25 TABLET, FILM COATED ORAL at 08:06

## 2019-02-27 RX ADMIN — LEVALBUTEROL HYDROCHLORIDE 0.63 MG: 0.63 SOLUTION RESPIRATORY (INHALATION) at 08:55

## 2019-02-27 RX ADMIN — CEFEPIME 2000 MG: 2 INJECTION, POWDER, FOR SOLUTION INTRAVENOUS at 03:00

## 2019-02-27 RX ADMIN — ENOXAPARIN SODIUM 40 MG: 40 INJECTION SUBCUTANEOUS at 08:07

## 2019-02-27 RX ADMIN — FLUTICASONE PROPIONATE 2 SPRAY: 50 SPRAY, METERED NASAL at 10:15

## 2019-02-27 RX ADMIN — INSULIN LISPRO 8 UNITS: 100 INJECTION, SOLUTION INTRAVENOUS; SUBCUTANEOUS at 11:41

## 2019-02-27 RX ADMIN — SODIUM CHLORIDE 100 ML/HR: 9 INJECTION, SOLUTION INTRAVENOUS at 10:21

## 2019-02-27 RX ADMIN — GUAIFENESIN 600 MG: 600 TABLET, EXTENDED RELEASE ORAL at 08:07

## 2019-02-27 RX ADMIN — BENZONATATE 200 MG: 100 CAPSULE ORAL at 08:05

## 2019-02-27 RX ADMIN — POTASSIUM CHLORIDE 40 MEQ: 20 TABLET, EXTENDED RELEASE ORAL at 10:15

## 2019-02-27 NOTE — DISCHARGE SUMMARY
[facility]    Patient Information: Perlita Robin 68 y o  female MRN: [id number]  Unit/Bed#: 5T -01 Encounter: [id number]    Discharging Physician / Practitioner: Lucie Cooks, MD  PCP: Meenakshi Donnelly PA-C  Admission Date: 2/25/2019  Discharge Date: 02/27/19    Disposition:     Home    Reason for Admission:  Pneumonia    Discharge Diagnoses:     Principal Problem:    Sepsis ([facility] [address] )  Active Problems:    Type 2 diabetes mellitus without complication, without long-term current use of insulin ([facility] [address] )    Essential hypertension    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)    Hyponatremia    Severe protein-calorie malnutrition ([facility] [address] )    HCAP (healthcare-associated pneumonia)  Resolved Problems:    Hypernatremia      Consultations During Hospital Stay:  · None    Procedures Performed:     · Chest x-ray showed right-sided pneumonia    Hospital Course:     Perlita Robin is a 68 y o  female patient with diffuse large B-cell lymphoma who originally presented to the hospital on 2/25/2019 due to cough  Patient was seen by Oncology in the office and was noted to have symptoms of pneumonia  She had a chest x-ray which showed right-sided pneumonia  She was subsequently admitted and started on IV antibiotics  Her symptoms gradually improved  She remained stable and afebrile  She is being discharged home with p o  Antibiotics  Condition at Discharge: good     Discharge Day Visit / Exam:     Subjective:  Feeling better and anxious to go home today    Used  services  Vitals: Blood Pressure: 123/62 (02/27/19 0700)  Pulse: 94 (02/27/19 0700)  Temperature: (!) 96 6 °F (35 9 °C) (02/27/19 0700)  Temp Source: Temporal (02/27/19 0700)  Respirations: 18 (02/27/19 0700)  Height: 5' (152 4 cm) (02/25/19 1717)  Weight - Scale: 59 8 kg (131 lb 13 4 oz) (02/27/19 0600)  SpO2: 98 % (02/27/19 0700)  Exam:   Physical Exam     Gen -Patient comfortable at rest   Neck- Supple  No thyromegaly or lymphadenopathy  Lungs-Clear bilaterally without any wheeze or rales   Heart S1-S2, regular rate and rhythm, no murmurs  Abdomen-soft nontender, no organomegaly  Bowel sounds present  Extremities-no cyanosi,  clubbing or edema  Skin- no rash  Neuro-nonfocal     Discussion with Family:     Discharge instructions/Information to patient and family:   See after visit summary for information provided to patient and family  Provisions for Follow-Up Care:  See after visit summary for information related to follow-up care and any pertinent home health orders  Planned Readmission:  No     Discharge Statement:  I spent 35 minutes discharging the patient  This time was spent on the day of discharge  I had direct contact with the patient on the day of discharge  Greater than 50% of the total time was spent examining patient, answering all patient questions, arranging and discussing plan of care with patient as well as directly providing post-discharge instructions  Additional time then spent on discharge activities  Discharge Medications:  See after visit summary for reconciled discharge medications provided to patient and family        ** Please Note: This note has been constructed using a voice recognition system **

## 2019-02-27 NOTE — PHYSICAL THERAPY NOTE
Physical Therapy Evaluation     Time In/Out:8532-9136    Patient's Name: Lesly Ernst    Admitting Diagnosis  Pneumonia [J18 9]  Fever [R50 9]    Problem List  Patient Active Problem List   Diagnosis    Type 2 diabetes mellitus without complication, without long-term current use of insulin ([facility] )    Essential hypertension    Gastroesophageal reflux disease    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)    Oropharyngeal dysphagia    Hyponatremia    Anxiety about health    Nausea    Tremor    Severe protein-calorie malnutrition ([place] [address] )    Palliative care patient    Neoplasm related pain    Chronic pain due to neoplasm    Depression    Other insomnia    Rash    Status post chemotherapy    Fever    Syncope    Sepsis ([place] [address] )    HCAP (healthcare-associated pneumonia)    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 [facility])       Past Medical History  Past Medical History:   Diagnosis Date    Cancer ([place] [address] )     Throat    Chronic pain disorder     Diabetes mellitus ([place] [address] )     Diffuse large B cell lymphoma ([place] [address] )     Dysphagia     GERD without esophagitis     HTN (hypertension)     Hyperlipidemia     Hypertension     MI (myocardial infarction) ([place] [address] )        Past Surgical History  Past Surgical History:   Procedure Laterality Date    BONE MARROW BIOPSY      BREAST LUMPECTOMY      CHOLECYSTECTOMY      IR PORT PLACEMENT  11/16/2018    OTHER SURGICAL HISTORY      tendor tear repair to right shoulder    SHOULDER ARTHROSCOPY              02/27/19 1039   Note Type   Note type Eval only   Pain Assessment   Pain Assessment 0-10   Pain Score No Pain   Home Living   Type of Home House   Home Layout Two level;Bed/bath upstairs   Home Equipment Wheelchair-manual   Prior Function   Level of [place]   (Pt reports that when she is not well she uses the [facility])   Lives With Daughter  (Great grandtr and grandson)   Receives Help From Family   ADL Assistance Independent [place] in the last 6 months 0   Restrictions/Precautions   Weight Bearing Precautions Per Order No   Other Precautions Bed Alarm; Chair Alarm   Cognition   Attention Within functional limits   Orientation Level Oriented to person;Oriented to place;Oriented to situation;Oriented to time   RUE Assessment   RUE Assessment WFL   LUE Assessment   LUE Assessment WFL   RLE Assessment   RLE Assessment WNL   LLE Assessment   LLE Assessment WNL   Coordination   Movements are Fluid and Coordinated 1   Light Touch   RLE Light Touch Grossly intact   LLE Light Touch Grossly intact   Transfers   Sit to Stand 7  Independent   Stand to Sit 7  Independent   Stand pivot 7  Independent   Ambulation/Elevation   Gait pattern WNL   Gait Assistance 7  Independent   Assistive Device None   Distance 40-50 feet   Stair Management Assistance   (CGA with left rail ascending and SPC/NBQC trial)   Stair Management Technique   (reciprocal ascending , step to descending)   Number of Stairs 12   Balance   Static Standing Good   Ambulatory Fair +   Activity Tolerance   Activity Tolerance Patient tolerated treatment well   Nurse Made Aware RN cleared for treatment   Assessment   Assessment Perlita Robin is a 68[facility] with fever and mild cough  Chest xray showed right lower lobe pneumonia due to chemotherapy leukocytosis  Pt was recently [facility] with diarrhea and low grade fever  Pt has been started on antibiotics  Order for PT received and patient cleared for PT by nursing  PMH significant for DM  Strata interpretor used [id number] Katrina during session  On evaluation patient reports no c/o pain  She reports that she has been able to walk without difficulty in her home until recent days due to illness when she would use the [facility] if she needed it  She expressed interest in [facility] for use on steps therefore training evaluation on steps with NQBC/SPC and rail    Pt was most stable with use of bilateral HH on rail for steps  She did not always sequence the cane (NBQC or SPC) correctly, showing little use of the device  At this time a cane is not recommended for use on steps  Pt reports that someone is always with her when she goes up and does the steps  She is never left alone at home  He ambulation on level surfaces for household distances was stable  She demonstrated the ability to open and close the closet, retrieve wash bin off of the floor to get ready to wash up as she has been told that she will be going home today  No skilled PT needs at this time  Pt was noted to cough more when she was up and moving, likely due to the mobilization loosening secretions  In summary the patient's history, examination of body system(s), activity limitations, participation restrictions, and collaboration of care are the guiding factors that were used to determine the clinical decision  The clinical presentation is           and therefore the assigned level of complexity is: High       Barriers to Discharge None   Goals   Patient Goals "to cook, do laundry and clean and play with the babies, "   Treatment Day   (Evaluation only)   Plan   PT Frequency Other (Comment)  (eval only)   Recommendation   Recommendation Home with family support   Equipment Recommended   (none)   PT - OK to Discharge Yes   Barthel Index   Feeding 10   Bathing 5   Grooming Score 5   Dressing Score 5   Bladder Score 10   Bowels Score 10   Toilet Use Score 10   Transfers (Bed/Chair) Score 15   Mobility (Level Surface) Score 0   Stairs Score 5   Barthel Index Score [address], PT

## 2019-02-27 NOTE — PLAN OF CARE
Problem: Potential for Falls  Goal: Patient will remain free of falls  Description  INTERVENTIONS:  - Assess patient frequently for physical needs  -  Identify cognitive and physical deficits and behaviors that affect risk of falls  -  [place] fall precautions as indicated by assessment   - Educate patient/family on patient safety including physical limitations  - Instruct patient to call for assistance with activity based on assessment  - Modify environment to reduce risk of injury  - Consider OT/PT consult to assist with strengthening/mobility  2/27/2019 1202 by Fran Barr RN  Outcome: Adequate for Discharge  2/27/2019 1202 by Fran Barr RN  Outcome: Adequate for Discharge     Problem: Nutrition/Hydration-ADULT  Goal: Nutrient/Hydration intake appropriate for improving, restoring or maintaining nutritional needs  Description  Monitor and assess patient's nutrition/hydration status for malnutrition (ex- brittle hair, bruises, dry skin, pale skin and conjunctiva, muscle wasting, smooth red tongue, and disorientation)  Collaborate with interdisciplinary team and initiate plan and interventions as ordered  Monitor patient's weight and dietary intake as ordered or per policy  Utilize nutrition screening tool and intervene per policy  Determine patient's food preferences and provide high-protein, high-caloric foods as appropriate       INTERVENTIONS:  - Monitor oral intake, urinary output, labs, and treatment plans  - Assess nutrition and hydration status and recommend course of action  - Evaluate amount of meals eaten  - Assist patient with eating if necessary   - Allow adequate time for meals  - Recommend/ encourage appropriate diets, oral nutritional supplements, and vitamin/mineral supplements  - Order, calculate, and assess calorie counts as needed  - Recommend, monitor, and adjust tube feedings and TPN/PPN based on assessed needs  - Assess need for intravenous fluids  - Provide specific nutrition/hydration education as appropriate  - Include patient/family/caregiver in decisions related to nutrition  2/27/2019 1202 by Viki Harrington RN  Outcome: Adequate for Discharge  2/27/2019 1202 by Viki Harrington RN  Outcome: Adequate for Discharge     Problem: PAIN - ADULT  Goal: Verbalizes/displays adequate comfort level or baseline comfort level  Description  Interventions:  - Encourage patient to monitor pain and request assistance  - Assess pain using appropriate pain scale  - Administer analgesics based on type and severity of pain and evaluate response  - Implement non-pharmacological measures as appropriate and evaluate response  - Consider cultural and social influences on pain and pain management  - Notify physician/advanced practitioner if interventions unsuccessful or patient reports new pain  2/27/2019 1202 by Viki Harrington RN  Outcome: Adequate for Discharge  2/27/2019 1202 by Viki Harrington RN  Outcome: Adequate for Discharge     Problem: INFECTION - ADULT  Goal: Absence or prevention of progression during hospitalization  Description  INTERVENTIONS:  - Assess and monitor for signs and symptoms of infection  - Monitor lab/diagnostic results  - Monitor all insertion sites, i e  indwelling lines, tubes, and drains  - Monitor endotracheal (as able) and nasal secretions for changes in amount and color  - [place] appropriate cooling/warming therapies per order  - Administer medications as ordered  - Instruct and encourage patient and family to use good hand hygiene technique  - Identify and instruct in appropriate isolation precautions for identified infection/condition  2/27/2019 1202 by Viki Harrington RN  Outcome: Adequate for Discharge  2/27/2019 1202 by Viki Harrington RN  Outcome: Adequate for Discharge  Goal: Absence of fever/infection during neutropenic period  Description  INTERVENTIONS:  - Monitor WBC  - Implement neutropenic guidelines  2/27/2019 1202 by Viki Harrington RN  Outcome: Adequate for Discharge  2/27/2019 1202 by Viki Harrington RN  Outcome: Adequate for Discharge     Problem: SAFETY ADULT  Goal: Patient will remain free of falls  Description  INTERVENTIONS:  - Assess patient frequently for physical needs  -  Identify cognitive and physical deficits and behaviors that affect risk of falls    -  [place] fall precautions as indicated by assessment   - Educate patient/family on patient safety including physical limitations  - Instruct patient to call for assistance with activity based on assessment  - Modify environment to reduce risk of injury  - Consider OT/PT consult to assist with strengthening/mobility  2/27/2019 1202 by Viki Harrington RN  Outcome: Adequate for Discharge  2/27/2019 1202 by Viki Harrington RN  Outcome: Adequate for Discharge  Goal: Maintain or return to baseline ADL function  Description  INTERVENTIONS:  -  Assess patient's ability to carry out ADLs; assess patient's baseline for ADL function and identify physical deficits which impact ability to perform ADLs (bathing, care of mouth/teeth, toileting, grooming, dressing, etc )  - Assess/evaluate cause of self-care deficits   - Assess range of motion  - Assess patient's mobility; develop plan if impaired  - Assess patient's need for assistive devices and provide as appropriate  - Encourage maximum independence but intervene and supervise when necessary  ¯ Involve family in performance of ADLs  ¯ Assess for home care needs following discharge   ¯ Request OT consult to assist with ADL evaluation and planning for discharge  ¯ Provide patient education as appropriate  2/27/2019 1202 by Viki Harrington RN  Outcome: Adequate for Discharge  2/27/2019 1202 by Viki Harrington RN  Outcome: Adequate for Discharge  Goal: Maintain or return mobility status to optimal level  Description  INTERVENTIONS:  - Assess patient's baseline mobility status (ambulation, transfers, stairs, etc )    - Identify cognitive and physical deficits and behaviors that affect mobility  - Identify mobility aids required to assist with transfers and/or ambulation (gait belt, sit-to-stand, lift, walker, cane, etc )  - [place] fall precautions as indicated by assessment  - Record patient progress and toleration of activity level on Mobility SBAR; progress patient to next Phase/Stage  - Instruct patient to call for assistance with activity based on assessment  - Request Rehabilitation consult to assist with strengthening/weightbearing, etc   2/27/2019 1202 by Marcela Vega RN  Outcome: Adequate for Discharge  2/27/2019 1202 by Marcela Vega RN  Outcome: Adequate for Discharge     Problem: DISCHARGE PLANNING  Goal: Discharge to home or other facility with appropriate resources  Description  INTERVENTIONS:  - Identify barriers to discharge w/patient and caregiver  - Arrange for needed discharge resources and transportation as appropriate  - Identify discharge learning needs (meds, wound care, etc )  - Arrange for interpretive services to assist at discharge as needed  - Refer to Case Management Department for coordinating discharge planning if the patient needs post-hospital services based on physician/advanced practitioner order or complex needs related to functional status, cognitive ability, or social support system  2/27/2019 1202 by Marcela Vega RN  Outcome: Adequate for Discharge  2/27/2019 1202 by Marcela Vega RN  Outcome: Adequate for Discharge     Problem: Knowledge Deficit  Goal: Patient/family/caregiver demonstrates understanding of disease process, treatment plan, medications, and discharge instructions  Description  Complete learning assessment and assess knowledge base    Interventions:  - Provide teaching at level of understanding  - Provide teaching via preferred learning methods  2/27/2019 1202 by Marcela Vega RN  Outcome: Adequate for Discharge  2/27/2019 1202 by Marcela Vega RN  Outcome: Adequate for Discharge     Problem: DISCHARGE PLANNING - CARE MANAGEMENT  Goal: Discharge to post-acute care or home with appropriate resources  Description  INTERVENTIONS:  - Conduct assessment to determine patient/family and health care team treatment goals, and need for post-acute services based on payer coverage, community resources, and patient preferences, and barriers to discharge  - Address psychosocial, clinical, and financial barriers to discharge as identified in assessment in conjunction with the patient/family and health care team  - Arrange appropriate level of post-acute services according to patient?s   needs and preference and payer coverage in collaboration with the physician and health care team  - Communicate with and update the patient/family, physician, and health care team regarding progress on the discharge plan  - Arrange appropriate transportation to post-acute venues  2/27/2019 1202 by Sunny Phoenix, RN  Outcome: Adequate for Discharge  2/27/2019 1202 by Sunny Phoenix, RN  Outcome: Adequate for Discharge

## 2019-02-27 NOTE — NURSING NOTE
Pt in bed alert, awake and oriented  C/o of cough tessalon and mucinex given as ordered  Pt in no apparent distress  Pt is cooperative, encourage to utilize call arana   Will monitor

## 2019-02-27 NOTE — NURSING NOTE
Pt received this am, in bed resting  No complaints of pain, resting comfortably in bed  Denies SOB and CP  Family at bedside  Will continue to monitor

## 2019-02-27 NOTE — MALNUTRITION/BMI
This medical record reflects one or more clinical indicators suggestive of malnutrition and/or morbid obesity  Malnutrition Findings:   Malnutrition type: Chronic illness  Degree of Malnutrition: Malnutrition of moderate degree  Malnutrition Characteristics: Weight loss, Inadequate energy, Muscle loss(9% body weight loss/4months, temporal wasting, pectoralis wasting)    BMI Findings: Body mass index is 25 75 kg/m²  See Nutrition note dated 2/27/19 for additional details  Completed nutrition assessment is viewable in the nutrition documentation

## 2019-02-27 NOTE — NURSING NOTE
Pt alert and oriented times 4, was pleasant  Pt tolerated all meds, continues to cough, but confirms has gone down and cough is not prod  Pt ambulated w/ PT and successfully used IS  Pt remained afebrile, lungs clear, denies c/p or any pain   Discussed D/C papers with daughter and they both verbalized unerstanding

## 2019-02-27 NOTE — PLAN OF CARE
Problem: OCCUPATIONAL THERAPY ADULT  Goal: Performs self-care activities at highest level of function for planned discharge setting  See evaluation for individualized goals  Description  Treatment Interventions: ADL retraining, Functional transfer training, Endurance training, Activityengagement, Energy conservation          See flowsheet documentation for full assessment, interventions and recommendations  Note:   Limitation: Decreased ADL status, Decreased endurance, Decreased self-care trans, Decreased high-level ADLs  Prognosis: Good  Assessment: Pt is a 68 y o  female seen for OT evaluation s/p admit to [facility] on 2/25/2019 w/ Sepsis ([facility] [address] )  OT completed expanded review of pt's medical and social history  Pt with current OT orders and appropriate for evaluation  Pt admitted d/t pneumonia  Pt with h/o depression, diabetes type II, HTN, diffuse large b-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  Prior to admission, pt was living at home with family and able to complete ADLs independently and functional mobility with use of RW  Pt's family able to assist PRN  Vitals: stable throughout session  Stratus  Fabi De Guzman #[id number] used, pt primarily Lao speaking  When OT entered room, pt in bed with RN present  Pt presents to OT below baseline due to the following performance deficits: endurance;  balance; stand tolerance; functional mobility; self care; and IADLs  Pt to benefit from continued skilled OT tx while in the hospital to address deficits as defined above and maximize level of functional independence with ADLs, IADLs, and functional mobility  Occupational Performance areas to address include: bathing/shower, toilet hygiene, dressing, functional mobility and clothing management  In summary the patient's history, examination of body system(s), activity limitations, participation restrictions, and collaboration of care are the guiding factors that were used to determine the clinical descion   Therefore, the assigned level of complexity is: Moderate  From OT standpoint, recommendation at time of d/c would be home with family support  OT Discharge Recommendation: Home with family support  OT - OK to Discharge:  Yes

## 2019-02-27 NOTE — OCCUPATIONAL THERAPY NOTE
Occupational Therapy Evaluation  (Time: 4927-5439)      Maral Harringtona    [birth year]    Patient Active Problem List   Diagnosis    Type 2 diabetes mellitus without complication, without long-term current use of insulin (HCC)    Essential hypertension    Gastroesophageal reflux disease    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)    Oropharyngeal dysphagia    Hyponatremia    Anxiety about health    Nausea    Tremor    Severe protein-calorie malnutrition ([facility] [address] )    Palliative care patient    Neoplasm related pain    Chronic pain due to neoplasm    Depression    Other insomnia    Rash    Status post chemotherapy    Fever    Syncope    Sepsis ([place] [address] )    HCAP (healthcare-associated pneumonia)    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)       Past Medical History:   Diagnosis Date    Cancer ([place] [address] )     Throat    Chronic pain disorder     Diabetes mellitus ([place] [address] )     Diffuse large B cell lymphoma ([place] [address] )     Dysphagia     GERD without esophagitis     HTN (hypertension)     Hyperlipidemia     Hypertension     MI (myocardial infarction) ([place] [address] )        Past Surgical History:   Procedure Laterality Date    BONE MARROW BIOPSY      BREAST LUMPECTOMY      CHOLECYSTECTOMY      IR PORT PLACEMENT  11/16/2018    OTHER SURGICAL HISTORY      tendor tear repair to right shoulder    SHOULDER ARTHROSCOPY          02/27/19 0820   Note Type   Note type Eval only   Restrictions/Precautions   Weight Bearing Precautions Per Order No   Other Precautions Chair Alarm; Bed Alarm; Fall Risk;Multiple lines   Pain Assessment   Pain Assessment 0-10   Pain Score No Pain   Home Living   Type of Home House   Home Layout Two level;Bed/bath upstairs;Stairs to enter with rails  (2 KENDAL, one rail)   Bathroom Shower/Tub Tub/shower unit   Bathroom Toilet Standard   Bathroom Equipment   (pt reports that she uses towel bars as grab bars), OT educated pt on use of grab bars for safety, not towel bars   Bathroom Accessibility Accessible via walker   Home Equipment   (pt reports RW, unclear based on previous PT note)   Prior Function   Level of [place] Independent with ADLs and functional mobility   Lives With Daughter; Other (Comment)  (granddaughter, great grandson)   Receives Help From Family   ADL Assistance Independent   IADLs Needs assistance   Falls in the last 6 months 0   Lifestyle   Autonomy   (PTA, pt (I) with ADLs, functional mobility)   Reciprocal Relationships   (supportive family)   Service to Others helps care for great grandson   Intrinsic Gratification   (spend time with family, including great grandson)   Psychosocial   Psychosocial (WDL) WDL   Subjective   Subjective   (I am excited to go home to see my newly born great grandson)   ADL   Where Assessed Edge of bed   Eating Assistance 5  Supervision/Setup   Eating Deficit Setup   Grooming Assistance 5  Supervision/Setup   Grooming Deficit Setup   UB Bathing Assistance 5  Supervision/Setup   UB Bathing Deficit Supervision/safety   LB Bathing Assistance 5  Supervision/Setup   LB Bathing Deficit Supervision/safety   UB Dressing Assistance 5  Supervision/Setup   UB Dressing Deficit Supervision/safety   LB Dressing Assistance 5  Supervision/Setup   LB Dressing Deficit [address] [facility]    (pt did not need to void during session)   Bed Mobility   Supine to Sit 6  Modified independent   Additional items HOB elevated; Increased time required   Transfers   Sit to Stand 6  Modified independent   Additional items Verbal cues   Stand to Sit 6  Modified independent   Additional items Verbal cues   Functional Mobility   Functional Mobility 5  Supervision   Additional items Rolling walker   Balance   Static Sitting Fair +   Dynamic Sitting Fair +   Static Standing Good   Dynamic Standing Fair +   Ambulatory Fair +   Activity Tolerance   Activity Tolerance Patient tolerated treatment well   Medical Staff Made Aware (MD present for part of eval)   Nurse Made Aware   (RN cleared pt for treatment)   RUE Assessment   RUE Assessment WFL   LUE Assessment   LUE Assessment WFL   Hand Function   Gross Motor Coordination Functional   Fine Motor Coordination Functional   Cognition   Overall Cognitive Status WFL   Arousal/Participation Alert; Responsive; Cooperative   Attention Within functional limits   Orientation Level Oriented to person;Oriented to place;Oriented to situation   Following Commands Follows all commands and directions without difficulty   Assessment   Limitation Decreased ADL status; Decreased endurance;Decreased self-care trans;Decreased high-level ADLs   Prognosis Good   Assessment Pt is a 68 y o  female seen for OT evaluation s/p admit to [facility] on 2/25/2019 w/ Sepsis ([address] )  OT completed expanded review of pt's medical and social history  Pt with current OT orders and appropriate for evaluation  Pt admitted d/t pneumonia  Pt with h/o depression, diabetes type II, HTN, diffuse large b-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  Prior to admission, pt was living at home with family and able to complete ADLs independently and functional mobility with use of RW  Pt's family able to assist PRN  Vitals: stable throughout session  Stratus  Rosalia Mendoza #[id number] used, pt primarily Irish speaking  When OT entered room, pt in bed with RN present  Pt presents to OT below baseline due to the following performance deficits: endurance;  balance; stand tolerance; functional mobility; self care; and IADLs  Pt to benefit from continued skilled OT tx while in the hospital to address deficits as defined above and maximize level of functional independence with ADLs, IADLs, and functional mobility  Occupational Performance areas to address include: bathing/shower, toilet hygiene, dressing, functional mobility and clothing management   In summary the patient's history, examination of body system(s), activity limitations, participation restrictions, and collaboration of care are the guiding factors that were used to determine the clinical descion  Therefore, the assigned level of complexity is: Moderate  From OT standpoint, recommendation at time of d/c would be home with family support  Goals   Patient Goals   ("to go home to my great grandson")   Plan   Treatment Interventions ADL retraining;Functional transfer training; Endurance training; Activityengagement; Energy conservation   Goal Expiration Date 03/06/19   OT Frequency 3-5x/wk   Recommendation   OT Discharge Recommendation Home with family support   OT - OK to Discharge Yes   Barthel Index   Feeding 10   Bathing 5   Grooming Score 5   Dressing Score 5   Bladder Score 10   Bowels Score 10   Toilet Use Score 5   Transfers (Bed/Chair) Score 10   Mobility (Level Surface) Score 0   Stairs Score 0   Barthel Index Score 60     Pt will achieve the following goals in 1 week:  LTG=STG   UB ADL- mod (I)   LB ADL- mod (I)   Toileting (with hygiene and clothing management)- mod (I)   Bed Mobility (with bed flat and no SR to prep for purposeful tasks)- mod (I) with increased time   Stand Balance (dynamic, supported for ADLs)- Good   Seated Balance (dynamic, supported for ADLs)-  Good    Marnie Josue MS, OTR/L

## 2019-02-27 NOTE — PLAN OF CARE
Problem: Potential for Falls  Goal: Patient will remain free of falls  Description  INTERVENTIONS:  - Assess patient frequently for physical needs  -  Identify cognitive and physical deficits and behaviors that affect risk of falls  -  [place] fall precautions as indicated by assessment   - Educate patient/family on patient safety including physical limitations  - Instruct patient to call for assistance with activity based on assessment  - Modify environment to reduce risk of injury  - Consider OT/PT consult to assist with strengthening/mobility  Outcome: Progressing     Problem: Nutrition/Hydration-ADULT  Goal: Nutrient/Hydration intake appropriate for improving, restoring or maintaining nutritional needs  Description  Monitor and assess patient's nutrition/hydration status for malnutrition (ex- brittle hair, bruises, dry skin, pale skin and conjunctiva, muscle wasting, smooth red tongue, and disorientation)  Collaborate with interdisciplinary team and initiate plan and interventions as ordered  Monitor patient's weight and dietary intake as ordered or per policy  Utilize nutrition screening tool and intervene per policy  Determine patient's food preferences and provide high-protein, high-caloric foods as appropriate       INTERVENTIONS:  - Monitor oral intake, urinary output, labs, and treatment plans  - Assess nutrition and hydration status and recommend course of action  - Evaluate amount of meals eaten  - Assist patient with eating if necessary   - Allow adequate time for meals  - Recommend/ encourage appropriate diets, oral nutritional supplements, and vitamin/mineral supplements  - Order, calculate, and assess calorie counts as needed  - Recommend, monitor, and adjust tube feedings and TPN/PPN based on assessed needs  - Assess need for intravenous fluids  - Provide specific nutrition/hydration education as appropriate  - Include patient/family/caregiver in decisions related to nutrition  Outcome: Progressing     Problem: PAIN - ADULT  Goal: Verbalizes/displays adequate comfort level or baseline comfort level  Description  Interventions:  - Encourage patient to monitor pain and request assistance  - Assess pain using appropriate pain scale  - Administer analgesics based on type and severity of pain and evaluate response  - Implement non-pharmacological measures as appropriate and evaluate response  - Consider cultural and social influences on pain and pain management  - Notify physician/advanced practitioner if interventions unsuccessful or patient reports new pain  Outcome: Progressing     Problem: INFECTION - ADULT  Goal: Absence or prevention of progression during hospitalization  Description  INTERVENTIONS:  - Assess and monitor for signs and symptoms of infection  - Monitor lab/diagnostic results  - Monitor all insertion sites, i e  indwelling lines, tubes, and drains  - Monitor endotracheal (as able) and nasal secretions for changes in amount and color  - [place] appropriate cooling/warming therapies per order  - Administer medications as ordered  - Instruct and encourage patient and family to use good hand hygiene technique  - Identify and instruct in appropriate isolation precautions for identified infection/condition  Outcome: Progressing  Goal: Absence of fever/infection during neutropenic period  Description  INTERVENTIONS:  - Monitor WBC  - Implement neutropenic guidelines  Outcome: Progressing     Problem: SAFETY ADULT  Goal: Patient will remain free of falls  Description  INTERVENTIONS:  - Assess patient frequently for physical needs  -  Identify cognitive and physical deficits and behaviors that affect risk of falls    -  [place] fall precautions as indicated by assessment   - Educate patient/family on patient safety including physical limitations  - Instruct patient to call for assistance with activity based on assessment  - Modify environment to reduce risk of injury  - Consider OT/PT consult to assist with strengthening/mobility  Outcome: Progressing  Goal: Maintain or return to baseline ADL function  Description  INTERVENTIONS:  -  Assess patient's ability to carry out ADLs; assess patient's baseline for ADL function and identify physical deficits which impact ability to perform ADLs (bathing, care of mouth/teeth, toileting, grooming, dressing, etc )  - Assess/evaluate cause of self-care deficits   - Assess range of motion  - Assess patient's mobility; develop plan if impaired  - Assess patient's need for assistive devices and provide as appropriate  - Encourage maximum independence but intervene and supervise when necessary  ¯ Involve family in performance of ADLs  ¯ Assess for home care needs following discharge   ¯ Request OT consult to assist with ADL evaluation and planning for discharge  ¯ Provide patient education as appropriate  Outcome: Progressing  Goal: Maintain or return mobility status to optimal level  Description  INTERVENTIONS:  - Assess patient's baseline mobility status (ambulation, transfers, stairs, etc )    - Identify cognitive and physical deficits and behaviors that affect mobility  - Identify mobility aids required to assist with transfers and/or ambulation (gait belt, sit-to-stand, lift, walker, cane, etc )  - [place] fall precautions as indicated by assessment  - Record patient progress and toleration of activity level on Mobility SBAR; progress patient to next Phase/Stage  - Instruct patient to call for assistance with activity based on assessment  - Request Rehabilitation consult to assist with strengthening/weightbearing, etc   Outcome: Progressing     Problem: DISCHARGE PLANNING  Goal: Discharge to home or other facility with appropriate resources  Description  INTERVENTIONS:  - Identify barriers to discharge w/patient and caregiver  - Arrange for needed discharge resources and transportation as appropriate  - Identify discharge learning needs (meds, wound care, etc )  - Arrange for interpretive services to assist at discharge as needed  - Refer to Case Management Department for coordinating discharge planning if the patient needs post-hospital services based on physician/advanced practitioner order or complex needs related to functional status, cognitive ability, or social support system  Outcome: Progressing     Problem: Knowledge Deficit  Goal: Patient/family/caregiver demonstrates understanding of disease process, treatment plan, medications, and discharge instructions  Description  Complete learning assessment and assess knowledge base    Interventions:  - Provide teaching at level of understanding  - Provide teaching via preferred learning methods  Outcome: Progressing     Problem: DISCHARGE PLANNING - CARE MANAGEMENT  Goal: Discharge to post-acute care or home with appropriate resources  Description  INTERVENTIONS:  - Conduct assessment to determine patient/family and health care team treatment goals, and need for post-acute services based on payer coverage, community resources, and patient preferences, and barriers to discharge  - Address psychosocial, clinical, and financial barriers to discharge as identified in assessment in conjunction with the patient/family and health care team  - Arrange appropriate level of post-acute services according to patient?s   needs and preference and payer coverage in collaboration with the physician and health care team  - Communicate with and update the patient/family, physician, and health care team regarding progress on the discharge plan  - Arrange appropriate transportation to post-acute venues  Outcome: Progressing

## 2019-02-28 NOTE — UTILIZATION REVIEW
Notification of Discharge  This is a Notification of Discharge from our facility [address]  Please be advised that this patient has been discharge from our facility  Below you will find the admission and discharge date and time including the patients disposition  PRESENTATION DATE: 2/25/2019 10:13 AM  IP ADMISSION DATE: 2/25/19 1255  DISCHARGE DATE: 2/27/2019  1:00 PM  DISPOSITION: [facility] Utilization Review Department  Phone: [phone]; Fax [phone]  [email or web address]  ATTENTION: Please call with any questions or concerns to [phone]  and carefully listen to the prompts so that you are directed to the right person  Send all requests for admission clinical reviews, approved or denied determinations and any other requests to fax [phone]   All voicemails are confidential

## 2019-03-02 LAB
BACTERIA BLD CULT: NORMAL
BACTERIA BLD CULT: NORMAL

## 2019-03-05 ENCOUNTER — APPOINTMENT (OUTPATIENT)
Dept: LAB | Facility: HOSPITAL | Age: 74
End: 2019-03-05
Payer: COMMERCIAL

## 2019-03-05 DIAGNOSIS — C83.31 DIFFUSE LARGE B-CELL LYMPHOMA OF LYMPH NODES OF NECK (HCC): ICD-10-CM

## 2019-03-05 LAB
ALBUMIN SERPL BCP-MCNC: 3.9 G/DL (ref 3–5.2)
ALP SERPL-CCNC: 99 U/L (ref 43–122)
ALT SERPL W P-5'-P-CCNC: 39 U/L (ref 9–52)
ANION GAP SERPL CALCULATED.3IONS-SCNC: 9 MMOL/L (ref 5–14)
ANISOCYTOSIS BLD QL SMEAR: PRESENT
AST SERPL W P-5'-P-CCNC: 26 U/L (ref 14–36)
BASOPHILS # BLD AUTO: 0.27 THOUSAND/UL (ref 0–0.1)
BASOPHILS NFR MAR MANUAL: 3 % (ref 0–1)
BILIRUB SERPL-MCNC: 0.3 MG/DL
BUN SERPL-MCNC: 9 MG/DL (ref 5–25)
CALCIUM SERPL-MCNC: 9.7 MG/DL (ref 8.4–10.2)
CHLORIDE SERPL-SCNC: 98 MMOL/L (ref 97–108)
CO2 SERPL-SCNC: 29 MMOL/L (ref 22–30)
CREAT SERPL-MCNC: 0.58 MG/DL (ref 0.6–1.2)
EOSINOPHIL # BLD AUTO: 0.18 THOUSAND/UL (ref 0–0.4)
EOSINOPHIL NFR BLD MANUAL: 2 % (ref 0–6)
ERYTHROCYTE [DISTWIDTH] IN BLOOD BY AUTOMATED COUNT: 17.1 %
GFR SERPL CREATININE-BSD FRML MDRD: 92 ML/MIN/1.73SQ M
GLUCOSE SERPL-MCNC: 188 MG/DL (ref 70–99)
HCT VFR BLD AUTO: 30.7 % (ref 36–46)
HGB BLD-MCNC: 9.8 G/DL (ref 12–16)
HYPERCHROMIA BLD QL SMEAR: PRESENT
LDH SERPL-CCNC: 540 U/L (ref 313–618)
LYMPHOCYTES # BLD AUTO: 1.62 THOUSAND/UL (ref 0.5–4)
LYMPHOCYTES # BLD AUTO: 18 % (ref 25–45)
MCH RBC QN AUTO: 31.3 PG (ref 26–34)
MCHC RBC AUTO-ENTMCNC: 31.9 G/DL (ref 31–36)
MCV RBC AUTO: 98 FL (ref 80–100)
METAMYELOCYTES NFR BLD MANUAL: 1 % (ref 0–1)
MONOCYTES # BLD AUTO: 1.26 THOUSAND/UL (ref 0.2–0.9)
MONOCYTES NFR BLD AUTO: 14 % (ref 1–10)
MYELOCYTES NFR BLD MANUAL: 1 % (ref 0–1)
NEUTS BAND NFR BLD MANUAL: 4 % (ref 0–8)
NEUTS SEG # BLD: 5.49 THOUSAND/UL (ref 1.8–7.8)
NEUTS SEG NFR BLD AUTO: 57 %
PLATELET # BLD AUTO: 751 THOUSANDS/UL (ref 150–450)
PLATELET BLD QL SMEAR: ABNORMAL
PMV BLD AUTO: 7.1 FL (ref 8.9–12.7)
POTASSIUM SERPL-SCNC: 4.3 MMOL/L (ref 3.6–5)
PROT SERPL-MCNC: 7 G/DL (ref 5.9–8.4)
RBC # BLD AUTO: 3.12 MILLION/UL (ref 4–5.2)
RBC MORPH BLD: ABNORMAL
SODIUM SERPL-SCNC: 136 MMOL/L (ref 137–147)
TOTAL CELLS COUNTED SPEC: 100
WBC # BLD AUTO: 9 THOUSAND/UL (ref 4.5–11)

## 2019-03-05 PROCEDURE — 36415 COLL VENOUS BLD VENIPUNCTURE: CPT

## 2019-03-05 PROCEDURE — 85007 BL SMEAR W/DIFF WBC COUNT: CPT

## 2019-03-05 PROCEDURE — 80053 COMPREHEN METABOLIC PANEL: CPT

## 2019-03-05 PROCEDURE — 83615 LACTATE (LD) (LDH) ENZYME: CPT

## 2019-03-05 PROCEDURE — 85027 COMPLETE CBC AUTOMATED: CPT

## 2019-03-06 ENCOUNTER — OFFICE VISIT (OUTPATIENT)
Dept: HEMATOLOGY ONCOLOGY | Facility: CLINIC | Age: 74
End: 2019-03-06
Payer: COMMERCIAL

## 2019-03-06 ENCOUNTER — OFFICE VISIT (OUTPATIENT)
Dept: FAMILY MEDICINE CLINIC | Facility: CLINIC | Age: 74
End: 2019-03-06

## 2019-03-06 VITALS
HEART RATE: 80 BPM | OXYGEN SATURATION: 96 % | HEIGHT: 60 IN | SYSTOLIC BLOOD PRESSURE: 100 MMHG | WEIGHT: 133 LBS | RESPIRATION RATE: 20 BRPM | TEMPERATURE: 97.3 F | DIASTOLIC BLOOD PRESSURE: 58 MMHG | BODY MASS INDEX: 26.11 KG/M2

## 2019-03-06 VITALS
DIASTOLIC BLOOD PRESSURE: 50 MMHG | RESPIRATION RATE: 16 BRPM | BODY MASS INDEX: 26.11 KG/M2 | HEIGHT: 60 IN | HEART RATE: 73 BPM | TEMPERATURE: 98.3 F | WEIGHT: 133 LBS | OXYGEN SATURATION: 95 % | SYSTOLIC BLOOD PRESSURE: 100 MMHG

## 2019-03-06 DIAGNOSIS — C83.31 DIFFUSE LARGE B-CELL LYMPHOMA OF LYMPH NODES OF NECK (HCC): Primary | ICD-10-CM

## 2019-03-06 DIAGNOSIS — J18.9 HCAP (HEALTHCARE-ASSOCIATED PNEUMONIA): Primary | ICD-10-CM

## 2019-03-06 PROCEDURE — 99214 OFFICE O/P EST MOD 30 MIN: CPT | Performed by: INTERNAL MEDICINE

## 2019-03-06 PROCEDURE — 99213 OFFICE O/P EST LOW 20 MIN: CPT | Performed by: PHYSICIAN ASSISTANT

## 2019-03-06 RX ORDER — ACETAMINOPHEN 325 MG/1
650 TABLET ORAL ONCE
Status: COMPLETED | OUTPATIENT
Start: 2019-03-07 | End: 2019-03-07

## 2019-03-06 RX ORDER — SODIUM CHLORIDE 9 MG/ML
20 INJECTION, SOLUTION INTRAVENOUS ONCE
Status: COMPLETED | OUTPATIENT
Start: 2019-03-07 | End: 2019-03-07

## 2019-03-06 RX ORDER — DOXORUBICIN HYDROCHLORIDE 2 MG/ML
78 INJECTION, SOLUTION INTRAVENOUS ONCE
Status: COMPLETED | OUTPATIENT
Start: 2019-03-07 | End: 2019-03-07

## 2019-03-06 NOTE — ASSESSMENT & PLAN NOTE
Has finished cefdinir  Symptoms have greatly improved since being discharged from the hospital   Pulmonary physical exam of normal today  Recommend continuing with Flonase to help with rhinorrhea, and Tessalon Perles as needed for cough  If there is any concern that symptoms are recurring, recommend making a follow-up appointment

## 2019-03-06 NOTE — PROGRESS NOTES
Assessment/Plan:     HCAP (healthcare-associated pneumonia)  Has finished cefdinir  Symptoms have greatly improved since being discharged from the hospital   Pulmonary physical exam of normal today  Recommend continuing with Flonase to help with rhinorrhea, and Tessalon Perles as needed for cough  If there is any concern that symptoms are recurring, recommend making a follow-up appointment  Diagnoses and all orders for this visit:    HCAP (healthcare-associated pneumonia)    Other orders  -     Cancel: Ambulatory referral to Gastroenterology; Future         Subjective:     Patient ID: Flower Aranda is a 68 y o  female  Russian-speaking, granddaughter translated  23-year-old female presenting for follow-up of recent hospitalization from 02/21/2019 to 02/23/2019, and 02/25/2019 to 02/27/2019  Patient is currently undergoing chemotherapy for lymphoma  Her 1st hospital admission she was admitted for dehydration likely caused by diarrhea secondary to chemotherapy  The patient was rehydrated, and symptoms resolved  She was then being seen at the oncology office on 02/25/2019 and was believed to have a pneumonia  She was sent to the ER and was diagnosed with pneumonia  She was kept in the hospital for 2 days, and treated with cefdinir  Patient has finished the antibiotics, and states that she is doing much better  Has some nasal discharge which she is using the nasal spray for  She is also have a mild cough which she use using Tessalon Perles for  Currently denies any fevers, chills, night sweats, chest pain, shortness of breath, wheezing, abdominal pain  Patient has no concerns or questions today  Review of Systems   Constitutional: Negative for activity change, appetite change, chills, diaphoresis, fatigue, fever and unexpected weight change  HENT: Positive for rhinorrhea  Negative for congestion, ear pain, postnasal drip, sore throat and trouble swallowing      Respiratory: Positive for cough  Negative for chest tightness, shortness of breath and wheezing  Cardiovascular: Negative for chest pain, palpitations and leg swelling  Gastrointestinal: Negative for abdominal pain, diarrhea, nausea and vomiting  Genitourinary: Negative for dysuria  Neurological: Negative for dizziness, weakness, light-headedness, numbness and headaches  Objective:     Physical Exam   Constitutional: She is oriented to person, place, and time  She appears well-developed  No distress  HENT:   Right Ear: External ear normal    Left Ear: External ear normal    Nose: Nose normal    Mouth/Throat: Oropharynx is clear and moist  Mucous membranes are pale  Eyes: Pupils are equal, round, and reactive to light  Conjunctivae and EOM are normal    Cardiovascular: Normal rate, regular rhythm and normal heart sounds  Exam reveals no gallop and no friction rub  No murmur heard  Pulmonary/Chest: Effort normal and breath sounds normal  No stridor  No respiratory distress  She has no wheezes  She has no rales  Abdominal: Soft  Bowel sounds are normal  She exhibits no distension and no mass  There is no tenderness  There is no rebound and no guarding  Neurological: She is alert and oriented to person, place, and time  No cranial nerve deficit  Coordination normal    Skin: She is not diaphoretic  Psychiatric: She has a normal mood and affect  Her behavior is normal  Thought content normal          Vitals:    03/06/19 1317   BP: 100/58   Pulse: 80   Resp: 20   Temp: (!) 97 3 °F (36 3 °C)   TempSrc: Tympanic   SpO2: 96%   Weight: 60 3 kg (133 lb)   Height: 5' (1 524 m)       Transitional Care Management Review:  Harriett Miramontes is a 68 y o  female here for TCM follow up       During the TCM phone call patient stated:    TCM Call (since 2/3/2019)     Date and time call was made  2/27/2019  7:37 PM    Hospital care reviewed  Records reviewed    Patient was hospitialized at  [address] [facility] [place] [facility]    Date of Admission  02/25/19    Date of discharge  02/27/19    Diagnosis  SEPSIS    Disposition  Home    Were the patients medications reviewed and updated  No      TCM Call (since 2/3/2019)     Should patient be enrolled in anticoag monitoring? No    Scheduled for follow up?   Yes    Did you obtain your prescribed medications  Yes    Do you need help managing your prescriptions or medications  No    Is transportation to your appointment needed  No    I have advised the patient to call PCP with any new or worsening symptoms  CHRISTOPHER NUR [facility]    Counseling  Family    Comments  I SPOKE WITH PT'S DAUGHTER SHELLEY REDMOND          Andreas Juan PA-C

## 2019-03-06 NOTE — PROGRESS NOTES
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)  The patient will be given cycle 6 of R-CHOP for her diffuse large B-cell lymphoma tomorrow  We will then see her for a delfina visit and obtain a PET-CT scan in couple of weeks from now for post treatment evaluation  We will then monitor her closely   - NM PET CT skull base to mid thigh; Future  - CBC and differential; Future  - Comprehensive metabolic panel; Future  - LD,Blood; Future  - Magnesium; Future        HPI:  The patient came today for a follow-up visit  She was recently admitted to the hospital on the 25th of February and was discharged on the 27th after she was treated for what it seems to be pneumonia  She continued to oral antibiotics after the discharge and now feeling much better  Her last oral antibiotics was given last week on Friday  Her most recent blood work on the 5th of March showed a white cell count of 9 0 with hemoglobin of 9 8 and platelet of 711  The patient is entirely afebrile feels much better with the great energy in seems to be ready for her final chemotherapy treatment  Oncology History    The patient complained about significant sore throat in May which was treated with antibiotics by her PCP in [facility] which did not improve her sore throat  She then started to notice significant odynophagia and dysphagia for liquids it is and solid nutrition  Eventually, she had a CT scan of the neck on the 20th of September which showed soft tissue lesion in the left palatine tonsil with abnormal lymph nodes bilaterally in the neck compatible with neoplastic process  She then had a biopsy of the left tonsil/left tonsillectomy on the 25th of September 2018 which was compatible with diffuse large B-cell lymphoma germinal center B phenotype    The immunohistochemical staining came back positive for BCL2, BCL 6 and myc with Ki 67 around 70%  No FISH rearrangements gene study was done for BCL2, BCL 6 are myc translocation  The patient was treated with 1 cycle of R-EPOCH since her airway was compromise with the large tonsillar mass and a biopsy which was reviewed by our hematopathologist on the 15 November compatible with 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 according to the [facility] with pending gene rearrangement studies  During the hospital course the patient had another biopsy of the left tonsil to better understand the exact aggressiveness of her large B-cell lymphoma  The biopsy on the 20th of November showed large B-cell lymphoma with BCL -2 and C myc level expressed surface a type without the myc or BCL gene rearrangement  Her bone marrow biopsy on the 9th of November was negative for B-cell lymphoma involvement with normal bone marrow trilineage maturation  She was also evaluated with an MRI of the brain during the hospital course which was negative for any hint of lymphoma involvement  PET scan on the 14th of November showed IMPRESSION:     1  FDG avid left posterior oropharyngeal lesion compatible with malignancy  2   FDG avid lymph nodes in the neck and left axilla compatible with malignancy  3  No FDG avid lymph nodes in the abdomen or pelvis suspicious for malignancy  4   Tiny focus of FDG uptake along the skin of the right upper quadrant anterior abdominal wall with mild skin thickening suggested here on CT        Diffuse large 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 ([address] )    11/9/2018 Initial Diagnosis     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)          Chemotherapy     1  Dose adjusted R-EPOCH 11/21/18 (1 cycle)- switched to RCHOP after repeat biopsy/pathology reviewed  2  R-CHOP started 12/12/18            Interval history:    ROS: Review of Systems   Constitutional: Positive for appetite change   Negative for activity change, chills, diaphoresis, fatigue, fever and unexpected weight change  Hot flashes   HENT: Negative for congestion, dental problem, ear discharge, ear pain, facial swelling, hearing loss, mouth sores, nosebleeds, postnasal drip, sore throat, tinnitus and trouble swallowing  Eyes: Negative for discharge, redness, itching and visual disturbance  Respiratory: Positive for cough and shortness of breath  Negative for chest tightness and wheezing  Cardiovascular: Negative for chest pain, palpitations and leg swelling  Gastrointestinal: Positive for nausea  Negative for abdominal distention, abdominal pain, anal bleeding, blood in stool, constipation, diarrhea and vomiting  Genitourinary: Negative for difficulty urinating, dysuria, flank pain, frequency, hematuria and urgency  Musculoskeletal: Negative for arthralgias, back pain, gait problem, joint swelling, myalgias and neck pain  Skin: Negative for color change, pallor, rash and wound  Neurological: Negative for dizziness, syncope, speech difficulty, weakness, light-headedness, numbness and headaches  Hematological: Negative for adenopathy  Does not bruise/bleed easily  Psychiatric/Behavioral: Positive for sleep disturbance  Negative for agitation, behavioral problems and confusion  Past Medical History:   Diagnosis Date    [facility])     Throat    Chronic pain disorder     Diabetes mellitus ([facility] )     Diffuse large B cell lymphoma ([place] [address] )     Dysphagia     GERD without esophagitis     HTN (hypertension)     Hyperlipidemia     Hypertension     MI (myocardial infarction) ([facility] )        Past Surgical History:   Procedure Laterality Date    BONE MARROW BIOPSY      BREAST LUMPECTOMY      CHOLECYSTECTOMY      IR PORT PLACEMENT  11/16/2018    OTHER SURGICAL HISTORY      tendor tear repair to right shoulder    SHOULDER ARTHROSCOPY         Social History     Socioeconomic History    Marital status:       Spouse name: None    Number of children: None    Years of education: None    Highest education level: None   Occupational History    None   Social Needs    Financial resource strain: None    Food insecurity:     Worry: None     Inability: None    Transportation needs:     Medical: None     Non-medical: None   Tobacco Use    Smoking status: Never Smoker    Smokeless tobacco: Never Used   Substance and Sexual Activity    Alcohol use: Never     Frequency: Never     Drinks per session: Patient refused     Binge frequency: Never     Comment: 0    Drug use: No    Sexual activity: Not Currently     Partners: Male   Lifestyle    Physical activity:     Days per week: None     Minutes per session: None    Stress: None   Relationships    Social connections:     Talks on phone: None     Gets together: None     Attends [employer] service: None     Active member of club or organization: None     Attends meetings of clubs or organizations: None     Relationship status: None    Intimate partner violence:     Fear of current or ex partner: None     Emotionally abused: None     Physically abused: None     Forced sexual activity: None   Other Topics Concern    None   Social History Narrative    None       Family History   Problem Relation Age of Onset    Diabetes Mother     Heart disease Sister     Hypertension Brother     Cancer Maternal Grandmother     Cancer Maternal Grandfather        No Known Allergies      Current Outpatient Medications:     al mag oxide-diphenhydramine-lidocaine viscous (MAGIC MOUTHWASH) 1:1:1 suspension, Swish and swallow 10 mL every 4 (four) hours as needed for mouth pain or discomfort, Disp: 180 mL, Rfl: 0    albuterol (PROVENTIL HFA,VENTOLIN HFA) 90 mcg/act inhaler, Inhale 2 puffs every 4 (four) hours as needed for wheezing, Disp: 1 Inhaler, Rfl: 0    amLODIPine (NORVASC) 5 mg tablet, Take 1 tablet (5 mg total) by mouth daily, Disp: 90 tablet, Rfl: 1    benzonatate (TESSALON) 200 MG capsule, Take 1 capsule (200 mg total) by mouth 3 (three) times a day, Disp: 20 capsule, Rfl: 0    Blood Glucose Monitoring Suppl (FREESTYLE LITE) JORDAN, Check blood sugar twice a day, Disp: 1 each, Rfl: 0    carvedilol (COREG) 12 5 mg tablet, Take 1 tablet (12 5 mg total) by mouth 2 (two) times a day with meals, Disp: 180 tablet, Rfl: 1    ergocalciferol (VITAMIN D2) 50,000 units, Take 50,000 Units by mouth once a week , Disp: , Rfl:     ezetimibe (ZETIA) 10 mg tablet, Take 1 tablet (10 mg total) by mouth daily, Disp: 90 tablet, Rfl: 1    fluticasone (FLONASE) 50 mcg/act nasal spray, 2 sprays into each nostril daily, Disp: 1 Bottle, Rfl: 0    gabapentin (NEURONTIN) 100 mg capsule, Take 1 capsule (100 mg total) by mouth daily at bedtime, Disp: 30 capsule, Rfl: 1    guaiFENesin (MUCINEX) 600 mg 12 hr tablet, Take 1 tablet (600 mg total) by mouth every 12 (twelve) hours, Disp: 10 tablet, Rfl: 0    hydrOXYzine HCL (ATARAX) 50 mg tablet, Take 1 tablet (50 mg total) by mouth 3 (three) times a day as needed for anxiety (and insomnia), Disp: 90 tablet, Rfl: 2    Lancets (FREESTYLE) lancets, Check blood sugar twice a day , Disp: 100 each, Rfl: 5    lidocaine viscous (XYLOCAINE) 2 % mucosal solution, Swish and swallow 10 mL 3 (three) times a day as needed for mild pain or moderate pain, Disp: 100 mL, Rfl: 2    loperamide (IMODIUM) 2 mg capsule, Take 1 capsule (2 mg total) by mouth as needed for diarrhea Every 1-2 hours as needed for diarrhea, Disp: 60 capsule, Rfl: 1    losartan (COZAAR) 25 mg tablet, Take 1 tablet (25 mg total) by mouth daily, Disp: 90 tablet, Rfl: 1    ondansetron (ZOFRAN-ODT) 8 mg disintegrating tablet, Take 1 tablet (8 mg total) by mouth every 8 (eight) hours as needed for nausea or vomiting, Disp: 90 tablet, Rfl: 0    predniSONE 20 mg tablet, Take 20 mg by mouth daily Take 5 tablets by mouth every day for 5 days every 3 weeks with chemotherapy, Disp: , Rfl:       Physical Exam:  /50 (BP Location: Left arm, Cuff Size: Adult)   Pulse 73   Temp 98 3 °F (36 8 °C) (Tympanic)   Resp 16   Ht 5' (1 524 m)   Wt 60 3 kg (133 lb)   SpO2 95%   BMI 25 97 kg/m²     Physical Exam   Constitutional: She is oriented to person, place, and time  She appears well-developed and well-nourished  No distress  HENT:   Head: Normocephalic and atraumatic  Nose: Nose normal    Mouth/Throat: Oropharynx is clear and moist    Eyes: Pupils are equal, round, and reactive to light  Conjunctivae and EOM are normal  Right eye exhibits no discharge  Left eye exhibits no discharge  No scleral icterus  Neck: Normal range of motion  Neck supple  No JVD present  No tracheal deviation present  No thyromegaly present  Cardiovascular: Normal rate, regular rhythm and normal heart sounds  Exam reveals no friction rub  No murmur heard  Pulmonary/Chest: Effort normal and breath sounds normal  No stridor  No respiratory distress  She has no wheezes  She has no rales  She exhibits no tenderness  Abdominal: Soft  Bowel sounds are normal  She exhibits no distension and no mass  There is no hepatosplenomegaly, splenomegaly or hepatomegaly  There is no tenderness  There is no rebound and no guarding  Musculoskeletal: Normal range of motion  She exhibits no edema, tenderness or deformity  Lymphadenopathy:     She has no cervical adenopathy  Neurological: She is alert and oriented to person, place, and time  She has normal reflexes  No cranial nerve deficit  Coordination normal    Skin: Skin is warm and dry  No rash noted  She is not diaphoretic  No erythema  No pallor  Psychiatric: She has a normal mood and affect   Her behavior is normal  Judgment and thought content normal          Labs:  Lab Results   Component Value Date    WBC 9 00 03/05/2019    HGB 9 8 (L) 03/05/2019    HCT 30 7 (L) 03/05/2019    MCV 98 03/05/2019     (H) 03/05/2019     Lab Results   Component Value Date    K 4 3 03/05/2019    CL 98 03/05/2019    CO2 29 03/05/2019    BUN 9 03/05/2019    CREATININE 0 58 (L) 03/05/2019    GLUF 100 (H) 02/11/2019    CALCIUM 9 7 03/05/2019    AST 26 03/05/2019    ALT 39 03/05/2019    ALKPHOS 99 03/05/2019    EGFR 92 03/05/2019     No results found for: TSH    Patient voiced understanding and agreement in the above discussion  Aware to contact our office with questions/symptoms in the interim

## 2019-03-07 ENCOUNTER — HOSPITAL ENCOUNTER (OUTPATIENT)
Dept: INFUSION CENTER | Facility: HOSPITAL | Age: 74
Discharge: HOME | End: 2019-03-07
Payer: COMMERCIAL

## 2019-03-07 VITALS
WEIGHT: 133.16 LBS | DIASTOLIC BLOOD PRESSURE: 63 MMHG | SYSTOLIC BLOOD PRESSURE: 108 MMHG | RESPIRATION RATE: 18 BRPM | TEMPERATURE: 97.8 F | BODY MASS INDEX: 26.84 KG/M2 | HEART RATE: 66 BPM | HEIGHT: 59 IN

## 2019-03-07 DIAGNOSIS — C83.31 DIFFUSE LARGE B-CELL LYMPHOMA OF LYMPH NODES OF NECK (HCC): ICD-10-CM

## 2019-03-07 LAB — MAGNESIUM SERPL-MCNC: 1.7 MG/DL (ref 1.6–2.3)

## 2019-03-07 PROCEDURE — 96413 CHEMO IV INFUSION 1 HR: CPT

## 2019-03-07 PROCEDURE — 96415 CHEMO IV INFUSION ADDL HR: CPT

## 2019-03-07 PROCEDURE — 96417 CHEMO IV INFUS EACH ADDL SEQ: CPT

## 2019-03-07 PROCEDURE — 83735 ASSAY OF MAGNESIUM: CPT

## 2019-03-07 PROCEDURE — 96367 TX/PROPH/DG ADDL SEQ IV INF: CPT

## 2019-03-07 PROCEDURE — 96411 CHEMO IV PUSH ADDL DRUG: CPT

## 2019-03-07 RX ADMIN — RITUXIMAB 585 MG: 10 INJECTION, SOLUTION INTRAVENOUS at 11:44

## 2019-03-07 RX ADMIN — SODIUM CHLORIDE 150 MG: 9 INJECTION, SOLUTION INTRAVENOUS at 10:04

## 2019-03-07 RX ADMIN — DIPHENHYDRAMINE HYDROCHLORIDE 50 MG: 50 INJECTION INTRAMUSCULAR; INTRAVENOUS at 10:40

## 2019-03-07 RX ADMIN — CYCLOPHOSPHAMIDE 1170 MG: 1 INJECTION, POWDER, FOR SOLUTION INTRAVENOUS; ORAL at 14:06

## 2019-03-07 RX ADMIN — SODIUM CHLORIDE 20 ML/HR: 9 INJECTION, SOLUTION INTRAVENOUS at 09:47

## 2019-03-07 RX ADMIN — DEXAMETHASONE SODIUM PHOSPHATE: 10 INJECTION, SOLUTION INTRAMUSCULAR; INTRAVENOUS at 11:16

## 2019-03-07 RX ADMIN — ACETAMINOPHEN 650 MG: 325 TABLET ORAL at 09:49

## 2019-03-07 RX ADMIN — DOXORUBICIN HYDROCHLORIDE 78 MG: 2 INJECTION, SOLUTION INTRAVENOUS at 14:44

## 2019-03-07 RX ADMIN — VINCRISTINE SULFATE 2 MG: 1 INJECTION, SOLUTION INTRAVENOUS at 14:55

## 2019-03-07 NOTE — PROGRESS NOTES
Pt tolerated all medications including rituxan, vincristine, adriamycin, and cytoxan well  No adverse reactions noted  Port flushed and deaccessed per routine  Awaiting daughter for discharge  Aware of appt tomorrow for neulasta

## 2019-03-08 ENCOUNTER — HOSPITAL ENCOUNTER (OUTPATIENT)
Dept: INFUSION CENTER | Facility: HOSPITAL | Age: 74
Discharge: HOME | End: 2019-03-08
Payer: COMMERCIAL

## 2019-03-08 PROCEDURE — 96372 THER/PROPH/DIAG INJ SC/IM: CPT

## 2019-03-08 RX ADMIN — PEGFILGRASTIM 6 MG: 6 INJECTION SUBCUTANEOUS at 14:15

## 2019-03-18 ENCOUNTER — OFFICE VISIT (OUTPATIENT)
Dept: HEMATOLOGY ONCOLOGY | Facility: CLINIC | Age: 74
End: 2019-03-18
Payer: COMMERCIAL

## 2019-03-18 ENCOUNTER — HOSPITAL ENCOUNTER (OUTPATIENT)
Dept: NUCLEAR MEDICINE | Facility: HOSPITAL | Age: 74
Discharge: HOME | End: 2019-03-18
Attending: INTERNAL MEDICINE
Payer: COMMERCIAL

## 2019-03-18 VITALS
RESPIRATION RATE: 18 BRPM | OXYGEN SATURATION: 97 % | BODY MASS INDEX: 26.04 KG/M2 | SYSTOLIC BLOOD PRESSURE: 128 MMHG | WEIGHT: 129.2 LBS | HEART RATE: 78 BPM | HEIGHT: 59 IN | TEMPERATURE: 98.5 F | DIASTOLIC BLOOD PRESSURE: 82 MMHG

## 2019-03-18 DIAGNOSIS — C83.31 DIFFUSE LARGE B-CELL LYMPHOMA OF LYMPH NODES OF NECK (HCC): Primary | ICD-10-CM

## 2019-03-18 DIAGNOSIS — C83.31 DIFFUSE LARGE B-CELL LYMPHOMA OF LYMPH NODES OF NECK (HCC): ICD-10-CM

## 2019-03-18 PROBLEM — J18.9 HCAP (HEALTHCARE-ASSOCIATED PNEUMONIA): Status: RESOLVED | Noted: 2019-02-25 | Resolved: 2019-03-18

## 2019-03-18 LAB — GLUCOSE SERPL-MCNC: 150 MG/DL (ref 65–140)

## 2019-03-18 PROCEDURE — 78815 PET IMAGE W/CT SKULL-THIGH: CPT

## 2019-03-18 PROCEDURE — 99214 OFFICE O/P EST MOD 30 MIN: CPT | Performed by: NURSE PRACTITIONER

## 2019-03-18 PROCEDURE — 82948 REAGENT STRIP/BLOOD GLUCOSE: CPT

## 2019-03-18 PROCEDURE — A9552 F18 FDG: HCPCS

## 2019-03-18 NOTE — PROGRESS NOTES
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)  She also has a history of diabetes which also could be a contributing factor  According to the granddaughter her diabetes has been out of control with the chemotherapy  Her last A1c was greater than 9  Her PCP is monitoring it closely and if it does not improve over the next few months will be sending her to endocrinology  We will continue to monitor the neuropathy closely  She was encouraged to continue to take her p r n  Nausea medications as needed and continue to live early apply moisturizer to her dry skin/rash  - CBC and differential; Future  - Comprehensive metabolic panel; Future  - C-reactive protein; Future  - Sedimentation rate, automated; Future  - LD,Blood; Future    HPI:  Patient presents today accompanied by her granddaughter for a delfina visit; she is Syriac-speaking and translation done by patient's granddaughter  She completed her final cycle of R-CHOP with Neulasta support on 03/07/2019  She went for her follow-up PET scan this morning and the results are pending  Patient tolerated her final cycle of R-CHOP fairly well  Her granddaughter states for about 3-4 days after the chemo she was exhausted and experiencing nausea vomiting and diarrhea which has resolved for the most part    She was also experiencing significant arthralgias and myalgias from her Neulasta injection which has improved  She continues to have some mild myalgias rated 4/10 and reports that it improves with ice packs or warm showers  She continues to have nausea at times she is using her p r n  Compazine and/or Zofran which improves the symptom sometimes  She denies reflux and states that her nausea is exacerbated by strong smells of food; she did vomit x1 this morning  She is reporting some mild peripheral neuropathy to the fingers and feet which she states is new and started a few days ago  Oncology History    The patient complained about significant sore throat in May which was treated with antibiotics by her PCP in [facility] which did not improve her sore throat  She then started to notice significant odynophagia and dysphagia for liquids it is and solid nutrition  Eventually, she had a CT scan of the neck on the 20th of September which showed soft tissue lesion in the left palatine tonsil with abnormal lymph nodes bilaterally in the neck compatible with neoplastic process  She then had a biopsy of the left tonsil/left tonsillectomy on the 25th of September 2018 which was compatible with diffuse large B-cell lymphoma germinal center B phenotype  The immunohistochemical staining came back positive for BCL2, BCL 6 and myc with Ki 67 around 70%  No FISH rearrangements gene study was done for BCL2, BCL 6 are myc translocation  The patient was treated with 1 cycle of R-EPOCH since her airway was compromise with the large tonsillar mass and a biopsy which was reviewed by our hematopathologist on the 15 November compatible with 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 according to the [facility] with pending gene rearrangement studies  During the hospital course the patient had another biopsy of the left tonsil to better understand the exact aggressiveness of her large B-cell lymphoma    The biopsy on the 20th of November showed large B-cell lymphoma with BCL -2 and C myc level expressed surface a type without the myc or BCL gene rearrangement  Her bone marrow biopsy on the 9th of November was negative for B-cell lymphoma involvement with normal bone marrow trilineage maturation  She was also evaluated with an MRI of the brain during the hospital course which was negative for any hint of lymphoma involvement  PET scan on the 14th of November showed IMPRESSION:     1  FDG avid left posterior oropharyngeal lesion compatible with malignancy  2   FDG avid lymph nodes in the neck and left axilla compatible with malignancy  3  No FDG avid lymph nodes in the abdomen or pelvis suspicious for malignancy  4   Tiny focus of FDG uptake along the skin of the right upper quadrant anterior abdominal wall with mild skin thickening suggested here on CT        Diffuse large 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 ([facility] [address] )    11/9/2018 Initial Diagnosis     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)          Chemotherapy     1  Dose adjusted R-EPOCH 11/21/18 (1 cycle)- switched to RCHOP after repeat biopsy/pathology reviewed  2  R-CHOP started 12/12/18 completed 3/7/19 (5 cycles)                Interval history:    ROS: Review of Systems   Constitutional: Positive for activity change and appetite change ( mild)  Negative for chills, fatigue, fever and unexpected weight change  HENT: Positive for mouth sores and postnasal drip  Negative for congestion, nosebleeds, sore throat and trouble swallowing  Eyes: Negative  Respiratory: Positive for shortness of breath ( with exertion)  Negative for cough and chest tightness  Cardiovascular: Negative for chest pain, palpitations and leg swelling  Gastrointestinal: Positive for constipation ( mild), nausea and vomiting  Negative for abdominal distention, abdominal pain, blood in stool and diarrhea  Genitourinary: Positive for dysuria ( mild)  Negative for difficulty urinating, frequency, hematuria and urgency  Musculoskeletal: Positive for myalgias  Negative for arthralgias, back pain, gait problem and joint swelling  Skin: Positive for rash ( Also reports generalized dry itchy skin)  Negative for color change and pallor  Neurological: Positive for dizziness ( mild at times) and numbness ( and tingling as per HPI)  Negative for weakness, light-headedness and headaches  Hematological: Negative for adenopathy  Does not bruise/bleed easily  Psychiatric/Behavioral: Positive for dysphoric mood and sleep disturbance  Rates stress 7/10       Past Medical History:   Diagnosis Date    Cancer ([place] [address] )     Throat    Chronic pain disorder     Diabetes mellitus ([place] [address] )     Diffuse large B cell lymphoma ([place] [address] )     Dysphagia     GERD without esophagitis     HTN (hypertension)     Hyperlipidemia     Hypertension     MI (myocardial infarction) ([place] [address] )        Past Surgical History:   Procedure Laterality Date    BONE MARROW BIOPSY      BREAST LUMPECTOMY      CHOLECYSTECTOMY      IR PORT PLACEMENT  11/16/2018    OTHER SURGICAL HISTORY      tendor tear repair to right shoulder    SHOULDER ARTHROSCOPY         Social History     Socioeconomic History    Marital status:       Spouse name: Not on file    Number of children: Not on file    Years of education: Not on file    Highest education level: Not on file   Occupational History    Not on file   Social Needs    Financial resource strain: Not on file    Food insecurity:     Worry: Not on file     Inability: Not on file    Transportation needs:     Medical: Not on file     Non-medical: Not on file   Tobacco Use    Smoking status: Never Smoker    Smokeless tobacco: Never Used   Substance and Sexual Activity    Alcohol use: Never     Frequency: Never     Drinks per session: Patient refused     Binge frequency: Never     Comment: 0    Drug use: No    Sexual activity: Not Currently     Partners: Male   Lifestyle    Physical activity:     Days per week: Not on file     Minutes per session: Not on file    Stress: Not on file   Relationships    Social connections:     Talks on phone: Not on file     Gets together: Not on file     Attends Baptism service: Not on file     Active member of club or organization: Not on file     Attends meetings of clubs or organizations: Not on file     Relationship status: Not on file    Intimate partner violence:     Fear of current or ex partner: Not on file     Emotionally abused: Not on file     Physically abused: Not on file     Forced sexual activity: Not on file   Other Topics Concern    Not on file   Social History Narrative    Not on file       Family History   Problem Relation Age of Onset    Diabetes Mother     Heart disease Sister     Hypertension Brother     Cancer Maternal Grandmother     Cancer Maternal Grandfather        No Known Allergies      Current Outpatient Medications:     al mag oxide-diphenhydramine-lidocaine viscous (MAGIC MOUTHWASH) 1:1:1 suspension, Swish and swallow 10 mL every 4 (four) hours as needed for mouth pain or discomfort, Disp: 180 mL, Rfl: 0    albuterol (PROVENTIL HFA,VENTOLIN HFA) 90 mcg/act inhaler, Inhale 2 puffs every 4 (four) hours as needed for wheezing, Disp: 1 Inhaler, Rfl: 0    amLODIPine (NORVASC) 5 mg tablet, Take 1 tablet (5 mg total) by mouth daily, Disp: 90 tablet, Rfl: 1    benzonatate (TESSALON) 200 MG capsule, Take 1 capsule (200 mg total) by mouth 3 (three) times a day, Disp: 20 capsule, Rfl: 0    Blood Glucose Monitoring Suppl (FREESTYLE LITE) JORDAN, Check blood sugar twice a day, Disp: 1 each, Rfl: 0    carvedilol (COREG) 12 5 mg tablet, Take 1 tablet (12 5 mg total) by mouth 2 (two) times a day with meals, Disp: 180 tablet, Rfl: 1    ergocalciferol (VITAMIN D2) 50,000 units, Take 50,000 Units by mouth once a week , Disp: , Rfl:     ezetimibe (ZETIA) 10 mg tablet, Take 1 tablet (10 mg total) by mouth daily, Disp: 90 tablet, Rfl: 1    fluticasone (FLONASE) 50 mcg/act nasal spray, 2 sprays into each nostril daily, Disp: 1 Bottle, Rfl: 0    gabapentin (NEURONTIN) 100 mg capsule, Take 1 capsule (100 mg total) by mouth daily at bedtime, Disp: 30 capsule, Rfl: 1    guaiFENesin (MUCINEX) 600 mg 12 hr tablet, Take 1 tablet (600 mg total) by mouth every 12 (twelve) hours, Disp: 10 tablet, Rfl: 0    hydrOXYzine HCL (ATARAX) 50 mg tablet, Take 1 tablet (50 mg total) by mouth 3 (three) times a day as needed for anxiety (and insomnia), Disp: 90 tablet, Rfl: 2    Lancets (FREESTYLE) lancets, Check blood sugar twice a day , Disp: 100 each, Rfl: 5    lidocaine viscous (XYLOCAINE) 2 % mucosal solution, Swish and swallow 10 mL 3 (three) times a day as needed for mild pain or moderate pain, Disp: 100 mL, Rfl: 2    loperamide (IMODIUM) 2 mg capsule, Take 1 capsule (2 mg total) by mouth as needed for diarrhea Every 1-2 hours as needed for diarrhea, Disp: 60 capsule, Rfl: 1    losartan (COZAAR) 25 mg tablet, Take 1 tablet (25 mg total) by mouth daily, Disp: 90 tablet, Rfl: 1    ondansetron (ZOFRAN-ODT) 8 mg disintegrating tablet, Take 1 tablet (8 mg total) by mouth every 8 (eight) hours as needed for nausea or vomiting, Disp: 90 tablet, Rfl: 0      Physical Exam:  /82 (BP Location: Right arm, Patient Position: Sitting, Cuff Size: Adult)   Pulse 78   Temp 98 5 °F (36 9 °C)   Resp 18   Ht 4' 11 2" (1 504 m)   Wt 58 6 kg (129 lb 3 2 oz)   SpO2 97%   BMI 25 92 kg/m²     Physical Exam   Constitutional: She is oriented to person, place, and time  She appears well-developed and well-nourished  No distress  HENT:   Head: Normocephalic and atraumatic  Mouth/Throat: Oropharynx is clear and moist  No oropharyngeal exudate  Eyes: Pupils are equal, round, and reactive to light  Conjunctivae are normal  No scleral icterus  Neck: Normal range of motion  Neck supple  No thyromegaly present     Cardiovascular: Normal rate, regular rhythm, normal heart sounds and intact distal pulses  No murmur heard  Pulmonary/Chest: Effort normal and breath sounds normal  No respiratory distress  Abdominal: Soft  Bowel sounds are normal  She exhibits no distension  There is no hepatosplenomegaly  There is no tenderness  Musculoskeletal: Normal range of motion  She exhibits no edema  Lymphadenopathy:     She has no cervical adenopathy  She has no axillary adenopathy  Neurological: She is alert and oriented to person, place, and time  Skin: Skin is warm and dry  No rash noted  She is not diaphoretic  No erythema  No pallor  Generalized dry scaly skin  A darkening of the palms noted  Few tiny scattered pink lesions noted to bilateral upper and lower extremities  Psychiatric: Her behavior is normal  Judgment and thought content normal  Her affect is blunt  Vitals reviewed  Labs:  Lab Results   Component Value Date    WBC 9 00 03/05/2019    HGB 9 8 (L) 03/05/2019    HCT 30 7 (L) 03/05/2019    MCV 98 03/05/2019     (H) 03/05/2019     Lab Results   Component Value Date    K 4 3 03/05/2019    CL 98 03/05/2019    CO2 29 03/05/2019    BUN 9 03/05/2019    CREATININE 0 58 (L) 03/05/2019    GLUF 100 (H) 02/11/2019    CALCIUM 9 7 03/05/2019    AST 26 03/05/2019    ALT 39 03/05/2019    ALKPHOS 99 03/05/2019    EGFR 92 03/05/2019         Patient voiced understanding and agreement in the above discussion  Aware to contact our office with questions/symptoms in the interim

## 2019-03-22 DIAGNOSIS — G89.3 NEOPLASM RELATED PAIN: ICD-10-CM

## 2019-03-22 DIAGNOSIS — G47.01 INSOMNIA DUE TO MEDICAL CONDITION: ICD-10-CM

## 2019-03-22 DIAGNOSIS — G89.3 CHRONIC PAIN DUE TO NEOPLASM: ICD-10-CM

## 2019-03-22 RX ORDER — GABAPENTIN 100 MG/1
100 CAPSULE ORAL
Qty: 30 CAPSULE | Refills: 1 | Status: SHIPPED | OUTPATIENT
Start: 2019-03-22 | End: 2019-04-12 | Stop reason: SDUPTHER

## 2019-04-05 RX ORDER — SODIUM CHLORIDE 9 MG/ML
20 INJECTION, SOLUTION INTRAVENOUS ONCE
Status: DISCONTINUED | OUTPATIENT
Start: 2019-04-08 | End: 2019-04-12 | Stop reason: HOSPADM

## 2019-04-06 ENCOUNTER — APPOINTMENT (OUTPATIENT)
Dept: LAB | Facility: HOSPITAL | Age: 74
End: 2019-04-06
Payer: COMMERCIAL

## 2019-04-06 DIAGNOSIS — C83.31 DIFFUSE LARGE B-CELL LYMPHOMA OF LYMPH NODES OF NECK (HCC): ICD-10-CM

## 2019-04-06 LAB
ALBUMIN SERPL BCP-MCNC: 4.1 G/DL (ref 3–5.2)
ALP SERPL-CCNC: 106 U/L (ref 43–122)
ALT SERPL W P-5'-P-CCNC: 26 U/L (ref 9–52)
ANION GAP SERPL CALCULATED.3IONS-SCNC: 8 MMOL/L (ref 5–14)
AST SERPL W P-5'-P-CCNC: 24 U/L (ref 14–36)
BASOPHILS # BLD AUTO: 0.1 THOUSANDS/ΜL (ref 0–0.1)
BASOPHILS NFR BLD AUTO: 2 % (ref 0–1)
BILIRUB SERPL-MCNC: 0.2 MG/DL
BUN SERPL-MCNC: 9 MG/DL (ref 5–25)
CALCIUM SERPL-MCNC: 9.5 MG/DL (ref 8.4–10.2)
CHLORIDE SERPL-SCNC: 101 MMOL/L (ref 97–108)
CO2 SERPL-SCNC: 29 MMOL/L (ref 22–30)
CREAT SERPL-MCNC: 0.56 MG/DL (ref 0.6–1.2)
CRP SERPL QL: <3 MG/L
EOSINOPHIL # BLD AUTO: 0.6 THOUSAND/ΜL (ref 0–0.4)
EOSINOPHIL NFR BLD AUTO: 9 % (ref 0–6)
ERYTHROCYTE [DISTWIDTH] IN BLOOD BY AUTOMATED COUNT: 15.5 %
ERYTHROCYTE [SEDIMENTATION RATE] IN BLOOD: 35 MM/HOUR (ref 1–20)
GFR SERPL CREATININE-BSD FRML MDRD: 93 ML/MIN/1.73SQ M
GLUCOSE P FAST SERPL-MCNC: 131 MG/DL (ref 70–99)
HCT VFR BLD AUTO: 36 % (ref 36–46)
HGB BLD-MCNC: 11.6 G/DL (ref 12–16)
LDH SERPL-CCNC: 448 U/L (ref 313–618)
LYMPHOCYTES # BLD AUTO: 1.8 THOUSANDS/ΜL (ref 0.5–4)
LYMPHOCYTES NFR BLD AUTO: 28 % (ref 25–45)
MCH RBC QN AUTO: 31.4 PG (ref 26–34)
MCHC RBC AUTO-ENTMCNC: 32.4 G/DL (ref 31–36)
MCV RBC AUTO: 97 FL (ref 80–100)
MONOCYTES # BLD AUTO: 0.7 THOUSAND/ΜL (ref 0.2–0.9)
MONOCYTES NFR BLD AUTO: 11 % (ref 1–10)
NEUTROPHILS # BLD AUTO: 3.1 THOUSANDS/ΜL (ref 1.8–7.8)
NEUTS SEG NFR BLD AUTO: 49 % (ref 45–65)
PLATELET # BLD AUTO: 280 THOUSANDS/UL (ref 150–450)
PMV BLD AUTO: 8 FL (ref 8.9–12.7)
POTASSIUM SERPL-SCNC: 3.8 MMOL/L (ref 3.6–5)
PROT SERPL-MCNC: 6.8 G/DL (ref 5.9–8.4)
RBC # BLD AUTO: 3.71 MILLION/UL (ref 4–5.2)
SODIUM SERPL-SCNC: 138 MMOL/L (ref 137–147)
WBC # BLD AUTO: 6.4 THOUSAND/UL (ref 4.5–11)

## 2019-04-06 PROCEDURE — 80053 COMPREHEN METABOLIC PANEL: CPT

## 2019-04-06 PROCEDURE — 83615 LACTATE (LD) (LDH) ENZYME: CPT

## 2019-04-06 PROCEDURE — 85025 COMPLETE CBC W/AUTO DIFF WBC: CPT

## 2019-04-06 PROCEDURE — 85652 RBC SED RATE AUTOMATED: CPT

## 2019-04-06 PROCEDURE — 86140 C-REACTIVE PROTEIN: CPT

## 2019-04-06 PROCEDURE — 36415 COLL VENOUS BLD VENIPUNCTURE: CPT

## 2019-04-08 ENCOUNTER — HOSPITAL ENCOUNTER (OUTPATIENT)
Dept: INFUSION CENTER | Facility: HOSPITAL | Age: 74
Discharge: HOME | End: 2019-04-08
Payer: COMMERCIAL

## 2019-04-08 ENCOUNTER — OFFICE VISIT (OUTPATIENT)
Dept: HEMATOLOGY ONCOLOGY | Facility: CLINIC | Age: 74
End: 2019-04-08
Payer: COMMERCIAL

## 2019-04-08 VITALS
WEIGHT: 132.6 LBS | HEART RATE: 64 BPM | BODY MASS INDEX: 26.73 KG/M2 | TEMPERATURE: 98.2 F | OXYGEN SATURATION: 97 % | RESPIRATION RATE: 18 BRPM | DIASTOLIC BLOOD PRESSURE: 68 MMHG | SYSTOLIC BLOOD PRESSURE: 128 MMHG | HEIGHT: 59 IN

## 2019-04-08 DIAGNOSIS — J18.9 PNEUMONIA DUE TO INFECTIOUS ORGANISM, UNSPECIFIED LATERALITY, UNSPECIFIED PART OF LUNG: ICD-10-CM

## 2019-04-08 DIAGNOSIS — J03.00 ACUTE STREPTOCOCCAL TONSILLITIS, NOT SPECIFIED AS RECURRENT OR NOT: ICD-10-CM

## 2019-04-08 DIAGNOSIS — C83.31 DIFFUSE LARGE B-CELL LYMPHOMA OF LYMPH NODES OF NECK (HCC): Primary | ICD-10-CM

## 2019-04-08 PROBLEM — J06.9 UPPER RESPIRATORY INFECTION: Status: ACTIVE | Noted: 2019-04-08

## 2019-04-08 PROCEDURE — 96523 IRRIG DRUG DELIVERY DEVICE: CPT

## 2019-04-08 PROCEDURE — 99214 OFFICE O/P EST MOD 30 MIN: CPT | Performed by: INTERNAL MEDICINE

## 2019-04-08 RX ORDER — LEVOFLOXACIN 500 MG/1
500 TABLET, FILM COATED ORAL EVERY 24 HOURS
Qty: 10 TABLET | Refills: 0 | Status: SHIPPED | OUTPATIENT
Start: 2019-04-08 | End: 2019-04-10 | Stop reason: SDUPTHER

## 2019-04-08 NOTE — PROGRESS NOTES
Patient tolerated PAC flush, offers no complaints   AVS reviewed with patient and granddaughter, refuses AVS

## 2019-04-10 ENCOUNTER — TELEPHONE (OUTPATIENT)
Dept: HEMATOLOGY ONCOLOGY | Facility: CLINIC | Age: 74
End: 2019-04-10

## 2019-04-10 DIAGNOSIS — J18.9 PNEUMONIA DUE TO INFECTIOUS ORGANISM, UNSPECIFIED LATERALITY, UNSPECIFIED PART OF LUNG: ICD-10-CM

## 2019-04-10 RX ORDER — LEVOFLOXACIN 500 MG/1
500 TABLET, FILM COATED ORAL EVERY 24 HOURS
Qty: 10 TABLET | Refills: 0 | Status: SHIPPED | OUTPATIENT
Start: 2019-04-10 | End: 2019-04-20

## 2019-04-12 DIAGNOSIS — G47.01 INSOMNIA DUE TO MEDICAL CONDITION: ICD-10-CM

## 2019-04-12 DIAGNOSIS — G89.3 NEOPLASM RELATED PAIN: ICD-10-CM

## 2019-04-12 DIAGNOSIS — G89.3 CHRONIC PAIN DUE TO NEOPLASM: ICD-10-CM

## 2019-04-12 RX ORDER — GABAPENTIN 100 MG/1
100 CAPSULE ORAL
Qty: 90 CAPSULE | Refills: 1 | Status: SHIPPED | OUTPATIENT
Start: 2019-04-12 | End: 2019-04-26 | Stop reason: SDUPTHER

## 2019-04-26 ENCOUNTER — OFFICE VISIT (OUTPATIENT)
Dept: PALLIATIVE MEDICINE | Facility: CLINIC | Age: 74
End: 2019-04-26
Payer: COMMERCIAL

## 2019-04-26 VITALS
TEMPERATURE: 98.3 F | SYSTOLIC BLOOD PRESSURE: 118 MMHG | BODY MASS INDEX: 27.46 KG/M2 | RESPIRATION RATE: 16 BRPM | OXYGEN SATURATION: 96 % | HEART RATE: 69 BPM | DIASTOLIC BLOOD PRESSURE: 70 MMHG | HEIGHT: 59 IN | WEIGHT: 136.2 LBS

## 2019-04-26 DIAGNOSIS — C80.1 NEUROPATHY ASSOCIATED WITH CANCER (HCC): ICD-10-CM

## 2019-04-26 DIAGNOSIS — G63 NEUROPATHY ASSOCIATED WITH CANCER (HCC): ICD-10-CM

## 2019-04-26 DIAGNOSIS — R21 RASH: Primary | ICD-10-CM

## 2019-04-26 DIAGNOSIS — G89.3 CHRONIC PAIN DUE TO NEOPLASM: ICD-10-CM

## 2019-04-26 DIAGNOSIS — G47.01 INSOMNIA DUE TO MEDICAL CONDITION: ICD-10-CM

## 2019-04-26 PROCEDURE — 99214 OFFICE O/P EST MOD 30 MIN: CPT | Performed by: INTERNAL MEDICINE

## 2019-04-26 RX ORDER — GABAPENTIN 100 MG/1
200 CAPSULE ORAL
Qty: 180 CAPSULE | Refills: 1 | Status: SHIPPED | OUTPATIENT
Start: 2019-04-26 | End: 2019-07-12 | Stop reason: ALTCHOICE

## 2019-04-29 ENCOUNTER — APPOINTMENT (EMERGENCY)
Dept: RADIOLOGY | Facility: HOSPITAL | Age: 74
End: 2019-04-29
Payer: COMMERCIAL

## 2019-04-29 ENCOUNTER — HOSPITAL ENCOUNTER (EMERGENCY)
Facility: HOSPITAL | Age: 74
Discharge: HOME | End: 2019-04-29
Attending: EMERGENCY MEDICINE
Payer: COMMERCIAL

## 2019-04-29 VITALS
HEART RATE: 65 BPM | BODY MASS INDEX: 27.61 KG/M2 | TEMPERATURE: 97.6 F | OXYGEN SATURATION: 99 % | SYSTOLIC BLOOD PRESSURE: 145 MMHG | WEIGHT: 136.69 LBS | DIASTOLIC BLOOD PRESSURE: 65 MMHG | RESPIRATION RATE: 16 BRPM

## 2019-04-29 DIAGNOSIS — M25.562 ACUTE PAIN OF LEFT KNEE: Primary | ICD-10-CM

## 2019-04-29 PROCEDURE — 73560 X-RAY EXAM OF KNEE 1 OR 2: CPT

## 2019-04-29 PROCEDURE — 99283 EMERGENCY DEPT VISIT LOW MDM: CPT | Performed by: EMERGENCY MEDICINE

## 2019-04-29 PROCEDURE — 99283 EMERGENCY DEPT VISIT LOW MDM: CPT

## 2019-04-29 RX ORDER — NAPROXEN 500 MG/1
500 TABLET ORAL EVERY 12 HOURS PRN
Qty: 15 TABLET | Refills: 0 | Status: SHIPPED | OUTPATIENT
Start: 2019-04-29 | End: 2019-12-06 | Stop reason: ALTCHOICE

## 2019-04-29 RX ORDER — TRAMADOL HYDROCHLORIDE 50 MG/1
50 TABLET ORAL EVERY 6 HOURS PRN
Qty: 15 TABLET | Refills: 0 | Status: SHIPPED | OUTPATIENT
Start: 2019-04-29 | End: 2019-05-07 | Stop reason: SDUPTHER

## 2019-05-07 ENCOUNTER — OFFICE VISIT (OUTPATIENT)
Dept: FAMILY MEDICINE CLINIC | Facility: CLINIC | Age: 74
End: 2019-05-07

## 2019-05-07 VITALS
TEMPERATURE: 97.1 F | OXYGEN SATURATION: 95 % | SYSTOLIC BLOOD PRESSURE: 128 MMHG | HEART RATE: 88 BPM | WEIGHT: 137 LBS | RESPIRATION RATE: 20 BRPM | BODY MASS INDEX: 24.27 KG/M2 | HEIGHT: 63 IN | DIASTOLIC BLOOD PRESSURE: 68 MMHG

## 2019-05-07 DIAGNOSIS — M25.562 ACUTE PAIN OF LEFT KNEE: ICD-10-CM

## 2019-05-07 DIAGNOSIS — J18.9 PNEUMONIA DUE TO INFECTIOUS ORGANISM, UNSPECIFIED LATERALITY, UNSPECIFIED PART OF LUNG: ICD-10-CM

## 2019-05-07 DIAGNOSIS — E11.9 TYPE 2 DIABETES MELLITUS WITHOUT COMPLICATION, WITHOUT LONG-TERM CURRENT USE OF INSULIN (HCC): ICD-10-CM

## 2019-05-07 DIAGNOSIS — L28.2 PRURITIC RASH: ICD-10-CM

## 2019-05-07 DIAGNOSIS — M25.562 ACUTE PAIN OF BOTH KNEES: ICD-10-CM

## 2019-05-07 DIAGNOSIS — Z12.11 SCREENING FOR COLON CANCER: Primary | ICD-10-CM

## 2019-05-07 DIAGNOSIS — M25.469 KNEE SWELLING: ICD-10-CM

## 2019-05-07 DIAGNOSIS — M25.561 ACUTE PAIN OF BOTH KNEES: ICD-10-CM

## 2019-05-07 LAB — SL AMB POCT HEMOGLOBIN AIC: 6.6 (ref ?–6.5)

## 2019-05-07 PROCEDURE — 99214 OFFICE O/P EST MOD 30 MIN: CPT | Performed by: PHYSICIAN ASSISTANT

## 2019-05-07 PROCEDURE — 83036 HEMOGLOBIN GLYCOSYLATED A1C: CPT | Performed by: PHYSICIAN ASSISTANT

## 2019-05-07 RX ORDER — GLIPIZIDE 10 MG/1
TABLET ORAL
Qty: 180 TABLET | Refills: 10 | OUTPATIENT
Start: 2019-05-07

## 2019-05-07 RX ORDER — SITAGLIPTIN AND METFORMIN HYDROCHLORIDE 500; 50 MG/1; MG/1
TABLET, FILM COATED ORAL
Qty: 180 TABLET | Refills: 10 | OUTPATIENT
Start: 2019-05-07

## 2019-05-07 RX ORDER — LEVOFLOXACIN 500 MG/1
TABLET, FILM COATED ORAL
Qty: 10 TABLET | Refills: 10 | OUTPATIENT
Start: 2019-05-07

## 2019-05-07 RX ORDER — CETIRIZINE HYDROCHLORIDE 5 MG/1
5 TABLET ORAL DAILY
Qty: 90 TABLET | Refills: 1 | Status: SHIPPED | OUTPATIENT
Start: 2019-05-07 | End: 2019-10-08 | Stop reason: SDUPTHER

## 2019-05-07 RX ORDER — PREDNISONE 20 MG/1
TABLET ORAL
Qty: 30 TABLET | Refills: 0 | Status: SHIPPED | OUTPATIENT
Start: 2019-05-07 | End: 2019-05-24

## 2019-05-07 RX ORDER — TRAMADOL HYDROCHLORIDE 50 MG/1
50 TABLET ORAL EVERY 6 HOURS PRN
Qty: 15 TABLET | Refills: 0 | Status: SHIPPED | OUTPATIENT
Start: 2019-05-07 | End: 2019-05-17

## 2019-05-07 RX ORDER — GLIPIZIDE 10 MG/1
10 TABLET ORAL
Qty: 180 TABLET | Refills: 0 | Status: SHIPPED | OUTPATIENT
Start: 2019-05-07 | End: 2019-10-08 | Stop reason: ALTCHOICE

## 2019-05-09 DIAGNOSIS — L28.2 PRURITIC RASH: ICD-10-CM

## 2019-05-09 RX ORDER — PREDNISONE 20 MG/1
TABLET ORAL
Qty: 30 TABLET | Refills: 10 | OUTPATIENT
Start: 2019-05-09

## 2019-05-10 ENCOUNTER — TELEPHONE (OUTPATIENT)
Dept: FAMILY MEDICINE CLINIC | Facility: CLINIC | Age: 74
End: 2019-05-10

## 2019-05-20 ENCOUNTER — APPOINTMENT (OUTPATIENT)
Dept: RADIOLOGY | Facility: CLINIC | Age: 74
End: 2019-05-20
Payer: COMMERCIAL

## 2019-05-20 ENCOUNTER — OFFICE VISIT (OUTPATIENT)
Dept: OBGYN CLINIC | Facility: MEDICAL CENTER | Age: 74
End: 2019-05-20
Payer: COMMERCIAL

## 2019-05-20 VITALS
BODY MASS INDEX: 24.27 KG/M2 | HEART RATE: 61 BPM | HEIGHT: 63 IN | DIASTOLIC BLOOD PRESSURE: 71 MMHG | SYSTOLIC BLOOD PRESSURE: 113 MMHG | WEIGHT: 137 LBS

## 2019-05-20 DIAGNOSIS — M25.562 PAIN IN BOTH KNEES, UNSPECIFIED CHRONICITY: ICD-10-CM

## 2019-05-20 DIAGNOSIS — M25.561 PAIN IN BOTH KNEES, UNSPECIFIED CHRONICITY: Primary | ICD-10-CM

## 2019-05-20 DIAGNOSIS — M25.562 PAIN IN BOTH KNEES, UNSPECIFIED CHRONICITY: Primary | ICD-10-CM

## 2019-05-20 DIAGNOSIS — M25.561 PAIN IN BOTH KNEES, UNSPECIFIED CHRONICITY: ICD-10-CM

## 2019-05-20 PROCEDURE — 20610 DRAIN/INJ JOINT/BURSA W/O US: CPT | Performed by: ORTHOPAEDIC SURGERY

## 2019-05-20 PROCEDURE — 73564 X-RAY EXAM KNEE 4 OR MORE: CPT

## 2019-05-20 PROCEDURE — 99203 OFFICE O/P NEW LOW 30 MIN: CPT | Performed by: ORTHOPAEDIC SURGERY

## 2019-05-20 RX ORDER — LIDOCAINE HYDROCHLORIDE 10 MG/ML
3 INJECTION, SOLUTION EPIDURAL; INFILTRATION; INTRACAUDAL; PERINEURAL
Status: COMPLETED | OUTPATIENT
Start: 2019-05-20 | End: 2019-05-20

## 2019-05-20 RX ORDER — METHYLPREDNISOLONE ACETATE 40 MG/ML
1 INJECTION, SUSPENSION INTRA-ARTICULAR; INTRALESIONAL; INTRAMUSCULAR; SOFT TISSUE
Status: COMPLETED | OUTPATIENT
Start: 2019-05-20 | End: 2019-05-20

## 2019-05-20 RX ADMIN — LIDOCAINE HYDROCHLORIDE 3 ML: 10 INJECTION, SOLUTION EPIDURAL; INFILTRATION; INTRACAUDAL; PERINEURAL at 11:21

## 2019-05-20 RX ADMIN — METHYLPREDNISOLONE ACETATE 1 ML: 40 INJECTION, SUSPENSION INTRA-ARTICULAR; INTRALESIONAL; INTRAMUSCULAR; SOFT TISSUE at 11:21

## 2019-05-24 ENCOUNTER — OFFICE VISIT (OUTPATIENT)
Dept: PALLIATIVE MEDICINE | Facility: CLINIC | Age: 74
End: 2019-05-24
Payer: COMMERCIAL

## 2019-05-24 VITALS
OXYGEN SATURATION: 97 % | TEMPERATURE: 97.8 F | WEIGHT: 133.4 LBS | RESPIRATION RATE: 24 BRPM | DIASTOLIC BLOOD PRESSURE: 62 MMHG | HEART RATE: 62 BPM | BODY MASS INDEX: 23.63 KG/M2 | SYSTOLIC BLOOD PRESSURE: 108 MMHG

## 2019-05-24 DIAGNOSIS — T50.905A ADVERSE EFFECT OF DRUG, INITIAL ENCOUNTER: Primary | ICD-10-CM

## 2019-05-24 DIAGNOSIS — F41.9 ANXIETY: ICD-10-CM

## 2019-05-24 PROCEDURE — 99215 OFFICE O/P EST HI 40 MIN: CPT | Performed by: INTERNAL MEDICINE

## 2019-05-24 RX ORDER — DEXAMETHASONE 2 MG/1
2 TABLET ORAL
Qty: 5 TABLET | Refills: 0 | Status: SHIPPED | OUTPATIENT
Start: 2019-05-24 | End: 2019-05-29

## 2019-05-24 RX ORDER — MIRTAZAPINE 7.5 MG/1
7.5 TABLET, FILM COATED ORAL
Qty: 30 TABLET | Refills: 0 | Status: SHIPPED | OUTPATIENT
Start: 2019-05-24 | End: 2019-06-27 | Stop reason: SDUPTHER

## 2019-06-03 ENCOUNTER — HOSPITAL ENCOUNTER (OUTPATIENT)
Dept: INFUSION CENTER | Facility: HOSPITAL | Age: 74
Discharge: HOME | End: 2019-06-03
Payer: COMMERCIAL

## 2019-06-03 DIAGNOSIS — C83.31 DIFFUSE LARGE B-CELL LYMPHOMA OF LYMPH NODES OF NECK (HCC): Primary | ICD-10-CM

## 2019-06-03 PROCEDURE — 96523 IRRIG DRUG DELIVERY DEVICE: CPT

## 2019-06-05 ENCOUNTER — EVALUATION (OUTPATIENT)
Dept: PHYSICAL THERAPY | Facility: CLINIC | Age: 74
End: 2019-06-05
Payer: COMMERCIAL

## 2019-06-05 DIAGNOSIS — M25.562 PAIN IN BOTH KNEES, UNSPECIFIED CHRONICITY: Primary | ICD-10-CM

## 2019-06-05 DIAGNOSIS — M25.561 PAIN IN BOTH KNEES, UNSPECIFIED CHRONICITY: Primary | ICD-10-CM

## 2019-06-05 PROCEDURE — 97162 PT EVAL MOD COMPLEX 30 MIN: CPT | Performed by: PHYSICAL THERAPIST

## 2019-06-07 DIAGNOSIS — L28.2 PRURITIC RASH: ICD-10-CM

## 2019-06-07 DIAGNOSIS — M25.562 ACUTE PAIN OF BOTH KNEES: ICD-10-CM

## 2019-06-07 DIAGNOSIS — M25.562 ACUTE PAIN OF LEFT KNEE: ICD-10-CM

## 2019-06-07 DIAGNOSIS — R21 RASH: ICD-10-CM

## 2019-06-07 DIAGNOSIS — M25.561 ACUTE PAIN OF BOTH KNEES: ICD-10-CM

## 2019-06-07 RX ORDER — TRAMADOL HYDROCHLORIDE 50 MG/1
TABLET ORAL
Qty: 15 TABLET | Refills: 10 | OUTPATIENT
Start: 2019-06-07

## 2019-06-07 RX ORDER — PREDNISONE 20 MG/1
TABLET ORAL
Qty: 30 TABLET | Refills: 10 | OUTPATIENT
Start: 2019-06-07

## 2019-06-10 ENCOUNTER — OFFICE VISIT (OUTPATIENT)
Dept: PHYSICAL THERAPY | Facility: CLINIC | Age: 74
End: 2019-06-10
Payer: COMMERCIAL

## 2019-06-10 DIAGNOSIS — M25.561 PAIN IN BOTH KNEES, UNSPECIFIED CHRONICITY: Primary | ICD-10-CM

## 2019-06-10 DIAGNOSIS — M25.562 PAIN IN BOTH KNEES, UNSPECIFIED CHRONICITY: Primary | ICD-10-CM

## 2019-06-10 PROCEDURE — 97110 THERAPEUTIC EXERCISES: CPT

## 2019-06-12 ENCOUNTER — OFFICE VISIT (OUTPATIENT)
Dept: PHYSICAL THERAPY | Facility: CLINIC | Age: 74
End: 2019-06-12
Payer: COMMERCIAL

## 2019-06-12 DIAGNOSIS — M25.561 PAIN IN BOTH KNEES, UNSPECIFIED CHRONICITY: Primary | ICD-10-CM

## 2019-06-12 DIAGNOSIS — M25.562 PAIN IN BOTH KNEES, UNSPECIFIED CHRONICITY: Primary | ICD-10-CM

## 2019-06-12 PROCEDURE — 97112 NEUROMUSCULAR REEDUCATION: CPT | Performed by: PHYSICAL THERAPIST

## 2019-06-12 PROCEDURE — 97110 THERAPEUTIC EXERCISES: CPT | Performed by: PHYSICAL THERAPIST

## 2019-06-12 PROCEDURE — 97010 HOT OR COLD PACKS THERAPY: CPT | Performed by: PHYSICAL THERAPIST

## 2019-06-19 ENCOUNTER — APPOINTMENT (OUTPATIENT)
Dept: PHYSICAL THERAPY | Facility: CLINIC | Age: 74
End: 2019-06-19
Payer: COMMERCIAL

## 2019-06-21 ENCOUNTER — OFFICE VISIT (OUTPATIENT)
Dept: PHYSICAL THERAPY | Facility: CLINIC | Age: 74
End: 2019-06-21
Payer: COMMERCIAL

## 2019-06-21 DIAGNOSIS — M25.562 PAIN IN BOTH KNEES, UNSPECIFIED CHRONICITY: Primary | ICD-10-CM

## 2019-06-21 DIAGNOSIS — M25.561 PAIN IN BOTH KNEES, UNSPECIFIED CHRONICITY: Primary | ICD-10-CM

## 2019-06-21 PROCEDURE — 97112 NEUROMUSCULAR REEDUCATION: CPT | Performed by: PHYSICAL THERAPIST

## 2019-06-21 PROCEDURE — 97010 HOT OR COLD PACKS THERAPY: CPT | Performed by: PHYSICAL THERAPIST

## 2019-06-21 PROCEDURE — 97110 THERAPEUTIC EXERCISES: CPT | Performed by: PHYSICAL THERAPIST

## 2019-06-24 ENCOUNTER — OFFICE VISIT (OUTPATIENT)
Dept: PHYSICAL THERAPY | Facility: CLINIC | Age: 74
End: 2019-06-24
Payer: COMMERCIAL

## 2019-06-24 DIAGNOSIS — M25.562 ACUTE PAIN OF LEFT KNEE: ICD-10-CM

## 2019-06-24 DIAGNOSIS — M25.562 PAIN IN BOTH KNEES, UNSPECIFIED CHRONICITY: Primary | ICD-10-CM

## 2019-06-24 DIAGNOSIS — M25.561 PAIN IN BOTH KNEES, UNSPECIFIED CHRONICITY: Primary | ICD-10-CM

## 2019-06-24 PROCEDURE — 97112 NEUROMUSCULAR REEDUCATION: CPT | Performed by: PHYSICAL THERAPIST

## 2019-06-24 PROCEDURE — 97010 HOT OR COLD PACKS THERAPY: CPT | Performed by: PHYSICAL THERAPIST

## 2019-06-24 PROCEDURE — 97110 THERAPEUTIC EXERCISES: CPT | Performed by: PHYSICAL THERAPIST

## 2019-06-25 LAB
LEFT EYE DIABETIC RETINOPATHY: NORMAL
RIGHT EYE DIABETIC RETINOPATHY: NORMAL

## 2019-06-26 ENCOUNTER — EVALUATION (OUTPATIENT)
Dept: PHYSICAL THERAPY | Facility: CLINIC | Age: 74
End: 2019-06-26
Payer: COMMERCIAL

## 2019-06-26 DIAGNOSIS — M25.562 PAIN IN BOTH KNEES, UNSPECIFIED CHRONICITY: Primary | ICD-10-CM

## 2019-06-26 DIAGNOSIS — M25.561 PAIN IN BOTH KNEES, UNSPECIFIED CHRONICITY: Primary | ICD-10-CM

## 2019-06-26 PROCEDURE — 97010 HOT OR COLD PACKS THERAPY: CPT | Performed by: PHYSICAL THERAPIST

## 2019-06-26 PROCEDURE — 97110 THERAPEUTIC EXERCISES: CPT | Performed by: PHYSICAL THERAPIST

## 2019-06-26 PROCEDURE — 97112 NEUROMUSCULAR REEDUCATION: CPT | Performed by: PHYSICAL THERAPIST

## 2019-06-26 RX ORDER — TRAMADOL HYDROCHLORIDE 50 MG/1
TABLET ORAL
Qty: 15 TABLET | Refills: 0 | OUTPATIENT
Start: 2019-06-26

## 2019-06-27 ENCOUNTER — OFFICE VISIT (OUTPATIENT)
Dept: PHYSICAL THERAPY | Facility: CLINIC | Age: 74
End: 2019-06-27
Payer: COMMERCIAL

## 2019-06-27 DIAGNOSIS — F41.9 ANXIETY: ICD-10-CM

## 2019-06-27 DIAGNOSIS — M25.561 PAIN IN BOTH KNEES, UNSPECIFIED CHRONICITY: Primary | ICD-10-CM

## 2019-06-27 DIAGNOSIS — M25.562 PAIN IN BOTH KNEES, UNSPECIFIED CHRONICITY: Primary | ICD-10-CM

## 2019-06-27 PROCEDURE — 97110 THERAPEUTIC EXERCISES: CPT

## 2019-06-27 PROCEDURE — 97112 NEUROMUSCULAR REEDUCATION: CPT

## 2019-06-27 RX ORDER — MIRTAZAPINE 7.5 MG/1
7.5 TABLET, FILM COATED ORAL
Qty: 30 TABLET | Refills: 0 | Status: SHIPPED | OUTPATIENT
Start: 2019-06-27 | End: 2019-06-28 | Stop reason: SDUPTHER

## 2019-06-28 RX ORDER — MIRTAZAPINE 7.5 MG/1
7.5 TABLET, FILM COATED ORAL
Qty: 30 TABLET | Refills: 0 | Status: SHIPPED | OUTPATIENT
Start: 2019-06-28 | End: 2019-07-12 | Stop reason: SDUPTHER

## 2019-07-08 DIAGNOSIS — M25.562 ACUTE PAIN OF LEFT KNEE: ICD-10-CM

## 2019-07-08 DIAGNOSIS — L28.2 PRURITIC RASH: ICD-10-CM

## 2019-07-09 RX ORDER — TRAMADOL HYDROCHLORIDE 50 MG/1
TABLET ORAL
Qty: 15 TABLET | Refills: 10 | OUTPATIENT
Start: 2019-07-09

## 2019-07-09 RX ORDER — PREDNISONE 20 MG/1
TABLET ORAL
Qty: 30 TABLET | Refills: 10 | OUTPATIENT
Start: 2019-07-09

## 2019-07-11 NOTE — PROGRESS NOTES
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)    2  Neoplasm related pain    3  Anxiety   Refilled remeron prescription  Patient tolerating well without any complaints  Filled out handicap placard form for patient  Requested Prescriptions     Pending Prescriptions Disp Refills    mirtazapine (REMERON) 7 5 MG tablet 30 tablet 0     Sig: Take 1 tablet (7 5 mg total) by mouth daily at bedtime     Medications Discontinued During This Encounter   Medication Reason    gabapentin (NEURONTIN) 100 mg capsule Therapy completed    hydrocortisone 2 5 % ointment Therapy completed       No follow-ups on file  Subjective:   Chief Complaint  Follow up visit for:  symptom management  HPI     Maral Ruiz is a 68 y o  female with diffuse Large B Cell Lymphoma of LNs of the neck Diagnosed in September 2018  She was treated with one round of R-EPOCH and  cycles of R-CHOP  Completed Chemotherapy in March 2019  She has had multiple episodes of skin rashes and pains in her extremities since completion of chemotherapy which [facility] has been following and maintaining  She denied any complaints  She will be leave for [place] later this month and will be returning afterwards  She reports that she will be needing a refill of remeron before her trip starts  Sent prescription to pharmacy  The following portions of the medical history were reviewed: past medical history, problem list, medication list, and social history      Current Outpatient Medications:     al mag oxide-diphenhydramine-lidocaine viscous (MAGIC MOUTHWASH) 1:1:1 suspension, Swish and swallow 10 mL every 4 (four) hours as needed for mouth pain or discomfort, Disp: 180 mL, Rfl: 0    albuterol (PROVENTIL HFA,VENTOLIN HFA) 90 mcg/act inhaler, Inhale 2 puffs every 4 (four) hours as needed for wheezing, Disp: 1 Inhaler, Rfl: 0   amLODIPine (NORVASC) 5 mg tablet, Take 1 tablet (5 mg total) by mouth daily, Disp: 90 tablet, Rfl: 1    benzonatate (TESSALON) 200 MG capsule, Take 1 capsule (200 mg total) by mouth 3 (three) times a day (Patient not taking: Reported on 5/24/2019), Disp: 20 capsule, Rfl: 0    Blood Glucose Monitoring Suppl (FREESTYLE LITE) JORDAN, Check blood sugar twice a day, Disp: 1 each, Rfl: 0    carvedilol (COREG) 12 5 mg tablet, Take 1 tablet (12 5 mg total) by mouth 2 (two) times a day with meals, Disp: 180 tablet, Rfl: 1    cetirizine (ZyrTEC) 5 MG tablet, Take 1 tablet (5 mg total) by mouth daily, Disp: 90 tablet, Rfl: 1    diclofenac sodium (VOLTAREN) 1 %, APPLY 2 GRAM TOPICALLY 4 (FOUR) TIMES A DAY, Disp: 100 g, Rfl: 10    ergocalciferol (VITAMIN D2) 50,000 units, Take 50,000 Units by mouth once a week , Disp: , Rfl:     ezetimibe (ZETIA) 10 mg tablet, Take 1 tablet (10 mg total) by mouth daily, Disp: 90 tablet, Rfl: 1    fluticasone (FLONASE) 50 mcg/act nasal spray, 2 sprays into each nostril daily, Disp: 1 Bottle, Rfl: 0    glipiZIDE (GLUCOTROL) 10 mg tablet, Take 1 tablet (10 mg total) by mouth 2 (two) times a day before meals, Disp: 180 tablet, Rfl: 0    Lancets (FREESTYLE) lancets, Check blood sugar twice a day , Disp: 100 each, Rfl: 5    loperamide (IMODIUM) 2 mg capsule, Take 1 capsule (2 mg total) by mouth as needed for diarrhea Every 1-2 hours as needed for diarrhea, Disp: 60 capsule, Rfl: 1    losartan (COZAAR) 25 mg tablet, Take 1 tablet (25 mg total) by mouth daily, Disp: 90 tablet, Rfl: 1    mirtazapine (REMERON) 7 5 MG tablet, Take 1 tablet (7 5 mg total) by mouth daily at bedtime, Disp: 30 tablet, Rfl: 0    naproxen (NAPROSYN) 500 mg tablet, Take 1 tablet (500 mg total) by mouth every 12 (twelve) hours as needed for mild pain or moderate pain, Disp: 15 tablet, Rfl: 0    ondansetron (ZOFRAN-ODT) 8 mg disintegrating tablet, Take 1 tablet (8 mg total) by mouth every 8 (eight) hours as needed for nausea or vomiting, Disp: 90 tablet, Rfl: 0    sitaGLIPtin-metFORMIN (JANUMET)  MG per tablet, Take 1 tablet by mouth 2 (two) times a day with meals, Disp: 180 tablet, Rfl: 0  Review of Systems    All other systems negative    Objective:  Vital Signs  /70 (BP Location: Left arm, Patient Position: Sitting)   Pulse 76   Temp 97 9 °F (36 6 °C) (Oral)   Resp 18   Wt 64 kg (141 lb)   BMI 24 98 kg/m²    Physical Exam    Constitutional: Appears well-developed and well-nourished  In no acute physical or emotional distress  Head: Normocephalic and atraumatic  Eyes: EOM are normal  No ocular discharge  No scleral icterus  Neck: No visible adenopathy or masses  Respiratory: Effort normal  No stridor  No respiratory distress  Gastrointestinal: No abdominal distension  Musculoskeletal: No edema  Neurological: Alert, oriented and appropriately conversant  Skin: No diaphoresis, no rashes seen on exposed areas of skin  Psychiatric: Displays a normal mood and affect  Behavior, judgement and thought content appear normal      Celi Leonardo MD  [address] [facility]

## 2019-07-12 ENCOUNTER — OFFICE VISIT (OUTPATIENT)
Dept: PALLIATIVE MEDICINE | Facility: CLINIC | Age: 74
End: 2019-07-12
Payer: COMMERCIAL

## 2019-07-12 VITALS
BODY MASS INDEX: 24.98 KG/M2 | WEIGHT: 141 LBS | DIASTOLIC BLOOD PRESSURE: 70 MMHG | RESPIRATION RATE: 18 BRPM | SYSTOLIC BLOOD PRESSURE: 150 MMHG | HEART RATE: 76 BPM | TEMPERATURE: 97.9 F

## 2019-07-12 DIAGNOSIS — C83.31 DIFFUSE LARGE B-CELL LYMPHOMA OF LYMPH NODES OF NECK (HCC): Primary | ICD-10-CM

## 2019-07-12 DIAGNOSIS — G89.3 NEOPLASM RELATED PAIN: ICD-10-CM

## 2019-07-12 DIAGNOSIS — F41.9 ANXIETY: ICD-10-CM

## 2019-07-12 PROCEDURE — 99213 OFFICE O/P EST LOW 20 MIN: CPT | Performed by: INTERNAL MEDICINE

## 2019-07-12 RX ORDER — MIRTAZAPINE 7.5 MG/1
7.5 TABLET, FILM COATED ORAL
Qty: 30 TABLET | Refills: 0 | Status: SHIPPED | OUTPATIENT
Start: 2019-07-12 | End: 2019-08-01 | Stop reason: SDUPTHER

## 2019-07-15 ENCOUNTER — HOSPITAL ENCOUNTER (OUTPATIENT)
Dept: INFUSION CENTER | Facility: HOSPITAL | Age: 74
Discharge: HOME | End: 2019-07-15
Payer: COMMERCIAL

## 2019-07-15 ENCOUNTER — OFFICE VISIT (OUTPATIENT)
Dept: HEMATOLOGY ONCOLOGY | Facility: CLINIC | Age: 74
End: 2019-07-15
Payer: COMMERCIAL

## 2019-07-15 VITALS
RESPIRATION RATE: 16 BRPM | DIASTOLIC BLOOD PRESSURE: 60 MMHG | HEART RATE: 63 BPM | WEIGHT: 140.6 LBS | BODY MASS INDEX: 24.91 KG/M2 | OXYGEN SATURATION: 98 % | TEMPERATURE: 98.3 F | SYSTOLIC BLOOD PRESSURE: 120 MMHG

## 2019-07-15 DIAGNOSIS — C83.31 DIFFUSE LARGE B-CELL LYMPHOMA OF LYMPH NODES OF NECK (HCC): Primary | ICD-10-CM

## 2019-07-15 DIAGNOSIS — D64.9 ANEMIA, UNSPECIFIED TYPE: ICD-10-CM

## 2019-07-15 DIAGNOSIS — Z12.31 BREAST CANCER SCREENING BY MAMMOGRAM: ICD-10-CM

## 2019-07-15 LAB
ALBUMIN SERPL BCP-MCNC: 4 G/DL (ref 3–5.2)
ALP SERPL-CCNC: 89 U/L (ref 43–122)
ALT SERPL W P-5'-P-CCNC: 30 U/L (ref 9–52)
ANION GAP SERPL CALCULATED.3IONS-SCNC: 8 MMOL/L (ref 5–14)
AST SERPL W P-5'-P-CCNC: 32 U/L (ref 14–36)
BASOPHILS # BLD AUTO: 0.15 THOUSAND/UL (ref 0–0.1)
BASOPHILS NFR MAR MANUAL: 2 % (ref 0–1)
BILIRUB SERPL-MCNC: 0.2 MG/DL
BUN SERPL-MCNC: 12 MG/DL (ref 5–25)
CALCIUM SERPL-MCNC: 9.2 MG/DL (ref 8.4–10.2)
CHLORIDE SERPL-SCNC: 103 MMOL/L (ref 97–108)
CO2 SERPL-SCNC: 26 MMOL/L (ref 22–30)
CREAT SERPL-MCNC: 0.52 MG/DL (ref 0.6–1.2)
CRP SERPL QL: <3 MG/L
EOSINOPHIL # BLD AUTO: 0.22 THOUSAND/UL (ref 0–0.4)
EOSINOPHIL NFR BLD MANUAL: 3 % (ref 0–6)
ERYTHROCYTE [DISTWIDTH] IN BLOOD BY AUTOMATED COUNT: 14.4 %
ERYTHROCYTE [SEDIMENTATION RATE] IN BLOOD: 20 MM/HOUR (ref 1–20)
GFR SERPL CREATININE-BSD FRML MDRD: 96 ML/MIN/1.73SQ M
GLUCOSE SERPL-MCNC: 121 MG/DL (ref 70–99)
HCT VFR BLD AUTO: 35.7 % (ref 36–46)
HGB BLD-MCNC: 11.6 G/DL (ref 12–16)
LDH SERPL-CCNC: 615 U/L (ref 313–618)
LYMPHOCYTES # BLD AUTO: 2 THOUSAND/UL (ref 0.5–4)
LYMPHOCYTES # BLD AUTO: 27 % (ref 25–45)
MCH RBC QN AUTO: 30 PG (ref 26–34)
MCHC RBC AUTO-ENTMCNC: 32.5 G/DL (ref 31–36)
MCV RBC AUTO: 92 FL (ref 80–100)
MONOCYTES # BLD AUTO: 0.52 THOUSAND/UL (ref 0.2–0.9)
MONOCYTES NFR BLD AUTO: 7 % (ref 1–10)
NEUTS BAND NFR BLD MANUAL: 1 % (ref 0–8)
NEUTS SEG # BLD: 4.51 THOUSAND/UL (ref 1.8–7.8)
NEUTS SEG NFR BLD AUTO: 60 %
PLATELET # BLD AUTO: 252 THOUSANDS/UL (ref 150–450)
PLATELET BLD QL SMEAR: ADEQUATE
PMV BLD AUTO: 7.8 FL (ref 8.9–12.7)
POTASSIUM SERPL-SCNC: 3.9 MMOL/L (ref 3.6–5)
PROT SERPL-MCNC: 7 G/DL (ref 5.9–8.4)
RBC # BLD AUTO: 3.87 MILLION/UL (ref 4–5.2)
RBC MORPH BLD: NORMAL
SODIUM SERPL-SCNC: 137 MMOL/L (ref 137–147)
TOTAL CELLS COUNTED SPEC: 100
WBC # BLD AUTO: 7.4 THOUSAND/UL (ref 4.5–11)

## 2019-07-15 PROCEDURE — 83615 LACTATE (LD) (LDH) ENZYME: CPT

## 2019-07-15 PROCEDURE — 86140 C-REACTIVE PROTEIN: CPT

## 2019-07-15 PROCEDURE — 85007 BL SMEAR W/DIFF WBC COUNT: CPT

## 2019-07-15 PROCEDURE — 96523 IRRIG DRUG DELIVERY DEVICE: CPT

## 2019-07-15 PROCEDURE — 80053 COMPREHEN METABOLIC PANEL: CPT

## 2019-07-15 PROCEDURE — 85027 COMPLETE CBC AUTOMATED: CPT

## 2019-07-15 PROCEDURE — 99213 OFFICE O/P EST LOW 20 MIN: CPT | Performed by: NURSE PRACTITIONER

## 2019-07-15 PROCEDURE — 85652 RBC SED RATE AUTOMATED: CPT

## 2019-07-15 NOTE — PROGRESS NOTES
Patient tolerated PAC flush and central line lab draw, offers no complaints  AVS reviewed with and provided to patient

## 2019-07-15 NOTE — PROGRESS NOTES
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)  Patient does not appear to have any hint of recurrence based off of today's examination  We will send her to the lab to have her blood work drawn that was supposed to be done for the visit and will review/address results once available  We will continue to monitor patient closely according to the NCCN guidelines  She will follow up with us again in 3 months with repeat labs and CT scan of the chest abdomen and pelvis prior  Her Port-A-Cath will continue to be flushed every 4-8 weeks, we will discuss possible removal at her next follow-up visit  - CBC and differential; Standing  - Comprehensive metabolic panel; Standing  - Sedimentation rate, automated; Standing  - C-reactive protein; Standing  - LD,Blood; Standing  - CT chest abdomen pelvis w contrast; Future  - CBC and differential  - Comprehensive metabolic panel  - Sedimentation rate, automated  - C-reactive protein  - LD,Blood    2  Breast cancer screening by mammogram  Patient is overdue for her mammogram she is asking that we order it which we will gladly due for her  - Mammo screening bilateral w cad; Future    Addendum:  Patient's laboratory tests came back she continues to have stable normocytic anemia H&H 11 6/35 7 which is most likely related to anemia of chronic disease, her white blood cells or platelets are normal, her absolute basophil count is slightly above average 0 15  CMP, LDH and sed rate are within the normal range  We will order some additional anemia workup to be done prior to her next follow-up visit  HPI:  Patient presents today for a three-month follow-up visit accompanied by her granddaughter  She is primarily Turkmen speaking translation done via [employer] system    The patient reports that she is feeling well and has recovered from her chemotherapy with good energy and better appetite  She has gained 8 lb since her last office visit  She denies any constitutional symptoms  Patient did not get her repeat blood work done prior to her visit today  Oncology History    The patient complained about significant sore throat in May which was treated with antibiotics by her PCP in [facility] and [facility] which did not improve her sore throat  She then started to notice significant odynophagia and dysphagia for liquids it is and solid nutrition  Eventually, she had a CT scan of the neck on the 20th of September which showed soft tissue lesion in the left palatine tonsil with abnormal lymph nodes bilaterally in the neck compatible with neoplastic process  She then had a biopsy of the left tonsil/left tonsillectomy on the 25th of September 2018 which was compatible with diffuse large B-cell lymphoma germinal center B phenotype  The immunohistochemical staining came back positive for BCL2, BCL 6 and myc with Ki 67 around 70%  No FISH rearrangements gene study was done for BCL2, BCL 6 are myc translocation  The patient was treated with 1 cycle of R-EPOCH since her airway was compromise with the large tonsillar mass and a biopsy which was reviewed by our hematopathologist on the 15 November compatible with 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 according to the [facility] with pending gene rearrangement studies  During the hospital course the patient had another biopsy of the left tonsil to better understand the exact aggressiveness of her large B-cell lymphoma  The biopsy on the 20th of November showed large B-cell lymphoma with BCL -2 and C myc level expressed surface a type without the myc or BCL gene rearrangement  Her bone marrow biopsy on the 9th of November was negative for B-cell lymphoma involvement with normal bone marrow trilineage maturation    She was also evaluated with an MRI of the brain during the hospital course which was negative for any hint of lymphoma involvement  PET scan on the 14th of November showed IMPRESSION:  1   FDG avid left posterior oropharyngeal lesion compatible with malignancy  2   FDG avid lymph nodes in the neck and left axilla compatible with malignancy  3  No FDG avid lymph nodes in the abdomen or pelvis suspicious for malignancy  4   Tiny focus of FDG uptake along the skin of the right upper quadrant anterior abdominal wall with mild skin thickening suggested here on CT    Her post treatment PET-CT 3/18/19 was read:  IMPRESSION:  1  No evidence of hypermetabolic malignancy  Deauville score of 1   2   Mild patchy FDG uptake in the right upper lobe corresponding to patchy  consolidation  This may be infectious or inflammatory  This has partially improved from the 2/25/2019 chest x-ray  Diffuse large 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 ([address] )    11/9/2018 Initial Diagnosis     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)       Chemotherapy     1  Dose adjusted R-EPOCH 11/21/18 (1 cycle)- switched to RCHOP after repeat biopsy/pathology reviewed  2  R-CHOP started 12/12/18 completed 3/7/19 (5 cycles)             Interval history:    ROS: Review of Systems   Constitutional: Positive for activity change and appetite change  Negative for chills, fatigue, fever and unexpected weight change  HENT: Negative for congestion, mouth sores, nosebleeds, sore throat and trouble swallowing  Eyes: Negative  Respiratory: Negative for cough, chest tightness and shortness of breath  Cardiovascular: Negative for chest pain, palpitations and leg swelling  Gastrointestinal: Positive for constipation ( mild)  Negative for abdominal distention, abdominal pain, blood in stool, diarrhea, nausea and vomiting  Genitourinary: Negative for difficulty urinating, dysuria, frequency, hematuria and urgency     Musculoskeletal: Negative for arthralgias, back pain, gait problem, joint swelling and myalgias  Skin: Negative for color change, pallor and rash  Neurological: Positive for numbness (And tingling moderate amount hands and feet from diabetic neuropathy)  Negative for dizziness, weakness, light-headedness and headaches  Hematological: Negative for adenopathy  Does not bruise/bleed easily  Psychiatric/Behavioral: Positive for dysphoric mood and sleep disturbance (At times)  The patient is not nervous/anxious  Past Medical History:   Diagnosis Date    Cancer [facility])     Throat    Chronic pain disorder     Diabetes mellitus ([place] [address] )     Diffuse large B cell lymphoma ([place] [address] )     Dysphagia     GERD without esophagitis     HTN (hypertension)     Hyperlipidemia     Hypertension     MI (myocardial infarction) ([place] [address] )        Past Surgical History:   Procedure Laterality Date    BONE MARROW BIOPSY      BREAST LUMPECTOMY      CHOLECYSTECTOMY      IR PORT PLACEMENT  11/16/2018    OTHER SURGICAL HISTORY      tendor tear repair to right shoulder    SHOULDER ARTHROSCOPY         Social History     Socioeconomic History    Marital status:       Spouse name: None    Number of children: None    Years of education: None    Highest education level: None   Occupational History    None   Social Needs    Financial resource strain: None    Food insecurity:     Worry: None     Inability: None    Transportation needs:     Medical: None     Non-medical: None   Tobacco Use    Smoking status: Never Smoker    Smokeless tobacco: Never Used   Substance and Sexual Activity    Alcohol use: Never     Frequency: Never     Drinks per session: Patient refused     Binge frequency: Never     Comment: 0    Drug use: No    Sexual activity: Not Currently     Partners: Male   Lifestyle    Physical activity:     Days per week: None     Minutes per session: None    Stress: None   Relationships    Social connections:     Talks on phone: None     Gets together: None     Attends Baptism service: None Active member of club or organization: None     Attends meetings of clubs or organizations: None     Relationship status: None    Intimate partner violence:     Fear of current or ex partner: None     Emotionally abused: None     Physically abused: None     Forced sexual activity: None   Other Topics Concern    None   Social History Narrative    None       Family History   Problem Relation Age of Onset    Diabetes Mother     Heart disease Sister     Hypertension Brother     Cancer Maternal Grandmother     Cancer Maternal Grandfather        No Known Allergies      Current Outpatient Medications:     al mag oxide-diphenhydramine-lidocaine viscous (MAGIC MOUTHWASH) 1:1:1 suspension, Swish and swallow 10 mL every 4 (four) hours as needed for mouth pain or discomfort, Disp: 180 mL, Rfl: 0    albuterol (PROVENTIL HFA,VENTOLIN HFA) 90 mcg/act inhaler, Inhale 2 puffs every 4 (four) hours as needed for wheezing, Disp: 1 Inhaler, Rfl: 0    amLODIPine (NORVASC) 5 mg tablet, Take 1 tablet (5 mg total) by mouth daily, Disp: 90 tablet, Rfl: 1    benzonatate (TESSALON) 200 MG capsule, Take 1 capsule (200 mg total) by mouth 3 (three) times a day, Disp: 20 capsule, Rfl: 0    Blood Glucose Monitoring Suppl (FREESTYLE LITE) JORDAN, Check blood sugar twice a day, Disp: 1 each, Rfl: 0    carvedilol (COREG) 12 5 mg tablet, Take 1 tablet (12 5 mg total) by mouth 2 (two) times a day with meals, Disp: 180 tablet, Rfl: 1    cetirizine (ZyrTEC) 5 MG tablet, Take 1 tablet (5 mg total) by mouth daily, Disp: 90 tablet, Rfl: 1    diclofenac sodium (VOLTAREN) 1 %, APPLY 2 GRAM TOPICALLY 4 (FOUR) TIMES A DAY, Disp: 100 g, Rfl: 10    ergocalciferol (VITAMIN D2) 50,000 units, Take 50,000 Units by mouth once a week , Disp: , Rfl:     ezetimibe (ZETIA) 10 mg tablet, Take 1 tablet (10 mg total) by mouth daily, Disp: 90 tablet, Rfl: 1    fluticasone (FLONASE) 50 mcg/act nasal spray, 2 sprays into each nostril daily, Disp: 1 Bottle, Rfl: 0    glipiZIDE (GLUCOTROL) 10 mg tablet, Take 1 tablet (10 mg total) by mouth 2 (two) times a day before meals, Disp: 180 tablet, Rfl: 0    Lancets (FREESTYLE) lancets, Check blood sugar twice a day , Disp: 100 each, Rfl: 5    loperamide (IMODIUM) 2 mg capsule, Take 1 capsule (2 mg total) by mouth as needed for diarrhea Every 1-2 hours as needed for diarrhea, Disp: 60 capsule, Rfl: 1    losartan (COZAAR) 25 mg tablet, Take 1 tablet (25 mg total) by mouth daily, Disp: 90 tablet, Rfl: 1    mirtazapine (REMERON) 7 5 MG tablet, Take 1 tablet (7 5 mg total) by mouth daily at bedtime, Disp: 30 tablet, Rfl: 0    naproxen (NAPROSYN) 500 mg tablet, Take 1 tablet (500 mg total) by mouth every 12 (twelve) hours as needed for mild pain or moderate pain, Disp: 15 tablet, Rfl: 0    ondansetron (ZOFRAN-ODT) 8 mg disintegrating tablet, Take 1 tablet (8 mg total) by mouth every 8 (eight) hours as needed for nausea or vomiting, Disp: 90 tablet, Rfl: 0    sitaGLIPtin-metFORMIN (JANUMET)  MG per tablet, Take 1 tablet by mouth 2 (two) times a day with meals, Disp: 180 tablet, Rfl: 0      Physical Exam:  /60 (BP Location: Left arm, Cuff Size: Adult)   Pulse 63   Temp 98 3 °F (36 8 °C) (Tympanic)   Resp 16   Wt 63 8 kg (140 lb 9 6 oz)   SpO2 98%   BMI 24 91 kg/m²     Physical Exam   Constitutional: She is oriented to person, place, and time  She appears well-developed and well-nourished  No distress  HENT:   Head: Normocephalic and atraumatic  Mouth/Throat: Oropharynx is clear and moist  No oropharyngeal exudate  Eyes: Pupils are equal, round, and reactive to light  Conjunctivae are normal  No scleral icterus  Neck: Normal range of motion  Neck supple  No thyromegaly present  Cardiovascular: Normal rate, regular rhythm, normal heart sounds and intact distal pulses  No murmur heard  Pulmonary/Chest: Effort normal and breath sounds normal  No respiratory distress  Abdominal: Soft   Bowel sounds are normal  She exhibits no distension  There is no hepatosplenomegaly  There is no tenderness  Musculoskeletal: Normal range of motion  She exhibits no edema  Lymphadenopathy:     She has no cervical adenopathy  She has no axillary adenopathy  Neurological: She is alert and oriented to person, place, and time  Skin: Skin is warm and dry  No rash noted  She is not diaphoretic  No erythema  No pallor  Psychiatric: She has a normal mood and affect  Her behavior is normal  Judgment and thought content normal    Vitals reviewed  Labs:  Lab Results   Component Value Date    WBC 7 40 07/15/2019    HGB 11 6 (L) 07/15/2019    HCT 35 7 (L) 07/15/2019    MCV 92 07/15/2019     07/15/2019     Lab Results   Component Value Date    K 3 8 04/06/2019     04/06/2019    CO2 29 04/06/2019    BUN 9 04/06/2019    CREATININE 0 56 (L) 04/06/2019    GLUF 131 (H) 04/06/2019    CALCIUM 9 5 04/06/2019    AST 24 04/06/2019    ALT 26 04/06/2019    ALKPHOS 106 04/06/2019    EGFR 93 04/06/2019         Patient voiced understanding and agreement in the above discussion  Aware to contact our office with questions/symptoms in the interim

## 2019-07-29 ENCOUNTER — HOSPITAL ENCOUNTER (OUTPATIENT)
Dept: MAMMOGRAPHY | Facility: CLINIC | Age: 74
Discharge: HOME | End: 2019-07-29
Payer: COMMERCIAL

## 2019-07-29 VITALS — BODY MASS INDEX: 24.8 KG/M2 | HEIGHT: 63 IN | WEIGHT: 140 LBS

## 2019-07-29 DIAGNOSIS — Z12.31 BREAST CANCER SCREENING BY MAMMOGRAM: ICD-10-CM

## 2019-07-29 PROCEDURE — 77067 SCR MAMMO BI INCL CAD: CPT

## 2019-08-01 DIAGNOSIS — R19.7 DIARRHEA, UNSPECIFIED TYPE: ICD-10-CM

## 2019-08-01 DIAGNOSIS — I10 ESSENTIAL HYPERTENSION: ICD-10-CM

## 2019-08-01 DIAGNOSIS — Z51.5 PALLIATIVE CARE PATIENT: ICD-10-CM

## 2019-08-01 DIAGNOSIS — E11.9 TYPE 2 DIABETES MELLITUS WITHOUT COMPLICATION, WITHOUT LONG-TERM CURRENT USE OF INSULIN (HCC): ICD-10-CM

## 2019-08-01 DIAGNOSIS — J06.9 URI (UPPER RESPIRATORY INFECTION): ICD-10-CM

## 2019-08-01 DIAGNOSIS — C83.31 DIFFUSE LARGE B-CELL LYMPHOMA OF LYMPH NODES OF NECK (HCC): ICD-10-CM

## 2019-08-01 DIAGNOSIS — F41.9 ANXIETY: ICD-10-CM

## 2019-08-01 RX ORDER — OMEPRAZOLE 40 MG/1
CAPSULE, DELAYED RELEASE ORAL
Qty: 180 CAPSULE | Refills: 0 | OUTPATIENT
Start: 2019-08-01

## 2019-08-01 RX ORDER — LOPERAMIDE HYDROCHLORIDE 2 MG/1
CAPSULE ORAL
Qty: 90 CAPSULE | Refills: 0 | Status: SHIPPED | OUTPATIENT
Start: 2019-08-01 | End: 2019-10-22 | Stop reason: SDUPTHER

## 2019-08-01 RX ORDER — EZETIMIBE 10 MG/1
TABLET ORAL
Qty: 90 TABLET | Refills: 0 | Status: SHIPPED | OUTPATIENT
Start: 2019-08-01 | End: 2019-10-22 | Stop reason: SDUPTHER

## 2019-08-01 RX ORDER — AMLODIPINE BESYLATE 5 MG/1
TABLET ORAL
Qty: 90 TABLET | Refills: 0 | Status: SHIPPED | OUTPATIENT
Start: 2019-08-01 | End: 2019-10-22 | Stop reason: SDUPTHER

## 2019-08-01 RX ORDER — RANITIDINE 300 MG/1
TABLET ORAL
Qty: 90 TABLET | Refills: 0 | OUTPATIENT
Start: 2019-08-01

## 2019-08-01 RX ORDER — PROCHLORPERAZINE MALEATE 10 MG
TABLET ORAL
Qty: 360 TABLET | Refills: 0 | OUTPATIENT
Start: 2019-08-01

## 2019-08-01 RX ORDER — LOSARTAN POTASSIUM 25 MG/1
TABLET ORAL
Qty: 90 TABLET | Refills: 0 | Status: SHIPPED | OUTPATIENT
Start: 2019-08-01 | End: 2019-10-22 | Stop reason: SDUPTHER

## 2019-08-01 RX ORDER — FLUTICASONE PROPIONATE 50 MCG
SPRAY, SUSPENSION (ML) NASAL
Qty: 48 G | Refills: 0 | Status: SHIPPED | OUTPATIENT
Start: 2019-08-01 | End: 2019-10-08 | Stop reason: SDUPTHER

## 2019-08-02 RX ORDER — MIRTAZAPINE 7.5 MG/1
TABLET, FILM COATED ORAL
Qty: 30 TABLET | Refills: 10 | Status: SHIPPED | OUTPATIENT
Start: 2019-08-02 | End: 2020-04-24 | Stop reason: SDUPTHER

## 2019-08-29 DIAGNOSIS — I10 ESSENTIAL HYPERTENSION: ICD-10-CM

## 2019-08-29 RX ORDER — OMEPRAZOLE 40 MG/1
CAPSULE, DELAYED RELEASE ORAL
Qty: 180 CAPSULE | Refills: 1 | OUTPATIENT
Start: 2019-08-29

## 2019-08-29 RX ORDER — PROCHLORPERAZINE MALEATE 10 MG
TABLET ORAL
Qty: 360 TABLET | Refills: 1 | OUTPATIENT
Start: 2019-08-29

## 2019-08-29 RX ORDER — CARVEDILOL 12.5 MG/1
TABLET ORAL
Qty: 180 TABLET | Refills: 1 | Status: SHIPPED | OUTPATIENT
Start: 2019-08-29 | End: 2019-09-13 | Stop reason: SDUPTHER

## 2019-08-29 RX ORDER — RANITIDINE 300 MG/1
TABLET ORAL
Qty: 90 TABLET | Refills: 1 | OUTPATIENT
Start: 2019-08-29

## 2019-09-05 ENCOUNTER — OFFICE VISIT (OUTPATIENT)
Dept: OBGYN CLINIC | Facility: MEDICAL CENTER | Age: 74
End: 2019-09-05
Payer: COMMERCIAL

## 2019-09-05 VITALS
HEIGHT: 60 IN | DIASTOLIC BLOOD PRESSURE: 65 MMHG | BODY MASS INDEX: 27.48 KG/M2 | WEIGHT: 140 LBS | SYSTOLIC BLOOD PRESSURE: 130 MMHG

## 2019-09-05 DIAGNOSIS — M17.0 PRIMARY OSTEOARTHRITIS OF BOTH KNEES: Primary | ICD-10-CM

## 2019-09-05 PROCEDURE — 99213 OFFICE O/P EST LOW 20 MIN: CPT | Performed by: ORTHOPAEDIC SURGERY

## 2019-09-05 NOTE — PROGRESS NOTES
Ortho Sports Medicine Knee Visit     Assesment:   bilateral knee moderate tricompartmental osteoarthritis significantly improved with PT and injection    Plan:    Conservative treatment:    Ice to knee for 20 minutes at least 1-2 times daily  Tylenol for pain  Let pain guide gradual return activities  Imaging:    No imaging was available for review today  Injection:    No Injection planned at this time  Can repeat cortisone injection if symptoms return       Surgery:     No surgery is recommended at this point, continue with conservative treatment plan as noted  History of Present Illness: The patient is returns for follow up of bilateral knee's  Since the prior visit, She reports significant improvement following cortisone injections 5/20/19 and physical therapy  She is not having any pain  Pain is improved by rest, ice, NSAIDS, physical therapy and injection  Pain is aggravated by nothing currently  Symptoms include clicking  The patient has tried rest, ice, NSAIDS, physical therapy and injection  I have reviewed the past medical, surgical, social and family history, medications and allergies as documented in the EMR  Review of systems: ROS is negative other than that noted in the HPI  Constitutional: Negative for fatigue and fever  Physical Exam:    Blood pressure 130/65, height 5' (1 524 m), weight 63 5 kg (140 lb), not currently breastfeeding      General/Constitutional: NAD, well developed, well nourished  HENT: Normocephalic, atraumatic  CV: Intact distal pulses, regular rate  Resp: No respiratory distress or labored breathing  Lymphatic: No lymphadenopathy palpated  Neuro: Alert and Oriented x 3, no focal deficits  Psych: Normal mood, normal affect, normal judgement, normal behavior  Skin: Warm, dry, no rashes, no erythema       Knee Exam (focused):                  RIGHT LEFT   ROM:   0-130 0-130   Palpation: Effusion negative negative     MJL tenderness Negative Negative     LJL tenderness Negative Negative   Instability: Varus stable stable     Valgus stable stable   Special Tests: Lachman Negative Negative     Posterior drawer Negative Negative     Anterior drawer Negative Negative     Pivot shift not tested not tested     Dial not tested not tested   Patella: Palpation no tenderness no tenderness     Mobility 1/4 1/4     Apprehension Negative Negative   Other: Single leg 1/4 squat not tested not tested      LE NV Exam: +2 DP/PT pulses bilaterally  Sensation intact to light touch L2-S1 bilaterally    No calf tenderness to palpation bilaterally      Knee Imaging    No imaging was performed today        Scribe Attestation    I,:   Mook Stack am acting as a scribe while in the presence of the attending physician :        I,:   Albina Dacosta DO personally performed the services described in this documentation    as scribed in my presence :

## 2019-09-13 DIAGNOSIS — K21.9 GASTROESOPHAGEAL REFLUX DISEASE, ESOPHAGITIS PRESENCE NOT SPECIFIED: Primary | ICD-10-CM

## 2019-09-13 DIAGNOSIS — I10 ESSENTIAL HYPERTENSION: ICD-10-CM

## 2019-09-16 RX ORDER — CARVEDILOL 12.5 MG/1
TABLET ORAL
Qty: 180 TABLET | Refills: 1 | Status: SHIPPED | OUTPATIENT
Start: 2019-09-16 | End: 2020-04-24 | Stop reason: SDUPTHER

## 2019-09-16 RX ORDER — OMEPRAZOLE 40 MG/1
CAPSULE, DELAYED RELEASE ORAL
Qty: 180 CAPSULE | Refills: 1 | Status: SHIPPED | OUTPATIENT
Start: 2019-09-16 | End: 2020-02-20

## 2019-09-17 ENCOUNTER — TELEPHONE (OUTPATIENT)
Dept: FAMILY MEDICINE CLINIC | Facility: CLINIC | Age: 74
End: 2019-09-17

## 2019-09-17 DIAGNOSIS — K21.9 GASTROESOPHAGEAL REFLUX DISEASE, ESOPHAGITIS PRESENCE NOT SPECIFIED: Primary | ICD-10-CM

## 2019-09-17 RX ORDER — RANITIDINE 300 MG/1
300 TABLET ORAL
Qty: 90 TABLET | Refills: 1 | Status: SHIPPED | OUTPATIENT
Start: 2019-09-17 | End: 2020-02-20

## 2019-10-02 NOTE — PROGRESS NOTES
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)    2  Anxiety about health    3  Neoplasm related pain    4  Other insomnia      Neoplasm related pain:   Currently resolved  Not taking any pain medications  Insomnia:  Remeron 7 5mg PO qhs   -counselled patient to take the Remeron a few hours earlier than she is now, so that she falls asleep a few hours earlier    HTN and DM: Will route note to patient's PCP to ensure that patient is taking appropriate medications for her chronic disease processes in setting of continued weight loss  Requested Prescriptions      No prescriptions requested or ordered in this encounter     There are no discontinued medications  Representatives have queried the patient's controlled substance dispensing history in the Prescription Drug Monitoring Program in compliance with regulations before I have prescribed any controlled substances  The prescription history is consistent with prescribed therapy and our practice policies  25 minutes were spent face to face with [facility] and her family with greater than 50% of the time spent in counseling or coordination of care including discussions of follow up requirements, patient and family counseling/involvement in care and compliance with treatment regimen   All of the patient's questions were answered during this discussion  Return in about 2 months (around 12/4/2019) for Symptom Assessment and Managment, with Dr Colon Bernheim  Subjective:   Chief Complaint  Follow up visit for:  symptom management, depression or anxiety  HPI     [facility] is a 69 y o  female with diffuse Large B Cell Lymphoma of LNs of the neck Diagnosed in September 2018  She was treated with one round of R-EPOCH and  cycles of R-CHOP   Completed Chemotherapy in March 2019       She has had multiple episodes of skin rashes and pains in her extremities since completion of chemotherapy which [facility] has been following her for  Today, patient is here with granddaughter who provides translation assistance as patient is Turkmen-speaking only  Patient states she is doing well and denies any complaints aside from falling asleep late, around 11pm/ midnight  She takes the remeron at around 8pm  Patient's granddaughter reports that patient is depressed because she would prefer to be in [place], but there is no family in [facility] that can help her so she must live with granddaughter  States that the patient has a poor appetite, and that she has to convince patient to eat  If she does not watch her, if she is away during meal times, then patient eats a cracker or two, but not a full meal  Otherwise, says that everything is status quo  The following portions of the medical history were reviewed: past medical history, problem list, medication list, and social history      Current Outpatient Medications:     amLODIPine (NORVASC) 5 mg tablet, MAR JOHNNY (1) TABLETA POR VIA ORAL JOHNNY VEZ AL JEFFREY, Disp: 90 tablet, Rfl: 0    benzonatate (TESSALON) 200 MG capsule, Take 1 capsule (200 mg total) by mouth 3 (three) times a day, Disp: 20 capsule, Rfl: 0    Blood Glucose Monitoring Suppl (FREESTYLE LITE) JORDAN, Check blood sugar twice a day, Disp: 1 each, Rfl: 0    carvedilol (COREG) 12 5 mg tablet, MAR JOHNNY (1) TABLETA POR VIA ORAL DOS VECES AL JEFFREY, Disp: 180 tablet, Rfl: 1    diclofenac sodium (VOLTAREN) 1 %, APPLY 2 GRAM TOPICALLY 4 (FOUR) TIMES A DAY, Disp: 100 g, Rfl: 10    ezetimibe (ZETIA) 10 mg tablet, MAR JOHNNY (1) TABLETA POR VIA ORAL JOHNNY VEZ AL JEFFREY, Disp: 90 tablet, Rfl: 0    fluticasone (FLONASE) 50 mcg/act nasal spray, USE 1-2 AEROSOLES EN CADA FOSA NASAL JOHNNY VEZ AL JEFFREY, Disp: 48 g, Rfl: 0    glipiZIDE (GLUCOTROL) 10 mg tablet, Take 1 tablet (10 mg total) by mouth 2 (two) times a day before meals, Disp: 180 tablet, Rfl: 0    Lancets (FREESTYLE) lancets, Check blood sugar twice a day , Disp: 100 each, Rfl: 5    loperamide (IMODIUM) 2 mg capsule, TOME 1 CAPSULA POR VIA ORAL ALFREDO SEA NECESARIO PARA LA DIARREA, Disp: 90 capsule, Rfl: 0    losartan (COZAAR) 25 mg tablet, MAR JOHNNY (1) TABLETA POR VIA ORAL JOHNNY VEZ AL JEFFREY, Disp: 90 tablet, Rfl: 0    mirtazapine (REMERON) 7 5 MG tablet, MAR JOHNNY (1) TABLETA POR VIA ORAL JOHNNY VEZ AL JEFFREY AL ACOSTARSE, Disp: 30 tablet, Rfl: 10    omeprazole (PriLOSEC) 40 MG capsule, MAR JOHNNY (1) CAPSULA POR VIA ORAL DOS VECES AL JEFFREY, Disp: 180 capsule, Rfl: 1    ondansetron (ZOFRAN-ODT) 8 mg disintegrating tablet, Take 1 tablet (8 mg total) by mouth every 8 (eight) hours as needed for nausea or vomiting, Disp: 90 tablet, Rfl: 0    ranitidine (ZANTAC) 300 MG tablet, Take 1 tablet (300 mg total) by mouth daily at bedtime, Disp: 90 tablet, Rfl: 1    sitaGLIPtin-metFORMIN (JANUMET)  MG per tablet, Take 1 tablet by mouth 2 (two) times a day with meals, Disp: 180 tablet, Rfl: 0    VENTOLIN  (90 Base) MCG/ACT inhaler, INHALE 1-2 BOCANADAS EN LOS PULMONES CADA 4 A 6 HORAS ALFREDO SEA NECESARIO, Disp: 54 g, Rfl: 0    cetirizine (ZyrTEC) 5 MG tablet, Take 1 tablet (5 mg total) by mouth daily (Patient not taking: Reported on 10/4/2019), Disp: 90 tablet, Rfl: 1    ergocalciferol (VITAMIN D2) 50,000 units, Take 50,000 Units by mouth once a week , Disp: , Rfl:     naproxen (NAPROSYN) 500 mg tablet, Take 1 tablet (500 mg total) by mouth every 12 (twelve) hours as needed for mild pain or moderate pain (Patient not taking: Reported on 9/5/2019), Disp: 15 tablet, Rfl: 0  Review of Systems   Constitutional: Positive for appetite change and fatigue  Respiratory: Negative for shortness of breath  Cardiovascular: Negative for chest pain and palpitations  Gastrointestinal: Negative for abdominal pain, constipation, diarrhea, nausea and vomiting  Skin: Negative for pallor and rash  Neurological: Negative for headaches  Psychiatric/Behavioral: Positive for sleep disturbance  All other systems negative    Objective:  Vital Signs  /60 (BP Location: Right arm, Cuff Size: Standard)   Pulse 64   Temp 98 5 °F (36 9 °C) (Oral)   Resp 16   Wt 62 7 kg (138 lb 2 oz)   SpO2 98%   BMI 26 98 kg/m²    Physical Exam    Constitutional: Appears well-developed  Thin  Appears older than stated age  In no acute physical or emotional distress  Head: Normocephalic and atraumatic  Eyes: EOM are normal  No ocular discharge  No scleral icterus  Neck: No visible adenopathy or masses  Respiratory: Effort normal  No stridor  No respiratory distress  Gastrointestinal: No abdominal distension  Musculoskeletal: No edema  Neurological: Alert, oriented and appropriately conversant  Skin: No diaphoresis, no rashes seen on exposed areas of skin  Psychiatric: Flat affect

## 2019-10-03 PROBLEM — J06.9 UPPER RESPIRATORY INFECTION: Status: RESOLVED | Noted: 2019-04-08 | Resolved: 2019-10-03

## 2019-10-03 PROBLEM — R50.9 FEVER: Status: RESOLVED | Noted: 2019-02-25 | Resolved: 2019-10-03

## 2019-10-03 PROBLEM — A41.9 SEPSIS (HCC): Status: RESOLVED | Noted: 2019-02-25 | Resolved: 2019-10-03

## 2019-10-04 ENCOUNTER — OFFICE VISIT (OUTPATIENT)
Dept: PALLIATIVE MEDICINE | Facility: CLINIC | Age: 74
End: 2019-10-04
Payer: COMMERCIAL

## 2019-10-04 VITALS
BODY MASS INDEX: 26.98 KG/M2 | DIASTOLIC BLOOD PRESSURE: 60 MMHG | WEIGHT: 138.13 LBS | SYSTOLIC BLOOD PRESSURE: 128 MMHG | OXYGEN SATURATION: 98 % | RESPIRATION RATE: 16 BRPM | TEMPERATURE: 98.5 F | HEART RATE: 64 BPM

## 2019-10-04 DIAGNOSIS — C83.31 DIFFUSE LARGE B-CELL LYMPHOMA OF LYMPH NODES OF NECK (HCC): Primary | ICD-10-CM

## 2019-10-04 DIAGNOSIS — G89.3 NEOPLASM RELATED PAIN: ICD-10-CM

## 2019-10-04 DIAGNOSIS — G47.09 OTHER INSOMNIA: ICD-10-CM

## 2019-10-04 DIAGNOSIS — F41.8 ANXIETY ABOUT HEALTH: ICD-10-CM

## 2019-10-04 PROCEDURE — 99214 OFFICE O/P EST MOD 30 MIN: CPT | Performed by: FAMILY MEDICINE

## 2019-10-07 ENCOUNTER — TRANSCRIBE ORDERS (OUTPATIENT)
Dept: ADMINISTRATIVE | Facility: HOSPITAL | Age: 74
End: 2019-10-07

## 2019-10-07 ENCOUNTER — HOSPITAL ENCOUNTER (OUTPATIENT)
Dept: INFUSION CENTER | Facility: HOSPITAL | Age: 74
Discharge: HOME | End: 2019-10-07
Payer: COMMERCIAL

## 2019-10-07 DIAGNOSIS — D64.9 ANEMIA, UNSPECIFIED TYPE: Primary | ICD-10-CM

## 2019-10-07 DIAGNOSIS — C83.31 DIFFUSE LARGE B-CELL LYMPHOMA OF LYMPH NODES OF NECK (HCC): ICD-10-CM

## 2019-10-07 LAB
ALBUMIN SERPL BCP-MCNC: 4.1 G/DL (ref 3–5.2)
ALP SERPL-CCNC: 90 U/L (ref 43–122)
ALT SERPL W P-5'-P-CCNC: 34 U/L (ref 9–52)
ANION GAP SERPL CALCULATED.3IONS-SCNC: 9 MMOL/L (ref 5–14)
AST SERPL W P-5'-P-CCNC: 24 U/L (ref 14–36)
BASOPHILS # BLD AUTO: 0.1 THOUSANDS/ΜL (ref 0–0.1)
BASOPHILS NFR BLD AUTO: 1 % (ref 0–1)
BILIRUB SERPL-MCNC: 0.2 MG/DL
BUN SERPL-MCNC: 13 MG/DL (ref 5–25)
CALCIUM SERPL-MCNC: 9.1 MG/DL (ref 8.4–10.2)
CHLORIDE SERPL-SCNC: 101 MMOL/L (ref 97–108)
CO2 SERPL-SCNC: 28 MMOL/L (ref 22–30)
CREAT SERPL-MCNC: 0.6 MG/DL (ref 0.6–1.2)
EOSINOPHIL # BLD AUTO: 0.1 THOUSAND/ΜL (ref 0–0.4)
EOSINOPHIL NFR BLD AUTO: 2 % (ref 0–6)
ERYTHROCYTE [DISTWIDTH] IN BLOOD BY AUTOMATED COUNT: 14.2 %
FERRITIN SERPL-MCNC: 52 NG/ML (ref 8–388)
FOLATE SERPL-MCNC: >20 NG/ML (ref 3.1–17.5)
GFR SERPL CREATININE-BSD FRML MDRD: 91 ML/MIN/1.73SQ M
GLUCOSE SERPL-MCNC: 201 MG/DL (ref 70–99)
HCT VFR BLD AUTO: 37 % (ref 36–46)
HGB BLD-MCNC: 12.4 G/DL (ref 12–16)
IGA SERPL-MCNC: 227 MG/DL (ref 70–400)
IGG SERPL-MCNC: 1030 MG/DL (ref 700–1600)
IGM SERPL-MCNC: 24 MG/DL (ref 40–230)
IRON SATN MFR SERPL: 26 %
IRON SERPL-MCNC: 73 UG/DL (ref 50–170)
LYMPHOCYTES # BLD AUTO: 1.2 THOUSANDS/ΜL (ref 0.5–4)
LYMPHOCYTES NFR BLD AUTO: 17 % (ref 25–45)
MCH RBC QN AUTO: 30.9 PG (ref 26–34)
MCHC RBC AUTO-ENTMCNC: 33.5 G/DL (ref 31–36)
MCV RBC AUTO: 92 FL (ref 80–100)
MONOCYTES # BLD AUTO: 0.7 THOUSAND/ΜL (ref 0.2–0.9)
MONOCYTES NFR BLD AUTO: 10 % (ref 1–10)
NEUTROPHILS # BLD AUTO: 4.9 THOUSANDS/ΜL (ref 1.8–7.8)
NEUTS SEG NFR BLD AUTO: 70 % (ref 45–65)
PLATELET # BLD AUTO: 243 THOUSANDS/UL (ref 150–450)
PMV BLD AUTO: 8.9 FL (ref 8.9–12.7)
POTASSIUM SERPL-SCNC: 4 MMOL/L (ref 3.6–5)
PROT SERPL-MCNC: 7.3 G/DL (ref 5.9–8.4)
RBC # BLD AUTO: 4.02 MILLION/UL (ref 4–5.2)
RETICS # CALC: 0.81 % (ref 0.87–2.63)
SODIUM SERPL-SCNC: 138 MMOL/L (ref 137–147)
TIBC SERPL-MCNC: 279 UG/DL (ref 250–450)
VIT B12 SERPL-MCNC: 325 PG/ML (ref 100–900)
WBC # BLD AUTO: 7 THOUSAND/UL (ref 4.5–11)

## 2019-10-07 PROCEDURE — 84165 PROTEIN E-PHORESIS SERUM: CPT | Performed by: PATHOLOGY

## 2019-10-07 PROCEDURE — 82607 VITAMIN B-12: CPT

## 2019-10-07 PROCEDURE — 82746 ASSAY OF FOLIC ACID SERUM: CPT

## 2019-10-07 PROCEDURE — 36415 COLL VENOUS BLD VENIPUNCTURE: CPT | Performed by: NURSE PRACTITIONER

## 2019-10-07 PROCEDURE — 83540 ASSAY OF IRON: CPT

## 2019-10-07 PROCEDURE — 82728 ASSAY OF FERRITIN: CPT

## 2019-10-07 PROCEDURE — 85025 COMPLETE CBC W/AUTO DIFF WBC: CPT | Performed by: NURSE PRACTITIONER

## 2019-10-07 PROCEDURE — 84165 PROTEIN E-PHORESIS SERUM: CPT

## 2019-10-07 PROCEDURE — 83550 IRON BINDING TEST: CPT

## 2019-10-07 PROCEDURE — 82784 ASSAY IGA/IGD/IGG/IGM EACH: CPT

## 2019-10-07 PROCEDURE — 80053 COMPREHEN METABOLIC PANEL: CPT | Performed by: NURSE PRACTITIONER

## 2019-10-07 PROCEDURE — 85045 AUTOMATED RETICULOCYTE COUNT: CPT

## 2019-10-07 NOTE — PROGRESS NOTES
Pt here for port flush and central labs  Port flushed per protocol and central labs drawn per order  Patient made next flush appt, AVS provided

## 2019-10-08 ENCOUNTER — HOSPITAL ENCOUNTER (OUTPATIENT)
Dept: CT IMAGING | Facility: HOSPITAL | Age: 74
Discharge: HOME | End: 2019-10-08
Payer: COMMERCIAL

## 2019-10-08 ENCOUNTER — OFFICE VISIT (OUTPATIENT)
Dept: FAMILY MEDICINE CLINIC | Facility: CLINIC | Age: 74
End: 2019-10-08

## 2019-10-08 VITALS
HEART RATE: 69 BPM | DIASTOLIC BLOOD PRESSURE: 66 MMHG | SYSTOLIC BLOOD PRESSURE: 104 MMHG | WEIGHT: 138 LBS | RESPIRATION RATE: 16 BRPM | BODY MASS INDEX: 26.95 KG/M2 | TEMPERATURE: 98 F | OXYGEN SATURATION: 99 %

## 2019-10-08 DIAGNOSIS — I10 ESSENTIAL HYPERTENSION: ICD-10-CM

## 2019-10-08 DIAGNOSIS — C83.31 DIFFUSE LARGE B-CELL LYMPHOMA OF LYMPH NODES OF NECK (HCC): ICD-10-CM

## 2019-10-08 DIAGNOSIS — R06.02 SHORTNESS OF BREATH: ICD-10-CM

## 2019-10-08 DIAGNOSIS — Z51.5 PALLIATIVE CARE PATIENT: ICD-10-CM

## 2019-10-08 DIAGNOSIS — E11.9 TYPE 2 DIABETES MELLITUS WITHOUT COMPLICATION, WITHOUT LONG-TERM CURRENT USE OF INSULIN (HCC): Primary | ICD-10-CM

## 2019-10-08 DIAGNOSIS — K21.9 GASTROESOPHAGEAL REFLUX DISEASE, ESOPHAGITIS PRESENCE NOT SPECIFIED: ICD-10-CM

## 2019-10-08 DIAGNOSIS — J30.9 ALLERGIC RHINITIS, UNSPECIFIED SEASONALITY, UNSPECIFIED TRIGGER: ICD-10-CM

## 2019-10-08 DIAGNOSIS — L28.2 PRURITIC RASH: ICD-10-CM

## 2019-10-08 LAB
ALBUMIN SERPL ELPH-MCNC: 4.09 G/DL (ref 3.5–5)
ALBUMIN SERPL ELPH-MCNC: 59.3 % (ref 52–65)
ALPHA1 GLOB SERPL ELPH-MCNC: 0.23 G/DL (ref 0.1–0.4)
ALPHA1 GLOB SERPL ELPH-MCNC: 3.4 % (ref 2.5–5)
ALPHA2 GLOB SERPL ELPH-MCNC: 0.81 G/DL (ref 0.4–1.2)
ALPHA2 GLOB SERPL ELPH-MCNC: 11.7 % (ref 7–13)
BETA GLOB ABNORMAL SERPL ELPH-MCNC: 0.38 G/DL (ref 0.4–0.8)
BETA1 GLOB SERPL ELPH-MCNC: 5.5 % (ref 5–13)
BETA2 GLOB SERPL ELPH-MCNC: 5.1 % (ref 2–8)
BETA2+GAMMA GLOB SERPL ELPH-MCNC: 0.35 G/DL (ref 0.2–0.5)
GAMMA GLOB ABNORMAL SERPL ELPH-MCNC: 1.04 G/DL (ref 0.5–1.6)
GAMMA GLOB SERPL ELPH-MCNC: 15 % (ref 12–22)
IGG/ALB SER: 1.46 {RATIO} (ref 1.1–1.8)
PROT PATTERN SERPL ELPH-IMP: ABNORMAL
PROT SERPL-MCNC: 6.9 G/DL (ref 6.4–8.2)
SL AMB POCT HEMOGLOBIN AIC: 6.1 (ref ?–6.5)

## 2019-10-08 PROCEDURE — 83036 HEMOGLOBIN GLYCOSYLATED A1C: CPT | Performed by: PHYSICIAN ASSISTANT

## 2019-10-08 PROCEDURE — 74177 CT ABD & PELVIS W/CONTRAST: CPT

## 2019-10-08 PROCEDURE — 3074F SYST BP LT 130 MM HG: CPT | Performed by: PHYSICIAN ASSISTANT

## 2019-10-08 PROCEDURE — 99214 OFFICE O/P EST MOD 30 MIN: CPT | Performed by: PHYSICIAN ASSISTANT

## 2019-10-08 PROCEDURE — 71260 CT THORAX DX C+: CPT

## 2019-10-08 PROCEDURE — 3078F DIAST BP <80 MM HG: CPT | Performed by: PHYSICIAN ASSISTANT

## 2019-10-08 PROCEDURE — 3044F HG A1C LEVEL LT 7.0%: CPT | Performed by: PHYSICIAN ASSISTANT

## 2019-10-08 PROCEDURE — 90662 IIV NO PRSV INCREASED AG IM: CPT

## 2019-10-08 PROCEDURE — G0008 ADMIN INFLUENZA VIRUS VAC: HCPCS

## 2019-10-08 RX ORDER — CETIRIZINE HYDROCHLORIDE 5 MG/1
5 TABLET ORAL DAILY
Qty: 90 TABLET | Refills: 1 | Status: SHIPPED | OUTPATIENT
Start: 2019-10-08 | End: 2020-04-24 | Stop reason: SDUPTHER

## 2019-10-08 RX ORDER — FLUTICASONE PROPIONATE 50 MCG
2 SPRAY, SUSPENSION (ML) NASAL DAILY
Qty: 48 G | Refills: 1 | Status: SHIPPED | OUTPATIENT
Start: 2019-10-08 | End: 2020-04-23

## 2019-10-08 RX ADMIN — IOHEXOL 100 ML: 350 INJECTION, SOLUTION INTRAVENOUS at 11:00

## 2019-10-08 NOTE — ASSESSMENT & PLAN NOTE
For the cough would recommend taking Flonase and Zyrtec on a daily basis to see if this will help symptoms  Could also be the start of a viral upper respiratory infection  Lungs were clear to auscultation  Would recommend using rescue inhaler as needed  Will also order spirometry for further evaluation

## 2019-10-08 NOTE — PROGRESS NOTES
Assessment/Plan:    Gastroesophageal reflux disease  Symptoms are stable  Will continue with omeprazole and ranitidine at this time  Type 2 diabetes mellitus without complication, without long-term current use of insulin ([facility])    Lab Results   Component Value Date    HGBA1C 6 1 10/08/2019    A1c has decreased  At this time due to concerns of episodes of hypoglycemia, will discontinue glipizide  Continue with Janumet  Continue monitoring blood sugar  If blood sugar continues to be low, may have to make further adjustments to medication  Essential hypertension  Patient has been taking Coreg, amlodipine, losartan, however does look like losartan may have been discontinued in the past   Will continue to monitor blood pressure as it does seem to vary quite a bit  If there is any episodes of lightheadedness and dizziness, or near syncope, but blood sugars within normal range, may have to make adjustments to blood pressure medications  BMI Counseling: Body mass index is 26 95 kg/m²  The BMI is above normal  No BMI follow-up plan is appropriate  Patient is currently receiving palliative care  Diagnoses and all orders for this visit:    Type 2 diabetes mellitus without complication, without long-term current use of insulin ([facility])  -     POCT hemoglobin A1c    Shortness of breath  -     Complete PFT with post bronchodilator; Future    Pruritic rash  -     cetirizine (ZyrTEC) 5 MG tablet;  Take 1 tablet (5 mg total) by mouth daily    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)  -     fluticasone (FLONASE) 50 mcg/act nasal spray; 2 sprays into each nostril daily    Palliative care patient  -     fluticasone (FLONASE) 50 mcg/act nasal spray; 2 sprays into each nostril daily    Gastroesophageal reflux disease, esophagitis presence not specified    Essential hypertension    Allergic rhinitis, unspecified seasonality, unspecified trigger          Subjective:      Patient ID: Feliciano Peralta Hardin Cabot is a 69 y o  female  Korean-speaking, daughter translated  61-year-old female presenting for follow-up of hypertension  Patient is currently on amlodipine 5 mg daily, carvedilol 12 5 mg twice a day, losartan 25 mg daily  Patient has been taking medication as directed  Over the past month daughter denies any recent lightheadedness, dizziness, syncope, falls  Patient denies any chest pain, or palpitations  Has not noted any episodes of low blood pressure  Patient has been having episodes of hypoglycemia  Is currently on Janumet  mg twice a day, and glipizide 10 mg twice a day  Patient's diet has been limited recently, and has not been eating as much as she usually has been  She has been taking her medications as directed  When her blood sugar is low she does feel lightheaded, but denies any syncope  Daughter is also concerned that patient has had a cough for the last 2 weeks  She has also been experiencing shortness of breath  Has been diagnosed with allergic rhinitis, and acid reflux in the past   Is currently taking ranitidine and omeprazole for acid reflux, and those symptoms are under control  Has been using Flonase on an as-needed basis, and has not been taking Zyrtec recently  Patient has also not been using her rescue inhaler  Does have some nasal congestion  Denies any sore throat, bilateral ear pain, chest pain, wheezing  Has not taking anything over-the-counter  The following portions of the patient's history were reviewed and updated as appropriate:   She  has a past medical history of Cancer ([facility] [address] ), Chronic pain disorder, Diabetes mellitus ([facility] [address] ), Diffuse large B cell lymphoma ([facility] [address] ), Dysphagia, GERD without esophagitis, HTN (hypertension), Hyperlipidemia, Hypertension, MI (myocardial infarction) ([address] ), Port-A-Cath in place (07/29/2019), and Thyroid cancer ([facility] [address] ) (2018)    She   Patient Active Problem List    Diagnosis Date Noted    Syncope 02/25/2019    Status post chemotherapy 02/23/2019    Depression 02/06/2019    Other insomnia 02/06/2019    Rash 02/06/2019    Palliative care patient 01/28/2019    Neoplasm related pain 01/28/2019    Nausea 11/22/2018    Tremor 11/22/2018    Severe protein-calorie malnutrition ([facility] [address] ) 11/22/2018    Anxiety about health 11/20/2018    Hyponatremia 11/19/2018    Diffuse large 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 ([facility] [address] ) 11/09/2018    Oropharyngeal dysphagia 11/09/2018    Type 2 diabetes mellitus without complication, without long-term current use of insulin ([facility] [address] ) 10/31/2018    Essential hypertension 10/31/2018    Gastroesophageal reflux disease 10/31/2018     She  has a past surgical history that includes Cholecystectomy; Other surgical history; Bone marrow biopsy; Shoulder arthroscopy; IR port Placement (11/16/2018); and Breast biopsy (Left)  Her family history includes Cancer in her maternal grandfather and maternal grandmother; Diabetes in her mother; Heart disease in her sister; Hypertension in her brother  She  reports that she has never smoked  She has never used smokeless tobacco  She reports that she does not drink alcohol or use drugs  Current Outpatient Medications   Medication Sig Dispense Refill    amLODIPine (NORVASC) 5 mg tablet MAR JOHNNY (1) TABLETA POR VIA ORAL JOHNNY VEZ AL JEFFREY 90 tablet 0    benzonatate (TESSALON) 200 MG capsule Take 1 capsule (200 mg total) by mouth 3 (three) times a day 20 capsule 0    Blood Glucose Monitoring Suppl (FREESTYLE LITE) JORDAN Check blood sugar twice a day 1 each 0    carvedilol (COREG) 12 5 mg tablet MAR JOHNNY (1) TABLETA POR VIA ORAL DOS VECES AL JEFFREY 180 tablet 1    cetirizine (ZyrTEC) 5 MG tablet Take 1 tablet (5 mg total) by mouth daily 90 tablet 1    diclofenac sodium (VOLTAREN) 1 % APPLY 2 GRAM TOPICALLY 4 (FOUR) TIMES A  g 10    ergocalciferol (VITAMIN D2) 50,000 units Take 50,000 Units by mouth once a week        ezetimibe (ZETIA) 10 mg tablet MAR JOHNNY (1) TABLETA POR VIA ORAL JOHNNY VEZ AL JEFFREY 90 tablet 0    fluticasone (FLONASE) 50 mcg/act nasal spray 2 sprays into each nostril daily 48 g 1    Lancets (FREESTYLE) lancets Check blood sugar twice a day  100 each 5    loperamide (IMODIUM) 2 mg capsule TOME 1 CAPSULA POR VIA ORAL ALFREDO SEA NECESARIO PARA LA DIARREA 90 capsule 0    losartan (COZAAR) 25 mg tablet MAR JOHNNY (1) TABLETA POR VIA ORAL JOHNNY VEZ AL JEFFREY 90 tablet 0    mirtazapine (REMERON) 7 5 MG tablet MAR JOHNNY (1) TABLETA POR VIA ORAL JOHNNY VEZ AL JEFFREY AL ACOSTARSE 30 tablet 10    naproxen (NAPROSYN) 500 mg tablet Take 1 tablet (500 mg total) by mouth every 12 (twelve) hours as needed for mild pain or moderate pain 15 tablet 0    omeprazole (PriLOSEC) 40 MG capsule MAR JOHNNY (1) CAPSULA POR VIA ORAL DOS VECES AL JEFFREY 180 capsule 1    ondansetron (ZOFRAN-ODT) 8 mg disintegrating tablet Take 1 tablet (8 mg total) by mouth every 8 (eight) hours as needed for nausea or vomiting 90 tablet 0    ranitidine (ZANTAC) 300 MG tablet Take 1 tablet (300 mg total) by mouth daily at bedtime 90 tablet 1    sitaGLIPtin-metFORMIN (JANUMET)  MG per tablet Take 1 tablet by mouth 2 (two) times a day with meals 180 tablet 0    VENTOLIN  (90 Base) MCG/ACT inhaler INHALE 1-2 BOCANADAS EN LOS PULMONES CADA 4 A 6 HORAS ALFREDO SEA NECESARIO 54 g 0     No current facility-administered medications for this visit  She has No Known Allergies       Review of Systems   Constitutional: Positive for appetite change  Negative for activity change, chills, diaphoresis, fatigue, fever and unexpected weight change  HENT: Positive for congestion, rhinorrhea and sore throat  Negative for postnasal drip, sneezing and trouble swallowing  Eyes: Negative for pain and visual disturbance  Respiratory: Positive for cough and shortness of breath  Negative for chest tightness and wheezing  Cardiovascular: Negative for chest pain, palpitations and leg swelling  Gastrointestinal: Negative for abdominal pain, constipation, diarrhea, nausea and vomiting  Endocrine: Negative for cold intolerance, heat intolerance, polydipsia, polyphagia and polyuria  Genitourinary: Negative for frequency and urgency  Skin: Negative for rash and wound  Neurological: Negative for dizziness, syncope, weakness, light-headedness, numbness and headaches  Objective:      /66 (BP Location: Right arm, Patient Position: Sitting, Cuff Size: Standard)   Pulse 69   Temp 98 °F (36 7 °C) (Temporal)   Resp 16   Wt 62 6 kg (138 lb)   SpO2 99%   BMI 26 95 kg/m²          Physical Exam   Constitutional: She is oriented to person, place, and time  She appears well-developed  No distress  HENT:   Right Ear: Tympanic membrane, external ear and ear canal normal    Left Ear: Tympanic membrane, external ear and ear canal normal    Nose: Mucosal edema present  Mouth/Throat: Oropharynx is clear and moist and mucous membranes are normal    Neck: Normal range of motion  Neck supple  No JVD present  Cardiovascular: Normal rate, regular rhythm and normal heart sounds  Exam reveals no gallop and no friction rub  No murmur heard  Pulmonary/Chest: Effort normal and breath sounds normal  No respiratory distress  She has no wheezes  She has no rales  Abdominal: Soft  Bowel sounds are normal  She exhibits no distension  There is no tenderness  There is no rebound and no guarding  Lymphadenopathy:     She has no cervical adenopathy  Neurological: She is alert and oriented to person, place, and time  No cranial nerve deficit  Skin: She is not diaphoretic  Nursing note and vitals reviewed

## 2019-10-08 NOTE — ASSESSMENT & PLAN NOTE
Patient has been taking Coreg, amlodipine, losartan, however does look like losartan may have been discontinued in the past   Will continue to monitor blood pressure as it does seem to vary quite a bit  If there is any episodes of lightheadedness and dizziness, or near syncope, but blood sugars within normal range, may have to make adjustments to blood pressure medications

## 2019-10-08 NOTE — ASSESSMENT & PLAN NOTE
Lab Results   Component Value Date    HGBA1C 6 1 10/08/2019    A1c has decreased  At this time due to concerns of episodes of hypoglycemia, will discontinue glipizide  Continue with Janumet  Continue monitoring blood sugar  If blood sugar continues to be low, may have to make further adjustments to medication

## 2019-10-16 ENCOUNTER — OFFICE VISIT (OUTPATIENT)
Dept: HEMATOLOGY ONCOLOGY | Facility: CLINIC | Age: 74
End: 2019-10-16
Payer: COMMERCIAL

## 2019-10-16 VITALS
HEART RATE: 69 BPM | DIASTOLIC BLOOD PRESSURE: 68 MMHG | SYSTOLIC BLOOD PRESSURE: 126 MMHG | TEMPERATURE: 97.8 F | WEIGHT: 140 LBS | OXYGEN SATURATION: 99 % | RESPIRATION RATE: 16 BRPM | BODY MASS INDEX: 27.48 KG/M2 | HEIGHT: 60 IN

## 2019-10-16 DIAGNOSIS — C83.31 DIFFUSE LARGE B-CELL LYMPHOMA OF LYMPH NODES OF NECK (HCC): Primary | ICD-10-CM

## 2019-10-16 PROCEDURE — 99214 OFFICE O/P EST MOD 30 MIN: CPT | Performed by: INTERNAL MEDICINE

## 2019-10-16 NOTE — PROGRESS NOTES
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)  The patient does not seem to have a recurrence of her diffuse large B-cell lymphoma at least clinically  We will continue to monitor her closely on every 3-6 months basis  I did advise her that her immune system is pretty weak after the chemotherapy  If she starts to develop any upper respiratory airway infection she should get in touch with us or with her primary care physician for antibiotic treatment  - CBC and differential; Future  - Comprehensive metabolic panel; Future  - LD,Blood; Future  - C-reactive protein; Future  - Sedimentation rate, automated; Future      HPI:  The patient came today for a follow-up visit  She denied significant symptoms or changes of her overall condition  She did complain today about some cough which is being treated by her primary care physician with Zyrtec and Flonase  She has just had a CT scan of the chest abdomen pelvis on the 8th of October which showed:  IMPRESSION:     No CT evidence of lymphadenopathy or soft tissue tumor mass in the chest, abdomen, or pelvis      Tubular bronchiectatic change and scarring in the posterior right lower chest, unchanged from prior PET CT scan and most suspicious for the sequela of prior infectious process in that location      Mild cardiomegaly  Relative prominence of atria suggesting the possibility that there is some element of valvular disease  She also had screening mammograms so recently on 07/29/2019 which read as benign     Her most recent blood work on the 7th of October showed hemoglobin of 12 4 with the normal white cells and platelets  Her creatinine was 0 6 with normal liver enzymes  Her immunoglobulin levels continues to show IgM of 24  SPEP was not negative from M protein    The beta min B12 level was borderline low  The iron panel seems to be within normal range  The patient denies any bleeding from any sites or significant constitutional symptoms  Oncology History    The patient complained about significant sore throat in May which was treated with antibiotics by her PCP in [facility] which did not improve her sore throat  She then started to notice significant odynophagia and dysphagia for liquids it is and solid nutrition  Eventually, she had a CT scan of the neck on the 20th of September which showed soft tissue lesion in the left palatine tonsil with abnormal lymph nodes bilaterally in the neck compatible with neoplastic process  She then had a biopsy of the left tonsil/left tonsillectomy on the 25th of September 2018 which was compatible with diffuse large B-cell lymphoma germinal center B phenotype  The immunohistochemical staining came back positive for BCL2, BCL 6 and myc with Ki 67 around 70%  No FISH rearrangements gene study was done for BCL2, BCL 6 are myc translocation  The patient was treated with 1 cycle of R-EPOCH since her airway was compromise with the large tonsillar mass and a biopsy which was reviewed by our hematopathologist on the 15 November compatible with 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 according to the [facility] with pending gene rearrangement studies  During the hospital course the patient had another biopsy of the left tonsil to better understand the exact aggressiveness of her large B-cell lymphoma  The biopsy on the 20th of November showed large B-cell lymphoma with BCL -2 and C myc level expressed surface a type without the myc or BCL gene rearrangement  Her bone marrow biopsy on the 9th of November was negative for B-cell lymphoma involvement with normal bone marrow trilineage maturation  She was also evaluated with an MRI of the brain during the hospital course which was negative for any hint of lymphoma involvement      PET scan on the 14th of November showed IMPRESSION:  1   FDG avid left posterior oropharyngeal lesion compatible with malignancy  2   FDG avid lymph nodes in the neck and left axilla compatible with malignancy  3  No FDG avid lymph nodes in the abdomen or pelvis suspicious for malignancy  4   Tiny focus of FDG uptake along the skin of the right upper quadrant anterior abdominal wall with mild skin thickening suggested here on CT    Her post treatment PET-CT 3/18/19 was read:  IMPRESSION:  1  No evidence of hypermetabolic malignancy  Deauville score of 1   2   Mild patchy FDG uptake in the right upper lobe corresponding to patchy  consolidation  This may be infectious or inflammatory  This has partially improved from the 2/25/2019 chest x-ray  Diffuse large 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 ([address] )    11/9/2018 Initial Diagnosis     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)       Chemotherapy     1  Dose adjusted R-EPOCH 11/21/18 (1 cycle)- switched to RCHOP after repeat biopsy/pathology reviewed  2  R-CHOP started 12/12/18 completed 3/7/19 (5 cycles)               Interval history:    ROS: Review of Systems   Constitutional: Positive for appetite change  Negative for activity change, chills, diaphoresis, fatigue, fever and unexpected weight change  HENT: Negative for congestion, dental problem, ear discharge, ear pain, facial swelling, hearing loss, mouth sores, nosebleeds, postnasal drip, sore throat, tinnitus and trouble swallowing  Eyes: Negative for discharge, redness, itching and visual disturbance  Respiratory: Positive for shortness of breath (On exertion)  Negative for cough, chest tightness and wheezing  Cardiovascular: Negative for chest pain, palpitations and leg swelling  Gastrointestinal: Negative for abdominal distention, abdominal pain, anal bleeding, blood in stool, constipation, diarrhea, nausea and vomiting     Genitourinary: Negative for difficulty urinating, dysuria, flank pain, frequency, hematuria and urgency  Musculoskeletal: Negative for arthralgias, back pain, gait problem, joint swelling, myalgias and neck pain  Skin: Negative for color change, pallor, rash and wound  Neurological: Positive for numbness  Negative for dizziness, syncope, speech difficulty, weakness, light-headedness and headaches  Hematological: Negative for adenopathy  Does not bruise/bleed easily  Psychiatric/Behavioral: Positive for sleep disturbance  Negative for agitation, behavioral problems and confusion  Past Medical History:   Diagnosis Date    Cancer [facility])     Throat    Chronic pain disorder     Diabetes mellitus ([place] [address] )     Diffuse large B cell lymphoma ([place] [address] )     Dysphagia     GERD without esophagitis     HTN (hypertension)     Hyperlipidemia     Hypertension     MI (myocardial infarction) ([place] [address] )     Port-A-Cath in place 07/29/2019    Thyroid cancer ([place] [address] ) 2018       Past Surgical History:   Procedure Laterality Date    BONE MARROW BIOPSY      BREAST BIOPSY Left     CHOLECYSTECTOMY      IR PORT PLACEMENT  11/16/2018    OTHER SURGICAL HISTORY      tendor tear repair to right shoulder    SHOULDER ARTHROSCOPY         Social History     Socioeconomic History    Marital status:       Spouse name: None    Number of children: None    Years of education: None    Highest education level: None   Occupational History    None   Social Needs    Financial resource strain: None    Food insecurity:     Worry: None     Inability: None    Transportation needs:     Medical: None     Non-medical: None   Tobacco Use    Smoking status: Never Smoker    Smokeless tobacco: Never Used   Substance and Sexual Activity    Alcohol use: Never     Frequency: Never     Drinks per session: Patient refused     Binge frequency: Never     Comment: 0    Drug use: No    Sexual activity: Not Currently     Partners: Male   Lifestyle    Physical activity:     Days per week: None     Minutes per session: None  Stress: None   Relationships    Social connections:     Talks on phone: None     Gets together: None     Attends Buddhism service: None     Active member of club or organization: None     Attends meetings of clubs or organizations: None     Relationship status: None    Intimate partner violence:     Fear of current or ex partner: None     Emotionally abused: None     Physically abused: None     Forced sexual activity: None   Other Topics Concern    None   Social History Narrative    None       Family History   Problem Relation Age of Onset    Diabetes Mother     Heart disease Sister     Hypertension Brother     Cancer Maternal Grandmother     Cancer Maternal Grandfather        No Known Allergies      Current Outpatient Medications:     amLODIPine (NORVASC) 5 mg tablet, MAR JOHNNY (1) TABLETA POR VIA ORAL JOHNNY VEZ AL JEFFREY, Disp: 90 tablet, Rfl: 0    benzonatate (TESSALON) 200 MG capsule, Take 1 capsule (200 mg total) by mouth 3 (three) times a day, Disp: 20 capsule, Rfl: 0    Blood Glucose Monitoring Suppl (FREESTYLE LITE) JORDAN, Check blood sugar twice a day, Disp: 1 each, Rfl: 0    carvedilol (COREG) 12 5 mg tablet, MAR JOHNNY (1) TABLETA POR VIA ORAL DOS VECES AL JEFFREY, Disp: 180 tablet, Rfl: 1    cetirizine (ZyrTEC) 5 MG tablet, Take 1 tablet (5 mg total) by mouth daily, Disp: 90 tablet, Rfl: 1    diclofenac sodium (VOLTAREN) 1 %, APPLY 2 GRAM TOPICALLY 4 (FOUR) TIMES A DAY, Disp: 100 g, Rfl: 10    ergocalciferol (VITAMIN D2) 50,000 units, Take 50,000 Units by mouth once a week , Disp: , Rfl:     ezetimibe (ZETIA) 10 mg tablet, MAR JOHNNY (1) TABLETA POR VIA ORAL JOHNNY VEZ AL JEFFREY, Disp: 90 tablet, Rfl: 0    fluticasone (FLONASE) 50 mcg/act nasal spray, 2 sprays into each nostril daily, Disp: 48 g, Rfl: 1    Lancets (FREESTYLE) lancets, Check blood sugar twice a day , Disp: 100 each, Rfl: 5    loperamide (IMODIUM) 2 mg capsule, TOME 1 CAPSULA POR VIA ORAL ALFREDO SEA NECESARIO PARA LA DIARREA, Disp: 90 capsule, Rfl: 0    losartan (COZAAR) 25 mg tablet, MAR JOHNNY (1) TABLETA POR VIA ORAL JOHNNY VEZ AL JEFFREY, Disp: 90 tablet, Rfl: 0    mirtazapine (REMERON) 7 5 MG tablet, MAR JOHNNY (1) TABLETA POR VIA ORAL JOHNNY VEZ AL JEFFREY AL ACOSTARSE, Disp: 30 tablet, Rfl: 10    naproxen (NAPROSYN) 500 mg tablet, Take 1 tablet (500 mg total) by mouth every 12 (twelve) hours as needed for mild pain or moderate pain, Disp: 15 tablet, Rfl: 0    omeprazole (PriLOSEC) 40 MG capsule, MAR JOHNNY (1) CAPSULA POR VIA ORAL DOS VECES AL JEFFREY, Disp: 180 capsule, Rfl: 1    ondansetron (ZOFRAN-ODT) 8 mg disintegrating tablet, Take 1 tablet (8 mg total) by mouth every 8 (eight) hours as needed for nausea or vomiting, Disp: 90 tablet, Rfl: 0    ranitidine (ZANTAC) 300 MG tablet, Take 1 tablet (300 mg total) by mouth daily at bedtime, Disp: 90 tablet, Rfl: 1    sitaGLIPtin-metFORMIN (JANUMET)  MG per tablet, Take 1 tablet by mouth 2 (two) times a day with meals, Disp: 180 tablet, Rfl: 0    VENTOLIN  (90 Base) MCG/ACT inhaler, INHALE 1-2 BOCANADAS EN LOS PULMONES CADA 4 A 6 HORAS ALFREDO SEA NECESARIO, Disp: 54 g, Rfl: 0      Physical Exam:  /68 (BP Location: Right arm, Patient Position: Sitting, Cuff Size: Adult)   Pulse 69   Temp 97 8 °F (36 6 °C)   Resp 16   Ht 5' (1 524 m)   Wt 63 5 kg (140 lb)   SpO2 99%   BMI 27 34 kg/m²     Physical Exam   Constitutional: She is oriented to person, place, and time  She appears well-developed and well-nourished  No distress  HENT:   Head: Normocephalic and atraumatic  Nose: Nose normal    Mouth/Throat: Oropharynx is clear and moist    Eyes: Pupils are equal, round, and reactive to light  Conjunctivae and EOM are normal  Right eye exhibits no discharge  Left eye exhibits no discharge  No scleral icterus  Neck: Normal range of motion  Neck supple  No JVD present  No tracheal deviation present  No thyromegaly present     Cardiovascular: Normal rate, regular rhythm and normal heart sounds  Exam reveals no friction rub  No murmur heard  Pulmonary/Chest: Effort normal and breath sounds normal  No stridor  No respiratory distress  She has no wheezes  She has no rales  She exhibits no tenderness  Abdominal: Soft  Bowel sounds are normal  She exhibits no distension and no mass  There is no hepatosplenomegaly, splenomegaly or hepatomegaly  There is no tenderness  There is no rebound and no guarding  Musculoskeletal: Normal range of motion  She exhibits no edema, tenderness or deformity  Lymphadenopathy:     She has no cervical adenopathy  Neurological: She is alert and oriented to person, place, and time  She has normal reflexes  No cranial nerve deficit  Coordination normal    Skin: Skin is warm and dry  No rash noted  She is not diaphoretic  No erythema  No pallor  Psychiatric: She has a normal mood and affect  Her behavior is normal  Judgment and thought content normal          Labs:  Lab Results   Component Value Date    WBC 7 00 10/07/2019    HGB 12 4 10/07/2019    HCT 37 0 10/07/2019    MCV 92 10/07/2019     10/07/2019     Lab Results   Component Value Date    K 4 0 10/07/2019     10/07/2019    CO2 28 10/07/2019    BUN 13 10/07/2019    CREATININE 0 60 10/07/2019    GLUF 131 (H) 04/06/2019    CALCIUM 9 1 10/07/2019    AST 24 10/07/2019    ALT 34 10/07/2019    ALKPHOS 90 10/07/2019    EGFR 91 10/07/2019       Patient voiced understanding and agreement in the above discussion  Aware to contact our office with questions/symptoms in the interim

## 2019-10-22 DIAGNOSIS — R19.7 DIARRHEA, UNSPECIFIED TYPE: ICD-10-CM

## 2019-10-22 DIAGNOSIS — E11.9 TYPE 2 DIABETES MELLITUS WITHOUT COMPLICATION, WITHOUT LONG-TERM CURRENT USE OF INSULIN (HCC): ICD-10-CM

## 2019-10-22 DIAGNOSIS — J06.9 URI (UPPER RESPIRATORY INFECTION): ICD-10-CM

## 2019-10-22 DIAGNOSIS — I10 ESSENTIAL HYPERTENSION: ICD-10-CM

## 2019-10-24 PROCEDURE — 4010F ACE/ARB THERAPY RXD/TAKEN: CPT | Performed by: PHYSICIAN ASSISTANT

## 2019-10-24 RX ORDER — EZETIMIBE 10 MG/1
TABLET ORAL
Qty: 90 TABLET | Refills: 1 | Status: SHIPPED | OUTPATIENT
Start: 2019-10-24 | End: 2020-04-23

## 2019-10-24 RX ORDER — LOSARTAN POTASSIUM 25 MG/1
TABLET ORAL
Qty: 90 TABLET | Refills: 1 | Status: SHIPPED | OUTPATIENT
Start: 2019-10-24 | End: 2020-04-23

## 2019-10-24 RX ORDER — ALBUTEROL SULFATE 90 UG/1
AEROSOL, METERED RESPIRATORY (INHALATION)
Qty: 54 G | Refills: 1 | Status: SHIPPED | OUTPATIENT
Start: 2019-10-24 | End: 2020-03-27

## 2019-10-24 RX ORDER — LOPERAMIDE HYDROCHLORIDE 2 MG/1
CAPSULE ORAL
Qty: 90 CAPSULE | Refills: 1 | Status: SHIPPED | OUTPATIENT
Start: 2019-10-24 | End: 2019-12-17 | Stop reason: SDUPTHER

## 2019-10-24 RX ORDER — AMLODIPINE BESYLATE 5 MG/1
TABLET ORAL
Qty: 90 TABLET | Refills: 1 | Status: SHIPPED | OUTPATIENT
Start: 2019-10-24 | End: 2020-04-23

## 2019-11-11 DIAGNOSIS — E11.9 TYPE 2 DIABETES MELLITUS WITHOUT COMPLICATION, WITHOUT LONG-TERM CURRENT USE OF INSULIN (HCC): ICD-10-CM

## 2019-11-11 RX ORDER — SITAGLIPTIN AND METFORMIN HYDROCHLORIDE 500; 50 MG/1; MG/1
TABLET, FILM COATED ORAL
Qty: 180 TABLET | Refills: 1 | Status: SHIPPED | OUTPATIENT
Start: 2019-11-11 | End: 2020-04-23

## 2019-11-14 NOTE — PROGRESS NOTES
Pt admitted to infusions dept today for neulasta injection  Pt is feeling well per her daughter  "she had too much energy last night  Moving all around  Busy"  Education given on neulasta  Encouraged to take tylenol if needed for aches  Discharged ambulatory with family  verbal instruction/audio/skill demonstration

## 2019-11-19 ENCOUNTER — TRANSCRIBE ORDERS (OUTPATIENT)
Dept: ADMINISTRATIVE | Facility: HOSPITAL | Age: 74
End: 2019-11-19

## 2019-11-20 ENCOUNTER — HOSPITAL ENCOUNTER (OUTPATIENT)
Dept: PULMONOLOGY | Facility: HOSPITAL | Age: 74
Discharge: HOME | End: 2019-11-20
Payer: COMMERCIAL

## 2019-11-20 DIAGNOSIS — R06.02 SHORTNESS OF BREATH: ICD-10-CM

## 2019-11-20 PROCEDURE — 94010 BREATHING CAPACITY TEST: CPT

## 2019-11-20 PROCEDURE — 94760 N-INVAS EAR/PLS OXIMETRY 1: CPT

## 2019-11-20 PROCEDURE — 94010 BREATHING CAPACITY TEST: CPT | Performed by: INTERNAL MEDICINE

## 2019-12-02 ENCOUNTER — HOSPITAL ENCOUNTER (OUTPATIENT)
Dept: INFUSION CENTER | Facility: HOSPITAL | Age: 74
Discharge: HOME | End: 2019-12-02

## 2019-12-04 ENCOUNTER — HOSPITAL ENCOUNTER (OUTPATIENT)
Dept: INFUSION CENTER | Facility: HOSPITAL | Age: 74
Discharge: HOME | End: 2019-12-04
Payer: COMMERCIAL

## 2019-12-04 DIAGNOSIS — C83.31 DIFFUSE LARGE B-CELL LYMPHOMA OF LYMPH NODES OF NECK (HCC): Primary | ICD-10-CM

## 2019-12-04 PROCEDURE — 96523 IRRIG DRUG DELIVERY DEVICE: CPT

## 2019-12-04 NOTE — PROGRESS NOTES
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)    2  Neoplasm related pain    3  Other insomnia    4  Anxiety about health    5  Arthritis pain      Neoplasm related pain   Resolved    Arthritic Pains  Tylenol 975mg PO q8h [facility]   Motrin 400mg PO q6h PRN moderate pain x 2 weeks     Insomnia   Remeron 7 5mg PO qhs     HTN and DM  Previously routed not to PCP who discontinued glipizide and is monitoring blood pressure- may be decreasing antihypertensive dosage as needed in the future    Requested Prescriptions     Signed Prescriptions Disp Refills    acetaminophen (TYLENOL) 500 mg tablet 180 tablet 0     Sig: Take 2 tablets (1,000 mg total) by mouth every 8 (eight) hours    ibuprofen (MOTRIN) 400 mg tablet 56 tablet 0     Sig: Take 1 tablet (400 mg total) by mouth every 6 (six) hours as needed for mild pain for up to 14 days     Medications Discontinued During This Encounter   Medication Reason    naproxen (NAPROSYN) 500 mg tablet Alternate therapy       Representatives have queried the patient's controlled substance dispensing history in the Prescription Drug Monitoring Program in compliance with regulations before I have prescribed any controlled substances  The prescription history is consistent with prescribed therapy and our practice policies  25 minutes were spent face to face with [facility] and her daughter with greater than 50% of the time spent in counseling or coordination of care including discussions of treatment instructions, follow up requirements, risk factors and risk reduction of disease, patient and family counseling/involvement in care and compliance with treatment regimen   All of the patient's questions were answered during this discussion      Return in about 4 weeks (around 1/3/2020) for Symptom Assessment and Managment, with   Chantell John  Subjective:   Chief Complaint  Follow up visit for:  symptom management, depression or anxiety, disease process education and discussion of prognosis  CEDRIC Paez is a 69 y o  female with diffuse Large B Cell Lymphoma of LNs of the neck Diagnosed in September 2018  She was treated with one round of R-EPOCH and cycles of R-CHOP  Completed Chemotherapy in March 2019       Patient was last seen by me on 10/4/2019 with her granddaughter who translated for me and provided majority of the history  Pain had resolved and was no longer requiring any medications  Patient was continuing to have trouble falling asleep because it took the remeron a few hours to kick in  Also complained of poor appetite and needing constant verbal cues to et her meals  Since last visit, patient has been sleeping better with taking the remeron a bit earlier in the evening  With the changing weather, patient reports that she has been having a week of aches and throbbing pains in both shoulders and hands, most specifically knuckles with a little bit of swelling  Has taken tylenol "here and there" with some relief  The following portions of the medical history were reviewed: past medical history, problem list, medication list, and social history      Current Outpatient Medications:     albuterol (PROVENTIL HFA,VENTOLIN HFA) 90 mcg/act inhaler, INHALE 1-2 BOCANADAS EN LOS PULMONES CADA 4 A 6 HORAS ALFREDO SEA NECESARIO, Disp: 54 g, Rfl: 1    amLODIPine (NORVASC) 5 mg tablet, MAR JOHNNY (1) TABLETA POR VIA ORAL JOHNNY VEZ AL JEFFREY, Disp: 90 tablet, Rfl: 1    benzonatate (TESSALON) 200 MG capsule, Take 1 capsule (200 mg total) by mouth 3 (three) times a day, Disp: 20 capsule, Rfl: 0    Blood Glucose Monitoring Suppl (FREESTYLE LITE) JORDAN, Check blood sugar twice a day, Disp: 1 each, Rfl: 0    carvedilol (COREG) 12 5 mg tablet, MAR JOHNNY (1) TABLETA POR VIA ORAL DOS VECES AL JEFFREY, Disp: 180 tablet, Rfl: 1   cetirizine (ZyrTEC) 5 MG tablet, Take 1 tablet (5 mg total) by mouth daily, Disp: 90 tablet, Rfl: 1    diclofenac sodium (VOLTAREN) 1 %, APPLY 2 GRAM TOPICALLY 4 (FOUR) TIMES A DAY, Disp: 100 g, Rfl: 10    ergocalciferol (VITAMIN D2) 50,000 units, Take 50,000 Units by mouth once a week , Disp: , Rfl:     ezetimibe (ZETIA) 10 mg tablet, MAR JOHNNY (1) TABLETA POR VIA ORAL JOHNNY VEZ AL JEFFREY, Disp: 90 tablet, Rfl: 1    fluticasone (FLONASE) 50 mcg/act nasal spray, 2 sprays into each nostril daily, Disp: 48 g, Rfl: 1    JANUMET  MG per tablet, MAR JOHNNY (1) TABLETA POR VIA ORAL DOS VECES AL JEFFREY, Disp: 180 tablet, Rfl: 1    Lancets (FREESTYLE) lancets, Check blood sugar twice a day , Disp: 100 each, Rfl: 5    loperamide (IMODIUM) 2 mg capsule, MAR JOHNNY (1) CAPSULA POR VIA ORAL ALFREDO SEA NECESARIO PARA LA DIARREA, Disp: 90 capsule, Rfl: 1    losartan (COZAAR) 25 mg tablet, MAR JOHNNY (1) TABLETA POR VIA ORAL JOHNNY VEZ AL JEFFREY, Disp: 90 tablet, Rfl: 1    mirtazapine (REMERON) 7 5 MG tablet, MAR JOHNNY (1) TABLETA POR VIA ORAL JOHNNY VEZ AL JEFFREY AL ACOSTARSE, Disp: 30 tablet, Rfl: 10    omeprazole (PriLOSEC) 40 MG capsule, MAR JOHNNY (1) CAPSULA POR VIA ORAL DOS VECES AL JEFFREY, Disp: 180 capsule, Rfl: 1    ondansetron (ZOFRAN-ODT) 8 mg disintegrating tablet, Take 1 tablet (8 mg total) by mouth every 8 (eight) hours as needed for nausea or vomiting, Disp: 90 tablet, Rfl: 0    ranitidine (ZANTAC) 300 MG tablet, Take 1 tablet (300 mg total) by mouth daily at bedtime, Disp: 90 tablet, Rfl: 1    acetaminophen (TYLENOL) 500 mg tablet, Take 2 tablets (1,000 mg total) by mouth every 8 (eight) hours, Disp: 180 tablet, Rfl: 0    ibuprofen (MOTRIN) 400 mg tablet, Take 1 tablet (400 mg total) by mouth every 6 (six) hours as needed for mild pain for up to 14 days, Disp: 56 tablet, Rfl: 0  Review of Systems    All other systems negative    Objective:  Vital Signs  /60 (BP Location: Right arm, Patient Position: Sitting, Cuff Size: Standard)   Pulse 64   Temp 98 7 °F (37 1 °C) (Oral)   Resp 16   Ht 5' (1 524 m)   Wt 64 6 kg (142 lb 6 4 oz)   SpO2 95%   BMI 27 81 kg/m²    Physical Exam    Constitutional: Appears well-developed  A bit frail appearing  In no acute physical or emotional distress  Head: Normocephalic and atraumatic  Eyes: EOM are normal  No ocular discharge  No scleral icterus  Neck: No visible adenopathy or masses  Respiratory: Effort normal  No stridor  No respiratory distress  Gastrointestinal: No abdominal distension  Musculoskeletal: tender over the knuckles of fingers of bilateral hands  A small amount of swelling on both hands making finer motor movements difficult  Has full AROM of BL shoulders and fingers despite pain  Neurological: Alert, oriented and appropriately conversant  Skin: No diaphoresis, no rashes seen on exposed areas of skin  Psychiatric: Displays a normal mood and affect   Behavior, judgement and thought content appear normal

## 2019-12-06 ENCOUNTER — OFFICE VISIT (OUTPATIENT)
Dept: PALLIATIVE MEDICINE | Facility: CLINIC | Age: 74
End: 2019-12-06
Payer: COMMERCIAL

## 2019-12-06 VITALS
WEIGHT: 142.4 LBS | HEIGHT: 60 IN | SYSTOLIC BLOOD PRESSURE: 110 MMHG | TEMPERATURE: 98.7 F | DIASTOLIC BLOOD PRESSURE: 60 MMHG | HEART RATE: 64 BPM | OXYGEN SATURATION: 95 % | BODY MASS INDEX: 27.96 KG/M2 | RESPIRATION RATE: 16 BRPM

## 2019-12-06 DIAGNOSIS — M19.90 ARTHRITIS PAIN: ICD-10-CM

## 2019-12-06 DIAGNOSIS — G89.3 NEOPLASM RELATED PAIN: ICD-10-CM

## 2019-12-06 DIAGNOSIS — C83.31 DIFFUSE LARGE B-CELL LYMPHOMA OF LYMPH NODES OF NECK (HCC): Primary | ICD-10-CM

## 2019-12-06 DIAGNOSIS — G47.09 OTHER INSOMNIA: ICD-10-CM

## 2019-12-06 DIAGNOSIS — F41.8 ANXIETY ABOUT HEALTH: ICD-10-CM

## 2019-12-06 PROCEDURE — 99214 OFFICE O/P EST MOD 30 MIN: CPT | Performed by: FAMILY MEDICINE

## 2019-12-06 RX ORDER — IBUPROFEN 400 MG/1
400 TABLET ORAL EVERY 6 HOURS PRN
Qty: 56 TABLET | Refills: 0 | Status: SHIPPED | OUTPATIENT
Start: 2019-12-06 | End: 2020-04-24 | Stop reason: SINTOL

## 2019-12-06 RX ORDER — ACETAMINOPHEN 500 MG
975 TABLET ORAL EVERY 8 HOURS SCHEDULED
Qty: 180 TABLET | Refills: 0 | Status: SHIPPED | OUTPATIENT
Start: 2019-12-06 | End: 2020-01-05

## 2019-12-17 DIAGNOSIS — J98.4 RESTRICTIVE LUNG DISEASE: Primary | ICD-10-CM

## 2019-12-17 DIAGNOSIS — R19.7 DIARRHEA, UNSPECIFIED TYPE: ICD-10-CM

## 2019-12-17 DIAGNOSIS — R06.02 SHORTNESS OF BREATH: ICD-10-CM

## 2019-12-17 RX ORDER — LOPERAMIDE HYDROCHLORIDE 2 MG/1
CAPSULE ORAL
Qty: 90 CAPSULE | Refills: 11 | Status: SHIPPED | OUTPATIENT
Start: 2019-12-17 | End: 2020-11-17

## 2020-01-08 ENCOUNTER — OFFICE VISIT (OUTPATIENT)
Dept: FAMILY MEDICINE CLINIC | Facility: CLINIC | Age: 75
End: 2020-01-08

## 2020-01-08 ENCOUNTER — VBI (OUTPATIENT)
Dept: OTHER | Facility: OTHER | Age: 75
End: 2020-01-08

## 2020-01-08 VITALS
SYSTOLIC BLOOD PRESSURE: 128 MMHG | WEIGHT: 147 LBS | HEART RATE: 76 BPM | BODY MASS INDEX: 28.86 KG/M2 | HEIGHT: 60 IN | DIASTOLIC BLOOD PRESSURE: 76 MMHG | OXYGEN SATURATION: 96 % | RESPIRATION RATE: 18 BRPM | TEMPERATURE: 97.5 F

## 2020-01-08 DIAGNOSIS — Z12.11 COLON CANCER SCREENING: Primary | ICD-10-CM

## 2020-01-08 DIAGNOSIS — I51.7 CARDIOMEGALY: ICD-10-CM

## 2020-01-08 DIAGNOSIS — M19.049 HAND ARTHRITIS: ICD-10-CM

## 2020-01-08 DIAGNOSIS — I10 ESSENTIAL HYPERTENSION: ICD-10-CM

## 2020-01-08 DIAGNOSIS — E11.9 TYPE 2 DIABETES MELLITUS WITHOUT COMPLICATION, WITHOUT LONG-TERM CURRENT USE OF INSULIN (HCC): ICD-10-CM

## 2020-01-08 DIAGNOSIS — K21.9 GASTROESOPHAGEAL REFLUX DISEASE, ESOPHAGITIS PRESENCE NOT SPECIFIED: ICD-10-CM

## 2020-01-08 LAB — SL AMB POCT HEMOGLOBIN AIC: 7.7 (ref ?–6.5)

## 2020-01-08 PROCEDURE — 3074F SYST BP LT 130 MM HG: CPT | Performed by: PHYSICIAN ASSISTANT

## 2020-01-08 PROCEDURE — 3051F HG A1C>EQUAL 7.0%<8.0%: CPT | Performed by: PHYSICIAN ASSISTANT

## 2020-01-08 PROCEDURE — 83036 HEMOGLOBIN GLYCOSYLATED A1C: CPT | Performed by: PHYSICIAN ASSISTANT

## 2020-01-08 PROCEDURE — 99214 OFFICE O/P EST MOD 30 MIN: CPT | Performed by: PHYSICIAN ASSISTANT

## 2020-01-08 PROCEDURE — 3051F HG A1C>EQUAL 7.0%<8.0%: CPT | Performed by: INTERNAL MEDICINE

## 2020-01-08 PROCEDURE — 3078F DIAST BP <80 MM HG: CPT | Performed by: PHYSICIAN ASSISTANT

## 2020-01-08 NOTE — PROGRESS NOTES
Assessment/Plan:    Type 2 diabetes mellitus without complication, without long-term current use of insulin (HCC)    Lab Results   Component Value Date    HGBA1C 7 7 (A) 01/08/2020    Significant increase in patient's A1c  Did discussed with daughter that due to patient's history of cancer, and malnutrition, where more acceptable with an A1c being a little bit elevated, but previous A1c was within good range  Recommend cutting back on the glucerna, and also discussing reducing carbohydrates in her diet  Diabetic foot exam was completed today  Will contact [facility] for results of diabetic eye exam     Essential hypertension  Stable  Continue carvedilol and losartan  Will refer to Cardiology due to concerns of cardiomegaly  Severe protein-calorie malnutrition ([address] )  Malnutrition Findings:           BMI Findings: Body mass index is 28 71 kg/m²  Patient has been gaining weight  Albumin levels have been normal with recent set of lab work  Patient appears to be doing well  Recommend decreasing Glucerna as this may also be affecting her diabetes  If there is concern with weight loss and nutrient deficiency, may go back to 3 times a day  Gastroesophageal reflux disease  Stable  Continue with omeprazole  Currently following up with heme Onc for history of diffuse large B-cell lymphoma  No recurrence of malignancy at this time  Continue with routine screening  Diagnoses and all orders for this visit:    Colon cancer screening    Hand arthritis  -     XR hand 3+ vw right; Future  -     XR hand 3+ vw left; Future    Cardiomegaly  -     Ambulatory referral to Cardiology; Future    Type 2 diabetes mellitus without complication, without long-term current use of insulin (HCC)  -     POCT hemoglobin A1c    Essential hypertension    Gastroesophageal reflux disease, esophagitis presence not specified    Other orders  -     Cancel: Ambulatory referral to Gastroenterology;  Future Subjective:      Patient ID: Madeleine Romberg is a 69 y o  female  61-year-old female presenting with daughter for routine follow-up  Patient was previously diagnosed with diffuse large B-cell lymphoma  Was treated with chemotherapy, and cancer has resolved  Is continuing to follow up with Hematology  Had recent appointment there were going over to CT results, and was noted to have mild cardiomegaly  Hematologist was recommending that patient follow up with Cardiology for further evaluation  She has been diagnosed with hypertension in the past, and is currently on carvedilol and losartan  Has not had any episodes of chest pain, palpitations  Patient does have shortness of breath which she is being evaluated by pulmonology  Patient continues to have acid reflux symptoms  Symptoms are being controlled with omeprazole at this time  Denies any complications  Patient is currently on Janumet for her diabetes  Has not been checking blood sugar at home  Daughter does state that patient's diet has been poor over these last 2 months due to holidays  Has also been using Glucerna 3 times a day for her weight and malnutrition  Patient has also had an increase in carbohydrate intake such as pasta and rice  Currently denies any headaches, vision changes, abdominal pain, numbness tingling in hands  Patient has also been diagnosed with arthritis  Does have pain in bilateral hands  Does have some mild swelling  Pain is usually associated when she is making a  with her hands  Denies any weakness in the hands  Denies any carpal tunnel symptoms  Has been using ibuprofen which has helped with pain  Goes to [facility]    The following portions of the patient's history were reviewed and updated as appropriate:   She  has a past medical history of Cancer ([facility] ), Chronic pain disorder, Diabetes mellitus ([facility] ), Diffuse large B cell lymphoma ([facility] [facility] ), Dysphagia, GERD without esophagitis, HTN (hypertension), Hyperlipidemia, Hypertension, MI (myocardial infarction) ([facility] ), Port-A-Cath in place (07/29/2019), and Thyroid cancer ([facility] ) (2018)  She   Patient Active Problem List    Diagnosis Date Noted    Allergic rhinitis 10/08/2019    Syncope 02/25/2019    Status post chemotherapy 02/23/2019    Depression 02/06/2019    Other insomnia 02/06/2019    Rash 02/06/2019    Palliative care patient 01/28/2019    Neoplasm related pain 01/28/2019    Nausea 11/22/2018    Tremor 11/22/2018    Severe protein-calorie malnutrition ([facility] ) 11/22/2018    Anxiety about health 11/20/2018    Hyponatremia 11/19/2018    Diffuse large 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 ([facility] ) 11/09/2018    Oropharyngeal dysphagia 11/09/2018    Type 2 diabetes mellitus without complication, without long-term current use of insulin ([facility] ) 10/31/2018    Essential hypertension 10/31/2018    Gastroesophageal reflux disease 10/31/2018     She  has a past surgical history that includes Cholecystectomy; Other surgical history; Bone marrow biopsy; Shoulder arthroscopy; IR port Placement (11/16/2018); and Breast biopsy (Left)  Her family history includes Cancer in her maternal grandfather and maternal grandmother; Diabetes in her mother; Heart disease in her sister; Hypertension in her brother  She  reports that she has never smoked  She has never used smokeless tobacco  She reports that she does not drink alcohol or use drugs    Current Outpatient Medications   Medication Sig Dispense Refill    albuterol (PROVENTIL HFA,VENTOLIN HFA) 90 mcg/act inhaler INHALE 1-2 BOCANADAS EN LOS PULMONES CADA 4 A 6 HORAS ALFREDO SEA NECESARIO 54 g 1    amLODIPine (NORVASC) 5 mg tablet MAR JOHNNY (1) TABLETA POR VIA ORAL JOHNNY VEZ AL JEFFREY 90 tablet 1    benzonatate (TESSALON) 200 MG capsule Take 1 capsule (200 mg total) by mouth 3 (three) times a day 20 capsule 0    Blood Glucose Monitoring Suppl (FREESTYLE LITE) JORDAN Check blood sugar twice a day 1 each 0    carvedilol (COREG) 12 5 mg tablet MAR JOHNNY (1) TABLETA POR VIA ORAL DOS VECES AL JEFFREY 180 tablet 1    cetirizine (ZyrTEC) 5 MG tablet Take 1 tablet (5 mg total) by mouth daily 90 tablet 1    diclofenac sodium (VOLTAREN) 1 % APPLY 2 GRAM TOPICALLY 4 (FOUR) TIMES A  g 10    ergocalciferol (VITAMIN D2) 50,000 units Take 50,000 Units by mouth once a week   ezetimibe (ZETIA) 10 mg tablet MAR JOHNNY (1) TABLETA POR VIA ORAL JOHNNY VEZ AL JEFFREY 90 tablet 1    fluticasone (FLONASE) 50 mcg/act nasal spray 2 sprays into each nostril daily 48 g 1    ibuprofen (MOTRIN) 400 mg tablet Take 1 tablet (400 mg total) by mouth every 6 (six) hours as needed for mild pain for up to 14 days 56 tablet 0    JANUMET  MG per tablet MAR JOHNNY (1) TABLETA POR VIA ORAL DOS VECES AL JEFFREY 180 tablet 1    Lancets (FREESTYLE) lancets Check blood sugar twice a day  100 each 5    loperamide (IMODIUM) 2 mg capsule MAR JOHNNY (1) CAPSULA POR VIA ORAL ALFREDO SEA NECESARIO PARA LA DIARREA 90 capsule 11    losartan (COZAAR) 25 mg tablet MAR JOHNNY (1) TABLETA POR VIA ORAL JOHNNY VEZ AL JEFFREY 90 tablet 1    mirtazapine (REMERON) 7 5 MG tablet MAR JOHNNY (1) TABLETA POR VIA ORAL JOHNNY VEZ AL JEFFREY AL ACOSTARSE 30 tablet 10    omeprazole (PriLOSEC) 40 MG capsule MAR JOHNNY (1) CAPSULA POR VIA ORAL DOS VECES AL JEFFREY 180 capsule 1    ondansetron (ZOFRAN-ODT) 8 mg disintegrating tablet Take 1 tablet (8 mg total) by mouth every 8 (eight) hours as needed for nausea or vomiting 90 tablet 0    ranitidine (ZANTAC) 300 MG tablet Take 1 tablet (300 mg total) by mouth daily at bedtime 90 tablet 1     No current facility-administered medications for this visit  She has No Known Allergies       Review of Systems   Constitutional: Negative for activity change, appetite change, chills, diaphoresis, fatigue, fever and unexpected weight change  Eyes: Negative for pain and visual disturbance  Respiratory: Positive for shortness of breath   Negative for cough and chest tightness  Cardiovascular: Negative for chest pain, palpitations and leg swelling  Gastrointestinal: Negative for abdominal pain, diarrhea, nausea and vomiting  Endocrine: Negative for cold intolerance, heat intolerance, polydipsia, polyphagia and polyuria  Genitourinary: Negative for dysuria, frequency, hematuria and urgency  Musculoskeletal: Positive for arthralgias and joint swelling  Negative for back pain and myalgias  Skin: Negative for rash and wound  Neurological: Negative for dizziness, syncope, speech difficulty, weakness, numbness and headaches  Psychiatric/Behavioral: Negative for dysphoric mood and sleep disturbance  The patient is not nervous/anxious  Objective:      /76 (BP Location: Left arm, Patient Position: Sitting, Cuff Size: Standard)   Pulse 76   Temp 97 5 °F (36 4 °C) (Temporal)   Resp 18   Ht 5' (1 524 m)   Wt 66 7 kg (147 lb)   SpO2 96%   BMI 28 71 kg/m²          Physical Exam   Constitutional: She is oriented to person, place, and time  She appears well-developed and well-nourished  No distress  HENT:   Right Ear: External ear normal    Left Ear: External ear normal    Nose: Nose normal    Mouth/Throat: Oropharynx is clear and moist    Eyes: Pupils are equal, round, and reactive to light  Conjunctivae and EOM are normal    Neck: Normal range of motion  Neck supple  Cardiovascular: Normal rate, regular rhythm and normal heart sounds  Exam reveals no gallop and no friction rub  Pulses are no weak pulses  No murmur heard  Pulses:       Dorsalis pedis pulses are 2+ on the right side, and 2+ on the left side  Posterior tibial pulses are 2+ on the right side, and 2+ on the left side  Pulmonary/Chest: Effort normal  No respiratory distress  She has decreased breath sounds  She has no wheezes  She has no rales  Abdominal: Soft  Bowel sounds are normal  She exhibits no distension  There is no tenderness   There is no rebound and no guarding  Musculoskeletal: Normal range of motion  She exhibits no edema  Right wrist: Normal         Left wrist: Normal         Right hand: She exhibits deformity and swelling  She exhibits no tenderness and no bony tenderness  Normal strength noted  Left hand: She exhibits deformity and swelling  She exhibits no tenderness and no bony tenderness  Normal strength noted  Feet:   Right Foot:   Skin Integrity: Negative for ulcer, skin breakdown, erythema, warmth, callus or dry skin  Left Foot:   Skin Integrity: Negative for ulcer, skin breakdown, erythema, warmth, callus or dry skin  Lymphadenopathy:     She has no cervical adenopathy  Neurological: She is alert and oriented to person, place, and time  She displays normal reflexes  No cranial nerve deficit  She exhibits normal muscle tone  Coordination normal    Skin: She is not diaphoretic  Psychiatric: She has a normal mood and affect  Her behavior is normal  Thought content normal    Nursing note and vitals reviewed  Patient's shoes and socks removed  Right Foot/Ankle   Right Foot Inspection  Skin Exam: skin normal and skin intact no dry skin, no warmth, no callus, no erythema, no maceration, no abnormal color, no pre-ulcer, no ulcer and no callus                          Toe Exam: ROM and strength within normal limitsno tenderness, erythema and  no right toe deformity  Sensory     Proprioception: intact   Monofilament testing: intact  Vascular  Capillary refills: < 3 seconds  The right DP pulse is 2+  The right PT pulse is 2+       Left Foot/Ankle  Left Foot Inspection  Skin Exam: skin normal and skin intactno dry skin, no warmth, no erythema, no maceration, normal color, no pre-ulcer, no ulcer and no callus                         Toe Exam: ROM and strength within normal limitsno tenderness, no erythema and no left toe deformity                   Sensory     Proprioception: intact  Monofilament: intact  Vascular  Capillary refills: < 3 seconds  The left DP pulse is 2+  The left PT pulse is 2+  Assign Risk Category:  No deformity present; No loss of protective sensation;  No weak pulses       Risk: 0

## 2020-01-08 NOTE — LETTER
Diabetic Eye Exam Form    Date Requested: 20  Patient: Carter Hood  Patient : 1945   Referring Provider: Uzair Chavez PA-C    Dilated Retinal Exam        Yes         No     Date of Diabetic Eye Exam ______________________________  Left Eye      Exam did show retinopathy    Exam did not show retinopathy         Mild       Moderate       None       Proliferative       Severe     Right Eye     Exam did show retinopathy    Exam did not show retinopathy         Mild       Moderate       None       Proliferative       Severe     Comments __________________________________________________________    Practice Providing Exam ______________________________________________    Exam Performed By (print name) _______________________________________      Provider Signature ___________________________________________________      These reports are needed for  compliance    Please fax this completed form and a copy of the Diabetic Eye Exam report to our office as soon as possible to [phone] liilan Booth: Phone [phone]    We thank you for your assistance in treating our mutual patient

## 2020-01-08 NOTE — ASSESSMENT & PLAN NOTE
Malnutrition Findings:           BMI Findings: Body mass index is 28 71 kg/m²  Patient has been gaining weight  Albumin levels have been normal with recent set of lab work  Patient appears to be doing well  Recommend decreasing Glucerna as this may also be affecting her diabetes  If there is concern with weight loss and nutrient deficiency, may go back to 3 times a day

## 2020-01-08 NOTE — TELEPHONE ENCOUNTER
01/08/20 12:54PM [address] [facility] to [facility] [phone]    01/10/20 8:34 AM     Upon review of the In Basket request we were able to locate, review, and update the patient chart as requested  This message will now be closed  *To review the status updates documented on this request, open sent messages within your In Basket and select this message to view comments   If comments do not automatically appear for review, select the properties button to review*  Thank you  Jatin Howe

## 2020-01-08 NOTE — ASSESSMENT & PLAN NOTE
Lab Results   Component Value Date    HGBA1C 7 7 (A) 01/08/2020    Significant increase in patient's A1c  Did discussed with daughter that due to patient's history of cancer, and malnutrition, where more acceptable with an A1c being a little bit elevated, but previous A1c was within good range  Recommend cutting back on the glucerna, and also discussing reducing carbohydrates in her diet  Diabetic foot exam was completed today    Will contact [facility]- for results of diabetic eye exam

## 2020-01-10 ENCOUNTER — OFFICE VISIT (OUTPATIENT)
Dept: PALLIATIVE MEDICINE | Facility: CLINIC | Age: 75
End: 2020-01-10
Payer: COMMERCIAL

## 2020-01-10 VITALS
DIASTOLIC BLOOD PRESSURE: 80 MMHG | SYSTOLIC BLOOD PRESSURE: 140 MMHG | HEART RATE: 62 BPM | TEMPERATURE: 98.4 F | WEIGHT: 147 LBS | OXYGEN SATURATION: 98 % | RESPIRATION RATE: 14 BRPM | BODY MASS INDEX: 28.71 KG/M2

## 2020-01-10 DIAGNOSIS — C83.31 DIFFUSE LARGE B-CELL LYMPHOMA OF LYMPH NODES OF NECK (HCC): Primary | ICD-10-CM

## 2020-01-10 DIAGNOSIS — G89.3 NEOPLASM RELATED PAIN: ICD-10-CM

## 2020-01-10 DIAGNOSIS — F41.8 ANXIETY ABOUT HEALTH: ICD-10-CM

## 2020-01-10 DIAGNOSIS — F41.9 ANXIETY: ICD-10-CM

## 2020-01-10 DIAGNOSIS — F32.A DEPRESSION, UNSPECIFIED DEPRESSION TYPE: ICD-10-CM

## 2020-01-10 PROCEDURE — 99213 OFFICE O/P EST LOW 20 MIN: CPT | Performed by: FAMILY MEDICINE

## 2020-01-10 NOTE — PROGRESS NOTES
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)    2  Anxiety about health    3  Neoplasm related pain    4  Depression, unspecified depression type    5  Anxiety      Neoplasm related pain   Continues to remain resolved     Arthritic Pains  Tylenol 975mg PO q8h [facility]   Motrin 400mg PO q6h PRN moderate pain       Insomnia   Remeron 7 5mg PO qhs      HTN  Continue medications as per PCP    DM  Per PCP notes, patient's most recent hbA1c has increased with increased carbohydrate intake/ poor diet and she was counseled regarding decreasing her glucerna and watching diet until next recheck  Requested Prescriptions      No prescriptions requested or ordered in this encounter     There are no discontinued medications  Representatives have queried the patient's controlled substance dispensing history in the Prescription Drug Monitoring Program in compliance with regulations before I have prescribed any controlled substances  The prescription history is consistent with prescribed therapy and our practice policies  16 minutes were spent face to face with [facility] and her daughter with greater than 50% of the time spent in counseling or coordination of care including discussions of risks and benefits of treatment, instructions for disease self management, treatment instructions and follow up requirements   All of the patient's questions were answered during this discussion  Return in about 2 months (around 3/10/2020) for Symptom Assessment and Managment, with Dr Portia Reese  Subjective:   Chief Complaint  Follow up visit for:  symptom management  HPI     [facility] is a 69 y o  female with diffuse Large B Cell Lymphoma of LNs of the neck Diagnosed in September 2018  She was treated with one round of R-EPOCH and cycles of R-CHOP   Completed Chemotherapy in March 2019       Patient was last seen by me on 12/06/2019 with her granddaughter who translated for me and provided majority of the history  Granddaughter reports that patient has been sleeping better  Still having pain in the hands, for which her PCP is getting XRs and she is taking tylenol and ibuprofen as needed         The following portions of the medical history were reviewed: past medical history, problem list, medication list, and social history      Current Outpatient Medications:     albuterol (PROVENTIL HFA,VENTOLIN HFA) 90 mcg/act inhaler, INHALE 1-2 BOCANADAS EN LOS PULMONES CADA 4 A 6 HORAS ALFREDO SEA NECESARIO, Disp: 54 g, Rfl: 1    amLODIPine (NORVASC) 5 mg tablet, MAR JOHNNY (1) TABLETA POR VIA ORAL JOHNNY VEZ AL JEFFREY, Disp: 90 tablet, Rfl: 1    benzonatate (TESSALON) 200 MG capsule, Take 1 capsule (200 mg total) by mouth 3 (three) times a day, Disp: 20 capsule, Rfl: 0    Blood Glucose Monitoring Suppl (FREESTYLE LITE) JORDAN, Check blood sugar twice a day, Disp: 1 each, Rfl: 0    carvedilol (COREG) 12 5 mg tablet, MAR JOHNNY (1) TABLETA POR VIA ORAL DOS VECES AL JEFFREY, Disp: 180 tablet, Rfl: 1    cetirizine (ZyrTEC) 5 MG tablet, Take 1 tablet (5 mg total) by mouth daily, Disp: 90 tablet, Rfl: 1    diclofenac sodium (VOLTAREN) 1 %, APPLY 2 GRAM TOPICALLY 4 (FOUR) TIMES A DAY, Disp: 100 g, Rfl: 10    ezetimibe (ZETIA) 10 mg tablet, MAR JOHNNY (1) TABLETA POR VIA ORAL JOHNNY VEZ AL JEFFREY, Disp: 90 tablet, Rfl: 1    fluticasone (FLONASE) 50 mcg/act nasal spray, 2 sprays into each nostril daily, Disp: 48 g, Rfl: 1    JANUMET  MG per tablet, MAR JOHNNY (1) TABLETA POR VIA ORAL DOS VECES AL JEFFREY, Disp: 180 tablet, Rfl: 1    loperamide (IMODIUM) 2 mg capsule, MAR JOHNNY (1) CAPSULA POR VIA ORAL AFLREDO SEA NECESARIO PARA LA DIARREA, Disp: 90 capsule, Rfl: 11    losartan (COZAAR) 25 mg tablet, MAR JOHNNY (1) TABLETA POR VIA ORAL JOHNNY VEZ AL JEFFREY, Disp: 90 tablet, Rfl: 1    mirtazapine (REMERON) 7 5 MG tablet, MAR JOHNNY (1) TABLETA POR VIA ORAL JOHNNY VEZ AL JEFFREY AL ACOSTARSE, Disp: 30 tablet, Rfl: 10    omeprazole (PriLOSEC) 40 MG capsule, MAR JOHNNY (1) CAPSULA POR VIA ORAL DOS VECES AL JEFFREY, Disp: 180 capsule, Rfl: 1    ondansetron (ZOFRAN-ODT) 8 mg disintegrating tablet, Take 1 tablet (8 mg total) by mouth every 8 (eight) hours as needed for nausea or vomiting, Disp: 90 tablet, Rfl: 0    ranitidine (ZANTAC) 300 MG tablet, Take 1 tablet (300 mg total) by mouth daily at bedtime, Disp: 90 tablet, Rfl: 1    ergocalciferol (VITAMIN D2) 50,000 units, Take 50,000 Units by mouth once a week , Disp: , Rfl:     ibuprofen (MOTRIN) 400 mg tablet, Take 1 tablet (400 mg total) by mouth every 6 (six) hours as needed for mild pain for up to 14 days, Disp: 56 tablet, Rfl: 0    Lancets (FREESTYLE) lancets, Check blood sugar twice a day , Disp: 100 each, Rfl: 5  Review of Systems   Constitutional: Positive for fatigue  Respiratory: Negative for cough and shortness of breath  Cardiovascular: Negative for chest pain and palpitations  Gastrointestinal: Negative for abdominal pain, diarrhea, nausea and vomiting  Genitourinary: Negative for dysuria and hematuria  Musculoskeletal: Positive for arthralgias  Skin: Negative for pallor and rash  Neurological: Negative for dizziness, light-headedness and headaches  Psychiatric/Behavioral: Positive for confusion and sleep disturbance  All other systems negative    Objective:  Vital Signs  /80 (BP Location: Right arm, Cuff Size: Standard)   Pulse 62   Temp 98 4 °F (36 9 °C) (Oral)   Resp 14   Wt 66 7 kg (147 lb)   SpO2 98%   BMI 28 71 kg/m²    Physical Exam    Constitutional: Appears well-developed and well-nourished  In no acute physical or emotional distress  Head: Normocephalic and atraumatic  Eyes: EOM are normal  No ocular discharge  No scleral icterus  Neck: No visible adenopathy or masses  Respiratory: Effort normal  No stridor   No respiratory distress  Gastrointestinal: No abdominal distension  Musculoskeletal: No edema  Neurological: Alert, oriented and appropriately conversant  Skin: No diaphoresis, no rashes seen on exposed areas of skin  Psychiatric: Displays a normal mood and affect   Behavior, judgement and thought content appear normal

## 2020-01-10 NOTE — PATIENT INSTRUCTIONS
PRESCRIPTION REFILL REMINDER:  All medication refills should be requested prior to [facility] on Friday  Any refill requests after noon on Friday would be addressed the following Monday

## 2020-01-20 ENCOUNTER — TELEPHONE (OUTPATIENT)
Dept: HEMATOLOGY ONCOLOGY | Facility: CLINIC | Age: 75
End: 2020-01-20

## 2020-01-20 ENCOUNTER — HOSPITAL ENCOUNTER (OUTPATIENT)
Dept: INFUSION CENTER | Facility: HOSPITAL | Age: 75
Discharge: HOME | End: 2020-01-20
Payer: COMMERCIAL

## 2020-01-20 DIAGNOSIS — C83.31 DIFFUSE LARGE B-CELL LYMPHOMA OF LYMPH NODES OF NECK (HCC): Primary | ICD-10-CM

## 2020-01-20 LAB
ALBUMIN SERPL BCP-MCNC: 3.8 G/DL (ref 3–5.2)
ALP SERPL-CCNC: 121 U/L (ref 43–122)
ALT SERPL W P-5'-P-CCNC: 23 U/L (ref 9–52)
ANION GAP SERPL CALCULATED.3IONS-SCNC: 9 MMOL/L (ref 5–14)
AST SERPL W P-5'-P-CCNC: 21 U/L (ref 14–36)
BASOPHILS # BLD AUTO: 0.1 THOUSANDS/ΜL (ref 0–0.1)
BASOPHILS NFR BLD AUTO: 1 % (ref 0–1)
BILIRUB SERPL-MCNC: 0.4 MG/DL
BUN SERPL-MCNC: 16 MG/DL (ref 5–25)
CALCIUM SERPL-MCNC: 9.1 MG/DL (ref 8.4–10.2)
CHLORIDE SERPL-SCNC: 99 MMOL/L (ref 97–108)
CO2 SERPL-SCNC: 26 MMOL/L (ref 22–30)
CREAT SERPL-MCNC: 0.64 MG/DL (ref 0.6–1.2)
CRP SERPL QL: <5 MG/L
EOSINOPHIL # BLD AUTO: 0.2 THOUSAND/ΜL (ref 0–0.4)
EOSINOPHIL NFR BLD AUTO: 2 % (ref 0–6)
ERYTHROCYTE [DISTWIDTH] IN BLOOD BY AUTOMATED COUNT: 13.3 %
ERYTHROCYTE [SEDIMENTATION RATE] IN BLOOD: 19 MM/HOUR (ref 1–20)
GFR SERPL CREATININE-BSD FRML MDRD: 89 ML/MIN/1.73SQ M
GLUCOSE SERPL-MCNC: 373 MG/DL (ref 70–99)
HCT VFR BLD AUTO: 35.7 % (ref 36–46)
HGB BLD-MCNC: 11.9 G/DL (ref 12–16)
LDH SERPL-CCNC: 347 U/L (ref 313–618)
LYMPHOCYTES # BLD AUTO: 1.5 THOUSANDS/ΜL (ref 0.5–4)
LYMPHOCYTES NFR BLD AUTO: 23 % (ref 25–45)
MCH RBC QN AUTO: 31.4 PG (ref 26–34)
MCHC RBC AUTO-ENTMCNC: 33.4 G/DL (ref 31–36)
MCV RBC AUTO: 94 FL (ref 80–100)
MONOCYTES # BLD AUTO: 0.6 THOUSAND/ΜL (ref 0.2–0.9)
MONOCYTES NFR BLD AUTO: 10 % (ref 1–10)
NEUTROPHILS # BLD AUTO: 4.4 THOUSANDS/ΜL (ref 1.8–7.8)
NEUTS SEG NFR BLD AUTO: 65 % (ref 45–65)
PLATELET # BLD AUTO: 251 THOUSANDS/UL (ref 150–450)
PMV BLD AUTO: 8.9 FL (ref 8.9–12.7)
POTASSIUM SERPL-SCNC: 4.2 MMOL/L (ref 3.6–5)
PROT SERPL-MCNC: 6.8 G/DL (ref 5.9–8.4)
RBC # BLD AUTO: 3.8 MILLION/UL (ref 4–5.2)
SODIUM SERPL-SCNC: 134 MMOL/L (ref 137–147)
WBC # BLD AUTO: 6.8 THOUSAND/UL (ref 4.5–11)

## 2020-01-20 PROCEDURE — 86140 C-REACTIVE PROTEIN: CPT

## 2020-01-20 PROCEDURE — 83615 LACTATE (LD) (LDH) ENZYME: CPT

## 2020-01-20 PROCEDURE — 85652 RBC SED RATE AUTOMATED: CPT

## 2020-01-20 PROCEDURE — 80053 COMPREHEN METABOLIC PANEL: CPT

## 2020-01-20 PROCEDURE — 85025 COMPLETE CBC W/AUTO DIFF WBC: CPT

## 2020-01-20 NOTE — TELEPHONE ENCOUNTER
I spoke with patients granddaughter  I reminded her that the patient needs to have her lab work done prior to her appt on 1/23/20  She stated that she will take the patient on 1/21/20

## 2020-01-20 NOTE — PROGRESS NOTES
Pt tolerated routine port flush and lab draw well  No recent changes in health  flushed and deaccessed per routine  discharged ambulatory with dtr

## 2020-01-22 ENCOUNTER — DOCUMENTATION (OUTPATIENT)
Dept: HEMATOLOGY ONCOLOGY | Facility: CLINIC | Age: 75
End: 2020-01-22

## 2020-01-23 ENCOUNTER — OFFICE VISIT (OUTPATIENT)
Dept: PULMONOLOGY | Facility: CLINIC | Age: 75
End: 2020-01-23
Payer: COMMERCIAL

## 2020-01-23 ENCOUNTER — OFFICE VISIT (OUTPATIENT)
Dept: HEMATOLOGY ONCOLOGY | Facility: CLINIC | Age: 75
End: 2020-01-23
Payer: COMMERCIAL

## 2020-01-23 VITALS
HEART RATE: 67 BPM | HEIGHT: 60 IN | WEIGHT: 148 LBS | SYSTOLIC BLOOD PRESSURE: 120 MMHG | DIASTOLIC BLOOD PRESSURE: 60 MMHG | BODY MASS INDEX: 29.06 KG/M2 | TEMPERATURE: 99.6 F | OXYGEN SATURATION: 96 %

## 2020-01-23 VITALS
HEART RATE: 65 BPM | OXYGEN SATURATION: 95 % | RESPIRATION RATE: 16 BRPM | TEMPERATURE: 98 F | SYSTOLIC BLOOD PRESSURE: 148 MMHG | DIASTOLIC BLOOD PRESSURE: 70 MMHG | WEIGHT: 148.2 LBS | BODY MASS INDEX: 29.09 KG/M2 | HEIGHT: 60 IN

## 2020-01-23 DIAGNOSIS — D64.9 ANEMIA, UNSPECIFIED TYPE: ICD-10-CM

## 2020-01-23 DIAGNOSIS — R06.02 SHORTNESS OF BREATH: ICD-10-CM

## 2020-01-23 DIAGNOSIS — Z85.79 HISTORY OF LYMPHOMA: ICD-10-CM

## 2020-01-23 DIAGNOSIS — G47.33 OSA (OBSTRUCTIVE SLEEP APNEA): ICD-10-CM

## 2020-01-23 DIAGNOSIS — C83.31 DIFFUSE LARGE B-CELL LYMPHOMA OF LYMPH NODES OF NECK (HCC): Primary | ICD-10-CM

## 2020-01-23 DIAGNOSIS — R05.9 COUGH: Primary | ICD-10-CM

## 2020-01-23 PROCEDURE — 99214 OFFICE O/P EST MOD 30 MIN: CPT | Performed by: NURSE PRACTITIONER

## 2020-01-23 PROCEDURE — 99204 OFFICE O/P NEW MOD 45 MIN: CPT | Performed by: INTERNAL MEDICINE

## 2020-01-23 PROCEDURE — 94618 PULMONARY STRESS TESTING: CPT | Performed by: INTERNAL MEDICINE

## 2020-01-23 PROCEDURE — 1160F RVW MEDS BY RX/DR IN RCRD: CPT | Performed by: NURSE PRACTITIONER

## 2020-01-23 NOTE — PROGRESS NOTES
Pulmonary Consultation   [facility] 69 y o  female MRN: [id number]    Encounter: [id number]      Reason for consultation:   Dyspnea    Requesting physician:  Masoud Villeda PA-C       Impressions:   · Cough  · Shortness of breath  · Obstructive sleep apnea  · History of lymphoma  Recommendations:  · 6 minutes walk test   · Sleep study  · Video barium study and speech evaluation for possible recurrent aspiration  · Follow-up in 2 weeks  Discussion:  The patient's dyspnea on exertion is most likely due to deconditioning  Her spirometry was normal   On the 6 minutes walk test today she did not desaturate  Her cough is due to recurrent aspiration  I have referred her to speech to have a video barium study  Her daytime hypersomnolence most likely due to obstructive sleep apnea  I have ordered a sleep study  I will see the patient in 2 weeks after the study is done  History of Present Illness   HPI:  [facility] is a 69 y o  female who is here for evaluation of shortness of Breath  The patient had the shortness of breath about 2-3 years ago  It is mainly on exertion  He got worse when she was getting treated for her lymphoma  Now it has improved but she is still gets the shortness of breath on exertion  She coughs every time she eats or drinks  She has no significant sputum production  Denies any chest pain or palpitations  No fever or chills  She sleeps about 8-9 hours at night  She has daytime hypersomnolence and feels tired  Review of systems:  12 point review of systems was completed and was otherwise negative except as listed in HPI        Historical Information   Past Medical History:   Diagnosis Date    Cancer [facility])     Throat    Chronic pain disorder     Diabetes mellitus ([facility] )     Diffuse large B cell lymphoma ([facility] )     Dysphagia     GERD without esophagitis     HTN (hypertension)     Hyperlipidemia     Hypertension     MI (myocardial infarction) [facility] )     Port-A-Cath in place 07/29/2019    Thyroid cancer ([facility] ) 2018     Past Surgical History:   Procedure Laterality Date    BONE MARROW BIOPSY      BREAST BIOPSY Left     CHOLECYSTECTOMY      IR PORT PLACEMENT  11/16/2018    OTHER SURGICAL HISTORY      tendor tear repair to right shoulder    SHOULDER ARTHROSCOPY       Family History   Problem Relation Age of Onset    Diabetes Mother     Heart disease Sister     Hypertension Brother     Cancer Maternal Grandmother     Cancer Maternal Grandfather        Family History:  No history of lung disease in her family  Social History:  The patient lives with her daughter  She is a lifelong nonsmoker  They have no pets  Meds/Allergies   No current facility-administered medications for this visit  (Not in a hospital admission)  No Known Allergies    Vitals: Blood pressure 120/60, pulse 67, temperature 99 6 °F (37 6 °C), temperature source Tympanic, height 5' (1 524 m), weight 67 1 kg (148 lb), SpO2 96 %, not currently breastfeeding ,      Physical exam:        Head/eyes:    Normocephalic, without obvious abnormality, atraumatic,         PERRL, extraocular muscles intact, no scleral icterus    Nose:   Nares normal, septum midline, mucosa normal, no drainage    or sinus tenderness   Throat:   Moist mucous membranes, no thrush   Neck:   Supple, trachea midline, no adenopathy; no carotid    bruit or JVD   Lungs:     Decreased breath sounds  No wheezing          Heart:    Regular rate and rhythm, S1 and S2 normal, no murmur, rub   or gallop   Abdomen:     Soft, non-tender, bowel sounds active all four quadrants,     no masses, no organomegaly   Extremities:   Extremities normal, atraumatic, no cyanosis or edema   Skin:   Warm, dry, turgor normal, no rashes or lesions   Neurologic:   CNII-XII intact, normal strength, non-focal             Imaging and other studies: I have personally reviewed pertinent films in PACS CT scan of the chest is reviewed on the [facility] system and it showed the bronchiectatic changes in the right lower lobe  No other parenchymal lung disease  Lab Results   Component Value Date    WBC 6 80 01/20/2020    HGB 11 9 (L) 01/20/2020    HCT 35 7 (L) 01/20/2020    MCV 94 01/20/2020     01/20/2020     Lab Results   Component Value Date    SODIUM 134 (L) 01/20/2020    K 4 2 01/20/2020    CL 99 01/20/2020    CO2 26 01/20/2020    BUN 16 01/20/2020    CREATININE 0 64 01/20/2020    GLUC 373 (H) 01/20/2020    CALCIUM 9 1 01/20/2020     A 6 minutes walk test was done in the office today and the patient started with a heart rate of 71 beats per minute and O2 saturation 98%  At the end of the test the heart rate was 75 beats per minute and the O2 saturation was 99%  She walked 168 m             Silvestre Diez MD

## 2020-01-23 NOTE — PROGRESS NOTES
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)  Patient does not seem to have any hint of recurrence based off of today's examination  We will continue to monitor her on a regular basis according to the NCCN guidelines  She will be back for follow-up in about 3 months with repeat labs and CT scan of the chest abdomen and pelvis prior which she will be due for at that time  Patient will continue to get her Port-A-Cath flushed every 6-8 weeks  Could consider removal at her next follow-up visit if there are no changes as it will be 1 year post treatment at that time  We also did discuss pursuing healthier lifestyles after cancer treatment including: following up with PCP on a regular basis for health maintenance/immunizations, getting routine screening exams when indicated, consuming a well-balanced diet with lots of fruits/vegetables/whole grains and limiting greasy/fatty and sugary foods, exercising on a regular basis  - CBC and differential; Future  - Comprehensive metabolic panel; Future  - C-reactive protein; Future  - Sedimentation rate, automated; Future  - LD,Blood; Future  - CT chest abdomen pelvis w contrast; Future    2  Anemia, unspecified type  Patient has mild normocytic anemia which is likely due to anemia of chronic disease  We will continue to monitor     - CBC and differential; Future  - Comprehensive metabolic panel; Future  - C-reactive protein; Future  - Sedimentation rate, automated; Future  - LD,Blood; Future  - Vitamin B12; Future  - Iron Panel (Includes Ferritin, Iron Sat%, Iron, and TIBC); Future  - Ferritin;  Future    HPI:  Patient presents today for a follow-up visit accompanied by her granddaughter who kindly translated as per patient's request   The patient's granddaughter states that the patient continues to have some depression, gets agitated/angry at times and is often forgetful; she is planning to take her for some counseling in the near future  Patient denies any constitutional symptoms  She continues to drink Glucerna supplements twice a day  The patient continues to follow up with the palliative care team on a regular basis for symptom management  She is reporting some pain in her hands and was told that it is arthritis by her PCP; she is supposed to be getting x-rays done the next couple days  The patient is up-to-date with her mammograms  Her most recent laboratory studies from 01/20/2020 showed normal white cells and platelets, she has mild normocytic anemia H&H 11 9/35 7, MCV 94  Sodium was 134, blood sugar was elevated 373 remaining metabolic panel is normal   Her inflammatory markers and LDH are in the normal range  Oncology History    The patient complained about significant sore throat in May which was treated with antibiotics by her PCP in [facility] which did not improve her sore throat  She then started to notice significant odynophagia and dysphagia for liquids it is and solid nutrition  Eventually, she had a CT scan of the neck on the 20th of September which showed soft tissue lesion in the left palatine tonsil with abnormal lymph nodes bilaterally in the neck compatible with neoplastic process  She then had a biopsy of the left tonsil/left tonsillectomy on the 25th of September 2018 which was compatible with diffuse large B-cell lymphoma germinal center B phenotype  The immunohistochemical staining came back positive for BCL2, BCL 6 and myc with Ki 67 around 70%  No FISH rearrangements gene study was done for BCL2, BCL 6 are myc translocation  The patient was treated with 1 cycle of R-EPOCH since her airway was compromise with the large tonsillar mass and a biopsy which was reviewed by our hematopathologist on the 15 November compatible with 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 according to the [facility] with pending gene rearrangement studies  During the hospital course the patient had another biopsy of the left tonsil to better understand the exact aggressiveness of her large B-cell lymphoma  The biopsy on the 20th of November showed large B-cell lymphoma with BCL -2 and C myc level expressed surface a type without the myc or BCL gene rearrangement  Her bone marrow biopsy on the 9th of November was negative for B-cell lymphoma involvement with normal bone marrow trilineage maturation  She was also evaluated with an MRI of the brain during the hospital course which was negative for any hint of lymphoma involvement  PET scan on the 14th of November showed IMPRESSION:  1   FDG avid left posterior oropharyngeal lesion compatible with malignancy  2   FDG avid lymph nodes in the neck and left axilla compatible with malignancy  3  No FDG avid lymph nodes in the abdomen or pelvis suspicious for malignancy  4   Tiny focus of FDG uptake along the skin of the right upper quadrant anterior abdominal wall with mild skin thickening suggested here on CT    Her post treatment PET-CT 3/18/19 was read:  IMPRESSION:  1  No evidence of hypermetabolic malignancy  Deauville score of 1   2   Mild patchy FDG uptake in the right upper lobe corresponding to patchy  consolidation  This may be infectious or inflammatory  This has partially improved from the 2/25/2019 chest x-ray  Diffuse large 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 ([address] )    11/9/2018 Initial Diagnosis     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)       Chemotherapy     1  Dose adjusted R-EPOCH 11/21/18 (1 cycle)- switched to RCHOP after repeat biopsy/pathology reviewed  2  R-CHOP started 12/12/18 completed 3/7/19 (5 cycles)             Interval history:    ROS: Review of Systems   Constitutional: Positive for activity change  Negative for appetite change, chills, fatigue, fever and unexpected weight change     HENT: Negative for congestion, mouth sores, nosebleeds, sore throat and trouble swallowing  Eyes: Negative  Respiratory: Positive for shortness of breath  Negative for cough and chest tightness  Cardiovascular: Negative for chest pain, palpitations and leg swelling  Gastrointestinal: Positive for constipation (mild)  Negative for abdominal distention, abdominal pain, blood in stool, diarrhea, nausea and vomiting  Genitourinary: Negative for difficulty urinating, dysuria, frequency, hematuria and urgency  Musculoskeletal: Positive for arthralgias (hands)  Negative for back pain, gait problem, joint swelling and myalgias  Skin: Negative for color change, pallor and rash  Neurological: Positive for numbness (And tingling mild)  Negative for dizziness, weakness, light-headedness and headaches  Hematological: Negative for adenopathy  Does not bruise/bleed easily  Psychiatric/Behavioral: Positive for decreased concentration, dysphoric mood and sleep disturbance  Past Medical History:   Diagnosis Date    Cancer [facility])     Throat    Chronic pain disorder     Diabetes mellitus ([place] [address] )     Diffuse large B cell lymphoma ([place] [address] )     Dysphagia     GERD without esophagitis     HTN (hypertension)     Hyperlipidemia     Hypertension     [facility] infarction[facility] )     Port-A-Cath in place 07/29/2019    Thyroid cancer ([place] [address] ) 2018       Past Surgical History:   Procedure Laterality Date    BONE MARROW BIOPSY      BREAST BIOPSY Left     CHOLECYSTECTOMY      IR PORT PLACEMENT  11/16/2018    OTHER SURGICAL HISTORY      tendor tear repair to right shoulder    SHOULDER ARTHROSCOPY         Social History     Socioeconomic History    Marital status:       Spouse name: None    Number of children: None    Years of education: None    Highest education level: None   Occupational History    None   Social Needs    Financial resource strain: None    Food insecurity:     Worry: None Inability: None    Transportation needs:     Medical: None     Non-medical: None   Tobacco Use    Smoking status: Never Smoker    Smokeless tobacco: Never Used   Substance and Sexual Activity    Alcohol use: Never     Frequency: Never     Drinks per session: Patient refused     Binge frequency: Never     Comment: 0    Drug use: No    Sexual activity: Not Currently     Partners: Male   Lifestyle    Physical activity:     Days per week: None     Minutes per session: None    Stress: None   Relationships    Social connections:     Talks on phone: None     Gets together: None     Attends Evangelical service: None     Active member of club or organization: None     Attends meetings of clubs or organizations: None     Relationship status: None    Intimate partner violence:     Fear of current or ex partner: None     Emotionally abused: None     Physically abused: None     Forced sexual activity: None   Other Topics Concern    None   Social History Narrative    None       Family History   Problem Relation Age of Onset    Diabetes Mother     Heart disease Sister     Hypertension Brother     Cancer Maternal Grandmother     Cancer Maternal Grandfather        No Known Allergies      Current Outpatient Medications:     albuterol (PROVENTIL HFA,VENTOLIN HFA) 90 mcg/act inhaler, INHALE 1-2 BOCANADAS EN LOS PULMONES CADA 4 A 6 HORAS ALFREDO SEA NECESARIO, Disp: 54 g, Rfl: 1    amLODIPine (NORVASC) 5 mg tablet, MAR JOHNNY (1) TABLETA POR VIA ORAL JOHNNY VEZ AL JEFFREY, Disp: 90 tablet, Rfl: 1    benzonatate (TESSALON) 200 MG capsule, Take 1 capsule (200 mg total) by mouth 3 (three) times a day, Disp: 20 capsule, Rfl: 0    Blood Glucose Monitoring Suppl (FREESTYLE LITE) JORDAN, Check blood sugar twice a day, Disp: 1 each, Rfl: 0    carvedilol (COREG) 12 5 mg tablet, MAR JOHNNY (1) TABLETA POR VIA ORAL DOS VECES AL JEFFREY, Disp: 180 tablet, Rfl: 1    cetirizine (ZyrTEC) 5 MG tablet, Take 1 tablet (5 mg total) by mouth daily, Disp: 90 tablet, Rfl: 1    diclofenac sodium (VOLTAREN) 1 %, APPLY 2 GRAM TOPICALLY 4 (FOUR) TIMES A DAY, Disp: 100 g, Rfl: 10    ergocalciferol (VITAMIN D2) 50,000 units, Take 50,000 Units by mouth once a week , Disp: , Rfl:     ezetimibe (ZETIA) 10 mg tablet, MAR JOHNNY (1) TABLETA POR VIA ORAL JOHNNY VEZ AL JEFFREY, Disp: 90 tablet, Rfl: 1    fluticasone (FLONASE) 50 mcg/act nasal spray, 2 sprays into each nostril daily, Disp: 48 g, Rfl: 1    JANUMET  MG per tablet, MAR JOHNNY (1) TABLETA POR VIA ORAL DOS VECES AL JEFFREY, Disp: 180 tablet, Rfl: 1    Lancets (FREESTYLE) lancets, Check blood sugar twice a day , Disp: 100 each, Rfl: 5    loperamide (IMODIUM) 2 mg capsule, MAR JOHNNY (1) CAPSULA POR VIA ORAL ALFREDO SEA NECESARIO PARA LA DIARREA, Disp: 90 capsule, Rfl: 11    losartan (COZAAR) 25 mg tablet, MAR JOHNNY (1) TABLETA POR VIA ORAL JOHNNY VEZ AL JEFFREY, Disp: 90 tablet, Rfl: 1    mirtazapine (REMERON) 7 5 MG tablet, MAR JOHNNY (1) TABLETA POR VIA ORAL JOHNNY VEZ AL JEFFREY AL ACOSTARSE, Disp: 30 tablet, Rfl: 10    omeprazole (PriLOSEC) 40 MG capsule, MAR JOHNNY (1) CAPSULA POR VIA ORAL DOS VECES AL JEFFREY, Disp: 180 capsule, Rfl: 1    ondansetron (ZOFRAN-ODT) 8 mg disintegrating tablet, Take 1 tablet (8 mg total) by mouth every 8 (eight) hours as needed for nausea or vomiting, Disp: 90 tablet, Rfl: 0    ranitidine (ZANTAC) 300 MG tablet, Take 1 tablet (300 mg total) by mouth daily at bedtime, Disp: 90 tablet, Rfl: 1    ibuprofen (MOTRIN) 400 mg tablet, Take 1 tablet (400 mg total) by mouth every 6 (six) hours as needed for mild pain for up to 14 days, Disp: 56 tablet, Rfl: 0      Physical Exam:  /70 (BP Location: Right arm, Patient Position: Sitting, Cuff Size: Adult)   Pulse 65   Temp 98 °F (36 7 °C) (Tympanic)   Resp 16   Ht 5' (1 524 m)   Wt 67 2 kg (148 lb 3 2 oz)   SpO2 95%   BMI 28 94 kg/m²     Physical Exam   Constitutional: She is oriented to person, place, and time   She appears well-developed and well-nourished  No distress  HENT:   Head: Normocephalic and atraumatic  Mouth/Throat: Oropharynx is clear and moist  No oropharyngeal exudate  Eyes: Pupils are equal, round, and reactive to light  Conjunctivae are normal  No scleral icterus  Neck: Normal range of motion  Neck supple  No thyromegaly present  Cardiovascular: Normal rate, regular rhythm, normal heart sounds and intact distal pulses  No murmur heard  Pulmonary/Chest: Effort normal and breath sounds normal  No respiratory distress  Abdominal: Soft  Bowel sounds are normal  She exhibits no distension  There is no hepatosplenomegaly  There is no tenderness  Musculoskeletal: Normal range of motion  She exhibits no edema  Lymphadenopathy:     She has no cervical adenopathy  She has no axillary adenopathy  Neurological: She is alert and oriented to person, place, and time  Skin: Skin is warm and dry  No rash noted  She is not diaphoretic  No erythema  No pallor  Psychiatric: Her behavior is normal  Judgment and thought content normal  Her affect is blunt  She exhibits a depressed mood  Vitals reviewed  Labs:  Lab Results   Component Value Date    WBC 6 80 01/20/2020    HGB 11 9 (L) 01/20/2020    HCT 35 7 (L) 01/20/2020    MCV 94 01/20/2020     01/20/2020     Lab Results   Component Value Date    K 4 2 01/20/2020    CL 99 01/20/2020    CO2 26 01/20/2020    BUN 16 01/20/2020    CREATININE 0 64 01/20/2020    GLUF 131 (H) 04/06/2019    CALCIUM 9 1 01/20/2020    AST 21 01/20/2020    ALT 23 01/20/2020    ALKPHOS 121 01/20/2020    EGFR 89 01/20/2020         Patient voiced understanding and agreement in the above discussion  Aware to contact our office with questions/symptoms in the interim  This note has been generated by voice recognition software system  Therefore, there may be spelling, grammar, and or syntax errors  Please contact if questions arise

## 2020-01-29 ENCOUNTER — TELEPHONE (OUTPATIENT)
Dept: SLEEP CENTER | Facility: CLINIC | Age: 75
End: 2020-01-29

## 2020-01-29 NOTE — TELEPHONE ENCOUNTER
----- Message from Ceci Pereyra DO sent at 1/28/2020  3:43 PM EST -----  Chart reviewed  Study approved  Study to be read by Dr Sincere Santizo  ----- Message -----  From: Gertrudis Barksdale MA  Sent: 1/24/2020   9:01 AM EST  To: Sleep Medicine [facility] Provider    This sleep study needs approval      If approved please sign and return to clerical pool  If denied please include reasons why  Also provide alternative testing if warranted  Please sign and return to clerical pool

## 2020-01-30 ENCOUNTER — HOSPITAL ENCOUNTER (OUTPATIENT)
Dept: RADIOLOGY | Facility: HOSPITAL | Age: 75
Discharge: HOME | End: 2020-01-30
Attending: INTERNAL MEDICINE
Payer: COMMERCIAL

## 2020-01-30 DIAGNOSIS — R05.9 COUGH: ICD-10-CM

## 2020-01-30 PROCEDURE — 92611 MOTION FLUOROSCOPY/SWALLOW: CPT

## 2020-01-30 PROCEDURE — 74230 X-RAY XM SWLNG FUNCJ C+: CPT

## 2020-01-30 NOTE — PROCEDURES
Video Swallow Study      Patient Name: Hermelindo Ryan  FEBMW'N Date: 1/30/2020        Past Medical History  Past Medical History:   Diagnosis Date    Cancer [facility])     Throat    Chronic pain disorder     Diabetes mellitus ([facility] )     Diffuse large B cell lymphoma ([facility] )     Dysphagia     GERD without esophagitis     HTN (hypertension)     Hyperlipidemia     Hypertension     MI (myocardial infarction) ([facility] )     Port-A-Cath in place 07/29/2019    Thyroid cancer ([place] [address] ) 2018        Past Surgical History  Past Surgical History:   Procedure Laterality Date    BONE MARROW BIOPSY      BREAST BIOPSY Left     CHOLECYSTECTOMY      IR PORT PLACEMENT  11/16/2018    OTHER SURGICAL HISTORY      tendor tear repair to right shoulder    SHOULDER ARTHROSCOPY       Per visit w/ Dr Lindsey Maxwell, 1/23/20:  Discussion:  The patient's dyspnea on exertion is most likely due to deconditioning  Her spirometry was normal   On the 6 minutes walk test today she did not desaturate  Her cough is due to recurrent aspiration  I have referred her to speech to have a video barium study  Her daytime hypersomnolence most likely due to obstructive sleep apnea  I have ordered a sleep study  I will see the patient in 2 weeks after the study is done  History of Present Illness         HPI:  Hermelindo Ryan is a 69 y o  female who is here for evaluation of shortness of Breath  The patient had the shortness of breath about 2-3 years ago  It is mainly on exertion  He got worse when she was getting treated for her lymphoma  Now it has improved but she is still gets the shortness of breath on exertion  She coughs every time she eats or drinks  She has no significant sputum production  Denies any chest pain or palpitations  No fever or chills  She sleeps about 8-9 hours at night  She has daytime hypersomnolence and feels tired      Video Barium Swallow Study    Summary:  Oral stage was WFL/WNL despite minimal dentition  Pt was able to masticate items given today [facility]  Bolus formation, control, and transfer were WNL  Pharyngeal stage was also WNL  Epiglottic inversion, pharyngeal constriction, and hyoid excursion were WNL  There was minimal to no residue  No penetration or aspiration observed this study  No coughing  Per gross esophageal screen a small amt of reflux was observed  Images are on PACS for review  Recommendations:  Diet:Soft/easy to chew foods  Liquids: thin  Meds:as tolerated  Upright position  F/u ST tx: consider clinical f/u, as grand daughter reports pt often coughs while eating but she prepares very soft food for her  Question rate of intake vs other  Supervision  Aspiration Precautions  Reflux Precautions  Consider consult with: ? F/u w/ ST at meal  Results reviewed with: pt, grand daughter    Pt is a 74yof referred by Pulmonary, Dr Sal Bangura, for reported coughing w/ po intake  Previous VBS:  No VBS  Pt was last seen by our dept 11/19/18 at which time she was on puree w/ thin lquids  Current Diet:  Soft/mashed foods per grand daughter  Thin liquids  Dentition:  minimal  O2 requirement:  none  Oral mech:  Strength and ROM:  wnl  Vocal Quality/Speech:  wnl  Cognitive status:  Alert, follows commands    Consistencies administered: Barium laden applesauce, nutrigrain bar, oreo, ham sandwich bite,  nectar thick, thin liquids, 13mm barium pill x 2  Liquids were administered by cup and straw  Pt was seated laterally at 90 degrees and A/P  Oral stage:  WNL/WFL  Lip closure:+  Mastication:+ w/ items given  Bolus formation:+  Bolus control:+  Transfer:+  Residue:-    Pharyngeal stage:   WNL  Swallow promptness:+  Spill to valleculae:-  Spill to pyriforms:-  Epiglottic inversion:+  Laryngeal excursion:+  Pharyngeal constriction:+  Vallecular retention:min or none  Pyriform retention:none  PPW coating:-  CP prominence:-  Transient penetration:-  Epiglottic undercoat:-  Penetration:-  Aspiration:-  No gross aspiration observed, can not r/o any tace aspiration       Screening of Esophageal stage:  Mild reflux observed x 1

## 2020-02-06 ENCOUNTER — OFFICE VISIT (OUTPATIENT)
Dept: PULMONOLOGY | Facility: CLINIC | Age: 75
End: 2020-02-06
Payer: COMMERCIAL

## 2020-02-06 VITALS
HEIGHT: 60 IN | OXYGEN SATURATION: 95 % | RESPIRATION RATE: 16 BRPM | SYSTOLIC BLOOD PRESSURE: 122 MMHG | HEART RATE: 82 BPM | TEMPERATURE: 97 F | DIASTOLIC BLOOD PRESSURE: 54 MMHG | WEIGHT: 146 LBS | BODY MASS INDEX: 28.66 KG/M2

## 2020-02-06 DIAGNOSIS — I10 ESSENTIAL HYPERTENSION: ICD-10-CM

## 2020-02-06 DIAGNOSIS — R05.9 COUGH: Primary | ICD-10-CM

## 2020-02-06 DIAGNOSIS — G47.33 OSA (OBSTRUCTIVE SLEEP APNEA): ICD-10-CM

## 2020-02-06 DIAGNOSIS — E11.9 TYPE 2 DIABETES MELLITUS WITHOUT COMPLICATION, WITHOUT LONG-TERM CURRENT USE OF INSULIN (HCC): ICD-10-CM

## 2020-02-06 DIAGNOSIS — K21.9 GASTROESOPHAGEAL REFLUX DISEASE WITHOUT ESOPHAGITIS: ICD-10-CM

## 2020-02-06 DIAGNOSIS — J30.89 NON-SEASONAL ALLERGIC RHINITIS, UNSPECIFIED TRIGGER: ICD-10-CM

## 2020-02-06 PROCEDURE — 3078F DIAST BP <80 MM HG: CPT | Performed by: INTERNAL MEDICINE

## 2020-02-06 PROCEDURE — 3008F BODY MASS INDEX DOCD: CPT | Performed by: INTERNAL MEDICINE

## 2020-02-06 PROCEDURE — 1160F RVW MEDS BY RX/DR IN RCRD: CPT | Performed by: INTERNAL MEDICINE

## 2020-02-06 PROCEDURE — 1036F TOBACCO NON-USER: CPT | Performed by: INTERNAL MEDICINE

## 2020-02-06 PROCEDURE — 99213 OFFICE O/P EST LOW 20 MIN: CPT | Performed by: INTERNAL MEDICINE

## 2020-02-06 PROCEDURE — 3008F BODY MASS INDEX DOCD: CPT | Performed by: PHYSICIAN ASSISTANT

## 2020-02-06 PROCEDURE — 2022F DILAT RTA XM EVC RTNOPTHY: CPT | Performed by: INTERNAL MEDICINE

## 2020-02-06 PROCEDURE — 3074F SYST BP LT 130 MM HG: CPT | Performed by: INTERNAL MEDICINE

## 2020-02-06 NOTE — PROGRESS NOTES
Office Progress Note - Pulmonary    Erika AnthonyrobertaRosaliagroverarnold 69 y o  female MRN: [id number]    Encounter: [id number]      Assessment:   Cough   Obstructive sleep apnea   Allergic rhinitis   Hypertension   Type 2 diabetes mellitus  Plan:     Aspiration precautions   Fluticasone nasal spray 2 sprays to each nostril once a day   Polysomnogram scheduled on April 14th   Follow-up in 4 months  Discussion:   The patient's cough has markedly improved after she started to implement the precautions provided to her by the speech therapist   Also she is more consistent on using her fluticasone nasal spray which may have decreased the postnasal dripping that have been contributing to the cough  I have emphasized to the patient through her daughter's interpretation that she should maintain these aspiration precautions  Also emphasized on the importance to use the fluticasone nasal spray every day  She has the polysomnogram scheduled on April 14th  I will see her in 4 months in a follow-up visit  Subjective: The patient is here for a follow-up visit  She had her speech evaluation done  She was educated about aspiration precautions including some taking small bites as well as small sips of water  This has helped her a lot  Also her daughter made her use the fluticasone nasal spray every day  She had stated that the cough has markedly improved  She has no shortness of breath on exertion  She has no nocturnal symptoms  She still feels tired and sleepy during the day  Review of systems:  A 12 point system review is done and aside from what is stated above the rest of the review of systems is negative  Family history and social history are reviewed  Medications list is reviewed  Vitals: Blood pressure 122/54, pulse 82, temperature (!) 97 °F (36 1 °C), resp   rate 16, height 5' (1 524 m), weight 66 2 kg (146 lb), SpO2 95 %, not currently breastfeeding ,     Physical Exam  Gen: Awake, alert, oriented x 3, no acute distress  HEENT: Mucous membranes moist, no oral lesions, no thrush  NECK: No accessory muscle use, JVP not elevated  Cardiac: Regular, single S1, single S2, no murmurs, no rubs, no gallops  Lungs:  Clear breath sounds  No wheezing or rhonchi  Abdomen: normoactive bowel sounds, soft nontender, nondistended, no rebound or rigidity, no guarding  Extremities: no cyanosis, no clubbing, no edema  Neuro:  Grossly nonfocal   Skin:  No rash  Speech evaluation report is reviewed  The patient is recommended for soft/easy to chew foods

## 2020-02-20 DIAGNOSIS — K21.9 GASTROESOPHAGEAL REFLUX DISEASE, ESOPHAGITIS PRESENCE NOT SPECIFIED: ICD-10-CM

## 2020-02-20 RX ORDER — RANITIDINE 300 MG/1
TABLET ORAL
Qty: 90 TABLET | Refills: 1 | Status: SHIPPED | OUTPATIENT
Start: 2020-02-20 | End: 2020-04-24 | Stop reason: ALTCHOICE

## 2020-02-20 RX ORDER — OMEPRAZOLE 40 MG/1
CAPSULE, DELAYED RELEASE ORAL
Qty: 180 CAPSULE | Refills: 1 | Status: SHIPPED | OUTPATIENT
Start: 2020-02-20 | End: 2020-08-17

## 2020-02-25 ENCOUNTER — HOSPITAL ENCOUNTER (OUTPATIENT)
Dept: RADIOLOGY | Facility: HOSPITAL | Age: 75
Discharge: HOME | End: 2020-02-25
Payer: COMMERCIAL

## 2020-02-25 DIAGNOSIS — M19.049 HAND ARTHRITIS: ICD-10-CM

## 2020-02-25 PROCEDURE — 73130 X-RAY EXAM OF HAND: CPT

## 2020-03-27 DIAGNOSIS — J06.9 URI (UPPER RESPIRATORY INFECTION): ICD-10-CM

## 2020-03-27 RX ORDER — ALBUTEROL SULFATE 90 UG/1
AEROSOL, METERED RESPIRATORY (INHALATION)
Qty: 54 G | Refills: 1 | Status: SHIPPED | OUTPATIENT
Start: 2020-03-27 | End: 2020-10-19

## 2020-04-16 ENCOUNTER — TELEPHONE (OUTPATIENT)
Dept: GASTROENTEROLOGY | Facility: MEDICAL CENTER | Age: 75
End: 2020-04-16

## 2020-04-23 DIAGNOSIS — E11.9 TYPE 2 DIABETES MELLITUS WITHOUT COMPLICATION, WITHOUT LONG-TERM CURRENT USE OF INSULIN (HCC): ICD-10-CM

## 2020-04-23 DIAGNOSIS — C83.31 DIFFUSE LARGE B-CELL LYMPHOMA OF LYMPH NODES OF NECK (HCC): ICD-10-CM

## 2020-04-23 DIAGNOSIS — I10 ESSENTIAL HYPERTENSION: ICD-10-CM

## 2020-04-23 DIAGNOSIS — Z51.5 PALLIATIVE CARE PATIENT: ICD-10-CM

## 2020-04-23 DIAGNOSIS — M25.562 ACUTE PAIN OF BOTH KNEES: ICD-10-CM

## 2020-04-23 DIAGNOSIS — M25.561 ACUTE PAIN OF BOTH KNEES: ICD-10-CM

## 2020-04-23 PROCEDURE — 4010F ACE/ARB THERAPY RXD/TAKEN: CPT | Performed by: PHYSICIAN ASSISTANT

## 2020-04-23 PROCEDURE — 4010F ACE/ARB THERAPY RXD/TAKEN: CPT | Performed by: INTERNAL MEDICINE

## 2020-04-23 RX ORDER — AMLODIPINE BESYLATE 5 MG/1
TABLET ORAL
Qty: 90 TABLET | Refills: 1 | Status: SHIPPED | OUTPATIENT
Start: 2020-04-23 | End: 2020-10-19

## 2020-04-23 RX ORDER — SITAGLIPTIN AND METFORMIN HYDROCHLORIDE 500; 50 MG/1; MG/1
TABLET, FILM COATED ORAL
Qty: 180 TABLET | Refills: 1 | Status: SHIPPED | OUTPATIENT
Start: 2020-04-23 | End: 2020-10-19

## 2020-04-23 RX ORDER — EZETIMIBE 10 MG/1
TABLET ORAL
Qty: 90 TABLET | Refills: 1 | Status: SHIPPED | OUTPATIENT
Start: 2020-04-23 | End: 2020-10-19

## 2020-04-23 RX ORDER — FLUTICASONE PROPIONATE 50 MCG
SPRAY, SUSPENSION (ML) NASAL
Qty: 48 G | Refills: 1 | Status: SHIPPED | OUTPATIENT
Start: 2020-04-23 | End: 2020-10-19

## 2020-04-23 RX ORDER — LOSARTAN POTASSIUM 25 MG/1
TABLET ORAL
Qty: 90 TABLET | Refills: 1 | Status: SHIPPED | OUTPATIENT
Start: 2020-04-23 | End: 2020-10-19

## 2020-04-24 ENCOUNTER — TELEMEDICINE (OUTPATIENT)
Dept: FAMILY MEDICINE CLINIC | Facility: CLINIC | Age: 75
End: 2020-04-24

## 2020-04-24 ENCOUNTER — TELEMEDICINE (OUTPATIENT)
Dept: PALLIATIVE MEDICINE | Facility: CLINIC | Age: 75
End: 2020-04-24
Payer: COMMERCIAL

## 2020-04-24 ENCOUNTER — TELEPHONE (OUTPATIENT)
Dept: FAMILY MEDICINE CLINIC | Facility: CLINIC | Age: 75
End: 2020-04-24

## 2020-04-24 DIAGNOSIS — F41.9 ANXIETY: ICD-10-CM

## 2020-04-24 DIAGNOSIS — F41.8 ANXIETY ABOUT HEALTH: Primary | ICD-10-CM

## 2020-04-24 DIAGNOSIS — L28.2 PRURITIC RASH: ICD-10-CM

## 2020-04-24 DIAGNOSIS — I10 ESSENTIAL HYPERTENSION: Primary | ICD-10-CM

## 2020-04-24 DIAGNOSIS — J30.9 ALLERGIC RHINITIS, UNSPECIFIED SEASONALITY, UNSPECIFIED TRIGGER: ICD-10-CM

## 2020-04-24 DIAGNOSIS — F32.A DEPRESSION, UNSPECIFIED DEPRESSION TYPE: ICD-10-CM

## 2020-04-24 DIAGNOSIS — R11.0 NAUSEA: ICD-10-CM

## 2020-04-24 DIAGNOSIS — E43 SEVERE PROTEIN-CALORIE MALNUTRITION (HCC): ICD-10-CM

## 2020-04-24 DIAGNOSIS — E11.9 TYPE 2 DIABETES MELLITUS WITHOUT COMPLICATION, WITHOUT LONG-TERM CURRENT USE OF INSULIN (HCC): ICD-10-CM

## 2020-04-24 DIAGNOSIS — Z51.5 PALLIATIVE CARE PATIENT: ICD-10-CM

## 2020-04-24 DIAGNOSIS — K21.9 GASTROESOPHAGEAL REFLUX DISEASE WITHOUT ESOPHAGITIS: ICD-10-CM

## 2020-04-24 DIAGNOSIS — G47.09 OTHER INSOMNIA: ICD-10-CM

## 2020-04-24 DIAGNOSIS — G89.3 NEOPLASM RELATED PAIN: ICD-10-CM

## 2020-04-24 DIAGNOSIS — M19.90 ARTHRITIS: ICD-10-CM

## 2020-04-24 PROCEDURE — 99213 OFFICE O/P EST LOW 20 MIN: CPT | Performed by: PHYSICIAN ASSISTANT

## 2020-04-24 PROCEDURE — 1160F RVW MEDS BY RX/DR IN RCRD: CPT | Performed by: PHYSICIAN ASSISTANT

## 2020-04-24 PROCEDURE — G2012 BRIEF CHECK IN BY MD/QHP: HCPCS | Performed by: EMERGENCY MEDICINE

## 2020-04-24 RX ORDER — LANCETS 33 GAUGE
EACH MISCELLANEOUS DAILY
Qty: 100 EACH | Refills: 3 | Status: SHIPPED | OUTPATIENT
Start: 2020-04-24 | End: 2020-11-25 | Stop reason: SDUPTHER

## 2020-04-24 RX ORDER — METHYLPREDNISOLONE 4 MG/1
TABLET ORAL
Qty: 21 EACH | Refills: 0 | Status: SHIPPED | OUTPATIENT
Start: 2020-04-24 | End: 2020-07-02

## 2020-04-24 RX ORDER — IBUPROFEN 800 MG/1
800 TABLET ORAL EVERY 6 HOURS PRN
Qty: 90 TABLET | Refills: 5 | Status: SHIPPED | OUTPATIENT
Start: 2020-04-24 | End: 2020-04-24 | Stop reason: SDUPTHER

## 2020-04-24 RX ORDER — CARVEDILOL 12.5 MG/1
12.5 TABLET ORAL 2 TIMES DAILY WITH MEALS
Qty: 180 TABLET | Refills: 1 | Status: SHIPPED | OUTPATIENT
Start: 2020-04-24 | End: 2021-01-14

## 2020-04-24 RX ORDER — IBUPROFEN 600 MG/1
600 TABLET ORAL EVERY 6 HOURS PRN
Qty: 120 TABLET | Refills: 5 | Status: SHIPPED | OUTPATIENT
Start: 2020-04-24 | End: 2020-07-02

## 2020-04-24 RX ORDER — CETIRIZINE HYDROCHLORIDE 5 MG/1
5 TABLET ORAL DAILY
Qty: 90 TABLET | Refills: 1 | Status: SHIPPED | OUTPATIENT
Start: 2020-04-24 | End: 2021-01-15 | Stop reason: SDUPTHER

## 2020-04-24 RX ORDER — MIRTAZAPINE 7.5 MG/1
15 TABLET, FILM COATED ORAL
Qty: 60 TABLET | Refills: 0 | Status: SHIPPED | OUTPATIENT
Start: 2020-04-24 | End: 2020-05-22 | Stop reason: SDUPTHER

## 2020-04-24 RX ORDER — BLOOD-GLUCOSE METER
EACH MISCELLANEOUS DAILY
Qty: 1 KIT | Refills: 0 | Status: SHIPPED | OUTPATIENT
Start: 2020-04-24 | End: 2020-11-25 | Stop reason: SDUPTHER

## 2020-04-28 ENCOUNTER — TELEPHONE (OUTPATIENT)
Dept: HEMATOLOGY ONCOLOGY | Facility: CLINIC | Age: 75
End: 2020-04-28

## 2020-05-22 ENCOUNTER — TELEMEDICINE (OUTPATIENT)
Dept: PALLIATIVE MEDICINE | Facility: CLINIC | Age: 75
End: 2020-05-22
Payer: COMMERCIAL

## 2020-05-22 DIAGNOSIS — G89.3 NEOPLASM RELATED PAIN: ICD-10-CM

## 2020-05-22 DIAGNOSIS — F41.9 ANXIETY: ICD-10-CM

## 2020-05-22 DIAGNOSIS — E43 SEVERE PROTEIN-CALORIE MALNUTRITION (HCC): ICD-10-CM

## 2020-05-22 DIAGNOSIS — M19.90 ARTHRITIS: ICD-10-CM

## 2020-05-22 DIAGNOSIS — Z51.5 PALLIATIVE CARE PATIENT: Primary | ICD-10-CM

## 2020-05-22 DIAGNOSIS — F41.8 ANXIETY ABOUT HEALTH: ICD-10-CM

## 2020-05-22 DIAGNOSIS — C83.31 DIFFUSE LARGE B-CELL LYMPHOMA OF LYMPH NODES OF NECK (HCC): ICD-10-CM

## 2020-05-22 PROCEDURE — G2012 BRIEF CHECK IN BY MD/QHP: HCPCS | Performed by: FAMILY MEDICINE

## 2020-05-22 RX ORDER — MIRTAZAPINE 7.5 MG/1
15 TABLET, FILM COATED ORAL
Qty: 60 TABLET | Refills: 0 | Status: SHIPPED | OUTPATIENT
Start: 2020-05-22 | End: 2020-07-02 | Stop reason: SDUPTHER

## 2020-06-10 ENCOUNTER — TELEPHONE (OUTPATIENT)
Dept: HEMATOLOGY ONCOLOGY | Facility: CLINIC | Age: 75
End: 2020-06-10

## 2020-06-12 ENCOUNTER — TELEPHONE (OUTPATIENT)
Dept: SLEEP CENTER | Facility: CLINIC | Age: 75
End: 2020-06-12

## 2020-06-15 ENCOUNTER — HOSPITAL ENCOUNTER (OUTPATIENT)
Dept: SLEEP CENTER | Facility: CLINIC | Age: 75
Discharge: HOME | End: 2020-06-15
Payer: COMMERCIAL

## 2020-06-15 DIAGNOSIS — G47.33 OSA (OBSTRUCTIVE SLEEP APNEA): ICD-10-CM

## 2020-06-15 PROCEDURE — 95810 POLYSOM 6/> YRS 4/> PARAM: CPT

## 2020-06-15 PROCEDURE — 95810 POLYSOM 6/> YRS 4/> PARAM: CPT | Performed by: INTERNAL MEDICINE

## 2020-06-22 DIAGNOSIS — G47.33 OSA (OBSTRUCTIVE SLEEP APNEA): Primary | ICD-10-CM

## 2020-06-24 ENCOUNTER — TELEPHONE (OUTPATIENT)
Dept: PULMONOLOGY | Facility: CLINIC | Age: 75
End: 2020-06-24

## 2020-06-29 NOTE — PATIENT INSTRUCTIONS
Please protect yourself from the novel Coronavirus (COVID-19)! Even though we do not have a vaccine or any antiviral drugs for this infection, the following strategies can help you stay healthy:    = Wash your hands with soap and water, or hand  with at least 60% alcohol often  = Avoid touching your face!   = Avoid close contact with people who are sick  Avoid gatherings of more than 5 people, and don't travel unnecessarily  = Stay home, except to get medical care or other essentials  If you can eat and drink and breathe and sleep, please consider calling your doctor's office instead of visiting in person, even if you are ill   = Cover your cough with a tissue, or your elbow  = Clean frequently touched surfaces and objects daily (e g , tables, countertops, light switches, doorknobs, and cabinet handles)  Regular household detergent and water are sufficient  PRESCRIPTION REFILL REMINDER:  All medication refills should be requested prior to [facility] on Friday  Any refill requests after noon on Friday would be addressed the following Monday

## 2020-07-02 ENCOUNTER — OFFICE VISIT (OUTPATIENT)
Dept: PALLIATIVE MEDICINE | Facility: CLINIC | Age: 75
End: 2020-07-02
Payer: COMMERCIAL

## 2020-07-02 VITALS
TEMPERATURE: 97.5 F | RESPIRATION RATE: 18 BRPM | BODY MASS INDEX: 29.37 KG/M2 | WEIGHT: 150.4 LBS | HEART RATE: 91 BPM | DIASTOLIC BLOOD PRESSURE: 65 MMHG | OXYGEN SATURATION: 95 % | SYSTOLIC BLOOD PRESSURE: 120 MMHG

## 2020-07-02 DIAGNOSIS — M19.90 ARTHRITIS: ICD-10-CM

## 2020-07-02 DIAGNOSIS — F41.8 ANXIETY ABOUT HEALTH: Primary | ICD-10-CM

## 2020-07-02 DIAGNOSIS — F41.9 ANXIETY: ICD-10-CM

## 2020-07-02 PROCEDURE — 2022F DILAT RTA XM EVC RTNOPTHY: CPT | Performed by: FAMILY MEDICINE

## 2020-07-02 PROCEDURE — 1160F RVW MEDS BY RX/DR IN RCRD: CPT | Performed by: FAMILY MEDICINE

## 2020-07-02 PROCEDURE — 99214 OFFICE O/P EST MOD 30 MIN: CPT | Performed by: FAMILY MEDICINE

## 2020-07-02 PROCEDURE — 3078F DIAST BP <80 MM HG: CPT | Performed by: FAMILY MEDICINE

## 2020-07-02 PROCEDURE — 1036F TOBACCO NON-USER: CPT | Performed by: FAMILY MEDICINE

## 2020-07-02 PROCEDURE — 3074F SYST BP LT 130 MM HG: CPT | Performed by: FAMILY MEDICINE

## 2020-07-02 PROCEDURE — 3051F HG A1C>EQUAL 7.0%<8.0%: CPT | Performed by: FAMILY MEDICINE

## 2020-07-02 RX ORDER — ALPRAZOLAM 0.25 MG/1
0.25 TABLET ORAL
Qty: 15 TABLET | Refills: 0 | Status: SHIPPED | OUTPATIENT
Start: 2020-07-02 | End: 2020-07-28 | Stop reason: SDUPTHER

## 2020-07-02 RX ORDER — MIRTAZAPINE 15 MG/1
15 TABLET, FILM COATED ORAL
Qty: 30 TABLET | Refills: 0 | Status: SHIPPED | OUTPATIENT
Start: 2020-07-02 | End: 2020-07-28 | Stop reason: SDUPTHER

## 2020-07-02 RX ORDER — MELOXICAM 7.5 MG/1
7.5 TABLET ORAL
Qty: 30 TABLET | Refills: 0 | Status: SHIPPED | OUTPATIENT
Start: 2020-07-02 | End: 2020-07-28 | Stop reason: SDUPTHER

## 2020-07-28 DIAGNOSIS — F41.9 ANXIETY: ICD-10-CM

## 2020-07-28 DIAGNOSIS — M19.90 ARTHRITIS: ICD-10-CM

## 2020-07-28 DIAGNOSIS — F41.8 ANXIETY ABOUT HEALTH: ICD-10-CM

## 2020-07-28 RX ORDER — ALPRAZOLAM 0.25 MG/1
0.25 TABLET ORAL
Qty: 15 TABLET | Refills: 0 | Status: SHIPPED | OUTPATIENT
Start: 2020-07-28 | End: 2020-08-18

## 2020-07-28 RX ORDER — MIRTAZAPINE 15 MG/1
15 TABLET, FILM COATED ORAL
Qty: 30 TABLET | Refills: 0 | Status: SHIPPED | OUTPATIENT
Start: 2020-07-28 | End: 2020-08-18

## 2020-07-28 RX ORDER — MELOXICAM 7.5 MG/1
7.5 TABLET ORAL
Qty: 30 TABLET | Refills: 0 | Status: SHIPPED | OUTPATIENT
Start: 2020-07-28 | End: 2020-08-18

## 2020-08-14 DIAGNOSIS — F41.9 ANXIETY: ICD-10-CM

## 2020-08-14 DIAGNOSIS — M19.90 ARTHRITIS: ICD-10-CM

## 2020-08-14 DIAGNOSIS — K21.9 GASTROESOPHAGEAL REFLUX DISEASE, ESOPHAGITIS PRESENCE NOT SPECIFIED: ICD-10-CM

## 2020-08-14 DIAGNOSIS — F41.8 ANXIETY ABOUT HEALTH: ICD-10-CM

## 2020-08-17 RX ORDER — OMEPRAZOLE 40 MG/1
CAPSULE, DELAYED RELEASE ORAL
Qty: 180 CAPSULE | Refills: 1 | Status: SHIPPED | OUTPATIENT
Start: 2020-08-17 | End: 2021-02-15

## 2020-08-18 RX ORDER — ALPRAZOLAM 0.25 MG/1
TABLET ORAL
Qty: 15 TABLET | Refills: 0 | Status: SHIPPED | OUTPATIENT
Start: 2020-08-18 | End: 2020-09-14

## 2020-08-18 RX ORDER — MELOXICAM 7.5 MG/1
TABLET ORAL
Qty: 30 TABLET | Refills: 0 | Status: SHIPPED | OUTPATIENT
Start: 2020-08-18 | End: 2020-09-14 | Stop reason: SDUPTHER

## 2020-08-18 RX ORDER — MIRTAZAPINE 15 MG/1
TABLET, FILM COATED ORAL
Qty: 30 TABLET | Refills: 0 | Status: SHIPPED | OUTPATIENT
Start: 2020-08-18 | End: 2020-09-14

## 2020-08-28 ENCOUNTER — TELEPHONE (OUTPATIENT)
Dept: PALLIATIVE MEDICINE | Facility: CLINIC | Age: 75
End: 2020-08-28

## 2020-08-28 NOTE — TELEPHONE ENCOUNTER
Patient no-showed appointment on 8/27/2020  No Show #1 letter has been sent to pt's home address via certified mail

## 2020-09-09 ENCOUNTER — TELEPHONE (OUTPATIENT)
Dept: PALLIATIVE MEDICINE | Facility: CLINIC | Age: 75
End: 2020-09-09

## 2020-09-09 DIAGNOSIS — M19.90 ARTHRITIS: ICD-10-CM

## 2020-09-09 NOTE — TELEPHONE ENCOUNTER
Salvatore Tripathi (pharmacist) from [address] calling on a duplicate prescription     Dr Radha Jackson prescribing - Meloxicam  Dr Josy Batista prescribing - Ibuprofen  Pharmacist would like to know if she is taking both or just the Meloxicam

## 2020-09-14 DIAGNOSIS — F41.8 ANXIETY ABOUT HEALTH: ICD-10-CM

## 2020-09-14 DIAGNOSIS — F41.9 ANXIETY: ICD-10-CM

## 2020-09-14 DIAGNOSIS — M19.90 ARTHRITIS: ICD-10-CM

## 2020-09-14 RX ORDER — MIRTAZAPINE 15 MG/1
TABLET, FILM COATED ORAL
Qty: 90 TABLET | Refills: 0 | Status: SHIPPED | OUTPATIENT
Start: 2020-09-14 | End: 2020-12-03 | Stop reason: SDUPTHER

## 2020-09-14 RX ORDER — MELOXICAM 7.5 MG/1
TABLET ORAL
Qty: 30 TABLET | Refills: 0 | OUTPATIENT
Start: 2020-09-14

## 2020-09-14 RX ORDER — MELOXICAM 7.5 MG/1
7.5 TABLET ORAL DAILY
Qty: 90 TABLET | Refills: 3 | Status: SHIPPED | OUTPATIENT
Start: 2020-09-14 | End: 2021-09-15

## 2020-09-14 RX ORDER — ALPRAZOLAM 0.25 MG/1
TABLET ORAL
Qty: 20 TABLET | Refills: 2 | Status: SHIPPED | OUTPATIENT
Start: 2020-09-14 | End: 2020-10-13 | Stop reason: SDUPTHER

## 2020-09-14 NOTE — TELEPHONE ENCOUNTER
9/14/2020 10:11 AM -  Discussed with family that meloxicam has been more helpful than ibuprofen for pain  Confirmed with pharmacy that we will use this medication to the exclusion of other NSAIDs

## 2020-10-01 NOTE — TELEPHONE ENCOUNTER
Reviewing her chart, her last A1C was in January, and she does not have a follow up appointment scheduled at this time  Please call her to schedule a follow up appointment with new PCP

## 2020-10-13 ENCOUNTER — OFFICE VISIT (OUTPATIENT)
Dept: PALLIATIVE MEDICINE | Facility: CLINIC | Age: 75
End: 2020-10-13
Payer: COMMERCIAL

## 2020-10-13 VITALS
TEMPERATURE: 96.8 F | OXYGEN SATURATION: 96 % | HEART RATE: 77 BPM | SYSTOLIC BLOOD PRESSURE: 120 MMHG | BODY MASS INDEX: 28.33 KG/M2 | WEIGHT: 145.06 LBS | RESPIRATION RATE: 12 BRPM | DIASTOLIC BLOOD PRESSURE: 74 MMHG

## 2020-10-13 DIAGNOSIS — Z51.5 PALLIATIVE CARE PATIENT: Primary | ICD-10-CM

## 2020-10-13 DIAGNOSIS — T45.1X5A CHEMOTHERAPY-INDUCED PERIPHERAL NEUROPATHY (HCC): Chronic | ICD-10-CM

## 2020-10-13 DIAGNOSIS — Z23 ENCOUNTER FOR IMMUNIZATION: ICD-10-CM

## 2020-10-13 DIAGNOSIS — Z92.21 STATUS POST CHEMOTHERAPY: ICD-10-CM

## 2020-10-13 DIAGNOSIS — G62.0 CHEMOTHERAPY-INDUCED PERIPHERAL NEUROPATHY (HCC): Chronic | ICD-10-CM

## 2020-10-13 DIAGNOSIS — F41.8 ANXIETY ABOUT HEALTH: ICD-10-CM

## 2020-10-13 PROCEDURE — 99214 OFFICE O/P EST MOD 30 MIN: CPT | Performed by: FAMILY MEDICINE

## 2020-10-13 PROCEDURE — 90471 IMMUNIZATION ADMIN: CPT

## 2020-10-13 PROCEDURE — 90662 IIV NO PRSV INCREASED AG IM: CPT

## 2020-10-13 RX ORDER — ALPRAZOLAM 0.25 MG/1
0.25 TABLET ORAL
Qty: 30 TABLET | Refills: 0 | Status: SHIPPED | OUTPATIENT
Start: 2020-10-13 | End: 2020-11-16

## 2020-10-13 RX ORDER — GABAPENTIN 100 MG/1
100 CAPSULE ORAL 2 TIMES DAILY
Qty: 60 CAPSULE | Refills: 0 | Status: SHIPPED | OUTPATIENT
Start: 2020-10-13 | End: 2020-10-28 | Stop reason: SDUPTHER

## 2020-10-19 DIAGNOSIS — Z51.5 PALLIATIVE CARE PATIENT: ICD-10-CM

## 2020-10-19 DIAGNOSIS — I10 ESSENTIAL HYPERTENSION: ICD-10-CM

## 2020-10-19 DIAGNOSIS — J06.9 URI (UPPER RESPIRATORY INFECTION): ICD-10-CM

## 2020-10-19 DIAGNOSIS — E11.9 TYPE 2 DIABETES MELLITUS WITHOUT COMPLICATION, WITHOUT LONG-TERM CURRENT USE OF INSULIN (HCC): ICD-10-CM

## 2020-10-19 DIAGNOSIS — C83.31 DIFFUSE LARGE B-CELL LYMPHOMA OF LYMPH NODES OF NECK (HCC): ICD-10-CM

## 2020-10-19 RX ORDER — FLUTICASONE PROPIONATE 50 MCG
SPRAY, SUSPENSION (ML) NASAL
Qty: 16 G | Refills: 0 | Status: SHIPPED | OUTPATIENT
Start: 2020-10-19 | End: 2020-11-17

## 2020-10-19 RX ORDER — ALBUTEROL SULFATE 90 UG/1
AEROSOL, METERED RESPIRATORY (INHALATION)
Qty: 18 G | Refills: 0 | Status: SHIPPED | OUTPATIENT
Start: 2020-10-19 | End: 2020-11-17

## 2020-10-19 RX ORDER — LOSARTAN POTASSIUM 25 MG/1
TABLET ORAL
Qty: 30 TABLET | Refills: 0 | Status: SHIPPED | OUTPATIENT
Start: 2020-10-19 | End: 2020-11-17

## 2020-10-19 RX ORDER — SITAGLIPTIN AND METFORMIN HYDROCHLORIDE 500; 50 MG/1; MG/1
TABLET, FILM COATED ORAL
Qty: 60 TABLET | Refills: 0 | Status: SHIPPED | OUTPATIENT
Start: 2020-10-19 | End: 2020-11-10

## 2020-10-19 RX ORDER — AMLODIPINE BESYLATE 5 MG/1
TABLET ORAL
Qty: 30 TABLET | Refills: 0 | Status: SHIPPED | OUTPATIENT
Start: 2020-10-19 | End: 2020-11-17

## 2020-10-19 RX ORDER — EZETIMIBE 10 MG/1
TABLET ORAL
Qty: 30 TABLET | Refills: 0 | Status: SHIPPED | OUTPATIENT
Start: 2020-10-19 | End: 2020-11-17

## 2020-10-21 DIAGNOSIS — Z92.21 STATUS POST CHEMOTHERAPY: ICD-10-CM

## 2020-10-21 DIAGNOSIS — G62.0 CHEMOTHERAPY-INDUCED PERIPHERAL NEUROPATHY (HCC): Chronic | ICD-10-CM

## 2020-10-21 DIAGNOSIS — T45.1X5A CHEMOTHERAPY-INDUCED PERIPHERAL NEUROPATHY (HCC): Chronic | ICD-10-CM

## 2020-10-21 RX ORDER — GABAPENTIN 100 MG/1
CAPSULE ORAL
Qty: 60 CAPSULE | Refills: 10 | OUTPATIENT
Start: 2020-10-21

## 2020-10-28 DIAGNOSIS — G62.0 CHEMOTHERAPY-INDUCED PERIPHERAL NEUROPATHY (HCC): Chronic | ICD-10-CM

## 2020-10-28 DIAGNOSIS — T45.1X5A CHEMOTHERAPY-INDUCED PERIPHERAL NEUROPATHY (HCC): Chronic | ICD-10-CM

## 2020-10-28 DIAGNOSIS — Z92.21 STATUS POST CHEMOTHERAPY: ICD-10-CM

## 2020-10-28 RX ORDER — GABAPENTIN 100 MG/1
100 CAPSULE ORAL 2 TIMES DAILY
Qty: 60 CAPSULE | Refills: 0 | Status: SHIPPED | OUTPATIENT
Start: 2020-10-28 | End: 2020-11-16

## 2020-11-10 ENCOUNTER — OFFICE VISIT (OUTPATIENT)
Dept: FAMILY MEDICINE CLINIC | Facility: CLINIC | Age: 75
End: 2020-11-10

## 2020-11-10 ENCOUNTER — TELEPHONE (OUTPATIENT)
Dept: FAMILY MEDICINE CLINIC | Facility: CLINIC | Age: 75
End: 2020-11-10

## 2020-11-10 VITALS
HEIGHT: 60 IN | TEMPERATURE: 97.4 F | BODY MASS INDEX: 28.25 KG/M2 | SYSTOLIC BLOOD PRESSURE: 110 MMHG | WEIGHT: 143.9 LBS | DIASTOLIC BLOOD PRESSURE: 70 MMHG | OXYGEN SATURATION: 97 % | HEART RATE: 68 BPM | RESPIRATION RATE: 16 BRPM

## 2020-11-10 DIAGNOSIS — E11.9 TYPE 2 DIABETES MELLITUS WITHOUT COMPLICATION, WITHOUT LONG-TERM CURRENT USE OF INSULIN (HCC): Primary | ICD-10-CM

## 2020-11-10 DIAGNOSIS — M25.562 ACUTE PAIN OF BOTH KNEES: ICD-10-CM

## 2020-11-10 DIAGNOSIS — R26.2 TROUBLE WALKING: ICD-10-CM

## 2020-11-10 DIAGNOSIS — M25.561 ACUTE PAIN OF BOTH KNEES: ICD-10-CM

## 2020-11-10 DIAGNOSIS — E78.2 MIXED HYPERLIPIDEMIA: ICD-10-CM

## 2020-11-10 LAB — SL AMB POCT HEMOGLOBIN AIC: 14.4 (ref ?–6.5)

## 2020-11-10 PROCEDURE — 3074F SYST BP LT 130 MM HG: CPT | Performed by: NURSE PRACTITIONER

## 2020-11-10 PROCEDURE — 1160F RVW MEDS BY RX/DR IN RCRD: CPT | Performed by: NURSE PRACTITIONER

## 2020-11-10 PROCEDURE — 83036 HEMOGLOBIN GLYCOSYLATED A1C: CPT | Performed by: NURSE PRACTITIONER

## 2020-11-10 PROCEDURE — 1036F TOBACCO NON-USER: CPT | Performed by: NURSE PRACTITIONER

## 2020-11-10 PROCEDURE — 3288F FALL RISK ASSESSMENT DOCD: CPT | Performed by: NURSE PRACTITIONER

## 2020-11-10 PROCEDURE — 3725F SCREEN DEPRESSION PERFORMED: CPT | Performed by: NURSE PRACTITIONER

## 2020-11-10 PROCEDURE — 99214 OFFICE O/P EST MOD 30 MIN: CPT | Performed by: NURSE PRACTITIONER

## 2020-11-10 PROCEDURE — 1100F PTFALLS ASSESS-DOCD GE2>/YR: CPT | Performed by: NURSE PRACTITIONER

## 2020-11-10 PROCEDURE — 3008F BODY MASS INDEX DOCD: CPT | Performed by: NURSE PRACTITIONER

## 2020-11-10 PROCEDURE — 3046F HEMOGLOBIN A1C LEVEL >9.0%: CPT | Performed by: NURSE PRACTITIONER

## 2020-11-10 PROCEDURE — 3078F DIAST BP <80 MM HG: CPT | Performed by: NURSE PRACTITIONER

## 2020-11-10 RX ORDER — FLASH GLUCOSE SENSOR
1 KIT MISCELLANEOUS DAILY
Qty: 1 EACH | Refills: 0 | Status: SHIPPED | OUTPATIENT
Start: 2020-11-10 | End: 2020-11-11

## 2020-11-10 RX ORDER — GLIPIZIDE 2.5 MG/1
2.5 TABLET, EXTENDED RELEASE ORAL DAILY
Qty: 30 TABLET | Refills: 0 | Status: SHIPPED | OUTPATIENT
Start: 2020-11-10 | End: 2020-11-25

## 2020-11-10 RX ORDER — BLOOD-GLUCOSE METER
1 KIT MISCELLANEOUS ONCE
Qty: 1 EACH | Refills: 0 | Status: SHIPPED | OUTPATIENT
Start: 2020-11-10 | End: 2020-11-11

## 2020-11-10 RX ORDER — FLASH GLUCOSE SCANNING READER
1 EACH MISCELLANEOUS DAILY
Qty: 1 DEVICE | Refills: 0 | Status: SHIPPED | OUTPATIENT
Start: 2020-11-10 | End: 2020-11-11

## 2020-11-10 RX ORDER — SITAGLIPTIN AND METFORMIN HYDROCHLORIDE 500; 50 MG/1; MG/1
1 TABLET, FILM COATED ORAL 2 TIMES DAILY WITH MEALS
Qty: 60 TABLET | Refills: 0 | Status: SHIPPED | OUTPATIENT
Start: 2020-11-10 | End: 2020-11-25

## 2020-11-11 ENCOUNTER — PATIENT OUTREACH (OUTPATIENT)
Dept: FAMILY MEDICINE CLINIC | Facility: CLINIC | Age: 75
End: 2020-11-11

## 2020-11-11 DIAGNOSIS — E11.9 TYPE 2 DIABETES MELLITUS WITHOUT COMPLICATION, WITHOUT LONG-TERM CURRENT USE OF INSULIN (HCC): ICD-10-CM

## 2020-11-11 PROBLEM — R26.2 TROUBLE WALKING: Status: ACTIVE | Noted: 2020-11-11

## 2020-11-11 RX ORDER — FLASH GLUCOSE SCANNING READER
1 EACH MISCELLANEOUS DAILY
Qty: 1 DEVICE | Refills: 0 | Status: SHIPPED | OUTPATIENT
Start: 2020-11-11 | End: 2021-02-17

## 2020-11-11 RX ORDER — FLASH GLUCOSE SENSOR
1 KIT MISCELLANEOUS DAILY
Qty: 1 EACH | Refills: 0 | Status: SHIPPED | OUTPATIENT
Start: 2020-11-11 | End: 2021-02-17

## 2020-11-11 RX ORDER — BLOOD-GLUCOSE METER
1 KIT MISCELLANEOUS ONCE
Qty: 1 EACH | Refills: 0 | Status: SHIPPED | OUTPATIENT
Start: 2020-11-11 | End: 2020-12-10

## 2020-11-16 DIAGNOSIS — G62.0 CHEMOTHERAPY-INDUCED PERIPHERAL NEUROPATHY (HCC): Chronic | ICD-10-CM

## 2020-11-16 DIAGNOSIS — Z51.5 PALLIATIVE CARE PATIENT: ICD-10-CM

## 2020-11-16 DIAGNOSIS — C83.31 DIFFUSE LARGE B-CELL LYMPHOMA OF LYMPH NODES OF NECK (HCC): ICD-10-CM

## 2020-11-16 DIAGNOSIS — E11.9 TYPE 2 DIABETES MELLITUS WITHOUT COMPLICATION, WITHOUT LONG-TERM CURRENT USE OF INSULIN (HCC): ICD-10-CM

## 2020-11-16 DIAGNOSIS — I10 ESSENTIAL HYPERTENSION: ICD-10-CM

## 2020-11-16 DIAGNOSIS — R19.7 DIARRHEA, UNSPECIFIED TYPE: ICD-10-CM

## 2020-11-16 DIAGNOSIS — T45.1X5A CHEMOTHERAPY-INDUCED PERIPHERAL NEUROPATHY (HCC): Chronic | ICD-10-CM

## 2020-11-16 DIAGNOSIS — F41.8 ANXIETY ABOUT HEALTH: ICD-10-CM

## 2020-11-16 DIAGNOSIS — J06.9 URI (UPPER RESPIRATORY INFECTION): ICD-10-CM

## 2020-11-16 DIAGNOSIS — Z92.21 STATUS POST CHEMOTHERAPY: ICD-10-CM

## 2020-11-16 RX ORDER — GABAPENTIN 100 MG/1
CAPSULE ORAL
Qty: 60 CAPSULE | Refills: 0 | Status: SHIPPED | OUTPATIENT
Start: 2020-11-16 | End: 2020-12-23 | Stop reason: SDUPTHER

## 2020-11-16 RX ORDER — ALPRAZOLAM 0.25 MG/1
TABLET ORAL
Qty: 30 TABLET | Refills: 0 | Status: SHIPPED | OUTPATIENT
Start: 2020-11-16 | End: 2020-12-23 | Stop reason: SDUPTHER

## 2020-11-17 PROCEDURE — 4010F ACE/ARB THERAPY RXD/TAKEN: CPT | Performed by: NURSE PRACTITIONER

## 2020-11-17 RX ORDER — EZETIMIBE 10 MG/1
TABLET ORAL
Qty: 30 TABLET | Refills: 0 | Status: SHIPPED | OUTPATIENT
Start: 2020-11-17 | End: 2020-12-16

## 2020-11-17 RX ORDER — LOPERAMIDE HYDROCHLORIDE 2 MG/1
CAPSULE ORAL
Qty: 30 CAPSULE | Refills: 11 | Status: SHIPPED | OUTPATIENT
Start: 2020-11-17 | End: 2021-11-10

## 2020-11-17 RX ORDER — LOSARTAN POTASSIUM 25 MG/1
TABLET ORAL
Qty: 30 TABLET | Refills: 0 | Status: SHIPPED | OUTPATIENT
Start: 2020-11-17 | End: 2020-12-16

## 2020-11-17 RX ORDER — ALBUTEROL SULFATE 90 UG/1
AEROSOL, METERED RESPIRATORY (INHALATION)
Qty: 18 G | Refills: 0 | Status: SHIPPED | OUTPATIENT
Start: 2020-11-17 | End: 2020-12-16

## 2020-11-17 RX ORDER — FLUTICASONE PROPIONATE 50 MCG
SPRAY, SUSPENSION (ML) NASAL
Qty: 16 G | Refills: 0 | Status: SHIPPED | OUTPATIENT
Start: 2020-11-17 | End: 2020-12-16

## 2020-11-17 RX ORDER — AMLODIPINE BESYLATE 5 MG/1
TABLET ORAL
Qty: 30 TABLET | Refills: 0 | Status: SHIPPED | OUTPATIENT
Start: 2020-11-17 | End: 2020-12-16

## 2020-11-19 ENCOUNTER — TELEPHONE (OUTPATIENT)
Dept: FAMILY MEDICINE CLINIC | Facility: CLINIC | Age: 75
End: 2020-11-19

## 2020-11-19 ENCOUNTER — PATIENT OUTREACH (OUTPATIENT)
Dept: FAMILY MEDICINE CLINIC | Facility: CLINIC | Age: 75
End: 2020-11-19

## 2020-11-20 DIAGNOSIS — E11.9 TYPE 2 DIABETES MELLITUS WITHOUT COMPLICATION, WITHOUT LONG-TERM CURRENT USE OF INSULIN (HCC): ICD-10-CM

## 2020-11-20 RX ORDER — GLIPIZIDE 2.5 MG/1
TABLET, EXTENDED RELEASE ORAL
Qty: 30 TABLET | Refills: 10 | OUTPATIENT
Start: 2020-11-20

## 2020-11-23 ENCOUNTER — PATIENT OUTREACH (OUTPATIENT)
Dept: FAMILY MEDICINE CLINIC | Facility: CLINIC | Age: 75
End: 2020-11-23

## 2020-11-25 ENCOUNTER — EVALUATION (OUTPATIENT)
Dept: PHYSICAL THERAPY | Facility: REHABILITATION | Age: 75
End: 2020-11-25
Payer: COMMERCIAL

## 2020-11-25 ENCOUNTER — PATIENT OUTREACH (OUTPATIENT)
Dept: FAMILY MEDICINE CLINIC | Facility: CLINIC | Age: 75
End: 2020-11-25

## 2020-11-25 DIAGNOSIS — R29.6 FALLS FREQUENTLY: Primary | ICD-10-CM

## 2020-11-25 DIAGNOSIS — E11.9 TYPE 2 DIABETES MELLITUS WITHOUT COMPLICATION, WITHOUT LONG-TERM CURRENT USE OF INSULIN (HCC): ICD-10-CM

## 2020-11-25 DIAGNOSIS — R26.9 GAIT DISTURBANCE: ICD-10-CM

## 2020-11-25 DIAGNOSIS — R26.89 BALANCE DISORDER: ICD-10-CM

## 2020-11-25 DIAGNOSIS — R26.2 TROUBLE WALKING: ICD-10-CM

## 2020-11-25 PROCEDURE — 97162 PT EVAL MOD COMPLEX 30 MIN: CPT

## 2020-11-25 PROCEDURE — 97112 NEUROMUSCULAR REEDUCATION: CPT

## 2020-11-25 RX ORDER — LANCETS 33 GAUGE
EACH MISCELLANEOUS DAILY
Qty: 100 EACH | Refills: 3 | Status: SHIPPED | OUTPATIENT
Start: 2020-11-25 | End: 2020-12-10 | Stop reason: SDUPTHER

## 2020-11-25 RX ORDER — BLOOD SUGAR DIAGNOSTIC
STRIP MISCELLANEOUS
Qty: 100 EACH | Refills: 3 | Status: SHIPPED | OUTPATIENT
Start: 2020-11-25 | End: 2020-12-10 | Stop reason: SDUPTHER

## 2020-11-25 RX ORDER — BLOOD-GLUCOSE METER
EACH MISCELLANEOUS DAILY
Qty: 1 KIT | Refills: 0 | Status: SHIPPED | OUTPATIENT
Start: 2020-11-25 | End: 2021-10-15 | Stop reason: SDUPTHER

## 2020-11-25 RX ORDER — GLIPIZIDE 5 MG/1
5 TABLET, FILM COATED, EXTENDED RELEASE ORAL DAILY
Qty: 30 TABLET | Refills: 0 | Status: SHIPPED | OUTPATIENT
Start: 2020-11-25 | End: 2020-12-09 | Stop reason: SDUPTHER

## 2020-11-25 RX ORDER — LINAGLIPTIN 5 MG/1
5 TABLET, FILM COATED ORAL DAILY
Qty: 30 TABLET | Refills: 0 | Status: SHIPPED | OUTPATIENT
Start: 2020-11-25 | End: 2020-12-09 | Stop reason: SDUPTHER

## 2020-11-30 ENCOUNTER — PATIENT OUTREACH (OUTPATIENT)
Dept: FAMILY MEDICINE CLINIC | Facility: CLINIC | Age: 75
End: 2020-11-30

## 2020-11-30 DIAGNOSIS — F41.9 ANXIETY: ICD-10-CM

## 2020-11-30 RX ORDER — MIRTAZAPINE 15 MG/1
TABLET, FILM COATED ORAL
Qty: 30 TABLET | Refills: 10 | OUTPATIENT
Start: 2020-11-30

## 2020-12-01 ENCOUNTER — PATIENT OUTREACH (OUTPATIENT)
Dept: FAMILY MEDICINE CLINIC | Facility: CLINIC | Age: 75
End: 2020-12-01

## 2020-12-03 DIAGNOSIS — F41.9 ANXIETY: ICD-10-CM

## 2020-12-04 RX ORDER — MIRTAZAPINE 15 MG/1
15 TABLET, FILM COATED ORAL
Qty: 90 TABLET | Refills: 0 | Status: SHIPPED | OUTPATIENT
Start: 2020-12-04 | End: 2021-03-19

## 2020-12-09 ENCOUNTER — TELEPHONE (OUTPATIENT)
Dept: HEMATOLOGY ONCOLOGY | Facility: CLINIC | Age: 75
End: 2020-12-09

## 2020-12-09 ENCOUNTER — HOSPITAL ENCOUNTER (OUTPATIENT)
Dept: INFUSION CENTER | Facility: HOSPITAL | Age: 75
Discharge: HOME | End: 2020-12-09
Payer: COMMERCIAL

## 2020-12-09 ENCOUNTER — PATIENT OUTREACH (OUTPATIENT)
Dept: FAMILY MEDICINE CLINIC | Facility: CLINIC | Age: 75
End: 2020-12-09

## 2020-12-09 ENCOUNTER — HOSPITAL ENCOUNTER (EMERGENCY)
Facility: HOSPITAL | Age: 75
Discharge: HOME | End: 2020-12-09
Attending: EMERGENCY MEDICINE | Admitting: EMERGENCY MEDICINE
Payer: COMMERCIAL

## 2020-12-09 VITALS
HEART RATE: 66 BPM | WEIGHT: 141.31 LBS | BODY MASS INDEX: 27.6 KG/M2 | RESPIRATION RATE: 16 BRPM | OXYGEN SATURATION: 98 % | DIASTOLIC BLOOD PRESSURE: 84 MMHG | SYSTOLIC BLOOD PRESSURE: 150 MMHG | TEMPERATURE: 98 F

## 2020-12-09 DIAGNOSIS — Z45.2 ENCOUNTER FOR CENTRAL LINE CARE: ICD-10-CM

## 2020-12-09 DIAGNOSIS — C83.31 DIFFUSE LARGE B-CELL LYMPHOMA OF LYMPH NODES OF NECK (HCC): Primary | ICD-10-CM

## 2020-12-09 DIAGNOSIS — E11.9 TYPE 2 DIABETES MELLITUS WITHOUT COMPLICATION, WITHOUT LONG-TERM CURRENT USE OF INSULIN (HCC): ICD-10-CM

## 2020-12-09 DIAGNOSIS — R73.9 HYPERGLYCEMIA: Primary | ICD-10-CM

## 2020-12-09 LAB
ALBUMIN SERPL BCP-MCNC: 4 G/DL (ref 3–5.2)
ALBUMIN SERPL BCP-MCNC: 4.6 G/DL (ref 3–5.2)
ALP SERPL-CCNC: 194 U/L (ref 43–122)
ALP SERPL-CCNC: 202 U/L (ref 43–122)
ALT SERPL W P-5'-P-CCNC: 15 U/L (ref 9–52)
ALT SERPL W P-5'-P-CCNC: 20 U/L (ref 9–52)
ANION GAP SERPL CALCULATED.3IONS-SCNC: 10 MMOL/L (ref 5–14)
ANION GAP SERPL CALCULATED.3IONS-SCNC: 11 MMOL/L (ref 5–14)
AST SERPL W P-5'-P-CCNC: 31 U/L (ref 14–36)
AST SERPL W P-5'-P-CCNC: 36 U/L (ref 14–36)
BACTERIA UR QL AUTO: NORMAL /HPF
BASE EX.OXY STD BLDV CALC-SCNC: 82.3 %
BASE EXCESS BLDV CALC-SCNC: -0.6 MMOL/L (ref -2.1–2.1)
BASOPHILS # BLD AUTO: 0.1 THOUSANDS/ΜL (ref 0–0.1)
BASOPHILS # BLD AUTO: 0.1 THOUSANDS/ΜL (ref 0–0.1)
BASOPHILS NFR BLD AUTO: 1 % (ref 0–1)
BASOPHILS NFR BLD AUTO: 1 % (ref 0–1)
BETA-HYDROXYBUTYRATE: 0.1 MMOL/L
BILIRUB SERPL-MCNC: 0.4 MG/DL
BILIRUB SERPL-MCNC: 0.4 MG/DL
BILIRUB UR QL STRIP: NEGATIVE
BUN SERPL-MCNC: 13 MG/DL (ref 5–25)
BUN SERPL-MCNC: 14 MG/DL (ref 5–25)
CALCIUM SERPL-MCNC: 9 MG/DL (ref 8.4–10.2)
CALCIUM SERPL-MCNC: 9.4 MG/DL (ref 8.4–10.2)
CHLORIDE SERPL-SCNC: 94 MMOL/L (ref 97–108)
CHLORIDE SERPL-SCNC: 95 MMOL/L (ref 97–108)
CLARITY UR: CLEAR
CO2 SERPL-SCNC: 25 MMOL/L (ref 22–30)
CO2 SERPL-SCNC: 27 MMOL/L (ref 22–30)
COLOR UR: ABNORMAL
CREAT SERPL-MCNC: 0.66 MG/DL (ref 0.6–1.2)
CREAT SERPL-MCNC: 0.69 MG/DL (ref 0.6–1.2)
CRP SERPL QL: <5 MG/L
EOSINOPHIL # BLD AUTO: 0.1 THOUSAND/ΜL (ref 0–0.4)
EOSINOPHIL # BLD AUTO: 0.1 THOUSAND/ΜL (ref 0–0.4)
EOSINOPHIL NFR BLD AUTO: 1 % (ref 0–6)
EOSINOPHIL NFR BLD AUTO: 1 % (ref 0–6)
ERYTHROCYTE [DISTWIDTH] IN BLOOD BY AUTOMATED COUNT: 13.6 %
ERYTHROCYTE [DISTWIDTH] IN BLOOD BY AUTOMATED COUNT: 13.6 %
ERYTHROCYTE [SEDIMENTATION RATE] IN BLOOD: 44 MM/HOUR (ref 0–29)
FERRITIN SERPL-MCNC: 40 NG/ML (ref 8–388)
FLUAV RNA RESP QL NAA+PROBE: NEGATIVE
FLUBV RNA RESP QL NAA+PROBE: NEGATIVE
GFR SERPL CREATININE-BSD FRML MDRD: 86 ML/MIN/1.73SQ M
GFR SERPL CREATININE-BSD FRML MDRD: 87 ML/MIN/1.73SQ M
GLUCOSE SERPL-MCNC: 322 MG/DL (ref 65–140)
GLUCOSE SERPL-MCNC: 414 MG/DL (ref 65–140)
GLUCOSE SERPL-MCNC: 418 MG/DL (ref 70–99)
GLUCOSE SERPL-MCNC: 699 MG/DL (ref 70–99)
GLUCOSE UR STRIP-MCNC: ABNORMAL MG/DL
HCO3 BLDV-SCNC: 24.9 MMOL/L (ref 23–28)
HCT VFR BLD AUTO: 39.8 % (ref 36–46)
HCT VFR BLD AUTO: 40.4 % (ref 36–46)
HGB BLD-MCNC: 12.8 G/DL (ref 12–16)
HGB BLD-MCNC: 13.6 G/DL (ref 12–16)
HGB UR QL STRIP.AUTO: NEGATIVE
IRON SATN MFR SERPL: 27 %
IRON SERPL-MCNC: 76 UG/DL (ref 50–170)
KETONES UR STRIP-MCNC: NEGATIVE MG/DL
LACTATE SERPL-SCNC: 1.9 MMOL/L (ref 0.7–2)
LDH SERPL-CCNC: 348 U/L (ref 313–618)
LEUKOCYTE ESTERASE UR QL STRIP: 100
LYMPHOCYTES # BLD AUTO: 2.4 THOUSANDS/ΜL (ref 0.5–4)
LYMPHOCYTES # BLD AUTO: 3.7 THOUSANDS/ΜL (ref 0.5–4)
LYMPHOCYTES NFR BLD AUTO: 27 % (ref 25–45)
LYMPHOCYTES NFR BLD AUTO: 36 % (ref 25–45)
MAGNESIUM SERPL-MCNC: 1.8 MG/DL (ref 1.6–2.3)
MCH RBC QN AUTO: 30 PG (ref 26–34)
MCH RBC QN AUTO: 30.8 PG (ref 26–34)
MCHC RBC AUTO-ENTMCNC: 32.1 G/DL (ref 31–36)
MCHC RBC AUTO-ENTMCNC: 33.7 G/DL (ref 31–36)
MCV RBC AUTO: 91 FL (ref 80–100)
MCV RBC AUTO: 93 FL (ref 80–100)
MONOCYTES # BLD AUTO: 0.6 THOUSAND/ΜL (ref 0.2–0.9)
MONOCYTES # BLD AUTO: 0.7 THOUSAND/ΜL (ref 0.2–0.9)
MONOCYTES NFR BLD AUTO: 7 % (ref 1–10)
MONOCYTES NFR BLD AUTO: 7 % (ref 1–10)
NEUTROPHILS # BLD AUTO: 5.7 THOUSANDS/ΜL (ref 1.8–7.8)
NEUTROPHILS # BLD AUTO: 5.8 THOUSANDS/ΜL (ref 1.8–7.8)
NEUTS SEG NFR BLD AUTO: 56 % (ref 45–65)
NEUTS SEG NFR BLD AUTO: 64 % (ref 45–65)
NITRITE UR QL STRIP: NEGATIVE
NON-SQ EPI CELLS URNS QL MICRO: NORMAL /HPF
O2 CT BLDV-SCNC: 16.3 ML/DL
PCO2 BLDV: 43 MM HG (ref 41–51)
PH BLDV: 7.37 [PH] (ref 7.35–7.45)
PH UR STRIP.AUTO: 6.5 [PH]
PLATELET # BLD AUTO: 252 THOUSANDS/UL (ref 150–450)
PLATELET # BLD AUTO: 275 THOUSANDS/UL (ref 150–450)
PMV BLD AUTO: 8.7 FL (ref 8.9–12.7)
PMV BLD AUTO: 9.6 FL (ref 8.9–12.7)
PO2 BLDV: 45 MM HG
POTASSIUM SERPL-SCNC: 4.5 MMOL/L (ref 3.6–5)
POTASSIUM SERPL-SCNC: 4.9 MMOL/L (ref 3.6–5)
PROT SERPL-MCNC: 7.4 G/DL (ref 5.9–8.4)
PROT SERPL-MCNC: 8.3 G/DL (ref 5.9–8.4)
PROT UR STRIP-MCNC: NEGATIVE MG/DL
RBC # BLD AUTO: 4.27 MILLION/UL (ref 4–5.2)
RBC # BLD AUTO: 4.42 MILLION/UL (ref 4–5.2)
RBC #/AREA URNS AUTO: NORMAL /HPF
RSV RNA RESP QL NAA+PROBE: NEGATIVE
SARS-COV-2 RNA RESP QL NAA+PROBE: NEGATIVE
SODIUM SERPL-SCNC: 130 MMOL/L (ref 137–147)
SODIUM SERPL-SCNC: 132 MMOL/L (ref 137–147)
SP GR UR STRIP.AUTO: 1 (ref 1–1.04)
TIBC SERPL-MCNC: 285 UG/DL (ref 250–450)
TROPONIN I SERPL-MCNC: <0.01 NG/ML (ref 0–0.03)
UROBILINOGEN UA: NEGATIVE MG/DL
VIT B12 SERPL-MCNC: 508 PG/ML (ref 100–900)
WBC # BLD AUTO: 10.4 THOUSAND/UL (ref 4.5–11)
WBC # BLD AUTO: 8.9 THOUSAND/UL (ref 4.5–11)
WBC #/AREA URNS AUTO: NORMAL /HPF

## 2020-12-09 PROCEDURE — 86140 C-REACTIVE PROTEIN: CPT | Performed by: INTERNAL MEDICINE

## 2020-12-09 PROCEDURE — 81001 URINALYSIS AUTO W/SCOPE: CPT | Performed by: EMERGENCY MEDICINE

## 2020-12-09 PROCEDURE — 85025 COMPLETE CBC W/AUTO DIFF WBC: CPT | Performed by: EMERGENCY MEDICINE

## 2020-12-09 PROCEDURE — 99284 EMERGENCY DEPT VISIT MOD MDM: CPT

## 2020-12-09 PROCEDURE — 83735 ASSAY OF MAGNESIUM: CPT | Performed by: EMERGENCY MEDICINE

## 2020-12-09 PROCEDURE — 82607 VITAMIN B-12: CPT | Performed by: INTERNAL MEDICINE

## 2020-12-09 PROCEDURE — 85652 RBC SED RATE AUTOMATED: CPT | Performed by: INTERNAL MEDICINE

## 2020-12-09 PROCEDURE — 84484 ASSAY OF TROPONIN QUANT: CPT | Performed by: EMERGENCY MEDICINE

## 2020-12-09 PROCEDURE — 82728 ASSAY OF FERRITIN: CPT | Performed by: INTERNAL MEDICINE

## 2020-12-09 PROCEDURE — 85025 COMPLETE CBC W/AUTO DIFF WBC: CPT | Performed by: INTERNAL MEDICINE

## 2020-12-09 PROCEDURE — 83605 ASSAY OF LACTIC ACID: CPT | Performed by: EMERGENCY MEDICINE

## 2020-12-09 PROCEDURE — 82805 BLOOD GASES W/O2 SATURATION: CPT | Performed by: EMERGENCY MEDICINE

## 2020-12-09 PROCEDURE — 0241U HB NFCT DS VIR RESP RNA 4 TRGT: CPT | Performed by: EMERGENCY MEDICINE

## 2020-12-09 PROCEDURE — 82010 KETONE BODYS QUAN: CPT | Performed by: EMERGENCY MEDICINE

## 2020-12-09 PROCEDURE — 36415 COLL VENOUS BLD VENIPUNCTURE: CPT | Performed by: EMERGENCY MEDICINE

## 2020-12-09 PROCEDURE — 99285 EMERGENCY DEPT VISIT HI MDM: CPT | Performed by: EMERGENCY MEDICINE

## 2020-12-09 PROCEDURE — 93005 ELECTROCARDIOGRAM TRACING: CPT

## 2020-12-09 PROCEDURE — 96361 HYDRATE IV INFUSION ADD-ON: CPT

## 2020-12-09 PROCEDURE — 96360 HYDRATION IV INFUSION INIT: CPT

## 2020-12-09 PROCEDURE — 80053 COMPREHEN METABOLIC PANEL: CPT | Performed by: INTERNAL MEDICINE

## 2020-12-09 PROCEDURE — 83550 IRON BINDING TEST: CPT | Performed by: INTERNAL MEDICINE

## 2020-12-09 PROCEDURE — 80053 COMPREHEN METABOLIC PANEL: CPT | Performed by: EMERGENCY MEDICINE

## 2020-12-09 PROCEDURE — 82948 REAGENT STRIP/BLOOD GLUCOSE: CPT

## 2020-12-09 PROCEDURE — 83615 LACTATE (LD) (LDH) ENZYME: CPT | Performed by: INTERNAL MEDICINE

## 2020-12-09 PROCEDURE — 83540 ASSAY OF IRON: CPT | Performed by: INTERNAL MEDICINE

## 2020-12-09 RX ORDER — GLIPIZIDE 5 MG/1
5 TABLET, FILM COATED, EXTENDED RELEASE ORAL DAILY
Qty: 30 TABLET | Refills: 0 | Status: SHIPPED | OUTPATIENT
Start: 2020-12-09 | End: 2020-12-10

## 2020-12-09 RX ORDER — LINAGLIPTIN 5 MG/1
5 TABLET, FILM COATED ORAL DAILY
Qty: 30 TABLET | Refills: 0 | Status: SHIPPED | OUTPATIENT
Start: 2020-12-09 | End: 2020-12-10 | Stop reason: SDUPTHER

## 2020-12-09 RX ADMIN — SODIUM CHLORIDE 1000 ML: 0.9 INJECTION, SOLUTION INTRAVENOUS at 21:14

## 2020-12-09 RX ADMIN — SODIUM CHLORIDE 1000 ML: 0.9 INJECTION, SOLUTION INTRAVENOUS at 19:22

## 2020-12-10 ENCOUNTER — PATIENT OUTREACH (OUTPATIENT)
Dept: FAMILY MEDICINE CLINIC | Facility: CLINIC | Age: 75
End: 2020-12-10

## 2020-12-10 DIAGNOSIS — E11.9 TYPE 2 DIABETES MELLITUS WITHOUT COMPLICATION, WITHOUT LONG-TERM CURRENT USE OF INSULIN (HCC): ICD-10-CM

## 2020-12-10 DIAGNOSIS — E11.65 UNCONTROLLED TYPE 2 DIABETES MELLITUS WITH HYPERGLYCEMIA (HCC): Primary | ICD-10-CM

## 2020-12-10 LAB
ATRIAL RATE: 67 BPM
P AXIS: 62 DEGREES
PR INTERVAL: 130 MS
QRS AXIS: 18 DEGREES
QRSD INTERVAL: 86 MS
QT INTERVAL: 414 MS
QTC INTERVAL: 437 MS
T WAVE AXIS: 37 DEGREES
VENTRICULAR RATE: 67 BPM

## 2020-12-10 PROCEDURE — 93010 ELECTROCARDIOGRAM REPORT: CPT | Performed by: INTERNAL MEDICINE

## 2020-12-10 RX ORDER — BLOOD SUGAR DIAGNOSTIC
STRIP MISCELLANEOUS
Qty: 100 EACH | Refills: 3 | Status: SHIPPED | OUTPATIENT
Start: 2020-12-10 | End: 2021-03-13 | Stop reason: SDUPTHER

## 2020-12-10 RX ORDER — GLIPIZIDE 5 MG/1
5 TABLET, FILM COATED, EXTENDED RELEASE ORAL DAILY
Qty: 30 TABLET | Refills: 0 | Status: SHIPPED | OUTPATIENT
Start: 2020-12-10 | End: 2020-12-15

## 2020-12-10 RX ORDER — LINAGLIPTIN 5 MG/1
5 TABLET, FILM COATED ORAL DAILY
Qty: 30 TABLET | Refills: 0 | Status: SHIPPED | OUTPATIENT
Start: 2020-12-10 | End: 2020-12-15

## 2020-12-10 RX ORDER — LANCETS 33 GAUGE
EACH MISCELLANEOUS DAILY
Qty: 100 EACH | Refills: 3 | Status: SHIPPED | OUTPATIENT
Start: 2020-12-10 | End: 2021-05-20 | Stop reason: SDUPTHER

## 2020-12-10 RX ORDER — LANCETS 33 GAUGE
EACH MISCELLANEOUS DAILY
Qty: 100 EACH | Refills: 3 | Status: SHIPPED | OUTPATIENT
Start: 2020-12-10 | End: 2020-12-10

## 2020-12-10 RX ORDER — BLOOD SUGAR DIAGNOSTIC
STRIP MISCELLANEOUS
Qty: 100 EACH | Refills: 3 | Status: SHIPPED | OUTPATIENT
Start: 2020-12-10 | End: 2020-12-10

## 2020-12-14 ENCOUNTER — OFFICE VISIT (OUTPATIENT)
Dept: FAMILY MEDICINE CLINIC | Facility: CLINIC | Age: 75
End: 2020-12-14

## 2020-12-14 VITALS
RESPIRATION RATE: 16 BRPM | OXYGEN SATURATION: 98 % | HEART RATE: 66 BPM | WEIGHT: 140.8 LBS | BODY MASS INDEX: 27.64 KG/M2 | HEIGHT: 60 IN | TEMPERATURE: 98.3 F | DIASTOLIC BLOOD PRESSURE: 58 MMHG | SYSTOLIC BLOOD PRESSURE: 104 MMHG

## 2020-12-14 DIAGNOSIS — E11.65 UNCONTROLLED TYPE 2 DIABETES MELLITUS WITH HYPERGLYCEMIA (HCC): Primary | ICD-10-CM

## 2020-12-14 LAB — SL AMB POCT GLUCOSE BLD: 324

## 2020-12-14 PROCEDURE — 82948 REAGENT STRIP/BLOOD GLUCOSE: CPT | Performed by: NURSE PRACTITIONER

## 2020-12-14 PROCEDURE — 99214 OFFICE O/P EST MOD 30 MIN: CPT | Performed by: NURSE PRACTITIONER

## 2020-12-14 PROCEDURE — 3008F BODY MASS INDEX DOCD: CPT | Performed by: NURSE PRACTITIONER

## 2020-12-14 RX ORDER — PEN NEEDLE, DIABETIC 32 GX 1/4"
NEEDLE, DISPOSABLE MISCELLANEOUS DAILY
Qty: 100 EACH | Refills: 1 | Status: SHIPPED | OUTPATIENT
Start: 2020-12-14

## 2020-12-14 RX ORDER — INSULIN GLARGINE 100 [IU]/ML
10 INJECTION, SOLUTION SUBCUTANEOUS
Qty: 5 PEN | Refills: 0 | Status: SHIPPED | OUTPATIENT
Start: 2020-12-14 | End: 2021-01-15

## 2020-12-15 DIAGNOSIS — C83.31 DIFFUSE LARGE B-CELL LYMPHOMA OF LYMPH NODES OF NECK (HCC): ICD-10-CM

## 2020-12-15 DIAGNOSIS — I10 ESSENTIAL HYPERTENSION: ICD-10-CM

## 2020-12-15 DIAGNOSIS — G62.0 CHEMOTHERAPY-INDUCED PERIPHERAL NEUROPATHY (HCC): Chronic | ICD-10-CM

## 2020-12-15 DIAGNOSIS — E11.9 TYPE 2 DIABETES MELLITUS WITHOUT COMPLICATION, WITHOUT LONG-TERM CURRENT USE OF INSULIN (HCC): ICD-10-CM

## 2020-12-15 DIAGNOSIS — Z51.5 PALLIATIVE CARE PATIENT: ICD-10-CM

## 2020-12-15 DIAGNOSIS — F41.8 ANXIETY ABOUT HEALTH: ICD-10-CM

## 2020-12-15 DIAGNOSIS — Z92.21 STATUS POST CHEMOTHERAPY: ICD-10-CM

## 2020-12-15 DIAGNOSIS — T45.1X5A CHEMOTHERAPY-INDUCED PERIPHERAL NEUROPATHY (HCC): Chronic | ICD-10-CM

## 2020-12-15 DIAGNOSIS — J06.9 URI (UPPER RESPIRATORY INFECTION): ICD-10-CM

## 2020-12-15 RX ORDER — LINAGLIPTIN 5 MG/1
TABLET, FILM COATED ORAL
Qty: 30 TABLET | Refills: 0 | Status: SHIPPED | OUTPATIENT
Start: 2020-12-15 | End: 2020-12-29

## 2020-12-15 RX ORDER — GLIPIZIDE 5 MG/1
TABLET, FILM COATED, EXTENDED RELEASE ORAL
Qty: 30 TABLET | Refills: 0 | Status: SHIPPED | OUTPATIENT
Start: 2020-12-15 | End: 2020-12-29

## 2020-12-16 RX ORDER — EZETIMIBE 10 MG/1
TABLET ORAL
Qty: 30 TABLET | Refills: 10 | Status: SHIPPED | OUTPATIENT
Start: 2020-12-16 | End: 2021-01-15 | Stop reason: SDUPTHER

## 2020-12-16 RX ORDER — FLUTICASONE PROPIONATE 50 MCG
SPRAY, SUSPENSION (ML) NASAL
Qty: 16 G | Refills: 10 | Status: SHIPPED | OUTPATIENT
Start: 2020-12-16 | End: 2021-01-15 | Stop reason: SDUPTHER

## 2020-12-16 RX ORDER — AMLODIPINE BESYLATE 5 MG/1
TABLET ORAL
Qty: 30 TABLET | Refills: 10 | Status: SHIPPED | OUTPATIENT
Start: 2020-12-16 | End: 2021-01-15 | Stop reason: SDUPTHER

## 2020-12-16 RX ORDER — LOSARTAN POTASSIUM 25 MG/1
TABLET ORAL
Qty: 30 TABLET | Refills: 10 | Status: SHIPPED | OUTPATIENT
Start: 2020-12-16 | End: 2021-03-12

## 2020-12-17 ENCOUNTER — PATIENT OUTREACH (OUTPATIENT)
Dept: FAMILY MEDICINE CLINIC | Facility: CLINIC | Age: 75
End: 2020-12-17

## 2020-12-18 RX ORDER — ALPRAZOLAM 0.25 MG/1
TABLET ORAL
Qty: 30 TABLET | Refills: 4 | OUTPATIENT
Start: 2020-12-18

## 2020-12-18 RX ORDER — GABAPENTIN 100 MG/1
CAPSULE ORAL
Qty: 60 CAPSULE | Refills: 10 | OUTPATIENT
Start: 2020-12-18

## 2020-12-23 DIAGNOSIS — Z92.21 STATUS POST CHEMOTHERAPY: ICD-10-CM

## 2020-12-23 DIAGNOSIS — G62.0 CHEMOTHERAPY-INDUCED PERIPHERAL NEUROPATHY (HCC): Chronic | ICD-10-CM

## 2020-12-23 DIAGNOSIS — F41.8 ANXIETY ABOUT HEALTH: ICD-10-CM

## 2020-12-23 DIAGNOSIS — T45.1X5A CHEMOTHERAPY-INDUCED PERIPHERAL NEUROPATHY (HCC): Chronic | ICD-10-CM

## 2020-12-23 RX ORDER — ALPRAZOLAM 0.25 MG/1
0.25 TABLET ORAL
Qty: 30 TABLET | Refills: 0 | Status: SHIPPED | OUTPATIENT
Start: 2020-12-23 | End: 2021-01-14 | Stop reason: SDUPTHER

## 2020-12-23 RX ORDER — GABAPENTIN 100 MG/1
100 CAPSULE ORAL 2 TIMES DAILY
Qty: 60 CAPSULE | Refills: 0 | Status: SHIPPED | OUTPATIENT
Start: 2020-12-23 | End: 2021-01-15 | Stop reason: SDUPTHER

## 2020-12-29 DIAGNOSIS — G62.0 CHEMOTHERAPY-INDUCED PERIPHERAL NEUROPATHY (HCC): Chronic | ICD-10-CM

## 2020-12-29 DIAGNOSIS — E11.9 TYPE 2 DIABETES MELLITUS WITHOUT COMPLICATION, WITHOUT LONG-TERM CURRENT USE OF INSULIN (HCC): ICD-10-CM

## 2020-12-29 DIAGNOSIS — T45.1X5A CHEMOTHERAPY-INDUCED PERIPHERAL NEUROPATHY (HCC): Chronic | ICD-10-CM

## 2020-12-29 DIAGNOSIS — Z92.21 STATUS POST CHEMOTHERAPY: ICD-10-CM

## 2020-12-29 RX ORDER — LINAGLIPTIN 5 MG/1
TABLET, FILM COATED ORAL
Qty: 30 TABLET | Refills: 10 | Status: SHIPPED | OUTPATIENT
Start: 2020-12-29 | End: 2021-11-10

## 2020-12-29 RX ORDER — GLIPIZIDE 5 MG/1
TABLET, FILM COATED, EXTENDED RELEASE ORAL
Qty: 30 TABLET | Refills: 10 | Status: SHIPPED | OUTPATIENT
Start: 2020-12-29 | End: 2021-03-12

## 2020-12-31 ENCOUNTER — PATIENT OUTREACH (OUTPATIENT)
Dept: FAMILY MEDICINE CLINIC | Facility: CLINIC | Age: 75
End: 2020-12-31

## 2020-12-31 RX ORDER — GABAPENTIN 100 MG/1
CAPSULE ORAL
Qty: 60 CAPSULE | Refills: 10 | OUTPATIENT
Start: 2020-12-31

## 2021-01-11 ENCOUNTER — PATIENT OUTREACH (OUTPATIENT)
Dept: FAMILY MEDICINE CLINIC | Facility: CLINIC | Age: 76
End: 2021-01-11

## 2021-01-11 NOTE — PROGRESS NOTES
I called Jazlyn Kevin, patient's granddaughter, to follow up and for patient's assessment  Jazlyn Kevin states patient is "so good"  She has had no further falls  She reports patient's blood sugars have been "good"  She notes last night before bed her sugar was 75 so she did not give the patient insulin but she did give her a snack  She did not check patient's sugar yet this morning since she is still sleeping  Helenamonicayanci Kevin states she does check patient's feet on a regular basis and currently has no skin breakdown  My intent was to do her assessment today but Jazlyn Kevin had many children in her home and it was not a good time  She asked that I call back another time  I will remind her of patient's PCP appointment at that time

## 2021-01-11 NOTE — PROGRESS NOTES
I received a call from [facility] asking for medication refills  I reviewed her chart and patient has refills remaining  I asked [facility] to contact the pharmacy; she stated she will call after our call ended

## 2021-01-14 ENCOUNTER — PATIENT OUTREACH (OUTPATIENT)
Dept: FAMILY MEDICINE CLINIC | Facility: CLINIC | Age: 76
End: 2021-01-14

## 2021-01-14 ENCOUNTER — TELEMEDICINE (OUTPATIENT)
Dept: PALLIATIVE MEDICINE | Facility: CLINIC | Age: 76
End: 2021-01-14
Payer: COMMERCIAL

## 2021-01-14 DIAGNOSIS — F41.8 ANXIETY ABOUT HEALTH: ICD-10-CM

## 2021-01-14 DIAGNOSIS — R55 SYNCOPE, UNSPECIFIED SYNCOPE TYPE: Primary | ICD-10-CM

## 2021-01-14 DIAGNOSIS — Z78.9 NEEDS ASSISTANCE WITH COMMUNITY RESOURCES: Primary | ICD-10-CM

## 2021-01-14 DIAGNOSIS — I10 ESSENTIAL HYPERTENSION: ICD-10-CM

## 2021-01-14 PROCEDURE — 99213 OFFICE O/P EST LOW 20 MIN: CPT | Performed by: FAMILY MEDICINE

## 2021-01-14 RX ORDER — CARVEDILOL 12.5 MG/1
TABLET ORAL
Qty: 60 TABLET | Refills: 10 | Status: SHIPPED | OUTPATIENT
Start: 2021-01-14 | End: 2021-01-15 | Stop reason: SDUPTHER

## 2021-01-14 RX ORDER — ALPRAZOLAM 0.25 MG/1
TABLET ORAL
Qty: 30 TABLET | Refills: 10 | OUTPATIENT
Start: 2021-01-14

## 2021-01-14 RX ORDER — ALPRAZOLAM 0.25 MG/1
0.25 TABLET ORAL
Qty: 30 TABLET | Refills: 1 | Status: SHIPPED | OUTPATIENT
Start: 2021-01-14 | End: 2021-03-19

## 2021-01-14 NOTE — PROGRESS NOTES
Outpatient Virtual Regular Visit - Follow-up with Palliative and Supportive Care  [facility] 77 y o  female [id number]    Intake:    Reason for virtual visit is f/up cancer pain  The patient is unable to visit the clinic safely due to the coronavirus pandemic  After connecting through [employer], the patient was identified by name and date of birth  [facility] was informed that this was a telemedicine visit and that the visit is being conducted through [employer] and patient was informed that this is a secure, HIPAA-compliant platform  She agrees to proceed     My office door was closed  No one else was in the room  She acknowledged consent and understanding of privacy and security of the video platform  The patient has agreed to participate and understands they can discontinue the visit at any time  Assessment and Plan  Problem List Items Addressed This Visit     None        No orders of the defined types were placed in this encounter  There are no discontinued medications  [facility] was assessed today for symptoms and care planning related to above illnesses  I have reviewed the patient's controlled substance dispensing history in the Prescription Drug Monitoring Program in compliance with the [facility] regulations before prescribing any controlled substances  She is invited to continue to follow with us  If there are questions or concerns, please contact us through our clinic/answering service 24 hours a day, seven days a week  Aurora Iglesias MD  [address] [place] [address] Palliative and Supportive Care  [phone]          Subjective    This 77 y o  lady presents in follow up of her DLBCL  She was diagnosed in September 2018 and completed therapy in March 2019; she continues in survivorship cares under the guidance of Ms Eilleen Holstein and Dr Jay Camp        Since last visit, she visited ED for terrible hyperglycemia, and outpt f/up revealed that insulin was necessary to control wildly elevated FSBG  She is feeling much better with improved glycemic control, as well as meloxicam we have written  Alprazolam continues to be helpful for anxiety management  We agreed to continue with these meds, as well as her mirtazapine, without changes          Past Medical History:   Diagnosis Date    Cancer [facility])     Throat    Chronic pain disorder     Diabetes mellitus ([facility] )     Diffuse large B cell lymphoma ([place] [address] )     Dysphagia     GERD without esophagitis     HTN (hypertension)     Hyperlipidemia     Hypertension     MI (myocardial infarction) ([place] [address] )     Port-A-Cath in place 07/29/2019    Thyroid cancer ([place] [address] ) 2018     Past Surgical History:   Procedure Laterality Date    BONE MARROW BIOPSY      BREAST BIOPSY Left     CHOLECYSTECTOMY      IR PORT PLACEMENT  11/16/2018    OTHER SURGICAL HISTORY      tendor tear repair to right shoulder    SHOULDER ARTHROSCOPY       Current Outpatient Medications   Medication Sig Dispense Refill    ALPRAZolam (XANAX) 0 25 mg tablet Take 1 tablet (0 25 mg total) by mouth daily at bedtime as needed for anxiety or sleep 30 tablet 0    amLODIPine (NORVASC) 5 mg tablet MAR JOHNNY (1) TABLETA POR VIA ORAL JOHNNY VEZ AL JEFFREY 30 tablet 10    benzonatate (TESSALON) 200 MG capsule Take 1 capsule (200 mg total) by mouth 3 (three) times a day 20 capsule 0    Blood Glucose Monitoring Suppl (OneTouch Verio) w/Device KIT Use daily 1 kit 0    carvedilol (COREG) 12 5 mg tablet Take 1 tablet (12 5 mg total) by mouth 2 (two) times a day with meals 180 tablet 1    cetirizine (ZyrTEC) 5 MG tablet Take 1 tablet (5 mg total) by mouth daily 90 tablet 1    Continuous Blood Gluc  (FreeStyle Otoniel 14 Day [place]) JORDAN Use 1 application daily 1 Device 0    Continuous Blood Gluc Sensor (FreeStyle Otoniel 14 Day Sensor) MISC Use 1 application daily 1 each 0    diclofenac sodium (VOLTAREN) 1 % Apply 2 g topically 4 (four) times a day 100 g 10    ergocalciferol (VITAMIN D2) 50,000 units Take 50,000 Units by mouth once a week   ezetimibe (ZETIA) 10 mg tablet MAR SHAMAR (1) TABLETA POR VIA ORAL SHAMAR VEZ AL JEFFREY 30 tablet 10    fluticasone (FLONASE) 50 mcg/act nasal spray USAR DOS (2) ROCIADAS EN CADA FOSA NASAL DIARIAMENTE 16 g 10    gabapentin (NEURONTIN) 100 mg capsule Take 1 capsule (100 mg total) by mouth 2 (two) times a day 60 capsule 0    glipiZIDE (GLUCOTROL XL) 5 mg 24 hr tablet MAR SHAMAR (1) TABLETA POR VIA ORAL SHAMAR VEZ AL JEFFREY 30 tablet 10    glucose blood (OneTouch Verio) test strip Use as instructed 100 each 3    insulin glargine (Lantus SoloStar) 100 units/mL injection pen Inject 10 Units under the skin daily at bedtime 5 pen 0    Insulin Pen Needle (BD Pen Needle Micro U/F) 32G X 6 MM MISC Use daily 100 each 1    loperamide (IMODIUM) 2 mg capsule MAR SHAMAR (1) CAPSULA POR VIA ORAL ALFREDO SEA NECESARIO PARA LA DIARREA 30 capsule 11    losartan (COZAAR) 25 mg tablet MAR SHAMAR (1) TABLETA POR VIA ORAL SHAMAR VEZ AL JEFFREY 30 tablet 10    meloxicam (MOBIC) 7 5 mg tablet Take 1 tablet (7 5 mg total) by mouth daily Elon shamar tableta cada jeffrey con el desayuno para la artritis  NO TOME con el ibuprofeno 90 tablet 3    metFORMIN (GLUCOPHAGE) 1000 MG tablet MAR SHAMAR (1) TABLETA POR VIA ORAL DOS VECES AL JEFFREY CON COMIDAS   60 tablet 10    mirtazapine (REMERON) 15 mg tablet Take 1 tablet (15 mg total) by mouth daily at bedtime MAR SHAMAR (1) TABLETA POR VIA ORAL CADA NOCHE A LA HORA DE ACOSTARSE 90 tablet 0    omeprazole (PriLOSEC) 40 MG capsule MAR SHAMAR (1) CAPSULA POR VIA ORAL DOS VECES AL JEFFREY 180 capsule 1    ondansetron (ZOFRAN-ODT) 8 mg disintegrating tablet Take 1 tablet (8 mg total) by mouth every 8 (eight) hours as needed for nausea or vomiting (Patient not taking: Reported on 10/13/2020) 90 tablet 0    OneTouch Delica Lancets 36B MISC Use daily 100 each 3    Tradjenta 5 MG TABS MAR SHAMAR (1) TABLETA POR VIA ORAL SHAMAR VEZ AL JEFFREY 30 tablet 10    Ventolin  (90 Base) MCG/ACT inhaler INHALAR 1-2 BOCANADAS A LOS PULMONES CADA 4-6 HORAS ALFREDO SEA NECESARIO 18 g 10     No current facility-administered medications for this visit  No Known Allergies    Review of Systems   Constitutional: Negative for chills, diaphoresis and fever  HENT: Negative for ear pain, rhinorrhea and sinus pain  Eyes: Negative for photophobia and redness  Respiratory: Negative for cough and shortness of breath  Cardiovascular: Negative for chest pain  Gastrointestinal: Negative for abdominal distention, abdominal pain and constipation  Endocrine: Negative for polyphagia and polyuria  Genitourinary: Negative for difficulty urinating, dysuria and flank pain  Musculoskeletal: Positive for back pain  Negative for gait problem and joint swelling  Neurological: Positive for light-headedness  Negative for seizures and speech difficulty  Psychiatric/Behavioral: Negative for agitation and dysphoric mood  The patient is not nervous/anxious  Video Exam  There were no vitals filed for this visit  Physical Exam  Constitutional:       General: She is not in acute distress  Appearance: She is ill-appearing  She is not toxic-appearing or diaphoretic  Comments: frail   HENT:      Head: Normocephalic and atraumatic  Right Ear: External ear normal       Left Ear: External ear normal       Mouth/Throat:      Comments: Mask not removed today  Eyes:      General:         Right eye: No discharge  Left eye: No discharge  Conjunctiva/sclera: Conjunctivae normal       Pupils: Pupils are equal, round, and reactive to light  Neck:      Trachea: No tracheal deviation  Pulmonary:      Effort: Pulmonary effort is normal  No respiratory distress  Breath sounds: No stridor  Abdominal:      General: There is no distension  Comments: Scaphoid   Skin:     General: Skin is warm and dry  Coloration: Skin is pale        Findings: No erythema or rash  Neurological:      General: No focal deficit present  Mental Status: She is alert  Mental status is at baseline  Psychiatric:         Mood and Affect: Mood normal            I spent 15+ minutes was spent during this virtual visit by Alyssa, face to face with [facility] with greater than 50% of the time spent in counseling or coordination of care including discussions of etiology of diagnosis, treatment instructions and compliance with treatment regimen   All of the patient's questions were answered during this discussion  Encounter provider Vero Steel MD, located at:  [address] [place] [id number] [phone]    Recent Visits  No visits were found meeting these conditions  Showing recent visits within past 7 days and meeting all other requirements     Today's Visits  Date Type Provider Dept   01/14/21 Telemedicine Vero Steel, [address] [place] [address] today's visits and meeting all other requirements     Future Appointments  No visits were found meeting these conditions  Showing future appointments within next 150 days and meeting all other requirements        VIRTUAL VISIT [facility] acknowledges that She has consented to an online visit or consultation  She understands that the online visit is based solely on information provided by She, and that, in the absence of a face-to-face physical evaluation by the physician, the diagnosis She receives is both limited and provisional in terms of accuracy and completeness  This is not intended to replace a full medical face-to-face evaluation by the physician  [facility] understands and accepts these terms  Patient was informed this is a billable service

## 2021-01-14 NOTE — PROGRESS NOTES
Called Giuliana Ricci, patient's granddaughter, for the assessment  Giuliana Ricci states patient is doing well and was happy to report her blood sugar this morning was 112  She is very relieved her readings have been stable  Giuliana Ricci is interested in the patient signing up for 5 Wishes; will send referral to W  Giuliana Ricci is aware of patient's PCP appointment tomorrow  She notes patient is overdue to see a Cardiologist and needs a referral; will send note to PCP  Will continue to follow

## 2021-01-15 ENCOUNTER — OFFICE VISIT (OUTPATIENT)
Dept: FAMILY MEDICINE CLINIC | Facility: CLINIC | Age: 76
End: 2021-01-15

## 2021-01-15 VITALS
SYSTOLIC BLOOD PRESSURE: 128 MMHG | BODY MASS INDEX: 28.23 KG/M2 | DIASTOLIC BLOOD PRESSURE: 74 MMHG | TEMPERATURE: 96.2 F | OXYGEN SATURATION: 96 % | RESPIRATION RATE: 18 BRPM | HEIGHT: 60 IN | HEART RATE: 74 BPM | WEIGHT: 143.8 LBS

## 2021-01-15 DIAGNOSIS — C83.31 DIFFUSE LARGE B-CELL LYMPHOMA OF LYMPH NODES OF NECK (HCC): ICD-10-CM

## 2021-01-15 DIAGNOSIS — Z51.5 PALLIATIVE CARE PATIENT: ICD-10-CM

## 2021-01-15 DIAGNOSIS — Z13.820 SCREENING FOR OSTEOPOROSIS: ICD-10-CM

## 2021-01-15 DIAGNOSIS — G62.0 CHEMOTHERAPY-INDUCED PERIPHERAL NEUROPATHY (HCC): Chronic | ICD-10-CM

## 2021-01-15 DIAGNOSIS — T45.1X5A CHEMOTHERAPY-INDUCED PERIPHERAL NEUROPATHY (HCC): Chronic | ICD-10-CM

## 2021-01-15 DIAGNOSIS — I10 ESSENTIAL HYPERTENSION: ICD-10-CM

## 2021-01-15 DIAGNOSIS — Z92.21 STATUS POST CHEMOTHERAPY: ICD-10-CM

## 2021-01-15 DIAGNOSIS — Z12.39 BREAST CANCER SCREENING, HIGH RISK PATIENT: ICD-10-CM

## 2021-01-15 DIAGNOSIS — L28.2 PRURITIC RASH: ICD-10-CM

## 2021-01-15 DIAGNOSIS — M85.80 AGE-RELATED BONE LOSS: ICD-10-CM

## 2021-01-15 DIAGNOSIS — E11.65 UNCONTROLLED TYPE 2 DIABETES MELLITUS WITH HYPERGLYCEMIA (HCC): ICD-10-CM

## 2021-01-15 DIAGNOSIS — E11.9 TYPE 2 DIABETES MELLITUS WITHOUT COMPLICATION, WITHOUT LONG-TERM CURRENT USE OF INSULIN (HCC): Primary | ICD-10-CM

## 2021-01-15 LAB — SL AMB POCT GLUCOSE BLD: 261

## 2021-01-15 PROCEDURE — 82948 REAGENT STRIP/BLOOD GLUCOSE: CPT | Performed by: NURSE PRACTITIONER

## 2021-01-15 PROCEDURE — 99214 OFFICE O/P EST MOD 30 MIN: CPT | Performed by: NURSE PRACTITIONER

## 2021-01-15 RX ORDER — CETIRIZINE HYDROCHLORIDE 5 MG/1
5 TABLET ORAL DAILY
Qty: 90 TABLET | Refills: 1 | Status: SHIPPED | OUTPATIENT
Start: 2021-01-15 | End: 2022-03-17

## 2021-01-15 RX ORDER — FLUTICASONE PROPIONATE 50 MCG
1 SPRAY, SUSPENSION (ML) NASAL DAILY
Qty: 16 G | Refills: 10 | Status: SHIPPED | OUTPATIENT
Start: 2021-01-15 | End: 2022-01-04

## 2021-01-15 RX ORDER — INSULIN GLARGINE 100 [IU]/ML
9 INJECTION, SOLUTION SUBCUTANEOUS
Qty: 5 PEN | Refills: 0 | Status: SHIPPED | OUTPATIENT
Start: 2021-01-15 | End: 2021-01-25

## 2021-01-15 RX ORDER — EZETIMIBE 10 MG/1
10 TABLET ORAL DAILY
Qty: 30 TABLET | Refills: 10 | Status: SHIPPED | OUTPATIENT
Start: 2021-01-15 | End: 2021-12-09

## 2021-01-15 RX ORDER — CALCIUM CARBONATE/VITAMIN D3 600 MG-10
1 TABLET ORAL 2 TIMES DAILY
Qty: 60 TABLET | Refills: 2 | Status: SHIPPED | OUTPATIENT
Start: 2021-01-15 | End: 2021-11-17 | Stop reason: SDUPTHER

## 2021-01-15 RX ORDER — CARVEDILOL 12.5 MG/1
12.5 TABLET ORAL 2 TIMES DAILY WITH MEALS
Qty: 60 TABLET | Refills: 10 | Status: SHIPPED | OUTPATIENT
Start: 2021-01-15 | End: 2021-11-17 | Stop reason: SDUPTHER

## 2021-01-15 RX ORDER — GABAPENTIN 100 MG/1
100 CAPSULE ORAL 2 TIMES DAILY
Qty: 60 CAPSULE | Refills: 0 | Status: SHIPPED | OUTPATIENT
Start: 2021-01-15 | End: 2021-02-15

## 2021-01-15 RX ORDER — AMLODIPINE BESYLATE 5 MG/1
5 TABLET ORAL DAILY
Qty: 30 TABLET | Refills: 10 | Status: SHIPPED | OUTPATIENT
Start: 2021-01-15 | End: 2021-11-10

## 2021-01-15 NOTE — ASSESSMENT & PLAN NOTE
Stable, continue omeprazole at this time  Would like to taper and discontinue medication at next visit

## 2021-01-15 NOTE — PROGRESS NOTES
Assessment/Plan:    Gastroesophageal reflux disease  Stable, continue omeprazole at this time  Would like to taper and discontinue medication at next visit     Type 2 diabetes mellitus without complication, without long-term current use of insulin ([facility])    Lab Results   Component Value Date    HGBA1C 14 4 (A) 11/10/2020     In office glucose today 260 postprandial  Granddaughter does report a 2 episodes of low BG around 70   Will decrease Lantus to 9 units at HS and monitor  Continue with oral anti-diabetic medications: metformin 1,000 mg bid, tradjenta 5 mg daily, and glipizide 5 mg qd  Discussed that if FBG <70 or >300 to call office right away   Will have her RTC in one month for repeat HgA1c  Continue with arb    Follows with podiatry       Kellen Monroe was seen today for diabetes  Diagnoses and all orders for this visit:    Type 2 diabetes mellitus without complication, without long-term current use of insulin (HCC)  -     POCT blood glucose  -     ezetimibe (ZETIA) 10 mg tablet; Take 1 tablet (10 mg total) by mouth daily    Uncontrolled type 2 diabetes mellitus with hyperglycemia (HCC)  -     insulin glargine (Lantus SoloStar) 100 units/mL injection pen; Inject 9 Units under the skin daily at bedtime    Breast cancer screening, high risk patient  -     Mammo screening bilateral w 3d & cad; Future    Screening for osteoporosis  -     DXA bone density spine hip and pelvis; Future    Age-related bone loss  -     Calcium Carb-Cholecalciferol 600-400 MG-UNIT TABS; Take 1 tablet by mouth 2 (two) times a day    Essential hypertension  -     amLODIPine (NORVASC) 5 mg tablet; Take 1 tablet (5 mg total) by mouth daily  -     carvedilol (COREG) 12 5 mg tablet; Take 1 tablet (12 5 mg total) by mouth 2 (two) times a day with meals    Pruritic rash  -     cetirizine (ZyrTEC) 5 MG tablet;  Take 1 tablet (5 mg total) by mouth daily    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)  -     fluticasone (FLONASE) 50 mcg/act nasal spray; 1 spray into each nostril daily    Palliative care patient  -     fluticasone (FLONASE) 50 mcg/act nasal spray; 1 spray into each nostril daily    Status post chemotherapy  -     gabapentin (NEURONTIN) 100 mg capsule; Take 1 capsule (100 mg total) by mouth 2 (two) times a day    Chemotherapy-induced peripheral neuropathy (HCC)  -     gabapentin (NEURONTIN) 100 mg capsule; Take 1 capsule (100 mg total) by mouth 2 (two) times a day        Return in about 4 weeks (around 2/12/2021) for diabetes   Patient Instructions     Diabetes y nutrición   LO QUE NECESITA SABER:   Los planes de nutrición ayudan a establecer patrones de alimentación saludable que [employer]  Los planes de nutrición y ejercicio regular ayudan a mantener estable diane niveles de azúcar en la ainsley  [place] a retrasar o prevenir las complicaciones de la diabetes, yoni la enfermedad renal diabética  INSTRUCCIONES SOBRE EL NATALIE HOSPITALARIA:   Llame al número de emergencias local (911 en los [place]) si:  · Tiene alguno de los siguientes signos de un ataque cardíaco:      ? Estrujamiento, presión o tensión en zuniga pecho    ? Usted también podría presentar alguno de los siguientes:     § Malestar o dolor en zuniga espalda, mae, mandíbula, abdomen, o brazo    § Falta de [employer] o vómitos    § Desvanecimiento o sudor frío repentino      Regrese a la stanley de emergencias si:  · Usted tiene un nivel bajo azúcar en la ainsley y el problema no mejora con el tratamiento  Los síntomas son dificultad para pensar, latidos cardíacos randall y sudoración  · Zuniga nivel de azúcar en la ainsley está por encima de 240 mg/dl y no baja dentro de los 15 minutos del tratamiento  · Usted tiene cetonas en la ainsley o en la orina  · Tiene náuseas o vómitos y no puede retener alimentos o líquidos en el estómago  · Usted tiene visión borrosa o doble      · Zuniga aliento tiene un [employer] a frutas o zuniga respiración es superficial     Llame a zuniga médico o al equipo de atención diabética si:  · Diane niveles de azúcar en la ainsley son superiores a las metas fijadas  · Usted tiene bajos niveles de azúcar en zuniga ainsley frecuentemente  · Usted tiene problemas para sobrellevar zuniga diabetes o se siente ansioso o deprimido  · Usted tiene preguntas o inquietudes acerca de zuniga condición o cuidado  Un dietista lo ayudará a crear un plan de nutrición para satisfacer diane necesidades y las de [address] [place]  El objetivo es que usted alcance o [place] un peso y niveles de azúcar en la ainsley, presión arterial y lípidos saludables  Debería reunirte con el dietista al menos 1 vez al [place]  Aprenderá lo siguiente:  · [place] los alimentos afectan zuniga nivel de azúcar en la ainsley    · Cómo generar hábitos de alimentación saludables    · Cómo elegir los alimentos en función de zuniga nivel de [place], [place] y niveles de glucosa    · Cómo puede integrar diane comidas preferidas a zuniga plan    · Cómo hacer un seguimiento de los carbohidratos    · El tamaño correcto de las porciones de cada alimento    · Cambios que puede hacer en zuniga plan si [place] o está amamantando    No se salte ninguna comida: La meta es mantener estable el nivel de azúcar en la [place]  Zuniga nivel de azúcar en la ainsley puede bajar demasiado si ha recibido insulina y no ha comido  Consuma más alimentos ricos en fibra: Los alimentos altos en fibras incluyen frutas y verduras frescas o congeladas, panes integrales y frijoles  La fibra ayuda a controlar o reducir los niveles de azúcar en la ainsley y de [place]  Elija frutas enteras en lugar de jugo de fruta tanto yoni sea posible  Es posible que se añada azúcar al jugo y se elimine la [place]  Seleccione grasas saludables para el corazón: Los alimentos con alto contenido de grasas saludables para el corazón incluyen el aceite de [place], las nueces, los aguacates y los pescados grasos, yoni el salmón y el atún   Los alimentos con alto contenido de grasas no saludables incluyen la carne amelia, los productos lácteos enteros y la margarina blanda  Las grasas no saludables pueden aumentar el riesgo de enfermedades cardíacas, aumentar el colesterol partha y reducir el hutson  Elija los carbohidratos complejos: Los alimentos con carbohidratos complejos incluyen el arroz integral, los panes y cereales integrales y los frijoles cocidos  Los alimentos con carbohidratos simples incluyen el pan chambers, el arroz chambers, la mayoría de los cereales fríos y los refrigerios  Wu plan incluirá la cantidad de carbohidratos a consumir de shamar vez o en un día  Wu nivel de azúcar en la ainslye puede subir demasiado si come muchos carbohidratos de shamar vez  Los niveles de azúcar en la ainsley no aumentan o disminuyen tan rápidamente con los carbohidratos complejos yoni con los carbohidratos simples  Elija carbohidratos complejos siempre que sea posible  Consuma menos sodio (sal): El riesgo de presión arterial quincy aumenta con los alimentos con alto contenido de [place]  Limite los alimentos altos en sodio, yoni, por ejemplo, salsa de soya, calvin tostadas y sopas enlatadas  No añada sal a la comida que usted prepare  Restrinja el uso de sal de nice  Isabel las etiquetas para que no tengan más de 2300 miligramos de sodio en un día  Limite los edulcorantes artificiales: Pueden encontrarse en alimentos o bebidas, yoni los refrescos de dieta u otras bebidas bajas en calorías  Los edulcorantes artificiales son bajos en calorías  Pueden ayudar a reducir Devonda Pen y los carbohidratos en general  Es importante no consumir más calorías de otros alimentos para compensar las calorías [place]  Los edulcorantes artificiales no tienen ningún tipo de nutrición  Coma alimentos integrales y yannick agua tanto yoni sea posible  Wu plan puede incluir bebidas con edulcorantes artificiales por un corto tiempo   [place] puede ayudarte a pasar de las bebidas con alto contenido de azúcar al aguaAxel  método del plato para cada comida: Jaimie método puede ayudar a comer la cantidad correcta de carbohidratos y [employer] niveles de azúcar en la ainsley bajo control  · Dibuje shamar línea imaginaria en medio de un plato de comida de 9 pulgadas  En un lado, dibuje otra línea para dividir jose sección a la mitad  El plato tendrá Lay Herrerae sección vida y [place]  · Llene la sección más vida con verduras sin almidón  Estos incluyen al brócoli, espinacas, pepino, pimientos, coliflor y tomates  · Agregue un almidón a shamar de las secciones pequeñas  Los almidones [employer], arroz, panes de gardenia entero, tortillas, [place], calvin y frijoles  · Agregue carne u otra abby de proteína a la otra sección pequeña del plato  Por ejemplo, tre o pavo sin piel, pescado, carne de res o de BOONOO BOONOO, queso bajo en grasa, tofu y [place]  · Agregue productos lácteos o fruta al lado de zuniga plato si zuniga plan de alimentación lo permite  Los ejemplos de productos lácteos incluyen [employer] o del 1% o yogur bajo en grasa  Si usted no perez leche ni consume productos lácteos, es posible que pueda agregar otra porción de almidón en vez de eso  · [place] shamar bebida baja en calorías o sin calorías con zuinga comida  Por ejemplo, agua o café o té sin azúcar  Conozca los riesgos si decide beber alcohol: El alcohol puede causar hipoglucemia (bajo nivel de azúcar en la ainsley), especialmente si [place] insulina  El alcohol puede causar niveles altos de azúcar en la ainsley y de presión arterial, y aumento de peso si usted adalid demasiado  Leigh Karl de [address] y los hombres de 72 años o más deben limitar el consumo de alcohol a 1 bebida por día  Los hombres de [address] de alcohol a 2 tragos al día  Un trago equivale a 12 onzas de cerveza, 5 onzas de vino o 1 onza y ½ de licor  La hipoglucemia puede ocurrir horas después de beber alcohol   Compruebe zuniga nivel de azúcar en la ainsley kyung varias horas después de beber alcohol  Lleve consigo shamar abby de carbohidratos de acción rápida en hay de que zuniga nivel baje demasiado  Necesita atención inmediata si tiene signos o síntomas de hipoglucemia, yoni sudor, confusión o desmayos  Mantenga un peso saludable: Un peso saludable puede ayudarlo a controlar zuniga diabetes  Usted puede mantener un peso saludable con un plan de nutrición y ejercicio  Consulte con zuniga médico cuánto debería pesar  [place] que lo ayude a crear un plan para bajar de peso si tiene sobrepeso  Juntos pueden definir metas de pérdida y de mantenimiento del [place]  Acuda a diane consultas de control con zuniga médico o con el equipo de diabetes según le indicaron: Anote diane preguntas para que se acuerde de hacerlas kyung diane visitas  © [address] Information is for End User's use only and may not be sold, redistributed or otherwise used for commercial purposes  All illustrations and images included in CareNotes® are the copyrighted property of [employer] [place]  or [address] [place] [address] es sólo para uso en educación  Zuniga intención no es darle un consejo médico sobre enfermedades o tratamientos  Colsulte con zuniga Benjamin Chill farmacéutico antes de seguir cualquier régimen médico para saber si es seguro y efectivo para usted  Subjective:     Julián Mortensen is a 77 y o  female who  has a past medical history of Cancer ([facility] [address] ), Chronic pain disorder, Diabetes mellitus ([facility] [address] ), Diffuse large B cell lymphoma ([facility] [address] ), Dysphagia, GERD without esophagitis, HTN (hypertension), Hyperlipidemia, Hypertension, MI (myocardial infarction) ([facility] [address] ), Port-A-Cath in place, and Thyroid cancer ([facility] [address] )  who presented to the office today for follow up  She is accompanied by her grand daughter who is her caretaker  She has been followed closely due to critically high glucose readings in the previous months   Today her grand daughter report that blood sugars have been around 130 typically  She is checking twice per day  She does note that there were 2 incidences of blood sugar around 70 and the patient felt "cold"  She otherwise has been doing well  She has an upcoming appointment with endocrinology in February  The following portions of the patient's history were reviewed and updated as appropriate: allergies, current medications, past family history, past medical history, past social history, past surgical history and problem list     Current Outpatient Medications on File Prior to Visit   Medication Sig Dispense Refill    ALPRAZolam (XANAX) 0 25 mg tablet Take 1 tablet (0 25 mg total) by mouth daily at bedtime as needed for anxiety or sleep Cada noche, para ansiedad, antes de dormir 30 tablet 1    benzonatate (TESSALON) 200 MG capsule Take 1 capsule (200 mg total) by mouth 3 (three) times a day 20 capsule 0    Blood Glucose Monitoring Suppl (OneTouch Verio) w/Device KIT Use daily 1 kit 0    Continuous Blood Gluc  (FreeStyle Otoniel 14 Day [place]) JORDAN Use 1 application daily 1 Device 0    Continuous Blood Gluc Sensor (FreeStyle Otoniel 14 Day Sensor) MISC Use 1 application daily 1 each 0    diclofenac sodium (VOLTAREN) 1 % Apply 2 g topically 4 (four) times a day 100 g 10    glipiZIDE (GLUCOTROL XL) 5 mg 24 hr tablet MAR SHAMAR (1) TABLETA POR VIA ORAL SHAMAR VEZ AL JEFFREY 30 tablet 10    glucose blood (OneTouch Verio) test strip Use as instructed 100 each 3    Insulin Pen Needle (BD Pen Needle Micro U/F) 32G X 6 MM MISC Use daily 100 each 1    loperamide (IMODIUM) 2 mg capsule MAR SHAMAR (1) CAPSULA POR VIA ORAL ALFREDO SEA NECESARIO PARA LA DIARREA 30 capsule 11    losartan (COZAAR) 25 mg tablet MAR SHAMAR (1) TABLETA POR VIA ORAL SHAMAR VEZ AL JEFFREY 30 tablet 10    meloxicam (MOBIC) 7 5 mg tablet Take 1 tablet (7 5 mg total) by mouth daily [place] shamar tableta cada jeffrey con el desayuno para la artritis   NO TOME con el ibuprofeno 90 tablet 3    metFORMIN (GLUCOPHAGE) 1000 MG tablet MAR JOHNNY (1) TABLETA POR VIA ORAL DOS VECES AL JEFFREY CON COMIDAS  60 tablet 10    mirtazapine (REMERON) 15 mg tablet Take 1 tablet (15 mg total) by mouth daily at bedtime MAR JOHNNY (1) TABLETA POR VIA ORAL CADA NOCHE A LA HORA DE ACOSTARSE 90 tablet 0    omeprazole (PriLOSEC) 40 MG capsule MAR JOHNNY (1) CAPSULA POR VIA ORAL DOS VECES AL JEFFREY 180 capsule 1    OneTouch Delica Lancets 43Z MISC Use daily 100 each 3    Tradjenta 5 MG TABS MAR JOHNNY (1) TABLETA POR VIA ORAL JOHNNY VEZ AL JEFFREY 30 tablet 10    Ventolin  (90 Base) MCG/ACT inhaler INHALAR 1-2 BOCANADAS A LOS PULMONES CADA 4-6 HORAS ALFREDO SEA NECESARIO 18 g 10    [DISCONTINUED] amLODIPine (NORVASC) 5 mg tablet MAR JOHNNY (1) TABLETA POR VIA ORAL JOHNNY VEZ AL JEFFREY 30 tablet 10    [DISCONTINUED] carvedilol (COREG) 12 5 mg tablet MAR JOHNNY (1) TABLETA POR VIA ORAL DOS VECES AL JEFFREY CON COMIDAS 60 tablet 10    [DISCONTINUED] cetirizine (ZyrTEC) 5 MG tablet Take 1 tablet (5 mg total) by mouth daily 90 tablet 1    [DISCONTINUED] ergocalciferol (VITAMIN D2) 50,000 units Take 50,000 Units by mouth once a week   [DISCONTINUED] ezetimibe (ZETIA) 10 mg tablet MAR JOHNNY (1) TABLETA POR VIA ORAL JOHNNY VEZ AL JEFFREY 30 tablet 10    [DISCONTINUED] fluticasone (FLONASE) 50 mcg/act nasal spray USAR DOS (2) ROCIADAS EN CADA FOSA NASAL DIARIAMENTE 16 g 10    [DISCONTINUED] gabapentin (NEURONTIN) 100 mg capsule Take 1 capsule (100 mg total) by mouth 2 (two) times a day 60 capsule 0    [DISCONTINUED] insulin glargine (Lantus SoloStar) 100 units/mL injection pen Inject 10 Units under the skin daily at bedtime 5 pen 0    [DISCONTINUED] ondansetron (ZOFRAN-ODT) 8 mg disintegrating tablet Take 1 tablet (8 mg total) by mouth every 8 (eight) hours as needed for nausea or vomiting (Patient not taking: Reported on 10/13/2020) 90 tablet 0     No current facility-administered medications on file prior to visit          Review of Systems   Constitutional: Negative for activity change, appetite change, chills, fatigue, fever and unexpected weight change  HENT: Negative for hearing loss, nosebleeds, sinus pain, sneezing, sore throat and trouble swallowing  Eyes: Negative for photophobia and visual disturbance  Respiratory: Negative for cough, chest tightness, shortness of breath and wheezing  Cardiovascular: Negative for chest pain, palpitations and leg swelling  Gastrointestinal: Negative for abdominal pain, constipation, nausea and vomiting  Genitourinary: Negative for decreased urine volume, difficulty urinating, dysuria, flank pain, genital sores, hematuria and urgency  Musculoskeletal: Negative for back pain and gait problem  Skin: Negative for pallor, rash and wound  Neurological: Negative for dizziness, seizures, syncope, weakness, numbness and headaches  Hematological: Negative for adenopathy  Does not bruise/bleed easily  Psychiatric/Behavioral: Negative for confusion, hallucinations, self-injury, sleep disturbance and suicidal ideas  The patient is not nervous/anxious  BMI Counseling: Body mass index is 28 08 kg/m²  The BMI is above normal  Nutrition recommendations include encouraging healthy choices of fruits and vegetables and consuming healthier snacks  Objective:    /74 (BP Location: Right arm, Patient Position: Sitting, Cuff Size: Standard)   Pulse 74   Temp (!) 96 2 °F (35 7 °C) (Temporal)   Resp 18   Ht 5' (1 524 m)   Wt 65 2 kg (143 lb 12 8 oz)   SpO2 96%   BMI 28 08 kg/m²     Physical Exam  Vitals signs and nursing note reviewed  Constitutional:       General: She is not in acute distress  Appearance: She is well-developed  She is not diaphoretic  HENT:      Head: Normocephalic and atraumatic  Right Ear: External ear normal       Left Ear: External ear normal    Eyes:      Pupils: Pupils are equal, round, and reactive to light     Neck:      Musculoskeletal: Normal range of motion and neck supple  Cardiovascular:      Rate and Rhythm: Normal rate and regular rhythm  Pulses: Normal pulses  no weak pulses          Dorsalis pedis pulses are 2+ on the right side and 2+ on the left side  Posterior tibial pulses are 2+ on the right side and 2+ on the left side  Heart sounds: Normal heart sounds  Pulmonary:      Effort: Pulmonary effort is normal  No respiratory distress  Breath sounds: Normal breath sounds  No wheezing  Abdominal:      General: Bowel sounds are normal  There is no distension  Palpations: Abdomen is soft  Tenderness: There is no abdominal tenderness  Musculoskeletal: Normal range of motion  General: No deformity  Feet:      Right foot:      Skin integrity: No ulcer, skin breakdown, erythema, warmth, callus or dry skin  Left foot:      Skin integrity: No ulcer, skin breakdown, erythema, warmth, callus or dry skin  Lymphadenopathy:      Cervical: No cervical adenopathy  Skin:     General: Skin is warm and dry  Capillary Refill: Capillary refill takes less than 2 seconds  Findings: No rash  Neurological:      Mental Status: She is alert and oriented to person, place, and time  Psychiatric:         Behavior: Behavior normal        Patient's shoes and socks removed  Right Foot/Ankle   Right Foot Inspection  Skin Exam: skin normal and skin intact no dry skin, no warmth, no callus, no erythema, no maceration, no abnormal color, no pre-ulcer, no ulcer and no callus                          Toe Exam: ROM and strength within normal limits  Sensory   Vibration: intact  Proprioception: intact   Monofilament testing: intact  Vascular  Capillary refills: < 3 seconds  The right DP pulse is 2+  The right PT pulse is 2+       Left Foot/Ankle  Left Foot Inspection  Skin Exam: skin normal and skin intactno dry skin, no warmth, no erythema, no maceration, normal color, no pre-ulcer, no ulcer and no callus                         Toe Exam: ROM and strength within normal limits                   Sensory   Vibration: intact  Proprioception: intact  Monofilament: intact  Vascular  Capillary refills: < 3 seconds  The left DP pulse is 2+  The left PT pulse is 2+  Assign Risk Category:  No deformity present; No loss of protective sensation;  No weak pulses       Risk: 0        MOIRA Gerardo  01/15/21  10:41 AM

## 2021-01-15 NOTE — ASSESSMENT & PLAN NOTE
Lab Results   Component Value Date    HGBA1C 14 4 (A) 11/10/2020     In office glucose today 260 postprandial  Granddaughter does report a 2 episodes of low BG around 70   Will decrease Lantus to 9 units at HS and monitor  Continue with oral anti-diabetic medications: metformin 1,000 mg bid, tradjenta 5 mg daily, and glipizide 5 mg qd  Discussed that if FBG <70 or >300 to call office right away   Will have her RTC in one month for repeat HgA1c  Continue with arb    Follows with podiatry

## 2021-01-15 NOTE — PATIENT INSTRUCTIONS
Diabetes y nutrición   LO QUE NECESITA SABER:   Los planes de nutrición ayudan a establecer patrones de alimentación saludable que [employer]  Los planes de nutrición y ejercicio regular ayudan a mantener estable brit niveles de azúcar en la ainsley  Jackson Rober a retrasar o prevenir las complicaciones de la diabetes, yoni la enfermedad renal diabética  INSTRUCCIONES SOBRE EL NATALIE HOSPITALARIA:   Llame al número de emergencias local (911 en los [place]) si:  · Tiene alguno de los siguientes signos de un ataque cardíaco:      ? Estrujamiento, presión o tensión en zuniga pecho    ? Usted también podría presentar alguno de los siguientes:     § Malestar o dolor en zuniga espalda, mae, mandíbula, abdomen, o brazo    § Falta de [employer] o vómitos    § Desvanecimiento o sudor frío repentino      Regrese a la stanley de emergencias si:  · Usted tiene un nivel bajo azúcar en la ainsley y el problema no mejora con el tratamiento  Los síntomas son dificultad para pensar, latidos cardíacos randall y sudoración  · Zuniga nivel de azúcar en la ainsley está por encima de 240 mg/dl y no baja dentro de los 15 minutos del tratamiento  · Usted tiene cetonas en la ainsley o en la orina  · Tiene náuseas o vómitos y no puede retener alimentos o líquidos en el estómago  · Usted tiene visión borrosa o doble  · Zuniga aliento tiene un [employer] a frutas o zuniga respiración es superficial     Llame a zuniga médico o al equipo de atención diabética si:  · Brit niveles de azúcar en la ainsley son superiores a las metas fijadas  · Usted tiene bajos niveles de azúcar en zuniga ainsley frecuentemente  · Usted tiene problemas para sobrellevar zuniga diabetes o se siente ansioso o deprimido  · Usted tiene preguntas o inquietudes acerca de zuniga condición o cuidado  Un dietista lo ayudará a crear un plan de nutrición para satisfacer brit necesidades y las de [facility]   El objetivo es que usted alcance o [place] un peso y niveles de azúcar en la ainsley, presión arterial y lípidos saludables  Debería reunirte con el dietista al menos 1 vez al [place]  Aprenderá lo siguiente:  · Stephon los alimentos afectan zuniga nivel de azúcar en la ainsley    · Cómo generar hábitos de alimentación saludables    · Cómo elegir los alimentos en función de zuniga nivel de [place], [place] y niveles de glucosa    · Cómo puede integrar diane comidas preferidas a zuniga plan    · Cómo hacer un seguimiento de los carbohidratos    · El tamaño correcto de las porciones de cada alimento    · Cambios que puede hacer en zuniga plan si Lilliam Dove o está amamantando    No se salte ninguna comida: La meta es mantener estable el nivel de azúcar en la [place]  Zuniga nivel de azúcar en la ainsley puede bajar demasiado si ha recibido insulina y no ha comido  Consuma más alimentos ricos en fibra: Los alimentos altos en fibras incluyen frutas y verduras frescas o congeladas, panes integrales y frijoles  La fibra ayuda a controlar o reducir los niveles de azúcar en la ainsley y de [place]  Elija frutas enteras en lugar de jugo de fruta tanto stephon sea posible  Es posible que se añada azúcar al jugo y se elimine la [place]  Seleccione grasas saludables para el corazón: Los alimentos con alto contenido de grasas saludables para el corazón incluyen el aceite de [place], las nueces, los aguacates y los pescados grasos, stephon el salmón y el atún  Los alimentos con alto contenido de grasas no saludables incluyen la carne amelia, los productos lácteos enteros y la margarina blanda  Las grasas no saludables pueden aumentar el riesgo de enfermedades cardíacas, aumentar el colesterol partha y reducir el hutson  Elija los carbohidratos complejos: Los alimentos con carbohidratos complejos incluyen el arroz integral, los panes y cereales integrales y los frijoles cocidos  Los alimentos con carbohidratos simples incluyen el pan chambers, el arroz chambers, la mayoría de los cereales fríos y los refrigerios   Zuniga plan incluirá la cantidad de carbohidratos a consumir de shamar vez o en un día  Wu nivel de azúcar en la ainsley puede subir demasiado si come muchos carbohidratos de shamar vez  Los niveles de azúcar en la ainsley no aumentan o disminuyen tan rápidamente con los carbohidratos complejos yoni con los carbohidratos simples  Elija carbohidratos complejos siempre que sea posible  Consuma menos sodio (sal): El riesgo de presión arterial quincy aumenta con los alimentos con alto contenido de [place]  Limite los alimentos altos en sodio, yoni, por ejemplo, salsa de soya, calvin tostadas y sopas enlatadas  No añada sal a la comida que usted prepare  Restrinja el uso de sal de nice  Isabel las etiquetas para que no tengan más de 2300 miligramos de sodio en un día  Limite los edulcorantes artificiales: Pueden encontrarse en alimentos o bebidas, yoni los refrescos de dieta u otras bebidas bajas en calorías  Los edulcorantes artificiales son bajos en calorías  Pueden ayudar a reducir Ev Riis y los carbohidratos en general  Es importante no consumir más calorías de otros alimentos para compensar las calorías [place]  Los edulcorantes artificiales no tienen ningún tipo de nutrición  Coma alimentos integrales y yannick agua tanto yoni sea posible  Wu plan puede incluir bebidas con edulcorantes artificiales por un corto tiempo  [place] puede ayudarte a pasar de las bebidas con alto contenido de azúcar al agua  Utilice el método del plato para cada comida: Jaimie método puede ayudar a comer la cantidad correcta de carbohidratos y [employer] niveles de azúcar en la ainsley bajo control  · Dibuje shamar línea imaginaria en medio de un plato de comida de 9 pulgadas  En un lado, dibuje otra línea para dividir jose sección a la mitad  El plato tendrá [place] Arnoldo sección vida y [place]  · Llene la sección más vida con verduras sin almidón  Estos incluyen al brócoli, espinacas, pepino, pimientos, coliflor y tomates      · Agregue un almidón a shamar de las secciones pequeñas  Los almidones [employer], arroz, panes de gardenia entero, tortillas, [place], calvin y frijoles  · Agregue carne u otra abby de proteína a la otra sección pequeña del plato  Por ejemplo, tre o pavo sin piel, pescado, carne de res o de BOONOO BOONOO, queso bajo en grasa, tofu y [place]  · Agregue productos lácteos o fruta al lado de zuniga plato si zuniga plan de alimentación lo permite  Los ejemplos de productos lácteos incluyen [employer] o del 1% o yogur bajo en grasa  Si usted no perez leche ni consume productos lácteos, es posible que pueda agregar otra porción de almidón en vez de eso  · [place] shamar bebida baja en calorías o sin calorías con zuniga comida  Por ejemplo, agua o café o té sin azúcar  Conozca los riesgos si decide beber alcohol: El alcohol puede causar hipoglucemia (bajo nivel de azúcar en la ainsley), especialmente si [place] insulina  El alcohol puede causar niveles altos de azúcar en la ainsley y de presión arterial, y aumento de peso si usted adalid demasiado  [facility] de [address] y los hombres de 72 años o más deben limitar el consumo de alcohol a 1 bebida por día  Los hombres de [address] de alcohol a 2 tragos al día  Un trago equivale a 12 onzas de cerveza, 5 onzas de vino o 1 onza y ½ de licor  La hipoglucemia puede ocurrir horas después de beber alcohol  Compruebe zuniga nivel de azúcar en la ainsley kyung varias horas después de beber alcohol  Lleve consigo shamar abby de carbohidratos de acción rápida en hay de que zuniga nivel baje demasiado  Necesita atención inmediata si tiene signos o síntomas de hipoglucemia, yoni sudor, confusión o desmayos  Mantenga un peso saludable: Un peso saludable puede ayudarlo a controlar zuniga diabetes  Usted puede mantener un peso saludable con un plan de nutrición y ejercicio  Consulte con zuniga médico cuánto debería pesar  [place] que lo ayude a crear un plan para bajar de peso si tiene sobrepeso   Juntos pueden definir metas de pérdida y de mantenimiento del [place]  Acuda a diane consultas de control con zuniga médico o con el equipo de diabetes según le indicaron: Anote diane preguntas para que se acuerde de hacerlas kyung diane visitas  © Copyright [address] Information is for End User's use only and may not be sold, redistributed or otherwise used for commercial purposes  All illustrations and images included in CareNotes® are the copyrighted property of [employer]  or [address] [place] [address] es sólo para uso en educación  Zuniga intención no es darle un consejo médico sobre enfermedades o tratamientos  Colsulte con zuniga Cornell Older farmacéutico antes de seguir cualquier régimen médico para saber si es seguro y efectivo para usted

## 2021-01-18 ENCOUNTER — PATIENT OUTREACH (OUTPATIENT)
Dept: FAMILY MEDICINE CLINIC | Facility: CLINIC | Age: 76
End: 2021-01-18

## 2021-01-18 NOTE — PROGRESS NOTES
Outgoing Call  01/18/2021    Community Health Worker called Mariana Gaonaly on this day regarding referral from Kasey Dumont to assist with 5 Wishes and JOSEE Munguia did not answer at this time  CHW left voicemail to please call back at her convenience  Next follow up was scheduled on 01/19/2021

## 2021-01-19 ENCOUNTER — PATIENT OUTREACH (OUTPATIENT)
Dept: FAMILY MEDICINE CLINIC | Facility: CLINIC | Age: 76
End: 2021-01-19

## 2021-01-19 NOTE — PROGRESS NOTES
Outgoing Call  01/19/2021    CHW called Elizabeth Stephen, on this day to follow up with 5 Wishes and POA  Luann Sandifer stated she has a legal POA that was made couple of years ago  CHW explained Luann Sandifer what is 5 wishes and Luann Sandifer would like to have it done  Appointment to complete the 5 Wishes was scheduled on 01/26/2021

## 2021-01-20 ENCOUNTER — HOSPITAL ENCOUNTER (OUTPATIENT)
Dept: INFUSION CENTER | Facility: HOSPITAL | Age: 76
Discharge: HOME | End: 2021-01-20
Payer: COMMERCIAL

## 2021-01-20 ENCOUNTER — TELEPHONE (OUTPATIENT)
Dept: HEMATOLOGY ONCOLOGY | Facility: CLINIC | Age: 76
End: 2021-01-20

## 2021-01-20 DIAGNOSIS — D64.9 ANEMIA, UNSPECIFIED TYPE: ICD-10-CM

## 2021-01-20 DIAGNOSIS — Z45.2 ENCOUNTER FOR CENTRAL LINE CARE: ICD-10-CM

## 2021-01-20 DIAGNOSIS — C83.31 DIFFUSE LARGE B-CELL LYMPHOMA OF LYMPH NODES OF NECK (HCC): Primary | ICD-10-CM

## 2021-01-20 LAB
ALBUMIN SERPL BCP-MCNC: 4.2 G/DL (ref 3–5.2)
ALP SERPL-CCNC: 99 U/L (ref 43–122)
ALT SERPL W P-5'-P-CCNC: 19 U/L (ref 9–52)
ANION GAP SERPL CALCULATED.3IONS-SCNC: 6 MMOL/L (ref 5–14)
AST SERPL W P-5'-P-CCNC: 24 U/L (ref 14–36)
BASOPHILS # BLD AUTO: 0.1 THOUSANDS/ΜL (ref 0–0.1)
BASOPHILS NFR BLD AUTO: 1 % (ref 0–1)
BILIRUB SERPL-MCNC: 0.3 MG/DL
BUN SERPL-MCNC: 14 MG/DL (ref 5–25)
CALCIUM SERPL-MCNC: 9.3 MG/DL (ref 8.4–10.2)
CHLORIDE SERPL-SCNC: 97 MMOL/L (ref 97–108)
CO2 SERPL-SCNC: 28 MMOL/L (ref 22–30)
CREAT SERPL-MCNC: 0.66 MG/DL (ref 0.6–1.2)
CRP SERPL QL: 5.4 MG/L
EOSINOPHIL # BLD AUTO: 0.2 THOUSAND/ΜL (ref 0–0.4)
EOSINOPHIL NFR BLD AUTO: 2 % (ref 0–6)
ERYTHROCYTE [DISTWIDTH] IN BLOOD BY AUTOMATED COUNT: 13.6 %
ERYTHROCYTE [SEDIMENTATION RATE] IN BLOOD: 32 MM/HOUR (ref 0–29)
FERRITIN SERPL-MCNC: 33 NG/ML (ref 8–388)
GFR SERPL CREATININE-BSD FRML MDRD: 87 ML/MIN/1.73SQ M
GLUCOSE SERPL-MCNC: 100 MG/DL (ref 70–99)
HCT VFR BLD AUTO: 35.2 % (ref 36–46)
HGB BLD-MCNC: 11.4 G/DL (ref 12–16)
LDH SERPL-CCNC: 354 U/L (ref 313–618)
LYMPHOCYTES # BLD AUTO: 3.1 THOUSANDS/ΜL (ref 0.5–4)
LYMPHOCYTES NFR BLD AUTO: 42 % (ref 25–45)
MCH RBC QN AUTO: 29.9 PG (ref 26–34)
MCHC RBC AUTO-ENTMCNC: 32.5 G/DL (ref 31–36)
MCV RBC AUTO: 92 FL (ref 80–100)
MONOCYTES # BLD AUTO: 0.5 THOUSAND/ΜL (ref 0.2–0.9)
MONOCYTES NFR BLD AUTO: 7 % (ref 1–10)
NEUTROPHILS # BLD AUTO: 3.4 THOUSANDS/ΜL (ref 1.8–7.8)
NEUTS SEG NFR BLD AUTO: 47 % (ref 45–65)
PLATELET # BLD AUTO: 284 THOUSANDS/UL (ref 150–450)
PMV BLD AUTO: 8.8 FL (ref 8.9–12.7)
POTASSIUM SERPL-SCNC: 4.5 MMOL/L (ref 3.6–5)
PROT SERPL-MCNC: 7.4 G/DL (ref 5.9–8.4)
RBC # BLD AUTO: 3.82 MILLION/UL (ref 4–5.2)
SODIUM SERPL-SCNC: 131 MMOL/L (ref 137–147)
VIT B12 SERPL-MCNC: 318 PG/ML (ref 100–900)
WBC # BLD AUTO: 7.2 THOUSAND/UL (ref 4.5–11)

## 2021-01-20 PROCEDURE — 86140 C-REACTIVE PROTEIN: CPT

## 2021-01-20 PROCEDURE — 85652 RBC SED RATE AUTOMATED: CPT

## 2021-01-20 PROCEDURE — 82607 VITAMIN B-12: CPT

## 2021-01-20 PROCEDURE — 85025 COMPLETE CBC W/AUTO DIFF WBC: CPT

## 2021-01-20 PROCEDURE — 83615 LACTATE (LD) (LDH) ENZYME: CPT

## 2021-01-20 PROCEDURE — 80053 COMPREHEN METABOLIC PANEL: CPT

## 2021-01-20 PROCEDURE — 82728 ASSAY OF FERRITIN: CPT

## 2021-01-20 NOTE — PROGRESS NOTES
Pt arrives on unit for lab work  Labs drawn and sent as ordered via right CW port  Next apt  Scheduled and AVS provided  Left unit ambulatory with grand daughter

## 2021-01-25 ENCOUNTER — OFFICE VISIT (OUTPATIENT)
Dept: HEMATOLOGY ONCOLOGY | Facility: CLINIC | Age: 76
End: 2021-01-25
Payer: COMMERCIAL

## 2021-01-25 VITALS
WEIGHT: 147.2 LBS | BODY MASS INDEX: 28.9 KG/M2 | RESPIRATION RATE: 18 BRPM | DIASTOLIC BLOOD PRESSURE: 74 MMHG | HEART RATE: 65 BPM | HEIGHT: 60 IN | TEMPERATURE: 96.5 F | SYSTOLIC BLOOD PRESSURE: 130 MMHG | OXYGEN SATURATION: 97 %

## 2021-01-25 DIAGNOSIS — C83.31 DIFFUSE LARGE B-CELL LYMPHOMA OF LYMPH NODES OF NECK (HCC): Primary | ICD-10-CM

## 2021-01-25 DIAGNOSIS — E11.65 UNCONTROLLED TYPE 2 DIABETES MELLITUS WITH HYPERGLYCEMIA (HCC): ICD-10-CM

## 2021-01-25 DIAGNOSIS — D50.9 IRON DEFICIENCY ANEMIA, UNSPECIFIED IRON DEFICIENCY ANEMIA TYPE: ICD-10-CM

## 2021-01-25 DIAGNOSIS — D64.9 ANEMIA, UNSPECIFIED TYPE: ICD-10-CM

## 2021-01-25 PROCEDURE — 1160F RVW MEDS BY RX/DR IN RCRD: CPT | Performed by: INTERNAL MEDICINE

## 2021-01-25 PROCEDURE — 1036F TOBACCO NON-USER: CPT | Performed by: INTERNAL MEDICINE

## 2021-01-25 PROCEDURE — 3008F BODY MASS INDEX DOCD: CPT | Performed by: INTERNAL MEDICINE

## 2021-01-25 PROCEDURE — 99214 OFFICE O/P EST MOD 30 MIN: CPT | Performed by: INTERNAL MEDICINE

## 2021-01-25 RX ORDER — INSULIN GLARGINE 100 [IU]/ML
INJECTION, SOLUTION SUBCUTANEOUS
Qty: 15 ML | Refills: 10 | Status: SHIPPED | OUTPATIENT
Start: 2021-01-25 | End: 2022-03-15 | Stop reason: SDUPTHER

## 2021-01-25 NOTE — PROGRESS NOTES
Hematology/Oncology Outpatient Follow-up  Perla PutnamgaudencioRosaliakirit 77 y o  female 1945 [id number]    Date:  1/25/2021        Assessment and Plan:  1  Diffuse large 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 ([facility] [address] )  The patient does not seem to have any clinical hint of recurrence of her diffuse large B-cell lymphoma which show was treated between December 2018 and March 2019  She was told that we will pursue a CT scan of the neck chest abdomen pelvis since she did complain about some occasional dysphagia   If her imaging are negative for any hint of recurrence of her lymphoma then removal of the Port-A-Cath will be ordered  - CBC and differential; Future  - Comprehensive metabolic panel; Future  - LD,Blood; Future  - CT chest abdomen pelvis w contrast; Future  - CT soft tissue neck w contrast; Future    2  Anemia, unspecified type  This seems to be anemia of chronic disease with a hint of iron deficiency and vitamin B12 deficiency  I did ask the patient to consider taking vitamin B12 an all oral iron supplements and return to us in a month  If she continues to have low iron or vitamin B12 then treatment will be discussed accordingly   - CBC and differential; Future  - Comprehensive metabolic panel; Future  - LD,Blood; Future  - Iron Panel (Includes Ferritin, Iron Sat%, Iron, and TIBC); Future  - Ferritin; Future  - Vitamin B12; Future  - TSH, 3rd generation with Free T4 reflex; Future  - Sedimentation rate, automated; Future  - C-reactive protein; Future    3  Iron deficiency anemia, unspecified iron deficiency anemia type  Also check the stools for completeness sake to rule out any hint of occult blood  - Occult Blood, Fecal Immunochemical        HPI:  The patient came today for a follow-up visit accompanied by her granddaughter who translated the whole office visit    The patient was recently found to have very high blood sugar of almost 700 and was started on insulin which is controlling her blood sugar   She did not follow-up with us since January of last year due to the pandemic  She denies any constitutional symptoms but did complain about some occasional dysphagia  Her most recent blood work on 01/20/2021 showed hemoglobin of 11 4 with white cell count of 7 2 and platelet count of 220  Creatinine 0 6 with normal calcium liver enzymes  Ferritin 33 with vitamin B12 of 318  LDH and C-reactive protein were within normal range with slightly elevated sed rate  She denied obvious bleeding from any sites  Oncology History Overview Note   The patient complained about significant sore throat in May which was treated with antibiotics by her PCP in [facility] which did not improve her sore throat  She then started to notice significant odynophagia and dysphagia for liquids it is and solid nutrition  Eventually, she had a CT scan of the neck on the 20th of September which showed soft tissue lesion in the left palatine tonsil with abnormal lymph nodes bilaterally in the neck compatible with neoplastic process  She then had a biopsy of the left tonsil/left tonsillectomy on the 25th of September 2018 which was compatible with diffuse large B-cell lymphoma germinal center B phenotype  The immunohistochemical staining came back positive for BCL2, BCL 6 and myc with Ki 67 around 70%  No FISH rearrangements gene study was done for BCL2, BCL 6 are myc translocation  The patient was treated with 1 cycle of R-EPOCH since her airway was compromise with the large tonsillar mass and a biopsy which was reviewed by our hematopathologist on the 15 November compatible with 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 according to the [facility] with pending gene rearrangement studies  During the hospital course the patient had another biopsy of the left tonsil to better understand the exact aggressiveness of her large B-cell lymphoma    The biopsy on the 20th of November showed large B-cell lymphoma with BCL -2 and C myc level expressed surface a type without the myc or BCL gene rearrangement  Her bone marrow biopsy on the 9th of November was negative for B-cell lymphoma involvement with normal bone marrow trilineage maturation  She was also evaluated with an MRI of the brain during the hospital course which was negative for any hint of lymphoma involvement  PET scan on the 14th of November showed IMPRESSION:  1   FDG avid left posterior oropharyngeal lesion compatible with malignancy  2   FDG avid lymph nodes in the neck and left axilla compatible with malignancy  3  No FDG avid lymph nodes in the abdomen or pelvis suspicious for malignancy  4   Tiny focus of FDG uptake along the skin of the right upper quadrant anterior abdominal wall with mild skin thickening suggested here on CT    Her post treatment PET-CT 3/18/19 was read:  IMPRESSION:  1  No evidence of hypermetabolic malignancy  Deauville score of 1   2   Mild patchy FDG uptake in the right upper lobe corresponding to patchy  consolidation  This may be infectious or inflammatory  This has partially improved from the 2/25/2019 chest x-ray  Diffuse large 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 ([address] )   11/9/2018 Initial Diagnosis    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)      Chemotherapy    1  Dose adjusted R-EPOCH 11/21/18 (1 cycle)- switched to RCHOP after repeat biopsy/pathology reviewed  2  R-CHOP started 12/12/18 completed 3/7/19 (5 cycles)             Interval history:    ROS: Review of Systems   Constitutional: Positive for appetite change  Negative for chills and fever  HENT: Positive for trouble swallowing  Negative for ear pain and sore throat  Eyes: Negative for pain and visual disturbance  Respiratory: Positive for cough and shortness of breath  Cardiovascular: Negative for chest pain and palpitations  Gastrointestinal: Positive for constipation  Negative for abdominal pain and vomiting     Genitourinary: Negative for dysuria and hematuria  Musculoskeletal: Positive for arthralgias  Negative for back pain  Skin: Negative for color change and rash  Neurological: Positive for dizziness and headaches  Negative for seizures and syncope  All other systems reviewed and are negative  Past Medical History:   Diagnosis Date    Cancer [facility])     Throat    Chronic pain disorder     Diabetes mellitus ([place] [address] )     Diffuse large B cell lymphoma ([place] [address] )     Dysphagia     GERD without esophagitis     HTN (hypertension)     Hyperlipidemia     Hypertension     MI (myocardial infarction) ([place] [address] )     Port-A-Cath in place 07/29/2019    Thyroid cancer ([place] [address] ) 2018       Past Surgical History:   Procedure Laterality Date    BONE MARROW BIOPSY      BREAST BIOPSY Left     CHOLECYSTECTOMY      IR PORT PLACEMENT  11/16/2018    OTHER SURGICAL HISTORY      tendor tear repair to right shoulder    SHOULDER ARTHROSCOPY         Social History     Socioeconomic History    Marital status:       Spouse name: None    Number of children: None    Years of education: None    Highest education level: None   Occupational History    None   Social Needs    Financial resource strain: None    Food insecurity     Worry: None     Inability: None    Transportation needs     Medical: None     Non-medical: None   Tobacco Use    Smoking status: Never Smoker    Smokeless tobacco: Never Used   Substance and Sexual Activity    Alcohol use: Never     Frequency: Never     Drinks per session: Patient refused     Binge frequency: Never     Comment: 0    Drug use: No    Sexual activity: Not Currently     Partners: Male   Lifestyle    Physical activity     Days per week: None     Minutes per session: None    Stress: None   Relationships    Social connections     Talks on phone: None     Gets together: None     Attends Baptism service: None     Active member of club or organization: None     Attends meetings of clubs or organizations: None     Relationship status: None    Intimate partner violence     Fear of current or ex partner: None     Emotionally abused: None     Physically abused: None     Forced sexual activity: None   Other Topics Concern    None   Social History Narrative    None       Family History   Problem Relation Age of Onset    Diabetes Mother     Heart disease Sister     Hypertension Brother     Cancer Maternal Grandmother     Cancer Maternal Grandfather        No Known Allergies      Current Outpatient Medications:     ALPRAZolam (XANAX) 0 25 mg tablet, Take 1 tablet (0 25 mg total) by mouth daily at bedtime as needed for anxiety or sleep Cada ashleigh, para ansiedad, antes de dormir, Disp: 30 tablet, Rfl: 1    amLODIPine (NORVASC) 5 mg tablet, Take 1 tablet (5 mg total) by mouth daily, Disp: 30 tablet, Rfl: 10    benzonatate (TESSALON) 200 MG capsule, Take 1 capsule (200 mg total) by mouth 3 (three) times a day, Disp: 20 capsule, Rfl: 0    Blood Glucose Monitoring Suppl (OneTouch Verio) w/Device KIT, Use daily, Disp: 1 kit, Rfl: 0    Calcium Carb-Cholecalciferol 600-400 MG-UNIT TABS, Take 1 tablet by mouth 2 (two) times a day, Disp: 60 tablet, Rfl: 2    carvedilol (COREG) 12 5 mg tablet, Take 1 tablet (12 5 mg total) by mouth 2 (two) times a day with meals, Disp: 60 tablet, Rfl: 10    cetirizine (ZyrTEC) 5 MG tablet, Take 1 tablet (5 mg total) by mouth daily, Disp: 90 tablet, Rfl: 1    Continuous Blood Gluc  (FreeStyle Otoniel 14 Day [place]) JORDAN, Use 1 application daily, Disp: 1 Device, Rfl: 0    Continuous Blood Gluc Sensor (FreeStyle Otoniel 14 Day Sensor) MISC, Use 1 application daily, Disp: 1 each, Rfl: 0    diclofenac sodium (VOLTAREN) 1 %, Apply 2 g topically 4 (four) times a day, Disp: 100 g, Rfl: 10    ezetimibe (ZETIA) 10 mg tablet, Take 1 tablet (10 mg total) by mouth daily, Disp: 30 tablet, Rfl: 10    fluticasone (FLONASE) 50 mcg/act nasal spray, 1 spray into each nostril daily, Disp: 16 g, Rfl: 10    gabapentin (NEURONTIN) 100 mg capsule, Take 1 capsule (100 mg total) by mouth 2 (two) times a day, Disp: 60 capsule, Rfl: 0    glipiZIDE (GLUCOTROL XL) 5 mg 24 hr tablet, MAR SHAMAR (1) TABLETA POR VIA ORAL SHAMAR VEZ AL JEFFREY, Disp: 30 tablet, Rfl: 10    glucose blood (OneTouch Verio) test strip, Use as instructed, Disp: 100 each, Rfl: 3    Insulin Pen Needle (BD Pen Needle Micro U/F) 32G X 6 MM MISC, Use daily, Disp: 100 each, Rfl: 1    Lantus SoloStar 100 units/mL injection pen, INYECTAR 9 UNIDADES DEBAJO DE LA PIEL DIARIAMENTE A LA HORA DE ACOSTARSE, Disp: 15 mL, Rfl: 10    loperamide (IMODIUM) 2 mg capsule, MAR SHAMAR (1) CAPSULA POR VIA ORAL ALFREDO SEA NECESARIO PARA LA DIARREA, Disp: 30 capsule, Rfl: 11    losartan (COZAAR) 25 mg tablet, MAR SHAMAR (1) TABLETA POR VIA ORAL SHAMAR VEZ AL JEFFREY, Disp: 30 tablet, Rfl: 10    meloxicam (MOBIC) 7 5 mg tablet, Take 1 tablet (7 5 mg total) by mouth daily [place] shamar tableta cada jeffrey con el desayuno para la artritis   NO TOME con el ibuprofeno, Disp: 90 tablet, Rfl: 3    metFORMIN (GLUCOPHAGE) 1000 MG tablet, MAR SHAMAR (1) TABLETA POR VIA ORAL DOS VECES AL JEFFREY CON COMIDAS , Disp: 60 tablet, Rfl: 10    mirtazapine (REMERON) 15 mg tablet, Take 1 tablet (15 mg total) by mouth daily at bedtime MAR SHAMAR (1) TABLETA POR VIA ORAL CADA NOCHE A LA HORA DE ACOSTARSE, Disp: 90 tablet, Rfl: 0    omeprazole (PriLOSEC) 40 MG capsule, MAR SHAMAR (1) CAPSULA POR VIA ORAL DOS VECES AL JEFFREY, Disp: 180 capsule, Rfl: 1    OneTouch Delica Lancets 93O MISC, Use daily, Disp: 100 each, Rfl: 3    Tradjenta 5 MG TABS, MAR SHAMAR (1) TABLETA POR VIA ORAL SHAMAR VEZ AL JEFFREY, Disp: 30 tablet, Rfl: 10    Ventolin  (90 Base) MCG/ACT inhaler, INHALAR 1-2 BOCANADAS A LOS PULMONES CADA 4-6 HORAS ALFREDO SEA NECESARIO, Disp: 18 g, Rfl: 10      Physical Exam:  /74 (BP Location: Left arm, Patient Position: Sitting, Cuff Size: Adult)   Pulse 65   Temp (!) 96 5 °F (35 8 °C) (Tympanic)   Resp 18   Ht 5' (1 524 m) Wt 66 8 kg (147 lb 3 2 oz)   SpO2 97%   BMI 28 75 kg/m²     Physical Exam  Constitutional:       General: She is not in acute distress  Appearance: She is well-developed  She is not diaphoretic  HENT:      Head: Normocephalic and atraumatic  Eyes:      General: No scleral icterus  Right eye: No discharge  Left eye: No discharge  Conjunctiva/sclera: Conjunctivae normal       Pupils: Pupils are equal, round, and reactive to light  Neck:      Musculoskeletal: Normal range of motion and neck supple  Thyroid: No thyromegaly  Vascular: No JVD  Trachea: No tracheal deviation  Cardiovascular:      Rate and Rhythm: Normal rate and regular rhythm  Heart sounds: Normal heart sounds  No murmur  No friction rub  Pulmonary:      Effort: Pulmonary effort is normal  No respiratory distress  Breath sounds: Normal breath sounds  No stridor  No wheezing or rales  Chest:      Chest wall: No tenderness  Abdominal:      General: There is no distension  Palpations: Abdomen is soft  There is no hepatomegaly or splenomegaly  Tenderness: There is no abdominal tenderness  There is no guarding or rebound  Musculoskeletal: Normal range of motion  General: No tenderness or deformity  Lymphadenopathy:      Cervical: No cervical adenopathy  Skin:     General: Skin is warm and dry  Coloration: Skin is not pale  Findings: No erythema or rash  Neurological:      Mental Status: She is alert and oriented to person, place, and time  Cranial Nerves: No cranial nerve deficit  Coordination: Coordination normal       Deep Tendon Reflexes: Reflexes are normal and symmetric  Psychiatric:         Behavior: Behavior normal          Thought Content:  Thought content normal          Judgment: Judgment normal            Labs:  Lab Results   Component Value Date    WBC 7 20 01/20/2021    HGB 11 4 (L) 01/20/2021    HCT 35 2 (L) 01/20/2021    MCV 92 01/20/2021     01/20/2021     Lab Results   Component Value Date    K 4 5 01/20/2021    CL 97 01/20/2021    CO2 28 01/20/2021    BUN 14 01/20/2021    CREATININE 0 66 01/20/2021    GLUF 131 (H) 04/06/2019    CALCIUM 9 3 01/20/2021    AST 24 01/20/2021    ALT 19 01/20/2021    ALKPHOS 99 01/20/2021    EGFR 87 01/20/2021     No results found for: TSH    Patient voiced understanding and agreement in the above discussion  Aware to contact our office with questions/symptoms in the interim

## 2021-01-26 ENCOUNTER — PATIENT OUTREACH (OUTPATIENT)
Dept: FAMILY MEDICINE CLINIC | Facility: CLINIC | Age: 76
End: 2021-01-26

## 2021-01-26 NOTE — PROGRESS NOTES
Outgoing Call  01/26/2021    CHW called dEith Nazario, on this day regarding POA and 5 Wishes  Ashleigh Tinoco did not answer at this time  CHW left voicemail to please call back at her convenience  Next follow up was scheduled on 02/02/2021

## 2021-01-31 ENCOUNTER — IMMUNIZATIONS (OUTPATIENT)
Dept: FAMILY MEDICINE CLINIC | Facility: CLINIC | Age: 76
End: 2021-01-31

## 2021-01-31 DIAGNOSIS — Z23 ENCOUNTER FOR IMMUNIZATION: Primary | ICD-10-CM

## 2021-02-03 ENCOUNTER — PATIENT OUTREACH (OUTPATIENT)
Dept: FAMILY MEDICINE CLINIC | Facility: CLINIC | Age: 76
End: 2021-02-03

## 2021-02-03 DIAGNOSIS — R29.6 FALLS FREQUENTLY: Primary | ICD-10-CM

## 2021-02-03 NOTE — PROGRESS NOTES
I called Tanya Chao, patient's granddaughter, to follow up  She states patient is "good"  She notes the patient just woke up and her fasting sugar was 102  She reports the decreased dose of lantus is helping but patient still has some low readings  She notes last night patient's 2 hour postprandial was 88  At this point, Tanya Jeremie held her insulin and gave the patient a snack of peanut butter and crackers and juice  Tanya Chao states the patient does not complain of any hypoglycemic symptoms but Tanya Jeremie knows when her sugar is low because she states the patient becomes sleepy  Tanya Chao denies the patient having any further falls  She states the patient is no longer receiving PT but would like this to continue  She notes the patient would need a new referral; will send note to PCP  Tanya Chao had to feed the patient breakfast so I ended the call  I informed her the patient has upcoming appointments in a few weeks and I will call her prior to then as a reminder and she agreed

## 2021-02-03 NOTE — PROGRESS NOTES
Outgoing Call  02/03/2021    CHW called Eloisa Smart, on this day regarding 5 Wishes and POA  Oswadlo Leigh stated had not found existing POA an she will continue looking for it  CHW will drop off 5 Wishes to Oswaldo Leigh on 02/08/2021  Oswaldo Leigh will discuss the 5 Wishes with [facility] [[email or web address] and completed  Next follow up was scheduled on 02/08/2021

## 2021-02-08 ENCOUNTER — PATIENT OUTREACH (OUTPATIENT)
Dept: FAMILY MEDICINE CLINIC | Facility: CLINIC | Age: 76
End: 2021-02-08

## 2021-02-08 NOTE — PROGRESS NOTES
Outgoing Call  02/08/2021    CHW called Silas Paredes Granddaughter, on this day to complete COVID screening and confirm appointment for today  Screening was completed  CHW will meet Clair to have [facility] [place] sign 5 Wishes  Next follow up was scheduled on 02/12/2021

## 2021-02-09 ENCOUNTER — TELEMEDICINE (OUTPATIENT)
Dept: PALLIATIVE MEDICINE | Facility: CLINIC | Age: 76
End: 2021-02-09
Payer: COMMERCIAL

## 2021-02-09 DIAGNOSIS — C83.31 DIFFUSE LARGE B-CELL LYMPHOMA OF LYMPH NODES OF NECK (HCC): Primary | ICD-10-CM

## 2021-02-09 PROCEDURE — 99213 OFFICE O/P EST LOW 20 MIN: CPT | Performed by: FAMILY MEDICINE

## 2021-02-09 NOTE — PROGRESS NOTES
Outpatient Virtual Regular Visit - Follow-up with Palliative and Supportive Care  [facility] 77 y o  female [id number]    Intake:    Reason for virtual visit is f/up cancer  The patient is unable to visit the clinic safely due to the coronavirus pandemic  After connecting through QVPN, the patient was identified by name and date of birth  [facility] or their agent was informed that this was a telemedicine visit and that the visit is being conducted through [employer] and patient was informed that this is a secure, HIPAA-compliant platform  She agrees to proceed     My office door was closed  No one else was in the room  They acknowledged consent and understanding of privacy and security of the video platform  The patient or agent has agreed to participate and understands they can discontinue the visit at any time  Assessment & Plan  No diagnosis found   - Counseling on health screening and disease prevention, COVID-19 specific (CPT V65 49)    Medications adjusted this encounter:  Requested Prescriptions      No prescriptions requested or ordered in this encounter     No orders of the defined types were placed in this encounter  There are no discontinued medications  [facility] was assessed today for symptoms and care planning related to above illnesses  I have reviewed the patient's controlled substance dispensing history in the Prescription Drug Monitoring Program in compliance with the [facility] regulations before prescribing any controlled substances  She is invited to continue to follow with us  If there are questions or concerns, please contact us through our clinic/answering service 24 hours a day, seven days a week        Jonathan Ziegler MD  [facility] Palliative and Supportive Care          Visit Information     Accompanied By: Family member    Source of History: Self, Family member    History Limitations: Language Barrier - [place] helps interpret today    Contacts: Luke Ordoñez - [phone]    History of Present Illness      Haily Jason is a 77 y o  female who presents in 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  Pertinent issues include: symptom management  She was diagnosed in September 2018 and completed therapy in March 2019; she continues in survivorship cares under the guidance of Ms Sendy Pimentel and Dr Srinivasa Cardenas        Since last visit, Dr Srinivasa Cardenas has reviewed her case and is actually feeling that her illness is well-enough managed that removal of port may be reasonable  He will review labs and scans on 3/10, and consider port explantation afterwards  She has gotten her first COVID vaccine, and will be due for second shot on 2/28  Daughter is helping to manage sugars, with help of PCP, and her sugar control hermains decent  Symptoms are well controlled, and pt is resting today; family does not wish to wake her  She is feeling much better with improved glycemic control, as well as meloxicam we have written  Alprazolam continues to be helpful for anxiety management    We agreed to continue with these meds, as well as her mirtazapine, without changes            Past Medical History:   Diagnosis Date    Cancer [facility]     Throat    Chronic pain disorder     Diabetes mellitus [facility]     Diffuse large B cell lymphoma ([facility]     Dysphagia     GERD without esophagitis     HTN (hypertension)     Hyperlipidemia     Hypertension     MI (myocardial infarction) ([facility]     Port-A-Cath in place 07/29/2019    Thyroid cancer ([facility] ) 2018     Past Surgical History:   Procedure Laterality Date    BONE MARROW BIOPSY      BREAST BIOPSY Left     CHOLECYSTECTOMY      IR PORT PLACEMENT  11/16/2018    OTHER SURGICAL HISTORY      tendor tear repair to right shoulder    SHOULDER ARTHROSCOPY       Current Outpatient Medications   Medication Sig Dispense Refill    ALPRAZolam (XANAX) 0 25 mg tablet Take 1 tablet (0 25 mg total) by mouth daily at bedtime as needed for anxiety or sleep Cada noche, para ansiedad, antes de dormir 30 tablet 1    amLODIPine (NORVASC) 5 mg tablet Take 1 tablet (5 mg total) by mouth daily 30 tablet 10    benzonatate (TESSALON) 200 MG capsule Take 1 capsule (200 mg total) by mouth 3 (three) times a day 20 capsule 0    Blood Glucose Monitoring Suppl (OneTouch Verio) w/Device KIT Use daily 1 kit 0    Calcium Carb-Cholecalciferol 600-400 MG-UNIT TABS Take 1 tablet by mouth 2 (two) times a day 60 tablet 2    carvedilol (COREG) 12 5 mg tablet Take 1 tablet (12 5 mg total) by mouth 2 (two) times a day with meals 60 tablet 10    cetirizine (ZyrTEC) 5 MG tablet Take 1 tablet (5 mg total) by mouth daily 90 tablet 1    Continuous Blood Gluc  (FreeStyle Otoniel 14 Day [place]) JORDAN Use 1 application daily 1 Device 0    Continuous Blood Gluc Sensor (FreeStyle Otoniel 14 Day Sensor) MISC Use 1 application daily 1 each 0    diclofenac sodium (VOLTAREN) 1 % Apply 2 g topically 4 (four) times a day 100 g 10    ezetimibe (ZETIA) 10 mg tablet Take 1 tablet (10 mg total) by mouth daily 30 tablet 10    fluticasone (FLONASE) 50 mcg/act nasal spray 1 spray into each nostril daily 16 g 10    gabapentin (NEURONTIN) 100 mg capsule Take 1 capsule (100 mg total) by mouth 2 (two) times a day 60 capsule 0    glipiZIDE (GLUCOTROL XL) 5 mg 24 hr tablet MAR JOHNNY (1) TABLETA POR VIA ORAL JOHNNY VEZ AL JEFFREY 30 tablet 10    glucose blood (OneTouch Verio) test strip Use as instructed 100 each 3    Insulin Pen Needle (BD Pen Needle Micro U/F) 32G X 6 MM MISC Use daily 100 each 1    Lantus SoloStar 100 units/mL injection pen INYECTAR 9 UNIDADES DEBAJO DE LA PIEL DIARIAMENTE A LA HORA DE ACOSTARSE 15 mL 10    loperamide (IMODIUM) 2 mg capsule MAR JOHNNY (1) CAPSULA POR VIA ORAL ALFREDO SEA NECESARIO PARA LA DIARREA 30 capsule 11    losartan (COZAAR) 25 mg tablet MAR JOHNNY (1) TABLETA POR VIA ORAL JOHNNY VEZ AL JEFFREY 30 tablet 10    meloxicam (MOBIC) 7 5 mg tablet Take 1 tablet (7 5 mg total) by mouth daily [place] shamar tableta cada jeffrey con el desayuno para la artritis  NO TOME con el ibuprofeno 90 tablet 3    metFORMIN (GLUCOPHAGE) 1000 MG tablet MAR SHAMAR (1) TABLETA POR VIA ORAL DOS VECES AL JEFFREY CON COMIDAS  60 tablet 10    mirtazapine (REMERON) 15 mg tablet Take 1 tablet (15 mg total) by mouth daily at bedtime MAR SHAMAR (1) TABLETA POR VIA ORAL CADA NOCHE A LA HORA DE ACOSTARSE 90 tablet 0    omeprazole (PriLOSEC) 40 MG capsule MAR SHAMAR (1) CAPSULA POR VIA ORAL DOS VECES AL JEFFREY 180 capsule 1    OneTouch Delica Lancets 29N MISC Use daily 100 each 3    Tradjenta 5 MG TABS MAR SHAMAR (1) TABLETA POR VIA ORAL SHAMAR VEZ AL JEFFREY 30 tablet 10    Ventolin  (90 Base) MCG/ACT inhaler INHALAR 1-2 BOCANADAS A LOS PULMONES CADA 4-6 HORAS ALFREDO SEA NECESARIO 18 g 10     No current facility-administered medications for this visit  No Known Allergies    Review of Systems   Unable to perform ROS: Mental status change (pt sleeping, and fmaily does not wish to wake her)         Video Exam  There were no vitals filed for this visit  Physical Exam  Constitutional:       General: She is not in acute distress  Appearance: She is not ill-appearing, toxic-appearing or diaphoretic  Comments: frail   HENT:      Head: Normocephalic and atraumatic  Right Ear: External ear normal       Left Ear: External ear normal       Mouth/Throat:      Comments: Mask not removed today  Eyes:      General:         Right eye: No discharge  Left eye: No discharge  Neck:      Trachea: No tracheal deviation  Pulmonary:      Effort: Pulmonary effort is normal  No respiratory distress  Breath sounds: No stridor  Abdominal:      General: There is no distension  Skin:     General: Skin is warm and dry  Coloration: Skin is pale  Findings: No erythema or rash  Neurological:      Comments: Facies symmetric    Does not wake to participate in exam Psychiatric:      Comments: Does not participate in exam today       I spent 15+ minutes was spent during this virtual visit by Alyssa, face to face with Taj Rosenbaum and her family with greater than 50% of the time spent in counseling or coordination of care including discussions of etiology of diagnosis and follow up requirements   All of the patient's or agent's questions were answered during this discussion  Encounter provider Camron Abernathy MD, located at:  [address] [place] [id number] [phone]    Recent Visits  Date Type Provider Dept   02/09/21 Telemedicine Camron Abernathy, [address] recent visits within past 7 days and meeting all other requirements     Future Appointments  No visits were found meeting these conditions  Showing future appointments within next 150 days and meeting all other requirements        VIRTUAL VISIT [facility] or their agent has consented to an online visit or consultation  They understands that the online visit is based solely on information provided by the patient or agent, and that, in the absence of a face-to-face physical evaluation by the physician, the diagnosis they receive is both limited and provisional in terms of accuracy and completeness  This is not intended to replace a full medical face-to-face evaluation by the physician  Taj Rosenbaum or their agent understands and accepts these terms  The patient or their agent was informed this is a billable service

## 2021-02-12 ENCOUNTER — PATIENT OUTREACH (OUTPATIENT)
Dept: FAMILY MEDICINE CLINIC | Facility: CLINIC | Age: 76
End: 2021-02-12

## 2021-02-12 NOTE — PROGRESS NOTES
Outgoing Call  02/12/2021    CHW called Helenasaramd KevinMarci's Granddaughter on this day to follow up with 5 Wishes  Jazlyn Kevin was driving at the time of this call  Jazlyn Kevin will call back  Next follow up was scheduled on 02/15/2021

## 2021-02-15 ENCOUNTER — PATIENT OUTREACH (OUTPATIENT)
Dept: FAMILY MEDICINE CLINIC | Facility: CLINIC | Age: 76
End: 2021-02-15

## 2021-02-15 DIAGNOSIS — G62.0 CHEMOTHERAPY-INDUCED PERIPHERAL NEUROPATHY (HCC): Chronic | ICD-10-CM

## 2021-02-15 DIAGNOSIS — Z92.21 STATUS POST CHEMOTHERAPY: ICD-10-CM

## 2021-02-15 DIAGNOSIS — T45.1X5A CHEMOTHERAPY-INDUCED PERIPHERAL NEUROPATHY (HCC): Chronic | ICD-10-CM

## 2021-02-15 DIAGNOSIS — K21.9 GASTROESOPHAGEAL REFLUX DISEASE: ICD-10-CM

## 2021-02-15 RX ORDER — GABAPENTIN 100 MG/1
CAPSULE ORAL
Qty: 60 CAPSULE | Refills: 5 | Status: SHIPPED | OUTPATIENT
Start: 2021-02-15 | End: 2021-08-13

## 2021-02-15 RX ORDER — OMEPRAZOLE 40 MG/1
CAPSULE, DELAYED RELEASE ORAL
Qty: 60 CAPSULE | Refills: 10 | Status: SHIPPED | OUTPATIENT
Start: 2021-02-15 | End: 2022-01-03 | Stop reason: SDUPTHER

## 2021-02-15 NOTE — PROGRESS NOTES
I called Ashleigh Tinoco, patient's granddaughter, to remind her of the patient's upcoming appointments:  Endo 2/17; PCP and Cards 2/19; infusion 2/22; CT's 2/23  Ashleigh Tinoco was aware of all appointments and plans on the patient attending  Ashleigh Tinoco reports the patient's blood sugar this morning was 98 and last night's reading was 118  She states the patient ate breakfast today and went back to sleep because she stated she did not feel well  Upon further questioning, Ashleigh Tinoco stated the patient is tired because she had visitors over the weekend and was getting up earlier than her usual       Ashleigh Tinoco states the patient recently saw H/O and was told the pain the patient is experiencing in her hands and joints is related to arthritis  There was also discussion of having the patient's port removed after CT's next week  I will continue to follow

## 2021-02-16 ENCOUNTER — PATIENT OUTREACH (OUTPATIENT)
Dept: FAMILY MEDICINE CLINIC | Facility: CLINIC | Age: 76
End: 2021-02-16

## 2021-02-16 NOTE — PROGRESS NOTES
Outgoing Call  02/16/2021    CHW called Rowan Bundy, Marci's Granddaughter, on this day to follow up with 5 Wishes  Rowan Bundy stated that they still working with the 5 Wishes  Next follow up was scheduled on Thursday 02/18/2021

## 2021-02-17 ENCOUNTER — CONSULT (OUTPATIENT)
Dept: ENDOCRINOLOGY | Facility: CLINIC | Age: 76
End: 2021-02-17
Payer: COMMERCIAL

## 2021-02-17 VITALS
BODY MASS INDEX: 28.76 KG/M2 | HEIGHT: 60 IN | WEIGHT: 146.5 LBS | DIASTOLIC BLOOD PRESSURE: 70 MMHG | HEART RATE: 70 BPM | SYSTOLIC BLOOD PRESSURE: 122 MMHG

## 2021-02-17 DIAGNOSIS — Z79.4 CURRENT USE OF INSULIN (HCC): ICD-10-CM

## 2021-02-17 DIAGNOSIS — I10 ESSENTIAL HYPERTENSION: ICD-10-CM

## 2021-02-17 DIAGNOSIS — E11.65 UNCONTROLLED TYPE 2 DIABETES MELLITUS WITH HYPERGLYCEMIA (HCC): Primary | ICD-10-CM

## 2021-02-17 DIAGNOSIS — E11.9 TYPE 2 DIABETES MELLITUS WITHOUT COMPLICATION, WITHOUT LONG-TERM CURRENT USE OF INSULIN (HCC): ICD-10-CM

## 2021-02-17 DIAGNOSIS — Z78.9 NON-ENGLISH SPEAKING PATIENT: ICD-10-CM

## 2021-02-17 PROCEDURE — 99204 OFFICE O/P NEW MOD 45 MIN: CPT | Performed by: INTERNAL MEDICINE

## 2021-02-17 RX ORDER — FERROUS SULFATE 325(65) MG
325 TABLET ORAL
COMMUNITY

## 2021-02-17 NOTE — PROGRESS NOTES
New Patient Progress Note      Chief Complaint   Patient presents with    Diabetes Type 2      Referring Provider  Sid Schmitz, [address] [facility],[address]  [facility]  [facility],  [address] [place] [address]     History of Present Illness:   Carter Hood is a 77 y o  female with a history of type 2 diabetes with long term use of insulin and is seen at the request of [address]  She is accompanied by granddaughter Roque Dietz translates  She reports that her grandmother was eating high amounts of iced cream in the summer, drinking some juice and soda  And her diet was significantly impacted by her great-grandchildren  Recalls being diagnosed with diabetes at age 56y, but did not use insulin until this year  She only ever took oral agents prior to this  She was taking Janumet and glipzide, but this was stopped after her Lymphoma treatment  There was significant weight loss with her chemo, but her port is now being removed so no additional treatment is being planned now  She was on prednisone with her lymphoma treatment  Reports complications of eye and nerve damage  Reports a hx of CVA  Denies hospitalizations for hypoglycemic or severe hyperglycemic episodes  Started Lantus 10 units once daily (830-9pm), which was lowered    Current regimen: Lantus pens 9units once daily, Tradjenta 5mg, Metformin 1g twice dailly, and Glipizide 5mg with bf and dinner  Injects in and rotates sites: abdomen and back of the arm    Home blood glucose readings:   Before breakfast: 97, 107, 120, 122  2h after lunch: 115-130  Before dinner: 90, 80  Bedtime: 131, 138    Hypoglycemic episodes: none  Hypoglycemia symptoms: sleepiness  Treatment of hypoglycemia: OJ and peanuts or cheese  Diabetes education:    Diet: 2-3 meals meals per day   BF is "good" but lunch is small (crackers or fruit), dinner includes the   diabetic diet compliance:  Much improved  Activity: Daily activity         Opthamology: sees, but needs to schedule an appointment  Believes she has retinopathy  Podiatry: 2 years ago, was told she had neuropathy after a biopsy      Has hypertension: on losartan, amlodipine, carvedilol  Has hyperlipidemia: on Zetia    Thyroid disorders: none  History of pancreatitis: none    She does have poor vision, bur this improves with glasses  She has thirst and urination, though her   She denies currently having pain in her feet   She denies diarrhea or constipation now, but "sometimes "     Patient Active Problem List   Diagnosis    Type 2 diabetes mellitus without complication, without long-term current use of insulin ([facility]    Essential hypertension    Gastroesophageal reflux disease    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)    Oropharyngeal dysphagia    Hyponatremia    Anxiety about health    Nausea    Tremor    Severe protein-calorie malnutrition ([facility]    Palliative care patient    Neoplasm related pain    Depression    Other insomnia    Rash    Status post chemotherapy    Syncope    Allergic rhinitis    Arthritis    RONAK (obstructive sleep apnea)    Chemotherapy-induced peripheral neuropathy (HCC)    Trouble walking    Encounter for central line care    Current use of insulin ([facility]    Non-English speaking patient      Past Medical History:   Diagnosis Date    [facility]     Throat    Chronic pain disorder     Diabetes mellitus ([facility]     Diffuse large B cell lymphoma ([facility]     Dysphagia     GERD without esophagitis     HTN (hypertension)     Hyperlipidemia     Hypertension     MI (myocardial infarction) ([facility]     Port-A-Cath in place 07/29/2019    Thyroid cancer ([facility] ) 2018      Past Surgical History:   Procedure Laterality Date    BONE MARROW BIOPSY      BREAST BIOPSY Left     CHOLECYSTECTOMY      IR PORT PLACEMENT  11/16/2018    OTHER SURGICAL HISTORY      tendor tear repair to right shoulder    SHOULDER ARTHROSCOPY        Family History   Problem Relation Age of Onset    Diabetes Mother     Heart disease Sister     Hypertension Brother     Uterine cancer Maternal Grandmother     Prostate cancer Paternal Grandfather      Social History     Tobacco Use    Smoking status: Never Smoker    Smokeless tobacco: Never Used   Substance Use Topics    Alcohol use: Never     Frequency: Never     Drinks per session: Patient refused     Binge frequency: Never     Comment: 0     No Known Allergies      Current Outpatient Medications:     ALPRAZolam (XANAX) 0 25 mg tablet, Take 1 tablet (0 25 mg total) by mouth daily at bedtime as needed for anxiety or sleep Cada ashleigh, para ansiedad, antes de dormir, Disp: 30 tablet, Rfl: 1    amLODIPine (NORVASC) 5 mg tablet, Take 1 tablet (5 mg total) by mouth daily, Disp: 30 tablet, Rfl: 10    benzonatate (TESSALON) 200 MG capsule, Take 1 capsule (200 mg total) by mouth 3 (three) times a day, Disp: 20 capsule, Rfl: 0    Blood Glucose Monitoring Suppl (OneTouch Verio) w/Device KIT, Use daily, Disp: 1 kit, Rfl: 0    Calcium Carb-Cholecalciferol 600-400 MG-UNIT TABS, Take 1 tablet by mouth 2 (two) times a day, Disp: 60 tablet, Rfl: 2    carvedilol (COREG) 12 5 mg tablet, Take 1 tablet (12 5 mg total) by mouth 2 (two) times a day with meals, Disp: 60 tablet, Rfl: 10    cetirizine (ZyrTEC) 5 MG tablet, Take 1 tablet (5 mg total) by mouth daily, Disp: 90 tablet, Rfl: 1    cyanocobalamin (VITAMIN B-12) 500 MCG tablet, Take 1,000 mcg by mouth daily, Disp: , Rfl:     diclofenac sodium (VOLTAREN) 1 %, Apply 2 g topically 4 (four) times a day, Disp: 100 g, Rfl: 10    ezetimibe (ZETIA) 10 mg tablet, Take 1 tablet (10 mg total) by mouth daily, Disp: 30 tablet, Rfl: 10    ferrous sulfate 325 (65 Fe) mg tablet, Take 325 mg by mouth daily with breakfast, Disp: , Rfl:     fluticasone (FLONASE) 50 mcg/act nasal spray, 1 spray into each nostril daily, Disp: 16 g, Rfl: 10    gabapentin (NEURONTIN) 100 mg capsule, MAR JOHNNY (1) CAPSULA POR VIA ORAL DOS VECES AL JEFFREY (Patient taking differently: Take 100 mg by mouth daily ), Disp: 60 capsule, Rfl: 5    glipiZIDE (GLUCOTROL XL) 5 mg 24 hr tablet, MAR SHAMAR (1) TABLETA POR VIA ORAL SHAMAR VEZ AL JEFFREY, Disp: 30 tablet, Rfl: 10    glucose blood (OneTouch Verio) test strip, Use as instructed, Disp: 100 each, Rfl: 3    Insulin Pen Needle (BD Pen Needle Micro U/F) 32G X 6 MM MISC, Use daily, Disp: 100 each, Rfl: 1    Lantus SoloStar 100 units/mL injection pen, INYECTAR 9 UNIDADES DEBAJO DE LA PIEL DIARIAMENTE A LA HORA DE ACOSTARSE, Disp: 15 mL, Rfl: 10    loperamide (IMODIUM) 2 mg capsule, MAR SHAMAR (1) CAPSULA POR VIA ORAL ALFREDO SEA NECESARIO PARA LA DIARREA, Disp: 30 capsule, Rfl: 11    losartan (COZAAR) 25 mg tablet, MAR SHAMAR (1) TABLETA POR VIA ORAL SHAMAR VEZ AL JEFFREY, Disp: 30 tablet, Rfl: 10    meloxicam (MOBIC) 7 5 mg tablet, Take 1 tablet (7 5 mg total) by mouth daily [place] shamar tableta cada jeffrey con el desayuno para la artritis  NO TOME con el ibuprofeno, Disp: 90 tablet, Rfl: 3    metFORMIN (GLUCOPHAGE) 1000 MG tablet, MAR SHAMAR (1) TABLETA POR VIA ORAL DOS VECES AL JEFFREY CON COMIDAS , Disp: 60 tablet, Rfl: 10    mirtazapine (REMERON) 15 mg tablet, Take 1 tablet (15 mg total) by mouth daily at bedtime MAR SHAMAR (1) TABLETA POR VIA ORAL CADA NOCHE A LA HORA DE ACOSTARSE, Disp: 90 tablet, Rfl: 0    omeprazole (PriLOSEC) 40 MG capsule, MAR SHAMAR (1) CAPSULA POR VIA ORAL DOS VECES AL JEFFREY, Disp: 60 capsule, Rfl: 10    OneTouch Delica Lancets 21A MISC, Use daily, Disp: 100 each, Rfl: 3    Tradjenta 5 MG TABS, MAR SHAMAR (1) TABLETA POR VIA ORAL SHAMAR VEZ AL JEFFREY, Disp: 30 tablet, Rfl: 10    Ventolin  (90 Base) MCG/ACT inhaler, INHALAR 1-2 BOCANADAS A LOS PULMONES CADA 4-6 HORAS ALFREDO SEA NECESARIO, Disp: 18 g, Rfl: 10  Review of Systems    Physical Exam:  Body mass index is 28 61 kg/m²    /70 (BP Location: Left arm, Patient Position: Sitting, Cuff Size: Standard)   Pulse 70   Ht 5' (1 524 m)   Wt 66 5 kg (146 lb 8 oz)   BMI 28 61 kg/m²    Wt Readings from Last 3 Encounters:   02/17/21 66 5 kg (146 lb 8 oz)   01/25/21 66 8 kg (147 lb 3 2 oz)   01/15/21 65 2 kg (143 lb 12 8 oz)       GEN: NAD  E/n/m mask in place, hearing intact bilat  Eyes: no stare or proptosis, EOMI  Neck: trachea midline, thyroid NT to palpation, nl in size, no nodules or neck masses noted, no cervical LAD  CV; heart reg rate s1s2 nl, no m/r/g appreciated  Resp: CTAB, good effort  Ab+BS  Neuro: no tremor  MS: no c/c in digits, moves all 4 ext, nl muscle bulk, gait nl  Skin: warm and dry, no palmar erythema  Ext: deferred  Psych: nl mood and affect, no gross lapses in memory    Labs:     Lab Results   Component Value Date    HGBA1C 14 4 (A) 11/10/2020         Lab Results   Component Value Date    CREATININE 0 66 01/20/2021    CREATININE 0 69 12/09/2020    CREATININE 0 66 12/09/2020    BUN 14 01/20/2021    K 4 5 01/20/2021    CL 97 01/20/2021    CO2 28 01/20/2021     eGFR   Date Value Ref Range Status   01/20/2021 87 >60 ml/min/1 73sq m Final     No components found for: [facility]    Lab Results   Component Value Date    HDL 41 05/06/2016    TRIG 85 05/06/2016       Lab Results   Component Value Date    ALT 19 01/20/2021    AST 24 01/20/2021    ALKPHOS 99 01/20/2021       No results found for: TSH, FREET4, TSI    Impression:  1  Uncontrolled type 2 diabetes mellitus with hyperglycemia ([address] )    2  Type 2 diabetes mellitus without complication, without long-term current use of insulin ([address] )    3  Non-English speaking patient    4  Current use of insulin ([address] )    5  Essential hypertension           Plan:    Juan Antonio Silva was seen today for diabetes type 2      Diagnoses and all orders for this visit:    Uncontrolled type 2 diabetes mellitus with hyperglycemia ([address] )  -     Ambulatory referral to Endocrinology    Type 2 diabetes mellitus without complication, without long-term current use of insulin ([address] )    Non-English speaking patient    [facility]    Essential hypertension      1  Uncontrolled type 2 diabetes mellitus with hyperglycemia ([facility] )     - Ambulatory referral to Endocrinology    1  T2DM, now on insulin: BGs are much improved after making changes in her diet  I advised her to lower Lantus to 7units, but continue her Tradjenta, glipizide and metformin as she is doing  They will send Bgs in 2 weeks to review  2  HTN: Adherent to care, including losartan  3  Hyperlipidemia: Taking zetia    RTC in 3mos  Discussed with the patient and all questioned fully answered  She will call me if any problems arise      Counseled patient on diagnostic results, prognosis, risk and benefit of treatment options, instruction for management, importance of treatment compliance, Risk  factor reduction and impressions      Nato Car MD

## 2021-02-19 ENCOUNTER — PATIENT OUTREACH (OUTPATIENT)
Dept: FAMILY MEDICINE CLINIC | Facility: CLINIC | Age: 76
End: 2021-02-19

## 2021-02-19 NOTE — PROGRESS NOTES
Outgoing Call  02/19/2021    CHW called Matthias Jonas Granddaughter, on this day regarding 5 Wishes  Chanell Walter stated that they still working on the 5 Wishes  Also Chanell Walter said, that she found the POA and will give copy to CHW for Marci's Chart  Next follow up was scheduled on 02/26/2021

## 2021-02-22 ENCOUNTER — HOSPITAL ENCOUNTER (OUTPATIENT)
Dept: INFUSION CENTER | Facility: HOSPITAL | Age: 76
Discharge: HOME | End: 2021-02-22
Payer: COMMERCIAL

## 2021-02-22 DIAGNOSIS — C83.31 DIFFUSE LARGE B-CELL LYMPHOMA OF LYMPH NODES OF NECK (HCC): Primary | ICD-10-CM

## 2021-02-22 DIAGNOSIS — Z45.2 ENCOUNTER FOR CENTRAL LINE CARE: ICD-10-CM

## 2021-02-22 DIAGNOSIS — D64.9 ANEMIA, UNSPECIFIED TYPE: ICD-10-CM

## 2021-02-22 LAB
ALBUMIN SERPL BCP-MCNC: 4.2 G/DL (ref 3–5.2)
ALP SERPL-CCNC: 96 U/L (ref 43–122)
ALT SERPL W P-5'-P-CCNC: 19 U/L (ref 9–52)
ANION GAP SERPL CALCULATED.3IONS-SCNC: 10 MMOL/L (ref 5–14)
AST SERPL W P-5'-P-CCNC: 24 U/L (ref 14–36)
BASOPHILS # BLD AUTO: 0.1 THOUSANDS/ΜL (ref 0–0.1)
BASOPHILS NFR BLD AUTO: 1 % (ref 0–1)
BILIRUB SERPL-MCNC: 0.3 MG/DL
BUN SERPL-MCNC: 19 MG/DL (ref 5–25)
CALCIUM SERPL-MCNC: 9.4 MG/DL (ref 8.4–10.2)
CHLORIDE SERPL-SCNC: 101 MMOL/L (ref 97–108)
CO2 SERPL-SCNC: 25 MMOL/L (ref 22–30)
CREAT SERPL-MCNC: 0.75 MG/DL (ref 0.6–1.2)
CRP SERPL QL: <5 MG/L
EOSINOPHIL # BLD AUTO: 0.2 THOUSAND/ΜL (ref 0–0.4)
EOSINOPHIL NFR BLD AUTO: 2 % (ref 0–6)
ERYTHROCYTE [DISTWIDTH] IN BLOOD BY AUTOMATED COUNT: 13.6 %
ERYTHROCYTE [SEDIMENTATION RATE] IN BLOOD: 24 MM/HOUR (ref 0–29)
FERRITIN SERPL-MCNC: 30 NG/ML (ref 8–388)
GFR SERPL CREATININE-BSD FRML MDRD: 79 ML/MIN/1.73SQ M
GLUCOSE SERPL-MCNC: 246 MG/DL (ref 70–99)
HCT VFR BLD AUTO: 36.4 % (ref 36–46)
HGB BLD-MCNC: 11.6 G/DL (ref 12–16)
IRON SATN MFR SERPL: 33 %
IRON SERPL-MCNC: 99 UG/DL (ref 50–170)
LDH SERPL-CCNC: 331 U/L (ref 313–618)
LYMPHOCYTES # BLD AUTO: 2.8 THOUSANDS/ΜL (ref 0.5–4)
LYMPHOCYTES NFR BLD AUTO: 32 % (ref 25–45)
MCH RBC QN AUTO: 29.5 PG (ref 26–34)
MCHC RBC AUTO-ENTMCNC: 31.9 G/DL (ref 31–36)
MCV RBC AUTO: 93 FL (ref 80–100)
MONOCYTES # BLD AUTO: 0.6 THOUSAND/ΜL (ref 0.2–0.9)
MONOCYTES NFR BLD AUTO: 7 % (ref 1–10)
NEUTROPHILS # BLD AUTO: 5.1 THOUSANDS/ΜL (ref 1.8–7.8)
NEUTS SEG NFR BLD AUTO: 59 % (ref 45–65)
PLATELET # BLD AUTO: 287 THOUSANDS/UL (ref 150–450)
PMV BLD AUTO: 8.4 FL (ref 8.9–12.7)
POTASSIUM SERPL-SCNC: 4.3 MMOL/L (ref 3.6–5)
PROT SERPL-MCNC: 7.5 G/DL (ref 5.9–8.4)
RBC # BLD AUTO: 3.93 MILLION/UL (ref 4–5.2)
SODIUM SERPL-SCNC: 136 MMOL/L (ref 137–147)
TIBC SERPL-MCNC: 297 UG/DL (ref 250–450)
TSH SERPL DL<=0.05 MIU/L-ACNC: 3.45 UIU/ML (ref 0.47–4.68)
VIT B12 SERPL-MCNC: 609 PG/ML (ref 100–900)
WBC # BLD AUTO: 8.7 THOUSAND/UL (ref 4.5–11)

## 2021-02-22 PROCEDURE — 86140 C-REACTIVE PROTEIN: CPT

## 2021-02-22 PROCEDURE — 83540 ASSAY OF IRON: CPT

## 2021-02-22 PROCEDURE — 84443 ASSAY THYROID STIM HORMONE: CPT

## 2021-02-22 PROCEDURE — 85652 RBC SED RATE AUTOMATED: CPT

## 2021-02-22 PROCEDURE — 82728 ASSAY OF FERRITIN: CPT

## 2021-02-22 PROCEDURE — 85025 COMPLETE CBC W/AUTO DIFF WBC: CPT

## 2021-02-22 PROCEDURE — 80053 COMPREHEN METABOLIC PANEL: CPT

## 2021-02-22 PROCEDURE — 82607 VITAMIN B-12: CPT

## 2021-02-22 PROCEDURE — 83615 LACTATE (LD) (LDH) ENZYME: CPT

## 2021-02-22 PROCEDURE — 83550 IRON BINDING TEST: CPT

## 2021-02-22 NOTE — PROGRESS NOTES
Pt arrived on unit today for labs drawn via port  Labs drawn and sent as ordered  Next apt scheduled and AVS provided  Left unit ambulatory with granddaughter

## 2021-02-23 ENCOUNTER — HOSPITAL ENCOUNTER (OUTPATIENT)
Dept: CT IMAGING | Facility: HOSPITAL | Age: 76
Discharge: HOME | End: 2021-02-23
Attending: INTERNAL MEDICINE
Payer: COMMERCIAL

## 2021-02-23 DIAGNOSIS — C83.31 DIFFUSE LARGE B-CELL LYMPHOMA OF LYMPH NODES OF NECK (HCC): ICD-10-CM

## 2021-02-23 PROCEDURE — 74177 CT ABD & PELVIS W/CONTRAST: CPT

## 2021-02-23 PROCEDURE — G1004 CDSM NDSC: HCPCS

## 2021-02-23 PROCEDURE — 70491 CT SOFT TISSUE NECK W/DYE: CPT

## 2021-02-23 PROCEDURE — 71260 CT THORAX DX C+: CPT

## 2021-02-23 RX ADMIN — IOHEXOL 100 ML: 350 INJECTION, SOLUTION INTRAVENOUS at 09:52

## 2021-02-28 ENCOUNTER — IMMUNIZATIONS (OUTPATIENT)
Dept: FAMILY MEDICINE CLINIC | Facility: CLINIC | Age: 76
End: 2021-02-28

## 2021-02-28 DIAGNOSIS — Z23 ENCOUNTER FOR IMMUNIZATION: Primary | ICD-10-CM

## 2021-03-02 ENCOUNTER — PATIENT OUTREACH (OUTPATIENT)
Dept: FAMILY MEDICINE CLINIC | Facility: CLINIC | Age: 76
End: 2021-03-02

## 2021-03-02 ENCOUNTER — TELEPHONE (OUTPATIENT)
Dept: HEMATOLOGY ONCOLOGY | Facility: CLINIC | Age: 76
End: 2021-03-02

## 2021-03-02 ENCOUNTER — TELEPHONE (OUTPATIENT)
Dept: HEMATOLOGY ONCOLOGY | Facility: MEDICAL CENTER | Age: 76
End: 2021-03-02

## 2021-03-02 DIAGNOSIS — Z45.2 ENCOUNTER FOR CENTRAL LINE CARE: ICD-10-CM

## 2021-03-02 DIAGNOSIS — C83.31 DIFFUSE LARGE B-CELL LYMPHOMA OF LYMPH NODES OF NECK (HCC): ICD-10-CM

## 2021-03-02 NOTE — TELEPHONE ENCOUNTER
Patient daughter called in to reschedule 03/03/2021 to 03/05/2021 stated that patient is not feeling well today

## 2021-03-02 NOTE — PROGRESS NOTES
Outgoing Call  03/02/2021    CHW called Narcisa Mathews daughter, to follow up with 5 Wishes  Ashleigh Tinoco stated that 5 Wishes is completed and be taken to the doctor's appointment on Thursday 03/05/2021  This referral will be closed on my behalf today 03/02/2021

## 2021-03-02 NOTE — PROGRESS NOTES
I called Lexa Argue, patient's granddaughter, but received voicemail  Message was left asking for a return call  Will make further outreach attempts

## 2021-03-05 ENCOUNTER — OFFICE VISIT (OUTPATIENT)
Dept: HEMATOLOGY ONCOLOGY | Facility: CLINIC | Age: 76
End: 2021-03-05
Payer: COMMERCIAL

## 2021-03-05 ENCOUNTER — PREP FOR PROCEDURE (OUTPATIENT)
Dept: INTERVENTIONAL RADIOLOGY/VASCULAR | Facility: CLINIC | Age: 76
End: 2021-03-05

## 2021-03-05 VITALS
RESPIRATION RATE: 18 BRPM | HEIGHT: 60 IN | DIASTOLIC BLOOD PRESSURE: 70 MMHG | TEMPERATURE: 96 F | WEIGHT: 145 LBS | HEART RATE: 70 BPM | OXYGEN SATURATION: 98 % | SYSTOLIC BLOOD PRESSURE: 136 MMHG | BODY MASS INDEX: 28.47 KG/M2

## 2021-03-05 DIAGNOSIS — Z92.21 STATUS POST CHEMOTHERAPY: Primary | ICD-10-CM

## 2021-03-05 DIAGNOSIS — C83.31 DIFFUSE LARGE B-CELL LYMPHOMA OF LYMPH NODES OF NECK (HCC): Primary | ICD-10-CM

## 2021-03-05 DIAGNOSIS — D64.9 NORMOCYTIC ANEMIA: ICD-10-CM

## 2021-03-05 DIAGNOSIS — D50.9 IRON DEFICIENCY ANEMIA, UNSPECIFIED IRON DEFICIENCY ANEMIA TYPE: ICD-10-CM

## 2021-03-05 DIAGNOSIS — Z12.31 SCREENING MAMMOGRAM, ENCOUNTER FOR: ICD-10-CM

## 2021-03-05 PROCEDURE — 99214 OFFICE O/P EST MOD 30 MIN: CPT | Performed by: NURSE PRACTITIONER

## 2021-03-05 NOTE — PROGRESS NOTES
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)   the patient does not seem to have any obvious hint of recurrence of her diffuse large B-cell lymphoma based off of today's examination and recent imaging studies  We will continue to monitor her closely according to the NCCN guidelines  I think it would be appropriate to remove her Port-A-Cath at this time; she will be referred to the interventional radiology department for removal     We also did discuss pursuing healthier lifestyles after cancer treatment including: following up with PCP on a regular basis for health maintenance/immunizations, getting routine screening exams when indicated, consuming a well-balanced diet and exercising on a regular basis  - CBC and differential; Future  - Comprehensive metabolic panel; Future  - C-reactive protein; Future  - LD,Blood; Future  - Ambulatory referral to Interventional Radiology; Future    2  Normocytic anemia  The patient continues to have mild normocytic anemia hemoglobin 11 6  Which is likely multifactorial including anemia of chronic disease and nutritional deficiencies  She denies any obvious hint of bleeding from any site  She was just recently started on oral vitamin B12 and iron supplements 3 weeks ago on a daily basis which she is tolerating well  She was advised to continue for now  We will repeat her laboratory studies in about 4 months from now if she continues to have decreased iron stores could consider IV iron at that time  - CBC and differential; Future  - Comprehensive metabolic panel; Future  - C-reactive protein; Future  - Sedimentation rate, automated; Future  - Vitamin B12; Future  - Iron Panel (Includes Ferritin, Iron Sat%, Iron, and TIBC); Future  - Ferritin; Future  - LD,Blood;  Future  - Ambulatory referral to Interventional Radiology; Future    3  Iron deficiency anemia, unspecified iron deficiency anemia type    As above  - CBC and differential; Future  - Comprehensive metabolic panel; Future  - C-reactive protein; Future  - Sedimentation rate, automated; Future  - Vitamin B12; Future  - Iron Panel (Includes Ferritin, Iron Sat%, Iron, and TIBC); Future  - Ferritin; Future  - LD,Blood; Future    4  Screening mammogram, encounter for  Patient is overdue for her screening mammography will order and schedule today as per granddaughter's request     - Mammo screening bilateral w 3d & cad; Future    HPI:  Patient presents today for a follow-up visit accompanied by her granddaughter who kindly assisted with translation as per patient's request   She is doing fairly well and has no new complaints  Her granddaughter states that her blood sugars are under better control at this time  She denies any constitutional symptoms or bleeding from any site  Started taking an oral vitamin B12 supplement and iron supplement daily about 3 weeks ago as advised by Dr Emely Vidal which she is tolerating well  Patient's most recent laboratory studies from 02/22/2021 showed normal white cells and platelets, she continues to have stable mild normocytic anemia H&H 11 6/36 4 with MCV 93  nonfasting glucose 246, sodium slightly lower than average 136 remaining metabolic panel is normal   Inflammatory markers are not elevated  TSH and LDH normal   B12 is appropriate on supplementation 609  Iron panel revealed iron saturation 33% with ferritin lower than average 30  She just had repeat imaging done 02/23/2021 a CT scan of the neck, chest abdomen and pelvis with contrast which showed:  CT soft tissue neck w contrast  IMPRESSION:  No pathologically enlarged cervical lymphadenopathy by CT size criteria  CT chest abdomen pelvis w contrast  IMPRESSION:  No evidence of recurrent lymphoma      Oncology History Overview Note   The patient complained about significant sore throat in May which was treated with antibiotics by her PCP in [facility] which did not improve her sore throat  She then started to notice significant odynophagia and dysphagia for liquids it is and solid nutrition  Eventually, she had a CT scan of the neck on the 20th of September which showed soft tissue lesion in the left palatine tonsil with abnormal lymph nodes bilaterally in the neck compatible with neoplastic process  She then had a biopsy of the left tonsil/left tonsillectomy on the 25th of September 2018 which was compatible with diffuse large B-cell lymphoma germinal center B phenotype  The immunohistochemical staining came back positive for BCL2, BCL 6 and myc with Ki 67 around 70%  No FISH rearrangements gene study was done for BCL2, BCL 6 are myc translocation  The patient was treated with 1 cycle of R-EPOCH since her airway was compromise with the large tonsillar mass and a biopsy which was reviewed by our hematopathologist on the 15 November compatible with 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 according to the [facility] with pending gene rearrangement studies  During the hospital course the patient had another biopsy of the left tonsil to better understand the exact aggressiveness of her large B-cell lymphoma  The biopsy on the 20th of November showed large B-cell lymphoma with BCL -2 and C myc level expressed surface a type without the myc or BCL gene rearrangement  Her bone marrow biopsy on the 9th of November was negative for B-cell lymphoma involvement with normal bone marrow trilineage maturation  She was also evaluated with an MRI of the brain during the hospital course which was negative for any hint of lymphoma involvement  PET scan on the 14th of November showed IMPRESSION:  1   FDG avid left posterior oropharyngeal lesion compatible with malignancy  2   FDG avid lymph nodes in the neck and left axilla compatible with malignancy  3    No FDG avid lymph nodes in the abdomen or pelvis suspicious for malignancy  4   Tiny focus of FDG uptake along the skin of the right upper quadrant anterior abdominal wall with mild skin thickening suggested here on CT    Her post treatment PET-CT 3/18/19 was read:  IMPRESSION:  1  No evidence of hypermetabolic malignancy  Deauville score of 1   2   Mild patchy FDG uptake in the right upper lobe corresponding to patchy  consolidation  This may be infectious or inflammatory  This has partially improved from the 2/25/2019 chest x-ray  Diffuse large 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 ([address] )   11/9/2018 Initial Diagnosis    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)      Chemotherapy    1  Dose adjusted R-EPOCH 11/21/18 (1 cycle)- switched to RCHOP after repeat biopsy/pathology reviewed  2  R-CHOP started 12/12/18 completed 3/7/19 (5 cycles)             Interval history:    ROS: Review of Systems   Constitutional: Positive for activity change  Negative for appetite change, chills, fatigue, fever and unexpected weight change  HENT: Negative for congestion, mouth sores, nosebleeds, sore throat and trouble swallowing  Eyes: Negative  Respiratory: Positive for shortness of breath  Negative for cough and chest tightness  Cardiovascular: Negative for chest pain, palpitations and leg swelling  Gastrointestinal: Negative for abdominal distention, abdominal pain, blood in stool, constipation, diarrhea, nausea and vomiting  Genitourinary: Negative for difficulty urinating, dysuria, frequency, hematuria and urgency  Musculoskeletal: Positive for arthralgias  Negative for back pain, gait problem, joint swelling and myalgias  Skin: Negative for color change, pallor and rash  Neurological: Positive for dizziness and numbness  Negative for weakness, light-headedness and headaches  Hematological: Negative for adenopathy  Does not bruise/bleed easily     Psychiatric/Behavioral: Positive for dysphoric mood and sleep disturbance  Past Medical History:   Diagnosis Date    Cancer [facility])     Throat    Chronic pain disorder     Diabetes mellitus ([facility] )     Diffuse large B cell lymphoma ([facility] )     Dysphagia     GERD without esophagitis     HTN (hypertension)     Hyperlipidemia     Hypertension     MI (myocardial infarction) ([facility] )     Port-A-Cath in place 07/29/2019    Thyroid cancer ([place] [id number] ) 2018       Past Surgical History:   Procedure Laterality Date    BONE MARROW BIOPSY      BREAST BIOPSY Left     CHOLECYSTECTOMY      IR PORT PLACEMENT  11/16/2018    OTHER SURGICAL HISTORY      tendor tear repair to right shoulder    SHOULDER ARTHROSCOPY         Social History     Socioeconomic History    Marital status:       Spouse name: None    Number of children: None    Years of education: None    Highest education level: None   Occupational History    None   Social Needs    Financial resource strain: None    Food insecurity     Worry: None     Inability: None    Transportation needs     Medical: None     Non-medical: None   Tobacco Use    Smoking status: Never Smoker    Smokeless tobacco: Never Used   Substance and Sexual Activity    Alcohol use: Never     Frequency: Never     Drinks per session: Patient refused     Binge frequency: Never     Comment: 0    Drug use: No    Sexual activity: Not Currently     Partners: Male   Lifestyle    Physical activity     Days per week: None     Minutes per session: None    Stress: None   Relationships    Social connections     Talks on phone: None     Gets together: None     Attends Yazidi service: None     Active member of club or organization: None     Attends meetings of clubs or organizations: None     Relationship status: None    Intimate partner violence     Fear of current or ex partner: None     Emotionally abused: None     Physically abused: None     Forced sexual activity: None   Other Topics Concern    None   Social History Narrative    None Family History   Problem Relation Age of Onset    Diabetes Mother     Heart disease Sister     Hypertension Brother     Uterine cancer Maternal Grandmother     Prostate cancer Paternal Grandfather        No Known Allergies      Current Outpatient Medications:     ALPRAZolam (XANAX) 0 25 mg tablet, Take 1 tablet (0 25 mg total) by mouth daily at bedtime as needed for anxiety or sleep Cadlizabeth sotelo, para ansiedad, antes de dormir, Disp: 30 tablet, Rfl: 1    amLODIPine (NORVASC) 5 mg tablet, Take 1 tablet (5 mg total) by mouth daily, Disp: 30 tablet, Rfl: 10    benzonatate (TESSALON) 200 MG capsule, Take 1 capsule (200 mg total) by mouth 3 (three) times a day, Disp: 20 capsule, Rfl: 0    Blood Glucose Monitoring Suppl (OneTouch Verio) w/Device KIT, Use daily, Disp: 1 kit, Rfl: 0    Calcium Carb-Cholecalciferol 600-400 MG-UNIT TABS, Take 1 tablet by mouth 2 (two) times a day, Disp: 60 tablet, Rfl: 2    carvedilol (COREG) 12 5 mg tablet, Take 1 tablet (12 5 mg total) by mouth 2 (two) times a day with meals, Disp: 60 tablet, Rfl: 10    cetirizine (ZyrTEC) 5 MG tablet, Take 1 tablet (5 mg total) by mouth daily, Disp: 90 tablet, Rfl: 1    cyanocobalamin (VITAMIN B-12) 500 MCG tablet, Take 1,000 mcg by mouth daily, Disp: , Rfl:     diclofenac sodium (VOLTAREN) 1 %, Apply 2 g topically 4 (four) times a day, Disp: 100 g, Rfl: 10    ezetimibe (ZETIA) 10 mg tablet, Take 1 tablet (10 mg total) by mouth daily, Disp: 30 tablet, Rfl: 10    ferrous sulfate 325 (65 Fe) mg tablet, Take 325 mg by mouth daily with breakfast, Disp: , Rfl:     fluticasone (FLONASE) 50 mcg/act nasal spray, 1 spray into each nostril daily, Disp: 16 g, Rfl: 10    gabapentin (NEURONTIN) 100 mg capsule, MAR JOHNNY (1) CAPSULA POR VIA ORAL DOS VECES AL JEFFREY (Patient taking differently: Take 100 mg by mouth daily ), Disp: 60 capsule, Rfl: 5    glipiZIDE (GLUCOTROL XL) 5 mg 24 hr tablet, MAR TURNER (1) TABLETA POR VIA ORAL JOHNNY WILI CLEARY, Disp: 30 tablet, Rfl: 10    glucose blood (OneTouch Verio) test strip, Use as instructed, Disp: 100 each, Rfl: 3    Insulin Pen Needle (BD Pen Needle Micro U/F) 32G X 6 MM MISC, Use daily, Disp: 100 each, Rfl: 1    Lantus SoloStar 100 units/mL injection pen, INYECTAR 9 UNIDADES DEBAJO DE LA PIEL DIARIAMENTE A LA HORA DE ACOSTARSE, Disp: 15 mL, Rfl: 10    loperamide (IMODIUM) 2 mg capsule, MAR SHAMAR (1) CAPSULA POR VIA ORAL ALFREDO SEA NECESARIO PARA LA DIARREA, Disp: 30 capsule, Rfl: 11    losartan (COZAAR) 25 mg tablet, MAR SHAMAR (1) TABLETA POR VIA ORAL SHAMAR VEZ AL JEFFREY, Disp: 30 tablet, Rfl: 10    meloxicam (MOBIC) 7 5 mg tablet, Take 1 tablet (7 5 mg total) by mouth daily Tiffin shamar tableta cada jeffrey con el desayuno para la artritis  NO TOME con el ibuprofeno, Disp: 90 tablet, Rfl: 3    metFORMIN (GLUCOPHAGE) 1000 MG tablet, MAR SHAMAR (1) TABLETA POR VIA ORAL DOS VECES AL JEFFREY CON COMIDAS , Disp: 60 tablet, Rfl: 10    mirtazapine (REMERON) 15 mg tablet, Take 1 tablet (15 mg total) by mouth daily at bedtime MAR SHAMAR (1) TABLETA POR VIA ORAL CADA NOCHE A LA HORA DE ACOSTARSE, Disp: 90 tablet, Rfl: 0    omeprazole (PriLOSEC) 40 MG capsule, MAR SHAMAR (1) CAPSULA POR VIA ORAL DOS VECES AL JEFFREY, Disp: 60 capsule, Rfl: 10    OneTouch Delica Lancets 91M MISC, Use daily, Disp: 100 each, Rfl: 3    Tradjenta 5 MG TABS, MAR SHAMAR (1) TABLETA POR VIA ORAL SHAMAR VEZ AL JEFFREY, Disp: 30 tablet, Rfl: 10    Ventolin  (90 Base) MCG/ACT inhaler, INHALAR 1-2 BOCANADAS A LOS PULMONES CADA 4-6 HORAS ALFREDO SEA NECESARIO, Disp: 18 g, Rfl: 10      Physical Exam:  /70 (BP Location: Left arm, Patient Position: Sitting, Cuff Size: Adult)   Pulse 70   Temp (!) 96 °F (35 6 °C) (Tympanic)   Resp 18   Ht 5' (1 524 m)   Wt 65 8 kg (145 lb)   SpO2 98%   BMI 28 32 kg/m²     Physical Exam  Vitals signs reviewed  Constitutional:       General: She is not in acute distress  Appearance: She is well-developed   She is not diaphoretic  HENT:      Head: Normocephalic and atraumatic  Eyes:      General: No scleral icterus  Conjunctiva/sclera: Conjunctivae normal       Pupils: Pupils are equal, round, and reactive to light  Neck:      Musculoskeletal: Normal range of motion and neck supple  Thyroid: No thyromegaly  Cardiovascular:      Rate and Rhythm: Normal rate and regular rhythm  Heart sounds: Normal heart sounds  No murmur  Pulmonary:      Effort: Pulmonary effort is normal  No respiratory distress  Breath sounds: Normal breath sounds  Abdominal:      General: There is no distension  Palpations: Abdomen is soft  There is no hepatomegaly or splenomegaly  Tenderness: There is no abdominal tenderness  Musculoskeletal: Normal range of motion  General: No swelling  Lymphadenopathy:      Cervical: No cervical adenopathy  Upper Body:      Right upper body: No axillary adenopathy  Left upper body: No axillary adenopathy  Skin:     General: Skin is warm and dry  Findings: No erythema or rash  Neurological:      General: No focal deficit present  Mental Status: She is alert and oriented to person, place, and time  Psychiatric:         Mood and Affect: Mood normal  Affect is blunt  Behavior: Behavior normal  Behavior is cooperative  Thought Content: Thought content normal          Judgment: Judgment normal            Labs:  Lab Results   Component Value Date    WBC 8 70 02/22/2021    HGB 11 6 (L) 02/22/2021    HCT 36 4 02/22/2021    MCV 93 02/22/2021     02/22/2021     Lab Results   Component Value Date    K 4 3 02/22/2021     02/22/2021    CO2 25 02/22/2021    BUN 19 02/22/2021    CREATININE 0 75 02/22/2021    GLUF 131 (H) 04/06/2019    CALCIUM 9 4 02/22/2021    AST 24 02/22/2021    ALT 19 02/22/2021    ALKPHOS 96 02/22/2021    EGFR 79 02/22/2021       Patient voiced understanding and agreement in the above discussion   Aware to contact our office with questions/symptoms in the interim  This note has been generated by voice recognition software system  Therefore, there may be spelling, grammar, and or syntax errors  Please contact if questions arise

## 2021-03-11 ENCOUNTER — PATIENT OUTREACH (OUTPATIENT)
Dept: FAMILY MEDICINE CLINIC | Facility: CLINIC | Age: 76
End: 2021-03-11

## 2021-03-11 NOTE — PROGRESS NOTES
I called Bailey Schwab to remind her of patient's PCP appointment for tomorrow, 3/12 @1040  She is aware and plans on attending

## 2021-03-12 ENCOUNTER — OFFICE VISIT (OUTPATIENT)
Dept: FAMILY MEDICINE CLINIC | Facility: CLINIC | Age: 76
End: 2021-03-12

## 2021-03-12 VITALS
WEIGHT: 145.1 LBS | BODY MASS INDEX: 28.49 KG/M2 | RESPIRATION RATE: 20 BRPM | HEIGHT: 60 IN | TEMPERATURE: 98 F | OXYGEN SATURATION: 98 % | SYSTOLIC BLOOD PRESSURE: 136 MMHG | HEART RATE: 73 BPM | DIASTOLIC BLOOD PRESSURE: 68 MMHG

## 2021-03-12 DIAGNOSIS — I10 ESSENTIAL HYPERTENSION: ICD-10-CM

## 2021-03-12 DIAGNOSIS — E11.9 TYPE 2 DIABETES MELLITUS WITHOUT COMPLICATION, WITHOUT LONG-TERM CURRENT USE OF INSULIN (HCC): Primary | ICD-10-CM

## 2021-03-12 DIAGNOSIS — E43 SEVERE PROTEIN-CALORIE MALNUTRITION (HCC): ICD-10-CM

## 2021-03-12 LAB — SL AMB POCT HEMOGLOBIN AIC: 7 (ref ?–6.5)

## 2021-03-12 PROCEDURE — 3051F HG A1C>EQUAL 7.0%<8.0%: CPT | Performed by: INTERNAL MEDICINE

## 2021-03-12 PROCEDURE — 1036F TOBACCO NON-USER: CPT | Performed by: NURSE PRACTITIONER

## 2021-03-12 PROCEDURE — 3075F SYST BP GE 130 - 139MM HG: CPT | Performed by: NURSE PRACTITIONER

## 2021-03-12 PROCEDURE — 4010F ACE/ARB THERAPY RXD/TAKEN: CPT | Performed by: NURSE PRACTITIONER

## 2021-03-12 PROCEDURE — 1160F RVW MEDS BY RX/DR IN RCRD: CPT | Performed by: NURSE PRACTITIONER

## 2021-03-12 PROCEDURE — 3078F DIAST BP <80 MM HG: CPT | Performed by: NURSE PRACTITIONER

## 2021-03-12 PROCEDURE — 99214 OFFICE O/P EST MOD 30 MIN: CPT | Performed by: NURSE PRACTITIONER

## 2021-03-12 PROCEDURE — 3008F BODY MASS INDEX DOCD: CPT | Performed by: INTERNAL MEDICINE

## 2021-03-12 PROCEDURE — 3008F BODY MASS INDEX DOCD: CPT | Performed by: NURSE PRACTITIONER

## 2021-03-12 PROCEDURE — 83036 HEMOGLOBIN GLYCOSYLATED A1C: CPT | Performed by: NURSE PRACTITIONER

## 2021-03-12 PROCEDURE — 3288F FALL RISK ASSESSMENT DOCD: CPT | Performed by: NURSE PRACTITIONER

## 2021-03-12 PROCEDURE — 3051F HG A1C>EQUAL 7.0%<8.0%: CPT | Performed by: NURSE PRACTITIONER

## 2021-03-12 PROCEDURE — 1100F PTFALLS ASSESS-DOCD GE2>/YR: CPT | Performed by: NURSE PRACTITIONER

## 2021-03-12 PROCEDURE — 4010F ACE/ARB THERAPY RXD/TAKEN: CPT | Performed by: INTERNAL MEDICINE

## 2021-03-12 RX ORDER — LOSARTAN POTASSIUM 25 MG/1
12.5 TABLET ORAL DAILY
Qty: 30 TABLET | Refills: 2 | Status: SHIPPED | OUTPATIENT
Start: 2021-03-12 | End: 2021-09-15

## 2021-03-12 NOTE — PROGRESS NOTES
Assessment/Plan:    Gastroesophageal reflux disease  Stable, continue omeprazole at this time  -Discussed diet and lifestyle interventions to improve sx including: avoidance of common triggers (chocolate, caffeine, tomatoes, citrus), eat small meals frequently, remain upright after meals       Type 2 diabetes mellitus without complication, without long-term current use of insulin ([facility])    Lab Results   Component Value Date    HGBA1C 7 0 (A) 03/12/2021     A1c significantly improved as compared to 3 months ago (it previously was 14 4)  Patient having periodic episodes of hypoglycemia- d/c glipizide   Continue with other medications:  Lantus 9 units at HS and  Continue with oral anti-diabetic medications: metformin 1,000 mg bid, tradjenta 5 mg daily with close monitoring   Discussed that if FBG <70 or >300 to call office right away   Continue with arb    Follows with podiatry     Essential hypertension  Blood pressure is acceptable today in the office at 136/68   Decrease losartan to 12 5 mg 2/2 possible orthostatic hypotension at home  Will continue with other meds at current doses at this time, however may require further adjustment   Discussed with patient to change positions slowly, discussed with granddaughter to monitor BP's and to encourage the use of the walker at home to prevent falls        [facility]   Return in about 3 months (around 6/12/2021) for Recheck  Patient Instructions     Heart Healthy Diet   WHAT YOU NEED TO KNOW:   A heart healthy diet is an eating plan low in unhealthy fats and sodium (salt)  The plan is high in healthy fats and fiber  A heart healthy diet helps improve your cholesterol levels and lowers your risk for heart disease and stroke  A dietitian will teach you how to read and understand food labels  DISCHARGE INSTRUCTIONS:   Heart healthy diet guidelines to follow:   · Choose foods that contain healthy fats  ?  Unsaturated fats  include monounsaturated and polyunsaturated fats  Unsaturated fat is found in foods such as soybean, canola, olive, corn, and safflower oils  It is also found in soft tub margarine that is made with liquid vegetable oil  ? Omega-3 fat  is found in certain fish, such as salmon, tuna, and trout, and in walnuts and flaxseed  Eat fish high in omega-3 fats at least 2 times a week  · Get 20 to 30 grams of fiber each day  Fruits, vegetables, whole-grain foods, and legumes (cooked beans) are good sources of fiber  · Limit or do not have unhealthy fats  ? Cholesterol  is found in animal foods, such as eggs and lobster, and in dairy products made from whole milk  Limit cholesterol to less than 200 mg each day  ? Saturated fat  is found in meats, such as sharp and hamburger  It is also found in chicken or turkey skin, whole milk, and butter  Limit saturated fat to less than 7% of your total daily calories  ? Trans fat  is found in packaged foods, such as potato chips and cookies  It is also in hard margarine, some fried foods, and shortening  Do not eat foods that contain trans fats  · Limit sodium as directed  You may be told to limit sodium to 2,000 to 2,300 mg each day  Choose low-sodium or no-salt-added foods  Add little or no salt to food you prepare  Use herbs and spices in place of salt  Include the following in your heart healthy plan:  Ask your dietitian or healthcare provider how many servings to have from each of the following food groups:  · Grains:      ? Whole-wheat breads, cereals, and pastas, and brown rice    ? Low-fat, low-sodium crackers and chips    · Vegetables:      ? Broccoli, green beans, green peas, and spinach    ? Collards, kale, and lima beans    ? Carrots, sweet potatoes, tomatoes, and peppers    ? Canned vegetables with no salt added    · Fruits:      ? Bananas, peaches, pears, and pineapple    ? Grapes, raisins, and dates    ?  Oranges, tangerines, grapefruit, orange juice, and grapefruit juice    ? Apricots, mangoes, melons, and papaya    ? Raspberries and strawberries    ? Canned fruit with no added sugar    · Low-fat dairy:      ? Nonfat (skim) milk, 1% milk, and low-fat almond, cashew, or soy milks fortified with calcium    ? Low-fat cheese, regular or frozen yogurt, and cottage cheese    · Meats and proteins:      ? Lean cuts of beef and pork (loin, leg, round), skinless chicken and turkey    ? Legumes, soy products, egg whites, or nuts    Limit or do not include the following in your heart healthy plan:   · Unhealthy fats and oils:      ? Whole or 2% milk, cream cheese, sour cream, or cheese    ? High-fat cuts of beef (T-bone steaks, ribs), chicken or turkey with skin, and organ meats such as liver    ? Butter, stick margarine, shortening, and cooking oils such as coconut or palm oil    · Foods and liquids high in sodium:      ? Packaged foods, such as frozen dinners, cookies, macaroni and cheese, and cereals with more than 300 mg of sodium per serving    ? Vegetables with added sodium, such as instant potatoes, vegetables with added sauces, or regular canned vegetables    ? Cured or smoked meats, such as hot dogs, sharp, and sausage    ? High-sodium ketchup, barbecue sauce, salad dressing, pickles, olives, soy sauce, or miso    · Foods and liquids high in sugar:      ? Candy, cake, cookies, pies, or doughnuts    ? Soft drinks (soda), sports drinks, or sweetened tea    ? Canned or dry mixes for cakes, soups, sauces, or gravies    Other healthy heart guidelines:   · Do not smoke  Nicotine and other chemicals in cigarettes and cigars can cause lung and heart damage  Ask your healthcare provider for information if you currently smoke and need help to quit  E-cigarettes or smokeless tobacco still contain nicotine  Talk to your healthcare provider before you use these products  · Limit or do not drink alcohol as directed    Alcohol can damage your heart and raise your blood pressure  Your healthcare provider may give you specific daily and weekly limits  The general recommended limit is 1 drink a day for women 21 or older and for men 72 or older  Do not have more than 3 drinks in a day or 7 in a week  The recommended limit is 2 drinks a day for men 24to 59years of age  Do not have more than 4 drinks in a day or 14 in a week  A drink of alcohol is 12 ounces of beer, 5 ounces of wine, or 1½ ounces of liquor  · Exercise regularly  Exercise can help you maintain a healthy weight and improve your blood pressure and cholesterol levels  Regular exercise can also decrease your risk for heart problems  Ask your healthcare provider about the best exercise plan for you  Do not start an exercise program without asking your healthcare provider  Follow up with your doctor or cardiologist as directed:  Write down your questions so you remember to ask them during your visits  © Copyright [address] Information is for End User's use only and may not be sold, redistributed or otherwise used for commercial purposes  All illustrations and images included in CareNotes® are the copyrighted property of A D A M , Inc  or [address] [place] [address]  The above information is an  only  It is not intended as medical advice for individual conditions or treatments  Talk to your doctor, nurse or pharmacist before following any medical regimen to see if it is safe and effective for you  Subjective:     Gretchen Rhodes is a 77 y o  female who  has a past medical history of Cancer ([address] ), Chronic pain disorder, Diabetes mellitus ([address] ), Diffuse large B cell lymphoma ([address] ), Dysphagia, GERD without esophagitis, HTN (hypertension), Hyperlipidemia, Hypertension, MI (myocardial infarction) ([address] ), Port-A-Cath in place, and Thyroid cancer ([address] )  who presented to the office today for follow up  She is accompanied by her grand daughter who is her caretaker   [address] daughter states that patient blood sugars have been significantly improved as compared to several months ago, she has helped the patient to cut back on sugary foods and carbohydrates  There have been several episodes where the blood sugar has dropped to about 50  This has happened before lunch  Also the grand daughter notes that when the patient stands up too fast she becomes dizzy and almost fell  Patient denies chest pain, palpitations, shortness of breath         The following portions of the patient's history were reviewed and updated as appropriate: allergies, current medications, past family history, past medical history, past social history, past surgical history and problem list     Current Outpatient Medications on File Prior to Visit   Medication Sig Dispense Refill    ALPRAZolam (XANAX) 0 25 mg tablet Take 1 tablet (0 25 mg total) by mouth daily at bedtime as needed for anxiety or sleep Cada noche, para ansiedad, antes de luis mir 30 tablet 1    amLODIPine (NORVASC) 5 mg tablet Take 1 tablet (5 mg total) by mouth daily 30 tablet 10    benzonatate (TESSALON) 200 MG capsule Take 1 capsule (200 mg total) by mouth 3 (three) times a day 20 capsule 0    Blood Glucose Monitoring Suppl (OneTouch Verio) w/Device KIT Use daily 1 kit 0    Calcium Carb-Cholecalciferol 600-400 MG-UNIT TABS Take 1 tablet by mouth 2 (two) times a day 60 tablet 2    carvedilol (COREG) 12 5 mg tablet Take 1 tablet (12 5 mg total) by mouth 2 (two) times a day with meals 60 tablet 10    cetirizine (ZyrTEC) 5 MG tablet Take 1 tablet (5 mg total) by mouth daily 90 tablet 1    cyanocobalamin (VITAMIN B-12) 500 MCG tablet Take 1,000 mcg by mouth daily      diclofenac sodium (VOLTAREN) 1 % Apply 2 g topically 4 (four) times a day 100 g 10    ezetimibe (ZETIA) 10 mg tablet Take 1 tablet (10 mg total) by mouth daily 30 tablet 10    ferrous sulfate 325 (65 Fe) mg tablet Take 325 mg by mouth daily with breakfast      fluticasone (FLONASE) 50 mcg/act nasal spray 1 spray into each nostril daily 16 g 10    gabapentin (NEURONTIN) 100 mg capsule MAR SHAMAR (1) CAPSULA POR VIA ORAL DOS VECES AL JEFFREY (Patient taking differently: Take 100 mg by mouth daily ) 60 capsule 5    glucose blood (OneTouch Verio) test strip Use as instructed 100 each 3    Insulin Pen Needle (BD Pen Needle Micro U/F) 32G X 6 MM MISC Use daily 100 each 1    Lantus SoloStar 100 units/mL injection pen INYECTAR 9 UNIDADES DEBAJO DE LA PIEL DIARIAMENTE A LA HORA DE ACOSTARSE 15 mL 10    loperamide (IMODIUM) 2 mg capsule MAR SHAMAR (1) CAPSULA POR VIA ORAL ALFREDO SEA NECESARIO PARA LA DIARREA 30 capsule 11    meloxicam (MOBIC) 7 5 mg tablet Take 1 tablet (7 5 mg total) by mouth daily [place] shamar tableta cada jeffrey con el desayuno para la artritis  NO TOME con el ibuprofeno 90 tablet 3    metFORMIN (GLUCOPHAGE) 1000 MG tablet MAR SHAMAR (1) TABLETA POR VIA ORAL DOS VECES AL JEFFREY CON COMIDAS  60 tablet 10    mirtazapine (REMERON) 15 mg tablet Take 1 tablet (15 mg total) by mouth daily at bedtime MAR SHAMAR (1) TABLETA POR VIA ORAL CADA NOCHE A LA HORA DE ACOSTARSE 90 tablet 0    omeprazole (PriLOSEC) 40 MG capsule MAR SHAMAR (1) CAPSULA POR VIA ORAL DOS VECES AL JEFFREY 60 capsule 10    OneTouch Delica Lancets 95N MISC Use daily 100 each 3    Tradjenta 5 MG TABS MAR SHAMAR (1) TABLETA POR VIA ORAL SHAMAR VEZ AL JEFFREY 30 tablet 10    Ventolin  (90 Base) MCG/ACT inhaler INHALAR 1-2 BOCANADAS A LOS PULMONES CADA 4-6 HORAS ALFREDO SEA NECESARIO 18 g 10     No current facility-administered medications on file prior to visit  Review of Systems   Constitutional: Negative for chills and fever  HENT: Negative for ear pain and sore throat  Eyes: Negative for pain and visual disturbance  Respiratory: Negative for cough and shortness of breath  Cardiovascular: Negative for chest pain and palpitations  Gastrointestinal: Negative for abdominal pain and vomiting     Genitourinary: Negative for dysuria and hematuria  Musculoskeletal: Negative for arthralgias and back pain  Skin: Negative for color change and rash  Neurological: Positive for dizziness  Negative for seizures and syncope  All other systems reviewed and are negative  Falls Plan of Care: balance, strength, and gait training instructions were provided  Recommended assistive device to help with gait and balance  Medications that increase falls were reviewed  Objective:    /68 (BP Location: Left arm, Patient Position: Sitting, Cuff Size: Standard)   Pulse 73   Temp 98 °F (36 7 °C) (Temporal)   Resp 20   Ht 5' (1 524 m)   Wt 65 8 kg (145 lb 1 6 oz)   SpO2 98%   BMI 28 34 kg/m²     Physical Exam  Vitals signs and nursing note reviewed  Constitutional:       General: She is not in acute distress  Appearance: She is well-developed  She is not diaphoretic  HENT:      Head: Normocephalic and atraumatic  Right Ear: External ear normal       Left Ear: External ear normal    Eyes:      General:         Right eye: No discharge  Left eye: No discharge  Neck:      Musculoskeletal: Normal range of motion and neck supple  Cardiovascular:      Rate and Rhythm: Normal rate and regular rhythm  Pulses: Normal pulses  Heart sounds: Normal heart sounds  Pulmonary:      Effort: Pulmonary effort is normal  No respiratory distress  Breath sounds: Normal breath sounds  No wheezing  Abdominal:      General: Bowel sounds are normal  There is no distension  Palpations: Abdomen is soft  Tenderness: There is no abdominal tenderness  Musculoskeletal: Normal range of motion  General: No deformity  Right lower leg: No edema  Left lower leg: No edema  Lymphadenopathy:      Cervical: No cervical adenopathy  Skin:     General: Skin is warm and dry  Capillary Refill: Capillary refill takes less than 2 seconds  Findings: No rash     Neurological:      Mental Status: She is alert  Mental status is at baseline     Psychiatric:         Behavior: Behavior normal          MOIRA Vivas  03/13/21  11:24 AM

## 2021-03-12 NOTE — PATIENT INSTRUCTIONS
Heart Healthy Diet   WHAT YOU NEED TO KNOW:   A heart healthy diet is an eating plan low in unhealthy fats and sodium (salt)  The plan is high in healthy fats and fiber  A heart healthy diet helps improve your cholesterol levels and lowers your risk for heart disease and stroke  A dietitian will teach you how to read and understand food labels  DISCHARGE INSTRUCTIONS:   Heart healthy diet guidelines to follow:   · Choose foods that contain healthy fats  ? Unsaturated fats  include monounsaturated and polyunsaturated fats  Unsaturated fat is found in foods such as soybean, canola, olive, corn, and safflower oils  It is also found in soft tub margarine that is made with liquid vegetable oil  ? Omega-3 fat  is found in certain fish, such as salmon, tuna, and trout, and in walnuts and flaxseed  Eat fish high in omega-3 fats at least 2 times a week  · Get 20 to 30 grams of fiber each day  Fruits, vegetables, whole-grain foods, and legumes (cooked beans) are good sources of fiber  · Limit or do not have unhealthy fats  ? Cholesterol  is found in animal foods, such as eggs and lobster, and in dairy products made from whole milk  Limit cholesterol to less than 200 mg each day  ? Saturated fat  is found in meats, such as sharp and hamburger  It is also found in chicken or turkey skin, whole milk, and butter  Limit saturated fat to less than 7% of your total daily calories  ? Trans fat  is found in packaged foods, such as potato chips and cookies  It is also in hard margarine, some fried foods, and shortening  Do not eat foods that contain trans fats  · Limit sodium as directed  You may be told to limit sodium to 2,000 to 2,300 mg each day  Choose low-sodium or no-salt-added foods  Add little or no salt to food you prepare  Use herbs and spices in place of salt         Include the following in your heart healthy plan:  Ask your dietitian or healthcare provider how many servings to have from each of the following food groups:  · Grains:      ? Whole-wheat breads, cereals, and pastas, and brown rice    ? Low-fat, low-sodium crackers and chips    · Vegetables:      ? Broccoli, green beans, green peas, and spinach    ? Collards, kale, and lima beans    ? Carrots, sweet potatoes, tomatoes, and peppers    ? Canned vegetables with no salt added    · Fruits:      ? Bananas, peaches, pears, and pineapple    ? Grapes, raisins, and dates    ? Oranges, tangerines, grapefruit, orange juice, and grapefruit juice    ? Apricots, mangoes, melons, and papaya    ? Raspberries and strawberries    ? Canned fruit with no added sugar    · Low-fat dairy:      ? Nonfat (skim) milk, 1% milk, and low-fat almond, cashew, or soy milks fortified with calcium    ? Low-fat cheese, regular or frozen yogurt, and cottage cheese    · Meats and proteins:      ? Lean cuts of beef and pork (loin, leg, round), skinless chicken and turkey    ? Legumes, soy products, egg whites, or nuts    Limit or do not include the following in your heart healthy plan:   · Unhealthy fats and oils:      ? Whole or 2% milk, cream cheese, sour cream, or cheese    ? High-fat cuts of beef (T-bone steaks, ribs), chicken or turkey with skin, and organ meats such as liver    ? Butter, stick margarine, shortening, and cooking oils such as coconut or palm oil    · Foods and liquids high in sodium:      ? Packaged foods, such as frozen dinners, cookies, macaroni and cheese, and cereals with more than 300 mg of sodium per serving    ? Vegetables with added sodium, such as instant potatoes, vegetables with added sauces, or regular canned vegetables    ? Cured or smoked meats, such as hot dogs, sharp, and sausage    ? High-sodium ketchup, barbecue sauce, salad dressing, pickles, olives, soy sauce, or miso    · Foods and liquids high in sugar:      ? Candy, cake, cookies, pies, or doughnuts    ? Soft drinks (soda), sports drinks, or sweetened tea    ?  Canned or dry mixes for cakes, soups, sauces, or gravies    Other healthy heart guidelines:   · Do not smoke  Nicotine and other chemicals in cigarettes and cigars can cause lung and heart damage  Ask your healthcare provider for information if you currently smoke and need help to quit  E-cigarettes or smokeless tobacco still contain nicotine  Talk to your healthcare provider before you use these products  · Limit or do not drink alcohol as directed  Alcohol can damage your heart and raise your blood pressure  Your healthcare provider may give you specific daily and weekly limits  The general recommended limit is 1 drink a day for women 21 or older and for men 72 or older  Do not have more than 3 drinks in a day or 7 in a week  The recommended limit is 2 drinks a day for men 24to 59years of age  Do not have more than 4 drinks in a day or 14 in a week  A drink of alcohol is 12 ounces of beer, 5 ounces of wine, or 1½ ounces of liquor  · Exercise regularly  Exercise can help you maintain a healthy weight and improve your blood pressure and cholesterol levels  Regular exercise can also decrease your risk for heart problems  Ask your healthcare provider about the best exercise plan for you  Do not start an exercise program without asking your healthcare provider  Follow up with your doctor or cardiologist as directed:  Write down your questions so you remember to ask them during your visits  © Copyright [address] Information is for End User's use only and may not be sold, redistributed or otherwise used for commercial purposes  All illustrations and images included in CareNotes® are the copyrighted property of A D A M [employer] Inc  or [address] [place] [address]  The above information is an  only  It is not intended as medical advice for individual conditions or treatments  Talk to your doctor, nurse or pharmacist before following any medical regimen to see if it is safe and effective for you

## 2021-03-13 RX ORDER — BLOOD SUGAR DIAGNOSTIC
STRIP MISCELLANEOUS
Qty: 100 EACH | Refills: 3 | Status: SHIPPED | OUTPATIENT
Start: 2021-03-13 | End: 2021-05-20 | Stop reason: SDUPTHER

## 2021-03-13 NOTE — ASSESSMENT & PLAN NOTE
Stable, continue omeprazole at this time  -Discussed diet and lifestyle interventions to improve sx including: avoidance of common triggers (chocolate, caffeine, tomatoes, citrus), eat small meals frequently, remain upright after meals

## 2021-03-13 NOTE — ASSESSMENT & PLAN NOTE
Blood pressure is acceptable today in the office at 136/68   Decrease losartan to 12 5 mg 2/2 possible orthostatic hypotension at home  Will continue with other meds at current doses at this time, however may require further adjustment   Discussed with patient to change positions slowly, discussed with granddaughter to monitor BP's and to encourage the use of the walker at home to prevent falls

## 2021-03-13 NOTE — ASSESSMENT & PLAN NOTE
Lab Results   Component Value Date    HGBA1C 7 0 (A) 03/12/2021     A1c significantly improved as compared to 3 months ago (it previously was 14 4)  Patient having periodic episodes of hypoglycemia- d/c glipizide   Continue with other medications:  Lantus 9 units at HS and  Continue with oral anti-diabetic medications: metformin 1,000 mg bid, tradjenta 5 mg daily with close monitoring   Discussed that if FBG <70 or >300 to call office right away   Continue with arb    Follows with podiatry

## 2021-03-16 DIAGNOSIS — F41.8 ANXIETY ABOUT HEALTH: ICD-10-CM

## 2021-03-16 DIAGNOSIS — F41.9 ANXIETY: ICD-10-CM

## 2021-03-18 ENCOUNTER — PATIENT OUTREACH (OUTPATIENT)
Dept: FAMILY MEDICINE CLINIC | Facility: CLINIC | Age: 76
End: 2021-03-18

## 2021-03-18 ENCOUNTER — TELEPHONE (OUTPATIENT)
Dept: RADIOLOGY | Facility: HOSPITAL | Age: 76
End: 2021-03-18

## 2021-03-18 NOTE — PROGRESS NOTES
I called Trung Yu to follow up  She states the patient is currently sleeping but otherwise is doing well  She reports the patient's blood sugars are stable; yesterday's fasting was 92 and 830 pm it was 120  [facility] continues to watch the patient's diet and monitor her medications  Trung Yu states she checks the patient's feet daily for skin breakdown  Trung Yu notes the patient's blood pressure medication was recently decreased and her readings are stable  She reports a reading from yesterday of 127/90 but this was prior to the patient taking her meds  I asked that Tr[facility] check the patient's BP about 2 hours after she is given her medication  Trung [facility] reports the patient had a fall last week and denies her sustaining any injury  She noted the patient actually laughed about it  Trung Yu denies the patient hitting her head  Trung Yu stated the patient was walking too fast     I reminded Trung Yu that patient is scheduled for a Cardiology appointment tomorrow with time provided  I will continue to follow

## 2021-03-19 ENCOUNTER — CONSULT (OUTPATIENT)
Dept: CARDIOLOGY CLINIC | Facility: CLINIC | Age: 76
End: 2021-03-19
Payer: COMMERCIAL

## 2021-03-19 ENCOUNTER — TELEPHONE (OUTPATIENT)
Dept: SURGERY | Facility: HOSPITAL | Age: 76
End: 2021-03-19

## 2021-03-19 VITALS
DIASTOLIC BLOOD PRESSURE: 71 MMHG | WEIGHT: 145.4 LBS | BODY MASS INDEX: 28.4 KG/M2 | SYSTOLIC BLOOD PRESSURE: 141 MMHG | HEART RATE: 70 BPM

## 2021-03-19 DIAGNOSIS — R55 SYNCOPE, UNSPECIFIED SYNCOPE TYPE: ICD-10-CM

## 2021-03-19 DIAGNOSIS — R55 NEAR SYNCOPE: ICD-10-CM

## 2021-03-19 DIAGNOSIS — T45.1X5A CHEMOTHERAPY-INDUCED PERIPHERAL NEUROPATHY (HCC): Chronic | ICD-10-CM

## 2021-03-19 DIAGNOSIS — Z92.21 STATUS POST CHEMOTHERAPY: ICD-10-CM

## 2021-03-19 DIAGNOSIS — I51.89 GRADE I DIASTOLIC DYSFUNCTION: ICD-10-CM

## 2021-03-19 DIAGNOSIS — R00.2 PALPITATIONS: Primary | ICD-10-CM

## 2021-03-19 DIAGNOSIS — G47.33 OSA (OBSTRUCTIVE SLEEP APNEA): ICD-10-CM

## 2021-03-19 DIAGNOSIS — G62.0 CHEMOTHERAPY-INDUCED PERIPHERAL NEUROPATHY (HCC): Chronic | ICD-10-CM

## 2021-03-19 DIAGNOSIS — I10 ESSENTIAL HYPERTENSION: ICD-10-CM

## 2021-03-19 PROCEDURE — 1036F TOBACCO NON-USER: CPT | Performed by: INTERNAL MEDICINE

## 2021-03-19 PROCEDURE — 1160F RVW MEDS BY RX/DR IN RCRD: CPT | Performed by: INTERNAL MEDICINE

## 2021-03-19 PROCEDURE — 99204 OFFICE O/P NEW MOD 45 MIN: CPT | Performed by: INTERNAL MEDICINE

## 2021-03-19 PROCEDURE — 93000 ELECTROCARDIOGRAM COMPLETE: CPT | Performed by: INTERNAL MEDICINE

## 2021-03-19 RX ORDER — MIRTAZAPINE 15 MG/1
TABLET, FILM COATED ORAL
Qty: 90 TABLET | Refills: 3 | Status: SHIPPED | OUTPATIENT
Start: 2021-03-19 | End: 2022-03-08

## 2021-03-19 RX ORDER — ALPRAZOLAM 0.25 MG/1
TABLET ORAL
Qty: 30 TABLET | Refills: 0 | Status: SHIPPED | OUTPATIENT
Start: 2021-03-19 | End: 2021-04-16 | Stop reason: SDUPTHER

## 2021-03-19 NOTE — ASSESSMENT & PLAN NOTE
Ms Julián Mortensen is a pleasant 78-year-old female with risk factors hypertension, exposure to chemotherapy medication, anemia and diabetes mellitus who had been experiencing near-syncope and balance problems  It seems that this symptom has resolved lately after her antihypertensive medications were adjusted  At this time she does not appear to have orthostatic hypotension  She does have palpitations more or less related to anxiety but this seemed to be fairly significant  Her blood pressure is currently well controlled  Examination shows signs of mild anemia but no evidence of heart failure  Her ECG is benign  -- at this time I reassured her that probably symptoms before related to sudden changes in blood pressure  As this symptom has resolved this does not need to be further investigated at this time  -- given her palpitations that seem to be fairly frequent am ordering for a Holter monitor  -- am advising her to eat healthy and stay well hydrated at all times and to avoid sudden getting up from sitting position or sudden change in position of the head  -- I am advising her to carry a cane with her to help her with balance and avoid falls  -- Dietary and medical compliance are reinforced  -- she is advised  to report any concerning symptoms such as chest pain, shortness of breath, decline in exercise tolerance or presyncope/syncope

## 2021-03-19 NOTE — PATIENT INSTRUCTIONS
CARDIOLOGY ASSESSMENT & PLAN:   Diagnosis ICD-10-CM Associated Orders   1  Palpitations  R00 2 Holter monitor - 48 hour   2  Syncope, unspecified syncope type  R55 Ambulatory referral to Cardiology     POCT ECG   3  Near syncope  R55    4  Essential hypertension  I10    5  Chemotherapy-induced peripheral neuropathy (HCC)  G62 0     T45  1X5A    6  Status post chemotherapy  Z92 21    7  RONAK (obstructive sleep apnea)  G47 33    8  Grade I diastolic dysfunction without heart failure  I51 9      Near syncope  Ms Haily Jason is a pleasant 36-year-old female with risk factors hypertension, exposure to chemotherapy medication, anemia and diabetes mellitus who had been experiencing near-syncope and balance problems  It seems that this symptom has resolved lately after her antihypertensive medications were adjusted  At this time she does not appear to have orthostatic hypotension  She does have palpitations more or less related to anxiety but this seemed to be fairly significant  Her blood pressure is currently well controlled  Examination shows signs of mild anemia but no evidence of heart failure  Her ECG is benign  -- at this time I reassured her that probably symptoms before related to sudden changes in blood pressure  As this symptom has resolved this does not need to be further investigated at this time  -- given her palpitations that seem to be fairly frequent am ordering for a Holter monitor  -- am advising her to eat healthy and stay well hydrated at all times and to avoid sudden getting up from sitting position or sudden change in position of the head  -- I am advising her to carry a cane with her to help her with balance and avoid falls  -- Dietary and medical compliance are reinforced  -- she is advised  to report any concerning symptoms such as chest pain, shortness of breath, decline in exercise tolerance or presyncope/syncope

## 2021-03-19 NOTE — PROGRESS NOTES
[facility]  [address] [facility], [place] [address]  Phone#  [phone]  Fax#  [phone]  *-*-*-*-*-*-*-*-*-*-*-*-*-*-*-*-*-*-*-*-*-*-*-*-*-*-*-*-*-*-*-*-*-*-*-*-*-*-*-*-*-*-*-*-*-*-*-*-*-*-*-*-*-*  ENCOUNTER DATE: 03/19/21 11:17 AM  PATIENT NAME: Kamini Holden   [birth year]    [id number]  Age: 77 y o  Sex: female  AUTHOR: Carina Underwood MD  PRIMARYCARE PHYSICIAN: MOIRA Gerardo  REFERRING PHYSICIAN: Carmencita Hays, [address]  [address], [address] [facility]   *-*-*-*-*-*-*-*-*-*-*-*-*-*-*-*-*-*-*-*-*-*-*-*-*-*-*-*-*-*-*-*-*-*-*-*-*-*-*-*-*-*-*-*-*-*-*-*-*-*-*-*-*-*-  REASON FOR REFERRAL:  Evaluation of syncope  *-*-*-*-*-*-*-*-*-*-*-*-*-*-*-*-*-*-*-*-*-*-*-*-*-*-*-*-*-*-*-*-*-*-*-*-*-*-*-*-*-*-*-*-*-*-*-*-*-*-*-*-*-*-  CARDIOLOGY ASSESSMENT & PLAN:   Diagnosis ICD-10-CM Associated Orders   1  Palpitations  R00 2 Holter monitor - 48 hour   2  Syncope, unspecified syncope type  R55 Ambulatory referral to Cardiology     POCT ECG   3  Near syncope  R55    4  Essential hypertension  I10    5  Chemotherapy-induced peripheral neuropathy (HCC)  G62 0     T45  1X5A    6  Status post chemotherapy  Z92 21    7  RONAK (obstructive sleep apnea)  G47 33    8  Grade I diastolic dysfunction without heart failure  I51 9      Near syncope  Ms Kamini Holden is a pleasant 78-year-old female with risk factors hypertension, exposure to chemotherapy medication, anemia and diabetes mellitus who had been experiencing near-syncope and balance problems  It seems that this symptom has resolved lately after her antihypertensive medications were adjusted  At this time she does not appear to have orthostatic hypotension  She does have palpitations more or less related to anxiety but this seemed to be fairly significant  Her blood pressure is currently well controlled  Examination shows signs of mild anemia but no evidence of heart failure    Her ECG is benign  -- at this time I reassured her that probably symptoms before related to sudden changes in blood pressure  As this symptom has resolved this does not need to be further investigated at this time  -- given her palpitations that seem to be fairly frequent am ordering for a Holter monitor  -- am advising her to eat healthy and stay well hydrated at all times and to avoid sudden getting up from sitting position or sudden change in position of the head  -- I am advising her to carry a cane with her to help her with balance and avoid falls  -- Dietary and medical compliance are reinforced  -- she is advised  to report any concerning symptoms such as chest pain, shortness of breath, decline in exercise tolerance or presyncope/syncope  *-*-*-*-*-*-*-*-*-*-*-*-*-*-*-*-*-*-*-*-*-*-*-*-*-*-*-*-*-*-*-*-*-*-*-*-*-*-*-*-*-*-*-*-*-*-*-*-*-*-*-*-*-*-  CURRENT ECG:  Results for orders placed or performed in visit on 03/19/21   POCT ECG    Narrative    Sinus rhythm with no significant ST-T wave abnormalities  No evidence of prior infarction, or chamber enlargement or hypertrophy     HR 71 beats per minute, normal axis and intervals  *-*-*-*-*-*-*-*-*-*-*-*-*-*-*-*-*-*-*-*-*-*-*-*-*-*-*-*-*-*-*-*-*-*-*-*-*-*-*-*-*-*-*-*-*-*-*-*-*-*-*-*-*-*-  HISTORY OF PRESENT ILLNESS:  Patient is a pleasant 32-year-old female of [address] [place] [address] origin who is here for visit accompanied with her granddaughter is providing translation  Her prior medical history significant for:     1  Essential hypertension  2  Diabetes mellitus  3  History of palpitations  4  History of atypical chest pain  5  GERD  6  History of diffuse large B-cell lymphoma of neck, diagnosed 2018 status post chemotherapy with R-EPOCH regimen, followed by R-CHOP completed in March 2019, currently in remission  7  Chemotherapy-induced peripheral neuropathy   8   Normocytic anemia    Patient is here with was sometime in late December 2020-January 2020 when she was having near syncopal event  It was suspected that she had orthostatic hypotension and her losartan medication dose was decreased  Since then she has been feeling better  She does have chronic lightheadedness especially when she is getting up from sitting position  She also has some gait abnormality     Also experiencing intermittent palpitations especially when she is cared  Denies any true syncopal event  Denies shortness of breath, angina-like chest pain, orthopnea, PND or worsening pedal edema  She is currently in remission from her history of large diffuse B-cell lymphoma and is scheduled to undergo port removal   Her chemotherapy ended in 2019 and she tolerated it valve  She has had no recent hospitalizations or other significant illnesses  Functional capacity status:  Good for her age   (Excellent- >10 METs; Good: (7-10 METs); Moderate (4-7 METs); Poor (<= 4 METs)    Any chronic stressors:  None   (feeling of poor health, financial problems, and social isolation etc)  Tobacco or alcohol dependence:  None    She has known history of obstructive sleep apnea but does not use her CPAP machine consistently      *-*-*-*-*-*-*-*-*-*-*-*-*-*-*-*-*-*-*-*-*-*-*-*-*-*-*-*-*-*-*-*-*-*-*-*-*-*-*-*-*-*-*-*-*-*-*-*-*-*-*-*-*-*  PAST MEDICAL HISTORY:  Past Medical History:   Diagnosis Date    Cancer ([place] [address] )     Throat    Chronic pain disorder     Diabetes mellitus ([place] [address] )     Diffuse large B cell lymphoma ([place] [address] )     Dysphagia     GERD without esophagitis     HTN (hypertension)     Hyperlipidemia     Hypertension     MI (myocardial infarction) ([place] [address] )     Port-A-Cath in place 07/29/2019    Thyroid cancer ([place] [address] ) 2018    PAST SURGICAL HISTORY:   Past Surgical History:   Procedure Laterality Date    BONE MARROW BIOPSY      BREAST BIOPSY Left     CHOLECYSTECTOMY      IR PORT PLACEMENT  11/16/2018    OTHER SURGICAL HISTORY      tendor tear repair to right shoulder    SHOULDER ARTHROSCOPY           FAMILY HISTORY:  Family History   Problem Relation Age of Onset    Diabetes Mother     Heart disease Sister     Hypertension Brother     Uterine cancer Maternal Grandmother     Prostate cancer Paternal Grandfather     SOCIAL HISTORY:  Social History     Tobacco Use   Smoking Status Never Smoker   Smokeless Tobacco Never Used      Social History     Substance and Sexual Activity   Alcohol Use Never    Frequency: Never    Drinks per session: Patient refused    Binge frequency: Never    Comment: 0     Social History     Substance and Sexual Activity   Drug Use No    [id number]     *-*-*-*-*-*-*-*-*-*-*-*-*-*-*-*-*-*-*-*-*-*-*-*-*-*-*-*-*-*-*-*-*-*-*-*-*-*-*-*-*-*-*-*-*-*-*-*-*-*-*-*-*-*  ALLERGIES:  No Known Allergies CURRENT SCHEDULED MEDICATIONS:    Current Outpatient Medications:     ALPRAZolam (XANAX) 0 25 mg tablet, Take 1 tablet (0 25 mg total) by mouth daily at bedtime as needed for anxiety or sleep Ness sotelo, para anslorraine, antes de brittani, Disp: 30 tablet, Rfl: 1    amLODIPine (NORVASC) 5 mg tablet, Take 1 tablet (5 mg total) by mouth daily, Disp: 30 tablet, Rfl: 10    benzonatate (TESSALON) 200 MG capsule, Take 1 capsule (200 mg total) by mouth 3 (three) times a day, Disp: 20 capsule, Rfl: 0    Blood Glucose Monitoring Suppl (OneTouch Verio) w/Device KIT, Use daily, Disp: 1 kit, Rfl: 0    Calcium Carb-Cholecalciferol 600-400 MG-UNIT TABS, Take 1 tablet by mouth 2 (two) times a day, Disp: 60 tablet, Rfl: 2    carvedilol (COREG) 12 5 mg tablet, Take 1 tablet (12 5 mg total) by mouth 2 (two) times a day with meals, Disp: 60 tablet, Rfl: 10    cetirizine (ZyrTEC) 5 MG tablet, Take 1 tablet (5 mg total) by mouth daily, Disp: 90 tablet, Rfl: 1    cyanocobalamin (VITAMIN B-12) 500 MCG tablet, Take 1,000 mcg by mouth daily, Disp: , Rfl:     diclofenac sodium (VOLTAREN) 1 %, Apply 2 g topically 4 (four) times a day, Disp: 100 g, Rfl: 10    ezetimibe (ZETIA) 10 mg tablet, Take 1 tablet (10 mg total) by mouth daily, Disp: 30 tablet, Rfl: 10    ferrous sulfate 325 (65 Fe) mg tablet, Take 325 mg by mouth daily with breakfast, Disp: , Rfl:     fluticasone (FLONASE) 50 mcg/act nasal spray, 1 spray into each nostril daily, Disp: 16 g, Rfl: 10    gabapentin (NEURONTIN) 100 mg capsule, MAR SHAMAR (1) CAPSULA POR VIA ORAL DOS VECES AL JEFFREY (Patient taking differently: Take 100 mg by mouth daily ), Disp: 60 capsule, Rfl: 5    glucose blood (OneTouch Verio) test strip, Use as instructed, Disp: 100 each, Rfl: 3    Insulin Pen Needle (BD Pen Needle Micro U/F) 32G X 6 MM MISC, Use daily, Disp: 100 each, Rfl: 1    Lantus SoloStar 100 units/mL injection pen, INYECTAR 9 UNIDADES DEBAJO DE LA PIEL DIARIAMENTE A LA HORA DE ACOSTARSE, Disp: 15 mL, Rfl: 10    loperamide (IMODIUM) 2 mg capsule, MAR SHAMAR (1) CAPSULA POR VIA ORAL ALFREDO SEA NECESARIO PARA LA DIARREA, Disp: 30 capsule, Rfl: 11    losartan (COZAAR) 25 mg tablet, Take 0 5 tablets (12 5 mg total) by mouth daily, Disp: 30 tablet, Rfl: 2    meloxicam (MOBIC) 7 5 mg tablet, Take 1 tablet (7 5 mg total) by mouth daily [place] shamar tableta cada jeffrey con el desayuno para la artritis   NO TOME con el ibuprofeno, Disp: 90 tablet, Rfl: 3    metFORMIN (GLUCOPHAGE) 1000 MG tablet, MAR SHAMAR (1) TABLETA POR VIA ORAL DOS VECES AL JEFFREY CON COMIDAS , Disp: 60 tablet, Rfl: 10    mirtazapine (REMERON) 15 mg tablet, Take 1 tablet (15 mg total) by mouth daily at bedtime MAR SHAMAR (1) TABLETA POR VIA ORAL CADA NOCHE A LA HORA DE ACOSTARSE, Disp: 90 tablet, Rfl: 0    omeprazole (PriLOSEC) 40 MG capsule, MAR SHAMAR (1) CAPSULA POR VIA ORAL DOS VECES AL JEFFREY, Disp: 60 capsule, Rfl: 10    OneTouch Delica Lancets 95V MISC, Use daily, Disp: 100 each, Rfl: 3    Tradjenta 5 MG TABS, MAR SHAMAR (1) TABLETA POR VIA ORAL SHAMAR VEZ AL JEFFREY, Disp: 30 tablet, Rfl: 10    Ventolin  (90 Base) MCG/ACT inhaler, INHALAR 1-2 BOCANADAS A LOS PULMONES CADA 4-6 HORAS ALFREDO SEA NECESARIO, Disp: 18 g, Rfl: 10 *-*-*-*-*-*-*-*-*-*-*-*-*-*-*-*-*-*-*-*-*-*-*-*-*-*-*-*-*-*-*-*-*-*-*-*-*-*-*-*-*-*-*-*-*-*-*-*-*-*-*-*-*-*  REVIEW OF SYMPTOMS:    Positive for:  As described in HPI  Negative for: All remaining as reviewed below and in HPI   SYSTEM SYMPTOMS REVIEWED:  General--weight change, fever, night sweats  Respiratoryl-- Wheezing, shortness of breath, cough, URI symptoms, sputum, blood  Cardiovascular--chest pain, syncope, dyspnea on exertion, edema, decline in exercise tolerance, claudication   Gastrointestinal--persistent vomiting, diarrhea, abdominal distention, blood in stool   Muscular or skeletal--joint pain or swelling   Neurologic--headaches, syncope, abnormal movement  Hematologic--history of easy bruising and bleeding   Endocrine--thyroid enlargement, heat or cold intolerance, polyuria   Psychiatric--anxiety, depression      *-*-*-*-*-*-*-*-*-*-*-*-*-*-*-*-*-*-*-*-*-*-*-*-*-*-*-*-*-*-*-*-*-*-*-*-*-*-*-*-*-*-*-*-*-*-*-*-*-*-*-*-*-*  CURRENT OUTPATIENT MEDICATIONS:     Current Outpatient Medications:     ALPRAZolam (XANAX) 0 25 mg tablet, Take 1 tablet (0 25 mg total) by mouth daily at bedtime as needed for anxiety or sleep Ness sotelo, para ansiedad, antes de dormir, Disp: 30 tablet, Rfl: 1    amLODIPine (NORVASC) 5 mg tablet, Take 1 tablet (5 mg total) by mouth daily, Disp: 30 tablet, Rfl: 10    benzonatate (TESSALON) 200 MG capsule, Take 1 capsule (200 mg total) by mouth 3 (three) times a day, Disp: 20 capsule, Rfl: 0    Blood Glucose Monitoring Suppl (OneTouch Verio) w/Device KIT, Use daily, Disp: 1 kit, Rfl: 0    Calcium Carb-Cholecalciferol 600-400 MG-UNIT TABS, Take 1 tablet by mouth 2 (two) times a day, Disp: 60 tablet, Rfl: 2    carvedilol (COREG) 12 5 mg tablet, Take 1 tablet (12 5 mg total) by mouth 2 (two) times a day with meals, Disp: 60 tablet, Rfl: 10    cetirizine (ZyrTEC) 5 MG tablet, Take 1 tablet (5 mg total) by mouth daily, Disp: 90 tablet, Rfl: 1    cyanocobalamin (VITAMIN B-12) 500 MCG tablet, Take 1,000 mcg by mouth daily, Disp: , Rfl:     diclofenac sodium (VOLTAREN) 1 %, Apply 2 g topically 4 (four) times a day, Disp: 100 g, Rfl: 10    ezetimibe (ZETIA) 10 mg tablet, Take 1 tablet (10 mg total) by mouth daily, Disp: 30 tablet, Rfl: 10    ferrous sulfate 325 (65 Fe) mg tablet, Take 325 mg by mouth daily with breakfast, Disp: , Rfl:     fluticasone (FLONASE) 50 mcg/act nasal spray, 1 spray into each nostril daily, Disp: 16 g, Rfl: 10    gabapentin (NEURONTIN) 100 mg capsule, MAR SHAMAR (1) CAPSULA POR VIA ORAL DOS VECES AL JEFFREY (Patient taking differently: Take 100 mg by mouth daily ), Disp: 60 capsule, Rfl: 5    glucose blood (OneTouch Verio) test strip, Use as instructed, Disp: 100 each, Rfl: 3    Insulin Pen Needle (BD Pen Needle Micro U/F) 32G X 6 MM MISC, Use daily, Disp: 100 each, Rfl: 1    Lantus SoloStar 100 units/mL injection pen, INYECTAR 9 UNIDADES DEBAJO DE LA PIEL DIARIAMENTE A LA HORA DE ACOSTARSE, Disp: 15 mL, Rfl: 10    loperamide (IMODIUM) 2 mg capsule, MAR SHAMAR (1) CAPSULA POR VIA ORAL ALFREDO SEA NECESARIO PARA LA DIARREA, Disp: 30 capsule, Rfl: 11    losartan (COZAAR) 25 mg tablet, Take 0 5 tablets (12 5 mg total) by mouth daily, Disp: 30 tablet, Rfl: 2    meloxicam (MOBIC) 7 5 mg tablet, Take 1 tablet (7 5 mg total) by mouth daily [place] shamar tableta cada jeffrey con el desayuno para la artritis   NO TOME con el ibuprofeno, Disp: 90 tablet, Rfl: 3    metFORMIN (GLUCOPHAGE) 1000 MG tablet, MAR SHAMAR (1) TABLETA POR VIA ORAL DOS VECES AL JEFFREY CON COMIDAS , Disp: 60 tablet, Rfl: 10    mirtazapine (REMERON) 15 mg tablet, Take 1 tablet (15 mg total) by mouth daily at bedtime MAR SHAMAR (1) TABLETA POR VIA ORAL CADA NOCHE A LA HORA DE ACOSTARSE, Disp: 90 tablet, Rfl: 0    omeprazole (PriLOSEC) 40 MG capsule, MAR SHAMAR (1) CAPSULA POR VIA ORAL DOS VECES AL JEFFREY, Disp: 60 capsule, Rfl: 10    OneTouch Delica Lancets 75F MISC, Use daily, Disp: 100 each, Rfl: 3    Tradjenta 5 MG TABS, MAR JOHNNY (1) TABLETA POR VIA ORAL JOHNNY WILI HUSTON JEFFREY, Disp: 30 tablet, Rfl: 10    Ventolin  (90 Base) MCG/ACT inhaler, INHALAR 1-2 BOCANADAS A LOS PULMONES CADA 4-6 HORAS ALFREDO SEA NECESARIO, Disp: 18 g, Rfl: 10    *-*-*-*-*-*-*-*-*-*-*-*-*-*-*-*-*-*-*-*-*-*-*-*-*-*-*-*-*-*-*-*-*-*-*-*-*-*-*-*-*-*-*-*-*-*-*-*-*-*-*-*-*-*  VITAL SIGNS:  Vitals:    03/19/21 1040 03/19/21 1044 03/19/21 1045   BP: 143/77 139/70 141/71   BP Location: Right arm     Patient Position: Sitting Standing Supine   Cuff Size: Adult     Pulse:   70   Weight: 66 kg (145 lb 6 4 oz)       Weight (last 2 days)     Date/Time   Weight    03/19/21 1040   66 (145 4)           ,   Wt Readings from Last 3 Encounters:   03/19/21 66 kg (145 lb 6 4 oz)   03/12/21 65 8 kg (145 lb 1 6 oz)   03/05/21 65 8 kg (145 lb)    , Body mass index is 28 4 kg/m²  *-*-*-*-*-*-*-*-*-*-*-*-*-*-*-*-*-*-*-*-*-*-*-*-*-*-*-*-*-*-*-*-*-*-*-*-*-*-*-*-*-*-*-*-*-*-*-*-*-*-*-*-*-*-  PHYSICAL EXAM:  General Appearance:    Alert, cooperative, no distress, appears stated age   Head, Eyes, ENT:     mild conjunctival pallor, moist mucous mebranes  Neck:   Supple, no carotid bruit or JVD   Back:     Symmetric, no curvature  Lungs:     Respirations unlabored  Clear to auscultation bilaterally,    Chest wall:    No tenderness or deformity   Heart:    Regular rate and rhythm, S1 and S2 normal, no murmur, rub  or gallop  Abdomen:     Soft, non-tender, No obvious masses, or organomegaly   Extremities:   Extremities warm, no cyanosis or edema    Skin:   Skin color, texture, turgor normal, no rashes or lesions     *-*-*-*-*-*-*-*-*-*-*-*-*-*-*-*-*-*-*-*-*-*-*-*-*-*-*-*-*-*-*-*-*-*-*-*-*-*-*-*-*-*-*-*-*-*-*-*-*-*-*-*-*-*-  LABORATORY DATA: I have personally reviewed the available laboratory data        Potassium   Date Value Ref Range Status   02/22/2021 4 3 3 6 - 5 0 mmol/L Final   01/20/2021 4 5 3 6 - 5 0 mmol/L Final   12/09/2020 4 5 3 6 - 5 0 mmol/L Final     Chloride   Date Value Ref Range Status   02/22/2021 101 97 - 108 mmol/L Final   01/20/2021 97 97 - 108 mmol/L Final   12/09/2020 95 (L) 97 - 108 mmol/L Final     CO2   Date Value Ref Range Status   02/22/2021 25 22 - 30 mmol/L Final   01/20/2021 28 22 - 30 mmol/L Final   12/09/2020 27 22 - 30 mmol/L Final     BUN   Date Value Ref Range Status   02/22/2021 19 5 - 25 mg/dL Final   01/20/2021 14 5 - 25 mg/dL Final   12/09/2020 13 5 - 25 mg/dL Final     Creatinine   Date Value Ref Range Status   02/22/2021 0 75 0 60 - 1 20 mg/dL Final     Comment:     Standardized to IDMS reference method   01/20/2021 0 66 0 60 - 1 20 mg/dL Final     Comment:     Standardized to IDMS reference method   12/09/2020 0 69 0 60 - 1 20 mg/dL Final     Comment:     Standardized to IDMS reference method     eGFR   Date Value Ref Range Status   02/22/2021 79 >60 ml/min/1 73sq m Final   01/20/2021 87 >60 ml/min/1 73sq m Final   12/09/2020 86 >60 ml/min/1 73sq m Final     Calcium   Date Value Ref Range Status   02/22/2021 9 4 8 4 - 10 2 mg/dL Final   01/20/2021 9 3 8 4 - 10 2 mg/dL Final   12/09/2020 9 4 8 4 - 10 2 mg/dL Final     AST   Date Value Ref Range Status   02/22/2021 24 14 - 36 U/L Final     Comment:     Specimen collection should occur prior to Sulfasalazine administration due to the potential for falsely depressed results  01/20/2021 24 14 - 36 U/L Final     Comment:     Specimen collection should occur prior to Sulfasalazine administration due to the potential for falsely depressed results  12/09/2020 36 14 - 36 U/L Final     Comment:     Specimen collection should occur prior to Sulfasalazine administration due to the potential for falsely depressed results  ALT   Date Value Ref Range Status   02/22/2021 19 9 - 52 U/L Final     Comment:     Specimen collection should occur prior to Sulfasalazine administration due to the potential for falsely depressed results      01/20/2021 19 9 - 52 U/L Final     Comment:     Specimen collection should occur prior to Sulfasalazine administration due to the potential for falsely depressed results  12/09/2020 15 9 - 52 U/L Final     Comment:     Specimen collection should occur prior to Sulfasalazine administration due to the potential for falsely depressed results  Alkaline Phosphatase   Date Value Ref Range Status   02/22/2021 96 43 - 122 U/L Final   01/20/2021 99 43 - 122 U/L Final   12/09/2020 202 (H) 43 - 122 U/L Final     Magnesium   Date Value Ref Range Status   12/09/2020 1 8 1 6 - 2 3 mg/dL Final   03/07/2019 1 7 1 6 - 2 3 mg/dL Final   02/22/2019 1 5 (L) 1 6 - 2 3 mg/dL Final     WBC   Date Value Ref Range Status   02/22/2021 8 70 4 50 - 11 00 Thousand/uL Final   01/20/2021 7 20 4 50 - 11 00 Thousand/uL Final   12/09/2020 10 40 4 50 - 11 00 Thousand/uL Final     Hemoglobin   Date Value Ref Range Status   02/22/2021 11 6 (L) 12 0 - 16 0 g/dL Final   01/20/2021 11 4 (L) 12 0 - 16 0 g/dL Final   12/09/2020 13 6 12 0 - 16 0 g/dL Final     Platelets   Date Value Ref Range Status   02/22/2021 287 150 - 450 Thousands/uL Final   01/20/2021 284 150 - 450 Thousands/uL Final   12/09/2020 275 150 - 450 Thousands/uL Final     PTT   Date Value Ref Range Status   02/25/2019 32 23 - 34 seconds Final     Comment:     Therapeutic Heparin Range =  52-80 seconds   02/21/2019 32 23 - 34 seconds Final     Comment:     Therapeutic Heparin Range =  52-80 seconds     INR   Date Value Ref Range Status   02/25/2019 1 10 0 89 - 1 10 Final   02/21/2019 1 04 0 89 - 1 10 Final     No results found for: CKMB, DIGOXIN  No results found for: TSH  HDL, Direct   Date Value Ref Range Status   05/06/2016 41 40 - 60 mg/dL Final     Comment:     Specimen collection should occur prior to Metamizole administration due to the potential for falsely depressed results       Triglycerides   Date Value Ref Range Status   05/06/2016 85 <=150 mg/dL Final     Comment:     Specimen collection should occur prior to N-Acetylcysteine or Metamizole administration due to the potential for falsely depressed results  Hemoglobin A1C   Date Value Ref Range Status   2021 7 0 (A) 6 5 Final   10/31/2018 7 5  Final   10/12/2018 8 20  Final   2016 8 5  Final     Comment:       Performed at: In Office  ,       Blood Culture   Date Value Ref Range Status   2019 No Growth After 5 Days  Final   2019 No Growth After 5 Days  Final   2019 No Growth After 5 Days  Final   2019 No Growth After 5 Days  Final     Urine Culture   Date Value Ref Range Status   2016 >100,000 cfu/ml Escherichia coli  Final     Legionella Urinary Antigen   Date Value Ref Range Status   2019 Negative Negative Final     C difficile toxin by PCR   Date Value Ref Range Status   2018 NEGATIVE for C difficle toxin by PCR  NEGATIVE for C difficle toxin by PCR  Final       *-*-*-*-*-*-*-*-*-*-*-*-*-*-*-*-*-*-*-*-*-*-*-*-*-*-*-*-*-*-*-*-*-*-*-*-*-*-*-*-*-*-*-*-*-*-*-*-*-*-*-*-*-*-  RADIOLOGY RESULTS:  Ct Soft Tissue Neck W Contrast    Result Date: 2021  Impression: No pathologically enlarged cervical lymphadenopathy by CT size criteria  Workstation performed: [id number]     Ct Chest Abdomen Pelvis W Contrast    Result Date: 2021  Impression: No evidence of recurrent lymphoma   Workstation performed: [id number]       *-*-*-*-*-*-*-*-*-*-*-*-*-*-*-*-*-*-*-*-*-*-*-*-*-*-*-*-*-*-*-*-*-*-*-*-*-*-*-*-*-*-*-*-*-*-*-*-*-*-*-*-*-*-  LAST ECHOCARDIOGRAM AND OTHER CARDIOLOGY RESULTS:  Results for orders placed during the hospital encounter of 18   Echo complete with contrast if indicated    [place] [address] [facility] [address]    Transthoracic Echocardiogram  2D, M-mode, Doppler, and Color Doppler    Study date:  2018    Patient: Jose Mo  MR number: [id number]  Account number: [de-identified]  : 1945  Age: 68 years  Gender: Female  Status: Outpatient  Location: [address] [place] [address]  Height: 60 in  Weight: 140 8 lb  BP: 102/ 68 mmHg    Indications: Chemo    Diagnoses: C83 30 - Diffuse large B-cell lymphoma, unspecified site    Sonographer:  JING Patrick,RCS  Primary Physician:  Chava Kaufman PA-C  Referring Physician:  Dianna Pack MD  Group:  [id number]  Interpreting Physician:  Rizwan Pandya MD    SUMMARY    SUMMARY:  1  Sinus rhythm  2  Poor technical quality  3  Normal left ventricular systolic function  4  Grade 1 left ventricular diastolic dysfunction with normal filling pressures  5  Biatrial enlargement  6  Calcific aortic sclerosis without stenosis  LEFT VENTRICLE:  Normal left ventricular systolic function, EF 80%  Normal left ventricular cavity size  Normal left ventricular wall motion without regional wall motion abnormalities  Grade 1 left ventricular diastolic dysfunction  Normal left ventricular  filling pressures  LEFT ATRIUM:  Mild left atrial enlargement  RIGHT ATRIUM:  Minimal right atrial enlargement  MITRAL VALVE:  Trace mitral regurgitation  AORTIC VALVE:  Calcified, sclerotic, tricuspid aortic valve without stenosis  TRICUSPID VALVE:  Mild tricuspid regurgitation  PULMONIC VALVE:  Trace pulmonic regurgitation  PULMONARY ARTERIES:  Normal pulmonary artery pressures  PERICARDIUM:  No pericardial effusion  HISTORY: PRIOR HISTORY: Diabetes, shortness of breath, hypertension    PROCEDURE: The study was performed in the [address] [place] [address]  This was a routine study  The transthoracic approach was used  The study included complete 2D imaging, M-mode, complete spectral Doppler, and color Doppler  The heart rate was  65 bpm, at the start of the study  Images were obtained from the parasternal, apical, subcostal, and suprasternal notch acoustic windows  Image quality was adequate  LEFT VENTRICLE: Normal left ventricular systolic function, EF 02%   Normal left ventricular cavity size  Normal left ventricular wall motion without regional wall motion abnormalities  Grade 1 left ventricular diastolic dysfunction  Normal  left ventricular filling pressures  RIGHT VENTRICLE: The size was normal  Systolic function was normal  Wall thickness was normal     LEFT ATRIUM: Mild left atrial enlargement  RIGHT ATRIUM: Minimal right atrial enlargement  MITRAL VALVE: Trace mitral regurgitation  There was normal leaflet separation  DOPPLER: The transmitral velocity was within the normal range  There was no evidence for stenosis  There was no regurgitation  AORTIC VALVE: Calcified, sclerotic, tricuspid aortic valve without stenosis  TRICUSPID VALVE: Mild tricuspid regurgitation  PULMONIC VALVE: Trace pulmonic regurgitation  PERICARDIUM: No pericardial effusion  There was no pericardial effusion  The pericardium was normal in appearance  AORTA: The root exhibited normal size  PULMONARY ARTERY: Normal pulmonary artery pressures      SYSTEM MEASUREMENT TABLES    2D  %FS: 37 59 %  Ao Diam: 2 96 cm  EDV(Teich): 80 71 ml  EF(Teich): 68 01 %  ESV(Teich): 25 82 ml  IVSd: 0 98 cm  LA Area: 18 03 cm2  LA Diam: 3 81 cm  LVEDV MOD A4C: 55 5 ml  LVEF MOD A4C: 66 21 %  LVESV MOD A4C: 18 75 ml  LVIDd: 4 25 cm  LVIDs: 2 65 cm  LVLd A4C: 6 01 cm  LVLs A4C: 5 21 cm  LVPWd: 0 99 cm  RA Area: 15 94 cm2  RVIDd: 3 83 cm  SV MOD A4C: 36 75 ml  SV(Teich): 54 89 ml    CW  AV Vmax: 1 33 m/s  AV maxP 08 mmHg  RAP: 10 mmHg  TR Vmax: 2 4 m/s  TR maxP mmHg    MM  TAPSE: 2 14 cm    PW  E': 0 09 m/s  E/E': 6 19  LVOT Vmax: 1 18 m/s  LVOT maxP 56 mmHg  MV A Maximiliano: 0 84 m/s  MV Dec Mower: 1 74 m/s2  MV DecT: 313 59 ms  MV E Maximiliano: 0 55 m/s  MV E/A Ratio: 0 65  MV PHT: 90 94 ms  MVA By PHT: 2 42 cm2  PAEDP: 13 06 mmHg  PRend PG: 3 06 mmHg  PRend Vmax: 0 87 m/s  PV Vmax: 0 75 m/s  PV maxP 27 mmHg  RVSP: 33 mmHg    [facility] Accredited Echocardi[facility] Laboratory    Prepared and electronically signed by    Lorna Myers MD  Signed [id number] 17:16:37       No results found for this or any previous visit  No results found for this or any previous visit  No results found for this or any previous visit  *-*-*-*-*-*-*-*-*-*-*-*-*-*-*-*-*-*-*-*-*-*-*-*-*-*-*-*-*-*-*-*-*-*-*-*-*-*-*-*-*-*-*-*-*-*-*-*-*-*-*-*-*-*-  RADIOLOGY RESULTS:  Ct Soft Tissue Neck W Contrast    Result Date: 2021  Impression: No pathologically enlarged cervical lymphadenopathy by CT size criteria  Workstation performed: [id number]     Ct Chest Abdomen Pelvis W Contrast    Result Date: 2021  Impression: No evidence of recurrent lymphoma  Workstation performed: [id number]       *-*-*-*-*-*-*-*-*-*-*-*-*-*-*-*-*-*-*-*-*-*-*-*-*-*-*-*-*-*-*-*-*-*-*-*-*-*-*-*-*-*-*-*-*-*-*-*-*-*-*-*-*-*-  ECHOCARDIOGRAM AND OTHER CARDIOLOGY RESULTS:  Results for orders placed during the hospital encounter of 18   Echo complete with contrast if indicated    [address]    Transthoracic Echocardiogram  2D, M-mode, Doppler, and Color Doppler    Study date:  2018    Patient: Joi Stewart  MR number: [id number]  Account number: [de-identified]  : 1945  Age: 68 years  Gender: Female  Status: Outpatient  Location: [address] [place] [address]  Height: 60 in  Weight: 140 8 lb  BP: 102/ 68 mmHg    Indications: Chemo    Diagnoses: C83 30 - Diffuse large B-cell lymphoma, unspecified site    Sonographer:  Albina Alves, CCT,RCS  Primary Physician:  William Branham PA-C  Referring Physician:  Jannie Jimenez MD  Group:  [id number]  Interpreting Physician:  Lorna Myers MD    SUMMARY    SUMMARY:  1  Sinus rhythm  2  Poor technical quality  3  Normal left ventricular systolic function  4  Grade 1 left ventricular diastolic dysfunction with normal filling pressures  5  Biatrial enlargement  6   Calcific aortic sclerosis without stenosis  LEFT VENTRICLE:  Normal left ventricular systolic function, EF 59%  Normal left ventricular cavity size  Normal left ventricular wall motion without regional wall motion abnormalities  Grade 1 left ventricular diastolic dysfunction  Normal left ventricular  filling pressures  LEFT ATRIUM:  Mild left atrial enlargement  RIGHT ATRIUM:  Minimal right atrial enlargement  MITRAL VALVE:  Trace mitral regurgitation  AORTIC VALVE:  Calcified, sclerotic, tricuspid aortic valve without stenosis  TRICUSPID VALVE:  Mild tricuspid regurgitation  PULMONIC VALVE:  Trace pulmonic regurgitation  PULMONARY ARTERIES:  Normal pulmonary artery pressures  PERICARDIUM:  No pericardial effusion  HISTORY: PRIOR HISTORY: Diabetes, shortness of breath, hypertension    PROCEDURE: The study was performed in the [facility]  This was a routine study  The transthoracic approach was used  The study included complete 2D imaging, M-mode, complete spectral Doppler, and color Doppler  The heart rate was  65 bpm, at the start of the study  Images were obtained from the parasternal, apical, subcostal, and suprasternal notch acoustic windows  Image quality was adequate  LEFT VENTRICLE: Normal left ventricular systolic function, EF 83%  Normal left ventricular cavity size  Normal left ventricular wall motion without regional wall motion abnormalities  Grade 1 left ventricular diastolic dysfunction  Normal  left ventricular filling pressures  RIGHT VENTRICLE: The size was normal  Systolic function was normal  Wall thickness was normal     LEFT ATRIUM: Mild left atrial enlargement  RIGHT ATRIUM: Minimal right atrial enlargement  MITRAL VALVE: Trace mitral regurgitation  There was normal leaflet separation  DOPPLER: The transmitral velocity was within the normal range  There was no evidence for stenosis  There was no regurgitation      AORTIC VALVE: Calcified, sclerotic, tricuspid aortic valve without stenosis  TRICUSPID VALVE: Mild tricuspid regurgitation  PULMONIC VALVE: Trace pulmonic regurgitation  PERICARDIUM: No pericardial effusion  There was no pericardial effusion  The pericardium was normal in appearance  AORTA: The root exhibited normal size  PULMONARY ARTERY: Normal pulmonary artery pressures  SYSTEM MEASUREMENT TABLES    2D  %FS: 37 59 %  Ao Diam: 2 96 cm  EDV(Teich): 80 71 ml  EF(Teich): 68 01 %  ESV(Teich): 25 82 ml  IVSd: 0 98 cm  LA Area: 18 03 cm2  LA Diam: 3 81 cm  LVEDV MOD A4C: 55 5 ml  LVEF MOD A4C: 66 21 %  LVESV MOD A4C: 18 75 ml  LVIDd: 4 25 cm  LVIDs: 2 65 cm  LVLd A4C: 6 01 cm  LVLs A4C: 5 21 cm  LVPWd: 0 99 cm  RA Area: 15 94 cm2  RVIDd: 3 83 cm  SV MOD A4C: 36 75 ml  SV(Teich): 54 89 ml    CW  AV Vmax: 1 33 m/s  AV maxP 08 mmHg  RAP: 10 mmHg  TR Vmax: 2 4 m/s  TR maxP mmHg    MM  TAPSE: 2 14 cm    PW  E': 0 09 m/s  E/E': 6 19  LVOT Vmax: 1 18 m/s  LVOT maxP 56 mmHg  MV A Maximiliano: 0 84 m/s  MV Dec Cook: 1 74 m/s2  MV DecT: 313 59 ms  MV E Maximiliano: 0 55 m/s  MV E/A Ratio: 0 65  MV PHT: 90 94 ms  MVA By PHT: 2 42 cm2  PAEDP: 13 06 mmHg  PRend PG: 3 06 mmHg  PRend Vmax: 0 87 m/s  PV Vmax: 0 75 m/s  PV maxP 27 mmHg  RVSP: 33 mmHg    [facility]    Prepared and electronically signed by    Opal Cortes MD  Signed [id number] 17:16:37       No results found for this or any previous visit  No results found for this or any previous visit  No results found for this or any previous visit       *-*-*-*-*-*-*-*-*-*-*-*-*-*-*-*-*-*-*-*-*-*-*-*-*-*-*-*-*-*-*-*-*-*-*-*-*-*-*-*-*-*-*-*-*-*-*-*-*-*-*-*-*-*-  SIGNATURES:   @BQP@   Jennifer Rizo MD     CC:   MOIRA Clemens, [address] [place] [address]

## 2021-03-22 ENCOUNTER — HOSPITAL ENCOUNTER (OUTPATIENT)
Dept: RADIOLOGY | Facility: HOSPITAL | Age: 76
Discharge: HOME | End: 2021-03-22
Attending: RADIOLOGY | Admitting: RADIOLOGY
Payer: COMMERCIAL

## 2021-03-22 VITALS
HEIGHT: 60 IN | OXYGEN SATURATION: 97 % | HEART RATE: 73 BPM | DIASTOLIC BLOOD PRESSURE: 74 MMHG | BODY MASS INDEX: 28.47 KG/M2 | WEIGHT: 145 LBS | TEMPERATURE: 97.4 F | SYSTOLIC BLOOD PRESSURE: 160 MMHG | RESPIRATION RATE: 14 BRPM

## 2021-03-22 DIAGNOSIS — Z92.21 STATUS POST CHEMOTHERAPY: ICD-10-CM

## 2021-03-22 PROCEDURE — 99152 MOD SED SAME PHYS/QHP 5/>YRS: CPT

## 2021-03-22 PROCEDURE — 99153 MOD SED SAME PHYS/QHP EA: CPT

## 2021-03-22 PROCEDURE — 99152 MOD SED SAME PHYS/QHP 5/>YRS: CPT | Performed by: RADIOLOGY

## 2021-03-22 PROCEDURE — 77001 FLUOROGUIDE FOR VEIN DEVICE: CPT | Performed by: RADIOLOGY

## 2021-03-22 PROCEDURE — 36590 REMOVAL TUNNELED CV CATH: CPT | Performed by: RADIOLOGY

## 2021-03-22 PROCEDURE — 36590 REMOVAL TUNNELED CV CATH: CPT

## 2021-03-22 RX ORDER — SODIUM CHLORIDE 9 MG/ML
75 INJECTION, SOLUTION INTRAVENOUS CONTINUOUS
Status: DISCONTINUED | OUTPATIENT
Start: 2021-03-22 | End: 2021-03-23 | Stop reason: HOSPADM

## 2021-03-22 RX ORDER — HYDROMORPHONE HCL/PF 1 MG/ML
0.5 SYRINGE (ML) INJECTION EVERY 2 HOUR PRN
Status: CANCELLED | OUTPATIENT
Start: 2021-03-22 | End: 2021-03-24

## 2021-03-22 RX ORDER — OXYCODONE HYDROCHLORIDE 10 MG/1
10 TABLET ORAL EVERY 4 HOURS PRN
Status: DISCONTINUED | OUTPATIENT
Start: 2021-03-22 | End: 2021-03-23 | Stop reason: HOSPADM

## 2021-03-22 RX ORDER — ACETAMINOPHEN 325 MG/1
650 TABLET ORAL EVERY 4 HOURS PRN
Status: DISCONTINUED | OUTPATIENT
Start: 2021-03-22 | End: 2021-03-23 | Stop reason: HOSPADM

## 2021-03-22 RX ORDER — FENTANYL CITRATE 50 UG/ML
INJECTION, SOLUTION INTRAMUSCULAR; INTRAVENOUS CODE/TRAUMA/SEDATION MEDICATION
Status: COMPLETED | OUTPATIENT
Start: 2021-03-22 | End: 2021-03-22

## 2021-03-22 RX ORDER — MIDAZOLAM HYDROCHLORIDE 2 MG/2ML
INJECTION, SOLUTION INTRAMUSCULAR; INTRAVENOUS CODE/TRAUMA/SEDATION MEDICATION
Status: COMPLETED | OUTPATIENT
Start: 2021-03-22 | End: 2021-03-22

## 2021-03-22 RX ORDER — LIDOCAINE HYDROCHLORIDE AND EPINEPHRINE 10; 10 MG/ML; UG/ML
INJECTION, SOLUTION INFILTRATION; PERINEURAL CODE/TRAUMA/SEDATION MEDICATION
Status: COMPLETED | OUTPATIENT
Start: 2021-03-22 | End: 2021-03-22

## 2021-03-22 RX ORDER — OXYCODONE HYDROCHLORIDE 5 MG/1
5 TABLET ORAL EVERY 4 HOURS PRN
Status: DISCONTINUED | OUTPATIENT
Start: 2021-03-22 | End: 2021-03-23 | Stop reason: HOSPADM

## 2021-03-22 RX ADMIN — FENTANYL CITRATE 50 MCG: 50 INJECTION INTRAMUSCULAR; INTRAVENOUS at 11:44

## 2021-03-22 RX ADMIN — SODIUM CHLORIDE 75 ML/HR: 0.9 INJECTION, SOLUTION INTRAVENOUS at 10:41

## 2021-03-22 RX ADMIN — LIDOCAINE HYDROCHLORIDE,EPINEPHRINE BITARTRATE 10 ML: 10; .01 INJECTION, SOLUTION INFILTRATION; PERINEURAL at 12:00

## 2021-03-22 RX ADMIN — MIDAZOLAM 0.5 MG: 1 INJECTION INTRAMUSCULAR; INTRAVENOUS at 11:43

## 2021-03-22 RX ADMIN — ACETAMINOPHEN 650 MG: 325 TABLET ORAL at 12:34

## 2021-03-22 RX ADMIN — FENTANYL CITRATE 50 MCG: 50 INJECTION INTRAMUSCULAR; INTRAVENOUS at 11:58

## 2021-03-22 RX ADMIN — MIDAZOLAM 1 MG: 1 INJECTION INTRAMUSCULAR; INTRAVENOUS at 11:58

## 2021-03-22 NOTE — BRIEF OP NOTE (RAD/CATH)
INTERVENTIONAL RADIOLOGY PROCEDURE NOTE    Date: 3/22/2021    Procedure: IR PORT REMOVAL     Preoperative diagnosis:   1  Status post chemotherapy       Postoperative diagnosis: Same  Surgeon: Kirk Mendoza MD     Assistant: None  No qualified resident was available  Blood loss: minimal    Specimens: none    Findings: Successful port removal     Complications: None immediate      Anesthesia: conscious sedation

## 2021-03-22 NOTE — DISCHARGE INSTRUCTIONS
Implanted Venous Access Port Removal    WHAT YOU NEED TO KNOW:   An implanted venous access port is a device used to give treatments and take blood  It may also be called a central venous access device (CVAD)  The port is a small container that is placed under your skin, usually in your upper chest  A port can also be placed in your arm or abdomen  The port is attached to a catheter that enters a large vein  DISCHARGE INSTRUCTIONS:   Resume your normal diet  Small sips of flat soda will help with mild nausea  Prevent an infection:     Wash your hands often  Use soap and water  Clean your hands before and  after you care for your incision  Check your skin for infection every day  Look for redness, swelling, or fluid oozing from the incision site  Dressing may come off in 24 hours  Medical glue will peel off on its own in 5 to 10 days  You may shower 24 hours after procedure  Follow up with your healthcare provider as directed  Write down your questions so you remember to ask them during your visits  Activity:  You may return to your daily activities when the area heals  Contact Interventional Radiology at [phone] [facility] PATIENTS: Contact Interventional Radiology at [phone]) [facility] PATIENTS: Contact Interventional Radiology at [phone]) if:     You have a fever  You have persistent nausea  Your inciscion site is red, swollen, or draining pus  You have questions or concerns about your condition or care  Seek care immediately or call 911 if:  Blood soaks through your bandage  The skin over or around your incision breaks open  Your heart is jumping or fluttering  You have a headache, blurred vision, and feel confused  You have pain in your arm, neck, shoulder, or chest     You have trouble breathing that is getting worse over time         Moderate Sedation   WHAT YOU NEED TO KNOW:   Moderate sedation, or conscious sedation, is medicine used during procedures to help you feel relaxed and calm  You will be awake and able to follow directions without anxiety or pain  You will remember little to none of the procedure  You may feel tired, weak, or unsteady on your feet after you get sedation  You may also have trouble concentrating or short-term memory loss  These symptoms should go away in 24 hours or less  DISCHARGE INSTRUCTIONS:   Call 911 or have someone else call for any of the following:   · You have sudden trouble breathing  · You cannot be woken  Seek care immediately if:   · You have a severe headache or dizziness  · Your heart is beating faster than usual   Contact your healthcare provider if:   · You have a fever  · You have nausea or are vomiting for more than 8 hours after the procedure  · Your skin is itchy, swollen, or you have a rash  · You have questions or concerns about your condition or care  Self-care:   · Have someone stay with you for 24 hours  This person can drive you to errands and help you do things around the house  This person can also watch for problems  · Rest and do quiet activities for 24 hours  Do not exercise, ride a bike, or play sports  Stand up slowly to prevent dizziness and falls  Take short walks around the house with another person  Slowly return to your usual activities the next day  · Do not drive or use dangerous machines or tools for 24 hours  You may injure yourself or others  Examples include a lawnmower, saw, or drill  Do not return to work for 24 hours if you use dangerous machines or tools for work  · Do not make important decisions for 24 hours  For example, do not sign important papers or invest money  · Drink liquids as directed  Liquids help flush the sedation medicine out of your body  Ask how much liquid to drink each day and which liquids are best for you  · Eat small, frequent meals to prevent nausea and vomiting  Start with clear liquids such as juice or broth   If you do not vomit after clear liquids, you can eat your usual foods  · Do not drink alcohol or take medicines that make you drowsy  This includes medicines that help you sleep and anxiety medicines  Ask your healthcare provider if it is safe for you to take pain medicine  Follow up with your healthcare provider as directed: Write down your questions so you remember to ask them during your visits  © 2017 [address] [employer] Information is for End User's use only and may not be sold, redistributed or otherwise used for commercial purposes  All illustrations and images included in CareNotes® are the copyrighted property of [employer] , [employer]  or Rocael Cowart  The above information is an  only  It is not intended as medical advice for individual conditions or treatments  Talk to your doctor, nurse or pharmacist before following any medical regimen to see if it is safe and effective for you

## 2021-03-25 DIAGNOSIS — F41.8 ANXIETY ABOUT HEALTH: ICD-10-CM

## 2021-03-25 RX ORDER — ALPRAZOLAM 0.25 MG/1
TABLET ORAL
Qty: 30 TABLET | Refills: 0 | OUTPATIENT
Start: 2021-03-25

## 2021-04-02 NOTE — PATIENT INSTRUCTIONS
PRESCRIPTION REFILL REMINDER:  All medication refills should be requested prior to [facility] on Friday  Any refill requests after noon on Friday would be addressed the following Monday  Please protect yourself from COVID-19! Even though we do not good antiviral drugs for this infection, the following strategies can help you stay healthy:    = Wash your hands! Soap and water, or hand  with at least 60% alcohol, are both effective at killing the virus  = Wear a mask! This will help protect others from any virus particles you might spread  Your mouth and nose BOTH need to be covered  = Keep the distance! Keep 6 feet of distance from others people, even if they seem healthy  Keeping distance protects you from the other person's virus spread     = Get a vaccine! Three vaccines are approved for emergency use in the [place], made by [facility], and [employer]  These vaccines have been shown to be 90+% effective at preventing severe infections when combined with masking, hand-washing, and distancing  As of 3/1/2021, the following priority groups may get one of the vaccines in [facility]:  + nursing home residents and staff  + front-line health workers  + ALL persons over age 72 (ages 76 and up can be seen at [facility])  + ANY person over age 12 that has a qualifying risk factor, such as diabetes, lung disease, or obesity  ==> Please use  the following website  to check your eligibility in [place]:  [employer] [address]   ==> If you are under age 72, but still qualify, you may get a shot from many other locations outside [facility]: [facility], [employer], [facility], [place]      We are recommending that ALL our patients get a complete series of one of the three vaccines, as early as it is available to them    Please keep an eye on the [employer], Brooke Mcgregor, or call [phone] to see when you will become eligible  If you are eligible by the criteria above, please ask our team to help get you a shot! Informacion en espanol sobre vacunas, de nos companeros de [facility] --  Alexis arriaga      Numbers of Coronavirus cases (and COVID deaths) are improving in many  States, but rates of transmission are still high  This means that many people in a community are still spreading the virus  Please check the local disease reports near you if you consider travelling  As of 3/1/21, we do NOT advise travel outside the [place] or [place]) "        Check out [employer] for [place] data that are updated daily:    http://www Local Lift/     Global Epidemics  Org, from [facility] will give you Gslvjf-nr-Neuajl information on virus cases:    Https://globalepidemics  org/

## 2021-04-05 ENCOUNTER — OFFICE VISIT (OUTPATIENT)
Dept: PALLIATIVE MEDICINE | Facility: CLINIC | Age: 76
End: 2021-04-05
Payer: COMMERCIAL

## 2021-04-05 ENCOUNTER — HOSPITAL ENCOUNTER (OUTPATIENT)
Dept: NON INVASIVE DIAGNOSTICS | Facility: HOSPITAL | Age: 76
Discharge: HOME | End: 2021-04-05
Attending: INTERNAL MEDICINE
Payer: COMMERCIAL

## 2021-04-05 VITALS
HEART RATE: 75 BPM | OXYGEN SATURATION: 97 % | DIASTOLIC BLOOD PRESSURE: 60 MMHG | BODY MASS INDEX: 29.02 KG/M2 | RESPIRATION RATE: 16 BRPM | TEMPERATURE: 96.5 F | WEIGHT: 148.6 LBS | SYSTOLIC BLOOD PRESSURE: 130 MMHG

## 2021-04-05 DIAGNOSIS — R00.2 PALPITATIONS: ICD-10-CM

## 2021-04-05 DIAGNOSIS — C83.31 DIFFUSE LARGE B-CELL LYMPHOMA OF LYMPH NODES OF NECK (HCC): ICD-10-CM

## 2021-04-05 DIAGNOSIS — R29.6 FREQUENT FALLS: ICD-10-CM

## 2021-04-05 DIAGNOSIS — R68.89 FORGETFULNESS: Primary | ICD-10-CM

## 2021-04-05 DIAGNOSIS — Z71.89 COUNSELING REGARDING ADVANCED CARE PLANNING AND GOALS OF CARE: ICD-10-CM

## 2021-04-05 PROCEDURE — 1160F RVW MEDS BY RX/DR IN RCRD: CPT | Performed by: INTERNAL MEDICINE

## 2021-04-05 PROCEDURE — 93225 XTRNL ECG REC<48 HRS REC: CPT

## 2021-04-05 PROCEDURE — 99214 OFFICE O/P EST MOD 30 MIN: CPT | Performed by: INTERNAL MEDICINE

## 2021-04-05 PROCEDURE — 3075F SYST BP GE 130 - 139MM HG: CPT | Performed by: INTERNAL MEDICINE

## 2021-04-05 PROCEDURE — 3078F DIAST BP <80 MM HG: CPT | Performed by: INTERNAL MEDICINE

## 2021-04-05 PROCEDURE — 93226 XTRNL ECG REC<48 HR SCAN A/R: CPT

## 2021-04-05 NOTE — PROGRESS NOTES
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)    4  Counseling regarding advanced care planning and goals of care      · Her granddaughter's main concern now is her forgetfulness that seems to be getting more and more frequent and apparent  She forgets that some family members were  already  She forgets names of some family as well  More concerning was her forgetting that she was boiling water on the stove and went to bed  · She also has frequent falls and needs assistance with almost all ADLs, except feeding - can still feed herself  · Refer to Geriatrics for comprehensive adult assessment  · RTO in 2 months, after she sees geriatrics to re-evaluate ACP/GOC    Requested Prescriptions      No prescriptions requested or ordered in this encounter     There are no discontinued medications  Representatives have queried the patient's controlled substance dispensing history in the Prescription Drug Monitoring Program in compliance with regulations before I have prescribed any controlled substances  The prescription history is consistent with prescribed therapy and our practice policies  45 minutes were spent face to face with her granddaughter who is also her caregiver  with greater than 50% of the time spent in counseling or coordination of care including discussions of etiology of diagnosis, risks and benefits of treatment, instructions for disease self management, treatment instructions, follow up requirements and risk factors and risk reduction of disease   All of the patient's questions were answered during this discussion  No follow-ups on file      Subjective:   Chief Complaint  Follow up visit for:  symptom management, assessment of goals of care, disease process education and discussion of prognosis, advance care planning  HPI     Aria Young is a 77 y o  female with Hx of Diffuse large B cell lymphoma (diagnosed in 2018), finished treatment in 2019 and is currently on remission  She is seeing [facility] for supportive cares/continued cancer survivorship  She also has Hx of HTN, HLD, DM2, GERD  She was last seen on virtual visit on 2021 with colleague/Dr Marilou Cortez who continued her on xanax prn for anxiety, meloxicam for chronic back pain and remeron  Today, she comes in with her granddaughter whom she lives with and is her primary caretaker  Patient herself denied any new complaints, said she feels "fine"  Her granddaughter, however, is concerned about ongoing forgetfulness that they had noticed about a year ago and recently (2-3 months ago) had started to get worse/become more frequent  At one point, she told them that she couldn't get a hold of her aunt on the phone  This aunt  over a year ago and she attended the   She also forgot the name of her close relative (granddaughter's mother/her DIL)  She also forgot that there are 2 little girls in the home (her 2 granddaughters), thinking there was only 1  More concerning, however, was her forgetting that she was boiling water in a kettle on a gas stove  She apparently put the kettle on the stove and went to sleep  Granddaughter thankfully found it before anything bad happened  She is also falling more and more  Her granddaughter would help her with her ADLs like bathing, grooming and dressing, for this reason  She also prepares her meals  Patient can still feed herself  Patient lives with her granddaughter and her granddaughters 3 children - ages 10 1and 3year old  Her cousin and her cousin's daughter also live with them  I recommend a formal cognitive eval and comprehensive adult assessment not only to get her treatments, but also to help guide and reassess goals of care in case this turns out to be dementia   Granddaughter needs to be educated on disease trajectory and prognosis especially since she is the caregiver 24/7 not only to the patient but also to her very young children  The following portions of the medical history were reviewed: past medical history, problem list, medication list, and social history      Current Outpatient Medications:     ALPRAZolam (XANAX) 0 25 mg tablet, MAR JOHNNY (1) TABLETA POR VIA ORAL DIARIAMENTE A LA HORA DE ACOSTARSE PARA ANSIEDAD O DORMIR , Disp: 30 tablet, Rfl: 0    amLODIPine (NORVASC) 5 mg tablet, Take 1 tablet (5 mg total) by mouth daily, Disp: 30 tablet, Rfl: 10    benzonatate (TESSALON) 200 MG capsule, Take 1 capsule (200 mg total) by mouth 3 (three) times a day, Disp: 20 capsule, Rfl: 0    Blood Glucose Monitoring Suppl (OneTouch Verio) w/Device KIT, Use daily, Disp: 1 kit, Rfl: 0    Calcium Carb-Cholecalciferol 600-400 MG-UNIT TABS, Take 1 tablet by mouth 2 (two) times a day, Disp: 60 tablet, Rfl: 2    carvedilol (COREG) 12 5 mg tablet, Take 1 tablet (12 5 mg total) by mouth 2 (two) times a day with meals, Disp: 60 tablet, Rfl: 10    cetirizine (ZyrTEC) 5 MG tablet, Take 1 tablet (5 mg total) by mouth daily, Disp: 90 tablet, Rfl: 1    cyanocobalamin (VITAMIN B-12) 500 MCG tablet, Take 1,000 mcg by mouth daily, Disp: , Rfl:     diclofenac sodium (VOLTAREN) 1 %, Apply 2 g topically 4 (four) times a day, Disp: 100 g, Rfl: 10    ezetimibe (ZETIA) 10 mg tablet, Take 1 tablet (10 mg total) by mouth daily, Disp: 30 tablet, Rfl: 10    ferrous sulfate 325 (65 Fe) mg tablet, Take 325 mg by mouth daily with breakfast, Disp: , Rfl:     fluticasone (FLONASE) 50 mcg/act nasal spray, 1 spray into each nostril daily, Disp: 16 g, Rfl: 10    gabapentin (NEURONTIN) 100 mg capsule, MAR JOHNNY (1) CAPSULA POR VIA ORAL DOS VECES AL JEFFREY (Patient taking differently: Take 100 mg by mouth daily ), Disp: 60 capsule, Rfl: 5    glucose blood (OneTouch Verio) test strip, Use as instructed, Disp: 100 each, Rfl: 3    Insulin Pen Needle (BD Pen Needle Micro U/F) 32G X 6 MM MISC, Use daily, Disp: 100 each, Rfl: 1    Lantus SoloStar 100 units/mL injection pen, INYECTAR 9 UNIDADES DEBAJO DE LA PIEL DIARIAMENTE A LA HORA DE ACOSTARSE, Disp: 15 mL, Rfl: 10    loperamide (IMODIUM) 2 mg capsule, MAR SHAMAR (1) CAPSULA POR VIA ORAL ALFREDO SEA NECESARIO PARA LA DIARREA, Disp: 30 capsule, Rfl: 11    losartan (COZAAR) 25 mg tablet, Take 0 5 tablets (12 5 mg total) by mouth daily, Disp: 30 tablet, Rfl: 2    meloxicam (MOBIC) 7 5 mg tablet, Take 1 tablet (7 5 mg total) by mouth daily [place] shamar tableta cada jeffrey con el desayuno para la artritis  NO TOME con el ibuprofeno, Disp: 90 tablet, Rfl: 3    metFORMIN (GLUCOPHAGE) 1000 MG tablet, MAR SHAMAR (1) TABLETA POR VIA ORAL DOS VECES AL JEFFREY CON COMIDAS , Disp: 60 tablet, Rfl: 10    mirtazapine (REMERON) 15 mg tablet, MAR SHAMAR (1) TABLETA POR VIA ORAL CADA NOCHE A LA HORA DE ACOSTARSE, Disp: 90 tablet, Rfl: 3    omeprazole (PriLOSEC) 40 MG capsule, MAR SHAMAR (1) CAPSULA POR VIA ORAL DOS VECES AL JEFFREY, Disp: 60 capsule, Rfl: 10    OneTouch Delica Lancets 01Z MISC, Use daily, Disp: 100 each, Rfl: 3    Tradjenta 5 MG TABS, MAR SHAMAR (1) TABLETA POR VIA ORAL SHAMAR VEZ AL JEFFREY, Disp: 30 tablet, Rfl: 10    Ventolin  (90 Base) MCG/ACT inhaler, INHALAR 1-2 BOCANADAS A LOS PULMONES CADA 4-6 HORAS ALFREDO SEA NECESARIO, Disp: 18 g, Rfl: 10  Review of Systems   Constitutional: Negative for activity change, appetite change, chills, fatigue and fever  HENT: Negative for trouble swallowing  Respiratory: Negative for cough and shortness of breath  Cardiovascular: Negative for chest pain  Gastrointestinal: Negative for abdominal pain, constipation, diarrhea, nausea and vomiting  Musculoskeletal: Positive for back pain  Neurological:        Forgetfulness   Psychiatric/Behavioral: Negative for sleep disturbance  The patient is not nervous/anxious      All other systems reviewed and are negative  All other systems negative    Objective:  Vital Signs  /60 (BP Location: Right arm, Patient Position: Sitting, Cuff Size: Standard)   Pulse 75   Temp (!) 96 5 °F (35 8 °C) (Temporal)   Resp 16   Wt 67 4 kg (148 lb 9 6 oz)   SpO2 97%   BMI 29 02 kg/m²    Physical Exam    Constitutional: Appears well-developed and well-nourished  Appears as stated age, appears well-kempt  Did not appear sick/toxic-looking  Pleasant  But kept quiet most of the visit  In no acute physical or emotional distress  Head: Normocephalic and atraumatic  Eyes: EOM are normal  No ocular discharge  No scleral icterus  Neck: No visible adenopathy or masses  Respiratory: Effort normal  No stridor  No respiratory distress  Gastrointestinal: No abdominal distension  Musculoskeletal: No edema  Neurological: Alert, oriented and appropriately conversant  Skin: No diaphoresis, no rashes seen on exposed areas of skin  Psychiatric: Displays a normal mood and affect   Behavior, judgement and thought content appear normal      Nona Escobar MD  Palliative Medicine & Supportive Care  Internal Medicine  Available via [place] [employer]  Office: [phone]  Fax: [phone]

## 2021-04-06 ENCOUNTER — TELEPHONE (OUTPATIENT)
Dept: PALLIATIVE MEDICINE | Facility: CLINIC | Age: 76
End: 2021-04-06

## 2021-04-06 NOTE — TELEPHONE ENCOUNTER
Pt roomed and seen yesterday and is in need of signed Opioid agreement as she was prescribed medication from Dr Cheyenne Benjamin that very day  Please scan or send copy of Opioid agreement along with pre-stamped envelope to pt to update chart as soon as possible  Thank you!

## 2021-04-07 ENCOUNTER — PATIENT OUTREACH (OUTPATIENT)
Dept: FAMILY MEDICINE CLINIC | Facility: CLINIC | Age: 76
End: 2021-04-07

## 2021-04-07 NOTE — PROGRESS NOTES
I called Eduardo Villalpando to follow up  She states the patient was currently sleeping  She reports her blood sugars being stable with a fasting yesterday of 135 and before bed 108  Eduardo Villalpando has been checking the patient's feet and denies any skin breakdown  Eduardo Villalpando notes the patient has been taking her medications as prescribed  She is eating and drinking without any concerns  Eduardo Villalpando does state the patient has an increase of flatulence and has been giving Gas-X with some relief  Eduardo Villalpando denies the patient having any chest pain, shortness of breath or any further falls  She reports the patient's blood pressure are stable with most readings 120-130/70-80 with a few readings slightly elevated of 157/77 and 130/90  Eduardo Villalpando states a vacation is planned to [facility] from 4/17-4/28  I plan to follow up after their return

## 2021-04-11 PROCEDURE — 93227 XTRNL ECG REC<48 HR R&I: CPT | Performed by: INTERNAL MEDICINE

## 2021-04-16 DIAGNOSIS — F41.8 ANXIETY ABOUT HEALTH: ICD-10-CM

## 2021-04-16 RX ORDER — ALPRAZOLAM 0.25 MG/1
0.25 TABLET ORAL
Qty: 30 TABLET | Refills: 0 | Status: SHIPPED | OUTPATIENT
Start: 2021-04-16 | End: 2021-05-11

## 2021-04-16 NOTE — TELEPHONE ENCOUNTER
Primary palliative medicine provider: Dr Molina Perez    Medication requested:Xanax    If for pain, how has the patient been taking their pain medicine? Last appointment:4/5/21    Next scheduled appointment:6/1/21    PDMP review:  Filled Written  03/23/2021  1  03/19/2021  ALPRAZOLAM 0 25 MG TABLET  30 0  30  [place] PIP  [id number]  EXACT ([id number])  0   [place]  [place]        Requested refill by [facility] mail order pharmacy

## 2021-04-30 ENCOUNTER — PATIENT OUTREACH (OUTPATIENT)
Dept: FAMILY MEDICINE CLINIC | Facility: CLINIC | Age: 76
End: 2021-04-30

## 2021-04-30 NOTE — PROGRESS NOTES
I called Mitzi Pyle to follow up  She states they just returned from a trip to [employer]  She reports patient's blood sugars are stable, mostly running 120-135 but recently had a 164 before bedtime  We discussed the patient's A1C being cut in half from 14 4 to 7 0  I congratulated and thanked Mitzi Pyle in doing such a great job in watching the patient's diet and making sure she takes her medications  Mitzi Pyle denies the patient having any recent falls  She also denies any skin breakdown stating "all is good"  Mitzi Pyle states the patient received both of her Covid vaccines  She notes the only medication refill the patient is in need of is alprazolam but Palliative is taking care of this  I will continue to follow  Mitzi Pyle will call with any questions or concerns

## 2021-05-11 DIAGNOSIS — F41.8 ANXIETY ABOUT HEALTH: ICD-10-CM

## 2021-05-11 RX ORDER — ALPRAZOLAM 0.25 MG/1
TABLET ORAL
Qty: 30 TABLET | Refills: 0 | Status: SHIPPED | OUTPATIENT
Start: 2021-05-11 | End: 2021-06-08

## 2021-05-20 ENCOUNTER — PATIENT OUTREACH (OUTPATIENT)
Dept: FAMILY MEDICINE CLINIC | Facility: CLINIC | Age: 76
End: 2021-05-20

## 2021-05-20 DIAGNOSIS — E11.9 TYPE 2 DIABETES MELLITUS WITHOUT COMPLICATION, WITHOUT LONG-TERM CURRENT USE OF INSULIN (HCC): ICD-10-CM

## 2021-05-20 DIAGNOSIS — G47.30 SLEEP APNEA, UNSPECIFIED TYPE: Primary | ICD-10-CM

## 2021-05-20 RX ORDER — BLOOD SUGAR DIAGNOSTIC
STRIP MISCELLANEOUS
Qty: 100 EACH | Refills: 3 | Status: SHIPPED | OUTPATIENT
Start: 2021-05-20 | End: 2021-10-15 | Stop reason: SDUPTHER

## 2021-05-20 RX ORDER — LANCETS 33 GAUGE
EACH MISCELLANEOUS DAILY
Qty: 100 EACH | Refills: 3 | Status: SHIPPED | OUTPATIENT
Start: 2021-05-20 | End: 2021-10-15 | Stop reason: SDUPTHER

## 2021-05-20 NOTE — PROGRESS NOTES
I called Desiree Jalloh to follow up on the patient  She states the patient has been feeling "good"  She notes the patient's blood sugars have been stable with an occasional high  Her fasting levels run 130-150 and a recent 180  Desiree Jalloh stated the patient had a small piece of cake and ice cream  Desiree Jalloh notes she sometimes does not administer as much or any insulin at night because the patient's blood sugars are 100-120 and she is afraid of it dropping  The patient refuses to eat a snack before bed at times as well  Desiree Jalloh denies the patient having any skin breakdown and denies any further falls  Desiree Jalloh notes she received a letter from the patient's insurance inquiring if the patient is using her CPAP  Desiree Jalloh states the patient rarely uses it because she says "I'm fine"  The last time the patient used it was at least 3 months ago  The letter recommended the patient see Sleep Medicine to see if CPAP is still needed; will send note to PCP  I reminded Desiree Jalloh of patient's mammogram appointment on Monday, 5/24, which she was aware; time confirmed  Desiree Jalloh requested refills; will submit to PCP  I will continue to follow

## 2021-05-24 ENCOUNTER — HOSPITAL ENCOUNTER (OUTPATIENT)
Dept: MAMMOGRAPHY | Facility: CLINIC | Age: 76
Discharge: HOME | End: 2021-05-24
Payer: COMMERCIAL

## 2021-05-24 VITALS — BODY MASS INDEX: 29.45 KG/M2 | WEIGHT: 150 LBS | HEIGHT: 60 IN

## 2021-05-24 DIAGNOSIS — Z12.31 ENCOUNTER FOR SCREENING MAMMOGRAM FOR MALIGNANT NEOPLASM OF BREAST: ICD-10-CM

## 2021-05-24 DIAGNOSIS — Z12.31 SCREENING MAMMOGRAM, ENCOUNTER FOR: ICD-10-CM

## 2021-05-24 PROCEDURE — 77067 SCR MAMMO BI INCL CAD: CPT

## 2021-05-24 PROCEDURE — 77063 BREAST TOMOSYNTHESIS BI: CPT

## 2021-05-26 ENCOUNTER — TELEPHONE (OUTPATIENT)
Dept: FAMILY MEDICINE CLINIC | Facility: CLINIC | Age: 76
End: 2021-05-26

## 2021-05-26 NOTE — TELEPHONE ENCOUNTER
Insurance calling to verify Dx of DM was inquiring why pt was not placed on statin therapy despite patient being diabetic

## 2021-05-27 NOTE — PATIENT INSTRUCTIONS
PRESCRIPTION REFILL REMINDER:  All medication refills should be requested prior to [facility] on Friday  Any refill requests after noon on Friday would be addressed the following Monday  Please protect yourself from COVID-19! Even though we do not good antiviral drugs for this infection, the following strategies can help you stay healthy:    = Wash your hands! Soap and water, or hand  with at least 60% alcohol, are both effective at killing the virus  = Wear a mask! This will help protect others from any virus particles you might spread  Your mouth and nose BOTH need to be covered  = Keep the distance! Keep 6 feet of distance from others people, even if they seem healthy  Keeping distance protects you from the other person's virus spread     = Get a vaccine! Two vaccines are approved for emergency use in the [place], made by Kemar Barry, and Abran Last  (At this time, 4/13/21, Jeanie Megan is under additional investigation for side effects; we do not recommend that our patients get J&J )  These vaccines have been shown to be 90+% effective at preventing severe infections when combined with masking, hand-washing, and distancing  As of 4/19/2021, ALL adults may get a vaccine!  + Pfizer may be given to all adults age 12 and older   + Abran Last may be given to all adults age 25 and older   = You may get a shot from many other locations outside [facility]: [address] [facility], [employer], [facility], [facility], and certain [facility] locations  We are recommending that ALL our patients get a complete series of one of the two vaccines, as early as it is available to them  Please keep an eye on the [employer], [employer] / [employer], or call 8-030-YVMTXYE (Choose Option 7 for faster service!) to see when you will become eligible  If you are eligible by the criteria above, please ask our team to help get you a shot!       Informacion en espanol sobre vacunas, de nos dima [address] --  [employer] dk      Numbers of Coronavirus cases (and COVID deaths) are spiking in many [place] States, and rates of transmission are still high  This means that many people in a community are still spreading the virus  Please check the local disease reports near you if you consider travelling  As of 4/1/21, we do NOT advise travel outside the [facility] ([place] or [place]) "    Check out [facility] for [place] data that are updated daily:    http://www Meritful/     Global Epidemics  Org, from [facility] (OUTPATIENT CAMPUS), will give you Hdimga-df-Brhlyy information on virus cases:    Https://globalepidemics  org/

## 2021-06-01 ENCOUNTER — OFFICE VISIT (OUTPATIENT)
Dept: PALLIATIVE MEDICINE | Facility: CLINIC | Age: 76
End: 2021-06-01
Payer: COMMERCIAL

## 2021-06-01 ENCOUNTER — TELEPHONE (OUTPATIENT)
Dept: GERIATRICS | Age: 76
End: 2021-06-01

## 2021-06-01 VITALS
WEIGHT: 146 LBS | RESPIRATION RATE: 16 BRPM | DIASTOLIC BLOOD PRESSURE: 60 MMHG | HEART RATE: 73 BPM | OXYGEN SATURATION: 97 % | TEMPERATURE: 96.9 F | SYSTOLIC BLOOD PRESSURE: 130 MMHG | BODY MASS INDEX: 28.51 KG/M2

## 2021-06-01 DIAGNOSIS — C83.31 DIFFUSE LARGE B-CELL LYMPHOMA OF LYMPH NODES OF NECK (HCC): ICD-10-CM

## 2021-06-01 DIAGNOSIS — R68.89 FORGETFULNESS: ICD-10-CM

## 2021-06-01 DIAGNOSIS — R26.2 AMBULATORY DYSFUNCTION: ICD-10-CM

## 2021-06-01 DIAGNOSIS — Z71.89 COUNSELING REGARDING ADVANCED CARE PLANNING AND GOALS OF CARE: Primary | ICD-10-CM

## 2021-06-01 PROBLEM — G89.3 NEOPLASM RELATED PAIN: Status: RESOLVED | Noted: 2019-01-28 | Resolved: 2021-06-01

## 2021-06-01 PROCEDURE — 3078F DIAST BP <80 MM HG: CPT | Performed by: INTERNAL MEDICINE

## 2021-06-01 PROCEDURE — 3075F SYST BP GE 130 - 139MM HG: CPT | Performed by: INTERNAL MEDICINE

## 2021-06-01 PROCEDURE — 99213 OFFICE O/P EST LOW 20 MIN: CPT | Performed by: INTERNAL MEDICINE

## 2021-06-01 NOTE — PROGRESS NOTES
Palliative and [address] 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)    3  Forgetfulness    4  Ambulatory dysfunction      · Her granddaughter's main concern continues to be her forgetfulness that seems to be getting more and more frequent and apparent  Per previous visit, she forgets that some family members were  already  She forgets names of some family as well  More concerning was her forgetting that she was boiling water on the stove and went to bed  · They visited family, including her brother, in [address] [facility] not too long ago and after spending 2 days before was also convinced she has memory issues (previousl was doubting granddaughter's reports)  · She continues to have frequent falls (no head strike) and needs assistance with almost all ADLs, except feeding - can still feed herself  · Referred to Geriatrics for comprehensive adult assessment, we reached out and they will call them for an appointment   · They have completed the 5 Wishes but they did not bring the document with them  Also unsure if they witnessed the form properly  We provided another copy and asked them to leave the witness part blank so we can witness for them  · RTO in 3 months and then prn thereafter if appropriate    Requested Prescriptions      No prescriptions requested or ordered in this encounter     There are no discontinued medications  Representatives have queried the patient's controlled substance dispensing history in the Prescription Drug Monitoring Program in compliance with regulations before I have prescribed any controlled substances  The prescription history is consistent with prescribed therapy and our practice policies        30 minutes were spent face to face with her granddaughter who is also her caregiver  with greater than 50% of the time spent in counseling or coordination of care including discussions of etiology of diagnosis, risks and benefits of treatment, instructions for disease self management, treatment instructions, follow up requirements and risk factors and risk reduction of disease   All of the patient's questions were answered during this discussion  No follow-ups on file  Subjective:   Chief Complaint  Follow up visit for:  symptom management, assessment of goals of care, disease process education and discussion of prognosis, advance care planning  CEDRIC     Marquis Coleman is a 77 y o  female with Hx of Diffuse large B cell lymphoma (diagnosed in 2018), finished treatment in 2019 and is currently on remission  She is seeing [facility] for supportive cares/continued cancer survivorship  She also has Hx of HTN, HLD, DM2, GERD  She was seen on virtual visit on 2021 with colleague/Dr Denyce Bumpers who continued her on xanax prn for anxiety, meloxicam for chronic back pain and remeron  She was last seen in the office on 2021 where the granddaughter/primary caretaker reports progressive and worsening forgetfulness - " ongoing forgetfulness that they had noticed about a year ago and recently (2-3 months ago) had started to get worse/become more frequent  At one point, she told them that she couldn't get a hold of her aunt on the phone  This aunt  over a year ago and she attended the   She also forgot the name of her close relative (granddaughter's mother/her DIL)  She also forgot that there are 2 little girls in the home (her 2 granddaughters), thinking there was only 1  More concerning, however, was her forgetting that she was boiling water in a kettle on a gas stove  She apparently put the kettle on the stove and went to sleep  Granddaughter thankfully found it before anything bad happened  She is also falling more and more   Her granddaughter would help her with her ADLs like bathing, grooming and dressing, for this reason  She also prepares her meals  Patient can still feed herself  Patient lives with her granddaughter and her granddaughters 3 children - ages 10 1and 3year old  Her cousin and her cousin's daughter also live with them  I recommended a formal cognitive eval and comprehensive adult assessment not only to get her treatments, but also to help guide and reassess goals of care in case this turns out to be dementia  Granddaughter needs to be educated on disease trajectory and prognosis especially since she is the caregiver 24/7 not only to the patient but also to her very young children  Today, she comes in for routine follow up to further discuss [address] 65  Unfortunately, she has yet to be seen by geriatrics for dementia evaluation  Since their last visit, she thankfully continued to do well  She was able to visit with family in [place] in April 2021 for about 10 days  Since then, had infrequent falls with no head strike  While in [place], her brother stayed with her for 2 days and told Jerry Mondragon that he now believes her when she said she has memory problems (was previously doubtful)  She reports finishing the 5 Wishes  She named Jerry Mondragon as her 1st agent, her brother in [place] as 1nd, and her niece in [place] as 2rd  They did not bring the document for us to review and scan in the system so they are encouraged to bring it anytime so we can provided witnesses if appropriate  She voiced understanding  The following portions of the medical history were reviewed: past medical history, problem list, medication list, and social history      Current Outpatient Medications:     ALPRAZolam (XANAX) 0 25 mg tablet, MAR 1 TABLETA POR VIA ORAL CADA NOCHE A LA HORA DE ACOSTARSE ALFREDO SEA NECESARIO PARA ANSIEDAD, Disp: 30 tablet, Rfl: 0    amLODIPine (NORVASC) 5 mg tablet, Take 1 tablet (5 mg total) by mouth daily, Disp: 30 tablet, Rfl: 10    benzonatate (TESSALON) 200 MG capsule, Take 1 capsule (200 mg total) by mouth 3 (three) times a day, Disp: 20 capsule, Rfl: 0    Blood Glucose Monitoring Suppl (OneTouch Verio) w/Device KIT, Use daily, Disp: 1 kit, Rfl: 0    Calcium Carb-Cholecalciferol 600-400 MG-UNIT TABS, Take 1 tablet by mouth 2 (two) times a day, Disp: 60 tablet, Rfl: 2    carvedilol (COREG) 12 5 mg tablet, Take 1 tablet (12 5 mg total) by mouth 2 (two) times a day with meals, Disp: 60 tablet, Rfl: 10    cetirizine (ZyrTEC) 5 MG tablet, Take 1 tablet (5 mg total) by mouth daily, Disp: 90 tablet, Rfl: 1    cyanocobalamin (VITAMIN B-12) 500 MCG tablet, Take 1,000 mcg by mouth daily, Disp: , Rfl:     diclofenac sodium (VOLTAREN) 1 %, Apply 2 g topically 4 (four) times a day, Disp: 100 g, Rfl: 10    ezetimibe (ZETIA) 10 mg tablet, Take 1 tablet (10 mg total) by mouth daily, Disp: 30 tablet, Rfl: 10    ferrous sulfate 325 (65 Fe) mg tablet, Take 325 mg by mouth daily with breakfast, Disp: , Rfl:     fluticasone (FLONASE) 50 mcg/act nasal spray, 1 spray into each nostril daily, Disp: 16 g, Rfl: 10    gabapentin (NEURONTIN) 100 mg capsule, MAR SHAMAR (1) CAPSULA POR VIA ORAL DOS VECES AL JEFFREY (Patient taking differently: Take 100 mg by mouth daily ), Disp: 60 capsule, Rfl: 5    glucose blood (OneTouch Verio) test strip, Use as instructed, Disp: 100 each, Rfl: 3    Insulin Pen Needle (BD Pen Needle Micro U/F) 32G X 6 MM MISC, Use daily, Disp: 100 each, Rfl: 1    Lantus SoloStar 100 units/mL injection pen, INYECTAR 9 UNIDADES DEBAJO DE LA PIEL DIARIAMENTE A LA HORA DE ACOSTARSE, Disp: 15 mL, Rfl: 10    loperamide (IMODIUM) 2 mg capsule, MAR SHAMAR (1) CAPSULA POR VIA ORAL ALFREDO SEA NECESARIO PARA LA DIARREA, Disp: 30 capsule, Rfl: 11    losartan (COZAAR) 25 mg tablet, Take 0 5 tablets (12 5 mg total) by mouth daily, Disp: 30 tablet, Rfl: 2    meloxicam (MOBIC) 7 5 mg tablet, Take 1 tablet (7 5 mg total) by mouth daily [place] shamar tableta cada jeffrey con el desayuno para la artritis   NO TOME con el ibuprofeno, Disp: 90 tablet, Rfl: 3    metFORMIN (GLUCOPHAGE) 1000 MG tablet, MAR JOHNNY (1) TABLETA POR VIA ORAL DOS VECES AL JEFFREY CON COMIDAS , Disp: 60 tablet, Rfl: 10    mirtazapine (REMERON) 15 mg tablet, MAR JOHNNY (1) TABLETA POR VIA ORAL CADA NOCHE A LA HORA DE ACOSTARSE, Disp: 90 tablet, Rfl: 3    omeprazole (PriLOSEC) 40 MG capsule, MAR JOHNNY (1) CAPSULA POR VIA ORAL DOS VECES AL JEFFREY, Disp: 60 capsule, Rfl: 10    OneTouch Delica Lancets 77O MISC, Use daily, Disp: 100 each, Rfl: 3    Tradjenta 5 MG TABS, MAR JOHNNY (1) TABLETA POR VIA ORAL JOHNNY VEZ AL JEFFREY, Disp: 30 tablet, Rfl: 10    Ventolin  (90 Base) MCG/ACT inhaler, INHALAR 1-2 BOCANADAS A LOS PULMONES CADA 4-6 HORAS ALFREDO SEA NECESARIO, Disp: 18 g, Rfl: 10  Review of Systems   Constitutional: Negative for activity change, appetite change, chills, fatigue and fever  HENT: Negative for trouble swallowing  Respiratory: Negative for cough and shortness of breath  Cardiovascular: Negative for chest pain  Gastrointestinal: Negative for abdominal pain, constipation, diarrhea, nausea and vomiting  Musculoskeletal: Negative for back pain  Neurological:        Forgetfulness   Psychiatric/Behavioral: Negative for sleep disturbance  The patient is not nervous/anxious  All other systems reviewed and are negative  All other systems negative    Objective:  Vital Signs  /60 (BP Location: Left arm, Patient Position: Sitting, Cuff Size: Standard)   Pulse 73   Temp (!) 96 9 °F (36 1 °C) (Temporal)   Resp 16   Wt 66 2 kg (146 lb)   SpO2 97%   BMI 28 51 kg/m²    Physical Exam    Constitutional: Appears well-developed and well-nourished  Appears as stated age, appears well-kempt  Did not appear sick/toxic-looking  Pleasant  But kept quiet most of the visit  Was asleep at some portion of the visit  In no acute physical or emotional distress  Head: Normocephalic and atraumatic  Eyes: EOM are normal  No ocular discharge  No scleral icterus     Neck: No visible adenopathy or masses  Respiratory: Effort normal  No stridor  No respiratory distress  Gastrointestinal: No abdominal distension  Musculoskeletal: No edema  Neurological: Alert, oriented and appropriately conversant  Skin: No diaphoresis, no rashes seen on exposed areas of skin  Psychiatric: Displays a normal mood and affect   Behavior, judgement and thought content appear normal      Joseph Burnett MD  Palliative Medicine & Supportive Care  Internal Medicine  Available via [facility] Text  Office: [phone]  Fax: [phone]

## 2021-06-01 NOTE — TELEPHONE ENCOUNTER
[place] [address]  [phone]    Telephone Intake: Geriatric Assessment     Referral source: Eloise Booth, MD Pallativie                                         Caller who is scheduling/relationship to patient: Jatin Singh - granddaughter  Caller's phone number: [phone]              Is there a POA in place/If so, who has POA? No, unknown    Reason for referral: Family member concerns regarding memory concerns  What is the goal of visit? cause of memory issues     Has the patient been seen by a Neurologist or Geriatrician? No  Has the patient ever been diagnosed with dementia? No      Preferred language? Spansih  Highest education level? College associates degree  Does the patient wear glasses? Yes   Does the patient use hearing aids? no     Does the patient and/or caregiver have access for a virtual visit (computer or smart phone, working audio and video)? Yes     Caller was informed:    Please make sure the patient is accompanied by someone who knows them well / a caregiver / family member to participate in this appointment  If a patient plans to attend the assessment alone, please route a message to the assigned provider   Primary number on file will be called to confirm this appointment  Office packet mailed out? Yes     NOTE FOR : If the patient was recently hospitalized, please route intake to assigned provider for chart review

## 2021-06-02 ENCOUNTER — PATIENT OUTREACH (OUTPATIENT)
Dept: FAMILY MEDICINE CLINIC | Facility: CLINIC | Age: 76
End: 2021-06-02

## 2021-06-02 NOTE — PROGRESS NOTES
I called Desiree Jalloh to remind her of the patient's Endo appointment tomorrow; she was aware and plans on attending  She reports the patient's blood sugars have been stable with 128 before bed and 118 as a fasting  Desiree Jalloh requests for the patient to have a handicap placard because the patient has difficult walking long distances  I informed her this can be filled out at the time of her PCP visit in 2 weeks and she agreed  She asked if the appointment was over the phone or in-office and I informed her it was in the office  She requests a reminder call prior to that appointment  I will follow up after her PCP visit

## 2021-06-03 ENCOUNTER — OFFICE VISIT (OUTPATIENT)
Dept: ENDOCRINOLOGY | Facility: CLINIC | Age: 76
End: 2021-06-03
Payer: COMMERCIAL

## 2021-06-03 VITALS
HEIGHT: 60 IN | SYSTOLIC BLOOD PRESSURE: 102 MMHG | DIASTOLIC BLOOD PRESSURE: 60 MMHG | HEART RATE: 78 BPM | WEIGHT: 147.5 LBS | BODY MASS INDEX: 28.96 KG/M2

## 2021-06-03 DIAGNOSIS — E11.9 TYPE 2 DIABETES MELLITUS WITHOUT COMPLICATION, WITHOUT LONG-TERM CURRENT USE OF INSULIN (HCC): Primary | ICD-10-CM

## 2021-06-03 DIAGNOSIS — Z79.4 CURRENT USE OF INSULIN (HCC): ICD-10-CM

## 2021-06-03 DIAGNOSIS — I10 ESSENTIAL HYPERTENSION: ICD-10-CM

## 2021-06-03 DIAGNOSIS — Z78.9 NON-ENGLISH SPEAKING PATIENT: ICD-10-CM

## 2021-06-03 PROCEDURE — 1160F RVW MEDS BY RX/DR IN RCRD: CPT | Performed by: INTERNAL MEDICINE

## 2021-06-03 PROCEDURE — 4040F PNEUMOC VAC/ADMIN/RCVD: CPT | Performed by: INTERNAL MEDICINE

## 2021-06-03 PROCEDURE — 1036F TOBACCO NON-USER: CPT | Performed by: INTERNAL MEDICINE

## 2021-06-03 PROCEDURE — 99214 OFFICE O/P EST MOD 30 MIN: CPT | Performed by: INTERNAL MEDICINE

## 2021-06-03 NOTE — PROGRESS NOTES
Established Patient Progress Note      Chief Complaint   Patient presents with    Diabetes Type 2      Referring Provider  Sandi Sullivan, [address] [facility],[address]  [address] [facility]  [place],  [address] [place] [address]     History of Present Illness:   Mortimer Army is a 77 y o  female with a history of type 2 diabetes with long term use of insulin and is seen at the request of [address]  She is accompanied by granddaughter Haile, who translates as [place] speak [place]  She now has a referral for Geriatrics due to memory issues       Recalls being diagnosed with diabetes at age 56y, but did not use insulin until 2021  She only ever took oral agents prior to this  She was taking Janumet and glipzide, but this was stopped after her Lymphoma treatment  There was significant weight loss with her chemo, but her port is now being removed so no additional treatment is being planned now  She was on prednisone with her lymphoma treatment  Reports complications of eye and nerve damage  Reports a hx of CVA  Denies hospitalizations for hypoglycemic or severe hyperglycemic episodes  Current regimen: Lantus pens 7units once daily using 4-5 days garth a week, Tradjenta 5mg, Metformin 1g twice dailly  She is no longer using glipizide  Injects in and rotates sites: abdomen and back of the arm    Home blood glucose readings:   Before breakfast: 170, 130, 128  Before lunch: 119, 140, 136, 142, 145, 159, 147  Before dinner: 135, 157, 172, 144, 149, 161  Bedtime: 97, 199, 151, 153, 137, 137    Hypoglycemic episodes: none  Hypoglycemia symptoms: sleepiness  Treatment of hypoglycemia: OJ and peanuts or cheese  Diabetes education:  limited  Diet: 2-3 meals meals per day  BF is "good" but lunch is small (crackers or fruit), dinner includes the   diabetic diet compliance:  Much improved       Opthamology: sees, but needs to schedule an appointment  Believes she has retinopathy     Podiatry: 2 years ago, was told she had neuropathy after a biopsy  COVID: received both shots      Has hypertension: on losartan, amlodipine, carvedilol  Carvedilol dose was lowere  Has hyperlipidemia: on Zetia    Thyroid disorders: none  History of pancreatitis: none    She does have poor vision, bur this improves with glasses  She has thirst and urination, though her   She denies currently having pain in her feet   She denies diarrhea or constipation now, but "sometimes "     Patient Active Problem List   Diagnosis    Type 2 diabetes mellitus without complication, without long-term current use of insulin ([facility] )    Essential hypertension    Gastroesophageal reflux disease    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)    Oropharyngeal dysphagia    Hyponatremia    Anxiety about health    Nausea    Tremor    Severe protein-calorie malnutrition ([facility] )    Palliative care patient    Depression    Other insomnia    Rash    Status post chemotherapy    Allergic rhinitis    Arthritis    RONAK (obstructive sleep apnea)    Chemotherapy-induced peripheral neuropathy (HCC)    Trouble walking    Encounter for central line care    Current use of insulin ([facility] )    Non-English speaking patient    Near syncope    Palpitations    Grade I diastolic dysfunction without heart failure    Counseling regarding advanced care planning and goals of care    Forgetfulness    Ambulatory dysfunction      Past Medical History:   Diagnosis Date    Cancer ([facility] )     Throat    Chronic pain disorder     Diabetes mellitus ([facility]     Diffuse large B cell lymphoma ([facility] )     Dysphagia     GERD without esophagitis     HTN (hypertension)     Hyperlipidemia     Hypertension     MI (myocardial infarction) ([facility] )     Port-A-Cath in place 07/29/2019    Thyroid cancer ([facility] ) 2018      Past Surgical History:   Procedure Laterality Date    BONE MARROW BIOPSY      BREAST BIOPSY Left     CHOLECYSTECTOMY      IR PORT PLACEMENT  11/16/2018    IR PORT REMOVAL  3/22/2021    OTHER SURGICAL HISTORY      tendor tear repair to right shoulder    SHOULDER ARTHROSCOPY        Family History   Problem Relation Age of Onset    Diabetes Mother     Heart disease Sister     Hypertension Brother     Uterine cancer Maternal Grandmother     Prostate cancer Paternal Grandfather      Social History     Tobacco Use    Smoking status: Never Smoker    Smokeless tobacco: Never Used   Substance Use Topics    Alcohol use: Never     Frequency: Never     Drinks per session: Patient refused     Binge frequency: Never     Comment: 0     No Known Allergies      Current Outpatient Medications:     ALPRAZolam (XANAX) 0 25 mg tablet, MAR 1 TABLETA POR VIA ORAL CADA NOCHE A LA HORA DE ACOSTARSE ALFREDO SEA NECESARIO PARA ANSIEDAD, Disp: 30 tablet, Rfl: 0    amLODIPine (NORVASC) 5 mg tablet, Take 1 tablet (5 mg total) by mouth daily, Disp: 30 tablet, Rfl: 10    Blood Glucose Monitoring Suppl (OneTouch Verio) w/Device KIT, Use daily, Disp: 1 kit, Rfl: 0    Calcium Carb-Cholecalciferol 600-400 MG-UNIT TABS, Take 1 tablet by mouth 2 (two) times a day, Disp: 60 tablet, Rfl: 2    carvedilol (COREG) 12 5 mg tablet, Take 1 tablet (12 5 mg total) by mouth 2 (two) times a day with meals, Disp: 60 tablet, Rfl: 10    cetirizine (ZyrTEC) 5 MG tablet, Take 1 tablet (5 mg total) by mouth daily, Disp: 90 tablet, Rfl: 1    cyanocobalamin (VITAMIN B-12) 500 MCG tablet, Take 1,000 mcg by mouth daily, Disp: , Rfl:     diclofenac sodium (VOLTAREN) 1 %, Apply 2 g topically 4 (four) times a day, Disp: 100 g, Rfl: 10    ezetimibe (ZETIA) 10 mg tablet, Take 1 tablet (10 mg total) by mouth daily, Disp: 30 tablet, Rfl: 10    ferrous sulfate 325 (65 Fe) mg tablet, Take 325 mg by mouth daily with breakfast, Disp: , Rfl:     fluticasone (FLONASE) 50 mcg/act nasal spray, 1 spray into each nostril daily, Disp: 16 g, Rfl: 10    gabapentin (NEURONTIN) 100 mg capsule, MAR JOHNNY (1) CAPSULA POR VIA ORAL DOS VECES AL JEFFREY (Patient taking differently: Take 100 mg by mouth daily ), Disp: 60 capsule, Rfl: 5    glucose blood (OneTouch Verio) test strip, Use as instructed, Disp: 100 each, Rfl: 3    Insulin Pen Needle (BD Pen Needle Micro U/F) 32G X 6 MM MISC, Use daily, Disp: 100 each, Rfl: 1    Lantus SoloStar 100 units/mL injection pen, INYECTAR 9 UNIDADES DEBAJO DE LA PIEL DIARIAMENTE A LA HORA DE ACOSTARSE (Patient taking differently: Inject 7 Units under the skin daily ), Disp: 15 mL, Rfl: 10    loperamide (IMODIUM) 2 mg capsule, MAR SHAMAR (1) CAPSULA POR VIA ORAL ALFREDO SEA NECESARIO PARA LA DIARREA, Disp: 30 capsule, Rfl: 11    losartan (COZAAR) 25 mg tablet, Take 0 5 tablets (12 5 mg total) by mouth daily, Disp: 30 tablet, Rfl: 2    meloxicam (MOBIC) 7 5 mg tablet, Take 1 tablet (7 5 mg total) by mouth daily [place] shamar tableta cada jeffrey con el desayuno para la artritis  NO TOME con el ibuprofeno, Disp: 90 tablet, Rfl: 3    metFORMIN (GLUCOPHAGE) 1000 MG tablet, MAR SHAMAR (1) TABLETA POR VIA ORAL DOS VECES AL JEFFREY CON COMIDAS   (Patient taking differently: Take 1,000 mg by mouth 2 (two) times a day with meals ), Disp: 60 tablet, Rfl: 10    mirtazapine (REMERON) 15 mg tablet, MAR SHAMAR (1) TABLETA POR VIA ORAL CADA NOCHE A LA HORA DE ACOSTARSE, Disp: 90 tablet, Rfl: 3    omeprazole (PriLOSEC) 40 MG capsule, MAR SHAMAR (1) CAPSULA POR VIA ORAL DOS VECES AL JEFFREY, Disp: 60 capsule, Rfl: 10    OneTouch Delica Lancets 75C MISC, Use daily, Disp: 100 each, Rfl: 3    Tradjenta 5 MG TABS, MAR SHAMAR (1) TABLETA POR VIA ORAL SHAMAR VEZ AL JEFFREY, Disp: 30 tablet, Rfl: 10    Ventolin  (90 Base) MCG/ACT inhaler, INHALAR 1-2 BOCANADAS A LOS PULMONES CADA 4-6 HORAS ALFREDO SEA NECESARIO, Disp: 18 g, Rfl: 10    benzonatate (TESSALON) 200 MG capsule, Take 1 capsule (200 mg total) by mouth 3 (three) times a day (Patient not taking: Reported on 6/3/2021), Disp: 20 capsule, Rfl: 0  Review of Systems   Constitutional: Negative for unexpected weight change  Eyes: Negative for visual disturbance  Gastrointestinal: Negative for diarrhea  Endocrine: Positive for polyuria  Neurological: Negative for tremors  Psychiatric/Behavioral: The patient is not nervous/anxious  see also hpi    Physical Exam:  Body mass index is 28 81 kg/m²  /60 (BP Location: Left arm, Patient Position: Sitting, Cuff Size: Standard)   Pulse 78   Ht 5' (1 524 m)   Wt 66 9 kg (147 lb 8 oz)   BMI 28 81 kg/m²    Wt Readings from Last 3 Encounters:   06/03/21 66 9 kg (147 lb 8 oz)   06/01/21 66 2 kg (146 lb)   05/24/21 68 kg (150 lb)         Physical Exam   Gen: appears well-developed and well-nourished  No apparent distress  Head: Normocephalic and atraumatic  Eyes: no stare or proptosis, no periorbital edema  E/N/M: mask in place, hearing grossly intact  Neck: range of motion nl  Pulmonary/Chest: breathing  comfortably, no accessory muscle use, effort normal    Musculoskeletal: moves all extremities, gait nl  Neurological: No upper ext tremor appreciated  Skin: does not appear diaphoretic  Psychiatric: normal mood and affect; behavior is normal    Labs:     Lab Results   Component Value Date    HGBA1C 7 0 (A) 03/12/2021         Lab Results   Component Value Date    CREATININE 0 75 02/22/2021    CREATININE 0 66 01/20/2021    CREATININE 0 69 12/09/2020    BUN 19 02/22/2021    K 4 3 02/22/2021     02/22/2021    CO2 25 02/22/2021     eGFR   Date Value Ref Range Status   02/22/2021 79 >60 ml/min/1 73sq m Final     No components found for: [facility]    Lab Results   Component Value Date    HDL 41 05/06/2016    TRIG 85 05/06/2016       Lab Results   Component Value Date    ALT 19 02/22/2021    AST 24 02/22/2021    ALKPHOS 96 02/22/2021       No results found for: TSH, FREET4, TSI    Impression:  1  Type 2 diabetes mellitus without complication, without long-term current use of insulin ([address] )    2  Essential hypertension    3   Non-English speaking patient    4  Current use of insulin ([facility] )           Plan:    Ottoniel Pace was seen today for diabetes type 2  Diagnoses and all orders for this visit:    Type 2 diabetes mellitus without complication, without long-term current use of insulin ([place] [id number] )  -     Ambulatory referral to Ophthalmology; Future    Essential hypertension    Non-English speaking patient    Current use of insulin ([place] [address] )      1  Uncontrolled type 2 diabetes mellitus with hyperglycemia ([place] [address] )     - Ambulatory referral to Endocrinology    1  T2DM, now on insulin: BGs are much improved after making changes in her diet  I advised her to continue Lantus at 7units, but continue her Tradjenta and metformin as she is doing  Goal A1c 7-8% without hypoglycemia  2  HTN: Adherent to care, including losartan  3  Dyslipidemia: Taking zetia    RTC in 3mos  Discussed with the patient and all questioned fully answered  She will call me if any problems arise      Counseled patient on diagnostic results, prognosis, risk and benefit of treatment options, instruction for management, importance of treatment compliance, Risk  factor reduction and impressions      Racquel Lambert MD

## 2021-06-08 DIAGNOSIS — F41.8 ANXIETY ABOUT HEALTH: ICD-10-CM

## 2021-06-08 RX ORDER — ALPRAZOLAM 0.25 MG/1
TABLET ORAL
Qty: 30 TABLET | Refills: 0 | Status: SHIPPED | OUTPATIENT
Start: 2021-06-08 | End: 2021-07-09

## 2021-06-11 ENCOUNTER — PATIENT OUTREACH (OUTPATIENT)
Dept: FAMILY MEDICINE CLINIC | Facility: CLINIC | Age: 76
End: 2021-06-11

## 2021-06-11 NOTE — PROGRESS NOTES
I called Marelyjoce Arianeamerica to remind her of the patient's PCP appointment on Monday at 10 am; she was aware and plans on attending  She did state the patient's blood sugar last night was 110 so she did not administer the patient's insulin  Of note: Darling Owen states she herself fell down the stairs with her baby; went to the ED, no fractures  I will continue to follow

## 2021-06-14 ENCOUNTER — OFFICE VISIT (OUTPATIENT)
Dept: FAMILY MEDICINE CLINIC | Facility: CLINIC | Age: 76
End: 2021-06-14

## 2021-06-14 VITALS
HEART RATE: 74 BPM | TEMPERATURE: 97.9 F | WEIGHT: 144 LBS | HEIGHT: 60 IN | BODY MASS INDEX: 28.27 KG/M2 | DIASTOLIC BLOOD PRESSURE: 70 MMHG | RESPIRATION RATE: 18 BRPM | SYSTOLIC BLOOD PRESSURE: 138 MMHG | OXYGEN SATURATION: 96 %

## 2021-06-14 DIAGNOSIS — Z23 ENCOUNTER FOR IMMUNIZATION: ICD-10-CM

## 2021-06-14 DIAGNOSIS — E11.9 TYPE 2 DIABETES MELLITUS WITHOUT COMPLICATION, WITHOUT LONG-TERM CURRENT USE OF INSULIN (HCC): Primary | ICD-10-CM

## 2021-06-14 DIAGNOSIS — R21 RASH: ICD-10-CM

## 2021-06-14 LAB — SL AMB POCT HEMOGLOBIN AIC: 6.8 (ref ?–6.5)

## 2021-06-14 PROCEDURE — G0009 ADMIN PNEUMOCOCCAL VACCINE: HCPCS

## 2021-06-14 PROCEDURE — 1036F TOBACCO NON-USER: CPT | Performed by: NURSE PRACTITIONER

## 2021-06-14 PROCEDURE — 99214 OFFICE O/P EST MOD 30 MIN: CPT | Performed by: NURSE PRACTITIONER

## 2021-06-14 PROCEDURE — 83036 HEMOGLOBIN GLYCOSYLATED A1C: CPT | Performed by: NURSE PRACTITIONER

## 2021-06-14 PROCEDURE — 3044F HG A1C LEVEL LT 7.0%: CPT | Performed by: INTERNAL MEDICINE

## 2021-06-14 PROCEDURE — 3075F SYST BP GE 130 - 139MM HG: CPT | Performed by: NURSE PRACTITIONER

## 2021-06-14 PROCEDURE — 3044F HG A1C LEVEL LT 7.0%: CPT | Performed by: NURSE PRACTITIONER

## 2021-06-14 PROCEDURE — 4040F PNEUMOC VAC/ADMIN/RCVD: CPT | Performed by: NURSE PRACTITIONER

## 2021-06-14 PROCEDURE — 90670 PCV13 VACCINE IM: CPT

## 2021-06-14 PROCEDURE — 3008F BODY MASS INDEX DOCD: CPT | Performed by: INTERNAL MEDICINE

## 2021-06-14 PROCEDURE — 3008F BODY MASS INDEX DOCD: CPT | Performed by: NURSE PRACTITIONER

## 2021-06-14 PROCEDURE — 3078F DIAST BP <80 MM HG: CPT | Performed by: NURSE PRACTITIONER

## 2021-06-14 PROCEDURE — 1160F RVW MEDS BY RX/DR IN RCRD: CPT | Performed by: NURSE PRACTITIONER

## 2021-06-14 NOTE — ASSESSMENT & PLAN NOTE
Rash appears to be resolving   Recommend alternating steroid cream with Benadryl cream to provide relief from pruritus

## 2021-06-14 NOTE — ASSESSMENT & PLAN NOTE
Lab Results   Component Value Date    HGBA1C 6 8 (A) 06/14/2021     A1c continues to be well controlled since patient is living with grand daughter and she is caregiver   Patient is now following with endocrinology  Agree with continue Lantus 8 units HS, Tradjenta daily, and metformin

## 2021-06-14 NOTE — PROGRESS NOTES
Assessment/Plan:    Type 2 diabetes mellitus without complication, without long-term current use of insulin ([facility])    Lab Results   Component Value Date    HGBA1C 6 8 (A) 06/14/2021     A1c continues to be well controlled since patient is living with grand daughter and she is caregiver   Patient is now following with endocrinology  Agree with continue Lantus 8 units HS, Tradjenta daily, and metformin     Rash  Rash appears to be resolving   Recommend alternating steroid cream with Benadryl cream to provide relief from pruritus     Ana Vilchis was seen today for diabetes and rash  Diagnoses and all orders for this visit:    Type 2 diabetes mellitus without complication, without long-term current use of insulin ([facility])  -     POCT hemoglobin A1c    Rash  -     hydrocortisone 2 5 % cream; Apply topically 2 (two) times a day  -     diphenhydrAMINE (BENADRYL) 2 % cream; Apply topically 3 (three) times a day as needed for itching    Encounter for immunization  -     PNEUMOCOCCAL CONJUGATE VACCINE 13-VALENT GREATER THAN 6 MONTHS      Return in about 4 months (around 10/14/2021) for Recheck  Patient Instructions     Diabetes y nutrición   CUIDADO AMBULATORIO:   Los planes de nutrición ayudan a establecer patrones de alimentación saludable que mejoren la mane  Los planes de nutrición y ejercicio regular ayudan a mantener estable diane niveles de azúcar en la ainsley  Karma Rear a retrasar o prevenir las complicaciones de la diabetes, yoni la enfermedad renal diabética  Llame al [place] de la [place] de emergencias local (911 en los [place]) si:  · Tiene alguno de los siguientes signos de un ataque cardíaco:      ? Estrujamiento, presión o tensión en zuniga pecho    ?  Usted también podría presentar alguno de los siguientes:     § Malestar o dolor en zuniga espalda, mae, mandíbula, abdomen, o brazo    § Falta de aliento    § [employer] o vómitos    § Desvanecimiento o sudor frío repentino      [employer] atención médica de inmediato si:  · Usted tiene un nivel bajo azúcar en la ainsley y el problema no mejora con el [place]  Los síntomas son dificultad para pensar, latidos cardíacos randall y sudoración  · Zuniga nivel de azúcar en la ainsley está por encima de 240 mg/dl y no baja dentro de los 15 minutos del tratamiento  · Usted tiene cetonas en la ainsley o en la orina  · Tiene náuseas o vómitos y no puede retener alimentos o líquidos en el estómago  · Usted tiene visión borrosa o doble  · Zuniga aliento tiene un [employer] a frutas o zuniga respiración es superficial     Llame a zuniga médico o al equipo de atención diabética si:  · Brit niveles de azúcar en la ainsley son superiores a las metas fijadas  · Usted tiene bajos niveles de azúcar en zuniga ainsley frecuentemente  · Usted tiene problemas para sobrellevar zuniga diabetes o se siente ansioso o deprimido  · Usted tiene preguntas o inquietudes acerca de zuniga condición o cuidado  Un dietista lo ayudará a crear un plan de nutrición para satisfacer brit necesidades y las de [address]  El objetivo es que usted alcance o [place] un peso y niveles de azúcar en la ainsley, presión arterial y lípidos saludables  Debería reunirte con el dietista al menos 1 vez al OSIX  Aprenderá lo siguiente:  · [place]ua los alimentos afectan zuniga nivel de azúcar en la ainsley    · Cómo generar hábitos de alimentación saludables    · Cómo elegir los alimentos en función de zuniga nivel de [place] y niveles de glucosa    · Cómo puede integrar brit comidas preferidas a zuniga plan    · Cómo hacer un seguimiento de los carbohidratos    · El tamaño correcto de las porciones de cada alimento    · Cambios que puede hacer en zuniga plan si queda embarazada o está amamantando    Lo que puede hacer antes de reunirse con el dietista:  · No se salte ninguna comida  La meta es mantener estable el nivel de azúcar en la [place]  Zuniga nivel de azúcar en la ainsley puede bajar demasiado si ha recibido insulina y no ha comido      · Consuma más alimentos ricos en fibra, yoni frutas y verduras frescas o congeladas, panes integrales y frijoles  La fibra ayuda a controlar o reducir los niveles de azúcar en la ainsley y de [place]  Elija frutas enteras en lugar de jugo de fruta tanto yoni sea posible  Es posible que se añada azúcar al jugo y se elimine la [place]  · Seleccione grasas saludables para el corazón  Los alimentos con alto contenido de grasas saludables para el corazón incluyen el aceite de [place], las nueces, los aguacates y los pescados grasos, yoni el salmón y el atún  Los alimentos con alto contenido de grasas no saludables incluyen la carne amelia, los productos lácteos enteros y la margarina blanda  Las grasas no saludables pueden aumentar el riesgo de enfermedades cardíacas, aumentar el colesterol partha y reducir el hutson  · Elija los carbohidratos complejos  Los alimentos con carbohidratos complejos incluyen el arroz integral, los panes y cereales integrales y los frijoles cocidos  Los alimentos con carbohidratos simples incluyen el pan chambers, el arroz chambers, la mayoría de los cereales fríos y los refrigerios  Wu plan incluirá la cantidad de carbohidratos a consumir de shamar vez o en un día  Wu nivel de azúcar en la ainsley puede subir demasiado si come muchos carbohidratos de shamar vez  Los niveles de azúcar en la ainsley no aumentan o disminuyen tan rápidamente con los carbohidratos complejos yoni con los carbohidratos simples  Elija carbohidratos complejos siempre que sea posible  · Consuma menos sodio (sal)  El riesgo de presión arterial [place] aumenta con los alimentos con alto contenido de [place]  Limite los alimentos altos en sodio, yoni, por ejemplo, salsa de soya, calvin tostadas y sopas enlatadas  No añada sal a la comida que usted prepare  Restrinja el uso de sal de nice  Isabel las etiquetas para que no tengan más de 2300 miligramos de sodio en un día  · Limite los edulcorantes artificiales   Pueden encontrarse en alimentos o bebidas, yoni los refrescos de dieta u otras bebidas bajas en calorías  Los edulcorantes artificiales son bajos en calorías  Pueden ayudar a reducir [place] y los carbohidratos en general  Es importante no consumir más calorías de otros alimentos para compensar las calorías [place]  Los edulcorantes artificiales no tienen ningún tipo de nutrición  Coma alimentos integrales y yannick agua tanto yoni sea posible  Zuniga plan puede incluir bebidas con edulcorantes artificiales por un corto tiempo  [place] puede ayudarte a pasar de las bebidas con alto contenido de azúcar al agua  · Utilice el método del plato para cada comida  Jaimie método puede ayudar a comer la cantidad correcta de carbohidratos y [employer] niveles de azúcar en la ainsley bajo control  ? Dibuje shamar línea imaginaria en medio de un plato de comida de 9 pulgadas  En un lado, dibuje otra línea para dividir jose sección a la mitad  El plato tendrá Pamela Musty sección vida y [place]  ? Llene la sección más vida con verduras sin almidón  Estos incluyen al brócoli, espinacas, pepino, pimientos, coliflor y tomates  ? Agregue un almidón a shamar de las secciones pequeñas  Los almidones [employer], arroz, panes de gardenia entero, tortillas, [place], calvin y frijoles  ? Agregue carne u otra abby de proteína a la otra sección pequeña del plato  Por ejemplo, tre o pavo sin piel, pescado, carne de res o de BOONOO BOONOO, queso bajo en grasa, tofu y [place]  ? Agregue productos lácteos o fruta al lado de zuniga plato si zuniga plan de alimentación lo permite  Los ejemplos de productos lácteos incluyen [employer] o del 1% o yogur bajo en grasa  Si usted no perez leche ni consume productos lácteos, es posible que pueda agregar otra porción de almidón en vez de eso  ? [place] shamar bebida baja en calorías o sin calorías con zuniga comida  Por ejemplo, agua o café o té sin azúcar         Conozca los riesgos si decide beber alcohol: El alcohol puede causar hipoglucemia (bajo nivel de azúcar en la ainsley), especialmente si [place] insulina  El alcohol puede causar niveles altos de azúcar en la ainsley y de presión arterial, y aumento de peso si usted adalid demasiado  [place] [address] y los hombres de 72 años o más deben limitar el consumo de alcohol a 1 bebida por día  Los hombres de [address] de alcohol a 2 tragos al día  Un trago equivale a 12 onzas de cerveza, 5 onzas de vino o 1 onza y ½ de licor  La hipoglucemia puede ocurrir horas después de beber alcohol  Compruebe wu nivel de azúcar en la ainsley kyung varias horas después de beber alcohol  Lleve consigo shamar abby de carbohidratos de acción rápida en hay de que wu nivel baje demasiado  Necesita atención inmediata si tiene signos o síntomas de hipoglucemia, yoni sudor, confusión o desmayos  Mantenga un peso saludable: Un peso saludable puede ayudarlo a controlar wu diabetes  Usted puede mantener un peso saludable con un plan de nutrición y ejercicio  Consulte con wu médico cuánto debería pesar  [place] que lo ayude a crear un plan para bajar de peso si tiene sobrepeso  Juntos pueden definir metas de pérdida y de mantenimiento del [place]  Acuda a diane consultas de control con el equipo de atención de la diabetes según le indicaron: Anote diane preguntas para que se acuerde de hacerlas kyung diane visitas  © Copyright [address] Information is for End User's use only and may not be sold, redistributed or otherwise used for commercial purposes  All illustrations and images included in CareNotes® are the copyrighted property of A D A dax [employer]  [address] es sólo para uso en educación  Wu intención no es darle un consejo médico sobre enfermedades o tratamientos  Colsulte con wu [place] farmacéutico antes de seguir cualquier régimen médico para saber si es seguro y efectivo para usted            Subjective:     [place] Kirsty Jensen is a 77 y o  female who  has a past medical history of Cancer ([facility] ), Chronic pain disorder, Diabetes mellitus [facility] Diffuse large B cell lymphoma ([facility] ), Dysphagia, GERD without esophagitis, HTN (hypertension), Hyperlipidemia, Hypertension, MI (myocardial infarction) ([facility] ), Port-A-Cath in place, and Thyroid cancer ([facility] )  who presented to the office today for follow up  She notes a rash to the arms  Otherwise she is doing well and her and the granddaughter have no concerns  Patient has upcoming appointment with geriatrics due to forgetfulness/ memory issues  Rash  Patient presents for evaluation of a rash involving the upper extremity  Rash started 1 week ago  Lesions are red, and raised in texture  Rash has changed over time  Rash is pruritic  Associated symptoms: none  Patient denies: abdominal pain, arthralgia, congestion, cough, decrease in appetite, decrease in energy level, fever, headache, irritability, myalgia, nausea, sore throat and vomiting  Patient has not had contacts with similar rash  Patient has not had new exposures (soaps, lotions, laundry detergents, foods, medications, plants, insects or animals)        The following portions of the patient's history were reviewed and updated as appropriate: allergies, current medications, past family history, past medical history, past social history, past surgical history and problem list     Current Outpatient Medications on File Prior to Visit   Medication Sig Dispense Refill    ALPRAZolam (XANAX) 0 25 mg tablet MAR 1 TABLETA POR VIA ORAL CADA NOCHE A LA HORA DE ACOSTARSE ALFREDO SEA NECESARIO PARA ANSIEDAD 30 tablet 0    amLODIPine (NORVASC) 5 mg tablet Take 1 tablet (5 mg total) by mouth daily 30 tablet 10    Calcium Carb-Cholecalciferol 600-400 MG-UNIT TABS Take 1 tablet by mouth 2 (two) times a day 60 tablet 2    carvedilol (COREG) 12 5 mg tablet Take 1 tablet (12 5 mg total) by mouth 2 (two) times a day with meals 60 tablet 10    cetirizine (ZyrTEC) 5 MG tablet Take 1 tablet (5 mg total) by mouth daily 90 tablet 1    cyanocobalamin (VITAMIN B-12) 500 MCG tablet Take 1,000 mcg by mouth daily      ezetimibe (ZETIA) 10 mg tablet Take 1 tablet (10 mg total) by mouth daily 30 tablet 10    ferrous sulfate 325 (65 Fe) mg tablet Take 325 mg by mouth daily with breakfast      fluticasone (FLONASE) 50 mcg/act nasal spray 1 spray into each nostril daily 16 g 10    gabapentin (NEURONTIN) 100 mg capsule MAR SHAMAR (1) CAPSULA POR VIA ORAL DOS VECES AL JEFFREY (Patient taking differently: Take 100 mg by mouth daily ) 60 capsule 5    Insulin Pen Needle (BD Pen Needle Micro U/F) 32G X 6 MM MISC Use daily 100 each 1    Lantus SoloStar 100 units/mL injection pen INYECTAR 9 UNIDADES DEBAJO DE LA PIEL DIARIAMENTE A LA HORA DE ACOSTARSE (Patient taking differently: Inject 7 Units under the skin daily ) 15 mL 10    loperamide (IMODIUM) 2 mg capsule MRA SHAMAR (1) CAPSULA POR VIA ORAL ALFREDO SEA NECESARIO PARA LA DIARREA 30 capsule 11    losartan (COZAAR) 25 mg tablet Take 0 5 tablets (12 5 mg total) by mouth daily 30 tablet 2    meloxicam (MOBIC) 7 5 mg tablet Take 1 tablet (7 5 mg total) by mouth daily Berne shamar tableta cada jeffrey con el desayuno para la artritis  NO TOME con el ibuprofeno 90 tablet 3    metFORMIN (GLUCOPHAGE) 1000 MG tablet MAR SHAMAR (1) TABLETA POR VIA ORAL DOS VECES AL JEFFREY CON COMIDAS   (Patient taking differently: Take 1,000 mg by mouth 2 (two) times a day with meals ) 60 tablet 10    mirtazapine (REMERON) 15 mg tablet MAR SHAMAR (1) TABLETA POR VIA ORAL CADA NOCHE A LA HORA DE ACOSTARSE 90 tablet 3    omeprazole (PriLOSEC) 40 MG capsule MAR SHAMAR (1) CAPSULA POR VIA ORAL DOS VECES AL JEFFREY 60 capsule 10    Tradjenta 5 MG TABS MAR SHAMAR (1) TABLETA POR VIA ORAL SHAMAR VEZ AL JEFFREY 30 tablet 10    Ventolin  (90 Base) MCG/ACT inhaler INHALAR 1-2 BOCANADAS A LOS PULMONES CADA 4-6 HORAS ALFREDO SEA NECESARIO 18 g 10    benzonatate (TESSALON) 200 MG capsule Take 1 capsule (200 mg total) by mouth 3 (three) times a day (Patient not taking: Reported on 6/3/2021) 20 capsule 0    Blood Glucose Monitoring Suppl (OneTouch Verio) w/Device KIT Use daily 1 kit 0    diclofenac sodium (VOLTAREN) 1 % Apply 2 g topically 4 (four) times a day 100 g 10    glucose blood (OneTouch Verio) test strip Use as instructed 100 each 3    OneTouch Delica Lancets 22R MISC Use daily 100 each 3     No current facility-administered medications on file prior to visit  Review of Systems   Constitutional: Negative for chills and fever  HENT: Negative for ear pain and sore throat  Eyes: Negative for pain and visual disturbance  Respiratory: Negative for cough and shortness of breath  Cardiovascular: Negative for chest pain and palpitations  Gastrointestinal: Negative for abdominal pain and vomiting  Genitourinary: Negative for dysuria and hematuria  Musculoskeletal: Negative for arthralgias and back pain  Skin: Positive for rash  Negative for color change  Neurological: Negative for seizures and syncope  Psychiatric/Behavioral: Positive for decreased concentration  Negative for self-injury, sleep disturbance and suicidal ideas  The patient is not nervous/anxious  All other systems reviewed and are negative  Objective:    /70 (BP Location: Left arm, Patient Position: Sitting, Cuff Size: Standard)   Pulse 74   Temp 97 9 °F (36 6 °C) (Temporal)   Resp 18   Ht 5' (1 524 m)   Wt 65 3 kg (144 lb)   SpO2 96%   BMI 28 12 kg/m²     Physical Exam  Vitals and nursing note reviewed  Constitutional:       General: She is not in acute distress  Appearance: She is well-developed  She is not diaphoretic  HENT:      Head: Normocephalic and atraumatic  Right Ear: External ear normal       Left Ear: External ear normal    Eyes:      Pupils: Pupils are equal, round, and reactive to light     Cardiovascular:      Rate and Rhythm: Normal rate and regular rhythm  Pulses: Normal pulses  Heart sounds: Normal heart sounds  Pulmonary:      Effort: Pulmonary effort is normal  No respiratory distress  Breath sounds: Normal breath sounds  No wheezing  Abdominal:      General: Bowel sounds are normal  There is no distension  Palpations: Abdomen is soft  Tenderness: There is no abdominal tenderness  Musculoskeletal:         General: No deformity  Normal range of motion  Cervical back: Normal range of motion and neck supple  Lymphadenopathy:      Cervical: No cervical adenopathy  Skin:     General: Skin is warm and dry  Capillary Refill: Capillary refill takes less than 2 seconds  Findings: Rash present  Comments: Multiple healing erythematous papules to forearms    Neurological:      Mental Status: She is alert  Mental status is at baseline     Psychiatric:         Behavior: Behavior normal          MOIRA Carbajal  06/14/21  11:31 AM

## 2021-06-16 DIAGNOSIS — R21 RASH: ICD-10-CM

## 2021-06-18 ENCOUNTER — CLINICAL SUPPORT (OUTPATIENT)
Dept: DENTISTRY | Facility: CLINIC | Age: 76
End: 2021-06-18

## 2021-06-18 VITALS — DIASTOLIC BLOOD PRESSURE: 79 MMHG | TEMPERATURE: 96.4 F | SYSTOLIC BLOOD PRESSURE: 152 MMHG | HEART RATE: 67 BPM

## 2021-06-18 DIAGNOSIS — Z01.20 ENCOUNTER FOR DENTAL EXAMINATION: Primary | ICD-10-CM

## 2021-06-18 DIAGNOSIS — K05.6 PERIODONTAL DISEASE: ICD-10-CM

## 2021-06-18 DIAGNOSIS — K02.62 DENTIN CARIES: ICD-10-CM

## 2021-06-18 DIAGNOSIS — K02.9 DENTAL CARIES: ICD-10-CM

## 2021-06-18 DIAGNOSIS — K03.6 DENTAL CALCULUS: ICD-10-CM

## 2021-06-18 DIAGNOSIS — K03.6 ACCRETIONS ON TEETH: ICD-10-CM

## 2021-06-18 PROCEDURE — D1330 ORAL HYGIENE INSTRUCTIONS: HCPCS | Performed by: DENTAL HYGIENIST

## 2021-06-18 PROCEDURE — D1110 PROPHYLAXIS - ADULT: HCPCS | Performed by: DENTAL HYGIENIST

## 2021-06-18 PROCEDURE — D0120 PERIODIC ORAL EVALUATION - ESTABLISHED PATIENT: HCPCS | Performed by: DENTIST

## 2021-06-18 PROCEDURE — D0274 BITEWINGS - 4 RADIOGRAPHIC IMAGES: HCPCS | Performed by: DENTAL HYGIENIST

## 2021-06-18 NOTE — PROGRESS NOTES
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

## 2021-07-01 ENCOUNTER — PATIENT OUTREACH (OUTPATIENT)
Dept: FAMILY MEDICINE CLINIC | Facility: CLINIC | Age: 76
End: 2021-07-01

## 2021-07-01 NOTE — PROGRESS NOTES
I called Khushbu Winn but received voicemail  Message was left reminding her to have the patient's labs drawn for H/O before her appointment on 7/9  I will continue to follow

## 2021-07-07 ENCOUNTER — TELEPHONE (OUTPATIENT)
Dept: HEMATOLOGY ONCOLOGY | Facility: CLINIC | Age: 76
End: 2021-07-07

## 2021-07-07 NOTE — TELEPHONE ENCOUNTER
I spoke with Ne Ladd in regards to patients lab work that needs to be completed prior to her appointment with Dr Vazquez Mosher on 7/9/21  Informed Ne Ladd if patient can not complete lab work she can give us a call to reschedule at [phone]

## 2021-07-08 ENCOUNTER — APPOINTMENT (OUTPATIENT)
Dept: LAB | Facility: HOSPITAL | Age: 76
End: 2021-07-08
Payer: COMMERCIAL

## 2021-07-08 DIAGNOSIS — D50.9 IRON DEFICIENCY ANEMIA, UNSPECIFIED IRON DEFICIENCY ANEMIA TYPE: ICD-10-CM

## 2021-07-08 DIAGNOSIS — D64.9 NORMOCYTIC ANEMIA: ICD-10-CM

## 2021-07-08 DIAGNOSIS — C83.31 DIFFUSE LARGE B-CELL LYMPHOMA OF LYMPH NODES OF NECK (HCC): ICD-10-CM

## 2021-07-08 LAB
ALBUMIN SERPL BCP-MCNC: 4.4 G/DL (ref 3–5.2)
ALP SERPL-CCNC: 87 U/L (ref 43–122)
ALT SERPL W P-5'-P-CCNC: 24 U/L
ANION GAP SERPL CALCULATED.3IONS-SCNC: 10 MMOL/L (ref 5–14)
AST SERPL W P-5'-P-CCNC: 27 U/L (ref 14–36)
BASOPHILS # BLD AUTO: 0.1 THOUSANDS/ΜL (ref 0–0.1)
BASOPHILS NFR BLD AUTO: 1 % (ref 0–1)
BILIRUB SERPL-MCNC: 0.44 MG/DL
BUN SERPL-MCNC: 16 MG/DL (ref 5–25)
CALCIUM SERPL-MCNC: 9.6 MG/DL (ref 8.4–10.2)
CHLORIDE SERPL-SCNC: 99 MMOL/L (ref 97–108)
CO2 SERPL-SCNC: 27 MMOL/L (ref 22–30)
CREAT SERPL-MCNC: 0.76 MG/DL (ref 0.6–1.2)
CRP SERPL QL: <5 MG/L
EOSINOPHIL # BLD AUTO: 0.2 THOUSAND/ΜL (ref 0–0.4)
EOSINOPHIL NFR BLD AUTO: 3 % (ref 0–6)
ERYTHROCYTE [DISTWIDTH] IN BLOOD BY AUTOMATED COUNT: 13.8 %
ERYTHROCYTE [SEDIMENTATION RATE] IN BLOOD: 22 MM/HOUR (ref 0–29)
FERRITIN SERPL-MCNC: 37 NG/ML (ref 8–388)
GFR SERPL CREATININE-BSD FRML MDRD: 78 ML/MIN/1.73SQ M
GLUCOSE SERPL-MCNC: 165 MG/DL (ref 70–99)
HCT VFR BLD AUTO: 36.1 % (ref 36–46)
HGB BLD-MCNC: 12.4 G/DL (ref 12–16)
IRON SATN MFR SERPL: 23 %
IRON SERPL-MCNC: 64 UG/DL (ref 50–170)
LDH SERPL-CCNC: 362 U/L (ref 313–618)
LYMPHOCYTES # BLD AUTO: 3.1 THOUSANDS/ΜL (ref 0.5–4)
LYMPHOCYTES NFR BLD AUTO: 37 % (ref 25–45)
MCH RBC QN AUTO: 30.9 PG (ref 26–34)
MCHC RBC AUTO-ENTMCNC: 34.3 G/DL (ref 31–36)
MCV RBC AUTO: 90 FL (ref 80–100)
MONOCYTES # BLD AUTO: 0.7 THOUSAND/ΜL (ref 0.2–0.9)
MONOCYTES NFR BLD AUTO: 8 % (ref 1–10)
NEUTROPHILS # BLD AUTO: 4.4 THOUSANDS/ΜL (ref 1.8–7.8)
NEUTS SEG NFR BLD AUTO: 52 % (ref 45–65)
PLATELET # BLD AUTO: 304 THOUSANDS/UL (ref 150–450)
PMV BLD AUTO: 7.9 FL (ref 8.9–12.7)
POTASSIUM SERPL-SCNC: 4.3 MMOL/L (ref 3.6–5)
PROT SERPL-MCNC: 7.6 G/DL (ref 5.9–8.4)
RBC # BLD AUTO: 4 MILLION/UL (ref 4–5.2)
SODIUM SERPL-SCNC: 136 MMOL/L (ref 137–147)
TIBC SERPL-MCNC: 281 UG/DL (ref 250–450)
VIT B12 SERPL-MCNC: 1056 PG/ML (ref 100–900)
WBC # BLD AUTO: 8.4 THOUSAND/UL (ref 4.5–11)

## 2021-07-08 PROCEDURE — 82728 ASSAY OF FERRITIN: CPT

## 2021-07-08 PROCEDURE — 85025 COMPLETE CBC W/AUTO DIFF WBC: CPT

## 2021-07-08 PROCEDURE — 82607 VITAMIN B-12: CPT

## 2021-07-08 PROCEDURE — 83540 ASSAY OF IRON: CPT

## 2021-07-08 PROCEDURE — 80053 COMPREHEN METABOLIC PANEL: CPT

## 2021-07-08 PROCEDURE — 83615 LACTATE (LD) (LDH) ENZYME: CPT

## 2021-07-08 PROCEDURE — 85652 RBC SED RATE AUTOMATED: CPT

## 2021-07-08 PROCEDURE — 86140 C-REACTIVE PROTEIN: CPT

## 2021-07-08 PROCEDURE — 83550 IRON BINDING TEST: CPT

## 2021-07-08 PROCEDURE — 36415 COLL VENOUS BLD VENIPUNCTURE: CPT

## 2021-07-09 ENCOUNTER — OFFICE VISIT (OUTPATIENT)
Dept: HEMATOLOGY ONCOLOGY | Facility: CLINIC | Age: 76
End: 2021-07-09
Payer: COMMERCIAL

## 2021-07-09 VITALS
HEIGHT: 60 IN | BODY MASS INDEX: 28.47 KG/M2 | DIASTOLIC BLOOD PRESSURE: 78 MMHG | TEMPERATURE: 97.2 F | RESPIRATION RATE: 18 BRPM | WEIGHT: 145 LBS | SYSTOLIC BLOOD PRESSURE: 156 MMHG | OXYGEN SATURATION: 96 % | HEART RATE: 74 BPM

## 2021-07-09 DIAGNOSIS — F41.8 ANXIETY ABOUT HEALTH: ICD-10-CM

## 2021-07-09 DIAGNOSIS — C83.31 DIFFUSE LARGE B-CELL LYMPHOMA OF LYMPH NODES OF NECK (HCC): Primary | ICD-10-CM

## 2021-07-09 DIAGNOSIS — R21 RASH: ICD-10-CM

## 2021-07-09 PROCEDURE — 3008F BODY MASS INDEX DOCD: CPT | Performed by: INTERNAL MEDICINE

## 2021-07-09 PROCEDURE — 3008F BODY MASS INDEX DOCD: CPT | Performed by: NURSE PRACTITIONER

## 2021-07-09 PROCEDURE — 1160F RVW MEDS BY RX/DR IN RCRD: CPT | Performed by: INTERNAL MEDICINE

## 2021-07-09 PROCEDURE — 99213 OFFICE O/P EST LOW 20 MIN: CPT | Performed by: INTERNAL MEDICINE

## 2021-07-09 PROCEDURE — 1036F TOBACCO NON-USER: CPT | Performed by: INTERNAL MEDICINE

## 2021-07-09 RX ORDER — ALPRAZOLAM 0.25 MG/1
TABLET ORAL
Qty: 30 TABLET | Refills: 0 | Status: SHIPPED | OUTPATIENT
Start: 2021-07-09 | End: 2021-08-16

## 2021-07-09 NOTE — PROGRESS NOTES
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)    The patient does not seem to have any obvious clinical hint of recurrence of her diffuse large B-cell lymphoma which was diagnosed in November of 2018  the patient will be monitored closely on every 6 months basis  There is no need for imaging unless the patient becomes symptomatic   - CBC and differential; Future  - Comprehensive metabolic panel; Future  - Magnesium; Future  - LD,Blood; Future        HPI:    The patient came today for a follow-up visit accompanied by her daughter-in-law who  Interpreted the whole visit  The patient denies any constitutional symptoms or change of her overall condition  Most recent blood work from 07/08/2021 showed normal CBC with hemoglobin of 12 4  Her creatinine 0 76 with normal calcium liver enzymes  Iron panel showed ferritin of 37 with saturation of 23%  Vitamin B12 is within normal range  Oncology History Overview Note   The patient complained about significant sore throat in May which was treated with antibiotics by her PCP in [facility] which did not improve her sore throat  She then started to notice significant odynophagia and dysphagia for liquids it is and solid nutrition  Eventually, she had a CT scan of the neck on the 20th of September which showed soft tissue lesion in the left palatine tonsil with abnormal lymph nodes bilaterally in the neck compatible with neoplastic process  She then had a biopsy of the left tonsil/left tonsillectomy on the 25th of September 2018 which was compatible with diffuse large B-cell lymphoma germinal center B phenotype  The immunohistochemical staining came back positive for BCL2, BCL 6 and myc with Ki 67 around 70%  No FISH rearrangements gene study was done for BCL2, BCL 6 are myc translocation  The patient was treated with 1 cycle of R-EPOCH since her airway was compromise with the large tonsillar mass and a biopsy which was reviewed by our hematopathologist on the 15 November compatible with 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 according to the [facility] with pending gene rearrangement studies  During the hospital course the patient had another biopsy of the left tonsil to better understand the exact aggressiveness of her large B-cell lymphoma  The biopsy on the 20th of November showed large B-cell lymphoma with BCL -2 and C myc level expressed surface a type without the myc or BCL gene rearrangement  Her bone marrow biopsy on the 9th of November was negative for B-cell lymphoma involvement with normal bone marrow trilineage maturation  She was also evaluated with an MRI of the brain during the hospital course which was negative for any hint of lymphoma involvement  PET scan on the 14th of November showed IMPRESSION:  1   FDG avid left posterior oropharyngeal lesion compatible with malignancy  2   FDG avid lymph nodes in the neck and left axilla compatible with malignancy  3  No FDG avid lymph nodes in the abdomen or pelvis suspicious for malignancy  4   Tiny focus of FDG uptake along the skin of the right upper quadrant anterior abdominal wall with mild skin thickening suggested here on CT    Her post treatment PET-CT 3/18/19 was read:  IMPRESSION:  1  No evidence of hypermetabolic malignancy  Deauville score of 1   2   Mild patchy FDG uptake in the right upper lobe corresponding to patchy  consolidation  This may be infectious or inflammatory  This has partially improved from the 2/25/2019 chest x-ray  Diffuse large 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 ([address] )   11/9/2018 Initial Diagnosis    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)      Chemotherapy    1   Dose adjusted R-EPOCH 11/21/18 (1 cycle)- switched to RCHOP after repeat biopsy/pathology reviewed  2  R-CHOP started 12/12/18 completed 3/7/19 (5 cycles)             Interval history:    ROS: Review of Systems   Constitutional: Negative for chills and fever  HENT: Negative for ear pain and sore throat  Eyes: Negative for pain and visual disturbance  Respiratory: Positive for cough and shortness of breath  Cardiovascular: Negative for chest pain and palpitations  Gastrointestinal: Negative for abdominal pain and vomiting  Genitourinary: Negative for dysuria and hematuria  Musculoskeletal: Negative for arthralgias and back pain  Skin: Negative for color change and rash  Neurological: Positive for dizziness  Negative for seizures and syncope  Psychiatric/Behavioral: Positive for sleep disturbance  All other systems reviewed and are negative  Past Medical History:   Diagnosis Date    Cancer [facility])     Throat    Chronic pain disorder     Diabetes mellitus ([facility] )     Diffuse large B cell lymphoma ([facility] )     Dysphagia     GERD without esophagitis     HTN (hypertension)     Hyperlipidemia     Hypertension     MI (myocardial infarction) ([facility] )     Port-A-Cath in place 07/29/2019    Thyroid cancer ([facility] ) 2018       Past Surgical History:   Procedure Laterality Date    BONE MARROW BIOPSY      BREAST BIOPSY Left     CHOLECYSTECTOMY      IR PORT PLACEMENT  11/16/2018    IR PORT REMOVAL  3/22/2021    OTHER SURGICAL HISTORY      tendor tear repair to right shoulder    SHOULDER ARTHROSCOPY         Social History     Socioeconomic History    Marital status:       Spouse name: None    Number of children: None    Years of education: None    Highest education level: None   Occupational History    None   Tobacco Use    Smoking status: Never Smoker    Smokeless tobacco: Never Used   Vaping Use    Vaping Use: Never used   Substance and Sexual Activity    Alcohol use: Never     Comment: 0    Drug use: No    Sexual activity: Not Currently     Partners: Male   Other Topics Concern    None Social History Narrative    None     Social Determinants of Health     Financial Resource Strain:     Difficulty of Paying Living Expenses:    Food Insecurity:     Worried About Running Out of Food in the Last Year:     [address] in the Last Year:    Transportation Needs:     Lack of Transportation (Medical):      Lack of Transportation (Non-Medical):    Physical Activity:     Days of Exercise per Week:     Minutes of Exercise per Session:    Stress:     Feeling of Stress :    Social Connections:     Frequency of Communication with Friends and Family:     Frequency of Social Gatherings with Friends and Family:     Attends Buddhism Services:     Active Member of Clubs or Organizations:     Attends Club or Organization Meetings:     Marital Status:    Intimate Partner Violence:     Fear of Current or Ex-Partner:     Emotionally Abused:     Physically Abused:     Sexually Abused:        Family History   Problem Relation Age of Onset    Diabetes Mother     Heart disease Sister     Hypertension Brother     Uterine cancer Maternal Grandmother     Prostate cancer Paternal Grandfather        No Known Allergies      Current Outpatient Medications:     ALPRAZolam (XANAX) 0 25 mg tablet, MAR 1 TABLETA POR VIA ORAL CADA NOCHE A LA HORA DE ACOSTARSE ALFREDO SEA NECESARIO PARA ANSIEDAD, Disp: 30 tablet, Rfl: 0    amLODIPine (NORVASC) 5 mg tablet, Take 1 tablet (5 mg total) by mouth daily, Disp: 30 tablet, Rfl: 10    Blood Glucose Monitoring Suppl (OneTouch Verio) w/Device KIT, Use daily, Disp: 1 kit, Rfl: 0    Calcium Carb-Cholecalciferol 600-400 MG-UNIT TABS, Take 1 tablet by mouth 2 (two) times a day, Disp: 60 tablet, Rfl: 2    carvedilol (COREG) 12 5 mg tablet, Take 1 tablet (12 5 mg total) by mouth 2 (two) times a day with meals, Disp: 60 tablet, Rfl: 10    cetirizine (ZyrTEC) 5 MG tablet, Take 1 tablet (5 mg total) by mouth daily, Disp: 90 tablet, Rfl: 1    cyanocobalamin (VITAMIN B-12) 500 MCG tablet, Take 1,000 mcg by mouth daily, Disp: , Rfl:     diclofenac sodium (VOLTAREN) 1 %, Apply 2 g topically 4 (four) times a day, Disp: 100 g, Rfl: 10    diphenhydrAMINE (BENADRYL) 2 % cream, Apply topically 3 (three) times a day as needed for itching, Disp: 30 g, Rfl: 0    ezetimibe (ZETIA) 10 mg tablet, Take 1 tablet (10 mg total) by mouth daily, Disp: 30 tablet, Rfl: 10    ferrous sulfate 325 (65 Fe) mg tablet, Take 325 mg by mouth daily with breakfast, Disp: , Rfl:     fluticasone (FLONASE) 50 mcg/act nasal spray, 1 spray into each nostril daily, Disp: 16 g, Rfl: 10    gabapentin (NEURONTIN) 100 mg capsule, MAR SHAMAR (1) CAPSULA POR VIA ORAL DOS VECES AL JEFFREY (Patient taking differently: Take 100 mg by mouth daily ), Disp: 60 capsule, Rfl: 5    glucose blood (OneTouch Verio) test strip, Use as instructed, Disp: 100 each, Rfl: 3    Insulin Pen Needle (BD Pen Needle Micro U/F) 32G X 6 MM MISC, Use daily, Disp: 100 each, Rfl: 1    Lantus SoloStar 100 units/mL injection pen, INYECTAR 9 UNIDADES DEBAJO DE LA PIEL DIARIAMENTE A LA HORA DE ACOSTARSE (Patient taking differently: Inject 7 Units under the skin daily ), Disp: 15 mL, Rfl: 10    loperamide (IMODIUM) 2 mg capsule, MAR SHAMAR (1) CAPSULA POR VIA ORAL ALFREDO SEA NECESARIO PARA LA DIARREA, Disp: 30 capsule, Rfl: 11    losartan (COZAAR) 25 mg tablet, Take 0 5 tablets (12 5 mg total) by mouth daily, Disp: 30 tablet, Rfl: 2    meloxicam (MOBIC) 7 5 mg tablet, Take 1 tablet (7 5 mg total) by mouth daily Rowan shamar tableta cada jeffrey con el desayuno para la artritis  NO TOME con el ibuprofeno, Disp: 90 tablet, Rfl: 3    metFORMIN (GLUCOPHAGE) 1000 MG tablet, MAR SHAMAR (1) TABLETA POR VIA ORAL DOS VECES AL JEFFREY CON COMIDAS   (Patient taking differently: Take 1,000 mg by mouth 2 (two) times a day with meals ), Disp: 60 tablet, Rfl: 10    mirtazapine (REMERON) 15 mg tablet, MAR SHAMAR (1) TABLETA POR VIA ORAL CADA NOCHE A LA HORA DE ACOSTARSE, Disp: 90 tablet, Rfl: 3    omeprazole (PriLOSEC) 40 MG capsule, MAR JOHNNY (1) CAPSULA POR VIA ORAL DOS VECES AL JEFFREY, Disp: 60 capsule, Rfl: 10    OneTouch Delica Lancets 81H MISC, Use daily, Disp: 100 each, Rfl: 3    Tradjenta 5 MG TABS, MAR JOHNNY (1) TABLETA POR VIA ORAL JOHNNY VEZ AL JEFFREY, Disp: 30 tablet, Rfl: 10    Ventolin  (90 Base) MCG/ACT inhaler, INHALAR 1-2 BOCANADAS A LOS PULMONES CADA 4-6 HORAS ALFREDO SEA NECESARIO, Disp: 18 g, Rfl: 10    benzonatate (TESSALON) 200 MG capsule, Take 1 capsule (200 mg total) by mouth 3 (three) times a day (Patient not taking: Reported on 6/3/2021), Disp: 20 capsule, Rfl: 0    hydrocortisone 2 5 % cream, APLICAR TOPICAMENTE A LAS AREAS AFECTADAS DOS VECES AL JEFFREY, Disp: 30 g, Rfl: 0      Physical Exam:  /78 (BP Location: Left arm, Patient Position: Sitting, Cuff Size: Adult)   Pulse 74   Temp (!) 97 2 °F (36 2 °C) (Tympanic)   Resp 18   Ht 5' (1 524 m)   Wt 65 8 kg (145 lb)   SpO2 96%   BMI 28 32 kg/m²     Physical Exam  Constitutional:       General: She is not in acute distress  Appearance: She is well-developed  She is not diaphoretic  HENT:      Head: Normocephalic and atraumatic  Eyes:      General: No scleral icterus  Right eye: No discharge  Left eye: No discharge  Conjunctiva/sclera: Conjunctivae normal       Pupils: Pupils are equal, round, and reactive to light  Neck:      Thyroid: No thyromegaly  Vascular: No JVD  Trachea: No tracheal deviation  Cardiovascular:      Rate and Rhythm: Normal rate and regular rhythm  Heart sounds: Normal heart sounds  No murmur heard  No friction rub  Pulmonary:      Effort: Pulmonary effort is normal  No respiratory distress  Breath sounds: Normal breath sounds  No stridor  No wheezing or rales  Chest:      Chest wall: No tenderness  Abdominal:      General: There is no distension  Palpations: Abdomen is soft  There is no hepatomegaly or splenomegaly  Tenderness: There is no abdominal tenderness  There is no guarding or rebound  Musculoskeletal:         General: No tenderness or deformity  Normal range of motion  Cervical back: Normal range of motion and neck supple  Lymphadenopathy:      Cervical: No cervical adenopathy  Skin:     General: Skin is warm and dry  Coloration: Skin is not pale  Findings: No erythema or rash  Neurological:      Mental Status: She is alert and oriented to person, place, and time  Cranial Nerves: No cranial nerve deficit  Coordination: Coordination normal       Deep Tendon Reflexes: Reflexes are normal and symmetric  Psychiatric:         Behavior: Behavior normal          Thought Content: Thought content normal          Judgment: Judgment normal            Labs:  Lab Results   Component Value Date    WBC 8 40 07/08/2021    HGB 12 4 07/08/2021    HCT 36 1 07/08/2021    MCV 90 07/08/2021     07/08/2021     Lab Results   Component Value Date    K 4 3 07/08/2021    CL 99 07/08/2021    CO2 27 07/08/2021    BUN 16 07/08/2021    CREATININE 0 76 07/08/2021    GLUF 131 (H) 04/06/2019    CALCIUM 9 6 07/08/2021    AST 27 07/08/2021    ALT 24 07/08/2021    ALKPHOS 87 07/08/2021    EGFR 78 07/08/2021     No results found for: TSH    Patient voiced understanding and agreement in the above discussion  Aware to contact our office with questions/symptoms in the interim

## 2021-07-12 ENCOUNTER — PATIENT OUTREACH (OUTPATIENT)
Dept: FAMILY MEDICINE CLINIC | Facility: CLINIC | Age: 76
End: 2021-07-12

## 2021-07-12 NOTE — PROGRESS NOTES
I called Rekha Benson to follow up on the patient  She states the patient is doing "good"  She notes the patient's blood sugars remain stable with yesterday's fasting of 105 and last night 127  Rekha Benson denies the patient having any skin breakdown  She did state the patient continues with a rash as well as having mosquito bites  She has been treating the patient with cortisone and bendaryl cream with improvement  Rekha Benson denies the patient having any recent falls  Rekha Benson notes the patient will be going to [place] with her for 5 days, leaving this week  The patient needs refills and Rekha Benson will contact the pharmacy  Rekha Benson is aware of the patient's dental appointment on 7/14  She believes the patient has a cavity as the patient complains of pain when eating  Rekha Benson will also be calling to schedule an eye appointment for the patient  I will continue to follow

## 2021-07-14 ENCOUNTER — OFFICE VISIT (OUTPATIENT)
Dept: DENTISTRY | Facility: CLINIC | Age: 76
End: 2021-07-14

## 2021-07-14 VITALS — HEART RATE: 72 BPM | TEMPERATURE: 96.4 F | SYSTOLIC BLOOD PRESSURE: 147 MMHG | DIASTOLIC BLOOD PRESSURE: 78 MMHG

## 2021-07-14 DIAGNOSIS — K03.89: Primary | ICD-10-CM

## 2021-07-14 PROCEDURE — D9911: HCPCS | Performed by: DENTIST

## 2021-07-14 NOTE — PROGRESS NOTES
Janet Dover presents to [facility] with cc:" My tooth hurts up here "    Pt pointed to upper right side of mouth in area of #2 and #3  Reported that it only hurts when drinking cold drinks  It feels like a sharp pain that goes away in a few seconds  Dental pain of 5 when triggered by cold water  MED HX  Pt reported history of cancer in jaw  She said last chemotherapy treatment was about 1 year ago  She said she has seasonal allergies  Pt not exactly sure on names of meds she is taking  She said takes one for low calcium, gastric reflux, and seasonal allergies  Pt denies having any allergies to food or meds  DENT HX  Last periodic exam April  Pt planned for max and annabel interim resin RPD and also restorations  After completing restorations, final impression for max and annabel RPD may need to be re-done if restorations on abutment teeth #7 and 27 require additional contouring  OBJECTIVE   E/O: No palpation of cervico-facial lymph nodes  No popping or clicking of TMJ upon opening or closing  No deviation of the mandible when opening  No lesions or ulcers on buccal mucosal surface or tongue  I/O: #3 has recession of 2 mm on buccal   (-) percussion; (+) palpation; +++air    Radiographic assessment: Most recent radiograph PA of #2 , 3 and BW from June 2021  No PARL on #2 or 3  No radiolucencies present suggestive of primary or secondary decay  ASSESSMENT  Dentin hypersensitivity #3    Tooth #2 and #3 dried and isolated using cotton rolls  Adhesive prime and bond place on buccal of #3  GLUMA applied with microbrush then light cured for 20 seconds  GLUMA applied to interproximal of #2 and 3 using Super Floss and light cured  DONE TODAY  Limited examination of #3  Applied dentin desensitizer      NEXT VISIT  Restorations #7L, #20B, #27 DL    Dr Ajay Paez Erps

## 2021-08-13 ENCOUNTER — PATIENT OUTREACH (OUTPATIENT)
Dept: FAMILY MEDICINE CLINIC | Facility: CLINIC | Age: 76
End: 2021-08-13

## 2021-08-13 DIAGNOSIS — T45.1X5A CHEMOTHERAPY-INDUCED PERIPHERAL NEUROPATHY (HCC): Chronic | ICD-10-CM

## 2021-08-13 DIAGNOSIS — G62.0 CHEMOTHERAPY-INDUCED PERIPHERAL NEUROPATHY (HCC): Chronic | ICD-10-CM

## 2021-08-13 DIAGNOSIS — Z92.21 STATUS POST CHEMOTHERAPY: ICD-10-CM

## 2021-08-13 RX ORDER — GABAPENTIN 100 MG/1
CAPSULE ORAL
Qty: 60 CAPSULE | Refills: 5 | Status: SHIPPED | OUTPATIENT
Start: 2021-08-13 | End: 2022-02-11 | Stop reason: SDUPTHER

## 2021-08-13 NOTE — PROGRESS NOTES
I called Ne Ladd to follow up on the patient  She states the patient is "good"  She reports a blood sugar last night of 180 which Ne Ladd believes is from the patient drinking juice because she felt dizzy  This morning the patient's fasting sugar was 115  Ne Ladd denies any further dizziness or recent falls  She states the patient has been eating and drinking without any concerns  Ne Ladd denies the patient having any skin breakdown and continues to check the patient's feet daily  I asked if Ne Ladd scheduled an appointment to have the patient's eyes examined and she stated she forgot  She notes she will call to schedule  Ne Ladd had no questions or concerns  She stated no medication refills were needed  I will continue to follow

## 2021-08-16 DIAGNOSIS — R21 RASH: ICD-10-CM

## 2021-08-16 DIAGNOSIS — F41.8 ANXIETY ABOUT HEALTH: ICD-10-CM

## 2021-08-16 RX ORDER — ALPRAZOLAM 0.25 MG/1
TABLET ORAL
Qty: 30 TABLET | Refills: 0 | Status: SHIPPED | OUTPATIENT
Start: 2021-08-16 | End: 2021-09-16

## 2021-09-03 ENCOUNTER — OFFICE VISIT (OUTPATIENT)
Dept: GERIATRICS | Age: 76
End: 2021-09-03
Payer: COMMERCIAL

## 2021-09-03 VITALS
WEIGHT: 144 LBS | HEIGHT: 63 IN | HEART RATE: 65 BPM | DIASTOLIC BLOOD PRESSURE: 66 MMHG | SYSTOLIC BLOOD PRESSURE: 126 MMHG | RESPIRATION RATE: 16 BRPM | BODY MASS INDEX: 25.52 KG/M2 | TEMPERATURE: 97.1 F | OXYGEN SATURATION: 96 %

## 2021-09-03 DIAGNOSIS — F32.0 CURRENT MILD EPISODE OF MAJOR DEPRESSIVE DISORDER WITHOUT PRIOR EPISODE (HCC): ICD-10-CM

## 2021-09-03 DIAGNOSIS — Z79.899 POLYPHARMACY: ICD-10-CM

## 2021-09-03 DIAGNOSIS — F03.91 DEMENTIA WITH BEHAVIORAL DISTURBANCE, UNSPECIFIED DEMENTIA TYPE (HCC): ICD-10-CM

## 2021-09-03 DIAGNOSIS — R29.6 FREQUENT FALLS: ICD-10-CM

## 2021-09-03 DIAGNOSIS — G47.09 OTHER INSOMNIA: ICD-10-CM

## 2021-09-03 DIAGNOSIS — F03.90 DEMENTIA WITHOUT BEHAVIORAL DISTURBANCE, UNSPECIFIED DEMENTIA TYPE (HCC): Primary | ICD-10-CM

## 2021-09-03 DIAGNOSIS — C83.31 DIFFUSE LARGE B-CELL LYMPHOMA OF LYMPH NODES OF NECK (HCC): ICD-10-CM

## 2021-09-03 DIAGNOSIS — E11.9 TYPE 2 DIABETES MELLITUS WITHOUT COMPLICATION, WITHOUT LONG-TERM CURRENT USE OF INSULIN (HCC): ICD-10-CM

## 2021-09-03 DIAGNOSIS — I10 ESSENTIAL HYPERTENSION: ICD-10-CM

## 2021-09-03 DIAGNOSIS — R26.81 GAIT INSTABILITY: ICD-10-CM

## 2021-09-03 PROCEDURE — 99205 OFFICE O/P NEW HI 60 MIN: CPT | Performed by: STUDENT IN AN ORGANIZED HEALTH CARE EDUCATION/TRAINING PROGRAM

## 2021-09-03 PROCEDURE — 3008F BODY MASS INDEX DOCD: CPT | Performed by: INTERNAL MEDICINE
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
## 2021-09-06 PROBLEM — F03.918 DEMENTIA WITH BEHAVIORAL DISTURBANCE: Status: ACTIVE | Noted: 2021-09-03

## 2021-09-06 PROBLEM — F32.9 CURRENT EPISODE OF MAJOR DEPRESSIVE DISORDER WITHOUT PRIOR EPISODE: Status: ACTIVE | Noted: 2019-02-06

## 2021-09-06 PROBLEM — R29.6 FREQUENT FALLS: Status: ACTIVE | Noted: 2021-09-06

## 2021-09-06 PROBLEM — R26.81 GAIT INSTABILITY: Status: ACTIVE | Noted: 2021-09-06

## 2021-09-06 PROBLEM — R26.2 AMBULATORY DYSFUNCTION: Status: RESOLVED | Noted: 2021-06-01 | Resolved: 2021-09-06

## 2021-09-06 PROBLEM — Z79.899 POLYPHARMACY: Status: ACTIVE | Noted: 2021-09-06

## 2021-09-06 PROBLEM — F03.91 DEMENTIA WITH BEHAVIORAL DISTURBANCE (HCC): Status: ACTIVE | Noted: 2021-09-03

## 2021-09-06 PROBLEM — R68.89 FORGETFULNESS: Status: RESOLVED | Noted: 2021-06-01 | Resolved: 2021-09-06

## 2021-09-06 NOTE — ASSESSMENT & PLAN NOTE
TUGT 15 sec  Medication side effects may be a contributing factor as well in causing dizziness, ataxia   Will review at care plan conference  Will refer to physical therapy for gait training, balance and strengthening

## 2021-09-06 NOTE — ASSESSMENT & PLAN NOTE
/66   Controlled   Continue carvedilol 12 5 mg b i d     Continue losartan 12 5 mg daily   Continue amlodipine 5 mg daily   Recommend adherence to a diabetic, heart healthy diet   Physical activity as able as an exercise program   Continue routine follow-up with PCP

## 2021-09-06 NOTE — ASSESSMENT & PLAN NOTE
Patient with significant polypharmacy as she is on   1) Meloxicam which may increase the risk of  Nephrotoxicity, GI bleed, MI, stroke, thromboembolism, HTN and CHF in elderly patients  2) Alprazolam which may cause drowsiness, impaired coordination, amnesia, confusion, dizziness and disinhibition in elderly patients with dementia  3) Mirtazapine which may cause dizziness, mood dreams, abnormal thinking, confusion and constipation elderly patients with dementia   4) Patient currently is on cetirizine which may cause increased drowsiness, syncope and dizziness in elderly patients  5)Currently on gabapentin which may cause dizziness, abnormal thinking, amnesia and ataxia in elderly patients  Patient currently following with palliative care and would defer management of chronic pain to this team   Discussed with granddaughter, given benefits vs risks, would focus on quality of life and goals of care discussion to ensure patient is treated for her chronic debilitating pain and be cautious runs a side effect whilst to on her medication regimen  Would recommend weaning alprazolam if tolerated, given patient's worsening memory loss
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
## 2021-09-06 NOTE — ASSESSMENT & PLAN NOTE
Patient currently on gabapentin, alprazolam and mirtazapine however continues to have insomnia  Will discuss further care plan conference as the above medications can certainly cause increased dizziness with an increased risk of fall given the patient's history of frequent falls    Also the above medications can cause confusion and amnesia in elderly patients with dementia

## 2021-09-06 NOTE — ASSESSMENT & PLAN NOTE
Lab Results   Component Value Date    HGBA1C 6 8 (A) 06/14/2021     Stable   Continue metformin 1000 mg b i d     Continue Tradjenta 5 mg daily   Continue Lantus  subQ daily  Recommend adherence to a diabetic, heart healthy diet  Follow-up with endocrinology as scheduled

## 2021-09-06 NOTE — ASSESSMENT & PLAN NOTE
TUGT 15 sec  Recommend physical therapy referral for gait training, balance and strengthening   Would recommend a falls Alert device as a safety precaution   Maintain Falls precautions

## 2021-09-06 NOTE — ASSESSMENT & PLAN NOTE
GDS 5/15  No HI/SI   Patient currently on mirtazapine 15 mg daily   Continue social support by family and friends   Consider referral to Psychiatry for medication management

## 2021-09-08 ENCOUNTER — TELEPHONE (OUTPATIENT)
Dept: GERIATRICS | Age: 76
End: 2021-09-08

## 2021-09-08 NOTE — TELEPHONE ENCOUNTER
While attempting to begin prior authorization for scheduled MRI, and after many phone calls, this office was informed patient had a [place] [facility]  Voicemail left today asking granddaughter to bring the more current version of the card into our office to be scanned correctly for registration

## 2021-09-10 DIAGNOSIS — F41.8 ANXIETY ABOUT HEALTH: ICD-10-CM

## 2021-09-10 RX ORDER — ALPRAZOLAM 0.25 MG/1
TABLET ORAL
Qty: 30 TABLET | Refills: 0 | Status: CANCELLED | OUTPATIENT
Start: 2021-09-10

## 2021-09-10 NOTE — TELEPHONE ENCOUNTER
Patient requesting refill of Alprazolam   Patient has 5 tablets left      Next scheduled appt 10/4/21    Pharmacy- [facility]

## 2021-09-13 NOTE — TELEPHONE ENCOUNTER
Per pdmp and instructions, should have still have enough until about end of next week  Will not fill as this request is too early       Eugene Magallon MD  Palliative Medicine & Supportive Care  Internal Medicine  Available via [facility] Text  Office: [phone]  Fax: [phone]

## 2021-09-15 ENCOUNTER — PATIENT OUTREACH (OUTPATIENT)
Dept: FAMILY MEDICINE CLINIC | Facility: CLINIC | Age: 76
End: 2021-09-15

## 2021-09-15 DIAGNOSIS — I10 ESSENTIAL HYPERTENSION: ICD-10-CM

## 2021-09-15 DIAGNOSIS — M19.90 ARTHRITIS: ICD-10-CM

## 2021-09-15 PROCEDURE — 4010F ACE/ARB THERAPY RXD/TAKEN: CPT | Performed by: STUDENT IN AN ORGANIZED HEALTH CARE EDUCATION/TRAINING PROGRAM

## 2021-09-15 RX ORDER — MELOXICAM 7.5 MG/1
TABLET ORAL
Qty: 30 TABLET | Refills: 2 | Status: SHIPPED | OUTPATIENT
Start: 2021-09-15 | End: 2021-12-09

## 2021-09-15 RX ORDER — LOSARTAN POTASSIUM 25 MG/1
TABLET ORAL
Qty: 15 TABLET | Refills: 2 | Status: SHIPPED | OUTPATIENT
Start: 2021-09-15 | End: 2021-12-09

## 2021-09-15 NOTE — PROGRESS NOTES
I called Ally Elise but received voicemail  Message was left stating I will call her back at another time

## 2021-09-16 DIAGNOSIS — F41.8 ANXIETY ABOUT HEALTH: ICD-10-CM

## 2021-09-16 RX ORDER — ALPRAZOLAM 0.25 MG/1
TABLET ORAL
Qty: 30 TABLET | Refills: 0 | Status: SHIPPED | OUTPATIENT
Start: 2021-09-16 | End: 2021-10-14

## 2021-09-17 ENCOUNTER — PATIENT OUTREACH (OUTPATIENT)
Dept: FAMILY MEDICINE CLINIC | Facility: CLINIC | Age: 76
End: 2021-09-17

## 2021-09-17 NOTE — PROGRESS NOTES
Lazarus Mark returned my call  She states the patient is "perfect"  Lazarus Mark reports the patient's blood sugars are stable with fasting numbers 107-120  She noted for 3 days she did not give the patient insulin before bed because her sugar was on the low side  Lazarus Mark states the patient will intermittently get up during the night feeling dizzy  I asked her to give the patient a snack before bed as her sugar maybe dropping and Lazarus Mark stated the patient refuses snacks before bed  I informed her to continue trying to enforce this to avoid the patient falling  Lazarus Mark states the patient has been eating with no issues having healthier foods such as egg salad and tuna salad  Lazarus Mark denies any skin breakdown on the patient  Lazarus Mark reports the patient has been having regular bowel movements and denies any further falls  Lazarus Mark has been taking the patient for walks around the block twice a day to keep her active as she otherwise sits all day watching TV  I again reminded Lazarus Mark of the patient's MRI appointment on Monday at 9 pm and she plans on taking her  I will continue to follow

## 2021-09-17 NOTE — PROGRESS NOTES
I called Demond Oscar but received voicemail  Message was left reminding her of patient's MRI appointment on Monday at 9 pm     I will continue further outreach

## 2021-09-20 ENCOUNTER — HOSPITAL ENCOUNTER (OUTPATIENT)
Dept: MRI IMAGING | Facility: HOSPITAL | Age: 76
Discharge: HOME | End: 2021-09-20
Attending: STUDENT IN AN ORGANIZED HEALTH CARE EDUCATION/TRAINING PROGRAM
Payer: COMMERCIAL

## 2021-09-20 DIAGNOSIS — F03.90 DEMENTIA WITHOUT BEHAVIORAL DISTURBANCE, UNSPECIFIED DEMENTIA TYPE (HCC): ICD-10-CM

## 2021-09-20 PROCEDURE — 70551 MRI BRAIN STEM W/O DYE: CPT

## 2021-09-20 PROCEDURE — G1004 CDSM NDSC: HCPCS

## 2021-09-28 ENCOUNTER — TELEPHONE (OUTPATIENT)
Dept: GERIATRICS | Age: 76
End: 2021-09-28

## 2021-09-28 NOTE — TELEPHONE ENCOUNTER
Granddaughter, Nneka Paredes contacted, [facility] Care Conference rescheduled to Thursday, 10/14/21 at 11AM

## 2021-09-28 NOTE — TELEPHONE ENCOUNTER
----- Message from Jeevan Alexander LCSW sent at 9/28/2021 12:00 PM EDT -----  Regarding: FW: MRI Question  Update - yes, this appt does need to be changed  Dr Abby Ennis would like the MRI NeuroQuant analysis to be completed so that she can review the results prior to the conference  Their conference will need to be rescheduled to about 7-8 days AFTER the MRI is completed    Thank you!  ----- Message -----  From: Dione Babin MD  Sent: 9/28/2021  11:24 AM EDT  To: Jeevan Alexander LCSW  Subject: RE: MRI Question                                 Yes, it should so we'd prob need to reschedule  ----- Message -----  From: Jeevan Alexander LCSW  Sent: 9/28/2021   9:26 AM EDT  To: Dione Babin MD, Senthil Michelle  Subject: MRI Question                                     Pt scheduled to see you for a conf this Thurs 9/30 at 9:00am  Had MRI 9/20, however, neuroquant was inadequate for volumetric analysis and radiology is offering to bring her back to repeat this  Does this need to be completed and read prior to the conference?

## 2021-09-29 ENCOUNTER — HOSPITAL ENCOUNTER (OUTPATIENT)
Dept: MRI IMAGING | Facility: HOSPITAL | Age: 76
Discharge: HOME | End: 2021-09-29

## 2021-09-29 DIAGNOSIS — F03.90 DEMENTIA WITHOUT BEHAVIORAL DISTURBANCE (HCC): ICD-10-CM

## 2021-10-04 ENCOUNTER — OFFICE VISIT (OUTPATIENT)
Dept: PALLIATIVE MEDICINE | Facility: CLINIC | Age: 76
End: 2021-10-04
Payer: COMMERCIAL

## 2021-10-04 VITALS
WEIGHT: 149 LBS | TEMPERATURE: 96.9 F | HEART RATE: 76 BPM | SYSTOLIC BLOOD PRESSURE: 140 MMHG | OXYGEN SATURATION: 94 % | RESPIRATION RATE: 16 BRPM | BODY MASS INDEX: 26.39 KG/M2 | DIASTOLIC BLOOD PRESSURE: 60 MMHG

## 2021-10-04 DIAGNOSIS — F41.8 ANXIETY ABOUT HEALTH: ICD-10-CM

## 2021-10-04 DIAGNOSIS — Z71.89 COUNSELING REGARDING ADVANCED CARE PLANNING AND GOALS OF CARE: Primary | ICD-10-CM

## 2021-10-04 DIAGNOSIS — C83.31 DIFFUSE LARGE B-CELL LYMPHOMA OF LYMPH NODES OF NECK (HCC): ICD-10-CM

## 2021-10-04 DIAGNOSIS — F03.91 DEMENTIA WITH BEHAVIORAL DISTURBANCE, UNSPECIFIED DEMENTIA TYPE (HCC): ICD-10-CM

## 2021-10-04 PROCEDURE — 3078F DIAST BP <80 MM HG: CPT | Performed by: INTERNAL MEDICINE

## 2021-10-04 PROCEDURE — 3077F SYST BP >= 140 MM HG: CPT | Performed by: INTERNAL MEDICINE

## 2021-10-04 PROCEDURE — 99214 OFFICE O/P EST MOD 30 MIN: CPT | Performed by: INTERNAL MEDICINE

## 2021-10-13 ENCOUNTER — PATIENT OUTREACH (OUTPATIENT)
Dept: FAMILY MEDICINE CLINIC | Facility: CLINIC | Age: 76
End: 2021-10-13

## 2021-10-14 ENCOUNTER — PATIENT OUTREACH (OUTPATIENT)
Dept: FAMILY MEDICINE CLINIC | Facility: CLINIC | Age: 76
End: 2021-10-14

## 2021-10-14 ENCOUNTER — TELEMEDICINE (OUTPATIENT)
Dept: GERIATRICS | Age: 76
End: 2021-10-14
Payer: COMMERCIAL

## 2021-10-14 DIAGNOSIS — K21.9 GASTROESOPHAGEAL REFLUX DISEASE WITHOUT ESOPHAGITIS: ICD-10-CM

## 2021-10-14 DIAGNOSIS — G47.09 OTHER INSOMNIA: ICD-10-CM

## 2021-10-14 DIAGNOSIS — R26.81 GAIT INSTABILITY: ICD-10-CM

## 2021-10-14 DIAGNOSIS — C83.31 DIFFUSE LARGE B-CELL LYMPHOMA OF LYMPH NODES OF NECK (HCC): ICD-10-CM

## 2021-10-14 DIAGNOSIS — E11.9 TYPE 2 DIABETES MELLITUS WITHOUT COMPLICATION, WITHOUT LONG-TERM CURRENT USE OF INSULIN (HCC): ICD-10-CM

## 2021-10-14 DIAGNOSIS — F41.8 ANXIETY ABOUT HEALTH: ICD-10-CM

## 2021-10-14 DIAGNOSIS — F03.91 DEMENTIA WITH BEHAVIORAL DISTURBANCE, UNSPECIFIED DEMENTIA TYPE (HCC): Primary | ICD-10-CM

## 2021-10-14 DIAGNOSIS — Z79.899 POLYPHARMACY: ICD-10-CM

## 2021-10-14 DIAGNOSIS — R29.6 FREQUENT FALLS: ICD-10-CM

## 2021-10-14 DIAGNOSIS — I10 ESSENTIAL HYPERTENSION: ICD-10-CM

## 2021-10-14 DIAGNOSIS — F32.0 CURRENT MILD EPISODE OF MAJOR DEPRESSIVE DISORDER WITHOUT PRIOR EPISODE (HCC): ICD-10-CM

## 2021-10-14 PROCEDURE — 99215 OFFICE O/P EST HI 40 MIN: CPT | Performed by: STUDENT IN AN ORGANIZED HEALTH CARE EDUCATION/TRAINING PROGRAM

## 2021-10-14 RX ORDER — ALPRAZOLAM 0.25 MG/1
TABLET ORAL
Qty: 30 TABLET | Refills: 0 | Status: SHIPPED | OUTPATIENT
Start: 2021-10-14 | End: 2021-11-10

## 2021-10-14 RX ORDER — MEMANTINE HYDROCHLORIDE 5 MG-10 MG
KIT ORAL SEE ADMIN INSTRUCTIONS
Qty: 30 TABLET | Refills: 0 | Status: SHIPPED | OUTPATIENT
Start: 2021-10-14 | End: 2021-12-09 | Stop reason: DRUGHIGH

## 2021-10-15 ENCOUNTER — TELEPHONE (OUTPATIENT)
Dept: GERIATRICS | Age: 76
End: 2021-10-15

## 2021-10-19 ENCOUNTER — PATIENT OUTREACH (OUTPATIENT)
Dept: FAMILY MEDICINE CLINIC | Facility: CLINIC | Age: 76
End: 2021-10-19

## 2021-10-20 ENCOUNTER — OFFICE VISIT (OUTPATIENT)
Dept: ENDOCRINOLOGY | Facility: CLINIC | Age: 76
End: 2021-10-20
Payer: COMMERCIAL

## 2021-10-20 VITALS
DIASTOLIC BLOOD PRESSURE: 68 MMHG | HEART RATE: 65 BPM | SYSTOLIC BLOOD PRESSURE: 160 MMHG | WEIGHT: 143 LBS | BODY MASS INDEX: 25.34 KG/M2 | HEIGHT: 63 IN

## 2021-10-20 DIAGNOSIS — I10 ESSENTIAL HYPERTENSION: ICD-10-CM

## 2021-10-20 DIAGNOSIS — E11.9 TYPE 2 DIABETES MELLITUS WITHOUT COMPLICATION, WITHOUT LONG-TERM CURRENT USE OF INSULIN (HCC): Primary | ICD-10-CM

## 2021-10-20 DIAGNOSIS — E78.5 HYPERLIPIDEMIA, UNSPECIFIED HYPERLIPIDEMIA TYPE: ICD-10-CM

## 2021-10-20 LAB — SL AMB POCT HEMOGLOBIN AIC: 6.9 (ref ?–6.5)

## 2021-10-20 PROCEDURE — 83036 HEMOGLOBIN GLYCOSYLATED A1C: CPT | Performed by: PHYSICIAN ASSISTANT

## 2021-10-20 PROCEDURE — 3044F HG A1C LEVEL LT 7.0%: CPT | Performed by: PHYSICIAN ASSISTANT

## 2021-10-20 PROCEDURE — 3008F BODY MASS INDEX DOCD: CPT | Performed by: PHYSICIAN ASSISTANT

## 2021-10-20 PROCEDURE — 99214 OFFICE O/P EST MOD 30 MIN: CPT | Performed by: PHYSICIAN ASSISTANT

## 2021-10-20 PROCEDURE — 1036F TOBACCO NON-USER: CPT | Performed by: PHYSICIAN ASSISTANT

## 2021-10-20 PROCEDURE — 1160F RVW MEDS BY RX/DR IN RCRD: CPT | Performed by: PHYSICIAN ASSISTANT

## 2021-10-20 PROCEDURE — 4040F PNEUMOC VAC/ADMIN/RCVD: CPT | Performed by: PHYSICIAN ASSISTANT

## 2021-10-20 RX ORDER — BLOOD-GLUCOSE METER
EACH MISCELLANEOUS
Qty: 1 KIT | Refills: 0 | Status: SHIPPED | OUTPATIENT
Start: 2021-10-20 | End: 2021-10-22 | Stop reason: SDUPTHER

## 2021-10-21 DIAGNOSIS — E11.9 TYPE 2 DIABETES MELLITUS WITHOUT COMPLICATION, WITHOUT LONG-TERM CURRENT USE OF INSULIN (HCC): ICD-10-CM

## 2021-10-22 RX ORDER — LANCETS
EACH MISCELLANEOUS
Qty: 102 EACH | Refills: 12 | Status: SHIPPED | OUTPATIENT
Start: 2021-10-22 | End: 2022-03-15 | Stop reason: SDUPTHER

## 2021-11-09 ENCOUNTER — PATIENT OUTREACH (OUTPATIENT)
Dept: FAMILY MEDICINE CLINIC | Facility: CLINIC | Age: 76
End: 2021-11-09

## 2021-11-09 ENCOUNTER — TELEPHONE (OUTPATIENT)
Dept: GERIATRICS | Age: 76
End: 2021-11-09

## 2021-11-10 DIAGNOSIS — R21 RASH: ICD-10-CM

## 2021-11-10 DIAGNOSIS — J06.9 URI (UPPER RESPIRATORY INFECTION): ICD-10-CM

## 2021-11-10 DIAGNOSIS — E11.9 TYPE 2 DIABETES MELLITUS WITHOUT COMPLICATION, WITHOUT LONG-TERM CURRENT USE OF INSULIN (HCC): ICD-10-CM

## 2021-11-10 DIAGNOSIS — I10 ESSENTIAL HYPERTENSION: ICD-10-CM

## 2021-11-10 DIAGNOSIS — F41.8 ANXIETY ABOUT HEALTH: ICD-10-CM

## 2021-11-10 DIAGNOSIS — R19.7 DIARRHEA, UNSPECIFIED TYPE: ICD-10-CM

## 2021-11-10 RX ORDER — ALPRAZOLAM 0.25 MG/1
TABLET ORAL
Qty: 30 TABLET | Refills: 0 | Status: SHIPPED | OUTPATIENT
Start: 2021-11-10 | End: 2021-12-09

## 2021-11-10 RX ORDER — ALBUTEROL SULFATE 90 UG/1
AEROSOL, METERED RESPIRATORY (INHALATION)
Qty: 18 G | Refills: 10 | Status: SHIPPED | OUTPATIENT
Start: 2021-11-10

## 2021-11-10 RX ORDER — LINAGLIPTIN 5 MG/1
TABLET, FILM COATED ORAL
Qty: 30 TABLET | Refills: 10 | Status: SHIPPED | OUTPATIENT
Start: 2021-11-10

## 2021-11-10 RX ORDER — AMLODIPINE BESYLATE 5 MG/1
TABLET ORAL
Qty: 30 TABLET | Refills: 10 | Status: SHIPPED | OUTPATIENT
Start: 2021-11-10 | End: 2021-11-17 | Stop reason: SDUPTHER

## 2021-11-10 RX ORDER — LOPERAMIDE HYDROCHLORIDE 2 MG/1
CAPSULE ORAL
Qty: 30 CAPSULE | Refills: 11 | Status: SHIPPED | OUTPATIENT
Start: 2021-11-10

## 2021-11-12 DIAGNOSIS — M25.561 ACUTE PAIN OF BOTH KNEES: ICD-10-CM

## 2021-11-12 DIAGNOSIS — M25.562 ACUTE PAIN OF BOTH KNEES: ICD-10-CM

## 2021-11-17 ENCOUNTER — OFFICE VISIT (OUTPATIENT)
Dept: FAMILY MEDICINE CLINIC | Facility: CLINIC | Age: 76
End: 2021-11-17

## 2021-11-17 VITALS
HEART RATE: 73 BPM | OXYGEN SATURATION: 99 % | HEIGHT: 63 IN | SYSTOLIC BLOOD PRESSURE: 122 MMHG | DIASTOLIC BLOOD PRESSURE: 78 MMHG | RESPIRATION RATE: 18 BRPM | TEMPERATURE: 96.9 F | WEIGHT: 145 LBS | BODY MASS INDEX: 25.69 KG/M2

## 2021-11-17 DIAGNOSIS — L28.2 PRURITIC RASH: ICD-10-CM

## 2021-11-17 DIAGNOSIS — Z23 ENCOUNTER FOR IMMUNIZATION: ICD-10-CM

## 2021-11-17 DIAGNOSIS — K21.9 GASTROESOPHAGEAL REFLUX DISEASE WITHOUT ESOPHAGITIS: ICD-10-CM

## 2021-11-17 DIAGNOSIS — Z51.5 PALLIATIVE CARE PATIENT: ICD-10-CM

## 2021-11-17 DIAGNOSIS — Z23 NEED FOR VACCINATION: ICD-10-CM

## 2021-11-17 DIAGNOSIS — M85.80 AGE-RELATED BONE LOSS: ICD-10-CM

## 2021-11-17 DIAGNOSIS — C83.31 DIFFUSE LARGE B-CELL LYMPHOMA OF LYMPH NODES OF NECK (HCC): ICD-10-CM

## 2021-11-17 DIAGNOSIS — E11.9 TYPE 2 DIABETES MELLITUS WITHOUT COMPLICATION, WITHOUT LONG-TERM CURRENT USE OF INSULIN (HCC): Primary | ICD-10-CM

## 2021-11-17 DIAGNOSIS — I10 ESSENTIAL HYPERTENSION: ICD-10-CM

## 2021-11-17 LAB — SL AMB POCT GLUCOSE BLD: 221

## 2021-11-17 PROCEDURE — 1036F TOBACCO NON-USER: CPT | Performed by: NURSE PRACTITIONER

## 2021-11-17 PROCEDURE — 1160F RVW MEDS BY RX/DR IN RCRD: CPT | Performed by: NURSE PRACTITIONER

## 2021-11-17 PROCEDURE — 3008F BODY MASS INDEX DOCD: CPT | Performed by: NURSE PRACTITIONER

## 2021-11-17 PROCEDURE — 90732 PPSV23 VACC 2 YRS+ SUBQ/IM: CPT | Performed by: NURSE PRACTITIONER

## 2021-11-17 PROCEDURE — 3078F DIAST BP <80 MM HG: CPT | Performed by: NURSE PRACTITIONER

## 2021-11-17 PROCEDURE — 3008F BODY MASS INDEX DOCD: CPT | Performed by: PHYSICIAN ASSISTANT

## 2021-11-17 PROCEDURE — G0009 ADMIN PNEUMOCOCCAL VACCINE: HCPCS | Performed by: NURSE PRACTITIONER

## 2021-11-17 PROCEDURE — G0008 ADMIN INFLUENZA VIRUS VAC: HCPCS | Performed by: NURSE PRACTITIONER

## 2021-11-17 PROCEDURE — 99214 OFFICE O/P EST MOD 30 MIN: CPT | Performed by: NURSE PRACTITIONER

## 2021-11-17 PROCEDURE — 4040F PNEUMOC VAC/ADMIN/RCVD: CPT | Performed by: NURSE PRACTITIONER

## 2021-11-17 PROCEDURE — 90662 IIV NO PRSV INCREASED AG IM: CPT | Performed by: NURSE PRACTITIONER

## 2021-11-17 PROCEDURE — 82948 REAGENT STRIP/BLOOD GLUCOSE: CPT | Performed by: NURSE PRACTITIONER

## 2021-11-17 PROCEDURE — 3074F SYST BP LT 130 MM HG: CPT | Performed by: NURSE PRACTITIONER

## 2021-11-17 RX ORDER — CALCIUM CARBONATE/VITAMIN D3 600 MG-10
1 TABLET ORAL 2 TIMES DAILY
Qty: 60 TABLET | Refills: 2 | Status: SHIPPED | OUTPATIENT
Start: 2021-11-17

## 2021-11-17 RX ORDER — BENZONATATE 200 MG/1
200 CAPSULE ORAL 3 TIMES DAILY
Qty: 20 CAPSULE | Refills: 0 | Status: SHIPPED | OUTPATIENT
Start: 2021-11-17 | End: 2022-05-10

## 2021-11-17 RX ORDER — AMLODIPINE BESYLATE 5 MG/1
5 TABLET ORAL DAILY
Qty: 30 TABLET | Refills: 10 | Status: SHIPPED | OUTPATIENT
Start: 2021-11-17

## 2021-11-17 RX ORDER — CARVEDILOL 12.5 MG/1
12.5 TABLET ORAL 2 TIMES DAILY WITH MEALS
Qty: 60 TABLET | Refills: 10 | Status: SHIPPED | OUTPATIENT
Start: 2021-11-17

## 2021-12-02 ENCOUNTER — OFFICE VISIT (OUTPATIENT)
Dept: DENTISTRY | Facility: CLINIC | Age: 76
End: 2021-12-02

## 2021-12-02 VITALS — DIASTOLIC BLOOD PRESSURE: 86 MMHG | TEMPERATURE: 96.9 F | SYSTOLIC BLOOD PRESSURE: 152 MMHG | HEART RATE: 97 BPM

## 2021-12-02 DIAGNOSIS — K02.62 DENTIN CARIES: Primary | ICD-10-CM

## 2021-12-02 PROCEDURE — D2391 RESIN-BASED COMPOSITE - 1 SURFACE, POSTERIOR: HCPCS | Performed by: DENTIST

## 2021-12-08 DIAGNOSIS — M19.90 ARTHRITIS: ICD-10-CM

## 2021-12-08 DIAGNOSIS — I10 ESSENTIAL HYPERTENSION: ICD-10-CM

## 2021-12-08 DIAGNOSIS — E11.9 TYPE 2 DIABETES MELLITUS WITHOUT COMPLICATION, WITHOUT LONG-TERM CURRENT USE OF INSULIN (HCC): ICD-10-CM

## 2021-12-09 DIAGNOSIS — F03.91 DEMENTIA WITH BEHAVIORAL DISTURBANCE, UNSPECIFIED DEMENTIA TYPE (HCC): ICD-10-CM

## 2021-12-09 DIAGNOSIS — R21 RASH: ICD-10-CM

## 2021-12-09 PROCEDURE — 4010F ACE/ARB THERAPY RXD/TAKEN: CPT | Performed by: STUDENT IN AN ORGANIZED HEALTH CARE EDUCATION/TRAINING PROGRAM

## 2021-12-09 PROCEDURE — 4010F ACE/ARB THERAPY RXD/TAKEN: CPT | Performed by: NURSE PRACTITIONER

## 2021-12-09 RX ORDER — MEMANTINE HYDROCHLORIDE 5 MG-10 MG
KIT ORAL
Qty: 49 TABLET | Refills: 0 | OUTPATIENT
Start: 2021-12-09

## 2021-12-09 RX ORDER — MELOXICAM 7.5 MG/1
TABLET ORAL
Qty: 30 TABLET | Refills: 2 | Status: SHIPPED | OUTPATIENT
Start: 2021-12-09 | End: 2022-03-11

## 2021-12-09 RX ORDER — LOSARTAN POTASSIUM 25 MG/1
TABLET ORAL
Qty: 15 TABLET | Refills: 2 | Status: SHIPPED | OUTPATIENT
Start: 2021-12-09 | End: 2022-03-10

## 2021-12-09 RX ORDER — EZETIMIBE 10 MG/1
TABLET ORAL
Qty: 30 TABLET | Refills: 10 | Status: SHIPPED | OUTPATIENT
Start: 2021-12-09

## 2021-12-17 ENCOUNTER — PATIENT OUTREACH (OUTPATIENT)
Dept: FAMILY MEDICINE CLINIC | Facility: CLINIC | Age: 76
End: 2021-12-17

## 2021-12-20 ENCOUNTER — TELEMEDICINE (OUTPATIENT)
Dept: GERIATRICS | Age: 76
End: 2021-12-20
Payer: COMMERCIAL

## 2021-12-20 DIAGNOSIS — F03.91 DEMENTIA WITH BEHAVIORAL DISTURBANCE, UNSPECIFIED DEMENTIA TYPE (HCC): ICD-10-CM

## 2021-12-20 DIAGNOSIS — F03.90 DEMENTIA WITHOUT BEHAVIORAL DISTURBANCE, UNSPECIFIED DEMENTIA TYPE (HCC): Primary | ICD-10-CM

## 2021-12-20 PROCEDURE — 1160F RVW MEDS BY RX/DR IN RCRD: CPT | Performed by: STUDENT IN AN ORGANIZED HEALTH CARE EDUCATION/TRAINING PROGRAM

## 2021-12-20 PROCEDURE — 99213 OFFICE O/P EST LOW 20 MIN: CPT | Performed by: STUDENT IN AN ORGANIZED HEALTH CARE EDUCATION/TRAINING PROGRAM

## 2021-12-20 PROCEDURE — 1036F TOBACCO NON-USER: CPT | Performed by: STUDENT IN AN ORGANIZED HEALTH CARE EDUCATION/TRAINING PROGRAM

## 2021-12-20 RX ORDER — MEMANTINE HYDROCHLORIDE 10 MG/1
10 TABLET ORAL 2 TIMES DAILY
Qty: 60 TABLET | Refills: 3 | Status: SHIPPED | OUTPATIENT
Start: 2021-12-20 | End: 2022-05-05

## 2021-12-21 ENCOUNTER — OFFICE VISIT (OUTPATIENT)
Dept: DENTISTRY | Facility: CLINIC | Age: 76
End: 2021-12-21

## 2021-12-21 VITALS — DIASTOLIC BLOOD PRESSURE: 76 MMHG | HEART RATE: 74 BPM | SYSTOLIC BLOOD PRESSURE: 150 MMHG | TEMPERATURE: 97.6 F

## 2021-12-21 DIAGNOSIS — Z01.20 ENCOUNTER FOR DENTAL EXAMINATION: Primary | ICD-10-CM

## 2021-12-21 PROCEDURE — D1330 ORAL HYGIENE INSTRUCTIONS: HCPCS | Performed by: DENTAL HYGIENIST

## 2021-12-21 PROCEDURE — D1110 PROPHYLAXIS - ADULT: HCPCS | Performed by: DENTAL HYGIENIST

## 2021-12-21 PROCEDURE — D0120 PERIODIC ORAL EVALUATION - ESTABLISHED PATIENT: HCPCS | Performed by: DENTAL HYGIENIST

## 2021-12-22 ENCOUNTER — IMMUNIZATIONS (OUTPATIENT)
Dept: FAMILY MEDICINE CLINIC | Facility: HOSPITAL | Age: 76
End: 2021-12-22

## 2021-12-22 DIAGNOSIS — Z23 ENCOUNTER FOR IMMUNIZATION: Primary | ICD-10-CM

## 2021-12-22 PROCEDURE — 91306 COVID-19 MODERNA VACC 0.25 ML BOOSTER: CPT

## 2021-12-22 PROCEDURE — 0064A COVID-19 MODERNA VACC 0.25 ML BOOSTER: CPT

## 2021-12-31 ENCOUNTER — APPOINTMENT (OUTPATIENT)
Dept: LAB | Facility: HOSPITAL | Age: 76
End: 2021-12-31
Attending: INTERNAL MEDICINE
Payer: COMMERCIAL

## 2021-12-31 DIAGNOSIS — E11.9 TYPE 2 DIABETES MELLITUS WITHOUT COMPLICATION, WITHOUT LONG-TERM CURRENT USE OF INSULIN (HCC): ICD-10-CM

## 2021-12-31 DIAGNOSIS — C83.31 DIFFUSE LARGE B-CELL LYMPHOMA OF LYMPH NODES OF NECK (HCC): ICD-10-CM

## 2021-12-31 LAB
ALBUMIN SERPL BCP-MCNC: 4.1 G/DL (ref 3–5.2)
ALP SERPL-CCNC: 97 U/L (ref 43–122)
ALT SERPL W P-5'-P-CCNC: 24 U/L
ANION GAP SERPL CALCULATED.3IONS-SCNC: 4 MMOL/L (ref 5–14)
AST SERPL W P-5'-P-CCNC: 27 U/L (ref 14–36)
BASOPHILS # BLD AUTO: 0.1 THOUSANDS/ΜL (ref 0–0.1)
BASOPHILS NFR BLD AUTO: 1 % (ref 0–1)
BILIRUB SERPL-MCNC: 0.37 MG/DL
BUN SERPL-MCNC: 7 MG/DL (ref 5–25)
CALCIUM SERPL-MCNC: 9.2 MG/DL (ref 8.4–10.2)
CHLORIDE SERPL-SCNC: 98 MMOL/L (ref 97–108)
CHOLEST SERPL-MCNC: 144 MG/DL
CO2 SERPL-SCNC: 30 MMOL/L (ref 22–30)
CREAT SERPL-MCNC: 0.62 MG/DL (ref 0.6–1.2)
CREAT UR-MCNC: 18 MG/DL
EOSINOPHIL # BLD AUTO: 0.2 THOUSAND/ΜL (ref 0–0.4)
EOSINOPHIL NFR BLD AUTO: 3 % (ref 0–6)
ERYTHROCYTE [DISTWIDTH] IN BLOOD BY AUTOMATED COUNT: 14.2 %
GFR SERPL CREATININE-BSD FRML MDRD: 88 ML/MIN/1.73SQ M
GLUCOSE P FAST SERPL-MCNC: 107 MG/DL (ref 70–99)
HCT VFR BLD AUTO: 37.3 % (ref 36–46)
HDLC SERPL-MCNC: 35 MG/DL
HGB BLD-MCNC: 12.1 G/DL (ref 12–16)
LDH SERPL-CCNC: 381 U/L (ref 313–618)
LDLC SERPL CALC-MCNC: 78 MG/DL
LYMPHOCYTES # BLD AUTO: 2.4 THOUSANDS/ΜL (ref 0.5–4)
LYMPHOCYTES NFR BLD AUTO: 34 % (ref 25–45)
MAGNESIUM SERPL-MCNC: 1.4 MG/DL (ref 1.6–2.3)
MCH RBC QN AUTO: 28.8 PG (ref 26–34)
MCHC RBC AUTO-ENTMCNC: 32.5 G/DL (ref 31–36)
MCV RBC AUTO: 89 FL (ref 80–100)
MICROALBUMIN UR-MCNC: 30.2 MG/L (ref 0–20)
MICROALBUMIN/CREAT 24H UR: 168 MG/G CREATININE (ref 0–30)
MONOCYTES # BLD AUTO: 0.7 THOUSAND/ΜL (ref 0.2–0.9)
MONOCYTES NFR BLD AUTO: 10 % (ref 1–10)
NEUTROPHILS # BLD AUTO: 3.7 THOUSANDS/ΜL (ref 1.8–7.8)
NEUTS SEG NFR BLD AUTO: 52 % (ref 45–65)
PLATELET # BLD AUTO: 321 THOUSANDS/UL (ref 150–450)
PMV BLD AUTO: 7.7 FL (ref 8.9–12.7)
POTASSIUM SERPL-SCNC: 4.2 MMOL/L (ref 3.6–5)
PROT SERPL-MCNC: 7.8 G/DL (ref 5.9–8.4)
RBC # BLD AUTO: 4.2 MILLION/UL (ref 4–5.2)
SODIUM SERPL-SCNC: 132 MMOL/L (ref 137–147)
TRIGL SERPL-MCNC: 154 MG/DL
WBC # BLD AUTO: 7.1 THOUSAND/UL (ref 4.5–11)

## 2021-12-31 PROCEDURE — 80053 COMPREHEN METABOLIC PANEL: CPT

## 2021-12-31 PROCEDURE — 36415 COLL VENOUS BLD VENIPUNCTURE: CPT

## 2021-12-31 PROCEDURE — 82043 UR ALBUMIN QUANTITATIVE: CPT

## 2021-12-31 PROCEDURE — 83735 ASSAY OF MAGNESIUM: CPT

## 2021-12-31 PROCEDURE — 80061 LIPID PANEL: CPT

## 2021-12-31 PROCEDURE — 3060F POS MICROALBUMINURIA REV: CPT | Performed by: STUDENT IN AN ORGANIZED HEALTH CARE EDUCATION/TRAINING PROGRAM

## 2021-12-31 PROCEDURE — 82570 ASSAY OF URINE CREATININE: CPT

## 2021-12-31 PROCEDURE — 83615 LACTATE (LD) (LDH) ENZYME: CPT

## 2021-12-31 PROCEDURE — 3060F POS MICROALBUMINURIA REV: CPT | Performed by: NURSE PRACTITIONER

## 2021-12-31 PROCEDURE — 85025 COMPLETE CBC W/AUTO DIFF WBC: CPT

## 2022-01-03 ENCOUNTER — PATIENT OUTREACH (OUTPATIENT)
Dept: FAMILY MEDICINE CLINIC | Facility: CLINIC | Age: 77
End: 2022-01-03

## 2022-01-03 DIAGNOSIS — C83.31 DIFFUSE LARGE B-CELL LYMPHOMA OF LYMPH NODES OF NECK (HCC): ICD-10-CM

## 2022-01-03 DIAGNOSIS — Z51.5 PALLIATIVE CARE PATIENT: ICD-10-CM

## 2022-01-03 DIAGNOSIS — K21.9 GASTROESOPHAGEAL REFLUX DISEASE: ICD-10-CM

## 2022-01-03 RX ORDER — OMEPRAZOLE 40 MG/1
40 CAPSULE, DELAYED RELEASE ORAL DAILY
Qty: 90 CAPSULE | Refills: 1 | Status: SHIPPED | OUTPATIENT
Start: 2022-01-03 | End: 2022-06-24

## 2022-01-03 NOTE — PROGRESS NOTES
I called Leticia Farris to follow up on the patient  She states the patient is doing "pretty good"  She notes the patient was started on Namenda 2 months ago and feels it is helping "a little bit"  Leticia Farris reports the patient's fasting sugars are stable,   I reviewed the patient's diabetic meds and Leticia Farris states the patient is taking them as prescribed  Leticia Farris states the patient coughs at times with eating  Leticia Farris denies the patient having any chest pain or shortness of breath  She notes the patient had 2 Covid vaccines as well as the booster and no one leaves the home except if it is absolutely necessary  Leticia Farris denies the patient having any recent falls  Leticia Farris requests a medication refills which I will submit to the PCP  She had no questions or concerns  Leticia Farris is aware of the patient's upcoming Palliative Care this week and H/O appointment next week  I will continue to follow

## 2022-01-04 RX ORDER — FLUTICASONE PROPIONATE 50 MCG
SPRAY, SUSPENSION (ML) NASAL
Qty: 16 G | Refills: 10 | Status: SHIPPED | OUTPATIENT
Start: 2022-01-04

## 2022-01-05 ENCOUNTER — SOCIAL WORK (OUTPATIENT)
Dept: PALLIATIVE MEDICINE | Facility: CLINIC | Age: 77
End: 2022-01-05
Payer: COMMERCIAL

## 2022-01-05 ENCOUNTER — OFFICE VISIT (OUTPATIENT)
Dept: PALLIATIVE MEDICINE | Facility: CLINIC | Age: 77
End: 2022-01-05
Payer: COMMERCIAL

## 2022-01-05 VITALS
WEIGHT: 145.4 LBS | DIASTOLIC BLOOD PRESSURE: 78 MMHG | HEART RATE: 68 BPM | SYSTOLIC BLOOD PRESSURE: 140 MMHG | BODY MASS INDEX: 25.76 KG/M2 | RESPIRATION RATE: 20 BRPM | TEMPERATURE: 96.4 F

## 2022-01-05 DIAGNOSIS — Z71.89 COUNSELING AND COORDINATION OF CARE: Primary | ICD-10-CM

## 2022-01-05 DIAGNOSIS — Z71.89 COUNSELING REGARDING ADVANCED CARE PLANNING AND GOALS OF CARE: Primary | ICD-10-CM

## 2022-01-05 DIAGNOSIS — F03.91 DEMENTIA WITH BEHAVIORAL DISTURBANCE, UNSPECIFIED DEMENTIA TYPE (HCC): ICD-10-CM

## 2022-01-05 DIAGNOSIS — C83.31 DIFFUSE LARGE B-CELL LYMPHOMA OF LYMPH NODES OF NECK (HCC): ICD-10-CM

## 2022-01-05 PROCEDURE — 3077F SYST BP >= 140 MM HG: CPT | Performed by: INTERNAL MEDICINE

## 2022-01-05 PROCEDURE — NC001 PR NO CHARGE

## 2022-01-05 PROCEDURE — 3078F DIAST BP <80 MM HG: CPT | Performed by: INTERNAL MEDICINE

## 2022-01-05 PROCEDURE — 99214 OFFICE O/P EST MOD 30 MIN: CPT | Performed by: INTERNAL MEDICINE

## 2022-01-05 PROCEDURE — 1160F RVW MEDS BY RX/DR IN RCRD: CPT | Performed by: INTERNAL MEDICINE

## 2022-01-05 PROCEDURE — 99497 ADVNCD CARE PLAN 30 MIN: CPT | Performed by: INTERNAL MEDICINE

## 2022-01-05 NOTE — PROGRESS NOTES
Palliative Supportive Care  met with patient and her granddaughter, Melani Capone, to continue to provide emotional support and guidance  Updated biopsychosocial information relevant to support: Pt reports today with her granddaughter, Melani Capone provided translation  Overall pt is stable  She did see geriatrics and was started on medication  She has good days and bad days regarding her confusion  Dr Maverick Lowry further educated on dementia  Melani Capone continues being her paid caregiver through waiver  We spoke about the possibility of pt attending senior  and Melani Capone was in agreement, as she is pregnant and has other small children  This would provide her with a break, as well as keep her grandmother stimulated  Melani Capone reports that all pt does all day is sit and watch television  We all agreed the  would be beneficial  Melani Capone has also asked her cousin, who lives in [facility], to come and assist her  Pt is looking forward to this visit     Identified areas of need include: Support  Resources provided: None  Areas that need future follow-up include: Support as requested by family, or provider

## 2022-01-05 NOTE — PROGRESS NOTES
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)      · Doing well overall  Appears stable  · Family encouraging patient to attend adult , which we also encouraged  · Sees Geriatrics again for follow up  Sees family medicine closely as well  · Lymphoma per last oncology note is stable, sees her every 6 months  · Provided another copy of the 5 Wishes, they lost the previous one as patient tends to hide things and forgets where she puts them  · No palliative care needs at this time  · RTO in 6 months, to re-visit goals if appropriate  Call sooner if needed    Requested Prescriptions      No prescriptions requested or ordered in this encounter     There are no discontinued medications  Representatives have queried the patient's controlled substance dispensing history in the Prescription Drug Monitoring Program in compliance with regulations before I have prescribed any controlled substances  The prescription history is consistent with prescribed therapy and our practice policies  15 minutes were spent face to face with her granddaughter who is also her caregiver  with greater than 50% of the time spent in counseling or coordination of care including discussions of etiology of diagnosis, risks and benefits of treatment, instructions for disease self management, treatment instructions, follow up requirements and risk factors and risk reduction of disease   All of the patient's questions were answered during this discussion  No follow-ups on file      Subjective:   Chief Complaint  Follow up visit for:  symptom management, assessment of goals of care, disease process education and discussion of prognosis, advance care planning  HPI Victoriano Funes is a 77 y o  female with Hx of Diffuse large B cell lymphoma (diagnosed in 2018), finished treatment in 2019 and is currently on remission  She is seeing [facility] for supportive cares/continued cancer survivorship  She also has Hx of HTN, HLD, DM2, GERD  She was seen on virtual visit on 2021 with colleague/Dr Alejandro Patricia who continued her on xanax prn for anxiety, meloxicam for chronic back pain and remeron  She was last seen in the office on 2021 where the granddaughter/primary caretaker reports progressive and worsening forgetfulness - " ongoing forgetfulness that they had noticed about a year ago and recently (2-3 months ago) had started to get worse/become more frequent  At one point, she told them that she couldn't get a hold of her aunt on the phone  This aunt  over a year ago and she attended the   She also forgot the name of her close relative (granddaughter's mother/her DIL)  She also forgot that there are 2 little girls in the home (her 2 granddaughters), thinking there was only 1  More concerning, however, was her forgetting that she was boiling water in a kettle on a gas stove  She apparently put the kettle on the stove and went to sleep  Granddaughter thankfully found it before anything bad happened  She is also falling more and more  Her granddaughter would help her with her ADLs like bathing, grooming and dressing, for this reason  She also prepares her meals  Patient can still feed herself  Patient lives with her granddaughter and her granddaughters 3 children - ages 10 1and 3year old  Her cousin and her cousin's daughter also live with them  She was last see on 10/4/2021 where she was doing well relatively  Since that visit, she has seen geriatrics where she was diagnosed with dementia and started on memantine  They were given community resources  She returns today for her 3 month follow up, accompanied by her granddaughter   She apparently is doing relatively well  She has good days and bad days, with still seemingly more good days than bad  Her family watches over her closely  They are have yet to get her to adult , but they continue to encourage this with her  Previous, they reported finishing the 5 Wishes, where she named Debra Fitch as her 1st agent, her brother in [place] as 1nd, and her niece in [place] as 2rd  Unfortunately, this was lost before we had the chance to witness it  A new blank copy was provided and they can bring this in the office once done so we can witness and scan it to her chart when finished  The following portions of the medical history were reviewed: past medical history, problem list, medication list, and social history      Current Outpatient Medications:     gabapentin (NEURONTIN) 100 mg capsule, MAR JOHNNY (1) CAPSULA POR VIA ORAL DOS VECES AL JEFFREY, Disp: 60 capsule, Rfl: 5    mirtazapine (REMERON) 15 mg tablet, MAR JOHNNY (1) TABLETA POR VIA ORAL CADA NOCHE A LA HORA DE ACOSTARSE, Disp: 90 tablet, Rfl: 3    Accu-Chek FastClix Lancets MISC, CHECK DAILY, Disp: 102 each, Rfl: 12    albuterol (PROVENTIL HFA,VENTOLIN HFA) 90 mcg/act inhaler, INHALAR 1-2 BOCANADAS A LOS PULMONES CADA 4-6 HORAS ALFREDO SEA NECESARIO, Disp: 18 g, Rfl: 10    ALPRAZolam (XANAX) 0 25 mg tablet, MAR 1 TABLETA POR VIA ORAL CADA NOCHE A LA HORA DE ACOSTARSE ALFREDO SEA NECESARIO PARA ANSIEDAD, Disp: 30 tablet, Rfl: 0    amLODIPine (NORVASC) 5 mg tablet, Take 1 tablet (5 mg total) by mouth daily, Disp: 30 tablet, Rfl: 10    benzonatate (TESSALON) 200 MG capsule, Take 1 capsule (200 mg total) by mouth 3 (three) times a day, Disp: 20 capsule, Rfl: 0    Blood Glucose Monitoring Suppl (OneTouch Verio) w/Device KIT, Use as directed to check blood sugar DX E11 9, Disp: 1 kit, Rfl: 0    Calcium Carb-Cholecalciferol 600-400 MG-UNIT TABS, Take 1 tablet by mouth 2 (two) times a day, Disp: 60 tablet, Rfl: 2    carvedilol (COREG) 12 5 mg tablet, Take 1 tablet (12 5 mg total) by mouth 2 (two) times a day with meals, Disp: 60 tablet, Rfl: 10    cetirizine (ZyrTEC) 5 MG tablet, Take 1 tablet (5 mg total) by mouth daily (Patient taking differently: Take 5 mg by mouth daily as needed ), Disp: 90 tablet, Rfl: 1    cyanocobalamin (VITAMIN B-12) 500 MCG tablet, Take 1,000 mcg by mouth daily, Disp: , Rfl:     Diclofenac Sodium (VOLTAREN) 1 %, APLICAR 2 GRAMOS POR VIA TOPICAL CUATRO VECES AL JEFFREY, Disp: 100 g, Rfl: 10    diphenhydrAMINE (BENADRYL) 2 % cream, Apply topically 3 (three) times a day as needed for itching (Patient not taking: Reported on 10/20/2021), Disp: 30 g, Rfl: 0    ezetimibe (ZETIA) 10 mg tablet, MAR JOHNNY (1) TABLETA POR VIA ORAL JOHNNY VEZ AL JEFFREY, Disp: 30 tablet, Rfl: 10    ferrous sulfate 325 (65 Fe) mg tablet, Take 325 mg by mouth daily with breakfast, Disp: , Rfl:     fluticasone (FLONASE) 50 mcg/act nasal spray, USAR 1 ROCIADA EN CADA FOSA NASAL DIARIAMENTE, Disp: 16 g, Rfl: 10    glucose blood (OneTouch Verio) test strip, Use as instructed, Disp: 100 each, Rfl: 3    hydrocortisone 2 5 % cream, APLICAR POR VIA TOPICA CUATRO VECES AL JEFFREY ALFREDO SEA NECESARIO PARA EL ERUPCION, Disp: 30 g, Rfl: 0    Insulin Pen Needle (BD Pen Needle Micro U/F) 32G X 6 MM MISC, Use daily, Disp: 100 each, Rfl: 1    Lantus SoloStar 100 units/mL injection pen, INYECTAR 9 UNIDADES DEBAJO DE LA PIEL DIARIAMENTE A LA HORA DE ACOSTARSE (Patient taking differently: Inject 7 Units under the skin daily ), Disp: 15 mL, Rfl: 10    loperamide (IMODIUM) 2 mg capsule, MAR JOHNNY (1) CAPSULA POR VIA ORAL ALFREDO SEA NECESARIO PARA LA DIARREA, Disp: 30 capsule, Rfl: 11    losartan (COZAAR) 25 mg tablet, MAR 1/2 TABLETA POR VIA ORAL JOHNNY VEZ AL JEFFREY, Disp: 15 tablet, Rfl: 2    meloxicam (MOBIC) 7 5 mg tablet, MAR JOHNNY (1)TABLETA POR VIA ORAL DIARIA CON EL DESAYUNO PARA LA ARTHRITIS   NO TOME CON IBUPROFEN, Disp: 30 tablet, Rfl: 2    memantine (Namenda) 10 mg tablet, Take 1 tablet (10 mg total) by mouth 2 (two) times a day, Disp: 60 tablet, Rfl: 3    metFORMIN (GLUCOPHAGE) 1000 MG tablet, MAR JOHNNY (1) TABLETA POR VIA ORAL DOS VECES AL JEFFREY CON COMIDAS, Disp: 60 tablet, Rfl: 10    omeprazole (PriLOSEC) 40 MG capsule, Take 1 capsule (40 mg total) by mouth daily, Disp: 90 capsule, Rfl: 1    OneTouch Delica Lancets 58S MISC, Use daily, Disp: 100 each, Rfl: 3    Tradjenta 5 MG TABS, MAR JOHNNY (1) TABLETA POR VIA ORAL JOHNNY VEZ AL JEFFREY, Disp: 30 tablet, Rfl: 10  Review of Systems   Constitutional: Negative for activity change, appetite change, chills, fatigue and fever  HENT: Negative for trouble swallowing  Respiratory: Negative for cough and shortness of breath  Cardiovascular: Negative for chest pain  Gastrointestinal: Negative for abdominal pain, constipation, diarrhea, nausea and vomiting  Musculoskeletal: Negative for back pain  Neurological:        Forgetfulness   Psychiatric/Behavioral: Negative for sleep disturbance  The patient is not nervous/anxious  All other systems reviewed and are negative  All other systems negative    Objective:  Vital Signs  /78 (BP Location: Right arm, Patient Position: Sitting, Cuff Size: Standard)   Pulse 68   Temp (!) 96 4 °F (35 8 °C) (Temporal)   Resp 20   Wt 66 kg (145 lb 6 4 oz)   BMI 25 76 kg/m²    Physical Exam    Constitutional: Appears well-developed and well-nourished  Appears as stated age, appears well-kempt  Did not appear sick/toxic-looking  Pleasant  But kept quiet most of the visit  Sai Rincones asleep at some portion of the visit  In no acute physical or emotional distress  Head: Normocephalic and atraumatic  Eyes: EOM are normal  No ocular discharge  No scleral icterus  Neck: No visible adenopathy or masses  Respiratory: Effort normal  No stridor  No respiratory distress  Gastrointestinal: No abdominal distension  Musculoskeletal: No edema  Neurological: Alert, oriented and appropriately conversant  Skin: No diaphoresis, no rashes seen on exposed areas of skin  Psychiatric: Displays a normal mood and affect   Behavior, judgement and thought content appear normal      Opal Gaines MD  Palliative Medicine & Supportive Care  Internal Medicine  Available via [facility] Text  Office: [phone]  Fax: [phone]

## 2022-01-07 DIAGNOSIS — F41.8 ANXIETY ABOUT HEALTH: ICD-10-CM

## 2022-01-07 DIAGNOSIS — R21 RASH: ICD-10-CM

## 2022-01-10 ENCOUNTER — PATIENT OUTREACH (OUTPATIENT)
Dept: FAMILY MEDICINE CLINIC | Facility: CLINIC | Age: 77
End: 2022-01-10

## 2022-01-10 ENCOUNTER — TELEPHONE (OUTPATIENT)
Dept: SURGICAL ONCOLOGY | Facility: CLINIC | Age: 77
End: 2022-01-10

## 2022-01-10 ENCOUNTER — TELEPHONE (OUTPATIENT)
Dept: HEMATOLOGY ONCOLOGY | Facility: CLINIC | Age: 77
End: 2022-01-10

## 2022-01-10 DIAGNOSIS — Z11.59 SCREENING FOR VIRAL DISEASE: Primary | ICD-10-CM

## 2022-01-10 PROCEDURE — U0003 INFECTIOUS AGENT DETECTION BY NUCLEIC ACID (DNA OR RNA); SEVERE ACUTE RESPIRATORY SYNDROME CORONAVIRUS 2 (SARS-COV-2) (CORONAVIRUS DISEASE [COVID-19]), AMPLIFIED PROBE TECHNIQUE, MAKING USE OF HIGH THROUGHPUT TECHNOLOGIES AS DESCRIBED BY CMS-2020-01-R: HCPCS | Performed by: INTERNAL MEDICINE

## 2022-01-10 PROCEDURE — U0005 INFEC AGEN DETEC AMPLI PROBE: HCPCS | Performed by: INTERNAL MEDICINE

## 2022-01-10 RX ORDER — ALPRAZOLAM 0.25 MG/1
TABLET ORAL
Qty: 30 TABLET | Refills: 0 | Status: SHIPPED | OUTPATIENT
Start: 2022-01-20 | End: 2022-03-11

## 2022-01-10 NOTE — TELEPHONE ENCOUNTER
LVM patient's daughter Debra Floss regarding in  changing today's appt to a Virtual Visit  Left the office number to call back at her earliest convenience

## 2022-01-10 NOTE — TELEPHONE ENCOUNTER
Patient's daughter called as patient was in contact with someone COVID positive  She is wondering if they could R/S appt  To virtual? Appt  Was scheduled for next available which was 3/4/22 but daughter wanted sooner appt  Please call Hector Archuleta at [phone]

## 2022-01-11 NOTE — PROGRESS NOTES
Apparently an order has already been placed and the patient was tested yesterday  I sent you a separate note on Jung Taylor (patient's granddaughter) who is your patient  Thank you

## 2022-01-11 NOTE — PROGRESS NOTES
If patient is not having any symptoms I can place the order howoever she needs to be aware she might be responsible for $99 charge because insurance are not covering screening  I do not know who Steve Melchor is, can you please send separate message for her

## 2022-01-12 LAB — SARS-COV-2 RNA RESP QL NAA+PROBE: POSITIVE

## 2022-02-03 ENCOUNTER — PATIENT OUTREACH (OUTPATIENT)
Dept: FAMILY MEDICINE CLINIC | Facility: CLINIC | Age: 77
End: 2022-02-03

## 2022-02-03 NOTE — PROGRESS NOTES
I called Harry Fishman to follow up on the patient  She states the patient is doing well  She notes the patient was Covid positive but developed no symptoms  She states the patient had both Covid vaccines as well as the booster  Harry Fishman reports the patient's blood sugars have been stable running on the lower side,   She denies any hypoglycemic episodes  Harry Fishman states the patient is drinking sugar free juices  She notes the patient is eating and drinking without any concerns and denies the patient having any issues with bowel or bladder  Harry Fishman denies the patient having any further falls  She states the patient does not need any medication refills at this time  I will continue to follow  Harry Fishman will call with any questions or concerns

## 2022-02-04 DIAGNOSIS — R21 RASH: ICD-10-CM

## 2022-02-11 DIAGNOSIS — T45.1X5A CHEMOTHERAPY-INDUCED PERIPHERAL NEUROPATHY (HCC): Chronic | ICD-10-CM

## 2022-02-11 DIAGNOSIS — Z92.21 STATUS POST CHEMOTHERAPY: ICD-10-CM

## 2022-02-11 DIAGNOSIS — G62.0 CHEMOTHERAPY-INDUCED PERIPHERAL NEUROPATHY (HCC): Chronic | ICD-10-CM

## 2022-02-11 RX ORDER — GABAPENTIN 100 MG/1
CAPSULE ORAL
Qty: 60 CAPSULE | Refills: 5 | Status: CANCELLED | OUTPATIENT
Start: 2022-02-11

## 2022-03-04 ENCOUNTER — OFFICE VISIT (OUTPATIENT)
Dept: HEMATOLOGY ONCOLOGY | Facility: CLINIC | Age: 77
End: 2022-03-04
Payer: COMMERCIAL

## 2022-03-04 VITALS
RESPIRATION RATE: 17 BRPM | HEART RATE: 79 BPM | WEIGHT: 146.4 LBS | HEIGHT: 62 IN | BODY MASS INDEX: 26.94 KG/M2 | DIASTOLIC BLOOD PRESSURE: 82 MMHG | TEMPERATURE: 98.1 F | OXYGEN SATURATION: 98 % | SYSTOLIC BLOOD PRESSURE: 170 MMHG

## 2022-03-04 DIAGNOSIS — Z12.31 SCREENING MAMMOGRAM, ENCOUNTER FOR: ICD-10-CM

## 2022-03-04 DIAGNOSIS — C83.31 DIFFUSE LARGE B-CELL LYMPHOMA OF LYMPH NODES OF NECK (HCC): Primary | ICD-10-CM

## 2022-03-04 DIAGNOSIS — E83.42 HYPOMAGNESEMIA: ICD-10-CM

## 2022-03-04 PROCEDURE — 99214 OFFICE O/P EST MOD 30 MIN: CPT | Performed by: NURSE PRACTITIONER

## 2022-03-04 NOTE — PROGRESS NOTES
Hematology/Oncology Outpatient Follow-up  Trent Sandoval 77 y o  female 1945 [id number]    Date:  3/4/2022      Assessment and Plan:  1  Diffuse large 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 [facility])  Patient completed her chemotherapy for her diffuse large B-cell lymphoma March 2019  No obvious hint of recurrence based off of today's examination  We will continue to monitor her and her laboratory studies according to the NCCN guidelines  She will follow-up again with repeat labs in 6 months  She will continue to follow up with her primary care team on a regular basis for her comorbidities and routine health maintenance activities  - CBC and differential; Future  - Comprehensive metabolic panel; Future  - LD,Blood; Future  - C-reactive protein; Future  - Sedimentation rate, automated; Future    2  Hypomagnesemia  Patient was noted to have a low magnesium on different occasions  Recommended starting oral magnesium supplement on a daily basis  Offered to send script to local pharmacy however granddaughter since she will get over-the-counter  Recheck magnesium before her next office visit  Could also be monitored by her primary care team     - Magnesium; Future    3  Screening mammogram, encounter for  Patient will be due for her annual mammography around the end of May  She would like us to order and schedule the test today  - Mammo screening bilateral w 3d & cad; Future    HPI:  Patient presents today for a follow-up visit accompanied by her granddaughter who kindly assisted with [place] translation as per patient's request   She had to reschedule her appointment that was scheduled in January since she was positive for COVID-19 as it was going around the household  Patient's granddaughter said she was asymptomatic with her COVID and in fact did not even know that she had it  She denies any constitutional symptoms    Is eating well however she does seem to be a picky eater according to her granddaughter  She has been diagnosed with worsening dementia/ Alzheimer's disease and has been following with neurology  Patient does have a chronic dry cough which she was told is due to her allergies/postnasal drip however she declines to use her Flonase and oral allergy medication  Her blood sugars have been well controlled according to the granddaughter  Her most recent laboratory studies from 12/31/2021 showed essentially normal CBC hemoglobin 12 1  Glucose 107, sodium decreased which seems to be a chronic process 132 remaining metabolic panel is appropriate  Her magnesium is low again 1 4  LDH is normal 381  Oncology History Overview Note   The patient complained about significant sore throat in May which was treated with antibiotics by her PCP in [facility] which did not improve her sore throat  She then started to notice significant odynophagia and dysphagia for liquids it is and solid nutrition  Eventually, she had a CT scan of the neck on the 20th of September which showed soft tissue lesion in the left palatine tonsil with abnormal lymph nodes bilaterally in the neck compatible with neoplastic process  She then had a biopsy of the left tonsil/left tonsillectomy on the 25th of September 2018 which was compatible with diffuse large B-cell lymphoma germinal center B phenotype  The immunohistochemical staining came back positive for BCL2, BCL 6 and myc with Ki 67 around 70%  No FISH rearrangements gene study was done for BCL2, BCL 6 are myc translocation  The patient was treated with 1 cycle of R-EPOCH since her airway was compromise with the large tonsillar mass and a biopsy which was reviewed by our hematopathologist on the 15 November compatible with 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 according to the [facility] with pending gene rearrangement studies    During the hospital course the patient had another biopsy of the left tonsil to better understand the exact aggressiveness of her large B-cell lymphoma  The biopsy on the 20th of November showed large B-cell lymphoma with BCL -2 and C myc level expressed surface a type without the myc or BCL gene rearrangement  Her bone marrow biopsy on the 9th of November was negative for B-cell lymphoma involvement with normal bone marrow trilineage maturation  She was also evaluated with an MRI of the brain during the hospital course which was negative for any hint of lymphoma involvement  PET scan on the 14th of November showed IMPRESSION:  1   FDG avid left posterior oropharyngeal lesion compatible with malignancy  2   FDG avid lymph nodes in the neck and left axilla compatible with malignancy  3  No FDG avid lymph nodes in the abdomen or pelvis suspicious for malignancy  4   Tiny focus of FDG uptake along the skin of the right upper quadrant anterior abdominal wall with mild skin thickening suggested here on CT    Her post treatment PET-CT 3/18/19 was read:  IMPRESSION:  1  No evidence of hypermetabolic malignancy  Deauville score of 1   2   Mild patchy FDG uptake in the right upper lobe corresponding to patchy  consolidation  This may be infectious or inflammatory  This has partially improved from the 2/25/2019 chest x-ray  Diffuse large 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 ([address] )   11/9/2018 Initial Diagnosis    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)      Chemotherapy    1  Dose adjusted R-EPOCH 11/21/18 (1 cycle)- switched to RCHOP after repeat biopsy/pathology reviewed  2  R-CHOP started 12/12/18 completed 3/7/19 (5 cycles)             Interval history:    ROS: Review of Systems   Constitutional: Positive for activity change and appetite change  Negative for chills, fatigue, fever and unexpected weight change  HENT: Negative for congestion, mouth sores, nosebleeds, sore throat and trouble swallowing  Eyes: Negative  Respiratory: Positive for cough (dry chronic) and shortness of breath  Negative for chest tightness  Cardiovascular: Negative for chest pain, palpitations and leg swelling  Gastrointestinal: Negative for abdominal distention, abdominal pain, blood in stool, constipation, diarrhea, nausea and vomiting  Genitourinary: Negative for difficulty urinating, dysuria, frequency, hematuria and urgency  Musculoskeletal: Positive for arthralgias (knees and hands)  Negative for back pain, gait problem, joint swelling and myalgias  Skin: Negative for color change, pallor and rash  Neurological: Positive for dizziness and numbness  Negative for weakness, light-headedness and headaches  Hematological: Negative for adenopathy  Does not bruise/bleed easily  Psychiatric/Behavioral: Positive for confusion, dysphoric mood and sleep disturbance  Past Medical History:   Diagnosis Date    Cancer [facility])     Throat    Chronic pain disorder     Diabetes mellitus ([place] [address] )     Diffuse large B cell lymphoma ([place] [address] )     Dysphagia     GERD without esophagitis     HTN (hypertension)     Hyperlipidemia     Hypertension     [facility] )     Port-A-Cath in place 07/29/2019    Thyroid cancer ([place] [address] ) 2018       Past Surgical History:   Procedure Laterality Date    BONE MARROW BIOPSY      BREAST BIOPSY Left     CHOLECYSTECTOMY      IR PORT PLACEMENT  11/16/2018    IR PORT REMOVAL  3/22/2021    OTHER SURGICAL HISTORY      tendor tear repair to right shoulder    SHOULDER ARTHROSCOPY         Social History     Socioeconomic History    Marital status:       Spouse name: None    Number of children: None    Years of education: None    Highest education level: None   Occupational History    None   Tobacco Use    Smoking status: Never Smoker    Smokeless tobacco: Never Used   Vaping Use    Vaping Use: Never used   Substance and Sexual Activity    Alcohol use: Never     Comment: 0    Drug use: No    Sexual activity: Not Currently     Partners: Male   Other Topics Concern    None   Social History Narrative    None     Social Determinants of Health     Financial Resource Strain: Low Risk     Difficulty of Paying Living Expenses: Not very hard   Food Insecurity: No Food Insecurity    Worried About Running Out of Food in the Last Year: Never true    [place] of Food in the Last Year: Never true   Transportation Needs: No Transportation Needs    Lack of Transportation (Medical): No    Lack of Transportation (Non-Medical):  No   Physical Activity: Not on file   Stress: Not on file   Social Connections: Not on file   Intimate Partner Violence: Not on file   Housing Stability: Not on file       Family History   Problem Relation Age of Onset    Diabetes Mother     Heart disease Sister     Hypertension Brother     Uterine cancer Maternal Grandmother     Prostate cancer Paternal Grandfather        No Known Allergies      Current Outpatient Medications:     Accu-Chek FastClix Lancets MISC, CHECK DAILY, Disp: 102 each, Rfl: 12    albuterol (PROVENTIL HFA,VENTOLIN HFA) 90 mcg/act inhaler, INHALAR 1-2 BOCANADAS A LOS PULMONES CADA 4-6 HORAS ALFREDO SEA NECESARIO, Disp: 18 g, Rfl: 10    ALPRAZolam (XANAX) 0 25 mg tablet, MAR 1 TABLETA POR VIA ORAL CADA NOCHE A LA HORA DE ACOSTARSE ALFREDO SEA NECESARIO PARA ANSIEDAD, Disp: 30 tablet, Rfl: 0    amLODIPine (NORVASC) 5 mg tablet, Take 1 tablet (5 mg total) by mouth daily, Disp: 30 tablet, Rfl: 10    benzonatate (TESSALON) 200 MG capsule, Take 1 capsule (200 mg total) by mouth 3 (three) times a day, Disp: 20 capsule, Rfl: 0    Blood Glucose Monitoring Suppl (OneTouch Verio) w/Device KIT, Use as directed to check blood sugar DX E11 9, Disp: 1 kit, Rfl: 0    Calcium Carb-Cholecalciferol 600-400 MG-UNIT TABS, Take 1 tablet by mouth 2 (two) times a day, Disp: 60 tablet, Rfl: 2    carvedilol (COREG) 12 5 mg tablet, Take 1 tablet (12 5 mg total) by mouth 2 (two) times a day with meals, Disp: 60 tablet, Rfl: 10    cetirizine (ZyrTEC) 5 MG tablet, Take 1 tablet (5 mg total) by mouth daily (Patient taking differently: Take 5 mg by mouth daily as needed ), Disp: 90 tablet, Rfl: 1    cyanocobalamin (VITAMIN B-12) 500 MCG tablet, Take 1,000 mcg by mouth daily, Disp: , Rfl:     Diclofenac Sodium (VOLTAREN) 1 %, APLICAR 2 GRAMOS POR VIA TOPICAL CUATRO VECES AL JEFFREY, Disp: 100 g, Rfl: 10    diphenhydrAMINE (BENADRYL) 2 % cream, Apply topically 3 (three) times a day as needed for itching, Disp: 30 g, Rfl: 0    ezetimibe (ZETIA) 10 mg tablet, MAR JOHNNY (1) TABLETA POR VIA ORAL JOHNNY VEZ AL JEFFREY, Disp: 30 tablet, Rfl: 10    ferrous sulfate 325 (65 Fe) mg tablet, Take 325 mg by mouth daily with breakfast, Disp: , Rfl:     fluticasone (FLONASE) 50 mcg/act nasal spray, USAR 1 ROCIADA EN CADA FOSA NASAL DIARIAMENTE, Disp: 16 g, Rfl: 10    gabapentin (NEURONTIN) 100 mg capsule, Take 1 capsule (100 mg total) by mouth 2 (two) times a day, Disp: 60 capsule, Rfl: 5    glucose blood (OneTouch Verio) test strip, Use as instructed, Disp: 100 each, Rfl: 3    hydrocortisone 2 5 % cream, APLICAR POR VIA TOPICA CUATRO VECES AL JEFFREY ALFREDO SEA NECESARIO PARA EL ERUPCION, Disp: 30 g, Rfl: 0    Insulin Pen Needle (BD Pen Needle Micro U/F) 32G X 6 MM MISC, Use daily, Disp: 100 each, Rfl: 1    Lantus SoloStar 100 units/mL injection pen, INYECTAR 9 UNIDADES DEBAJO DE LA PIEL DIARIAMENTE A LA HORA DE ACOSTARSE (Patient taking differently: Inject 7 Units under the skin daily ), Disp: 15 mL, Rfl: 10    loperamide (IMODIUM) 2 mg capsule, MAR JOHNNY (1) CAPSULA POR VIA ORAL ALFREDO SEA NECESARIO PARA LA DIARREA, Disp: 30 capsule, Rfl: 11    losartan (COZAAR) 25 mg tablet, MAR 1/2 TABLETA POR VIA ORAL JOHNNY VEZ AL JEFFREY, Disp: 15 tablet, Rfl: 2    meloxicam (MOBIC) 7 5 mg tablet, MAR JOHNNY (1)TABLETA POR VIA ORAL DIARIA CON EL DESAYUNO PARA LA ARTHRITIS   NO TOME CON IBUPROFEN, Disp: 30 tablet, Rfl: 2    memantine (Namenda) 10 mg tablet, Take 1 tablet (10 mg total) by mouth 2 (two) times a day, Disp: 60 tablet, Rfl: 3    metFORMIN (GLUCOPHAGE) 1000 MG tablet, MAR JOHNNY (1) TABLETA POR VIA ORAL DOS VECES AL JEFFREY CON COMIDAS, Disp: 60 tablet, Rfl: 10    mirtazapine (REMERON) 15 mg tablet, MAR JOHNNY (1) TABLETA POR VIA ORAL CADA NOCHE A LA HORA DE ACOSTARSE, Disp: 90 tablet, Rfl: 3    omeprazole (PriLOSEC) 40 MG capsule, Take 1 capsule (40 mg total) by mouth daily, Disp: 90 capsule, Rfl: 1    OneTouch Delica Lancets 12S MISC, Use daily, Disp: 100 each, Rfl: 3    Tradjenta 5 MG TABS, MAR JOHNNY (1) TABLETA POR VIA ORAL JOHNNY VEZ AL JEFFREY, Disp: 30 tablet, Rfl: 10      Physical Exam:  /82 (BP Location: Left arm, Patient Position: Sitting, Cuff Size: Adult)   Pulse 79   Temp 98 1 °F (36 7 °C) (Probe)   Resp 17   Ht 5' 2" (1 575 m)   Wt 66 4 kg (146 lb 6 4 oz)   SpO2 98%   BMI 26 78 kg/m²     Physical Exam  Vitals reviewed  Constitutional:       General: She is not in acute distress  Appearance: She is well-developed  She is not diaphoretic  HENT:      Head: Normocephalic and atraumatic  Eyes:      General: No scleral icterus  Conjunctiva/sclera: Conjunctivae normal       Pupils: Pupils are equal, round, and reactive to light  Neck:      Thyroid: No thyromegaly  Cardiovascular:      Rate and Rhythm: Normal rate and regular rhythm  Heart sounds: Normal heart sounds  No murmur heard  Pulmonary:      Effort: Pulmonary effort is normal  No respiratory distress  Breath sounds: Normal breath sounds  Chest:   Breasts:      Right: No axillary adenopathy  Left: No axillary adenopathy  Abdominal:      General: There is no distension  Palpations: Abdomen is soft  There is no hepatomegaly or splenomegaly  Tenderness: There is no abdominal tenderness  Musculoskeletal:         General: No swelling  Normal range of motion  Cervical back: Normal range of motion and neck supple  Lymphadenopathy:      Cervical: No cervical adenopathy        Upper Body:      Right upper body: No axillary adenopathy  Left upper body: No axillary adenopathy  Skin:     General: Skin is warm and dry  Findings: No erythema or rash  Neurological:      General: No focal deficit present  Mental Status: She is alert and oriented to person, place, and time  Psychiatric:         Mood and Affect: Mood normal  Affect is blunt  Behavior: Behavior normal  Behavior is cooperative  Cognition and Memory: Cognition is impaired  Labs:  Lab Results   Component Value Date    WBC 7 10 12/31/2021    HGB 12 1 12/31/2021    HCT 37 3 12/31/2021    MCV 89 12/31/2021     12/31/2021     Lab Results   Component Value Date    K 4 2 12/31/2021    CL 98 12/31/2021    CO2 30 12/31/2021    BUN 7 12/31/2021    CREATININE 0 62 12/31/2021    GLUF 107 (H) 12/31/2021    CALCIUM 9 2 12/31/2021    AST 27 12/31/2021    ALT 24 12/31/2021    ALKPHOS 97 12/31/2021    EGFR 88 12/31/2021       Patient voiced understanding and agreement in the above discussion  Aware to contact our office with questions/symptoms in the interim  This note has been generated by voice recognition software system  Therefore, there may be spelling, grammar, and or syntax errors  Please contact if questions arise

## 2022-03-08 DIAGNOSIS — F41.9 ANXIETY: ICD-10-CM

## 2022-03-08 RX ORDER — MIRTAZAPINE 15 MG/1
TABLET, FILM COATED ORAL
Qty: 90 TABLET | Refills: 3 | Status: SHIPPED | OUTPATIENT
Start: 2022-03-08

## 2022-03-10 DIAGNOSIS — R21 RASH: ICD-10-CM

## 2022-03-10 DIAGNOSIS — M19.90 ARTHRITIS: ICD-10-CM

## 2022-03-10 DIAGNOSIS — F41.8 ANXIETY ABOUT HEALTH: ICD-10-CM

## 2022-03-10 PROCEDURE — 4010F ACE/ARB THERAPY RXD/TAKEN: CPT | Performed by: INTERNAL MEDICINE

## 2022-03-10 PROCEDURE — 4010F ACE/ARB THERAPY RXD/TAKEN: CPT | Performed by: NURSE PRACTITIONER

## 2022-03-11 RX ORDER — ALPRAZOLAM 0.25 MG/1
TABLET ORAL
Qty: 30 TABLET | Refills: 0 | Status: SHIPPED | OUTPATIENT
Start: 2022-03-11 | End: 2022-03-17 | Stop reason: SDUPTHER

## 2022-03-11 RX ORDER — MELOXICAM 7.5 MG/1
TABLET ORAL
Qty: 30 TABLET | Refills: 2 | Status: SHIPPED | OUTPATIENT
Start: 2022-03-11 | End: 2022-06-02

## 2022-03-15 ENCOUNTER — OFFICE VISIT (OUTPATIENT)
Dept: ENDOCRINOLOGY | Facility: CLINIC | Age: 77
End: 2022-03-15
Payer: COMMERCIAL

## 2022-03-15 VITALS
WEIGHT: 149 LBS | HEIGHT: 62 IN | DIASTOLIC BLOOD PRESSURE: 80 MMHG | HEART RATE: 70 BPM | BODY MASS INDEX: 27.42 KG/M2 | SYSTOLIC BLOOD PRESSURE: 154 MMHG

## 2022-03-15 DIAGNOSIS — E11.65 UNCONTROLLED TYPE 2 DIABETES MELLITUS WITH HYPERGLYCEMIA (HCC): ICD-10-CM

## 2022-03-15 DIAGNOSIS — E11.9 TYPE 2 DIABETES MELLITUS WITHOUT COMPLICATION, WITHOUT LONG-TERM CURRENT USE OF INSULIN (HCC): ICD-10-CM

## 2022-03-15 DIAGNOSIS — Z79.4 CURRENT USE OF INSULIN (HCC): Primary | ICD-10-CM

## 2022-03-15 DIAGNOSIS — F03.91 DEMENTIA WITH BEHAVIORAL DISTURBANCE, UNSPECIFIED DEMENTIA TYPE (HCC): ICD-10-CM

## 2022-03-15 LAB — SL AMB POCT HEMOGLOBIN AIC: 6.8 (ref ?–6.5)

## 2022-03-15 PROCEDURE — 83036 HEMOGLOBIN GLYCOSYLATED A1C: CPT | Performed by: INTERNAL MEDICINE

## 2022-03-15 PROCEDURE — 3044F HG A1C LEVEL LT 7.0%: CPT | Performed by: INTERNAL MEDICINE

## 2022-03-15 PROCEDURE — 3044F HG A1C LEVEL LT 7.0%: CPT | Performed by: NURSE PRACTITIONER

## 2022-03-15 PROCEDURE — 1036F TOBACCO NON-USER: CPT | Performed by: INTERNAL MEDICINE

## 2022-03-15 PROCEDURE — 99214 OFFICE O/P EST MOD 30 MIN: CPT | Performed by: INTERNAL MEDICINE

## 2022-03-15 PROCEDURE — 3077F SYST BP >= 140 MM HG: CPT | Performed by: INTERNAL MEDICINE

## 2022-03-15 PROCEDURE — 1160F RVW MEDS BY RX/DR IN RCRD: CPT | Performed by: INTERNAL MEDICINE

## 2022-03-15 PROCEDURE — 3079F DIAST BP 80-89 MM HG: CPT | Performed by: INTERNAL MEDICINE

## 2022-03-15 RX ORDER — INSULIN GLARGINE 100 [IU]/ML
INJECTION, SOLUTION SUBCUTANEOUS
Qty: 15 ML | Refills: 5 | Status: SHIPPED | OUTPATIENT
Start: 2022-03-15

## 2022-03-15 RX ORDER — BLOOD SUGAR DIAGNOSTIC
STRIP MISCELLANEOUS
Qty: 100 STRIP | Refills: 3 | Status: SHIPPED | OUTPATIENT
Start: 2022-03-15

## 2022-03-15 RX ORDER — LANCETS
EACH MISCELLANEOUS
Qty: 102 EACH | Refills: 3 | Status: SHIPPED | OUTPATIENT
Start: 2022-03-15

## 2022-03-15 NOTE — PROGRESS NOTES
Established Patient Progress Note      Chief Complaint   Patient presents with    Diabetes Type 2        History of Present Illness:   Aaron Hwang is a 77 y o  female with a history of type 2 diabetes with long term use of insulin since age, insulin was started about 1 5 years ago  She was last in the office in Oct 2021 with Avery Bourgeois PAC  Reports complications of retinopathy and neuropathy  Denies recent illness or hospitalizations  Denies recent severe hypoglycemic or severe hyperglycemic episodes  Denies any issues with her current regimen  home glucose monitoring: are not performed as she does not have meter  She is diagnosed with dementia  History is obtained from family member due to language barrier/dementia  She has made dietary improvements  Her granddaughter watches her own diet due to her dx of gestational diabetes  Of note, her granddaughter is in the last 4 weeks of her current pregnancy    Current regimen:   Tradjenta 5mg daily  Metformin 1000mg twice per day  Lantus 10 units daily   They increased the Lantus as Loyd Berg was complaining about urinary frequency, but she also drinks a significant amount of water      Checks BGs in the morning  Recalled Bgs morning 115-120  Arina in the day 150-160    Last Eye Exam:  Needs to schedule  Last Foot Exam: today    Has hypertension: Taking losartan  Has hyperlipidemia: Taking zetia        Patient Active Problem List   Diagnosis    Type 2 diabetes mellitus without complication, without long-term current use of insulin (HCC)    Essential hypertension    Gastroesophageal reflux disease    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)    Oropharyngeal dysphagia    Hyponatremia    Anxiety about health    Nausea    Tremor    Severe protein-calorie malnutrition ([facility] [address] )    Palliative care patient    Current episode of major depressive disorder without prior episode    Other insomnia    Rash    Status post chemotherapy    Allergic rhinitis    Arthritis    RONAK (obstructive sleep apnea)    Chemotherapy-induced peripheral neuropathy (HCC)    Encounter for central line care    Current use of insulin (HCC)    Near syncope    Palpitations    Grade I diastolic dysfunction without heart failure    Counseling regarding advanced care planning and goals of care    Dementia with behavioral disturbance ([place] [address] )    Gait instability    Polypharmacy    Frequent falls    Hyperlipidemia      Past Medical History:   Diagnosis Date    Cancer ([place] [address] )     Throat    Chronic pain disorder     Diabetes mellitus ([place] [address] )     Diffuse large B cell lymphoma ([place] [address] )     Dysphagia     GERD without esophagitis     HTN (hypertension)     Hyperlipidemia     Hypertension     MI (myocardial infarction) ([place] [address] )     Port-A-Cath in place 07/29/2019    Thyroid cancer ([place] [address] ) 2018      Past Surgical History:   Procedure Laterality Date    BONE MARROW BIOPSY      BREAST BIOPSY Left     CHOLECYSTECTOMY      IR PORT PLACEMENT  11/16/2018    IR PORT REMOVAL  3/22/2021    OTHER SURGICAL HISTORY      tendor tear repair to right shoulder    SHOULDER ARTHROSCOPY        Family History   Problem Relation Age of Onset    Diabetes Mother     Heart disease Sister     Hypertension Brother     Uterine cancer Maternal Grandmother     Prostate cancer Paternal Grandfather      Social History     Tobacco Use    Smoking status: Never Smoker    Smokeless tobacco: Never Used   Substance Use Topics    Alcohol use: Never     Comment: 0     No Known Allergies      Current Outpatient Medications:     albuterol (PROVENTIL HFA,VENTOLIN HFA) 90 mcg/act inhaler, INHALAR 1-2 BOCANADAS A LOS PULMONES CADA 4-6 HORAS ALFREDO SEA NECESARIO, Disp: 18 g, Rfl: 10    ALPRAZolam (XANAX) 0 25 mg tablet, MAR 1 TABLETA POR VIA ORAL CADA NOCHE A LA HORA DE ACOSTARSE ALFREDO SEA NECESARIO PARA ANSIEDAD *START DATE 1/20/22*, Disp: 30 tablet, Rfl: 0    amLODIPine (NORVASC) 5 mg tablet, Take 1 tablet (5 mg total) by mouth daily, Disp: 30 tablet, Rfl: 10    Calcium Carb-Cholecalciferol 600-400 MG-UNIT TABS, Take 1 tablet by mouth 2 (two) times a day, Disp: 60 tablet, Rfl: 2    carvedilol (COREG) 12 5 mg tablet, Take 1 tablet (12 5 mg total) by mouth 2 (two) times a day with meals, Disp: 60 tablet, Rfl: 10    cyanocobalamin (VITAMIN B-12) 500 MCG tablet, Take 1,000 mcg by mouth daily, Disp: , Rfl:     Diclofenac Sodium (VOLTAREN) 1 %, APLICAR 2 GRAMOS POR VIA TOPICAL CUATRO VECES AL JEFFREY, Disp: 100 g, Rfl: 10    diphenhydrAMINE (BENADRYL) 2 % cream, Apply topically 3 (three) times a day as needed for itching, Disp: 30 g, Rfl: 0    ezetimibe (ZETIA) 10 mg tablet, MAR JOHNNY (1) TABLETA POR VIA ORAL JOHNNY VEZ AL JEFFREY, Disp: 30 tablet, Rfl: 10    ferrous sulfate 325 (65 Fe) mg tablet, Take 325 mg by mouth daily with breakfast, Disp: , Rfl:     fluticasone (FLONASE) 50 mcg/act nasal spray, USAR 1 ROCIADA EN CADA FOSA NASAL DIARIAMENTE, Disp: 16 g, Rfl: 10    gabapentin (NEURONTIN) 100 mg capsule, Take 1 capsule (100 mg total) by mouth 2 (two) times a day, Disp: 60 capsule, Rfl: 5    hydrocortisone 2 5 % cream, APLICAR POR VIA TOPICA CUATRO VECES AL JEFFREY ALFREDO SEA NECESARIO PARA EL ERUPCION, Disp: 30 g, Rfl: 0    insulin glargine (Lantus SoloStar) 100 units/mL injection pen, Use 7units subcut once dialy, Disp: 15 mL, Rfl: 5    Insulin Pen Needle (BD Pen Needle Micro U/F) 32G X 6 MM MISC, Use daily, Disp: 100 each, Rfl: 1    loperamide (IMODIUM) 2 mg capsule, MAR JOHNNY (1) CAPSULA POR VIA ORAL ALFREDO SEA NECESARIO PARA LA DIARREA, Disp: 30 capsule, Rfl: 11    losartan (COZAAR) 25 mg tablet, MAR 1/2 TABLETA POR VIA ORAL JOHNNY VEZ AL JEFFREY, Disp: 15 tablet, Rfl: 2    meloxicam (MOBIC) 7 5 mg tablet, MAR JOHNNY (1)TABLETA POR VIA ORAL DIARIA CON EL DESAYUNO PARA LA ARTHRITIS   NO TOME CON IBUPROFEN, Disp: 30 tablet, Rfl: 2    memantine (Namenda) 10 mg tablet, Take 1 tablet (10 mg total) by mouth 2 (two) times a day, Disp: 60 tablet, Rfl: 3    metFORMIN (GLUCOPHAGE) 1000 MG tablet, MAR JOHNNY (1) TABLETA POR VIA ORAL DOS VECES AL JEFFREY CON COMIDAS, Disp: 60 tablet, Rfl: 10    mirtazapine (REMERON) 15 mg tablet, MAR JOHNNY (1) TABLETA POR VIA ORAL CADA NOCHE A LA HORA DE ACOSTARSE, Disp: 90 tablet, Rfl: 3    omeprazole (PriLOSEC) 40 MG capsule, Take 1 capsule (40 mg total) by mouth daily, Disp: 90 capsule, Rfl: 1    Tradjenta 5 MG TABS, MAR JOHNNY (1) TABLETA POR VIA ORAL JOHNNY VEZ AL JEFFREY, Disp: 30 tablet, Rfl: 10    Accu-Chek FastClix Lancets MISC, Use to test 3x daily, Disp: 102 each, Rfl: 3    benzonatate (TESSALON) 200 MG capsule, Take 1 capsule (200 mg total) by mouth 3 (three) times a day (Patient not taking: Reported on 3/15/2022 ), Disp: 20 capsule, Rfl: 0    Blood Glucose Monitoring Suppl (OneTouch Verio) w/Device KIT, Use as directed to check blood sugar DX E11 9 (Patient not taking: Reported on 3/15/2022 ), Disp: 1 kit, Rfl: 0    cetirizine (ZyrTEC) 5 MG tablet, Take 1 tablet (5 mg total) by mouth daily (Patient not taking: Reported on 3/15/2022 ), Disp: 90 tablet, Rfl: 1    glucose blood (Accu-Chek Guide) test strip, Use to test 3x daily, Disp: 100 strip, Rfl: 3    Review of Systems   Constitutional: Negative for unexpected weight change  HENT: Positive for postnasal drip  Negative for trouble swallowing and voice change  Eyes: Positive for visual disturbance  Respiratory: Positive for cough  Cardiovascular: Negative for chest pain  Gastrointestinal: Positive for diarrhea (occasional 1-2x per month)  Negative for constipation  Endocrine: Positive for polyuria  Negative for polydipsia  Neurological: Negative for dizziness and tremors  Psychiatric/Behavioral: Positive for sleep disturbance  All other systems reviewed and are negative  Physical Exam:  Body mass index is 27 25 kg/m²    /80 (BP Location: Left arm, Patient Position: Sitting, Cuff Size: Large) Pulse 70   Ht 5' 2" (1 575 m)   Wt 67 6 kg (149 lb)   BMI 27 25 kg/m²    Wt Readings from Last 3 Encounters:   03/15/22 67 6 kg (149 lb)   03/04/22 66 4 kg (146 lb 6 4 oz)   01/05/22 66 kg (145 lb 6 4 oz)       Physical Exam   Gen: appears well-developed and well-nourished  No apparent distress  Head: Normocephalic and atraumatic  Eyes: no stare or proptosis, no periorbital edema  E/N/M mask in place, hearing grossly intact  Neck: range of motion nl  Pulmonary/Chest: breathing  comfortably, no accessory muscle use, effort normal    Musculoskeletal: moves upper extremities  Neurological: alert and oriented to person, place, and time  No upper ext tremor appreciated  Skin: does not appear diaphoretic, no facial plethora  Psychiatric: normal mood and affect; behavior is normal    Patient's shoes and socks removed  Right Foot/Ankle   Right Foot Inspection  Skin Exam: skin normal and skin intact  No dry skin, no warmth, no callus, no erythema, no maceration, no abnormal color, no pre-ulcer, no ulcer and no callus  Toe Exam: ROM and strength within normal limits  Sensory   Vibration: intact  Monofilament testing: intact    Vascular  Capillary refills: < 3 seconds  The right DP pulse is 1+  Left Foot/Ankle  Left Foot Inspection  Skin Exam: skin normal and skin intact  No dry skin, no warmth, no erythema, no maceration, normal color, no pre-ulcer, no ulcer and no callus  Toe Exam: ROM and strength within normal limits  Sensory   Vibration: intact  Monofilament testing: intact    Vascular  Capillary refills: < 3 seconds  The left DP pulse is 1+       Assign Risk Category  No deformity present  No loss of protective sensation  Weak pulses  Risk: 1      Labs:   Lab Results   Component Value Date    HGBA1C 6 8 (A) 03/15/2022    HGBA1C 6 9 (A) 10/20/2021    HGBA1C 6 8 (A) 06/14/2021     Lab Results   Component Value Date    CREATININE 0 62 12/31/2021    CREATININE 0 76 07/08/2021    CREATININE 0 75 02/22/2021    BUN 7 12/31/2021    K 4 2 12/31/2021    CL 98 12/31/2021    CO2 30 12/31/2021     eGFR   Date Value Ref Range Status   12/31/2021 88 ml/min/1 73sq m Final     Lab Results   Component Value Date    HDL 35 (L) 12/31/2021    TRIG 154 (H) 12/31/2021     Lab Results   Component Value Date    ALT 24 12/31/2021    AST 27 12/31/2021    ALKPHOS 97 12/31/2021     Lab Results   Component Value Date    YML5SPYGZRKN 3 450 02/22/2021    EJZ4WBSZHQOV 1 330 02/11/2019    [id number] 1 550 11/10/2018         Impression & Plan:    Problem List Items Addressed This Visit        Endocrine    Type 2 diabetes mellitus without complication, without long-term current use of insulin (HCC)    Relevant Medications    Accu-Chek FastClix Lancets MISC    glucose blood (Accu-Chek Guide) test strip    insulin glargine (Lantus SoloStar) 100 units/mL injection pen    Other Relevant Orders    POCT hemoglobin A1c (Completed)    Ambulatory referral to Ophthalmology    Basic metabolic panel Lab Collect    CBC and Platelet- Lab Collect    Hemoglobin A1C    Microalbumin / creatinine urine ratio       Nervous and Auditory    Dementia with behavioral disturbance (HCC)       Other    Current use of insulin (HCC) - Primary    Relevant Medications    Accu-Chek FastClix Lancets MISC    glucose blood (Accu-Chek Guide) test strip    insulin glargine (Lantus SoloStar) 100 units/mL injection pen    Other Relevant Orders    Ambulatory referral to Ophthalmology    Basic metabolic panel Lab Collect    CBC and Platelet- Lab Collect    Hemoglobin A1C    Microalbumin / creatinine urine ratio      Other Visit Diagnoses     Uncontrolled type 2 diabetes mellitus with hyperglycemia (HCC)        Relevant Medications    insulin glargine (Lantus SoloStar) 100 units/mL injection pen          Orders Placed This Encounter   Procedures    Basic metabolic panel Lab Collect     This is a patient instruction: Patient fasting for 8 hours or longer recommended  Standing Status:   Future     Standing Expiration Date:   3/15/2023    CBC and Platelet- Lab Collect     Standing Status:   Future     Standing Expiration Date:   3/15/2023    Hemoglobin A1C     Standing Status:   Future     Standing Expiration Date:   3/15/2023    Microalbumin / creatinine urine ratio     Standing Status:   Future     Standing Expiration Date:   3/15/2023    Ambulatory referral to Ophthalmology     Standing Status:   Future     Standing Expiration Date:   3/15/2023     Referral Priority:   Routine     Referral Type:   Consult - AMB     Referral Reason:   Specialty Services Required     Referred to Provider:   Madalyn Velez OD     Requested Specialty:   Optometry     Number of Visits Requested:   1     Expiration Date:   3/15/2023    POCT hemoglobin A1c       Patient Instructions   Please lower the Lantus back to 7units  Continue Tradjenta and metformin  1  T2DM: A1c is lower then goal give her age and co-morbidities  Advised resuming Lantus 7units daily  Order given for eye exam, and encouraged her to re-establish with podiatry    2  Dementia: Goal A1c is 7-7 5%    Discussed with the patient and all questioned fully answered  She will call me if any problems arise  Follow-up appointment in 4 months       Counseled patient on diagnostic results, prognosis, risk and benefit of treatment options, instruction for management, importance of treatment compliance, Risk  factor reduction and impressions    Yohana Bernal MD

## 2022-03-16 ENCOUNTER — PATIENT OUTREACH (OUTPATIENT)
Dept: FAMILY MEDICINE CLINIC | Facility: CLINIC | Age: 77
End: 2022-03-16

## 2022-03-16 NOTE — PROGRESS NOTES
I called Lauryn Etienne but received voicemail  Message was left reminding her of the patient's PCP appointment for tomorrow at 0930  I will continue further outreach

## 2022-03-17 ENCOUNTER — OFFICE VISIT (OUTPATIENT)
Dept: FAMILY MEDICINE CLINIC | Facility: CLINIC | Age: 77
End: 2022-03-17

## 2022-03-17 VITALS
HEIGHT: 62 IN | WEIGHT: 146 LBS | RESPIRATION RATE: 16 BRPM | TEMPERATURE: 97.5 F | HEART RATE: 80 BPM | SYSTOLIC BLOOD PRESSURE: 140 MMHG | OXYGEN SATURATION: 95 % | BODY MASS INDEX: 26.87 KG/M2 | DIASTOLIC BLOOD PRESSURE: 80 MMHG

## 2022-03-17 DIAGNOSIS — I51.89 GRADE I DIASTOLIC DYSFUNCTION: ICD-10-CM

## 2022-03-17 DIAGNOSIS — F32.0 CURRENT MILD EPISODE OF MAJOR DEPRESSIVE DISORDER WITHOUT PRIOR EPISODE (HCC): ICD-10-CM

## 2022-03-17 DIAGNOSIS — G47.33 OSA (OBSTRUCTIVE SLEEP APNEA): ICD-10-CM

## 2022-03-17 DIAGNOSIS — Z78.9 NEED FOR FOLLOW-UP BY SOCIAL WORKER: ICD-10-CM

## 2022-03-17 DIAGNOSIS — Z12.11 COLON CANCER SCREENING: ICD-10-CM

## 2022-03-17 DIAGNOSIS — F41.8 ANXIETY ABOUT HEALTH: ICD-10-CM

## 2022-03-17 DIAGNOSIS — R13.12 OROPHARYNGEAL DYSPHAGIA: ICD-10-CM

## 2022-03-17 DIAGNOSIS — E43 SEVERE PROTEIN-CALORIE MALNUTRITION (HCC): ICD-10-CM

## 2022-03-17 DIAGNOSIS — Z13.820 SCREENING FOR OSTEOPOROSIS: ICD-10-CM

## 2022-03-17 DIAGNOSIS — G62.0 CHEMOTHERAPY-INDUCED PERIPHERAL NEUROPATHY (HCC): ICD-10-CM

## 2022-03-17 DIAGNOSIS — I10 ESSENTIAL HYPERTENSION: Primary | ICD-10-CM

## 2022-03-17 DIAGNOSIS — K21.00 GASTROESOPHAGEAL REFLUX DISEASE WITH ESOPHAGITIS WITHOUT HEMORRHAGE: ICD-10-CM

## 2022-03-17 DIAGNOSIS — T45.1X5A CHEMOTHERAPY-INDUCED PERIPHERAL NEUROPATHY (HCC): ICD-10-CM

## 2022-03-17 DIAGNOSIS — E28.39 ESTROGEN DEFICIENCY: ICD-10-CM

## 2022-03-17 DIAGNOSIS — E11.9 TYPE 2 DIABETES MELLITUS WITHOUT COMPLICATION, WITHOUT LONG-TERM CURRENT USE OF INSULIN (HCC): ICD-10-CM

## 2022-03-17 PROCEDURE — 1125F AMNT PAIN NOTED PAIN PRSNT: CPT | Performed by: NURSE PRACTITIONER

## 2022-03-17 PROCEDURE — 1160F RVW MEDS BY RX/DR IN RCRD: CPT | Performed by: NURSE PRACTITIONER

## 2022-03-17 PROCEDURE — G0439 PPPS, SUBSEQ VISIT: HCPCS | Performed by: NURSE PRACTITIONER

## 2022-03-17 PROCEDURE — 3725F SCREEN DEPRESSION PERFORMED: CPT | Performed by: NURSE PRACTITIONER

## 2022-03-17 PROCEDURE — 3288F FALL RISK ASSESSMENT DOCD: CPT | Performed by: NURSE PRACTITIONER

## 2022-03-17 PROCEDURE — 3077F SYST BP >= 140 MM HG: CPT | Performed by: NURSE PRACTITIONER

## 2022-03-17 PROCEDURE — 3079F DIAST BP 80-89 MM HG: CPT | Performed by: NURSE PRACTITIONER

## 2022-03-17 PROCEDURE — 1170F FXNL STATUS ASSESSED: CPT | Performed by: NURSE PRACTITIONER

## 2022-03-17 PROCEDURE — 1036F TOBACCO NON-USER: CPT | Performed by: NURSE PRACTITIONER

## 2022-03-17 PROCEDURE — 1100F PTFALLS ASSESS-DOCD GE2>/YR: CPT | Performed by: NURSE PRACTITIONER

## 2022-03-17 RX ORDER — ALPRAZOLAM 0.25 MG/1
0.25 TABLET ORAL
Qty: 30 TABLET | Refills: 0 | Status: SHIPPED | OUTPATIENT
Start: 2022-03-17 | End: 2022-06-02

## 2022-03-17 NOTE — PROGRESS NOTES
Assessment and Plan:     Problem List Items Addressed This Visit        Digestive    Gastroesophageal reflux disease    Oropharyngeal dysphagia       Endocrine    Type 2 diabetes mellitus without complication, without long-term current use of insulin ([facility] )    Relevant Orders    Ambulatory Referral to Podiatry       Respiratory    RONAK (obstructive sleep apnea)       Cardiovascular and Mediastinum    Essential hypertension - Primary       Nervous and Auditory    Chemotherapy-induced peripheral neuropathy (HCC) (Chronic)       Other    Anxiety about health    Relevant Medications    ALPRAZolam (XANAX) 0 25 mg tablet    Other Relevant Orders    Ambulatory Referral to Social Work Care Management Program    Severe protein-calorie malnutrition ([facility] )    Current mild episode of major depressive disorder without prior episode (HCC)    Relevant Medications    ALPRAZolam (XANAX) 0 25 mg tablet    Grade I diastolic dysfunction without heart failure     Wt Readings from Last 3 Encounters:   03/17/22 66 2 kg (146 lb)   03/15/22 67 6 kg (149 lb)   03/04/22 66 4 kg (146 lb 6 4 oz)                Other Visit Diagnoses     Colon cancer screening        Relevant Orders    Cologuard    Screening for osteoporosis        Relevant Orders    DXA bone density spine hip and pelvis    Estrogen deficiency        Relevant Orders    DXA bone density spine hip and pelvis    Need for follow-up by         Relevant Orders    Ambulatory Referral to Social Work Care Management Program        BMI Counseling: Body mass index is 26 7 kg/m²  The BMI is above normal  Nutrition recommendations include decreasing portion sizes, encouraging healthy choices of fruits and vegetables, decreasing fast food intake, consuming healthier snacks and limiting drinks that contain sugar  Rationale for BMI follow-up plan is due to patient being overweight or obese  Falls Plan of Care: balance, strength, and gait training instructions were provided  Preventive health issues were discussed with patient, and age appropriate screening tests were ordered as noted in patient's After Visit Summary  Personalized health advice and appropriate referrals for health education or preventive services given if needed, as noted in patient's After Visit Summary  History of Present Illness:     Patient presents for Medicare Annual Wellness visit  She is accompanied by her granddaughter who is her caregiver  [address] daughter reports that patient has been experiencing depression often  Expresses that she wants to go back to [place] and often argues with grand daughter to go  Due to dementia patient hudson snot understand that she can not leave to [place] until her grand daughter can take her  grand daughter would like her to start going to day program for socialization and to decrease depression but patient is refusing  No falls or hypoglycemia       Patient Care Team:  Natacha Espinoza as PCP - General (Family Medicine)  Osmel Singh MD as PCP - [address] [place] [address] (RTE)  Matt Prakash MD as PCP - Endocrinology (Endocrinology)  MD Corin Monge MD Tellis Roosevelt, RN as Lead OP Care Mgr     Problem List:     Patient Active Problem List   Diagnosis    Type 2 diabetes mellitus without complication, without long-term current use of insulin ([address] )    Essential hypertension    Gastroesophageal reflux disease    Diffuse large 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 ([address] )    Oropharyngeal dysphagia    Hyponatremia    Anxiety about health    Nausea    Tremor    Severe protein-calorie malnutrition ([address] )    Palliative care patient    Current mild episode of major depressive disorder without prior episode ([address] )    Other insomnia    Rash    Status post chemotherapy    Allergic rhinitis    Arthritis    RONAK (obstructive sleep apnea)    Chemotherapy-induced peripheral neuropathy ([address] )    Encounter for central line care    Current use of insulin (HCC)    Near syncope    Palpitations    Grade I diastolic dysfunction without heart failure    Counseling regarding advanced care planning and goals of care    Dementia with behavioral disturbance ([place] [address] )    Gait instability    Polypharmacy    Frequent falls    Hyperlipidemia      Past Medical and Surgical History:     Past Medical History:   Diagnosis Date    Cancer ([place] [address] )     Throat    Chronic pain disorder     Diabetes mellitus ([place] [address] )     Diffuse large B cell lymphoma ([place] [address] )     Dysphagia     GERD without esophagitis     HTN (hypertension)     Hyperlipidemia     Hypertension     MI (myocardial infarction) ([place] [address] )     Port-A-Cath in place 07/29/2019    Thyroid cancer ([place] [address] ) 2018     Past Surgical History:   Procedure Laterality Date    BONE MARROW BIOPSY      BREAST BIOPSY Left     CHOLECYSTECTOMY      IR PORT PLACEMENT  11/16/2018    IR PORT REMOVAL  3/22/2021    OTHER SURGICAL HISTORY      tendor tear repair to right shoulder    SHOULDER ARTHROSCOPY        Family History:     Family History   Problem Relation Age of Onset    Diabetes Mother     Heart disease Sister     Hypertension Brother     Uterine cancer Maternal Grandmother     Prostate cancer Paternal Grandfather       Social History:     Social History     Socioeconomic History    Marital status:       Spouse name: None    Number of children: None    Years of education: None    Highest education level: None   Occupational History    None   Tobacco Use    Smoking status: Never Smoker    Smokeless tobacco: Never Used   Vaping Use    Vaping Use: Never used   Substance and Sexual Activity    Alcohol use: Never     Comment: 0    Drug use: No    Sexual activity: Not Currently     Partners: Male   Other Topics Concern    None   Social History Narrative    None     Social Determinants of Health     Financial Resource Strain: Low Risk     Difficulty of Paying Living Expenses: Not very hard   Food Insecurity: No Food Insecurity    Worried About Running Out of Food in the Last Year: Never true    Stan[place]law of Food in the Last Year: Never true   Transportation Needs: No Transportation Needs    Lack of Transportation (Medical): No    Lack of Transportation (Non-Medical):  No   Physical Activity: Not on file   Stress: Not on file   Social Connections: Not on file   Intimate Partner Violence: Not on file   Housing Stability: Not on file      Medications and Allergies:     Current Outpatient Medications   Medication Sig Dispense Refill    Accu-Chek FastClix Lancets MISC Use to test 3x daily 102 each 3    albuterol (PROVENTIL HFA,VENTOLIN HFA) 90 mcg/act inhaler INHALAR 1-2 BOCANADAS A LOS PULMONES CADA 4-6 HORAS ALFREDO SEA NECESARIO 18 g 10    ALPRAZolam (XANAX) 0 25 mg tablet Take 1 tablet (0 25 mg total) by mouth daily at bedtime as needed for anxiety 30 tablet 0    amLODIPine (NORVASC) 5 mg tablet Take 1 tablet (5 mg total) by mouth daily 30 tablet 10    benzonatate (TESSALON) 200 MG capsule Take 1 capsule (200 mg total) by mouth 3 (three) times a day (Patient not taking: Reported on 3/15/2022 ) 20 capsule 0    Blood Glucose Monitoring Suppl (OneTouch Verio) w/Device KIT Use as directed to check blood sugar DX E11 9 (Patient not taking: Reported on 3/15/2022 ) 1 kit 0    Calcium Carb-Cholecalciferol 600-400 MG-UNIT TABS Take 1 tablet by mouth 2 (two) times a day 60 tablet 2    carvedilol (COREG) 12 5 mg tablet Take 1 tablet (12 5 mg total) by mouth 2 (two) times a day with meals 60 tablet 10    cyanocobalamin (VITAMIN B-12) 500 MCG tablet Take 1,000 mcg by mouth daily      Diclofenac Sodium (VOLTAREN) 1 % APLICAR 2 GRAMOS POR VIA TOPICAL CUATRO VECES AL JEFFREY 100 g 10    diphenhydrAMINE (BENADRYL) 2 % cream Apply topically 3 (three) times a day as needed for itching 30 g 0    ezetimibe (ZETIA) 10 mg tablet MAR JOHNNY (1) TABLETA POR VIA ORAL JOHNNY VEZ AL JEFFREY 30 tablet 10    ferrous sulfate 325 (65 Fe) mg tablet Take 325 mg by mouth daily with breakfast      fluticasone (FLONASE) 50 mcg/act nasal spray USAR 1 ROCIADA EN CADA FOSA NASAL DIARIAMENTE 16 g 10    gabapentin (NEURONTIN) 100 mg capsule Take 1 capsule (100 mg total) by mouth 2 (two) times a day 60 capsule 5    glucose blood (Accu-Chek Guide) test strip Use to test 3x daily 100 strip 3    hydrocortisone 2 5 % cream APLICAR POR VIA TOPICA CUATRO VECES AL JEFFREY ALFREDO SEA NECESARIO PARA EL ERUPCION 30 g 0    insulin glargine (Lantus SoloStar) 100 units/mL injection pen Use 7units subcut once dialy 15 mL 5    Insulin Pen Needle (BD Pen Needle Micro U/F) 32G X 6 MM MISC Use daily 100 each 1    loperamide (IMODIUM) 2 mg capsule MAR JOHNNY (1) CAPSULA POR VIA ORAL ALFREDO SEA NECESARIO PARA LA DIARREA 30 capsule 11    losartan (COZAAR) 25 mg tablet MAR 1/2 TABLETA POR VIA ORAL JOHNNY VEZ AL JEFFREY 15 tablet 2    meloxicam (MOBIC) 7 5 mg tablet MAR JOHNNY (1)TABLETA POR VIA ORAL DIARIA CON EL DESAYUNO PARA LA ARTHRITIS  NO TOME CON IBUPROFEN 30 tablet 2    memantine (Namenda) 10 mg tablet Take 1 tablet (10 mg total) by mouth 2 (two) times a day 60 tablet 3    metFORMIN (GLUCOPHAGE) 1000 MG tablet MAR JOHNNY (1) TABLETA POR VIA ORAL DOS VECES AL JEFFREY CON COMIDAS 60 tablet 10    mirtazapine (REMERON) 15 mg tablet MAR JOHNNY (1) TABLETA POR VIA ORAL CADA NOCHE A LA HORA DE ACOSTARSE 90 tablet 3    omeprazole (PriLOSEC) 40 MG capsule Take 1 capsule (40 mg total) by mouth daily 90 capsule 1    Tradjenta 5 MG TABS MAR JOHNNY (1) TABLETA POR VIA ORAL JOHNNY VEZ AL JEFFREY 30 tablet 10     No current facility-administered medications for this visit       No Known Allergies   Immunizations:     Immunization History   Administered Date(s) Administered    COVID-19 MODERNA VACC 0 25 ML IM BOOSTER 12/22/2021    COVID-19 MODERNA VACC 0 5 ML IM 01/31/2021, 02/28/2021    Influenza, high dose seasonal 0 7 mL 10/08/2019, 10/13/2020, 11/17/2021    Influenza, seasonal, injectable 12/15/2015    Pneumococcal Conjugate 13-Valent 06/14/2021    Pneumococcal Polysaccharide PPV23 11/17/2021      Health Maintenance:         Topic Date Due    Colorectal Cancer Screening  Never done    Hepatitis C Screening  Completed         Topic Date Due    DTaP,Tdap,and Td Vaccines (1 - Tdap) Never done    COVID-19 Vaccine (3 - Moderna risk 4-dose series) 01/19/2022      Medicare Health Risk Assessment:     /80 (BP Location: Left arm, Patient Position: Sitting, Cuff Size: Standard)   Pulse 80   Temp 97 5 °F (36 4 °C) (Temporal)   Resp 16   Ht 5' 2" (1 575 m)   Wt 66 2 kg (146 lb)   SpO2 95%   Breastfeeding No   BMI 26 70 kg/m²      Tonja Fuentes is here for her Subsequent Wellness visit  Health Risk Assessment:   Patient rates overall health as good  Patient feels that their physical health rating is same  Patient is satisfied with their life  Eyesight was rated as slightly worse  Hearing was rated as same  Patient feels that their emotional and mental health rating is same  Patients states they are sometimes angry  Patient states they are sometimes unusually tired/fatigued  Pain experienced in the last 7 days has been none  Patient states that she has experienced no weight loss or gain in last 6 months  Depression Screening:   PHQ-9 Score: 8      Fall Risk Screening: In the past year, patient has experienced: history of falling in past year    Number of falls: 2 or more  Injured during fall?: No    Feels unsteady when standing or walking?: Yes    Worried about falling?: Yes      Urinary Incontinence Screening:   Patient has not leaked urine accidently in the last six months  Home Safety:  Patient does not have trouble with stairs inside or outside of their home  Patient has working smoke alarms and has working carbon monoxide detector  Home safety hazards include: none  Nutrition:   Current diet is Regular  Medications:   Patient is currently taking over-the-counter supplements   OTC medications include: see medication list  Patient is not able to manage medications  Activities of Daily Living (ADLs)/Instrumental Activities of Daily Living (IADLs):   Walk and transfer into and out of bed and chair?: Yes  Dress and groom yourself?: No    Bathe or shower yourself?: No    Feed yourself? Yes  Do your laundry/housekeeping?: No  Manage your money, pay your bills and track your expenses?: No  Make your own meals?: No    Do your own shopping?: No    Previous Hospitalizations:   Any hospitalizations or ED visits within the last 12 months?: No      Advance Care Planning:   Living will: No    Durable POA for healthcare: No    Advanced directive: No      PREVENTIVE SCREENINGS      Cardiovascular Screening:    General: Screening Not Indicated and History Lipid Disorder      Diabetes Screening:     General: Screening Not Indicated and History Diabetes      Breast Cancer Screening:     General: Screening Current      Cervical Cancer Screening:    General: Screening Not Indicated      Osteoporosis Screening:    General: Risks and Benefits Discussed    Due for: DXA Axial      Abdominal Aortic Aneurysm (AAA) Screening:        General: Screening Not Indicated      Lung Cancer Screening:     General: Screening Not Indicated      Hepatitis C Screening:    General: Screening Current    Screening, Brief Intervention, and Referral to Treatment (SBIRT)    Screening  Typical number of drinks in a day: 0  Typical number of drinks in a week: 0  Interpretation: Low risk drinking behavior      Single Item Drug Screening:  How often have you used an illegal drug (including marijuana) or a prescription medication for non-medical reasons in the past year? never    Single Item Drug Screen Score: 0  Interpretation: Negative screen for possible drug use disorder    Review of Current Opioid Use    Opioid Risk Tool (ORT) Interpretation: Complete Opioid Risk Tool (ORT)    Other Counseling Topics:   Calcium and vitamin D intake and regular weightbearing exercise  PHQ-2/9 Depression Screening    Little interest or pleasure in doing things: 0 - not at all  Feeling down, depressed, or hopeless: 0 - not at all  Trouble falling or staying asleep, or sleeping too much: 3 - nearly every day  Feeling tired or having little energy: 0 - not at all  Poor appetite or overeatin - not at all  Feeling bad about yourself - or that you are a failure or have let yourself or your family down: 0 - not at all  Trouble concentrating on things, such as reading the newspaper or watching television: 0 - not at all  Moving or speaking so slowly that other people could have noticed   Or the opposite - being so fidgety or restless that you have been moving around a lot more than usual: 2 - more than half the days  Thoughts that you would be better off dead, or of hurting yourself in some way: 3 - nearly every day  PHQ-9 Score: 8   PHQ-9 Interpretation: Mild depression        Immunization History   Administered Date(s) Administered    COVID-19 MODERNA VACC 0 25 ML IM BOOSTER 2021    COVID-19 MODERNA VACC 0 5 ML IM 2021, 2021    Influenza, high dose seasonal 0 7 mL 10/08/2019, 10/13/2020, 2021    Influenza, seasonal, injectable 12/15/2015    Pneumococcal Conjugate 13-Valent 2021    Pneumococcal Polysaccharide PPV23 2021       MOIRA Carbajal

## 2022-03-17 NOTE — ASSESSMENT & PLAN NOTE
Wt Readings from Last 3 Encounters:   03/17/22 66 2 kg (146 lb)   03/15/22 67 6 kg (149 lb)   03/04/22 66 4 kg (146 lb 6 4 oz)

## 2022-03-17 NOTE — PATIENT INSTRUCTIONS
Medicare Preventive Visit Patient Instructions  Thank you for completing your Welcome to Medicare Visit or Medicare Annual Wellness Visit today  Your next wellness visit will be due in one year (3/18/2023)  The screening/preventive services that you may require over the next 5-10 years are detailed below  Some tests may not apply to you based off risk factors and/or age  Screening tests ordered at today's visit but not completed yet may show as past due  Also, please note that scanned in results may not display below  Preventive Screenings:  Service Recommendations Previous Testing/Comments   Colorectal Cancer Screening  * Colonoscopy    * Fecal Occult Blood Test (FOBT)/Fecal Immunochemical Test (FIT)  * Fecal DNA/Cologuard Test  * Flexible Sigmoidoscopy Age: 54-65 years old   Colonoscopy: every 10 years (may be performed more frequently if at higher risk)  OR  FOBT/FIT: every 1 year  OR  Cologuard: every 3 years  OR  Sigmoidoscopy: every 5 years  Screening may be recommended earlier than age 48 if at higher risk for colorectal cancer  Also, an individualized decision between you and your healthcare provider will decide whether screening between the ages of 74-80 would be appropriate  Colonoscopy: Not on file  FOBT/FIT: 01/25/2021  Cologuard: Not on file  Sigmoidoscopy: Not on file          Breast Cancer Screening Age: 36 years old  Frequency: every 1-2 years  Not required if history of left and right mastectomy Mammogram: 05/24/2021    Screening Current   Cervical Cancer Screening Between the ages of 21-29, pap smear recommended once every 3 years  Between the ages of 33-67, can perform pap smear with HPV co-testing every 5 years     Recommendations may differ for women with a history of total hysterectomy, cervical cancer, or abnormal pap smears in past  Pap Smear: Not on file    Screening Not Indicated   Hepatitis C Screening Once for adults born between 1945 and 1965  More frequently in patients at high risk for Hepatitis C Hep C Antibody: Not on file    Screening Current   Diabetes Screening 1-2 times per year if you're at risk for diabetes or have pre-diabetes Fasting glucose: 107 mg/dL   A1C: 6 8    Screening Not Indicated  History Diabetes   Cholesterol Screening Once every 5 years if you don't have a lipid disorder  May order more often based on risk factors  Lipid panel: 12/31/2021    Screening Not Indicated  History Lipid Disorder     Other Preventive Screenings Covered by Medicare:  1  Abdominal Aortic Aneurysm (AAA) Screening: covered once if your at risk  You're considered to be at risk if you have a family history of AAA  2  Lung Cancer Screening: covers low dose CT scan once per year if you meet all of the following conditions: (1) Age 50-69; (2) No signs or symptoms of lung cancer; (3) Current smoker or have quit smoking within the last 15 years; (4) You have a tobacco smoking history of at least 30 pack years (packs per day multiplied by number of years you smoked); (5) You get a written order from a healthcare provider  3  Glaucoma Screening: covered annually if you're considered high risk: (1) You have diabetes OR (2) Family history of glaucoma OR (3)  aged 48 and older OR (3)  American aged 72 and older  3  Osteoporosis Screening: covered every 2 years if you meet one of the following conditions: (1) You're estrogen deficient and at risk for osteoporosis based off medical history and other findings; (2) Have a vertebral abnormality; (3) On glucocorticoid therapy for more than 3 months; (4) Have primary hyperparathyroidism; (5) On osteoporosis medications and need to assess response to drug therapy  · Last bone density test (DXA Scan): Not on file  5  HIV Screening: covered annually if you're between the age of 12-76  Also covered annually if you are younger than 13 and older than 72 with risk factors for HIV infection   For pregnant patients, it is covered up to 3 times per pregnancy  Immunizations:  Immunization Recommendations   Influenza Vaccine Annual influenza vaccination during flu season is recommended for all persons aged >= 6 months who do not have contraindications   Pneumococcal Vaccine (Prevnar and Pneumovax)  * Prevnar = PCV13  * Pneumovax = PPSV23   Adults 25-60 years old: 1-3 doses may be recommended based on certain risk factors  Adults 72 years old: Prevnar (PCV13) vaccine recommended followed by Pneumovax (PPSV23) vaccine  If already received PPSV23 since turning 65, then PCV13 recommended at least one year after PPSV23 dose  Hepatitis B Vaccine 3 dose series if at intermediate or high risk (ex: diabetes, end stage renal disease, liver disease)   Tetanus (Td) Vaccine - COST NOT COVERED BY MEDICARE PART B Following completion of primary series, a booster dose should be given every 10 years to maintain immunity against tetanus  Td may also be given as tetanus wound prophylaxis  Tdap Vaccine - COST NOT COVERED BY MEDICARE PART B Recommended at least once for all adults  For pregnant patients, recommended with each pregnancy  Shingles Vaccine (Shingrix) - COST NOT COVERED BY MEDICARE PART B  2 shot series recommended in those aged 48 and above     Health Maintenance Due:      Topic Date Due    Colorectal Cancer Screening  Never done    Hepatitis C Screening  Completed     Immunizations Due:      Topic Date Due    DTaP,Tdap,and Td Vaccines (1 - Tdap) Never done    COVID-19 Vaccine (3 - Moderna risk 4-dose series) 01/19/2022     Advance Directives   What are advance directives? Advance directives are legal documents that state your wishes and plans for medical care  These plans are made ahead of time in case you lose your ability to make decisions for yourself  Advance directives can apply to any medical decision, such as the treatments you want, and if you want to donate organs  What are the types of advance directives?   There are many types of advance directives, and each state has rules about how to use them  You may choose a combination of any of the following:  · Living will: This is a written record of the treatment you want  You can also choose which treatments you do not want, which to limit, and which to stop at a certain time  This includes surgery, medicine, IV fluid, and tube feedings  · Durable power of  for healthcare [facility]): This is a written record that states who you want to make healthcare choices for you when you are unable to make them for yourself  This person, called a proxy, is usually a family member or a friend  You may choose more than 1 proxy  · Do not resuscitate (DNR) order:  A DNR order is used in case your heart stops beating or you stop breathing  It is a request not to have certain forms of treatment, such as CPR  A DNR order may be included in other types of advance directives  · Medical directive: This covers the care that you want if you are in a coma, near death, or unable to make decisions for yourself  You can list the treatments you want for each condition  Treatment may include pain medicine, surgery, blood transfusions, dialysis, IV or tube feedings, and a ventilator (breathing machine)  · Values history: This document has questions about your views, beliefs, and how you feel and think about life  This information can help others choose the care that you would choose  Why are advance directives important? An advance directive helps you control your care  Although spoken wishes may be used, it is better to have your wishes written down  Spoken wishes can be misunderstood, or not followed  Treatments may be given even if you do not want them  An advance directive may make it easier for your family to make difficult choices about your care  Fall Prevention    Fall prevention  includes ways to make your home and other areas safer  It also includes ways you can move more carefully to prevent a fall   Health conditions that cause changes in your blood pressure, vision, or muscle strength and coordination may increase your risk for falls  Medicines may also increase your risk for falls if they make you dizzy, weak, or sleepy  Fall prevention tips:   · Stand or sit up slowly  · Use assistive devices as directed  · Wear shoes that fit well and have soles that   · Wear a personal alarm  · Stay active  · Manage your medical conditions  Home Safety Tips:  · Add items to prevent falls in the bathroom  · Keep paths clear  · Install bright lights in your home  · Keep items you use often on shelves within reach  · Paint or place reflective tape on the edges of your stairs  Weight Management   Why it is important to manage your weight:  Being overweight increases your risk of health conditions such as heart disease, high blood pressure, type 2 diabetes, and certain types of cancer  It can also increase your risk for osteoarthritis, sleep apnea, and other respiratory problems  Aim for a slow, steady weight loss  Even a small amount of weight loss can lower your risk of health problems  How to lose weight safely:  A safe and healthy way to lose weight is to eat fewer calories and get regular exercise  You can lose up about 1 pound a week by decreasing the number of calories you eat by 500 calories each day  Healthy meal plan for weight management:  A healthy meal plan includes a variety of foods, contains fewer calories, and helps you stay healthy  A healthy meal plan includes the following:  · Eat whole-grain foods more often  A healthy meal plan should contain fiber  Fiber is the part of grains, fruits, and vegetables that is not broken down by your body  Whole-grain foods are healthy and provide extra fiber in your diet  Some examples of whole-grain foods are whole-wheat breads and pastas, oatmeal, brown rice, and bulgur  · Eat a variety of vegetables every day    Include dark, leafy greens such as spinach, kale, richard greens, and mustard greens  Eat yellow and orange vegetables such as carrots, sweet potatoes, and winter squash  · Eat a variety of fruits every day  Choose fresh or canned fruit (canned in its own juice or light syrup) instead of juice  Fruit juice has very little or no fiber  · Eat low-fat dairy foods  Drink fat-free (skim) milk or 1% milk  Eat fat-free yogurt and low-fat cottage cheese  Try low-fat cheeses such as mozzarella and other reduced-fat cheeses  · Choose meat and other protein foods that are low in fat  Choose beans or other legumes such as split peas or lentils  Choose fish, skinless poultry (chicken or turkey), or lean cuts of red meat (beef or pork)  Before you cook meat or poultry, cut off any visible fat  · Use less fat and oil  Try baking foods instead of frying them  Add less fat, such as margarine, sour cream, regular salad dressing and mayonnaise to foods  Eat fewer high-fat foods  Some examples of high-fat foods include french fries, doughnuts, ice cream, and cakes  · Eat fewer sweets  Limit foods and drinks that are high in sugar  This includes candy, cookies, regular soda, and sweetened drinks  Exercise:  Exercise at least 30 minutes per day on most days of the week  Some examples of exercise include walking, biking, dancing, and swimming  You can also fit in more physical activity by taking the stairs instead of the elevator or parking farther away from stores  Ask your healthcare provider about the best exercise plan for you     Narcotic (Opioid) Safety    Use narcotics safely:  · Take prescribed narcotics exactly as directed  · Do not give narcotics to others or take narcotics that belong to someone else  · Do not mix narcotics without medicines or alcohol  · Do not drive or operate heavy machinery after you take the narcotic  · Monitor for side effects and notify your healthcare provider if you experienced side effects such as nausea, sleepiness, itching, or trouble thinking clearly  Manage constipation:    Constipation is the most common side effect of narcotic medicine  Constipation is when you have hard, dry bowel movements, or you go longer than usual between bowel movements  Tell your healthcare provider about all changes in your bowel movements while you are taking narcotics  He or she may recommend laxative medicine to help you have a bowel movement  He or she may also change the kind of narcotic you are taking, or change when you take it  The following are more ways you can prevent or relieve constipation:    · Drink liquids as directed  You may need to drink extra liquids to help soften and move your bowels  Ask how much liquid to drink each day and which liquids are best for you  · Eat high-fiber foods  This may help decrease constipation by adding bulk to your bowel movements  High-fiber foods include fruits, vegetables, whole-grain breads and cereals, and beans  Your healthcare provider or dietitian can help you create a high-fiber meal plan  Your provider may also recommend a fiber supplement if you cannot get enough fiber from food  · Exercise regularly  Regular physical activity can help stimulate your intestines  Walking is a good exercise to prevent or relieve constipation  Ask which exercises are best for you  · Schedule a time each day to have a bowel movement  This may help train your body to have regular bowel movements  Bend forward while you are on the toilet to help move the bowel movement out  Sit on the toilet for at least 10 minutes, even if you do not have a bowel movement  Store narcotics safely:   · Store narcotics where others cannot easily get them  Keep them in a locked cabinet or secure area  Do not  keep them in a purse or other bag you carry with you  A person may be looking for something else and find the narcotics  · Make sure narcotics are stored out of the reach of children    A child can easily overdose on narcotics  Narcotics may look like candy to a small child  The best way to dispose of narcotics: The laws vary by country and area  In the [place], the best way is to return the narcotics through a take-back program  This program is offered by the [employer]  The following are options for using the program:  · Take the narcotics to a MARU collection site  The site is often a law enforcement center  Call your local law enforcement center for scheduled take-back days in your area  You will be given information on where to go if the collection site is in a different location  · Take the narcotics to an approved pharmacy or hospital   A pharmacy or hospital may be set up as a collection site  You will need to ask if it is a MARU collection site if you were not directed there  A pharmacy or doctor's office may not be able to take back narcotics unless it is a MARU site  · Use a mail-back system  This means you are given containers to put the narcotics into  You will then mail them in the containers  · Use a take-back drop box  This is a place to leave the narcotics at any time  People and animals will not be able to get into the box  Your local law enforcement agency can tell you where to find a drop box in your area  Other ways to manage pain:   · Ask your healthcare provider about non-narcotic medicines to control pain  Nonprescription medicines include NSAIDs (such as ibuprofen) and acetaminophen  Prescription medicines include muscle relaxers, antidepressants, and steroids  · Pain may be managed without any medicines  Some ways to relieve pain include massage, aromatherapy, or meditation  Physical or occupational therapy may also help  For more information:   · Drug Enforcement Administration  [address] [place] [address] [address]  Phone: 3- 984 - 937-7278  Web Address: [facility]/drug_disposal/    · [address] and Drug Administration  [address] [place] [address] , [address] [place] [address]  Phone: 6- 039 - [phone]  Web Address: http://[employer]/     © Copyright [employer] 2018 Information is for End User's use only and may not be sold, redistributed or otherwise used for commercial purposes   All illustrations and images included in CareNotes® are the copyrighted property of A D A M , Inc  or [address] [place] [address]

## 2022-03-21 ENCOUNTER — PATIENT OUTREACH (OUTPATIENT)
Dept: FAMILY MEDICINE CLINIC | Facility: CLINIC | Age: 77
End: 2022-03-21

## 2022-03-21 NOTE — PROGRESS NOTES
ALDEN DAVID did receive referral from provider regarding Pt needs assistance with day program and senior life  After chart review ALDEN DAVID did call Pt  Pt's grandchild Leticia Farris answered the phone  Leticia Farris asked if she can return the call since she is unable to talk at this time  In coming call  Pt's grandchild Leticia Farris called ALDEN Farris stated that she is interested to involve Pt in a day care to keep Pt active  ALDEN DAVID provided Leticia Farris a list of day care for senior, since, Pt is [place] speaking ALDEN DAVID encouraged Leticia Farris to contact [facility] in [place]  Leticia Farris is willing to contact  [facility]  In addition, ALDEN DAVID asked Leticia Farris if there is other social needs that ALDEN DAVID can assist Pt at this time  Leticia Farris expressed that there is no other social needs at this time  ALDEN DAVID explained Leticia Farris that she will contact her to confirm whether or not Pt has been involved in a day care program  Leticia Farris seems understanding  ALDEN DAVID is remain available for further assistance as needed     ALDEN DAVID will continue to follow

## 2022-04-04 ENCOUNTER — PATIENT OUTREACH (OUTPATIENT)
Dept: FAMILY MEDICINE CLINIC | Facility: CLINIC | Age: 77
End: 2022-04-04

## 2022-04-04 NOTE — PROGRESS NOTES
ALDEN CM did call Pt to follow up  Pt's grandchild Susie Ryan answered the phone  Susie Austinde stated that she called to [facility] and left VM for returned call  Lois Looney expressed that Pt sometimes is agreed to go to day care other day refuse to do it  ALDEN CM asked Susie Connor if there is other social needs that SW CM can assist Pt  Susie Ryan stated that there is no social needs at this time  SW CM explained Susie Connor that SW CM will close this referral since Pt is unwilling to go to day car at this time, also, Susie Ryan can reach out [facility] for future assistance  Susie Ryan stated that she will continue to encourage Pt to go to the day care  Lois Looney declined Pt's social needs at this time  SW CM is closing case due to Susie Ryan declined Pt's social needs at this time  SW CM is remain available for further assistance as needed

## 2022-04-18 DIAGNOSIS — R21 RASH: ICD-10-CM

## 2022-04-19 ENCOUNTER — PATIENT OUTREACH (OUTPATIENT)
Dept: FAMILY MEDICINE CLINIC | Facility: CLINIC | Age: 77
End: 2022-04-19

## 2022-04-19 NOTE — PROGRESS NOTES
I returned Clair's call  She is requesting a refill on Xanax; I reviewed the chart and the order is already in place waiting for PCP to sign off  [facility] states the patient is doing well  She reports her fasting blood sugars range 110-120 and after meals 140-150  She states the patient is taking her medication as prescribed  Trung Sis notes the patient's blood pressure have been elevated, 160-180/80-90 but states these readings are before the patient takes her meds  I asked her to check the patient's blood pressures ~2 hours after meds are taken  [facility] denies the patient having any chest pain, shortness of breath or any recent falls  [facility] reports the patient is scheduled for an eye exam at the end of next month but needs to cancel and schedule with another eye doctor  [facility] will call with any questions or concerns  She is aware of the patient's upcoming dental and sleep med appointments  I will continue to follow

## 2022-04-27 ENCOUNTER — OFFICE VISIT (OUTPATIENT)
Dept: DENTISTRY | Facility: CLINIC | Age: 77
End: 2022-04-27

## 2022-04-27 VITALS — DIASTOLIC BLOOD PRESSURE: 90 MMHG | TEMPERATURE: 96.8 F | SYSTOLIC BLOOD PRESSURE: 181 MMHG | HEART RATE: 80 BPM

## 2022-04-27 DIAGNOSIS — K02.9 CARIES: Primary | ICD-10-CM

## 2022-04-27 PROCEDURE — D2392 RESIN-BASED COMPOSITE - 2 SURFACES, POSTERIOR: HCPCS | Performed by: DENTIST

## 2022-04-27 NOTE — PROGRESS NOTES
Composite Filling    Reny Matter presents for composite filling #[id number]  PMH reviewed, no changes  Applied topical benzocaine, administered 1 carp 4% articaine 1:100k epi via infiltration  Prepped tooth #28 with 245 carbide on high speed and removed previous amalgam restoration  Caries removed with round carbide on slow speed  Placed tofflemire/palodent matrix  Isolation with cotton rolls and dri-angles  Etch with 37% H2PO4, rinse, dry  Applied Adhese with 20 second scrub once, gentle air dry and light cured for 10s  Restored with Tetric bulk renée shade A2 and light cured  Refined with finishing burs, polished with enhance point  Verified occlusion and contacts  Pt left satisfied      [place]: Milvia Blanton

## 2022-05-02 ENCOUNTER — OFFICE VISIT (OUTPATIENT)
Dept: SLEEP CENTER | Facility: CLINIC | Age: 77
End: 2022-05-02
Payer: COMMERCIAL

## 2022-05-02 VITALS
SYSTOLIC BLOOD PRESSURE: 160 MMHG | DIASTOLIC BLOOD PRESSURE: 77 MMHG | BODY MASS INDEX: 26.54 KG/M2 | HEIGHT: 62 IN | HEART RATE: 74 BPM | WEIGHT: 144.2 LBS

## 2022-05-02 DIAGNOSIS — G47.33 OBSTRUCTIVE SLEEP APNEA: Primary | ICD-10-CM

## 2022-05-02 PROCEDURE — 99205 OFFICE O/P NEW HI 60 MIN: CPT | Performed by: NURSE PRACTITIONER

## 2022-05-02 NOTE — PATIENT INSTRUCTIONS
1   Schedule CPAP titration study  2  Schedule appointment with [place] to have current CPAP checked and reset or for new equipment  3  Schedule follow up visit 2-3 months after equipment is reset or started    Nursing Support:  When: Monday through Friday 7A-5PM except holidays  Where: Our direct line is [phone]  If you are having a true emergency please call 911  In the event that the line is busy or it is after hours please leave a voice message and we will return your call  Please speak clearly, leaving your full name, birth date, best number to reach you and the reason for your call  Medication refills: We will need the name of the medication, the dosage, the ordering provider, whether you get a 30 or 90 day refill, and the pharmacy name and address  Medications will be ordered by the provider only  Nurses cannot call in prescriptions  Please allow 7 days for medication refills  Physician requested updates: If your provider requested that you call with an update after starting medication, please be ready to provide us the medication and dosage, what time you take your medication, the time you attempt to fall asleep, time you fall asleep, when you wake up, and what time you get out of bed  Sleep Study Results: We will contact you with sleep study results and/or next steps after the physician has reviewed your testing

## 2022-05-02 NOTE — PROGRESS NOTES
Consultation - [address], [id number], MRN: [id number]    5/2/2022        Reason for Consult / Principal Problem: Moderate Obstructive Sleep Apnea  Periodic limb movements of sleep       Thank you for the opportunity of participating in the evaluation and care of this patient in the Sleep Clinic at [facility]  Subjective:     HPI: Alexandra Scott is a 77y o  year old female  She presents with her daughter for a consultation regarding moderate obstructive sleep apnea  [email or web address]  #[id number] was used for the appointment  She completed a diagnostic sleep study in 2020, which confirmed moderate obstructive sleep apnea  AHI was 27 1, worsening to 42 9 in REM sleep  Oxygen delfina was 80%, with 10 minutes of the study spent at oxygen levels less than 90%  She began use of auto-titrating CPAP  She did not have a consultation with sleep medicine  In the beginning of PAP therapy, she felt that it was working well for her and she was sleeping better  She reports that she has not been using the CPAP equipment  She doesn't feel that it is working as it had in the beginning  Since not using CPAP equipment, she awakens frequently during the night for urination  She sleeps for up to 12 hours per night and dozes when sedentary and not engaged in an activity      Comorbid conditions:  HTN  Hx of MI  Alzheimer's dementia    Review of Systems      Genitourinary need to urinate more than twice a night   Cardiology palpitations/fluttering feeling in the chest   Gastrointestinal frequent heartburn/acid reflux   Neurology difficulty with memory and balance problems   Constitutional none   Integumentary rash or dry skin   Psychiatry mood change   Musculoskeletal joint pain   Pulmonary shortness of breath with activity, frequent cough and difficulty breathing when lying flat    ENT none   Endocrine frequent urination   Hematological none       Sleep Study Results:  Diagnostic sleep study completed in , indicated moderate obstructive sleep apnea    CPAP Equipment:  Equipment set up date:  2020  PAP Pressure: Nasal AutoPAP using a lower limit of 5 cm and an upper limit of 20 cm of water pressure  Type of mask used: full face  DME Provider: Margarita Stuart    Employment:  She is currently retired, but worked as a school  in [employer]    Sleep Schedule:       Bedtime:  10:00pm during the week and 11:00pm on weekends      Latency:  30-60 minutes      Wakeup time:  10:00am-1:00pm    Awakenings:       Frequency:  3-4 times      Causes: For urination      Duration:  Returns to sleep within a few minutes    Daytime Sleepiness / Inappropriate Sleep:       Most severe:  She becomes sleepy at times during the day       Naps :  She does not intentionally take naps      Inappropriate drowsiness / sleep:  She dozes while relaxing    Snoring:  She snores loudly, coughing noted    Apnea: No witnessed apnea    Change in Weight:  Weight has been stable since the time of the study    Restless Leg Syndrome:  no clinical symptoms consistent with this diagnosis     Other Complaints:  She talks in her sleep  No reports of sleep walking, sleep paralysis  She experiences hallucinations of her  who has  and others who are living during sleep at times  She does not awaken with headaches  She reports bruxism  Social History:      Caffeine:  20 ounces of coffee daily, one decaf coffee in the evening       Tobacco:   reports that she has never smoked  She has never used smokeless tobacco      E-cig/Vaping:    E-Cigarette/Vaping    E-Cigarette Use Never User       E-Cigarette/Vaping Substances         Alcohol:   reports no history of alcohol use  Drugs:   reports no history of drug use         The review of systems and following portions of the patient's history were reviewed and updated as appropriate: allergies, current medications, past family history, past medical history, past social history, past surgical history and problem list         Objective:       Vitals:    05/02/22 1304   BP: 160/77   Pulse: 74   Weight: 65 4 kg (144 lb 3 2 oz)   Height: 5' 2" (1 575 m)     Body mass index is 26 37 kg/m²  Neck Circumference: 13 25  Leivasy Sleepiness Scale:  Total score: 9      Current Outpatient Medications:     Accu-Chek FastClix Lancets MISC, Use to test 3x daily, Disp: 102 each, Rfl: 3    albuterol (PROVENTIL HFA,VENTOLIN HFA) 90 mcg/act inhaler, INHALAR 1-2 BOCANADAS A LOS PULMONES CADA 4-6 HORAS ALFREDO SEA NECESARIO, Disp: 18 g, Rfl: 10    ALPRAZolam (XANAX) 0 25 mg tablet, Take 1 tablet (0 25 mg total) by mouth daily at bedtime as needed for anxiety, Disp: 30 tablet, Rfl: 0    amLODIPine (NORVASC) 5 mg tablet, Take 1 tablet (5 mg total) by mouth daily, Disp: 30 tablet, Rfl: 10    Blood Glucose Monitoring Suppl (OneTouch Verio) w/Device KIT, Use as directed to check blood sugar DX E11 9, Disp: 1 kit, Rfl: 0    Calcium Carb-Cholecalciferol 600-400 MG-UNIT TABS, Take 1 tablet by mouth 2 (two) times a day, Disp: 60 tablet, Rfl: 2    carvedilol (COREG) 12 5 mg tablet, Take 1 tablet (12 5 mg total) by mouth 2 (two) times a day with meals, Disp: 60 tablet, Rfl: 10    cyanocobalamin (VITAMIN B-12) 500 MCG tablet, Take 1,000 mcg by mouth daily, Disp: , Rfl:     Diclofenac Sodium (VOLTAREN) 1 %, APLICAR 2 GRAMOS POR VIA TOPICAL CUATRO VECES AL JEFFREY, Disp: 100 g, Rfl: 10    diphenhydrAMINE (BENADRYL) 2 % cream, Apply topically 3 (three) times a day as needed for itching, Disp: 30 g, Rfl: 0    ezetimibe (ZETIA) 10 mg tablet, MAR JOHNNY (1) TABLETA POR VIA ORAL JOHNNY VEZ AL JEFFREY, Disp: 30 tablet, Rfl: 10    ferrous sulfate 325 (65 Fe) mg tablet, Take 325 mg by mouth daily with breakfast, Disp: , Rfl:     fluticasone (FLONASE) 50 mcg/act nasal spray, USAR 1 ROCIADA EN CADA FOSA NASAL DIARIAMENTE, Disp: 16 g, Rfl: 10    gabapentin (NEURONTIN) 100 mg capsule, Take 1 capsule (100 mg total) by mouth 2 (two) times a day, Disp: 60 capsule, Rfl: 5    glucose blood (Accu-Chek Guide) test strip, Use to test 3x daily, Disp: 100 strip, Rfl: 3    hydrocortisone 2 5 % cream, APLICAR POR VIA TOPICA CUATRO VECES AL JEFFREY ALFREDO SEA NECESARIO PARA EL ERUPCION, Disp: 30 g, Rfl: 10    insulin glargine (Lantus SoloStar) 100 units/mL injection pen, Use 7units subcut once dialy, Disp: 15 mL, Rfl: 5    Insulin Pen Needle (BD Pen Needle Micro U/F) 32G X 6 MM MISC, Use daily, Disp: 100 each, Rfl: 1    loperamide (IMODIUM) 2 mg capsule, MAR JOHNNY (1) CAPSULA POR VIA ORAL ALFREDO SEA NECESARIO PARA LA DIARREA, Disp: 30 capsule, Rfl: 11    losartan (COZAAR) 25 mg tablet, MAR 1/2 TABLETA POR VIA ORAL JOHNNY VEZ AL JEFFREY, Disp: 15 tablet, Rfl: 2    meloxicam (MOBIC) 7 5 mg tablet, MAR JOHNNY (1)TABLETA POR VIA ORAL DIARIA CON EL DESAYUNO PARA LA ARTHRITIS   NO TOME CON IBUPROFEN, Disp: 30 tablet, Rfl: 2    memantine (Namenda) 10 mg tablet, Take 1 tablet (10 mg total) by mouth 2 (two) times a day, Disp: 60 tablet, Rfl: 3    metFORMIN (GLUCOPHAGE) 1000 MG tablet, MAR JOHNNY (1) TABLETA POR VIA ORAL DOS VECES AL JEFFREY CON COMIDAS, Disp: 60 tablet, Rfl: 10    mirtazapine (REMERON) 15 mg tablet, MAR JOHNNY (1) TABLETA POR VIA ORAL CADA NOCHE A LA HORA DE ACOSTARSE, Disp: 90 tablet, Rfl: 3    omeprazole (PriLOSEC) 40 MG capsule, Take 1 capsule (40 mg total) by mouth daily, Disp: 90 capsule, Rfl: 1    Tradjenta 5 MG TABS, MAR JOHNNY (1) TABLETA POR VIA ORAL JOHNNY VEZ AL JEFFREY, Disp: 30 tablet, Rfl: 10    benzonatate (TESSALON) 200 MG capsule, Take 1 capsule (200 mg total) by mouth 3 (three) times a day (Patient not taking: Reported on 5/2/2022 ), Disp: 20 capsule, Rfl: 0    Physical Exam  General Appearance:   Alert, cooperative, no distress, appears stated age     Head:   Normocephalic, without obvious abnormality, atraumatic     Eyes:   PERRL, conjunctiva/corneas clear          Nose:  Nares normal, septum midline, mucosa normal, no drainage or sinus tenderness           Throat:  Lips and gums normal; poor dentition, tongue normal in size and midline in position; mucosa moist with low-lying soft palatal tissue, uvula barely visualized, tonsils not visualized, Mallampati class 4       Neck:  Supple, symmetrical, trachea midline, no adenopathy; no thyromegaly noted, no carotid bruit or JVD     Lungs:      Clear to auscultation bilaterally, respirations unlabored     Heart:   Regular rate and rhythm, S1 and S2 normal, no murmur, rub or gallop       Extremities:  Extremities normal, atraumatic, no cyanosis or edema       Skin:  Skin color, texture, turgor normal, no rashes or lesions       Neurologic:  Oriented to person and place  No tremors noted  ASSESSMENT / PLAN     1  Obstructive sleep apnea  Ambulatory referral to Sleep Medicine    CPAP Study         Counseling / Coordination of Care  Total clinic time spent today 60 minutes  Greater than 50% of total time was spent with the patient and / or family counseling and / or coordination of care  A description of the counseling / coordination of care:     diagnostic results, instructions for management, risk factor reductions, prognosis, patient and family education, impressions, risks and benefits of treatment options and importance of compliance with treatment    Today, we discussed her use of CPAP equipment  She reports that she was sleeping better when able to use CPAP equipment and would like to resume PAP therapy  Data from past compliance report indicates elevated AHI at 16 2  A CPAP titration study has been ordered  If titration to CPAP is successful, it is recommended that she have her current CPAP equipment checked and adjusted  If not functioning, new equipment may be needed  If titrated to BiPAP, she will need need equipment ordered  We discussed the recent recall of the patient's PAP equipment  The risks and benefits for continued use vs  Discontinuation of PAP therapy were discussed  It is ultimately the patient's decision whether or not to continue PAP therapy at this time  The patient was advised to register their equipment on the [facility], which will give them further direction in the future replacement of the equipment  Since the ozone cleaning devices have been suspected as a causative agent in the recall, the patient has been advised to avoid using any ozone cleaning device and clean the equipment using mild soap and water  She will schedule a CPAP study, followed by an appointment with the DME provider and compliance follow up 31-91 days after set up with appropriate equipment  The following instructions have been given to the patient today:    Patient Instructions   1  Schedule CPAP titration study  2  Schedule appointment with [place] to have current CPAP checked and reset or for new equipment  3  Schedule follow up visit 2-3 months after equipment is reset or started    Nursing Support:  When: Monday through Friday 7A-5PM except holidays  Where: Our direct line is [phone]  If you are having a true emergency please call 911  In the event that the line is busy or it is after hours please leave a voice message and we will return your call  Please speak clearly, leaving your full name, birth date, best number to reach you and the reason for your call  Medication refills: We will need the name of the medication, the dosage, the ordering provider, whether you get a 30 or 90 day refill, and the pharmacy name and address  Medications will be ordered by the provider only  Nurses cannot call in prescriptions  Please allow 7 days for medication refills  Physician requested updates:  If your provider requested that you call with an update after starting medication, please be ready to provide us the medication and dosage, what time you take your medication, the time you attempt to fall asleep, time you fall asleep, when you wake up, and what time you get out of bed  Sleep Study Results: We will contact you with sleep study results and/or next steps after the physician has reviewed your testing        Chloe Lorenzo, [address] [facility]

## 2022-05-05 ENCOUNTER — OFFICE VISIT (OUTPATIENT)
Dept: GERIATRICS | Age: 77
End: 2022-05-05
Payer: COMMERCIAL

## 2022-05-05 DIAGNOSIS — F41.9 ANXIETY: ICD-10-CM

## 2022-05-05 DIAGNOSIS — R26.81 GAIT INSTABILITY: ICD-10-CM

## 2022-05-05 DIAGNOSIS — F03.91 DEMENTIA WITH BEHAVIORAL DISTURBANCE, UNSPECIFIED DEMENTIA TYPE (HCC): Primary | ICD-10-CM

## 2022-05-05 DIAGNOSIS — I10 ESSENTIAL HYPERTENSION: ICD-10-CM

## 2022-05-05 DIAGNOSIS — G47.33 OSA (OBSTRUCTIVE SLEEP APNEA): ICD-10-CM

## 2022-05-05 DIAGNOSIS — E78.5 HYPERLIPIDEMIA, UNSPECIFIED HYPERLIPIDEMIA TYPE: ICD-10-CM

## 2022-05-05 DIAGNOSIS — E87.1 HYPONATREMIA: ICD-10-CM

## 2022-05-05 DIAGNOSIS — G47.09 OTHER INSOMNIA: ICD-10-CM

## 2022-05-05 DIAGNOSIS — E11.9 TYPE 2 DIABETES MELLITUS WITHOUT COMPLICATION, WITHOUT LONG-TERM CURRENT USE OF INSULIN (HCC): ICD-10-CM

## 2022-05-05 PROCEDURE — 99483 ASSMT & CARE PLN PT COG IMP: CPT | Performed by: STUDENT IN AN ORGANIZED HEALTH CARE EDUCATION/TRAINING PROGRAM

## 2022-05-05 NOTE — PROGRESS NOTES
ASSESSMENT AND PLAN:  1  Dementia with behavioral disturbance, unspecified dementia type [facility])  Assessment & Plan:  MOCA 17/30 (prev 14/30), CSDD 5, TUGT 13 sec  Patient deficits in visual spatial, executive function, attention, delayed recall and orientation domains  Continues to have short-term memory forgetfulness  Remains dependent in ADLs and IADLs  Continues to have granddaughter as her home health aide with a total of 56 hours per week  Continues on Namenda  Encouraged granddaughter to pursue enrolling into a senior  program for positive socialization, physical and cognitive activities  Recommend the [employer] a falls Alert device as a safety precaution  Consider blister packaging for ease of medication administration  Reorientation redirection as needed  Manage chronic conditions  Maintain Falls precautions  Encourage patient to remain active mentally, physically and socially  Participate in cognitively stimulating exercises as able  Will follow-up in 1 year      2  Type 2 diabetes mellitus without complication, without long-term current use of insulin ([facility])  Assessment & Plan:    Lab Results   Component Value Date    HGBA1C 6 8 (A) 03/15/2022     Continues on metformin Tradjenta  Recommend a diabetic, heart healthy diet  Continue routine follow-up with endocrinology      3  RONAK (obstructive sleep apnea)  Assessment & Plan:  Patient to follow-up with Sleep Medicine for repeat study  Per granddaughter, CPAP machine was not functioning      4  Hyponatremia  Assessment & Plan:  Most recent sodium 132  Patient currently is maintained mirtazapine which may be contributing  Advised fluid restriction  Recommend following up with PCP for further monitoring      5  Hyperlipidemia, unspecified hyperlipidemia type  Assessment & Plan:  Continues on ezetimibe  Recommend continued monitoring by PCP  Adhere to a diabetic, heart healthy diet      6   Gait instability  Assessment & Plan:  TUGT 13 sec  Improved  Maintain Falls precautions  Consider a falls Alert device as a safety precaution      7  Essential hypertension  Assessment & Plan:  /84  Patient maintained on carvedilol, amlodipine, losartan  Recommend a diabetic, heart healthy diet  Patient also on meloxicam which may contribute to elevated blood pressure and would advised to use minimally  Discussed with patient and granddaughter follow-up with PCP closely  Per AGS guidelines, would recommend a goal blood pressure of 609 systolic and this patient dementia      8  Other insomnia  Assessment & Plan:  Patient maintained on mirtazapine  Reviewed side effects of mirtazapine including hyponatremia, abnormal thinking, confusion, dizziness  Recommended following with PCP for hyponatremia and if worsening would consider weaning off mirtazapine  Avoid daytime napping  Avoid caffeinated beverages after 14:00      9  Anxiety  Assessment & Plan:  Patient with history of chronic anxiety  Maintained on alprazolam, mirtazapine  Reviewed side effects of chronic benzodiazepine use in elderly patients with dementia  Per granddaughter, this has been very effective and wishes to continue on alprazolam  Continue routine follow-up with  PCP  Continue social support by family and friends   Encouraged enrolling into a senior  program        Decision-making capacity:  The patient should not make any independent medical financial decisions without the presence of her POA as assessed during this visit    Staging:  Mild to moderate, FAST stage 5    Medications Review:  Reviewed side effects of chronic use of mirtazapine, alprazolam      HPI:    We had the pleasure of re-evaluating [facility] Charanjit Renteria who is a 77 y o  female in Geriatric follow up  today  Ms Rainer Newton is in the office with her  grandausanju  The patient continues to reside with her granddaughter and family    She remains dependent in ADLs and IADLs and does have 56 hours per week of home health services which is provided by the patient's granddaughter  Mattie explains that the patient continues to perseverate about wanting to return to [facility] to live with her family  Granddaughter has purchased a ticket for the patient has a birthday surprise to travel to [facility] and spend 2 weeks in July  The patient does continue to have occasional agitation and mood changes as she has become aggressive with her granddaughter on occasions  Granddaughter gives an example of the patient wanting ice cream but began hitting herself when she was told she should not have ice cream due to her diabetes  This has not recurred since  Granddaughter explains that the patient continues to have improved sleep on her current regimen of mirtazapine and alprazolam which she would like to continue  I have reviewed chronic usage of benzodiazepenes and their side effects in elderly patients with dementia with granddaughter  The patient does continue to occasionally hallucinate and grandlolisughter has installed video cameras for closer monitoring at home  The patient will be seeing Sleep Medicine for a repeat study as granddaughter explains the patient had damaged her CPAP machine  The patient has refused to go to a senior  program in the past  I did have a detailed conversation with the patient today encouraging her go a  program which she stated she was open to  The patient did recently sustain a fall but denied any head injury, loss consciousness and fall was attributed a mechanical etiology  No safety concerns in using the stove or leaving the water faucets running  During the office visit today, the patient required frequent reorientation, redirection and was notably forgetful  Will continue on Namenda and plan to follow-up in 1 year or earlier if needed        COGNITION:  Memory Issues noticed since 2019   Memory affected: short term memory loss    Symptoms started: gradual  Over time the memory has:  stable  Memory issue(s) were noted by: family   Patient has difficulties with medication errors and wandering  Difficulty finding the right word while speaking:occasionally  Requires repeat information or asking the same question repeatedly: Yes  Fluctuation in alertne  Now pt ok to go to daycaress: No  Changes in mood or personality:Yes  Current or previous treatment for depression or anxiety: Yes    Family member with dementia and what type? Yes, uncleHistory of head trauma: No  History of stroke: No  History of alcohol or substance abuse: No    FUNCTIONAL STATUS:  BADLs  Does patient require assistance with:  Bathing: Yes  Dressing: Yes  Toileting: Yes  Transferring: Yes  Continence: Yes  Feeding: Yes    IADLs  Dose patient require assistance with:  Telephone: Yes  Shopping: Yes  Food Preparation: Yes  Housekeeping: Yes  Laundry: Yes  Transportation: Yes  Medications: Yes  Finances: Yes    NEUROPSYCH SYMPTOMS:  Does patient get angry or hostile? Resist care from others? No  Does patient see or hear things that no one else can see or hear? Yes  Does patient act impatient and cranky? Does mood frequently change for no apparent reason? Yes  Does patient act suspicious without good reason (example: believes that others are stealing from him or her, or planning to harm him or her in some way)? No  Does patient less interested in his or her usual activities or in the activities and plans of others? Yes  Does patient have trouble sleeping at night? Yes  Dose patient have abnormal movements while asleep?  No    SAFETY:  Hearing and vision issue: No  Any gait or balance disorder: No  Uses: none  Any falls n the last year: Yes  Any history of wandering: No  Are there firearms or guns in the home: No  Does patient drive: No  Any driving accidents or citations in the last year: No  Any concerns about patient's ability to drive: No    ACP REVIEW:  Does patient have POA: Yes  Does patient have a Living will: Yes  Any legal assistance needed for healthcare planning?: No    ROS: Review of Systems   Unable to perform ROS: Dementia       No Known Allergies    Medications:    Current Outpatient Medications on File Prior to Visit   Medication Sig Dispense Refill    Accu-Chek FastClix Lancets MISC Use to test 3x daily 102 each 3    albuterol (PROVENTIL HFA,VENTOLIN HFA) 90 mcg/act inhaler INHALAR 1-2 BOCANADAS A LOS PULMONES CADA 4-6 HORAS ALFREDO SEA NECESARIO 18 g 10    ALPRAZolam (XANAX) 0 25 mg tablet Take 1 tablet (0 25 mg total) by mouth daily at bedtime as needed for anxiety 30 tablet 0    amLODIPine (NORVASC) 5 mg tablet Take 1 tablet (5 mg total) by mouth daily 30 tablet 10    Blood Glucose Monitoring Suppl (OneTouch Verio) w/Device KIT Use as directed to check blood sugar DX E11 9 1 kit 0    Calcium Carb-Cholecalciferol 600-400 MG-UNIT TABS Take 1 tablet by mouth 2 (two) times a day 60 tablet 2    carvedilol (COREG) 12 5 mg tablet Take 1 tablet (12 5 mg total) by mouth 2 (two) times a day with meals 60 tablet 10    cyanocobalamin (VITAMIN B-12) 500 MCG tablet Take 1,000 mcg by mouth daily      Diclofenac Sodium (VOLTAREN) 1 % APLICAR 2 GRAMOS POR VIA TOPICAL CUATRO VECES AL JEFFREY 100 g 10    diphenhydrAMINE (BENADRYL) 2 % cream Apply topically 3 (three) times a day as needed for itching 30 g 0    ezetimibe (ZETIA) 10 mg tablet MAR JOHNNY (1) TABLETA POR VIA ORAL JOHNNY VEZ AL JEFFREY 30 tablet 10    ferrous sulfate 325 (65 Fe) mg tablet Take 325 mg by mouth daily with breakfast      fluticasone (FLONASE) 50 mcg/act nasal spray USAR 1 ROCIADA EN CADA FOSA NASAL DIARIAMENTE 16 g 10    gabapentin (NEURONTIN) 100 mg capsule Take 1 capsule (100 mg total) by mouth 2 (two) times a day 60 capsule 5    glucose blood (Accu-Chek Guide) test strip Use to test 3x daily 100 strip 3    hydrocortisone 2 5 % cream APLICAR POR VIA TOPICA CUATRO VECES AL EJFFREY ALFREDO SEA NECESARIO PARA EL ERUPCION 30 g 10    insulin glargine (Lantus SoloStar) 100 units/mL injection pen Use 7units subcut once dialy 15 mL 5    Insulin Pen Needle (BD Pen Needle Micro U/F) 32G X 6 MM MISC Use daily 100 each 1    loperamide (IMODIUM) 2 mg capsule MAR JOHNNY (1) CAPSULA POR VIA ORAL ALFREDO SEA NECESARIO PARA LA DIARREA 30 capsule 11    losartan (COZAAR) 25 mg tablet MAR 1/2 TABLETA POR VIA ORAL JOHNNY VEZ AL JEFFREY 15 tablet 2    meloxicam (MOBIC) 7 5 mg tablet MAR JOHNNY (1)TABLETA POR VIA ORAL DIARIA CON EL DESAYUNO PARA LA ARTHRITIS  NO TOME CON IBUPROFEN 30 tablet 2    metFORMIN (GLUCOPHAGE) 1000 MG tablet MAR JOHNNY (1) TABLETA POR VIA ORAL DOS VECES AL JEFFREY CON COMIDAS 60 tablet 10    mirtazapine (REMERON) 15 mg tablet MAR JOHNNY (1) TABLETA POR VIA ORAL CADA NOCHE A LA HORA DE ACOSTARSE 90 tablet 3    omeprazole (PriLOSEC) 40 MG capsule Take 1 capsule (40 mg total) by mouth daily 90 capsule 1    Tradjenta 5 MG TABS MAR JOHNNY (1) TABLETA POR VIA ORAL JOHNNY VEZ AL JEFFREY 30 tablet 10    benzonatate (TESSALON) 200 MG capsule Take 1 capsule (200 mg total) by mouth 3 (three) times a day (Patient not taking: Reported on 5/2/2022 ) 20 capsule 0     No current facility-administered medications on file prior to visit         History:  Past Medical History:   Diagnosis Date    [facility]     Throat    Chronic pain disorder     Diabetes [facility]     Diffuse large B cell lymphoma ([facility]     Dysphagia     GERD without esophagitis     HTN (hypertension)     Hyperlipidemia     Hypertension     MI [facility]ocardial infarction) [facility]     Port-A-Cath in place 07/29/2019    Thyroid cancer ([facility] ) 2018     Past Surgical History:   Procedure Laterality Date    BONE MARROW BIOPSY      BREAST BIOPSY Left     CHOLECYSTECTOMY      IR PORT PLACEMENT  11/16/2018    IR PORT REMOVAL  3/22/2021    OTHER SURGICAL HISTORY      tendor tear repair to right shoulder    SHOULDER ARTHROSCOPY       Family History   Problem Relation Age of Onset    Diabetes Mother     Heart disease Sister     Hypertension Brother     Uterine cancer Maternal Grandmother     Prostate cancer Paternal Grandfather      Social History     Socioeconomic History    Marital status:      Spouse name: Not on file    Number of children: Not on file    Years of education: Not on file    Highest education level: Not on file   Occupational History    Not on file   Tobacco Use    Smoking status: Never Smoker    Smokeless tobacco: Never Used   Vaping Use    Vaping Use: Never used   Substance and Sexual Activity    Alcohol use: Never     Comment: 0    Drug use: No    Sexual activity: Not Currently     Partners: Male   Other Topics Concern    Not on file   Social History Narrative    Not on file     Social Determinants of Health     Financial Resource Strain: Low Risk     Difficulty of Paying Living Expenses: Not very hard   Food Insecurity: No Food Insecurity    Worried About Running Out of Food in the Last Year: Never true    Stanislaw of Food in the Last Year: Never true   Transportation Needs: No Transportation Needs    Lack of Transportation (Medical): No    Lack of Transportation (Non-Medical): No   Physical Activity: Not on file   Stress: Stress Concern Present    Feeling of Stress :  To some extent   Social Connections: Not on file   Intimate Partner Violence: Not on file   Housing Stability: Unknown    Unable to Pay for Housing in the Last Year: No    Number of [place] in the Last Year: Not on file    Unstable Housing in the Last Year: No     Past Surgical History:   Procedure Laterality Date    BONE MARROW BIOPSY      BREAST BIOPSY Left     CHOLECYSTECTOMY      IR PORT PLACEMENT  11/16/2018    IR PORT REMOVAL  3/22/2021    OTHER SURGICAL HISTORY      tendor tear repair to right shoulder    SHOULDER ARTHROSCOPY         OBJECTIVE:  Vitals:    05/05/22 1005   BP: 170/84   BP Location: Left arm   Patient Position: Sitting   Cuff Size: Adult   Pulse: 93 Resp: 16   Temp: 98 °F (36 7 °C)   TempSrc: Temporal   SpO2: 96%   Weight: 65 9 kg (145 lb 3 2 oz)   Height: 5' 0 25" (1 53 m)     Body mass index is 28 12 kg/m²  Physical Exam  Vitals reviewed  Constitutional:       General: She is not in acute distress  Appearance: Normal appearance  She is well-developed  She is not ill-appearing or diaphoretic  HENT:      Head: Normocephalic and atraumatic  Right Ear: Tympanic membrane, ear canal and external ear normal       Left Ear: Tympanic membrane, ear canal and external ear normal       Nose: Nose normal       Mouth/Throat:      Mouth: Mucous membranes are moist       Pharynx: No oropharyngeal exudate  Eyes:      General: No scleral icterus  Right eye: No discharge  Left eye: No discharge  Conjunctiva/sclera: Conjunctivae normal    Neck:      Vascular: No JVD  Cardiovascular:      Rate and Rhythm: Normal rate and regular rhythm  Heart sounds: Murmur heard  No friction rub  No gallop  Pulmonary:      Effort: Pulmonary effort is normal  No respiratory distress  Breath sounds: Normal breath sounds  No wheezing or rales  Abdominal:      General: Bowel sounds are normal  There is no distension  Palpations: Abdomen is soft  Tenderness: There is no abdominal tenderness  There is no guarding  Musculoskeletal:         General: No tenderness or deformity  Normal range of motion  Cervical back: Normal range of motion and neck supple  Skin:     General: Skin is warm  Coloration: Skin is not pale  Findings: No erythema or rash  Neurological:      General: No focal deficit present  Mental Status: She is alert  Mental status is at baseline  Cranial Nerves: No cranial nerve deficit  Motor: No weakness  Gait: Gait normal       Deep Tendon Reflexes: Reflexes are normal and symmetric        Comments: AOR x2  Moving all limbs  Follows commands readily   Psychiatric:         Mood and Affect: Mood normal          MoCA: 17/30      Labs & Imaging:  Lab Results   Component Value Date    WBC 7 10 12/31/2021    HGB 12 1 12/31/2021    HCT 37 3 12/31/2021    MCV 89 12/31/2021     12/31/2021     Lab Results   Component Value Date    SODIUM 132 (L) 12/31/2021    K 4 2 12/31/2021    CL 98 12/31/2021    CO2 30 12/31/2021    BUN 7 12/31/2021    CREATININE 0 62 12/31/2021    GLUC 221 11/17/2021    CALCIUM 9 2 12/31/2021     Lab Results   Component Value Date    IBZWPJXL26 1,056 (H) 07/08/2021     Lab Results   Component Value Date    VXG1DNDNAGHO 3 450 02/22/2021     Lab Results   Component Value Date    [id number] 25 9 (L) 02/15/2016      Repeat 3-D volumetric MRI brain with NeuroQuant IMAGING only-no charge      INDICATION: F03 [id number]: Unspecified dementia without behavioral disturbance      COMPARISON:   Recent MRI dated 9/20/2021      TECHNIQUE: The patient returned for repeat Sagittal 3D volumetric imaging processed by Trent lombardi General Morphology and Age Related Atrophy reports         IMAGE QUALITY:  Diagnostic      FINDINGS:     BRAIN PARENCHYMA: Unchanged   Please see recent complete MRI report       QUANTITATIVE:      Exam Quality: Adequate for volumetric analysis      Hippocampus  Hippocampal Occupancy Score (HOC):                   0 48        Percentile for similar age:                              1     Total hippocampal volume (cc):                           3 14    Percentile for similar age:                              1     Entorhinal cortex (cc)                                            2 62      Percentile for similar age:                                   5                Superior Lateral ventricular volume (cc):             28 97    Percentile for similar age:                             72     Inferior lateral ventricular volume (cc):                    3 38    Percentile for similar age:                                80     Quantitative conclusions: Hippocampal Volume:                       Low volume        Entorhinal Volume:                            Borderline Low volume        Superior Lateral Ventricle Volume:     Normal Volume       Inferior Lateral Ventricle Volume:       High Volume     Concordance between qualitative and quantitative hippocampal volume assessment: Concordant       Change in brain volumes: No previous volumetric study for comparison     Mean hippocampal volume loss among normal elderly: 0 7% per year, (-0 3 to 1 7;  Jim 2008; also Ted 2010)        IMPRESSION:     NeuroQuant analysis was performed: Low hippocampal volume and enlargement of the adjacent inferior lateral ventricles suggestive of local ex-vacuo dilatation    Findings support medial temporal lobe focused neurodegenerative etiology

## 2022-05-05 NOTE — PROGRESS NOTES
Patrice Contreras [facility]  [address] [facility], [address] [place] [address], [address] [place] [address]  [phone]    Social Work Follow-Up    LSW provided brochure of [place] to granddaughter as she was looking for an Adult Day Care where staff speak [place]  Cadet Cognitive Assessment (MoCA) Version 8 2 Swedish  Education: Associates degree    Points Earned POSSIBLE Points   Visuospatial/Executive   Alternating [place] Making 0 1   Visuoconstructional skills 0 1   Visuoconstructional skills (clock) 2 3   Naming   Naming Animals 3 3   Attention   Digit Span 0 2   Vigilance (letters) 1 1   Serial 7 subtraction 1 3   Language   Sentence Repetition 2 2   Verbal fluency 1 1   Abstraction   Abstraction (word pairings) 2 2   Delayed recall   Delayed recall 0 5   Memory index score: 5/15   Orientation   Orientation 5 6   TOTAL SCORE: 17/30  (Normal ?26/30)   Additional notes:      LSW to remain available as needed

## 2022-05-06 ENCOUNTER — PATIENT OUTREACH (OUTPATIENT)
Dept: FAMILY MEDICINE CLINIC | Facility: CLINIC | Age: 77
End: 2022-05-06

## 2022-05-06 VITALS
HEART RATE: 93 BPM | DIASTOLIC BLOOD PRESSURE: 84 MMHG | TEMPERATURE: 98 F | BODY MASS INDEX: 28.51 KG/M2 | WEIGHT: 145.2 LBS | HEIGHT: 60 IN | SYSTOLIC BLOOD PRESSURE: 170 MMHG | RESPIRATION RATE: 16 BRPM | OXYGEN SATURATION: 96 %

## 2022-05-06 PROBLEM — F41.9 ANXIETY: Status: ACTIVE | Noted: 2018-11-20

## 2022-05-06 NOTE — ASSESSMENT & PLAN NOTE
Patient to follow-up with Sleep Medicine for repeat study  Per granddaughter, CPAP machine was not functioning

## 2022-05-06 NOTE — PROGRESS NOTES
I returned Clair's call  She stated the patient's blood pressure has been elevated on many occasions and is requesting to see PCP sooner than her scheduled appointment; note sent to clerical   Mike Moffett states the patient has been taking all her medications as prescribed

## 2022-05-06 NOTE — ASSESSMENT & PLAN NOTE
MOCA 17/30 (prev 14/30), CSDD 5, TUGT 13 sec  Patient deficits in visual spatial, executive function, attention, delayed recall and orientation domains  Continues to have short-term memory forgetfulness  Remains dependent in ADLs and IADLs  Continues to have granddaughter as her home health aide with a total of 56 hours per week  Continues on Namenda  Encouraged granddaughter to pursue enrolling into a senior  program for positive socialization, physical and cognitive activities  Recommend the [place] a falls Alert device as a safety precaution  Consider blister packaging for ease of medication administration  Reorientation redirection as needed  Manage chronic conditions  Maintain Falls precautions  Encourage patient to remain active mentally, physically and socially  Participate in cognitively stimulating exercises as able  Will follow-up in 1 year

## 2022-05-06 NOTE — ASSESSMENT & PLAN NOTE
Continues on ezetimibe  Recommend continued monitoring by PCP  Adhere to a diabetic, heart healthy diet

## 2022-05-06 NOTE — ASSESSMENT & PLAN NOTE
Most recent sodium 132  Patient currently is maintained mirtazapine which may be contributing  Advised fluid restriction  Recommend following up with PCP for further monitoring

## 2022-05-06 NOTE — ASSESSMENT & PLAN NOTE
Lab Results   Component Value Date    HGBA1C 6 8 (A) 03/15/2022     Continues on metformin Tradjenta  Recommend a diabetic, heart healthy diet  Continue routine follow-up with endocrinology

## 2022-05-06 NOTE — ASSESSMENT & PLAN NOTE
TUGT 13 sec  Improved  Maintain Falls precautions  Consider a falls Alert device as a safety precaution

## 2022-05-06 NOTE — ASSESSMENT & PLAN NOTE
/84  Patient maintained on carvedilol, amlodipine, losartan  Recommend a diabetic, heart healthy diet  Patient also on meloxicam which may contribute to elevated blood pressure and would advised to use minimally  Discussed with patient and granddaughter follow-up with PCP closely  Per AGS guidelines, would recommend a goal blood pressure of 289 systolic and this patient dementia

## 2022-05-06 NOTE — ASSESSMENT & PLAN NOTE
Patient maintained on mirtazapine  Reviewed side effects of mirtazapine including hyponatremia, abnormal thinking, confusion, dizziness  Recommended following with PCP for hyponatremia and if worsening would consider weaning off mirtazapine  Avoid daytime napping  Avoid caffeinated beverages after 14:00

## 2022-05-06 NOTE — ASSESSMENT & PLAN NOTE
Patient with history of chronic anxiety  Maintained on alprazolam, mirtazapine  Reviewed side effects of chronic benzodiazepine use in elderly patients with dementia  Per granddaughter, this has been very effective and wishes to continue on alprazolam  Continue routine follow-up with  PCP  Continue social support by family and friends   Encouraged enrolling into a senior  program

## 2022-05-09 ENCOUNTER — OFFICE VISIT (OUTPATIENT)
Dept: OBGYN CLINIC | Facility: MEDICAL CENTER | Age: 77
End: 2022-05-09
Payer: COMMERCIAL

## 2022-05-09 VITALS
WEIGHT: 145 LBS | DIASTOLIC BLOOD PRESSURE: 71 MMHG | HEART RATE: 66 BPM | SYSTOLIC BLOOD PRESSURE: 171 MMHG | HEIGHT: 62 IN | BODY MASS INDEX: 26.68 KG/M2

## 2022-05-09 DIAGNOSIS — E11.9 TYPE 2 DIABETES MELLITUS WITHOUT COMPLICATION, WITHOUT LONG-TERM CURRENT USE OF INSULIN (HCC): ICD-10-CM

## 2022-05-09 DIAGNOSIS — M20.42 HAMMERTOE, BILATERAL: ICD-10-CM

## 2022-05-09 DIAGNOSIS — G62.0 CHEMOTHERAPY-INDUCED PERIPHERAL NEUROPATHY (HCC): Primary | Chronic | ICD-10-CM

## 2022-05-09 DIAGNOSIS — B35.1 TINEA UNGUIUM: ICD-10-CM

## 2022-05-09 DIAGNOSIS — T45.1X5A CHEMOTHERAPY-INDUCED PERIPHERAL NEUROPATHY (HCC): Primary | Chronic | ICD-10-CM

## 2022-05-09 DIAGNOSIS — M20.41 HAMMERTOE, BILATERAL: ICD-10-CM

## 2022-05-09 PROCEDURE — 1036F TOBACCO NON-USER: CPT | Performed by: PODIATRIST

## 2022-05-09 PROCEDURE — 1160F RVW MEDS BY RX/DR IN RCRD: CPT | Performed by: PODIATRIST

## 2022-05-09 PROCEDURE — 99204 OFFICE O/P NEW MOD 45 MIN: CPT | Performed by: PODIATRIST

## 2022-05-09 NOTE — PATIENT INSTRUCTIONS
Benefits of an Active Lifestyle   WHAT YOU NEED TO KNOW:   An active lifestyle means you do physical activity throughout the day  Any activity that gets you up and moving is part of an active lifestyle  Physical activity includes exercise such as walking or lifting weights  It also includes playing sports  Physical activity is different from other kinds of activity, such as reading a book  This kind of activity is called sedentary  A sedentary lifestyle means you sit or do not move much during the day  An active lifestyle has many benefits, such as helping you prevent or manage health conditions  DISCHARGE INSTRUCTIONS:   Call your doctor if:   · You notice changes in your health, such as new or worsening shortness of breath  · You have questions or concerns about your condition or care  Benefits of an active lifestyle:   · You may be able to do daily activities more easily  Activity helps condition your heart, lungs, and muscles  This can help you get through your daily activities without feeling tired  · You can help control your weight  Activity helps your body use the calories you eat instead of storing them as fat  Your body continues to burn calories at a higher rate after you are active  · Activity can increase your health  Activity helps lower your risk for cancer, heart disease, diabetes, and stroke  Activity can help you control your blood pressure and blood sugar levels, and lower your cholesterol  If you have arthritis, activity can help your joints move more easily and with less pain  · Your bones and muscles will get stronger  This will help prevent osteoporosis and reduce your risk for falls  · Activity can help improve your mood  Activity can reduce or prevent depression and stress  Activity can also help improve your sleep  Risks of a sedentary lifestyle:  A sedentary lifestyle increases your risk for diseases such as diabetes, high blood pressure, and heart disease   Your immune system also becomes weaker  This means it cannot fight infections well  How much activity you need:  Any activity is better than no activity at all  When you go from being mostly inactive to adding some activity, you will see health benefits  The following are general guidelines:  · Do aerobic activity several days each week  Aerobic activity includes walking, bicycling, dancing, swimming, and raking leaves  Aim for 150 to 300 minutes of moderate activity, or 75 to 150 of vigorous activity each week  You can also do a combination of moderate and vigorous activity  · Do strength training at least 2 times each week  Strength training helps you keep the muscles you have and build new muscles  Strength training includes pushups, yoga, russel chi, and weightlifting  If you do not have access to weights, you can lift items around your house  Try to work all the major muscle groups, such as your legs, arms, abdomen, and chest  Do 2 or 3 sets on each area  Use a weight that is slightly heavier than you can lift easily  You can work up to [employer]  You can also use resistance bands instead of weights  Steps you can take to become more active:   · Set goals  Set some long-term goals and some short-term goals  For example, you may want to be able to walk for 30 minutes without becoming short of breath  Try not to put time requirements on your goals  For example, do not think you should reach your goal in a month  Set smaller goals, such as walking a little longer each week, or feeling less shortness of breath  · Be active all day  Activity does not have to mean structured exercise each day  You can be more active by making small changes all day  For example, try parking as far from the entrance of buildings as you can when you run errands  If possible, walk or ride a bike instead of driving  Take the stairs instead of the elevator  · Keep a record of your activity and your progress    You can do this by writing down your daily activity  Include the kind of activity and how long you did it  You can also use a program on your phone or other device that will track activity for you  Also record your progress  You may be doing daily activities more easily, sleeping better, or building muscles  · Step counting can help you monitor activity  A general guide is to take 10,000 steps each day  A pedometer is a device you can wear to track your steps  Some phones have programs that will count and record steps  You may need to work up to 10,000 steps  Start by finding out how many steps you usually take in a day  Then try to take more steps each day than you took the day before  Tips to help you stay on track:   · Start slowly and work up  You do not have to do 30 minutes of activity at one time  You can break the activity up and do a few minutes at a time  Remember that some physical activity is better than none  Stand up during the day, even if you cannot walk around  Your body uses more energy when you stand  You may be able to get a desk that allows you to stand while you type or make phone calls for work  Aim for a speed or intensity that is challenging but not too difficult  You should be able to speak a few words at a time but not be able to sing  · Plan activities you enjoy  Do a variety of activities so you do not become bored and you stay challenged  Include activities that strengthen your bones  These activities are called weight-bearing exercises  Examples include tennis, jumping rope, and running  Swimming, riding a bike, and similar exercises keep weight off your bones  They will not help strengthen bones, but they will help your heart and lungs work better  · Ask for support from the people in your life  Go for a walk after dinner with your family  Meet friends at the park  Take a break with a coworker and walk around  Find someone who likes to go to the gym at the same time you do  You may be more likely to go if you know another person is counting on you  Get involved in community events, such as cleaning a community park  Ask someone to help you stay on track  For example, you can tell the person about your daily or weekly activity  · Treat yourself to a reward when you reach a goal   The rewards can be for activity done for a certain amount of time each day or days each week  Rewards can also be for progress you make  Have rewards that are not food, such as a new clothing item or book  What you need to know about nutrition and activity:  Healthy foods will give you the energy you need to be active  Activity and good nutrition work together to help you reach or maintain a healthy weight  Healthy foods include fruits, vegetables, lean meats, fish, cooked beans, whole-grain breads, and low-fat dairy products  Your healthcare provider can help you create a healthy meal plan  He or she can tell you how many calories you need to stay active and still lose weight if needed  Follow up with your doctor as directed:  Write down your questions so you remember to ask them during your visits  © Copyright [employer] 2022 Information is for End User's use only and may not be sold, redistributed or otherwise used for commercial purposes  All illustrations and images included in CareNotes® are the copyrighted property of A D A M , Inc  or [facility] Sima Johns  The above information is an  only  It is not intended as medical advice for individual conditions or treatments  Talk to your doctor, nurse or pharmacist before following any medical regimen to see if it is safe and effective for you

## 2022-05-09 NOTE — PROGRESS NOTES
Assessment/Plan:         Diagnoses and all orders for this visit:    Chemotherapy-induced peripheral neuropathy ([facility] )    Type 2 diabetes mellitus without complication, without long-term current use of insulin ([facility] )  -     Ambulatory Referral to Podiatry    Tinea unguium    Hammertoe, bilateral        Diagnosis and options discussed with patient  Patient agreeable to the plan as stated below    -Educated on DM risk to lower extremities, proper shoe gear, and daily monitoring of feet    -Educated on A1C and the risks of poorly controlled Diabetes  Reviewed recent A1C, 6 8  -Discussed weight loss and suitable exercise regiment  Reviewed most recent PCP visit on 5/5/2022    Patient has LE edema, parasthesia, trophic changes to skin  Recommend at risk foot care every 9-12 weeks      Subjective:      Patient ID: Isabella Blanco is a 77 y o  female  Patient presents for diabetic foot care  She has neuropathy in her feet  She had a nerve biopsy many years ago the diagnosed  She gets a lot of tingling  Her BG is well controlled  She developed neuropathy after treatment for lymphoma  Her great toenails are thick and yellow  The following portions of the patient's history were reviewed and updated as appropriate:   She  has a past medical history of Cancer ([facility] ), Chronic pain disorder, Diabetes mellitus ([address] ), Diffuse large B cell lymphoma ([address] ), Dysphagia, GERD without esophagitis, HTN (hypertension), Hyperlipidemia, Hypertension, MI (myocardial infarction) ([address] ), Port-A-Cath in place (07/29/2019), and Thyroid cancer ([address] ) (2018)    She   Patient Active Problem List    Diagnosis Date Noted    Hyperlipidemia     Gait instability 09/06/2021    Polypharmacy 09/06/2021    Frequent falls 09/06/2021    Dementia with behavioral disturbance ([address] ) 09/03/2021    Counseling regarding advanced care planning and goals of care 04/05/2021    Near syncope 03/19/2021    Palpitations 03/19/2021    Grade I diastolic dysfunction without heart failure 03/19/2021    Current use of insulin ([facility] ) 02/17/2021    Encounter for central line care 12/09/2020    Chemotherapy-induced peripheral neuropathy ([facility] ) 10/13/2020    RONAK (obstructive sleep apnea)     Arthritis 04/24/2020    Allergic rhinitis 10/08/2019    Status post chemotherapy 02/23/2019    Current mild episode of major depressive disorder without prior episode ([facility] ) 02/06/2019    Other insomnia 02/06/2019    Rash 02/06/2019    Palliative care patient 01/28/2019    Nausea 11/22/2018    Tremor 11/22/2018    Severe protein-calorie malnutrition ([facility] ) 11/22/2018    Anxiety 11/20/2018    Hyponatremia 11/19/2018    Diffuse large 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 ([facility] ) 11/09/2018    Oropharyngeal dysphagia 11/09/2018    Type 2 diabetes mellitus without complication, without long-term current use of insulin ([place] [address] ) 10/31/2018    Essential hypertension 10/31/2018    Gastroesophageal reflux disease 10/31/2018     She  has a past surgical history that includes Cholecystectomy; Other surgical history; Bone marrow biopsy; Shoulder arthroscopy; IR port placement (11/16/2018); IR port removal (3/22/2021); and Breast biopsy (Left)  Her family history includes Diabetes in her mother; Heart disease in her sister; Hypertension in her brother; Prostate cancer in her paternal grandfather; Uterine cancer in her maternal grandmother  She  reports that she has never smoked  She has never used smokeless tobacco  She reports that she does not drink alcohol and does not use drugs    Current Outpatient Medications   Medication Sig Dispense Refill    Accu-Chek FastClix Lancets MISC Use to test 3x daily 102 each 3    albuterol (PROVENTIL HFA,VENTOLIN HFA) 90 mcg/act inhaler INHALAR 1-2 BOCANADAS A LOS PULMONES CADA 4-6 HORAS ALFREDO SEA NECESARIO 18 g 10    ALPRAZolam (XANAX) 0 25 mg tablet Take 1 tablet (0 25 mg total) by mouth daily at bedtime as needed for anxiety 30 tablet 0    amLODIPine (NORVASC) 5 mg tablet Take 1 tablet (5 mg total) by mouth daily 30 tablet 10    Blood Glucose Monitoring Suppl (OneTouch Verio) w/Device KIT Use as directed to check blood sugar DX E11 9 1 kit 0    Calcium Carb-Cholecalciferol 600-400 MG-UNIT TABS Take 1 tablet by mouth 2 (two) times a day 60 tablet 2    carvedilol (COREG) 12 5 mg tablet Take 1 tablet (12 5 mg total) by mouth 2 (two) times a day with meals 60 tablet 10    cyanocobalamin (VITAMIN B-12) 500 MCG tablet Take 1,000 mcg by mouth daily      Diclofenac Sodium (VOLTAREN) 1 % APLICAR 2 GRAMOS POR VIA TOPICAL CUATRO VECES AL JEFFREY 100 g 10    diphenhydrAMINE (BENADRYL) 2 % cream Apply topically 3 (three) times a day as needed for itching 30 g 0    ezetimibe (ZETIA) 10 mg tablet MAR JOHNNY (1) TABLETA POR VIA ORAL JOHNNY VEZ AL JEFFREY 30 tablet 10    ferrous sulfate 325 (65 Fe) mg tablet Take 325 mg by mouth daily with breakfast      fluticasone (FLONASE) 50 mcg/act nasal spray USAR 1 ROCIADA EN CADA FOSA NASAL DIARIAMENTE 16 g 10    gabapentin (NEURONTIN) 100 mg capsule Take 1 capsule (100 mg total) by mouth 2 (two) times a day 60 capsule 5    glucose blood (Accu-Chek Guide) test strip Use to test 3x daily 100 strip 3    hydrocortisone 2 5 % cream APLICAR POR VIA TOPICA CUATRO VECES AL JEFFREY ALFREDO SEA NECESARIO PARA EL ERUPCION 30 g 10    insulin glargine (Lantus SoloStar) 100 units/mL injection pen Use 7units subcut once dialy 15 mL 5    Insulin Pen Needle (BD Pen Needle Micro U/F) 32G X 6 MM MISC Use daily 100 each 1    loperamide (IMODIUM) 2 mg capsule MAR JOHNNY (1) CAPSULA POR VIA ORAL ALFREDO SEA NECESARIO PARA LA DIARREA 30 capsule 11    losartan (COZAAR) 25 mg tablet MAR 1/2 TABLETA POR VIA ORAL JOHNNY VEZ AL JEFFREY 15 tablet 2    meloxicam (MOBIC) 7 5 mg tablet MAR JOHNNY (1)TABLETA POR VIA ORAL DIARIA CON EL DESAYUNO PARA LA ARTHRITIS   NO TOME CON IBUPROFEN 30 tablet 2    memantine (NAMENDA) 10 mg tablet MAR 1 TABLETA POR VIA ORAL DOS VECES AL JEFFREY 60 tablet 5    metFORMIN (GLUCOPHAGE) 1000 MG tablet MAR JOHNNY (1) TABLETA POR VIA ORAL DOS VECES AL JEFFREY CON COMIDAS 60 tablet 10    mirtazapine (REMERON) 15 mg tablet MAR JOHNNY (1) TABLETA POR VIA ORAL CADA NOCHE A LA HORA DE ACOSTARSE 90 tablet 3    omeprazole (PriLOSEC) 40 MG capsule Take 1 capsule (40 mg total) by mouth daily 90 capsule 1    Tradjenta 5 MG TABS MAR JOHNNY (1) TABLETA POR VIA ORAL JOHNNY VEZ AL JEFFREY 30 tablet 10    benzonatate (TESSALON) 200 MG capsule Take 1 capsule (200 mg total) by mouth 3 (three) times a day (Patient not taking: Reported on 5/2/2022 ) 20 capsule 0     No current facility-administered medications for this visit       Current Outpatient Medications on File Prior to Visit   Medication Sig    Accu-Chek FastClix Lancets MISC Use to test 3x daily    albuterol (PROVENTIL HFA,VENTOLIN HFA) 90 mcg/act inhaler INHALAR 1-2 BOCANADAS A LOS PULMONES CADA 4-6 HORAS ALFREDO SEA NECESARIO    ALPRAZolam (XANAX) 0 25 mg tablet Take 1 tablet (0 25 mg total) by mouth daily at bedtime as needed for anxiety    amLODIPine (NORVASC) 5 mg tablet Take 1 tablet (5 mg total) by mouth daily    Blood Glucose Monitoring Suppl (OneTouch Verio) w/Device KIT Use as directed to check blood sugar DX E11 9    Calcium Carb-Cholecalciferol 600-400 MG-UNIT TABS Take 1 tablet by mouth 2 (two) times a day    carvedilol (COREG) 12 5 mg tablet Take 1 tablet (12 5 mg total) by mouth 2 (two) times a day with meals    cyanocobalamin (VITAMIN B-12) 500 MCG tablet Take 1,000 mcg by mouth daily    Diclofenac Sodium (VOLTAREN) 1 % APLICAR 2 GRAMOS POR VIA TOPICAL CUATRO VECES AL JEFFREY    diphenhydrAMINE (BENADRYL) 2 % cream Apply topically 3 (three) times a day as needed for itching    ezetimibe (ZETIA) 10 mg tablet MAR JOHNNY (1) TABLETA POR VIA ORAL JOHNNY VEZ AL JEFFREY    ferrous sulfate 325 (65 Fe) mg tablet Take 325 mg by mouth daily with breakfast    fluticasone (FLONASE) 50 mcg/act nasal spray USAR 1 ROCIADA EN CADA FOSA NASAL DIARIAMENTE    gabapentin (NEURONTIN) 100 mg capsule Take 1 capsule (100 mg total) by mouth 2 (two) times a day    glucose blood (Accu-Chek Guide) test strip Use to test 3x daily    hydrocortisone 2 5 % cream APLICAR POR VIA TOPICA CUATRO VECES AL JEFFREY ALFREDO SEA NECESARIO PARA EL ERUPCION    insulin glargine (Lantus SoloStar) 100 units/mL injection pen Use 7units subcut once dialy    Insulin Pen Needle (BD Pen Needle Micro U/F) 32G X 6 MM MISC Use daily    loperamide (IMODIUM) 2 mg capsule MAR JOHNNY (1) CAPSULA POR VIA ORAL ALFREDO SEA NECESARIO PARA LA DIARREA    losartan (COZAAR) 25 mg tablet MAR 1/2 TABLETA POR VIA ORAL JOHNNY VEZ AL JEFFREY    meloxicam (MOBIC) 7 5 mg tablet MAR JOHNNY (1)TABLETA POR VIA ORAL DIARIA CON EL DESAYUNO PARA LA ARTHRITIS  NO TOME CON IBUPROFEN    memantine (NAMENDA) 10 mg tablet MAR 1 TABLETA POR VIA ORAL DOS VECES AL JEFFREY    metFORMIN (GLUCOPHAGE) 1000 MG tablet MAR JOHNNY (1) TABLETA POR VIA ORAL DOS VECES AL JEFFREY CON COMIDAS    mirtazapine (REMERON) 15 mg tablet MAR JOHNNY (1) TABLETA POR VIA ORAL CADA NOCHE A LA HORA DE ACOSTARSE    omeprazole (PriLOSEC) 40 MG capsule Take 1 capsule (40 mg total) by mouth daily    Tradjenta 5 MG TABS MAR JOHNNY (1) TABLETA POR VIA ORAL JOHNNY VEZ AL JEFFREY    benzonatate (TESSALON) 200 MG capsule Take 1 capsule (200 mg total) by mouth 3 (three) times a day (Patient not taking: Reported on 5/2/2022 )     No current facility-administered medications on file prior to visit  She has No Known Allergies       Review of Systems   Constitutional: Negative  HENT: Negative  Respiratory: Negative  Cardiovascular: Negative  Gastrointestinal: Negative  Musculoskeletal: Positive for arthralgias and joint swelling  Negative for gait problem  Skin: Positive for color change  Negative for wound  Neurological: Positive for numbness  Negative for weakness     Psychiatric/Behavioral: The patient is not nervous/anxious  Objective:      BP (!) 171/71   Pulse 66   Ht 5' 2" (1 575 m)   Wt 65 8 kg (145 lb)   BMI 26 52 kg/m²     Contains abnormal data POCT hemoglobin A1c  Order: [id number]   Status: Final result     Visible to patient: Yes (seen)     Next appt: 05/10/2022 at 10:00 AM in [facility]     Dx: Type 2 diabetes mellitus without comp        0 Result Notes     1  Topic    Component Ref Range & Units 3/15/22 1026 10/20/21 1201 6/14/21 1026 3/12/21 1320 11/10/20 1612 1/8/20 1047 10/8/19 0850   Hemoglobin A1C 6 5 6 8 Abnormal   6 9 Abnormal   6 8 Abnormal   7 0 Abnormal   14 4 Abnormal   7 7 Abnormal   6 1               Specimen Collected: 03/15/22 10:26 Last Resulted: 03/15/22 10:26                Physical Exam  Vitals reviewed  Constitutional:       Appearance: She is normal weight  HENT:      Nose: No congestion or rhinorrhea  Cardiovascular:      Rate and Rhythm: Normal rate  Pulses: Normal pulses  no weak pulses          Dorsalis pedis pulses are 2+ on the right side and 2+ on the left side  Posterior tibial pulses are 2+ on the right side and 2+ on the left side  Pulmonary:      Effort: Pulmonary effort is normal  No respiratory distress  Musculoskeletal:      Right lower leg: Edema present  Left lower leg: Edema present  Right foot: Normal range of motion  Deformity present  No bunion, Charcot foot, foot drop or prominent metatarsal heads  Left foot: Normal range of motion  Deformity present  No bunion, Charcot foot, foot drop or prominent metatarsal heads  Feet:      Right foot:      Protective Sensation: 10 sites tested  3 sites sensed  Skin integrity: Dry skin present  No ulcer, blister, skin breakdown, erythema, warmth, callus or fissure  Toenail Condition: Right toenails are abnormally thick  Fungal disease present  Left foot:      Protective Sensation: 10 sites tested  4 sites sensed        Skin integrity: Dry skin present  No ulcer, blister, skin breakdown, erythema, warmth, callus or fissure  Toenail Condition: Left toenails are abnormally thick  Fungal disease present  Skin:     Capillary Refill: Capillary refill takes less than 2 seconds  Findings: No erythema or rash  Neurological:      Mental Status: She is alert and oriented to person, place, and time  Sensory: Sensory deficit present  Motor: No weakness  Gait: Gait normal    Psychiatric:         Mood and Affect: Mood normal        Diabetic Foot Exam    Patient's shoes and socks removed  Right Foot/Ankle   Right Foot Inspection  Skin Exam: dry skin  No warmth, no blister, no callus, no erythema, no maceration, no ulcer and no callus  Toe Exam: swelling and right toe deformity  No tenderness and erythema    Sensory   Vibration: absent  Proprioception: diminished  Monofilament testing: diminished    Vascular  Capillary refills: < 3 seconds  The right DP pulse is 2+  The right PT pulse is 2+  Right Toe  - Comprehensive Exam  Ecchymosis: none  Arch: pes planus  Hammertoes: fifth toe  Hallux valgus: no  Swelling: dorsum   Tenderness: none         Left Foot/Ankle  Left Foot Inspection  Skin Exam: dry skin  No warmth, no erythema, no maceration, no ulcer and no callus  Toe Exam: swelling and left toe deformity  No tenderness and no erythema  Sensory   Vibration: absent  Proprioception: diminished  Monofilament testing: diminished    Vascular  Capillary refills: < 3 seconds  The left DP pulse is 2+  The left PT pulse is 2+       Left Toe  - Comprehensive Exam  Ecchymosis: none  Arch: pes planus  Hammertoes: fifth toe  Hallux valgus: no  Swelling: dorsum   Tenderness: none           Assign Risk Category  Deformity present  Loss of protective sensation  No weak pulses  Risk: 2

## 2022-05-10 ENCOUNTER — OFFICE VISIT (OUTPATIENT)
Dept: FAMILY MEDICINE CLINIC | Facility: CLINIC | Age: 77
End: 2022-05-10

## 2022-05-10 VITALS
OXYGEN SATURATION: 99 % | RESPIRATION RATE: 18 BRPM | SYSTOLIC BLOOD PRESSURE: 160 MMHG | TEMPERATURE: 97.8 F | HEART RATE: 71 BPM | WEIGHT: 145.9 LBS | DIASTOLIC BLOOD PRESSURE: 82 MMHG | BODY MASS INDEX: 26.85 KG/M2 | HEIGHT: 62 IN

## 2022-05-10 DIAGNOSIS — I10 ESSENTIAL HYPERTENSION: ICD-10-CM

## 2022-05-10 DIAGNOSIS — R09.89 CHEST CONGESTION: Primary | ICD-10-CM

## 2022-05-10 PROCEDURE — 1036F TOBACCO NON-USER: CPT | Performed by: NURSE PRACTITIONER

## 2022-05-10 PROCEDURE — 4010F ACE/ARB THERAPY RXD/TAKEN: CPT | Performed by: NURSE PRACTITIONER

## 2022-05-10 PROCEDURE — 99214 OFFICE O/P EST MOD 30 MIN: CPT | Performed by: NURSE PRACTITIONER

## 2022-05-10 PROCEDURE — 3725F SCREEN DEPRESSION PERFORMED: CPT | Performed by: NURSE PRACTITIONER

## 2022-05-10 PROCEDURE — 3079F DIAST BP 80-89 MM HG: CPT | Performed by: NURSE PRACTITIONER

## 2022-05-10 PROCEDURE — 1160F RVW MEDS BY RX/DR IN RCRD: CPT | Performed by: NURSE PRACTITIONER

## 2022-05-10 PROCEDURE — 4010F ACE/ARB THERAPY RXD/TAKEN: CPT | Performed by: PODIATRIST

## 2022-05-10 PROCEDURE — 3077F SYST BP >= 140 MM HG: CPT | Performed by: NURSE PRACTITIONER

## 2022-05-10 RX ORDER — LOSARTAN POTASSIUM 50 MG/1
50 TABLET ORAL DAILY
Qty: 60 TABLET | Refills: 0 | Status: SHIPPED | OUTPATIENT
Start: 2022-05-10 | End: 2022-07-11

## 2022-05-10 RX ORDER — AZITHROMYCIN 250 MG/1
TABLET, FILM COATED ORAL
Qty: 6 TABLET | Refills: 0 | Status: SHIPPED | OUTPATIENT
Start: 2022-05-10 | End: 2022-05-14

## 2022-05-10 NOTE — ASSESSMENT & PLAN NOTE
BP not at goal today or on past several encounters - recommend increase losartan to 50 mg daily, continue with carvedilol 12 5 mg bid, and amlodipine 5 mg daily   Discussed with granddaughter, she will check BP BID and record, she will send me readings in one week via [employer] or to Complex Care RN

## 2022-05-10 NOTE — ASSESSMENT & PLAN NOTE
She has dry cough and chest congestion x several weeks   On exam she does have faint wheezes in the BL bases   Will have her complete CXR and treat with zpack   Granddaughter to inform me if sx do not improve after completing medication

## 2022-05-10 NOTE — PROGRESS NOTES
Assessment/Plan:    Essential hypertension  BP not at goal today or on past several encounters - recommend increase losartan to 50 mg daily, continue with carvedilol 12 5 mg bid, and amlodipine 5 mg daily   Discussed with granddaughter, she will check BP BID and record, she will send me readings in one week via [employer] or to [facility] RN     Chest congestion  She has dry cough and chest congestion x several weeks   On exam she does have faint wheezes in the BL bases   Will have her complete CXR and treat with zpack   Granddaughter to inform me if sx do not improve after completing medication     Mya Mcclelland was seen today for hypertension  Diagnoses and all orders for this visit:    Chest congestion  -     azithromycin (ZITHROMAX) 250 mg tablet; Take 2 tablets today then 1 tablet daily x 4 days  -     XR chest pa & lateral; Future    Essential hypertension  -     losartan (COZAAR) 50 mg tablet; Take 1 tablet (50 mg total) by mouth daily          Return in about 3 months (around 8/10/2022) for htn   Patient Instructions     Hypertension and Diabetes   AMBULATORY CARE:   Hypertension  is high blood pressure (BP)  Hypertension is common in persons with diabetes  This type of hypertension is called secondary hypertension  A normal BP is 119/79 or lower  You can control hypertension and diabetes with a healthy lifestyle, or a combination of lifestyle and medicine  Controlled BP and blood sugar levels help prevent certain complications from diabetes  Examples include retinopathy (eye damage) and kidney damage  Common signs and symptoms include the following:   · Headache    · Blurred vision    · Chest pain    · Dizziness or weakness    · Trouble breathing    · Nosebleeds    Call or have someone call your local emergency number (911 in the [address] [place] [address],[address]) for any of the following:   You have any of the following signs of a heart attack:   · Squeezing, pressure, or pain in your chest    · You may  also have any of the following:     ? Discomfort or pain in your back, neck, jaw, stomach, or arm    ? Shortness of breath    ? Nausea or vomiting    ? Lightheadedness or a sudden cold sweat  You have any of the following signs of a stroke:   · Numbness or drooping on one side of your face     · Weakness in an arm or leg    · Confusion or difficulty speaking    · Dizziness, a severe headache, or vision loss    Call your doctor or diabetes care team if:   · You feel faint, dizzy, confused, or drowsy  · You have been taking your BP medicine and your BP is still higher than your healthcare provider says it should be  · You have questions or concerns about your condition or care  Treatment for hypertension and diabetes  may include lifestyle changes  You may also need medicines to lower your BP, blood sugar, and cholesterol levels  A low cholesterol level helps prevent heart disease and makes it easier to control your BP  Take your medicine exactly as directed  Manage hypertension and diabetes:  Talk with your healthcare provider about these and other ways to manage hypertension and diabetes:  · Check your BP at home  Avoid smoking, caffeine, and exercise at least 30 minutes before checking your BP  Sit and rest for 5 minutes before you take your blood pressure  Extend your arm and support it on a flat surface  Your arm should be at the same level as your heart  Follow the directions that came with your BP monitor  Check your BP 2 times, 1 minute apart, before you take your medicine in the morning  Also check your BP before your evening meal  Keep a record of your readings and bring it to your follow-up visits  Ask your healthcare provider what your BP should be  · Check your blood sugar level at home  Follow your healthcare provider's instructions and check your blood sugar level as directed  You may need to check a drop of blood in a glucose test machine   Your care team provider may recommend a continuous glucose monitor (CGM)  A CGM is a device that is worn at all times  The CGM checks your blood sugar every 5 minutes  It sends results to an electronic device such as a smart phone  Keep a record of your blood sugar level readings and bring it to your follow-up visits  Ask your healthcare provider what your blood sugar levels should be  · Manage any other health conditions you have  Health conditions such as kidney disease, thyroid disease, or adrenal gland disorder can increase your BP and blood sugar levels  Follow your healthcare provider's instructions and take all your medicines as directed  Lifestyle changes you can make:  Talk with your healthcare provider about these and other lifestyle changes for hypertension and diabetes:  · Limit sodium (salt) as directed  Too much sodium can affect your fluid balance  Check labels to find low-sodium or no-salt-added foods  Some low-sodium foods use potassium salts for flavor  Too much potassium can also cause health problems  Your healthcare provider will tell you how much sodium and potassium are safe for you to have in a day  He or she may recommend that you limit sodium to 2,300 mg a day  · Follow the meal plan recommended by your healthcare provider  A dietitian or your provider can help you create healthy meal plans  The plans will help you control sodium, carbohydrates, and fats in your meals  This can help you control both your blood sugar and BP levels  The plans usually include eating more fruits, vegetables, and low-fat dairy products  Your provider may talk to you about a Mediterranean style and Dietary Approaches to Stop Hypertension (DASH) eating plans  These eating plans can help you with weight loss and lowering your cholesterol  · Get regular physical activity  Physical activity can help decrease your blood sugar level  It can also help to decrease your risk for heart disease and help you lose weight   Adults should have moderate intensity physical activity for at least 150 minutes every week  Spread the amount of activity over at least 3 days a week  Do not skip more than 2 days in a row  Children should get at least 60 minutes of moderate physical activity on most days of the week  Examples of moderate physical activity include brisk walking, running, and swimming  Do not sit for longer than 30 minutes  Work with your healthcare provider to create a plan for physical activity  · Decrease stress  This may help lower your BP  Learn ways to relax, such as deep breathing or listening to music  Yoga and meditation may also help  Talk to your healthcare provider about ways to decrease stress  · Know the risks if you choose to drink alcohol  Alcohol can cause your blood sugar levels to be low if you use insulin  Alcohol can cause high blood sugar and BP levels, and weight gain if you drink too much  Women 21 years or older and men 72 years or older should limit alcohol to 1 drink a day  Men aged 24 to 59 years should limit alcohol to 2 drinks a day  A drink of alcohol is 12 ounces of beer, 5 ounces of wine, or 1½ ounces of liquor  · Do not smoke  Nicotine and other chemicals in cigarettes and cigars can increase your BP and make your blood sugar levels harder to control  Ask your healthcare provider for information if you currently smoke and need help to quit  E-cigarettes or smokeless tobacco still contain nicotine  Talk to your healthcare provider before you use these products  Follow up with your doctor or diabetes care team as directed: You will need to return to have your BP checked  You will also need other lab tests done, including an A1C to monitor your overall blood sugar control  Write down your questions so you remember to ask them during your visits  © Copyright [employer] 2022 Information is for End User's use only and may not be sold, redistributed or otherwise used for commercial purposes   All illustrations and images included in CareNotes® are the copyrighted property of [employer]  or [facility] Sima Mcfarland   The above information is an  only  It is not intended as medical advice for individual conditions or treatments  Talk to your doctor, nurse or pharmacist before following any medical regimen to see if it is safe and effective for you  Subjective:     Jose Martin Schilling is a 77 y o  female who  has a past medical history of Cancer ([facility] ), Chronic pain disorder, Diabetes mellitus ([facility] ), Diffuse large B cell lymphoma ([facility] ), Dysphagia, GERD without esophagitis, HTN (hypertension), Hyperlipidemia, Hypertension, MI (myocardial infarction) ([facility] ), Port-A-Cath in place, and Thy[facility] )  who presented to the office today for elevated BP  She is accompanied by her granddaughter who is her caretaker  Patient denies HA, chest pain, shortness of breath but does c/o dry cough and congestion for last several weeks       The following portions of the patient's history were reviewed and updated as appropriate: allergies, current medications, past family history, past medical history, past social history, past surgical history and problem list     Current Outpatient Medications on File Prior to Visit   Medication Sig Dispense Refill    Accu-Chek FastClix Lancets MISC Use to test 3x daily 102 each 3    albuterol (PROVENTIL HFA,VENTOLIN HFA) 90 mcg/act inhaler INHALAR 1-2 BOCANADAS A LOS PULMONES CADA 4-6 HORAS ALFREDO SEA NECESARIO 18 g 10    ALPRAZolam (XANAX) 0 25 mg tablet Take 1 tablet (0 25 mg total) by mouth daily at bedtime as needed for anxiety 30 tablet 0    amLODIPine (NORVASC) 5 mg tablet Take 1 tablet (5 mg total) by mouth daily 30 tablet 10    Blood Glucose Monitoring Suppl (OneTouch Verio) w/Device KIT Use as directed to check blood sugar DX E11 9 1 kit 0    Calcium Carb-Cholecalciferol 600-400 MG-UNIT TABS Take 1 tablet by mouth 2 (two) times a day 60 tablet 2    carvedilol (COREG) 12 5 mg tablet Take 1 tablet (12 5 mg total) by mouth 2 (two) times a day with meals 60 tablet 10    cyanocobalamin (VITAMIN B-12) 500 MCG tablet Take 1,000 mcg by mouth daily      Diclofenac Sodium (VOLTAREN) 1 % APLICAR 2 GRAMOS POR VIA TOPICAL CUATRO VECES AL JEFFREY 100 g 10    diphenhydrAMINE (BENADRYL) 2 % cream Apply topically 3 (three) times a day as needed for itching 30 g 0    ezetimibe (ZETIA) 10 mg tablet MAR JOHNNY (1) TABLETA POR VIA ORAL JOHNNY VEZ AL JEFFREY 30 tablet 10    ferrous sulfate 325 (65 Fe) mg tablet Take 325 mg by mouth daily with breakfast      fluticasone (FLONASE) 50 mcg/act nasal spray USAR 1 ROCIADA EN CADA FOSA NASAL DIARIAMENTE 16 g 10    gabapentin (NEURONTIN) 100 mg capsule Take 1 capsule (100 mg total) by mouth 2 (two) times a day 60 capsule 5    glucose blood (Accu-Chek Guide) test strip Use to test 3x daily 100 strip 3    hydrocortisone 2 5 % cream APLICAR POR VIA TOPICA CUATRO VECES AL JEFFREY ALFREDO SEA NECESARIO PARA EL ERUPCION 30 g 10    insulin glargine (Lantus SoloStar) 100 units/mL injection pen Use 7units subcut once dialy 15 mL 5    Insulin Pen Needle (BD Pen Needle Micro U/F) 32G X 6 MM MISC Use daily 100 each 1    loperamide (IMODIUM) 2 mg capsule MAR JOHNNY (1) CAPSULA POR VIA ORAL ALFREDO SEA NECESARIO PARA LA DIARREA 30 capsule 11    meloxicam (MOBIC) 7 5 mg tablet MAR JOHNNY (1)TABLETA POR VIA ORAL DIARIA CON EL DESAYUNO PARA LA ARTHRITIS   NO TOME CON IBUPROFEN 30 tablet 2    memantine (NAMENDA) 10 mg tablet MAR 1 TABLETA POR VIA ORAL DOS VECES AL JEFFREY 60 tablet 5    metFORMIN (GLUCOPHAGE) 1000 MG tablet MAR JOHNNY (1) TABLETA POR VIA ORAL DOS VECES AL JEFFREY CON COMIDAS 60 tablet 10    mirtazapine (REMERON) 15 mg tablet MAR JOHNNY (1) TABLETA POR VIA ORAL CADA NOCHE A LA HORA DE ACOSTARSE 90 tablet 3    omeprazole (PriLOSEC) 40 MG capsule Take 1 capsule (40 mg total) by mouth daily 90 capsule 1    Tradjenta 5 MG TABS MAR JOHNNY (1) TABLETA POR VIA ORAL JOHNNY VEZ AL JEFFREY 30 tablet 10    [DISCONTINUED] benzonatate (TESSALON) 200 MG capsule Take 1 capsule (200 mg total) by mouth 3 (three) times a day (Patient not taking: Reported on 5/2/2022 ) 20 capsule 0    [DISCONTINUED] losartan (COZAAR) 25 mg tablet MAR 1/2 TABLETA POR VIA ORAL JOHNNY VEZ AL JEFFREY 15 tablet 2     No current facility-administered medications on file prior to visit  Review of Systems   Constitutional: Negative for chills and fever  HENT: Positive for congestion  Negative for ear pain and sore throat  Eyes: Negative for pain and visual disturbance  Respiratory: Positive for cough  Negative for shortness of breath  Cardiovascular: Negative for chest pain and palpitations  Gastrointestinal: Negative for abdominal pain, blood in stool, constipation, diarrhea, nausea and vomiting  Genitourinary: Negative for dysuria and hematuria  Musculoskeletal: Negative for arthralgias and back pain  Skin: Negative for color change and rash  Neurological: Negative for seizures and syncope  Psychiatric/Behavioral: Negative for dysphoric mood and sleep disturbance  All other systems reviewed and are negative  Objective:    /82 (BP Location: Left arm, Patient Position: Sitting, Cuff Size: Standard)   Pulse 71   Temp 97 8 °F (36 6 °C) (Temporal)   Resp 18   Ht 5' 2" (1 575 m)   Wt 66 2 kg (145 lb 14 4 oz)   SpO2 99%   Breastfeeding No   BMI 26 69 kg/m²     Physical Exam  Vitals and nursing note reviewed  Constitutional:       General: She is not in acute distress  Appearance: She is well-developed  She is not diaphoretic  Comments: Elderly appearing   HENT:      Head: Normocephalic and atraumatic  Right Ear: External ear normal       Left Ear: External ear normal    Eyes:      General:         Right eye: No discharge  Left eye: No discharge  Pupils: Pupils are equal, round, and reactive to light     Cardiovascular:      Rate and Rhythm: Normal rate and regular rhythm  Pulmonary:      Effort: Pulmonary effort is normal  No respiratory distress  Breath sounds: Examination of the right-lower field reveals wheezing  Examination of the left-lower field reveals wheezing  Wheezing present  Abdominal:      General: Bowel sounds are normal  There is no distension  Palpations: Abdomen is soft  Tenderness: There is no abdominal tenderness  Musculoskeletal:      Cervical back: Normal range of motion and neck supple  Right lower leg: No edema  Left lower leg: No edema  Lymphadenopathy:      Cervical: No cervical adenopathy  Skin:     General: Skin is warm and dry  Capillary Refill: Capillary refill takes less than 2 seconds  Findings: No rash  Neurological:      Mental Status: She is alert  Mental status is at baseline     Psychiatric:         Behavior: Behavior normal          MOIRA Champion  05/10/22  5:03 PM

## 2022-05-25 ENCOUNTER — PATIENT OUTREACH (OUTPATIENT)
Dept: FAMILY MEDICINE CLINIC | Facility: CLINIC | Age: 77
End: 2022-05-25

## 2022-05-25 NOTE — PROGRESS NOTES
I called Maude Carlson but received voicemail  I started leaving a message but then the call was ended  I will continue further outreach

## 2022-06-01 DIAGNOSIS — F41.8 ANXIETY ABOUT HEALTH: ICD-10-CM

## 2022-06-01 DIAGNOSIS — M19.90 ARTHRITIS: ICD-10-CM

## 2022-06-02 RX ORDER — ALPRAZOLAM 0.25 MG/1
TABLET ORAL
Qty: 30 TABLET | Refills: 0 | Status: SHIPPED | OUTPATIENT
Start: 2022-06-02 | End: 2022-07-13

## 2022-06-02 RX ORDER — MELOXICAM 7.5 MG/1
TABLET ORAL
Qty: 30 TABLET | Refills: 10 | Status: SHIPPED | OUTPATIENT
Start: 2022-06-02

## 2022-06-06 ENCOUNTER — PATIENT OUTREACH (OUTPATIENT)
Dept: FAMILY MEDICINE CLINIC | Facility: CLINIC | Age: 77
End: 2022-06-06

## 2022-06-06 NOTE — PROGRESS NOTES
I called Susie Ryan but received voicemail  Message was left stating I will call back at another time

## 2022-06-07 NOTE — PATIENT INSTRUCTIONS
PRESCRIPTION REFILL REMINDER:  All medication refills should be requested prior to [facility] on Friday  Any refill requests after noon on Friday would be addressed the following Monday  Please protect yourself from [place]   = Wash your hands  Soap and water, or hand  with at least 60% alcohol, are both effective at killing the virus  = Wear a mask  This will help protect others from any virus particles you might spread  Your mouth and nose BOTH need to be covered  = Keep the distance  Keep 6 feet of distance from other people, even if they seem healthy  Keeping distance protects you from the other person's virus spread     = Get a vaccine, and boost it  Three vaccines are approved for use in the [place] for all adults  These vaccines do provide protection against all known variants   + Pfizer is FDA approved for all persons age 11 and older   + Valla Senna may be given to all person age 25 and older   - We do NOT recommend [address] vaccine for our Palliative Care patients  = Call 0-867-GOIFICU (Choose Option 7 for faster service!) to get scheduled for your shot   = [facility], [employer], [facility], [employer], and many [facility] locations ALSO have shots   + The CDC recommends booster shots on ALL vaccines for ALL adults  = https://[employer]/  = If you are over 50 or have an immune compromise, the CDC recommends a fourth shot     = Test to treat  If you have symptoms, get tested, and get treatment!   New drugs like Paxlovid can prevent you from ever having to go to the hospital , and may be covered completely by your insurance or special government programs    - https://aspr [facility]hs gov/TestToTreat/      Informacion en espanol sobre vacunas, de nos companeros de [facility] --  Alexis arriaga    Check out [employer] for [address] that are updated daily:    http://www [employer]/     Global Epidemics  Org, from [facility] will give you [id number] information on virus cases and vaccination rates:    Https://globalepidemics  org/    Frequently Asked Questions about COVID, answered by [facility]    [employer]

## 2022-06-08 ENCOUNTER — OFFICE VISIT (OUTPATIENT)
Dept: PALLIATIVE MEDICINE | Facility: CLINIC | Age: 77
End: 2022-06-08
Payer: COMMERCIAL

## 2022-06-08 VITALS
DIASTOLIC BLOOD PRESSURE: 70 MMHG | BODY MASS INDEX: 26.98 KG/M2 | SYSTOLIC BLOOD PRESSURE: 160 MMHG | HEIGHT: 62 IN | WEIGHT: 146.6 LBS | RESPIRATION RATE: 16 BRPM | TEMPERATURE: 96.8 F | HEART RATE: 76 BPM | OXYGEN SATURATION: 97 %

## 2022-06-08 DIAGNOSIS — C83.31 DIFFUSE LARGE B-CELL LYMPHOMA OF LYMPH NODES OF NECK (HCC): ICD-10-CM

## 2022-06-08 DIAGNOSIS — F03.91 DEMENTIA WITH BEHAVIORAL DISTURBANCE, UNSPECIFIED DEMENTIA TYPE (HCC): ICD-10-CM

## 2022-06-08 DIAGNOSIS — Z71.89 COUNSELING REGARDING ADVANCED CARE PLANNING AND GOALS OF CARE: Primary | ICD-10-CM

## 2022-06-08 DIAGNOSIS — F41.9 ANXIETY: ICD-10-CM

## 2022-06-08 PROBLEM — E43 SEVERE PROTEIN-CALORIE MALNUTRITION (HCC): Status: RESOLVED | Noted: 2018-11-22 | Resolved: 2022-06-08

## 2022-06-08 PROCEDURE — 99214 OFFICE O/P EST MOD 30 MIN: CPT | Performed by: INTERNAL MEDICINE

## 2022-06-08 PROCEDURE — 1036F TOBACCO NON-USER: CPT | Performed by: INTERNAL MEDICINE

## 2022-06-08 PROCEDURE — 1160F RVW MEDS BY RX/DR IN RCRD: CPT | Performed by: INTERNAL MEDICINE

## 2022-06-08 PROCEDURE — 99497 ADVNCD CARE PLAN 30 MIN: CPT | Performed by: INTERNAL MEDICINE

## 2022-06-08 NOTE — PROGRESS NOTES
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)       · Continues to do well  Per granddaughter/POA/caretaker, dementia/concentration appears much better since starting namenda  · Continue alprazolam 0 25mg OD prn anxiety  · Continue mobic OD prn arthritis  · Continue remeron 15mg PO ODHS  · From the DLBCL standpoint, she is stable with no hint of clinical recurrence per last heme onc note from 3/4/2022  · 5 Wishes completed, signed and witnessed on today's visit  · She names granddaughter/caretaker - Loli Robledo as 1st agent; son/Star Cueva as 2nd; and son/Josef Cueva as 3rd  · She wants to receive full treatments, including life sustaining treatments, for life prolongation but wants them stopped if the doctors believe that they are no longer helpful  · The original document plus 3 copies were provided to them   · A copy is scanned in her chart  · RTO in 6 months, then prn afterwards if appropriate    Requested Prescriptions      No prescriptions requested or ordered in this encounter     There are no discontinued medications  Representatives have queried the patient's controlled substance dispensing history in the Prescription Drug Monitoring Program in compliance with regulations before I have prescribed any controlled substances  The prescription history is consistent with prescribed therapy and our practice policies        30 minutes were spent face to face with her granddaughter who is also her caregiver  with greater than 50% of the time spent in counseling or coordination of care including discussions of etiology of diagnosis, risks and benefits of treatment, instructions for disease self management, treatment instructions, follow up requirements and risk factors and risk reduction of disease   All of the patient's questions were answered during this discussion  No follow-ups on file  Subjective:   Chief Complaint  Follow up visit for:  symptom management, assessment of goals of care, disease process education and discussion of prognosis, advance care planning  CEDRIC     Michelle Barnes is a 77 y o  female with a palliative diagnosis of moderate Alzheimer's dementia  She also has a Hx of Diffuse large B cell lymphoma (diagnosed in 9/2018), finished treatment in 2019 and is currently on remission  She is seeing [facility] for supportive cares/continued cancer survivorship  She also has Hx of HTN, HLD, DM2, GERD  She was seen on virtual visit on 2/9/2021 with colleague/Dr Jaz Cunningham who continued her on xanax prn for anxiety, meloxicam for chronic back pain and remeron  She was last seen on 1/5/2022 where she was stable and doing well  At that time, she recently was formally diagnosed with moderate stage Alzheimer's dementia after completing work up and comprehensive geriatric assessment with gerontology  She was placed on Namenda  Since that visit, she has followed up with oncology on 3/4 where she was remains on clinical remission and will be seen again in 6 months for continued surveillance  She also saw gerontology on 5/5 who continued her on Namenda  She was encouraged to attend adult day care to improve on socialization, physical and cognitive activities  She returns today for follow up with her granddaughter and her now 4 month old great granddaughter  She appears healthy and stable  Her granddaughter feels that her concentration is much better since starting Namenda  They are going to [facility] for the patient's birthday in July and they have arranged a surprise birthday party for her there  They have completed the 5 Wishes and we provided witnesses on today's visit  ACP/Social: Patient lives with her granddaughter/Clair who serves as her primary caregiver; as well as her granddaughters 3 children - ages 10 1and 3year old  Her cousin and her cousin's daughter also live with them  She names granddaughter/caretaker - Chapis Joyce as 1st agent; son/Star Biggs as 2nd; and son/Josef Biggs as 3rd  She wants to receive full treatments, including life sustaining treatments, for life prolongation but wants them stopped if the doctors believe that they are no longer helpful  The original document plus 3 copies were provided to them  The following portions of the medical history were reviewed: past medical history, problem list, medication list, and social history      Current Outpatient Medications:     Accu-Chek FastClix Lancets MISC, Use to test 3x daily, Disp: 102 each, Rfl: 3    albuterol (PROVENTIL HFA,VENTOLIN HFA) 90 mcg/act inhaler, INHALAR 1-2 BOCANADAS A LOS PULMONES CADA 4-6 HORAS ALFREDO SEA NECESARIO, Disp: 18 g, Rfl: 10    ALPRAZolam (XANAX) 0 25 mg tablet, MAR 1 TABLETA POR VIA ORAL DIARIAMENTE A LA HORA DE ACOSTARSE PARA ANSIEDAD, Disp: 30 tablet, Rfl: 0    amLODIPine (NORVASC) 5 mg tablet, Take 1 tablet (5 mg total) by mouth daily, Disp: 30 tablet, Rfl: 10    Blood Glucose Monitoring Suppl (OneTouch Verio) w/Device KIT, Use as directed to check blood sugar DX E11 9, Disp: 1 kit, Rfl: 0    Calcium Carb-Cholecalciferol 600-400 MG-UNIT TABS, Take 1 tablet by mouth 2 (two) times a day, Disp: 60 tablet, Rfl: 2    carvedilol (COREG) 12 5 mg tablet, Take 1 tablet (12 5 mg total) by mouth 2 (two) times a day with meals, Disp: 60 tablet, Rfl: 10    cyanocobalamin (VITAMIN B-12) 500 MCG tablet, Take 1,000 mcg by mouth daily, Disp: , Rfl:     Diclofenac Sodium (VOLTAREN) 1 %, APLICAR 2 GRAMOS POR VIA TOPICAL CUATRO VECES AL JEFFREY, Disp: 100 g, Rfl: 10    diphenhydrAMINE (BENADRYL) 2 % cream, Apply topically 3 (three) times a day as needed for itching, Disp: 30 g, Rfl: 0    ezetimibe (ZETIA) 10 mg tablet, MAR JOHNNY (1) TABLETA POR VIA ORAL JOHNNY VEZ AL JEFFREY, Disp: 30 tablet, Rfl: 10    ferrous sulfate 325 (65 Fe) mg tablet, Take 325 mg by mouth daily with breakfast, Disp: , Rfl:     fluticasone (FLONASE) 50 mcg/act nasal spray, USAR 1 ROCIADA EN CADA FOSA NASAL DIARIAMENTE, Disp: 16 g, Rfl: 10    gabapentin (NEURONTIN) 100 mg capsule, Take 1 capsule (100 mg total) by mouth 2 (two) times a day, Disp: 60 capsule, Rfl: 5    glucose blood (Accu-Chek Guide) test strip, Use to test 3x daily, Disp: 100 strip, Rfl: 3    hydrocortisone 2 5 % cream, APLICAR POR VIA TOPICA CUATRO VECES AL JEFFREY ALFREDO SEA NECESARIO PARA EL ERUPCION, Disp: 30 g, Rfl: 10    insulin glargine (Lantus SoloStar) 100 units/mL injection pen, Use 7units subcut once dialy, Disp: 15 mL, Rfl: 5    losartan (COZAAR) 50 mg tablet, Take 1 tablet (50 mg total) by mouth daily, Disp: 60 tablet, Rfl: 0    meloxicam (MOBIC) 7 5 mg tablet, MAR JOHNNY (1)TABLETA POR VIA ORAL DIARIA CON EL DESAYUNO PARA LA ARTHRITIS   NO TOME CON IBUPROFEN, Disp: 30 tablet, Rfl: 10    memantine (NAMENDA) 10 mg tablet, MAR 1 TABLETA POR VIA ORAL DOS VECES AL JEFFREY, Disp: 60 tablet, Rfl: 5    metFORMIN (GLUCOPHAGE) 1000 MG tablet, MAR JOHNNY (1) TABLETA POR VIA ORAL DOS VECES AL JEFFREY CON COMIDAS, Disp: 60 tablet, Rfl: 10    mirtazapine (REMERON) 15 mg tablet, MAR JOHNNY (1) TABLETA POR VIA ORAL CADA NOCHE A LA HORA DE ACOSTARSE, Disp: 90 tablet, Rfl: 3    omeprazole (PriLOSEC) 40 MG capsule, Take 1 capsule (40 mg total) by mouth daily, Disp: 90 capsule, Rfl: 1    Tradjenta 5 MG TABS, MAR JOHNNY (1) TABLETA POR VIA ORAL JOHNNY VEZ AL JEFFREY, Disp: 30 tablet, Rfl: 10    Insulin Pen Needle (BD Pen Needle Micro U/F) 32G X 6 MM MISC, Use daily, Disp: 100 each, Rfl: 1    loperamide (IMODIUM) 2 mg capsule, MAR JOHNNY (1) CAPSULA POR VIA ORAL ALFREDO SEA NECESARIO PARA LA DIARREA (Patient not taking: Reported on 6/8/2022), Disp: 30 capsule, Rfl: 11  Review of Systems   Constitutional: Negative for activity change, appetite change, chills, fatigue and fever  HENT: Negative for trouble swallowing  Respiratory: Negative for cough and shortness of breath  Cardiovascular: Negative for chest pain  Gastrointestinal: Negative for abdominal pain, constipation, diarrhea, nausea and vomiting  Musculoskeletal: Negative for back pain  Neurological:        Forgetfulness   Psychiatric/Behavioral: Negative for sleep disturbance  The patient is not nervous/anxious  All other systems reviewed and are negative  All other systems negative    Objective:  Vital Signs  /70 (BP Location: Left arm, Patient Position: Sitting, Cuff Size: Standard)   Pulse 76   Temp (!) 96 8 °F (36 °C) (Temporal)   Resp 16   Ht 5' 2" (1 575 m)   Wt 66 5 kg (146 lb 9 6 oz)   SpO2 97%   BMI 26 81 kg/m²    Physical Exam    Constitutional: Appears well-developed and well-nourished  Appears as stated age, appears well-kempt  Did not appear sick/toxic-looking  Pleasant  She appears healthier than on previous visit  In no acute physical or emotional distress  Head: Normocephalic and atraumatic  Eyes: EOM are normal  No ocular discharge  No scleral icterus  Neck: No visible adenopathy or masses  Respiratory: Effort normal  No stridor  No respiratory distress  Gastrointestinal: No abdominal distension  Musculoskeletal: No edema  Neurological: Alert, oriented and appropriately conversant  Skin: No diaphoresis, no rashes seen on exposed areas of skin  Psychiatric: Displays a normal mood and affect   Behavior, judgement and thought content appear normal      Samy Doty MD  Palliative Medicine & Supportive Care  Internal Medicine  Available via [address] [place] [address] Text  Office: [phone]  Fax: [phone]

## 2022-06-17 ENCOUNTER — PATIENT OUTREACH (OUTPATIENT)
Dept: FAMILY MEDICINE CLINIC | Facility: CLINIC | Age: 77
End: 2022-06-17

## 2022-06-17 NOTE — PROGRESS NOTES
I called Giuliana Ricci but my message kept getting cut off; I was reminding her of the patient's PCP appointment on Monday, 6/20 at [address] I will continue further outreach

## 2022-06-20 ENCOUNTER — OFFICE VISIT (OUTPATIENT)
Dept: FAMILY MEDICINE CLINIC | Facility: CLINIC | Age: 77
End: 2022-06-20

## 2022-06-20 VITALS
TEMPERATURE: 97.1 F | BODY MASS INDEX: 26.92 KG/M2 | RESPIRATION RATE: 16 BRPM | HEIGHT: 62 IN | OXYGEN SATURATION: 97 % | SYSTOLIC BLOOD PRESSURE: 126 MMHG | HEART RATE: 64 BPM | WEIGHT: 146.3 LBS | DIASTOLIC BLOOD PRESSURE: 76 MMHG

## 2022-06-20 DIAGNOSIS — E11.9 TYPE 2 DIABETES MELLITUS WITHOUT COMPLICATION, WITHOUT LONG-TERM CURRENT USE OF INSULIN (HCC): Primary | ICD-10-CM

## 2022-06-20 DIAGNOSIS — I10 ESSENTIAL HYPERTENSION: ICD-10-CM

## 2022-06-20 LAB — SL AMB POCT HEMOGLOBIN AIC: 6.8 (ref ?–6.5)

## 2022-06-20 PROCEDURE — 1160F RVW MEDS BY RX/DR IN RCRD: CPT | Performed by: NURSE PRACTITIONER

## 2022-06-20 PROCEDURE — 3044F HG A1C LEVEL LT 7.0%: CPT | Performed by: INTERNAL MEDICINE

## 2022-06-20 PROCEDURE — 83036 HEMOGLOBIN GLYCOSYLATED A1C: CPT | Performed by: NURSE PRACTITIONER

## 2022-06-20 PROCEDURE — 3044F HG A1C LEVEL LT 7.0%: CPT | Performed by: NURSE PRACTITIONER

## 2022-06-20 PROCEDURE — 1036F TOBACCO NON-USER: CPT | Performed by: NURSE PRACTITIONER

## 2022-06-20 PROCEDURE — 3074F SYST BP LT 130 MM HG: CPT | Performed by: NURSE PRACTITIONER

## 2022-06-20 PROCEDURE — 99214 OFFICE O/P EST MOD 30 MIN: CPT | Performed by: NURSE PRACTITIONER

## 2022-06-20 PROCEDURE — 3078F DIAST BP <80 MM HG: CPT | Performed by: NURSE PRACTITIONER

## 2022-06-20 NOTE — ASSESSMENT & PLAN NOTE
Lab Results   Component Value Date    HGBA1C 6 8 (A) 06/20/2022     A1c continues to be well controlled since patient is living with grand daughter and she is caregiver   Patient is now following with endocrinology  Agree with continue Lantus 8 units HS, Tradjenta daily, and metformin

## 2022-06-20 NOTE — ASSESSMENT & PLAN NOTE
Blood pressure well controlled in the office today at 126/76  Continue current regimen: losartan 25 mg daily, carvedilol 12 5 mg bid, and amlodipine 5 mg daily

## 2022-06-20 NOTE — PROGRESS NOTES
Assessment/Plan:    Type 2 diabetes mellitus without complication, without long-term current use of insulin (HCC)    Lab Results   Component Value Date    HGBA1C 6 8 (A) 06/20/2022     A1c continues to be well controlled since patient is living with grand daughter and she is caregiver   Patient is now following with endocrinology  Agree with continue Lantus 8 units HS, Tradjenta daily, and metformin     Essential hypertension  Blood pressure well controlled in the office today at 126/76  Continue current regimen: losartan 25 mg daily, carvedilol 12 5 mg bid, and amlodipine 5 mg daily     Marjorie Padilla was seen today for diabetes and hypertension  Diagnoses and all orders for this visit:    Type 2 diabetes mellitus without complication, without long-term current use of insulin (HCC)  -     POCT hemoglobin A1c    Essential hypertension        Return in about 3 months (around 9/20/2022) for diabetes, htn   Subjective:     Haily Jason is a 77 y o  female who  has a past medical history of Cancer ([facility] ), Chronic pain disorder, Diabetes mellitus ([facility] ), Diffuse large B cell lymphoma ([facility] ), Dysphagia, GERD without esophagitis, HTN (hypertension), Hyperlipidemia, Hypertension, MI (myocardial infarction) ([facility] ), Port-A-Cath in place, and Thyroid cancer ([facility] )  who presented to the office today for follow up  [facility] and to visit family  Patient reports that she feels well       The following portions of the patient's history were reviewed and updated as appropriate: allergies, current medications, past family history, past medical history, past social history, past surgical history and problem list     Current Outpatient Medications on File Prior to Visit   Medication Sig Dispense Refill    Accu-Chek FastClix Lancets MISC Use to test 3x daily 102 each 3    albuterol (PROVENTIL HFA,VENTOLIN HFA) 90 mcg/act inhaler INHALAR 1-2 BOCANADAS A LOS PULMONES CADA 4-6 HORAS ALFREDO SEA NECESARIO 18 g 10    ALPRAZolam (XANAX) 0 25 mg tablet MAR 1 TABLETA POR VIA ORAL DIARIAMENTE A LA HORA DE ACOSTARSE PARA ANSIEDAD 30 tablet 0    amLODIPine (NORVASC) 5 mg tablet Take 1 tablet (5 mg total) by mouth daily 30 tablet 10    Blood Glucose Monitoring Suppl (OneTouch Verio) w/Device KIT Use as directed to check blood sugar DX E11 9 1 kit 0    Calcium Carb-Cholecalciferol 600-400 MG-UNIT TABS Take 1 tablet by mouth 2 (two) times a day 60 tablet 2    carvedilol (COREG) 12 5 mg tablet Take 1 tablet (12 5 mg total) by mouth 2 (two) times a day with meals 60 tablet 10    cyanocobalamin (VITAMIN B-12) 500 MCG tablet Take 1,000 mcg by mouth daily      Diclofenac Sodium (VOLTAREN) 1 % APLICAR 2 GRAMOS POR VIA TOPICAL CUATRO VECES AL JEFFREY 100 g 10    diphenhydrAMINE (BENADRYL) 2 % cream Apply topically 3 (three) times a day as needed for itching 30 g 0    ezetimibe (ZETIA) 10 mg tablet MAR JOHNNY (1) TABLETA POR VIA ORAL JOHNNY VEZ AL JEFFREY 30 tablet 10    ferrous sulfate 325 (65 Fe) mg tablet Take 325 mg by mouth daily with breakfast      fluticasone (FLONASE) 50 mcg/act nasal spray USAR 1 ROCIADA EN CADA FOSA NASAL DIARIAMENTE 16 g 10    gabapentin (NEURONTIN) 100 mg capsule Take 1 capsule (100 mg total) by mouth 2 (two) times a day 60 capsule 5    glucose blood (Accu-Chek Guide) test strip Use to test 3x daily 100 strip 3    hydrocortisone 2 5 % cream APLICAR POR VIA TOPICA CUATRO VECES AL JEFFREY ALFREDO SEA NECESARIO PARA EL ERUPCION 30 g 10    insulin glargine (Lantus SoloStar) 100 units/mL injection pen Use 7units subcut once dialy 15 mL 5    Insulin Pen Needle (BD Pen Needle Micro U/F) 32G X 6 MM MISC Use daily 100 each 1    loperamide (IMODIUM) 2 mg capsule MAR JOHNNY (1) CAPSULA POR VIA ORAL ALFREDO SEA NECESARIO PARA LA DIARREA (Patient not taking: Reported on 6/8/2022) 30 capsule 11    losartan (COZAAR) 50 mg tablet Take 1 tablet (50 mg total) by mouth daily 60 tablet 0    meloxicam (MOBIC) 7 5 mg tablet MAR JOHNNY (1)TABLETA POR VIA ORAL DIARIA CON EL DESAYUNO PARA LA ARTHRITIS  NO TOME CON IBUPROFEN 30 tablet 10    memantine (NAMENDA) 10 mg tablet MAR 1 TABLETA POR VIA ORAL DOS VECES AL JEFFREY 60 tablet 5    metFORMIN (GLUCOPHAGE) 1000 MG tablet MAR JOHNNY (1) TABLETA POR VIA ORAL DOS VECES AL JEFFREY CON COMIDAS 60 tablet 10    mirtazapine (REMERON) 15 mg tablet MAR JOHNNY (1) TABLETA POR VIA ORAL CADA NOCHE A LA HORA DE ACOSTARSE 90 tablet 3    omeprazole (PriLOSEC) 40 MG capsule Take 1 capsule (40 mg total) by mouth daily 90 capsule 1    Tradjenta 5 MG TABS MAR JOHNNY (1) TABLETA POR VIA ORAL JOHNNY VEZ AL JEFFREY 30 tablet 10     No current facility-administered medications on file prior to visit  Review of Systems   Constitutional: Negative for chills and fever  HENT: Negative for congestion, ear pain and sore throat  Eyes: Negative for pain and visual disturbance  Respiratory: Negative for cough and shortness of breath  Cardiovascular: Negative for chest pain and palpitations  Gastrointestinal: Negative for abdominal pain, blood in stool, constipation, diarrhea, nausea and vomiting  Genitourinary: Negative for dysuria and hematuria  Musculoskeletal: Negative for arthralgias and back pain  Skin: Negative for color change and rash  Neurological: Negative for seizures and syncope  Psychiatric/Behavioral: Negative for dysphoric mood and sleep disturbance  All other systems reviewed and are negative  Objective:    /76 (BP Location: Left arm, Patient Position: Sitting, Cuff Size: Standard)   Pulse 64   Temp (!) 97 1 °F (36 2 °C) (Temporal)   Resp 16   Ht 5' 2" (1 575 m)   Wt 66 4 kg (146 lb 4 8 oz)   SpO2 97%   BMI 26 76 kg/m²     Physical Exam  Vitals and nursing note reviewed  Constitutional:       General: She is not in acute distress  Appearance: She is well-developed  She is not diaphoretic  Comments: Elderly appearing   HENT:      Head: Normocephalic and atraumatic  Right Ear: External ear normal       Left Ear: External ear normal    Eyes:      General:         Right eye: No discharge  Left eye: No discharge  Pupils: Pupils are equal, round, and reactive to light  Cardiovascular:      Rate and Rhythm: Normal rate and regular rhythm  Pulmonary:      Effort: Pulmonary effort is normal  No respiratory distress  Breath sounds: No wheezing  Abdominal:      General: Bowel sounds are normal  There is no distension  Palpations: Abdomen is soft  Tenderness: There is no abdominal tenderness  Musculoskeletal:      Cervical back: Normal range of motion and neck supple  Right lower leg: No edema  Left lower leg: No edema  Lymphadenopathy:      Cervical: No cervical adenopathy  Skin:     General: Skin is warm and dry  Capillary Refill: Capillary refill takes less than 2 seconds  Findings: No rash  Neurological:      Mental Status: She is alert  Mental status is at baseline     Psychiatric:         Behavior: Behavior normal          MOIRA Bradley  06/20/22  10:52 AM

## 2022-06-24 DIAGNOSIS — K21.9 GASTROESOPHAGEAL REFLUX DISEASE: ICD-10-CM

## 2022-06-24 RX ORDER — OMEPRAZOLE 40 MG/1
CAPSULE, DELAYED RELEASE ORAL
Qty: 30 CAPSULE | Refills: 10 | Status: SHIPPED | OUTPATIENT
Start: 2022-06-24

## 2022-06-29 ENCOUNTER — OFFICE VISIT (OUTPATIENT)
Dept: DENTISTRY | Facility: CLINIC | Age: 77
End: 2022-06-29

## 2022-06-29 VITALS — DIASTOLIC BLOOD PRESSURE: 88 MMHG | SYSTOLIC BLOOD PRESSURE: 162 MMHG | HEART RATE: 76 BPM | TEMPERATURE: 97.6 F

## 2022-06-29 DIAGNOSIS — Z01.20 ENCOUNTER FOR DENTAL EXAMINATION: Primary | ICD-10-CM

## 2022-06-29 PROCEDURE — D1330 ORAL HYGIENE INSTRUCTIONS: HCPCS | Performed by: DENTAL HYGIENIST

## 2022-06-29 PROCEDURE — D0274 BITEWINGS - 4 RADIOGRAPHIC IMAGES: HCPCS | Performed by: DENTAL HYGIENIST

## 2022-06-29 PROCEDURE — D1110 PROPHYLAXIS - ADULT: HCPCS | Performed by: DENTAL HYGIENIST

## 2022-06-29 PROCEDURE — D0120 PERIODIC ORAL EVALUATION - ESTABLISHED PATIENT: HCPCS | Performed by: DENTIST

## 2022-06-29 NOTE — PROGRESS NOTES
Dental procedures in this visit     - PERIODIC ORAL EVALUATION - ESTABLISHED PATIENT (Completed)     Service provider: Mikaela Gamez     Billing provider: Mode Ha DMD     - BITEWINGS - 4 RADIOGRAPHIC IMAGES (Completed)     Service provider: Darleen Tello 195     Billing provider: Mode Ha DMD     - PROPHYLAXIS - ADULT (Completed)     Service provider: Darleen Tello 195     Billing provider: Mode Ha DMD     - ORAL HYGIENE INSTRUCTIONS (Completed)     Service provider: Darleen Tello     Billing provider: Mode Ha DMD     Subjective   Patient ID: Lane Mcghee is a 77 y o  female  Chief Complaint   Patient presents with    Routine Oral Cleaning    Periodic Exam     ASA  II;  Pain - 0;   Reviewed M/DH    Adult Prophy     Exams:  Periodic exam   Xrays:  4 vertical  BWX    Type of Treatment:  Adult Prophy -  Hand scaling,  Polished, Flossed  Reviewed OHI  Brush:  2X/day and Floss 1X/day  Recommended Listerine  / ACT  mouth rinses  Recommended Biotene for dry mouth  EO/OCS Exams:  No significant findings  IO:    Large mandibular alem  Oral Hygiene:  Good / Fair  Plaque:  Light    Calculus:  Light    Bleeding:  None  Gingiva:  Pink  / Firm  Stain:  None  Perio Charting:  Periocharting was not completed  Perio Findings:  Chronic Mild to Moderate periodontitis - not active  Caries Findings:  2 - MO, 7 - DL, 10 - DL; Watch 6 - D, 20 - D, 28 - D  Caries Risk Assessment:   Moderate caries risk    Treatment Plan:  Updated  Resin based PD;s already pre-auth'd? Restorative first then check if pre-auth active      Dr  Exam:  Dr Concepcion Mosley  Referral:  No referral given  NV1:   Rest 7 - DL, 10 - DL - 60 min  NV2:   6mrc - 50 min

## 2022-07-11 DIAGNOSIS — I10 ESSENTIAL HYPERTENSION: ICD-10-CM

## 2022-07-11 PROCEDURE — 4010F ACE/ARB THERAPY RXD/TAKEN: CPT | Performed by: NURSE PRACTITIONER

## 2022-07-11 RX ORDER — LOSARTAN POTASSIUM 50 MG/1
TABLET ORAL
Qty: 90 TABLET | Refills: 0 | Status: SHIPPED | OUTPATIENT
Start: 2022-07-11 | End: 2022-10-08

## 2022-07-12 DIAGNOSIS — F41.8 ANXIETY ABOUT HEALTH: ICD-10-CM

## 2022-07-13 RX ORDER — ALPRAZOLAM 0.25 MG/1
TABLET ORAL
Qty: 30 TABLET | Refills: 0 | Status: SHIPPED | OUTPATIENT
Start: 2022-07-13 | End: 2022-08-22 | Stop reason: SDUPTHER

## 2022-07-18 ENCOUNTER — OFFICE VISIT (OUTPATIENT)
Dept: PODIATRY | Facility: CLINIC | Age: 77
End: 2022-07-18
Payer: COMMERCIAL

## 2022-07-18 ENCOUNTER — HOSPITAL ENCOUNTER (OUTPATIENT)
Dept: MAMMOGRAPHY | Facility: CLINIC | Age: 77
Discharge: HOME | End: 2022-07-18
Payer: COMMERCIAL

## 2022-07-18 VITALS — WEIGHT: 146 LBS | HEIGHT: 62 IN | BODY MASS INDEX: 26.87 KG/M2

## 2022-07-18 VITALS
BODY MASS INDEX: 26.87 KG/M2 | HEIGHT: 62 IN | DIASTOLIC BLOOD PRESSURE: 76 MMHG | SYSTOLIC BLOOD PRESSURE: 126 MMHG | HEART RATE: 79 BPM | WEIGHT: 146 LBS

## 2022-07-18 DIAGNOSIS — Z12.31 SCREENING MAMMOGRAM, ENCOUNTER FOR: ICD-10-CM

## 2022-07-18 DIAGNOSIS — T45.1X5A CHEMOTHERAPY-INDUCED PERIPHERAL NEUROPATHY (HCC): Chronic | ICD-10-CM

## 2022-07-18 DIAGNOSIS — G62.0 CHEMOTHERAPY-INDUCED PERIPHERAL NEUROPATHY (HCC): Chronic | ICD-10-CM

## 2022-07-18 DIAGNOSIS — E11.9 TYPE 2 DIABETES MELLITUS WITHOUT COMPLICATION, WITHOUT LONG-TERM CURRENT USE OF INSULIN (HCC): Primary | ICD-10-CM

## 2022-07-18 DIAGNOSIS — M79.676 PAIN DUE TO ONYCHOMYCOSIS OF TOENAIL: ICD-10-CM

## 2022-07-18 DIAGNOSIS — B35.1 PAIN DUE TO ONYCHOMYCOSIS OF TOENAIL: ICD-10-CM

## 2022-07-18 PROCEDURE — 77063 BREAST TOMOSYNTHESIS BI: CPT

## 2022-07-18 PROCEDURE — 77067 SCR MAMMO BI INCL CAD: CPT

## 2022-07-18 PROCEDURE — 11721 DEBRIDE NAIL 6 OR MORE: CPT | Performed by: PODIATRIST

## 2022-07-18 NOTE — PROGRESS NOTES
Madison Benjaminanthony Sandoval  [birth year]  AT RISK FOOT CARE    1  Type 2 diabetes mellitus without complication, without long-term current use of insulin ([address] )     2  Chemotherapy-induced peripheral neuropathy (HCC)     3  Pain due to onychomycosis of toenail         Patient presents for at-risk foot care  Patient has no acute concerns today  Patient has significant lower extremity risk due to neuropathy, parasthesia, edema, and trophic skin changes to the lower extremity  On exam patient has thickened, hypertrophic, discolored, brittle toenails with subungual debris and tenderness x10   Callus: none  Patient has lower extremity edema  PAtients skin is atrophic, thickened nails, and decreased pedal hair  Patient has decreased pinprick and vibratory sensation to his feet and parasthesia    Today's treatment includes:  Debridement of toenails  Using nail nipper, oscar, and curette, nails were sharply debrided, reduced in thickness and length  Devitalized nail tissue and fungal debris excised and removed  Patient tolerated well  Discussed proper shoe gear, daily inspections of feet, and general foot health with patient  Patient has Q9  findings and is recommended for at risk foot care every 9-10 weeks      Patients most recent complete clinical foot exam was on: 5/9/22

## 2022-08-05 ENCOUNTER — PATIENT OUTREACH (OUTPATIENT)
Dept: FAMILY MEDICINE CLINIC | Facility: CLINIC | Age: 77
End: 2022-08-05

## 2022-08-05 ENCOUNTER — HOSPITAL ENCOUNTER (OUTPATIENT)
Dept: SLEEP CENTER | Facility: CLINIC | Age: 77
Discharge: HOME | End: 2022-08-05
Payer: COMMERCIAL

## 2022-08-05 DIAGNOSIS — G47.33 OBSTRUCTIVE SLEEP APNEA: ICD-10-CM

## 2022-08-05 PROCEDURE — 95811 POLYSOM 6/>YRS CPAP 4/> PARM: CPT

## 2022-08-05 PROCEDURE — 95811 POLYSOM 6/>YRS CPAP 4/> PARM: CPT | Performed by: INTERNAL MEDICINE

## 2022-08-05 NOTE — PROGRESS NOTES
I called Kvng Haynes to follow up on the patient  She stated they both returned from [address] [place] [address] yesterday  Kvng Haynes could not talk because her phone needed to be charged  She agreed for me to follow up next week

## 2022-08-06 NOTE — PROGRESS NOTES
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 [facility]  Sleep Study: Treatment   Optimal PAP pressure:  5 cm H2O  Leak:Small  Snore:Eliminated  REM Obtained:yes  Supplemental O2: no    Minimum SaO2  89 %  Baseline SaO2  94 0 %  PAP mask tried (list all) Medium ResMed AirFit N20 Nasal Mask with no added humidity  PAP mask choice (final) Same  PAP mask type:nasal  PAP pressure at which snoring was eliminated  5 cm H2O  Minimum SaO2 at final PAP pressure  89 %  Mode of Therapy:CPAP  ETCO2:No  CPAP changed to BiPAP:No    Mode of Therapy:CPAP    EKG abnormalities: no     EEG abnormalities: yes:  ECG artifact throughout study  Averaged Ms  Sleep Study Recorded < 2 hours: N/A    Sleep Study Recorded > 2 hours but incomplete study: N/A    Sleep Study Recorded 6 hours but no sleep obtained: NO          Post-Sleep Study    Medication used at bedtime or during sleep study:YES prescription sleep aid    Patient reports time it took to fall asleep:less than 20 minutes    Patient reports waking up during study:Denied    Patient reports sleeping 4 to 6 hours without dreaming  Patient reports sleep during study:better than usual    Patient rated sleepiness: Somewhat sleepy or tired    PAP treatment:yes: Post PAP treatment patient reports feeling better and  would not wear PAP mask at home

## 2022-08-09 DIAGNOSIS — G47.61 PERIODIC LIMB MOVEMENTS OF SLEEP: ICD-10-CM

## 2022-08-09 DIAGNOSIS — G47.33 OBSTRUCTIVE SLEEP APNEA: Primary | ICD-10-CM

## 2022-08-10 ENCOUNTER — TELEPHONE (OUTPATIENT)
Dept: SLEEP CENTER | Facility: CLINIC | Age: 77
End: 2022-08-10

## 2022-08-10 NOTE — TELEPHONE ENCOUNTER
----- Message from Deepali Choe, Mariangel Dana Johns sent at 8/9/2022 10:10 AM EDT -----  CPAP titration study was completed  Recommend scheduling appointment to have CPAP checked and reset to new setting of 5cm  Pressure change ordered, as recommended  There were a large number of limb movements noted during the study as well and may be contributing to symptoms  Lab testing ordered, as recommended  She will need lab studies done, prior to follow up with Dr Obinna Junior, so that PLMs can additionally be addressed

## 2022-08-10 NOTE — TELEPHONE ENCOUNTER
Tami or Shannon Byrd, can you confirm if patient will need to bring her machine in to office for pressure change or can it be done remotely?

## 2022-08-10 NOTE — TELEPHONE ENCOUNTER
Per email from [facility], [facility] liaison, patient's machine has not called in since 2020  She will need to bring machine in to office for pressure change  Called patient and spoke to patient's granddaughter Bertin Steiner is listed on patient's communication consent)  Advised Clair that sleep study resulted and pressure change has been ordered  Patient's machine must be brought in to office for pressure change to be completed  Scheduled appointment for tomorrow 8/11/22 in [place]  Also advised Raul Hurtado that lab work has been ordered and patient needs to complete prior to follow up with Dr Carmen Esquivel on 9/9/22  Raul Hurtado verbalized understanding

## 2022-08-11 ENCOUNTER — PATIENT OUTREACH (OUTPATIENT)
Dept: FAMILY MEDICINE CLINIC | Facility: CLINIC | Age: 77
End: 2022-08-11

## 2022-08-11 DIAGNOSIS — G62.0 CHEMOTHERAPY-INDUCED PERIPHERAL NEUROPATHY (HCC): Chronic | ICD-10-CM

## 2022-08-11 DIAGNOSIS — Z92.21 STATUS POST CHEMOTHERAPY: ICD-10-CM

## 2022-08-11 DIAGNOSIS — T45.1X5A CHEMOTHERAPY-INDUCED PERIPHERAL NEUROPATHY (HCC): Chronic | ICD-10-CM

## 2022-08-11 DIAGNOSIS — F41.8 ANXIETY ABOUT HEALTH: ICD-10-CM

## 2022-08-11 RX ORDER — ALPRAZOLAM 0.25 MG/1
TABLET ORAL
Qty: 30 TABLET | Refills: 0 | OUTPATIENT
Start: 2022-08-11

## 2022-08-11 RX ORDER — GABAPENTIN 100 MG/1
100 CAPSULE ORAL 2 TIMES DAILY
Qty: 60 CAPSULE | Refills: 5 | OUTPATIENT
Start: 2022-08-11

## 2022-08-11 NOTE — PROGRESS NOTES
I called Nat Sterling to follow up on the patient  She states the patient is feeling "good"  She reports the patient's blood sugars are stable with fasting readings of 97/108 and an afternoon reading of 160  Nat Sterling also reports the patient's blood pressures are stable at 130/80  Nat Sterling denies the patient having any recent falls  She notes the patient gained a little bit of weight since she recently returned from 2 week stay in [place]  Nat Sterling states the patient's sleep appointment for today needs to be rescheduled and she asked for my help  [facility] and rescheduled the appointment for next week (Nat Sterling was informed and happy for the assistance)  Nat Sterling is requesting medication refills which I will submit to the PCP  I will continue to follow  Chart reviewed

## 2022-08-11 NOTE — TELEPHONE ENCOUNTER
Please confirm with pharmacy but on review of PDMP it appears that there was 2 prescriptions filled for 30 day supply of Xanax on 7/13/2022

## 2022-08-22 ENCOUNTER — PATIENT OUTREACH (OUTPATIENT)
Dept: FAMILY MEDICINE CLINIC | Facility: CLINIC | Age: 77
End: 2022-08-22

## 2022-08-22 DIAGNOSIS — F41.8 ANXIETY ABOUT HEALTH: ICD-10-CM

## 2022-08-22 DIAGNOSIS — T45.1X5A CHEMOTHERAPY-INDUCED PERIPHERAL NEUROPATHY (HCC): Chronic | ICD-10-CM

## 2022-08-22 DIAGNOSIS — Z92.21 STATUS POST CHEMOTHERAPY: ICD-10-CM

## 2022-08-22 DIAGNOSIS — G62.0 CHEMOTHERAPY-INDUCED PERIPHERAL NEUROPATHY (HCC): Chronic | ICD-10-CM

## 2022-08-22 NOTE — PROGRESS NOTES
I received a call from GAIL DELGADO [facility] stating she checked with the pharmacy regarding 2 medications that were not filled  I will submit request to PCP and continue to follow

## 2022-08-25 RX ORDER — GABAPENTIN 100 MG/1
100 CAPSULE ORAL 2 TIMES DAILY
Qty: 60 CAPSULE | Refills: 5 | Status: SHIPPED | OUTPATIENT
Start: 2022-08-25

## 2022-08-25 RX ORDER — ALPRAZOLAM 0.25 MG/1
0.25 TABLET ORAL
Qty: 30 TABLET | Refills: 0 | Status: SHIPPED | OUTPATIENT
Start: 2022-08-25

## 2022-09-12 DIAGNOSIS — G47.33 OSA (OBSTRUCTIVE SLEEP APNEA): Primary | ICD-10-CM

## 2022-09-14 DIAGNOSIS — J06.9 URI (UPPER RESPIRATORY INFECTION): ICD-10-CM

## 2022-09-14 DIAGNOSIS — E11.9 TYPE 2 DIABETES MELLITUS WITHOUT COMPLICATION, WITHOUT LONG-TERM CURRENT USE OF INSULIN (HCC): ICD-10-CM

## 2022-09-15 DIAGNOSIS — E11.9 TYPE 2 DIABETES MELLITUS WITHOUT COMPLICATION, WITHOUT LONG-TERM CURRENT USE OF INSULIN (HCC): ICD-10-CM

## 2022-09-15 RX ORDER — ALBUTEROL SULFATE 90 UG/1
AEROSOL, METERED RESPIRATORY (INHALATION)
Qty: 18 G | Refills: 10 | Status: SHIPPED | OUTPATIENT
Start: 2022-09-15 | End: 2022-09-22

## 2022-09-15 RX ORDER — LINAGLIPTIN 5 MG/1
TABLET, FILM COATED ORAL
Qty: 30 TABLET | Refills: 10 | Status: SHIPPED | OUTPATIENT
Start: 2022-09-15 | End: 2022-09-22

## 2022-09-16 ENCOUNTER — TELEPHONE (OUTPATIENT)
Dept: HEMATOLOGY ONCOLOGY | Facility: CLINIC | Age: 77
End: 2022-09-16

## 2022-09-16 ENCOUNTER — PATIENT OUTREACH (OUTPATIENT)
Dept: FAMILY MEDICINE CLINIC | Facility: CLINIC | Age: 77
End: 2022-09-16

## 2022-09-16 NOTE — PROGRESS NOTES
I called Deidre Mccauley to remind her of the patient's PCP appointment 9/20 at [phone]; she plans on attending  I will continue to follow

## 2022-09-16 NOTE — TELEPHONE ENCOUNTER
Called and spoke with the patient's granddaughter  Told her that her grandmother's appointment needed to be rescheduled from 10/7/22 @ 9:00am to 10/21/22 @9:30  Granddaughter said that works perfect

## 2022-09-21 DIAGNOSIS — J06.9 URI (UPPER RESPIRATORY INFECTION): ICD-10-CM

## 2022-09-21 DIAGNOSIS — E11.9 TYPE 2 DIABETES MELLITUS WITHOUT COMPLICATION, WITHOUT LONG-TERM CURRENT USE OF INSULIN (HCC): ICD-10-CM

## 2022-09-22 RX ORDER — ALBUTEROL SULFATE 90 UG/1
AEROSOL, METERED RESPIRATORY (INHALATION)
Qty: 18 G | Refills: 10 | Status: SHIPPED | OUTPATIENT
Start: 2022-09-22

## 2022-09-22 RX ORDER — LINAGLIPTIN 5 MG/1
TABLET, FILM COATED ORAL
Qty: 30 TABLET | Refills: 10 | Status: SHIPPED | OUTPATIENT
Start: 2022-09-22

## 2022-10-03 ENCOUNTER — OFFICE VISIT (OUTPATIENT)
Dept: PODIATRY | Facility: CLINIC | Age: 77
End: 2022-10-03
Payer: COMMERCIAL

## 2022-10-03 VITALS — WEIGHT: 149.2 LBS | HEIGHT: 62 IN | BODY MASS INDEX: 27.46 KG/M2

## 2022-10-03 DIAGNOSIS — G62.0 CHEMOTHERAPY-INDUCED PERIPHERAL NEUROPATHY (HCC): ICD-10-CM

## 2022-10-03 DIAGNOSIS — E11.9 TYPE 2 DIABETES MELLITUS WITHOUT COMPLICATION, WITHOUT LONG-TERM CURRENT USE OF INSULIN (HCC): Primary | ICD-10-CM

## 2022-10-03 DIAGNOSIS — M79.676 PAIN DUE TO ONYCHOMYCOSIS OF TOENAIL: ICD-10-CM

## 2022-10-03 DIAGNOSIS — T45.1X5A CHEMOTHERAPY-INDUCED PERIPHERAL NEUROPATHY (HCC): ICD-10-CM

## 2022-10-03 DIAGNOSIS — B35.1 PAIN DUE TO ONYCHOMYCOSIS OF TOENAIL: ICD-10-CM

## 2022-10-03 PROCEDURE — 11721 DEBRIDE NAIL 6 OR MORE: CPT | Performed by: PODIATRIST

## 2022-10-03 NOTE — PROGRESS NOTES
Finnegan Baumgarten SantiarobertaRosaliakirit  [birth year]  AT RISK FOOT CARE    1  Type 2 diabetes mellitus without complication, without long-term current use of insulin ([facility])     2  Pain due to onychomycosis of toenail     3  Chemotherapy-induced peripheral neuropathy [facility])         Patient presents for at-risk foot care  Patient has no acute concerns today  Patient has significant lower extremity risk due to neuropathy, parasthesia, edema, and trophic skin changes to the lower extremity  On exam patient has thickened, hypertrophic, discolored, brittle toenails with subungual debris and tenderness x10   Callus: none  Patient has lower extremity edema  PAtients skin is atrophic, thickened nails, and decreased pedal hair  Patient has decreased pinprick and vibratory sensation to his feet and parasthesia    Today's treatment includes:  Debridement of toenails  Using nail nipper, oscar, and curette, nails were sharply debrided, reduced in thickness and length  Devitalized nail tissue and fungal debris excised and removed  Patient tolerated well  Discussed proper shoe gear, daily inspections of feet, and general foot health with patient  Patient has Q9  findings and is recommended for at risk foot care every 9-10 weeks      Patients most recent complete clinical foot exam was on: 5/9/2022

## 2022-10-08 DIAGNOSIS — I10 ESSENTIAL HYPERTENSION: ICD-10-CM

## 2022-10-08 RX ORDER — LOSARTAN POTASSIUM 50 MG/1
TABLET ORAL
Qty: 90 TABLET | Refills: 0 | Status: SHIPPED | OUTPATIENT
Start: 2022-10-08

## 2022-10-13 DIAGNOSIS — I10 ESSENTIAL HYPERTENSION: ICD-10-CM

## 2022-10-13 DIAGNOSIS — R19.7 DIARRHEA, UNSPECIFIED TYPE: ICD-10-CM

## 2022-10-14 RX ORDER — LOPERAMIDE HYDROCHLORIDE 2 MG/1
CAPSULE ORAL
Qty: 30 CAPSULE | Refills: 10 | Status: SHIPPED | OUTPATIENT
Start: 2022-10-14

## 2022-10-14 RX ORDER — CARVEDILOL 12.5 MG/1
TABLET ORAL
Qty: 60 TABLET | Refills: 10 | Status: SHIPPED | OUTPATIENT
Start: 2022-10-14 | End: 2022-10-21

## 2022-10-19 ENCOUNTER — PATIENT OUTREACH (OUTPATIENT)
Dept: FAMILY MEDICINE CLINIC | Facility: CLINIC | Age: 77
End: 2022-10-19

## 2022-10-19 DIAGNOSIS — E11.9 TYPE 2 DIABETES MELLITUS WITHOUT COMPLICATION, WITHOUT LONG-TERM CURRENT USE OF INSULIN (HCC): ICD-10-CM

## 2022-10-20 ENCOUNTER — OFFICE VISIT (OUTPATIENT)
Dept: DENTISTRY | Facility: CLINIC | Age: 77
End: 2022-10-20

## 2022-10-20 VITALS — DIASTOLIC BLOOD PRESSURE: 87 MMHG | SYSTOLIC BLOOD PRESSURE: 164 MMHG | HEART RATE: 82 BPM | TEMPERATURE: 97.1 F

## 2022-10-20 DIAGNOSIS — Z01.20 ENCOUNTER FOR DENTAL EXAMINATION: Primary | ICD-10-CM

## 2022-10-20 NOTE — PROGRESS NOTES
Teresita Hu presents for composite filling  PMH reviewed, no changes  Pt reports no pain, Pt is ASA II with med hx of dementia     Interpretation  (English): [id number]    Pt seemed forgetful of conversations she was having with the provider and assistant  Pt repeatedly asked if we spoke English, kept reminding pt to talk to the   [place] [id number]    Pt kept asking when we were placing the bridge, explained to pt 3 times over the course of the appointment that she is not tx planned for a bridge, but is getting dentures started once her restorative tx is complete    Due to hx of dementia explained that we would like to speak to the pts granddaughter/ caregiver (pt expressed that she lives with her granddaughter and that she is her primary caregiver) before beginning tx     PT dismissed ambulatory     NV: restorative

## 2022-10-21 ENCOUNTER — OFFICE VISIT (OUTPATIENT)
Dept: FAMILY MEDICINE CLINIC | Facility: CLINIC | Age: 77
End: 2022-10-21

## 2022-10-21 VITALS
DIASTOLIC BLOOD PRESSURE: 78 MMHG | TEMPERATURE: 97.5 F | HEIGHT: 62 IN | HEART RATE: 76 BPM | OXYGEN SATURATION: 96 % | SYSTOLIC BLOOD PRESSURE: 126 MMHG | RESPIRATION RATE: 16 BRPM | WEIGHT: 148.9 LBS | BODY MASS INDEX: 27.4 KG/M2

## 2022-10-21 DIAGNOSIS — I10 ESSENTIAL HYPERTENSION: ICD-10-CM

## 2022-10-21 DIAGNOSIS — Z79.4 CURRENT USE OF INSULIN (HCC): ICD-10-CM

## 2022-10-21 DIAGNOSIS — E11.65 UNCONTROLLED TYPE 2 DIABETES MELLITUS WITH HYPERGLYCEMIA (HCC): ICD-10-CM

## 2022-10-21 DIAGNOSIS — Z23 ENCOUNTER FOR IMMUNIZATION: Primary | ICD-10-CM

## 2022-10-21 DIAGNOSIS — R35.0 INCREASED URINARY FREQUENCY: ICD-10-CM

## 2022-10-21 DIAGNOSIS — J06.9 URI (UPPER RESPIRATORY INFECTION): ICD-10-CM

## 2022-10-21 DIAGNOSIS — E11.9 TYPE 2 DIABETES MELLITUS WITHOUT COMPLICATION, WITHOUT LONG-TERM CURRENT USE OF INSULIN (HCC): ICD-10-CM

## 2022-10-21 PROBLEM — R21 RASH: Status: RESOLVED | Noted: 2019-02-06 | Resolved: 2022-10-21

## 2022-10-21 LAB
SL AMB  POCT GLUCOSE, UA: NORMAL
SL AMB LEUKOCYTE ESTERASE,UA: NORMAL
SL AMB POCT BILIRUBIN,UA: NORMAL
SL AMB POCT BLOOD,UA: NORMAL
SL AMB POCT CLARITY,UA: NORMAL
SL AMB POCT COLOR,UA: YELLOW
SL AMB POCT HEMOGLOBIN AIC: 7.6 (ref ?–6.5)
SL AMB POCT KETONES,UA: NORMAL
SL AMB POCT NITRITE,UA: NORMAL
SL AMB POCT PH,UA: 5
SL AMB POCT SPECIFIC GRAVITY,UA: 1
SL AMB POCT URINE PROTEIN: NORMAL
SL AMB POCT UROBILINOGEN: NORMAL

## 2022-10-21 PROCEDURE — 3078F DIAST BP <80 MM HG: CPT | Performed by: NURSE PRACTITIONER

## 2022-10-21 PROCEDURE — 83036 HEMOGLOBIN GLYCOSYLATED A1C: CPT | Performed by: NURSE PRACTITIONER

## 2022-10-21 PROCEDURE — 3074F SYST BP LT 130 MM HG: CPT | Performed by: NURSE PRACTITIONER

## 2022-10-21 PROCEDURE — G0008 ADMIN INFLUENZA VIRUS VAC: HCPCS | Performed by: NURSE PRACTITIONER

## 2022-10-21 PROCEDURE — 90662 IIV NO PRSV INCREASED AG IM: CPT | Performed by: NURSE PRACTITIONER

## 2022-10-21 PROCEDURE — 81002 URINALYSIS NONAUTO W/O SCOPE: CPT | Performed by: NURSE PRACTITIONER

## 2022-10-21 PROCEDURE — 99214 OFFICE O/P EST MOD 30 MIN: CPT | Performed by: NURSE PRACTITIONER

## 2022-10-21 RX ORDER — CARVEDILOL 12.5 MG/1
6.25 TABLET ORAL 2 TIMES DAILY WITH MEALS
Qty: 60 TABLET | Refills: 10 | Status: SHIPPED | OUTPATIENT
Start: 2022-10-21

## 2022-10-21 RX ORDER — BLOOD-GLUCOSE METER
EACH MISCELLANEOUS
Qty: 1 KIT | Refills: 0 | Status: SHIPPED | OUTPATIENT
Start: 2022-10-21

## 2022-10-21 RX ORDER — INSULIN GLARGINE 100 [IU]/ML
INJECTION, SOLUTION SUBCUTANEOUS
Qty: 15 ML | Refills: 5 | Status: SHIPPED | OUTPATIENT
Start: 2022-10-21

## 2022-10-21 NOTE — ASSESSMENT & PLAN NOTE
Blood pressure well controlled in the office today at 126/76  Continue current regimen: losartan 25 mg daily and amlodipine 5 mg daily --- due to orthostatic BP (140/80 sitting and 108/60 standing)  will decrease carvedilol to 6 25 mg bid

## 2022-10-21 NOTE — PATIENT INSTRUCTIONS
Mareos   CUIDADO AMBULATORIO:   Mareos es la sensación de falta de equilibrio o inestabilidad  Las causas comunes del mareo son un desequilibrio del líquido del oído interno o la falta de oxígeno en wu ainsley  [employer] ser [employer] (durar 3 días o menos) o crónicos (durar más de 3 días)  Usted puede llegar a tener episodios de [employer] que perkins de segundos a unas horas  Síntomas comunes que pueden presentarse con el mareo:  Shamar sensación de que wu entorno se está moviendo aun cuando usted está quieto    [employer] oídos o pérdida del oído    Sensación de mareo o desvanecimiento    Debilidad o inestabilidad    Visión doble o movimientos oculares que usted no puede controlar    Náuseas o vómitos    Confusión    Busque atención médica de inmediato si:  Usted tiene dolor de [place] y rigidez en el mae  Usted tiene escalofríos con temblores y fiebre  Usted vomita shamar y [place] vez sin alivio  Wu vómito o deposiciones están ledbetter o negras  Usted tiene [employer], espalda o abdomen  Usted tiene adormecimiento, sobre todo en la david, brazos o piernas  Usted tiene dificultad para  los brazos o las piernas  Usted está confundido  Comuníquese con wu médico si:  Tiene fiebre  Diane síntomas no mejoran con el tratamiento  Usted tiene preguntas o inquietudes acerca de wu condición o cuidado  El tratamiento para los mareos depende de la causa  Wu médico podría darle oxígeno o medicamentos para disminuir los [employer] y la náusea  Además podría referirlo a un especialista  También es probable que usted necesite que lo internen en el hospital para recibir tratamiento  El manejo de diane síntomas:  No maneje u opere maquinaria pesada cuando esté mareado  Levántese lentamente cuando esté sentado o acostado  [place] suficiente líquidos  Los líquidos ayudan a evitar la deshidratación   Pregunte cuánto líquido debe joshua cada día y cuáles líquidos son los más adecuados para nelia     Isamar Schooling a la consulta de control con zuniga médico según las indicaciones: Anote diane preguntas para que se acuerde de hacerlas kyung diane visitas  © Copyright [employer] 2022 Information is for End User's use only and may not be sold, redistributed or otherwise used for commercial purposes  All illustrations and images included in CareNotes® are the copyrighted property of A D A M [employer] [facility]  or [address] [place] [address] es sólo para uso en educación  Zuniga intención no es darle un consejo médico sobre enfermedades o tratamientos  Colsulte con zuniga Bimal Liu farmacéutico antes de seguir cualquier régimen médico para saber si es seguro y efectivo para usted

## 2022-10-21 NOTE — ASSESSMENT & PLAN NOTE
Lab Results   Component Value Date    HGBA1C 7 6 (A) 10/21/2022       Increase Lantus 9 units HS, continue Tradjenta daily, and metformin   Continue follow up with endocrinology

## 2022-10-21 NOTE — PROGRESS NOTES
Assessment/Plan:    Type 2 diabetes mellitus without complication, without long-term current use of insulin (HCC)    Lab Results   Component Value Date    HGBA1C 7 6 (A) 10/21/2022       Increase Lantus 9 units HS, continue Tradjenta daily, and metformin   Continue follow up with endocrinology     Essential hypertension  Blood pressure well controlled in the office today at 126/76  Continue current regimen: losartan 25 mg daily and amlodipine 5 mg daily --- due to orthostatic BP (140/80 sitting and 108/60 standing)  will decrease carvedilol to 6 25 mg bid     Dellatone Doran was seen today for diabetes, hypertension and medication refill  Diagnoses and all orders for this visit:    Encounter for immunization  -     influenza vaccine, high-dose, PF 0 7 mL (FLUZONE HIGH-DOSE)    Type 2 diabetes mellitus without complication, without long-term current use of insulin (HCC)  -     POCT hemoglobin A1c  -     Lipid panel; Future  -     Blood Glucose Monitoring Suppl (OneTouch Verio) w/Device KIT; Use as directed to check blood sugar DX E11 9    Increased urinary frequency  -     POCT urine dip  -     Cancel: POCT blood glucose    URI (upper respiratory infection)    Essential hypertension  -     carvedilol (COREG) 12 5 mg tablet; Take 0 5 tablets (6 25 mg total) by mouth 2 (two) times a day with meals    Current use of insulin (HCC)  -     Insulin Glargine Solostar (Lantus SoloStar) 100 UNIT/ML SOPN; Use 9 units subcut once dialy    Uncontrolled type 2 diabetes mellitus with hyperglycemia (HCC)  -     Insulin Glargine Solostar (Lantus SoloStar) 100 UNIT/ML SOPN; Use 9 units subcut once dialy          Return in about 6 months (around 4/21/2023) for htn   Patient Instructions   Mareos   CUIDADO AMBULATORIO:   Mareos es la sensación de falta de equilibrio o inestabilidad  Las causas comunes del mareo son un desequilibrio del líquido del oído interno o la falta de oxígeno en zuniga ainsley   [address] pueden ser [employer] 3 días o menos) o crónicos (durar más de 3 días)  Usted puede llegar a tener episodios de [employer] que perkins de segundos a unas horas  Síntomas comunes que pueden presentarse con el mareo:  · Shamar sensación de que wu entorno se está moviendo aun cuando usted está quieto    · [place] en los oídos o pérdida del oído    · Sensación de mareo o desvanecimiento    · Debilidad o inestabilidad    · Visión doble o movimientos oculares que usted no puede controlar    · [place] o vómitos    · Confusión    Busque atención médica de inmediato si:  · Usted tiene dolor de [place] y rigidez en el mae  · Usted tiene escalofríos con temblores y [place]  · Usted vomita shamar y [place] vez sin [place]  · Wu vómito o deposiciones están ledbetter o negras  · Usted tiene dolor en el pecho, espalda o abdomen  · Usted tiene adormecimiento, sobre todo en la david, brazos o piernas  · Usted tiene dificultad para  los brazos o las piernas  · Usted está confundido  Comuníquese con wu médico si:  · Tiene fiebre  · Diane síntomas no mejoran con el tratamiento  · Usted tiene preguntas o inquietudes acerca de wu condición o cuidado  El tratamiento para los mareos depende de la causa  Wu médico podría darle oxígeno o medicamentos para disminuir los [employer] y la náusea  Además podría referirlo a un especialista  También es probable que usted necesite que lo internen en el hospital para recibir tratamiento  El manejo de diane síntomas:  · No maneje u opere maquinaria pesada cuando esté mareado  · Levántese lentamente cuando esté sentado o acostado  · [place] suficiente líquidos  Los líquidos ayudan a evitar la deshidratación  Pregunte cuánto líquido debe joshua cada día y cuáles líquidos son los más adecuados para usted  Acuda a la consulta de control con wu médico según las indicaciones: Anote diane preguntas para que se acuerde de hacerlas kyung diane visitas    © Copyright [employer] 2022 Information is for End User's use only and may not be sold, redistributed or otherwise used for commercial purposes  All illustrations and images included in CareNotes® are the copyrighted property of A D A Emma WORRELL  or [address] [place] [address] es sólo para uso en educación  Zuniga intención no es darle un consejo médico sobre enfermedades o tratamientos  Colsulte con zuniga Jamar Keas farmacéutico antes de seguir cualquier régimen médico para saber si es seguro y efectivo para usted  Subjective:     Bryant Li is a 69 y o  female who  has a past medical history of Cancer ([facility] ), Chronic pain disorder, Diabetes mellitus ([facility] ), Diffuse large B cell lymphoma ([facility] ), Dysphagia, GERD without esophagitis, HTN (hypertension), Hyperlipidemia, Hypertension, MI (myocardial infarction) ([facility] ), Port-A-Cath in place, and [facility] )  who presented to the office today for follow up  She is accompanied by her granddaughter, her caretaker  They report that patient is having episodes of dizziness with standing  This resolves after a few seconds  No other sx       The following portions of the patient's history were reviewed and updated as appropriate: allergies, current medications, past family history, past medical history, past social history, past surgical history and problem list     Current Outpatient Medications on File Prior to Visit   Medication Sig Dispense Refill   • Accu-Chek FastClix Lancets MISC Use to test 3x daily 102 each 3   • albuterol (PROVENTIL HFA,VENTOLIN HFA) 90 mcg/act inhaler INHALAR 1-2 BOCANADAS A LOS PULMONES CADA 4-6 HORAS ALFREDO SEA NECESARIO 18 g 10   • ALPRAZolam (XANAX) 0 25 mg tablet Take 1 tablet (0 25 mg total) by mouth daily at bedtime 30 tablet 0   • amLODIPine (NORVASC) 5 mg tablet Take 1 tablet (5 mg total) by mouth daily 30 tablet 10   • Calcium Carb-Cholecalciferol 600-400 MG-UNIT TABS Take 1 tablet by mouth 2 (two) times a day 60 tablet 2   • cyanocobalamin (VITAMIN B-12) 500 MCG tablet Take 1,000 mcg by mouth daily     • Diclofenac Sodium (VOLTAREN) 1 % APLICAR 2 GRAMOS POR VIA TOPICAL CUATRO VECES AL JEFFREY 100 g 10   • diphenhydrAMINE (BENADRYL) 2 % cream Apply topically 3 (three) times a day as needed for itching 30 g 0   • ezetimibe (ZETIA) 10 mg tablet MAR JOHNNY (1) TABLETA POR VIA ORAL JOHNNY VEZ AL JEFFREY 30 tablet 10   • ferrous sulfate 325 (65 Fe) mg tablet Take 325 mg by mouth daily with breakfast     • fluticasone (FLONASE) 50 mcg/act nasal spray USAR 1 ROCIADA EN CADA FOSA NASAL DIARIAMENTE 16 g 10   • gabapentin (NEURONTIN) 100 mg capsule Take 1 capsule (100 mg total) by mouth 2 (two) times a day 60 capsule 5   • glucose blood (Accu-Chek Guide) test strip Use to test 3x daily 100 strip 3   • hydrocortisone 2 5 % cream APLICAR POR VIA TOPICA CUATRO VECES AL JEFFREY ALFREDO SEA NECESARIO PARA EL ERUPCION 30 g 10   • Insulin Pen Needle (BD Pen Needle Micro U/F) 32G X 6 MM MISC Use daily 100 each 1   • loperamide (IMODIUM) 2 mg capsule MAR JOHNNY (1) CAPSULA POR VIA ORAL ALFREDO SEA NECESARIO PARA LA DIARREA 30 capsule 10   • losartan (COZAAR) 50 mg tablet TAKE 1 TABLET(50 MG) BY MOUTH DAILY 90 tablet 0   • meloxicam (MOBIC) 7 5 mg tablet MAR JOHNNY (1)TABLETA POR VIA ORAL DIARIA CON EL DESAYUNO PARA LA ARTHRITIS   NO TOME CON IBUPROFEN 30 tablet 10   • memantine (NAMENDA) 10 mg tablet MAR 1 TABLETA POR VIA ORAL DOS VECES AL JEFFREY 60 tablet 2   • metFORMIN (GLUCOPHAGE) 1000 MG tablet MAR JOHNNY (1) TABLETA POR VIA ORAL DOS VECES AL JEFFREY CON COMIDAS 60 tablet 10   • mirtazapine (REMERON) 15 mg tablet MAR JOHNNY (1) TABLETA POR VIA ORAL CADA NOCHE A LA HORA DE ACOSTARSE 90 tablet 3   • omeprazole (PriLOSEC) 40 MG capsule MAR 1 CAPSULA POR VIA ORAL JOHNNY VEZ AL JEFFREY 30 capsule 10   • Tradjenta 5 MG TABS MAR JOHNNY TABLETA POR VIA ORAL JOHNNY VEZ AL JEFFREY 30 tablet 10   • [DISCONTINUED] Blood Glucose Monitoring Suppl (OneTouch Verio) w/Device KIT Use as directed to check blood sugar DX E11 9 1 kit 0   • [DISCONTINUED] carvedilol (COREG) 12 5 mg tablet MAR 1 TABLETA POR VIA ORAL DOS VECES AL JEFFREY CON COMIDAS 60 tablet 10   • [DISCONTINUED] insulin glargine (Lantus SoloStar) 100 units/mL injection pen Use 7units subcut once dialy 15 mL 5     No current facility-administered medications on file prior to visit  Review of Systems   Constitutional: Negative for chills and fever  HENT: Negative for ear pain and sore throat  Eyes: Negative for pain and visual disturbance  Respiratory: Negative for cough and shortness of breath  Cardiovascular: Negative for chest pain and palpitations  Gastrointestinal: Negative for abdominal pain and vomiting  Genitourinary: Negative for dysuria and hematuria  Musculoskeletal: Negative for arthralgias and back pain  Skin: Negative for color change and rash  Neurological: Negative for seizures and syncope  All other systems reviewed and are negative  Objective:    /78 (BP Location: Right arm, Patient Position: Sitting, Cuff Size: Standard)   Pulse 76   Temp 97 5 °F (36 4 °C) (Temporal)   Resp 16   Ht 5' 2" (1 575 m)   Wt 67 5 kg (148 lb 14 4 oz)   SpO2 96%   BMI 27 23 kg/m²     Physical Exam  Vitals and nursing note reviewed  Constitutional:       General: She is not in acute distress  Appearance: She is well-developed  She is not diaphoretic  Comments: Elderly appearing   HENT:      Head: Normocephalic and atraumatic  Right Ear: External ear normal       Left Ear: External ear normal    Eyes:      General:         Right eye: No discharge  Left eye: No discharge  Pupils: Pupils are equal, round, and reactive to light  Cardiovascular:      Rate and Rhythm: Normal rate and regular rhythm  Pulmonary:      Effort: Pulmonary effort is normal  No respiratory distress  Breath sounds: No wheezing  Abdominal:      General: Bowel sounds are normal  There is no distension  Palpations: Abdomen is soft  Tenderness: There is no abdominal tenderness  Musculoskeletal:      Cervical back: Normal range of motion and neck supple  Right lower leg: No edema  Left lower leg: No edema  Lymphadenopathy:      Cervical: No cervical adenopathy  Skin:     General: Skin is warm and dry  Capillary Refill: Capillary refill takes less than 2 seconds  Findings: No rash  Neurological:      Mental Status: She is alert  Mental status is at baseline     Psychiatric:         Behavior: Behavior normal          Mara Stuart  10/21/22  6:25 PM

## 2022-10-24 RX ORDER — EZETIMIBE 10 MG/1
TABLET ORAL
Qty: 30 TABLET | Refills: 10 | Status: SHIPPED | OUTPATIENT
Start: 2022-10-24

## 2022-11-23 ENCOUNTER — TELEPHONE (OUTPATIENT)
Dept: HEMATOLOGY ONCOLOGY | Facility: CLINIC | Age: 77
End: 2022-11-23

## 2022-11-23 DIAGNOSIS — M25.561 ACUTE PAIN OF BOTH KNEES: ICD-10-CM

## 2022-11-23 DIAGNOSIS — M25.562 ACUTE PAIN OF BOTH KNEES: ICD-10-CM

## 2022-11-23 NOTE — TELEPHONE ENCOUNTER
LVM to the patients Jaden Acosta, she is Syriac-speaking translation done via [employer] interpretation system    Annalisa Gutierrez [id number] informed Jazlyn Kevin the patient will need to have lab work completed prior to her appt on 12/5/22 at 10:30am

## 2022-11-28 ENCOUNTER — PATIENT OUTREACH (OUTPATIENT)
Dept: FAMILY MEDICINE CLINIC | Facility: CLINIC | Age: 77
End: 2022-11-28

## 2022-11-28 NOTE — PROGRESS NOTES
I called Jordon Marks but there was no answer and no option to leave a message  I will continue further outreach

## 2022-12-01 ENCOUNTER — PATIENT OUTREACH (OUTPATIENT)
Dept: FAMILY MEDICINE CLINIC | Facility: CLINIC | Age: 77
End: 2022-12-01

## 2022-12-01 DIAGNOSIS — F41.8 ANXIETY ABOUT HEALTH: ICD-10-CM

## 2022-12-01 NOTE — PROGRESS NOTES
I called Janine Carlos to follow up on the patient  She states the patient is doing "ok"  She reports the patient's blood sugar readings have been stable with readings of 120, 127  Janine Carlos states the patient's feet are free of any skin breakdown  She denies the patient having any recent falls  Janine Carlos is aware of the patient's upcoming H/O appointment on 12/5  Janine Carlos requests a medication refill which I will submit to PCP  I will continue to follow

## 2022-12-03 ENCOUNTER — APPOINTMENT (OUTPATIENT)
Dept: LAB | Facility: HOSPITAL | Age: 77
End: 2022-12-03

## 2022-12-03 ENCOUNTER — HOSPITAL ENCOUNTER (OUTPATIENT)
Dept: RADIOLOGY | Facility: HOSPITAL | Age: 77
Discharge: HOME | End: 2022-12-03

## 2022-12-03 DIAGNOSIS — R09.89 CHEST CONGESTION: ICD-10-CM

## 2022-12-03 DIAGNOSIS — E83.42 HYPOMAGNESEMIA: ICD-10-CM

## 2022-12-03 DIAGNOSIS — C83.31 DIFFUSE LARGE B-CELL LYMPHOMA OF LYMPH NODES OF NECK (HCC): ICD-10-CM

## 2022-12-03 DIAGNOSIS — Z79.4 CURRENT USE OF INSULIN (HCC): ICD-10-CM

## 2022-12-03 DIAGNOSIS — E11.9 TYPE 2 DIABETES MELLITUS WITHOUT COMPLICATION, WITHOUT LONG-TERM CURRENT USE OF INSULIN (HCC): ICD-10-CM

## 2022-12-03 DIAGNOSIS — G47.61 PERIODIC LIMB MOVEMENTS OF SLEEP: ICD-10-CM

## 2022-12-03 LAB
ANION GAP SERPL CALCULATED.3IONS-SCNC: 8 MMOL/L (ref 4–13)
BUN SERPL-MCNC: 16 MG/DL (ref 5–25)
CALCIUM SERPL-MCNC: 9.1 MG/DL (ref 8.3–10.1)
CHLORIDE SERPL-SCNC: 100 MMOL/L (ref 96–108)
CHOLEST SERPL-MCNC: 140 MG/DL
CO2 SERPL-SCNC: 28 MMOL/L (ref 21–32)
CREAT SERPL-MCNC: 0.74 MG/DL (ref 0.6–1.3)
CREAT UR-MCNC: 38.5 MG/DL
CRP SERPL QL: 3 MG/L
ERYTHROCYTE [DISTWIDTH] IN BLOOD BY AUTOMATED COUNT: 13.3 % (ref 11.6–15.1)
ERYTHROCYTE [SEDIMENTATION RATE] IN BLOOD: 47 MM/HOUR (ref 0–29)
EST. AVERAGE GLUCOSE BLD GHB EST-MCNC: 151 MG/DL
FERRITIN SERPL-MCNC: 44 NG/ML (ref 8–388)
GFR SERPL CREATININE-BSD FRML MDRD: 78 ML/MIN/1.73SQ M
GLUCOSE P FAST SERPL-MCNC: 124 MG/DL (ref 65–99)
HBA1C MFR BLD: 6.9 %
HCT VFR BLD AUTO: 38.3 % (ref 34.8–46.1)
HDLC SERPL-MCNC: 50 MG/DL
HGB BLD-MCNC: 12.3 G/DL (ref 11.5–15.4)
IRON SATN MFR SERPL: 18 % (ref 15–50)
IRON SERPL-MCNC: 58 UG/DL (ref 50–170)
LDLC SERPL CALC-MCNC: 71 MG/DL (ref 0–100)
MAGNESIUM SERPL-MCNC: 1.5 MG/DL (ref 1.6–2.6)
MCH RBC QN AUTO: 29.4 PG (ref 26.8–34.3)
MCHC RBC AUTO-ENTMCNC: 32.1 G/DL (ref 31.4–37.4)
MCV RBC AUTO: 91 FL (ref 82–98)
MICROALBUMIN UR-MCNC: 96.2 MG/L (ref 0–20)
MICROALBUMIN/CREAT 24H UR: 250 MG/G CREATININE (ref 0–30)
NONHDLC SERPL-MCNC: 90 MG/DL
PLATELET # BLD AUTO: 296 THOUSANDS/UL (ref 149–390)
PMV BLD AUTO: 10.4 FL (ref 8.9–12.7)
POTASSIUM SERPL-SCNC: 4.2 MMOL/L (ref 3.5–5.3)
RBC # BLD AUTO: 4.19 MILLION/UL (ref 3.81–5.12)
SODIUM SERPL-SCNC: 136 MMOL/L (ref 135–147)
TIBC SERPL-MCNC: 322 UG/DL (ref 250–450)
TRIGL SERPL-MCNC: 94 MG/DL
VIT B12 SERPL-MCNC: 485 PG/ML (ref 100–900)
WBC # BLD AUTO: 9.55 THOUSAND/UL (ref 4.31–10.16)

## 2022-12-03 RX ORDER — ALPRAZOLAM 0.25 MG/1
0.25 TABLET ORAL
Qty: 30 TABLET | Refills: 0 | Status: SHIPPED | OUTPATIENT
Start: 2022-12-03

## 2022-12-05 LAB — 1,25(OH)2D3 SERPL-MCNC: 40.6 PG/ML (ref 24.8–81.5)

## 2022-12-07 ENCOUNTER — HOSPITAL ENCOUNTER (INPATIENT)
Facility: HOSPITAL | Age: 77
LOS: 3 days | Discharge: HOME HEALTH | End: 2022-12-10
Attending: EMERGENCY MEDICINE | Admitting: INTERNAL MEDICINE

## 2022-12-07 ENCOUNTER — APPOINTMENT (EMERGENCY)
Dept: RADIOLOGY | Facility: HOSPITAL | Age: 77
End: 2022-12-07

## 2022-12-07 ENCOUNTER — APPOINTMENT (INPATIENT)
Dept: NON INVASIVE DIAGNOSTICS | Facility: HOSPITAL | Age: 77
End: 2022-12-07

## 2022-12-07 DIAGNOSIS — I42.9 CARDIOMYOPATHY (HCC): ICD-10-CM

## 2022-12-07 DIAGNOSIS — I50.9 CHF (CONGESTIVE HEART FAILURE) (HCC): ICD-10-CM

## 2022-12-07 DIAGNOSIS — R77.8 ELEVATED TROPONIN: ICD-10-CM

## 2022-12-07 DIAGNOSIS — J81.0 FLASH PULMONARY EDEMA (HCC): Primary | ICD-10-CM

## 2022-12-07 DIAGNOSIS — I20.0 UNSTABLE ANGINA (HCC): ICD-10-CM

## 2022-12-07 DIAGNOSIS — I42.9 CARDIOMYOPATHY, UNSPECIFIED TYPE (HCC): ICD-10-CM

## 2022-12-07 PROBLEM — J96.01 ACUTE RESPIRATORY FAILURE WITH HYPOXIA (HCC): Status: ACTIVE | Noted: 2022-12-07

## 2022-12-07 PROBLEM — R07.9 CHEST PAIN: Status: ACTIVE | Noted: 2022-12-07

## 2022-12-07 LAB
2HR DELTA HS TROPONIN: -27 NG/L
2HR DELTA HS TROPONIN: 70 NG/L
4HR DELTA HS TROPONIN: -49 NG/L
4HR DELTA HS TROPONIN: 97 NG/L
ALBUMIN SERPL BCP-MCNC: 4 G/DL (ref 3.5–5)
ALP SERPL-CCNC: 113 U/L (ref 46–116)
ALT SERPL W P-5'-P-CCNC: 34 U/L (ref 12–78)
ANION GAP SERPL CALCULATED.3IONS-SCNC: 7 MMOL/L (ref 4–13)
AORTIC ROOT: 2.7 CM
AORTIC VALVE MEAN VELOCITY: 8.1 M/S
APICAL FOUR CHAMBER EJECTION FRACTION: 33 %
APTT PPP: 35 SECONDS (ref 23–37)
AST SERPL W P-5'-P-CCNC: 21 U/L (ref 5–45)
ATRIAL RATE: 105 BPM
ATRIAL RATE: 108 BPM
ATRIAL RATE: 84 BPM
ATRIAL RATE: 86 BPM
ATRIAL RATE: 87 BPM
ATRIAL RATE: 88 BPM
AV AREA BY CONTINUOUS VTI: 1.8 CM2
AV AREA PEAK VELOCITY: 1.6 CM2
AV LVOT MEAN GRADIENT: 1 MMHG
AV LVOT PEAK GRADIENT: 3 MMHG
AV MEAN GRADIENT: 3 MMHG
AV PEAK GRADIENT: 5 MMHG
AV VALVE AREA: 1.8 CM2
AV VELOCITY RATIO: 0.72
BACTERIA UR QL AUTO: ABNORMAL /HPF
BASOPHILS # BLD AUTO: 0.09 THOUSANDS/ÂΜL (ref 0–0.1)
BASOPHILS # BLD AUTO: 0.11 THOUSANDS/ÂΜL (ref 0–0.1)
BASOPHILS NFR BLD AUTO: 1 % (ref 0–1)
BASOPHILS NFR BLD AUTO: 1 % (ref 0–1)
BILIRUB SERPL-MCNC: 0.24 MG/DL (ref 0.2–1)
BILIRUB UR QL STRIP: NEGATIVE
BUN SERPL-MCNC: 14 MG/DL (ref 5–25)
CALCIUM SERPL-MCNC: 8.9 MG/DL (ref 8.3–10.1)
CARDIAC TROPONIN I PNL SERPL HS: 106 NG/L
CARDIAC TROPONIN I PNL SERPL HS: 109 NG/L
CARDIAC TROPONIN I PNL SERPL HS: 12 NG/L
CARDIAC TROPONIN I PNL SERPL HS: 57 NG/L
CARDIAC TROPONIN I PNL SERPL HS: 79 NG/L
CARDIAC TROPONIN I PNL SERPL HS: 82 NG/L
CHLORIDE SERPL-SCNC: 96 MMOL/L (ref 96–108)
CLARITY UR: CLEAR
CO2 SERPL-SCNC: 30 MMOL/L (ref 21–32)
COLOR UR: ABNORMAL
CREAT SERPL-MCNC: 1.07 MG/DL (ref 0.6–1.3)
DOP CALC AO PEAK VEL: 1.15 M/S
DOP CALC AO VTI: 20.6 CM
DOP CALC LVOT AREA: 2.27 CM2
DOP CALC LVOT DIAMETER: 1.7 CM
DOP CALC LVOT PEAK VEL VTI: 16.38 CM
DOP CALC LVOT PEAK VEL: 0.83 M/S
DOP CALC LVOT STROKE INDEX: 20.7 ML/M2
DOP CALC LVOT STROKE VOLUME: 37.16
EOSINOPHIL # BLD AUTO: 0.05 THOUSAND/ÂΜL (ref 0–0.61)
EOSINOPHIL # BLD AUTO: 0.3 THOUSAND/ÂΜL (ref 0–0.61)
EOSINOPHIL NFR BLD AUTO: 0 % (ref 0–6)
EOSINOPHIL NFR BLD AUTO: 2 % (ref 0–6)
ERYTHROCYTE [DISTWIDTH] IN BLOOD BY AUTOMATED COUNT: 13.1 % (ref 11.6–15.1)
ERYTHROCYTE [DISTWIDTH] IN BLOOD BY AUTOMATED COUNT: 13.2 % (ref 11.6–15.1)
FLUAV RNA RESP QL NAA+PROBE: NEGATIVE
FLUBV RNA RESP QL NAA+PROBE: NEGATIVE
FRACTIONAL SHORTENING: 10 (ref 28–44)
GFR SERPL CREATININE-BSD FRML MDRD: 50 ML/MIN/1.73SQ M
GLUCOSE SERPL-MCNC: 111 MG/DL (ref 65–140)
GLUCOSE SERPL-MCNC: 128 MG/DL (ref 65–140)
GLUCOSE SERPL-MCNC: 169 MG/DL (ref 65–140)
GLUCOSE SERPL-MCNC: 209 MG/DL (ref 65–140)
GLUCOSE SERPL-MCNC: 211 MG/DL (ref 65–140)
GLUCOSE UR STRIP-MCNC: ABNORMAL MG/DL
HCT VFR BLD AUTO: 34.2 % (ref 34.8–46.1)
HCT VFR BLD AUTO: 39.4 % (ref 34.8–46.1)
HGB BLD-MCNC: 11.2 G/DL (ref 11.5–15.4)
HGB BLD-MCNC: 12.7 G/DL (ref 11.5–15.4)
HGB UR QL STRIP.AUTO: ABNORMAL
IMM GRANULOCYTES # BLD AUTO: 0.06 THOUSAND/UL (ref 0–0.2)
IMM GRANULOCYTES # BLD AUTO: 0.07 THOUSAND/UL (ref 0–0.2)
IMM GRANULOCYTES NFR BLD AUTO: 1 % (ref 0–2)
IMM GRANULOCYTES NFR BLD AUTO: 1 % (ref 0–2)
INR PPP: 0.95 (ref 0.84–1.19)
INTERVENTRICULAR SEPTUM IN DIASTOLE (PARASTERNAL SHORT AXIS VIEW): 1 CM
INTERVENTRICULAR SEPTUM: 1 CM (ref 0.6–1.1)
KETONES UR STRIP-MCNC: NEGATIVE MG/DL
LAAS-AP2: 19.7 CM2
LAAS-AP4: 18.9 CM2
LEFT ATRIUM AREA SYSTOLE SINGLE PLANE A4C: 20.1 CM2
LEFT ATRIUM SIZE: 3.4 CM
LEFT INTERNAL DIMENSION IN SYSTOLE: 4.3 CM (ref 2.1–4)
LEFT VENTRICLE DIASTOLIC VOLUME (MOD BIPLANE): 137 ML
LEFT VENTRICLE SYSTOLIC VOLUME (MOD BIPLANE): 92 ML
LEFT VENTRICULAR INTERNAL DIMENSION IN DIASTOLE: 4.8 CM (ref 3.5–6)
LEFT VENTRICULAR POSTERIOR WALL IN END DIASTOLE: 1 CM
LEFT VENTRICULAR STROKE VOLUME: 25 ML
LEUKOCYTE ESTERASE UR QL STRIP: NEGATIVE
LV EF: 33 %
LVSV (TEICH): 25 ML
LYMPHOCYTES # BLD AUTO: 2.9 THOUSANDS/ÂΜL (ref 0.6–4.47)
LYMPHOCYTES # BLD AUTO: 6.1 THOUSANDS/ÂΜL (ref 0.6–4.47)
LYMPHOCYTES NFR BLD AUTO: 26 % (ref 14–44)
LYMPHOCYTES NFR BLD AUTO: 46 % (ref 14–44)
MCH RBC QN AUTO: 29.8 PG (ref 26.8–34.3)
MCH RBC QN AUTO: 30.2 PG (ref 26.8–34.3)
MCHC RBC AUTO-ENTMCNC: 32.2 G/DL (ref 31.4–37.4)
MCHC RBC AUTO-ENTMCNC: 32.7 G/DL (ref 31.4–37.4)
MCV RBC AUTO: 91 FL (ref 82–98)
MCV RBC AUTO: 94 FL (ref 82–98)
MONOCYTES # BLD AUTO: 0.63 THOUSAND/ÂΜL (ref 0.17–1.22)
MONOCYTES # BLD AUTO: 0.7 THOUSAND/ÂΜL (ref 0.17–1.22)
MONOCYTES NFR BLD AUTO: 5 % (ref 4–12)
MONOCYTES NFR BLD AUTO: 6 % (ref 4–12)
NEUTROPHILS # BLD AUTO: 6 THOUSANDS/ÂΜL (ref 1.85–7.62)
NEUTROPHILS # BLD AUTO: 7.45 THOUSANDS/ÂΜL (ref 1.85–7.62)
NEUTS SEG NFR BLD AUTO: 45 % (ref 43–75)
NEUTS SEG NFR BLD AUTO: 66 % (ref 43–75)
NITRITE UR QL STRIP: NEGATIVE
NON-SQ EPI CELLS URNS QL MICRO: ABNORMAL /HPF
NRBC BLD AUTO-RTO: 0 /100 WBCS
NRBC BLD AUTO-RTO: 0 /100 WBCS
NT-PROBNP SERPL-MCNC: 1417 PG/ML
P AXIS: 68 DEGREES
P AXIS: 71 DEGREES
P AXIS: 72 DEGREES
P AXIS: 73 DEGREES
P AXIS: 74 DEGREES
P AXIS: 76 DEGREES
PH UR STRIP.AUTO: 6 [PH]
PHOSPHATE SERPL-MCNC: 4.1 MG/DL (ref 2.3–4.1)
PLATELET # BLD AUTO: 269 THOUSANDS/UL (ref 149–390)
PLATELET # BLD AUTO: 311 THOUSANDS/UL (ref 149–390)
PMV BLD AUTO: 9.7 FL (ref 8.9–12.7)
PMV BLD AUTO: 9.9 FL (ref 8.9–12.7)
POTASSIUM SERPL-SCNC: 4.1 MMOL/L (ref 3.5–5.3)
PR INTERVAL: 136 MS
PR INTERVAL: 140 MS
PR INTERVAL: 142 MS
PR INTERVAL: 144 MS
PROT SERPL-MCNC: 8.1 G/DL (ref 6.4–8.4)
PROT UR STRIP-MCNC: ABNORMAL MG/DL
PROTHROMBIN TIME: 12.7 SECONDS (ref 11.6–14.5)
QRS AXIS: 59 DEGREES
QRS AXIS: 67 DEGREES
QRS AXIS: 68 DEGREES
QRS AXIS: 71 DEGREES
QRS AXIS: 72 DEGREES
QRS AXIS: 74 DEGREES
QRSD INTERVAL: 130 MS
QRSD INTERVAL: 132 MS
QRSD INTERVAL: 134 MS
QRSD INTERVAL: 134 MS
QT INTERVAL: 346 MS
QT INTERVAL: 354 MS
QT INTERVAL: 384 MS
QT INTERVAL: 396 MS
QT INTERVAL: 434 MS
QT INTERVAL: 440 MS
QTC INTERVAL: 418 MS
QTC INTERVAL: 418 MS
QTC INTERVAL: 514 MS
QTC INTERVAL: 519 MS
QTC INTERVAL: 523 MS
QTC INTERVAL: 529 MS
RBC # BLD AUTO: 3.76 MILLION/UL (ref 3.81–5.12)
RBC # BLD AUTO: 4.2 MILLION/UL (ref 3.81–5.12)
RBC #/AREA URNS AUTO: ABNORMAL /HPF
RIGHT ATRIUM AREA SYSTOLE A4C: 13.6 CM2
RIGHT VENTRICLE ID DIMENSION: 4.3 CM
RSV RNA RESP QL NAA+PROBE: NEGATIVE
SARS-COV-2 RNA RESP QL NAA+PROBE: NEGATIVE
SL CV LEFT ATRIUM LENGTH A2C: 5.2 CM
SL CV PED ECHO LEFT VENTRICLE DIASTOLIC VOLUME (MOD BIPLANE) 2D: 109 ML
SL CV PED ECHO LEFT VENTRICLE SYSTOLIC VOLUME (MOD BIPLANE) 2D: 85 ML
SODIUM SERPL-SCNC: 133 MMOL/L (ref 135–147)
SP GR UR STRIP.AUTO: 1.01 (ref 1–1.03)
T WAVE AXIS: 102 DEGREES
T WAVE AXIS: 116 DEGREES
T WAVE AXIS: 144 DEGREES
T WAVE AXIS: 176 DEGREES
T WAVE AXIS: 34 DEGREES
T WAVE AXIS: 71 DEGREES
TR MAX PG: 28 MMHG
TR PEAK VELOCITY: 2.7 M/S
TRICUSPID VALVE PEAK REGURGITATION VELOCITY: 2.66 M/S
UROBILINOGEN UR QL STRIP.AUTO: 0.2 E.U./DL
VENTRICULAR RATE: 105 BPM
VENTRICULAR RATE: 108 BPM
VENTRICULAR RATE: 84 BPM
VENTRICULAR RATE: 86 BPM
VENTRICULAR RATE: 87 BPM
VENTRICULAR RATE: 88 BPM
WBC # BLD AUTO: 11.18 THOUSAND/UL (ref 4.31–10.16)
WBC # BLD AUTO: 13.28 THOUSAND/UL (ref 4.31–10.16)
WBC #/AREA URNS AUTO: ABNORMAL /HPF

## 2022-12-07 RX ORDER — AMLODIPINE BESYLATE 5 MG/1
5 TABLET ORAL DAILY
Status: DISCONTINUED | OUTPATIENT
Start: 2022-12-07 | End: 2022-12-08

## 2022-12-07 RX ORDER — ONDANSETRON 2 MG/ML
4 INJECTION INTRAMUSCULAR; INTRAVENOUS EVERY 6 HOURS PRN
Status: DISCONTINUED | OUTPATIENT
Start: 2022-12-07 | End: 2022-12-10 | Stop reason: HOSPADM

## 2022-12-07 RX ORDER — CARVEDILOL 6.25 MG/1
6.25 TABLET ORAL 2 TIMES DAILY WITH MEALS
Status: DISCONTINUED | OUTPATIENT
Start: 2022-12-07 | End: 2022-12-10 | Stop reason: HOSPADM

## 2022-12-07 RX ORDER — FUROSEMIDE 10 MG/ML
40 INJECTION INTRAMUSCULAR; INTRAVENOUS DAILY
Status: DISCONTINUED | OUTPATIENT
Start: 2022-12-07 | End: 2022-12-08

## 2022-12-07 RX ORDER — FUROSEMIDE 10 MG/ML
80 INJECTION INTRAMUSCULAR; INTRAVENOUS ONCE
Status: COMPLETED | OUTPATIENT
Start: 2022-12-07 | End: 2022-12-07

## 2022-12-07 RX ORDER — ENOXAPARIN SODIUM 100 MG/ML
40 INJECTION SUBCUTANEOUS DAILY
Status: DISCONTINUED | OUTPATIENT
Start: 2022-12-07 | End: 2022-12-10 | Stop reason: HOSPADM

## 2022-12-07 RX ORDER — ALPRAZOLAM 0.25 MG/1
0.25 TABLET ORAL
Status: DISCONTINUED | OUTPATIENT
Start: 2022-12-07 | End: 2022-12-10 | Stop reason: HOSPADM

## 2022-12-07 RX ORDER — LOSARTAN POTASSIUM 50 MG/1
50 TABLET ORAL DAILY
Status: DISCONTINUED | OUTPATIENT
Start: 2022-12-07 | End: 2022-12-08

## 2022-12-07 RX ORDER — EZETIMIBE 10 MG/1
10 TABLET ORAL
Status: DISCONTINUED | OUTPATIENT
Start: 2022-12-07 | End: 2022-12-10 | Stop reason: HOSPADM

## 2022-12-07 RX ORDER — MIRTAZAPINE 15 MG/1
15 TABLET, FILM COATED ORAL
Status: DISCONTINUED | OUTPATIENT
Start: 2022-12-07 | End: 2022-12-10 | Stop reason: HOSPADM

## 2022-12-07 RX ORDER — INSULIN LISPRO 100 [IU]/ML
1-5 INJECTION, SOLUTION INTRAVENOUS; SUBCUTANEOUS
Status: DISCONTINUED | OUTPATIENT
Start: 2022-12-07 | End: 2022-12-10 | Stop reason: HOSPADM

## 2022-12-07 RX ORDER — NITROGLYCERIN 0.4 MG/1
0.4 TABLET SUBLINGUAL ONCE
Status: COMPLETED | OUTPATIENT
Start: 2022-12-07 | End: 2022-12-07

## 2022-12-07 RX ORDER — INSULIN GLARGINE 100 [IU]/ML
5 INJECTION, SOLUTION SUBCUTANEOUS EVERY MORNING
Status: DISCONTINUED | OUTPATIENT
Start: 2022-12-07 | End: 2022-12-10 | Stop reason: HOSPADM

## 2022-12-07 RX ORDER — GABAPENTIN 100 MG/1
100 CAPSULE ORAL 2 TIMES DAILY
Status: DISCONTINUED | OUTPATIENT
Start: 2022-12-07 | End: 2022-12-10 | Stop reason: HOSPADM

## 2022-12-07 RX ORDER — ALBUTEROL SULFATE 90 UG/1
1 AEROSOL, METERED RESPIRATORY (INHALATION) EVERY 4 HOURS PRN
Status: DISCONTINUED | OUTPATIENT
Start: 2022-12-07 | End: 2022-12-10 | Stop reason: HOSPADM

## 2022-12-07 RX ORDER — MEMANTINE HYDROCHLORIDE 5 MG/1
10 TABLET ORAL 2 TIMES DAILY
Status: DISCONTINUED | OUTPATIENT
Start: 2022-12-07 | End: 2022-12-10 | Stop reason: HOSPADM

## 2022-12-07 RX ORDER — ACETAMINOPHEN 325 MG/1
650 TABLET ORAL EVERY 6 HOURS PRN
Status: DISCONTINUED | OUTPATIENT
Start: 2022-12-07 | End: 2022-12-10 | Stop reason: HOSPADM

## 2022-12-07 RX ORDER — BENZONATATE 100 MG/1
100 CAPSULE ORAL 3 TIMES DAILY PRN
Status: DISCONTINUED | OUTPATIENT
Start: 2022-12-07 | End: 2022-12-10 | Stop reason: HOSPADM

## 2022-12-07 RX ORDER — NITROGLYCERIN 20 MG/100ML
5-200 INJECTION INTRAVENOUS
Status: DISCONTINUED | OUTPATIENT
Start: 2022-12-07 | End: 2022-12-07

## 2022-12-07 RX ORDER — PANTOPRAZOLE SODIUM 40 MG/1
40 TABLET, DELAYED RELEASE ORAL
Status: DISCONTINUED | OUTPATIENT
Start: 2022-12-07 | End: 2022-12-10 | Stop reason: HOSPADM

## 2022-12-07 RX ORDER — HYDRALAZINE HYDROCHLORIDE 20 MG/ML
5 INJECTION INTRAMUSCULAR; INTRAVENOUS EVERY 6 HOURS PRN
Status: DISCONTINUED | OUTPATIENT
Start: 2022-12-07 | End: 2022-12-10 | Stop reason: HOSPADM

## 2022-12-07 RX ORDER — ASPIRIN 81 MG/1
324 TABLET, CHEWABLE ORAL ONCE
Status: DISCONTINUED | OUTPATIENT
Start: 2022-12-07 | End: 2022-12-07

## 2022-12-07 RX ADMIN — GABAPENTIN 100 MG: 100 CAPSULE ORAL at 17:44

## 2022-12-07 RX ADMIN — ENOXAPARIN SODIUM 40 MG: 40 INJECTION SUBCUTANEOUS at 09:11

## 2022-12-07 RX ADMIN — MEMANTINE 10 MG: 5 TABLET ORAL at 17:44

## 2022-12-07 RX ADMIN — BENZONATATE 100 MG: 100 CAPSULE ORAL at 23:48

## 2022-12-07 RX ADMIN — MEMANTINE 10 MG: 5 TABLET ORAL at 09:11

## 2022-12-07 RX ADMIN — ALPRAZOLAM 0.25 MG: 0.25 TABLET ORAL at 21:49

## 2022-12-07 RX ADMIN — EZETIMIBE 10 MG: 10 TABLET ORAL at 21:49

## 2022-12-07 RX ADMIN — MIRTAZAPINE 15 MG: 15 TABLET, FILM COATED ORAL at 21:49

## 2022-12-07 RX ADMIN — PANTOPRAZOLE SODIUM 40 MG: 40 TABLET, DELAYED RELEASE ORAL at 06:02

## 2022-12-07 RX ADMIN — CARVEDILOL 6.25 MG: 6.25 TABLET, FILM COATED ORAL at 09:10

## 2022-12-07 RX ADMIN — NITROGLYCERIN 0.4 MG: 0.4 TABLET SUBLINGUAL at 01:56

## 2022-12-07 RX ADMIN — INSULIN LISPRO 1 UNITS: 100 INJECTION, SOLUTION INTRAVENOUS; SUBCUTANEOUS at 21:48

## 2022-12-07 RX ADMIN — AMLODIPINE BESYLATE 5 MG: 5 TABLET ORAL at 09:10

## 2022-12-07 RX ADMIN — FUROSEMIDE 80 MG: 10 INJECTION, SOLUTION INTRAVENOUS at 00:24

## 2022-12-07 RX ADMIN — GABAPENTIN 100 MG: 100 CAPSULE ORAL at 09:10

## 2022-12-07 RX ADMIN — LOSARTAN POTASSIUM 50 MG: 50 TABLET, FILM COATED ORAL at 09:10

## 2022-12-07 RX ADMIN — FUROSEMIDE 40 MG: 10 INJECTION, SOLUTION INTRAMUSCULAR; INTRAVENOUS at 09:18

## 2022-12-07 RX ADMIN — INSULIN GLARGINE 5 UNITS: 100 INJECTION, SOLUTION SUBCUTANEOUS at 09:15

## 2022-12-07 RX ADMIN — CARVEDILOL 6.25 MG: 6.25 TABLET, FILM COATED ORAL at 17:44

## 2022-12-07 NOTE — ED PROVIDER NOTES
History  Chief Complaint   Patient presents with   • Shortness of Breath     Per EMS patient woke up out of sleep with SOB, patient states she doesn't feel good, reporting chest pain and SOB  Noted to be diaphoretic      70 y/o female with a hx of diabetes and CAD presents with sudden onset of cp and sob since waking up pta  No cough, no fevers, no wheezing, no h/o asthma but states that she does have a albuterol inhaler  Prior to Admission Medications   Prescriptions Last Dose Informant Patient Reported? Taking?    ALPRAZolam (XANAX) 0 25 mg tablet   No No   Sig: Take 1 tablet (0 25 mg total) by mouth daily at bedtime   Accu-Chek FastClix Lancets MISC   No No   Sig: Use to test 3x daily   Blood Glucose Monitoring Suppl (OneTouch Verio) w/Device KIT   No No   Sig: Use as directed to check blood sugar DX E11 9   Calcium Carb-Cholecalciferol 600-400 MG-UNIT TABS   No No   Sig: Take 1 tablet by mouth 2 (two) times a day   Diclofenac Sodium (VOLTAREN) 1 %   No No   Sig: APLICAR 2 GRAMOS POR VIA TOPICAL CUATRO VECES AL JEFFREY   Insulin Glargine Solostar (Lantus SoloStar) 100 UNIT/ML SOPN   No No   Sig: Use 9 units subcut once dialy   Insulin Pen Needle (BD Pen Needle Micro U/F) 32G X 6 MM MISC   No No   Sig: Use daily   Tradjenta 5 MG TABS   No No   Sig: MAR JOHNNY TABLETA POR VIA ORAL JOHNNY VEZ AL JEFFREY   albuterol (PROVENTIL HFA,VENTOLIN HFA) 90 mcg/act inhaler   No No   Sig: INHALAR 1-2 BOCANADAS A LOS PULMONES CADA 4-6 HORAS ALFREDO SEA NECESARIO   amLODIPine (NORVASC) 5 mg tablet   No No   Sig: Take 1 tablet (5 mg total) by mouth daily   carvedilol (COREG) 12 5 mg tablet   No No   Sig: Take 0 5 tablets (6 25 mg total) by mouth 2 (two) times a day with meals   cyanocobalamin (VITAMIN B-12) 500 MCG tablet   Yes No   Sig: Take 1,000 mcg by mouth daily   diphenhydrAMINE (BENADRYL) 2 % cream   No No   Sig: Apply topically 3 (three) times a day as needed for itching   ezetimibe (ZETIA) 10 mg tablet   No No   Sig: [facility] JOHNNY (1) TABLETA POR VIA ORAL JOHNNY VEZ JEFFREY   ferrous sulfate 325 (65 Fe) mg tablet   Yes No   Sig: Take 325 mg by mouth daily with breakfast   fluticasone (FLONASE) 50 mcg/act nasal spray   No No   Sig: USAR 1 ROCIADA EN CADA FOSA NASAL DIARIAMENTE   gabapentin (NEURONTIN) 100 mg capsule   No No   Sig: Take 1 capsule (100 mg total) by mouth 2 (two) times a day   glucose blood (Accu-Chek Guide) test strip   No No   Sig: Use to test 3x daily   hydrocortisone 2 5 % cream   No No   Sig: APLICAR POR VIA TOPICA CUATRO VECES AL JEFFREY ALFREDO SEA NECESARIO PARA EL ERUPCION   loperamide (IMODIUM) 2 mg capsule   No No   Sig: MAR JOHNNY (1) CAPSULA POR VIA ORAL ALFREDO SEA NECESARIO PARA LA DIARREA   losartan (COZAAR) 50 mg tablet   No No   Sig: TAKE 1 TABLET(50 MG) BY MOUTH DAILY   meloxicam (MOBIC) 7 5 mg tablet   No No   Sig: MAR JOHNNY (1)TABLETA POR VIA ORAL DIARIA CON EL DESAYUNO PARA LA ARTHRITIS   NO TOME CON IBUPROFEN   memantine (NAMENDA) 10 mg tablet   No No   Sig: MAR 1 TABLETA POR VIA ORAL DOS VECES AL JEFFREY   metFORMIN (GLUCOPHAGE) 1000 MG tablet   No No   Sig: MAR JOHNNY (1) TABLETA POR VIA ORAL DOS VECES AL JEFFREY CON COMIDAS   mirtazapine (REMERON) 15 mg tablet   No No   Sig: MAR JOHNNY (1) TABLETA POR VIA ORAL CADA NOCHE A LA HORA DE ACOSTARSE   omeprazole (PriLOSEC) 40 MG capsule   No No   Sig: MAR 1 CAPSULA POR VIA ORAL JOHNNY VEZ AL JEFFREY      Facility-Administered Medications: None       Past Medical History:   Diagnosis Date   • Cancer (HCC)     Throat   • Chronic pain disorder    • Diabetes mellitus (HCC)    • Diffuse large B cell lymphoma (HCC)    • Dysphagia    • GERD without esophagitis    • HTN (hypertension)    • Hyperlipidemia    • Hypertension    • MI (myocardial infarction) ([facility] )    • Port-A-Cath in place 07/29/2019   • Thyroid cancer ([facility] ) 2018       Past Surgical History:   Procedure Laterality Date   • BONE MARROW BIOPSY     • BREAST BIOPSY Left    • CHOLECYSTECTOMY     • IR PORT PLACEMENT  11/16/2018   • IR PORT REMOVAL  3/22/2021   • OTHER SURGICAL HISTORY      tendor tear repair to right shoulder   • SHOULDER ARTHROSCOPY         Family History   Problem Relation Age of Onset   • Diabetes Mother    • Heart disease Sister    • Hypertension Brother    • Uterine cancer Maternal Grandmother    • Prostate cancer Paternal Grandfather      I have reviewed and agree with the history as documented  E-Cigarette/Vaping   • E-Cigarette Use Never User      E-Cigarette/Vaping Substances     Social History     Tobacco Use   • Smoking status: Never   • Smokeless tobacco: Never   Vaping Use   • Vaping Use: Never used   Substance Use Topics   • Alcohol use: Never     Comment: 0   • Drug use: No       Review of Systems   Constitutional: Negative for appetite change, fatigue and fever  HENT: Negative for rhinorrhea and sore throat  Eyes: Negative for pain  Respiratory: Positive for shortness of breath  Negative for cough and wheezing  Cardiovascular: Positive for chest pain  Negative for leg swelling  Gastrointestinal: Negative for abdominal pain, diarrhea, nausea and vomiting  Genitourinary: Negative for dysuria and flank pain  Musculoskeletal: Negative for back pain and neck pain  Skin: Negative for rash  Neurological: Negative for syncope and headaches  Psychiatric/Behavioral:        Mood normal       Physical Exam  Physical Exam  Vitals and nursing note reviewed  Constitutional:       Appearance: She is well-developed  HENT:      Head: Normocephalic and atraumatic  Right Ear: External ear normal       Left Ear: External ear normal    Eyes:      General: No scleral icterus  Extraocular Movements: Extraocular movements intact  Cardiovascular:      Rate and Rhythm: Normal rate and regular rhythm  Pulmonary:      Comments: Increased work of breathing, diffuse rales b/l  Abdominal:      Palpations: Abdomen is soft  Tenderness: There is no abdominal tenderness     Musculoskeletal: General: No deformity or signs of injury  Normal range of motion  Cervical back: Normal range of motion and neck supple  Skin:     General: Skin is warm and dry  Coloration: Skin is not jaundiced or pale  Neurological:      General: No focal deficit present  Mental Status: She is alert and oriented to person, place, and time     Psychiatric:         Mood and Affect: Mood normal          Behavior: Behavior normal          Vital Signs  ED Triage Vitals   Temperature Pulse Respirations Blood Pressure SpO2   12/07/22 0100 12/07/22 0009 12/07/22 0022 12/07/22 0009 12/07/22 0009   97 7 °F (36 5 °C) 102 (!) 23 (!) 209/103 (!) 87 %      Temp Source Heart Rate Source Patient Position - Orthostatic VS BP Location FiO2 (%)   12/07/22 0100 12/07/22 0009 12/07/22 0009 12/07/22 0009 --   Oral Monitor Lying Right arm       Pain Score       12/07/22 0346       2           Vitals:    12/07/22 0400 12/07/22 0412 12/07/22 0430 12/07/22 0445   BP: 132/66 147/82 124/65 154/73   Pulse: 86 91 86 88   Patient Position - Orthostatic VS: Lying Lying Lying Lying         Visual Acuity      ED Medications  Medications   albuterol (PROVENTIL HFA,VENTOLIN HFA) inhaler 1 puff (has no administration in time range)   ALPRAZolam (XANAX) tablet 0 25 mg (has no administration in time range)   amLODIPine (NORVASC) tablet 5 mg (has no administration in time range)   carvedilol (COREG) tablet 6 25 mg (has no administration in time range)   ezetimibe (ZETIA) tablet 10 mg (has no administration in time range)   gabapentin (NEURONTIN) capsule 100 mg (has no administration in time range)   insulin glargine (LANTUS) subcutaneous injection 5 Units 0 05 mL (has no administration in time range)   losartan (COZAAR) tablet 50 mg (has no administration in time range)   memantine (NAMENDA) tablet 10 mg (has no administration in time range)   mirtazapine (REMERON) tablet 15 mg (has no administration in time range)   pantoprazole (PROTONIX) EC tablet 40 mg (has no administration in time range)   acetaminophen (TYLENOL) tablet 650 mg (has no administration in time range)   ondansetron (ZOFRAN) injection 4 mg (has no administration in time range)   furosemide (LASIX) injection 40 mg (has no administration in time range)   enoxaparin (LOVENOX) subcutaneous injection 40 mg (has no administration in time range)   insulin lispro (HumaLOG) 100 units/mL subcutaneous injection 1-5 Units (has no administration in time range)   insulin lispro (HumaLOG) 100 units/mL subcutaneous injection 1-5 Units (has no administration in time range)   benzonatate (TESSALON PERLES) capsule 100 mg (has no administration in time range)   hydrALAZINE (APRESOLINE) injection 5 mg (has no administration in time range)   furosemide (LASIX) injection 80 mg (80 mg Intravenous Given 12/7/22 0024)   nitroglycerin (NITROSTAT) SL tablet 0 4 mg (0 4 mg Sublingual Given 12/7/22 0156)       Diagnostic Studies  Results Reviewed     Procedure Component Value Units Date/Time    HS Troponin I 4hr [[id number]]  (Abnormal) Collected: 12/07/22 0415    Lab Status: Final result Specimen: Blood from Arm, Right Updated: 12/07/22 0542     hs TnI 4hr 109 ng/L      Delta 4hr hsTnI 97 ng/L     Phosphorus [[id number]]  (Normal) Collected: 12/07/22 0512    Lab Status: Final result Specimen: Blood from Arm, Right Updated: 12/07/22 0538     Phosphorus 4 1 mg/dL     CBC and differential [[id number]]  (Abnormal) Collected: 12/07/22 0512    Lab Status: Final result Specimen: Blood from Arm, Right Updated: 12/07/22 0522     WBC 11 18 Thousand/uL      RBC 3 76 Million/uL      Hemoglobin 11 2 g/dL      Hematocrit 34 2 %      MCV 91 fL      MCH 29 8 pg      MCHC 32 7 g/dL      RDW 13 2 %      MPV 9 7 fL      Platelets 403 Thousands/uL      nRBC 0 /100 WBCs      Neutrophils Relative 66 %      Immat GRANS % 1 %      Lymphocytes Relative 26 %      Monocytes Relative 6 %      Eosinophils Relative 0 %      Basophils Relative 1 % Neutrophils Absolute 7 45 Thousands/µL      Immature Grans Absolute 0 06 Thousand/uL      Lymphocytes Absolute 2 90 Thousands/µL      Monocytes Absolute 0 63 Thousand/µL      Eosinophils Absolute 0 05 Thousand/µL      Basophils Absolute 0 09 Thousands/µL     Urinalysis with microscopic [[id number]] Collected: 12/07/22 0342    Lab Status: In process Specimen: Urine, Clean Catch Updated: 12/07/22 0349    HS Troponin I 2hr [[id number]]  (Abnormal) Collected: 12/07/22 0214    Lab Status: Final result Specimen: Blood from Arm, Right Updated: 12/07/22 0248     hs TnI 2hr 82 ng/L      Delta 2hr hsTnI 70 ng/L     FLU/RSV/COVID - if FLU/RSV clinically relevant [[id number]]  (Normal) Collected: 12/07/22 0114    Lab Status: Final result Specimen: Nares from Nose Updated: 12/07/22 0203     SARS-CoV-2 Negative     INFLUENZA A PCR Negative     INFLUENZA B PCR Negative     RSV PCR Negative    Narrative:      FOR PEDIATRIC PATIENTS - copy/paste COVID Guidelines URL to browser: [email or web address]  [employer]    SARS-CoV-2 assay is a Nucleic Acid Amplification assay intended for the  qualitative detection of nucleic acid from SARS-CoV-2 in nasopharyngeal  swabs  Results are for the presumptive identification of SARS-CoV-2 RNA  Positive results are indicative of infection with SARS-CoV-2, the virus  causing COVID-19, but do not rule out bacterial infection or co-infection  with other viruses  Laboratories within the [place] and its  territories are required to report all positive results to the appropriate  public health authorities  Negative results do not preclude SARS-CoV-2  infection and should not be used as the sole basis for treatment or other  patient management decisions  Negative results must be combined with  clinical observations, patient history, and epidemiological information  This test has not been FDA cleared or approved      This test has been authorized by FDA under an Emergency Use Authorization  (EUA)  This test is only authorized for the duration of time the  declaration that circumstances exist justifying the authorization of the  emergency use of an in vitro diagnostic tests for detection of SARS-CoV-2  virus and/or diagnosis of COVID-19 infection under section 564(b)(1) of  the Act, 21 U  S C  447YHW-9(X)(7), unless the authorization is terminated  or revoked sooner  The test has been validated but independent review by FDA  and CLIA is pending  Test performed using [employer] GeneXpert: This RT-PCR assay targets N2,  a region unique to SARS-CoV-2  A conserved region in the E-gene was chosen  for pan-Sarbecovirus detection which includes SARS-CoV-2  According to CMS-2020-01-R, this platform meets the definition of high-throughput technology      NT-BNP PRO [[id number]]  (Abnormal) Collected: 12/07/22 0018    Lab Status: Final result Specimen: Blood from Arm, Right Updated: 12/07/22 0056     NT-proBNP 1,417 pg/mL     HS Troponin 0hr (reflex protocol) [[id number]]  (Normal) Collected: 12/07/22 0018    Lab Status: Final result Specimen: Blood from Arm, Right Updated: 12/07/22 0054     hs TnI 0hr 12 ng/L     Comprehensive metabolic panel [[id number]]  (Abnormal) Collected: 12/07/22 0018    Lab Status: Final result Specimen: Blood from Arm, Right Updated: 12/07/22 0049     Sodium 133 mmol/L      Potassium 4 1 mmol/L      Chloride 96 mmol/L      CO2 30 mmol/L      ANION GAP 7 mmol/L      BUN 14 mg/dL      Creatinine 1 07 mg/dL      Glucose 209 mg/dL      Calcium 8 9 mg/dL      AST 21 U/L      ALT 34 U/L      Alkaline Phosphatase 113 U/L      Total Protein 8 1 g/dL      Albumin 4 0 g/dL      Total Bilirubin 0 24 mg/dL      eGFR 50 ml/min/1 73sq m     Narrative:      [place] guidelines for Chronic Kidney Disease (CKD):   •  Stage 1 with normal or high GFR (GFR > 90 mL/min/1 73 square meters)  •  Stage 2 Mild CKD (GFR = 60-89 mL/min/1 73 square meters)  • Stage 3A Moderate CKD (GFR = 45-59 mL/min/1 73 square meters)  •  Stage 3B Moderate CKD (GFR = 30-44 mL/min/1 73 square meters)  •  Stage 4 Severe CKD (GFR = 15-29 mL/min/1 73 square meters)  •  Stage 5 End Stage CKD (GFR <15 mL/min/1 73 square meters)  Note: GFR calculation is accurate only with a steady state creatinine    Protime-INR [[id number]]  (Normal) Collected: 12/07/22 0018    Lab Status: Final result Specimen: Blood from Arm, Right Updated: 12/07/22 0043     Protime 12 7 seconds      INR 0 95    APTT [[id number]]  (Normal) Collected: 12/07/22 0018    Lab Status: Final result Specimen: Blood from Arm, Right Updated: 12/07/22 0043     PTT 35 seconds     CBC and differential [[id number]]  (Abnormal) Collected: 12/07/22 0018    Lab Status: Final result Specimen: Blood from Arm, Right Updated: 12/07/22 0032     WBC 13 28 Thousand/uL      RBC 4 20 Million/uL      Hemoglobin 12 7 g/dL      Hematocrit 39 4 %      MCV 94 fL      MCH 30 2 pg      MCHC 32 2 g/dL      RDW 13 1 %      MPV 9 9 fL      Platelets 048 Thousands/uL      nRBC 0 /100 WBCs      Neutrophils Relative 45 %      Immat GRANS % 1 %      Lymphocytes Relative 46 %      Monocytes Relative 5 %      Eosinophils Relative 2 %      Basophils Relative 1 %      Neutrophils Absolute 6 00 Thousands/µL      Immature Grans Absolute 0 07 Thousand/uL      Lymphocytes Absolute 6 10 Thousands/µL      Monocytes Absolute 0 70 Thousand/µL      Eosinophils Absolute 0 30 Thousand/µL      Basophils Absolute 0 11 Thousands/µL                  XR chest 1 view portable    (Results Pending)              Procedures  Procedures         ED Course             HEART Risk Score    Flowsheet Row Most Recent Value   Heart Score Risk Calculator    History 1 Filed at: 12/07/2022 0552   ECG 1 Filed at: 12/07/2022 4919   Age 2 Filed at: 12/07/2022 9653   Risk Factors 2 Filed at: 12/07/2022 4192   Troponin --   HEART Score --                        SBIRT 22yo+    Flowsheet Row Most Recent Value   SBIRT (24 yo +)    In order to provide better care to our patients, we are screening all of our patients for alcohol and drug use  Would it be okay to ask you these screening questions? Yes Filed at: 12/07/2022 6027   Initial Alcohol Screen: US AUDIT-C     1  How often do you have a drink containing alcohol? 0 Filed at: 12/07/2022 0058   2  How many drinks containing alcohol do you have on a typical day you are drinking? 0 Filed at: 12/07/2022 0058   3b  FEMALE Any Age, or MALE 65+: How often do you have 4 or more drinks on one occassion? 0 Filed at: 12/07/2022 0058   Audit-C Score 0 Filed at: 12/07/2022 3767   JHONY: How many times in the past year have you    Used an illegal drug or used a prescription medication for non-medical reasons? Never Filed at: 12/07/2022 0058                    MDM  Number of Diagnoses or Management Options     Amount and/or Complexity of Data Reviewed  Clinical lab tests: ordered and reviewed  Tests in the radiology section of CPT®: ordered and reviewed    Risk of Complications, Morbidity, and/or Mortality  Presenting problems: high  General comments: ekg - no st elevation or depression    CXR shows CHF  Pt  Improved with meds and bipap - after about 1 & 1/2 hours of bipap  Pt  Was comfortable, and bipap was taken off    Pt  Stable with NC    Admitted to hospitalist        Disposition  Final diagnoses:   Flash pulmonary edema (HCC)   CHF (congestive heart failure) ([facility] )   Unstable angina ([facility] )     Time reflects when diagnosis was documented in both MDM as applicable and the Disposition within this note     Time User Action Codes Description Comment    12/7/2022  2:11 AM Patricia Hernandez Add [J81 0] Flash pulmonary edema ([facility] )     12/7/2022  2:11 AM Patricia Hernandez Add [I50 9] CHF (congestive heart failure) ([facility] )     12/7/2022  2:11 AM Patricia Hernandez Add [I20 0] Unstable angina [facility])       ED Disposition     ED Disposition   Admit    Condition Stable    Date/Time   Wed Dec 7, 2022  2:10 AM    Comment   Case was discussed with JOSE L  and the patient's admission status was agreed to be step down 2/tele           Follow-up Information    None         Patient's Medications   Discharge Prescriptions    No medications on file       No discharge procedures on file      PDMP Review       Value Time User    PDMP Reviewed  Yes 12/3/2022  1:59 PM James Harrell, [facility]          ED Provider  Electronically Signed by           Shahriar Deal MD  12/07/22 1782

## 2022-12-07 NOTE — ASSESSMENT & PLAN NOTE
Presented with chest pain or shortness of breath which started today  Blood pressure was significantly elevated on arrival at 209/103  CXR concerning for vascular congestion  She additionally has been without her CPAP for about 2 weeks after moving to the area  Suspect flash pulmonary edema in combination with BiPAP pulm therapy  S/p IV Lasix and weaned off BiPAP in ED to 4 L nasal cannula    · Continue IV Lasix  · Monitor I&O's  · Continuous pulse oximetry  · Wean O2 as able  · IV hydralazine as needed  · Continue CPAP therapy at night

## 2022-12-07 NOTE — Clinical Note
Post procedure Miguel Rhodes is sleepy easily awakened with tactile and verbal stimulation then is responsive and appropriate  O2 weaned and tolerated  Without complaints, dozing when not disturbed    Readied for transfer

## 2022-12-07 NOTE — H&P
Jameel 327 [id number], 69 y o  female MRN: [id number]  Unit/Bed#: ED 22 Encounter: [id number]  Primary Care Provider: MOIRA Grider   Date and time admitted to hospital: 12/7/2022 12:06 AM    * Acute respiratory failure with hypoxia [facility])  Assessment & Plan  Presented with chest pain or shortness of breath which started today  Blood pressure was significantly elevated on arrival at 209/103  CXR concerning for vascular congestion  She additionally has been without her CPAP for about 2 weeks after moving to the area  Suspect flash pulmonary edema in combination with BiPAP pulm therapy  S/p IV Lasix and weaned off BiPAP in ED to 4 L nasal cannula  · Continue IV Lasix  · Monitor I&O's  · Continuous pulse oximetry  · Wean O2 as able  · IV hydralazine as needed  · Continue CPAP therapy at night    Chest pain  Assessment & Plan  Reports onset of generalized chest pain across chest earlier today  She would describe this as a burning chest pain  Initial troponin 12  EKG without ST elevation  · Trend troponin, EKG  · On telemetry  · Obtain echo given chest pain and appearance of volume overload  Type 2 diabetes mellitus without complication, without long-term current use of insulin (HCC)  Assessment & Plan  Lab Results   Component Value Date    HGBA1C 6 9 (H) 12/03/2022       No results for input(s): POCGLU in the last 72 hours  Blood Sugar Average: Last 72 hrs:  Current regimen includes glargine 9 units daily, metformin  · Hold metformin while inpatient  · Continue Lantus, decrease dose to 5 units for now, titrate to goal -180  · Sliding-scale insulin  · Hypoglycemia protocol    Obstructive sleep apnea  Assessment & Plan  Reports she normally wears a CPAP machine at night, but she has been without this machine for about 2 weeks after moving to the area    Continue CPAP at night    Dementia with behavioral disturbance  Assessment & Plan  Continue Namenda, Remeron    Chemotherapy-induced peripheral neuropathy (HCC)  Assessment & Plan  Continue gabapentin    Gastroesophageal reflux disease  Assessment & Plan  Continue PPI    Essential hypertension  Assessment & Plan  Continue amlodipine, Coreg, losartan  Hydralazine as needed for SBP greater than 190, DBP greater than 110        VTE Prophylaxis: Enoxaparin (Lovenox)  / sequential compression device and foot pump applied   Code Status: Prior level 1-full code      Anticipated Length of Stay:  Patient will be admitted on an Inpatient basis with an anticipated length of stay of greater than 2 midnights  Justification for Hospital Stay: Please see detailed plans noted above  Granddaughter updated at bedside  Chief Complaint:     Shortness of breath    History of Present Illness:  Anam Stock is a 69 y o  female who has past medical history significant for diabetes, hypertension, diffuse large B-cell lymphoma presenting with shortness of breath and chest pain  Patient and granddaughter reports she had onset of shortness of breath and chest pain earlier today  She also reports cough productive of phlegm and nausea  She otherwise denies fever, chills, vomiting, diarrhea, abdominal pain, lower extremity edema  She did previously have CPAP machine, but has been without this for about 2 weeks  In ED, patient initially placed on BiPAP and given Lasix with suspected flash pulmonary edema  She improved on BiPAP after Lasix and was able to be weaned to 4 L nasal cannula  CXR pulmonary evaluation showing vascular congestion  Initial troponin 12        Review of Systems:    Constitutional:  Denies fever or chills   Eyes:  Denies change in visual acuity   HENT:  Denies nasal congestion or sore throat   Respiratory: Reports cough and shortness of breath  Cardiovascular: Reports chest  GI:  Denies abdominal pain or bloody stools  :  Denies dysuria   Musculoskeletal:  Denies back pain or joint pain   Integument:  Denies rash   Neurologic:  Denies headache or sensory changes   Endocrine:  Denies polyuria or polydipsia   Psychiatric:  Denies depression or anxiety     Past Medical and Surgical History:   Past Medical History:   Diagnosis Date   • Cancer ([place] [address] )     Throat   • Chronic pain disorder    • Diabetes mellitus ([place] [address] )    • Diffuse large B cell lymphoma (HCC)    • Dysphagia    • GERD without esophagitis    • HTN (hypertension)    • Hyperlipidemia    • Hypertension    • MI (myocardial infarction) ([place] [address] )    • Port-A-Cath in place 07/29/2019   • Thyroid cancer ([place] [address] ) 2018     Past Surgical History:   Procedure Laterality Date   • BONE MARROW BIOPSY     • BREAST BIOPSY Left    • CHOLECYSTECTOMY     • IR PORT PLACEMENT  11/16/2018   • IR PORT REMOVAL  3/22/2021   • OTHER SURGICAL HISTORY      tendor tear repair to right shoulder   • SHOULDER ARTHROSCOPY         Meds/Allergies:  No current facility-administered medications on file prior to encounter       Current Outpatient Medications on File Prior to Encounter   Medication Sig Dispense Refill   • Accu-Chek FastClix Lancets MISC Use to test 3x daily 102 each 3   • albuterol (PROVENTIL HFA,VENTOLIN HFA) 90 mcg/act inhaler INHALAR 1-2 BOCANADAS A LOS PULMONES CADA 4-6 HORAS ALFREDO SEA NECESARIO 18 g 10   • ALPRAZolam (XANAX) 0 25 mg tablet Take 1 tablet (0 25 mg total) by mouth daily at bedtime 30 tablet 0   • amLODIPine (NORVASC) 5 mg tablet Take 1 tablet (5 mg total) by mouth daily 30 tablet 10   • Blood Glucose Monitoring Suppl (OneTouch Verio) w/Device KIT Use as directed to check blood sugar DX E11 9 1 kit 0   • Calcium Carb-Cholecalciferol 600-400 MG-UNIT TABS Take 1 tablet by mouth 2 (two) times a day 60 tablet 2   • carvedilol (COREG) 12 5 mg tablet Take 0 5 tablets (6 25 mg total) by mouth 2 (two) times a day with meals 60 tablet 10   • cyanocobalamin (VITAMIN B-12) 500 MCG tablet Take 1,000 mcg by mouth daily     • Diclofenac Sodium (VOLTAREN) 1 % APLICAR 2 GRAMOS POR VIA TOPICAL CUATRO VECES AL JEFFREY 100 g 10   • diphenhydrAMINE (BENADRYL) 2 % cream Apply topically 3 (three) times a day as needed for itching 30 g 0   • ezetimibe (ZETIA) 10 mg tablet MAR JOHNNY (1) TABLETA POR VIA ORAL JOHNNY VEZ JEFFREY 30 tablet 10   • ferrous sulfate 325 (65 Fe) mg tablet Take 325 mg by mouth daily with breakfast     • fluticasone (FLONASE) 50 mcg/act nasal spray USAR 1 ROCIADA EN CADA FOSA NASAL DIARIAMENTE 16 g 10   • gabapentin (NEURONTIN) 100 mg capsule Take 1 capsule (100 mg total) by mouth 2 (two) times a day 60 capsule 5   • glucose blood (Accu-Chek Guide) test strip Use to test 3x daily 100 strip 3   • hydrocortisone 2 5 % cream APLICAR POR VIA TOPICA CUATRO VECES AL JEFFREY ALFREDO SEA NECESARIO PARA EL ERUPCION 30 g 10   • Insulin Glargine Solostar (Lantus SoloStar) 100 UNIT/ML SOPN Use 9 units subcut once dialy 15 mL 5   • Insulin Pen Needle (BD Pen Needle Micro U/F) 32G X 6 MM MISC Use daily 100 each 1   • loperamide (IMODIUM) 2 mg capsule MAR JOHNNY (1) CAPSULA POR VIA ORAL ALFREDO SEA NECESARIO PARA LA DIARREA 30 capsule 10   • losartan (COZAAR) 50 mg tablet TAKE 1 TABLET(50 MG) BY MOUTH DAILY 90 tablet 0   • meloxicam (MOBIC) 7 5 mg tablet MAR JOHNNY (1)TABLETA POR VIA ORAL DIARIA CON EL DESAYUNO PARA LA ARTHRITIS  NO TOME CON IBUPROFEN 30 tablet 10   • memantine (NAMENDA) 10 mg tablet MAR 1 TABLETA POR VIA ORAL DOS VECES AL JEFFREY 60 tablet 2   • metFORMIN (GLUCOPHAGE) 1000 MG tablet MAR JOHNNY (1) TABLETA POR VIA ORAL DOS VECES AL JEFFREY CON COMIDAS 60 tablet 10   • mirtazapine (REMERON) 15 mg tablet MAR JOHNNY (1) TABLETA POR VIA ORAL CADA NOCHE A LA HORA DE ACOSTARSE 90 tablet 3   • omeprazole (PriLOSEC) 40 MG capsule MAR 1 CAPSULA POR VIA ORAL JOHNNY VEZ AL JEFFREY 30 capsule 10   • Tradjenta 5 MG TABS MAR JOHNNY TABLETA POR VIA ORAL JOHNNY VEZ AL JEFFREY 30 tablet 10         Allergies: No Known Allergies    History:  Marital Status:       Substance Use History:   Social History     Substance and Sexual Activity   Alcohol Use Never    Comment: 0     Social History     Tobacco Use   Smoking Status Never   Smokeless Tobacco Never     Social History     Substance and Sexual Activity   Drug Use No       Family History:  Family History   Problem Relation Age of Onset   • Diabetes Mother    • Heart disease Sister    • Hypertension Brother    • Uterine cancer Maternal Grandmother    • Prostate cancer Paternal Grandfather        Physical Exam:     Vitals:   Blood Pressure: 152/65 (12/07/22 0300)  Pulse: 90 (12/07/22 0300)  Respirations: 14 (12/07/22 0300)  SpO2: 98 % (12/07/22 0300)    Constitutional: Chronically ill-appearing female, no acute distress  Eyes:  EOMI, No scleral icterus   HENT:   oropharynx moist, external ears normal, external nose normal   Respiratory: Bilateral rales, no wheezing  Breathing comfortably on 4 L nasal cannula  Cardiovascular:  Normal rate, no murmurs   GI:  Soft, nondistended, no guarding   Musculoskeletal:  no tenderness, no deformities  Integument:  no jaundice, no rash   Neurologic:  Alert &awake, communicative, CN 2-12 normal,  no focal deficits noted   Psychiatric:  Speech and behavior appropriate       Lab Results: I have personally reviewed pertinent reports  Results from last 7 days   Lab Units 12/07/22  0018   WBC Thousand/uL 13 28*   HEMOGLOBIN g/dL 12 7   HEMATOCRIT % 39 4   PLATELETS Thousands/uL 311   NEUTROS PCT % 45   LYMPHS PCT % 46*   MONOS PCT % 5   EOS PCT % 2     Results from last 7 days   Lab Units 12/07/22  0018   POTASSIUM mmol/L 4 1   CHLORIDE mmol/L 96   CO2 mmol/L 30   BUN mg/dL 14   CREATININE mg/dL 1 07   CALCIUM mg/dL 8 9   ALK PHOS U/L 113   ALT U/L 34   AST U/L 21     Results from last 7 days   Lab Units 12/07/22  0018   INR  0 95         Imaging: I have personally reviewed pertinent reports     and I have personally reviewed pertinent films in PACS    XR chest pa & lateral    Result Date: 12/5/2022  Narrative: CHEST INDICATION:   R09 89: Other specified symptoms and signs involving the circulatory and respiratory systems  COMPARISON:  2/25/2019 EXAM PERFORMED/VIEWS:  XR CHEST PA & LATERAL FINDINGS: Cardiomediastinal silhouette appears unremarkable  Moderate calcific atherosclerosis of the aortic arch region again noted  No lobar airspace consolidation or pulmonary parenchymal mass identified  Previously noted right lung airspace infiltrates from 2019 have resolved  No pneumothorax or pleural effusion  Osseous structures appear within normal limits for patient age  No free air in the upper abdomen  Right upper abdominal surgical clips are seen which could be from prior cholecystectomy  Impression: No acute cardiopulmonary disease  Workstation performed: [id number]       Total time for visit, including counseling/coordination of care: 45 minutes  Greater than 50% of this total time spent on direct patient counseling and coorination of care  Epic Records Reviewed as well as Records in Care Everywhere    ** Please Note: Dragon 360 Dictation voice to text software was used in the creation of this document   **

## 2022-12-07 NOTE — PLAN OF CARE
Problem: PHYSICAL THERAPY ADULT  Goal: Performs mobility at highest level of function for planned discharge setting  See evaluation for individualized goals  Description: Treatment/Interventions: Functional transfer training, LE strengthening/ROM, Elevations, Therapeutic exercise, Endurance training, Patient/family training, Bed mobility, Gait training, Spoke to nursing, OT, Family  Equipment Recommended: Mallory Chan (pt has)       See flowsheet documentation for full assessment, interventions and recommendations  Note: Prognosis: Good  Problem List: Decreased strength, Decreased endurance, Impaired balance, Decreased mobility  Assessment: Pt  69 y  o female presented w/ SOB & generalized chest pain  Pt admitted for Acute respiratory failure with hypoxia (HCC) w/ suspected flash pulmonary edema  CXR showed vascular congestion  Pt referred to PT for mobility assessment & D/C planning w/ orders of up as tolerated  Please see above for information re: home set-up & PLOF as well as objective findings during PT assessment  On eval, pt functioning minimally below baseline  Will continue skilled PT to improve function & safety  Pt require S for most functional mobility w/ RW + cues for techniques & safety  Gait deviations as above, slow w/ dec foot clearance but no gross LOB noted  (+) SOB after amb but relieved w/ rest  SpO2 96% after amb  The patient's AM-PAC Basic Mobility Inpatient Short Form Raw Score is 19  A Raw score of greater than 16 suggests the patient may benefit from discharge to home  Please also refer to the recommendation of the Physical Therapist for safe discharge planning  From PT standpoint, will anticipate safe return to home w/ family support when medically cleared  Will recommend   No SOB & dizziness reported t/o session   Nsg staff most recent vital signs as follows: /75 (BP Location: Right arm)   Pulse 84   Temp 97 8 °F (36 6 °C) (Oral)   Resp 20   Ht 5' 2" (1 575 m)   Wt 67 1 kg (148 lb) SpO2 99%   BMI 27 07 kg/m²   At end of session, pt back in bed in stable condition, call bell & phone in reach, all lines intact  Fall precautions reinforced w/ good understanding  CM to follow  Nsg staff to continue to mobilized pt (OOB in chair for all meals & ambulate in room/unit) as tolerated to prevent further decline in function  Nsg notified  Co-eval was necessary to complete this PT eval for the pt's best interest given pt's medical acuity & complexity  PT Discharge Recommendation: Home with home health rehabilitation    See flowsheet documentation for full assessment

## 2022-12-07 NOTE — ASSESSMENT & PLAN NOTE
Continue amlodipine, Coreg, losartan  Hydralazine as needed for SBP greater than 190, DBP greater than 110

## 2022-12-07 NOTE — ASSESSMENT & PLAN NOTE
Reports she normally wears a CPAP machine at night, but she has been without this machine for about 2 weeks after moving to the area    Continue CPAP at night

## 2022-12-07 NOTE — QUICK NOTE
Pt admitted this am by Nena Severe md  Preformed chart review  Troponin elevated to 106 but decreased to 79  Sating well on 4 liters  Will follow up with echo and consult cardiology if needed

## 2022-12-07 NOTE — ASSESSMENT & PLAN NOTE
Reports onset of generalized chest pain across chest earlier today  She would describe this as a burning chest pain  Initial troponin 12  EKG without ST elevation  · Trend troponin, EKG  · On telemetry  · Obtain echo given chest pain and appearance of volume overload

## 2022-12-07 NOTE — Clinical Note
Interpretor #[id number] used to interview patient and obtain consent  Ana Vilchis is without current complaints, cooperative and appropriate    Answers questions freely, aware of planned procedure and offers no questions

## 2022-12-07 NOTE — ASSESSMENT & PLAN NOTE
Lab Results   Component Value Date    HGBA1C 6 9 (H) 12/03/2022       No results for input(s): POCGLU in the last 72 hours  Blood Sugar Average: Last 72 hrs:  Current regimen includes glargine 9 units daily, metformin    · Hold metformin while inpatient  · Continue Lantus, decrease dose to 5 units for now, titrate to goal -180  · Sliding-scale insulin  · Hypoglycemia protocol

## 2022-12-07 NOTE — PLAN OF CARE
Problem: Potential for Falls  Goal: Patient will remain free of falls  Description: INTERVENTIONS:  - Educate patient/family on patient safety including physical limitations  - Instruct patient to call for assistance with activity   - Consult OT/PT to assist with strengthening/mobility   - Keep Call bell within reach  - Keep bed low and locked with side rails adjusted as appropriate  - Keep care items and personal belongings within reach  - Initiate and maintain comfort rounds  - Make Fall Risk Sign visible to staff  - Offer Toileting every 2 Hours, in advance of need  - Initiate/Maintain bed alarm  - Apply yellow socks and bracelet for high fall risk patients  - Consider moving patient to room near nurses station  Outcome: Progressing     Problem: MOBILITY - ADULT  Goal: Maintain or return to baseline ADL function  Description: INTERVENTIONS:  -  Assess patient's ability to carry out ADLs; assess patient's baseline for ADL function and identify physical deficits which impact ability to perform ADLs (bathing, care of mouth/teeth, toileting, grooming, dressing, etc )  - Assess/evaluate cause of self-care deficits   - Assess range of motion  - Assess patient's mobility; develop plan if impaired  - Assess patient's need for assistive devices and provide as appropriate  - Encourage maximum independence but intervene and supervise when necessary  - Involve family in performance of ADLs  - Assess for home care needs following discharge   - Consider OT consult to assist with ADL evaluation and planning for discharge  - Provide patient education as appropriate  Outcome: Progressing  Goal: Maintains/Returns to pre admission functional level  Description: INTERVENTIONS:  - Perform BMAT or MOVE assessment daily    - Set and communicate daily mobility goal to care team and patient/family/caregiver     - Collaborate with rehabilitation services on mobility goals if consulted  - Stand patient 3 times a day  - Ambulate patient 3 times a day  - Out of bed to chair 3 times a day   - Out of bed for meals 3 times a day  - Out of bed for toileting  - Record patient progress and toleration of activity level   Outcome: Progressing     Problem: INFECTION - ADULT  Goal: Absence or prevention of progression during hospitalization  Description: INTERVENTIONS:  - Assess and monitor for signs and symptoms of infection  - Monitor lab/diagnostic results  - Monitor all insertion sites, i e  indwelling lines, tubes, and drains  - Monitor endotracheal if appropriate and nasal secretions for changes in amount and color  - Kelley appropriate cooling/warming therapies per order  - Administer medications as ordered  - Instruct and encourage patient and family to use good hand hygiene technique  - Identify and instruct in appropriate isolation precautions for identified infection/condition  Outcome: Progressing     Problem: DISCHARGE PLANNING  Goal: Discharge to home or other facility with appropriate resources  Description: INTERVENTIONS:  - Identify barriers to discharge w/patient and caregiver  - Arrange for needed discharge resources and transportation as appropriate  - Identify discharge learning needs (meds, wound care, etc )  - Arrange for interpretive services to assist at discharge as needed  - Refer to Case Management Department for coordinating discharge planning if the patient needs post-hospital services based on physician/advanced practitioner order or complex needs related to functional status, cognitive ability, or social support system  Outcome: Progressing     Problem: Knowledge Deficit  Goal: Patient/family/caregiver demonstrates understanding of disease process, treatment plan, medications, and discharge instructions  Description: Complete learning assessment and assess knowledge base    Interventions:  - Provide teaching at level of understanding  - Provide teaching via preferred learning methods  Outcome: Progressing     Problem: RESPIRATORY - ADULT  Goal: Achieves optimal ventilation and oxygenation  Description: INTERVENTIONS:  - Assess for changes in respiratory status  - Assess for changes in mentation and behavior  - Position to facilitate oxygenation and minimize respiratory effort  - Oxygen administered by appropriate delivery if ordered  - Initiate smoking cessation education as indicated  - Encourage broncho-pulmonary hygiene including cough, deep breathe, Incentive Spirometry  - Assess the need for suctioning and aspirate as needed  - Assess and instruct to report SOB or any respiratory difficulty  - Respiratory Therapy support as indicated  Outcome: Progressing     Problem: METABOLIC, FLUID AND ELECTROLYTES - ADULT  Goal: Glucose maintained within target range  Description: INTERVENTIONS:  - Monitor Blood Glucose as ordered  - Assess for signs and symptoms of hyperglycemia and hypoglycemia  - Administer ordered medications to maintain glucose within target range  - Assess nutritional intake and initiate nutrition service referral as needed  Outcome: Progressing     Problem: SKIN/TISSUE INTEGRITY - ADULT  Goal: Skin Integrity remains intact(Skin Breakdown Prevention)  Description: Assess:  -Perform Deven assessment twice daily  -Clean and moisturize skin every day  -Inspect skin when repositioning, toileting, and assisting with ADLS  -Assess extremities for adequate circulation and sensation     Bed Management:  -Have minimal linens on bed & keep smooth, unwrinkled  -Change linens as needed when moist or perspiring      Toileting:  -Offer bedside commode      Activity:  -Mobilize patient 3 times a day  -Encourage activity and walks on unit  -Encourage or provide ROM exercises       Skin Care:  -Avoid use of baby powder, tape, friction and shearing, hot water or constrictive clothing      Outcome: Progressing

## 2022-12-07 NOTE — Clinical Note
Left heart catheterization: A tiger catheter was advanced over a guidewire into the ascending aorta  After recording ascending aortic pressure, the catheter was advanced across the aortic valve and left ventricular pressure was recorded  The catheter was   pulled back across the aortic valve and into the ascending aorta and pullback pressures were obtained

## 2022-12-07 NOTE — OCCUPATIONAL THERAPY NOTE
Occupational Therapy Evaluation     Patient Name: Flower Aranda  [id number] Date: 12/7/2022  Problem List  Principal Problem:    Acute respiratory failure with hypoxia ([place] [address] )  Active Problems:    Type 2 diabetes mellitus without complication, without long-term current use of insulin (HCC)    Essential hypertension    Gastroesophageal reflux disease    Obstructive sleep apnea    Chemotherapy-induced peripheral neuropathy (HCC)    Dementia with behavioral disturbance    Chest pain    Past Medical History  Past Medical History:   Diagnosis Date    Cancer ([place] [address] )     Throat    Chronic pain disorder     Diabetes mellitus ([place] [address] )     Diffuse large B cell lymphoma (HCC)     Dysphagia     GERD without esophagitis     HTN (hypertension)     Hyperlipidemia     Hypertension     MI (myocardial infarction) ([place] [address] )     Port-A-Cath in place 07/29/2019    Thyroid cancer ([place] [address] ) 2018     Past Surgical History  Past Surgical History:   Procedure Laterality Date    BONE MARROW BIOPSY      BREAST BIOPSY Left     CHOLECYSTECTOMY      IR PORT PLACEMENT  11/16/2018    IR PORT REMOVAL  3/22/2021    OTHER SURGICAL HISTORY      tendor tear repair to right shoulder    SHOULDER ARTHROSCOPY           12/07/22 1040   OT Last Visit   OT Visit Date 12/07/22   Note Type   Note type Evaluation   Additional Comments Pt greeted in supine and agreeeable to skilled OT eval  DTR at bedside to assist with translation  Pain Assessment   Pain Assessment Tool 0-10   Pain Score No Pain   Restrictions/Precautions   Weight Bearing Precautions Per Order No   Other Precautions Telemetry;O2;Fall Risk  (4 LO2)   Home Living   Type of Home House  (3-5 KENDAL)   Home Layout Two level;1/2 bath on main level;Bed/bath upstairs;Stairs to enter with rails   Bathroom Shower/Tub Tub/shower unit   Bathroom Toilet Standard   Bathroom Equipment Grab bars in shower; Shower chair;Commode   [address] [place]   Additional Comments granddaughter is caregiver   Prior Function   Level of [place] Needs assistance with ADLs; Needs assistance with functional mobility; Needs assistance with IADLS   Lives With Family  (granddaughter )   Receives Help From Family   IADLs Family/Friend/Other provides transportation; Family/Friend/Other provides meals; Family/Friend/Other provides medication management   Falls in the last 6 months 0   Vocational Retired   Comments Prior to admission, pt lives with grand daughter who is her PCA, in a mutli level home with 3-5 KENDAL and 1 flight to second floor bed/ bath  1/2 bath on the first floor  Home has a tub shower with seat and grab bars, standard toilets and bed  Pt has a commode in her room  Pt had A for all ADLs and IADLs from grandDTR at home  Pt ambulates with RW at baseline  Reports 0 falls in the past 6 months  Lifestyle   Autonomy A for ADLs, I with mobility with RW   Reciprocal Relationships family   ADL   Where Assessed Edge of bed   Eating Assistance 7  Independent   Grooming Assistance 6  Modified Independent   UB Bathing Assistance 5  Supervision/Setup   LB Bathing Assistance 4  Minimal Assistance   [address] 5  Supervision/Setup   LB Dressing Assistance 4  Minimal [address]  5  Supervision/Setup   Bed Mobility   Supine to Sit 5  Supervision   Additional items Assist x 1; Increased time required   Sit to Supine 5  Supervision   Additional items Assist x 1; Increased time required   Additional Comments cues for best technique  Transfers   Sit to Stand 5  Supervision   Additional items Assist x 1; Increased time required;Verbal cues   Stand to Sit 5  Supervision   Additional items Assist x 1; Increased time required;Verbal cues   Stand pivot 5  Supervision   Additional items Increased time required;Verbal cues; Assist x 1   Toilet transfer 5  Supervision   Additional items Assist x 1; Armrests; Increased time required;Commode   Functional Mobility   Functional Mobility (Contact Guard Assist )   Additional Comments functional short distances  Additional items Rolling walker   Balance   Static Sitting Good   Dynamic Sitting Good   Static Standing Fair   Dynamic Standing Fair -   Ambulatory Fair -   Activity Tolerance   Activity Tolerance Patient tolerated treatment well   Medical Staff Made Aware PT Fritz   Nurse Made Aware MANPREET RIGGS Assessment   RUE Assessment WFL   LUE Assessment   LUE Assessment WFL   Hand Function   Gross Motor Coordination Functional   Fine Motor Coordination Functional   Vision-Basic Assessment   Current Vision Wears glasses all the time   Psychosocial   Psychosocial (WDL) WDL   Cognition   Overall Cognitive Status WFL   Arousal/Participation Cooperative   Attention Within functional limits   Orientation Level Oriented to person;Oriented to place;Oriented to situation   Following Commands Follows one step commands without difficulty   Comments pleasant and cooperative  Assessment   Assessment Puja Alvarez is a 69 y o  Bengali speaking female who has past medical history significant for diabetes, hypertension, diffuse large B-cell lymphoma presenting with shortness of breath and chest pain  Patient placed on BiPAP and given Lasix with suspected flash pulmonary edema  Pt on 4L O2 in ED, no home O2 at baseline  Pt with active orders for OT and up as tolerated  Prior to admission, pt lives with grand daughter who is her PCA, in a mutli level home with 3-5 KENDAL and 1 flight to second floor bed/ bath  1/2 bath on the first floor  Home has a tub shower with seat and grab bars, standard toilets and bed  Pt has a commode in her room  [facility]  Pt ambulates with RW at baseline  Reports 0 falls in the past 6 months  Upon evaluation, pt presenting close to her functional baseline for self care tasks, transfers and mobility  Skilled OT not warranted at this time  Jenny Juarez is full time caregiver   Pt not alone at home  Pt current level of function: bed mobility - supervision; transfers- supervision; functional mobility-CGA with RW; UB dressing / bathing - supervision; LB dressing/ bathing- Aurelia; toileting - supervision  OT DC recommendation:  Home Health PT  Plan   OT Treatment Day 0   OT Frequency Eval only   Recommendation   OT Discharge Recommendation Home with home health rehabilitation   Additional Comments  The patient's raw score on the AM-PAC Daily Activity inpatient short form is 21, standardized score is 44 27, greater than 39 4  Patients at this level are likely to benefit from discharge to home  Please refer to the recommendation of the Occupational Therapist for safe discharge planning     AM-PAC Daily Activity Inpatient   Lower Body Dressing 3   Bathing 3   Toileting 3   Upper Body Dressing 4   Grooming 4   Eating 4   Daily Activity Raw Score 21   Daily Activity Standardized Score (Calc for Raw Score >=11) 44 27   AM-[facility] Applied Cognition Inpatient   Following a Speech/Presentation 4   Understanding Ordinary Conversation 4   Taking Medications 3   Remembering Where Things Are Placed or Put Away 3   Remembering List of 4-5 Errands 3   Taking Care of Complicated Tasks 3   Applied Cognition Raw Score 20   Applied Cognition Standardized Score 41 76   Lin Abdullahi, OT

## 2022-12-08 LAB
ANION GAP SERPL CALCULATED.3IONS-SCNC: 9 MMOL/L (ref 4–13)
BUN SERPL-MCNC: 16 MG/DL (ref 5–25)
CALCIUM SERPL-MCNC: 8.8 MG/DL (ref 8.3–10.1)
CHLORIDE SERPL-SCNC: 94 MMOL/L (ref 96–108)
CO2 SERPL-SCNC: 30 MMOL/L (ref 21–32)
CREAT SERPL-MCNC: 0.73 MG/DL (ref 0.6–1.3)
GFR SERPL CREATININE-BSD FRML MDRD: 80 ML/MIN/1.73SQ M
GLUCOSE SERPL-MCNC: 133 MG/DL (ref 65–140)
GLUCOSE SERPL-MCNC: 143 MG/DL (ref 65–140)
GLUCOSE SERPL-MCNC: 194 MG/DL (ref 65–140)
GLUCOSE SERPL-MCNC: 205 MG/DL (ref 65–140)
GLUCOSE SERPL-MCNC: 310 MG/DL (ref 65–140)
MAGNESIUM SERPL-MCNC: 1.7 MG/DL (ref 1.6–2.6)
POTASSIUM SERPL-SCNC: 3.7 MMOL/L (ref 3.5–5.3)
SODIUM SERPL-SCNC: 133 MMOL/L (ref 135–147)

## 2022-12-08 PROCEDURE — 4A023N7 MEASUREMENT OF CARDIAC SAMPLING AND PRESSURE, LEFT HEART, PERCUTANEOUS APPROACH: ICD-10-PCS | Performed by: INTERNAL MEDICINE

## 2022-12-08 RX ORDER — ATORVASTATIN CALCIUM 40 MG/1
40 TABLET, FILM COATED ORAL
Status: DISCONTINUED | OUTPATIENT
Start: 2022-12-08 | End: 2022-12-10 | Stop reason: HOSPADM

## 2022-12-08 RX ORDER — MAGNESIUM SULFATE HEPTAHYDRATE 40 MG/ML
2 INJECTION, SOLUTION INTRAVENOUS ONCE
Status: COMPLETED | OUTPATIENT
Start: 2022-12-08 | End: 2022-12-09

## 2022-12-08 RX ORDER — ASPIRIN 81 MG/1
81 TABLET, CHEWABLE ORAL DAILY
Status: DISCONTINUED | OUTPATIENT
Start: 2022-12-09 | End: 2022-12-10 | Stop reason: HOSPADM

## 2022-12-08 RX ORDER — ASPIRIN 81 MG/1
324 TABLET, CHEWABLE ORAL ONCE
Status: COMPLETED | OUTPATIENT
Start: 2022-12-09 | End: 2022-12-09

## 2022-12-08 RX ORDER — POTASSIUM CHLORIDE 20 MEQ/1
40 TABLET, EXTENDED RELEASE ORAL ONCE
Status: COMPLETED | OUTPATIENT
Start: 2022-12-08 | End: 2022-12-08

## 2022-12-08 RX ORDER — FUROSEMIDE 10 MG/ML
40 INJECTION INTRAMUSCULAR; INTRAVENOUS
Status: COMPLETED | OUTPATIENT
Start: 2022-12-08 | End: 2022-12-08

## 2022-12-08 RX ORDER — LOSARTAN POTASSIUM 50 MG/1
50 TABLET ORAL DAILY
Status: DISCONTINUED | OUTPATIENT
Start: 2022-12-10 | End: 2022-12-10

## 2022-12-08 RX ADMIN — LOSARTAN POTASSIUM 50 MG: 50 TABLET, FILM COATED ORAL at 09:21

## 2022-12-08 RX ADMIN — BENZONATATE 100 MG: 100 CAPSULE ORAL at 23:08

## 2022-12-08 RX ADMIN — CARVEDILOL 6.25 MG: 6.25 TABLET, FILM COATED ORAL at 16:48

## 2022-12-08 RX ADMIN — FUROSEMIDE 40 MG: 10 INJECTION, SOLUTION INTRAMUSCULAR; INTRAVENOUS at 09:21

## 2022-12-08 RX ADMIN — GABAPENTIN 100 MG: 100 CAPSULE ORAL at 09:21

## 2022-12-08 RX ADMIN — MEMANTINE 10 MG: 5 TABLET ORAL at 09:21

## 2022-12-08 RX ADMIN — GABAPENTIN 100 MG: 100 CAPSULE ORAL at 16:48

## 2022-12-08 RX ADMIN — ATORVASTATIN CALCIUM 40 MG: 40 TABLET, FILM COATED ORAL at 16:48

## 2022-12-08 RX ADMIN — INSULIN GLARGINE 5 UNITS: 100 INJECTION, SOLUTION SUBCUTANEOUS at 09:21

## 2022-12-08 RX ADMIN — MAGNESIUM SULFATE HEPTAHYDRATE 2 G: 40 INJECTION, SOLUTION INTRAVENOUS at 12:23

## 2022-12-08 RX ADMIN — MEMANTINE 10 MG: 5 TABLET ORAL at 16:48

## 2022-12-08 RX ADMIN — INSULIN LISPRO 1 UNITS: 100 INJECTION, SOLUTION INTRAVENOUS; SUBCUTANEOUS at 21:48

## 2022-12-08 RX ADMIN — INSULIN LISPRO 3 UNITS: 100 INJECTION, SOLUTION INTRAVENOUS; SUBCUTANEOUS at 12:24

## 2022-12-08 RX ADMIN — MIRTAZAPINE 15 MG: 15 TABLET, FILM COATED ORAL at 21:47

## 2022-12-08 RX ADMIN — POTASSIUM CHLORIDE 40 MEQ: 1500 TABLET, EXTENDED RELEASE ORAL at 12:22

## 2022-12-08 RX ADMIN — FUROSEMIDE 40 MG: 10 INJECTION, SOLUTION INTRAMUSCULAR; INTRAVENOUS at 16:43

## 2022-12-08 RX ADMIN — ENOXAPARIN SODIUM 40 MG: 40 INJECTION SUBCUTANEOUS at 09:21

## 2022-12-08 RX ADMIN — ALPRAZOLAM 0.25 MG: 0.25 TABLET ORAL at 21:47

## 2022-12-08 RX ADMIN — CARVEDILOL 6.25 MG: 6.25 TABLET, FILM COATED ORAL at 09:21

## 2022-12-08 RX ADMIN — EZETIMIBE 10 MG: 10 TABLET ORAL at 21:47

## 2022-12-08 RX ADMIN — INSULIN LISPRO 1 UNITS: 100 INJECTION, SOLUTION INTRAVENOUS; SUBCUTANEOUS at 16:43

## 2022-12-08 RX ADMIN — AMLODIPINE BESYLATE 5 MG: 5 TABLET ORAL at 09:21

## 2022-12-08 NOTE — PROGRESS NOTES
[facility]  Progress Note - [address] [facility] [birth year], 69 y o  female MRN: [id number]  Unit/Bed#: [id number] Encounter: [id number]  Primary Care Provider: MOIRA Keller   Date and time admitted to hospital: 12/7/2022 12:06 AM    Chest pain  Assessment & Plan  Reports onset of generalized chest pain across chest earlier today  She would describe this as a burning chest pain  Initial troponin 12  EKG without ST elevation  · Appreciate cardiology recommendations  · Echo with new cardiomyopathy and changes  · Pt for cardiac cath tomorrow   · Continue asa, statin, and beta blocker    Dementia with behavioral disturbance  Assessment & Plan  Continue Namenda, Remeron    Chemotherapy-induced peripheral neuropathy ([address] )  Assessment & Plan  Continue gabapentin    Obstructive sleep apnea  Assessment & Plan  Reports she normally wears a CPAP machine at night, but she has been without this machine for about 2 weeks after moving to the area  Continue CPAP at night    Gastroesophageal reflux disease  Assessment & Plan  Continue PPI    Essential hypertension  Assessment & Plan  Continue Coreg, losartan  Hydralazine as needed for SBP greater than 190, DBP greater than 110    Type 2 diabetes mellitus without complication, without long-term current use of insulin [facility])  Assessment & Plan  Lab Results   Component Value Date    HGBA1C 6 9 (H) 12/03/2022       Recent Labs     12/07/22  2112 12/08/22  0711 12/08/22  1108 12/08/22  1608   POCGLU 169* 143* 310* 194*       Blood Sugar Average: Last 72 hrs:  (P) 180 [id number] regimen includes glargine 9 units daily, metformin  · Hold metformin while inpatient  · Continue lantus of 5 units as pt will be npo   · Sliding-scale insulin  · Hypoglycemia protocol    * Acute respiratory failure with hypoxia (HCC)  Assessment & Plan  Presented with chest pain or shortness of breath which started today    Blood pressure was significantly elevated on arrival at 209/103  CXR concerning for vascular congestion  She additionally has been without her CPAP for about 2 weeks after moving to the area  Suspect flash pulmonary edema in combination with BiPAP pulm therapy  Due to acute systolic chf  S/p IV Lasix and weaned off BiPAP in ED to 4 L nasal cannula  · Continue IV Lasix  · Monitor I&O's  · Continuous pulse oximetry  · Wean O2 as able  · IV hydralazine as needed  · Continue CPAP therapy at night    VTE Pharmacologic Prophylaxis: lovenox     Mechanical VTE Prophylaxis in Place: Yes    Education and Discussions with Family / Patient: will call family     Time Spent for Care: 20 minutes  More than 50% of total time spent on counseling and coordination of care as described above  Current Length of Stay: 1 day(s)  Current Patient Status: Inpatient     Discharge Plan / Estimated Discharge Date: 48 to 72 hours  Scheduled for cardiac cath tomorrow     Code Status: Level 1 - Full Code      Subjective:   Pt seen and examined  Used crycrom [id number]  She denies cp now  She said she had cp on admission  No f/c no c    Objective:     Vitals:   Temp (24hrs), Av 2 °F (36 2 °C), Min:96 3 °F (35 7 °C), Max:98 6 °F (37 °C)    Temp:  [96 3 °F (35 7 °C)-98 6 °F (37 °C)] 98 6 °F (37 °C)  HR:  [65-85] 81  Resp:  [14-18] 14  BP: (114-138)/(63-79) 123/72  SpO2:  [94 %-99 %] 94 %  Body mass index is 28 23 kg/m²  Input and Output Summary (last 24 hours): Intake/Output Summary (Last 24 hours) at 2022  Last data filed at 2022 1800  Gross per 24 hour   Intake 720 ml   Output 1800 ml   Net -1080 ml       Physical Exam:   Physical Exam  Constitutional:       Appearance: Normal appearance  HENT:      Head: Normocephalic and atraumatic  Eyes:      Extraocular Movements: Extraocular movements intact  Pupils: Pupils are equal, round, and reactive to light  Cardiovascular:      Rate and Rhythm: Normal rate and regular rhythm  Heart sounds: No murmur heard  No friction rub  No gallop  Pulmonary:      Effort: Pulmonary effort is normal  No respiratory distress  Breath sounds: Normal breath sounds  No wheezing, rhonchi or rales  Abdominal:      General: Bowel sounds are normal  There is no distension  Palpations: Abdomen is soft  Tenderness: There is no abdominal tenderness  There is no guarding or rebound  Musculoskeletal:      Right lower leg: No edema  Left lower leg: No edema  Neurological:      Mental Status: She is alert and oriented to person, place, and time            Additional Data:     Labs:  Results from last 7 days   Lab Units 12/07/22  0512   WBC Thousand/uL 11 18*   HEMOGLOBIN g/dL 11 2*   HEMATOCRIT % 34 2*   PLATELETS Thousands/uL 269   NEUTROS PCT % 66   LYMPHS PCT % 26   MONOS PCT % 6   EOS PCT % 0     Results from last 7 days   Lab Units 12/08/22  0508 12/07/22  0018   SODIUM mmol/L 133* 133*   POTASSIUM mmol/L 3 7 4 1   CHLORIDE mmol/L 94* 96   CO2 mmol/L 30 30   BUN mg/dL 16 14   CREATININE mg/dL 0 73 1 07   ANION GAP mmol/L 9 7   CALCIUM mg/dL 8 8 8 9   ALBUMIN g/dL  --  4 0   TOTAL BILIRUBIN mg/dL  --  0 24   ALK PHOS U/L  --  113   ALT U/L  --  34   AST U/L  --  21   GLUCOSE RANDOM mg/dL 133 209*     Results from last 7 days   Lab Units 12/07/22  0018   INR  0 95     Results from last 7 days   Lab Units 12/08/22  1608 12/08/22  1108 12/08/22  0711 12/07/22  2112 12/07/22  1625 12/07/22  1227 12/07/22  0838   POC GLUCOSE mg/dl 194* 310* 143* 169* 128 211* 111     Results from last 7 days   Lab Units 12/03/22  1032   HEMOGLOBIN A1C % 6 9*           Recent Cultures (last 7 days):           Lines/Drains:  Invasive Devices     Peripheral Intravenous Line  Duration           Peripheral IV 12/07/22 Right Antecubital 1 day                Telemetry:   Telemetry Orders (From admission, onward)             48 Hour Telemetry Monitoring  Continuous x 48 hours        References:    Telemetry Guidelines   Question:  Reason for 48 Hour Telemetry  Answer:  Acute MI, chest pain - R/O MI, or unstable angina                    Last 24 Hours Medication List:   Current Facility-Administered Medications   Medication Dose Route Frequency Provider Last Rate   • acetaminophen  650 mg Oral Q6H PRN Brody Kendall MD     • albuterol  1 puff Inhalation Q4H PRN Brody Kendall MD     • ALPRAZolam  0 25 mg Oral HS Brody Kendall MD     • [START ON 12/9/2022] aspirin  324 mg Oral Once MOIRA Reyes     • [START ON 12/9/2022] aspirin  81 mg Oral Daily Donnie Nunez MD     • atorvastatin  40 mg Oral Daily With Dolly Goodson MD     • benzonatate  100 mg Oral TID PRN Brody Kendall MD     • carvedilol  6 25 mg Oral BID With Michelle Best MD     • enoxaparin  40 mg Subcutaneous Daily Brody Kendall MD     • ezetimibe  10 mg Oral HS Brody Kendall MD     • gabapentin  100 mg Oral BID Brody Kendall MD     • hydrALAZINE  5 mg Intravenous Q6H PRN Brody Kendall MD     • insulin glargine  5 Units Subcutaneous QA Brody Kendall MD     • insulin lispro  1-5 Units Subcutaneous TID AC Brody Kendall MD     • insulin lispro  1-5 Units Subcutaneous HS Brody Kendall MD     • [START ON 12/10/2022] losartan  50 mg Oral Daily MOIRA Reyes     • memantine  10 mg Oral BID Brody Kendall MD     • mirtazapine  15 mg Oral HS Brody Kendall MD     • ondansetron  4 mg Intravenous Q6H PRN Brody Kendall MD     • pantoprazole  40 mg Oral Early Morning Brody Kendall MD          Today, Patient Was Seen By: Marvin Hernandez DO

## 2022-12-08 NOTE — UTILIZATION REVIEW
Initial Clinical Review    Admission: Date/Time/Statement:   Admission Orders (From admission, onward)     Ordered        12/07/22 0211  INPATIENT ADMISSION  Once                      Orders Placed This Encounter   Procedures   • INPATIENT ADMISSION     Standing Status:   Standing     Number of Occurrences:   1     Order Specific Question:   Level of Care     Answer:   Level 2 Stepdown / HOT [14]     Order Specific Question:   Estimated length of stay     Answer:   More than 2 Midnights     Order Specific Question:   Certification     Answer:   I certify that inpatient services are medically necessary for this patient for a duration of greater than two midnights  See H&P and MD Progress Notes for additional information about the patient's course of treatment  ED Arrival Information     Expected   -    Arrival   12/7/2022 00:06    Acuity   Emergent            Means of arrival   Ambulance    Escorted by   [facility] ([address])    Service   Hospitalist    Admission type   Emergency            Arrival complaint   sob           Chief Complaint   Patient presents with   • Shortness of Breath     Per EMS patient woke up out of sleep with SOB, patient states she doesn't feel good, reporting chest pain and SOB  Noted to be diaphoretic        Initial Presentation: 69 y o  female presents to the ED via EMS from home with c/o SOB, burning chest pain across chest, productive cough starting earlier in day  PMH: NIDDM, HTN, diffuse large B cell lymphoma, GERD, chemo induced neuropathy, dementia w/ behavioral disturbances, RONAK on CPAP  In the ED she was hypoxic to 87%, placed on BIPAP  She was HTN and tachypnic  Labs show elevated CRP, sed rate, proBNP, elevated troponins, glucose, WBC, low Na  Imaging shows borderline cardiomegaly, mild vascular congestion, trace R pleural effusion  She is treated with IV Lasix, NTG SL, insulin, IV Lasix, Protonix, Sq Lovenox, Norvasc    On exam she has bilat rales, awake and communicative  She is admitted to INPATIENT status with Acute hypoxic respiratory failure - IV Lasix, wean oxygen, continuous pulse ox, CPAP at HS  Chest pain - Tele, echo  RONAK - CPAP at HS  Post admit note - Was able to be taken off BIPAP and put on oxygen 4L NC  Echo shows decreased in LV vent function and EF  Date: 12/8   Day 2:   Pt is on oxygen at 2L NC today  Cardiac cath is planned  12/8  Cardio Consult - Acute hypoxic resp failure initally needing BIPAP and now on 3L NC oxygen likely d/t CHF exac with new cardiomyopathy  Acute heart failure with reduced EF 33%, IV Lasix BID  Precordial CP - elevated tropnins, no ischemic changes on ECG - will get L heart Cath on 7/9, start ASA, continue on statins and other home meds  Holding Amlodipine for now, hold Losartan on 12/9 for cath  Will have 2 doses IV Lasix 40 mg today and then hold  Monitor volume status, maintain lytes, will give IV mag and PO KDur  Date 12/9  Day 3:   Pt had normal Cath  Will restart Lasix tomorrow  Remains on IV Fluids for low Na 131  Had some hypoxia earlier today  Was on 10L mask and back to NC oxygen  12/9 Cardiac Cath - Minimal plaque  Essentially normal coronaries  Normal LVEDP (12 mmHg)          ED Triage Vitals   Temperature Pulse Respirations Blood Pressure SpO2   12/07/22 0100 12/07/22 0009 12/07/22 0022 12/07/22 0009 12/07/22 0009   97 7 °F (36 5 °C) 102 (!) 23 (!) 209/103 (!) 87 %      Temp Source Heart Rate Source Patient Position - Orthostatic VS BP Location FiO2 (%)   12/07/22 0100 12/07/22 0009 12/07/22 0009 12/07/22 0009 --   Oral Monitor Lying Right arm       Pain Score       12/07/22 0346       2          Wt Readings from Last 1 Encounters:   12/09/22 70 8 kg (156 lb 1 4 oz)     Additional Vital Signs:   12/09/22 1602 -- 68 -- 108/62 -- -- -- -- -- -- -- Lying   12/09/22 1600 -- 69 -- 101/71 -- -- -- -- -- -- -- --   12/09/22 1530 -- 71 -- 107/54 -- -- -- -- -- -- -- Lying   12/09/22 1500 -- 69 18 95/58 -- -- -- -- -- -- -- --   12/09/22 1445 -- 70 18 96/62 -- -- -- -- -- -- -- --   12/09/22 1430 -- 69 18 99/63 -- -- -- -- -- -- -- --   12/09/22 1415 97 1 °F (36 2 °C) Abnormal  75 18 106/65 86 98 % -- -- -- Nasal cannula -- Lying   12/09/22 13:46:13 -- -- -- -- -- -- 36 -- 4 L/min Nasal cannula -- --   12/09/22 13:32:04 -- -- -- -- -- 98 % -- 10 L/min -- Simple mask -- --   12/09/22 13:31:24 -- -- -- -- -- 79 % Abnormal  -- 10 L/min -- Simple mask -- --   12/09/22 13:24:28 -- -- -- -- -- 85 % Abnormal  36 -- 4 L/min Nasal cannula -- --   12/09/22 13:12:55 -- -- -- -- -- -- 28 -- 2 L/min  Nasal cannula -- --   Nasal Cannula O2 Flow Rate (L/min): applied wioth sedation at 12/09/22 1312   12/09/22 0840 -- -- -- -- -- 93 % -- -- -- None (Room air) -- --   12/09/22 07:19:02 97 2 °F (36 2 °C) Abnormal  71 19 121/68 86 99 % -- -- -- None (Room air) -- --   12/09/22 03:49:46 97 4 °F (36 3 °C) Abnormal  72 18 127/71 90 100 % -- -- -- -- -- --   12/09/22 0152 -- -- -- -- -- 96 % -- -- -- -- Full face mask --   12/08/22 23:05:26 98 1 °F (36 7 °C) 82 18 126/74 91 94 % -- -- -- -- -- --   12/08/22 19:54:04 97 1 °F (36 2 °C) Abnormal  83 16 126/72 90 94 % -- -- -- None (Room air) -- Lying   12/08/22 1700 -- -- -- -- -- -- -- -- -- None (Room air) -- --   12/08/22 16:46:44 -- 81 -- 123/72 89 94 % -- -- -- -- -- --   12/08/22 15:13:19 98 6 °F (37 °C) 85 -- 115/68 84 95 % -- -- -- -- -- --     12/08/22 0900 -- -- -- -- -- -- 28 -- 2 L/min Nasal cannula -- --   12/08/22 0738 -- -- -- -- -- 98 % -- -- -- -- Face mask --   12/08/22 07:08:52 97 °F (36 1 °C) Abnormal  65 -- 114/63 80 98 % -- -- -- -- -- --   12/08/22 07:08:19 96 3 °F (35 7 °C) Abnormal  65 14 114/63 80 98 % -- -- -- -- -- --   12/08/22 03:11:50 96 7 °F (35 9 °C) Abnormal  76 18 131/72 92 96 % -- -- -- -- -- --   12/08/22 0150 -- -- -- -- -- 98 % -- -- -- -- Face mask --   12/07/22 23:14:54 96 7 °F (35 9 °C) Abnormal  75 18 138/78 98 99 % -- -- -- -- -- -- 12/07/22 2314 -- -- -- -- -- 98 % -- -- -- -- Face mask --   12/07/22 19:30:32 97 7 °F (36 5 °C) 80 18 138/79 99 98 % 32 -- 3 L/min Nasal cannula -- Lying   12/07/22 1900 -- 85 -- -- -- 97 % 32 -- 3 L/min -- -- --   12/07/22 16:25:58 98 1 °F (36 7 °C) 79 20 139/79 99 100 % 36 -- 4 L/min -- -- --   12/07/22 1606 -- 83 18 135/74 -- 98 % 36 -- 4 L/min Nasal cannula -- Lying   12/07/22 1524 -- 78 18 -- -- -- -- -- -- -- -- Lying   12/07/22 1153 -- 84 20 154/75 -- 99 % 36 -- 4 L/min Nasal cannula -- Lying   12/07/22 0720 97 8 °F (36 6 °C) 86 18 153/74 -- 100 % 36 -- 4 L/min Nasal cannula -- Lying   12/07/22 0603 -- 94 19 133/71 -- 98 % 36 -- 4 L/min Nasal cannula -- Lying   12/07/22 0445 -- 88 17 154/73 105 99 % 36 -- 4 L/min Nasal cannula -- Lying   12/07/22 0430 -- 86 14 124/65 91 99 % 36 -- 4 L/min Nasal cannula -- Lying   12/07/22 0412 -- 91 18 147/82 -- 99 % -- -- -- Nasal cannula -- Lying   12/07/22 0400 -- 86 15 132/66 91 99 % 36 -- 4 L/min Nasal cannula -- Lying   12/07/22 0300 -- 90 14 152/65 100 98 % 36 -- 4 L/min Nasal cannula -- Lying   12/07/22 0245 -- 92 17 150/72 101 96 % 36 -- 4 L/min Nasal cannula -- Lying   12/07/22 0230 -- 102 23 Abnormal  158/76 109 99 % 36 -- 4 L/min Nasal cannula -- Lying   12/07/22 0215 -- 100 21 177/92 Abnormal  125 99 % 36 -- 4 L/min Nasal cannula -- Lying   12/07/22 0214 -- 97 -- 177/92 Abnormal  -- 99 % -- -- -- Nasal cannula -- Lying   12/07/22 0200 -- 98 17 137/72 96 99 % 36 -- 4 L/min Nasal cannula -- Lying   12/07/22 0152 -- 92 19 152/74 -- 100 % 36 -- 4 L/min Nasal cannula -- Lying   12/07/22 0114 -- 87 16 137/70 -- 100 % -- 12 L/min -- BiPAP -- Lying   12/07/22 0100 97 7 °F (36 5 °C) 90 18 153/75 107 99 % -- 12 L/min -- BiPAP -- Lying   12/07/22 0041 -- 90 21 167/81 -- 100 % -- 12 L/min -- BiPAP -- Lying   12/07/22 0030 -- 96 22 174/86 Abnormal  123 100 % -- 12 L/min -- CPAP -- Lying   12/07/22 0022 -- 98 23 Abnormal  190/93 Abnormal  -- 100 % -- 12 L/min -- CPAP -- Lying   12/07/22 0019 -- -- -- -- -- -- -- -- -- -- Face mask --   12/07/22 0018 -- -- -- -- -- -- -- 12 L/min -- BiPAP -- --   12/07/22 0010 -- -- -- -- -- 98 %  -- -- -- -- -- --     Pertinent Labs/Diagnostic Test Results:     12/7 ECG - Sinus tachycardia  Right atrial enlargement  Non-specific intra-ventricular conduction block  T wave abnormality, consider lateral ischemia  Abnormal ECG  12/7 ECG - Sinus tachycardia  Right atrial enlargement  Left ventricular hypertrophy with QRS widening and repolarization abnormality  Abnormal ECG  12/7 ECG - Normal sinus rhythm  Right atrial enlargement  Left ventricular hypertrophy with QRS widening  Nonspecific T wave abnormality  Abnormal ECG  12/7 Echo - 1  Normal left ventricular cavity size, severely reduced left ventricular systolic function  Global hypokinesis with regional wall motion variability  Ejection fraction is estimated as around 33%  Markedly decreased global longitudinal strain, -7 2%  Grade 3 diastolic dysfunction, restrictive left ventricular filling pattern  2   Normal right ventricular size and systolic function  3 mild left atrial cavity enlargement  4   Aortic valve sclerosis with some focal calcification, no aortic valve stenosis  Trace aortic valve regurgitation  5   Mild mitral valve sclerosis, mild mitral valve regurgitation  6   Mild tricuspid valve regurgitation  7   No obvious pulmonary hypertension  8   No pericardial effusion    Compared to report of previous echocardiogram from 11/14/2018 left ventricular dysfunction, there is significant decrease in left ventricular function and ejection fraction is markedly decreased      XR chest 1 view portable   Final Result by Quin Ortega MD (12/07 7142)      Borderline cardiomegaly      Mild vascular congestion      Trace right pleural effusion     Results from last 7 days   Lab Units 12/07/22  0114   SARS-COV-2  Negative     Results from last 7 days   Lab Units 12/09/22  0581 12/07/22  0512 12/07/22  0018 12/03/22  1032   WBC Thousand/uL 9 44 11 18* 13 28* 9 55   HEMOGLOBIN g/dL 12 7 11 2* 12 7 12 3   HEMATOCRIT % 39 0 34 2* 39 4 38 3   PLATELETS Thousands/uL 298 269 311 296   NEUTROS ABS Thousands/µL  --  7 45 6 00  --          Results from last 7 days   Lab Units 12/09/22  0513 12/08/22  0508 12/07/22  0512 12/07/22  0018 12/03/22  1032   SODIUM mmol/L 131* 133*  --  133* 136   POTASSIUM mmol/L 4 3 3 7  --  4 1 4 2   CHLORIDE mmol/L 93* 94*  --  96 100   CO2 mmol/L 30 30  --  30 28   ANION GAP mmol/L 8 9  --  7 8   BUN mg/dL 22 16  --  14 16   CREATININE mg/dL 0 84 0 73  --  1 07 0 74   EGFR ml/min/1 73sq m 67 80  --  50 78   CALCIUM mg/dL 8 9 8 8  --  8 9 9 1   MAGNESIUM mg/dL  --  1 7  --   --  1 5*   PHOSPHORUS mg/dL  --   --  4 1  --   --      Results from last 7 days   Lab Units 12/07/22  0018   AST U/L 21   ALT U/L 34   ALK PHOS U/L 113   TOTAL PROTEIN g/dL 8 1   ALBUMIN g/dL 4 0   TOTAL BILIRUBIN mg/dL 0 24     Results from last 7 days   Lab Units 12/09/22  1156 12/09/22  0718 12/08/22  2106 12/08/22  1608 12/08/22  1108 12/08/22  0711 12/07/22  2112 12/07/22  1625 12/07/22  1227 12/07/22  0838   POC GLUCOSE mg/dl 172* 157* 205* 194* 310* 143* 169* 128 211* 111     Results from last 7 days   Lab Units 12/09/22  0513 12/08/22  0508 12/07/22  0018   GLUCOSE RANDOM mg/dL 159* 133 209*         Results from last 7 days   Lab Units 12/03/22  1032   HEMOGLOBIN A1C % 6 9*   EAG mg/dl 151     BETA-HYDROXYBUTYRATE   Date Value Ref Range Status   12/09/2020 0 1 <0 6 mmol/L Final      Results from last 7 days   Lab Units 12/07/22  1116 12/07/22  0813 12/07/22  0610 12/07/22  0415 12/07/22  0214 12/07/22  0018   HS TNI 0HR ng/L  --   --  106*  --   --  12   HS TNI 2HR ng/L  --  79*  --   --  82*  --    HSTNI D2 ng/L  --  -27  --   --  70*  --    HS TNI 4HR ng/L 57*  --   --  109*  --   --    HSTNI D4 ng/L -49  --   --  97*  --   --          Results from last 7 days   Lab Units 12/07/22  0018 PROTIME seconds 12 7   INR  0 95   PTT seconds 35     Results from last 7 days   Lab Units 12/07/22  0018   NT-PRO BNP pg/mL 1,417*     Results from last 7 days   Lab Units 12/03/22  1032   FERRITIN ng/mL 44             Results from last 7 days   Lab Units 12/03/22  1032   CRP mg/L 3 0*   SED RATE mm/hour 47*             Results from last 7 days   Lab Units 12/07/22  0342 12/03/22  1032   CLARITY UA  Clear  --    COLOR UA  Light Yellow  --    SPEC GRAV UA  1 015  --    PH UA  6 0  --    GLUCOSE UA mg/dl 100 (1/10%)*  --    KETONES UA mg/dl Negative  --    BLOOD UA  Trace-lysed*  --    PROTEIN UA mg/dl Trace*  --    NITRITE UA  Negative  --    BILIRUBIN UA  Negative  --    UROBILINOGEN UA E U /dl 0 2  --    LEUKOCYTES UA  Negative  --    WBC UA /hpf 0-1*  --    RBC UA /hpf 1-2*  --    BACTERIA UA /hpf None Seen  --    EPITHELIAL CELLS WET PREP /hpf Occasional  --    CREATININE UR mg/dL  --  38 5     Results from last 7 days   Lab Units 12/07/22  0114   INFLUENZA A PCR  Negative   INFLUENZA B PCR  Negative   RSV PCR  Negative     ED Treatment:   Medication Administration from 12/07/2022 0006 to 12/07/2022 1614       Date/Time Order Dose Route Action     12/07/2022 0024 EST furosemide (LASIX) injection 80 mg 80 mg Intravenous Given     12/07/2022 0156 EST nitroglycerin (NITROSTAT) SL tablet 0 4 mg 0 4 mg Sublingual Given     12/07/2022 0910 EST amLODIPine (NORVASC) tablet 5 mg 5 mg Oral Given     12/07/2022 0910 EST carvedilol (COREG) tablet 6 25 mg 6 25 mg Oral Given     12/07/2022 0910 EST gabapentin (NEURONTIN) capsule 100 mg 100 mg Oral Given     12/07/2022 0915 EST insulin glargine (LANTUS) subcutaneous injection 5 Units 0 05 mL 5 Units Subcutaneous Given     12/07/2022 0910 EST losartan (COZAAR) tablet 50 mg 50 mg Oral Given     12/07/2022 0911 EST memantine (NAMENDA) tablet 10 mg 10 mg Oral Given     12/07/2022 0602 EST pantoprazole (PROTONIX) EC tablet 40 mg 40 mg Oral Given     12/07/2022 0918 EST furosemide (LASIX) injection 40 mg 40 mg Intravenous Given     12/07/2022 0911 EST enoxaparin (LOVENOX) subcutaneous injection 40 mg 40 mg Subcutaneous Given        Past Medical History:   Diagnosis Date   • Cancer ([place] [address] )     Throat   • Chronic pain disorder    • Diabetes mellitus (HCC)    • Diffuse large B cell lymphoma (HCC)    • Dysphagia    • GERD without esophagitis    • HTN (hypertension)    • Hyperlipidemia    • Hypertension    • MI (myocardial infarction) ([place] [address] )    • Port-A-Cath in place 07/29/2019   • Thyroid cancer ([place] [address] ) 2018     Present on Admission:  • Type 2 diabetes mellitus without complication, without long-term current use of insulin (HCC)  • Essential hypertension  • Obstructive sleep apnea  • Gastroesophageal reflux disease  • Chemotherapy-induced peripheral neuropathy ([facility])  • Dementia with behavioral disturbance      Admitting Diagnosis: Unstable angina ([facility]) [I20 0]  CHF (congestive heart failure) ([facility]) [I50 9]  SOB (shortness of breath) [R06 02]  Flash pulmonary edema ([facility]) [J81 0]  Age/Sex: 69 y o  female  Admission Orders:  Scheduled Medications:  ALPRAZolam, 0 25 mg, Oral, HS  aspirin, 81 mg, Oral, Daily  atorvastatin, 40 mg, Oral, Daily With Dinner  carvedilol, 6 25 mg, Oral, BID With Meals  enoxaparin, 40 mg, Subcutaneous, Daily  ezetimibe, 10 mg, Oral, HS  gabapentin, 100 mg, Oral, BID  insulin glargine, 5 Units, Subcutaneous, QAM  insulin lispro, 1-5 Units, Subcutaneous, TID AC  insulin lispro, 1-5 Units, Subcutaneous, HS  [START ON 12/10/2022] losartan, 50 mg, Oral, Daily  memantine, 10 mg, Oral, BID  mirtazapine, 15 mg, Oral, HS  pantoprazole, 40 mg, Oral, Early Morning      Continuous IV Infusions:  sodium chloride, 50 mL/hr, Intravenous, Continuous      PRN Meds:  acetaminophen, 650 mg, Oral, Q6H PRN  albuterol, 1 puff, Inhalation, Q4H PRN  benzonatate, 100 mg, Oral, TID PRN - x 1 12/7  hydrALAZINE, 5 mg, Intravenous, Q6H PRN  ondansetron, 4 mg, Intravenous, Q6H PRN    Level 2 SD - to MedSurg 12/7  NC oxygen 2L   POC GLUCOSE AC/HS WITH SSI COVERAGE   Urinary retention monitoring  Tele  Fluid restriction - NPO p MN 12/9  CPAP  Cardiac cath  IP CONSULT TO CARDIOLOGY    Network Utilization Review Department  ATTENTION: Please call with any questions or concerns to [phone] and carefully listen to the prompts so that you are directed to the right person  All voicemails are confidential   Candia Siemens all requests for admission clinical reviews, approved or denied determinations and any other requests to dedicated fax number below belonging to the campus where the patient is receiving treatment   List of dedicated fax numbers for the Facilities:  [address] DENIALS (Administrative/Medical Necessity) [phone]   [address] [place] [address] (Maternity/NICU/Pediatrics) [phone]   [address] [phone]   [facility] [place] [address] [phone]   [address] [place] [address] [facility] [phone]   [address] [facility] 134 815 [facility] [phone]

## 2022-12-08 NOTE — CONSULTS
Consult - Cardiology   [facility] 69 [facility] MR[facility]: [id number]  Unit/Bed#: [place] 2 -01 Encounter: [id number]        Reason For Consult: Elevated troponin, new cardiomyopathy               ASSESSMENT:  New cardiomyopathy  Acute on chronic combined systolic and diastolic congestive heart failure   - Chest x-ray on arrival revealed borderline cardiomegaly, mild vascular congestion, trace right pleural effusion    - TTE 12/7/22: LVEF 33%, markedly decreased global longitudinal strain at -7 2%, global hypokinesis with regional wall motion variability, grade 3 diastolic dysfunction, mild left atrial cavity enlargement, AV sclerosis with trace AR, mild MV sclerosis with mild MR, mild TR    - TTE 11/14/18: LVEF 66%, no regional wall motion abnormalities, grade 1 diastolic dysfunction  Precordial chest pain   - Elevated troponin with high-sensitivity troponin trended 106, 79, 57  Acute hypoxic respiratory failure  Hypertension  Obstructive sleep apnea  History of near syncope  Diabetes mellitus type 2  Palliative diagnosis of moderate Alzheimer's dementia   History of diffuse B cell lymphoma, diagnosed in 9/218, finished treatment in 2019  Chemotherapy induced peripheral neuropathy  GERD    PLAN/ DISCUSSION:     · Currently on amlodipine 5 mg daily, carvedilol 6 25 mg twice daily, losartan 50 mg daily  Blood pressure with fluctuations, will hold further amlodipine for now  Hold losartan tomorrow for anticipation for cardiac catheterization  · Currently on furosemide 40 mg IV daily, will provide an additional dose of furosemide 40 mg IV this evening with plan to hold further scheduled dosing thereafter  · Continue ezetimibe 10 mg daily  · Will plan on cardiac catheterization tomorrow, patient is agreeable  Orders placed  Will also speak with the patient's granddaughter to provide an update  · Monitor volume status closely with strict intake/output, daily weight    · Monitor renal function and electrolytes; maintain potassium > 4, magnesium > 2    · Most recent magnesium 1 7, will provide magnesium sulfate 2 g IVPB  Most recent potassium 3 7, will provide K- Dur 40 mEq x 1 today  History Of Present Illness:  78-year-old female presented to [facility] with complaints of shortness of breath and chest pain  In addition, patient with reported productive cough and nausea as well   utilized to speak with the patient at the bedside  Per patient, she states that she has had the chest pain and shortness of breath for approximately 3 days  She notes that she feels the pain more with exertion and feels like a pressure  She denies having chest pain at rest or pain that wakes her from her sleep  Patient states that her breathing is also worse with exertion  She has been experiencing dizziness upon ambulation  She has not experienced palpitations  Patient states that she feels as though she has had a cold for several days prior to presentation to the hospital, but denies any alterations in life stressors  Upon assessment and evaluation, patient resting in bed  At this time, patient does not exhibit chest pain or shortness of breath  Patient denies dizziness, lightheadedness, palpitations  She did not sleep well overnight, however she notes that she is presently comfortable but lethargic  Please see significant cardiac history and problems enumerated above  Patient was seen in cardiology consultation outpatient by Dr Caesar Gomez in March 2021  Patient was experiencing near syncope and balance problems  Appeared that the symptoms had resolved after her antihypertensive medications were adjusted  During visit, patient did not appear to have orthostatic hypotension  Patient endorsed palpitations related to anxiety      Past Medical History:        Past Medical History:   Diagnosis Date   • Cancer ([facility] )     Throat   • Chronic pain disorder    • Diabetes mellitus ([facility] )    • Diffuse large B cell lymphoma ([facility] )    • Dysphagia    • GERD without esophagitis    • HTN (hypertension)    • Hyperlipidemia    • Hypertension    • MI (myocardial infarction) ([place] [address] )    • Port-A-Cath in place 07/29/2019   • Thyroid cancer ([place] [address] ) 2018      Past Surgical History:   Procedure Laterality Date   • BONE MARROW BIOPSY     • BREAST BIOPSY Left    • CHOLECYSTECTOMY     • IR PORT PLACEMENT  11/16/2018   • IR PORT REMOVAL  3/22/2021   • OTHER SURGICAL HISTORY      tendor tear repair to right shoulder   • SHOULDER ARTHROSCOPY          Allergy:        No Known Allergies    Medications:       Prior to Admission medications    Medication Sig Start Date End Date Taking?  Authorizing Provider   Accu-Chek FastClix Lancets MISC Use to test 3x daily 3/15/22   Jessica Lowry MD   albuterol (PROVENTIL HFA,VENTOLIN HFA) 90 mcg/act inhaler INHALAR 1-2 BOCANADAS A LOS PULMONES CADA 4-6 HORAS ALFREDO SEA NECESARIO 9/22/22   [place] Drum, CRNP   ALPRAZolam Forestine Guitar) 0 25 mg tablet Take 1 tablet (0 25 mg total) by mouth daily at bedtime 12/3/22   [place] MOIRA Couch   amLODIPine (NORVASC) 5 mg tablet Take 1 tablet (5 mg total) by mouth daily 11/17/21   [place] MOIRA Couch   Blood Glucose Monitoring Suppl (OneTouch Verio) w/Device KIT Use as directed to check blood sugar DX E11 9 10/21/22   [place] Drum, CRNP   Calcium Carb-Cholecalciferol 600-400 MG-UNIT TABS Take 1 tablet by mouth 2 (two) times a day 11/17/21   [place] Drum, CRNP   carvedilol (COREG) 12 5 mg tablet Take 0 5 tablets (6 25 mg total) by mouth 2 (two) times a day with meals 10/21/22   [place] MOIRA Couch   cyanocobalamin (VITAMIN B-12) 500 MCG tablet Take 1,000 mcg by mouth daily    Historical Provider, MD   Diclofenac Sodium (VOLTAREN) 1 % APLICAR 2 GRAMOS POR VIA TOPICAL CUATRO VECES AL JEFFREY 11/23/22   [place] MOIRA Couch   diphenhydrAMINE (BENADRYL) 2 % cream Apply topically 3 (three) times a day as needed for itching 6/14/21   [place] MOIRA Couch   ezetimibe (ZETIA) 10 mg tablet MAR JOHNNY (1) TABLETA POR VIA ORAL JOHNNY VEZ JEFFREY 10/24/22   [place] Julio Cesar, CRNP   ferrous sulfate 325 (65 Fe) mg tablet Take 325 mg by mouth daily with breakfast    Historical Provider, MD   fluticasone (FLONASE) 50 mcg/act nasal spray USAR 1 ROCIADA EN CADA FOSA NASAL DIARIAMENTE 1/4/22   Erroll Julio Cesar, MOIRA   gabapentin (NEURONTIN) 100 mg capsule Take 1 capsule (100 mg total) by mouth 2 (two) times a day 8/25/22   Erroll Julio CesarMOIRA   glucose blood (Accu-Chek Guide) test strip Use to test 3x daily 3/15/22   Charlton Dancer, MD   hydrocortisone 2 5 % cream APLICAR POR VIA TOPICA CUATRO VECES AL JEFFREY ALFREDO SEA NECESARIO PARA EL ERUPCION 4/18/22   Erroll [place]   Insulin Glargine Solostar (Lantus SoloStar) 100 UNIT/ML SOPN Use 9 units subcut once dialy 10/21/22   Erroll [place]   Insulin Pen Needle (BD Pen Needle Micro U/F) 32G X 6 MM MISC Use daily 12/14/20   Erroll Julio CesarMOIRA   loperamide (IMODIUM) 2 mg capsule MAR JOHNNY (1) CAPSULA POR VIA ORAL ALFREDO SEA NECESARIO PARA LA DIARREA 10/14/22   Errcristal [place], MOIRA   losartan (COZAAR) 50 mg tablet TAKE 1 TABLET(50 MG) BY MOUTH DAILY 10/8/22   Erroll [place]   meloxicam (MOBIC) 7 5 mg tablet MAR JOHNNY (1)TABLETA POR VIA ORAL DIARIA CON EL DESAYUNO PARA LA ARTHRITIS   NO TOME CON IBUPROFEN 6/2/22   Everett Souza MD   memantine (NAMENDA) 10 mg tablet MAR 1 TABLETA POR VIA ORAL DOS VECES AL JEFFREY 10/14/22   Marilyn Rash, MOIRA   metFORMIN (GLUCOPHAGE) 1000 MG tablet MAR JOHNNY (1) TABLETA POR VIA ORAL DOS VECES AL JEFFREY CON COMIDAS 9/22/22   [place]roll Julio Cesar, MOIRA   mirtazapine (REMERON) 15 mg tablet MAR JOHNNY (1) TABLETA POR VIA ORAL CADA Gume Quin A LA HORA DE ACOSTARSE 3/8/22   Everett Souza MD   omeprazole (PriLOSEC) 40 MG capsule MAR 1 CAPSULA POR VIA ORAL JOHNNY VEZ AL JEFFREY 6/24/22   MOIRA Birch   Tradjenta 5 MG TABS MAR JOHNNY TABLETA POR VIA ORAL JOHNNY VEZ AL JEFFREY 9/22/22   MOIRA Birch       Family History:     Family History   Problem Relation Age of Onset   • Diabetes Mother    • Heart disease Sister    • Hypertension Brother    • Uterine cancer Maternal Grandmother    • Prostate cancer Paternal Grandfather         Social History:       Social History     Socioeconomic History   • Marital status:      Spouse name: None   • Number of children: None   • Years of education: None   • Highest education level: None   Occupational History   • None   Tobacco Use   • Smoking status: Never   • Smokeless tobacco: Never   Vaping Use   • Vaping Use: Never used   Substance and Sexual Activity   • Alcohol use: Never     Comment: 0   • Drug use: No   • Sexual activity: Not Currently     Partners: Male   Other Topics Concern   • None   Social History Narrative   • None     Social Determinants of Health     Financial Resource Strain: Not on file   Food Insecurity: Not on file   Transportation Needs: No Transportation Needs   • Lack of Transportation (Medical): No   • Lack of Transportation (Non-Medical): No   Physical Activity: Not on file   Stress: Stress Concern Present   • Feeling of Stress : To some extent   Social Connections: Not on file   Intimate Partner Violence: Not on file   Housing Stability: Unknown   • Unable to Pay for Housing in the Last Year: No   • Number of Places Lived in the Last Year: Not on file   • Unstable Housing in the Last Year: No       ROS:  Negative except otherwise aforementioned above  Remainder review of systems is negative    Exam:  General:  alert, oriented and in no distress, cooperative  Head: Normocephalic, atraumatic  Eyes:  EOMI  Pupils - equal, round, reactive to accomodation  No icterus  Normal Conjunctiva     Oropharynx: moist and normal-appearing mucosa  Neck: supple, symmetrical, trachea midline   Heart: regular rate, regular rhythm, S1, S2   Respiratory effort / Chest Inspection: unlabored on exam, remains on supplemental oxygen   Lungs: diminished breath sounds bilateral bases, expiratory wheezing noted  Abdomen: flat, normal findings: bowel sounds normal and soft, non-tender  Lower Limbs:  no pitting edema  Musculoskeletal: ROM grossly normal      DATA:      ECG:                 Normal sinus rhythm  Left ventricular hypertrophy with QRS widening and repolarization abnormality  Abnormal ECG      Telemetry: NSR on exam           Echocardiogram completed on 12/7/22:         1  Normal left ventricular cavity size, severely reduced left ventricular systolic function  Global hypokinesis with regional wall motion variability  Ejection fraction is estimated as around 33%  Markedly decreased global longitudinal strain, -7 2%  Grade 3 diastolic dysfunction, restrictive left ventricular filling pattern  2   Normal right ventricular size and systolic function  3 mild left atrial cavity enlargement  4   Aortic valve sclerosis with some focal calcification, no aortic valve stenosis  Trace aortic valve regurgitation  5   Mild mitral valve sclerosis, mild mitral valve regurgitation  6   Mild tricuspid valve regurgitation  7   No obvious pulmonary hypertension  8   No pericardial effusion  Compared to report of previous echocardiogram from 11/14/2018 left ventricular dysfunction, there is significant decrease in left ventricular function and ejection fraction is markedly decreased  Weights: Wt Readings from Last 3 Encounters:   12/08/22 70 kg (154 lb 5 2 oz)   10/21/22 67 5 kg (148 lb 14 4 oz)   10/03/22 67 7 kg (149 lb 3 2 oz)   , Body mass index is 28 23 kg/m²           Lab Studies:           Results from last 7 days   Lab Units 12/03/22  1032   TRIGLYCERIDES mg/dL 94   HDL mg/dL 50     Results from last 7 days   Lab Units 12/07/22  0512 12/07/22  0018 12/03/22  1032   WBC Thousand/uL 11 18* 13 28* 9 55   HEMOGLOBIN g/dL 11 2* 12 7 12 3   HEMATOCRIT % 34 2* 39 4 38 3   PLATELETS Thousands/uL 269 311 296   ,   Results from last 7 days   Lab Units 12/08/22  0508 12/07/22  0018 12/03/22  1032 POTASSIUM mmol/L 3 7 4 1 4 2   CHLORIDE mmol/L 94* 96 100   CO2 mmol/L 30 30 28   BUN mg/dL 16 14 16   CREATININE mg/dL 0 73 1 07 0 74   CALCIUM mg/dL 8 8 8 9 9 1   ALK PHOS U/L  --  113  --    ALT U/L  --  34  --    AST U/L  --  21  --

## 2022-12-08 NOTE — ASSESSMENT & PLAN NOTE
Reports onset of generalized chest pain across chest earlier today  She would describe this as a burning chest pain  Initial troponin 12  EKG without ST elevation    · Appreciate cardiology recommendations  · Echo with new cardiomyopathy and changes  · Pt for cardiac cath tomorrow   · Continue asa, statin, and beta blocker

## 2022-12-08 NOTE — CASE MANAGEMENT
Case Management Assessment & Discharge Planning Note    Patient name [facility]  Location [place] martin 2 /South 2 Venecia Martin* MRN [id number]  : 1945 Date 2022       Current Admission Date: 2022  Current Admission Diagnosis:Acute respiratory failure with hypoxia [facility])   Patient Active Problem List    Diagnosis Date Noted   • Acute respiratory failure with hypoxia ([address] ) 2022   • Chest pain 2022   • Chest congestion 05/10/2022   • Hyperlipidemia    • Gait instability 2021   • Polypharmacy 2021   • Frequent falls 2021   • Dementia with behavioral disturbance 2021   • Counseling regarding advanced care planning and goals of care 2021   • Near syncope 2021   • Palpitations 2021   • Grade I diastolic dysfunction without heart failure 2021   • Current use of insulin ([address] ) 2021   • Encounter for central line care 2020   • Chemotherapy-induced peripheral neuropathy ([address] ) 10/13/2020   • Obstructive sleep apnea    • Arthritis 2020   • Allergic rhinitis 10/08/2019   • Status post chemotherapy 2019   • Current mild episode of major depressive disorder without prior episode ([address] ) 2019   • Other insomnia 2019   • Palliative care patient 2019   • Nausea 2018   • Tremor 2018   • Anxiety 2018   • Hyponatremia 2018   • Diffuse large 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 ([address] ) 2018   • Oropharyngeal dysphagia 2018   • Type 2 diabetes mellitus without complication, without long-term current use of insulin ([address] ) 10/31/2018   • Essential hypertension 10/31/2018   • Gastroesophageal reflux disease 10/31/2018      LOS (days): 1  Geometric Mean LOS (GMLOS) (days): 3 50  Days to GMLOS:2     OBJECTIVE:    Risk of Unplanned Readmission Score: 16 3         Current admission status: Inpatient       Preferred Pharmacy:   [facility], [address] [facility] [address]  Phone: [phone] Fax: [phone]    Primary Care Provider: MOIRA Delarosa    Primary Insurance: [facility] [address]  Secondary Insurance: [address] [facility]    ASSESSMENT:  [address], [address] [facility]   Primary Phone: [phone] (Mobile)  Home Phone: [phone]               Advance Directives  Does patient have a [address]?: Yes  Does patient have Advance Directives?: Yes  Advance Directives: Living will, Power of  for health care  Primary Contact: Radha Makijerry ([address])   [phone]         Readmission Root Cause  30 Day Readmission: No    Patient Information  Admitted from[de-identified] Home  Mental Status: Alert  During Assessment patient was accompanied by: Not accompanied during assessment  Assessment information provided by[de-identified] Other - please comment Radha Arce ([id number])   [phone])  Primary Caregiver: Family  Caregiver's Name[de-identified] Parminder Jay   [phone] - waiver program 56 hours/week  Caregiver's Relationship to Patient[de-identified] Family Member  Caregiver's Telephone Number[de-identified] Radha Arce ([email or web address])   [phone]  Support Systems: Children, Spouse/significant other  [place] of Residence: [address] [place] [address] [facility]  Type of Current Residence: [address] [place] home  Upon entering residence, is there a bedroom on the main floor (no further steps)?: No  A bedroom is located on the following floor levels of residence (select all that apply):: 2nd Floor  Upon entering residence, is there a bathroom on the main floor (no further steps)?: Yes  Indicate which floors of current residence have a bathroom (select all the apply):: 2nd Floor  Number of steps to 2nd floor from main floor: One Flight  In the last 12 months, was there a time when you were not able to pay the mortgage or rent on time?: No  In the last 12 months, how many places have you lived?: 1  In the last 12 months, was there a time when you did not have a steady place to sleep or slept in a shelter (including now)?: No  Homeless/housing insecurity resource given?: N/A  Living Arrangements: Lives w/ Extended Family  Is patient a ?: No    Activities of Daily Living Prior to Admission  Functional Status: Assistance  Completes ADLs independently?: No  Level of ADL dependence: Assistance  Ambulates independently?: No  Level of ambulatory dependence: Assistance  Does patient use assisted devices?: Yes  Assisted Devices (DME) used: Teresa Cutting  Does patient currently own DME?: Yes  What DME does the patient currently own?: Teresa Cutting  Does patient have a history of [address]?: Yes  Does patient currently have [address]?:  ([address])    [address] [facility]  Type of Current Home Care Services: Home health aide  Current Home Health Agency[de-identified]  ([address]) [address] [facility] Provider[de-identified] PCP    Patient Information Continued  Income Source: SSI/SSD  Does patient have prescription coverage?: Yes  Within the past 12 months, you worried that your food would run out before you got the money to buy more : Never true  Within the past 12 months, the food you bought just didn't last and you didn't have money to get more : Never true  Food insecurity resource given?: N/A  Does patient receive dialysis treatments?: No  Does patient have a history of substance abuse?: No  Does patient have a history of Mental Health Diagnosis?: No         Means of Transportation  Means of Transport to Appts[de-identified] Family transport  In the past 12 months, has lack of transportation kept you from meetings, work, or from getting things needed for daily living?: No  Was application for public transport provided?: N/A        DISCHARGE DETAILS:    Discharge planning discussed with[de-identified] Jarett Parks ([address] [facility])   [phone]  Freedom of Choice: Yes  Comments - Freedom of Choice: Deanna Cuadra states pt uses [facility]  Referral made in Aidin to ensure services can continue after d/c   CM contacted family/caregiver?: Yes  Were Treatment Team discharge recommendations reviewed with patient/caregiver?: Yes  Did patient/caregiver verbalize understanding of patient care needs?: Yes       Contacts  Patient Contacts: daughter Raul Hurtado  Relationship to Patient[de-identified] Family  Contact Method:  In Person  Reason/Outcome: Continuity of Care, Emergency Contact, Discharge [address]         Is the patient interested in Ana Boss at discharge?: Yes  Via Tabitha Gracia requested[de-identified] [address] Name[de-identified] Other ([address]) [address] Provider[de-identified] PCP  Home Health Services Needed[de-identified] Evaluate Functional Status and Safety, Gait/ADL Training, Strengthening/Theraputic Exercises to Improve Function  Homebound Criteria Met[de-identified] Requires the Assistance of Another Person for Safe Ambulation or to Leave the Home  Supporting Clincal Findings[de-identified] Fatigues Easliy in [employer], Limited Endurance, Cognitive Deficit Requiring the Assistance of Others (dementia)         Other Referral/Resources/Interventions Provided:  Interventions: [facility]         Treatment Team Recommendation: [address]  Discharge Destination Plan[de-identified] Home with [place] at Discharge : Automobile

## 2022-12-08 NOTE — PLAN OF CARE
Problem: Potential for Falls  Goal: Patient will remain free of falls  Description: INTERVENTIONS:  - Educate patient/family on patient safety including physical limitations  - Instruct patient to call for assistance with activity   - Consult OT/PT to assist with strengthening/mobility   - Keep Call bell within reach  - Keep bed low and locked with side rails adjusted as appropriate  - Keep care items and personal belongings within reach  - Initiate and maintain comfort rounds  - Make Fall Risk Sign visible to staff  - Offer Toileting every 2 Hours, in advance of need  - Initiate/Maintain bed alarm  - Apply yellow socks and bracelet for high fall risk patients  - Consider moving patient to room near nurses station  Outcome: Progressing     Problem: MOBILITY - ADULT  Goal: Maintain or return to baseline ADL function  Description: INTERVENTIONS:  -  Assess patient's ability to carry out ADLs; assess patient's baseline for ADL function and identify physical deficits which impact ability to perform ADLs (bathing, care of mouth/teeth, toileting, grooming, dressing, etc )  - Assess/evaluate cause of self-care deficits   - Assess range of motion  - Assess patient's mobility; develop plan if impaired  - Assess patient's need for assistive devices and provide as appropriate  - Encourage maximum independence but intervene and supervise when necessary  - Involve family in performance of ADLs  - Assess for home care needs following discharge   - Consider OT consult to assist with ADL evaluation and planning for discharge  - Provide patient education as appropriate  Outcome: Progressing  Goal: Maintains/Returns to pre admission functional level  Description: INTERVENTIONS:  - Perform BMAT or MOVE assessment daily    - Set and communicate daily mobility goal to care team and patient/family/caregiver     - Collaborate with rehabilitation services on mobility goals if consulted  - Stand patient 3 times a day  - Ambulate patient 3 times a day  - Out of bed to chair 3 times a day   - Out of bed for meals 3 times a day  - Out of bed for toileting  - Record patient progress and toleration of activity level   Outcome: Progressing     Problem: INFECTION - ADULT  Goal: Absence or prevention of progression during hospitalization  Description: INTERVENTIONS:  - Assess and monitor for signs and symptoms of infection  - Monitor lab/diagnostic results  - Monitor all insertion sites, i e  indwelling lines, tubes, and drains  - Monitor endotracheal if appropriate and nasal secretions for changes in amount and color  - [place] appropriate cooling/warming therapies per order  - Administer medications as ordered  - Instruct and encourage patient and family to use good hand hygiene technique  - Identify and instruct in appropriate isolation precautions for identified infection/condition  Outcome: Progressing     Problem: DISCHARGE PLANNING  Goal: Discharge to home or other facility with appropriate resources  Description: INTERVENTIONS:  - Identify barriers to discharge w/patient and caregiver  - Arrange for needed discharge resources and transportation as appropriate  - Identify discharge learning needs (meds, wound care, etc )  - Arrange for interpretive services to assist at discharge as needed  - Refer to Case Management Department for coordinating discharge planning if the patient needs post-hospital services based on physician/advanced practitioner order or complex needs related to functional status, cognitive ability, or social support system  Outcome: Progressing     Problem: Knowledge Deficit  Goal: Patient/family/caregiver demonstrates understanding of disease process, treatment plan, medications, and discharge instructions  Description: Complete learning assessment and assess knowledge base    Interventions:  - Provide teaching at level of understanding  - Provide teaching via preferred learning methods  Outcome: Progressing     Problem: RESPIRATORY - ADULT  Goal: Achieves optimal ventilation and oxygenation  Description: INTERVENTIONS:  - Assess for changes in respiratory status  - Assess for changes in mentation and behavior  - Position to facilitate oxygenation and minimize respiratory effort  - Oxygen administered by appropriate delivery if ordered  - Initiate smoking cessation education as indicated  - Encourage broncho-pulmonary hygiene including cough, deep breathe, Incentive Spirometry  - Assess the need for suctioning and aspirate as needed  - Assess and instruct to report SOB or any respiratory difficulty  - Respiratory Therapy support as indicated  Outcome: Progressing     Problem: METABOLIC, FLUID AND ELECTROLYTES - ADULT  Goal: Glucose maintained within target range  Description: INTERVENTIONS:  - Monitor Blood Glucose as ordered  - Assess for signs and symptoms of hyperglycemia and hypoglycemia  - Administer ordered medications to maintain glucose within target range  - Assess nutritional intake and initiate nutrition service referral as needed  Outcome: Progressing     Problem: SKIN/TISSUE INTEGRITY - ADULT  Goal: Skin Integrity remains intact(Skin Breakdown Prevention)  Description: Assess:  -Perform Deven assessment twice daily  -Clean and moisturize skin every day  -Inspect skin when repositioning, toileting, and assisting with ADLS  -Assess extremities for adequate circulation and sensation     Bed Management:  -Have minimal linens on bed & keep smooth, unwrinkled  -Change linens as needed when moist or perspiring      Toileting:  -Offer bedside commode      Activity:  -Mobilize patient 3 times a day  -Encourage activity and walks on unit  -Encourage or provide ROM exercises       Skin Care:  -Avoid use of baby powder, tape, friction and shearing, hot water or constrictive clothing      Outcome: Progressing

## 2022-12-08 NOTE — ASSESSMENT & PLAN NOTE
Lab Results   Component Value Date    HGBA1C 6 9 (H) 12/03/2022       Recent Labs     12/07/22  2112 12/08/22  0711 12/08/22  1108 12/08/22  1608   POCGLU 169* 143* 310* 194*       Blood Sugar Average: Last 72 hrs:  (P) 180 [id number] regimen includes glargine 9 units daily, metformin    · Hold metformin while inpatient  · Continue lantus of 5 units as pt will be npo   · Sliding-scale insulin  · Hypoglycemia protocol

## 2022-12-09 LAB
ANION GAP SERPL CALCULATED.3IONS-SCNC: 8 MMOL/L (ref 4–13)
BUN SERPL-MCNC: 22 MG/DL (ref 5–25)
CALCIUM SERPL-MCNC: 8.9 MG/DL (ref 8.3–10.1)
CHLORIDE SERPL-SCNC: 93 MMOL/L (ref 96–108)
CO2 SERPL-SCNC: 30 MMOL/L (ref 21–32)
CREAT SERPL-MCNC: 0.84 MG/DL (ref 0.6–1.3)
ERYTHROCYTE [DISTWIDTH] IN BLOOD BY AUTOMATED COUNT: 13 % (ref 11.6–15.1)
GFR SERPL CREATININE-BSD FRML MDRD: 67 ML/MIN/1.73SQ M
GLUCOSE SERPL-MCNC: 157 MG/DL (ref 65–140)
GLUCOSE SERPL-MCNC: 159 MG/DL (ref 65–140)
GLUCOSE SERPL-MCNC: 172 MG/DL (ref 65–140)
GLUCOSE SERPL-MCNC: 223 MG/DL (ref 65–140)
HCT VFR BLD AUTO: 39 % (ref 34.8–46.1)
HGB BLD-MCNC: 12.7 G/DL (ref 11.5–15.4)
MCH RBC QN AUTO: 30 PG (ref 26.8–34.3)
MCHC RBC AUTO-ENTMCNC: 32.6 G/DL (ref 31.4–37.4)
MCV RBC AUTO: 92 FL (ref 82–98)
PLATELET # BLD AUTO: 298 THOUSANDS/UL (ref 149–390)
PMV BLD AUTO: 10 FL (ref 8.9–12.7)
POTASSIUM SERPL-SCNC: 4.3 MMOL/L (ref 3.5–5.3)
RBC # BLD AUTO: 4.24 MILLION/UL (ref 3.81–5.12)
SODIUM SERPL-SCNC: 131 MMOL/L (ref 135–147)
WBC # BLD AUTO: 9.44 THOUSAND/UL (ref 4.31–10.16)

## 2022-12-09 RX ORDER — SODIUM CHLORIDE 9 MG/ML
INJECTION, SOLUTION INTRAVENOUS
Status: DISCONTINUED | OUTPATIENT
Start: 2022-12-09 | End: 2022-12-09

## 2022-12-09 RX ORDER — SODIUM CHLORIDE 9 MG/ML
50 INJECTION, SOLUTION INTRAVENOUS CONTINUOUS
Status: DISPENSED | OUTPATIENT
Start: 2022-12-09 | End: 2022-12-09

## 2022-12-09 RX ORDER — HEPARIN SODIUM 1000 [USP'U]/ML
INJECTION, SOLUTION INTRAVENOUS; SUBCUTANEOUS CODE/TRAUMA/SEDATION MEDICATION
Status: DISCONTINUED | OUTPATIENT
Start: 2022-12-09 | End: 2022-12-09 | Stop reason: HOSPADM

## 2022-12-09 RX ORDER — NITROGLYCERIN 20 MG/100ML
INJECTION INTRAVENOUS CODE/TRAUMA/SEDATION MEDICATION
Status: DISCONTINUED | OUTPATIENT
Start: 2022-12-09 | End: 2022-12-09 | Stop reason: HOSPADM

## 2022-12-09 RX ORDER — FENTANYL CITRATE 50 UG/ML
INJECTION, SOLUTION INTRAMUSCULAR; INTRAVENOUS CODE/TRAUMA/SEDATION MEDICATION
Status: DISCONTINUED | OUTPATIENT
Start: 2022-12-09 | End: 2022-12-09 | Stop reason: HOSPADM

## 2022-12-09 RX ORDER — LIDOCAINE HYDROCHLORIDE 10 MG/ML
INJECTION, SOLUTION EPIDURAL; INFILTRATION; INTRACAUDAL; PERINEURAL CODE/TRAUMA/SEDATION MEDICATION
Status: DISCONTINUED | OUTPATIENT
Start: 2022-12-09 | End: 2022-12-09 | Stop reason: HOSPADM

## 2022-12-09 RX ORDER — MIDAZOLAM HYDROCHLORIDE 2 MG/2ML
INJECTION, SOLUTION INTRAMUSCULAR; INTRAVENOUS CODE/TRAUMA/SEDATION MEDICATION
Status: DISCONTINUED | OUTPATIENT
Start: 2022-12-09 | End: 2022-12-09 | Stop reason: HOSPADM

## 2022-12-09 RX ADMIN — MEMANTINE 10 MG: 5 TABLET ORAL at 08:22

## 2022-12-09 RX ADMIN — MIRTAZAPINE 15 MG: 15 TABLET, FILM COATED ORAL at 21:44

## 2022-12-09 RX ADMIN — ALPRAZOLAM 0.25 MG: 0.25 TABLET ORAL at 21:44

## 2022-12-09 RX ADMIN — SODIUM CHLORIDE 50 ML/HR: 0.9 INJECTION, SOLUTION INTRAVENOUS at 15:20

## 2022-12-09 RX ADMIN — ASPIRIN 81 MG CHEWABLE TABLET 324 MG: 81 TABLET CHEWABLE at 08:22

## 2022-12-09 RX ADMIN — GABAPENTIN 100 MG: 100 CAPSULE ORAL at 17:20

## 2022-12-09 RX ADMIN — MEMANTINE 10 MG: 5 TABLET ORAL at 17:20

## 2022-12-09 RX ADMIN — GABAPENTIN 100 MG: 100 CAPSULE ORAL at 08:22

## 2022-12-09 RX ADMIN — CARVEDILOL 6.25 MG: 6.25 TABLET, FILM COATED ORAL at 08:22

## 2022-12-09 RX ADMIN — CARVEDILOL 6.25 MG: 6.25 TABLET, FILM COATED ORAL at 16:54

## 2022-12-09 RX ADMIN — EZETIMIBE 10 MG: 10 TABLET ORAL at 21:44

## 2022-12-09 RX ADMIN — ATORVASTATIN CALCIUM 40 MG: 40 TABLET, FILM COATED ORAL at 16:54

## 2022-12-09 RX ADMIN — INSULIN LISPRO 1 UNITS: 100 INJECTION, SOLUTION INTRAVENOUS; SUBCUTANEOUS at 21:44

## 2022-12-09 NOTE — PROGRESS NOTES
[facility]  Progress Note - [address] [birth year], 69 y o  female MRN: [id number]  Unit/Bed#: E4 -01 Encounter: [id number]  Primary Care Provider: MOIRA Kline   Date and time admitted to hospital: 12/7/2022 12:06 AM    Chest pain  Assessment & Plan  Reports onset of generalized chest pain across chest earlier today  She would describe this as a burning chest pain  Initial troponin 12  EKG without ST elevation  · Appreciate cardiology recommendations  · Echo with new cardiomyopathy and changes  · S/p heart cath: no cad  Likely due to chemotherapy   · Continue asa, statin, and beta blocker    Dementia with behavioral disturbance  Assessment & Plan  Continue Namenda, Remeron    Chemotherapy-induced peripheral neuropathy (HCC)  Assessment & Plan  Continue gabapentin    Obstructive sleep apnea  Assessment & Plan  Reports she normally wears a CPAP machine at night, but she has been without this machine for about 2 weeks after moving to the area  Continue CPAP at night    Gastroesophageal reflux disease  Assessment & Plan  Continue PPI    Essential hypertension  Assessment & Plan  Continue Coreg, losartan  Hydralazine as needed for SBP greater than 190, DBP greater than 110    Type 2 diabetes mellitus without complication, without long-term current use of insulin [facility])  Assessment & Plan  Lab Results   Component Value Date    HGBA1C 6 9 (H) 12/03/2022       Recent Labs     12/08/22  1608 12/08/22  2106 12/09/22  0718 12/09/22  1156   POCGLU 194* 205* 157* 172*       Blood Sugar Average: Last 72 hrs:  (P) 180Current regimen includes glargine 9 units daily, metformin  · Hold metformin while inpatient  · Continue lantus of 5 units  Will monitor and adjust as needed  · Sliding-scale insulin  · Hypoglycemia protocol    * Acute respiratory failure with hypoxia [facility])  Assessment & Plan  Presented with chest pain or shortness of breath which started today    Blood pressure was significantly elevated on arrival at 209/103  CXR concerning for vascular congestion  She additionally has been without her CPAP for about 2 weeks after moving to the area  Suspect flash pulmonary edema in combination with BiPAP pulm therapy  S/p IV Lasix and weaned off BiPAP in ED to 4 L nasal cannula  · Lasix held due to heart cath  Will likely restart tomorrow  · Monitor I&O's  · Continuous pulse oximetry  · Wean O2 as able  · IV hydralazine as needed  · Continue CPAP therapy at night      VTE Pharmacologic Prophylaxis: lovenox  Mechanical VTE Prophylaxis in Place: Yes    Education and Discussions with Family / Patient: called granddaughter and updated her    Time Spent for Care: 20 minutes  More than 50% of total time spent on counseling and coordination of care as described above  Current Length of Stay: 2 day(s)  Current Patient Status: Inpatient     Discharge in 48 to 72 hours     Code Status: Level 1 - Full Code      Subjective:   Pt seen and examined  Interpretation with the help of nursing staff  S/p cardiac cath  No problems at this time  Objective:     Vitals:   Temp (24hrs), Av 4 °F (36 3 °C), Min:97 1 °F (36 2 °C), Max:98 1 °F (36 7 °C)    Temp:  [97 1 °F (36 2 °C)-98 1 °F (36 7 °C)] 97 1 °F (36 2 °C)  HR:  [69-83] 71  Resp:  [16-19] 18  BP: ()/(54-74) 107/54  SpO2:  [79 %-100 %] 98 %  Body mass index is 28 55 kg/m²  Input and Output Summary (last 24 hours): Intake/Output Summary (Last 24 hours) at 2022 1556  Last data filed at 2022 1345  Gross per 24 hour   Intake 240 ml   Output 1205 ml   Net -965 ml       Physical Exam:   Physical Exam  Constitutional:       Appearance: Normal appearance  HENT:      Head: Normocephalic and atraumatic  Eyes:      Extraocular Movements: Extraocular movements intact  Pupils: Pupils are equal, round, and reactive to light  Cardiovascular:      Rate and Rhythm: Normal rate and regular rhythm        Heart sounds: No murmur heard  No friction rub  No gallop  Pulmonary:      Effort: Pulmonary effort is normal  No respiratory distress  Breath sounds: Normal breath sounds  No wheezing, rhonchi or rales  Abdominal:      General: Bowel sounds are normal  There is no distension  Palpations: Abdomen is soft  Tenderness: There is no abdominal tenderness  There is no guarding or rebound  Musculoskeletal:      Right lower leg: No edema  Left lower leg: No edema  Neurological:      Mental Status: She is alert and oriented to person, place, and time  Additional Data:     Labs:  Results from last 7 days   Lab Units 12/09/22  0513 12/07/22  0512   WBC Thousand/uL 9 44 11 18*   HEMOGLOBIN g/dL 12 7 11 2*   HEMATOCRIT % 39 0 34 2*   PLATELETS Thousands/uL 298 269   NEUTROS PCT %  --  66   LYMPHS PCT %  --  26   MONOS PCT %  --  6   EOS PCT %  --  0     Results from last 7 days   Lab Units 12/09/22  0513 12/08/22  0508 12/07/22  0018   SODIUM mmol/L 131*   < > 133*   POTASSIUM mmol/L 4 3   < > 4 1   CHLORIDE mmol/L 93*   < > 96   CO2 mmol/L 30   < > 30   BUN mg/dL 22   < > 14   CREATININE mg/dL 0 84   < > 1 07   ANION GAP mmol/L 8   < > 7   CALCIUM mg/dL 8 9   < > 8 9   ALBUMIN g/dL  --   --  4 0   TOTAL BILIRUBIN mg/dL  --   --  0 24   ALK PHOS U/L  --   --  113   ALT U/L  --   --  34   AST U/L  --   --  21   GLUCOSE RANDOM mg/dL 159*   < > 209*    < > = values in this interval not displayed       Results from last 7 days   Lab Units 12/07/22  0018   INR  0 95     Results from last 7 days   Lab Units 12/09/22  1156 12/09/22  0718 12/08/22  2106 12/08/22  1608 12/08/22  1108 12/08/22  0711 12/07/22  2112 12/07/22  1625 12/07/22  1227 12/07/22  0838   POC GLUCOSE mg/dl 172* 157* 205* 194* 310* 143* 169* 128 211* 111     Results from last 7 days   Lab Units 12/03/22  1032   HEMOGLOBIN A1C % 6 9*           Recent Cultures (last 7 days):           Lines/Drains:  Invasive Devices     Peripheral Intravenous Line  Duration           Peripheral IV 12/07/22 Right Antecubital 2 days                Telemetry:   Telemetry Orders (From admission, onward)             24 Hour Telemetry Monitoring  Continuous x 24 Hours (Telem)        References:    Telemetry Guidelines   Question:  Reason for 24 Hour Telemetry  Answer:  Pre or Post EP Study/PCI                    Last 24 Hours Medication List:   Current Facility-Administered Medications   Medication Dose Route Frequency Provider Last Rate   • acetaminophen  650 mg Oral Q6H PRN Mitra Tuore MD     • albuterol  1 puff Inhalation Q4H PRN Mitra Toure MD     • ALPRAZolam  0 25 mg Oral HS Mitra Toure MD     • aspirin  81 mg Oral Daily Hiral Bahena MD     • atorvastatin  40 mg Oral Daily With Geovani Cano MD     • benzonatate  100 mg Oral TID PRN Mitra Toure MD     • carvedilol  6 25 mg Oral BID With Buddy Davis MD     • enoxaparin  40 mg Subcutaneous Daily Mitra Toure MD     • ezetimibe  10 mg Oral HS Mitra Toure MD     • gabapentin  100 mg Oral BID Mitra Toure MD     • hydrALAZINE  5 mg Intravenous Q6H PRN Mitra Toure MD     • insulin glargine  5 Units Subcutaneous QAM Mitra Toure MD     • insulin lispro  1-5 Units Subcutaneous TID aCrly Jean MD     • insulin lispro  1-5 Units Subcutaneous HS Mitra Toure MD     • [START ON 12/10/2022] losartan  50 mg Oral Daily MOIRA Reyes     • memantine  10 mg Oral BID Mitra Toure MD     • mirtazapine  15 mg Oral HS Mitra Toure MD     • ondansetron  4 mg Intravenous Q6H PRN Mitra Toure MD     • pantoprazole  40 mg Oral Early Morning Mitra Toure MD     • sodium chloride  50 mL/hr Intravenous Continuous Vista Persons, CRNP 50 mL/hr (12/09/22 1520)        Today, Patient Was Seen By: Kaela Hamm DO

## 2022-12-09 NOTE — PHYSICAL THERAPY NOTE
12/09/22 1425   PT Last Visit   PT Visit Date 12/09/22   Note Type   Note Type Cancelled Session   Cancel Reasons Other  (pt for cardiac cath today)     Physical Therapy Cancellation Note    Chart review performed  At this time, PT treatment session cancelled , patient for cardiac cath today  PT will follow and provide PT interventions as appropriate      Nano Bennett, PTA

## 2022-12-09 NOTE — DISCHARGE INSTRUCTIONS
1  Please see the post cardiac catheterization dishcarge instructions  No heavy lifting, greater than 10 lbs  or strenuous  activity for 48 hrs  2 Remove band aid tomorrow  Shower and wash area- wrist gently with soap and water- beginning tomorrow  Rinse and pat dry  Apply new water seal band aid  Repeat this process for 5 days  No powders, creams lotions or antibiotic ointments  for 5 days  No tub baths, hot tubs or swimming for 5 days  3  Please call our office ([phone]) if you have any fever, redness, swelling, discharge from your wrist access site      4 No driving for 1 day

## 2022-12-09 NOTE — ASSESSMENT & PLAN NOTE
Presented with chest pain or shortness of breath which started today  Blood pressure was significantly elevated on arrival at 209/103  CXR concerning for vascular congestion  She additionally has been without her CPAP for about 2 weeks after moving to the area  Suspect flash pulmonary edema in combination with BiPAP pulm therapy  S/p IV Lasix and weaned off BiPAP in ED to 4 L nasal cannula  · Lasix held due to heart cath  Will likely restart tomorrow     · Monitor I&O's  · Continuous pulse oximetry  · Wean O2 as able  · IV hydralazine as needed  · Continue CPAP therapy at night

## 2022-12-09 NOTE — PROGRESS NOTES
Cardiology Progress Note   MD Rogelio Barajas MD Enrico Lango, DO, MD Duglas Anna DO, Romina Gilbert DO, [facility] [place]  ----------------------------------------------------------------  [address] [facility] 69 [facility]  [facility] MRN: [id number]  Unit/Bed#: E4 -01 Encounter: [id number]      ASSESSMENT:   · Acute combined systolic and diastolic heart failure  · LVEF 88%, grade 3 diastolic dysfunction with elevated LAP, mild LA dilatation, AV sclerosis, trace AR, mild MR, MV sclerosis, mild TR, December 2022  · Acute hypoxic respiratory failure  · Precordial chest pain  · CAD  · s/p cath w/ luminal irregularities of RCA, December 2022  · Hypertension  · Dyslipidemia  · Type 2 diabetes mellitus  · RONAK  · GERD  · Chemotherapy-induced peripheral neuropathy  · Dementia    PLAN:  Patient is clinically euvolemic  LVEDP was normal on catheterization  Transition to Lasix 20 mg daily tomorrow if renal function is stable  Strict I/Os and daily weights  Continue aspirin, high-intensity statin, carvedilol and losartan  Consider further uptitration of neurohormonal blockers in the next 24 hours prior to discharge if BP allows  Continue Zetia  Likely reasonable for discharge tomorrow from CV perspective with outpatient follow-up within 2 weeks  BMP within 1 week of discharge    Signed: Elfego Velazco DO, FACC, BARBARA, FACP      History of Present Illness:  Patient seen and examined  Denies chest pain, pressure, tightness or squeezing  Denies lightheadedness, dizziness or palpitations  Denies lower extremity swelling, orthopnea or paroxysmal nocturnal dyspnea  Review of Systems:  Review of Systems   Constitutional: Negative for decreased appetite, fever, weight gain and weight loss  HENT: Negative for congestion and sore throat  Eyes: Negative for visual disturbance     Cardiovascular: Negative for chest pain, dyspnea on exertion, leg swelling, near-syncope and palpitations  Respiratory: Negative for cough and shortness of breath  Hematologic/Lymphatic: Negative for bleeding problem  Skin: Negative for rash  Musculoskeletal: Negative for myalgias and neck pain  Gastrointestinal: Negative for abdominal pain and nausea  Neurological: Negative for light-headedness and weakness  Psychiatric/Behavioral: Negative for depression  No Known Allergies    No current facility-administered medications on file prior to encounter       Current Outpatient Medications on File Prior to Encounter   Medication Sig   • Accu-Chek FastClix Lancets MISC Use to test 3x daily   • albuterol (PROVENTIL HFA,VENTOLIN HFA) 90 mcg/act inhaler INHALAR 1-2 BOCANADAS A LOS PULMONES CADA 4-6 HORAS ALFREDO SEA NECESARIO   • ALPRAZolam (XANAX) 0 25 mg tablet Take 1 tablet (0 25 mg total) by mouth daily at bedtime   • amLODIPine (NORVASC) 5 mg tablet Take 1 tablet (5 mg total) by mouth daily   • Blood Glucose Monitoring Suppl (OneTouch Verio) w/Device KIT Use as directed to check blood sugar DX E11 9   • Calcium Carb-Cholecalciferol 600-400 MG-UNIT TABS Take 1 tablet by mouth 2 (two) times a day   • carvedilol (COREG) 12 5 mg tablet Take 0 5 tablets (6 25 mg total) by mouth 2 (two) times a day with meals   • cyanocobalamin (VITAMIN B-12) 500 MCG tablet Take 1,000 mcg by mouth daily   • Diclofenac Sodium (VOLTAREN) 1 % APLICAR 2 GRAMOS POR VIA TOPICAL CUATRO VECES AL JEFFREY   • diphenhydrAMINE (BENADRYL) 2 % cream Apply topically 3 (three) times a day as needed for itching   • ezetimibe (ZETIA) 10 mg tablet MAR JOHNNY (1) TABLETA POR VIA ORAL JOHNNY VEZ JEFFREY   • ferrous sulfate 325 (65 Fe) mg tablet Take 325 mg by mouth daily with breakfast   • fluticasone (FLONASE) 50 mcg/act nasal spray USAR 1 ROCIADA EN CADA FOSA NASAL DIARIAMENTE   • gabapentin (NEURONTIN) 100 mg capsule Take 1 capsule (100 mg total) by mouth 2 (two) times a day   • glucose blood (Accu-Chek Guide) test strip Use to test 3x daily   • hydrocortisone 2 5 % cream APLICAR POR VIA TOPICA CUATRO VECES AL JEFFREY ALFREDO SEA NECESARIO PARA EL ERUPCION   • Insulin Glargine Solostar (Lantus SoloStar) 100 UNIT/ML SOPN Use 9 units subcut once dialy   • Insulin Pen Needle (BD Pen Needle Micro U/F) 32G X 6 MM MISC Use daily   • loperamide (IMODIUM) 2 mg capsule MAR JOHNNY (1) CAPSULA POR VIA ORAL ALFREDO SEA NECESARIO PARA LA DIARREA   • losartan (COZAAR) 50 mg tablet TAKE 1 TABLET(50 MG) BY MOUTH DAILY   • meloxicam (MOBIC) 7 5 mg tablet AMR JOHNNY (1)TABLETA POR VIA ORAL DIARIA CON EL DESAYUNO PARA LA ARTHRITIS   NO TOME CON IBUPROFEN   • memantine (NAMENDA) 10 mg tablet MAR 1 TABLETA POR VIA ORAL DOS VECES AL JEFFREY   • metFORMIN (GLUCOPHAGE) 1000 MG tablet MAR JOHNNY (1) TABLETA POR VIA ORAL DOS VECES AL JEFFREY CON COMIDAS   • mirtazapine (REMERON) 15 mg tablet MAR JOHNNY (1) TABLETA POR VIA ORAL CADA NOCHE A LA HORA DE ACOSTARSE   • omeprazole (PriLOSEC) 40 MG capsule MAR 1 CAPSULA POR VIA ORAL JOHNNY VEZ AL JEFFREY   • Tradjenta 5 MG TABS MAR JOHNNY TABLETA POR VIA ORAL JOHNNY VEZ AL JEFFREY        Current Facility-Administered Medications   Medication Dose Route Frequency Provider Last Rate   • acetaminophen  650 mg Oral Q6H PRN Nam Jimenez MD     • albuterol  1 puff Inhalation Q4H PRN Nam Jimenez MD     • ALPRAZolam  0 25 mg Oral HS Nam Jimenez MD     • aspirin  81 mg Oral Daily Eliana Sanders MD     • atorvastatin  40 mg Oral Daily With Melinda Oconnell MD     • benzonatate  100 mg Oral TID PRN Nam Jimenez MD     • carvedilol  6 25 mg Oral BID With Karina Macias MD     • enoxaparin  40 mg Subcutaneous Daily Nam Jimenez MD     • ezetimibe  10 mg Oral HS Nam Jimenez MD     • gabapentin  100 mg Oral BID Nam Jimenez MD     • hydrALAZINE  5 mg Intravenous Q6H PRN Nam Jimenez MD     • insulin glargine  5 Units Subcutaneous QAM Solomon Willams MD     • insulin lispro  1-5 Units Subcutaneous TID AC Solomon Willams MD     • insulin lispro  1-5 Units Subcutaneous HS Solomon Willams MD     • [START ON 12/10/2022] losartan  50 mg Oral Daily MOIRA Reyes     • memantine  10 mg Oral BID Solomon Willams MD     • mirtazapine  15 mg Oral HS Solomon Willams MD     • ondansetron  4 mg Intravenous Q6H PRN Solomon Willams MD     • pantoprazole  40 mg Oral Early Morning Solomon Willams MD     • sodium chloride  50 mL/hr Intravenous Continuous MOIRA Alexander 50 mL/hr (12/09/22 1520)       sodium chloride, 50 mL/hr, Last Rate: 50 mL/hr (12/09/22 1520)        Vitals:    12/09/22 1430 12/09/22 1445 12/09/22 1500 12/09/22 1530   BP: 99/63 96/62 95/58 107/54   BP Location:    Left arm   Pulse: 69 70 69 71   Resp: 18 18 18    Temp:       TempSrc:       SpO2:       Weight:       Height:         Body mass index is 28 55 kg/m²  Intake/Output Summary (Last 24 hours) at 12/9/2022 1601  Last data filed at 12/9/2022 1345  Gross per 24 hour   Intake 240 ml   Output 805 ml   Net -565 ml       Weight change: 3 668 kg (8 lb 1 4 oz)    PHYSICAL EXAMINATION:  Gen: Awake, Alert, NAD  Head/eyes: AT/NC, pupils equal and round, Anicteric  ENT: mmm  Neck: Supple, No elevated JVP, trachea midline  Resp: CTA bilaterally no w/r/r  CV: RRR +S1, S2, No m/r/g  Abd: Soft, NT/ND + BS  Ext: no LE edema bilaterally; r radial site c/d/i  Neuro:  Follows commands, moves all extermities  Psych: Appropriate affect, normal mood, pleasant attitude, non-combative  Skin: warm; no rash, erythema or venous stasis changes on exposed skin    Lab Results:  Results from last 7 days   Lab Units 12/09/22  0513   WBC Thousand/uL 9 44   HEMOGLOBIN g/dL 12 7   HEMATOCRIT % 39 0   PLATELETS Thousands/uL 298     Results from last 7 days   Lab Units 12/09/22  0513 12/08/22  0508 12/07/22  0018   POTASSIUM mmol/L 4 3   < > 4 1   CHLORIDE mmol/L 93*   < > 96   CO2 mmol/L 30   < > 30   BUN mg/dL 22   < > 14   CREATININE mg/dL 0 84   < > 1 07   CALCIUM mg/dL 8 9   < > 8 9   ALK PHOS U/L  --   --  113   ALT U/L  --   --  34   AST U/L  --   --  21    < > = values in this interval not displayed  No results found for: TROPONINT      Results from last 7 days   Lab Units 12/07/22  1116 12/07/22  0813 12/07/22  0610   HS TNI 0HR ng/L  --   --  106*   HS TNI 2HR ng/L  --  79*  --    HS TNI 4HR ng/L 57*  --   --      Results from last 7 days   Lab Units 12/07/22  0018   INR  0 95       Tele: SR    This note was completed in part utilizing [employer] Direct Software  Grammatical errors, random word insertions, spelling mistakes, and incomplete sentences may be an occasional consequence of this system secondary to software limitations, ambient noise, and hardware issues  If you have any questions or concerns about the content, text, or information contained within the body of this dictation, please contact the provider for clarification

## 2022-12-09 NOTE — CASE MANAGEMENT
Case Management Discharge Planning Note    Patient name [facility]  Location [facility] 68 2 /[address] 2 Kayli Adan* MRN [id number]  : 1945 Date 2022       Current Admission Date: 2022  Current Admission Diagnosis:Acute respiratory failure with hypoxia [facility])   Patient Active Problem List    Diagnosis Date Noted   • Acute respiratory failure with hypoxia ([address] ) 2022   • Chest pain 2022   • Chest congestion 05/10/2022   • Hyperlipidemia    • Gait instability 2021   • Polypharmacy 2021   • Frequent falls 2021   • Dementia with behavioral disturbance 2021   • Counseling regarding advanced care planning and goals of care 2021   • Near syncope 2021   • Palpitations 2021   • Grade I diastolic dysfunction without heart failure 2021   • Current use of insulin ([address] ) 2021   • Encounter for central line care 2020   • Chemotherapy-induced peripheral neuropathy ([address] ) 10/13/2020   • Obstructive sleep apnea    • Arthritis 2020   • Allergic rhinitis 10/08/2019   • Status post chemotherapy 2019   • Current mild episode of major depressive disorder without prior episode ([address] ) 2019   • Other insomnia 2019   • Palliative care patient 2019   • Nausea 2018   • Tremor 2018   • Anxiety 2018   • Hyponatremia 2018   • Diffuse large 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 ([address] ) 2018   • Oropharyngeal dysphagia 2018   • Type 2 diabetes mellitus without complication, without long-term current use of insulin ([address] ) 10/31/2018   • Essential hypertension 10/31/2018   • Gastroesophageal reflux disease 10/31/2018      LOS (days): 2  Geometric Mean LOS (GMLOS) (days): 3 50  Days to GMLOS:1 2     OBJECTIVE:  Risk of Unplanned Readmission Score: 14 58         Current admission status: Inpatient   Preferred Pharmacy:   [facility], [address]   [address] [facility] [address] [facility] [address]  Phone: [phone] Fax: [phone]    Primary Care Provider: MOIRA Ramsey    Primary Insurance: [facility] CARE [address] [employer]  Secondary Insurance: [address] DETAILS:  Lauri 54 requested[de-identified] Nursing, Occupational Therapy, Physical Therapy, [address] Name[de-identified] Other ([facility])  [address] Provider[de-identified] PCP  Home Health Services Needed[de-identified] Evaluate Functional Status and Safety, Strengthening/Theraputic Exercises to Improve Function, Urinary Incontinence Catheter Management, Diabetes Management, Wound/Ostomy Care  Homebound Criteria Met[de-identified] Requires the Assistance of Another Person for Safe Ambulation or to Leave the Home  Supporting Clincal Findings[de-identified] Cognitive Deficit Requiring the Assistance of Others, Fatigues Easliy in [employer], Limited Endurance      Treatment Team Recommendation: Home with [employer]  Discharge Destination Plan[de-identified] Home with [employer]     Patient was on service with [address] [facility] says it is permanently closed  The faxed referral was not received to their company  Per Roberto Carlos, patient is on their services at the [facility] office and they can resume care when patient is discharged

## 2022-12-09 NOTE — PLAN OF CARE
Problem: Potential for Falls  Goal: Patient will remain free of falls  Description: INTERVENTIONS:  - Educate patient/family on patient safety including physical limitations  - Instruct patient to call for assistance with activity   - Consult OT/PT to assist with strengthening/mobility   - Keep Call bell within reach  - Keep bed low and locked with side rails adjusted as appropriate  - Keep care items and personal belongings within reach  - Initiate and maintain comfort rounds  - Make Fall Risk Sign visible to staff  - Offer Toileting every 2 Hours, in advance of need  - Initiate/Maintain bed alarm  - Apply yellow socks and bracelet for high fall risk patients  - Consider moving patient to room near nurses station  Outcome: Progressing     Problem: MOBILITY - ADULT  Goal: Maintain or return to baseline ADL function  Description: INTERVENTIONS:  -  Assess patient's ability to carry out ADLs; assess patient's baseline for ADL function and identify physical deficits which impact ability to perform ADLs (bathing, care of mouth/teeth, toileting, grooming, dressing, etc )  - Assess/evaluate cause of self-care deficits   - Assess range of motion  - Assess patient's mobility; develop plan if impaired  - Assess patient's need for assistive devices and provide as appropriate  - Encourage maximum independence but intervene and supervise when necessary  - Involve family in performance of ADLs  - Assess for home care needs following discharge   - Consider OT consult to assist with ADL evaluation and planning for discharge  - Provide patient education as appropriate  Outcome: Progressing  Goal: Maintains/Returns to pre admission functional level  Description: INTERVENTIONS:  - Perform BMAT or MOVE assessment daily    - Set and communicate daily mobility goal to care team and patient/family/caregiver     - Collaborate with rehabilitation services on mobility goals if consulted  - Stand patient 3 times a day  - Ambulate patient 3 times a day  - Out of bed to chair 3 times a day   - Out of bed for meals 3 times a day  - Out of bed for toileting  - Record patient progress and toleration of activity level   Outcome: Progressing     Problem: INFECTION - ADULT  Goal: Absence or prevention of progression during hospitalization  Description: INTERVENTIONS:  - Assess and monitor for signs and symptoms of infection  - Monitor lab/diagnostic results  - Monitor all insertion sites, i e  indwelling lines, tubes, and drains  - Monitor endotracheal if appropriate and nasal secretions for changes in amount and color  - [place] appropriate cooling/warming therapies per order  - Administer medications as ordered  - Instruct and encourage patient and family to use good hand hygiene technique  - Identify and instruct in appropriate isolation precautions for identified infection/condition  Outcome: Progressing     Problem: DISCHARGE PLANNING  Goal: Discharge to home or other facility with appropriate resources  Description: INTERVENTIONS:  - Identify barriers to discharge w/patient and caregiver  - Arrange for needed discharge resources and transportation as appropriate  - Identify discharge learning needs (meds, wound care, etc )  - Arrange for interpretive services to assist at discharge as needed  - Refer to Case Management Department for coordinating discharge planning if the patient needs post-hospital services based on physician/advanced practitioner order or complex needs related to functional status, cognitive ability, or social support system  Outcome: Progressing     Problem: Knowledge Deficit  Goal: Patient/family/caregiver demonstrates understanding of disease process, treatment plan, medications, and discharge instructions  Description: Complete learning assessment and assess knowledge base    Interventions:  - Provide teaching at level of understanding  - Provide teaching via preferred learning methods  Outcome: Progressing     Problem: RESPIRATORY - ADULT  Goal: Achieves optimal ventilation and oxygenation  Description: INTERVENTIONS:  - Assess for changes in respiratory status  - Assess for changes in mentation and behavior  - Position to facilitate oxygenation and minimize respiratory effort  - Oxygen administered by appropriate delivery if ordered  - Initiate smoking cessation education as indicated  - Encourage broncho-pulmonary hygiene including cough, deep breathe, Incentive Spirometry  - Assess the need for suctioning and aspirate as needed  - Assess and instruct to report SOB or any respiratory difficulty  - Respiratory Therapy support as indicated  Outcome: Progressing     Problem: METABOLIC, FLUID AND ELECTROLYTES - ADULT  Goal: Glucose maintained within target range  Description: INTERVENTIONS:  - Monitor Blood Glucose as ordered  - Assess for signs and symptoms of hyperglycemia and hypoglycemia  - Administer ordered medications to maintain glucose within target range  - Assess nutritional intake and initiate nutrition service referral as needed  Outcome: Progressing     Problem: SKIN/TISSUE INTEGRITY - ADULT  Goal: Skin Integrity remains intact(Skin Breakdown Prevention)  Description: Assess:  -Perform Deven assessment twice daily  -Clean and moisturize skin every day  -Inspect skin when repositioning, toileting, and assisting with ADLS  -Assess extremities for adequate circulation and sensation     Bed Management:  -Have minimal linens on bed & keep smooth, unwrinkled  -Change linens as needed when moist or perspiring      Toileting:  -Offer bedside commode      Activity:  -Mobilize patient 3 times a day  -Encourage activity and walks on unit  -Encourage or provide ROM exercises       Skin Care:  -Avoid use of baby powder, tape, friction and shearing, hot water or constrictive clothing      Outcome: Progressing

## 2022-12-09 NOTE — UTILIZATION REVIEW
NOTIFICATION OF INPATIENT ADMISSION   AUTHORIZATION REQUEST   SERVICING FACILITY:   [address] [place] [address], [facility], [address]   Tax ID: [id number]  NPI: [id number] ATTENDING PROVIDER:  Attending Name and NPI#: Jocelyn Pineda [[id number]]  Address: [address] [place] [address] [address] [facility], [address]   Phone: [phone]     ADMISSION INFORMATION:  Place of Service: Inpatient [address] [facility]  [address]  Place of Service Code: 21  Inpatient Admission Date/Time: 12/7/22  2:11 AM  Discharge Date/Time: No discharge date for patient encounter  Admitting Diagnosis Code/Description:  Unstable angina (HCC) [I20 0]  CHF (congestive heart failure) (HCC) [I50 9]  SOB (shortness of breath) [R06 02]  Flash pulmonary edema ([address] ) [J81 0]     UTILIZATION REVIEW CONTACT:  Author Hardin, Utilization   Network Utilization Review Department  Phone: [phone]  Fax: [phone]  Email: [email or web address]  Contact for approvals/pending authorizations, clinical reviews, and discharge  PHYSICIAN ADVISORY SERVICES:  Medical Necessity Denial & [id number]-[id number] Review  Phone: [phone]  Fax: [phone]  Email: [email or web address]

## 2022-12-09 NOTE — ASSESSMENT & PLAN NOTE
Lab Results   Component Value Date    HGBA1C 6 9 (H) 12/03/2022       Recent Labs     12/08/22  1608 12/08/22  2106 12/09/22  0718 12/09/22  1156   POCGLU 194* 205* 157* 172*       Blood Sugar Average: Last 72 hrs:  (P) 180Current regimen includes glargine 9 units daily, metformin  · Hold metformin while inpatient  · Continue lantus of 5 units  Will monitor and adjust as needed     · Sliding-scale insulin  · Hypoglycemia protocol

## 2022-12-09 NOTE — PLAN OF CARE
Problem: Potential for Falls  Goal: Patient will remain free of falls  Description: INTERVENTIONS:  - Educate patient/family on patient safety including physical limitations  - Instruct patient to call for assistance with activity   - Consult OT/PT to assist with strengthening/mobility   - Keep Call bell within reach  - Keep bed low and locked with side rails adjusted as appropriate  - Keep care items and personal belongings within reach  - Initiate and maintain comfort rounds  - Make Fall Risk Sign visible to staff  - Offer Toileting every 2 Hours, in advance of need  - Initiate/Maintain bed alarm  - Apply yellow socks and bracelet for high fall risk patients  - Consider moving patient to room near nurses station  Outcome: Progressing     Problem: MOBILITY - ADULT  Goal: Maintain or return to baseline ADL function  Description: INTERVENTIONS:  -  Assess patient's ability to carry out ADLs; assess patient's baseline for ADL function and identify physical deficits which impact ability to perform ADLs (bathing, care of mouth/teeth, toileting, grooming, dressing, etc )  - Assess/evaluate cause of self-care deficits   - Assess range of motion  - Assess patient's mobility; develop plan if impaired  - Assess patient's need for assistive devices and provide as appropriate  - Encourage maximum independence but intervene and supervise when necessary  - Involve family in performance of ADLs  - Assess for home care needs following discharge   - Consider OT consult to assist with ADL evaluation and planning for discharge  - Provide patient education as appropriate  Outcome: Progressing  Goal: Maintains/Returns to pre admission functional level  Description: INTERVENTIONS:  - Perform BMAT or MOVE assessment daily    - Set and communicate daily mobility goal to care team and patient/family/caregiver     - Collaborate with rehabilitation services on mobility goals if consulted  - Stand patient 3 times a day  - Ambulate patient 3 times a day  - Out of bed to chair 3 times a day   - Out of bed for meals 3 times a day  - Out of bed for toileting  - Record patient progress and toleration of activity level   Outcome: Progressing     Problem: INFECTION - ADULT  Goal: Absence or prevention of progression during hospitalization  Description: INTERVENTIONS:  - Assess and monitor for signs and symptoms of infection  - Monitor lab/diagnostic results  - Monitor all insertion sites, i e  indwelling lines, tubes, and drains  - Monitor endotracheal if appropriate and nasal secretions for changes in amount and color  - [place] appropriate cooling/warming therapies per order  - Administer medications as ordered  - Instruct and encourage patient and family to use good hand hygiene technique  - Identify and instruct in appropriate isolation precautions for identified infection/condition  Outcome: Progressing     Problem: DISCHARGE PLANNING  Goal: Discharge to home or other facility with appropriate resources  Description: INTERVENTIONS:  - Identify barriers to discharge w/patient and caregiver  - Arrange for needed discharge resources and transportation as appropriate  - Identify discharge learning needs (meds, wound care, etc )  - Arrange for interpretive services to assist at discharge as needed  - Refer to Case Management Department for coordinating discharge planning if the patient needs post-hospital services based on physician/advanced practitioner order or complex needs related to functional status, cognitive ability, or social support system  Outcome: Progressing     Problem: Knowledge Deficit  Goal: Patient/family/caregiver demonstrates understanding of disease process, treatment plan, medications, and discharge instructions  Description: Complete learning assessment and assess knowledge base    Interventions:  - Provide teaching at level of understanding  - Provide teaching via preferred learning methods  Outcome: Progressing     Problem: RESPIRATORY - ADULT  Goal: Achieves optimal ventilation and oxygenation  Description: INTERVENTIONS:  - Assess for changes in respiratory status  - Assess for changes in mentation and behavior  - Position to facilitate oxygenation and minimize respiratory effort  - Oxygen administered by appropriate delivery if ordered  - Initiate smoking cessation education as indicated  - Encourage broncho-pulmonary hygiene including cough, deep breathe, Incentive Spirometry  - Assess the need for suctioning and aspirate as needed  - Assess and instruct to report SOB or any respiratory difficulty  - Respiratory Therapy support as indicated  Outcome: Progressing     Problem: METABOLIC, FLUID AND ELECTROLYTES - ADULT  Goal: Glucose maintained within target range  Description: INTERVENTIONS:  - Monitor Blood Glucose as ordered  - Assess for signs and symptoms of hyperglycemia and hypoglycemia  - Administer ordered medications to maintain glucose within target range  - Assess nutritional intake and initiate nutrition service referral as needed  Outcome: Progressing     Problem: SKIN/TISSUE INTEGRITY - ADULT  Goal: Skin Integrity remains intact(Skin Breakdown Prevention)  Description: Assess:  -Perform Deven assessment twice daily  -Clean and moisturize skin every day  -Inspect skin when repositioning, toileting, and assisting with ADLS  -Assess extremities for adequate circulation and sensation     Bed Management:  -Have minimal linens on bed & keep smooth, unwrinkled  -Change linens as needed when moist or perspiring      Toileting:  -Offer bedside commode      Activity:  -Mobilize patient 3 times a day  -Encourage activity and walks on unit  -Encourage or provide ROM exercises       Skin Care:  -Avoid use of baby powder, tape, friction and shearing, hot water or constrictive clothing      Outcome: Progressing

## 2022-12-09 NOTE — ASSESSMENT & PLAN NOTE
Reports onset of generalized chest pain across chest earlier today  She would describe this as a burning chest pain  Initial troponin 12  EKG without ST elevation  · Appreciate cardiology recommendations  · Echo with new cardiomyopathy and changes  · S/p heart cath: no cad   Likely due to chemotherapy   · Continue asa, statin, and beta blocker

## 2022-12-09 NOTE — NURSING NOTE
Patient taken for Cardiac Cath  RN attempted to call report at [phone] [id number] but no response  Will attempt to call at later time      Odalys Moreno 12/9/2022 1:17 PM

## 2022-12-09 NOTE — PROGRESS NOTES
Transferred by stretcher with nurse to C552  Received by Stacy Singh RN and care assumed  OP site assessed and remains stable  Safety addressed before leaving bedside

## 2022-12-10 VITALS
HEIGHT: 62 IN | WEIGHT: 156.09 LBS | TEMPERATURE: 97.3 F | RESPIRATION RATE: 18 BRPM | DIASTOLIC BLOOD PRESSURE: 56 MMHG | BODY MASS INDEX: 28.72 KG/M2 | OXYGEN SATURATION: 94 % | SYSTOLIC BLOOD PRESSURE: 115 MMHG | HEART RATE: 74 BPM

## 2022-12-10 DIAGNOSIS — Z79.4 CURRENT USE OF INSULIN (HCC): ICD-10-CM

## 2022-12-10 DIAGNOSIS — E11.9 TYPE 2 DIABETES MELLITUS WITHOUT COMPLICATION, WITHOUT LONG-TERM CURRENT USE OF INSULIN (HCC): ICD-10-CM

## 2022-12-10 PROBLEM — J96.01 ACUTE RESPIRATORY FAILURE WITH HYPOXIA (HCC): Status: RESOLVED | Noted: 2022-12-07 | Resolved: 2022-12-10

## 2022-12-10 LAB
ANION GAP SERPL CALCULATED.3IONS-SCNC: 8 MMOL/L (ref 4–13)
BUN SERPL-MCNC: 22 MG/DL (ref 5–25)
CALCIUM SERPL-MCNC: 8.6 MG/DL (ref 8.3–10.1)
CHLORIDE SERPL-SCNC: 94 MMOL/L (ref 96–108)
CO2 SERPL-SCNC: 29 MMOL/L (ref 21–32)
CREAT SERPL-MCNC: 0.81 MG/DL (ref 0.6–1.3)
GFR SERPL CREATININE-BSD FRML MDRD: 70 ML/MIN/1.73SQ M
GLUCOSE SERPL-MCNC: 182 MG/DL (ref 65–140)
GLUCOSE SERPL-MCNC: 292 MG/DL (ref 65–140)
GLUCOSE SERPL-MCNC: 383 MG/DL (ref 65–140)
POTASSIUM SERPL-SCNC: 4.5 MMOL/L (ref 3.5–5.3)
SODIUM SERPL-SCNC: 131 MMOL/L (ref 135–147)

## 2022-12-10 RX ORDER — FUROSEMIDE 20 MG/1
20 TABLET ORAL DAILY
Qty: 30 TABLET | Refills: 0 | Status: SHIPPED | OUTPATIENT
Start: 2022-12-11 | End: 2022-12-10 | Stop reason: SDUPTHER

## 2022-12-10 RX ORDER — FUROSEMIDE 20 MG/1
20 TABLET ORAL DAILY
Status: DISCONTINUED | OUTPATIENT
Start: 2022-12-10 | End: 2022-12-10 | Stop reason: HOSPADM

## 2022-12-10 RX ORDER — ATORVASTATIN CALCIUM 40 MG/1
40 TABLET, FILM COATED ORAL
Qty: 30 TABLET | Refills: 0 | Status: SHIPPED | OUTPATIENT
Start: 2022-12-10 | End: 2023-01-09

## 2022-12-10 RX ORDER — ATORVASTATIN CALCIUM 40 MG/1
40 TABLET, FILM COATED ORAL
Qty: 30 TABLET | Refills: 0 | Status: SHIPPED | OUTPATIENT
Start: 2022-12-10 | End: 2022-12-10 | Stop reason: SDUPTHER

## 2022-12-10 RX ORDER — LOSARTAN POTASSIUM 25 MG/1
25 TABLET ORAL DAILY
Qty: 30 TABLET | Refills: 0 | Status: SHIPPED | OUTPATIENT
Start: 2022-12-11 | End: 2022-12-10 | Stop reason: SDUPTHER

## 2022-12-10 RX ORDER — ASPIRIN 81 MG/1
81 TABLET, CHEWABLE ORAL DAILY
Qty: 30 TABLET | Refills: 0 | Status: SHIPPED | OUTPATIENT
Start: 2022-12-11 | End: 2023-01-10

## 2022-12-10 RX ORDER — LOSARTAN POTASSIUM 25 MG/1
25 TABLET ORAL DAILY
Qty: 30 TABLET | Refills: 0 | Status: SHIPPED | OUTPATIENT
Start: 2022-12-11 | End: 2023-01-10

## 2022-12-10 RX ORDER — ASPIRIN 81 MG/1
81 TABLET, CHEWABLE ORAL DAILY
Qty: 30 TABLET | Refills: 0 | Status: SHIPPED | OUTPATIENT
Start: 2022-12-11 | End: 2022-12-10 | Stop reason: SDUPTHER

## 2022-12-10 RX ORDER — LOSARTAN POTASSIUM 25 MG/1
25 TABLET ORAL DAILY
Status: DISCONTINUED | OUTPATIENT
Start: 2022-12-11 | End: 2022-12-10 | Stop reason: HOSPADM

## 2022-12-10 RX ORDER — FUROSEMIDE 20 MG/1
20 TABLET ORAL DAILY
Qty: 30 TABLET | Refills: 0 | Status: SHIPPED | OUTPATIENT
Start: 2022-12-11 | End: 2023-01-10

## 2022-12-10 RX ADMIN — MEMANTINE 10 MG: 5 TABLET ORAL at 08:32

## 2022-12-10 RX ADMIN — PANTOPRAZOLE SODIUM 40 MG: 40 TABLET, DELAYED RELEASE ORAL at 05:18

## 2022-12-10 RX ADMIN — ASPIRIN 81 MG CHEWABLE TABLET 81 MG: 81 TABLET CHEWABLE at 08:33

## 2022-12-10 RX ADMIN — INSULIN GLARGINE 5 UNITS: 100 INJECTION, SOLUTION SUBCUTANEOUS at 08:33

## 2022-12-10 RX ADMIN — CARVEDILOL 6.25 MG: 6.25 TABLET, FILM COATED ORAL at 08:32

## 2022-12-10 RX ADMIN — GABAPENTIN 100 MG: 100 CAPSULE ORAL at 08:32

## 2022-12-10 RX ADMIN — INSULIN LISPRO 2 UNITS: 100 INJECTION, SOLUTION INTRAVENOUS; SUBCUTANEOUS at 08:41

## 2022-12-10 RX ADMIN — ENOXAPARIN SODIUM 40 MG: 40 INJECTION SUBCUTANEOUS at 08:32

## 2022-12-10 RX ADMIN — INSULIN LISPRO 4 UNITS: 100 INJECTION, SOLUTION INTRAVENOUS; SUBCUTANEOUS at 12:19

## 2022-12-10 RX ADMIN — FUROSEMIDE 20 MG: 20 TABLET ORAL at 08:33

## 2022-12-10 NOTE — ASSESSMENT & PLAN NOTE
Presented with chest pain or shortness of breath which started today  Blood pressure was significantly elevated on arrival at 209/103  CXR concerning for vascular congestion  She additionally has been without her CPAP for about 2 weeks after moving to the area  Suspect flash pulmonary edema in combination with BiPAP pulm therapy  S/p IV Lasix and weaned off BiPAP in ED to 4 L nasal cannula  · Lasix held due to heart cath   Restart at 20mg daily   · Monitor I&O's  · Continuous pulse oximetry  · Wean O2 as able  · IV hydralazine as needed  · Continue CPAP therapy at night

## 2022-12-10 NOTE — DISCHARGE SUMMARY
Discharge Summary - [facility]    Patient Information: Keyana Garrett 69 y o  female MRN: [id number]  Unit/Bed#: E4 -01 Encounter: [id number]    Discharging Physician / Practitioner: Armando Dubois DO  PCP: Mariangel Ramsey  Admission Date: 12/7/2022  Discharge Date: 12/10/22    Disposition:     Home with VNA Services (Reminder: Complete face to face encounter)     Reason for Admission: acute respiratory failure with hypoxia     Discharge Diagnoses:     Principal Problem (Resolved):    Acute [facility] ([facility] )  Active Problems:    Type 2 diabetes mellitus without complication, without long-term current use of insulin ([facility])    Essential hypertension    Gastroesophageal reflux disease    Obstructive sleep apnea    Chemotherapy-induced peripheral neuropathy ([facility] )    Dementia with behavioral disturbance    Chest pain      Consultations During Hospital Stay:  · Cardiology     Procedures Performed:     · Heart cath: normal coronary arteries  Significant Findings / Test Results:   XR chest 1 view portable  Result Date: 12/7/2022  Impression: Borderline cardiomegaly Mild vascular congestion Trace right pleural effusion     XR chest pa & lateral  Result Date: 12/5/2022  Impression: No acute cardiopulmonary disease  Echo complete w/ contrast if indicated  Result Date: 12/7/2022  Narrative: Technically difficult but adequate transthoracic echocardiogram study due to lung interference and restricted mobility  1   Normal left ventricular cavity size, severely reduced left ventricular systolic function  Global hypokinesis with regional wall motion variability  Ejection fraction is estimated as around 33%  Markedly decreased global longitudinal strain, -7 2%  Grade 3 diastolic dysfunction, restrictive left ventricular filling pattern  2   Normal right ventricular size and systolic function  3 mild left atrial cavity enlargement   4   Aortic valve sclerosis with some focal calcification, no aortic valve stenosis  Trace aortic valve regurgitation  5   Mild mitral valve sclerosis, mild mitral valve regurgitation  6   Mild tricuspid valve regurgitation  7   No obvious pulmonary hypertension  8   No pericardial effusion  Compared to report of previous echocardiogram from 11/14/2018 left ventricular dysfunction, there is significant decrease in left ventricular function and ejection fraction is markedly decreased  Incidental Findings:   · none    Test Results Pending at Discharge (will require follow up):   · none     Outpatient Tests Requested:  Bmp in 1 week    Hospital Course:     Ant Bynum is a 69 y o  female patient who originally presented to the hospital on 12/7/2022 due to acute respiratory failure with hypoxia  She presented with chest pain and shortness of breath that started the day of admission  She was found to have an elevated bp of 209/103  Her cxr showed vascular congestion  She has also been approximately 2 weeks with out of her cpapa  She was treated with bipap and iv lasix  She diuresed well  Cardiology was consulted as pt had new cardiomyopathy with ef of 33%  She is s/p heart cath that showed normal coronary arteries  Her cardiomyopathy was likely due to chemotherapy in which she completed course  She will continue asa, statin, losartan which was decreased to 25mg, lasix of 20mg daily, and coreg  She was discharged to home with follow up with cardiology in 2 weeks  She will require repeat bmp in 1 week  She has vna and hhpt at discharged  Discharge Day Visit / Exam:     * Please refer to separate progress note for these details *    Discharge instructions/Information to patient and family:   See after visit summary for information provided to patient and family  Provisions for Follow-Up Care:  See after visit summary for information related to follow-up care and any pertinent home health orders        Discharge Statement:  I spent 33 minutes discharging the patient  This time was spent on the day of discharge  I had direct contact with the patient on the day of discharge  Greater than 50% of the total time was spent examining patient, answering all patient questions, arranging and discussing plan of care with patient as well as directly providing post-discharge instructions  Additional time then spent on discharge activities  Discharge Medications:  See after visit summary for reconciled discharge medications provided to patient and family

## 2022-12-10 NOTE — PLAN OF CARE
Problem: Potential for Falls  Goal: Patient will remain free of falls  Description: INTERVENTIONS:  - Educate patient/family on patient safety including physical limitations  - Instruct patient to call for assistance with activity   - Consult OT/PT to assist with strengthening/mobility   - Keep Call bell within reach  - Keep bed low and locked with side rails adjusted as appropriate  - Keep care items and personal belongings within reach  - Initiate and maintain comfort rounds  - Make Fall Risk Sign visible to staff  - Offer Toileting every 2 Hours, in advance of need  - Initiate/Maintain bed alarm  - Apply yellow socks and bracelet for high fall risk patients  - Consider moving patient to room near nurses station  Outcome: Progressing     Problem: MOBILITY - ADULT  Goal: Maintain or return to baseline ADL function  Description: INTERVENTIONS:  -  Assess patient's ability to carry out ADLs; assess patient's baseline for ADL function and identify physical deficits which impact ability to perform ADLs (bathing, care of mouth/teeth, toileting, grooming, dressing, etc )  - Assess/evaluate cause of self-care deficits   - Assess range of motion  - Assess patient's mobility; develop plan if impaired  - Assess patient's need for assistive devices and provide as appropriate  - Encourage maximum independence but intervene and supervise when necessary  - Involve family in performance of ADLs  - Assess for home care needs following discharge   - Consider OT consult to assist with ADL evaluation and planning for discharge  - Provide patient education as appropriate  Outcome: Progressing  Goal: Maintains/Returns to pre admission functional level  Description: INTERVENTIONS:  - Perform BMAT or MOVE assessment daily    - Set and communicate daily mobility goal to care team and patient/family/caregiver     - Collaborate with rehabilitation services on mobility goals if consulted  - Stand patient 3 times a day  - Ambulate patient 3 times a day  - Out of bed to chair 3 times a day   - Out of bed for meals 3 times a day  - Out of bed for toileting  - Record patient progress and toleration of activity level   Outcome: Progressing     Problem: INFECTION - ADULT  Goal: Absence or prevention of progression during hospitalization  Description: INTERVENTIONS:  - Assess and monitor for signs and symptoms of infection  - Monitor lab/diagnostic results  - Monitor all insertion sites, i e  indwelling lines, tubes, and drains  - Monitor endotracheal if appropriate and nasal secretions for changes in amount and color  - [place] appropriate cooling/warming therapies per order  - Administer medications as ordered  - Instruct and encourage patient and family to use good hand hygiene technique  - Identify and instruct in appropriate isolation precautions for identified infection/condition  Outcome: Progressing     Problem: DISCHARGE PLANNING  Goal: Discharge to home or other facility with appropriate resources  Description: INTERVENTIONS:  - Identify barriers to discharge w/patient and caregiver  - Arrange for needed discharge resources and transportation as appropriate  - Identify discharge learning needs (meds, wound care, etc )  - Arrange for interpretive services to assist at discharge as needed  - Refer to Case Management Department for coordinating discharge planning if the patient needs post-hospital services based on physician/advanced practitioner order or complex needs related to functional status, cognitive ability, or social support system  Outcome: Progressing     Problem: Knowledge Deficit  Goal: Patient/family/caregiver demonstrates understanding of disease process, treatment plan, medications, and discharge instructions  Description: Complete learning assessment and assess knowledge base    Interventions:  - Provide teaching at level of understanding  - Provide teaching via preferred learning methods  Outcome: Progressing     Problem: RESPIRATORY - ADULT  Goal: Achieves optimal ventilation and oxygenation  Description: INTERVENTIONS:  - Assess for changes in respiratory status  - Assess for changes in mentation and behavior  - Position to facilitate oxygenation and minimize respiratory effort  - Oxygen administered by appropriate delivery if ordered  - Initiate smoking cessation education as indicated  - Encourage broncho-pulmonary hygiene including cough, deep breathe, Incentive Spirometry  - Assess the need for suctioning and aspirate as needed  - Assess and instruct to report SOB or any respiratory difficulty  - Respiratory Therapy support as indicated  Outcome: Progressing     Problem: METABOLIC, FLUID AND ELECTROLYTES - ADULT  Goal: Glucose maintained within target range  Description: INTERVENTIONS:  - Monitor Blood Glucose as ordered  - Assess for signs and symptoms of hyperglycemia and hypoglycemia  - Administer ordered medications to maintain glucose within target range  - Assess nutritional intake and initiate nutrition service referral as needed  Outcome: Progressing     Problem: SKIN/TISSUE INTEGRITY - ADULT  Goal: Skin Integrity remains intact(Skin Breakdown Prevention)  Description: Assess:  -Perform Deven assessment twice daily  -Clean and moisturize skin every day  -Inspect skin when repositioning, toileting, and assisting with ADLS  -Assess extremities for adequate circulation and sensation     Bed Management:  -Have minimal linens on bed & keep smooth, unwrinkled  -Change linens as needed when moist or perspiring      Toileting:  -Offer bedside commode      Activity:  -Mobilize patient 3 times a day  -Encourage activity and walks on unit  -Encourage or provide ROM exercises       Skin Care:  -Avoid use of baby powder, tape, friction and shearing, hot water or constrictive clothing      Outcome: Progressing

## 2022-12-10 NOTE — PROGRESS NOTES
Cardiology Progress Note   MD Jaz Weeks MD Bynum Mass, DO, MD Xiomara Jacobsen DO, Callie Leigh DO, [facility] - [place]  ----------------------------------------------------------------  [address] [facility] 69 [facility]  [facility] MRN: [id number]  Unit/Bed#: E4 -01 Encounter: [id number]      ASSESSMENT:   · Acute combined systolic and diastolic heart failure  · LVEF 92%, grade 3 diastolic dysfunction with elevated LAP, mild LA dilatation, AV sclerosis, trace AR, mild MR, MV sclerosis, mild TR, December 2022  · Acute hypoxic respiratory failure  · Precordial chest pain  · CAD  · s/p cath w/ luminal irregularities of RCA, December 2022  · Hypertension  · Dyslipidemia  · Type 2 diabetes mellitus  · RONAK  · GERD  · Chemotherapy-induced peripheral neuropathy  · Dementia    PLAN:  Patient clinically remains euvolemic  LVEDP on catheterization yesterday was normal  Start Lasix 20 mg daily  Repeat BMP in 1 week following discharge to reassess renal function and potassium on oral diuretic  Strict I/Os and daily weights  Continue aspirin, high-intensity statin,, Zetia carvedilol and losartan  Blood pressure is borderline; not further up titrate losartan and carvedilol at this time  No further inpatient CV testing  Outpatient follow-up within next 2 weeks for additional CV testing as clinically indicated after discharge    Signed: Gracie Parker DO, FACNIVIA, BARBARA, DORI      History of Present Illness:  Patient seen and examined  Sitting up in bed resting comfortably  Denies chest pain, pressure, tightness or squeezing  Denies lightheadedness, dizziness or palpitations  Denies lower extremity swelling, orthopnea or paroxysmal nocturnal dyspnea  Review of Systems:  Review of Systems   Constitutional: Negative for decreased appetite, fever, weight gain and weight loss     HENT: Negative for congestion and sore throat  Eyes: Negative for visual disturbance  Cardiovascular: Negative for chest pain, dyspnea on exertion, leg swelling, near-syncope and palpitations  Respiratory: Negative for cough and shortness of breath  Hematologic/Lymphatic: Negative for bleeding problem  Skin: Negative for rash  Musculoskeletal: Negative for myalgias and neck pain  Gastrointestinal: Negative for abdominal pain and nausea  Neurological: Negative for light-headedness and weakness  Psychiatric/Behavioral: Negative for depression  No Known Allergies    No current facility-administered medications on file prior to encounter       Current Outpatient Medications on File Prior to Encounter   Medication Sig   • Accu-Chek FastClix Lancets MISC Use to test 3x daily   • albuterol (PROVENTIL HFA,VENTOLIN HFA) 90 mcg/act inhaler INHALAR 1-2 BOCANADAS A LOS PULMONES CADA 4-6 HORAS ALFREDO SEA NECESARIO   • ALPRAZolam (XANAX) 0 25 mg tablet Take 1 tablet (0 25 mg total) by mouth daily at bedtime   • amLODIPine (NORVASC) 5 mg tablet Take 1 tablet (5 mg total) by mouth daily   • Blood Glucose Monitoring Suppl (OneTouch Verio) w/Device KIT Use as directed to check blood sugar DX E11 9   • Calcium Carb-Cholecalciferol 600-400 MG-UNIT TABS Take 1 tablet by mouth 2 (two) times a day   • carvedilol (COREG) 12 5 mg tablet Take 0 5 tablets (6 25 mg total) by mouth 2 (two) times a day with meals   • cyanocobalamin (VITAMIN B-12) 500 MCG tablet Take 1,000 mcg by mouth daily   • Diclofenac Sodium (VOLTAREN) 1 % APLICAR 2 GRAMOS POR VIA TOPICAL CUATRO VECES AL JEFFREY   • diphenhydrAMINE (BENADRYL) 2 % cream Apply topically 3 (three) times a day as needed for itching   • ezetimibe (ZETIA) 10 mg tablet MAR JOHNNY (1) TABLETA POR VIA ORAL JOHNNY VEZ JEFFREY   • ferrous sulfate 325 (65 Fe) mg tablet Take 325 mg by mouth daily with breakfast   • fluticasone (FLONASE) 50 mcg/act nasal spray USAR 1 ROCIADA EN CADA FOSA NASAL DIARIAMENTE   • gabapentin (NEURONTIN) 100 mg capsule Take 1 capsule (100 mg total) by mouth 2 (two) times a day   • glucose blood (Accu-Chek Guide) test strip Use to test 3x daily   • hydrocortisone 2 5 % cream APLICAR POR VIA TOPICA CUATRO VECES AL JEFFREY ALFREDO SEA NECESARIO PARA EL ERUPCION   • Insulin Glargine Solostar (Lantus SoloStar) 100 UNIT/ML SOPN Use 9 units subcut once dialy   • Insulin Pen Needle (BD Pen Needle Micro U/F) 32G X 6 MM MISC Use daily   • loperamide (IMODIUM) 2 mg capsule MAR JOHNNY (1) CAPSULA POR VIA ORAL ALFREDO SEA NECESARIO PARA LA DIARREA   • losartan (COZAAR) 50 mg tablet TAKE 1 TABLET(50 MG) BY MOUTH DAILY   • meloxicam (MOBIC) 7 5 mg tablet MAR JOHNNY (1)TABLETA POR VIA ORAL DIARIA CON EL DESAYUNO PARA LA ARTHRITIS   NO TOME CON IBUPROFEN   • memantine (NAMENDA) 10 mg tablet MAR 1 TABLETA POR VIA ORAL DOS VECES AL JEFFREY   • metFORMIN (GLUCOPHAGE) 1000 MG tablet MAR JOHNNY (1) TABLETA POR VIA ORAL DOS VECES AL JEFFREY CON COMIDAS   • mirtazapine (REMERON) 15 mg tablet MAR JOHNNY (1) TABLETA POR VIA ORAL CADA NOCHE A LA HORA DE ACOSTARSE   • omeprazole (PriLOSEC) 40 MG capsule MAR 1 CAPSULA POR VIA ORAL JOHNNY VEZ AL JEFFREY   • Tradjenta 5 MG TABS MAR JOHNNY TABLETA POR VIA ORAL JOHNNY VEZ AL JEFFREY        Current Facility-Administered Medications   Medication Dose Route Frequency Provider Last Rate   • acetaminophen  650 mg Oral Q6H PRN Demetrio Layne MD     • albuterol  1 puff Inhalation Q4H PRN Demetrio Layne MD     • ALPRAZolam  0 25 mg Oral HS Demetrio Layne MD     • aspirin  81 mg Oral Daily Winifred Martinez MD     • atorvastatin  40 mg Oral Daily With Yordy Bell MD     • benzonatate  100 mg Oral TID PRN Demetrio Layne MD     • carvedilol  6 25 mg Oral BID With Colin Lainez MD     • enoxaparin  40 mg Subcutaneous Daily Demetrio Layne MD     • ezetimibe  10 mg Oral HS Demetrio Layne MD     • furosemide  20 mg Oral Daily Diane Weathers DO     • gabapentin  100 mg Oral BID Mariah Gaines MD     • hydrALAZINE  5 mg Intravenous Q6H PRN Mariah Gaiens MD     • insulin glargine  5 Units Subcutaneous QAM Mariah Gaines MD     • insulin lispro  1-5 Units Subcutaneous TID AC Mariah Gaines MD     • insulin lispro  1-5 Units Subcutaneous HS Mariah Gaines MD     • losartan  50 mg Oral Daily MOIRA Reyes     • memantine  10 mg Oral BID Mariah Gaines MD     • mirtazapine  15 mg Oral HS Mariah Gaines MD     • ondansetron  4 mg Intravenous Q6H PRN Mariah Gaines MD     • pantoprazole  40 mg Oral Early Morning Mariah Gaines MD              Vitals:    12/09/22 2053 12/09/22 2313 12/10/22 0327 12/10/22 0722   BP: 133/81 119/72 121/70 104/57   BP Location: Left arm Left arm Left arm Right arm   Pulse: 79 76 74 72   Resp:  18 18 18   Temp: (!) 97 3 °F (36 3 °C) 97 6 °F (36 4 °C) 97 8 °F (36 6 °C) (!) 97 4 °F (36 3 °C)   TempSrc: Temporal Temporal Temporal Temporal   SpO2: 94% 92% 93% 90%   Weight:       Height:         Body mass index is 28 55 kg/m²  Intake/Output Summary (Last 24 hours) at 12/10/2022 1016  Last data filed at 12/9/2022 1345  Gross per 24 hour   Intake --   Output 5 ml   Net -5 ml       Weight change:     PHYSICAL EXAMINATION:  Gen: Awake, Alert, NAD  Head/eyes: AT/NC, pupils equal and round, Anicteric  ENT: mmm  Neck: Supple, No elevated JVP, trachea midline  Resp: CTA bilaterally no w/r/r  CV: RRR +S1, S2, No m/r/g  Abd: Soft, NT/ND + BS  Ext: no LE edema bilaterally; r radial site c/d/i  Neuro:  Follows commands, moves all extermities  Psych: Appropriate affect, happy mood, pleasant attitude, non-combative  Skin: warm; no rash, erythema or venous stasis changes on exposed skin    Lab Results:  Results from last 7 days   Lab Units 12/09/22  0513   WBC Thousand/uL 9 44   HEMOGLOBIN g/dL 12 7   HEMATOCRIT % 39 0   PLATELETS Thousands/uL 298     Results from last 7 days   Lab Units 12/10/22  0518 12/08/22  9253 12/07/22  0018   POTASSIUM mmol/L 4 5   < > 4 1   CHLORIDE mmol/L 94*   < > 96   CO2 mmol/L 29   < > 30   BUN mg/dL 22   < > 14   CREATININE mg/dL 0 81   < > 1 07   CALCIUM mg/dL 8 6   < > 8 9   ALK PHOS U/L  --   --  113   ALT U/L  --   --  34   AST U/L  --   --  21    < > = values in this interval not displayed  No results found for: TROPONINT      Results from last 7 days   Lab Units 12/07/22  1116 12/07/22  0813 12/07/22  0610   HS TNI 0HR ng/L  --   --  106*   HS TNI 2HR ng/L  --  79*  --    HS TNI 4HR ng/L 57*  --   --      Results from last 7 days   Lab Units 12/07/22  0018   INR  0 95       Tele: SR    This note was completed in part utilizing [employer] Direct Software  Grammatical errors, random word insertions, spelling mistakes, and incomplete sentences may be an occasional consequence of this system secondary to software limitations, ambient noise, and hardware issues  If you have any questions or concerns about the content, text, or information contained within the body of this dictation, please contact the provider for clarification

## 2022-12-10 NOTE — PROGRESS NOTES
[address] [facility]  Progress Note - [address] [facility] [id number], 69 y o  female MRN: [id number]  Unit/Bed#: E4 -01 Encounter: [id number]  Primary Care Provider: MOIRA Powers   Date and time admitted to hospital: 12/7/2022 12:06 AM    Chest pain  Assessment & Plan  Reports onset of generalized chest pain across chest earlier today  She would describe this as a burning chest pain  Initial troponin 12  EKG without ST elevation  · Appreciate cardiology recommendations  · Echo with new cardiomyopathy and changes  · S/p heart cath: no cad  Likely due to chemotherapy   · Continue asa, statin, and beta blocker  · Follow up with cardiology in 2 weeks  · Repeat bmp in 1 week     Dementia with behavioral disturbance  Assessment & Plan  Continue Namenda, Remeron    Chemotherapy-induced peripheral neuropathy (HCC)  Assessment & Plan  Continue gabapentin    Obstructive sleep apnea  Assessment & Plan  Reports she normally wears a CPAP machine at night, but she has been without this machine for about 2 weeks after moving to the area  Continue CPAP at night    Gastroesophageal reflux disease  Assessment & Plan  Continue PPI    Essential hypertension  Assessment & Plan  Continue Coreg, losartan  Decrease losartan to 25mg as sbp 100-120  Type 2 diabetes mellitus without complication, without long-term current use of insulin [facility])  Assessment & Plan  Lab Results   Component Value Date    HGBA1C 6 9 (H) 12/03/2022       Recent Labs     12/09/22  0718 12/09/22  1156 12/09/22  2141 12/10/22  0841   POCGLU 157* 172* 223* 292*       Blood Sugar Average: Last 72 hrs:  (P) 468 [id number] regimen includes glargine 9 units daily, metformin    · Hold metformin while inpatient  · Increase lantus to 9 units which is home dose  · Sliding-scale insulin  · Hypoglycemia protocol    * Acute respiratory failure with hypoxia [facility])  Assessment & Plan  Presented with chest pain or shortness of breath which started today  Blood pressure was significantly elevated on arrival at 209/103  CXR concerning for vascular congestion  She additionally has been without her CPAP for about 2 weeks after moving to the area  Suspect flash pulmonary edema in combination with BiPAP pulm therapy  S/p IV Lasix and weaned off BiPAP in ED to 4 L nasal cannula  · Lasix held due to heart cath  Restart at 20mg daily   · Monitor I&O's  · Continuous pulse oximetry  · Wean O2 as able  · IV hydralazine as needed  · Continue CPAP therapy at night      Discharge Plan / Estimated Discharge Date: d/c to home  Code Status: Level 1 - Full Code      Subjective:   Pt seen and examined  Interpretation per nursing staff  No problems over night  Objective:     Vitals:   Temp (24hrs), Av 5 °F (36 4 °C), Min:97 1 °F (36 2 °C), Max:97 9 °F (36 6 °C)    Temp:  [97 1 °F (36 2 °C)-97 9 °F (36 6 °C)] 97 3 °F (36 3 °C)  HR:  [68-80] 74  Resp:  [18] 18  BP: ()/(54-81) 115/56  SpO2:  [79 %-98 %] 94 %  Body mass index is 28 55 kg/m²  Input and Output Summary (last 24 hours): Intake/Output Summary (Last 24 hours) at 12/10/2022 1154  Last data filed at 2022 1345  Gross per 24 hour   Intake --   Output 5 ml   Net -5 ml       Physical Exam:   Physical Exam  Constitutional:       Appearance: Normal appearance  HENT:      Head: Normocephalic and atraumatic  Eyes:      Extraocular Movements: Extraocular movements intact  Pupils: Pupils are equal, round, and reactive to light  Cardiovascular:      Rate and Rhythm: Normal rate and regular rhythm  Heart sounds: No murmur heard  No friction rub  No gallop  Pulmonary:      Effort: Pulmonary effort is normal  No respiratory distress  Breath sounds: Normal breath sounds  No wheezing, rhonchi or rales  Abdominal:      General: Bowel sounds are normal  There is no distension  Palpations: Abdomen is soft  Tenderness:  There is no abdominal tenderness  There is no guarding or rebound  Musculoskeletal:      Right lower leg: No edema  Left lower leg: No edema  Neurological:      Mental Status: She is alert and oriented to person, place, and time  Additional Data:     Labs:  Results from last 7 days   Lab Units 12/09/22  0513 12/07/22  0512   WBC Thousand/uL 9 44 11 18*   HEMOGLOBIN g/dL 12 7 11 2*   HEMATOCRIT % 39 0 34 2*   PLATELETS Thousands/uL 298 269   NEUTROS PCT %  --  66   LYMPHS PCT %  --  26   MONOS PCT %  --  6   EOS PCT %  --  0     Results from last 7 days   Lab Units 12/10/22  0518 12/08/22  0508 12/07/22  0018   SODIUM mmol/L 131*   < > 133*   POTASSIUM mmol/L 4 5   < > 4 1   CHLORIDE mmol/L 94*   < > 96   CO2 mmol/L 29   < > 30   BUN mg/dL 22   < > 14   CREATININE mg/dL 0 81   < > 1 07   ANION GAP mmol/L 8   < > 7   CALCIUM mg/dL 8 6   < > 8 9   ALBUMIN g/dL  --   --  4 0   TOTAL BILIRUBIN mg/dL  --   --  0 24   ALK PHOS U/L  --   --  113   ALT U/L  --   --  34   AST U/L  --   --  21   GLUCOSE RANDOM mg/dL 182*   < > 209*    < > = values in this interval not displayed       Results from last 7 days   Lab Units 12/07/22  0018   INR  0 95     Results from last 7 days   Lab Units 12/10/22  0841 12/09/22  2141 12/09/22  1156 12/09/22  0718 12/08/22  2106 12/08/22  1608 12/08/22  1108 12/08/22  0711 12/07/22  2112 12/07/22  1625 12/07/22  1227 12/07/22  0838   POC GLUCOSE mg/dl 292* 223* 172* 157* 205* 194* 310* 143* 169* 128 211* 111               Recent Cultures (last 7 days):           Lines/Drains:  Invasive Devices     Peripheral Intravenous Line  Duration           Peripheral IV 12/07/22 Right Antecubital 3 days                Telemetry:   Telemetry Orders (From admission, onward)             24 Hour Telemetry Monitoring  Continuous x 24 Hours (Telem)        Expiring   References:    Telemetry Guidelines   Question:  Reason for 24 Hour Telemetry  Answer:  Pre or Post EP Study/PCI                    Last 24 Hours Medication List:   Current Facility-Administered Medications   Medication Dose Route Frequency Provider Last Rate   • acetaminophen  650 mg Oral Q6H PRN Samantha Vidal MD     • albuterol  1 puff Inhalation Q4H PRN Samantha Vidal MD     • ALPRAZolam  0 25 mg Oral HS Samantha Vidal MD     • aspirin  81 mg Oral Daily Eliot Jiang MD     • atorvastatin  40 mg Oral Daily With Lorna Hernandez MD     • benzonatate  100 mg Oral TID PRN Samantha Vidal MD     • carvedilol  6 25 mg Oral BID With Grace Driscoll MD     • enoxaparin  40 mg Subcutaneous Daily Samantha Vidal MD     • ezetimibe  10 mg Oral HS Samantha Vidal MD     • furosemide  20 mg Oral Daily Diane Weathers DO     • gabapentin  100 mg Oral BID Samantha Vidal MD     • hydrALAZINE  5 mg Intravenous Q6H PRN Samantha Vidal MD     • insulin glargine  5 Units Subcutaneous QA Samantha Vidal MD     • insulin lispro  1-5 Units Subcutaneous TID AC Samantha Vidal MD     • insulin lispro  1-5 Units Subcutaneous HS Samantha Vidal MD     • [START ON 12/11/2022] losartan  25 mg Oral Daily Diane Weathers DO     • memantine  10 mg Oral BID Samantha Vidal MD     • mirtazapine  15 mg Oral HS Samantha Vidal MD     • ondansetron  4 mg Intravenous Q6H PRN Samantha Vidal MD     • pantoprazole  40 mg Oral Early Morning Samantha Vidal MD          Today, Patient Was Seen By: Stone Pope DO

## 2022-12-10 NOTE — ASSESSMENT & PLAN NOTE
Reports onset of generalized chest pain across chest earlier today  She would describe this as a burning chest pain  Initial troponin 12  EKG without ST elevation  · Appreciate cardiology recommendations  · Echo with new cardiomyopathy and changes  · S/p heart cath: no cad   Likely due to chemotherapy   · Continue asa, statin, and beta blocker  · Follow up with cardiology in 2 weeks  · Repeat bmp in 1 week

## 2022-12-10 NOTE — PLAN OF CARE
Problem: Potential for Falls  Goal: Patient will remain free of falls  Description: INTERVENTIONS:  - Educate patient/family on patient safety including physical limitations  - Instruct patient to call for assistance with activity   - Consult OT/PT to assist with strengthening/mobility   - Keep Call bell within reach  - Keep bed low and locked with side rails adjusted as appropriate  - Keep care items and personal belongings within reach  - Initiate and maintain comfort rounds  - Make Fall Risk Sign visible to staff  - Offer Toileting every 2 Hours, in advance of need  - Initiate/Maintain bed alarm  - Apply yellow socks and bracelet for high fall risk patients  - Consider moving patient to room near nurses station  Outcome: Progressing     Problem: MOBILITY - ADULT  Goal: Maintain or return to baseline ADL function  Description: INTERVENTIONS:  -  Assess patient's ability to carry out ADLs; assess patient's baseline for ADL function and identify physical deficits which impact ability to perform ADLs (bathing, care of mouth/teeth, toileting, grooming, dressing, etc )  - Assess/evaluate cause of self-care deficits   - Assess range of motion  - Assess patient's mobility; develop plan if impaired  - Assess patient's need for assistive devices and provide as appropriate  - Encourage maximum independence but intervene and supervise when necessary  - Involve family in performance of ADLs  - Assess for home care needs following discharge   - Consider OT consult to assist with ADL evaluation and planning for discharge  - Provide patient education as appropriate  Outcome: Progressing  Goal: Maintains/Returns to pre admission functional level  Description: INTERVENTIONS:  - Perform BMAT or MOVE assessment daily    - Set and communicate daily mobility goal to care team and patient/family/caregiver     - Collaborate with rehabilitation services on mobility goals if consulted  - Stand patient 3 times a day  - Ambulate patient 3 times a day  - Out of bed to chair 3 times a day   - Out of bed for meals 3 times a day  - Out of bed for toileting  - Record patient progress and toleration of activity level   Outcome: Progressing     Problem: INFECTION - ADULT  Goal: Absence or prevention of progression during hospitalization  Description: INTERVENTIONS:  - Assess and monitor for signs and symptoms of infection  - Monitor lab/diagnostic results  - Monitor all insertion sites, i e  indwelling lines, tubes, and drains  - Monitor endotracheal if appropriate and nasal secretions for changes in amount and color  - [place] appropriate cooling/warming therapies per order  - Administer medications as ordered  - Instruct and encourage patient and family to use good hand hygiene technique  - Identify and instruct in appropriate isolation precautions for identified infection/condition  Outcome: Progressing     Problem: DISCHARGE PLANNING  Goal: Discharge to home or other facility with appropriate resources  Description: INTERVENTIONS:  - Identify barriers to discharge w/patient and caregiver  - Arrange for needed discharge resources and transportation as appropriate  - Identify discharge learning needs (meds, wound care, etc )  - Arrange for interpretive services to assist at discharge as needed  - Refer to Case Management Department for coordinating discharge planning if the patient needs post-hospital services based on physician/advanced practitioner order or complex needs related to functional status, cognitive ability, or social support system  Outcome: Progressing     Problem: Knowledge Deficit  Goal: Patient/family/caregiver demonstrates understanding of disease process, treatment plan, medications, and discharge instructions  Description: Complete learning assessment and assess knowledge base    Interventions:  - Provide teaching at level of understanding  - Provide teaching via preferred learning methods  Outcome: Progressing     Problem: RESPIRATORY - ADULT  Goal: Achieves optimal ventilation and oxygenation  Description: INTERVENTIONS:  - Assess for changes in respiratory status  - Assess for changes in mentation and behavior  - Position to facilitate oxygenation and minimize respiratory effort  - Oxygen administered by appropriate delivery if ordered  - Initiate smoking cessation education as indicated  - Encourage broncho-pulmonary hygiene including cough, deep breathe, Incentive Spirometry  - Assess the need for suctioning and aspirate as needed  - Assess and instruct to report SOB or any respiratory difficulty  - Respiratory Therapy support as indicated  Outcome: Progressing     Problem: METABOLIC, FLUID AND ELECTROLYTES - ADULT  Goal: Glucose maintained within target range  Description: INTERVENTIONS:  - Monitor Blood Glucose as ordered  - Assess for signs and symptoms of hyperglycemia and hypoglycemia  - Administer ordered medications to maintain glucose within target range  - Assess nutritional intake and initiate nutrition service referral as needed  Outcome: Progressing     Problem: SKIN/TISSUE INTEGRITY - ADULT  Goal: Skin Integrity remains intact(Skin Breakdown Prevention)  Description: Assess:  -Perform Deven assessment twice daily  -Clean and moisturize skin every day  -Inspect skin when repositioning, toileting, and assisting with ADLS  -Assess extremities for adequate circulation and sensation     Bed Management:  -Have minimal linens on bed & keep smooth, unwrinkled  -Change linens as needed when moist or perspiring      Toileting:  -Offer bedside commode      Activity:  -Mobilize patient 3 times a day  -Encourage activity and walks on unit  -Encourage or provide ROM exercises       Skin Care:  -Avoid use of baby powder, tape, friction and shearing, hot water or constrictive clothing      Outcome: Progressing

## 2022-12-10 NOTE — PLAN OF CARE
Problem: Potential for Falls  Goal: Patient will remain free of falls  Description: INTERVENTIONS:  - Educate patient/family on patient safety including physical limitations  - Instruct patient to call for assistance with activity   - Consult OT/PT to assist with strengthening/mobility   - Keep Call bell within reach  - Keep bed low and locked with side rails adjusted as appropriate  - Keep care items and personal belongings within reach  - Initiate and maintain comfort rounds  - Make Fall Risk Sign visible to staff  - Offer Toileting every 2 Hours, in advance of need  - Initiate/Maintain bed alarm  - Apply yellow socks and bracelet for high fall risk patients  - Consider moving patient to room near nurses station  Outcome: Progressing     Problem: MOBILITY - ADULT  Goal: Maintain or return to baseline ADL function  Description: INTERVENTIONS:  -  Assess patient's ability to carry out ADLs; assess patient's baseline for ADL function and identify physical deficits which impact ability to perform ADLs (bathing, care of mouth/teeth, toileting, grooming, dressing, etc )  - Assess/evaluate cause of self-care deficits   - Assess range of motion  - Assess patient's mobility; develop plan if impaired  - Assess patient's need for assistive devices and provide as appropriate  - Encourage maximum independence but intervene and supervise when necessary  - Involve family in performance of ADLs  - Assess for home care needs following discharge   - Consider OT consult to assist with ADL evaluation and planning for discharge  - Provide patient education as appropriate  Outcome: Progressing  Goal: Maintains/Returns to pre admission functional level  Description: INTERVENTIONS:  - Perform BMAT or MOVE assessment daily    - Set and communicate daily mobility goal to care team and patient/family/caregiver     - Collaborate with rehabilitation services on mobility goals if consulted  - Stand patient 3 times a day  - Ambulate patient 3 times a day  - Out of bed to chair 3 times a day   - Out of bed for meals 3 times a day  - Out of bed for toileting  - Record patient progress and toleration of activity level   Outcome: Progressing     Problem: INFECTION - ADULT  Goal: Absence or prevention of progression during hospitalization  Description: INTERVENTIONS:  - Assess and monitor for signs and symptoms of infection  - Monitor lab/diagnostic results  - Monitor all insertion sites, i e  indwelling lines, tubes, and drains  - Monitor endotracheal if appropriate and nasal secretions for changes in amount and color  - [place] appropriate cooling/warming therapies per order  - Administer medications as ordered  - Instruct and encourage patient and family to use good hand hygiene technique  - Identify and instruct in appropriate isolation precautions for identified infection/condition  Outcome: Progressing     Problem: DISCHARGE PLANNING  Goal: Discharge to home or other facility with appropriate resources  Description: INTERVENTIONS:  - Identify barriers to discharge w/patient and caregiver  - Arrange for needed discharge resources and transportation as appropriate  - Identify discharge learning needs (meds, wound care, etc )  - Arrange for interpretive services to assist at discharge as needed  - Refer to Case Management Department for coordinating discharge planning if the patient needs post-hospital services based on physician/advanced practitioner order or complex needs related to functional status, cognitive ability, or social support system  Outcome: Progressing     Problem: Knowledge Deficit  Goal: Patient/family/caregiver demonstrates understanding of disease process, treatment plan, medications, and discharge instructions  Description: Complete learning assessment and assess knowledge base    Interventions:  - Provide teaching at level of understanding  - Provide teaching via preferred learning methods  Outcome: Progressing     Problem: RESPIRATORY - ADULT  Goal: Achieves optimal ventilation and oxygenation  Description: INTERVENTIONS:  - Assess for changes in respiratory status  - Assess for changes in mentation and behavior  - Position to facilitate oxygenation and minimize respiratory effort  - Oxygen administered by appropriate delivery if ordered  - Initiate smoking cessation education as indicated  - Encourage broncho-pulmonary hygiene including cough, deep breathe, Incentive Spirometry  - Assess the need for suctioning and aspirate as needed  - Assess and instruct to report SOB or any respiratory difficulty  - Respiratory Therapy support as indicated  Outcome: Progressing     Problem: METABOLIC, FLUID AND ELECTROLYTES - ADULT  Goal: Glucose maintained within target range  Description: INTERVENTIONS:  - Monitor Blood Glucose as ordered  - Assess for signs and symptoms of hyperglycemia and hypoglycemia  - Administer ordered medications to maintain glucose within target range  - Assess nutritional intake and initiate nutrition service referral as needed  Outcome: Progressing     Problem: SKIN/TISSUE INTEGRITY - ADULT  Goal: Skin Integrity remains intact(Skin Breakdown Prevention)  Description: Assess:  -Perform Deven assessment twice daily  -Clean and moisturize skin every day  -Inspect skin when repositioning, toileting, and assisting with ADLS  -Assess extremities for adequate circulation and sensation     Bed Management:  -Have minimal linens on bed & keep smooth, unwrinkled  -Change linens as needed when moist or perspiring      Toileting:  -Offer bedside commode      Activity:  -Mobilize patient 3 times a day  -Encourage activity and walks on unit  -Encourage or provide ROM exercises       Skin Care:  -Avoid use of baby powder, tape, friction and shearing, hot water or constrictive clothing      Outcome: Progressing

## 2022-12-10 NOTE — ASSESSMENT & PLAN NOTE
Lab Results   Component Value Date    HGBA1C 6 9 (H) 12/03/2022       Recent Labs     12/09/22  0718 12/09/22  1156 12/09/22  2141 12/10/22  0841   POCGLU 157* 172* 223* 292*       Blood Sugar Average: Last 72 hrs:  (P) 236 [id number] regimen includes glargine 9 units daily, metformin    · Hold metformin while inpatient  · Increase lantus to 9 units which is home dose  · Sliding-scale insulin  · Hypoglycemia protocol

## 2022-12-12 ENCOUNTER — PATIENT OUTREACH (OUTPATIENT)
Dept: FAMILY MEDICINE CLINIC | Facility: CLINIC | Age: 77
End: 2022-12-12

## 2022-12-12 RX ORDER — LANCETS
EACH MISCELLANEOUS
Qty: 102 EACH | Refills: 0 | Status: SHIPPED | OUTPATIENT
Start: 2022-12-12

## 2022-12-12 RX ORDER — BLOOD SUGAR DIAGNOSTIC
STRIP MISCELLANEOUS
Qty: 100 STRIP | Refills: 0 | Status: SHIPPED | OUTPATIENT
Start: 2022-12-12

## 2022-12-12 NOTE — PROGRESS NOTES
I called [facility] to follow up on the patient after her recent hospitalization for respiratory failure  Tilmon Mood states the patient's breathing is much better but notes she continues with a slight cough  She denies the patient having any chest pain  Popeye Sands reports the patient having arm pain where the IV was placed  She states she gave the patient Tylenol with some improvement  Popeye Sands reports the patient's blood sugar yesterday was >400 before lunch  She states she gave the patient 3U Lantus; recheck of sugar was then 174  Tilmon Mood notes she then gave only 6U Lantus later that evening since the patient is prescribed 9U nightly  I explained that Lantus is a long acting insulin and should not be used as a short acting insulin  Popeye Sands reports the patient's sugar this morning was 174  Tilmon Mood is aware the patient needs a hospital follow up with Cardiology as well as her PCP  Clerical scheduled the patient for a PCP appointment 12/19  I informed Tilmon Mood the patient is scheduled for a Podiatry appointment this morning but she states she will be rescheduling as the patient wanted to rest     I will continue to follow  Chart reviewed

## 2022-12-12 NOTE — PROGRESS NOTES
I received a call from [facility] stating the pharmacy has not received the order for the glucometer  I called the pharmacy and was told the OneTouch Verio order was rescinded by PCP office; note sent to PCP       I confirmed with [facility] the patient's PCP appointment for 12/19 at 1pm

## 2022-12-12 NOTE — UTILIZATION REVIEW
NOTIFICATION OF ADMISSION DISCHARGE   This is a Notification of Discharge from [address]  Please be advised that this patient has been discharge from our facility  Below you will find the admission and discharge date and time including the patient’s disposition  UTILIZATION REVIEW CONTACT:  Marci Biggs  Utilization   Network Utilization Review Department  Phone: [phone] x carefully listen to the prompts  All voicemails are confidential   Email: [email or web address]     ADMISSION INFORMATION  PRESENTATION DATE: 12/7/2022 12:06 AM    INPATIENT ADMISSION DATE: 12/7/22  2:11 AM   DISCHARGE DATE: 12/10/2022  2:47 PM   DISPOSITION:Home with Home Health Care    IMPORTANT INFORMATION:  Send all requests for admission clinical reviews, approved or denied determinations and any other requests to dedicated fax number below belonging to the campus where the patient is receiving treatment   List of dedicated fax numbers:  [address] (Administrative/Medical Necessity) [phone]   [address] [place] [address] (Maternity/NICU/Pediatrics) [phone]   [address] [facility] [phone]   [place] [address] [phone]   [address] [phone]   [address] [facility] [address] [place] [address] Street [place] [address] [phone]   [address] [facility] [phone] [address] [facility] [phone]   [address] [place] [address] [phone]              Pa Priest DO  Physician  Hospitalist  Discharge Summary     Signed  Date of Service:  12/10/2022 12:03 PM     Signed        Discharge Summary - [facility]     Patient Information: Aaron Inch 69 y o  female MRN: [id number]  Unit/Bed#: E4 -01 Encounter: [id number]     Discharging Physician / Practitioner: Levi Michelle DO  PCP: Jovani Delarosa  Admission Date: 12/7/2022  Discharge Date: 12/10/22     Disposition:      Home with VNA Services (Reminder: Complete face to face encounter)      Reason for Admission: acute respiratory failure with hypoxia      Discharge Diagnoses:      Principal Problem (Resolved):    Acute respiratory failure with hypoxia ([facility] )  Active Problems:    Type 2 diabetes mellitus without complication, without long-term current use of insulin ([facility] )    Essential hypertension    Gastroesophageal reflux disease    Obstructive sleep apnea    Chemotherapy-induced peripheral neuropathy ([facility] )    Dementia with behavioral disturbance    Chest pain        Consultations During Hospital Stay:  • Cardiology      Procedures Performed:      • Heart cath: normal coronary arteries       Significant Findings / Test Results:   XR chest 1 view portable  Result Date: 12/7/2022  Impression: Borderline cardiomegaly Mild vascular congestion Trace right pleural effusion      XR chest pa & lateral  Result Date: 12/5/2022  Impression: No acute cardiopulmonary disease       Echo complete w/ contrast if indicated  Result Date: 12/7/2022  Narrative: Technically difficult but adequate transthoracic echocardiogram study due to lung interference and restricted mobility  1   Normal left ventricular cavity size, severely reduced left ventricular systolic function  Global hypokinesis with regional wall motion variability  Ejection fraction is estimated as around 33%  Markedly decreased global longitudinal strain, -7 2%  Grade 3 diastolic dysfunction, restrictive left ventricular filling pattern  2   Normal right ventricular size and systolic function  3 mild left atrial cavity enlargement  4   Aortic valve sclerosis with some focal calcification, no aortic valve stenosis  Trace aortic valve regurgitation  5   Mild mitral valve sclerosis, mild mitral valve regurgitation  6   Mild tricuspid valve regurgitation  7   No obvious pulmonary hypertension  8   No pericardial effusion  Compared to report of previous echocardiogram from 11/14/2018 left ventricular dysfunction, there is significant decrease in left ventricular function and ejection fraction is markedly decreased         Incidental Findings:   • none     Test Results Pending at Discharge (will require follow up):   • none     Outpatient Tests Requested:  Bmp in 1 week     Hospital Course:      Daxa Delgado is a 69 y o  female patient who originally presented to the hospital on 12/7/2022 due to acute respiratory failure with hypoxia  She presented with chest pain and shortness of breath that started the day of admission  She was found to have an elevated bp of 209/103  Her cxr showed vascular congestion  She has also been approximately 2 weeks with out of her cpapa  She was treated with bipap and iv lasix  She diuresed well  Cardiology was consulted as pt had new cardiomyopathy with ef of 33%  She is s/p heart cath that showed normal coronary arteries  Her cardiomyopathy was likely due to chemotherapy in which she completed course  She will continue asa, statin, losartan which was decreased to 25mg, lasix of 20mg daily, and coreg  She was discharged to home with follow up with cardiology in 2 weeks  She will require repeat bmp in 1 week  She has vna and hhpt at discharged        Discharge Day Visit / Exam:      * Please refer to separate progress note for these details *     Discharge instructions/Information to patient and family:   See after visit summary for information provided to patient and family        Provisions for Follow-Up Care:  See after visit summary for information related to follow-up care and any pertinent home health orders        Discharge Statement:  I spent 33 minutes discharging the patient  This time was spent on the day of discharge   I had direct contact with the patient on the day of discharge  Greater than 50% of the total time was spent examining patient, answering all patient questions, arranging and discussing plan of care with patient as well as directly providing post-discharge instructions    Additional time then spent on discharge activities      Discharge Medications:  See after visit summary for reconciled discharge medications provided to patient and family

## 2022-12-14 DIAGNOSIS — E11.9 TYPE 2 DIABETES MELLITUS WITHOUT COMPLICATION, WITHOUT LONG-TERM CURRENT USE OF INSULIN (HCC): Primary | ICD-10-CM

## 2022-12-14 RX ORDER — BLOOD-GLUCOSE METER
EACH MISCELLANEOUS DAILY
Qty: 1 KIT | Refills: 0 | Status: SHIPPED | OUTPATIENT
Start: 2022-12-14 | End: 2022-12-14

## 2022-12-14 RX ORDER — BLOOD-GLUCOSE METER
EACH MISCELLANEOUS DAILY
Qty: 1 KIT | Refills: 0 | Status: SHIPPED | OUTPATIENT
Start: 2022-12-14 | End: 2023-04-06 | Stop reason: ALTCHOICE

## 2022-12-19 ENCOUNTER — OFFICE VISIT (OUTPATIENT)
Dept: FAMILY MEDICINE CLINIC | Facility: CLINIC | Age: 77
End: 2022-12-19

## 2022-12-19 VITALS
OXYGEN SATURATION: 95 % | WEIGHT: 149.8 LBS | DIASTOLIC BLOOD PRESSURE: 72 MMHG | BODY MASS INDEX: 27.57 KG/M2 | RESPIRATION RATE: 16 BRPM | HEART RATE: 80 BPM | SYSTOLIC BLOOD PRESSURE: 130 MMHG | TEMPERATURE: 97.5 F | HEIGHT: 62 IN

## 2022-12-19 DIAGNOSIS — J30.9 ALLERGIC RHINITIS, UNSPECIFIED SEASONALITY, UNSPECIFIED TRIGGER: ICD-10-CM

## 2022-12-19 DIAGNOSIS — F03.918 DEMENTIA WITH BEHAVIORAL DISTURBANCE: ICD-10-CM

## 2022-12-19 DIAGNOSIS — G47.33 OBSTRUCTIVE SLEEP APNEA: ICD-10-CM

## 2022-12-19 DIAGNOSIS — I50.9 HEART FAILURE, UNSPECIFIED HF CHRONICITY, UNSPECIFIED HEART FAILURE TYPE (HCC): Primary | ICD-10-CM

## 2022-12-19 DIAGNOSIS — M19.90 ARTHRITIS: ICD-10-CM

## 2022-12-19 DIAGNOSIS — Z51.5 PALLIATIVE CARE PATIENT: ICD-10-CM

## 2022-12-19 DIAGNOSIS — E11.9 TYPE 2 DIABETES MELLITUS WITHOUT COMPLICATION, WITHOUT LONG-TERM CURRENT USE OF INSULIN (HCC): ICD-10-CM

## 2022-12-19 DIAGNOSIS — K21.00 GASTROESOPHAGEAL REFLUX DISEASE WITH ESOPHAGITIS WITHOUT HEMORRHAGE: ICD-10-CM

## 2022-12-19 DIAGNOSIS — C83.31 DIFFUSE LARGE B-CELL LYMPHOMA OF LYMPH NODES OF NECK (HCC): ICD-10-CM

## 2022-12-19 DIAGNOSIS — I10 ESSENTIAL HYPERTENSION: ICD-10-CM

## 2022-12-19 DIAGNOSIS — F41.8 ANXIETY ABOUT HEALTH: ICD-10-CM

## 2022-12-19 NOTE — PROGRESS NOTES
Assessment & Plan     1  Heart failure, unspecified HF chronicity, unspecified heart failure type [facility])  Assessment & Plan:  Wt Readings from Last 3 Encounters:   12/19/22 67 9 kg (149 lb 12 8 oz)   12/09/22 70 8 kg (156 lb 1 4 oz)   10/21/22 67 5 kg (148 lb 14 4 oz)     Recent Echo 12/7/2022  1  Normal left ventricular cavity size, severely reduced left ventricular systolic function  Global hypokinesis with regional wall motion variability  Ejection fraction is estimated as around 33%  Markedly decreased global longitudinal strain, -7 2%  Grade 3 diastolic dysfunction, restrictive left ventricular filling pattern  2   Normal right ventricular size and systolic function  3 mild left atrial cavity enlargement  4   Aortic valve sclerosis with some focal calcification, no aortic valve stenosis  Trace aortic valve regurgitation  5   Mild mitral valve sclerosis, mild mitral valve regurgitation  6   Mild tricuspid valve regurgitation  7   No obvious pulmonary hypertension  8   No pericardial effusion      Discussed with granddaughter, given persistent cough and fatigue patient may be retaining fluid and require higher dose of diuretics, will check CXR and labs             Orders:  -     Ambulatory Referral to Sleep Medicine; Future  -     XR chest pa & lateral; Future; Expected date: 12/19/2022  -     NT-BNP PRO; Future  -     Ambulatory Referral to Palliative Care; Future  -     Ambulatory Referral to Cardiology; Future    2  Anxiety about health    3  Essential hypertension  Assessment & Plan:  BP WNL today  Continue Coreg 6 25 mg bid, losartan 25 mg daily, furosemide 20 mg daily       4  Allergic rhinitis, unspecified seasonality, unspecified trigger  Assessment & Plan:  Continue with daily fluticasone       5  Obstructive sleep apnea  Assessment & Plan:  Resume CPAP use at    Follow up with sleep medicine       6   Type 2 diabetes mellitus without complication, without long-term current use of insulin [facility])  Assessment & Plan:    Lab Results   Component Value Date    HGBA1C 6 9 (H) 12/03/2022       Continue Lantus 9 units HS, continue Tradjenta daily, and metformin   Continue follow up with endocrinology       7  Gastroesophageal reflux disease with esophagitis without hemorrhage  Assessment & Plan:  Continue with omeprazole daily at this time   Consider PPI wean in future (deprescribing)      8  Dementia with behavioral disturbance  Assessment & Plan:  Continue with Namenda, continue following with senior care       9  Arthritis  Assessment & Plan:  Can use topical agents PRN, acetaminophen PRN       10  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)    11  Palliative care patient  Assessment & Plan:  Recommend re-establishing with palliative care, patient and family agreeable          Subjective     Transitional Care Management Review:   Arun Craft is a 69 y o  female here for TCM follow up  During the TCM phone call patient stated:  TCM Call     Date and time call was made  2/27/2019  7:37 PM    Hospital care reviewed  Records reviewed    Patient was hospitialized at  [facility]     Date of Admission  02/25/19    Date of discharge  02/27/19    Diagnosis  SEPSIS    Disposition  Home    Were the patients medications reviewed and updated  No    Current Symptoms  Neausea  ANXIETY, CAN'T SLEEP WEEL AT NIGHT    Neausea severity  Moderate      TCM Call     Should patient be enrolled in anticoag monitoring? No    Scheduled for follow up?   Yes    Patients specialists  Other (comment)    Other specialists names  [facility] Jennifer PEGUERO SCHEDULE FOR 12/6/18 11AM    Did you obtain your prescribed medications  Yes    Do you need help managing your prescriptions or medications  No    Is transportation to your appointment needed  No    I have advised the patient to call PCP with any new or worsening symptoms  CHRISTOPHER NUR CMA    Living Arrangements  Children    Counseling Family    Comments  I SPOKE WITH PT'S DAUGHTER Oscar Shah is a 69 y o  female who  has a past medical history of Cancer ([facility] ), Chronic pain disorder, Diabetes mellitus ([facility] ), Diffuse large B cell lymphoma ([facility] ), Dysphagia, GERD without esophagitis, HTN (hypertension), Hyperlipidemia, Hypertension, MI (myocardial infarction) ([facility] ), Port-A-Cath in place, and Thyroid cancer ([facility] )  who presented to the office today for transitional care management visit  Patient was hospitalized from 12/7 - 12/10 due to acute respiratory failure  She was found to have vascular congestion on CXR and new cardiomyopathy with EF of 33% likely 2/2 chemotherapy  She was diuresed and d/c'd on appropriate medication regimen with VNA and home health physical therapy  She is accompanied by her granddaughter today, who is her caretaker  Granddaughter reports that patient has been more fatigued lately and has been sleeping more  Patient also has persistent dry cough  She has not noted the patient to be out of breath as she was the day of admission       Patient Active Problem List   Diagnosis   • Type 2 diabetes mellitus without complication, without long-term current use of insulin ([facility] )   • Essential hypertension   • Gastroesophageal reflux disease   • 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)   • Anxiety   • Palliative care patient   • Current mild episode of major depressive disorder without prior episode ([facility])   • Other insomnia   • Status post chemotherapy   • Allergic rhinitis   • Arthritis   • Obstructive sleep apnea   • Chemotherapy-induced peripheral neuropathy ([facility] )   • Encounter for central line care   • Current use of insulin ([facility])   • Near syncope   • Counseling regarding advanced care planning and goals of care   • Dementia with behavioral disturbance   • Gait instability   • Polypharmacy   • Frequent falls   • Hyperlipidemia   • Chest pain   • Heart failure ([place] [address] ) Review of Systems   Constitutional: Positive for activity change, appetite change and fatigue  Negative for chills and fever  HENT: Negative for ear pain and sore throat  Eyes: Negative for pain and visual disturbance  Respiratory: Positive for cough  Negative for shortness of breath  Cardiovascular: Negative for chest pain and palpitations  Gastrointestinal: Negative for abdominal pain and vomiting  Genitourinary: Negative for dysuria and hematuria  Musculoskeletal: Negative for arthralgias and back pain  Skin: Negative for color change and rash  Neurological: Negative for seizures and syncope  Psychiatric/Behavioral: Positive for sleep disturbance  All other systems reviewed and are negative  Objective     /72 (BP Location: Left arm, Patient Position: Sitting, Cuff Size: Standard)   Pulse 80   Temp 97 5 °F (36 4 °C) (Temporal)   Resp 16   Ht 5' 2" (1 575 m)   Wt 67 9 kg (149 lb 12 8 oz)   SpO2 95%   BMI 27 40 kg/m²      Physical Exam  Vitals and nursing note reviewed  Constitutional:       Appearance: She is well-developed  Comments: Elderly appearing, frail    HENT:      Head: Normocephalic and atraumatic  Right Ear: External ear normal       Left Ear: External ear normal    Cardiovascular:      Rate and Rhythm: Normal rate and regular rhythm  Pulmonary:      Effort: Pulmonary effort is normal  No respiratory distress  Abdominal:      Palpations: Abdomen is soft  Tenderness: There is no abdominal tenderness  Musculoskeletal:      Cervical back: Neck supple  Skin:     General: Skin is warm and dry  Capillary Refill: Capillary refill takes less than 2 seconds  Neurological:      Mental Status: She is alert  Mental status is at baseline        Comments: Falling asleep        Medications have been reviewed by provider in current encounter    Mariangel Platt

## 2022-12-20 ENCOUNTER — HOSPITAL ENCOUNTER (OUTPATIENT)
Dept: RADIOLOGY | Facility: HOSPITAL | Age: 77
Discharge: HOME | End: 2022-12-20

## 2022-12-20 ENCOUNTER — APPOINTMENT (OUTPATIENT)
Dept: LAB | Facility: HOSPITAL | Age: 77
End: 2022-12-20

## 2022-12-20 DIAGNOSIS — C83.31 DIFFUSE LARGE B-CELL LYMPHOMA OF LYMPH NODES OF NECK (HCC): ICD-10-CM

## 2022-12-20 DIAGNOSIS — I42.9 CARDIOMYOPATHY (HCC): ICD-10-CM

## 2022-12-20 DIAGNOSIS — I50.9 HEART FAILURE, UNSPECIFIED HF CHRONICITY, UNSPECIFIED HEART FAILURE TYPE (HCC): ICD-10-CM

## 2022-12-20 PROBLEM — R25.1 TREMOR: Status: RESOLVED | Noted: 2018-11-22 | Resolved: 2022-12-20

## 2022-12-20 PROBLEM — R11.0 NAUSEA: Status: RESOLVED | Noted: 2018-11-22 | Resolved: 2022-12-20

## 2022-12-20 PROBLEM — R13.12 OROPHARYNGEAL DYSPHAGIA: Status: RESOLVED | Noted: 2018-11-09 | Resolved: 2022-12-20

## 2022-12-20 PROBLEM — R09.89 CHEST CONGESTION: Status: RESOLVED | Noted: 2022-05-10 | Resolved: 2022-12-20

## 2022-12-20 PROBLEM — R00.2 PALPITATIONS: Status: RESOLVED | Noted: 2021-03-19 | Resolved: 2022-12-20

## 2022-12-20 PROBLEM — E87.1 HYPONATREMIA: Status: RESOLVED | Noted: 2018-11-19 | Resolved: 2022-12-20

## 2022-12-20 PROBLEM — I51.89 GRADE I DIASTOLIC DYSFUNCTION: Status: RESOLVED | Noted: 2021-03-19 | Resolved: 2022-12-20

## 2022-12-20 LAB
ALBUMIN SERPL BCP-MCNC: 3.9 G/DL (ref 3.5–5)
ALP SERPL-CCNC: 82 U/L (ref 46–116)
ALT SERPL W P-5'-P-CCNC: 31 U/L (ref 12–78)
ANION GAP SERPL CALCULATED.3IONS-SCNC: 1 MMOL/L (ref 4–13)
AST SERPL W P-5'-P-CCNC: 21 U/L (ref 5–45)
BASOPHILS # BLD AUTO: 0.09 THOUSANDS/ÂΜL (ref 0–0.1)
BASOPHILS NFR BLD AUTO: 1 % (ref 0–1)
BILIRUB SERPL-MCNC: 0.4 MG/DL (ref 0.2–1)
BUN SERPL-MCNC: 17 MG/DL (ref 5–25)
CALCIUM SERPL-MCNC: 9.9 MG/DL (ref 8.3–10.1)
CHLORIDE SERPL-SCNC: 100 MMOL/L (ref 96–108)
CO2 SERPL-SCNC: 32 MMOL/L (ref 21–32)
CREAT SERPL-MCNC: 0.82 MG/DL (ref 0.6–1.3)
EOSINOPHIL # BLD AUTO: 0.26 THOUSAND/ÂΜL (ref 0–0.61)
EOSINOPHIL NFR BLD AUTO: 3 % (ref 0–6)
ERYTHROCYTE [DISTWIDTH] IN BLOOD BY AUTOMATED COUNT: 12.8 % (ref 11.6–15.1)
GFR SERPL CREATININE-BSD FRML MDRD: 69 ML/MIN/1.73SQ M
GLUCOSE P FAST SERPL-MCNC: 134 MG/DL (ref 65–99)
HCT VFR BLD AUTO: 37.8 % (ref 34.8–46.1)
HGB BLD-MCNC: 12 G/DL (ref 11.5–15.4)
IMM GRANULOCYTES # BLD AUTO: 0.06 THOUSAND/UL (ref 0–0.2)
IMM GRANULOCYTES NFR BLD AUTO: 1 % (ref 0–2)
LDH SERPL-CCNC: 297 U/L (ref 81–234)
LYMPHOCYTES # BLD AUTO: 3.48 THOUSANDS/ÂΜL (ref 0.6–4.47)
LYMPHOCYTES NFR BLD AUTO: 34 % (ref 14–44)
MCH RBC QN AUTO: 29.2 PG (ref 26.8–34.3)
MCHC RBC AUTO-ENTMCNC: 31.7 G/DL (ref 31.4–37.4)
MCV RBC AUTO: 92 FL (ref 82–98)
MONOCYTES # BLD AUTO: 0.9 THOUSAND/ÂΜL (ref 0.17–1.22)
MONOCYTES NFR BLD AUTO: 9 % (ref 4–12)
NEUTROPHILS # BLD AUTO: 5.41 THOUSANDS/ÂΜL (ref 1.85–7.62)
NEUTS SEG NFR BLD AUTO: 52 % (ref 43–75)
NRBC BLD AUTO-RTO: 0 /100 WBCS
NT-PROBNP SERPL-MCNC: 792 PG/ML
PLATELET # BLD AUTO: 364 THOUSANDS/UL (ref 149–390)
PMV BLD AUTO: 9.8 FL (ref 8.9–12.7)
POTASSIUM SERPL-SCNC: 4.2 MMOL/L (ref 3.5–5.3)
PROT SERPL-MCNC: 8.2 G/DL (ref 6.4–8.4)
RBC # BLD AUTO: 4.11 MILLION/UL (ref 3.81–5.12)
SODIUM SERPL-SCNC: 133 MMOL/L (ref 135–147)
WBC # BLD AUTO: 10.2 THOUSAND/UL (ref 4.31–10.16)

## 2022-12-20 NOTE — ASSESSMENT & PLAN NOTE
Wt Readings from Last 3 Encounters:   12/19/22 67 9 kg (149 lb 12 8 oz)   12/09/22 70 8 kg (156 lb 1 4 oz)   10/21/22 67 5 kg (148 lb 14 4 oz)     Recent Echo 12/7/2022  1  Normal left ventricular cavity size, severely reduced left ventricular systolic function  Global hypokinesis with regional wall motion variability  Ejection fraction is estimated as around 33%  Markedly decreased global longitudinal strain, -7 2%  Grade 3 diastolic dysfunction, restrictive left ventricular filling pattern  2   Normal right ventricular size and systolic function  3 mild left atrial cavity enlargement  4   Aortic valve sclerosis with some focal calcification, no aortic valve stenosis  Trace aortic valve regurgitation  5   Mild mitral valve sclerosis, mild mitral valve regurgitation  6   Mild tricuspid valve regurgitation  7   No obvious pulmonary hypertension    8   No pericardial effusion      Discussed with granddaughter, given persistent cough and fatigue patient may be retaining fluid and require higher dose of diuretics, will check CXR and labs

## 2022-12-20 NOTE — ASSESSMENT & PLAN NOTE
Lab Results   Component Value Date    HGBA1C 6 9 (H) 12/03/2022       Continue Lantus 9 units HS, continue Tradjenta daily, and metformin   Continue follow up with endocrinology

## 2023-01-04 ENCOUNTER — OFFICE VISIT (OUTPATIENT)
Dept: DENTISTRY | Facility: CLINIC | Age: 78
End: 2023-01-04

## 2023-01-04 VITALS — SYSTOLIC BLOOD PRESSURE: 143 MMHG | TEMPERATURE: 97.3 F | DIASTOLIC BLOOD PRESSURE: 79 MMHG | HEART RATE: 90 BPM

## 2023-01-04 DIAGNOSIS — Z01.20 ENCOUNTER FOR DENTAL EXAMINATION: Primary | ICD-10-CM

## 2023-01-04 NOTE — PROGRESS NOTES
Dental procedures in this visit   •  - PERIODIC ORAL EVALUATION - ESTABLISHED PATIENT (Completed)     Service provider: Tiffany Warren DMD     Billing provider: Tiffany Warren DMD   •  - PROPHYLAXIS - ADULT (Completed)     Service provider: Darleen Ware     Billing provider: Tiffany Warren DMD     Subjective   Patient ID: Victoriano Funes is a 69 y o  female  Chief Complaint   Patient presents with   • Periodic Exam   • Routine Oral Cleaning     Adult Prophy    ASA II  Pain:   0  Reviewed M/DH     Exams:  Periodic exam   Xrays:     none  Type of Treatment:  Adult Prophy -  Hand scaling,  Polished, Flossed  Reviewed OHI  Brush:  2X/day and Floss 1X/day  Recommended Listerine  / ACT  mouth rinses  Recommended Biotene for dry mouth  EO/OCS Exams:  No significant findings  IO:   Pt has Class III bite;  Bilateral mandibular alem  Oral Hygiene:  Good   Plaque:  Light    Calculus:  Light    Bleeding:  none  Gingiva:  Pink  / Firm   Stain:  none  Perio Charting:  Periocharting was completed and evaluated  1-3 mm w/ no   BUP    Perio Findings:  Stage 2 - Grade B  Caries Findings:  2 - MO, 7 - DL, 10 - L;   Watch 6 - D, 20 - D, 28 - D  Caries Risk Assessment:   Moderate caries risk    Treatment Plan:  Updated  LPD is not recommended unless patient decides to go through with a surgical procedure to remove the alem  Will preauth for resin based UPD  Pt concerned that her lip keeps slipping into the space where 8, 9 were    Dr  Exam:  Dr Nikhil Oscar  Referral:  No referral given   NV1:  Rest 2, 7, 10 - 75 min w/ jaber  NV2:  6mrc w/ Bws - 60 min

## 2023-01-06 ENCOUNTER — PATIENT OUTREACH (OUTPATIENT)
Dept: FAMILY MEDICINE CLINIC | Facility: CLINIC | Age: 78
End: 2023-01-06

## 2023-01-06 DIAGNOSIS — Z51.5 PALLIATIVE CARE PATIENT: ICD-10-CM

## 2023-01-06 DIAGNOSIS — C83.31 DIFFUSE LARGE B-CELL LYMPHOMA OF LYMPH NODES OF NECK (HCC): ICD-10-CM

## 2023-01-06 DIAGNOSIS — F41.8 ANXIETY ABOUT HEALTH: ICD-10-CM

## 2023-01-06 DIAGNOSIS — I42.9 CARDIOMYOPATHY (HCC): ICD-10-CM

## 2023-01-06 RX ORDER — FUROSEMIDE 20 MG/1
20 TABLET ORAL DAILY
Qty: 30 TABLET | Refills: 0 | Status: SHIPPED | OUTPATIENT
Start: 2023-01-06 | End: 2023-02-05

## 2023-01-06 NOTE — PROGRESS NOTES
I received a call from [facility] stating the patient gained 3lbs overnight  She reports a weight yesterday of 147 and today 150 9  [facility] denies the patient having extremity edema, shortness of breath or chest pain  She states the patient has a slight cough  I provided the number to Cardiology; she will call for further med instructions  I will continue to follow

## 2023-01-06 NOTE — PROGRESS NOTES
Patient called cardiology and I went over diet for CHF  Patient did eat sharp and the weight gain happened right after  Patient is asymptomatic, no edema, no SOB  Will talk with Dr Iwona Delgado and see if he wants to prescribe extra diuretic today  Patient has an appointment with him 1/19/23

## 2023-01-06 NOTE — PROGRESS NOTES
Per Dr Charity Freeman, patient can take an extra 20 mgs of furosemide just for today  Daughter notified

## 2023-01-09 ENCOUNTER — OFFICE VISIT (OUTPATIENT)
Dept: HEMATOLOGY ONCOLOGY | Facility: CLINIC | Age: 78
End: 2023-01-09

## 2023-01-09 VITALS
OXYGEN SATURATION: 96 % | SYSTOLIC BLOOD PRESSURE: 140 MMHG | RESPIRATION RATE: 16 BRPM | TEMPERATURE: 97.5 F | HEIGHT: 62 IN | WEIGHT: 150.2 LBS | DIASTOLIC BLOOD PRESSURE: 80 MMHG | BODY MASS INDEX: 27.64 KG/M2 | HEART RATE: 76 BPM

## 2023-01-09 DIAGNOSIS — R13.10 DYSPHAGIA, UNSPECIFIED TYPE: ICD-10-CM

## 2023-01-09 DIAGNOSIS — C83.31 DIFFUSE LARGE B-CELL LYMPHOMA OF LYMPH NODES OF NECK (HCC): Primary | ICD-10-CM

## 2023-01-09 NOTE — PROGRESS NOTES
Hematology/Oncology Outpatient Follow-up  Chelsi Sandoval 69 y o  female [id number]    Date:  1/9/2023        Assessment and Plan:  1  Diffuse large 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 ([facility] [address] )  The patient completed her diffuse large B-cell lymphoma treatment in March 2019  She seems to be at least clinically in complete remission  Monitor her on every 6 months basis  - CBC and differential; Future  - Comprehensive metabolic panel; Future  - Magnesium; Future  - LD,Blood; Future  - C-reactive protein; Future  - Sedimentation rate, automated; Future    2  Dysphagia, unspecified type  She did complain about cough and some dysphagia  Its not entirely clear if the patient has reflux disease or other etiologies  She will be sent to the GI team for upper endoscopy in the near future  - Ambulatory referral to Gastroenterology; Future        HPI:  The patient came today for follow-up visit accompanied by her granddaughter  The whole visit was interpreted by the granddaughter  The patient apparently seems to have a cough which may be related to pulmonary edema  She is getting diuretics on a regular basis  She did complain about dysphagia today  Most recent blood work on 12/20/2022 showed white cell count of 10 2 with hemoglobin of 12 0 and a platelet count of 611  White cell differential was normal   Creatinine was 0 8 with normal calcium and liver enzymes  LDH slightly elevated to 97  NT-proBNP was elevated 792  She did have screening mammograms on 7/18/2022 which was read as benign  Oncology History Overview Note   The patient complained about significant sore throat in May which was treated with antibiotics by her PCP in [facility] which did not improve her sore throat  She then started to notice significant odynophagia and dysphagia for liquids it is and solid nutrition    Eventually, she had a CT scan of the neck on the 20th of September which showed soft tissue lesion in the left palatine tonsil with abnormal lymph nodes bilaterally in the neck compatible with neoplastic process  She then had a biopsy of the left tonsil/left tonsillectomy on the 25th of September 2018 which was compatible with diffuse large B-cell lymphoma germinal center B phenotype  The immunohistochemical staining came back positive for BCL2, BCL 6 and myc with Ki 67 around 70%  No FISH rearrangements gene study was done for BCL2, BCL 6 are myc translocation  The patient was treated with 1 cycle of R-EPOCH since her airway was compromise with the large tonsillar mass and a biopsy which was reviewed by our hematopathologist on the 15 November compatible with 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 according to the [facility] with pending gene rearrangement studies  During the hospital course the patient had another biopsy of the left tonsil to better understand the exact aggressiveness of her large B-cell lymphoma  The biopsy on the 20th of November showed large B-cell lymphoma with BCL -2 and C myc level expressed surface a type without the myc or BCL gene rearrangement  Her bone marrow biopsy on the 9th of November was negative for B-cell lymphoma involvement with normal bone marrow trilineage maturation  She was also evaluated with an MRI of the brain during the hospital course which was negative for any hint of lymphoma involvement  PET scan on the 14th of November showed IMPRESSION:  1   FDG avid left posterior oropharyngeal lesion compatible with malignancy  2   FDG avid lymph nodes in the neck and left axilla compatible with malignancy  3  No FDG avid lymph nodes in the abdomen or pelvis suspicious for malignancy  4   Tiny focus of FDG uptake along the skin of the right upper quadrant anterior abdominal wall with mild skin thickening suggested here on CT    Her post treatment PET-CT 3/18/19 was read:  IMPRESSION:  1  No evidence of hypermetabolic malignancy    Deauville score of 1   2   Mild patchy FDG uptake in the right upper lobe corresponding to patchy  consolidation  This may be infectious or inflammatory  This has partially improved from the 2/25/2019 chest x-ray  Diffuse large 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 ([facility] )   11/9/2018 Initial Diagnosis    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)      Chemotherapy    1  Dose adjusted R-EPOCH 11/21/18 (1 cycle)- switched to RCHOP after repeat biopsy/pathology reviewed  2  R-CHOP started 12/12/18 completed 3/7/19 (5 cycles)             Interval history:    ROS: Review of Systems   Constitutional: Positive for fatigue  Negative for chills and fever  HENT: Positive for trouble swallowing  Negative for ear pain and sore throat  Eyes: Negative for pain and visual disturbance  Respiratory: Positive for cough and shortness of breath  Cardiovascular: Negative for chest pain and palpitations  Gastrointestinal: Negative for abdominal pain and vomiting  Genitourinary: Negative for dysuria and hematuria  Musculoskeletal: Negative for arthralgias and back pain  Skin: Negative for color change and rash  Neurological: Negative for seizures and syncope  Psychiatric/Behavioral: Positive for sleep disturbance  All other systems reviewed and are negative  Past Medical History:   Diagnosis Date   • Cancer [facility])     Throat   • Chronic pain disorder    • Diabetes mellitus (HCC)    • Diffuse large B cell lymphoma (HCC)    • Dysphagia    • GERD without esophagitis    • HTN (hypertension)    • Hyperlipidemia    • Hypertension    • MI (myocardial infarction) ([facility] )    • Port-A-Cath in place 07/29/2019   • Thyroid cancer ([facility] ) 2018       Past Surgical History:   Procedure Laterality Date   • BONE MARROW BIOPSY     • BREAST BIOPSY Left    • CARDIAC CATHETERIZATION N/A 12/9/2022    Procedure: Cardiac catheterization;  Surgeon: Stefany Ruiz MD;  Location: [facility];   Service: Cardiology   • CARDIAC CATHETERIZATION N/A 12/9/2022 Procedure: Cardiac Coronary Angiogram;  Surgeon: Oni Damon MD;  Location: [facility]; Service: Cardiology   • CARDIAC CATHETERIZATION Left 12/9/2022    Procedure: Cardiac Left Heart Cath;  Surgeon: Oni Damon MD;  Location: [facility]; Service: Cardiology   • CHOLECYSTECTOMY     • IR PORT PLACEMENT  11/16/2018   • IR PORT REMOVAL  3/22/2021   • OTHER SURGICAL HISTORY      tendor tear repair to right shoulder   • SHOULDER ARTHROSCOPY         Social History     Socioeconomic History   • Marital status:      Spouse name: None   • Number of children: None   • Years of education: None   • Highest education level: None   Occupational History   • None   Tobacco Use   • Smoking status: Never   • Smokeless tobacco: Never   Vaping Use   • Vaping Use: Never used   Substance and Sexual Activity   • Alcohol use: Never     Comment: 0   • Drug use: No   • Sexual activity: Not Currently     Partners: Male   Other Topics Concern   • None   Social History Narrative   • None     Social Determinants of Health     Financial Resource Strain: Not on file   Food Insecurity: No Food Insecurity   • Worried About Running Out of Food in the Last Year: Never true   • Ran Out of Food in the Last Year: Never true   Transportation Needs: No Transportation Needs   • Lack of Transportation (Medical): No   • Lack of Transportation (Non-Medical): No   Physical Activity: Not on file   Stress: Stress Concern Present   • Feeling of Stress :  To some extent   Social Connections: Not on file   Intimate Partner Violence: Not on file   Housing Stability: Low Risk    • Unable to Pay for Housing in the Last Year: No   • Number of Places Lived in the Last Year: 1   • Unstable Housing in the Last Year: No       Family History   Problem Relation Age of Onset   • Diabetes Mother    • Heart disease Sister    • Hypertension Brother    • Uterine cancer Maternal Grandmother    • Prostate cancer Paternal Grandfather        No Known Allergies      Current Outpatient Medications:   •  Accu-Chek FastClix Lancets MISC, Use to test 3x daily, Disp: 102 each, Rfl: 0  •  albuterol (PROVENTIL HFA,VENTOLIN HFA) 90 mcg/act inhaler, INHALAR 1-2 BOCANADAS A LOS PULMONES CADA 4-6 HORAS ALFREDO SEA NECESARIO, Disp: 18 g, Rfl: 10  •  ALPRAZolam (XANAX) 0 25 mg tablet, Take 1 tablet (0 25 mg total) by mouth daily at bedtime, Disp: 30 tablet, Rfl: 0  •  aspirin 81 mg chewable tablet, Chew 1 tablet (81 mg total) daily Do not start before December 11, 2022 , Disp: 30 tablet, Rfl: 0  •  atorvastatin (LIPITOR) 40 mg tablet, Take 1 tablet (40 mg total) by mouth daily with dinner, Disp: 30 tablet, Rfl: 0  •  Blood Glucose Monitoring Suppl (Accu-Chek Guide) w/Device KIT, Use in the morning, Disp: 1 kit, Rfl: 0  •  Calcium Carb-Cholecalciferol 600-400 MG-UNIT TABS, Take 1 tablet by mouth 2 (two) times a day, Disp: 60 tablet, Rfl: 2  •  carvedilol (COREG) 12 5 mg tablet, Take 0 5 tablets (6 25 mg total) by mouth 2 (two) times a day with meals, Disp: 60 tablet, Rfl: 0  •  cyanocobalamin (VITAMIN B-12) 500 MCG tablet, Take 1,000 mcg by mouth daily, Disp: , Rfl:   •  ezetimibe (ZETIA) 10 mg tablet, MAR JOHNNY (1) TABLETA POR VIA ORAL JOHNNY VEZ JEFFREY, Disp: 30 tablet, Rfl: 10  •  ferrous sulfate 325 (65 Fe) mg tablet, Take 325 mg by mouth daily with breakfast, Disp: , Rfl:   •  fluticasone (FLONASE) 50 mcg/act nasal spray, USAR 1 ROCIADA EN CADA FOSA NASAL DIARIAMENTE, Disp: 16 g, Rfl: 10  •  furosemide (LASIX) 20 mg tablet, Take 1 tablet (20 mg total) by mouth daily, Disp: 30 tablet, Rfl: 0  •  gabapentin (NEURONTIN) 100 mg capsule, Take 1 capsule (100 mg total) by mouth 2 (two) times a day, Disp: 60 capsule, Rfl: 5  •  glucose blood (Accu-Chek Guide) test strip, Use to test 3x daily, Disp: 100 strip, Rfl: 0  •  Insulin Glargine Solostar (Lantus SoloStar) 100 UNIT/ML SOPN, Use 9 units subcut once dialy, Disp: 15 mL, Rfl: 5  •  Insulin Pen Needle (BD Pen Needle Micro U/F) 32G X 6 MM MISC, Use daily, Disp: 100 each, Rfl: 1  •  loperamide (IMODIUM) 2 mg capsule, MAR JOHNNY (1) CAPSULA POR VIA ORAL ALFREDO SEA NECESARIO PARA LA DIARREA, Disp: 30 capsule, Rfl: 10  •  losartan (COZAAR) 25 mg tablet, Take 1 tablet (25 mg total) by mouth daily Do not start before December 11, 2022 , Disp: 30 tablet, Rfl: 0  •  memantine (NAMENDA) 10 mg tablet, MAR 1 TABLETA POR VIA ORAL DOS VECES AL JEFFREY, Disp: 60 tablet, Rfl: 2  •  metFORMIN (GLUCOPHAGE) 1000 MG tablet, MAR JOHNNY (1) TABLETA POR VIA ORAL DOS VECES AL JEFFREY CON COMIDAS, Disp: 60 tablet, Rfl: 10  •  mirtazapine (REMERON) 15 mg tablet, MAR JOHNNY (1) TABLETA POR VIA ORAL CADA NOCHE A LA HORA DE ACOSTARSE, Disp: 90 tablet, Rfl: 3  •  omeprazole (PriLOSEC) 40 MG capsule, MAR 1 CAPSULA POR VIA ORAL JOHNNY VEZ AL JEFFREY, Disp: 30 capsule, Rfl: 10  •  Tradjenta 5 MG TABS, MAR JOHNNY TABLETA POR VIA ORAL JOHNNY VEZ AL JEFFREY, Disp: 30 tablet, Rfl: 10      Physical Exam:  /80 (BP Location: Left arm, Patient Position: Sitting, Cuff Size: Adult)   Pulse 76   Temp 97 5 °F (36 4 °C) (Tympanic)   Resp 16   Ht 5' 2" (1 575 m)   Wt 68 1 kg (150 lb 3 2 oz)   SpO2 96%   BMI 27 47 kg/m²     Physical Exam  Constitutional:       General: She is not in acute distress  Appearance: She is well-developed  She is not diaphoretic  HENT:      Head: Normocephalic and atraumatic  Eyes:      General: No scleral icterus  Right eye: No discharge  Left eye: No discharge  Conjunctiva/sclera: Conjunctivae normal       Pupils: Pupils are equal, round, and reactive to light  Neck:      Thyroid: No thyromegaly  Vascular: No JVD  Trachea: No tracheal deviation  Cardiovascular:      Rate and Rhythm: Normal rate and regular rhythm  Heart sounds: Normal heart sounds  No murmur heard  No friction rub  Pulmonary:      Effort: Pulmonary effort is normal  No respiratory distress  Breath sounds: Normal breath sounds  No stridor   No wheezing or rales  Chest:      Chest wall: No tenderness  Abdominal:      General: There is no distension  Palpations: Abdomen is soft  There is no hepatomegaly or splenomegaly  Tenderness: There is no abdominal tenderness  There is no guarding or rebound  Musculoskeletal:         General: No tenderness or deformity  Normal range of motion  Cervical back: Normal range of motion and neck supple  Lymphadenopathy:      Cervical: No cervical adenopathy  Skin:     General: Skin is warm and dry  Coloration: Skin is not pale  Findings: No erythema or rash  Neurological:      Mental Status: She is alert and oriented to person, place, and time  Cranial Nerves: No cranial nerve deficit  Coordination: Coordination normal       Deep Tendon Reflexes: Reflexes are normal and symmetric  Psychiatric:         Behavior: Behavior normal          Thought Content: Thought content normal          Judgment: Judgment normal            Labs:  Lab Results   Component Value Date    WBC 10 20 (H) 12/20/2022    HGB 12 0 12/20/2022    HCT 37 8 12/20/2022    MCV 92 12/20/2022     12/20/2022     Lab Results   Component Value Date    K 4 2 12/20/2022     12/20/2022    CO2 32 12/20/2022    BUN 17 12/20/2022    CREATININE 0 82 12/20/2022    GLUF 134 (H) 12/20/2022    CALCIUM 9 9 12/20/2022    AST 21 12/20/2022    ALT 31 12/20/2022    ALKPHOS 82 12/20/2022    EGFR 69 12/20/2022     No results found for: TSH    Patient voiced understanding and agreement in the above discussion  Aware to contact our office with questions/symptoms in the interim

## 2023-01-10 ENCOUNTER — PATIENT OUTREACH (OUTPATIENT)
Dept: FAMILY MEDICINE CLINIC | Facility: CLINIC | Age: 78
End: 2023-01-10

## 2023-01-10 DIAGNOSIS — I42.9 CARDIOMYOPATHY (HCC): ICD-10-CM

## 2023-01-10 DIAGNOSIS — I10 ESSENTIAL HYPERTENSION: ICD-10-CM

## 2023-01-10 NOTE — PROGRESS NOTES
I received a call from [facility] requesting medication refills; submitted to PCP  [facility] states the patient is taking the last dose of a few of the meds today  [facility] notes the patient has been doing well  She responded well with an increased diuretic dose last week  [facility] denies the patient having any further edema and no chest pain or shortness of breath  She states the patient had a H/O appointment yesterday which went well and the patient will follow up in 6 months  I will continue to follow

## 2023-01-11 RX ORDER — ALPRAZOLAM 0.25 MG/1
0.25 TABLET ORAL
Qty: 30 TABLET | Refills: 0 | Status: SHIPPED | OUTPATIENT
Start: 2023-01-11

## 2023-01-11 RX ORDER — FLUTICASONE PROPIONATE 50 MCG
1 SPRAY, SUSPENSION (ML) NASAL DAILY
Qty: 16 G | Refills: 10 | Status: SHIPPED | OUTPATIENT
Start: 2023-01-11

## 2023-01-11 RX ORDER — CARVEDILOL 12.5 MG/1
6.25 TABLET ORAL 2 TIMES DAILY WITH MEALS
Qty: 60 TABLET | Refills: 2 | Status: SHIPPED | OUTPATIENT
Start: 2023-01-11

## 2023-01-11 RX ORDER — ASPIRIN 81 MG/1
81 TABLET, CHEWABLE ORAL DAILY
Qty: 30 TABLET | Refills: 2 | Status: SHIPPED | OUTPATIENT
Start: 2023-01-11 | End: 2023-02-10

## 2023-01-11 RX ORDER — LOSARTAN POTASSIUM 25 MG/1
25 TABLET ORAL DAILY
Qty: 30 TABLET | Refills: 2 | Status: SHIPPED | OUTPATIENT
Start: 2023-01-11 | End: 2023-02-10

## 2023-01-11 RX ORDER — ATORVASTATIN CALCIUM 40 MG/1
40 TABLET, FILM COATED ORAL
Qty: 30 TABLET | Refills: 2 | Status: SHIPPED | OUTPATIENT
Start: 2023-01-11 | End: 2023-02-10

## 2023-01-19 ENCOUNTER — OFFICE VISIT (OUTPATIENT)
Dept: CARDIOLOGY CLINIC | Facility: CLINIC | Age: 78
End: 2023-01-19

## 2023-01-19 VITALS
DIASTOLIC BLOOD PRESSURE: 60 MMHG | BODY MASS INDEX: 27.27 KG/M2 | OXYGEN SATURATION: 94 % | HEART RATE: 87 BPM | WEIGHT: 148.2 LBS | SYSTOLIC BLOOD PRESSURE: 138 MMHG | HEIGHT: 62 IN

## 2023-01-19 DIAGNOSIS — E11.65 UNCONTROLLED TYPE 2 DIABETES MELLITUS WITH HYPERGLYCEMIA (HCC): ICD-10-CM

## 2023-01-19 DIAGNOSIS — I50.9 HEART FAILURE, UNSPECIFIED HF CHRONICITY, UNSPECIFIED HEART FAILURE TYPE (HCC): ICD-10-CM

## 2023-01-19 DIAGNOSIS — I42.8 NONISCHEMIC CARDIOMYOPATHY (HCC): ICD-10-CM

## 2023-01-19 DIAGNOSIS — I10 ESSENTIAL HYPERTENSION: ICD-10-CM

## 2023-01-19 DIAGNOSIS — I50.20 ACC/AHA STAGE C HEART FAILURE WITH REDUCED EJECTION FRACTION (HCC): Primary | ICD-10-CM

## 2023-01-19 RX ORDER — CHOLECALCIFEROL (VITAMIN D3) 125 MCG
CAPSULE ORAL AS NEEDED
COMMUNITY

## 2023-01-19 NOTE — ASSESSMENT & PLAN NOTE
Wt Readings from Last 3 Encounters:   01/19/23 67 2 kg (148 lb 3 2 oz)   01/09/23 68 1 kg (150 lb 3 2 oz)   12/19/22 67 9 kg (149 lb 12 8 oz)     Ms Monique Murphy is recently diagnosed with dilated cardiomyopathy with severely reduced left ventricle systolic function  Currently she appears to be euvolemic on examination  She is on good guideline based medical regimen although she can be considered for addition of SGLT2 inhibitor and ARNI  Today I reviewed with her and her granddaughter her diagnosis and the need for close monitoring for 6 symptoms of decompensated heart failure  --I am adding Jardiance 10 mg daily to her medical regimen  She will need to be monitored for any urinary tract infections and for dehydration  -- I am advising her to continue all other medications  -- Some heart failure monitoring instructions are provided below  I would like to see her back in 3 months time to reassess symptom symptoms and further optimize medications  The following routine measures are being advised to prevent exacerbation of congestive heart failure:     - Daily or atleast weekly checking of weight  Notify your clinicians if greater than 2 lb is gained in 1 day or greater than 5 lb is gained in 1 wk  - Checking for lower extremity swelling  Examine legs for swelling (new or increase in existing swelling) and describe if swelling reaches ankle, shin, or knee  - Monitoring for decrease in exercise tolerance  Check your self for unusual shortness of breath at rest, or with mild exertion, moderate exertion, or none at all     - Monitoring for worsening shortness of breath on lying down  Check for increase in number of pillows used at night and for increase in waking at night with shortness of breath  - Salt/sodium restriction to less than 2 g a day  Calculate total sodium intake in a day from nutrition labels and food charts   Understand hidden sources of salt intake  • Eliminate the salt shaker  (Remember, One teaspoon of table salt = 2,300 mg of sodium)  •  Avoid using garlic salt, onion salt, MSG, meat tenderizers, broth mixes, [place] food, soy sauce, teriyaki sauce, barbeque sauce, sauerkraut, olives, pickles, pickle relish, sharp bits, and croutons  •  Use fresh ingredients and/or foods with no added salt  • Try orange, lemon, lime, pineapple juice, or vinegar as a base for meat marinades or to add tart flavor  • Avoid convenience foods such as canned soups, entrees, vegetables, pasta and rice mixes, frozen dinners, instant cereal and puddings, and gravy sauce mixes  •  Select frozen meals that contain around 600 mg sodium or less  • Use fresh, frozen, no-added-salt canned vegetables, low-sodium soups, and low-sodium lunchmeats  • Look for seasoning or spice blends with no salt, or try fresh herbs, onions, or garlic  You are advised to inform us for any concerns regarding above measures

## 2023-01-19 NOTE — PATIENT INSTRUCTIONS
CARDIOLOGY ASSESSMENT & PLAN     1  ACC/AHA stage C heart failure with reduced ejection fraction (HCC)  Empagliflozin (Jardiance) 10 MG TABS tablet      2  Heart failure, unspecified HF chronicity, unspecified heart failure type [facility])  Ambulatory Referral to Cardiology      3  Essential hypertension        4  Uncontrolled type 2 diabetes mellitus with hyperglycemia ([facility] [address] )        5  Nonischemic cardiomyopathy (HCC)          ACC/AHA stage C heart failure with reduced ejection fraction (HCC)  Wt Readings from Last 3 Encounters:   01/19/23 67 2 kg (148 lb 3 2 oz)   01/09/23 68 1 kg (150 lb 3 2 oz)   12/19/22 67 9 kg (149 lb 12 8 oz)     Ms Dhruv Steele is recently diagnosed with dilated cardiomyopathy with severely reduced left ventricle systolic function  Currently she appears to be euvolemic on examination  She is on good guideline based medical regimen although she can be considered for addition of SGLT2 inhibitor and ARNI  Today I reviewed with her and her granddaughter her diagnosis and the need for close monitoring for 6 symptoms of decompensated heart failure  --I am adding Jardiance 10 mg daily to her medical regimen  She will need to be monitored for any urinary tract infections and for dehydration  -- I am advising her to continue all other medications  -- Some heart failure monitoring instructions are provided below  I would like to see her back in 3 months time to reassess symptom symptoms and further optimize medications  The following routine measures are being advised to prevent exacerbation of congestive heart failure:     - Daily or atleast weekly checking of weight  Notify your clinicians if greater than 2 lb is gained in 1 day or greater than 5 lb is gained in 1 wk  - Checking for lower extremity swelling  Examine legs for swelling (new or increase in existing swelling) and describe if swelling reaches ankle, shin, or knee      - Monitoring for decrease in exercise tolerance  Check your self for unusual shortness of breath at rest, or with mild exertion, moderate exertion, or none at all     - Monitoring for worsening shortness of breath on lying down  Check for increase in number of pillows used at night and for increase in waking at night with shortness of breath  - Salt/sodium restriction to less than 2 g a day  Calculate total sodium intake in a day from nutrition labels and food charts  Understand hidden sources of salt intake  Eliminate the salt shaker  (Remember, One teaspoon of table salt = 2,300 mg of sodium)   Avoid using garlic salt, onion salt, MSG, meat tenderizers, broth mixes, [place] food, soy sauce, teriyaki sauce, barbeque sauce, sauerkraut, olives, pickles, pickle relish, sharp bits, and croutons  Use fresh ingredients and/or foods with no added salt  Try orange, lemon, lime, pineapple juice, or vinegar as a base for meat marinades or to add tart flavor  Avoid convenience foods such as canned soups, entrees, vegetables, pasta and rice mixes, frozen dinners, instant cereal and puddings, and gravy sauce mixes  Select frozen meals that contain around 600 mg sodium or less  Use fresh, frozen, no-added-salt canned vegetables, low-sodium soups, and low-sodium lunchmeats  Look for seasoning or spice blends with no salt, or try fresh herbs, onions, or garlic  You are advised to inform us for any concerns regarding above measures

## 2023-01-19 NOTE — PROGRESS NOTES
[facility]  [address] [facility], [place] [address]  Phone#  [phone]  Fax#  [phone]  *-*-*-*-*-*-*-*-*-*-*-*-*-*-*-*-*-*-*-*-*-*-*-*-*-*-*-*-*-*-*-*-*-*-*-*-*-*-*-*-*-*-*-*-*-*-*-*-*-*-*-*-*-*  ENCOUNTER DATE: 01/19/23 11:15 AM  PATIENT NAME: Teresita Grady   [birth year]    [id number]  AGE:77 y o  SEX: female  Margarita Jordan MD     PRIMARY CARE PHYSICIAN: MOIRA Gould    DIAGNOSES:  1  Nonischemic cardiomyopathy, diagnosed December 2022  2  Heart failure with reduced ejection fraction,  3  Diabetes mellitus  4  Primary hypertension  5  History of palpitations  6  Atypical chest pain  7  History of diffuse large B-cell lymphoma of neck, diagnosed 2018 status post chemotherapy with R-EPOCH regimen, followed by R-CHOP completed in March 2019, currently in remission  8  Chemotherapy-induced peripheral neuropathy   9  Normocytic anemia  10  Anemia     Cardiac catheterization 12/9/2022:  Left main: Moderate-sized vessel, angiographically normal  LAD: Moderate-sized vessel, angiographically normal  LCx: Large vessel, angiographically normal  RCA: Moderate-sized dominant vessel, minimal luminal irregularities  ECHOCARDIOGRAM 12/7/2022:  1  Normal left ventricular cavity size, severely reduced left ventricular systolic function  Global hypokinesis with regional wall motion variability  Ejection fraction is estimated as around 33%  Markedly decreased global longitudinal strain, -7 2%  Grade 3 diastolic dysfunction, restrictive left ventricular filling pattern  2   Normal right ventricular size and systolic function  3 mild left atrial cavity enlargement  4   Aortic valve sclerosis with some focal calcification, no aortic valve stenosis  Trace aortic valve regurgitation  5   Mild mitral valve sclerosis, mild mitral valve regurgitation  6   Mild tricuspid valve regurgitation  7   No obvious pulmonary hypertension    8   No pericardial effusion      Compared to report of previous echocardiogram from 11/14/2018 left ventricular dysfunction, there is significant decrease in left ventricular function and ejection fraction is markedly decreased  HOLTER MONITOR 4/11/2021:  Holter monitor reveals the underlying rhythm is sinus rhythm with an average heart rate of 75 beats per minute, a minimum heart rate of 60 beats per minute and a maximum heart rate of 113 beats per minute  There are rare ventricular ectopy comprised of rare VPCs   There are rare supraventricular ectopy comprised of rare APCs and a single atrial couplet  Single 5 beat run of non-sustained PSVT with aberrant conduction  There is no evidence of significant bradyarrhythmia or advanced heart block  The longest R to R was 1 1 seconds  Intermittent bundle branch block was noted  BBB appears to be rate related  No reported symptoms on patient diary  CURRENT ECG   No results found for this visit on 01/19/23  CARDIOLOGY ASSESSMENT & PLAN     1  ACC/AHA stage C heart failure with reduced ejection fraction (HCC)  Empagliflozin (Jardiance) 10 MG TABS tablet      2  Heart failure, unspecified HF chronicity, unspecified heart failure type [facility])  Ambulatory Referral to Cardiology      3  Essential hypertension        4  Uncontrolled type 2 diabetes mellitus with hyperglycemia ([facility] [address] )        5  Nonischemic cardiomyopathy (HCC)          ACC/AHA stage C heart failure with reduced ejection fraction (HCC)  Wt Readings from Last 3 Encounters:   01/19/23 67 2 kg (148 lb 3 2 oz)   01/09/23 68 1 kg (150 lb 3 2 oz)   12/19/22 67 9 kg (149 lb 12 8 oz)     Ms Concha Arauz is recently diagnosed with dilated cardiomyopathy with severely reduced left ventricle systolic function  Currently she appears to be euvolemic on examination  She is on good guideline based medical regimen although she can be considered for addition of SGLT2 inhibitor and ARNI      Today I reviewed with her and her granddaughter her diagnosis and the need for close monitoring for 6 symptoms of decompensated heart failure  --I am adding Jardiance 10 mg daily to her medical regimen  She will need to be monitored for any urinary tract infections and for dehydration  -- I am advising her to continue all other medications  -- Some heart failure monitoring instructions are provided below  I would like to see her back in 3 months time to reassess symptom symptoms and further optimize medications  The following routine measures are being advised to prevent exacerbation of congestive heart failure:     - Daily or atleast weekly checking of weight  Notify your clinicians if greater than 2 lb is gained in 1 day or greater than 5 lb is gained in 1 wk  - Checking for lower extremity swelling  Examine legs for swelling (new or increase in existing swelling) and describe if swelling reaches ankle, shin, or knee  - Monitoring for decrease in exercise tolerance  Check your self for unusual shortness of breath at rest, or with mild exertion, moderate exertion, or none at all     - Monitoring for worsening shortness of breath on lying down  Check for increase in number of pillows used at night and for increase in waking at night with shortness of breath  - Salt/sodium restriction to less than 2 g a day  Calculate total sodium intake in a day from nutrition labels and food charts  Understand hidden sources of salt intake  • Eliminate the salt shaker  (Remember, One teaspoon of table salt = 2,300 mg of sodium)  •  Avoid using garlic salt, onion salt, MSG, meat tenderizers, broth mixes, [place] food, soy sauce, teriyaki sauce, barbeque sauce, sauerkraut, olives, pickles, pickle relish, sharp bits, and croutons  •  Use fresh ingredients and/or foods with no added salt  • Try orange, lemon, lime, pineapple juice, or vinegar as a base for meat marinades or to add tart flavor    • Avoid convenience foods such as canned soups, entrees, vegetables, pasta and rice mixes, frozen dinners, instant cereal and puddings, and gravy sauce mixes  •  Select frozen meals that contain around 600 mg sodium or less  • Use fresh, frozen, no-added-salt canned vegetables, low-sodium soups, and low-sodium lunchmeats  • Look for seasoning or spice blends with no salt, or try fresh herbs, onions, or garlic  You are advised to inform us for any concerns regarding above measures  INTERVAL HISTORY & HISTORY OF PRESENT ILLNESS     Maciej Keenan is here for follow-up regarding her cardiac comorbidities which include: Recently diagnosed nonischemic cardiomyopathy with heart failure with reduced ejection fraction, primary hypertension, obstructive sleep apnea and other comorbidities  She was recently admitted at [facility] between December 7 and 10, 2022  She had presented with shortness of breath and was found to have pulmonary vascular congestion  She was hypoxemic and was placed on BiPAP therapy along with diuresis  Echocardiogram had shown severely reduced left ventricle systolic function  Cardiology team was consulted and cardiac catheterization showed no significant occlusive coronary artery disease  Treatment for nonischemic cardiomyopathy was initiated  She is here for follow-up accompanied with her granddaughter who is also her primary caregiver  Patient has advanced dementia and is unable to provide history  Patient reports that she has been overall well  She gets palpitations sometimes especially when she is scared otherwise she is well  She denies experiencing shortness of breath or lower extremity edema  Denies any chest pain  She denies any orthopnea or PND  She is not on any oxygen therapy  Functional capacity status: Moderate to good  She is supposed to be using a walker but she is hesitant and does not want to use it    She has had no recent falls   (Excellent- >10 METs; Good: (7-10 METs); Moderate (4-7 METs); Poor (<= 4 METs)    Any chronic stressors: None   (feeling of poor health, financial problems, and social isolation etc)  Tobacco or alcohol dependence: She does not smoke and she does not drink  Current cardiac medications: Furosemide 20 mg daily, ezetimibe 10 mg daily, losartan 25 mg daily, carvedilol 6 25 mg twice daily  Last blood work is from 12/20/2022  Sodium was 133 potassium 4 2 chloride 100 bicarb 32 BUN 17 creatinine 0 82 LFTs were normal, LDH was increased at 297, NT proBNP level was 792, hemoglobin and hematocrit and platelet count are normal   Hemoglobin A1c was 6 9   TSH in February 2021 was 3 45      REVIEW OF SYSTEMS   Positive for: As noted above in HPI  Negative for: All remaining as reviewed below and in HPI  SYSTEM SYMPTOMS REVIEWED:  General--weight change, fever, night sweats  Respiratory--cough, wheezing, shortness of breath, sputum production  Cardiovascular--chest pain, syncope, dyspnea on exertion, edema, decline in exercise tolerance, claudication   Gastrointestinal--persistent vomiting, diarrhea, abdominal distention, blood in stool   Muscular or skeletal--joint pain or swelling   Neurologic--headaches, syncope, abnormal movement  Hematologic--history of easy bruising and bleeding   Endocrine--thyroid enlargement, heat or cold intolerance, polyuria   Psychiatric--anxiety, depression     *-*-*-*-*-*-*-*-*-*-*-*-*-*-*-*-*-*-*-*-*-*-*-*-*-*-*-*-*-*-*-*-*-*-*-*-*-*-*-*-*-*-*-*-*-*-*-*-*-*-*-*-*-*-  VITAL SIGNS     CURRENT VITAL SIGNS:   Vitals:    01/19/23 1042   BP: 138/60   Pulse: 87   SpO2: 94%   Weight: 67 2 kg (148 lb 3 2 oz)   Height: 5' 2" (1 575 m)       BMI: Body mass index is 27 11 kg/m²      WEIGHTS:   Wt Readings from Last 25 Encounters:   01/19/23 67 2 kg (148 lb 3 2 oz)   01/09/23 68 1 kg (150 lb 3 2 oz)   12/19/22 67 9 kg (149 lb 12 8 oz)   12/09/22 70 8 kg (156 lb 1 4 oz)   10/21/22 67 5 kg (148 lb 14 4 oz)   10/03/22 67 7 kg (149 lb 3 2 oz)   07/18/22 66 2 kg (146 lb)   07/18/22 66 2 kg (146 lb)   06/20/22 66 4 kg (146 lb 4 8 oz)   06/08/22 66 5 kg (146 lb 9 6 oz)   05/10/22 66 2 kg (145 lb 14 4 oz)   05/09/22 65 8 kg (145 lb)   05/05/22 65 9 kg (145 lb 3 2 oz)   05/02/22 65 4 kg (144 lb 3 2 oz)   03/17/22 66 2 kg (146 lb)   03/15/22 67 6 kg (149 lb)   03/04/22 66 4 kg (146 lb 6 4 oz)   01/05/22 66 kg (145 lb 6 4 oz)   11/17/21 65 8 kg (145 lb)   10/20/21 64 9 kg (143 lb)   10/04/21 67 6 kg (149 lb)   09/03/21 65 3 kg (144 lb)   07/09/21 65 8 kg (145 lb)   06/14/21 65 3 kg (144 lb)   06/03/21 66 9 kg (147 lb 8 oz)        *-*-*-*-*-*-*-*-*-*-*-*-*-*-*-*-*-*-*-*-*-*-*-*-*-*-*-*-*-*-*-*-*-*-*-*-*-*-*-*-*-*-*-*-*-*-*-*-*-*-*-*-*-*-  PHYSICAL EXAM     General Appearance:    Alert, cooperative, no distress, appears stated age, slightly overweight   Head, Eyes, ENT:    No obvious abnormality, moist mucous mebranes  Neck:   Supple, no carotid bruit or JVD   Back:     Symmetric, no curvature  Lungs:     Respirations unlabored  Clear to auscultation bilaterally,    Chest wall:    No tenderness or deformity   Heart:    Regular rate and rhythm, S1 and S2 normal, no murmur, rub  or gallop  Abdomen:     Soft, non-tender, No obvious masses, or organomegaly   Extremities:   Extremities warm, no cyanosis or edema    Skin:   No venostatic changes in lower extremities  Normal skin color, texture, and turgor   No rashes or lesions     *-*-*-*-*-*-*-*-*-*-*-*-*-*-*-*-*-*-*-*-*-*-*-*-*-*-*-*-*-*-*-*-*-*-*-*-*-*-*-*-*-*-*-*-*-*-*-*-*-*-*-*-*-*-  CURRENT MEDICATIONS LIST     Current Outpatient Medications:   •  Accu-Chek FastClix Lancets MISC, Use to test 3x daily, Disp: 102 each, Rfl: 0  •  albuterol (PROVENTIL HFA,VENTOLIN HFA) 90 mcg/act inhaler, INHALAR 1-2 BOCANADAS A LOS PULMONES CADA 4-6 HORAS ALFREDO SEA NECESARIO, Disp: 18 g, Rfl: 10  •  ALPRAZolam (XANAX) 0 25 mg tablet, Take 1 tablet (0 25 mg total) by mouth daily at bedtime, Disp: 30 tablet, Rfl: 0  •  aspirin 81 mg chewable tablet, Chew 1 tablet (81 mg total) daily, Disp: 30 tablet, Rfl: 2  •  atorvastatin (LIPITOR) 40 mg tablet, Take 1 tablet (40 mg total) by mouth daily with dinner, Disp: 30 tablet, Rfl: 2  •  Blood Glucose Monitoring Suppl (Accu-Chek Guide) w/Device KIT, Use in the morning, Disp: 1 kit, Rfl: 0  •  Calcium Carb-Cholecalciferol 600-400 MG-UNIT TABS, Take 1 tablet by mouth 2 (two) times a day, Disp: 60 tablet, Rfl: 2  •  carvedilol (COREG) 12 5 mg tablet, Take 0 5 tablets (6 25 mg total) by mouth 2 (two) times a day with meals, Disp: 60 tablet, Rfl: 2  •  cyanocobalamin (VITAMIN B-12) 500 MCG tablet, Take 1,000 mcg by mouth daily, Disp: , Rfl:   •  Empagliflozin (Jardiance) 10 MG TABS tablet, Take 1 tablet (10 mg total) by mouth every morning, Disp: 90 tablet, Rfl: 3  •  ezetimibe (ZETIA) 10 mg tablet, MAR JOHNNY (1) TABLETA POR VIA ORAL JOHNNY VEZ JEFFREY, Disp: 30 tablet, Rfl: 10  •  ferrous sulfate 325 (65 Fe) mg tablet, Take 325 mg by mouth daily with breakfast, Disp: , Rfl:   •  fluticasone (FLONASE) 50 mcg/act nasal spray, 1 spray into each nostril daily, Disp: 16 g, Rfl: 10  •  furosemide (LASIX) 20 mg tablet, Take 1 tablet (20 mg total) by mouth daily, Disp: 30 tablet, Rfl: 0  •  gabapentin (NEURONTIN) 100 mg capsule, Take 1 capsule (100 mg total) by mouth 2 (two) times a day, Disp: 60 capsule, Rfl: 5  •  glucose blood (Accu-Chek Guide) test strip, Use to test 3x daily, Disp: 100 strip, Rfl: 0  •  Insulin Glargine Solostar (Lantus SoloStar) 100 UNIT/ML SOPN, Use 9 units subcut once dialy, Disp: 15 mL, Rfl: 5  •  Insulin Pen Needle (BD Pen Needle Micro U/F) 32G X 6 MM MISC, Use daily, Disp: 100 each, Rfl: 1  •  loperamide (IMODIUM) 2 mg capsule, MAR JOHNNY (1) CAPSULA POR VIA ORAL ALFREDO SEA NECESARIO PARA LA DIARREA, Disp: 30 capsule, Rfl: 10  •  losartan (COZAAR) 25 mg tablet, Take 1 tablet (25 mg total) by mouth daily, Disp: 30 tablet, Rfl: 2  •  Melatonin 5 MG TABS, Take by mouth as needed, Disp: , Rfl:   •  memantine (NAMENDA) 10 mg tablet, MAR JOHNNY TABLETA POR VIA ORAL DOS VECES AL JEFFREY, Disp: 60 tablet, Rfl: 3  •  metFORMIN (GLUCOPHAGE) 1000 MG tablet, MAR JOHNNY (1) TABLETA POR VIA ORAL DOS VECES AL JEFFREY CON COMIDAS, Disp: 60 tablet, Rfl: 10  •  mirtazapine (REMERON) 15 mg tablet, MAR JOHNNY (1) TABLETA POR VIA ORAL CADA NOCHE A LA HORA DE ACOSTARSE, Disp: 90 tablet, Rfl: 3  •  omeprazole (PriLOSEC) 40 MG capsule, MAR 1 CAPSULA POR VIA ORAL JOHNNY VEZ AL JEFFREY, Disp: 30 capsule, Rfl: 10  •  Tradjenta 5 MG TABS, MAR JOHNNY TABLETA POR VIA ORAL JOHNNY VEZ AL JEFFREY, Disp: 30 tablet, Rfl: 10       ALLERGIES   No Known Allergies    *-*-*-*-*-*-*-*-*-*-*-*-*-*-*-*-*-*-*-*-*-*-*-*-*-*-*-*-*-*-*-*-*-*-*-*-*-*-*-*-*-*-*-*-*-*-*-*-*-*-*-*-*-*-  LABORATORY DATA   No results found for: NA  Potassium   Date Value Ref Range Status   12/20/2022 4 2 3 5 - 5 3 mmol/L Final   12/10/2022 4 5 3 5 - 5 3 mmol/L Final   12/09/2022 4 3 3 5 - 5 3 mmol/L Final     Chloride   Date Value Ref Range Status   12/20/2022 100 96 - 108 mmol/L Final   12/10/2022 94 (L) 96 - 108 mmol/L Final   12/09/2022 93 (L) 96 - 108 mmol/L Final     CO2   Date Value Ref Range Status   12/20/2022 32 21 - 32 mmol/L Final   12/10/2022 29 21 - 32 mmol/L Final   12/09/2022 30 21 - 32 mmol/L Final     BUN   Date Value Ref Range Status   12/20/2022 17 5 - 25 mg/dL Final   12/10/2022 22 5 - 25 mg/dL Final   12/09/2022 22 5 - 25 mg/dL Final     Creatinine   Date Value Ref Range Status   12/20/2022 0 82 0 60 - 1 30 mg/dL Final     Comment:     Standardized to IDMS reference method   12/10/2022 0 81 0 60 - 1 30 mg/dL Final     Comment:     Standardized to IDMS reference method   12/09/2022 0 84 0 60 - 1 30 mg/dL Final     Comment:     Standardized to IDMS reference method     eGFR   Date Value Ref Range Status   12/20/2022 69 ml/min/1 73sq m Final   12/10/2022 70 ml/min/1 73sq m Final   12/09/2022 67 ml/min/1 73sq m Final     Calcium   Date Value Ref Range Status   12/20/2022 9 9 8 3 - 10 1 mg/dL Final   12/10/2022 8 6 8 3 - 10 1 mg/dL Final   12/09/2022 8 9 8 3 - 10 1 mg/dL Final     AST   Date Value Ref Range Status   12/20/2022 21 5 - 45 U/L Final     Comment:     Specimen collection should occur prior to Sulfasalazine administration due to the potential for falsely depressed results  12/07/2022 21 5 - 45 U/L Final     Comment:     Specimen collection should occur prior to Sulfasalazine administration due to the potential for falsely depressed results  12/31/2021 27 14 - 36 U/L Final     Comment:     Specimen collection should occur prior to Sulfasalazine administration due to the potential for falsely depressed results  ALT   Date Value Ref Range Status   12/20/2022 31 12 - 78 U/L Final     Comment:     Specimen collection should occur prior to Sulfasalazine and/or Sulfapyridine administration due to the potential for falsely depressed results  12/07/2022 34 12 - 78 U/L Final     Comment:     Specimen collection should occur prior to Sulfasalazine administration due to the potential for falsely depressed results  12/31/2021 24 <35 U/L Final     Comment:     Specimen collection should occur prior to Sulfasalazine administration due to the potential for falsely depressed results        Alkaline Phosphatase   Date Value Ref Range Status   12/20/2022 82 46 - 116 U/L Final   12/07/2022 113 46 - 116 U/L Final   12/31/2021 97 43 - 122 U/L Final     Magnesium   Date Value Ref Range Status   12/08/2022 1 7 1 6 - 2 6 mg/dL Final   12/03/2022 1 5 (L) 1 6 - 2 6 mg/dL Final   12/31/2021 1 4 (L) 1 6 - 2 3 mg/dL Final     WBC   Date Value Ref Range Status   12/20/2022 10 20 (H) 4 31 - 10 16 Thousand/uL Final   12/09/2022 9 44 4 31 - 10 16 Thousand/uL Final   12/07/2022 11 18 (H) 4 31 - 10 16 Thousand/uL Final     Hemoglobin   Date Value Ref Range Status   12/20/2022 12 0 11 5 - 15 4 g/dL Final   12/09/2022 12 7 11 5 - 15 4 g/dL Final   12/07/2022 11 2 (L) 11 5 - 15 4 g/dL Final     Platelets   Date Value Ref Range Status   12/20/2022 364 149 - 390 Thousands/uL Final   12/09/2022 298 149 - 390 Thousands/uL Final   12/07/2022 269 149 - 390 Thousands/uL Final     PTT   Date Value Ref Range Status   12/07/2022 35 23 - 37 seconds Final     Comment:     Therapeutic Heparin Range =  60-90 seconds   02/25/2019 32 23 - 34 seconds Final     Comment:     Therapeutic Heparin Range =  52-80 seconds     INR   Date Value Ref Range Status   12/07/2022 0 95 0 84 - 1 19 Final   02/25/2019 1 10 0 89 - 1 10 Final     No results found for: CKMB, DIGOXIN  No results found for: TSH  No results found for: CHOL   Hemoglobin A1C   Date Value Ref Range Status   12/03/2022 6 9 (H) Normal 3 8-5 6%; PreDiabetic 5 7-6 4%; Diabetic >=6 5%; Glycemic control for adults with diabetes <7 0% % Final   08/03/2016 8 5  Final     Comment:       Performed at: In Office  ,       Blood Culture   Date Value Ref Range Status   02/25/2019 No Growth After 5 Days  Final   02/25/2019 No Growth After 5 Days  Final   02/21/2019 No Growth After 5 Days  Final   02/21/2019 No Growth After 5 Days  Final     Urine Culture   Date Value Ref Range Status   05/06/2016 >100,000 cfu/ml Escherichia coli  Final     Legionella Urinary Antigen   Date Value Ref Range Status   02/26/2019 Negative Negative Final     C difficile toxin by PCR   Date Value Ref Range Status   11/09/2018 NEGATIVE for C difficle toxin by PCR  NEGATIVE for C difficle toxin by PCR  Final       *-*-*-*-*-*-*-*-*-*-*-*-*-*-*-*-*-*-*-*-*-*-*-*-*-*-*-*-*-*-*-*-*-*-*-*-*-*-*-*-*-*-*-*-*-*-*-*-*-*-*-*-*-*-  PREVIOUS CARDIOLOGY & RADIOLOGY TEST RESULTS   I have personally reviewed pertinent results of cardiovascular tests noted below      Results for orders placed during the hospital encounter of 11/14/18    Echo complete with contrast if indicated    Narrative  [address] [place], [place] [address] Blvd    Transthoracic Echocardiogram  2D, M-mode, Doppler, and Color Doppler    Study date:  2018    Patient: Baldemar Navarro  MR number: [id number]  Account number: [de-identified]  : 1945  Age: 68 years  Gender: Female  Status: Outpatient  Location: [address] [place] [address]  Height: 60 in  Weight: 140 8 lb  BP: 102/ 68 mmHg    Indications: Chemo    Diagnoses: C83 30 - Diffuse large B-cell lymphoma, unspecified site    Sonographer:  Diamond Pardo CCT,RCS  Primary Physician:  Toney Lincoln PA-C  Referring Physician:  Alka Maria MD  Group:  [id number]  Interpreting Physician:  Sheila Man MD    SUMMARY    SUMMARY:  1  Sinus rhythm  2  Poor technical quality  3  Normal left ventricular systolic function  4  Grade 1 left ventricular diastolic dysfunction with normal filling pressures  5  Biatrial enlargement  6  Calcific aortic sclerosis without stenosis  LEFT VENTRICLE:  Normal left ventricular systolic function, EF 92%  Normal left ventricular cavity size  Normal left ventricular wall motion without regional wall motion abnormalities  Grade 1 left ventricular diastolic dysfunction  Normal left ventricular  filling pressures  LEFT ATRIUM:  Mild left atrial enlargement  RIGHT ATRIUM:  Minimal right atrial enlargement  MITRAL VALVE:  Trace mitral regurgitation  AORTIC VALVE:  Calcified, sclerotic, tricuspid aortic valve without stenosis  TRICUSPID VALVE:  Mild tricuspid regurgitation  PULMONIC VALVE:  Trace pulmonic regurgitation  PULMONARY ARTERIES:  Normal pulmonary artery pressures  PERICARDIUM:  No pericardial effusion  HISTORY: PRIOR HISTORY: Diabetes, shortness of breath, hypertension    PROCEDURE: The study was performed in the [address] [place] [address]  This was a routine study  The transthoracic approach was used  The study included complete 2D imaging, M-mode, complete spectral Doppler, and color Doppler   The heart rate was  65 bpm, at the start of the study  Images were obtained from the parasternal, apical, subcostal, and suprasternal notch acoustic windows  Image quality was adequate  LEFT VENTRICLE: Normal left ventricular systolic function, EF 97%  Normal left ventricular cavity size  Normal left ventricular wall motion without regional wall motion abnormalities  Grade 1 left ventricular diastolic dysfunction  Normal  left ventricular filling pressures  RIGHT VENTRICLE: The size was normal  Systolic function was normal  Wall thickness was normal     LEFT ATRIUM: Mild left atrial enlargement  RIGHT ATRIUM: Minimal right atrial enlargement  MITRAL VALVE: Trace mitral regurgitation  There was normal leaflet separation  DOPPLER: The transmitral velocity was within the normal range  There was no evidence for stenosis  There was no regurgitation  AORTIC VALVE: Calcified, sclerotic, tricuspid aortic valve without stenosis  TRICUSPID VALVE: Mild tricuspid regurgitation  PULMONIC VALVE: Trace pulmonic regurgitation  PERICARDIUM: No pericardial effusion  There was no pericardial effusion  The pericardium was normal in appearance  AORTA: The root exhibited normal size  PULMONARY ARTERY: Normal pulmonary artery pressures      SYSTEM MEASUREMENT TABLES    2D  %FS: 37 59 %  Ao Diam: 2 96 cm  EDV(Teich): 80 71 ml  EF(Teich): 68 01 %  ESV(Teich): 25 82 ml  IVSd: 0 98 cm  LA Area: 18 03 cm2  LA Diam: 3 81 cm  LVEDV MOD A4C: 55 5 ml  LVEF MOD A4C: 66 21 %  LVESV MOD A4C: 18 75 ml  LVIDd: 4 25 cm  LVIDs: 2 65 cm  LVLd A4C: 6 01 cm  LVLs A4C: 5 21 cm  LVPWd: 0 99 cm  RA Area: 15 94 cm2  RVIDd: 3 83 cm  SV MOD A4C: 36 75 ml  SV(Teich): 54 89 ml    CW  AV Vmax: 1 33 m/s  AV maxP 08 mmHg  RAP: 10 mmHg  TR Vmax: 2 4 m/s  TR maxP mmHg    MM  TAPSE: 2 14 cm    PW  E': 0 09 m/s  E/E': 6 19  LVOT Vmax: 1 18 m/s  LVOT maxP 56 mmHg  MV A Maximiliano: 0 84 m/s  MV Dec Grimes: 1 74 m/s2  MV DecT: 313 59 ms  MV E Maximiliano: 0 55 m/s  MV E/A Ratio: 0 65  MV PHT: 90 94 ms  MVA By PHT: 2 42 cm2  PAEDP: 13 06 mmHg  PRend PG: 3 06 mmHg  PRend Vmax: 0 87 m/s  PV Vmax: 0 75 m/s  PV maxP 27 mmHg  RVSP: 33 mmHg    [facility] Accredited [facility]ch[facility]    Prepared and electronically signed by    Patricia Lyman MD  Signed [id number] 17:16:37    No results found for this or any previous visit  No results found for this or any previous visit  No results found for this or any previous visit  XR chest pa & lateral  Narrative: CHEST     INDICATION:   I50 9: Heart failure, unspecified  History of lymphoma  COMPARISON:  CXR 2022, 12/3/2022, and 2019 and chest CT 2021  EXAM PERFORMED/VIEWS:  XR CHEST PA & LATERAL  DUAL ENERGY SUBTRACTION  FINDINGS:    Cardiomediastinal silhouette appears unremarkable  Mild chronic scar in the right base with no acute disease  No effusion or pneumothorax  Osseous structures appear within normal limits for patient age  Cholecystectomy  Impression: No acute cardiopulmonary disease      Workstation performed: [id number]        *-*-*-*-*-*-*-*-*-*-*-*-*-*-*-*-*-*-*-*-*-*-*-*-*-*-*-*-*-*-*-*-*-*-*-*-*-*-*-*-*-*-*-*-*-*-*-*-*-*-*-*-*-*-  SIGNATURES:   [unfilled]   Carina Underwood MD; MHA    *-*-*-*-*-*-*-*-*-*-*-*-*-*-*-*-*-*-*-*-*-*-*-*-*-*-*-*-*-*-*-*-*-*-*-*-*-*-*-*-*-*-*-*-*-*-*-*-*-*-*-*-*-*-  PAST MEDICAL HISTORY:  Past Medical History:   Diagnosis Date   • Cancer ([address] )     Throat   • Chronic pain disorder    • Diabetes mellitus (HCC)    • Diffuse large B cell lymphoma (HCC)    • Dysphagia    • GERD without esophagitis    • HTN (hypertension)    • Hyperlipidemia    • Hypertension    • MI (myocardial infarction) ([facility] )    • Port-A-Cath in place 2019   • Thyroid cancer ([facility] ) 2018    PAST SURGICAL HISTORY:  Past Surgical History:   Procedure Laterality Date   • BONE MARROW BIOPSY     • BREAST BIOPSY Left    • CARDIAC CATHETERIZATION N/A 2022    Procedure: Cardiac catheterization; Surgeon: Evangelista Mock MD;  [facility]; Service: Cardiology   • CARDIAC CATHETERIZATION N/A 12/9/2022    Procedure: Cardiac Coronary Angiogram;  Surgeon: Evangelista Mock MD;  [facility]; Service: Cardiology   • CARDIAC CATHETERIZATION Left 12/9/2022    Procedure: Cardiac Left Heart Cath;  Surgeon: Evangelista Mock MD;  [facility];   Service: Cardiology   • CHOLECYSTECTOMY     • IR PORT PLACEMENT  11/16/2018   • IR PORT REMOVAL  3/22/2021   • OTHER SURGICAL HISTORY      tendor tear repair to right shoulder   • SHOULDER ARTHROSCOPY           FAMILY HISTORY:  Family History   Problem Relation Age of Onset   • Diabetes Mother    • Heart disease Sister    • Hypertension Brother    • Uterine cancer Maternal Grandmother    • Prostate cancer Paternal Grandfather     SOCIAL HISTORY:  Social History     Tobacco Use   Smoking Status Never   Smokeless Tobacco Never      Social History     Substance and Sexual Activity   Alcohol Use Never    Comment: 0     Social History     Substance and Sexual Activity   Drug Use No    [unfilled]     *-*-*-*-*-*-*-*-*-*-*-*-*-*-*-*-*-*-*-*-*-*-*-*-*-*-*-*-*-*-*-*-*-*-*-*-*-*-*-*-*-*-*-*-*-*-*-*-*-*-*-*-*-*  ALLERGIES:  No Known Allergies CURRENT SCHEDULED MEDICATIONS:    Current Outpatient Medications:   •  Accu-Chek FastClix Lancets MISC, Use to test 3x daily, Disp: 102 each, Rfl: 0  •  albuterol (PROVENTIL HFA,VENTOLIN HFA) 90 mcg/act inhaler, INHALAR 1-2 BOCANADAS A LOS PULMONES CADA 4-6 HORAS ALFREDO SEA NECESARIO, Disp: 18 g, Rfl: 10  •  ALPRAZolam (XANAX) 0 25 mg tablet, Take 1 tablet (0 25 mg total) by mouth daily at bedtime, Disp: 30 tablet, Rfl: 0  •  aspirin 81 mg chewable tablet, Chew 1 tablet (81 mg total) daily, Disp: 30 tablet, Rfl: 2  •  atorvastatin (LIPITOR) 40 mg tablet, Take 1 tablet (40 mg total) by mouth daily with dinner, Disp: 30 tablet, Rfl: 2  •  Blood Glucose Monitoring Suppl (Accu-Chek Guide) w/Device KIT, Use in the morning, Disp: 1 kit, Rfl: 0  •  Calcium Carb-Cholecalciferol 600-400 MG-UNIT TABS, Take 1 tablet by mouth 2 (two) times a day, Disp: 60 tablet, Rfl: 2  •  carvedilol (COREG) 12 5 mg tablet, Take 0 5 tablets (6 25 mg total) by mouth 2 (two) times a day with meals, Disp: 60 tablet, Rfl: 2  •  cyanocobalamin (VITAMIN B-12) 500 MCG tablet, Take 1,000 mcg by mouth daily, Disp: , Rfl:   •  Empagliflozin (Jardiance) 10 MG TABS tablet, Take 1 tablet (10 mg total) by mouth every morning, Disp: 90 tablet, Rfl: 3  •  ezetimibe (ZETIA) 10 mg tablet, MAR JOHNNY (1) TABLETA POR VIA ORAL JOHNNY VEZ JEFFREY, Disp: 30 tablet, Rfl: 10  •  ferrous sulfate 325 (65 Fe) mg tablet, Take 325 mg by mouth daily with breakfast, Disp: , Rfl:   •  fluticasone (FLONASE) 50 mcg/act nasal spray, 1 spray into each nostril daily, Disp: 16 g, Rfl: 10  •  furosemide (LASIX) 20 mg tablet, Take 1 tablet (20 mg total) by mouth daily, Disp: 30 tablet, Rfl: 0  •  gabapentin (NEURONTIN) 100 mg capsule, Take 1 capsule (100 mg total) by mouth 2 (two) times a day, Disp: 60 capsule, Rfl: 5  •  glucose blood (Accu-Chek Guide) test strip, Use to test 3x daily, Disp: 100 strip, Rfl: 0  •  Insulin Glargine Solostar (Lantus SoloStar) 100 UNIT/ML SOPN, Use 9 units subcut once dialy, Disp: 15 mL, Rfl: 5  •  Insulin Pen Needle (BD Pen Needle Micro U/F) 32G X 6 MM MISC, Use daily, Disp: 100 each, Rfl: 1  •  loperamide (IMODIUM) 2 mg capsule, MAR JOHNNY (1) CAPSULA POR VIA ORAL ALFREDO SEA NECESARIO PARA LA DIARREA, Disp: 30 capsule, Rfl: 10  •  losartan (COZAAR) 25 mg tablet, Take 1 tablet (25 mg total) by mouth daily, Disp: 30 tablet, Rfl: 2  •  Melatonin 5 MG TABS, Take by mouth as needed, Disp: , Rfl:   •  memantine (NAMENDA) 10 mg tablet, MAR JOHNNY TABLETA POR VIA ORAL DOS VECES AL JEFFREY, Disp: 60 tablet, Rfl: 3  •  metFORMIN (GLUCOPHAGE) 1000 MG tablet, MAR JOHNNY (1) TABLETA POR VIA ORAL DOS VECES AL JEFFREY CON COMIDAS, Disp: 60 tablet, Rfl: 10  •  mirtazapine (REMERON) 15 mg tablet, MAR JOHNNY (1) TABLETA POR VIA ORAL CADA NOCHE A LA HORA DE ACOSTARSE, Disp: 90 tablet, Rfl: 3  •  omeprazole (PriLOSEC) 40 MG capsule, MAR 1 CAPSULA POR VIA ORAL JOHNNY VEZ AL JEFFREY, Disp: 30 capsule, Rfl: 10  •  Tradjenta 5 MG TABS, MAR JOHNNY TABLETA POR VIA ORAL JOHNNY VEZ AL JEFFREY, Disp: 30 tablet, Rfl: 10     *-*-*-*-*-*-*-*-*-*-*-*-*-*-*-*-*-*-*-*-*-*-*-*-*-*-*-*-*-*-*-*-*-*-*-*-*-*-*-*-*-*-*-*-*-*-*-*-*-*-*-*-*-*

## 2023-01-24 DIAGNOSIS — F41.8 ANXIETY ABOUT HEALTH: ICD-10-CM

## 2023-01-30 DIAGNOSIS — I42.9 CARDIOMYOPATHY (HCC): ICD-10-CM

## 2023-01-30 RX ORDER — FUROSEMIDE 20 MG/1
TABLET ORAL
Qty: 90 TABLET | Refills: 1 | Status: SHIPPED | OUTPATIENT
Start: 2023-01-30

## 2023-02-03 RX ORDER — ALPRAZOLAM 0.25 MG/1
TABLET ORAL
Qty: 30 TABLET | Refills: 0 | Status: SHIPPED | OUTPATIENT
Start: 2023-02-03

## 2023-02-09 ENCOUNTER — TELEPHONE (OUTPATIENT)
Dept: GASTROENTEROLOGY | Facility: CLINIC | Age: 78
End: 2023-02-09

## 2023-02-09 DIAGNOSIS — R21 RASH: ICD-10-CM

## 2023-02-09 NOTE — TELEPHONE ENCOUNTER
Patients GI provider:  Dr America Krabbe to return call: (    Reason for call: Pt calling     Scheduled procedure/appointment date if applicable: Apt/procedure

## 2023-02-17 ENCOUNTER — PATIENT OUTREACH (OUTPATIENT)
Dept: FAMILY MEDICINE CLINIC | Facility: CLINIC | Age: 78
End: 2023-02-17

## 2023-02-17 NOTE — PROGRESS NOTES
I called Rubio Antoine to follow up on the patient  She states the patient has been feeling "good"  She reports all blood sugars have been <150  Rubio Antoine has been checking the patient's feet daily and denies any skin breakdown  Rubio Antoine reports the patient's weight has remained stable, 142-146  She denies the patient having any chest pain, shortness of breath or extremity edema  Rubio Antoine notes the patient complains of increased fatigue because she has not been sleeping well  Reportedly the patient only sleeps for a few hours then is awake; she has also been waking to urinate 1-2 times per night as well  Rubio Antoine states the patient has taken melatonin in the past but none recently; note sent to PCP  Rubio Antoine reports the patient has had 2 falls in the past week, both times maneuvering the bedside commode  She denies the patient hitting her head or having any bruises  Rubio Atnoine notes she tries to keep the area well lit but the patient continues turning lights off  Rubio Antoine is aware of the patient's Podiatry appointment 2/20 at 0915 and plans on her attending  Rubio Antoine will call with any questions or concerns  I will continue to follow

## 2023-02-17 NOTE — PROGRESS NOTES
I called Christophe Jorge to inform her that the patient can take melatonin nightly, no more than 5mg nightly (per PCP instruction)  She was happy to hear this

## 2023-02-20 ENCOUNTER — OFFICE VISIT (OUTPATIENT)
Dept: PODIATRY | Facility: CLINIC | Age: 78
End: 2023-02-20

## 2023-02-20 VITALS
HEIGHT: 62 IN | BODY MASS INDEX: 27.23 KG/M2 | DIASTOLIC BLOOD PRESSURE: 87 MMHG | WEIGHT: 148 LBS | HEART RATE: 75 BPM | SYSTOLIC BLOOD PRESSURE: 135 MMHG

## 2023-02-20 DIAGNOSIS — M79.676 PAIN DUE TO ONYCHOMYCOSIS OF TOENAIL: ICD-10-CM

## 2023-02-20 DIAGNOSIS — E11.9 TYPE 2 DIABETES MELLITUS WITHOUT COMPLICATION, WITHOUT LONG-TERM CURRENT USE OF INSULIN (HCC): Primary | ICD-10-CM

## 2023-02-20 DIAGNOSIS — T45.1X5A CHEMOTHERAPY-INDUCED PERIPHERAL NEUROPATHY (HCC): ICD-10-CM

## 2023-02-20 DIAGNOSIS — R26.2 AMBULATORY DYSFUNCTION: ICD-10-CM

## 2023-02-20 DIAGNOSIS — B35.1 PAIN DUE TO ONYCHOMYCOSIS OF TOENAIL: ICD-10-CM

## 2023-02-20 DIAGNOSIS — G62.0 CHEMOTHERAPY-INDUCED PERIPHERAL NEUROPATHY (HCC): ICD-10-CM

## 2023-02-20 DIAGNOSIS — F41.8 ANXIETY ABOUT HEALTH: ICD-10-CM

## 2023-02-20 NOTE — PATIENT INSTRUCTIONS
Cuidado del pie para personas con diabetes   LO QUE NECESITA SABER:   Los niveles altos de azúcar en la ainsley a enid plazo pueden dañar los vasos sanguíneos y los nervios en brit piernas y pies  Jaimie daño dificulta que sienta presión, dolor, temperatura y el tacto  Es posible que usted no sienta shamar cortada o shamar úlcera o que los zapatos están muy apretados  El cuidado del pie es necesario para evitar problemas graves, yoni shamar infección o shamar amputación  La diabetes podría provocar que los dedos de brit pies se tuerzan o se encorven [place] [address]  Estos cambios podrían afectar la manera en que usted camina y pueden conllevar al aumento de la presión en zuniga pie  La presión puede disminuir el flujo sanguíneo a brit pies  La falta del flujo sanguíneo aumenta zuniga riesgo de shamar úlcera en [employer]  INSTRUCCIONES SOBRE EL NATALIE HOSPITALARIA:   Llame al proveedor del equipo de cuidados de mane si:  Brit pies se ponen entumecidos, débiles o difícil de   Usted tiene pus drenando de shamar llaga en zuniga pie  Usted tiene shamar herida en zuniga pie que se hace más vida, más profunda o que no federico  Usted nota que tiene [place], cortadas, rasguños, callos o llagas en zuniga pie  Usted tiene fiebre y brit pies se ponen rojos, calientes e inflamados  Las uñas de brit pies se vuelven gruesas, encorvadas o [id number]  Le es difícil revisarse los pies porque zuniga visión no está vivian  Usted tiene preguntas o inquietudes acerca de zuniga condición o cuidado  El cuidado de los pies:  Revísese los pies a diario  Observe todo el pie, incluyendo la planta del pie, entre y [employer] dedos  Revise si hay heridas y callos  Use un omar para verse la planta de los pies  La piel de los pies podría estar brillante, estirada o más obscura de lo normal  Brit pies también podrían estar fríos y pálidos  Pase brit kade por encima, por debajo, por los lados y [facility] dedos del pie para sentir la piel   El enrojecimiento, inflamación y calor son signos de problemas con el flujo sanguíneo que pueden conllevar a shamar úlcera en [employer]  No trate de quitarse los callos usted mismo  No ignore problemas pequeños, yoni la piel reseca o heridas pequeñas  Con el tiempo, estos puede representar shamar amenaza para la steven si no se les da el cuidado apropiado  Lávese los pies todos los días con agua tibia y Gabriela  No use [employer], porque esto puede lesionarle el pie  Séquese los pies suavemente con shamar toalla después de lavarlos  Seque entre y [employer] dedos  Aplique shamar loción o un humectante sobre diane pies secos  Pregunte al médico del equipo de cuidados de mane cuáles lociones son las mejores que puede usar  No  aplique loción o humectante entre diane dedos  [employer] dedos del pie podría causar rupturas de la piel  [address] uñas de los pies correctamente  Lime o mercedes las uñas de los pies en línea recta  Use un cepillo suave para limpiar alrededor [employer]  Si las [address] gruesas, es posible que necesite que un médico del equipo de cuidados de mane o un especialista se las mercedes  Proteja diane pies  No  camine descalzo ni use zapatos sin calcetines  Compruebe que no haya piedras u otros objetos dentro de diane zapatos que le podrían [facility]  Use calcetines de algodón para ayudar a [employer]  Use calcetines sin costura en los dedos o póngaselos con la costura hacia afuera  Cámbiese los calcetines diariamente  No use calcetines sucios ni húmedos  Use zapatos que le calcen vivian  Use zapatos que no rocen ninguna parte de diane pies  Wu calzado debería ser de ½ a ¾ pulgada (1 a 2 centímetros) más grandes que diane pies  Wu calzado también debería tener espacio adicional alrededor de la parte más ancha de diane pies  El calzado para caminar o atlético con cordones o correas que se ajustan son los mejores  Pida ayuda al médico del equipo de cuidados de mane para elegir un par de zapatos que le calcen vivian  Pregúntele si debe usar algún tipo de plantilla, soporte o vendaje en diane pies  No fume  Fumar puede dañar los vasos sanguíneos y aumentar zuniga riesgo de úlceras en los pies  Pida al médico de zuniga equipo de cuidados de mane información si usted fuma actualmente y [place] para dejar de hacerlo  Los cigarrillos electrónicos o el tabaco sin humo igualmente contienen nicotina  Consulte con zuniga médico del equipo de cuidados de mane antes de utilizar estos productos  Acuda a diane consultas de control con el médico del equipo de cuidado de la diabetes o un especialista en pies según le indicaron: Es necesario que le revisen los pies por lo menos 1 vez al [place]  Es posible que usted necesite hacerse un examen de pie más seguido si tiene los nervios dañados, deformidad en el pie o [place]  Anote diane preguntas para que se acuerde de hacerlas kyung diane visitas  © Copyright [employer] [place] 2022 Information is for End User's use only and may not be sold, redistributed or otherwise used for commercial purposes  Esta información es sólo para uso en educación  Zuniga intención no es darle un consejo médico sobre enfermedades o tratamientos  Colsulte con zuniga [place] farmacéutico antes de seguir cualquier régimen médico para saber si es seguro y efectivo para usted

## 2023-02-20 NOTE — PROGRESS NOTES
Assessment/Plan:     Diagnoses and all orders for this visit:    Type 2 diabetes mellitus without complication, without long-term current use of insulin (HCC)    Ambulatory dysfunction    Pain due to onychomycosis of toenail    Chemotherapy-induced peripheral neuropathy (HCC)        Diagnosis and options discussed with patient  Patient agreeable to the plan as stated below  DM foot risk is moderate due to neuropathy, edema, trophic changes to skin, ambulatory dysfunction  Continue at risk foot care (Q9)    -Discussed DM risk to lower extremities, proper shoe gear, and daily monitoring of feet      -Educated on A1C and the risks of poorly controlled Diabetes  Reviewed recent A1C:  Lab Results   Component Value Date    HGBA1C 6 9 (H) 12/03/2022    HGBA1C 8 5 08/03/2016         -Discussed weight loss and suitable exercise regiment  Reviewed most recent PCP visit on 12/19/2022          Subjective:      Patient ID: Aryan Sung is a 69 y o  female  Annual DM foot exam  A1C well controlled  She has neuropathy secondary to chemotherapy and DM  She has fallen twice at home but her daughter states she won't use her RW  She has trouble getting off the toilet  Her daughter is translating [address] and providing history and treatment plan discussion  The following portions of the patient's history were reviewed and updated as appropriate: allergies, current medications, past family history, past medical history, past social history, past surgical history and problem list     Review of Systems    Constitutional: Negative  Respiratory: Negative for cough and shortness of breath  Gastrointestinal: Negative for diarrhea, nausea and vomiting  Musculoskeletal: Negative for arthralgias, gait problem, joint swelling and myalgias  Skin: Negative for rash or wound     Neurological: Neuropathy        Objective:      /87 (BP Location: Left arm, Patient Position: Sitting, Cuff Size: Standard) Pulse 75   Ht 5' 2" (1 575 m)   Wt 67 1 kg (148 lb)   BMI 27 07 kg/m²          Physical Exam  Vitals reviewed  Cardiovascular:      Rate and Rhythm: Normal rate  Pulses: Normal pulses  no weak pulses          Dorsalis pedis pulses are 2+ on the right side and 2+ on the left side  Posterior tibial pulses are 2+ on the right side and 2+ on the left side  Pulmonary:      Effort: Pulmonary effort is normal  No respiratory distress  Musculoskeletal:      Right lower leg: Edema present  Left lower leg: Edema present  Right foot: Decreased range of motion  Deformity present  No bunion, foot drop or prominent metatarsal heads  Left foot: Decreased range of motion  Deformity present  No bunion, foot drop or prominent metatarsal heads  Feet:      Right foot:      Protective Sensation: 10 sites tested  5 sites sensed  Skin integrity: Dry skin present  No ulcer or callus  Toenail Condition: Right toenails are abnormally thick  Fungal disease present  Left foot:      Protective Sensation: 10 sites tested  4 sites sensed  Skin integrity: Dry skin present  No ulcer or callus  Toenail Condition: Left toenails are abnormally thick  Fungal disease present  Skin:     General: Skin is dry  Capillary Refill: Capillary refill takes less than 2 seconds  Findings: No erythema  Neurological:      Mental Status: She is alert  Mental status is at baseline  Sensory: Sensory deficit present  Gait: Gait abnormal            Diabetic Foot Exam    Patient's shoes and socks removed  Right Foot/Ankle   Right Foot Inspection  Skin Exam: skin intact and dry skin  No callus, no ulcer and no callus  Toe Exam: swelling and right toe deformity  No tenderness and erythema    Sensory   Vibration: absent  Proprioception: diminished  Monofilament testing: diminished    Vascular  Capillary refills: < 3 seconds  The right DP pulse is 2+  The right PT pulse is 2+  Right Toe  - Comprehensive Exam  Ecchymosis: none  Arch: pes planus  Hammertoes: fifth toe  Hallux valgus: no  Swelling: dorsum   Tenderness: none         Left Foot/Ankle  Left Foot Inspection  Skin Exam: skin intact and dry skin  No ulcer and no callus  Toe Exam: swelling and left toe deformity  No tenderness and no erythema  Sensory   Vibration: absent  Proprioception: diminished  Monofilament testing: diminished    Vascular  Capillary refills: < 3 seconds  The left DP pulse is 2+  The left PT pulse is 2+       Left Toe  - Comprehensive Exam  Ecchymosis: none  Arch: pes planus  Hammertoes: fifth toe  Hallux valgus: no  Swelling: dorsum   Tenderness: none           Assign Risk Category  Deformity present  Loss of protective sensation  No weak pulses  Risk: 2

## 2023-03-03 ENCOUNTER — TELEPHONE (OUTPATIENT)
Dept: FAMILY MEDICINE CLINIC | Facility: CLINIC | Age: 78
End: 2023-03-03

## 2023-03-03 RX ORDER — ALPRAZOLAM 0.25 MG/1
TABLET ORAL
Qty: 30 TABLET | Refills: 0 | Status: SHIPPED | OUTPATIENT
Start: 2023-03-03

## 2023-03-03 NOTE — TELEPHONE ENCOUNTER
Pharmacy called requesting a refill for hydrocortisone 2 5% cream for the pt, medication not on current med list

## 2023-03-09 DIAGNOSIS — R21 RASH: ICD-10-CM

## 2023-03-14 ENCOUNTER — OFFICE VISIT (OUTPATIENT)
Dept: DENTISTRY | Facility: CLINIC | Age: 78
End: 2023-03-14

## 2023-03-14 VITALS — TEMPERATURE: 97.8 F | HEART RATE: 81 BPM | DIASTOLIC BLOOD PRESSURE: 82 MMHG | SYSTOLIC BLOOD PRESSURE: 148 MMHG

## 2023-03-14 DIAGNOSIS — K02.9 CARIES: Primary | ICD-10-CM

## 2023-03-23 ENCOUNTER — PATIENT OUTREACH (OUTPATIENT)
Dept: FAMILY MEDICINE CLINIC | Facility: CLINIC | Age: 78
End: 2023-03-23

## 2023-03-23 NOTE — PROGRESS NOTES
I called Tanna Cushing to follow up on the patient  She states the patient has been sleeping much better using melatonin nightly  Tanna Cushing denies the patient having any further falls since we last spoke  Tanna Cushing notes the patient was complaining of finger pain from blood sugar sticks so she stopped checking for a few days  She reports the patient's sugars have been <160  Tanna Cushing continues to check the patient's feet and denies any skin breakdown  Tanna Cushing reports the patient's weight has been stable, 142-144  The patient recently had the GI bug but is improving and Tanna Cushing states the patient is eating and drinking without issues  Tanna Cushing will call next week when the patient is due for a med refill  I will continue to follow

## 2023-03-26 DIAGNOSIS — F41.8 ANXIETY ABOUT HEALTH: ICD-10-CM

## 2023-03-31 RX ORDER — ALPRAZOLAM 0.25 MG/1
TABLET ORAL
Qty: 30 TABLET | Refills: 0 | Status: SHIPPED | OUTPATIENT
Start: 2023-03-31

## 2023-04-03 DIAGNOSIS — F41.9 ANXIETY: ICD-10-CM

## 2023-04-03 DIAGNOSIS — G62.0 CHEMOTHERAPY-INDUCED PERIPHERAL NEUROPATHY: Chronic | ICD-10-CM

## 2023-04-03 DIAGNOSIS — E11.65 UNCONTROLLED TYPE 2 DIABETES MELLITUS WITH HYPERGLYCEMIA (HCC): ICD-10-CM

## 2023-04-03 DIAGNOSIS — I10 ESSENTIAL HYPERTENSION: ICD-10-CM

## 2023-04-03 DIAGNOSIS — I50.20 ACC/AHA STAGE C HEART FAILURE WITH REDUCED EJECTION FRACTION (HCC): ICD-10-CM

## 2023-04-03 DIAGNOSIS — J06.9 URI (UPPER RESPIRATORY INFECTION): ICD-10-CM

## 2023-04-03 DIAGNOSIS — I42.9 CARDIOMYOPATHY (HCC): ICD-10-CM

## 2023-04-03 DIAGNOSIS — T45.1X5A CHEMOTHERAPY-INDUCED PERIPHERAL NEUROPATHY: Chronic | ICD-10-CM

## 2023-04-03 DIAGNOSIS — K21.9 GASTROESOPHAGEAL REFLUX DISEASE: ICD-10-CM

## 2023-04-03 DIAGNOSIS — Z79.4 CURRENT USE OF INSULIN (HCC): ICD-10-CM

## 2023-04-03 DIAGNOSIS — E11.9 TYPE 2 DIABETES MELLITUS WITHOUT COMPLICATION, WITHOUT LONG-TERM CURRENT USE OF INSULIN (HCC): ICD-10-CM

## 2023-04-03 DIAGNOSIS — Z92.21 STATUS POST CHEMOTHERAPY: ICD-10-CM

## 2023-04-03 DIAGNOSIS — M85.80 AGE-RELATED BONE LOSS: ICD-10-CM

## 2023-04-03 RX ORDER — EZETIMIBE 10 MG/1
10 TABLET ORAL DAILY
Qty: 30 TABLET | Refills: 10 | Status: SHIPPED | OUTPATIENT
Start: 2023-04-03 | End: 2023-05-02

## 2023-04-03 RX ORDER — LOSARTAN POTASSIUM 25 MG/1
25 TABLET ORAL DAILY
Qty: 90 TABLET | Refills: 0 | Status: SHIPPED | OUTPATIENT
Start: 2023-04-03 | End: 2023-07-16

## 2023-04-03 RX ORDER — LINAGLIPTIN 5 MG/1
5 TABLET, FILM COATED ORAL DAILY
Qty: 30 TABLET | Refills: 10 | Status: SHIPPED | OUTPATIENT
Start: 2023-04-03 | End: 2023-04-26

## 2023-04-03 RX ORDER — OMEPRAZOLE 40 MG/1
40 CAPSULE, DELAYED RELEASE ORAL DAILY
Qty: 30 CAPSULE | Refills: 10 | Status: SHIPPED | OUTPATIENT
Start: 2023-04-03 | End: 2023-05-26

## 2023-04-03 RX ORDER — CALCIUM CARBONATE/VITAMIN D3 600 MG-10
1 TABLET ORAL 2 TIMES DAILY
Qty: 180 TABLET | Refills: 0 | Status: ON HOLD | OUTPATIENT
Start: 2023-04-03 | End: 2023-07-07

## 2023-04-03 RX ORDER — FUROSEMIDE 20 MG/1
20 TABLET ORAL DAILY
Qty: 90 TABLET | Refills: 3 | Status: SHIPPED | OUTPATIENT
Start: 2023-04-03

## 2023-04-03 RX ORDER — BLOOD SUGAR DIAGNOSTIC
STRIP MISCELLANEOUS
Qty: 100 STRIP | Refills: 0 | Status: SHIPPED | OUTPATIENT
Start: 2023-04-03

## 2023-04-03 RX ORDER — CARVEDILOL 12.5 MG/1
12.5 TABLET ORAL 2 TIMES DAILY WITH MEALS
Qty: 60 TABLET | Refills: 10 | Status: SHIPPED | OUTPATIENT
Start: 2023-04-03 | End: 2023-06-02

## 2023-04-03 RX ORDER — MIRTAZAPINE 15 MG/1
TABLET, FILM COATED ORAL
Qty: 90 TABLET | Refills: 0 | Status: CANCELLED | OUTPATIENT
Start: 2023-04-03

## 2023-04-03 RX ORDER — ASPIRIN 81 MG/1
81 TABLET, CHEWABLE ORAL DAILY
Qty: 90 TABLET | Refills: 0 | Status: SHIPPED | OUTPATIENT
Start: 2023-04-03

## 2023-04-03 RX ORDER — GABAPENTIN 100 MG/1
100 CAPSULE ORAL 2 TIMES DAILY
Qty: 60 CAPSULE | Refills: 5 | Status: SHIPPED | OUTPATIENT
Start: 2023-04-03 | End: 2023-07-13 | Stop reason: SDUPTHER

## 2023-04-03 RX ORDER — INSULIN GLARGINE 100 [IU]/ML
INJECTION, SOLUTION SUBCUTANEOUS
Qty: 15 ML | Refills: 5 | Status: SHIPPED | OUTPATIENT
Start: 2023-04-03 | End: 2023-04-05

## 2023-04-03 RX ORDER — ATORVASTATIN CALCIUM 40 MG/1
40 TABLET, FILM COATED ORAL DAILY
Qty: 90 TABLET | Refills: 0 | Status: SHIPPED | OUTPATIENT
Start: 2023-04-03 | End: 2023-07-16

## 2023-04-03 RX ORDER — LANCETS
EACH MISCELLANEOUS
Qty: 102 EACH | Refills: 0 | Status: SHIPPED | OUTPATIENT
Start: 2023-04-03

## 2023-04-03 RX ORDER — ALBUTEROL SULFATE 90 UG/1
1 AEROSOL, METERED RESPIRATORY (INHALATION) EVERY 4 HOURS PRN
Qty: 18 G | Refills: 10 | Status: SHIPPED | OUTPATIENT
Start: 2023-04-03

## 2023-04-06 ENCOUNTER — PATIENT OUTREACH (OUTPATIENT)
Dept: FAMILY MEDICINE CLINIC | Facility: CLINIC | Age: 78
End: 2023-04-06

## 2023-04-06 ENCOUNTER — OFFICE VISIT (OUTPATIENT)
Dept: ENDOCRINOLOGY | Facility: CLINIC | Age: 78
End: 2023-04-06

## 2023-04-06 VITALS
WEIGHT: 145.8 LBS | SYSTOLIC BLOOD PRESSURE: 132 MMHG | HEART RATE: 89 BPM | BODY MASS INDEX: 26.83 KG/M2 | DIASTOLIC BLOOD PRESSURE: 78 MMHG | HEIGHT: 62 IN

## 2023-04-06 DIAGNOSIS — I10 ESSENTIAL HYPERTENSION: Primary | ICD-10-CM

## 2023-04-06 DIAGNOSIS — E78.5 HYPERLIPIDEMIA, UNSPECIFIED HYPERLIPIDEMIA TYPE: ICD-10-CM

## 2023-04-06 DIAGNOSIS — Z79.4 TYPE 2 DIABETES MELLITUS WITH MILD NONPROLIFERATIVE RETINOPATHY, WITH LONG-TERM CURRENT USE OF INSULIN, MACULAR EDEMA PRESENCE UNSPECIFIED, UNSPECIFIED LATERALITY (HCC): ICD-10-CM

## 2023-04-06 DIAGNOSIS — I50.20 ACC/AHA STAGE C HEART FAILURE WITH REDUCED EJECTION FRACTION (HCC): ICD-10-CM

## 2023-04-06 DIAGNOSIS — E11.65 UNCONTROLLED TYPE 2 DIABETES MELLITUS WITH HYPERGLYCEMIA (HCC): ICD-10-CM

## 2023-04-06 DIAGNOSIS — E11.3299 TYPE 2 DIABETES MELLITUS WITH MILD NONPROLIFERATIVE RETINOPATHY, WITH LONG-TERM CURRENT USE OF INSULIN, MACULAR EDEMA PRESENCE UNSPECIFIED, UNSPECIFIED LATERALITY (HCC): ICD-10-CM

## 2023-04-06 DIAGNOSIS — D49.2 ABNORMAL SKIN GROWTH: ICD-10-CM

## 2023-04-06 LAB — SL AMB POCT HEMOGLOBIN AIC: 6.9 (ref ?–6.5)

## 2023-04-06 RX ORDER — BLOOD-GLUCOSE METER
EACH MISCELLANEOUS
Qty: 1 KIT | Refills: 0 | Status: SHIPPED | OUTPATIENT
Start: 2023-04-06

## 2023-04-06 NOTE — PROGRESS NOTES
I received a call from [facility] stating the patient's left great toe is swollen and erythematous  She first noticed this last night and is concerned  The patient has an Endo appointment this morning  I suggested to [facility] to have Endo examine the toe  If they suggest the patient needs an appointment at [facility], I asked [facility] to call me and I can assist and she agreed  I will continue to follow

## 2023-04-06 NOTE — PROGRESS NOTES
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, with long-term current use of insulin ([address] )     Patient is doing well  Her diabetes is well controlled  I will keep her regimen the same for now  Advised granddaughter to notify us for any issues including hypo or hyperglycemia  Continue with checking blood sugars at least twice per day  I have sent for a new glucose meter at their request  She will be due for repeat lab work in December  Lab Results   Component Value Date    HGBA1C 6 9 (A) 04/06/2023            Relevant Medications    Blood Glucose Monitoring Suppl (OneTouch Verio) w/Device KIT    Empagliflozin (Jardiance) 10 MG TABS tablet    RESOLVED: Uncontrolled type 2 diabetes mellitus with hyperglycemia (HCC)    Relevant Medications    Empagliflozin (Jardiance) 10 MG TABS tablet    Other Relevant Orders    POCT hemoglobin A1c (Completed)       Cardiovascular and Mediastinum    Essential hypertension - Primary     Controlled  Continue current regimen  ACC/AHA stage C heart failure with reduced ejection fraction (HCC)    Relevant Medications    Empagliflozin (Jardiance) 10 MG TABS tablet       Musculoskeletal and Integument    Abnormal skin growth     Recommend patient see podiatry or dermatology for this            Other    Hyperlipidemia     Controlled  Continue current regimen            Orders Placed This Encounter   Procedures   • POCT hemoglobin A1c       History of Present Illness:   Maynor Moeller is a 69 y o  female with a history of type 2 diabetes, hypertension and hyperlipidemia  Last seen in the office 3/15/22  Reports complications of neuropathy, microalbuminuria and diabetic retinopathy  Last A1C was 6 9  Denies recent illness or hospitalizations   Denies recent severe hypoglycemic or severe hyperglycemic episodes  Denies any issues with current regimen  Home glucose monitoring: performed twice daily using home glucose meter    The patient is accompanied to the visit today by her granddaughter who is her caretaker  The patient has a concern for abnormal tissue growth on her left big toe  She states she tried to remove this herself because it is painful  Home blood glucose readings: no log provided   Per granddaughter, blood sugars range from 130 to 170's before meals     Current regimen:   Tradjenta 5 mg daily  Jardiance 10 mg  Metformin 1000 mg twice per day  Lantus 9 units daily     Last Eye Exam: has appointment in June  Last Foot Exam: UTD    Has hypertension: Taking losartan  Has hyperlipidemia: Taking atorvastatin, zetia      Patient Active Problem List   Diagnosis   • 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, with long-term current use of insulin ([facility])   • Essential hypertension   • Gastroesophageal reflux disease   • 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)   • Anxiety   • Palliative care patient   • Current mild episode of major depressive disorder without prior episode ([facility])   • Other insomnia   • Status post chemotherapy   • Allergic rhinitis   • Arthritis   • Obstructive sleep apnea   • Chemotherapy-induced peripheral neuropathy ([facility])   • Encounter for central line care   • Current use of insulin ([facility])   • Near syncope   • Counseling regarding advanced care planning and goals of care   • Dementia with behavioral disturbance   • Gait instability   • Polypharmacy   • Frequent falls   • Hyperlipidemia   • Chest pain   • ACC/AHA stage C heart failure with reduced ejection fraction (HCC)   • Nonischemic cardiomyopathy (HCC)   • Abnormal skin growth      Past Medical History:   Diagnosis Date   • Cancer ([facility] )     Throat   • Chronic pain disorder    • Diabetes mellitus ([place] [address] )    • Diffuse large B cell lymphoma (HCC)    • Dysphagia    • GERD without esophagitis    • HTN (hypertension)    • Hyperlipidemia    • Hypertension    • MI (myocardial infarction) ([facility] [address] )    • Port-A-Cath in place 07/29/2019   • Thyroid cancer ([facility] ) 2018      Past Surgical History:   Procedure Laterality Date   • BONE MARROW BIOPSY     • BREAST BIOPSY Left    • CARDIAC CATHETERIZATION N/A 12/9/2022    Procedure: Cardiac catheterization;  Surgeon: Hailey Simms MD;  [facility]; Service: Cardiology   • CARDIAC CATHETERIZATION N/A 12/9/2022    Procedure: Cardiac Coronary Angiogram;  Surgeon: Hailey Simms MD;  [facility]; Service: Cardiology   • CARDIAC CATHETERIZATION Left 12/9/2022    Procedure: Cardiac Left Heart Cath;  Surgeon: Hailey Simms MD;  [facility];   Service: Cardiology   • CHOLECYSTECTOMY     • IR PORT PLACEMENT  11/16/2018   • IR PORT REMOVAL  3/22/2021   • OTHER SURGICAL HISTORY      tendor tear repair to right shoulder   • SHOULDER ARTHROSCOPY        Family History   Problem Relation Age of Onset   • Diabetes Mother    • Heart disease Sister    • Hypertension Brother    • Uterine cancer Maternal Grandmother    • Prostate cancer Paternal Grandfather      Social History     Tobacco Use   • Smoking status: Never   • Smokeless tobacco: Never   Substance Use Topics   • Alcohol use: Never     Comment: 0     No Known Allergies      Current Outpatient Medications:   •  Accu-Chek FastClix Lancets MISC, Use to test 3x daily, Disp: 102 each, Rfl: 0  •  albuterol (PROVENTIL HFA,VENTOLIN HFA) 90 mcg/act inhaler, Inhale 1 puff every 4 (four) hours as needed for wheezing, Disp: 18 g, Rfl: 10  •  ALPRAZolam (XANAX) 0 25 mg tablet, MAR 1 TABLETA POR VIA ORAL DIARIAMENTE A LA HORA DE ACOSTARSE, Disp: 30 tablet, Rfl: 0  •  aspirin (Aspirin Low Dose) 81 mg chewable tablet, Chew 1 tablet (81 mg total) daily, Disp: 90 tablet, Rfl: 0  •  atorvastatin (LIPITOR) 40 mg tablet, Take 1 tablet (40 mg total) by mouth daily, Disp: 90 tablet, Rfl: 0  •  Blood Glucose Monitoring Suppl (OneTouch Verio) w/Device KIT, Use per  guidelines, Disp: 1 kit, Rfl: 0  •  Calcium Carb-Cholecalciferol (Calcium + Vitamin D3) 600-10 MG-MCG TABS, Take 1 tablet by mouth 2 (two) times a day, Disp: 180 tablet, Rfl: 0  •  carvedilol (COREG) 12 5 mg tablet, Take 1 tablet (12 5 mg total) by mouth 2 (two) times a day with meals, Disp: 60 tablet, Rfl: 10  •  cyanocobalamin (VITAMIN B-12) 500 MCG tablet, Take 1,000 mcg by mouth daily, Disp: , Rfl:   •  Empagliflozin (Jardiance) 10 MG TABS tablet, Take 1 tablet (10 mg total) by mouth every morning, Disp: 90 tablet, Rfl: 1  •  ezetimibe (ZETIA) 10 mg tablet, Take 1 tablet (10 mg total) by mouth daily, Disp: 30 tablet, Rfl: 10  •  ferrous sulfate 325 (65 Fe) mg tablet, Take 325 mg by mouth daily with breakfast, Disp: , Rfl:   •  fluticasone (FLONASE) 50 mcg/act nasal spray, 1 spray into each nostril daily, Disp: 16 g, Rfl: 10  •  furosemide (LASIX) 20 mg tablet, Take 1 tablet (20 mg total) by mouth daily, Disp: 90 tablet, Rfl: 3  •  gabapentin (NEURONTIN) 100 mg capsule, Take 1 capsule (100 mg total) by mouth 2 (two) times a day, Disp: 60 capsule, Rfl: 5  •  glucose blood (Accu-Chek Guide) test strip, Use to test 3x daily, Disp: 100 strip, Rfl: 0  •  Insulin Glargine Solostar (Basaglar KwikPen) 100 UNIT/ML SOPN, Inject 0 09 mL (9 Units total) under the skin in the morning, Disp: 12 mL, Rfl: 1  •  Insulin Pen Needle (BD Pen Needle Micro U/F) 32G X 6 MM MISC, Use daily, Disp: 100 each, Rfl: 1  •  loperamide (IMODIUM) 2 mg capsule, MAR JOHNNY (1) CAPSULA POR VIA ORAL ALFREDO SEA NECESARIO PARA LA DIARREA, Disp: 30 capsule, Rfl: 10  •  losartan (COZAAR) 25 mg tablet, Take 1 tablet (25 mg total) by mouth daily, Disp: 90 tablet, Rfl: 0  •  Melatonin 5 MG TABS, Take by mouth as needed, Disp: , Rfl:   •  memantine (NAMENDA) 10 mg tablet, Take 1 tablet (10 mg total) by mouth 2 (two) times a day, Disp: 60 tablet, Rfl: 3  •  metFORMIN "(GLUCOPHAGE) 1000 MG tablet, Take 1 tablet (1,000 mg total) by mouth 2 (two) times a day with meals, Disp: 60 tablet, Rfl: 10  •  mirtazapine (REMERON) 15 mg tablet, MAR JOHNNY (1) TABLETA POR VIA ORAL CADA NOCHE A LA HORA DE ACOSTARSE, Disp: 90 tablet, Rfl: 3  •  omeprazole (PriLOSEC) 40 MG capsule, Take 1 capsule (40 mg total) by mouth daily, Disp: 30 capsule, Rfl: 10  •  Tradjenta 5 MG TABS, Take 5 mg by mouth in the morning, Disp: 30 tablet, Rfl: 10    Review of Systems   Constitutional: Negative for chills and fever  HENT: Negative for ear pain and sore throat  Eyes: Negative for pain and visual disturbance  Respiratory: Negative for cough and shortness of breath  Cardiovascular: Negative for chest pain and palpitations  Gastrointestinal: Negative for abdominal pain and vomiting  Genitourinary: Negative for dysuria and hematuria  Musculoskeletal: Negative for arthralgias and back pain  Skin: Negative for color change and rash  Neurological: Negative for seizures and syncope  All other systems reviewed and are negative  Physical Exam:  Body mass index is 26 67 kg/m²  /78   Pulse 89   Ht 5' 2\" (1 575 m)   Wt 66 1 kg (145 lb 12 8 oz)   BMI 26 67 kg/m²    Wt Readings from Last 3 Encounters:   04/06/23 66 1 kg (145 lb 12 8 oz)   02/20/23 67 1 kg (148 lb)   01/19/23 67 2 kg (148 lb 3 2 oz)       Physical Exam  Vitals reviewed  Constitutional:       Appearance: Normal appearance  HENT:      Head: Normocephalic and atraumatic  Cardiovascular:      Rate and Rhythm: Normal rate  Heart sounds: Normal heart sounds  Pulmonary:      Effort: Pulmonary effort is normal       Breath sounds: Normal breath sounds  Musculoskeletal:        Feet:    Neurological:      Mental Status: She is alert and oriented to person, place, and time     Psychiatric:         Mood and Affect: Mood normal          Behavior: Behavior normal                  Labs:   Lab Results   Component Value Date " HGBA1C 6 9 (A) 04/06/2023    HGBA1C 6 9 (H) 12/03/2022    HGBA1C 7 6 (A) 10/21/2022     Lab Results   Component Value Date    CREATININE 0 82 12/20/2022    CREATININE 0 81 12/10/2022    CREATININE 0 84 12/09/2022    BUN 17 12/20/2022    K 4 2 12/20/2022     12/20/2022    CO2 32 12/20/2022     eGFR   Date Value Ref Range Status   12/20/2022 69 ml/min/1 73sq m Final     Lab Results   Component Value Date    HDL 50 12/03/2022    TRIG 94 12/03/2022     Lab Results   Component Value Date    ALT 31 12/20/2022    AST 21 12/20/2022    ALKPHOS 82 12/20/2022     Lab Results   Component Value Date    EKK1DEWGAGFL 3 450 02/22/2021    LQJ7XYPVXZXQ 1 330 02/11/2019    ZLM5SJCAKGXG 1 550 11/10/2018     No results found for: FREET4, TSI      There are no Patient Instructions on file for this visit  Discussed with the patient and all questioned fully answered  She will call me if any problems arise

## 2023-04-06 NOTE — ASSESSMENT & PLAN NOTE
Patient is doing well  Her diabetes is well controlled  I will keep her regimen the same for now  Advised granddaughter to notify us for any issues including hypo or hyperglycemia  Continue with checking blood sugars at least twice per day  I have sent for a new glucose meter at their request  She will be due for repeat lab work in December       Lab Results   Component Value Date    HGBA1C 6 9 (A) 04/06/2023

## 2023-04-18 PROBLEM — G47.61 PLMD (PERIODIC LIMB MOVEMENT DISORDER): Status: ACTIVE | Noted: 2023-04-18

## 2023-04-24 ENCOUNTER — CONSULT (OUTPATIENT)
Dept: GASTROENTEROLOGY | Facility: CLINIC | Age: 78
End: 2023-04-24

## 2023-04-24 VITALS
DIASTOLIC BLOOD PRESSURE: 80 MMHG | SYSTOLIC BLOOD PRESSURE: 120 MMHG | HEIGHT: 62 IN | TEMPERATURE: 96.5 F | WEIGHT: 141 LBS | BODY MASS INDEX: 25.95 KG/M2

## 2023-04-24 DIAGNOSIS — Z12.11 SCREENING FOR COLON CANCER: ICD-10-CM

## 2023-04-24 DIAGNOSIS — R13.10 DYSPHAGIA, UNSPECIFIED TYPE: Primary | ICD-10-CM

## 2023-04-24 RX ORDER — BISACODYL 5 MG/1
20 TABLET, DELAYED RELEASE ORAL ONCE
Qty: 4 TABLET | Refills: 0 | Status: SHIPPED | OUTPATIENT
Start: 2023-04-24 | End: 2023-05-05

## 2023-04-24 RX ORDER — MELOXICAM 7.5 MG/1
TABLET ORAL
Qty: 30 TABLET | Refills: 10 | OUTPATIENT
Start: 2023-04-24

## 2023-04-24 NOTE — PROGRESS NOTES
[facility] - Outpatient Consultation  [facility] 69 y o  female MRN: [id number]  Encounter: [id number]          ASSESSMENT AND PLAN:      1  Dysphagia, unspecified type  Patient is complaining about dysphagia with episodes of choking and coughing  Her daughter discloses prior swallow eval, no previous EGD, no previous history of strokes  Differential diagnosis is broad including, oropharyngeal dysphagia, esophageal dysphagia or GERD  She is currently on omeprazole 40 daily, we will continue with current treatment  We will perform EGD  If EGD does not find an explanation for her dysphagia, she may benefit from video barium swallow and/or manometry  Follow-up in 3 months    2  Screening for colon cancer  - Patient is 69 y o , has not been screened for colon cancer in the past, denies any family history of GI malignancy or IBD, no alarm symptoms at this point, currently having normal bowel movements  - Currently average risk for colonoscopy, other options for colorectal cancer screening were discussed with the patient, although she has dementia, she has stated to palliative care she wants to receive full treatments, will perform colonoscopy, risk and benefits of the procedure were discussed with the patient including but not limited to bleeding, infection, perforation and missing an adenoma     ______________________________________________________________________    HPI:  Patient seen and examined, she is a 46-year-old female patient with history of dementia, CHF, diabetes, lymphoma, she is currently complaining about dysphagia with episodes of choking and coughing, otherwise she denies any recent events, denies nausea or vomiting, is passing flatus and having bowel movements, denies abdominal pain, no recent weight loss      REVIEW OF SYSTEMS:    CONSTITUTIONAL: Denies any fever, chills, or weight loss  HEENT: No earache or visual disturbances    CARDIOVASCULAR: No chest pain or palpitations  RESPIRATORY: Denies shortness of breath or dyspnea on exertion  GASTROINTESTINAL: As noted in the History of Present Illness  GENITOURINARY: No problems with urination  NEUROLOGIC: No dizziness or headaches  MUSCULOSKELETAL: No muscle or joint pain  SKIN: No skin rashes or itching  ENDOCRINE: No intolerance to heat or cold  Historical Information   Past Medical History:   Diagnosis Date   • Cancer ([place] [address] )     Throat   • Chronic pain disorder    • Diabetes mellitus (HCC)    • Diffuse large B cell lymphoma (HCC)    • Dysphagia    • GERD without esophagitis    • HTN (hypertension)    • Hyperlipidemia    • Hypertension    • MI (myocardial infarction) ([place] [address] )    • Port-A-Cath in place 07/29/2019   • Thyroid cancer ([place] [address] ) 2018     Past Surgical History:   Procedure Laterality Date   • BONE MARROW BIOPSY     • BREAST BIOPSY Left    • CARDIAC CATHETERIZATION N/A 12/9/2022    Procedure: Cardiac catheterization;  Surgeon: Hailey Simms MD;  [facility]; Service: Cardiology   • CARDIAC CATHETERIZATION N/A 12/9/2022    Procedure: Cardiac Coronary Angiogram;  Surgeon: Hailey Simms MD;  [facility]; Service: Cardiology   • CARDIAC CATHETERIZATION Left 12/9/2022    Procedure: Cardiac Left Heart Cath;  Surgeon: Hailey Simms MD;  [facility]: [facility];   Service: Cardiology   • CHOLECYSTECTOMY     • IR PORT PLACEMENT  11/16/2018   • IR PORT REMOVAL  3/22/2021   • OTHER SURGICAL HISTORY      tendor tear repair to right shoulder   • SHOULDER ARTHROSCOPY       Social History   Social History     Substance and Sexual Activity   Alcohol Use Never    Comment: 0     Social History     Substance and Sexual Activity   Drug Use No     Social History     Tobacco Use   Smoking Status Never   Smokeless Tobacco Never     Family History   Problem Relation Age of Onset   • Diabetes Mother    • Heart disease Sister    • Hypertension Brother    • Uterine cancer Maternal "Grandmother    • Prostate cancer Paternal Grandfather        Meds/Allergies       Current Outpatient Medications:   •  Accu-Chek FastClix Lancets MISC  •  albuterol (PROVENTIL HFA,VENTOLIN HFA) 90 mcg/act inhaler  •  ALPRAZolam (XANAX) 0 25 mg tablet  •  aspirin (Aspirin Low Dose) 81 mg chewable tablet  •  atorvastatin (LIPITOR) 40 mg tablet  •  bisacodyl (DULCOLAX) 5 mg EC tablet  •  Blood Glucose Monitoring Suppl (OneTouch Verio) w/Device KIT  •  Calcium Carb-Cholecalciferol (Calcium + Vitamin D3) 600-10 MG-MCG TABS  •  carvedilol (COREG) 12 5 mg tablet  •  cyanocobalamin (VITAMIN B-12) 500 MCG tablet  •  Empagliflozin (Jardiance) 10 MG TABS tablet  •  ezetimibe (ZETIA) 10 mg tablet  •  ferrous sulfate 325 (65 Fe) mg tablet  •  fluticasone (FLONASE) 50 mcg/act nasal spray  •  furosemide (LASIX) 20 mg tablet  •  gabapentin (NEURONTIN) 100 mg capsule  •  glucose blood (Accu-Chek Guide) test strip  •  Insulin Glargine Solostar (Basaglar KwikPen) 100 UNIT/ML SOPN  •  Insulin Pen Needle (BD Pen Needle Micro U/F) 32G X 6 MM MISC  •  loperamide (IMODIUM) 2 mg capsule  •  losartan (COZAAR) 25 mg tablet  •  Melatonin 5 MG TABS  •  meloxicam (MOBIC) 7 5 mg tablet  •  memantine (NAMENDA) 10 mg tablet  •  metFORMIN (GLUCOPHAGE) 1000 MG tablet  •  mirtazapine (REMERON) 15 mg tablet  •  omeprazole (PriLOSEC) 40 MG capsule  •  polyethylene glycol (GOLYTELY) 4000 mL solution  •  Tradjenta 5 MG TABS    No Known Allergies        Objective     Blood pressure 120/80, temperature (!) 96 5 °F (35 8 °C), temperature source Tympanic, height 5' 2\" (1 575 m), weight 64 kg (141 lb), not currently breastfeeding  Body mass index is 25 79 kg/m²  PHYSICAL EXAM:      General Appearance:   Alert, cooperative, no distress   HEENT:   Normocephalic, atraumatic, anicteric       Neck:  Supple, symmetrical, trachea midline   Lungs:   Clear to auscultation bilaterally; no rales, rhonchi or wheezing; respirations unlabored    Heart[de-identified]   Regular rate " and rhythm  Abdomen:   Soft, non-tender, non-distended; normal bowel sounds; no masses, no organomegaly    Genitalia:   Deferred    Rectal:   Deferred    Extremities:  No cyanosis or edema                  Lymph nodes: No palpable cervical lymphadenopathy   Skin:  No jaundice, rashes, or lesions              Lab Results:   No visits with results within 1 Day(s) from this visit  Latest known visit with results is:   Telephone on 04/19/2023   Component Date Value   • Supplier Name 04/19/2023 [employer]    • Supplier Phone Number 04/19/2023 [phone]    • Order Status 04/19/2023 Delivery Successful    • Delivery Request Date 04/19/2023 04/19/2023    • Date Delivered  04/19/2023 04/19/2023    • Item Description 04/19/2023 PAP Accessory    • Item Description 04/19/2023 PAP Mask, Full Face, Fit Upon Setup, N/A, 1 per 3 months    • Item Description 04/19/2023 Humidifier Water Chamber, 1 per 6 months    • Item Description 04/19/2023 PAP Headgear, 1 per 6 months    • Item Description 04/19/2023 PAP Humidifier, Heated    • Item Description 04/19/2023 Heated PAP Tubing, 1 per 3 months    • Item Description 04/19/2023 Disposable PAP Filter, 2 per 1 month    • Item Description 04/19/2023 Non-Disposable PAP Filter, 1 per 6 months    • Item Description 04/19/2023 PAP Mask Interface Cushion, Full Face, 1 per 1 month          Radiology Results:   No results found

## 2023-04-26 ENCOUNTER — OFFICE VISIT (OUTPATIENT)
Dept: FAMILY MEDICINE CLINIC | Facility: CLINIC | Age: 78
End: 2023-04-26

## 2023-04-26 VITALS
WEIGHT: 141 LBS | BODY MASS INDEX: 25.95 KG/M2 | HEART RATE: 74 BPM | RESPIRATION RATE: 18 BRPM | TEMPERATURE: 98.7 F | SYSTOLIC BLOOD PRESSURE: 118 MMHG | OXYGEN SATURATION: 96 % | DIASTOLIC BLOOD PRESSURE: 80 MMHG | HEIGHT: 62 IN

## 2023-04-26 DIAGNOSIS — I10 ESSENTIAL HYPERTENSION: ICD-10-CM

## 2023-04-26 DIAGNOSIS — E11.9 TYPE 2 DIABETES MELLITUS WITHOUT COMPLICATION, WITHOUT LONG-TERM CURRENT USE OF INSULIN (HCC): ICD-10-CM

## 2023-04-26 DIAGNOSIS — E11.65 UNCONTROLLED TYPE 2 DIABETES MELLITUS WITH HYPERGLYCEMIA (HCC): ICD-10-CM

## 2023-04-26 DIAGNOSIS — F03.918 DEMENTIA WITH BEHAVIORAL DISTURBANCE (HCC): ICD-10-CM

## 2023-04-26 DIAGNOSIS — M81.0 AGE RELATED OSTEOPOROSIS, UNSPECIFIED PATHOLOGICAL FRACTURE PRESENCE: ICD-10-CM

## 2023-04-26 DIAGNOSIS — E28.39 ESTROGEN DEFICIENCY: ICD-10-CM

## 2023-04-26 DIAGNOSIS — Z79.899 HIGH RISK MEDICATION USE: Primary | ICD-10-CM

## 2023-04-26 DIAGNOSIS — E78.5 HYPERLIPIDEMIA, UNSPECIFIED HYPERLIPIDEMIA TYPE: ICD-10-CM

## 2023-04-26 DIAGNOSIS — Z23 ENCOUNTER FOR IMMUNIZATION: ICD-10-CM

## 2023-04-26 DIAGNOSIS — Z79.4 CURRENT USE OF INSULIN (HCC): ICD-10-CM

## 2023-04-26 DIAGNOSIS — R11.0 NAUSEA: ICD-10-CM

## 2023-04-26 RX ORDER — ONDANSETRON 4 MG/1
4 TABLET, FILM COATED ORAL EVERY 8 HOURS PRN
Qty: 20 TABLET | Refills: 0 | Status: SHIPPED | OUTPATIENT
Start: 2023-04-26

## 2023-04-26 NOTE — ASSESSMENT & PLAN NOTE
BP is well controlled, no reports of dizziness or falls  continue current regimen at this time - carvedilol 12 5 mg bid, furosemide 20 mg daily

## 2023-04-26 NOTE — ASSESSMENT & PLAN NOTE
Lab Results   Component Value Date    CHOLESTEROL 140 12/03/2022    CHOLESTEROL 144 12/31/2021    CHOLESTEROL 132 05/06/2016     Lab Results   Component Value Date    HDL 50 12/03/2022    HDL 35 (L) 12/31/2021    HDL 41 05/06/2016     Lab Results   Component Value Date    TRIG 94 12/03/2022    TRIG 154 (H) 12/31/2021    TRIG 85 05/06/2016     Lab Results   Component Value Date    [place] 90 12/03/2022     Lab Results   Component Value Date    LDLCALC 71 12/03/2022     Continue atorvastatin 40 mg daily, ezetimibe 10 mg daily

## 2023-04-26 NOTE — PROGRESS NOTES
Name: Liat Dunbar      :       MRN: [id number]  Encounter Provider: MOIRA Brantley  Encounter Date: 2023   Encounter department: [address] [place] [address]     1  High risk medication use  -     Toxicology screen, urine  -     Alprazolam, Quantitative Urine    2  Essential hypertension  Assessment & Plan:  BP is well controlled, no reports of dizziness or falls  continue current regimen at this time - carvedilol 12 5 mg bid, furosemide 20 mg daily       3  Dementia with behavioral disturbance  Assessment & Plan:  Continue with Namenda, continue following with senior care   Continue alprazolam 0 25 mg HS       4  Hyperlipidemia, unspecified hyperlipidemia type  Assessment & Plan:  Lab Results   Component Value Date    CHOLESTEROL 140 2022    CHOLESTEROL 144 2021    CHOLESTEROL 132 2016     Lab Results   Component Value Date    HDL 50 2022    HDL 35 (L) 2021    HDL 41 2016     Lab Results   Component Value Date    TRIG 94 2022    TRIG 154 (H) 2021    TRIG 85 2016     Lab Results   Component Value Date    [place] 90 2022     Lab Results   Component Value Date    LDLCALC 71 2022     Continue atorvastatin 40 mg daily, ezetimibe 10 mg daily       5  Nausea  -     ondansetron (ZOFRAN) 4 mg tablet; Take 1 tablet (4 mg total) by mouth every 8 (eight) hours as needed for nausea or vomiting    6  Encounter for immunization  -     Age 15 y+: Ana 78 vac bivalent neil-sucr  -     TDAP VACCINE GREATER THAN OR EQUAL TO 8YO IM    7  Age related osteoporosis, unspecified pathological fracture presence  -     DXA bone density spine hip and pelvis; Future; Expected date: 2023    8  Estrogen deficiency  -     DXA bone density spine hip and pelvis; Future; Expected date: 2023    9   Uncontrolled type 2 diabetes mellitus with hyperglycemia ([facility] )  - sitaGLIPtin-metFORMIN (JANUMET)  MG per tablet; Take 1 tablet by mouth 2 (two) times a day with meals    BMI Counseling: Body mass index is 25 79 kg/m²  The BMI is above normal  Nutrition recommendations include consuming healthier snacks  Rationale for BMI follow-up plan is due to patient being overweight or obese  Subjective     Manuel Davey is a 69 y o  female who  has a past medical history of Cancer ([facility] ), Chronic pain disorder, Diabetes mellitus ([facility] ), Diffuse large B cell lymphoma ([facility] ), Dysphagia, GERD without esophagitis, HTN (hypertension), Hyperlipidemia, Hypertension, MI (myocardial infarction) ([facility] ), Port-A-Cath in place, and Thyroid cancer ([facility] )  who presented to the office today for follow up  She is accompanied by her granddaughter who is her caregiver  She reports that the whole family had a GI virus last week and patient is still experiencing nausea intermittently  She is eating small amounts and drinking fluids often  No loose stool or vomiting  Granddaughter reports that patient has had no recent falls and has no UTI sx  The patient reports that she enjoys helping to care for her young great granddaughter who is less than 3years old  The following portions of the patient's history were reviewed and updated as appropriate: allergies, current medications, past family history, past medical history, past social history, past surgical history and problem list       Review of Systems   Constitutional: Negative for chills and fever  HENT: Negative for ear pain and sore throat  Eyes: Negative for pain and visual disturbance  Respiratory: Negative for cough and shortness of breath  Cardiovascular: Negative for chest pain and palpitations  Gastrointestinal: Negative for abdominal pain and vomiting  Genitourinary: Negative for dysuria and hematuria  Musculoskeletal: Negative for arthralgias and back pain  Skin: Negative for color change and rash  Neurological: Negative for seizures and syncope  Psychiatric/Behavioral: Positive for confusion  All other systems reviewed and are negative  Past Medical History:   Diagnosis Date   • Cancer [facility])     Throat   • Chronic pain disorder    • Diabetes mellitus (HCC)    • Diffuse large B cell lymphoma (HCC)    • Dysphagia    • GERD without esophagitis    • HTN (hypertension)    • Hyperlipidemia    • Hypertension    • MI (myocardial infarction) ([facility] )    • Port-A-Cath in place 07/29/2019   • Thyroid cancer ([facility] ) 2018     Past Surgical History:   Procedure Laterality Date   • BONE MARROW BIOPSY     • BREAST BIOPSY Left    • CARDIAC CATHETERIZATION N/A 12/9/2022    Procedure: Cardiac catheterization;  Surgeon: Adeola Brito MD;  Location: [facility]; Service: Cardiology   • CARDIAC CATHETERIZATION N/A 12/9/2022    Procedure: Cardiac Coronary Angiogram;  Surgeon: Adeola Brito MD;  Location: [facility]; Service: Cardiology   • CARDIAC CATHETERIZATION Left 12/9/2022    Procedure: Cardiac Left Heart Cath;  Surgeon: Adeola Brito MD;  Location: [facility]; Service: Cardiology   • CHOLECYSTECTOMY     • IR PORT PLACEMENT  11/16/2018   • IR PORT REMOVAL  3/22/2021   • OTHER SURGICAL HISTORY      tendor tear repair to right shoulder   • SHOULDER ARTHROSCOPY       Family History   Problem Relation Age of Onset   • Diabetes Mother    • Heart disease Sister    • Hypertension Brother    • Uterine cancer Maternal Grandmother    • Prostate cancer Paternal Grandfather      Social History     Socioeconomic History   • Marital status:       Spouse name: Not on file   • Number of children: Not on file   • Years of education: Not on file   • Highest education level: Not on file   Occupational History   • Not on file   Tobacco Use   • Smoking status: Never   • Smokeless tobacco: Never   Vaping Use   • Vaping Use: Never used   Substance and Sexual Activity   • Alcohol use: Never     Comment: 0   • Drug use: No   • Sexual activity: Not Currently     Partners: Male   Other Topics Concern   • Not on file   Social History Narrative   • Not on file     Social Determinants of Health     Financial Resource Strain: Not on file   Food Insecurity: No Food Insecurity   • Worried About Running Out of Food in the Last Year: Never true   • Ran Out of Food in the Last Year: Never true   Transportation Needs: Unknown   • Lack of Transportation (Medical): Not on file   • Lack of Transportation (Non-Medical):  No   Physical Activity: Not on file   Stress: Not on file   Social Connections: Not on file   Intimate Partner Violence: Not on file   Housing Stability: Low Risk    • Unable to Pay for Housing in the Last Year: No   • Number of Places Lived in the Last Year: 1   • Unstable Housing in the Last Year: No     Current Outpatient Medications on File Prior to Visit   Medication Sig   • Accu-Chek FastClix Lancets MISC USAR PARA PROBAR AZUCAR EN LA TWAN MANISHA VECES AL JEFFREY   • albuterol (PROVENTIL HFA,VENTOLIN HFA) 90 mcg/act inhaler Inhale 1 puff every 4 (four) hours as needed for wheezing   • ALPRAZolam (XANAX) 0 25 mg tablet MAR 1 TABLETA POR VIA ORAL DIARIAMENTE A LA HORA DE ACOSTARSE   • aspirin (Aspirin Low Dose) 81 mg chewable tablet Chew 1 tablet (81 mg total) daily   • atorvastatin (LIPITOR) 40 mg tablet Take 1 tablet (40 mg total) by mouth daily   • bisacodyl (DULCOLAX) 5 mg EC tablet Take 4 tablets (20 mg total) by mouth once for 1 dose   • Blood Glucose Monitoring Suppl (OneTouch Verio) w/Device KIT Use per  guidelines   • Calcium Carb-Cholecalciferol (Calcium + Vitamin D3) 600-10 MG-MCG TABS Take 1 tablet by mouth 2 (two) times a day   • carvedilol (COREG) 12 5 mg tablet Take 1 tablet (12 5 mg total) by mouth 2 (two) times a day with meals   • cyanocobalamin (VITAMIN B-12) 500 MCG tablet Take 1,000 mcg by mouth daily   • Empagliflozin (Jardiance) 10 MG TABS tablet Take 1 tablet (10 mg total) by mouth every morning   • ezetimibe (ZETIA) 10 mg tablet Take 1 tablet (10 mg total) by mouth daily   • ferrous sulfate 325 (65 Fe) mg tablet Take 325 mg by mouth daily with breakfast   • fluticasone (FLONASE) 50 mcg/act nasal spray 1 spray into each nostril daily   • furosemide (LASIX) 20 mg tablet Take 1 tablet (20 mg total) by mouth daily   • gabapentin (NEURONTIN) 100 mg capsule Take 1 capsule (100 mg total) by mouth 2 (two) times a day   • glucose blood (Accu-Chek Guide) test strip USAR PARA EXAMINAR AZUCAR EN LA TWAN MANISHA VECES AL JEFFREY   • Insulin Glargine Solostar (Basaglar KwikPen) 100 UNIT/ML SOPN Inject 0 09 mL (9 Units total) under the skin in the morning   • Insulin Pen Needle (BD Pen Needle Micro U/F) 32G X 6 MM MISC Use daily   • loperamide (IMODIUM) 2 mg capsule MAR JOHNNY (1) CAPSULA POR VIA ORAL ALFREDO SEA NECESARIO PARA LA DIARREA   • losartan (COZAAR) 25 mg tablet Take 1 tablet (25 mg total) by mouth daily   • Melatonin 5 MG TABS Take by mouth as needed   • meloxicam (MOBIC) 7 5 mg tablet MAR JOHNNY (1)TABLETA POR VIA ORAL DIARIA CON EL DESAYUNO PARA LA ARTHRITIS   NO TOME CON IBUPROFEN   • memantine (NAMENDA) 10 mg tablet Take 1 tablet (10 mg total) by mouth 2 (two) times a day   • mirtazapine (REMERON) 15 mg tablet MAR JOHNNY (1) TABLETA POR VIA ORAL CADA NOCHE A LA HORA DE ACOSTARSE   • omeprazole (PriLOSEC) 40 MG capsule Take 1 capsule (40 mg total) by mouth daily   • polyethylene glycol (GOLYTELY) 4000 mL solution Take 4,000 mL by mouth once for 1 dose   • [DISCONTINUED] metFORMIN (GLUCOPHAGE) 1000 MG tablet Take 1 tablet (1,000 mg total) by mouth 2 (two) times a day with meals   • [DISCONTINUED] Tradjenta 5 MG TABS Take 5 mg by mouth in the morning     No Known Allergies  Immunization History   Administered Date(s) Administered   • COVID-19 MODERNA VACC 0 25 ML IM BOOSTER 12/22/2021   • COVID-19 MODERNA VACC 0 5 ML IM 01/31/2021, 02/28/2021, 12/22/2021   • COVID-19 Pfizer Vac BIVALENT Corey-sucrose 12 "Yr+ IM (BOOSTER ONLY) 04/26/2023   • INFLUENZA 10/21/2022   • Influenza, high dose seasonal 0 7 mL 10/08/2019, 10/13/2020, 11/17/2021, 10/21/2022   • Influenza, seasonal, injectable 12/15/2015   • Pneumococcal Conjugate 13-Valent 06/14/2021   • Pneumococcal Polysaccharide PPV23 11/17/2021   • Tdap 04/26/2023       Objective     /80 (BP Location: Left arm, Patient Position: Sitting, Cuff Size: Standard)   Pulse 74   Temp 98 7 °F (37 1 °C) (Temporal)   Resp 18   Ht 5' 2\" (1 575 m)   Wt 64 kg (141 lb)   SpO2 96%   BMI 25 79 kg/m²     Physical Exam  Vitals and nursing note reviewed  Constitutional:       General: She is not in acute distress  Appearance: She is well-developed  She is not diaphoretic  Comments: Elderly appearing   HENT:      Head: Normocephalic and atraumatic  Right Ear: External ear normal       Left Ear: External ear normal    Eyes:      General:         Right eye: No discharge  Left eye: No discharge  Pupils: Pupils are equal, round, and reactive to light  Cardiovascular:      Rate and Rhythm: Normal rate and regular rhythm  Pulmonary:      Effort: Pulmonary effort is normal  No respiratory distress  Breath sounds: No wheezing  Abdominal:      General: Bowel sounds are normal  There is no distension  Palpations: Abdomen is soft  Tenderness: There is no abdominal tenderness  Musculoskeletal:      Cervical back: Normal range of motion and neck supple  Right lower leg: No edema  Left lower leg: No edema  Lymphadenopathy:      Cervical: No cervical adenopathy  Skin:     General: Skin is warm and dry  Capillary Refill: Capillary refill takes less than 2 seconds  Findings: No rash  Neurological:      Mental Status: She is alert  Mental status is at baseline     Psychiatric:         Behavior: Behavior normal        Patience MOIRA Banks  "

## 2023-04-27 ENCOUNTER — TELEPHONE (OUTPATIENT)
Dept: GASTROENTEROLOGY | Facility: CLINIC | Age: 78
End: 2023-04-27

## 2023-04-27 LAB
AMPHETAMINES UR QL SCN: NEGATIVE NG/ML
BARBITURATES UR QL SCN: NEGATIVE NG/ML
BENZODIAZ UR QL: NEGATIVE NG/ML
BZE UR QL: NEGATIVE NG/ML
CANNABINOIDS UR QL SCN: NEGATIVE NG/ML
METHADONE UR QL SCN: NEGATIVE NG/ML
OPIATES UR QL: NEGATIVE NG/ML
PCP UR QL: NEGATIVE NG/ML
PROPOXYPH UR QL SCN: NEGATIVE NG/ML

## 2023-04-27 RX ORDER — LANCETS
EACH MISCELLANEOUS
Qty: 102 EACH | Refills: 10 | Status: SHIPPED | OUTPATIENT
Start: 2023-04-27

## 2023-04-27 NOTE — TELEPHONE ENCOUNTER
Scheduled date of EGD/colonoscopy (as of today):05/08/2023  Physician performing EGD/colonoscopy: Ilir Stovall  Location of EGD/colonoscopy:[facility]  Desired bowel prep reviewed with patient:Kirsty Vega  Instructions reviewed with patient by: Eben Guerrero  Clearances:  N/A

## 2023-04-30 RX ORDER — MELOXICAM 7.5 MG/1
TABLET ORAL
Qty: 30 TABLET | Refills: 10 | OUTPATIENT
Start: 2023-04-30

## 2023-05-01 ENCOUNTER — OFFICE VISIT (OUTPATIENT)
Dept: CARDIOLOGY CLINIC | Facility: CLINIC | Age: 78
End: 2023-05-01

## 2023-05-01 VITALS
WEIGHT: 141 LBS | BODY MASS INDEX: 25.95 KG/M2 | DIASTOLIC BLOOD PRESSURE: 64 MMHG | HEIGHT: 62 IN | HEART RATE: 70 BPM | SYSTOLIC BLOOD PRESSURE: 132 MMHG | OXYGEN SATURATION: 96 %

## 2023-05-01 DIAGNOSIS — I10 ESSENTIAL HYPERTENSION: ICD-10-CM

## 2023-05-01 DIAGNOSIS — I50.20 ACC/AHA STAGE C HEART FAILURE WITH REDUCED EJECTION FRACTION (HCC): Primary | ICD-10-CM

## 2023-05-01 DIAGNOSIS — I42.8 NONISCHEMIC CARDIOMYOPATHY (HCC): ICD-10-CM

## 2023-05-01 DIAGNOSIS — Z92.21 STATUS POST CHEMOTHERAPY: ICD-10-CM

## 2023-05-01 DIAGNOSIS — C83.31 DIFFUSE LARGE B-CELL LYMPHOMA OF LYMPH NODES OF NECK (HCC): ICD-10-CM

## 2023-05-01 NOTE — PATIENT INSTRUCTIONS
CARDIOLOGY ASSESSMENT & PLAN     1  ACC/AHA stage C heart failure with reduced ejection fraction ([address] )        2  Essential hypertension        3  Nonischemic cardiomyopathy ([address] )        4  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)        5  Status post chemotherapy          ACC/AHA stage C heart failure with reduced ejection fraction (HCC)  Wt Readings from Last 3 Encounters:   05/01/23 64 kg (141 lb)   04/26/23 64 kg (141 lb)   04/24/23 64 kg (141 lb)     Ms Ulises Holden is overall stable from cardiac perspective with no recent symptoms that suggest decompensated heart failure  Her blood pressure is reasonably well controlled  She is on good medical regimen  Physical examination does not show signs of pulmonary or peripheral vascular congestion  She is scheduled to have follow-up blood work towards the end of this month  She is scheduled to have EGD colonoscopy next few days  --At this time she is advised to continue current medications  -- She has no contraindication to undergo planned EGD colonoscopy  She will need relatively closer cardiac rhythm monitoring and monitoring of fluid status during the procedure  Large fluid boluses should be avoided  She is advised to continue current medications  -- She will have routine blood work performed to assess her kidney function and electrolytes  -- We will plan for cardiology office visit in 6 months time  Earlier if necessary  -- Dietary and medical compliance are reinforced  -- She is advised  to report any concerning symptoms such as chest pain, shortness of breath, decline in exercise tolerance or presyncope/syncope

## 2023-05-01 NOTE — PROGRESS NOTES
[facility]  [address] [facility], [address] [facility] [address]  Phone#  [phone]  Fax#  [phone]  *-*-*-*-*-*-*-*-*-*-*-*-*-*-*-*-*-*-*-*-*-*-*-*-*-*-*-*-*-*-*-*-*-*-*-*-*-*-*-*-*-*-*-*-*-*-*-*-*-*-*-*-*-*  ENCOUNTER DATE: 05/01/23 11:31 AM  PATIENT NAME: Lizett Ferrer   4/50/1906    [id number]  AGE:77 y o  SEX: female  Margarita Jordan MD     PRIMARY CARE PHYSICIAN: MOIRA Logan    DIAGNOSES:  1  Nonischemic cardiomyopathy, diagnosed December 2022  2  Heart failure with reduced ejection fraction,  3  Diabetes mellitus  4  Primary hypertension  5  History of palpitations  6  Atypical chest pain  7  History of diffuse large B-cell lymphoma of neck, diagnosed 2018 status post chemotherapy with R-EPOCH regimen, followed by R-CHOP completed in March 2019, currently in remission  8  Chemotherapy-induced peripheral neuropathy   9  Normocytic anemia  10  Anemia     Cardiac catheterization 12/9/2022:  Left main: Moderate-sized vessel, angiographically normal  LAD: Moderate-sized vessel, angiographically normal  LCx: Large vessel, angiographically normal  RCA: Moderate-sized dominant vessel, minimal luminal irregularities  ECHOCARDIOGRAM 12/7/2022:  1  Normal left ventricular cavity size, severely reduced left ventricular systolic function  Global hypokinesis with regional wall motion variability  Ejection fraction is estimated as around 33%  Markedly decreased global longitudinal strain, -7 2%  Grade 3 diastolic dysfunction, restrictive left ventricular filling pattern  2   Normal right ventricular size and systolic function  3 mild left atrial cavity enlargement  4   Aortic valve sclerosis with some focal calcification, no aortic valve stenosis  Trace aortic valve regurgitation  5   Mild mitral valve sclerosis, mild mitral valve regurgitation  6   Mild tricuspid valve regurgitation  7   No obvious pulmonary hypertension    8   No pericardial effusion      Compared to report of previous echocardiogram from 11/14/2018 left ventricular dysfunction, there is significant decrease in left ventricular function and ejection fraction is markedly decreased  HOLTER MONITOR 4/11/2021:  Holter monitor reveals the underlying rhythm is sinus rhythm with an average heart rate of 75 beats per minute, a minimum heart rate of 60 beats per minute and a maximum heart rate of 113 beats per minute  There are rare ventricular ectopy comprised of rare VPCs   There are rare supraventricular ectopy comprised of rare APCs and a single atrial couplet  Single 5 beat run of non-sustained PSVT with aberrant conduction  There is no evidence of significant bradyarrhythmia or advanced heart block  The longest R to R was 1 1 seconds  Intermittent bundle branch block was noted  BBB appears to be rate related  No reported symptoms on patient diary  CURRENT ECG   No results found for this visit on 05/01/23  CARDIOLOGY ASSESSMENT & PLAN     1  ACC/AHA stage C heart failure with reduced ejection fraction ([address] )        2  Essential hypertension        3  Nonischemic cardiomyopathy ([facility] [address] )        4  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)        5  Status post chemotherapy          ACC/AHA stage C heart failure with reduced ejection fraction (HCC)  Wt Readings from Last 3 Encounters:   05/01/23 64 kg (141 lb)   04/26/23 64 kg (141 lb)   04/24/23 64 kg (141 lb)     Ms Noel Miramontes is overall stable from cardiac perspective with no recent symptoms that suggest decompensated heart failure  Her blood pressure is reasonably well controlled  She is on good medical regimen  Physical examination does not show signs of pulmonary or peripheral vascular congestion  She is scheduled to have follow-up blood work towards the end of this month  She is scheduled to have EGD colonoscopy next few days      --At this time she is advised to continue current medications  -- She has no contraindication to undergo planned EGD colonoscopy  She will need relatively closer cardiac rhythm monitoring and monitoring of fluid status during the procedure  Large fluid boluses should be avoided  She is advised to continue current medications  -- She will have routine blood work performed to assess her kidney function and electrolytes  -- We will plan for cardiology office visit in 6 months time  Earlier if necessary  -- Dietary and medical compliance are reinforced  -- She is advised  to report any concerning symptoms such as chest pain, shortness of breath, decline in exercise tolerance or presyncope/syncope  INTERVAL HISTORY & HISTORY OF PRESENT ILLNESS     Karen Morfin is here for follow-up regarding her cardiac comorbidities which include: Recently diagnosed nonischemic cardiomyopathy with heart failure with reduced ejection fraction, primary hypertension, obstructive sleep apnea and other comorbidities  She was last seen by me in January 2023  She is here for follow-up visit accompanied with her granddaughter who is helping with the translation  Patient's granddaughter mentions that since last visit patient has had no new hospitalizations or significant illnesses  She has been dealing with some dysphagia and excessive cough when she is eating and is scheduled to undergo EGD  evaluation along with colonoscopy  This is planned for May 8, 2023  Patient denies any chest pains or unusual shortness of breath or dizziness or lightheadedness or palpitations  She denies orthopnea PND or worsening pedal edema  Patient continues to have symptoms related to Alzheimer's dementia  She was advised to use walker but has not been using it  She has had no recent falls  Functional capacity status: Moderate to good  (Excellent- >10 METs; Good: (7-10 METs); Moderate (4-7 METs);  Poor (<= 4 METs)    Any chronic stressors: None   (feeling of poor "health, financial problems, and social isolation etc)  Tobacco or alcohol dependence: She does not smoke and she does not drink  Current cardiac medications: Furosemide 20 mg daily, ezetimibe 10 mg daily, losartan 25 mg daily, carvedilol 12 5 mg twice daily, Jardiance 10 mg daily, aspirin 81 mg daily, atorvastatin 40 mg daily  Last blood work is from 12/20/2022  Sodium was 133 potassium 4 2 chloride 100 bicarb 32 BUN 17 creatinine 0 82 LFTs were normal, LDH was increased at 297, NT proBNP level was 792, hemoglobin and hematocrit and platelet count are normal   Hemoglobin A1c was 6 9   TSH in February 2021 was 3 45    REVIEW OF SYSTEMS   Positive for: As noted above in HPI  Negative for: All remaining as reviewed below and in HPI  SYSTEM SYMPTOMS REVIEWED:  General--weight change, fever, night sweats  Respiratory--cough, wheezing, shortness of breath, sputum production  Cardiovascular--chest pain, syncope, dyspnea on exertion, edema, decline in exercise tolerance, claudication   Gastrointestinal--persistent vomiting, diarrhea, abdominal distention, blood in stool   Muscular or skeletal--joint pain or swelling   Neurologic--headaches, syncope, abnormal movement  Hematologic--history of easy bruising and bleeding   Endocrine--thyroid enlargement, heat or cold intolerance, polyuria   Psychiatric--anxiety, depression     *-*-*-*-*-*-*-*-*-*-*-*-*-*-*-*-*-*-*-*-*-*-*-*-*-*-*-*-*-*-*-*-*-*-*-*-*-*-*-*-*-*-*-*-*-*-*-*-*-*-*-*-*-*-  VITAL SIGNS     CURRENT VITAL SIGNS:   Vitals:    05/01/23 1108   BP: 132/64   Pulse: 70   SpO2: 96%   Weight: 64 kg (141 lb)   Height: 5' 2\" (1 575 m)     BMI: Body mass index is 25 79 kg/m²      WEIGHTS:   Wt Readings from Last 25 Encounters:   05/01/23 64 kg (141 lb)   04/26/23 64 kg (141 lb)   04/24/23 64 kg (141 lb)   04/18/23 65 3 kg (144 lb)   04/17/23 65 3 kg (144 lb)   04/13/23 65 5 kg (144 lb 6 4 oz)   04/06/23 66 1 kg (145 lb 12 8 oz)   02/20/23 67 1 kg (148 lb)   01/19/23 " 67 2 kg (148 lb 3 2 oz)   01/09/23 68 1 kg (150 lb 3 2 oz)   12/19/22 67 9 kg (149 lb 12 8 oz)   12/09/22 70 8 kg (156 lb 1 4 oz)   10/21/22 67 5 kg (148 lb 14 4 oz)   10/03/22 67 7 kg (149 lb 3 2 oz)   07/18/22 66 2 kg (146 lb)   07/18/22 66 2 kg (146 lb)   06/20/22 66 4 kg (146 lb 4 8 oz)   06/08/22 66 5 kg (146 lb 9 6 oz)   05/10/22 66 2 kg (145 lb 14 4 oz)   05/09/22 65 8 kg (145 lb)   05/05/22 65 9 kg (145 lb 3 2 oz)   05/02/22 65 4 kg (144 lb 3 2 oz)   03/17/22 66 2 kg (146 lb)   03/15/22 67 6 kg (149 lb)   03/04/22 66 4 kg (146 lb 6 4 oz)        *-*-*-*-*-*-*-*-*-*-*-*-*-*-*-*-*-*-*-*-*-*-*-*-*-*-*-*-*-*-*-*-*-*-*-*-*-*-*-*-*-*-*-*-*-*-*-*-*-*-*-*-*-*-  PHYSICAL EXAM     General Appearance:    Alert, cooperative, no distress, appears stated age, slightly overweight   Head, Eyes, ENT:    No obvious abnormality, moist mucous mebranes  Neck:   Supple, no carotid bruit or JVD   Back:     Symmetric, no curvature  Lungs:     Respirations unlabored  Clear to auscultation bilaterally,    Chest wall:    No tenderness or deformity   Heart:    Regular rate and rhythm, S1 and S2 normal, no murmur, rub  or gallop  Abdomen:     Soft, non-tender, No obvious masses, or organomegaly   Extremities:   Extremities warm, no cyanosis or edema    Skin:   No venostatic changes in lower extremities  Normal skin color, texture, and turgor   No rashes or lesions     *-*-*-*-*-*-*-*-*-*-*-*-*-*-*-*-*-*-*-*-*-*-*-*-*-*-*-*-*-*-*-*-*-*-*-*-*-*-*-*-*-*-*-*-*-*-*-*-*-*-*-*-*-*-  CURRENT MEDICATIONS LIST     Current Outpatient Medications:     Accu-Chek FastClix Lancets MISC, USAR PARA PROBAR AZUCAR EN LA TWAN MANISHA VECES AL JEFFREY, Disp: 102 each, Rfl: 10    albuterol (PROVENTIL HFA,VENTOLIN HFA) 90 mcg/act inhaler, Inhale 1 puff every 4 (four) hours as needed for wheezing, Disp: 18 g, Rfl: 10    ALPRAZolam (XANAX) 0 25 mg tablet, MAR 1 TABLETA POR VIA ORAL DIARIAMENTE A LA HORA DE ACOSTARSE, Disp: 30 tablet, Rfl: 0    aspirin (Aspirin Low Dose) 81 mg chewable tablet, Chew 1 tablet (81 mg total) daily, Disp: 90 tablet, Rfl: 0    atorvastatin (LIPITOR) 40 mg tablet, Take 1 tablet (40 mg total) by mouth daily, Disp: 90 tablet, Rfl: 0    Blood Glucose Monitoring Suppl (OneTouch Verio) w/Device KIT, Use per  guidelines, Disp: 1 kit, Rfl: 0    Calcium Carb-Cholecalciferol (Calcium + Vitamin D3) 600-10 MG-MCG TABS, Take 1 tablet by mouth 2 (two) times a day, Disp: 180 tablet, Rfl: 0    carvedilol (COREG) 12 5 mg tablet, Take 1 tablet (12 5 mg total) by mouth 2 (two) times a day with meals, Disp: 60 tablet, Rfl: 10    cyanocobalamin (VITAMIN B-12) 500 MCG tablet, Take 1,000 mcg by mouth daily, Disp: , Rfl:     Empagliflozin (Jardiance) 10 MG TABS tablet, Take 1 tablet (10 mg total) by mouth every morning, Disp: 90 tablet, Rfl: 1    ezetimibe (ZETIA) 10 mg tablet, Take 1 tablet (10 mg total) by mouth daily, Disp: 30 tablet, Rfl: 10    ferrous sulfate 325 (65 Fe) mg tablet, Take 325 mg by mouth daily with breakfast, Disp: , Rfl:     fluticasone (FLONASE) 50 mcg/act nasal spray, 1 spray into each nostril daily, Disp: 16 g, Rfl: 10    furosemide (LASIX) 20 mg tablet, Take 1 tablet (20 mg total) by mouth daily, Disp: 90 tablet, Rfl: 3    gabapentin (NEURONTIN) 100 mg capsule, Take 1 capsule (100 mg total) by mouth 2 (two) times a day, Disp: 60 capsule, Rfl: 5    glucose blood (Accu-Chek Guide) test strip, USAR PARA EXAMINAR AZUCAR EN LA TWAN MANISHA VECES AL JEFFREY, Disp: 100 strip, Rfl: 10    Insulin Glargine Solostar (Basaglar KwikPen) 100 UNIT/ML SOPN, Inject 0 09 mL (9 Units total) under the skin in the morning, Disp: 12 mL, Rfl: 1    Insulin Pen Needle (BD Pen Needle Micro U/F) 32G X 6 MM MISC, Use daily, Disp: 100 each, Rfl: 1    loperamide (IMODIUM) 2 mg capsule, MAR JOHNNY (1) CAPSULA POR VIA ORAL ALFREDO SEA NECESARIO PARA LA DIARREA, Disp: 30 capsule, Rfl: 10    losartan (COZAAR) 25 mg tablet, Take 1 tablet (25 mg total) by mouth daily, Disp: 90 tablet, Rfl: 0    Melatonin 5 MG TABS, Take by mouth as needed, Disp: , Rfl:     meloxicam (MOBIC) 7 5 mg tablet, MAR JOHNNY (1)TABLETA POR VIA ORAL DIARIA CON EL DESAYUNO PARA LA ARTHRITIS   NO TOME CON IBUPROFEN, Disp: , Rfl:     memantine (NAMENDA) 10 mg tablet, Take 1 tablet (10 mg total) by mouth 2 (two) times a day, Disp: 60 tablet, Rfl: 3    mirtazapine (REMERON) 15 mg tablet, MAR JOHNNY (1) TABLETA POR VIA ORAL CADA NOCHE A LA HORA DE ACOSTARSE, Disp: 90 tablet, Rfl: 3    omeprazole (PriLOSEC) 40 MG capsule, Take 1 capsule (40 mg total) by mouth daily, Disp: 30 capsule, Rfl: 10    ondansetron (ZOFRAN) 4 mg tablet, Take 1 tablet (4 mg total) by mouth every 8 (eight) hours as needed for nausea or vomiting, Disp: 20 tablet, Rfl: 0    sitaGLIPtin-metFORMIN (JANUMET)  MG per tablet, Take 1 tablet by mouth 2 (two) times a day with meals, Disp: 60 tablet, Rfl: 5    bisacodyl (DULCOLAX) 5 mg EC tablet, Take 4 tablets (20 mg total) by mouth once for 1 dose, Disp: 4 tablet, Rfl: 0    polyethylene glycol (GOLYTELY) 4000 mL solution, Take 4,000 mL by mouth once for 1 dose, Disp: 4000 mL, Rfl: 0       ALLERGIES   No Known Allergies    *-*-*-*-*-*-*-*-*-*-*-*-*-*-*-*-*-*-*-*-*-*-*-*-*-*-*-*-*-*-*-*-*-*-*-*-*-*-*-*-*-*-*-*-*-*-*-*-*-*-*-*-*-*-  LABORATORY DATA   No results found for: NA  Potassium   Date Value Ref Range Status   12/20/2022 4 2 3 5 - 5 3 mmol/L Final   12/10/2022 4 5 3 5 - 5 3 mmol/L Final   12/09/2022 4 3 3 5 - 5 3 mmol/L Final     Chloride   Date Value Ref Range Status   12/20/2022 100 96 - 108 mmol/L Final   12/10/2022 94 (L) 96 - 108 mmol/L Final   12/09/2022 93 (L) 96 - 108 mmol/L Final     CO2   Date Value Ref Range Status   12/20/2022 32 21 - 32 mmol/L Final   12/10/2022 29 21 - 32 mmol/L Final   12/09/2022 30 21 - 32 mmol/L Final     BUN   Date Value Ref Range Status   12/20/2022 17 5 - 25 mg/dL Final   12/10/2022 22 5 - 25 mg/dL Final   12/09/2022 22 5 - 25 mg/dL Final     Creatinine   Date Value Ref Range Status   12/20/2022 0 82 0 60 - 1 30 mg/dL Final     Comment:     Standardized to IDMS reference method   12/10/2022 0 81 0 60 - 1 30 mg/dL Final     Comment:     Standardized to IDMS reference method   12/09/2022 0 84 0 60 - 1 30 mg/dL Final     Comment:     Standardized to IDMS reference method     eGFR   Date Value Ref Range Status   12/20/2022 69 ml/min/1 73sq m Final   12/10/2022 70 ml/min/1 73sq m Final   12/09/2022 67 ml/min/1 73sq m Final     Calcium   Date Value Ref Range Status   12/20/2022 9 9 8 3 - 10 1 mg/dL Final   12/10/2022 8 6 8 3 - 10 1 mg/dL Final   12/09/2022 8 9 8 3 - 10 1 mg/dL Final     AST   Date Value Ref Range Status   12/20/2022 21 5 - 45 U/L Final     Comment:     Specimen collection should occur prior to Sulfasalazine administration due to the potential for falsely depressed results  12/07/2022 21 5 - 45 U/L Final     Comment:     Specimen collection should occur prior to Sulfasalazine administration due to the potential for falsely depressed results  12/31/2021 27 14 - 36 U/L Final     Comment:     Specimen collection should occur prior to Sulfasalazine administration due to the potential for falsely depressed results  ALT   Date Value Ref Range Status   12/20/2022 31 12 - 78 U/L Final     Comment:     Specimen collection should occur prior to Sulfasalazine and/or Sulfapyridine administration due to the potential for falsely depressed results  12/07/2022 34 12 - 78 U/L Final     Comment:     Specimen collection should occur prior to Sulfasalazine administration due to the potential for falsely depressed results  12/31/2021 24 <35 U/L Final     Comment:     Specimen collection should occur prior to Sulfasalazine administration due to the potential for falsely depressed results        Alkaline Phosphatase   Date Value Ref Range Status   12/20/2022 82 46 - 116 U/L Final   12/07/2022 113 46 - 116 U/L Final   12/31/2021 97 43 - 122 U/L Final     Magnesium   Date Value Ref Range Status   12/08/2022 1 7 1 6 - 2 6 mg/dL Final   12/03/2022 1 5 (L) 1 6 - 2 6 mg/dL Final   12/31/2021 1 4 (L) 1 6 - 2 3 mg/dL Final     WBC   Date Value Ref Range Status   12/20/2022 10 20 (H) 4 31 - 10 16 Thousand/uL Final   12/09/2022 9 44 4 31 - 10 16 Thousand/uL Final   12/07/2022 11 18 (H) 4 31 - 10 16 Thousand/uL Final     Hemoglobin   Date Value Ref Range Status   12/20/2022 12 0 11 5 - 15 4 g/dL Final   12/09/2022 12 7 11 5 - 15 4 g/dL Final   12/07/2022 11 2 (L) 11 5 - 15 4 g/dL Final     Platelets   Date Value Ref Range Status   12/20/2022 364 149 - 390 Thousands/uL Final   12/09/2022 298 149 - 390 Thousands/uL Final   12/07/2022 269 149 - 390 Thousands/uL Final     PTT   Date Value Ref Range Status   12/07/2022 35 23 - 37 seconds Final     Comment:     Therapeutic Heparin Range =  60-90 seconds   02/25/2019 32 23 - 34 seconds Final     Comment:     Therapeutic Heparin Range =  52-80 seconds     INR   Date Value Ref Range Status   12/07/2022 0 95 0 84 - 1 19 Final   02/25/2019 1 10 0 89 - 1 10 Final     No results found for: CKMB, DIGOXIN  No results found for: TSH  No results found for: CHOL   Hemoglobin A1C   Date Value Ref Range Status   04/06/2023 6 9 (A) 6 5 Final   12/03/2022 6 9 (H) Normal 3 8-5 6%; PreDiabetic 5 7-6 4%; Diabetic >=6 5%; Glycemic control for adults with diabetes <7 0% % Final   08/03/2016 8 5  Final     Comment:       Performed at: In Office  ,       Blood Culture   Date Value Ref Range Status   02/25/2019 No Growth After 5 Days  Final   02/25/2019 No Growth After 5 Days  Final   02/21/2019 No Growth After 5 Days  Final   02/21/2019 No Growth After 5 Days    Final     Urine Culture   Date Value Ref Range Status   05/06/2016 >100,000 cfu/ml Escherichia coli  Final     Legionella Urinary Antigen   Date Value Ref Range Status   02/26/2019 Negative Negative Final     C difficile toxin by PCR   Date Value Ref Range Status 2018 NEGATIVE for C difficle toxin by PCR  NEGATIVE for C difficle toxin by PCR  Final       *-*-*-*-*-*-*-*-*-*-*-*-*-*-*-*-*-*-*-*-*-*-*-*-*-*-*-*-*-*-*-*-*-*-*-*-*-*-*-*-*-*-*-*-*-*-*-*-*-*-*-*-*-*-  PREVIOUS CARDIOLOGY & RADIOLOGY TEST RESULTS   I have personally reviewed pertinent results of cardiovascular tests noted below  Results for orders placed during the hospital encounter of 18    Echo complete with contrast if indicated    Narrative  [facility] [employer] [address]    Transthoracic Echocardiogram  2D, M-mode, Doppler, and Color Doppler    Study date:  2018    Patient: Nathan Pinzon  MR number: [id number]  Account number: [de-identified]  : 1945  Age: 68 years  Gender: Female  Status: Outpatient  Location: [address] [place] [address]  Height: 60 in  Weight: 140 8 lb  BP: 102/ 68 mmHg    Indications: Chemo    Diagnoses: C83 30 - Diffuse large B-cell lymphoma, unspecified site    Sonographer:  JING Mcnulty,RCS  Primary Physician:  Doran Litten, PA-C  Referring Physician:  Lewis Fry MD  Group:  [id number]  Interpreting Physician:  Vipul Schmidt MD    SUMMARY    SUMMARY:  1  Sinus rhythm  2  Poor technical quality  3  Normal left ventricular systolic function  4  Grade 1 left ventricular diastolic dysfunction with normal filling pressures  5  Biatrial enlargement  6  Calcific aortic sclerosis without stenosis  LEFT VENTRICLE:  Normal left ventricular systolic function, EF 62%  Normal left ventricular cavity size  Normal left ventricular wall motion without regional wall motion abnormalities  Grade 1 left ventricular diastolic dysfunction  Normal left ventricular  filling pressures  LEFT ATRIUM:  Mild left atrial enlargement  RIGHT ATRIUM:  Minimal right atrial enlargement  MITRAL VALVE:  Trace mitral regurgitation      AORTIC VALVE:  Calcified, sclerotic, tricuspid aortic valve without stenosis  TRICUSPID VALVE:  Mild tricuspid regurgitation  PULMONIC VALVE:  Trace pulmonic regurgitation  PULMONARY ARTERIES:  Normal pulmonary artery pressures  PERICARDIUM:  No pericardial effusion  HISTORY: PRIOR HISTORY: Diabetes, shortness of breath, hypertension    PROCEDURE: The study was performed in the [facility]  This was a routine study  The transthoracic approach was used  The study included complete 2D imaging, M-mode, complete spectral Doppler, and color Doppler  The heart rate was  65 bpm, at the start of the study  Images were obtained from the parasternal, apical, subcostal, and suprasternal notch acoustic windows  Image quality was adequate  LEFT VENTRICLE: Normal left ventricular systolic function, EF 91%  Normal left ventricular cavity size  Normal left ventricular wall motion without regional wall motion abnormalities  Grade 1 left ventricular diastolic dysfunction  Normal  left ventricular filling pressures  RIGHT VENTRICLE: The size was normal  Systolic function was normal  Wall thickness was normal     LEFT ATRIUM: Mild left atrial enlargement  RIGHT ATRIUM: Minimal right atrial enlargement  MITRAL VALVE: Trace mitral regurgitation  There was normal leaflet separation  DOPPLER: The transmitral velocity was within the normal range  There was no evidence for stenosis  There was no regurgitation  AORTIC VALVE: Calcified, sclerotic, tricuspid aortic valve without stenosis  TRICUSPID VALVE: Mild tricuspid regurgitation  PULMONIC VALVE: Trace pulmonic regurgitation  PERICARDIUM: No pericardial effusion  There was no pericardial effusion  The pericardium was normal in appearance  AORTA: The root exhibited normal size  PULMONARY ARTERY: Normal pulmonary artery pressures      SYSTEM MEASUREMENT TABLES    2D  %FS: 37 59 %  Ao Diam: 2 96 cm  EDV(Teich): 80 71 ml  EF(Teich): 68 01 %  ESV(Teich): 25 82 ml  IVSd: 0 98 cm  LA Area: 18 03 cm2  LA Diam: 3 81 cm  LVEDV MOD A4C: 55 5 ml  LVEF MOD A4C: 66 21 %  LVESV MOD A4C: 18 75 ml  LVIDd: 4 25 cm  LVIDs: 2 65 cm  LVLd A4C: 6 01 cm  LVLs A4C: 5 21 cm  LVPWd: 0 99 cm  RA Area: 15 94 cm2  RVIDd: 3 83 cm  SV MOD A4C: 36 75 ml  SV(Teich): 54 89 ml    CW  AV Vmax: 1 33 m/s  AV maxP 08 mmHg  RAP: 10 mmHg  TR Vmax: 2 4 m/s  TR maxP mmHg    MM  TAPSE: 2 14 cm    PW  E': 0 09 m/s  E/E': 6 19  LVOT Vmax: 1 18 m/s  LVOT maxP 56 mmHg  MV A Maximiliano: 0 84 m/s  MV Dec Butts: 1 74 m/s2  MV DecT: 313 59 ms  MV E Maximiliano: 0 55 m/s  MV E/A Ratio: 0 65  MV PHT: 90 94 ms  MVA By PHT: 2 42 cm2  PAEDP: 13 06 mmHg  PRend PG: 3 06 mmHg  PRend Vmax: 0 87 m/s  PV Vmax: 0 75 m/s  PV maxP 27 mmHg  RVSP: 33 mmHg    [facility]    Prepared and electronically signed by    Omega King MD  Signed [id number] 17:16:37    No results found for this or any previous visit  No results found for this or any previous visit  No results found for this or any previous visit  XR chest pa & lateral  Narrative: CHEST     INDICATION:   I50 9: Heart failure, unspecified  History of lymphoma  COMPARISON:  CXR 2022, 12/3/2022, and 2019 and chest CT 2021  EXAM PERFORMED/VIEWS:  XR CHEST PA & LATERAL  DUAL ENERGY SUBTRACTION  FINDINGS:    Cardiomediastinal silhouette appears unremarkable  Mild chronic scar in the right base with no acute disease  No effusion or pneumothorax  Osseous structures appear within normal limits for patient age  Cholecystectomy  Impression: No acute cardiopulmonary disease      Workstation performed: [id number]        *-*-*-*-*-*-*-*-*-*-*-*-*-*-*-*-*-*-*-*-*-*-*-*-*-*-*-*-*-*-*-*-*-*-*-*-*-*-*-*-*-*-*-*-*-*-*-*-*-*-*-*-*-*-  SIGNATURES:   @FKU@   Marni Montana MD; KRYS    *-*-*-*-*-*-*-*-*-*-*-*-*-*-*-*-*-*-*-*-*-*-*-*-*-*-*-*-*-*-*-*-*-*-*-*-*-*-*-*-*-*-*-*-*-*-*-*-*-*-*-*-*-*-  PAST MEDICAL HISTORY:  Past Medical History:   Diagnosis Date    Cancer ([place] [address] )     Throat    Chronic pain disorder     Diabetes mellitus ([place] [address] )     Diffuse large B cell lymphoma (HCC)     Dysphagia     GERD without esophagitis     HTN (hypertension)     Hyperlipidemia     Hypertension     MI (myocardial infarction) ([place] [address] )     Port-A-Cath in place 07/29/2019    Thyroid cancer ([place] [address] ) 2018    PAST SURGICAL HISTORY:  Past Surgical History:   Procedure Laterality Date    BONE MARROW BIOPSY      BREAST BIOPSY Left     CARDIAC CATHETERIZATION N/A 12/9/2022    Procedure: Cardiac catheterization;  Surgeon: Arnold Sosa MD;  Location: [facility]; Service: Cardiology    CARDIAC CATHETERIZATION N/A 12/9/2022    Procedure: Cardiac Coronary Angiogram;  Surgeon: Arnold Sosa MD;  [facility]; Service: Cardiology    CARDIAC CATHETERIZATION Left 12/9/2022    Procedure: Cardiac Left Heart Cath;  Surgeon: Arnold Sosa MD;  [facility];   Service: Cardiology    CHOLECYSTECTOMY      IR PORT PLACEMENT  11/16/2018    IR PORT REMOVAL  3/22/2021    OTHER SURGICAL HISTORY      tendor tear repair to right shoulder    SHOULDER ARTHROSCOPY           FAMILY HISTORY:  Family History   Problem Relation Age of Onset    Diabetes Mother     Heart disease Sister     Hypertension Brother     Uterine cancer Maternal Grandmother     Prostate cancer Paternal Grandfather     SOCIAL HISTORY:  Social History     Tobacco Use   Smoking Status Never   Smokeless Tobacco Never      Social History     Substance and Sexual Activity   Alcohol Use Never    Comment: 0     Social History     Substance and Sexual Activity   Drug Use No    [id number]     *-*-*-*-*-*-*-*-*-*-*-*-*-*-*-*-*-*-*-*-*-*-*-*-*-*-*-*-*-*-*-*-*-*-*-*-*-*-*-*-*-*-*-*-*-*-*-*-*-*-*-*-*-*  ALLERGIES:  No Known Allergies CURRENT SCHEDULED MEDICATIONS:    Current Outpatient Medications:     Accu-Chek FastClix Lancets MISC, USAR PARA PROBAR AZUCAR EN LA TWAN MANISHA VECES AL JEFFREY, Disp: 102 each, Rfl: 10    albuterol (PROVENTIL HFA,VENTOLIN HFA) 90 mcg/act inhaler, Inhale 1 puff every 4 (four) hours as needed for wheezing, Disp: 18 g, Rfl: 10    ALPRAZolam (XANAX) 0 25 mg tablet, MAR 1 TABLETA POR VIA ORAL DIARIAMENTE A LA HORA DE ACOSTARSE, Disp: 30 tablet, Rfl: 0    aspirin (Aspirin Low Dose) 81 mg chewable tablet, Chew 1 tablet (81 mg total) daily, Disp: 90 tablet, Rfl: 0    atorvastatin (LIPITOR) 40 mg tablet, Take 1 tablet (40 mg total) by mouth daily, Disp: 90 tablet, Rfl: 0    Blood Glucose Monitoring Suppl (OneTouch Verio) w/Device KIT, Use per  guidelines, Disp: 1 kit, Rfl: 0    Calcium Carb-Cholecalciferol (Calcium + Vitamin D3) 600-10 MG-MCG TABS, Take 1 tablet by mouth 2 (two) times a day, Disp: 180 tablet, Rfl: 0    carvedilol (COREG) 12 5 mg tablet, Take 1 tablet (12 5 mg total) by mouth 2 (two) times a day with meals, Disp: 60 tablet, Rfl: 10    cyanocobalamin (VITAMIN B-12) 500 MCG tablet, Take 1,000 mcg by mouth daily, Disp: , Rfl:     Empagliflozin (Jardiance) 10 MG TABS tablet, Take 1 tablet (10 mg total) by mouth every morning, Disp: 90 tablet, Rfl: 1    ezetimibe (ZETIA) 10 mg tablet, Take 1 tablet (10 mg total) by mouth daily, Disp: 30 tablet, Rfl: 10    ferrous sulfate 325 (65 Fe) mg tablet, Take 325 mg by mouth daily with breakfast, Disp: , Rfl:     fluticasone (FLONASE) 50 mcg/act nasal spray, 1 spray into each nostril daily, Disp: 16 g, Rfl: 10    furosemide (LASIX) 20 mg tablet, Take 1 tablet (20 mg total) by mouth daily, Disp: 90 tablet, Rfl: 3    gabapentin (NEURONTIN) 100 mg capsule, Take 1 capsule (100 mg total) by mouth 2 (two) times a day, Disp: 60 capsule, Rfl: 5    glucose blood (Accu-Chek Guide) test strip, USAR PARA EXAMINAR AZUCAR EN LA TWAN MANISHA VECES AL JEFFREY, Disp: 100 strip, Rfl: 10    Insulin Glargine Solostar (Basaglar KwikPen) 100 UNIT/ML SOPN, Inject 0 09 mL (9 Units total) under the skin in the morning, Disp: 12 mL, Rfl: 1    Insulin Pen Needle (BD Pen Needle Micro U/F) 32G X 6 MM MISC, Use daily, Disp: 100 each, Rfl: 1    loperamide (IMODIUM) 2 mg capsule, MAR JOHNNY (1) CAPSULA POR VIA ORAL ALFREDO SEA NECESARIO PARA LA DIARREA, Disp: 30 capsule, Rfl: 10    losartan (COZAAR) 25 mg tablet, Take 1 tablet (25 mg total) by mouth daily, Disp: 90 tablet, Rfl: 0    Melatonin 5 MG TABS, Take by mouth as needed, Disp: , Rfl:     meloxicam (MOBIC) 7 5 mg tablet, MAR JOHNNY (1)TABLETA POR VIA ORAL DIARIA CON EL DESAYUNO PARA LA ARTHRITIS   NO TOME CON IBUPROFEN, Disp: , Rfl:     memantine (NAMENDA) 10 mg tablet, Take 1 tablet (10 mg total) by mouth 2 (two) times a day, Disp: 60 tablet, Rfl: 3    mirtazapine (REMERON) 15 mg tablet, MAR JOHNNY (1) TABLETA POR VIA ORAL CADA NOCHE A LA HORA DE ACOSTARSE, Disp: 90 tablet, Rfl: 3    omeprazole (PriLOSEC) 40 MG capsule, Take 1 capsule (40 mg total) by mouth daily, Disp: 30 capsule, Rfl: 10    ondansetron (ZOFRAN) 4 mg tablet, Take 1 tablet (4 mg total) by mouth every 8 (eight) hours as needed for nausea or vomiting, Disp: 20 tablet, Rfl: 0    sitaGLIPtin-metFORMIN (JANUMET)  MG per tablet, Take 1 tablet by mouth 2 (two) times a day with meals, Disp: 60 tablet, Rfl: 5    bisacodyl (DULCOLAX) 5 mg EC tablet, Take 4 tablets (20 mg total) by mouth once for 1 dose, Disp: 4 tablet, Rfl: 0    polyethylene glycol (GOLYTELY) 4000 mL solution, Take 4,000 mL by mouth once for 1 dose, Disp: 4000 mL, Rfl: 0     *-*-*-*-*-*-*-*-*-*-*-*-*-*-*-*-*-*-*-*-*-*-*-*-*-*-*-*-*-*-*-*-*-*-*-*-*-*-*-*-*-*-*-*-*-*-*-*-*-*-*-*-*-*

## 2023-05-01 NOTE — ASSESSMENT & PLAN NOTE
Wt Readings from Last 3 Encounters:   05/01/23 64 kg (141 lb)   04/26/23 64 kg (141 lb)   04/24/23 64 kg (141 lb)     Ms Dianna Mckeon is overall stable from cardiac perspective with no recent symptoms that suggest decompensated heart failure  Her blood pressure is reasonably well controlled  She is on good medical regimen  Physical examination does not show signs of pulmonary or peripheral vascular congestion  She is scheduled to have follow-up blood work towards the end of this month  She is scheduled to have EGD colonoscopy next few days  --At this time she is advised to continue current medications  -- She has no contraindication to undergo planned EGD colonoscopy  She will need relatively closer cardiac rhythm monitoring and monitoring of fluid status during the procedure  Large fluid boluses should be avoided  She is advised to continue current medications  -- She will have routine blood work performed to assess her kidney function and electrolytes  -- We will plan for cardiology office visit in 6 months time  Earlier if necessary  -- Dietary and medical compliance are reinforced  -- She is advised  to report any concerning symptoms such as chest pain, shortness of breath, decline in exercise tolerance or presyncope/syncope

## 2023-05-01 NOTE — LETTER
Cardiology Pre Operative Clearance      PRE OPERATIVE CARDIAC RISK ASSESSMENT    05/01/23    Kieran Sandoval  [birth year]  [id number]    Date of Surgery: 5/8/2023    Type of Surgery: EGD and colonoscopy    Surgeon: Fermin Sanabria MD    No Cardiac Contraindication for Planned Surgical Procedures    Anticoagulation: not applicable    Physician Comment: Patient has history of nonischemic cardiomyopathy with severely reduced left ventricular systolic function  Will require closer than usual monitoring for cardiac rhythm disturbance and signs of heart failure during and following the procedure      Electronically Signed: Scott Correa MD

## 2023-05-02 DIAGNOSIS — E11.9 TYPE 2 DIABETES MELLITUS WITHOUT COMPLICATION, WITHOUT LONG-TERM CURRENT USE OF INSULIN (HCC): ICD-10-CM

## 2023-05-02 DIAGNOSIS — F41.8 ANXIETY ABOUT HEALTH: ICD-10-CM

## 2023-05-02 RX ORDER — EZETIMIBE 10 MG/1
TABLET ORAL
Qty: 90 TABLET | Refills: 4 | Status: SHIPPED | OUTPATIENT
Start: 2023-05-02

## 2023-05-03 LAB — MISCELLANEOUS LAB TEST RESULT: NORMAL

## 2023-05-05 ENCOUNTER — OFFICE VISIT (OUTPATIENT)
Age: 78
End: 2023-05-05

## 2023-05-05 VITALS
HEART RATE: 71 BPM | SYSTOLIC BLOOD PRESSURE: 110 MMHG | OXYGEN SATURATION: 97 % | WEIGHT: 142.8 LBS | HEIGHT: 61 IN | TEMPERATURE: 97.8 F | DIASTOLIC BLOOD PRESSURE: 60 MMHG | BODY MASS INDEX: 26.96 KG/M2

## 2023-05-05 DIAGNOSIS — F41.9 ANXIETY: ICD-10-CM

## 2023-05-05 DIAGNOSIS — F03.918 DEMENTIA WITH BEHAVIORAL DISTURBANCE (HCC): Primary | ICD-10-CM

## 2023-05-05 DIAGNOSIS — I10 ESSENTIAL HYPERTENSION: ICD-10-CM

## 2023-05-05 DIAGNOSIS — G47.09 OTHER INSOMNIA: ICD-10-CM

## 2023-05-05 DIAGNOSIS — M19.90 ARTHRITIS: ICD-10-CM

## 2023-05-05 DIAGNOSIS — R26.81 GAIT INSTABILITY: ICD-10-CM

## 2023-05-05 NOTE — ASSESSMENT & PLAN NOTE
· Patient has some good nights of sleep, some bad nights of sleep    · She continues with Remeron at night  · Continue to keep active during the day and good sleep hygiene at night

## 2023-05-05 NOTE — ASSESSMENT & PLAN NOTE
· BP stable without complaints of headache or dizziness  · Patient magalie on Coreg, furosemide, losartan  · Continue dietary and lifestyle interventions    Continue follow-ups with PCP for chronic monitoring

## 2023-05-05 NOTE — ASSESSMENT & PLAN NOTE
MOCA: 12/29  Deficits in: Deficits in all areas except naming  Recall 0/5, orientation 3/6  Pt's current cognitive level is consistent with mild/moderate dementia  Pt is assist adl/dependent iadls  Lives with granddaughter and her family  Mobility: Has AD, does not usually use  No recent falls  Patient continues on Namenda  Continue to stay active  Current activity level: Adequate  She sometimes participate in social, cognitive, physical activity  Monitor for changes in mood, sleep, pain control  Notify provider if any concerns  Medication review: Gabapentin, alprazolam can increase cognitive decline and fall risk  Granddaughter has been counseled about this in the past, patient is doing well on current regimen and she is monitoring closely  Check with pharmacist/provider before starting any new OTC/prescription medications for potential cognitive side effects  Optimize all acute and chronic conditions  Continue to follow-up with PCP and specialist as directed for management  Safety concerns: Fall risk  Caregiver stress: Doing okay    Ensure adequate hydration, good nutrition

## 2023-05-05 NOTE — PROGRESS NOTES
[facility]  [address], [address], [address]  [phone]    Social Work Follow-Up    LSW met with Lyndsey Callaway for follow up visit  Completed Kavon Cognitive Assessment, score 12/30 (previously 17/30 in 5/5/22), and Geriatric Depression Screen, 5/15 (previously 5/15 in 9/3/21)  Alderson Cognitive Assessment (MoCA) Version 8 3  Education: Associate's    Points Earned POSSIBLE Points   Visuospatial/Executive   Alternating [place] Making 0 1   Visuoconstructional skills 0 1   Visuoconstructional skills (clock) 2 3   Naming   Naming Animals 3 3   Attention   Digit Span 1 2   Serial 7 subtraction 2 3   Language   Sentence Repetition 0 2   Verbal fluency 0 1   Abstraction   Abstraction (word pairings) 1 2   Delayed recall   Delayed recall 0 5   Memory index score: 4/15   Orientation   Orientation 3 6   TOTAL SCORE: 12/29  (Normal ?26/30)   Additional notes: pt's granddaughter assisted with Turkmen translation for pt  Skipped vigilance portion for ease of administration, scored out of 29  LSW to remain available as needed

## 2023-05-05 NOTE — ASSESSMENT & PLAN NOTE
· Occasional back pain, no complaints today  · Continues on gabapentin, meloxicam, nonpharmacological methods of pain control  · Notify PCP if increased or unrelieved pain

## 2023-05-05 NOTE — PROGRESS NOTES
Assessment & Plan:   1  Dementia with behavioral disturbance  Assessment & Plan:  MOCA: 12/29  Deficits in: Deficits in all areas except naming  Recall 0/5, orientation 3/6  Pt's current cognitive level is consistent with mild/moderate dementia  Pt is assist adl/dependent iadls  Lives with granddaughter and her family  Mobility: Has AD, does not usually use  No recent falls  Patient continues on Namenda  Continue to stay active  Current activity level: Adequate  She sometimes participate in social, cognitive, physical activity  Monitor for changes in mood, sleep, pain control  Notify provider if any concerns  Medication review: Gabapentin, alprazolam can increase cognitive decline and fall risk  Granddaughter has been counseled about this in the past, patient is doing well on current regimen and she is monitoring closely  Check with pharmacist/provider before starting any new OTC/prescription medications for potential cognitive side effects  Optimize all acute and chronic conditions  Continue to follow-up with PCP and specialist as directed for management  Safety concerns: Fall risk  Caregiver stress: Doing okay  Ensure adequate hydration, good nutrition        2  Gait instability  Assessment & Plan:  · Has AD, does not always use  No recent falls  · Fall precautions      3  Essential hypertension  Assessment & Plan:  · BP stable without complaints of headache or dizziness  · Patient magalie on Coreg, furosemide, losartan  · Continue dietary and lifestyle interventions  Continue follow-ups with PCP for chronic monitoring      4  Other insomnia  Assessment & Plan:  · Patient has some good nights of sleep, some bad nights of sleep  · She continues with Remeron at night  · Continue to keep active during the day and good sleep hygiene at night      5  Anxiety  Assessment & Plan:  · Fairly stable at this time    Does have occasional increased anxiety per granddaughter  · Patient continues on alprazolam, gabapentin, mirtazapine  She is aware that xanax and domingo can increase cognitive fog, but they are helping with decreasing anxiety  · Continue emotional support, relaxation techniques  · Do not overwhelm patient with too much information  · Patient will be traveling to a wedding this weekend, granddaughter has traveled with her grandma before and is aware that she has increased anxiety during these periods  She is prepared for this to occur over the weekend and does supports to help patient enjoy her time with family  6  Arthritis  Assessment & Plan:  · Occasional back pain, no complaints today  · Continues on gabapentin, meloxicam, nonpharmacological methods of pain control  · Notify PCP if increased or unrelieved pain      HPI:  We had the pleasure of evaluating [address] who is a 69 y o  female in Geriatric follow up today  Previous MOCA:  17/30  Comorbidities include dementia on namenda, htn, ambulatory dysfunction  She lives with granddgt  Ms  Alisa Camp is in the office with her granddgt    Memory update per patient and granddgt:   Memory is abou the same  [address] she was dressed and ready to go  All week talked about wedding and appt  Allowed her to pack for weddin- had dress ready for today  Has trouble with sleep  SOmetimes doesn't want to eat  FOrgets she had coffee and pills  Reorients easily  Mood has improved  Has trouble when travelling first few weeks  When [place] out of spot she gets lost   Has to be careful that kids put everything in same spot or she gets very upset  She enjoys grandchildren  Conversation is good  Knows by counting how many pills  Pt history limited by dementia and language barrier  She states she is doing fine  She offers no complaints  She engages in convo at times, monitored her granddgt's daughter and helped as needed at other times    She has difficulty finding the right word while speaking: No  Patient requires repeat information or ask the same question repeatedly: Yes - driving  Do you drive: No       Do you handle your own financial affairs such as balancing your checkbook, paying bills, investments: No  Have you or your family noted any change in your mood or personality:No  Are you currently or have you been treated in the past for depression or anxiety: Yes  Have you noticed any gait or balance disorder: Yes  Uses :Walker: sometimes won't use  Sleep Issues: Yes- occasional  Hearing and vision issue: No  ADL/IADL:  Assist/dependent  Appetite/swallow:  Ok/coughing - following with GI  Pain:  Doing ok    Past Medical, surgical, social, medication and allergy history and patients previous records reviewed  Family Review of Behavior [facility]:    pacing  No    agressive/combative behavior  No    agitated  Yes - more easily frustrated (sushila with memory loss)  wandering  No   resistance to care  No - sometimes give granddgt a hard time when she wants/doesn't want something  hoarding/hiding objects  Yes  - collects the toliet paper  suspicious  Yes  withdrawn No  rummaging/pillaging  Yes    misplacing/losing objects Yes  personal hygiene problems  No  forgetfulness of actions Yes    ROS: Review of Systems   Constitutional: Negative for activity change, appetite change, chills and fatigue  HENT: Positive for trouble swallowing (scope next week)  Negative for congestion and hearing loss  Eyes: Negative for visual disturbance  Respiratory: Negative for cough and shortness of breath  Cardiovascular: Positive for palpitations (with up and down)  Negative for chest pain  Gastrointestinal: Negative for abdominal pain, constipation, diarrhea, nausea and vomiting  Genitourinary: Negative for difficulty urinating  Musculoskeletal: Positive for gait problem  Negative for arthralgias and back pain  Neurological: Negative for dizziness and light-headedness     Psychiatric/Behavioral: Positive for decreased concentration (forgetful), dysphoric mood (occ) and sleep disturbance (sometimes)  The patient is nervous/anxious (frustrate with memory loss)          Allergies:   No Known Allergies    Medications:      Current Outpatient Medications:     Accu-Chek FastClix Lancets MISC, USAR PARA PROBAR AZUCAR EN LA TWAN MANISHA VECES AL JEFFREY, Disp: 102 each, Rfl: 10    albuterol (PROVENTIL HFA,VENTOLIN HFA) 90 mcg/act inhaler, Inhale 1 puff every 4 (four) hours as needed for wheezing, Disp: 18 g, Rfl: 10    ALPRAZolam (XANAX) 0 25 mg tablet, MAR 1 TABLETA POR VIA ORAL DIARIAMENTE A LA HORA DE ACOSTARSE, Disp: 30 tablet, Rfl: 0    aspirin (Aspirin Low Dose) 81 mg chewable tablet, Chew 1 tablet (81 mg total) daily, Disp: 90 tablet, Rfl: 0    atorvastatin (LIPITOR) 40 mg tablet, Take 1 tablet (40 mg total) by mouth daily, Disp: 90 tablet, Rfl: 0    bisacodyl (DULCOLAX) 5 mg EC tablet, Take 4 tablets (20 mg total) by mouth once for 1 dose, Disp: 4 tablet, Rfl: 0    Blood Glucose Monitoring Suppl (OneTouch Verio) w/Device KIT, Use per  guidelines, Disp: 1 kit, Rfl: 0    Calcium Carb-Cholecalciferol (Calcium + Vitamin D3) 600-10 MG-MCG TABS, Take 1 tablet by mouth 2 (two) times a day, Disp: 180 tablet, Rfl: 0    carvedilol (COREG) 12 5 mg tablet, Take 1 tablet (12 5 mg total) by mouth 2 (two) times a day with meals, Disp: 60 tablet, Rfl: 10    cyanocobalamin (VITAMIN B-12) 500 MCG tablet, Take 1,000 mcg by mouth daily, Disp: , Rfl:     Empagliflozin (Jardiance) 10 MG TABS tablet, Take 1 tablet (10 mg total) by mouth every morning, Disp: 90 tablet, Rfl: 1    ezetimibe (ZETIA) 10 mg tablet, TOME JOHNNY TABLETA TODOS LOS ROTH, Disp: 90 tablet, Rfl: 4    ferrous sulfate 325 (65 Fe) mg tablet, Take 325 mg by mouth daily with breakfast, Disp: , Rfl:     fluticasone (FLONASE) 50 mcg/act nasal spray, 1 spray into each nostril daily, Disp: 16 g, Rfl: 10    furosemide (LASIX) 20 mg tablet, Take 1 tablet (20 mg total) by mouth daily, Disp: 90 tablet, Rfl: 3    gabapentin (NEURONTIN) 100 mg capsule, Take 1 capsule (100 mg total) by mouth 2 (two) times a day, Disp: 60 capsule, Rfl: 5    glucose blood (Accu-Chek Guide) test strip, USAR PARA EXAMINAR AZUCAR EN LA TWAN MANISHA VECES AL JEFFREY, Disp: 100 strip, Rfl: 10    Insulin Glargine Solostar (Basaglar KwikPen) 100 UNIT/ML SOPN, Inject 0 09 mL (9 Units total) under the skin in the morning (Patient taking differently: Inject 9 Units under the skin in the morning Pt g'dtr reports taking 1xdaily), Disp: 12 mL, Rfl: 1    Insulin Pen Needle (BD Pen Needle Micro U/F) 32G X 6 MM MISC, Use daily, Disp: 100 each, Rfl: 1    loperamide (IMODIUM) 2 mg capsule, MAR JOHNNY (1) CAPSULA POR VIA ORAL ALFREDO SEA NECESARIO PARA LA DIARREA, Disp: 30 capsule, Rfl: 10    losartan (COZAAR) 25 mg tablet, Take 1 tablet (25 mg total) by mouth daily, Disp: 90 tablet, Rfl: 0    Melatonin 5 MG TABS, Take by mouth as needed, Disp: , Rfl:     meloxicam (MOBIC) 7 5 mg tablet, MAR JOHNNY (1)TABLETA POR VIA ORAL DIARIA CON EL DESAYUNO PARA LA ARTHRITIS   NO TOME CON IBUPROFEN, Disp: , Rfl:     memantine (NAMENDA) 10 mg tablet, Take 1 tablet (10 mg total) by mouth 2 (two) times a day, Disp: 60 tablet, Rfl: 3    mirtazapine (REMERON) 15 mg tablet, MAR JOHNNY (1) TABLETA POR VIA ORAL CADA NOCHE A LA HORA DE ACOSTARSE, Disp: 90 tablet, Rfl: 3    omeprazole (PriLOSEC) 40 MG capsule, Take 1 capsule (40 mg total) by mouth daily, Disp: 30 capsule, Rfl: 10    ondansetron (ZOFRAN) 4 mg tablet, Take 1 tablet (4 mg total) by mouth every 8 (eight) hours as needed for nausea or vomiting, Disp: 20 tablet, Rfl: 0    sitaGLIPtin-metFORMIN (JANUMET)  MG per tablet, Take 1 tablet by mouth 2 (two) times a day with meals, Disp: 60 tablet, Rfl: 5    polyethylene glycol (GOLYTELY) 4000 mL solution, Take 4,000 mL by mouth once for 1 dose (Patient not taking: Reported on 5/5/2023), Disp: 4000 mL, Rfl: 0    Vitals:  Vitals: 05/05/23 1038   BP: 110/60   Pulse: 71   Temp: 97 8 °F (36 6 °C)   SpO2: 97%       History:  Past Medical History:   Diagnosis Date    Cancer ([place] [address] )     Throat    Chronic pain disorder     Diabetes mellitus ([place] [address] )     Diffuse large B cell lymphoma ([place] [address] )     Dysphagia     GERD without esophagitis     HTN (hypertension)     Hyperlipidemia     Hypertension     MI (myocardial infarction) ([place] [address] )     Port-A-Cath in place 07/29/2019    Thyroid cancer ([place] [address] ) 2018     Past Surgical History:   Procedure Laterality Date    BONE MARROW BIOPSY      BREAST BIOPSY Left     CARDIAC CATHETERIZATION N/A 12/9/2022    Procedure: Cardiac catheterization;  Surgeon: Gianna Patel MD;  [facility]; Service: Cardiology    CARDIAC CATHETERIZATION N/A 12/9/2022    Procedure: Cardiac Coronary Angiogram;  Surgeon: Gianna Patel MD;  [facility]; Service: Cardiology    CARDIAC CATHETERIZATION Left 12/9/2022    Procedure: Cardiac Left Heart Cath;  Surgeon: Gianna Patel MD;  [facility]; Service: Cardiology    CHOLECYSTECTOMY      IR PORT PLACEMENT  11/16/2018    IR PORT REMOVAL  3/22/2021    OTHER SURGICAL HISTORY      tendor tear repair to right shoulder    SHOULDER ARTHROSCOPY       Family History   Problem Relation Age of Onset    Diabetes Mother     Heart disease Sister     Hypertension Brother     Uterine cancer Maternal Grandmother     Prostate cancer Paternal Grandfather      Social History     Socioeconomic History    Marital status:       Spouse name: Not on file    Number of children: Not on file    Years of education: Not on file    Highest education level: Not on file   Occupational History    Not on file   Tobacco Use    Smoking status: Never    Smokeless tobacco: Never   Vaping Use    Vaping Use: Never used   Substance and Sexual Activity    Alcohol use: Never     Comment: 0    Drug use: No    Sexual activity: Not Currently     Partners: Male   Other Topics Concern    Not on file   Social History Narrative    Not on file     Social Determinants of Health     Financial Resource Strain: Not on file   Food Insecurity: No Food Insecurity    Worried About Running Out of Food in the Last Year: Never true    Stanislaw of Food in the Last Year: Never true   Transportation Needs: Unknown    Lack of Transportation (Medical): Not on file    Lack of Transportation (Non-Medical): No   Physical Activity: Not on file   Stress: Not on file   Social Connections: Not on file   Intimate Partner Violence: Not on file   Housing Stability: Low Risk     Unable to Pay for Housing in the Last Year: No    Number of Places Lived in the Last Year: 1    Unstable Housing in the Last Year: No     Past Surgical History:   Procedure Laterality Date    BONE MARROW BIOPSY      BREAST BIOPSY Left     CARDIAC CATHETERIZATION N/A 12/9/2022    Procedure: Cardiac catheterization;  Surgeon: Kavita Carcamo MD;  [facility]; Service: Cardiology    CARDIAC CATHETERIZATION N/A 12/9/2022    Procedure: Cardiac Coronary Angiogram;  Surgeon: Kavita Carcamo MD;  [facility]; Service: Cardiology    CARDIAC CATHETERIZATION Left 12/9/2022    Procedure: Cardiac Left Heart Cath;  Surgeon: Kavita Carcamo MD;  [facility]; Service: Cardiology    CHOLECYSTECTOMY      IR PORT PLACEMENT  11/16/2018    IR PORT REMOVAL  3/22/2021    OTHER SURGICAL HISTORY      tendor tear repair to right shoulder    SHOULDER ARTHROSCOPY           Physical Exam:  Physical Exam  Vitals and nursing note reviewed  Constitutional:       General: She is not in acute distress  Appearance: Normal appearance  She is well-developed  She is not diaphoretic  HENT:      Head: Normocephalic  Cardiovascular:      Rate and Rhythm: Normal rate  Heart sounds: No murmur heard  No friction rub  No gallop     Pulmonary:      Effort: Pulmonary effort is normal  No respiratory distress  Breath sounds: Normal breath sounds  No wheezing or rales  Abdominal:      General: Bowel sounds are normal  There is no distension  Palpations: Abdomen is soft  Tenderness: There is no abdominal tenderness  There is no rebound  Musculoskeletal:         General: Normal range of motion  Skin:     General: Skin is warm and dry  Neurological:      General: No focal deficit present  Mental Status: She is alert  Mental status is at baseline        Comments: Oriented to person, partial tps  Pleasant, cooperative, forgetful   Psychiatric:         Mood and Affect: Mood normal          Behavior: Behavior normal

## 2023-05-05 NOTE — ASSESSMENT & PLAN NOTE
· Fairly stable at this time  Does have occasional increased anxiety per granddaughter  · Patient continues on alprazolam, gabapentin, mirtazapine  She is aware that xanax and domingo can increase cognitive fog, but they are helping with decreasing anxiety  · Continue emotional support, relaxation techniques  · Do not overwhelm patient with too much information  · Patient will be traveling to a wedding this weekend, granddaughter has traveled with her grandma before and is aware that she has increased anxiety during these periods  She is prepared for this to occur over the weekend and does supports to help patient enjoy her time with family

## 2023-05-08 ENCOUNTER — HOSPITAL ENCOUNTER (OUTPATIENT)
Dept: GASTROENTEROLOGY | Facility: HOSPITAL | Age: 78
Setting detail: OUTPATIENT SURGERY
Discharge: HOME | End: 2023-05-08
Attending: INTERNAL MEDICINE

## 2023-05-08 ENCOUNTER — ANESTHESIA (OUTPATIENT)
Dept: GASTROENTEROLOGY | Facility: HOSPITAL | Age: 78
End: 2023-05-08

## 2023-05-08 ENCOUNTER — ANESTHESIA EVENT (OUTPATIENT)
Dept: GASTROENTEROLOGY | Facility: HOSPITAL | Age: 78
End: 2023-05-08

## 2023-05-08 VITALS
RESPIRATION RATE: 18 BRPM | TEMPERATURE: 97.6 F | SYSTOLIC BLOOD PRESSURE: 135 MMHG | HEART RATE: 64 BPM | OXYGEN SATURATION: 96 % | DIASTOLIC BLOOD PRESSURE: 63 MMHG

## 2023-05-08 DIAGNOSIS — R13.10 DYSPHAGIA, UNSPECIFIED TYPE: ICD-10-CM

## 2023-05-08 DIAGNOSIS — Z12.11 SCREENING FOR COLON CANCER: ICD-10-CM

## 2023-05-08 LAB — GLUCOSE SERPL-MCNC: 158 MG/DL (ref 65–140)

## 2023-05-08 RX ORDER — SODIUM CHLORIDE, SODIUM LACTATE, POTASSIUM CHLORIDE, CALCIUM CHLORIDE 600; 310; 30; 20 MG/100ML; MG/100ML; MG/100ML; MG/100ML
INJECTION, SOLUTION INTRAVENOUS CONTINUOUS PRN
Status: DISCONTINUED | OUTPATIENT
Start: 2023-05-08 | End: 2023-05-08

## 2023-05-08 RX ORDER — PROPOFOL 10 MG/ML
INJECTION, EMULSION INTRAVENOUS AS NEEDED
Status: DISCONTINUED | OUTPATIENT
Start: 2023-05-08 | End: 2023-05-08

## 2023-05-08 RX ORDER — LIDOCAINE HYDROCHLORIDE 10 MG/ML
INJECTION, SOLUTION EPIDURAL; INFILTRATION; INTRACAUDAL; PERINEURAL AS NEEDED
Status: DISCONTINUED | OUTPATIENT
Start: 2023-05-08 | End: 2023-05-08

## 2023-05-08 RX ADMIN — PROPOFOL 30 MG: 10 INJECTION, EMULSION INTRAVENOUS at 15:03

## 2023-05-08 RX ADMIN — SODIUM CHLORIDE, SODIUM LACTATE, POTASSIUM CHLORIDE, AND CALCIUM CHLORIDE: .6; .31; .03; .02 INJECTION, SOLUTION INTRAVENOUS at 14:50

## 2023-05-08 RX ADMIN — PROPOFOL 30 MG: 10 INJECTION, EMULSION INTRAVENOUS at 15:12

## 2023-05-08 RX ADMIN — LIDOCAINE HYDROCHLORIDE 40 MG: 10 INJECTION, SOLUTION EPIDURAL; INFILTRATION; INTRACAUDAL; PERINEURAL at 14:59

## 2023-05-08 RX ADMIN — PROPOFOL 50 MG: 10 INJECTION, EMULSION INTRAVENOUS at 14:59

## 2023-05-08 NOTE — ANESTHESIA POSTPROCEDURE EVALUATION
Post-Op Assessment Note    CV Status:  Stable  Pain Score: 0    Pain management: adequate     Mental Status:  Alert   Hydration Status:  Stable   PONV Controlled:  Controlled   Airway Patency:  Patent      Post Op Vitals Reviewed: Yes      Staff: CRNA         No notable events documented      BP   133/61   Temp     Pulse  74   Resp   20   SpO2   98

## 2023-05-08 NOTE — ANESTHESIA PREPROCEDURE EVALUATION
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)      MUSCULOSKELETAL   (+) Arthritis      NEURO/PSYCH   (+) Anxiety   (+) Current mild episode of major depressive disorder without prior episode (HCC)   (+) Dementia with behavioral disturbance      PULMONARY   (+) Obstructive sleep apnea      Cardiovascular and Mediastinum   (+) ACC/AHA stage C heart failure with reduced ejection fraction (HCC)   (+) Nonischemic cardiomyopathy (HCC)      Respiratory   (+) Allergic rhinitis      Nervous and Auditory   (+) Chemotherapy-induced peripheral neuropathy (HCC)      Other   (+) Current use of insulin (HCC)   (+) Encounter for central line care   (+) Frequent falls   (+) Gait instability   (+) PLMD (periodic limb movement disorder)   (+) Palliative care patient   (+) Polypharmacy   (+) Status post chemotherapy      Cath 12/9/22:    Minimal plaque  Essentially normal coronaries  Normal LVEDP (12 mmHg)      ECHO 12/7/22:    Technically difficult but adequate transthoracic echocardiogram study due to lung interference and restricted mobility      1  Normal left ventricular cavity size, severely reduced left ventricular systolic function  Global hypokinesis with regional wall motion variability  Ejection fraction is estimated as around 33%  Markedly decreased global longitudinal strain, -7 2%  Grade 3 diastolic dysfunction, restrictive left ventricular filling pattern  2   Normal right ventricular size and systolic function  3 mild left atrial cavity enlargement  4   Aortic valve sclerosis with some focal calcification, no aortic valve stenosis  Trace aortic valve regurgitation    5   Mild mitral valve sclerosis, mild mitral valve regurgitation  6   Mild tricuspid valve regurgitation  7   No obvious pulmonary hypertension  8   No pericardial effusion      Compared to report of previous echocardiogram from 11/14/2018 left ventricular dysfunction, there is significant decrease in left ventricular function and ejection fraction is markedly decreased  Physical Exam    Airway    Mallampati score: II  TM Distance: >3 FB  Neck ROM: full     Dental       Cardiovascular      Pulmonary      Other Findings        Anesthesia Plan  ASA Score- 4     Anesthesia Type- IV sedation with anesthesia with ASA Monitors  Additional Monitors:   Airway Plan:           Plan Factors-Exercise tolerance (METS): <4 METS  Chart reviewed  EKG reviewed  Imaging results reviewed  Existing labs reviewed  Patient summary reviewed  Patient is not a current smoker  Patient did not smoke on day of surgery  Induction- intravenous  Postoperative Plan-     Informed Consent- Anesthetic plan and risks discussed with patient and healthcare power of   I personally reviewed this patient with the CRNA  Discussed and agreed on the Anesthesia Plan with the CRNA  Molina Greene

## 2023-05-08 NOTE — H&P
History and Physical - [facility]  Evin Foley 69 y o  female MRN: [id number]                  HPI: Evin Foley is a 69y o  year old female who presents for EGD and colonoscopy for the assessment of dysphagia and CRC screening      REVIEW OF SYSTEMS: Per the HPI, and otherwise unremarkable  Historical Information   Past Medical History:   Diagnosis Date   • Cancer ([place] [address] )     Throat   • Chronic pain disorder    • Diabetes mellitus (HCC)    • Diffuse large B cell lymphoma (HCC)    • Dysphagia    • GERD without esophagitis    • HTN (hypertension)    • Hyperlipidemia    • Hypertension    • MI (myocardial infarction) ([place] [address] )    • Port-A-Cath in place 07/29/2019   • Thyroid cancer ([place] [address] ) 2018     Past Surgical History:   Procedure Laterality Date   • BONE MARROW BIOPSY     • BREAST BIOPSY Left    • CARDIAC CATHETERIZATION N/A 12/9/2022    Procedure: Cardiac catheterization;  Surgeon: Richardson Jara MD;  [facility]; Service: Cardiology   • CARDIAC CATHETERIZATION N/A 12/9/2022    Procedure: Cardiac Coronary Angiogram;  Surgeon: Richardson Jara MD;  [facility]; Service: Cardiology   • CARDIAC CATHETERIZATION Left 12/9/2022    Procedure: Cardiac Left Heart Cath;  Surgeon: Richardson Jara MD;  [facility];   Service: Cardiology   • CHOLECYSTECTOMY     • IR PORT PLACEMENT  11/16/2018   • IR PORT REMOVAL  3/22/2021   • OTHER SURGICAL HISTORY      tendor tear repair to right shoulder   • SHOULDER ARTHROSCOPY       Social History   Social History     Substance and Sexual Activity   Alcohol Use Never    Comment: 0     Social History     Substance and Sexual Activity   Drug Use No     Social History     Tobacco Use   Smoking Status Never   Smokeless Tobacco Never     Family History   Problem Relation Age of Onset   • Diabetes Mother    • Heart disease Sister    • Hypertension Brother    • Uterine cancer Maternal Grandmother    • Prostate cancer Paternal Grandfather        Meds/Allergies     (Not in a hospital admission)      No Known Allergies    Objective     Blood pressure 132/62, pulse 70, temperature (!) 97 2 °F (36 2 °C), temperature source Temporal, resp  rate 18, SpO2 93 %, not currently breastfeeding        PHYSICAL EXAM    Gen: NAD  CV: RRR  CHEST: Clear  ABD: soft, NT/ND  EXT: no edema      ASSESSMENT/PLAN:  This is a 69y o  year old female here for EGD and colonoscopy

## 2023-05-12 ENCOUNTER — HOSPITAL ENCOUNTER (OUTPATIENT)
Dept: BONE DENSITY | Facility: CLINIC | Age: 78
Discharge: HOME | End: 2023-05-12

## 2023-05-12 DIAGNOSIS — E28.39 ESTROGEN DEFICIENCY: ICD-10-CM

## 2023-05-12 DIAGNOSIS — M81.0 AGE RELATED OSTEOPOROSIS, UNSPECIFIED PATHOLOGICAL FRACTURE PRESENCE: ICD-10-CM

## 2023-05-12 RX ORDER — ALPRAZOLAM 0.25 MG/1
0.25 TABLET ORAL
Qty: 30 TABLET | Refills: 0 | Status: SHIPPED | OUTPATIENT
Start: 2023-05-12

## 2023-05-24 DIAGNOSIS — K21.9 GASTROESOPHAGEAL REFLUX DISEASE: ICD-10-CM

## 2023-05-26 RX ORDER — OMEPRAZOLE 40 MG/1
CAPSULE, DELAYED RELEASE ORAL
Qty: 30 CAPSULE | Refills: 10 | Status: SHIPPED | OUTPATIENT
Start: 2023-05-26

## 2023-05-31 ENCOUNTER — PATIENT OUTREACH (OUTPATIENT)
Dept: FAMILY MEDICINE CLINIC | Facility: CLINIC | Age: 78
End: 2023-05-31

## 2023-05-31 NOTE — PROGRESS NOTES
I called Nichelle Hutchins but she stated she was currently at an appointment and asked that I call later  I called Nichelle Hutchins who states the patient has been overall feeling well  She reports the patient's blood sugars have all been <160 and taking Lantus 9Uhs and Janumet bid  Nichelle Hutchins notes the patient's feet are without skin breakdown and she is aware of the patient's Podiatry appointment next month  The patient is due for her annual eye exam next month but Nichelle Hutchins is considering switching providers  Nichelle Hutchins reports the patient's weight has remained stable, ~143  She denies the patient complaining of chest pain, shortness of breath or extremity edema  She denies the patient having any recent falls  Nichelle Hutchins does report the patient having a dry cough and states everyone in the home is sick currently  I asked her to call if the patient develops a fever or worsens  I provided Nichelle Hutchins the results of the patient's DEXA scan; she states she will purchase Vit D and calcium  The patient is due for a repeat mammo in July; note sent to PCP  I will continue to follow

## 2023-06-01 DIAGNOSIS — Z12.31 BREAST CANCER SCREENING BY MAMMOGRAM: Primary | ICD-10-CM

## 2023-06-02 DIAGNOSIS — I10 ESSENTIAL HYPERTENSION: ICD-10-CM

## 2023-06-02 RX ORDER — CARVEDILOL 12.5 MG/1
TABLET ORAL
Qty: 180 TABLET | Refills: 4 | Status: SHIPPED | OUTPATIENT
Start: 2023-06-02

## 2023-06-07 DIAGNOSIS — Z51.5 PALLIATIVE CARE PATIENT: Primary | ICD-10-CM

## 2023-06-07 RX ORDER — MELOXICAM 7.5 MG/1
7.5 TABLET ORAL DAILY
Qty: 90 TABLET | Refills: 0 | Status: SHIPPED | OUTPATIENT
Start: 2023-06-07 | End: 2023-08-07

## 2023-06-07 RX ORDER — MELOXICAM 7.5 MG/1
TABLET ORAL
Qty: 30 TABLET | Refills: 10 | OUTPATIENT
Start: 2023-06-07

## 2023-06-07 NOTE — TELEPHONE ENCOUNTER
6/7/2023 3:14 PM -  At pt's last visit with Dr Marcos Morton on 4/13/23, the patient will be transitioned away from our practice  Defer all Rxs to PCP, Ms Marli Nielson

## 2023-06-19 ENCOUNTER — OFFICE VISIT (OUTPATIENT)
Dept: PODIATRY | Facility: CLINIC | Age: 78
End: 2023-06-19
Payer: COMMERCIAL

## 2023-06-19 VITALS
SYSTOLIC BLOOD PRESSURE: 140 MMHG | DIASTOLIC BLOOD PRESSURE: 60 MMHG | WEIGHT: 142 LBS | BODY MASS INDEX: 27.88 KG/M2 | HEIGHT: 60 IN

## 2023-06-19 DIAGNOSIS — G62.0 CHEMOTHERAPY-INDUCED PERIPHERAL NEUROPATHY (HCC): ICD-10-CM

## 2023-06-19 DIAGNOSIS — T45.1X5A CHEMOTHERAPY-INDUCED PERIPHERAL NEUROPATHY (HCC): ICD-10-CM

## 2023-06-19 DIAGNOSIS — M79.676 PAIN DUE TO ONYCHOMYCOSIS OF TOENAIL: ICD-10-CM

## 2023-06-19 DIAGNOSIS — B35.1 PAIN DUE TO ONYCHOMYCOSIS OF TOENAIL: ICD-10-CM

## 2023-06-19 DIAGNOSIS — E11.9 TYPE 2 DIABETES MELLITUS WITHOUT COMPLICATION, WITHOUT LONG-TERM CURRENT USE OF INSULIN (HCC): Primary | ICD-10-CM

## 2023-06-19 DIAGNOSIS — R26.2 AMBULATORY DYSFUNCTION: ICD-10-CM

## 2023-06-19 PROCEDURE — 11721 DEBRIDE NAIL 6 OR MORE: CPT | Performed by: PODIATRIST

## 2023-06-19 NOTE — PROGRESS NOTES
Bettye Sandoval  [birth year]  AT RISK FOOT CARE    1  Type 2 diabetes mellitus without complication, without long-term current use of insulin ([address] )        2  Chemotherapy-induced peripheral neuropathy (HCC)        3  Pain due to onychomycosis of toenail        4  Ambulatory dysfunction            Patient presents for at-risk foot care  Patient has no acute concerns today  Patient has significant lower extremity risk due to neuropathy, parasthesia, edema, and trophic skin changes to the lower extremity  On exam patient has thickened, hypertrophic, discolored, brittle toenails with subungual debris and tenderness x10   Callus: none  Patient has lower extremity edema  PAtients skin is atrophic, thickened nails, and decreased pedal hair  Patient has decreased pinprick and vibratory sensation to his feet and parasthesia    Today's treatment includes:  Debridement of toenails  Using nail nipper, oscar, and curette, nails were sharply debrided, reduced in thickness and length  Devitalized nail tissue and fungal debris excised and removed  Patient tolerated well  Discussed proper shoe gear, daily inspections of feet, and general foot health with patient  Patient has Q9  findings and is recommended for at risk foot care every 9-10 weeks      Patients most recent complete clinical foot exam was on: 2/20/2023

## 2023-07-05 ENCOUNTER — HOSPITAL ENCOUNTER (INPATIENT)
Facility: HOSPITAL | Age: 78
LOS: 4 days | Discharge: HOME | DRG: 871 | End: 2023-07-09
Attending: EMERGENCY MEDICINE | Admitting: STUDENT IN AN ORGANIZED HEALTH CARE EDUCATION/TRAINING PROGRAM
Payer: COMMERCIAL

## 2023-07-05 ENCOUNTER — APPOINTMENT (EMERGENCY)
Dept: CT IMAGING | Facility: HOSPITAL | Age: 78
DRG: 871 | End: 2023-07-05
Payer: COMMERCIAL

## 2023-07-05 DIAGNOSIS — J18.9 PNEUMONIA: ICD-10-CM

## 2023-07-05 DIAGNOSIS — E87.1 HYPONATREMIA: Primary | ICD-10-CM

## 2023-07-05 DIAGNOSIS — R29.6 FREQUENT FALLS: ICD-10-CM

## 2023-07-05 PROBLEM — N30.00 ACUTE CYSTITIS WITHOUT HEMATURIA: Status: ACTIVE | Noted: 2023-07-05

## 2023-07-05 LAB
2HR DELTA HS TROPONIN: -1 NG/L
4HR DELTA HS TROPONIN: 0 NG/L
ALBUMIN SERPL BCP-MCNC: 3.7 G/DL (ref 3.5–5)
ALP SERPL-CCNC: 58 U/L (ref 34–104)
ALT SERPL W P-5'-P-CCNC: 15 U/L (ref 7–52)
ANION GAP SERPL CALCULATED.3IONS-SCNC: 6 MMOL/L
AST SERPL W P-5'-P-CCNC: 12 U/L (ref 13–39)
ATRIAL RATE: 105 BPM
ATRIAL RATE: 92 BPM
BACTERIA UR QL AUTO: ABNORMAL /HPF
BASOPHILS # BLD AUTO: 0.06 THOUSANDS/ÂΜL (ref 0–0.1)
BASOPHILS NFR BLD AUTO: 0 % (ref 0–1)
BILIRUB DIRECT SERPL-MCNC: 0.23 MG/DL (ref 0–0.2)
BILIRUB SERPL-MCNC: 0.65 MG/DL (ref 0.2–1)
BILIRUB UR QL STRIP: NEGATIVE
BILIRUB UR QL STRIP: NEGATIVE
BUN SERPL-MCNC: 10 MG/DL (ref 5–25)
CALCIUM SERPL-MCNC: 8.8 MG/DL (ref 8.4–10.2)
CARDIAC TROPONIN I PNL SERPL HS: 17 NG/L
CARDIAC TROPONIN I PNL SERPL HS: 18 NG/L
CARDIAC TROPONIN I PNL SERPL HS: 18 NG/L
CHLORIDE SERPL-SCNC: 90 MMOL/L (ref 96–108)
CLARITY UR: CLEAR
CLARITY UR: CLEAR
CO2 SERPL-SCNC: 26 MMOL/L (ref 21–32)
COLOR UR: ABNORMAL
COLOR UR: YELLOW
COLOR, POC: YELLOW
CREAT SERPL-MCNC: 0.66 MG/DL (ref 0.6–1.3)
EOSINOPHIL # BLD AUTO: 0.02 THOUSAND/ÂΜL (ref 0–0.61)
EOSINOPHIL NFR BLD AUTO: 0 % (ref 0–6)
ERYTHROCYTE [DISTWIDTH] IN BLOOD BY AUTOMATED COUNT: 14.5 % (ref 11.6–15.1)
FLUAV RNA RESP QL NAA+PROBE: NEGATIVE
FLUBV RNA RESP QL NAA+PROBE: NEGATIVE
GFR SERPL CREATININE-BSD FRML MDRD: 86 ML/MIN/1.73SQ M
GLUCOSE SERPL-MCNC: 104 MG/DL (ref 65–140)
GLUCOSE SERPL-MCNC: 271 MG/DL (ref 65–140)
GLUCOSE UR STRIP-MCNC: ABNORMAL MG/DL
GLUCOSE UR STRIP-MCNC: ABNORMAL MG/DL
HCT VFR BLD AUTO: 35.6 % (ref 34.8–46.1)
HGB BLD-MCNC: 11.4 G/DL (ref 11.5–15.4)
HGB UR QL STRIP.AUTO: ABNORMAL
HGB UR QL STRIP.AUTO: NEGATIVE
IMM GRANULOCYTES # BLD AUTO: 0.07 THOUSAND/UL (ref 0–0.2)
IMM GRANULOCYTES NFR BLD AUTO: 0 % (ref 0–2)
KETONES UR STRIP-MCNC: NEGATIVE MG/DL
KETONES UR STRIP-MCNC: NEGATIVE MG/DL
LACTATE SERPL-SCNC: 1.9 MMOL/L (ref 0.5–2)
LEUKOCYTE ESTERASE UR QL STRIP: ABNORMAL
LEUKOCYTE ESTERASE UR QL STRIP: ABNORMAL
LIPASE SERPL-CCNC: 17 U/L (ref 11–82)
LYMPHOCYTES # BLD AUTO: 3.27 THOUSANDS/ÂΜL (ref 0.6–4.47)
LYMPHOCYTES NFR BLD AUTO: 18 % (ref 14–44)
MCH RBC QN AUTO: 28.6 PG (ref 26.8–34.3)
MCHC RBC AUTO-ENTMCNC: 32 G/DL (ref 31.4–37.4)
MCV RBC AUTO: 89 FL (ref 82–98)
MONOCYTES # BLD AUTO: 2 THOUSAND/ÂΜL (ref 0.17–1.22)
MONOCYTES NFR BLD AUTO: 11 % (ref 4–12)
NEUTROPHILS # BLD AUTO: 13.29 THOUSANDS/ÂΜL (ref 1.85–7.62)
NEUTS SEG NFR BLD AUTO: 71 % (ref 43–75)
NITRITE UR QL STRIP: NEGATIVE
NITRITE UR QL STRIP: NEGATIVE
NON-SQ EPI CELLS URNS QL MICRO: ABNORMAL /HPF
NRBC BLD AUTO-RTO: 0 /100 WBCS
P AXIS: 57 DEGREES
P AXIS: 62 DEGREES
PH UR STRIP.AUTO: 6 [PH]
PH UR STRIP.AUTO: 6.5 [PH] (ref 4.5–8)
PLATELET # BLD AUTO: 239 THOUSANDS/UL (ref 149–390)
PMV BLD AUTO: 10.3 FL (ref 8.9–12.7)
POTASSIUM SERPL-SCNC: 3.7 MMOL/L (ref 3.5–5.3)
PR INTERVAL: 136 MS
PR INTERVAL: 142 MS
PROCALCITONIN SERPL-MCNC: 0.05 NG/ML
PROT SERPL-MCNC: 7 G/DL (ref 6.4–8.4)
PROT UR STRIP-MCNC: ABNORMAL MG/DL
PROT UR STRIP-MCNC: ABNORMAL MG/DL
QRS AXIS: 33 DEGREES
QRS AXIS: 39 DEGREES
QRSD INTERVAL: 130 MS
QRSD INTERVAL: 130 MS
QT INTERVAL: 394 MS
QT INTERVAL: 418 MS
QTC INTERVAL: 516 MS
QTC INTERVAL: 520 MS
RBC # BLD AUTO: 3.99 MILLION/UL (ref 3.81–5.12)
RBC #/AREA URNS AUTO: ABNORMAL /HPF
RSV RNA RESP QL NAA+PROBE: NEGATIVE
SARS-COV-2 RNA RESP QL NAA+PROBE: NEGATIVE
SODIUM SERPL-SCNC: 122 MMOL/L (ref 135–147)
SP GR UR STRIP.AUTO: 1.01 (ref 1–1.03)
SP GR UR STRIP.AUTO: 1.01 (ref 1–1.03)
T WAVE AXIS: 156 DEGREES
T WAVE AXIS: 182 DEGREES
UROBILINOGEN UR QL STRIP.AUTO: 0.2 E.U./DL
UROBILINOGEN UR STRIP-ACNC: <2 MG/DL
VENTRICULAR RATE: 105 BPM
VENTRICULAR RATE: 92 BPM
WBC # BLD AUTO: 18.71 THOUSAND/UL (ref 4.31–10.16)
WBC #/AREA URNS AUTO: ABNORMAL /HPF

## 2023-07-05 PROCEDURE — 74174 CTA ABD&PLVS W/CONTRAST: CPT

## 2023-07-05 PROCEDURE — 80076 HEPATIC FUNCTION PANEL: CPT | Performed by: EMERGENCY MEDICINE

## 2023-07-05 PROCEDURE — 87147 CULTURE TYPE IMMUNOLOGIC: CPT | Performed by: EMERGENCY MEDICINE

## 2023-07-05 PROCEDURE — 71275 CT ANGIOGRAPHY CHEST: CPT

## 2023-07-05 PROCEDURE — 94002 VENT MGMT INPAT INIT DAY: CPT

## 2023-07-05 PROCEDURE — 0241U HB NFCT DS VIR RESP RNA 4 TRGT: CPT | Performed by: EMERGENCY MEDICINE

## 2023-07-05 PROCEDURE — 84484 ASSAY OF TROPONIN QUANT: CPT | Performed by: EMERGENCY MEDICINE

## 2023-07-05 PROCEDURE — 80048 BASIC METABOLIC PNL TOTAL CA: CPT | Performed by: EMERGENCY MEDICINE

## 2023-07-05 PROCEDURE — 83690 ASSAY OF LIPASE: CPT | Performed by: EMERGENCY MEDICINE

## 2023-07-05 PROCEDURE — 96365 THER/PROPH/DIAG IV INF INIT: CPT

## 2023-07-05 PROCEDURE — 87449 NOS EACH ORGANISM AG IA: CPT | Performed by: STUDENT IN AN ORGANIZED HEALTH CARE EDUCATION/TRAINING PROGRAM

## 2023-07-05 PROCEDURE — 87086 URINE CULTURE/COLONY COUNT: CPT | Performed by: EMERGENCY MEDICINE

## 2023-07-05 PROCEDURE — 99285 EMERGENCY DEPT VISIT HI MDM: CPT

## 2023-07-05 PROCEDURE — 80048 BASIC METABOLIC PNL TOTAL CA: CPT | Performed by: STUDENT IN AN ORGANIZED HEALTH CARE EDUCATION/TRAINING PROGRAM

## 2023-07-05 PROCEDURE — 36415 COLL VENOUS BLD VENIPUNCTURE: CPT | Performed by: EMERGENCY MEDICINE

## 2023-07-05 PROCEDURE — 83605 ASSAY OF LACTIC ACID: CPT | Performed by: EMERGENCY MEDICINE

## 2023-07-05 PROCEDURE — 85025 COMPLETE CBC W/AUTO DIFF WBC: CPT | Performed by: EMERGENCY MEDICINE

## 2023-07-05 PROCEDURE — 85049 AUTOMATED PLATELET COUNT: CPT | Performed by: STUDENT IN AN ORGANIZED HEALTH CARE EDUCATION/TRAINING PROGRAM

## 2023-07-05 PROCEDURE — 93005 ELECTROCARDIOGRAM TRACING: CPT

## 2023-07-05 PROCEDURE — 96375 TX/PRO/DX INJ NEW DRUG ADDON: CPT

## 2023-07-05 PROCEDURE — 93010 ELECTROCARDIOGRAM REPORT: CPT | Performed by: STUDENT IN AN ORGANIZED HEALTH CARE EDUCATION/TRAINING PROGRAM

## 2023-07-05 PROCEDURE — 94760 N-INVAS EAR/PLS OXIMETRY 1: CPT

## 2023-07-05 PROCEDURE — 83935 ASSAY OF URINE OSMOLALITY: CPT | Performed by: NURSE PRACTITIONER

## 2023-07-05 PROCEDURE — 99291 CRITICAL CARE FIRST HOUR: CPT | Performed by: EMERGENCY MEDICINE

## 2023-07-05 PROCEDURE — G1004 CDSM NDSC: HCPCS

## 2023-07-05 PROCEDURE — 84145 PROCALCITONIN (PCT): CPT | Performed by: EMERGENCY MEDICINE

## 2023-07-05 PROCEDURE — 81001 URINALYSIS AUTO W/SCOPE: CPT | Performed by: EMERGENCY MEDICINE

## 2023-07-05 PROCEDURE — 96361 HYDRATE IV INFUSION ADD-ON: CPT

## 2023-07-05 PROCEDURE — 82948 REAGENT STRIP/BLOOD GLUCOSE: CPT

## 2023-07-05 PROCEDURE — 99223 1ST HOSP IP/OBS HIGH 75: CPT | Performed by: STUDENT IN AN ORGANIZED HEALTH CARE EDUCATION/TRAINING PROGRAM

## 2023-07-05 PROCEDURE — 84300 ASSAY OF URINE SODIUM: CPT | Performed by: NURSE PRACTITIONER

## 2023-07-05 RX ORDER — FLUTICASONE PROPIONATE 50 MCG
1 SPRAY, SUSPENSION (ML) NASAL DAILY
Status: DISCONTINUED | OUTPATIENT
Start: 2023-07-06 | End: 2023-07-09 | Stop reason: HOSPADM

## 2023-07-05 RX ORDER — ACETAMINOPHEN 325 MG/1
650 TABLET ORAL EVERY 6 HOURS PRN
Status: DISCONTINUED | OUTPATIENT
Start: 2023-07-05 | End: 2023-07-07

## 2023-07-05 RX ORDER — ATORVASTATIN CALCIUM 40 MG/1
40 TABLET, FILM COATED ORAL
Status: DISCONTINUED | OUTPATIENT
Start: 2023-07-06 | End: 2023-07-09 | Stop reason: HOSPADM

## 2023-07-05 RX ORDER — BISACODYL 5 MG/1
20 TABLET, DELAYED RELEASE ORAL ONCE
Status: DISCONTINUED | OUTPATIENT
Start: 2023-07-05 | End: 2023-07-05 | Stop reason: CLARIF

## 2023-07-05 RX ORDER — CEFTRIAXONE 1 G/50ML
1000 INJECTION, SOLUTION INTRAVENOUS EVERY 24 HOURS
Status: DISCONTINUED | OUTPATIENT
Start: 2023-07-06 | End: 2023-07-09 | Stop reason: HOSPADM

## 2023-07-05 RX ORDER — CEFTRIAXONE 1 G/50ML
1000 INJECTION, SOLUTION INTRAVENOUS EVERY 24 HOURS
Status: DISCONTINUED | OUTPATIENT
Start: 2023-07-05 | End: 2023-07-05

## 2023-07-05 RX ORDER — MELOXICAM 7.5 MG/1
7.5 TABLET ORAL DAILY
Status: DISCONTINUED | OUTPATIENT
Start: 2023-07-06 | End: 2023-07-06

## 2023-07-05 RX ORDER — MIRTAZAPINE 15 MG/1
15 TABLET, FILM COATED ORAL
Status: DISCONTINUED | OUTPATIENT
Start: 2023-07-05 | End: 2023-07-09 | Stop reason: HOSPADM

## 2023-07-05 RX ORDER — LOSARTAN POTASSIUM 25 MG/1
25 TABLET ORAL DAILY
Status: DISCONTINUED | OUTPATIENT
Start: 2023-07-06 | End: 2023-07-09 | Stop reason: HOSPADM

## 2023-07-05 RX ORDER — PANTOPRAZOLE SODIUM 40 MG/1
40 TABLET, DELAYED RELEASE ORAL
Status: DISCONTINUED | OUTPATIENT
Start: 2023-07-06 | End: 2023-07-09 | Stop reason: HOSPADM

## 2023-07-05 RX ORDER — ALBUTEROL SULFATE 90 UG/1
1 AEROSOL, METERED RESPIRATORY (INHALATION) EVERY 4 HOURS PRN
Status: DISCONTINUED | OUTPATIENT
Start: 2023-07-05 | End: 2023-07-09 | Stop reason: HOSPADM

## 2023-07-05 RX ORDER — ONDANSETRON 2 MG/ML
4 INJECTION INTRAMUSCULAR; INTRAVENOUS ONCE
Status: COMPLETED | OUTPATIENT
Start: 2023-07-05 | End: 2023-07-05

## 2023-07-05 RX ORDER — CEFTRIAXONE 2 G/50ML
2000 INJECTION, SOLUTION INTRAVENOUS ONCE
Status: COMPLETED | OUTPATIENT
Start: 2023-07-05 | End: 2023-07-05

## 2023-07-05 RX ORDER — MORPHINE SULFATE 4 MG/ML
4 INJECTION, SOLUTION INTRAMUSCULAR; INTRAVENOUS ONCE
Status: COMPLETED | OUTPATIENT
Start: 2023-07-05 | End: 2023-07-05

## 2023-07-05 RX ORDER — INSULIN GLARGINE 100 [IU]/ML
9 INJECTION, SOLUTION SUBCUTANEOUS
Status: DISCONTINUED | OUTPATIENT
Start: 2023-07-05 | End: 2023-07-09 | Stop reason: HOSPADM

## 2023-07-05 RX ORDER — FERROUS SULFATE 325(65) MG
325 TABLET ORAL
Status: DISCONTINUED | OUTPATIENT
Start: 2023-07-06 | End: 2023-07-06

## 2023-07-05 RX ORDER — LANOLIN ALCOHOL/MO/W.PET/CERES
1 CREAM (GRAM) TOPICAL 2 TIMES DAILY
Status: DISCONTINUED | OUTPATIENT
Start: 2023-07-05 | End: 2023-07-09 | Stop reason: HOSPADM

## 2023-07-05 RX ORDER — INSULIN LISPRO 100 [IU]/ML
1-5 INJECTION, SOLUTION INTRAVENOUS; SUBCUTANEOUS
Status: DISCONTINUED | OUTPATIENT
Start: 2023-07-06 | End: 2023-07-09 | Stop reason: HOSPADM

## 2023-07-05 RX ORDER — ASPIRIN 81 MG/1
81 TABLET, CHEWABLE ORAL DAILY
Status: DISCONTINUED | OUTPATIENT
Start: 2023-07-06 | End: 2023-07-09 | Stop reason: HOSPADM

## 2023-07-05 RX ORDER — FUROSEMIDE 20 MG/1
20 TABLET ORAL DAILY
Status: DISCONTINUED | OUTPATIENT
Start: 2023-07-06 | End: 2023-07-06

## 2023-07-05 RX ORDER — SODIUM CHLORIDE 9 MG/ML
75 INJECTION, SOLUTION INTRAVENOUS CONTINUOUS
Status: DISCONTINUED | OUTPATIENT
Start: 2023-07-05 | End: 2023-07-06

## 2023-07-05 RX ORDER — ALPRAZOLAM 0.25 MG/1
0.25 TABLET ORAL
Status: DISCONTINUED | OUTPATIENT
Start: 2023-07-05 | End: 2023-07-09 | Stop reason: HOSPADM

## 2023-07-05 RX ORDER — MEMANTINE HYDROCHLORIDE 5 MG/1
10 TABLET ORAL 2 TIMES DAILY
Status: DISCONTINUED | OUTPATIENT
Start: 2023-07-05 | End: 2023-07-09 | Stop reason: HOSPADM

## 2023-07-05 RX ORDER — KETOROLAC TROMETHAMINE 30 MG/ML
15 INJECTION, SOLUTION INTRAMUSCULAR; INTRAVENOUS ONCE
Status: DISCONTINUED | OUTPATIENT
Start: 2023-07-05 | End: 2023-07-05

## 2023-07-05 RX ORDER — GABAPENTIN 100 MG/1
100 CAPSULE ORAL 2 TIMES DAILY
Status: DISCONTINUED | OUTPATIENT
Start: 2023-07-05 | End: 2023-07-07

## 2023-07-05 RX ORDER — HEPARIN SODIUM 5000 [USP'U]/ML
5000 INJECTION, SOLUTION INTRAVENOUS; SUBCUTANEOUS EVERY 8 HOURS SCHEDULED
Status: DISCONTINUED | OUTPATIENT
Start: 2023-07-05 | End: 2023-07-06

## 2023-07-05 RX ORDER — EZETIMIBE 10 MG/1
10 TABLET ORAL
Status: DISCONTINUED | OUTPATIENT
Start: 2023-07-05 | End: 2023-07-09 | Stop reason: HOSPADM

## 2023-07-05 RX ORDER — INSULIN LISPRO 100 [IU]/ML
1-5 INJECTION, SOLUTION INTRAVENOUS; SUBCUTANEOUS
Status: DISCONTINUED | OUTPATIENT
Start: 2023-07-05 | End: 2023-07-09 | Stop reason: HOSPADM

## 2023-07-05 RX ORDER — CARVEDILOL 12.5 MG/1
12.5 TABLET ORAL 2 TIMES DAILY WITH MEALS
Status: DISCONTINUED | OUTPATIENT
Start: 2023-07-06 | End: 2023-07-09 | Stop reason: HOSPADM

## 2023-07-05 RX ADMIN — MORPHINE SULFATE 4 MG: 4 INJECTION INTRAVENOUS at 14:45

## 2023-07-05 RX ADMIN — IOHEXOL 100 ML: 350 INJECTION, SOLUTION INTRAVENOUS at 15:25

## 2023-07-05 RX ADMIN — CEFTRIAXONE 2000 MG: 2 INJECTION, SOLUTION INTRAVENOUS at 17:53

## 2023-07-05 RX ADMIN — EZETIMIBE 10 MG: 10 TABLET ORAL at 21:07

## 2023-07-05 RX ADMIN — HEPARIN SODIUM 5000 UNITS: 5000 INJECTION INTRAVENOUS; SUBCUTANEOUS at 21:06

## 2023-07-05 RX ADMIN — SODIUM CHLORIDE 1000 ML: 0.9 INJECTION, SOLUTION INTRAVENOUS at 14:47

## 2023-07-05 RX ADMIN — GABAPENTIN 100 MG: 100 CAPSULE ORAL at 21:07

## 2023-07-05 RX ADMIN — ONDANSETRON 4 MG: 2 INJECTION INTRAMUSCULAR; INTRAVENOUS at 14:45

## 2023-07-05 RX ADMIN — MEMANTINE 10 MG: 5 TABLET ORAL at 21:07

## 2023-07-05 RX ADMIN — INSULIN GLARGINE 9 UNITS: 100 INJECTION, SOLUTION SUBCUTANEOUS at 21:06

## 2023-07-05 RX ADMIN — MIRTAZAPINE 15 MG: 15 TABLET, FILM COATED ORAL at 21:07

## 2023-07-05 RX ADMIN — Medication 1 TABLET: at 21:07

## 2023-07-05 RX ADMIN — SODIUM CHLORIDE 75 ML/HR: 0.9 INJECTION, SOLUTION INTRAVENOUS at 21:05

## 2023-07-05 NOTE — H&P
[facility]  H&P  Name: Julio Moses 69 y.o. female I MRN: [id number]  Unit/Bed#: [facility] -01 I Date of Admission: 7/5/2023   Date of Service: 7/5/2023 I Hospital Day: 0      Assessment/Plan   * Hyponatremia  Assessment & Plan  · Presents with low sodium to 122  · Unclear etiology at this time as patient with recent poor po intake however patient also with possible PNA and may have SIADH  · Will trial patient on gentle IVF hydration and continue to monitor Na q8h  · Nephrology consulted, recommendations appreciated    Acute cystitis without hematuria  Assessment & Plan  · Patient presenting with significant left-lower quadrant and suprapubic pain with increased frequency in urination over the last 2 days  · UA showing trace leukocytes and 4-10 WBC  · UCx pending  · Started on Rocephin in the ED, will continue and monitor    Right lower lobe pneumonia  Assessment & Plan  · Patient with ongoing coughing over the last 2 weeks, denies any fevers or chills  · CT scan showing "Consolidation right lower lobe with associated bronchiectatic changes"  · Patient also with leukocytosis  · Patient was started on Ceftriaxone in the ED, will continue  · procalcitonin ordered, trend  · Monitor fever curve  · Respiratory protocol  · F/u Sputum culture    Dementia with behavioral disturbance  Assessment & Plan  · Continue memantine    Obstructive sleep apnea  Assessment & Plan  · Continue CPAP qhs    Current mild episode of major depressive disorder without prior episode (HCC)  Assessment & Plan  · Continue mirtazapine  · Continue social support at home    [address]  · Continue mirtazapine, alprazolam prn    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 Blood Sugar Average: Last 72 hrs:    · Continue PTA glargine 9un qhs  · IASS + FS monitoring  · Hypoglycemia protocol  · Hold home janumet         VTE Pharmacologic Prophylaxis:   Moderate Risk (Score 3-4) - Pharmacological DVT Prophylaxis Ordered: heparin. Code Status: Level 1 - Full Code   Discussion with family: patient and granddaughter. Anticipated Length of Stay: Patient will be admitted on an inpatient basis with an anticipated length of stay of greater than 2 midnights secondary to PNA/UTI/Hyponatremia. Total Time Spent on Date of Encounter in care of patient: 65 minutes This time was spent on one or more of the following: performing physical exam; counseling and coordination of care; obtaining or reviewing history; documenting in the medical record; reviewing/ordering tests, medications or procedures; communicating with other healthcare professionals and discussing with patient's family/caregivers. Chief Complaint: left-sided abdominal pain    History of Present Illness:  Haley Walker is a 69 y.o. female with a PMH of DM who presents with left-sided abdominal pain that had started day prior to admission. Because of the pain, patient was unable to stand up and required a lot of help to ambulate and move around. Because of persistent abdominal pain, EMS was called and patient brought to the ED. Denies any dysuria, however patient states that when she holds her pee for too long, her abdomen starts to hurt. Also endorsing increasing frequency in urination over the past 2 days. No fevers or chills. No sick contacts.      Patient also reports a cough and granddaughter reports that she seems to choke on her food. Review of Systems:  Review of Systems   Constitutional: Negative for chills and fever. HENT: Negative for ear pain and sore throat. Eyes: Negative for pain and visual disturbance. Respiratory: Positive for cough and choking. Negative for shortness of breath. Cardiovascular: Negative for chest pain and palpitations. Gastrointestinal: Positive for abdominal pain. Negative for vomiting. Genitourinary: Positive for frequency. Negative for dysuria and hematuria. Musculoskeletal: Negative for arthralgias and back pain. Skin: Negative for color change and rash. Neurological: Negative for seizures and syncope. All other systems reviewed and are negative. Past Medical and Surgical History:   Past Medical History:   Diagnosis Date   • Cancer ([address])     Throat   • Chronic pain disorder    • Diabetes mellitus ([address])    • Diffuse large B cell lymphoma ([address])    • Dysphagia    • GERD without esophagitis    • HTN (hypertension)    • Hyperlipidemia    • Hypertension    • MI (myocardial infarction) ([address])    • Port-A-Cath in place 07/29/2019   • Thyroid cancer ([address]) 2018       Past Surgical History:   Procedure Laterality Date   • BONE MARROW BIOPSY     • BREAST BIOPSY Left    • CARDIAC CATHETERIZATION N/A 12/9/2022    Procedure: Cardiac catheterization;  Surgeon: Azeb Hdz MD;  [facility]; Service: Cardiology   • CARDIAC CATHETERIZATION N/A 12/9/2022    Procedure: Cardiac Coronary Angiogram;  Surgeon: Azeb Hdz MD;  [facility]; Service: Cardiology   • CARDIAC CATHETERIZATION Left 12/9/2022    Procedure: Cardiac Left Heart Cath;  Surgeon: Azeb Hdz MD;  [facility];   Service: Cardiology   • CHOLECYSTECTOMY     • IR PORT PLACEMENT  11/16/2018   • IR PORT REMOVAL  3/22/2021   • OTHER SURGICAL HISTORY      tendor tear repair to right shoulder   • SHOULDER ARTHROSCOPY Meds/Allergies:  Prior to Admission medications    Medication Sig Start Date End Date Taking?  Authorizing Provider   [facility] [place] AZUCAR EN LA TWAN [place] 4/27/23  Yes Myles Jeter MD   albuterol (PROVENTIL HFA,VENTOLIN HFA) 90 mcg/act inhaler Inhale 1 puff every 4 (four) hours as needed for wheezing 4/3/23  Yes MOIRA Sanches   ALPRAZolam Michiel Passe) 0.25 mg tablet Take 1 tablet (0.25 mg total) by mouth daily at bedtime as needed for anxiety 5/12/23  Yes MOIRA Sanches   aspirin (Aspirin Low Dose) 81 mg chewable tablet Chew 1 tablet (81 mg total) daily 4/3/23  Yes MOIRA Sanches   atorvastatin (LIPITOR) 40 mg tablet Take 1 tablet (40 mg total) by mouth daily 4/3/23  Yes MOIRA Sanches   Blood Glucose Monitoring Suppl (OneTouch Verio) w/Device KIT Use per  guidelines 4/6/23  Yes MOIRA Grove   Calcium Carb-Cholecalciferol (Calcium + Vitamin D3) 600-10 MG-MCG TABS Take 1 tablet by mouth 2 (two) times a day 4/3/23  Yes MOIRA Sanches   carvedilol (COREG) 12.5 mg tablet TOME JOHNNY TABLETA DOS VECES [place] CON ALIMENTO 6/2/23  Yes MOIRA Rosales   cyanocobalamin (VITAMIN B-12) 500 MCG tablet Take 1,000 mcg by mouth daily   Yes Historical Provider, MD   Empagliflozin (Jardiance) 10 MG TABS tablet Take 1 tablet (10 mg total) by mouth every morning 4/6/23  Yes MOIRA Grove   ezetimibe (ZETIA) 10 mg tablet TOME JOHNNY TABLETA TODOS LOS ROTH 5/2/23  Yes MOIRA Sanches   ferrous sulfate 325 (65 Fe) mg tablet Take 325 mg by mouth daily with breakfast   Yes Historical Provider, MD   fluticasone (FLONASE) 50 mcg/act nasal spray 1 spray into each nostril daily 1/11/23  Yes Cassandra Presume, CRNP   furosemide (LASIX) 20 mg tablet Take 1 tablet (20 mg total) by mouth daily 4/3/23  Yes Carina Underwood MD   gabapentin (NEURONTIN) 100 mg capsule Take 1 capsule (100 mg total) by mouth 2 (two) times a day 4/3/23  Yes MOIRA Fowler   glucose blood (Accu-Chek Guide) test strip USAR PARA EXAMINAR AZUCAR EN LA TWAN MANISHA VECES [place] 4/14/23  Yes Sejal Moncada MD   Insulin Glargine Solostar (Basaglar KwikPen) 100 UNIT/ML SOPN Inject 0.09 mL (9 Units total) under the skin in the morning  Patient taking differently: Inject 9 Units under the skin in the morning Pt g'dtr reports taking 1xdaily at night.  4/11/23  Yes MOIRA Folwer   Insulin Pen Needle (BD Pen Needle Micro U/F) 32G X 6 MM MISC Use daily 12/14/20  Yes MOIRA Fowler   loperamide (IMODIUM) 2 mg capsule MAR JOHNNY (1) CAPSULA POR VIA ORAL ALFREDO SEA NECESARIO PARA LA DIARREA 10/14/22  Yes MOIRA Fowler   losartan (COZAAR) 25 mg tablet Take 1 tablet (25 mg total) by mouth daily 4/3/23  Yes MOIRA Fowler   Melatonin 5 MG TABS Take by mouth as needed   Yes Historical Provider, MD   meloxicam (MOBIC) 7.5 mg tablet Take 1 tablet (7.5 mg total) by mouth daily 6/7/23  Yes MOIRA Fowler   memantine [facility]) 10 mg tablet Take 1 tablet (10 mg total) by mouth 2 (two) times a day 4/3/23  Yes MOIRA Rodríguez   mirtazapine (REMERON) 15 mg tablet MAR JOHNNY (1) TABLETA POR VIA ORAL CADA Gena Plank A LA HORA DE ACOSTARSE 2/10/23  Yes Jenise Avery MD   omeprazole (PriLOSEC) 40 MG capsule [facility] 1 CAPSULA POR VIA ORAL JOHNNY VEZ [place] 5/26/23  Yes MOIRA Fowler   ondansetron [facility]) 4 mg tablet Take 1 tablet (4 mg total) by mouth every 8 (eight) hours as needed for nausea or vomiting 4/26/23  Yes MOIRA Fowler   bisacodyl (DULCOLAX) 5 mg EC tablet Take 4 tablets (20 mg total) by mouth once for 1 dose 4/24/23 5/5/23  An Loredo MD   polyethylene glycol (GOLYTELY) 4000 mL solution Take 4,000 mL by mouth once for 1 dose  Patient not taking: Reported on 5/5/2023 4/24/23 5/5/23  An Loredo MD   sitaGLIPtin-metFORMIN (JANUMET)  MG per tablet Take 1 tablet by mouth 2 (two) times a day with meals 4/26/23   MOIRA Argueta     I have reviewed home medications with patient family member. Allergies: No Known Allergies    Social History:  Marital Status:     Occupation: retired  Patient Pre-hospital Living Situation: With other family member: granddaughter  Patient Pre-hospital Level of Mobility: walks with walker  Patient Pre-hospital Diet Restrictions: diabetic  Substance Use History:   Social History     Substance and Sexual Activity   Alcohol Use Never    Comment: 0     Social History     Tobacco Use   Smoking Status Never   Smokeless Tobacco Never     Social History     Substance and Sexual Activity   Drug Use No       Family History:  Family History   Problem Relation Age of Onset   • Diabetes Mother    • Heart disease Sister    • Hypertension Brother    • Uterine cancer Maternal Grandmother    • Prostate cancer Paternal Grandfather        Physical Exam:     Vitals:   Blood Pressure: 123/67 (07/05/23 2020)  Pulse: 92 (07/05/23 2020)  Temperature: 99.6 °F (37.6 °C) (07/05/23 2020)  Temp Source: Oral (07/05/23 2020)  Respirations: 18 (07/05/23 2020)  Height: 5' (152.4 cm) (07/05/23 2020)  Weight - Scale: 68 kg (149 lb 14.6 oz) (07/05/23 1325)  SpO2: 94 % (07/05/23 2020)    Physical Exam       Additional Data:     Lab Results:  Results from last 7 days   Lab Units 07/05/23  1439   WBC Thousand/uL 18.71*   HEMOGLOBIN g/dL 11.4*   HEMATOCRIT % 35.6   PLATELETS Thousands/uL 239   NEUTROS PCT % 71   LYMPHS PCT % 18   MONOS PCT % 11   EOS PCT % 0     Results from last 7 days   Lab Units 07/05/23  1439   SODIUM mmol/L 122*   POTASSIUM mmol/L 3.7   CHLORIDE mmol/L 90*   CO2 mmol/L 26   BUN mg/dL 10   CREATININE mg/dL 0.66   ANION GAP mmol/L 6   CALCIUM mg/dL 8.8   ALBUMIN g/dL 3.7   TOTAL BILIRUBIN mg/dL 0.65   ALK PHOS U/L 58   ALT U/L 15   AST U/L 12*   GLUCOSE RANDOM mg/dL 271*                 Results from last 7 days   Lab Units 07/05/23  1439   LACTIC ACID mmol/L 1.9   PROCALCITONIN ng/ml 0.05       Lines/Drains:  Invasive Devices     Peripheral Intravenous Line  Duration           Peripheral IV 12/07/22 Right Antecubital 210 days    Peripheral IV 07/05/23 Distal;Left;Upper;Ventral (anterior) Arm <1 day                    Imaging: Reviewed radiology reports from this admission including: chest CT scan  CTA dissection protocol chest abdomen pelvis w wo contrast   Final Result by Josey Carvalho MD (07/05 1630)      No thoracic aortic dissection   No pulmonary embolism   Consolidation right lower lobe with associated bronchiectatic changes, evaluate for pneumonia   Follow-up chest CT at 3 months suggested to demonstrate resolution                  Workstation performed: [id number]             EKG and Other Studies Reviewed on Admission:   · EKG: NSR. HR 92.    ** Please Note: This note has been constructed using a voice recognition system.  **

## 2023-07-05 NOTE — ED PROVIDER NOTES
History  Chief Complaint   Patient presents with   • Abdominal Pain   • Flank Pain     Patient arrived via ems with complaints of left sided pain. Pain is reproducible      Matteo Chavez is a very pleasant 71-year-old female here for evaluation of left-sided chest and abdominal pain. Her symptoms have been ongoing for about 4 days now. She is also complaining of pain that radiates to her back. Pain seems to be worse with certain movements. She is unsure of fevers or chills at home. She does report some shortness of breath. Otherwise, she is just complaining of feeling generally quite weak with general fatigue and malaise. Unsure of sick contacts. History provided by:  Patient      Prior to Admission Medications   Prescriptions Last Dose Informant Patient Reported? Taking? ALPRAZolam (XANAX) 0.25 mg tablet   No Yes   Sig: Take 1 tablet (0.25 mg total) by mouth daily at bedtime as needed for anxiety   Accu-Chek FastClix Lancets MISC  Family Member No Yes   Sig: USAR PARA PROBAR AZUCAR EN LA TWAN MANISHA VECES AL JEFFREY   Blood Glucose Monitoring Suppl (OneTouch Verio) w/Device KIT  Family Member No Yes   Sig: Use per  guidelines   Calcium Carb-Cholecalciferol (Calcium + Vitamin D3) 600-10 MG-MCG TABS  Family Member No Yes   Sig: Take 1 tablet by mouth 2 (two) times a day   Empagliflozin (Jardiance) 10 MG TABS tablet  Family Member No Yes   Sig: Take 1 tablet (10 mg total) by mouth every morning   Insulin Glargine Solostar (Basaglar KwikPen) 100 UNIT/ML SOPN  Family Member No Yes   Sig: Inject 0.09 mL (9 Units total) under the skin in the morning   Patient taking differently: Inject 9 Units under the skin in the morning Pt g'dtr reports taking 1xdaily at night.    Insulin Pen Needle (BD Pen Needle Micro U/F) 32G X 6 MM MISC  Family Member No Yes   Sig: Use daily   Melatonin 5 MG TABS  Family Member Yes Yes   Sig: Take by mouth as needed   albuterol (PROVENTIL HFA,VENTOLIN HFA) 90 mcg/act inhaler Family Member No Yes   Sig: Inhale 1 puff every 4 (four) hours as needed for wheezing   aspirin (Aspirin Low Dose) 81 mg chewable tablet  Family Member No Yes   Sig: Chew 1 tablet (81 mg total) daily   atorvastatin (LIPITOR) 40 mg tablet  Family Member No Yes   Sig: Take 1 tablet (40 mg total) by mouth daily   bisacodyl (DULCOLAX) 5 mg EC tablet   No No   Sig: Take 4 tablets (20 mg total) by mouth once for 1 dose   carvedilol (COREG) 12.5 mg tablet   No Yes   Sig: TOME JOHNNY TABLETA DOS VECES AL JEFFREY CON ALIMENTO   cyanocobalamin (VITAMIN B-12) 500 MCG tablet  Family Member Yes Yes   Sig: Take 1,000 mcg by mouth daily   ezetimibe (ZETIA) 10 mg tablet   No Yes   Sig: TOME JOHNNY TABLETA TODOS LOS ROTH   ferrous sulfate 325 (65 Fe) mg tablet  Family Member Yes Yes   Sig: Take 325 mg by mouth daily with breakfast   fluticasone (FLONASE) 50 mcg/act nasal spray  Family Member No Yes   Si spray into each nostril daily   furosemide (LASIX) 20 mg tablet  Family Member No Yes   Sig: Take 1 tablet (20 mg total) by mouth daily   gabapentin (NEURONTIN) 100 mg capsule  Family Member No Yes   Sig: Take 1 capsule (100 mg total) by mouth 2 (two) times a day   glucose blood (Accu-Chek Guide) test strip  Family Member No Yes   Sig: USAR PARA EXAMINAR AZUCAR EN LA TWAN MANISHA VECES AL JEFFREY   loperamide (IMODIUM) 2 mg capsule  Family Member No Yes   Sig: MAR JOHNNY (1) CAPSULA POR VIA ORAL ALFREDO SEA NECESARIO PARA LA DIARREA   losartan (COZAAR) 25 mg tablet  Family Member No Yes   Sig: Take 1 tablet (25 mg total) by mouth daily   meloxicam (MOBIC) 7.5 mg tablet   No Yes   Sig: Take 1 tablet (7.5 mg total) by mouth daily   memantine (NAMENDA) 10 mg tablet  Family Member No Yes   Sig: Take 1 tablet (10 mg total) by mouth 2 (two) times a day   mirtazapine (REMERON) 15 mg tablet  Family Member No Yes   Sig: MAR JOHNNY (1) TABLETA POR VIA ORAL CADA NOCHE A LA HORA DE ACOSTARSE   omeprazole (PriLOSEC) 40 MG capsule   No Yes   Sig: MAR 1 CAPSULA POR VIA ORAL JOHNNY VEZ AL JEFFREY   ondansetron (ZOFRAN) 4 mg tablet  Family Member No Yes   Sig: Take 1 tablet (4 mg total) by mouth every 8 (eight) hours as needed for nausea or vomiting   polyethylene glycol (GOLYTELY) 4000 mL solution   No No   Sig: Take 4,000 mL by mouth once for 1 dose   Patient not taking: Reported on 5/5/2023   sitaGLIPtin-metFORMIN (Tunde Bright)  MG per tablet Unknown Family Member No No   Sig: Take 1 tablet by mouth 2 (two) times a day with meals      Facility-Administered Medications: None       Past Medical History:   Diagnosis Date   • Cancer ([address])     Throat   • Chronic pain disorder    • Diabetes mellitus ([address])    • Diffuse large B cell lymphoma ([address])    • Dysphagia    • GERD without esophagitis    • HTN (hypertension)    • Hyperlipidemia    • Hypertension    • MI (myocardial infarction) ([address])    • Port-A-Cath in place 07/29/2019   • Thyroid cancer ([address]) 2018       Past Surgical History:   Procedure Laterality Date   • BONE MARROW BIOPSY     • BREAST BIOPSY Left    • CARDIAC CATHETERIZATION N/A 12/9/2022    Procedure: Cardiac catheterization;  Surgeon: Brain Ellis MD;  Location: [facility]; Service: Cardiology   • CARDIAC CATHETERIZATION N/A 12/9/2022    Procedure: Cardiac Coronary Angiogram;  Surgeon: Brain Ellis MD;  Location: [facility]; Service: Cardiology   • CARDIAC CATHETERIZATION Left 12/9/2022    Procedure: Cardiac Left Heart Cath;  Surgeon: Brain Ellis MD;  Location: [facility];   Service: Cardiology   • CHOLECYSTECTOMY     • IR PORT PLACEMENT  11/16/2018   • IR PORT REMOVAL  3/22/2021   • OTHER SURGICAL HISTORY      tendor tear repair to right shoulder   • SHOULDER ARTHROSCOPY         Family History   Problem Relation Age of Onset   • Diabetes Mother    • Heart disease Sister    • Hypertension Brother    • Uterine cancer Maternal Grandmother    • Prostate cancer Paternal Grandfather      I have reviewed and agree with the history as documented. E-Cigarette/Vaping   • E-Cigarette Use Never User      E-Cigarette/Vaping Substances   • Nicotine No    • THC No    • CBD No    • Flavoring No    • Other No    • Unknown No      Social History     Tobacco Use   • Smoking status: Never   • Smokeless tobacco: Never   Vaping Use   • Vaping Use: Never used   Substance Use Topics   • Alcohol use: Never     Comment: 0   • Drug use: No       Review of Systems   Constitutional: Negative for chills and fever. HENT: Negative for sore throat. Eyes: Negative for visual disturbance. Respiratory: Negative for cough and shortness of breath. Cardiovascular: Negative for chest pain and palpitations. Gastrointestinal: Negative for abdominal pain and vomiting. Genitourinary: Negative for dysuria and hematuria. Musculoskeletal: Negative for arthralgias, back pain, neck pain and neck stiffness. Skin: Negative for color change and rash. Neurological: Negative for seizures and syncope. All other systems reviewed and are negative. Physical Exam  Physical Exam  Vitals and nursing note reviewed. Constitutional:       General: She is not in acute distress. Appearance: She is well-developed. HENT:      Head: Normocephalic and atraumatic. Eyes:      Conjunctiva/sclera: Conjunctivae normal.   Cardiovascular:      Rate and Rhythm: Normal rate and regular rhythm. Heart sounds: No murmur heard. Pulmonary:      Effort: Respiratory distress present. Breath sounds: Wheezing and rhonchi present. Abdominal:      Palpations: Abdomen is soft. Tenderness: There is no abdominal tenderness. Musculoskeletal:         General: No swelling. Cervical back: Neck supple. Skin:     General: Skin is warm and dry. Capillary Refill: Capillary refill takes less than 2 seconds. Neurological:      General: No focal deficit present. Mental Status: She is alert and oriented to person, place, and time.    Psychiatric:         Mood and Affect: Mood normal.         Vital Signs  ED Triage Vitals   Temperature Pulse Respirations Blood Pressure SpO2   07/05/23 1321 07/05/23 1325 07/05/23 1325 07/05/23 1325 07/05/23 1325   99.4 °F (37.4 °C) 104 21 143/69 95 %      Temp src Heart Rate Source Patient Position - Orthostatic VS BP Location FiO2 (%)   -- 07/05/23 1800 07/05/23 1325 07/05/23 1325 --    Monitor Lying Right arm       Pain Score       07/05/23 1445       10 - Worst Possible Pain           Vitals:    07/05/23 1610 07/05/23 1800 07/05/23 1852 07/05/23 1900   BP: 130/60 123/67 124/65 122/68   Pulse: 98 89 89 90   Patient Position - Orthostatic VS:  Lying  Lying         Visual Acuity      ED Medications  Medications   ondansetron (ZOFRAN) injection 4 mg (4 mg Intravenous Given 7/5/23 1445)   sodium chloride 0.9 % bolus 1,000 mL (0 mL Intravenous Stopped 7/5/23 1637)   morphine injection 4 mg (4 mg Intravenous Given 7/5/23 1445)   iohexol (OMNIPAQUE) 350 MG/ML injection (SINGLE-DOSE) 100 mL (100 mL Intravenous Given 7/5/23 1525)   cefTRIAXone (ROCEPHIN) IVPB (premix in dextrose) 2,000 mg 50 mL (0 mg Intravenous Stopped 7/5/23 1848)       Diagnostic Studies  Results Reviewed     Procedure Component Value Units Date/Time    HS Troponin I 4hr [[id number]]  (Normal) Collected: 07/05/23 1852    Lab Status: Final result Specimen: Blood from Arm, Left Updated: 07/05/23 1921     hs TnI 4hr 18 ng/L      Delta 4hr hsTnI 0 ng/L     HS Troponin I 2hr [[id number]]  (Normal) Collected: 07/05/23 1641    Lab Status: Final result Specimen: Blood from Arm, Left Updated: 07/05/23 1708     hs TnI 2hr 17 ng/L      Delta 2hr hsTnI -1 ng/L     FLU/RSV/COVID - if FLU/RSV clinically relevant [[id number]]  (Normal) Collected: 07/05/23 1439    Lab Status: Final result Specimen: Nares from Nose Updated: 07/05/23 8733     SARS-CoV-2 Negative     INFLUENZA A PCR Negative     INFLUENZA B PCR Negative     RSV PCR Negative    Narrative:      FOR PEDIATRIC PATIENTS - copy/paste COVID Guidelines URL to browser: [email or web address]    SARS-CoV-2 assay is a Nucleic Acid Amplification assay intended for the  qualitative detection of nucleic acid from SARS-CoV-2 in nasopharyngeal  swabs. Results are for the presumptive identification of SARS-CoV-2 RNA. Positive results are indicative of infection with SARS-CoV-2, the virus  causing COVID-19, but do not rule out bacterial infection or co-infection  with other viruses. Laboratories within the [facility] and its  territories are required to report all positive results to the appropriate  public health authorities. Negative results do not preclude SARS-CoV-2  infection and should not be used as the sole basis for treatment or other  patient management decisions. Negative results must be combined with  clinical observations, patient history, and epidemiological information. This test has not been FDA cleared or approved. This test has been authorized by FDA under an Emergency Use Authorization  (EUA). This test is only authorized for the duration of time the  declaration that circumstances exist justifying the authorization of the  emergency use of an in vitro diagnostic tests for detection of SARS-CoV-2  virus and/or diagnosis of COVID-19 infection under section 564(b)(1) of  the Act, 21 U. S.C. 967ABH-2(I)(4), unless the authorization is terminated  or revoked sooner. The test has been validated but independent review by FDA  and CLIA is pending. Test performed using [employer] Gene[employer]pert: This RT-PCR assay targets N2,  a region unique to SARS-CoV-2. A conserved region in the E-gene was chosen  for pan-Sarbecovirus detection which includes SARS-CoV-2. According to CMS-2020-01-R, this platform meets the definition of high-throughput technology.     Urine Microscopic [[id number]]  (Abnormal) Collected: 07/05/23 7164    Lab Status: Final result Specimen: Urine, Clean Catch Updated: 07/05/23 1524     RBC, UA 1-2 /hpf      WBC, UA 4-10 /hpf      Epithelial Cells Occasional /hpf      Bacteria, UA Occasional /hpf     Procalcitonin [[id number]]  (Normal) Collected: 07/05/23 1439    Lab Status: Final result Specimen: Blood from Arm, Left Updated: 07/05/23 1520     Procalcitonin 0.05 ng/ml     HS Troponin 0hr (reflex protocol) [[id number]]  (Normal) Collected: 07/05/23 1439    Lab Status: Final result Specimen: Blood from Arm, Left Updated: 07/05/23 1519     hs TnI 0hr 18 ng/L     POCT urinalysis dipstick [[id number]]  (Abnormal) Resulted: 07/05/23 1514    Lab Status: Final result Specimen: Urine Updated: 07/05/23 1514     Color, UA Yellow     Clarity, UA --     EXT Leukocytes, UA --     Nitrite, UA --     Protein, UA -- mg/dl      Glucose, UA --     Ketones, UA -- mg/dl      EXT Urobilinogen, UA --      Bilirubin, UA --     Blood, UA --    UA (URINE) with reflex to Scope [[id number]]  (Abnormal) Collected: 07/05/23 1453    Lab Status: Final result Specimen: Urine, Clean Catch Updated: 07/05/23 1512     Color, UA Light Yellow     Clarity, UA Clear     Specific Gravity, UA 1.008     pH, UA 6.0     Leukocytes, UA Small     Nitrite, UA Negative     Protein, UA Trace mg/dl      Glucose,  (1/2%) mg/dl      Ketones, UA Negative mg/dl      Urobilinogen, UA <2.0 mg/dl      Bilirubin, UA Negative     Occult Blood, UA Negative    Basic metabolic panel [[id number]]  (Abnormal) Collected: 07/05/23 1439    Lab Status: Final result Specimen: Blood from Arm, Left Updated: 07/05/23 1508     Sodium 122 mmol/L      Potassium 3.7 mmol/L      Chloride 90 mmol/L      CO2 26 mmol/L      ANION GAP 6 mmol/L      BUN 10 mg/dL      Creatinine 0.66 mg/dL      Glucose 271 mg/dL      Calcium 8.8 mg/dL      eGFR 86 ml/min/1.73sq m     Narrative:      [place] guidelines for Chronic Kidney Disease (CKD):   •  Stage 1 with normal or high GFR (GFR > 90 mL/min/1.73 square meters)  •  Stage 2 Mild CKD (GFR = 60-89 mL/min/1.73 square meters)  •  Stage 3A Moderate CKD (GFR = 45-59 mL/min/1.73 square meters)  •  Stage 3B Moderate CKD (GFR = 30-44 mL/min/1.73 square meters)  •  Stage 4 Severe CKD (GFR = 15-29 mL/min/1.73 square meters)  •  Stage 5 End Stage CKD (GFR <15 mL/min/1.73 square meters)  Note: GFR calculation is accurate only with a steady state creatinine    Hepatic function panel [[id number]]  (Abnormal) Collected: 07/05/23 1439    Lab Status: Final result Specimen: Blood from Arm, Left Updated: 07/05/23 1508     Total Bilirubin 0.65 mg/dL      Bilirubin, Direct 0.23 mg/dL      Alkaline Phosphatase 58 U/L      AST 12 U/L      ALT 15 U/L      Total Protein 7.0 g/dL      Albumin 3.7 g/dL     Lipase [[id number]]  (Normal) Collected: 07/05/23 1439    Lab Status: Final result Specimen: Blood from Arm, Left Updated: 07/05/23 1508     Lipase 17 u/L     Lactic acid, plasma (w/reflex if result > 2.0) [[id number]]  (Normal) Collected: 07/05/23 1439    Lab Status: Final result Specimen: Blood from Arm, Left Updated: 07/05/23 1508     LACTIC ACID 1.9 mmol/L     Narrative:      Result may be elevated if tourniquet was used during collection. Urine culture [[id number]] Collected: 07/05/23 1458    Lab Status:  In process Specimen: Urine, Clean Catch Updated: 07/05/23 1505    Urine Macroscopic, POC [[id number]]  (Abnormal) Collected: 07/05/23 1501    Lab Status: Final result Specimen: Urine Updated: 07/05/23 1502     Color, UA Yellow     Clarity, UA Clear     pH, UA 6.5     Leukocytes, UA Trace     Nitrite, UA Negative     Protein, UA 30 (1+) mg/dl      Glucose,  (1/2%) mg/dl      Ketones, UA Negative mg/dl      Urobilinogen, UA 0.2 E.U./dl      Bilirubin, UA Negative     Occult Blood, UA Trace     Specific Gravity, UA 1.010    Narrative:      CLINITEK RESULT    CBC and differential [[id number]]  (Abnormal) Collected: 07/05/23 1439    Lab Status: Final result Specimen: Blood from Arm, Left Updated: 07/05/23 1451     WBC 18.71 Thousand/uL      RBC 3.99 Million/uL      Hemoglobin 11.4 g/dL      Hematocrit 35.6 %      MCV 89 fL      MCH 28.6 pg      MCHC 32.0 g/dL      RDW 14.5 %      MPV 10.3 fL      Platelets 698 Thousands/uL      nRBC 0 /100 WBCs      Neutrophils Relative 71 %      Immat GRANS % 0 %      Lymphocytes Relative 18 %      Monocytes Relative 11 %      Eosinophils Relative 0 %      Basophils Relative 0 %      Neutrophils Absolute 13.29 Thousands/µL      Immature Grans Absolute 0.07 Thousand/uL      Lymphocytes Absolute 3.27 Thousands/µL      Monocytes Absolute 2.00 Thousand/µL      Eosinophils Absolute 0.02 Thousand/µL      Basophils Absolute 0.06 Thousands/µL                  CTA dissection protocol chest abdomen pelvis w wo contrast   Final Result by Teri Porter MD (07/05 1630)      No thoracic aortic dissection   No pulmonary embolism   Consolidation right lower lobe with associated bronchiectatic changes, evaluate for pneumonia   Follow-up chest CT at 3 months suggested to demonstrate resolution                  Workstation performed: [id number]                    Procedures  CriticalCare Time    Date/Time: 7/5/2023 7:44 PM    Performed by: Sara Bangura MD  Authorized by: Sara Bangura MD    Critical care provider statement:     Critical care time (minutes):  39    Critical care time was exclusive of:  Separately billable procedures and treating other patients and teaching time    Critical care was necessary to treat or prevent imminent or life-threatening deterioration of the following conditions:  Metabolic crisis    Critical care was time spent personally by me on the following activities:  Blood draw for specimens, obtaining history from patient or surrogate, development of treatment plan with patient or surrogate, discussions with consultants, evaluation of patient's response to treatment, examination of patient, ordering and performing treatments and interventions, ordering and review of laboratory studies, ordering and review of radiographic studies, re-evaluation of patient's condition and review of old charts    I assumed direction of critical care for this patient from another provider in my specialty: no               ED Course                               SBIRT 22yo+    Flowsheet Row Most Recent Value   Initial Alcohol Screen: US AUDIT-C     1. How often do you have a drink containing alcohol? 0 Filed at: 07/05/2023 1429   2. How many drinks containing alcohol do you have on a typical day you are drinking? 0 Filed at: 07/05/2023 1429   3b. FEMALE Any Age, or MALE 65+: How often do you have 4 or more drinks on one occassion? 0 Filed at: 07/05/2023 1429   Audit-C Score 0 Filed at: 07/05/2023 1429   JHONY: How many times in the past year have you. .. Used an illegal drug or used a prescription medication for non-medical reasons? Never Filed at: 07/05/2023 1429                    Medical Decision Making  Hyponatremia: acute illness or injury  Pneumonia: acute illness or injury  Amount and/or Complexity of Data Reviewed  External Data Reviewed: notes. Labs: ordered. Decision-making details documented in ED Course. Radiology: ordered and independent interpretation performed. Decision-making details documented in ED Course. ECG/medicine tests: ordered and independent interpretation performed. Risk  Prescription drug management. Decision regarding hospitalization.           Disposition  Final diagnoses:   Hyponatremia   Pneumonia     Time reflects when diagnosis was documented in both MDM as applicable and the Disposition within this note     Time User Action Codes Description Comment    7/5/2023  3:41 PM Keisha Johnson Add [E87.1] Hyponatremia     7/5/2023  5:44 PM Keisha Johnson Add [J18.9] Pneumonia       ED Disposition     ED Disposition   Admit    Condition   Stable    Date/Time   Wed Jul 5, 2023  3:41 PM    Comment   Case was discussed with JOSE L and the patient's admission status was agreed to be Admission Status: inpatient status to the service of Dr. Jean Paul Kaur    None         Patient's Medications   Discharge Prescriptions    No medications on file       No discharge procedures on file.     PDMP Review       Value Time User    PDMP Reviewed  Yes 5/12/2023  9:31 AM [address] [place], [place]          ED Provider  Electronically Signed by           Umm Dunbar MD  07/05/23 9408

## 2023-07-06 DIAGNOSIS — M85.80 AGE-RELATED BONE LOSS: ICD-10-CM

## 2023-07-06 PROBLEM — E83.42 HYPOMAGNESEMIA: Status: ACTIVE | Noted: 2023-07-06

## 2023-07-06 PROBLEM — D64.9 ANEMIA: Status: ACTIVE | Noted: 2023-07-06

## 2023-07-06 LAB
ALBUMIN SERPL BCP-MCNC: 3.2 G/DL (ref 3.5–5)
ALP SERPL-CCNC: 58 U/L (ref 34–104)
ALT SERPL W P-5'-P-CCNC: 13 U/L (ref 7–52)
ANION GAP SERPL CALCULATED.3IONS-SCNC: 5 MMOL/L
ANION GAP SERPL CALCULATED.3IONS-SCNC: 6 MMOL/L
ANION GAP SERPL CALCULATED.3IONS-SCNC: 6 MMOL/L
ANION GAP SERPL CALCULATED.3IONS-SCNC: 7 MMOL/L
AST SERPL W P-5'-P-CCNC: 10 U/L (ref 13–39)
ATRIAL RATE: 88 BPM
BASOPHILS # BLD AUTO: 0.09 THOUSANDS/ÂΜL (ref 0–0.1)
BASOPHILS NFR BLD AUTO: 1 % (ref 0–1)
BILIRUB SERPL-MCNC: 0.24 MG/DL (ref 0.2–1)
BUN SERPL-MCNC: 11 MG/DL (ref 5–25)
BUN SERPL-MCNC: 12 MG/DL (ref 5–25)
BUN SERPL-MCNC: 12 MG/DL (ref 5–25)
BUN SERPL-MCNC: 14 MG/DL (ref 5–25)
CALCIUM ALBUM COR SERPL-MCNC: 8.5 MG/DL (ref 8.3–10.1)
CALCIUM SERPL-MCNC: 7.9 MG/DL (ref 8.4–10.2)
CALCIUM SERPL-MCNC: 8.2 MG/DL (ref 8.4–10.2)
CALCIUM SERPL-MCNC: 8.4 MG/DL (ref 8.4–10.2)
CALCIUM SERPL-MCNC: 8.6 MG/DL (ref 8.4–10.2)
CHLORIDE SERPL-SCNC: 97 MMOL/L (ref 96–108)
CHLORIDE SERPL-SCNC: 98 MMOL/L (ref 96–108)
CHLORIDE SERPL-SCNC: 98 MMOL/L (ref 96–108)
CHLORIDE SERPL-SCNC: 99 MMOL/L (ref 96–108)
CO2 SERPL-SCNC: 27 MMOL/L (ref 21–32)
CO2 SERPL-SCNC: 27 MMOL/L (ref 21–32)
CO2 SERPL-SCNC: 28 MMOL/L (ref 21–32)
CO2 SERPL-SCNC: 28 MMOL/L (ref 21–32)
CREAT SERPL-MCNC: 0.68 MG/DL (ref 0.6–1.3)
CREAT SERPL-MCNC: 0.77 MG/DL (ref 0.6–1.3)
CREAT SERPL-MCNC: 0.78 MG/DL (ref 0.6–1.3)
CREAT SERPL-MCNC: 0.97 MG/DL (ref 0.6–1.3)
EOSINOPHIL # BLD AUTO: 0.16 THOUSAND/ÂΜL (ref 0–0.61)
EOSINOPHIL NFR BLD AUTO: 1 % (ref 0–6)
ERYTHROCYTE [DISTWIDTH] IN BLOOD BY AUTOMATED COUNT: 14.7 % (ref 11.6–15.1)
GFR SERPL CREATININE-BSD FRML MDRD: 56 ML/MIN/1.73SQ M
GFR SERPL CREATININE-BSD FRML MDRD: 74 ML/MIN/1.73SQ M
GFR SERPL CREATININE-BSD FRML MDRD: 75 ML/MIN/1.73SQ M
GFR SERPL CREATININE-BSD FRML MDRD: 85 ML/MIN/1.73SQ M
GLUCOSE SERPL-MCNC: 115 MG/DL (ref 65–140)
GLUCOSE SERPL-MCNC: 164 MG/DL (ref 65–140)
GLUCOSE SERPL-MCNC: 169 MG/DL (ref 65–140)
GLUCOSE SERPL-MCNC: 174 MG/DL (ref 65–140)
GLUCOSE SERPL-MCNC: 180 MG/DL (ref 65–140)
GLUCOSE SERPL-MCNC: 188 MG/DL (ref 65–140)
GLUCOSE SERPL-MCNC: 194 MG/DL (ref 65–140)
GLUCOSE SERPL-MCNC: 214 MG/DL (ref 65–140)
GLUCOSE SERPL-MCNC: 294 MG/DL (ref 65–140)
HCT VFR BLD AUTO: 31 % (ref 34.8–46.1)
HGB BLD-MCNC: 9.9 G/DL (ref 11.5–15.4)
IMM GRANULOCYTES # BLD AUTO: 0.12 THOUSAND/UL (ref 0–0.2)
IMM GRANULOCYTES NFR BLD AUTO: 1 % (ref 0–2)
LYMPHOCYTES # BLD AUTO: 3.58 THOUSANDS/ÂΜL (ref 0.6–4.47)
LYMPHOCYTES NFR BLD AUTO: 24 % (ref 14–44)
MAGNESIUM SERPL-MCNC: 1.3 MG/DL (ref 1.9–2.7)
MCH RBC QN AUTO: 28.9 PG (ref 26.8–34.3)
MCHC RBC AUTO-ENTMCNC: 31.9 G/DL (ref 31.4–37.4)
MCV RBC AUTO: 90 FL (ref 82–98)
MONOCYTES # BLD AUTO: 1.62 THOUSAND/ÂΜL (ref 0.17–1.22)
MONOCYTES NFR BLD AUTO: 11 % (ref 4–12)
NEUTROPHILS # BLD AUTO: 9.38 THOUSANDS/ÂΜL (ref 1.85–7.62)
NEUTS SEG NFR BLD AUTO: 62 % (ref 43–75)
NRBC BLD AUTO-RTO: 0 /100 WBCS
OSMOLALITY UR: 304 MMOL/KG
P AXIS: 65 DEGREES
PLATELET # BLD AUTO: 235 THOUSANDS/UL (ref 149–390)
PLATELET # BLD AUTO: 239 THOUSANDS/UL (ref 149–390)
PMV BLD AUTO: 9.7 FL (ref 8.9–12.7)
PMV BLD AUTO: 9.8 FL (ref 8.9–12.7)
POTASSIUM SERPL-SCNC: 3.4 MMOL/L (ref 3.5–5.3)
POTASSIUM SERPL-SCNC: 3.6 MMOL/L (ref 3.5–5.3)
POTASSIUM SERPL-SCNC: 3.7 MMOL/L (ref 3.5–5.3)
POTASSIUM SERPL-SCNC: 4 MMOL/L (ref 3.5–5.3)
PR INTERVAL: 144 MS
PROCALCITONIN SERPL-MCNC: 0.05 NG/ML
PROT SERPL-MCNC: 6.1 G/DL (ref 6.4–8.4)
QRS AXIS: 32 DEGREES
QRSD INTERVAL: 126 MS
QT INTERVAL: 432 MS
QTC INTERVAL: 522 MS
RBC # BLD AUTO: 3.43 MILLION/UL (ref 3.81–5.12)
S PNEUM AG UR QL: NEGATIVE
SODIUM 24H UR-SCNC: 48 MOL/L
SODIUM SERPL-SCNC: 130 MMOL/L (ref 135–147)
SODIUM SERPL-SCNC: 132 MMOL/L (ref 135–147)
T WAVE AXIS: 148 DEGREES
TSH SERPL DL<=0.05 MIU/L-ACNC: 2.04 UIU/ML (ref 0.45–4.5)
URATE SERPL-MCNC: 2.3 MG/DL (ref 2–7.5)
VENTRICULAR RATE: 88 BPM
WBC # BLD AUTO: 14.95 THOUSAND/UL (ref 4.31–10.16)

## 2023-07-06 PROCEDURE — 94760 N-INVAS EAR/PLS OXIMETRY 1: CPT

## 2023-07-06 PROCEDURE — 92610 EVALUATE SWALLOWING FUNCTION: CPT

## 2023-07-06 PROCEDURE — 84145 PROCALCITONIN (PCT): CPT | Performed by: STUDENT IN AN ORGANIZED HEALTH CARE EDUCATION/TRAINING PROGRAM

## 2023-07-06 PROCEDURE — 80048 BASIC METABOLIC PNL TOTAL CA: CPT | Performed by: INTERNAL MEDICINE

## 2023-07-06 PROCEDURE — 99222 1ST HOSP IP/OBS MODERATE 55: CPT | Performed by: INTERNAL MEDICINE

## 2023-07-06 PROCEDURE — 80053 COMPREHEN METABOLIC PANEL: CPT | Performed by: STUDENT IN AN ORGANIZED HEALTH CARE EDUCATION/TRAINING PROGRAM

## 2023-07-06 PROCEDURE — 80048 BASIC METABOLIC PNL TOTAL CA: CPT | Performed by: STUDENT IN AN ORGANIZED HEALTH CARE EDUCATION/TRAINING PROGRAM

## 2023-07-06 PROCEDURE — 99233 SBSQ HOSP IP/OBS HIGH 50: CPT | Performed by: INTERNAL MEDICINE

## 2023-07-06 PROCEDURE — 93010 ELECTROCARDIOGRAM REPORT: CPT | Performed by: INTERNAL MEDICINE

## 2023-07-06 PROCEDURE — 97163 PT EVAL HIGH COMPLEX 45 MIN: CPT

## 2023-07-06 PROCEDURE — 82948 REAGENT STRIP/BLOOD GLUCOSE: CPT

## 2023-07-06 PROCEDURE — 84550 ASSAY OF BLOOD/URIC ACID: CPT | Performed by: NURSE PRACTITIONER

## 2023-07-06 PROCEDURE — 83735 ASSAY OF MAGNESIUM: CPT | Performed by: STUDENT IN AN ORGANIZED HEALTH CARE EDUCATION/TRAINING PROGRAM

## 2023-07-06 PROCEDURE — 85025 COMPLETE CBC W/AUTO DIFF WBC: CPT | Performed by: STUDENT IN AN ORGANIZED HEALTH CARE EDUCATION/TRAINING PROGRAM

## 2023-07-06 PROCEDURE — 94660 CPAP INITIATION&MGMT: CPT

## 2023-07-06 PROCEDURE — 84443 ASSAY THYROID STIM HORMONE: CPT | Performed by: NURSE PRACTITIONER

## 2023-07-06 RX ORDER — DEXTROSE MONOHYDRATE 50 MG/ML
500 INJECTION, SOLUTION INTRAVENOUS ONCE
Status: DISCONTINUED | OUTPATIENT
Start: 2023-07-06 | End: 2023-07-09 | Stop reason: HOSPADM

## 2023-07-06 RX ORDER — MAGNESIUM SULFATE HEPTAHYDRATE 40 MG/ML
2 INJECTION, SOLUTION INTRAVENOUS ONCE
Status: COMPLETED | OUTPATIENT
Start: 2023-07-06 | End: 2023-07-06

## 2023-07-06 RX ORDER — OXYCODONE HYDROCHLORIDE 5 MG/1
5 TABLET ORAL EVERY 6 HOURS PRN
Status: DISCONTINUED | OUTPATIENT
Start: 2023-07-06 | End: 2023-07-09 | Stop reason: HOSPADM

## 2023-07-06 RX ORDER — DOCUSATE SODIUM 100 MG/1
100 CAPSULE, LIQUID FILLED ORAL 2 TIMES DAILY
Status: DISCONTINUED | OUTPATIENT
Start: 2023-07-06 | End: 2023-07-09 | Stop reason: HOSPADM

## 2023-07-06 RX ORDER — POLYETHYLENE GLYCOL 3350 17 G/17G
17 POWDER, FOR SOLUTION ORAL DAILY
Status: DISCONTINUED | OUTPATIENT
Start: 2023-07-06 | End: 2023-07-09 | Stop reason: HOSPADM

## 2023-07-06 RX ORDER — POTASSIUM CHLORIDE 20 MEQ/1
40 TABLET, EXTENDED RELEASE ORAL ONCE
Status: COMPLETED | OUTPATIENT
Start: 2023-07-06 | End: 2023-07-06

## 2023-07-06 RX ORDER — ENOXAPARIN SODIUM 100 MG/ML
40 INJECTION SUBCUTANEOUS
Status: DISCONTINUED | OUTPATIENT
Start: 2023-07-07 | End: 2023-07-09 | Stop reason: HOSPADM

## 2023-07-06 RX ADMIN — ASPIRIN 81 MG 81 MG: 81 TABLET ORAL at 09:40

## 2023-07-06 RX ADMIN — EZETIMIBE 10 MG: 10 TABLET ORAL at 21:51

## 2023-07-06 RX ADMIN — INSULIN LISPRO 1 UNITS: 100 INJECTION, SOLUTION INTRAVENOUS; SUBCUTANEOUS at 17:29

## 2023-07-06 RX ADMIN — DOCUSATE SODIUM 100 MG: 100 CAPSULE, LIQUID FILLED ORAL at 20:11

## 2023-07-06 RX ADMIN — Medication 1 TABLET: at 20:11

## 2023-07-06 RX ADMIN — INSULIN GLARGINE 9 UNITS: 100 INJECTION, SOLUTION SUBCUTANEOUS at 21:51

## 2023-07-06 RX ADMIN — CEFTRIAXONE 1000 MG: 1 INJECTION, SOLUTION INTRAVENOUS at 19:20

## 2023-07-06 RX ADMIN — CARVEDILOL 12.5 MG: 12.5 TABLET, FILM COATED ORAL at 09:40

## 2023-07-06 RX ADMIN — OXYCODONE HYDROCHLORIDE 5 MG: 5 TABLET ORAL at 20:21

## 2023-07-06 RX ADMIN — ATORVASTATIN CALCIUM 40 MG: 40 TABLET, FILM COATED ORAL at 17:31

## 2023-07-06 RX ADMIN — ACETAMINOPHEN 650 MG: 325 TABLET ORAL at 06:20

## 2023-07-06 RX ADMIN — MEMANTINE 10 MG: 5 TABLET ORAL at 17:31

## 2023-07-06 RX ADMIN — MAGNESIUM SULFATE 2 G: 2 INJECTION INTRAVENOUS at 13:53

## 2023-07-06 RX ADMIN — FLUTICASONE PROPIONATE 1 SPRAY: 50 SPRAY, METERED NASAL at 09:43

## 2023-07-06 RX ADMIN — HEPARIN SODIUM 5000 UNITS: 5000 INJECTION INTRAVENOUS; SUBCUTANEOUS at 06:24

## 2023-07-06 RX ADMIN — MELOXICAM 7.5 MG: 7.5 TABLET ORAL at 09:39

## 2023-07-06 RX ADMIN — MIRTAZAPINE 15 MG: 15 TABLET, FILM COATED ORAL at 21:51

## 2023-07-06 RX ADMIN — CARVEDILOL 12.5 MG: 12.5 TABLET, FILM COATED ORAL at 17:31

## 2023-07-06 RX ADMIN — DEXTROSE 500 ML: 5 SOLUTION INTRAVENOUS at 09:44

## 2023-07-06 RX ADMIN — MEMANTINE 10 MG: 5 TABLET ORAL at 09:40

## 2023-07-06 RX ADMIN — POTASSIUM CHLORIDE 40 MEQ: 1500 TABLET, EXTENDED RELEASE ORAL at 20:11

## 2023-07-06 RX ADMIN — GABAPENTIN 100 MG: 100 CAPSULE ORAL at 09:40

## 2023-07-06 RX ADMIN — HEPARIN SODIUM 5000 UNITS: 5000 INJECTION INTRAVENOUS; SUBCUTANEOUS at 13:51

## 2023-07-06 RX ADMIN — INSULIN LISPRO 3 UNITS: 100 INJECTION, SOLUTION INTRAVENOUS; SUBCUTANEOUS at 12:23

## 2023-07-06 RX ADMIN — LOSARTAN POTASSIUM 25 MG: 25 TABLET, FILM COATED ORAL at 09:40

## 2023-07-06 RX ADMIN — INSULIN LISPRO 1 UNITS: 100 INJECTION, SOLUTION INTRAVENOUS; SUBCUTANEOUS at 21:51

## 2023-07-06 RX ADMIN — PANTOPRAZOLE SODIUM 40 MG: 40 TABLET, DELAYED RELEASE ORAL at 06:20

## 2023-07-06 RX ADMIN — CYANOCOBALAMIN TAB 500 MCG 1000 MCG: 500 TAB at 09:41

## 2023-07-06 RX ADMIN — GABAPENTIN 100 MG: 100 CAPSULE ORAL at 17:31

## 2023-07-06 RX ADMIN — FERROUS SULFATE TAB 325 MG (65 MG ELEMENTAL FE) 325 MG: 325 (65 FE) TAB at 09:40

## 2023-07-06 RX ADMIN — Medication 1 TABLET: at 09:40

## 2023-07-06 RX ADMIN — POLYETHYLENE GLYCOL 3350 17 G: 17 POWDER, FOR SOLUTION ORAL at 20:11

## 2023-07-06 NOTE — SPEECH THERAPY NOTE
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)    Anxiety    Current mild episode of major depressive disorder without prior episode (HCC)    Right lower lobe pneumonia    Obstructive sleep apnea    Dementia with behavioral disturbance    Acute cystitis without hematuria    Past Medical History  Past Medical History:   Diagnosis Date   • Cancer ([address])     Throat   • Chronic pain disorder    • Diabetes mellitus (HCC)    • Diffuse large B cell lymphoma (HCC)    • Dysphagia    • GERD without esophagitis    • HTN (hypertension)    • Hyperlipidemia    • Hypertension    • MI (myocardial infarction) ([address])    • Port-A-Cath in place 07/29/2019   • Thyroid cancer ([address]) 2018     Past Surgical History  Past Surgical History:   Procedure Laterality Date   • BONE MARROW BIOPSY     • BREAST BIOPSY Left    • CARDIAC CATHETERIZATION N/A 12/9/2022    Procedure: Cardiac catheterization;  Surgeon: Brain Ellis MD;  Location: [facility]; Service: Cardiology   • CARDIAC CATHETERIZATION N/A 12/9/2022    Procedure: Cardiac Coronary Angiogram;  Surgeon: Brain Ellis MD;  [facility]; Service: Cardiology   • CARDIAC CATHETERIZATION Left 12/9/2022    Procedure: Cardiac Left Heart Cath;  Surgeon: Brain Ellis MD;  [facility]; Service: Cardiology   • CHOLECYSTECTOMY     • IR PORT PLACEMENT  11/16/2018   • IR PORT REMOVAL  3/22/2021   • OTHER SURGICAL HISTORY      tendor tear repair to right shoulder   • SHOULDER ARTHROSCOPY          Bedside Swallow Evaluation:    Summary:  Pt presented alert and pleasant.  Seated in chair for lunch. She kept telling me she didn't have many teeth. Known to me from previous vbs 2020 at which time a cough w/ PO was also reported. Oral/pharyngeal stages were WFL/WNL. Soft foods that were easy to chew were recommended, along w/ thin liquids. Questioned rate of self feeding. Pt was seen at lunch today w/ baked potato (skin removed and cut my me), and chicken tenders. Ends were hard and I could barely cut them w/ a knife, removed them and put to the side and cut the chicken in small piceces. mastication appeared adequate but noted pt was placing more food in her mouth when she still had quite a bit of food already in her mouth. Cough x 1. Gave verbal cues to swallow before she took any more, go slower, and take some sips on between. Completed the rest of her lunch WNL. (I took the side salad w/ cucumbers and cherry tomatoes away). Denied any issues w/ swallowing. Mild oral stage, no pharyngeal s/s, Needs to be paced. Sips after every few bites. Will f/u. Recommendations:  Diet: Dysphagia 3 dental soft  Liquid: thin  Meds: as tolerated  Supervision: close  Positioning:Upright  Strategies: shonda rate, swallow prior to next presentation, sips after every few bites. Pt to take PO/Meds only when fully alert and upright. Oral care  Aspiration precautions  Reflux precautions  Therapy Prognosis: favorable if following cues  Frequency:? 2-3 visits of as indicated    Consider consult w/:  Rehab  Nutrition    Goal(s):  Dysphagia LTG  -Patient will demonstrate safe and effective oral intake (without overt s/s significant oral/pharyngeal dysphagia including s/s penetration or aspiration) for the highest appropriate diet level. 1.Pt will tolerate least restrictive diet w/out s/s aspiration or oral/pharyngeal difficulties. 2.Pt will will effectively manipulate/masticate and transfer purees/solids w/out s/s dysphagia/aspiration.    3.Pt will tolerate thin liquids w/out s/s aspiration.   -If indicated, patient will comply with a Video/Modified Barium Swallow study for more complete assessment of swallowing anatomy/physiology/aspiration risk and to assess efficacy of treatment techniques so as to best guide treatment plan    H&P/Admit info/ pertinent provider notes: (PMH noted above)  Chief Complaint: left-sided abdominal pain  History of Present Illness:  Rylie Larson is a 69 y.o. female with a PMH of DM who presents with left-sided abdominal pain that had started day prior to admission. Because of the pain, patient was unable to stand up and required a lot of help to ambulate and move around. Because of persistent abdominal pain, EMS was called and patient brought to the ED. Denies any dysuria, however patient states that when she holds her pee for too long, her abdomen starts to hurt. Also endorsing increasing frequency in urination over the past 2 days. No fevers or chills. No sick contacts. Patient also reports a cough and granddaughter reports that she seems to choke on her food. Special Studies:  EGD 4/24/23: IMPRESSION:  Irregular Z line  Mild gastritis  Otherwise normal EGD    Previous MBS:  1/30/20:  Summary:  Oral stage was WFL/WNL despite minimal dentition. Pt was able to masticate items given today [facility]. Bolus formation, control, and transfer were WNL. Pharyngeal stage was also WNL. Epiglottic inversion, pharyngeal constriction, and hyoid excursion were WNL. There was minimal to no residue. No penetration or aspiration observed this study. No coughing. Per gross esophageal screen a small amt of reflux was observed. Images are on PACS for review. Recommendations:  Diet:Soft/easy to chew foods  Liquids: thin  Meds:as tolerated  Upright position  F/u ST tx: consider clinical f/u, as grand daughter reports pt often coughs while eating but she prepares very soft food for her. Question rate of intake vs other.    Supervision  Aspiration Precautions  Reflux Precautions  Consider consult with: ? F/u w/ ST at meal  Results reviewed with: pt, grand daughter  Pt is a 74yof referred by Pulmonary, Dr. Rickie Marques, for reported coughing w/ po intake. Patient's goal: none stated    Did the pt report pain? no  If yes, was nursing notified/was it addressed?  na    Reason for consult:  R/o aspiration  Determine safest and least restrictive diet  h/o dysphagia     Precautions:  Aspiration  Fall    Food Allergies:  none   Current Diet:  regular   Premorbid diet:  soft   O2 requirement:  none   Social/Prior living  lives w/ family   Voice/Speech:  wnl in Armenian   Follows commands:  basic   Cognitive status:  alert     Oral mech exam:  Dentition:minimal  Lips (VII): +  Tongue (XII):+  Secretion management:wnl    Esophageal stage:  H/o GERD    Aspiration precautions posted    Results d/w:  Pt, nursing

## 2023-07-06 NOTE — CONSULTS
Consultation - Nephrology   [facility] 69 y.o. female MRN: [id number]  Unit/Bed#: [place] [address] -01 Encounter: [id number]    ASSESSMENT/PLAN:  Hyponatremia: Suspect hypovolemic hyponatremia as sodium level corrected quickly with fluid administration. Could have component of SIADH with possible pneumonia and pain.  -Baseline sodium level mid 130's. Noted episodes of hyponatremia previously. -Presented with sodium level of 122, corrected to glucose to 125.  -Received 1 L bolus of normal saline and placed on normal saline at 75 cc/h. Discontinued this morning.  -Received 500 cc of D5W today due to concern for rapid correction.  -Will place Lasix on hold. -Will check secondary work-up. -CT of chest/abdomen/pelvis negative for evidence of malignancy. Consolidation in the right lower lobe which may be pneumonia.  -goal Na level 130-132 corrected to glucose by 1430 today. -Creatinine remains stable, baseline 0.7-0.9. Hypomagnesemia:  -continue to monitor and replace as needed. Acute cystitis: Presents with suprapubic pain and increased frequency. -Urine culture is pending.  -Started on Rocephin in the emergency department. Hypertension: Blood pressure remained stable and acceptable. -Current medication: Coreg 12.5 mg 2 times per day, losartan 25 mg daily.  -Recommend avoiding hypotension or high fluctuations with blood pressure.  -Recommend holding parameters antihypertensive for systolic blood pressure less than 130. Right lower lobe pneumonia:  -Antibiotics per primary care team.    Anemia:  -Continue on oral iron supplementation.  -Continue to monitor and transfuse as needed for hemoglobin less than 7.0. Large 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: In remission for 4 years. Following with hematology/oncology.     Other: Diabetes, dementia, obstructive sleep apnea on CPAP, depression, anxiety, GERD      HISTORY OF PRESENT ILLNESS:  Requesting Physician: Renae Connolly MD  Reason for Consult: Hyponatremia    Theresa Geller is a 69y.o. year old female with history of diabetes, hypertension, dementia, obstructive sleep apnea on CPAP, depression, anxiety, GERD, who was admitted to [address] [place] [address] after presenting with reports of lower back and hip pain for several weeks. The patient reports taking Tylenol without relief. She denies radiation down her legs. She denies chest pain or shortness of breath. She denies nausea, vomiting, diarrhea. She reports having normal appetite. She denies issues with urination. A renal consultation is requested today for assistance in the management of hyponatremia. PAST MEDICAL HISTORY:  Past Medical History:   Diagnosis Date   • Cancer [facility])     Throat   • Chronic pain disorder    • Diabetes mellitus (HCC)    • Diffuse large B cell lymphoma ([facility])    • Dysphagia    • GERD without esophagitis    • HTN (hypertension)    • Hyperlipidemia    • Hypertension    • MI (myocardial infarction) ([facility])    • Port-A-Cath in place 07/29/2019   • Thyroid cancer ([address] [place] [address]) 2018       PAST SURGICAL HISTORY:  Past Surgical History:   Procedure Laterality Date   • BONE MARROW BIOPSY     • BREAST BIOPSY Left    • CARDIAC CATHETERIZATION N/A 12/9/2022    Procedure: Cardiac catheterization;  Surgeon: Sharla Perez MD;  [facility]; Service: Cardiology   • CARDIAC CATHETERIZATION N/A 12/9/2022    Procedure: Cardiac Coronary Angiogram;  Surgeon: Sharla Perez MD;  [facility]; Service: Cardiology   • CARDIAC CATHETERIZATION Left 12/9/2022    Procedure: Cardiac Left Heart Cath;  Surgeon: Sharla Perez MD;  [facility]: [facility];   Service: Cardiology   • CHOLECYSTECTOMY     • IR PORT PLACEMENT  11/16/2018   • IR PORT REMOVAL  3/22/2021   • OTHER SURGICAL HISTORY      tendor tear repair to right shoulder   • SHOULDER ARTHROSCOPY         ALLERGIES:  No Known Allergies    SOCIAL HISTORY:  Social History     Substance and Sexual Activity Alcohol Use Never    Comment: 0     Social History     Substance and Sexual Activity   Drug Use No     Social History     Tobacco Use   Smoking Status Never   Smokeless Tobacco Never       FAMILY HISTORY:  Family History   Problem Relation Age of Onset   • Diabetes Mother    • Heart disease Sister    • Hypertension Brother    • Uterine cancer Maternal Grandmother    • Prostate cancer Paternal Grandfather        MEDICATIONS:    Current Facility-Administered Medications:   •  acetaminophen (TYLENOL) tablet 650 mg, 650 mg, Oral, Q6H PRN, Christofer Jason MD, 650 mg at 07/06/23 0620  •  albuterol (PROVENTIL HFA,VENTOLIN HFA) inhaler 1 puff, 1 puff, Inhalation, Q4H PRN, Christofer Jason MD  •  ALPRAZolam Kavon Blush) tablet 0.25 mg, 0.25 mg, Oral, HS PRN, Rachel Jason MD  •  aspirin chewable tablet 81 mg, 81 mg, Oral, Daily, Christofer Jason MD, 81 mg at 07/06/23 0940  •  atorvastatin (LIPITOR) tablet 40 mg, 40 mg, Oral, Daily With Dinner, Rachel Jason MD  •  calcium carbonate-vitamin D 500 mg-5 mcg tablet 1 tablet, 1 tablet, Oral, BID, Christofer Shahid MD, 1 tablet at 07/06/23 0940  •  carvedilol (COREG) tablet 12.5 mg, 12.5 mg, Oral, BID With Meals, Christofer Jason MD, 12.5 mg at 07/06/23 0940  •  cefTRIAXone (ROCEPHIN) IVPB (premix in dextrose) 1,000 mg 50 mL, 1,000 mg, Intravenous, Q24H, Rachel Jason MD  •  cyanocobalamin (VITAMIN B-12) tablet 1,000 mcg, 1,000 mcg, Oral, Daily, Christofer Jason MD, 1,000 mcg at 07/06/23 0941  •  dextrose 5 % infusion 500 mL, 500 mL, Intravenous, Once, Nneka Palacio DO  •  ezetimibe (ZETIA) tablet 10 mg, 10 mg, Oral, HS, Christofer Jason MD, 10 mg at 07/05/23 2107  •  ferrous sulfate tablet 325 mg, 325 mg, Oral, Daily With Breakfast, Rachel Beltre MD Eren, 325 mg at 07/06/23 0940  •  fluticasone (FLONASE) 50 mcg/act nasal spray 1 spray, 1 spray, Nasal, Daily, Justino Jason MD, 1 spray at 07/06/23 8353  •  gabapentin (NEURONTIN) capsule 100 mg, 100 mg, Oral, BID, Justino Jason MD, 100 mg at 07/06/23 0940  •  heparin (porcine) subcutaneous injection 5,000 Units, 5,000 Units, Subcutaneous, Q8H [address], 5,000 Units at 07/06/23 0624 **AND** [COMPLETED] Platelet count, , , Once, Samuel Jason MD  •  insulin glargine (LANTUS) subcutaneous injection 9 Units 0.09 mL, 9 Units, Subcutaneous, HS, Samuel Jason MD, 9 Units at 07/05/23 2106  •  insulin lispro (HumaLOG) 100 units/mL subcutaneous injection 1-5 Units, 1-5 Units, Subcutaneous, TID AC, 3 Units at 07/06/23 1223 **AND** Fingerstick Glucose (POCT), , , TID AC, Samuel Jason MD  •  insulin lispro (HumaLOG) 100 units/mL subcutaneous injection 1-5 Units, 1-5 Units, Subcutaneous, HS, Justino Jason MD  •  losartan (COZAAR) tablet 25 mg, 25 mg, Oral, Daily, Justino Jason MD, 25 mg at 07/06/23 0940  •  meloxicam (MOBIC) tablet 7.5 mg, 7.5 mg, Oral, Daily, Justino Jason MD, 7.5 mg at 07/06/23 6146  •  memantine (NAMENDA) tablet 10 mg, 10 mg, Oral, BID, Justino Jason MD, 10 mg at 07/06/23 0940  •  mirtazapine (REMERON) tablet 15 mg, 15 mg, Oral, HS, Samuel Jason MD, 15 mg at 07/05/23 4413  •  pantoprazole (PROTONIX) EC tablet 40 mg, 40 mg, Oral, Early Morning, Samuel Jason MD, 40 mg at 07/06/23 0072    REVIEW OF SYSTEMS:  A complete review of systems was performed and found to be negative unless otherwise noted in the history of present illness. General: No fevers, chills. Cardiovascular:  - chest pain, - leg edema.   Respiratory: No cough, sputum production,  - shortness of breath. Gastrointestinal:   nausea/vomiting,  - diarrhea,   abdominal pain. Genitourinary: No hematuria. No foamy urine.   No dysuria    PHYSICAL EXAM:  Current Weight: Weight - Scale: 65.4 kg (144 lb 2.9 oz)  First Weight: Weight - Scale: 68 kg (149 lb 14.6 oz)  Vitals:    07/06/23 0527 07/06/23 0533 07/06/23 0716 07/06/23 0942   BP:   122/68 121/64   BP Location:   Right arm    Pulse:   84 81   Resp:   16    Temp:   98.8 °F (37.1 °C)    TempSrc:   Oral    SpO2:   97% 93%   Weight: 65.4 kg (144 lb 2.9 oz) 65.4 kg (144 lb 2.9 oz)     Height:           Intake/Output Summary (Last 24 hours) at 7/6/2023 1309  Last data filed at 7/5/2023 2105  Gross per 24 hour   Intake 1050 ml   Output 250 ml   Net 800 ml     General: NAD  Skin: warm, dry, intact, no rash  HEENT: Moist mucous membranes, sclera anicteric, normocephalic, atraumatic  Neck: No apparent JVD appreciated  Chest:lung sounds clear B/L, on RA   CVS:Regular rate and rhythm, no murmer   Abdomen: Soft, round, non-tender, +BS  Extremities: No B/L LE edema present  Neuro: alert and oriented  Psych: appropriate mood and affect     Invasive Devices:      Lab Results:   Results from last 7 days   Lab Units 07/06/23  0515 07/05/23  2347 07/05/23  1439   WBC Thousand/uL 14.95*  --  18.71*   HEMOGLOBIN g/dL 9.9*  --  11.4*   HEMATOCRIT % 31.0*  --  35.6   PLATELETS Thousands/uL 235 239 239   SODIUM mmol/L 132* 132* 122*   POTASSIUM mmol/L 3.6 3.7 3.7   CHLORIDE mmol/L 99 98 90*   CO2 mmol/L 28 28 26   BUN mg/dL 14 11 10   CREATININE mg/dL 0.78 0.97 0.66   CALCIUM mg/dL 7.9* 8.2* 8.8   MAGNESIUM mg/dL 1.3*  --   --    ALK PHOS U/L 58  --  58   ALT U/L 13  --  15   AST U/L 10*  --  12*

## 2023-07-06 NOTE — PLAN OF CARE
Problem: Potential for Falls  Goal: Patient will remain free of falls  Description: INTERVENTIONS:  - Educate patient/family on patient safety including physical limitations  - Instruct patient to call for assistance with activity   - Consult OT/PT to assist with strengthening/mobility   - Keep Call bell within reach  - Keep bed low and locked with side rails adjusted as appropriate  - Keep care items and personal belongings within reach  - Initiate and maintain comfort rounds  - Make Fall Risk Sign visible to staff  - Offer Toileting every 2 Hours, in advance of need  - Initiate/Maintain bed alarm  - Obtain necessary fall risk management equipment: alarms  - Apply yellow socks and bracelet for high fall risk patients  - Consider moving patient to room near nurses station  Outcome: Progressing     Problem: MOBILITY - ADULT  Goal: Maintain or return to baseline ADL function  Description: INTERVENTIONS:  -  Assess patient's ability to carry out ADLs; assess patient's baseline for ADL function and identify physical deficits which impact ability to perform ADLs (bathing, care of mouth/teeth, toileting, grooming, dressing, etc.)  - Assess/evaluate cause of self-care deficits   - Assess range of motion  - Assess patient's mobility; develop plan if impaired  - Assess patient's need for assistive devices and provide as appropriate  - Encourage maximum independence but intervene and supervise when necessary  - Involve family in performance of ADLs  - Assess for home care needs following discharge   - Consider OT consult to assist with ADL evaluation and planning for discharge  - Provide patient education as appropriate  Outcome: Progressing  Goal: Maintains/Returns to pre admission functional level  Description: INTERVENTIONS:  - Perform BMAT or MOVE assessment daily.   - Set and communicate daily mobility goal to care team and patient/family/caregiver.    - Collaborate with rehabilitation services on mobility goals if consulted  - Perform Range of Motion 4 times a day. - Reposition patient every 2 hours.   - Dangle patient 4 times a day  - Stand patient 4 times a day  - Ambulate patient 4 times a day  - Out of bed to chair 4 times a day   - Out of bed for meals 3 times a day  - Out of bed for toileting  - Record patient progress and toleration of activity level   Outcome: Progressing     Problem: PAIN - ADULT  Goal: Verbalizes/displays adequate comfort level or baseline comfort level  Description: Interventions:  - Encourage patient to monitor pain and request assistance  - Assess pain using appropriate pain scale  - Administer analgesics based on type and severity of pain and evaluate response  - Implement non-pharmacological measures as appropriate and evaluate response  - Consider cultural and social influences on pain and pain management  - Notify physician/advanced practitioner if interventions unsuccessful or patient reports new pain  Outcome: Progressing     Problem: INFECTION - ADULT  Goal: Absence or prevention of progression during hospitalization  Description: INTERVENTIONS:  - Assess and monitor for signs and symptoms of infection  - Monitor lab/diagnostic results  - Monitor all insertion sites, i.e. indwelling lines, tubes, and drains  - Monitor endotracheal if appropriate and nasal secretions for changes in amount and color  - [place] appropriate cooling/warming therapies per order  - Administer medications as ordered  - Instruct and encourage patient and family to use good hand hygiene technique  - Identify and instruct in appropriate isolation precautions for identified infection/condition  Outcome: Progressing     Problem: DISCHARGE PLANNING  Goal: Discharge to home or other facility with appropriate resources  Description: INTERVENTIONS:  - Identify barriers to discharge w/patient and caregiver  - Arrange for needed discharge resources and transportation as appropriate  - Identify discharge learning needs (meds, wound care, etc.)  - Arrange for interpretive services to assist at discharge as needed  - Refer to Case Management Department for coordinating discharge planning if the patient needs post-hospital services based on physician/advanced practitioner order or complex needs related to functional status, cognitive ability, or social support system  Outcome: Progressing     Problem: RESPIRATORY - ADULT  Goal: Achieves optimal ventilation and oxygenation  Description: INTERVENTIONS:  - Assess for changes in respiratory status  - Assess for changes in mentation and behavior  - Position to facilitate oxygenation and minimize respiratory effort  - Oxygen administered by appropriate delivery if ordered  - Initiate smoking cessation education as indicated  - Encourage broncho-pulmonary hygiene including cough, deep breathe, Incentive Spirometry  - Assess the need for suctioning and aspirate as needed  - Assess and instruct to report SOB or any respiratory difficulty  - Respiratory Therapy support as indicated  Outcome: Progressing     Problem: GENITOURINARY - ADULT  Goal: Maintains or returns to baseline urinary function  Description: INTERVENTIONS:  - Assess urinary function  - Encourage oral fluids to ensure adequate hydration if ordered  - Administer IV fluids as ordered to ensure adequate hydration  - Administer ordered medications as needed  - Offer frequent toileting  - Follow urinary retention protocol if ordered  Outcome: Progressing     Problem: METABOLIC, FLUID AND ELECTROLYTES - ADULT  Goal: Electrolytes maintained within normal limits  Description: INTERVENTIONS:  - Monitor labs and assess patient for signs and symptoms of electrolyte imbalances  - Administer electrolyte replacement as ordered  - Monitor response to electrolyte replacements, including repeat lab results as appropriate  - Instruct patient on fluid and nutrition as appropriate  Outcome: Progressing  Goal: Fluid balance maintained  Description: INTERVENTIONS:  - Monitor labs   - Monitor I/O and WT  - Instruct patient on fluid and nutrition as appropriate  - Assess for signs & symptoms of volume excess or deficit  Outcome: Progressing  Goal: Glucose maintained within target range  Description: INTERVENTIONS:  - Monitor Blood Glucose as ordered  - Assess for signs and symptoms of hyperglycemia and hypoglycemia  - Administer ordered medications to maintain glucose within target range  - Assess nutritional intake and initiate nutrition service referral as needed  Outcome: Progressing

## 2023-07-06 NOTE — UTILIZATION REVIEW
Date: 7/7    Day 3: Has surpassed a 2nd midnight with active treatments and services, which include mgmt electrolyte imbalance, tx for hyponatremia, IV antibiotics PNA and cystitis. Initial Clinical Review    Admission: Date/Time/Statement:   Admission Orders (From admission, onward)     Ordered        07/05/23 1818  INPATIENT ADMISSION  Once                      Orders Placed This Encounter   Procedures   • INPATIENT ADMISSION     Standing Status:   Standing     Number of Occurrences:   1     Order Specific Question:   Level of Care     Answer:   Med Surg [16]     Order Specific Question:   Estimated length of stay     Answer:   More than 2 Midnights     Order Specific Question:   Certification     Answer:   I certify that inpatient services are medically necessary for this patient for a duration of greater than two midnights. See H&P and MD Progress Notes for additional information about the patient's course of treatment. ED Arrival Information     Expected   -    Arrival   7/5/2023 13:17    Acuity   Urgent            Means of arrival   Ambulance    Escorted by   [address]    Service   Hospitalist    Admission type   Emergency            Arrival complaint   left sided pain           Chief Complaint   Patient presents with   • Abdominal Pain   • Flank Pain     Patient arrived via ems with complaints of left sided pain. Pain is reproducible        Initial Presentation: 69 y.o. female presents to the ED via EMS from home with c/o L side abd pain x 1 day - needed help to ambulate d/t pain, increasing urination, no fever, chills, + cough. PMH: IDDM, HTN, GERD, lymphoma in remission, anxiety, depression, dementia w/ behavioral disturbances. Labs - leukocytosis, low , elevated glucose 271, abnormal UA. Imaging - RLL PNA. Treated with  IV Zofran, IV fluids, IV Morphine, IV antibiotics. On exam respiratory distress, wheezing, rhonchi.   She is admitted to INPATIENT status with Hyponatremia - poss SIADH, nephrology consult. Acute cystitis w/o hematuria - IV antibiotics, follow Urine cultures. RLL PNA - monitor fever curve, sputum cultures, trend procal.  RONAK  - HS CPAP. Date: 7/6   Day 2:   WBC trending down, afebrile, off IV fluids, NA up to 132, low Mag and calcium, urine antigens negative. 7/6 Nephrology Consult - h/o hyponatremia in past.  Corrected to glucose to 125, hold IV fluids, IV fluids d/c this AM, hold Lasix. Monitor and replete Mag. Acute cystitis - continue IV antibiotics. Anemia - oral iron supp.         ED Triage Vitals   Temperature Pulse Respirations Blood Pressure SpO2   07/05/23 1321 07/05/23 1325 07/05/23 1325 07/05/23 1325 07/05/23 1325   99.4 °F (37.4 °C) 104 21 143/69 95 %      Temp Source Heart Rate Source Patient Position - Orthostatic VS BP Location FiO2 (%)   07/05/23 2020 07/05/23 1800 07/05/23 1325 07/05/23 1325 --   Oral Monitor Lying Right arm       Pain Score       07/05/23 1445       10 - Worst Possible Pain          Wt Readings from Last 1 Encounters:   07/06/23 65.4 kg (144 lb 2.9 oz)     Additional Vital Signs:   07/06/23 09:42:34 -- 81 -- 121/64 83 93 % -- -- --   07/06/23 0845 -- -- -- -- -- -- None (Room air) -- --   07/06/23 07:16:31 98.8 °F (37.1 °C) 84 16 122/68 86 97 % None (Room air) -- Lying   07/05/23 2334 -- -- -- -- -- 96 % -- Full face mask --   07/05/23 20:20:52 99.6 °F (37.6 °C) 92 18 123/67 86 94 % None (Room air) -- Lying   07/05/23 1900 -- 90 22 122/68 90 95 % None (Room air) -- Lying   07/05/23 1852 -- 89 20 124/65 -- 96 % -- -- --   07/05/23 1800 -- 89 20 123/67 89 96 % None (Room air) -- Lying   07/05/23 1610 -- 98 20 130/60 -- 97 % -- -- --     Pertinent Labs/Diagnostic Test Results:     7/5 ECG - Sinus tachycardia  Left bundle branch block  Abnormal ECG  7/5 ECG - Normal sinus rhythm  Left bundle branch block  Abnormal ECG    CTA dissection protocol chest abdomen pelvis w wo contrast   Final Result by Noble Walls Mojgan Mello MD (07/05 1630)      No thoracic aortic dissection   No pulmonary embolism   Consolidation right lower lobe with associated bronchiectatic changes, evaluate for pneumonia   Follow-up chest CT at 3 months suggested to demonstrate resolution                  Workstation performed: [id number]           Results from last 7 days   Lab Units 07/05/23  1439   SARS-COV-2  Negative     Results from last 7 days   Lab Units 07/06/23  0515 07/05/23 2347 07/05/23  1439   WBC Thousand/uL 14.95*  --  18.71*   HEMOGLOBIN g/dL 9.9*  --  11.4*   HEMATOCRIT % 31.0*  --  35.6   PLATELETS Thousands/uL 235 239 239   NEUTROS ABS Thousands/µL 9.38*  --  13.29*         Results from last 7 days   Lab Units 07/06/23  0515 07/05/23 2347 07/05/23  1439   SODIUM mmol/L 132* 132* 122*   POTASSIUM mmol/L 3.6 3.7 3.7   CHLORIDE mmol/L 99 98 90*   CO2 mmol/L 28 28 26   ANION GAP mmol/L 5 6 6   BUN mg/dL 14 11 10   CREATININE mg/dL 0.78 0.97 0.66   EGFR ml/min/1.73sq m 74 56 86   CALCIUM mg/dL 7.9* 8.2* 8.8   MAGNESIUM mg/dL 1.3*  --   --      Results from last 7 days   Lab Units 07/06/23  0515 07/05/23  1439   AST U/L 10* 12*   ALT U/L 13 15   ALK PHOS U/L 58 58   TOTAL PROTEIN g/dL 6.1* 7.0   ALBUMIN g/dL 3.2* 3.7   TOTAL BILIRUBIN mg/dL 0.24 0.65   BILIRUBIN DIRECT mg/dL  --  0.23*     Results from last 7 days   Lab Units 07/06/23  1114 07/06/23  0717 07/05/23  2037   POC GLUCOSE mg/dl 294* 115 104     Results from last 7 days   Lab Units 07/06/23  0515 07/05/23 2347 07/05/23  1439   GLUCOSE RANDOM mg/dL 169* 194* 271*             BETA-HYDROXYBUTYRATE   Date Value Ref Range Status   12/09/2020 0.1 <0.6 mmol/L Final      Results from last 7 days   Lab Units 07/05/23  1852 07/05/23  1641 07/05/23  1439   HS TNI 0HR ng/L  --   --  18   HS TNI 2HR ng/L  --  17  --    HSTNI D2 ng/L  --  -1  --    HS TNI 4HR ng/L 18  --   --    HSTNI D4 ng/L 0  --   --                  Results from last 7 days   Lab Units 07/06/23  0515 07/05/23  1433 PROCALCITONIN ng/ml 0.05 0.05     Results from last 7 days   Lab Units 07/05/23  1439   LACTIC ACID mmol/L 1.9     Results from last 7 days   Lab Units 07/05/23  1439   LIPASE u/L 17                 Results from last 7 days   Lab Units 07/05/23  1514 07/05/23  1501 07/05/23  1453   CLARITY UA   --  Clear Clear   COLOR UA  Yellow Yellow Light Yellow   SPEC GRAV UA   --  1.010 1.008   PH UA   --  6.5 6.0   GLUCOSE UA mg/dl  --  500 (1/2%)* 500 (1/2%)*   KETONES UA mg/dl  --  Negative Negative   BLOOD UA   --  Trace* Negative   PROTEIN UA mg/dl  --  30 (1+)* Trace*   NITRITE UA   --  Negative Negative   BILIRUBIN UA   --  Negative Negative   UROBILINOGEN UA E.U./dl  --  0.2  --    UROBILINOGEN UA (BE) mg/dl  --   --  <2.0   LEUKOCYTES UA   --  Trace* Small*   WBC UA /hpf  --   --  4-10*   RBC UA /hpf  --   --  1-2   BACTERIA UA /hpf  --   --  Occasional   EPITHELIAL CELLS WET PREP /hpf  --   --  Occasional     Results from last 7 days   Lab Units 07/05/23  2359 07/05/23  1439   STREP PNEUMONIAE ANTIGEN, URINE  Negative  --    INFLUENZA A PCR   --  Negative   INFLUENZA B PCR   --  Negative   RSV PCR   --  Negative         ED Treatment:   Medication Administration from 07/05/2023 1317 to 07/05/2023 2017       Date/Time Order Dose Route Action     07/05/2023 1445 EDT ondansetron (ZOFRAN) injection 4 mg 4 mg Intravenous Given     07/05/2023 1447 EDT sodium chloride 0.9 % bolus 1,000 mL 1,000 mL Intravenous New Bag     07/05/2023 1445 EDT morphine injection 4 mg 4 mg Intravenous Given     07/05/2023 1525 EDT iohexol (OMNIPAQUE) 350 MG/ML injection (SINGLE-DOSE) 100 mL 100 mL Intravenous Given     07/05/2023 1753 EDT cefTRIAXone (ROCEPHIN) IVPB (premix in dextrose) 2,000 mg 50 mL 2,000 mg Intravenous New Bag        Past Medical History:   Diagnosis Date   • Cancer (HCC)     Throat   • Chronic pain disorder    • Diabetes mellitus (HCC)    • Diffuse large B cell lymphoma (HCC)    • Dysphagia    • GERD without esophagitis • HTN (hypertension)    • Hyperlipidemia    • Hypertension    • MI (myocardial infarction) ([address])    • Port-A-Cath in place 07/29/2019   • Thyroid cancer ([address]) 2018     Present on Admission:  • Essential hypertension  • Gastroesophageal reflux disease  • 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 female  Admission Orders:  Scheduled Medications:  aspirin, 81 mg, Oral, Daily  atorvastatin, 40 mg, Oral, Daily With Dinner  calcium carbonate-vitamin D, 1 tablet, Oral, BID  carvedilol, 12.5 mg, Oral, BID With Meals  cefTRIAXone, 1,000 mg, Intravenous, Q24H  cyanocobalamin, 1,000 mcg, Oral, Daily  dextrose, 500 mL, Intravenous, Once  ezetimibe, 10 mg, Oral, HS  ferrous sulfate, 325 mg, Oral, Daily With Breakfast  fluticasone, 1 spray, Nasal, Daily  gabapentin, 100 mg, Oral, BID  heparin (porcine), 5,000 Units, Subcutaneous, Q8H CAROLINE  insulin glargine, 9 Units, Subcutaneous, HS  insulin lispro, 1-5 Units, Subcutaneous, TID AC  insulin lispro, 1-5 Units, Subcutaneous, HS  losartan, 25 mg, Oral, Daily  meloxicam, 7.5 mg, Oral, Daily  memantine, 10 mg, Oral, BID  mirtazapine, 15 mg, Oral, HS  pantoprazole, 40 mg, Oral, Early Morning      Continuous IV Infusions:    IV NSS @ 75 ml/hr - d/c 7/6     PRN Meds:  acetaminophen, 650 mg, Oral, Q6H PRN - x 1 7/6  albuterol, 1 puff, Inhalation, Q4H PRN  ALPRAZolam, 0.25 mg, Oral, HS PRN    BMP q 8 hr x 3 7/6  Urine and sputum cultures  Daily wt  POC GLUCOSE AC/HS WITH [employer]SI COVERAGE   Fluid restriction  CPAP   IP CONSULT TO NEPHROLOGY    Network Utilization Review Department  ATTENTION: Please call with any questions or concerns to [phone] and carefully listen to the prompts so that you are directed to the right person. All voicemails are confidential.  Roselyn Lucia all requests for admission clinical reviews, approved or denied determinations and any other requests to dedicated fax number below belonging to the campus where the patient is receiving treatment.  List of dedicated fax numbers for the Facilities:  [facility] (Administrative/Medical Necessity) [phone] [address] [facility] (Maternity/NICU/Pediatrics) [phone]   [address] [place] [address] Drive [phone]   [facility] [phone] [address] [place] [address] [phone]   [address] [address] [phone]

## 2023-07-06 NOTE — PLAN OF CARE
Problem: Potential for Falls  Goal: Patient will remain free of falls  Description: INTERVENTIONS:  - Educate patient/family on patient safety including physical limitations  - Instruct patient to call for assistance with activity   - Consult OT/PT to assist with strengthening/mobility   - Keep Call bell within reach  - Keep bed low and locked with side rails adjusted as appropriate  - Keep care items and personal belongings within reach  - Initiate and maintain comfort rounds  - Make Fall Risk Sign visible to staff  - Offer Toileting every 2 Hours, in advance of need  - Initiate/Maintain bed alarm  - Obtain necessary fall risk management equipment: alarms  - Apply yellow socks and bracelet for high fall risk patients  - Consider moving patient to room near nurses station  Outcome: Progressing     Problem: MOBILITY - ADULT  Goal: Maintain or return to baseline ADL function  Description: INTERVENTIONS:  -  Assess patient's ability to carry out ADLs; assess patient's baseline for ADL function and identify physical deficits which impact ability to perform ADLs (bathing, care of mouth/teeth, toileting, grooming, dressing, etc.)  - Assess/evaluate cause of self-care deficits   - Assess range of motion  - Assess patient's mobility; develop plan if impaired  - Assess patient's need for assistive devices and provide as appropriate  - Encourage maximum independence but intervene and supervise when necessary  - Involve family in performance of ADLs  - Assess for home care needs following discharge   - Consider OT consult to assist with ADL evaluation and planning for discharge  - Provide patient education as appropriate  Outcome: Progressing  Goal: Maintains/Returns to pre admission functional level  Description: INTERVENTIONS:  - Perform BMAT or MOVE assessment daily.   - Set and communicate daily mobility goal to care team and patient/family/caregiver.    - Collaborate with rehabilitation services on mobility goals if consulted  - Perform Range of Motion 4 times a day. - Reposition patient every 2 hours.   - Dangle patient 4 times a day  - Stand patient 4 times a day  - Ambulate patient 4 times a day  - Out of bed to chair 4 times a day   - Out of bed for meals 3 times a day  - Out of bed for toileting  - Record patient progress and toleration of activity level   Outcome: Progressing     Problem: PAIN - ADULT  Goal: Verbalizes/displays adequate comfort level or baseline comfort level  Description: Interventions:  - Encourage patient to monitor pain and request assistance  - Assess pain using appropriate pain scale  - Administer analgesics based on type and severity of pain and evaluate response  - Implement non-pharmacological measures as appropriate and evaluate response  - Consider cultural and social influences on pain and pain management  - Notify physician/advanced practitioner if interventions unsuccessful or patient reports new pain  Outcome: Progressing     Problem: INFECTION - ADULT  Goal: Absence or prevention of progression during hospitalization  Description: INTERVENTIONS:  - Assess and monitor for signs and symptoms of infection  - Monitor lab/diagnostic results  - Monitor all insertion sites, i.e. indwelling lines, tubes, and drains  - Monitor endotracheal if appropriate and nasal secretions for changes in amount and color  - Sargents appropriate cooling/warming therapies per order  - Administer medications as ordered  - Instruct and encourage patient and family to use good hand hygiene technique  - Identify and instruct in appropriate isolation precautions for identified infection/condition  Outcome: Progressing     Problem: DISCHARGE PLANNING  Goal: Discharge to home or other facility with appropriate resources  Description: INTERVENTIONS:  - Identify barriers to discharge w/patient and caregiver  - Arrange for needed discharge resources and transportation as appropriate  - Identify discharge learning needs (meds, wound care, etc.)  - Arrange for interpretive services to assist at discharge as needed  - Refer to Case Management Department for coordinating discharge planning if the patient needs post-hospital services based on physician/advanced practitioner order or complex needs related to functional status, cognitive ability, or social support system  Outcome: Progressing     Problem: RESPIRATORY - ADULT  Goal: Achieves optimal ventilation and oxygenation  Description: INTERVENTIONS:  - Assess for changes in respiratory status  - Assess for changes in mentation and behavior  - Position to facilitate oxygenation and minimize respiratory effort  - Oxygen administered by appropriate delivery if ordered  - Initiate smoking cessation education as indicated  - Encourage broncho-pulmonary hygiene including cough, deep breathe, Incentive Spirometry  - Assess the need for suctioning and aspirate as needed  - Assess and instruct to report SOB or any respiratory difficulty  - Respiratory Therapy support as indicated  Outcome: Progressing     Problem: GENITOURINARY - ADULT  Goal: Maintains or returns to baseline urinary function  Description: INTERVENTIONS:  - Assess urinary function  - Encourage oral fluids to ensure adequate hydration if ordered  - Administer IV fluids as ordered to ensure adequate hydration  - Administer ordered medications as needed  - Offer frequent toileting  - Follow urinary retention protocol if ordered  Outcome: Progressing     Problem: METABOLIC, FLUID AND ELECTROLYTES - ADULT  Goal: Electrolytes maintained within normal limits  Description: INTERVENTIONS:  - Monitor labs and assess patient for signs and symptoms of electrolyte imbalances  - Administer electrolyte replacement as ordered  - Monitor response to electrolyte replacements, including repeat lab results as appropriate  - Instruct patient on fluid and nutrition as appropriate  Outcome: Progressing  Goal: Fluid balance maintained  Description: INTERVENTIONS:  - Monitor labs   - Monitor I/O and WT  - Instruct patient on fluid and nutrition as appropriate  - Assess for signs & symptoms of volume excess or deficit  Outcome: Progressing  Goal: Glucose maintained within target range  Description: INTERVENTIONS:  - Monitor Blood Glucose as ordered  - Assess for signs and symptoms of hyperglycemia and hypoglycemia  - Administer ordered medications to maintain glucose within target range  - Assess nutritional intake and initiate nutrition service referral as needed  Outcome: Progressing

## 2023-07-06 NOTE — PROGRESS NOTES
[address] [facility]  Progress Note  Name: Jaiden Weathers  MRN: [id number]  Unit/Bed#: [address] [address] -01 I Date of Admission: 7/5/2023   Date of Service: 7/6/2023 I Hospital Day: 1    Assessment/Plan   Anemia  Assessment & Plan  Baseline hemoglobin appears to be approximately 11-12  Currently stable at 9.9, monitor and transfuse as needed  We will hold iron in the setting of active infection    Acute cystitis without hematuria  Assessment & Plan  · Patient presenting with significant left-lower quadrant and suprapubic pain with increased frequency in urination over the last 2 days  · UA showing trace leukocytes and 4-10 WBC  · UCx pending  · Started on Rocephin in the ED, will continue and monitor    Dementia with behavioral disturbance  Assessment & Plan  · Continue memantine    Obstructive sleep apnea  Assessment & Plan  · Continue CPAP qhs    Right lower lobe pneumonia  Assessment & Plan  · Patient with ongoing coughing over the last 2 weeks, denies any fevers or chills  · CT scan showing "Consolidation right lower lobe with associated bronchiectatic changes"  · Patient also with leukocytosis  · Patient was started on Ceftriaxone in the ED, will continue  · procalcitonin negative x 2; however white count trended down sharply on antibiotics  · Monitor fever curve  · Respiratory protocol  · F/u Sputum culture    Sepsis ([address])  Assessment & Plan  Sepsis - POA - a/e/b leukocytosis (WBC 18.71) and tachycardia (104)  -  in the setting of PNA - requiring IV fluids and IV Ceftriaxone.        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)  Assessment & Plan  · In remission for 4 years  · Followed by heme/onc as outpatient    Gastroesophageal reflux disease  Assessment & Plan  · Continue PPI    Essential hypertension  Assessment & Plan  · BP wnl on admission  · Continue PTA regimen: losartan, carvedilol    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, with long-term current use of insulin [facility])  Assessment & Plan  Lab Results   Component Value Date    HGBA1C 6.9 (A) 04/06/2023       Recent Labs     07/05/23 2037 07/06/23  0717 07/06/23  1114   POCGLU 104 115 294*       Blood Sugar Average: Last 72 hrs:    · Continue PTA glargine 9un qhs  · IASS + FS monitoring  · Hypoglycemia protocol  · Hold home [employer]  (P) 171    * Hyponatremia  Assessment & Plan  · Presents with low sodium to 122  · Unclear etiology at this time as patient with recent poor po intake however patient also with possible PNA and may have SIADH  · Will trial patient on gentle IVF hydration and continue to monitor Na q8h  · Nephrology consulted, recommendations appreciated  · Improved             VTE Pharmacologic Prophylaxis:   Pharmacologic: Enoxaparin (Lovenox)  Mechanical VTE Prophylaxis in Place: Yes    Patient Centered Rounds: I have performed bedside rounds with nursing staff today. Discussions with Specialists or Other Care Team Provider:     Education and Discussions with Family / Patient: Discussed treatment plan with family and patient who agree with current plan; encouraged to ask questions and participate. Time Spent for Care: 45 minutes. More than 50% of total time spent on counseling and coordination of care as described above. Current Length of Stay: 1 day(s)    Current Patient Status: Inpatient   Certification Statement: The patient will continue to require additional inpatient hospital stay due to Treatment of hyponatremia and pneumonia    Discharge Plan: To be determined    Code Status: Level 1 - Full Code      Subjective:   Patient seen and examined bedside, no acute distress but does report left flank tenderness to palpation.   Concerning for possible pyelonephritis; is being treated for UTI and pneumonia with Rocephin; imaging reviewed and no evidence of left-sided hydronephrosis. Potassium and magnesium repleted; respiratory status stable on room air. White count trended down significantly with antibiotics and fluids; procalcitonin negative x 2. Monitor on morning labs; initiate increased bowel regimen. Consider further imaging of left kidney versus SI joint if left flank pain does not improve. Objective:     Vitals:   Temp (24hrs), Av °F (37.2 °C), Min:98.6 °F (37 °C), Max:99.6 °F (37.6 °C)    Temp:  [98.6 °F (37 °C)-99.6 °F (37.6 °C)] 98.6 °F (37 °C)  HR:  [81-92] 90  Resp:  [16-18] 18  BP: (121-123)/(64-68) 121/64  SpO2:  [93 %-97 %] 93 %  Body mass index is 28.16 kg/m². Input and Output Summary (last 24 hours): Intake/Output Summary (Last 24 hours) at 2023 1915  Last data filed at 2023 2105  Gross per 24 hour   Intake --   Output 250 ml   Net -250 ml       Physical Exam:     Physical Exam  Vitals and nursing note reviewed. Constitutional:       General: She is not in acute distress. Appearance: She is well-developed. HENT:      Head: Normocephalic and atraumatic. Eyes:      Conjunctiva/sclera: Conjunctivae normal.   Cardiovascular:      Rate and Rhythm: Normal rate and regular rhythm. Heart sounds: No murmur heard. Pulmonary:      Effort: Pulmonary effort is normal. No respiratory distress. Abdominal:      Palpations: Abdomen is soft. Tenderness: There is no abdominal tenderness. Musculoskeletal:         General: Tenderness (Tenderness to left flank) present. No swelling. Cervical back: Neck supple. Skin:     General: Skin is warm and dry. Neurological:      Mental Status: She is alert.    Psychiatric:         Mood and Affect: Mood normal.            Additional Data:     Labs:    Results from last 7 days   Lab Units 23  0515   WBC Thousand/uL 14.95*   HEMOGLOBIN g/dL 9.9* HEMATOCRIT % 31.0*   PLATELETS Thousands/uL 235   NEUTROS PCT % 62   LYMPHS PCT % 24   MONOS PCT % 11   EOS PCT % 1     Results from last 7 days   Lab Units 07/06/23  1308 07/06/23  0515   SODIUM mmol/L 130* 132*   POTASSIUM mmol/L 3.4* 3.6   CHLORIDE mmol/L 97 99   CO2 mmol/L 27 28   BUN mg/dL 12 14   CREATININE mg/dL 0.68 0.78   ANION GAP mmol/L 6 5   CALCIUM mg/dL 8.4 7.9*   ALBUMIN g/dL  --  3.2*   TOTAL BILIRUBIN mg/dL  --  0.24   ALK PHOS U/L  --  58   ALT U/L  --  13   AST U/L  --  10*   GLUCOSE RANDOM mg/dL 214* 169*         Results from last 7 days   Lab Units 07/06/23  1637 07/06/23  1114 07/06/23  0717 07/05/23  2037   POC GLUCOSE mg/dl 174* 294* 115 104         Results from last 7 days   Lab Units 07/06/23  0515 07/05/23  1439   LACTIC ACID mmol/L  --  1.9   PROCALCITONIN ng/ml 0.05 0.05           * I Have Reviewed All Lab Data Listed Above. * Additional Pertinent Lab Tests Reviewed:  All Labs Within Last 24 Hours Reviewed    Imaging:    Imaging Reports Reviewed Today Include: CTA chest abdomen pelvis  Imaging Personally Reviewed by Myself Includes:      Recent Cultures (last 7 days):     Results from last 7 days   Lab Units 07/05/23  1454   URINE CULTURE  40,000-49,000 cfu/ml Beta Hemolytic Streptococcus Group B*       Last 24 Hours Medication List:   Current Facility-Administered Medications   Medication Dose Route Frequency Provider Last Rate   • acetaminophen  650 mg Oral Q6H PRN Jennifer Weldon MD     • albuterol  1 puff Inhalation Q4H PRN Irving Jason MD     • ALPRAZolam  0.25 mg Oral HS PRN Zachery Baer MD     • aspirin  81 mg Oral Daily Jennifer Weldon MD     • atorvastatin  40 mg Oral Daily With [employer] Bola Weldon MD     • calcium carbonate-vitamin D  1 tablet Oral BID Irving Jason MD     • carvedilol  12.5 mg Oral BID With Meals Jennifer Ye Delphine Hughes MD     • cefTRIAXone  1,000 mg Intravenous Q24H Anisa Hughes MD     • cyanocobalamin  1,000 mcg Oral Daily Anisa Jason MD     • dextrose  500 mL Intravenous Once Nneka Leyva DO     • ezetimibe  10 mg Oral HS Anisa Hughes MD     • fluticasone  1 spray Nasal Daily Anisa Hughes MD     • gabapentin  100 mg Oral BID Anisa Hughes MD     • heparin (porcine)  5,000 Units Subcutaneous [facility] Anisa Jason MD     • insulin glargine  9 Units Subcutaneous HS Anisa Jason MD     • insulin lispro  1-5 Units Subcutaneous TID [facility] Anisa Hughes MD     • insulin lispro  1-5 Units Subcutaneous HS Anisa Hughes MD     • losartan  25 mg Oral Daily Anisa Hughes MD     • memantine  10 mg Oral BID Anisa Hughes MD     • mirtazapine  15 mg Oral HS Anisa Hughes MD     • pantoprazole  40 mg Oral Early Morning Anisa Hughes MD          Today, Patient Was Seen By: Luis Kiser MD    ** Please Note: Dictation voice to text software may have been used in the creation of this document.  **

## 2023-07-06 NOTE — PHYSICAL THERAPY NOTE
PT EVALUATION 11:15-11:30    69 y.o.    [id number]    Hyponatremia [E87.1]  Pneumonia [J18.9]  Left sided abdominal pain [R10.9]    Past Medical History:   Diagnosis Date    Cancer ([address])     Throat    Chronic pain disorder     Diabetes mellitus ([address])     Diffuse large B cell lymphoma ([address])     Dysphagia     GERD without esophagitis     HTN (hypertension)     Hyperlipidemia     Hypertension     MI (myocardial infarction) ([address])     Port-A-Cath in place 07/29/2019    Thyroid cancer ([address]) 2018         Past Surgical History:   Procedure Laterality Date    BONE MARROW BIOPSY      BREAST BIOPSY Left     CARDIAC CATHETERIZATION N/A 12/9/2022    Procedure: Cardiac catheterization;  Surgeon: David Gray MD;  Location: [facility]; Service: Cardiology    CARDIAC CATHETERIZATION N/A 12/9/2022    Procedure: Cardiac Coronary Angiogram;  Surgeon: David Gray MD;  Location: [facility]; Service: Cardiology    CARDIAC CATHETERIZATION Left 12/9/2022    Procedure: Cardiac Left Heart Cath;  Surgeon: David Gray MD;  Location: [facility];   Service: Cardiology    CHOLECYSTECTOMY      IR PORT PLACEMENT  11/16/2018    IR PORT REMOVAL  3/22/2021    OTHER SURGICAL HISTORY      tendor tear repair to right shoulder    SHOULDER ARTHROSCOPY          07/06/23 1115   PT Last Visit   PT Visit Date 07/06/23   Note Type   Note type Evaluation   Pain Assessment   Pain Location/Orientation Location: Back;Orientation: Left   Pain Rating: FLACC (Rest) - Face 0   Pain Rating: FLACC (Rest) - Legs 0   Pain Rating: FLACC (Rest) - Activity 0   Pain Rating: FLACC (Rest) - Cry 1   Pain Rating: FLACC (Rest) - Consolability 0   Score: FLACC (Rest) 1   Pain Rating: FLACC (Activity) - Face 0   Pain Rating: FLACC (Activity) - Legs 0   Pain Rating: FLACC (Activity) - Activity 0   Pain Rating: FLACC (Activity) - Cry 1   Pain Rating: FLACC (Activity) - Consolability 0   Score: FLACC (Activity) 1   Restrictions/Precautions   Other Precautions Fall Risk; Chair Alarm; Bed Alarm;Cognitive;Pain;Multiple lines  ([place])   Home Living   Type of Home House   Home Layout Two level;Bed/bath upstairs;Stairs to enter with rails   Bathroom Shower/Tub Tub/shower unit   Bathroom Toilet Standard   Bathroom Equipment Grab bars in shower; Shower chair;Commode   Bathroom Accessibility Accessible   Home Equipment Walker   Additional Comments some information taken from previous admission, pt confirms. Resides in 2 story home with daughter ang granddtr. Not home alone. States B/L railings to 2nd floor. Prior Function   Level of [place] Independent with ADLs; Independent with functional mobility; Needs assistance with [place] in the last 6 months 0  (denies)   Comments Previous notes indicates ambulation with RW support. Pt however, denies. Stating independence for ADLS and ambulation without AD use. Admits to holding furniture. General   Additional Pertinent History Pt is 70 y/o female admitted with L sided abd pain. Admitted with hyponatremia, acute cystitis, RLL PNA. Family/Caregiver Present No   Cognition   Arousal/Participation Alert   Orientation Level Oriented to person;Oriented to place;Oriented to time   Following Commands Follows one step commands without difficulty   Comments Pleasant. [place] Speaking. Subjective   Subjective Reports still some L abd/back pain. Agreeable to walk. Denies use of AD.    RUE Assessment   RUE Assessment WFL  (as observed with functional reach and grasp.)   LUE Assessment   LUE Assessment WFL  (as observed with functional reach and grasp.)   RLE Assessment   RLE Assessment WFL   Strength RLE   RLE Overall Strength 4-/5   LLE Assessment   LLE Assessment WFL   Strength LLE   LLE Overall Strength 4-/5   Vision-Basic Assessment   Current Vision Wears glasses all the time   Bed Mobility   Sit to Supine 4  Minimal assistance   Additional items Assist x 1;Increased time required;Verbal cues;LE management   Additional Comments received sitting OOB in chair. Transfers   Sit to Stand 5  Supervision   Additional items Increased time required;Verbal cues;Armrests   Stand to Sit 5  Supervision   Additional items Increased time required;Verbal cues   Toilet transfer 5  Supervision   Additional items Increased time required;Standard toilet   Ambulation/Elevation   Gait pattern Improper Weight shift;Decreased foot clearance; Inconsistent paras  (occasional lateral displacement, no overt LOB)   Gait Assistance 5  Supervision   Additional items Verbal cues   Assistive Device None   Distance Amb without 'x1, then 20'x2. Stair Management Assistance Not tested   Balance   Static Sitting Good   Dynamic Sitting Fair +   Static Standing Fair +   Dynamic Standing Fair   Ambulatory Fair   Endurance Deficit   Endurance Deficit No   Activity Tolerance   Activity Tolerance Patient tolerated treatment well   Medical Staff Made Aware Nursing/PCA   Assessment   Prognosis Good   Problem List Impaired balance;Decreased strength;Decreased cognition;Pain   Assessment Vianey Hand is a 69 y.o. female with a PMH of DM who presents with left-sided abdominal pain that had started day prior to admission. Admitted with hyponatremia, acute cystitis, RLL PNA. PT consulted. Up and OOB as tolerated orders. Prior to admission resides with family in 2 story home. Not home alone. Has RW and cane but not using PTA per her report. Currently presents with mild impairments in strength, balance, functional mobility and locomotion. Mild gait deviations with occasional lateral displacement, no overt LOB. Despite impairments likely functioning close to or at baseline. The patient's AM-PAC Basic Mobility Inpatient Short Form Raw Score is 22. A Raw score of greater than 16 suggests the patient may benefit from discharge to home.  Please also refer to the recommendation of the Physical Therapist for safe discharge planning. At this time anticipate ability to d/c to home with family support when medically stable. At this time will d/c PT to restorative program.   Goals   Patient Goals get better   Plan   Treatment/Interventions Functional transfer training; Endurance training;Patient/family training;Bed mobility;Gait training; Compensatory technique education;Continued evaluation;Spoke to nursing   PT Frequency Other (Comment)  (d/c PT to restorative care)   Recommendation   PT Discharge Recommendation No rehabilitation needs   Equipment Recommended Other (Comment)  (has RW and cane)   AM-PAC Basic Mobility Inpatient   Turning in Flat Bed Without Bedrails 4   Lying on Back to Sitting on Edge of Flat Bed Without Bedrails 3   Moving Bed to Chair 4   Standing Up From Chair Using Arms 4   Walk in Room 4   Climb 3-5 Stairs With Railing 3   Basic Mobility Inpatient Raw Score 22   Basic Mobility Standardized Score 47.4   Highest Level Of Mobility   JH-HLM Goal 7: Walk 25 feet or more   JH-HLM Achieved 8: Walk 250 feet ot more   End of Consult   Patient Position at End of Consult Supine;Bed/Chair alarm activated; All needs within reach     Hx/personal factors: KENDAL home environment, steps within home environment, fall risk , language barrier, and advanced age, comorbidities    Examination:decreased strength , decreased balance, and gait deviations. Clinical: unpredictable Ongoing medical status, Trending/abnormal lab values, Risk for falls, bed/chair alarm, and Continuous monitoring.      Complexity: high           Brad Liner, PT

## 2023-07-07 LAB
ANION GAP SERPL CALCULATED.3IONS-SCNC: 4 MMOL/L
ANION GAP SERPL CALCULATED.3IONS-SCNC: 4 MMOL/L
BACTERIA UR CULT: ABNORMAL
BACTERIA UR CULT: ABNORMAL
BASOPHILS # BLD AUTO: 0.06 THOUSANDS/ÂΜL (ref 0–0.1)
BASOPHILS NFR BLD AUTO: 1 % (ref 0–1)
BUN SERPL-MCNC: 13 MG/DL (ref 5–25)
BUN SERPL-MCNC: 16 MG/DL (ref 5–25)
CALCIUM SERPL-MCNC: 8.5 MG/DL (ref 8.4–10.2)
CALCIUM SERPL-MCNC: 8.9 MG/DL (ref 8.4–10.2)
CHLORIDE SERPL-SCNC: 101 MMOL/L (ref 96–108)
CHLORIDE SERPL-SCNC: 99 MMOL/L (ref 96–108)
CO2 SERPL-SCNC: 28 MMOL/L (ref 21–32)
CO2 SERPL-SCNC: 28 MMOL/L (ref 21–32)
CREAT SERPL-MCNC: 0.62 MG/DL (ref 0.6–1.3)
CREAT SERPL-MCNC: 0.65 MG/DL (ref 0.6–1.3)
EOSINOPHIL # BLD AUTO: 0.26 THOUSAND/ÂΜL (ref 0–0.61)
EOSINOPHIL NFR BLD AUTO: 2 % (ref 0–6)
ERYTHROCYTE [DISTWIDTH] IN BLOOD BY AUTOMATED COUNT: 14.6 % (ref 11.6–15.1)
GFR SERPL CREATININE-BSD FRML MDRD: 86 ML/MIN/1.73SQ M
GFR SERPL CREATININE-BSD FRML MDRD: 87 ML/MIN/1.73SQ M
GLUCOSE SERPL-MCNC: 148 MG/DL (ref 65–140)
GLUCOSE SERPL-MCNC: 164 MG/DL (ref 65–140)
GLUCOSE SERPL-MCNC: 165 MG/DL (ref 65–140)
GLUCOSE SERPL-MCNC: 203 MG/DL (ref 65–140)
GLUCOSE SERPL-MCNC: 207 MG/DL (ref 65–140)
GLUCOSE SERPL-MCNC: 211 MG/DL (ref 65–140)
HCT VFR BLD AUTO: 31.3 % (ref 34.8–46.1)
HGB BLD-MCNC: 9.6 G/DL (ref 11.5–15.4)
IMM GRANULOCYTES # BLD AUTO: 0.06 THOUSAND/UL (ref 0–0.2)
IMM GRANULOCYTES NFR BLD AUTO: 1 % (ref 0–2)
LYMPHOCYTES # BLD AUTO: 3.16 THOUSANDS/ÂΜL (ref 0.6–4.47)
LYMPHOCYTES NFR BLD AUTO: 29 % (ref 14–44)
MCH RBC QN AUTO: 28 PG (ref 26.8–34.3)
MCHC RBC AUTO-ENTMCNC: 30.7 G/DL (ref 31.4–37.4)
MCV RBC AUTO: 91 FL (ref 82–98)
MONOCYTES # BLD AUTO: 1.07 THOUSAND/ÂΜL (ref 0.17–1.22)
MONOCYTES NFR BLD AUTO: 10 % (ref 4–12)
NEUTROPHILS # BLD AUTO: 6.2 THOUSANDS/ÂΜL (ref 1.85–7.62)
NEUTS SEG NFR BLD AUTO: 57 % (ref 43–75)
NRBC BLD AUTO-RTO: 0 /100 WBCS
OSMOLALITY UR/SERPL-RTO: 295 MMOL/KG (ref 282–298)
PLATELET # BLD AUTO: 246 THOUSANDS/UL (ref 149–390)
PMV BLD AUTO: 10 FL (ref 8.9–12.7)
POTASSIUM SERPL-SCNC: 4.3 MMOL/L (ref 3.5–5.3)
POTASSIUM SERPL-SCNC: 4.5 MMOL/L (ref 3.5–5.3)
RBC # BLD AUTO: 3.43 MILLION/UL (ref 3.81–5.12)
SODIUM SERPL-SCNC: 131 MMOL/L (ref 135–147)
SODIUM SERPL-SCNC: 133 MMOL/L (ref 135–147)
WBC # BLD AUTO: 10.81 THOUSAND/UL (ref 4.31–10.16)

## 2023-07-07 PROCEDURE — 97166 OT EVAL MOD COMPLEX 45 MIN: CPT

## 2023-07-07 PROCEDURE — 83930 ASSAY OF BLOOD OSMOLALITY: CPT | Performed by: NURSE PRACTITIONER

## 2023-07-07 PROCEDURE — 85025 COMPLETE CBC W/AUTO DIFF WBC: CPT | Performed by: INTERNAL MEDICINE

## 2023-07-07 PROCEDURE — 99233 SBSQ HOSP IP/OBS HIGH 50: CPT | Performed by: INTERNAL MEDICINE

## 2023-07-07 PROCEDURE — 92526 ORAL FUNCTION THERAPY: CPT

## 2023-07-07 PROCEDURE — 82948 REAGENT STRIP/BLOOD GLUCOSE: CPT

## 2023-07-07 PROCEDURE — 94760 N-INVAS EAR/PLS OXIMETRY 1: CPT

## 2023-07-07 PROCEDURE — 94660 CPAP INITIATION&MGMT: CPT

## 2023-07-07 PROCEDURE — 80048 BASIC METABOLIC PNL TOTAL CA: CPT | Performed by: INTERNAL MEDICINE

## 2023-07-07 RX ORDER — CALCIUM CARBONATE/VITAMIN D3 600 MG-10
TABLET ORAL
Qty: 180 TABLET | Refills: 0 | Status: SHIPPED | OUTPATIENT
Start: 2023-07-07

## 2023-07-07 RX ORDER — ACETAMINOPHEN 325 MG/1
975 TABLET ORAL EVERY 8 HOURS SCHEDULED
Status: DISCONTINUED | OUTPATIENT
Start: 2023-07-07 | End: 2023-07-09 | Stop reason: HOSPADM

## 2023-07-07 RX ORDER — GABAPENTIN 100 MG/1
100 CAPSULE ORAL 3 TIMES DAILY
Status: DISCONTINUED | OUTPATIENT
Start: 2023-07-07 | End: 2023-07-09 | Stop reason: HOSPADM

## 2023-07-07 RX ORDER — LIDOCAINE 50 MG/G
1 PATCH TOPICAL DAILY
Status: DISCONTINUED | OUTPATIENT
Start: 2023-07-08 | End: 2023-07-09 | Stop reason: HOSPADM

## 2023-07-07 RX ADMIN — INSULIN GLARGINE 9 UNITS: 100 INJECTION, SOLUTION SUBCUTANEOUS at 21:46

## 2023-07-07 RX ADMIN — Medication 1 TABLET: at 21:46

## 2023-07-07 RX ADMIN — ATORVASTATIN CALCIUM 40 MG: 40 TABLET, FILM COATED ORAL at 17:40

## 2023-07-07 RX ADMIN — GABAPENTIN 100 MG: 100 CAPSULE ORAL at 08:02

## 2023-07-07 RX ADMIN — MEMANTINE 10 MG: 5 TABLET ORAL at 08:02

## 2023-07-07 RX ADMIN — POLYETHYLENE GLYCOL 3350 17 G: 17 POWDER, FOR SOLUTION ORAL at 08:02

## 2023-07-07 RX ADMIN — GABAPENTIN 100 MG: 100 CAPSULE ORAL at 21:46

## 2023-07-07 RX ADMIN — FLUTICASONE PROPIONATE 1 SPRAY: 50 SPRAY, METERED NASAL at 08:03

## 2023-07-07 RX ADMIN — INSULIN LISPRO 1 UNITS: 100 INJECTION, SOLUTION INTRAVENOUS; SUBCUTANEOUS at 08:02

## 2023-07-07 RX ADMIN — EZETIMIBE 10 MG: 10 TABLET ORAL at 21:46

## 2023-07-07 RX ADMIN — MEMANTINE 10 MG: 5 TABLET ORAL at 17:37

## 2023-07-07 RX ADMIN — CARVEDILOL 12.5 MG: 12.5 TABLET, FILM COATED ORAL at 08:02

## 2023-07-07 RX ADMIN — ENOXAPARIN SODIUM 40 MG: 40 INJECTION SUBCUTANEOUS at 08:02

## 2023-07-07 RX ADMIN — MIRTAZAPINE 15 MG: 15 TABLET, FILM COATED ORAL at 21:46

## 2023-07-07 RX ADMIN — Medication 1 TABLET: at 08:02

## 2023-07-07 RX ADMIN — PANTOPRAZOLE SODIUM 40 MG: 40 TABLET, DELAYED RELEASE ORAL at 05:40

## 2023-07-07 RX ADMIN — CARVEDILOL 12.5 MG: 12.5 TABLET, FILM COATED ORAL at 17:40

## 2023-07-07 RX ADMIN — DOCUSATE SODIUM 100 MG: 100 CAPSULE, LIQUID FILLED ORAL at 08:02

## 2023-07-07 RX ADMIN — CYANOCOBALAMIN TAB 500 MCG 1000 MCG: 500 TAB at 08:02

## 2023-07-07 RX ADMIN — CEFTRIAXONE 1000 MG: 1 INJECTION, SOLUTION INTRAVENOUS at 17:38

## 2023-07-07 RX ADMIN — ACETAMINOPHEN 325MG 975 MG: 325 TABLET ORAL at 17:37

## 2023-07-07 RX ADMIN — ACETAMINOPHEN 325MG 975 MG: 325 TABLET ORAL at 21:51

## 2023-07-07 RX ADMIN — ASPIRIN 81 MG 81 MG: 81 TABLET ORAL at 08:02

## 2023-07-07 RX ADMIN — LOSARTAN POTASSIUM 25 MG: 25 TABLET, FILM COATED ORAL at 08:02

## 2023-07-07 RX ADMIN — INSULIN LISPRO 1 UNITS: 100 INJECTION, SOLUTION INTRAVENOUS; SUBCUTANEOUS at 17:38

## 2023-07-07 RX ADMIN — INSULIN LISPRO 1 UNITS: 100 INJECTION, SOLUTION INTRAVENOUS; SUBCUTANEOUS at 21:47

## 2023-07-07 RX ADMIN — DOCUSATE SODIUM 100 MG: 100 CAPSULE, LIQUID FILLED ORAL at 17:37

## 2023-07-07 NOTE — UTILIZATION REVIEW
Date: 07/07/23    Day 3: Has surpassed a 2nd midnight with active treatments and services, which include hyponatremia monitoring w/ BMP Q8h, IV ABX, fluid restriction, DW, I&O, BG checks ACHS. See initial review completed by Junior Riddle on 7/6.

## 2023-07-07 NOTE — PLAN OF CARE
Problem: Potential for Falls  Goal: Patient will remain free of falls  Description: INTERVENTIONS:  - Educate patient/family on patient safety including physical limitations  - Instruct patient to call for assistance with activity   - Consult OT/PT to assist with strengthening/mobility   - Keep Call bell within reach  - Keep bed low and locked with side rails adjusted as appropriate  - Keep care items and personal belongings within reach  - Initiate and maintain comfort rounds  - Make Fall Risk Sign visible to staff  - Offer Toileting every 2 Hours, in advance of need  - Initiate/Maintain bed alarm  - Obtain necessary fall risk management equipment: alarms  - Apply yellow socks and bracelet for high fall risk patients  - Consider moving patient to room near nurses station  Outcome: Progressing     Problem: MOBILITY - ADULT  Goal: Maintain or return to baseline ADL function  Description: INTERVENTIONS:  -  Assess patient's ability to carry out ADLs; assess patient's baseline for ADL function and identify physical deficits which impact ability to perform ADLs (bathing, care of mouth/teeth, toileting, grooming, dressing, etc.)  - Assess/evaluate cause of self-care deficits   - Assess range of motion  - Assess patient's mobility; develop plan if impaired  - Assess patient's need for assistive devices and provide as appropriate  - Encourage maximum independence but intervene and supervise when necessary  - Involve family in performance of ADLs  - Assess for home care needs following discharge   - Consider OT consult to assist with ADL evaluation and planning for discharge  - Provide patient education as appropriate  Outcome: Progressing  Goal: Maintains/Returns to pre admission functional level  Description: INTERVENTIONS:  - Perform BMAT or MOVE assessment daily.   - Set and communicate daily mobility goal to care team and patient/family/caregiver.    - Collaborate with rehabilitation services on mobility goals if consulted  - Perform Range of Motion 4 times a day. - Reposition patient every 2 hours.   - Dangle patient 4 times a day  - Stand patient 4 times a day  - Ambulate patient 4 times a day  - Out of bed to chair 4 times a day   - Out of bed for meals 3 times a day  - Out of bed for toileting  - Record patient progress and toleration of activity level   Outcome: Progressing     Problem: PAIN - ADULT  Goal: Verbalizes/displays adequate comfort level or baseline comfort level  Description: Interventions:  - Encourage patient to monitor pain and request assistance  - Assess pain using appropriate pain scale  - Administer analgesics based on type and severity of pain and evaluate response  - Implement non-pharmacological measures as appropriate and evaluate response  - Consider cultural and social influences on pain and pain management  - Notify physician/advanced practitioner if interventions unsuccessful or patient reports new pain  Outcome: Progressing     Problem: INFECTION - ADULT  Goal: Absence or prevention of progression during hospitalization  Description: INTERVENTIONS:  - Assess and monitor for signs and symptoms of infection  - Monitor lab/diagnostic results  - Monitor all insertion sites, i.e. indwelling lines, tubes, and drains  - Monitor endotracheal if appropriate and nasal secretions for changes in amount and color  - [place] appropriate cooling/warming therapies per order  - Administer medications as ordered  - Instruct and encourage patient and family to use good hand hygiene technique  - Identify and instruct in appropriate isolation precautions for identified infection/condition  Outcome: Progressing     Problem: DISCHARGE PLANNING  Goal: Discharge to home or other facility with appropriate resources  Description: INTERVENTIONS:  - Identify barriers to discharge w/patient and caregiver  - Arrange for needed discharge resources and transportation as appropriate  - Identify discharge learning needs (meds, wound care, etc.)  - Arrange for interpretive services to assist at discharge as needed  - Refer to Case Management Department for coordinating discharge planning if the patient needs post-hospital services based on physician/advanced practitioner order or complex needs related to functional status, cognitive ability, or social support system  Outcome: Progressing     Problem: RESPIRATORY - ADULT  Goal: Achieves optimal ventilation and oxygenation  Description: INTERVENTIONS:  - Assess for changes in respiratory status  - Assess for changes in mentation and behavior  - Position to facilitate oxygenation and minimize respiratory effort  - Oxygen administered by appropriate delivery if ordered  - Initiate smoking cessation education as indicated  - Encourage broncho-pulmonary hygiene including cough, deep breathe, Incentive Spirometry  - Assess the need for suctioning and aspirate as needed  - Assess and instruct to report SOB or any respiratory difficulty  - Respiratory Therapy support as indicated  Outcome: Progressing     Problem: GENITOURINARY - ADULT  Goal: Maintains or returns to baseline urinary function  Description: INTERVENTIONS:  - Assess urinary function  - Encourage oral fluids to ensure adequate hydration if ordered  - Administer IV fluids as ordered to ensure adequate hydration  - Administer ordered medications as needed  - Offer frequent toileting  - Follow urinary retention protocol if ordered  Outcome: Progressing     Problem: METABOLIC, FLUID AND ELECTROLYTES - ADULT  Goal: Electrolytes maintained within normal limits  Description: INTERVENTIONS:  - Monitor labs and assess patient for signs and symptoms of electrolyte imbalances  - Administer electrolyte replacement as ordered  - Monitor response to electrolyte replacements, including repeat lab results as appropriate  - Instruct patient on fluid and nutrition as appropriate  Outcome: Progressing  Goal: Fluid balance maintained  Description: INTERVENTIONS:  - Monitor labs   - Monitor I/O and WT  - Instruct patient on fluid and nutrition as appropriate  - Assess for signs & symptoms of volume excess or deficit  Outcome: Progressing  Goal: Glucose maintained within target range  Description: INTERVENTIONS:  - Monitor Blood Glucose as ordered  - Assess for signs and symptoms of hyperglycemia and hypoglycemia  - Administer ordered medications to maintain glucose within target range  - Assess nutritional intake and initiate nutrition service referral as needed  Outcome: Progressing

## 2023-07-07 NOTE — PROGRESS NOTES
[facility]  Progress Note  Name: Manisha Mayen  MRN: [id number]  Unit/Bed#: [place] 2 -01 I Date of Admission: 7/5/2023   Date of Service: 7/7/2023 I Hospital Day: 2    Assessment/Plan   Anemia  Assessment & Plan  Baseline hemoglobin appears to be approximately 11-12  Currently stable at 9.6, monitor and transfuse as needed  We will hold iron in the setting of active infection    Acute cystitis without hematuria  Assessment & Plan  · Patient presenting with significant left-lower quadrant and suprapubic pain with increased frequency in urination over the last 2 days  · UA showing trace leukocytes and 4-10 WBC  · UCx shows beta-hemolytic strep; Continue present therapy and await sensitivities  · Started on Rocephin in the ED, will continue and monitor    Dementia with behavioral disturbance  Assessment & Plan  · Continue memantine    Obstructive sleep apnea  Assessment & Plan  · Continue CPAP qhs    Right lower lobe pneumonia  Assessment & Plan  · Patient with ongoing coughing over the last 2 weeks, denies any fevers or chills  · CT scan showing "Consolidation right lower lobe with associated bronchiectatic changes"  · Patient also with leukocytosis  · Patient was started on Ceftriaxone in the ED, will continue  · procalcitonin negative x 2; however white count trended down sharply on antibiotics  · Monitor fever curve  · Respiratory protocol  · F/u Sputum culture    Sepsis ([address])  Assessment & Plan  Sepsis - POA - a/e/b leukocytosis (WBC 18.71) and tachycardia (104)  -  in the setting of PNA - requiring IV fluids and IV Ceftriaxone.        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, with long-term current use of insulin [facility])  Assessment & Plan  Lab Results   Component Value Date    HGBA1C 6.9 (A) 04/06/2023       Recent Labs     07/06/23  1637 07/06/23  2100 07/07/23  0717 07/07/23  1126   POCGLU 174* 180* 165* 148*       Blood Sugar Average: Last 72 hrs:    · Continue PTA glargine 9un qhs  · IASS + FS monitoring  · Hypoglycemia protocol  · Hold home [employer]  (P) 171    * Hyponatremia  Assessment & Plan  · Presents with low sodium to 122  · Unclear etiology at this time as patient with recent poor po intake however patient also with possible PNA and may have SIADH  · Will trial patient on gentle IVF hydration and continue to monitor Na q8h  · Nephrology consulted, recommendations appreciated  · Improved           VTE Pharmacologic Prophylaxis:   Pharmacologic: Enoxaparin (Lovenox)  Mechanical VTE Prophylaxis in Place: Yes    Patient Centered Rounds: I have performed bedside rounds with nursing staff today. Discussions with Specialists or Other Care Team Provider:     Education and Discussions with Family / Patient: Discussed treatment plan with family and patient who agree with current plan; encouraged to ask questions and participate. Time Spent for Care: 45 minutes. More than 50% of total time spent on counseling and coordination of care as described above. Current Length of Stay: 2 day(s)    Current Patient Status: Inpatient   Certification Statement: The patient will continue to require additional inpatient hospital stay due to Treatment of back pain and pneumonia    Discharge Plan:  To be determined, likely 48 to 72 hours    Code Status: Level 1 - Full Code      Subjective:   Patient seen and examined at bedside, still complains of left-sided back pain. Yesterday, as patient described it, it appeared to be left-sided flank pain. However today on examination, she pointed to her left buttock and, when palpated, expressed that was the main source of her pain. Patient's granddaughter at bedside reported that she did hear a fall on Monday evening and suspects that the the patient experienced a fall although the patient denies it. Will obtain imaging to rule out any acute fracture and have started increase gabapentin, Lidoderm, and scheduled Tylenol. Aqua K-pad started by nursing. Currently on day 2 of antibiotics, saturating well on room air and reports no profound respiratory symptoms. Sodium improved and nephrology signed off. Monitor on morning labs. Objective:     Vitals:   Temp (24hrs), Av.6 °F (37 °C), Min:98.4 °F (36.9 °C), Max:98.7 °F (37.1 °C)    Temp:  [98.4 °F (36.9 °C)-98.7 °F (37.1 °C)] 98.4 °F (36.9 °C)  HR:  [69-89] 85  Resp:  [17-18] 17  BP: (114-140)/(62-84) 127/62  SpO2:  [92 %-96 %] 94 %  Body mass index is 27.99 kg/m². Input and Output Summary (last 24 hours): Intake/Output Summary (Last 24 hours) at 2023 1921  Last data filed at 2023 1601  Gross per 24 hour   Intake 760 ml   Output 1650 ml   Net -890 ml       Physical Exam:     Physical Exam  Vitals and nursing note reviewed. Constitutional:       General: She is not in acute distress. Appearance: She is well-developed. HENT:      Head: Normocephalic and atraumatic. Eyes:      Conjunctiva/sclera: Conjunctivae normal.   Cardiovascular:      Rate and Rhythm: Normal rate and regular rhythm. Heart sounds: No murmur heard. Pulmonary:      Effort: Pulmonary effort is normal. No respiratory distress. Abdominal:      Palpations: Abdomen is soft. Tenderness: There is no abdominal tenderness. Musculoskeletal:         General: Tenderness (Tenderness to left flank) present.  No swelling. Cervical back: Neck supple. Skin:     General: Skin is warm and dry. Neurological:      Mental Status: She is alert. Psychiatric:         Mood and Affect: Mood normal.           Additional Data:     Labs:    Results from last 7 days   Lab Units 07/07/23  0434   WBC Thousand/uL 10.81*   HEMOGLOBIN g/dL 9.6*   HEMATOCRIT % 31.3*   PLATELETS Thousands/uL 246   NEUTROS PCT % 57   LYMPHS PCT % 29   MONOS PCT % 10   EOS PCT % 2     Results from last 7 days   Lab Units 07/07/23  1325 07/06/23  1308 07/06/23  0515   SODIUM mmol/L 131*   < > 132*   POTASSIUM mmol/L 4.5   < > 3.6   CHLORIDE mmol/L 99   < > 99   CO2 mmol/L 28   < > 28   BUN mg/dL 13   < > 14   CREATININE mg/dL 0.62   < > 0.78   ANION GAP mmol/L 4   < > 5   CALCIUM mg/dL 8.9   < > 7.9*   ALBUMIN g/dL  --   --  3.2*   TOTAL BILIRUBIN mg/dL  --   --  0.24   ALK PHOS U/L  --   --  58   ALT U/L  --   --  13   AST U/L  --   --  10*   GLUCOSE RANDOM mg/dL 203*   < > 169*    < > = values in this interval not displayed. Results from last 7 days   Lab Units 07/07/23  1613 07/07/23  1126 07/07/23  0717 07/06/23  2100 07/06/23  1637 07/06/23  1510 07/06/23  1114 07/06/23  0717 07/05/23  2037   POC GLUCOSE mg/dl 211* 148* 165* 180* 174* 188* 294* 115 104         Results from last 7 days   Lab Units 07/06/23  0515 07/05/23  1439   LACTIC ACID mmol/L  --  1.9   PROCALCITONIN ng/ml 0.05 0.05           * I Have Reviewed All Lab Data Listed Above. * Additional Pertinent Lab Tests Reviewed:  All Labs Within Last 24 Hours Reviewed    Imaging:    Imaging Reports Reviewed Today Include:   Imaging Personally Reviewed by Myself Includes:      Recent Cultures (last 7 days):     Results from last 7 days   Lab Units 07/05/23  1454   URINE CULTURE  40,000-49,000 cfu/ml Beta Hemolytic Streptococcus Group B*  60,000-69,000 cfu/ml       Last 24 Hours Medication List:   Current Facility-Administered Medications   Medication Dose Route Frequency Provider Last Rate   • acetaminophen  975 mg Oral [facility] Anamika Pierce MD     • albuterol  1 puff Inhalation Q4H PRN Guille Savage MD     • ALPRAZolam  0.25 mg Oral HS PRN Guille Savage MD     • aspirin  81 mg Oral Daily Guille Savage MD     • atorvastatin  40 mg Oral Daily With drchrono Edwige Savage MD     • calcium carbonate-vitamin D  1 tablet Oral BID Perla Jason MD     • carvedilol  12.5 mg Oral BID With Meals Guille Jason MD     • cefTRIAXone  1,000 mg Intravenous Q24H Guille Jason MD 1,000 mg (07/07/23 1738)   • cyanocobalamin  1,000 mcg Oral Daily Guille Jason MD     • dextrose  500 mL Intravenous Once Nneak Asim Alberto DO     • docusate sodium  100 mg Oral BID Anamika Pierce MD     • enoxaparin  40 mg Subcutaneous Q24H [address] Anamika Pierce MD     • ezetimibe  10 mg Oral HS Guille Savage MD     • fluticasone  1 spray Nasal Daily Guille Savage MD     • gabapentin  100 mg Oral TID Anamika Pierce MD     • insulin glargine  9 Units Subcutaneous HS Guille Jason MD     • insulin lispro  1-5 Units Subcutaneous TID [facility] Guille Savage MD     • insulin lispro  1-5 Units Subcutaneous HS Guille Savage MD     • [START ON 7/8/2023] lidocaine  1 patch Topical Daily Anamika Pierce MD     • losartan  25 mg Oral Daily Guille Savage MD     • memantine  10 mg Oral BID Guille Savage MD     • mirtazapine  15 mg Oral HS Guille Savgae MD     • oxyCODONE  5 mg Oral Q6H PRN Maday Garcia PA-C     • pantoprazole  40 mg Oral Early Morning Guille Savage MD     • polyethylene glycol  17 g Oral Daily Anamika Pierce MD          Today, Patient Was Seen By: Yuri Lehman MD    ** Please Note: Dictation voice to text software may have been used in the creation of this document.  **

## 2023-07-07 NOTE — SPEECH THERAPY NOTE
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)    Anxiety    Current mild episode of major depressive disorder without prior episode ([address])    Sepsis ([address])    Right lower lobe pneumonia    Obstructive sleep apnea    Dementia with behavioral disturbance    Acute cystitis without hematuria    Hypomagnesemia    Anemia       Past Medical History  Past Medical History:   Diagnosis Date   • Cancer ([address])     Throat   • Chronic pain disorder    • Diabetes mellitus ([address])    • Diffuse large B cell lymphoma (HCC)    • Dysphagia    • GERD without esophagitis    • HTN (hypertension)    • Hyperlipidemia    • Hypertension    • MI (myocardial infarction) ([address])    • Port-A-Cath in place 07/29/2019   • Thyroid cancer ([address]) 2018        Past Surgical History  Past Surgical History:   Procedure Laterality Date   • BONE MARROW BIOPSY     • BREAST BIOPSY Left    • CARDIAC CATHETERIZATION N/A 12/9/2022    Procedure: Cardiac catheterization;  Surgeon: eMndy Gruber MD;  Location: [facility] CARD[facility]; Service: Cardiology   • CARDIAC CATHETERIZATION N/A 12/9/2022    Procedure: Cardiac Coronary Angiogram;  Surgeon: Mendy Gruber MD;  Location: [facility]; Service: Cardiology   • CARDIAC CATHETERIZATION Left 12/9/2022    Procedure: Cardiac Left Heart Cath;  Surgeon: Mendy Gruber MD;  Location: [facility];   Service: Cardiology   • CHOLECYSTECTOMY     • IR PORT PLACEMENT  11/16/2018   • IR PORT REMOVAL  3/22/2021   • OTHER SURGICAL HISTORY      tendor tear repair to right shoulder   • SHOULDER ARTHROSCOPY           Subjective:  Pt alert, seated on egde of bed for lunch. Spoke w/ PCA re pt this am and she reported pt eats at a fast rate (noted yesterday). , but tolerated breakfast w/out cough. Objective:  Pt seen at end of lunch. Tolerated level 3 beef and sides. Needed assist w/ opening her milk. Denied any difficulty swallowing today. remains on room air. Ate 100% of her lunch. Needs to be paced. Assessment:  Tolerating dental soft diet. Plan/Recommendations:  Dysphagia 3 dental soft and thin liquids. Full supervision to control rate. Patient needs to swallow after each bite and periodically alternate with sips of liquids. D/c .

## 2023-07-07 NOTE — QUICK NOTE
Serum sodium up to 133. Sodium had rapidly improved from 122 to 133 status post normal saline. To avoid ongoing overcorrection, patient did receive D5 bolus with serum sodium down to 130 but continues to improve to 133 with 1.8 L/day fluid restriction only. Continue to monitor BMP. Nephrology will sign off.

## 2023-07-07 NOTE — DISCHARGE INSTR - DIET
Dental soft diet w/ thin liquids. Full supervision to eat slowly and swallow after each bite. Alternate w/ sips of liquid.

## 2023-07-07 NOTE — PLAN OF CARE
Problem: Potential for Falls  Goal: Patient will remain free of falls  Description: INTERVENTIONS:  - Educate patient/family on patient safety including physical limitations  - Instruct patient to call for assistance with activity   - Consult OT/PT to assist with strengthening/mobility   - Keep Call bell within reach  - Keep bed low and locked with side rails adjusted as appropriate  - Keep care items and personal belongings within reach  - Initiate and maintain comfort rounds  - Make Fall Risk Sign visible to staff  - Offer Toileting every 2 Hours, in advance of need  - Initiate/Maintain bed alarm  - Obtain necessary fall risk management equipment: alarms  - Apply yellow socks and bracelet for high fall risk patients  - Consider moving patient to room near nurses station  Outcome: Progressing     Problem: MOBILITY - ADULT  Goal: Maintain or return to baseline ADL function  Description: INTERVENTIONS:  -  Assess patient's ability to carry out ADLs; assess patient's baseline for ADL function and identify physical deficits which impact ability to perform ADLs (bathing, care of mouth/teeth, toileting, grooming, dressing, etc.)  - Assess/evaluate cause of self-care deficits   - Assess range of motion  - Assess patient's mobility; develop plan if impaired  - Assess patient's need for assistive devices and provide as appropriate  - Encourage maximum independence but intervene and supervise when necessary  - Involve family in performance of ADLs  - Assess for home care needs following discharge   - Consider OT consult to assist with ADL evaluation and planning for discharge  - Provide patient education as appropriate  Outcome: Progressing  Goal: Maintains/Returns to pre admission functional level  Description: INTERVENTIONS:  - Perform BMAT or MOVE assessment daily.   - Set and communicate daily mobility goal to care team and patient/family/caregiver.    - Collaborate with rehabilitation services on mobility goals if consulted  - Perform Range of Motion 4 times a day. - Reposition patient every 2 hours.   - Dangle patient 4 times a day  - Stand patient 4 times a day  - Ambulate patient 4 times a day  - Out of bed to chair 4 times a day   - Out of bed for meals 3 times a day  - Out of bed for toileting  - Record patient progress and toleration of activity level   Outcome: Progressing     Problem: PAIN - ADULT  Goal: Verbalizes/displays adequate comfort level or baseline comfort level  Description: Interventions:  - Encourage patient to monitor pain and request assistance  - Assess pain using appropriate pain scale  - Administer analgesics based on type and severity of pain and evaluate response  - Implement non-pharmacological measures as appropriate and evaluate response  - Consider cultural and social influences on pain and pain management  - Notify physician/advanced practitioner if interventions unsuccessful or patient reports new pain  Outcome: Progressing     Problem: INFECTION - ADULT  Goal: Absence or prevention of progression during hospitalization  Description: INTERVENTIONS:  - Assess and monitor for signs and symptoms of infection  - Monitor lab/diagnostic results  - Monitor all insertion sites, i.e. indwelling lines, tubes, and drains  - Monitor endotracheal if appropriate and nasal secretions for changes in amount and color  - [place] appropriate cooling/warming therapies per order  - Administer medications as ordered  - Instruct and encourage patient and family to use good hand hygiene technique  - Identify and instruct in appropriate isolation precautions for identified infection/condition  Outcome: Progressing     Problem: DISCHARGE PLANNING  Goal: Discharge to home or other facility with appropriate resources  Description: INTERVENTIONS:  - Identify barriers to discharge w/patient and caregiver  - Arrange for needed discharge resources and transportation as appropriate  - Identify discharge learning needs (meds, wound care, etc.)  - Arrange for interpretive services to assist at discharge as needed  - Refer to Case Management Department for coordinating discharge planning if the patient needs post-hospital services based on physician/advanced practitioner order or complex needs related to functional status, cognitive ability, or social support system  Outcome: Progressing     Problem: RESPIRATORY - ADULT  Goal: Achieves optimal ventilation and oxygenation  Description: INTERVENTIONS:  - Assess for changes in respiratory status  - Assess for changes in mentation and behavior  - Position to facilitate oxygenation and minimize respiratory effort  - Oxygen administered by appropriate delivery if ordered  - Initiate smoking cessation education as indicated  - Encourage broncho-pulmonary hygiene including cough, deep breathe, Incentive Spirometry  - Assess the need for suctioning and aspirate as needed  - Assess and instruct to report SOB or any respiratory difficulty  - Respiratory Therapy support as indicated  Outcome: Progressing     Problem: GENITOURINARY - ADULT  Goal: Maintains or returns to baseline urinary function  Description: INTERVENTIONS:  - Assess urinary function  - Encourage oral fluids to ensure adequate hydration if ordered  - Administer IV fluids as ordered to ensure adequate hydration  - Administer ordered medications as needed  - Offer frequent toileting  - Follow urinary retention protocol if ordered  Outcome: Progressing     Problem: METABOLIC, FLUID AND ELECTROLYTES - ADULT  Goal: Electrolytes maintained within normal limits  Description: INTERVENTIONS:  - Monitor labs and assess patient for signs and symptoms of electrolyte imbalances  - Administer electrolyte replacement as ordered  - Monitor response to electrolyte replacements, including repeat lab results as appropriate  - Instruct patient on fluid and nutrition as appropriate  Outcome: Progressing  Goal: Fluid balance maintained  Description: INTERVENTIONS:  - Monitor labs   - Monitor I/O and WT  - Instruct patient on fluid and nutrition as appropriate  - Assess for signs & symptoms of volume excess or deficit  Outcome: Progressing  Goal: Glucose maintained within target range  Description: INTERVENTIONS:  - Monitor Blood Glucose as ordered  - Assess for signs and symptoms of hyperglycemia and hypoglycemia  - Administer ordered medications to maintain glucose within target range  - Assess nutritional intake and initiate nutrition service referral as needed  Outcome: Progressing

## 2023-07-07 NOTE — OCCUPATIONAL THERAPY NOTE
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)    Anxiety    Current mild episode of major depressive disorder without prior episode (HCC)    Sepsis ([address])    Right lower lobe pneumonia    Obstructive sleep apnea    Dementia with behavioral disturbance    Acute cystitis without hematuria    Hypomagnesemia    Anemia    Past Medical History  Past Medical History:   Diagnosis Date    Cancer ([address])     Throat    Chronic pain disorder     Diabetes mellitus ([address])     Diffuse large B cell lymphoma ([address])     Dysphagia     GERD without esophagitis     HTN (hypertension)     Hyperlipidemia     Hypertension     MI (myocardial infarction) ([address])     Port-A-Cath in place 07/29/2019    Thyroid cancer ([address]) 2018     Past Surgical History  Past Surgical History:   Procedure Laterality Date    BONE MARROW BIOPSY      BREAST BIOPSY Left     CARDIAC CATHETERIZATION N/A 12/9/2022    Procedure: Cardiac catheterization;  Surgeon: Boby Richter MD;  Location: [facility]; Service: Cardiology    CARDIAC CATHETERIZATION N/A 12/9/2022    Procedure: Cardiac Coronary Angiogram;  Surgeon: Boby Richter MD;  Location: [facility]; Service: Cardiology    CARDIAC CATHETERIZATION Left 12/9/2022    Procedure: Cardiac Left Heart Cath;  Surgeon: Boby Richter MD;  Location: [facility];   Service: Cardiology    CHOLECYSTECTOMY      IR PORT PLACEMENT  11/16/2018    IR PORT REMOVAL  3/22/2021    OTHER SURGICAL HISTORY      tendor tear repair to right shoulder    SHOULDER ARTHROSCOPY          07/07/23 0835   OT Last Visit   OT Visit Date 07/07/23   Note Type   Note type Evaluation   Pain Assessment   Pain Assessment Tool 0-10   Pain Score 8   Pain Location/Orientation Location: Back;Orientation: Lower   Hospital Pain Intervention(s) Repositioned; Ambulation/increased activity; Emotional support;Relaxation technique   Pain Rating: FLACC (Rest) - Face 0   Pain Rating: FLACC (Rest) - Legs 0   Pain Rating: FLACC (Rest) - Activity 0   Pain Rating: FLACC (Rest) - Cry 1   Pain Rating: FLACC (Rest) - Consolability 0   Score: FLACC (Rest) 1   Pain Rating: FLACC (Activity) - Face 0   Pain Rating: FLACC (Activity) - Legs 0   Pain Rating: FLACC (Activity) - Activity 0   Pain Rating: FLACC (Activity) - Cry 1   Pain Rating: FLACC (Activity) - Consolability 0   Score: FLACC (Activity) 1   Restrictions/Precautions   Weight Bearing Precautions Per Order No   Other Precautions Fall Risk; Chair Alarm; Bed Alarm;Cognitive;Pain;Multiple lines  ([place])   Home Living   Type of Home House   Home Layout Two level;Bed/bath upstairs;Stairs to enter with rails   Bathroom Shower/Tub Tub/shower unit   Bathroom Toilet Standard   Bathroom Equipment Grab bars in shower; Shower chair;Commode   Home Equipment Walker   Prior Function   Level of [place] Independent with functional mobility; Needs assistance with IADLS;Needs assistance with ADLs   Lives With Family   Receives Help From Family   IADLs Family/Friend/Other provides transportation; Family/Friend/Other provides medication management; Family/Friend/Other provides meals   Falls in the last 6 months 0  (per pt)   Vocational Retired   ADL   Where Assessed Edge of bed   Eating Assistance 6  Modified independent   [address] 5  Supervision/Setup   LB Bathing Assistance 5  Supervision/Setup   UB Dressing Assistance 5  Supervision/Setup    Gay[address] Braxton  5  Supervision/Setup   Bed Mobility   Supine to Sit 5  Supervision   Additional items Bedrails; Increased time required;Verbal cues   Transfers   Sit to Stand 5  Supervision   Additional items Increased time required;Verbal cues; Bedrails   Stand to Sit 5  Supervision   Additional items Increased time required;Verbal cues; Bedrails   Toilet transfer 5  Supervision   Additional items Increased time required;Standard toilet   Functional Mobility   Functional Mobility 5  Supervision   Additional items Rolling walker   Balance   Static Sitting Good   Dynamic Sitting Fair +   Static Standing Fair +   Dynamic Standing Fair   Ambulatory Fair   Activity Tolerance   Activity Tolerance Patient tolerated treatment well   Medical Staff Made Aware Yes   RUE Assessment   RUE Assessment WFL   LUE Assessment   LUE Assessment WFL   Hand Function   Gross Motor Coordination Functional   Fine Motor Coordination Functional   Sensation   Light Touch No apparent deficits   Proprioception   Proprioception No apparent deficits   Vision-Basic Assessment   Current Vision Wears glasses all the time   Vision - Complex Assessment   Ocular Range of Motion Intact   Perception   Inattention/Neglect Appears intact   Cognition   Overall Cognitive Status Impaired   Arousal/Participation Responsive; Cooperative   Attention Attends with cues to redirect   Orientation Level Oriented to person;Oriented to place;Oriented to time;Disoriented to situation   Memory Decreased recall of precautions;Decreased short term memory;Decreased recall of recent events   Following Commands Follows one step commands without difficulty   Assessment   Limitation Decreased cognition;Decreased endurance   Assessment Patient is a 69y.o. year old female seen for OT eval s/p admit to [facility] on 7/5/2023 with hyponatremia, hypomagnesemia, anemia, acute cystitis without hematuria.   Comorbidities affecting pt’s functional performance include a significant PMH of: DM2, HTN, GERD, diffuse large B-cell lymphoma, anxiety, depression, sepsis, R LL pneumonia, RONAK, dementia, other insomnia, near syncope, frequent falls, gait instability, HF, cardiomyopathy, PLMD.  Patient with active OT orders and activity orders for Up and OOB as tolerated . Personal factors affecting pt at time of IE include: difficulty performing ADLs and IADLs, difficulty with functional mobility/transfers. Prior to admission, pt required (A) for ADLs/IADLs. Upon evaluation, pt functioning near baseline thus will D/C OT orders at this time and maintain pt on restorative. Based on the aforementioned OT evaluation, functional performance deficits, and assessments, pt has been identified as a moderate complexity evaluation.    Plan   OT Frequency Eval only   Recommendation   OT Discharge Recommendation No rehabilitation needs  (increased support from family)   AM-PAC Daily Activity Inpatient   Lower Body Dressing 3   Bathing 3   Toileting 3   Upper Body Dressing 3   Grooming 3   Eating 4   Daily Activity Raw Score 19   Daily Activity Standardized Score (Calc for Raw Score >=11) 40.22   AM-PAC Applied Cognition Inpatient   Following a Speech/Presentation 3   Understanding Ordinary Conversation 3   Taking Medications 2   Remembering Where Things Are Placed or Put Away 2   Remembering List of 4-5 Errands 2   Taking Care of Complicated Tasks 2   Applied Cognition Raw Score 14   Applied Cognition Standardized Score 32.02     Pauline Wilson

## 2023-07-08 ENCOUNTER — APPOINTMENT (INPATIENT)
Dept: RADIOLOGY | Facility: HOSPITAL | Age: 78
DRG: 871 | End: 2023-07-08
Payer: COMMERCIAL

## 2023-07-08 LAB
ALBUMIN SERPL BCP-MCNC: 3.6 G/DL (ref 3.5–5)
ALP SERPL-CCNC: 82 U/L (ref 34–104)
ALT SERPL W P-5'-P-CCNC: 71 U/L (ref 7–52)
ANION GAP SERPL CALCULATED.3IONS-SCNC: 6 MMOL/L
AST SERPL W P-5'-P-CCNC: 65 U/L (ref 13–39)
BASOPHILS # BLD AUTO: 0.06 THOUSANDS/ÂΜL (ref 0–0.1)
BASOPHILS NFR BLD AUTO: 1 % (ref 0–1)
BILIRUB SERPL-MCNC: 0.28 MG/DL (ref 0.2–1)
BUN SERPL-MCNC: 19 MG/DL (ref 5–25)
CALCIUM SERPL-MCNC: 9.2 MG/DL (ref 8.4–10.2)
CHLORIDE SERPL-SCNC: 101 MMOL/L (ref 96–108)
CO2 SERPL-SCNC: 27 MMOL/L (ref 21–32)
CREAT SERPL-MCNC: 0.61 MG/DL (ref 0.6–1.3)
EOSINOPHIL # BLD AUTO: 0.31 THOUSAND/ÂΜL (ref 0–0.61)
EOSINOPHIL NFR BLD AUTO: 4 % (ref 0–6)
ERYTHROCYTE [DISTWIDTH] IN BLOOD BY AUTOMATED COUNT: 14.7 % (ref 11.6–15.1)
GFR SERPL CREATININE-BSD FRML MDRD: 88 ML/MIN/1.73SQ M
GLUCOSE SERPL-MCNC: 167 MG/DL (ref 65–140)
GLUCOSE SERPL-MCNC: 177 MG/DL (ref 65–140)
GLUCOSE SERPL-MCNC: 177 MG/DL (ref 65–140)
GLUCOSE SERPL-MCNC: 182 MG/DL (ref 65–140)
GLUCOSE SERPL-MCNC: 200 MG/DL (ref 65–140)
GLUCOSE SERPL-MCNC: 228 MG/DL (ref 65–140)
HCT VFR BLD AUTO: 34 % (ref 34.8–46.1)
HGB BLD-MCNC: 10.9 G/DL (ref 11.5–15.4)
IMM GRANULOCYTES # BLD AUTO: 0.03 THOUSAND/UL (ref 0–0.2)
IMM GRANULOCYTES NFR BLD AUTO: 0 % (ref 0–2)
LYMPHOCYTES # BLD AUTO: 2.66 THOUSANDS/ÂΜL (ref 0.6–4.47)
LYMPHOCYTES NFR BLD AUTO: 32 % (ref 14–44)
MAGNESIUM SERPL-MCNC: 1.8 MG/DL (ref 1.9–2.7)
MCH RBC QN AUTO: 28.9 PG (ref 26.8–34.3)
MCHC RBC AUTO-ENTMCNC: 32.1 G/DL (ref 31.4–37.4)
MCV RBC AUTO: 90 FL (ref 82–98)
MONOCYTES # BLD AUTO: 0.9 THOUSAND/ÂΜL (ref 0.17–1.22)
MONOCYTES NFR BLD AUTO: 11 % (ref 4–12)
NEUTROPHILS # BLD AUTO: 4.39 THOUSANDS/ÂΜL (ref 1.85–7.62)
NEUTS SEG NFR BLD AUTO: 52 % (ref 43–75)
NRBC BLD AUTO-RTO: 0 /100 WBCS
PHOSPHATE SERPL-MCNC: 4.2 MG/DL (ref 2.3–4.1)
PLATELET # BLD AUTO: 311 THOUSANDS/UL (ref 149–390)
PMV BLD AUTO: 10.2 FL (ref 8.9–12.7)
POTASSIUM SERPL-SCNC: 4.3 MMOL/L (ref 3.5–5.3)
PROT SERPL-MCNC: 6.8 G/DL (ref 6.4–8.4)
RBC # BLD AUTO: 3.77 MILLION/UL (ref 3.81–5.12)
SODIUM SERPL-SCNC: 134 MMOL/L (ref 135–147)
WBC # BLD AUTO: 8.35 THOUSAND/UL (ref 4.31–10.16)

## 2023-07-08 PROCEDURE — 80053 COMPREHEN METABOLIC PANEL: CPT | Performed by: INTERNAL MEDICINE

## 2023-07-08 PROCEDURE — 94760 N-INVAS EAR/PLS OXIMETRY 1: CPT

## 2023-07-08 PROCEDURE — 83735 ASSAY OF MAGNESIUM: CPT | Performed by: INTERNAL MEDICINE

## 2023-07-08 PROCEDURE — 73502 X-RAY EXAM HIP UNI 2-3 VIEWS: CPT

## 2023-07-08 PROCEDURE — 84100 ASSAY OF PHOSPHORUS: CPT | Performed by: INTERNAL MEDICINE

## 2023-07-08 PROCEDURE — 85025 COMPLETE CBC W/AUTO DIFF WBC: CPT | Performed by: INTERNAL MEDICINE

## 2023-07-08 PROCEDURE — 94660 CPAP INITIATION&MGMT: CPT

## 2023-07-08 PROCEDURE — 82948 REAGENT STRIP/BLOOD GLUCOSE: CPT

## 2023-07-08 PROCEDURE — 99233 SBSQ HOSP IP/OBS HIGH 50: CPT | Performed by: INTERNAL MEDICINE

## 2023-07-08 RX ORDER — LANOLIN ALCOHOL/MO/W.PET/CERES
400 CREAM (GRAM) TOPICAL 2 TIMES DAILY
Status: DISCONTINUED | OUTPATIENT
Start: 2023-07-08 | End: 2023-07-09 | Stop reason: HOSPADM

## 2023-07-08 RX ADMIN — INSULIN LISPRO 1 UNITS: 100 INJECTION, SOLUTION INTRAVENOUS; SUBCUTANEOUS at 17:36

## 2023-07-08 RX ADMIN — CYANOCOBALAMIN TAB 500 MCG 1000 MCG: 500 TAB at 08:59

## 2023-07-08 RX ADMIN — EZETIMIBE 10 MG: 10 TABLET ORAL at 21:20

## 2023-07-08 RX ADMIN — DOCUSATE SODIUM 100 MG: 100 CAPSULE, LIQUID FILLED ORAL at 08:59

## 2023-07-08 RX ADMIN — LIDOCAINE 1 PATCH: 50 PATCH TOPICAL at 08:59

## 2023-07-08 RX ADMIN — Medication 1 TABLET: at 21:19

## 2023-07-08 RX ADMIN — GABAPENTIN 100 MG: 100 CAPSULE ORAL at 21:20

## 2023-07-08 RX ADMIN — PANTOPRAZOLE SODIUM 40 MG: 40 TABLET, DELAYED RELEASE ORAL at 05:25

## 2023-07-08 RX ADMIN — GABAPENTIN 100 MG: 100 CAPSULE ORAL at 08:59

## 2023-07-08 RX ADMIN — ACETAMINOPHEN 325MG 975 MG: 325 TABLET ORAL at 16:07

## 2023-07-08 RX ADMIN — CARVEDILOL 12.5 MG: 12.5 TABLET, FILM COATED ORAL at 16:07

## 2023-07-08 RX ADMIN — INSULIN LISPRO 1 UNITS: 100 INJECTION, SOLUTION INTRAVENOUS; SUBCUTANEOUS at 08:59

## 2023-07-08 RX ADMIN — LOSARTAN POTASSIUM 25 MG: 25 TABLET, FILM COATED ORAL at 08:59

## 2023-07-08 RX ADMIN — DOCUSATE SODIUM 100 MG: 100 CAPSULE, LIQUID FILLED ORAL at 17:35

## 2023-07-08 RX ADMIN — MIRTAZAPINE 15 MG: 15 TABLET, FILM COATED ORAL at 21:21

## 2023-07-08 RX ADMIN — ACETAMINOPHEN 325MG 975 MG: 325 TABLET ORAL at 21:19

## 2023-07-08 RX ADMIN — ENOXAPARIN SODIUM 40 MG: 40 INJECTION SUBCUTANEOUS at 08:59

## 2023-07-08 RX ADMIN — CEFTRIAXONE 1000 MG: 1 INJECTION, SOLUTION INTRAVENOUS at 17:36

## 2023-07-08 RX ADMIN — MEMANTINE 10 MG: 5 TABLET ORAL at 08:59

## 2023-07-08 RX ADMIN — INSULIN GLARGINE 9 UNITS: 100 INJECTION, SOLUTION SUBCUTANEOUS at 21:20

## 2023-07-08 RX ADMIN — FLUTICASONE PROPIONATE 1 SPRAY: 50 SPRAY, METERED NASAL at 08:59

## 2023-07-08 RX ADMIN — INSULIN LISPRO 3 UNITS: 100 INJECTION, SOLUTION INTRAVENOUS; SUBCUTANEOUS at 21:57

## 2023-07-08 RX ADMIN — Medication 1 TABLET: at 08:59

## 2023-07-08 RX ADMIN — ACETAMINOPHEN 325MG 975 MG: 325 TABLET ORAL at 05:25

## 2023-07-08 RX ADMIN — GABAPENTIN 100 MG: 100 CAPSULE ORAL at 16:07

## 2023-07-08 RX ADMIN — INSULIN LISPRO 1 UNITS: 100 INJECTION, SOLUTION INTRAVENOUS; SUBCUTANEOUS at 12:04

## 2023-07-08 RX ADMIN — CARVEDILOL 12.5 MG: 12.5 TABLET, FILM COATED ORAL at 08:59

## 2023-07-08 RX ADMIN — ATORVASTATIN CALCIUM 40 MG: 40 TABLET, FILM COATED ORAL at 16:07

## 2023-07-08 RX ADMIN — POLYETHYLENE GLYCOL 3350 17 G: 17 POWDER, FOR SOLUTION ORAL at 08:58

## 2023-07-08 RX ADMIN — ASPIRIN 81 MG 81 MG: 81 TABLET ORAL at 08:59

## 2023-07-08 RX ADMIN — MAGNESIUM OXIDE TAB 400 MG (241.3 MG ELEMENTAL MG) 400 MG: 400 (241.3 MG) TAB at 17:35

## 2023-07-08 RX ADMIN — MEMANTINE 10 MG: 5 TABLET ORAL at 17:35

## 2023-07-08 RX ADMIN — OXYCODONE HYDROCHLORIDE 5 MG: 5 TABLET ORAL at 21:21

## 2023-07-08 NOTE — PLAN OF CARE
Problem: Potential for Falls  Goal: Patient will remain free of falls  Description: INTERVENTIONS:  - Educate patient/family on patient safety including physical limitations  - Instruct patient to call for assistance with activity   - Consult OT/PT to assist with strengthening/mobility   - Keep Call bell within reach  - Keep bed low and locked with side rails adjusted as appropriate  - Keep care items and personal belongings within reach  - Initiate and maintain comfort rounds  - Make Fall Risk Sign visible to staff  - Offer Toileting every 2 Hours, in advance of need  - Initiate/Maintain bed alarm  - Obtain necessary fall risk management equipment: alarms  - Apply yellow socks and bracelet for high fall risk patients  - Consider moving patient to room near nurses station  Outcome: Progressing     Problem: MOBILITY - ADULT  Goal: Maintain or return to baseline ADL function  Description: INTERVENTIONS:  -  Assess patient's ability to carry out ADLs; assess patient's baseline for ADL function and identify physical deficits which impact ability to perform ADLs (bathing, care of mouth/teeth, toileting, grooming, dressing, etc.)  - Assess/evaluate cause of self-care deficits   - Assess range of motion  - Assess patient's mobility; develop plan if impaired  - Assess patient's need for assistive devices and provide as appropriate  - Encourage maximum independence but intervene and supervise when necessary  - Involve family in performance of ADLs  - Assess for home care needs following discharge   - Consider OT consult to assist with ADL evaluation and planning for discharge  - Provide patient education as appropriate  Outcome: Progressing  Goal: Maintains/Returns to pre admission functional level  Description: INTERVENTIONS:  - Perform BMAT or MOVE assessment daily.   - Set and communicate daily mobility goal to care team and patient/family/caregiver.    - Collaborate with rehabilitation services on mobility goals if consulted  - Perform Range of Motion 4 times a day. - Reposition patient every 2 hours.   - Dangle patient 4 times a day  - Stand patient 4 times a day  - Ambulate patient 4 times a day  - Out of bed to chair 4 times a day   - Out of bed for meals 3 times a day  - Out of bed for toileting  - Record patient progress and toleration of activity level   Outcome: Progressing     Problem: PAIN - ADULT  Goal: Verbalizes/displays adequate comfort level or baseline comfort level  Description: Interventions:  - Encourage patient to monitor pain and request assistance  - Assess pain using appropriate pain scale  - Administer analgesics based on type and severity of pain and evaluate response  - Implement non-pharmacological measures as appropriate and evaluate response  - Consider cultural and social influences on pain and pain management  - Notify physician/advanced practitioner if interventions unsuccessful or patient reports new pain  Outcome: Progressing     Problem: INFECTION - ADULT  Goal: Absence or prevention of progression during hospitalization  Description: INTERVENTIONS:  - Assess and monitor for signs and symptoms of infection  - Monitor lab/diagnostic results  - Monitor all insertion sites, i.e. indwelling lines, tubes, and drains  - Monitor endotracheal if appropriate and nasal secretions for changes in amount and color  - [place] appropriate cooling/warming therapies per order  - Administer medications as ordered  - Instruct and encourage patient and family to use good hand hygiene technique  - Identify and instruct in appropriate isolation precautions for identified infection/condition  Outcome: Progressing     Problem: DISCHARGE PLANNING  Goal: Discharge to home or other facility with appropriate resources  Description: INTERVENTIONS:  - Identify barriers to discharge w/patient and caregiver  - Arrange for needed discharge resources and transportation as appropriate  - Identify discharge learning needs (meds, wound care, etc.)  - Arrange for interpretive services to assist at discharge as needed  - Refer to Case Management Department for coordinating discharge planning if the patient needs post-hospital services based on physician/advanced practitioner order or complex needs related to functional status, cognitive ability, or social support system  Outcome: Progressing     Problem: RESPIRATORY - ADULT  Goal: Achieves optimal ventilation and oxygenation  Description: INTERVENTIONS:  - Assess for changes in respiratory status  - Assess for changes in mentation and behavior  - Position to facilitate oxygenation and minimize respiratory effort  - Oxygen administered by appropriate delivery if ordered  - Initiate smoking cessation education as indicated  - Encourage broncho-pulmonary hygiene including cough, deep breathe, Incentive Spirometry  - Assess the need for suctioning and aspirate as needed  - Assess and instruct to report SOB or any respiratory difficulty  - Respiratory Therapy support as indicated  Outcome: Progressing     Problem: GENITOURINARY - ADULT  Goal: Maintains or returns to baseline urinary function  Description: INTERVENTIONS:  - Assess urinary function  - Encourage oral fluids to ensure adequate hydration if ordered  - Administer IV fluids as ordered to ensure adequate hydration  - Administer ordered medications as needed  - Offer frequent toileting  - Follow urinary retention protocol if ordered  Outcome: Progressing     Problem: METABOLIC, FLUID AND ELECTROLYTES - ADULT  Goal: Electrolytes maintained within normal limits  Description: INTERVENTIONS:  - Monitor labs and assess patient for signs and symptoms of electrolyte imbalances  - Administer electrolyte replacement as ordered  - Monitor response to electrolyte replacements, including repeat lab results as appropriate  - Instruct patient on fluid and nutrition as appropriate  Outcome: Progressing  Goal: Fluid balance maintained  Description: INTERVENTIONS:  - Monitor labs   - Monitor I/O and WT  - Instruct patient on fluid and nutrition as appropriate  - Assess for signs & symptoms of volume excess or deficit  Outcome: Progressing  Goal: Glucose maintained within target range  Description: INTERVENTIONS:  - Monitor Blood Glucose as ordered  - Assess for signs and symptoms of hyperglycemia and hypoglycemia  - Administer ordered medications to maintain glucose within target range  - Assess nutritional intake and initiate nutrition service referral as needed  Outcome: Progressing     Problem: Nutrition/Hydration-ADULT  Goal: Nutrient/Hydration intake appropriate for improving, restoring or maintaining nutritional needs  Description: Monitor and assess patient's nutrition/hydration status for malnutrition. Collaborate with interdisciplinary team and initiate plan and interventions as ordered. Monitor patient's weight and dietary intake as ordered or per policy. Utilize nutrition screening tool and intervene as necessary. Determine patient's food preferences and provide high-protein, high-caloric foods as appropriate.      INTERVENTIONS:  - Monitor oral intake, urinary output, labs, and treatment plans  - Assess nutrition and hydration status and recommend course of action  - Evaluate amount of meals eaten  - Assist patient with eating if necessary   - Allow adequate time for meals  - Recommend/ encourage appropriate diets, oral nutritional supplements, and vitamin/mineral supplements  - Order, calculate, and assess calorie counts as needed  - Recommend, monitor, and adjust tube feedings and TPN/PPN based on assessed needs  - Assess need for intravenous fluids  - Provide specific nutrition/hydration education as appropriate  - Include patient/family/caregiver in decisions related to nutrition  Outcome: Progressing

## 2023-07-08 NOTE — CASE MANAGEMENT
Case Management Discharge Planning Note    Patient name [facility]  Location [place] [address] [address] [facility] [place] [address] Love Reinoso* MRN [id number]  : 1945 Date 2023       Current Admission Date: 2023  Current Admission Diagnosis:Hyponatremia   Patient Active Problem List    Diagnosis Date Noted   • Hypomagnesemia 2023   • Anemia 2023   • Acute cystitis without hematuria 2023   • PLMD (periodic limb movement disorder) 2023   • Abnormal skin growth 2023   • Nonischemic cardiomyopathy ([address]) 2023   • ACC/AHA stage C heart failure with reduced ejection fraction ([address]) 2022   • Chest pain 2022   • Hyperlipidemia    • Gait instability 2021   • Polypharmacy 2021   • Frequent falls 2021   • Dementia with behavioral disturbance 2021   • Counseling regarding advanced care planning and goals of care 2021   • Near syncope 2021   • Current use of insulin ([address]) 2021   • Encounter for central line care 2020   • Chemotherapy-induced peripheral neuropathy ([address]) 10/13/2020   • Obstructive sleep apnea    • Arthritis 2020   • Allergic rhinitis 10/08/2019   • Right lower lobe pneumonia 2019   • Sepsis ([address]) 2019   • Status post chemotherapy 2019   • Current mild episode of major depressive disorder without prior episode ([address]) 2019   • Other insomnia 2019   • Palliative care patient 2019   • Anxiety 2018   • Hyponatremia 2018   • Diffuse large 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 ([address]) 2018   • 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, with long-term current use of insulin ([address]) 10/31/2018   • Essential hypertension 10/31/2018   • Gastroesophageal reflux disease 10/31/2018      LOS (days): 3  Geometric Mean LOS (GMLOS) (days): 5.00  Days to GMLOS:2.2     OBJECTIVE:  Risk of Unplanned Readmission Score: 17.61         Current admission status: Inpatient Preferred Pharmacy:   [facility] [address] [place] [address]  Phone: [phone] Fax: [phone] [facility], [address] [place] [address]  Phone: [phone] Fax: [phone]    Primary Care Provider: MOIRA Li    Primary Insurance: [facility] REP  Secondary Insurance:     DISCHARGE DETAILS:                                                                                        IMM Given (Date):: 07/08/23 (Completed w/ pt using [facility]  [id number].)  IMM Given to[de-identified] Patient (Copy given to pt. Original placed in scan bin.)           1540 - CM met with pt's granddaughter, Christopher Cifuentes to discuss IMM, who verbalized understanding.

## 2023-07-08 NOTE — PLAN OF CARE
Problem: Potential for Falls  Goal: Patient will remain free of falls  Description: INTERVENTIONS:  - Educate patient/family on patient safety including physical limitations  - Instruct patient to call for assistance with activity   - Consult OT/PT to assist with strengthening/mobility   - Keep Call bell within reach  - Keep bed low and locked with side rails adjusted as appropriate  - Keep care items and personal belongings within reach  - Initiate and maintain comfort rounds  - Make Fall Risk Sign visible to staff  - Offer Toileting every 2 Hours, in advance of need  - Initiate/Maintain bed alarm  - Obtain necessary fall risk management equipment: alarms  - Apply yellow socks and bracelet for high fall risk patients  - Consider moving patient to room near nurses station  Outcome: Progressing     Problem: MOBILITY - ADULT  Goal: Maintain or return to baseline ADL function  Description: INTERVENTIONS:  -  Assess patient's ability to carry out ADLs; assess patient's baseline for ADL function and identify physical deficits which impact ability to perform ADLs (bathing, care of mouth/teeth, toileting, grooming, dressing, etc.)  - Assess/evaluate cause of self-care deficits   - Assess range of motion  - Assess patient's mobility; develop plan if impaired  - Assess patient's need for assistive devices and provide as appropriate  - Encourage maximum independence but intervene and supervise when necessary  - Involve family in performance of ADLs  - Assess for home care needs following discharge   - Consider OT consult to assist with ADL evaluation and planning for discharge  - Provide patient education as appropriate  Outcome: Progressing  Goal: Maintains/Returns to pre admission functional level  Description: INTERVENTIONS:  - Perform BMAT or MOVE assessment daily.   - Set and communicate daily mobility goal to care team and patient/family/caregiver.    - Collaborate with rehabilitation services on mobility goals if consulted  - Perform Range of Motion 4 times a day. - Reposition patient every 2 hours.   - Dangle patient 4 times a day  - Stand patient 4 times a day  - Ambulate patient 4 times a day  - Out of bed to chair 4 times a day   - Out of bed for meals 3 times a day  - Out of bed for toileting  - Record patient progress and toleration of activity level   Outcome: Progressing     Problem: PAIN - ADULT  Goal: Verbalizes/displays adequate comfort level or baseline comfort level  Description: Interventions:  - Encourage patient to monitor pain and request assistance  - Assess pain using appropriate pain scale  - Administer analgesics based on type and severity of pain and evaluate response  - Implement non-pharmacological measures as appropriate and evaluate response  - Consider cultural and social influences on pain and pain management  - Notify physician/advanced practitioner if interventions unsuccessful or patient reports new pain  Outcome: Progressing     Problem: INFECTION - ADULT  Goal: Absence or prevention of progression during hospitalization  Description: INTERVENTIONS:  - Assess and monitor for signs and symptoms of infection  - Monitor lab/diagnostic results  - Monitor all insertion sites, i.e. indwelling lines, tubes, and drains  - Monitor endotracheal if appropriate and nasal secretions for changes in amount and color  - Kinta appropriate cooling/warming therapies per order  - Administer medications as ordered  - Instruct and encourage patient and family to use good hand hygiene technique  - Identify and instruct in appropriate isolation precautions for identified infection/condition  Outcome: Progressing     Problem: DISCHARGE PLANNING  Goal: Discharge to home or other facility with appropriate resources  Description: INTERVENTIONS:  - Identify barriers to discharge w/patient and caregiver  - Arrange for needed discharge resources and transportation as appropriate  - Identify discharge learning needs (meds, wound care, etc.)  - Arrange for interpretive services to assist at discharge as needed  - Refer to Case Management Department for coordinating discharge planning if the patient needs post-hospital services based on physician/advanced practitioner order or complex needs related to functional status, cognitive ability, or social support system  Outcome: Progressing     Problem: RESPIRATORY - ADULT  Goal: Achieves optimal ventilation and oxygenation  Description: INTERVENTIONS:  - Assess for changes in respiratory status  - Assess for changes in mentation and behavior  - Position to facilitate oxygenation and minimize respiratory effort  - Oxygen administered by appropriate delivery if ordered  - Initiate smoking cessation education as indicated  - Encourage broncho-pulmonary hygiene including cough, deep breathe, Incentive Spirometry  - Assess the need for suctioning and aspirate as needed  - Assess and instruct to report SOB or any respiratory difficulty  - Respiratory Therapy support as indicated  Outcome: Progressing     Problem: GENITOURINARY - ADULT  Goal: Maintains or returns to baseline urinary function  Description: INTERVENTIONS:  - Assess urinary function  - Encourage oral fluids to ensure adequate hydration if ordered  - Administer IV fluids as ordered to ensure adequate hydration  - Administer ordered medications as needed  - Offer frequent toileting  - Follow urinary retention protocol if ordered  Outcome: Progressing     Problem: METABOLIC, FLUID AND ELECTROLYTES - ADULT  Goal: Electrolytes maintained within normal limits  Description: INTERVENTIONS:  - Monitor labs and assess patient for signs and symptoms of electrolyte imbalances  - Administer electrolyte replacement as ordered  - Monitor response to electrolyte replacements, including repeat lab results as appropriate  - Instruct patient on fluid and nutrition as appropriate  Outcome: Progressing  Goal: Fluid balance maintained  Description: INTERVENTIONS:  - Monitor labs   - Monitor I/O and WT  - Instruct patient on fluid and nutrition as appropriate  - Assess for signs & symptoms of volume excess or deficit  Outcome: Progressing  Goal: Glucose maintained within target range  Description: INTERVENTIONS:  - Monitor Blood Glucose as ordered  - Assess for signs and symptoms of hyperglycemia and hypoglycemia  - Administer ordered medications to maintain glucose within target range  - Assess nutritional intake and initiate nutrition service referral as needed  Outcome: Progressing     Problem: Nutrition/Hydration-ADULT  Goal: Nutrient/Hydration intake appropriate for improving, restoring or maintaining nutritional needs  Description: Monitor and assess patient's nutrition/hydration status for malnutrition. Collaborate with interdisciplinary team and initiate plan and interventions as ordered. Monitor patient's weight and dietary intake as ordered or per policy. Utilize nutrition screening tool and intervene as necessary. Determine patient's food preferences and provide high-protein, high-caloric foods as appropriate.      INTERVENTIONS:  - Monitor oral intake, urinary output, labs, and treatment plans  - Assess nutrition and hydration status and recommend course of action  - Evaluate amount of meals eaten  - Assist patient with eating if necessary   - Allow adequate time for meals  - Recommend/ encourage appropriate diets, oral nutritional supplements, and vitamin/mineral supplements  - Order, calculate, and assess calorie counts as needed  - Recommend, monitor, and adjust tube feedings and TPN/PPN based on assessed needs  - Assess need for intravenous fluids  - Provide specific nutrition/hydration education as appropriate  - Include patient/family/caregiver in decisions related to nutrition  Outcome: Progressing

## 2023-07-08 NOTE — PROGRESS NOTES
[facility]  Progress Note  Name: Kimberlee Pink  MRN: [id number]  Unit/Bed#: [facility] -01 I Date of Admission: 7/5/2023   Date of Service: 7/8/2023 I Hospital Day: 3    Assessment/Plan   Anemia  Assessment & Plan  Baseline hemoglobin appears to be approximately 11-12  Currently stable at 10.9, monitor and transfuse as needed  We will hold iron in the setting of active infection    Acute cystitis without hematuria  Assessment & Plan  · Patient presenting with significant left-lower quadrant and suprapubic pain with increased frequency in urination over the last 2 days  · UA showing trace leukocytes and 4-10 WBC  · UCx shows beta-hemolytic strep; Continue present therapy and await sensitivities  · Started on Rocephin in the ED, will continue and monitor    Dementia with behavioral disturbance  Assessment & Plan  · Continue memantine    Obstructive sleep apnea  Assessment & Plan  · Continue CPAP qhs    Right lower lobe pneumonia  Assessment & Plan  · Patient with ongoing coughing over the last 2 weeks, denies any fevers or chills  · CT scan showing "Consolidation right lower lobe with associated bronchiectatic changes"  · Patient also with leukocytosis  · Patient was started on Ceftriaxone in the ED, will continue  · procalcitonin negative x 2; however white count trended down sharply on antibiotics  · Monitor fever curve  · Respiratory protocol  · F/u Sputum culture    Sepsis ([address])  Assessment & Plan  Sepsis - POA - a/e/b leukocytosis (WBC 18.71) and tachycardia (104)  -  in the setting of PNA - requiring IV fluids and IV Ceftriaxone.        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, with long-term current use of insulin [facility])  Assessment & Plan  Lab Results   Component Value Date    HGBA1C 6.9 (A) 04/06/2023       Recent Labs     07/07/23  1613 07/07/23  2056 07/08/23  0804 07/08/23  1118   POCGLU 211* 207* 167* 177*       Blood Sugar Average: Last 72 hrs:    · Continue PTA glargine 9un qhs  · IASS + FS monitoring  · Hypoglycemia protocol  · Hold home [employer]  (P) 177.5    * Hyponatremia  Assessment & Plan  · Presents with low sodium to 122  · Unclear etiology at this time as patient with recent poor po intake however patient also with possible PNA and may have SIADH  · Will trial patient on gentle IVF hydration and continue to monitor Na q8h  · Nephrology consulted, recommendations appreciated  · Improved           VTE Pharmacologic Prophylaxis:   Pharmacologic: Enoxaparin (Lovenox)  Mechanical VTE Prophylaxis in Place: Yes    Patient Centered Rounds: I have performed bedside rounds with nursing staff today. Discussions with Specialists or Other Care Team Provider:     Education and Discussions with Family / Patient: Discussed treatment plan with family and patient who agree with current plan; encouraged to ask questions and participate. Time Spent for Care: 45 minutes. More than 50% of total time spent on counseling and coordination of care as described above. Current Length of Stay: 3 day(s)    Current Patient Status: Inpatient   Certification Statement: The patient will continue to require additional inpatient hospital stay due to Treatment of pneumonia    Discharge Plan:  To be determined, likely tomorrow    Code Status: Level 1 - Full Code      Subjective:   Patient seen and examined bedside, no acute distress or discomfort noted. Patient reports left sided back and gluteal pain is much improved with Lidoderm patches. X-ray obtained and no acute fractures; only degenerative changes normal for patient's age. Labs and vitals stable and PT/OT reporting patient has no rehab needs. New transaminitis noted, likely secondary to Rocephin. Have repleted magnesium. White count trended down and patient afebrile, likely stable for discharge home tomorrow. All discussed with patient's granddaughter at bedside. Objective:     Vitals:   Temp (24hrs), Av.1 °F (36.7 °C), Min:97.3 °F (36.3 °C), Max:98.6 °F (37 °C)    Temp:  [97.3 °F (36.3 °C)-98.6 °F (37 °C)] 98.6 °F (37 °C)  HR:  [70-80] 70  Resp:  [17-22] 18  BP: (134-138)/(67-90) 137/84  SpO2:  [94 %-96 %] 94 %  Body mass index is 28.2 kg/m². Input and Output Summary (last 24 hours): Intake/Output Summary (Last 24 hours) at 2023 1742  Last data filed at 2023 0730  Gross per 24 hour   Intake 240 ml   Output 1200 ml   Net -960 ml       Physical Exam:     Physical Exam  Vitals and nursing note reviewed. Constitutional:       General: She is not in acute distress. Appearance: She is well-developed. HENT:      Head: Normocephalic and atraumatic. Eyes:      Conjunctiva/sclera: Conjunctivae normal.   Cardiovascular:      Rate and Rhythm: Normal rate and regular rhythm. Heart sounds: No murmur heard. Pulmonary:      Effort: Pulmonary effort is normal. No respiratory distress. Abdominal:      Palpations: Abdomen is soft. Tenderness: There is no abdominal tenderness. Musculoskeletal:         General: Tenderness (Tenderness to left flank) present. No swelling. Cervical back: Neck supple. Skin:     General: Skin is warm and dry. Neurological:      Mental Status: She is alert.    Psychiatric:         Mood and Affect: Mood normal.           Additional Data:     Labs:    Results from last 7 days   Lab Units 07/08/23  0444   WBC Thousand/uL 8.35   HEMOGLOBIN g/dL 10.9*   HEMATOCRIT % 34.0*   PLATELETS Thousands/uL 311   NEUTROS PCT % 52   LYMPHS PCT % 32   MONOS PCT % 11   EOS PCT % 4     Results from last 7 days   Lab Units 07/08/23  0444   SODIUM mmol/L 134*   POTASSIUM mmol/L 4.3   CHLORIDE mmol/L 101   CO2 mmol/L 27   BUN mg/dL 19   CREATININE mg/dL 0.61   ANION GAP mmol/L 6   CALCIUM mg/dL 9.2   ALBUMIN g/dL 3.6   TOTAL BILIRUBIN mg/dL 0.28   ALK PHOS U/L 82   ALT U/L 71*   AST U/L 65*   GLUCOSE RANDOM mg/dL 182*         Results from last 7 days   Lab Units 07/08/23  1613 07/08/23  1118 07/08/23  0804 07/07/23  2056 07/07/23  1613 07/07/23  1126 07/07/23  0717 07/06/23  2100 07/06/23  1637 07/06/23  1510 07/06/23  1114 07/06/23  0717   POC GLUCOSE mg/dl 177* 177* 167* 207* 211* 148* 165* 180* 174* 188* 294* 115         Results from last 7 days   Lab Units 07/06/23  0515 07/05/23  1439   LACTIC ACID mmol/L  --  1.9   PROCALCITONIN ng/ml 0.05 0.05           * I Have Reviewed All Lab Data Listed Above. * Additional Pertinent Lab Tests Reviewed:  All Labs Within Last 24 Hours Reviewed    Imaging:    Imaging Reports Reviewed Today Include: X-ray hip and pelvis  Imaging Personally Reviewed by Myself Includes:      Recent Cultures (last 7 days):     Results from last 7 days   Lab Units 07/05/23  1454   URINE CULTURE  40,000-49,000 cfu/ml Beta Hemolytic Streptococcus Group B*  60,000-69,000 cfu/ml       Last 24 Hours Medication List:   Current Facility-Administered Medications   Medication Dose Route Frequency Provider Last Rate   • acetaminophen  975 mg Oral [facility] Kristopher Li MD     • albuterol  1 puff Inhalation Q4H PRN Casper Whitaker MD     • ALPRAZolam  0.25 mg Oral HS PRN Tayla Barr MD     • aspirin  81 mg Oral Daily Casper Whitaker MD     • atorvastatin  40 mg Oral Daily With [employer] Paulina Whitaker MD     • calcium carbonate-vitamin D  1 tablet Oral BID Hilda Jason MD     • carvedilol  12.5 mg Oral BID With Meals Anup Jason MD     • cefTRIAXone  1,000 mg Intravenous Q24H Anup Jason MD 1,000 mg (07/08/23 5054)   • cyanocobalamin  1,000 mcg Oral Daily Anup Jason MD     • dextrose  500 mL Intravenous Once Nneka Vernadine Area, DO     • docusate sodium  100 mg Oral BID Jihan Gutierrez MD     • enoxaparin  40 mg Subcutaneous Q24H [address] Jihan Gutierrez MD     • ezetimibe  10 mg Oral HS Anup Amor MD     • fluticasone  1 spray Nasal Daily Anup Amor MD     • gabapentin  100 mg Oral TID Jihan Gutierrez MD     • insulin glargine  9 Units Subcutaneous HS Anup Jason MD     • insulin lispro  1-5 Units Subcutaneous TID [facility] Anup Amor MD     • insulin lispro  1-5 Units Subcutaneous HS Anup Amor MD     • lidocaine  1 patch Topical Daily Jihan Gutierrez MD     • losartan  25 mg Oral Daily Anup Jason MD     • magnesium Oxide  400 mg Oral BID Jihan Gutierrez MD     • memantine  10 mg Oral BID Anup Amor MD     • mirtazapine  15 mg Oral HS Anup Amor MD     • oxyCODONE  5 mg Oral Q6H PRN Stew Bradford PA-C     • pantoprazole  40 mg Oral Early Morning Anup Amor MD     • polyethylene glycol  17 g Oral Daily Jihan Gutierrez MD          Today, Patient Was Seen By: Mike Joshi MD    ** Please Note: Dictation voice to text software may have been used in the creation of this document.  **

## 2023-07-09 VITALS
OXYGEN SATURATION: 95 % | HEART RATE: 65 BPM | TEMPERATURE: 98 F | HEIGHT: 60 IN | SYSTOLIC BLOOD PRESSURE: 125 MMHG | WEIGHT: 144.18 LBS | DIASTOLIC BLOOD PRESSURE: 60 MMHG | RESPIRATION RATE: 20 BRPM | BODY MASS INDEX: 28.31 KG/M2

## 2023-07-09 LAB
GLUCOSE SERPL-MCNC: 146 MG/DL (ref 65–140)
GLUCOSE SERPL-MCNC: 329 MG/DL (ref 65–140)

## 2023-07-09 PROCEDURE — 94660 CPAP INITIATION&MGMT: CPT

## 2023-07-09 PROCEDURE — 99239 HOSP IP/OBS DSCHRG MGMT >30: CPT | Performed by: INTERNAL MEDICINE

## 2023-07-09 PROCEDURE — 82948 REAGENT STRIP/BLOOD GLUCOSE: CPT

## 2023-07-09 RX ORDER — AMOXICILLIN AND CLAVULANATE POTASSIUM 875; 125 MG/1; MG/1
1 TABLET, FILM COATED ORAL EVERY 12 HOURS SCHEDULED
Qty: 6 TABLET | Refills: 0 | Status: SHIPPED | OUTPATIENT
Start: 2023-07-09 | End: 2023-07-12

## 2023-07-09 RX ORDER — LIDOCAINE 50 MG/G
1 PATCH TOPICAL DAILY
Qty: 15 PATCH | Refills: 0 | Status: SHIPPED | OUTPATIENT
Start: 2023-07-09 | End: 2023-07-12 | Stop reason: SDUPTHER

## 2023-07-09 RX ADMIN — LIDOCAINE 1 PATCH: 50 PATCH TOPICAL at 09:09

## 2023-07-09 RX ADMIN — MAGNESIUM OXIDE TAB 400 MG (241.3 MG ELEMENTAL MG) 400 MG: 400 (241.3 MG) TAB at 09:09

## 2023-07-09 RX ADMIN — LOSARTAN POTASSIUM 25 MG: 25 TABLET, FILM COATED ORAL at 09:08

## 2023-07-09 RX ADMIN — Medication 1 TABLET: at 09:08

## 2023-07-09 RX ADMIN — ACETAMINOPHEN 325MG 975 MG: 325 TABLET ORAL at 13:09

## 2023-07-09 RX ADMIN — CYANOCOBALAMIN TAB 500 MCG 1000 MCG: 500 TAB at 09:09

## 2023-07-09 RX ADMIN — PANTOPRAZOLE SODIUM 40 MG: 40 TABLET, DELAYED RELEASE ORAL at 05:18

## 2023-07-09 RX ADMIN — ENOXAPARIN SODIUM 40 MG: 40 INJECTION SUBCUTANEOUS at 09:08

## 2023-07-09 RX ADMIN — CARVEDILOL 12.5 MG: 12.5 TABLET, FILM COATED ORAL at 09:09

## 2023-07-09 RX ADMIN — GABAPENTIN 100 MG: 100 CAPSULE ORAL at 09:09

## 2023-07-09 RX ADMIN — ASPIRIN 81 MG 81 MG: 81 TABLET ORAL at 09:08

## 2023-07-09 RX ADMIN — FLUTICASONE PROPIONATE 1 SPRAY: 50 SPRAY, METERED NASAL at 09:12

## 2023-07-09 RX ADMIN — DOCUSATE SODIUM 100 MG: 100 CAPSULE, LIQUID FILLED ORAL at 09:09

## 2023-07-09 RX ADMIN — POLYETHYLENE GLYCOL 3350 17 G: 17 POWDER, FOR SOLUTION ORAL at 09:08

## 2023-07-09 RX ADMIN — MEMANTINE 10 MG: 5 TABLET ORAL at 09:09

## 2023-07-09 RX ADMIN — ACETAMINOPHEN 325MG 975 MG: 325 TABLET ORAL at 05:18

## 2023-07-09 RX ADMIN — INSULIN LISPRO 3 UNITS: 100 INJECTION, SOLUTION INTRAVENOUS; SUBCUTANEOUS at 13:09

## 2023-07-09 NOTE — NURSING NOTE
Discharge instructions reviewed with patient's granddaughter (caregiver) at bedside. Granddaughter verbalizes understanding of all discharge instructions. Discharge instructions given to granddaughter at time of discharge. IV removed, álvaro disconnected, VSS. All questions addressed at this time, no further questions or concerns at present. Patient discharged to home with granddaughter. Belongings sent with patient.

## 2023-07-09 NOTE — PLAN OF CARE
Problem: Potential for Falls  Goal: Patient will remain free of falls  Description: INTERVENTIONS:  - Educate patient/family on patient safety including physical limitations  - Instruct patient to call for assistance with activity   - Consult OT/PT to assist with strengthening/mobility   - Keep Call bell within reach  - Keep bed low and locked with side rails adjusted as appropriate  - Keep care items and personal belongings within reach  - Initiate and maintain comfort rounds  - Make Fall Risk Sign visible to staff  - Offer Toileting every 2 Hours, in advance of need  - Initiate/Maintain bed alarm  - Obtain necessary fall risk management equipment: alarms  - Apply yellow socks and bracelet for high fall risk patients  - Consider moving patient to room near nurses station  Outcome: Progressing

## 2023-07-09 NOTE — DISCHARGE SUMMARY
[address] [facility]  Discharge- [address] [facility] [birth year], 69 y.o. female MRN: [id number]  Unit/Bed#: [facility] 2 [id number] [facility] [place] 217-01 Encounter: [id number]  Primary Care Provider: MOIRA Nielson   Date and time admitted to hospital: 7/5/2023  1:17 PM    Anemia  Assessment & Plan  Baseline hemoglobin appears to be approximately 11-12  Currently stable at 10.9, monitor and transfuse as needed  We will hold iron in the setting of active infection    Acute cystitis without hematuria  Assessment & Plan  · Patient presenting with significant left-lower quadrant and suprapubic pain with increased frequency in urination over the last 2 days  · UA showing trace leukocytes and 4-10 WBC  · UCx shows beta-hemolytic strep; Continue present therapy and await sensitivities  · Started on Rocephin in the ED, will continue and monitor    Dementia with behavioral disturbance  Assessment & Plan  · Continue memantine    Obstructive sleep apnea  Assessment & Plan  · Continue CPAP qhs    Right lower lobe pneumonia  Assessment & Plan  · Patient with ongoing coughing over the last 2 weeks, denies any fevers or chills  · CT scan showing "Consolidation right lower lobe with associated bronchiectatic changes"  · Patient also with leukocytosis  · Patient was started on Ceftriaxone in the ED, will continue  · procalcitonin negative x 2; however white count trended down sharply on antibiotics  · Monitor fever curve  · Respiratory protocol  · F/u Sputum culture    Current mild episode of major depressive disorder without prior episode (HCC)  Assessment & Plan  · Continue mirtazapine  · Continue social support at home    [address]  · Continue mirtazapine, alprazolam prn    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)  Assessment & Plan  · In remission for 4 years  · Followed by heme/onc as outpatient    Gastroesophageal reflux disease  Assessment & Plan  · Continue PPI    Essential hypertension  Assessment & Plan  · BP wnl on admission  · Continue PTA regimen: losartan, carvedilol    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, with long-term current use of insulin [facility])  Assessment & Plan  Lab Results   Component Value Date    HGBA1C 6.9 (A) 04/06/2023       Recent Labs     07/08/23  1613 07/08/23  2156 07/08/23  2223 07/09/23  0714   POCGLU 177* 228* 200* 146*       Blood Sugar Average: Last 72 hrs:    · Continue PTA glargine 9un qhs  · IASS + FS monitoring  · Hypoglycemia protocol  · Hold home [employer]  (P) [id number]    * Hyponatremia  Assessment & Plan  · Presents with low sodium to 122  · Unclear etiology at this time as patient with recent poor po intake however patient also with possible PNA and may have SIADH  · Will trial patient on gentle IVF hydration and continue to monitor Na q8h  · Nephrology consulted, recommendations appreciated  · Improved      Discharging Physician / Practitioner: Thiago Chong MD  PCP: Tiana Amaro, [address] [place] [address]  Admission Date:   Admission Orders (From admission, onward)     Ordered        07/05/23 1818  INPATIENT ADMISSION  Once                      Discharge Date: 07/09/23    Medical Problems     Resolved Problems  Date Reviewed: 7/6/2023   None         Consultations During Hospital Stay:  · Nephrology    Procedures Performed:   · X-ray hip  · CTA chest abdomen pelvis    Significant Findings / Test Results:   · Pneumonia  · Left back and glutial pain    Incidental Findings:   · Hyponatremia    Test Results Pending at Discharge (will require follow up): · None     Outpatient Tests Requested:  · CBC/CMP  · CT chest in 3 months to monitor for resolution    Complications: None    Reason for Admission: Left-sided back pain    Hospital Course: As per HPI:    "Sascha Hammond is a 69 y.o. female with a PMH of DM who presents with left-sided abdominal pain that had started day prior to admission.  Because of the pain, patient was unable to stand up and required a lot of help to ambulate and move around. Because of persistent abdominal pain, EMS was called and patient brought to the ED.      Denies any dysuria, however patient states that when she holds her pee for too long, her abdomen starts to hurt. Also endorsing increasing frequency in urination over the past 2 days. No fevers or chills. No sick contacts.      Patient also reports a cough and granddaughter reports that she seems to choke on her food."        Condition at Discharge: stable     Discharge Day Visit / Exam:     Subjective: Patient presented from home with left sided back and gluteal pain; presumably from recent fall 4 days prior to admission. During work-up, patient also noted to have hyponatremia and pneumonia. Was admitted and hyponatremia quickly resolved with fluids. Patient received 4 days of IV Rocephin for pneumonia, but never experienced any hypoxia, cough, fever, chills. Will be discharged home with 3 days of Augmentin to complete her antibiotic course. For the back pain, x-rays were obtained and showed no acute fracture. PT/OT evaluated patient and no rehab recommendations were made. Speech evaluated recommended dysphagia 3 with thins. Stable for discharge home today. Vitals: Blood Pressure: 125/60 (07/09/23 0714)  Pulse: 65 (07/09/23 0714)  Temperature: 98 °F (36.7 °C) (07/09/23 0714)  Temp Source: Oral (07/09/23 0714)  Respirations: 20 (07/09/23 0714)  Height: 5' (152.4 cm) (07/05/23 2020)  Weight - Scale: 65.4 kg (144 lb 2.9 oz) (07/09/23 0600)  SpO2: 95 % (07/09/23 0840)  Exam:   Physical Exam  Vitals and nursing note reviewed. Constitutional:       General: She is not in acute distress. Appearance: She is well-developed. HENT:      Head: Normocephalic and atraumatic. Eyes:      Conjunctiva/sclera: Conjunctivae normal.   Cardiovascular:      Rate and Rhythm: Normal rate and regular rhythm.       Heart sounds: No murmur heard.  Pulmonary:      Effort: Pulmonary effort is normal. No respiratory distress. Abdominal:      Palpations: Abdomen is soft. Tenderness: There is no abdominal tenderness. Musculoskeletal:         General: Tenderness (Tenderness to left flank) present. No swelling. Cervical back: Neck supple. Skin:     General: Skin is warm and dry. Neurological:      Mental Status: She is alert. Psychiatric:         Mood and Affect: Mood normal.         Discussion with Family: Discussed treatment plan with family and patient who agree with current plan; encouraged to ask questions and participate. Discharge instructions/Information to patient and family:   See after visit summary for information provided to patient and family. Provisions for Follow-Up Care:  See after visit summary for information related to follow-up care and any pertinent home health orders. Disposition:     Home    For Discharges to [facility] SNF:   · Not Applicable to this Patient - Not Applicable to this Patient    Planned Readmission: None     Discharge Statement:  I spent 45 minutes discharging the patient. This time was spent on the day of discharge. I had direct contact with the patient on the day of discharge. Greater than 50% of the total time was spent examining patient, answering all patient questions, arranging and discussing plan of care with patient as well as directly providing post-discharge instructions. Additional time then spent on discharge activities. Discharge Medications:  See after visit summary for reconciled discharge medications provided to patient and family.       ** Please Note: This note has been constructed using a voice recognition system **

## 2023-07-09 NOTE — PLAN OF CARE
Problem: Potential for Falls  Goal: Patient will remain free of falls  Description: INTERVENTIONS:  - Educate patient/family on patient safety including physical limitations  - Instruct patient to call for assistance with activity   - Consult OT/PT to assist with strengthening/mobility   - Keep Call bell within reach  - Keep bed low and locked with side rails adjusted as appropriate  - Keep care items and personal belongings within reach  - Initiate and maintain comfort rounds  - Make Fall Risk Sign visible to staff  - Offer Toileting every 2 Hours, in advance of need  - Initiate/Maintain bed alarm  - Obtain necessary fall risk management equipment: alarms  - Apply yellow socks and bracelet for high fall risk patients  - Consider moving patient to room near nurses station  Outcome: Progressing     Problem: MOBILITY - ADULT  Goal: Maintain or return to baseline ADL function  Description: INTERVENTIONS:  -  Assess patient's ability to carry out ADLs; assess patient's baseline for ADL function and identify physical deficits which impact ability to perform ADLs (bathing, care of mouth/teeth, toileting, grooming, dressing, etc.)  - Assess/evaluate cause of self-care deficits   - Assess range of motion  - Assess patient's mobility; develop plan if impaired  - Assess patient's need for assistive devices and provide as appropriate  - Encourage maximum independence but intervene and supervise when necessary  - Involve family in performance of ADLs  - Assess for home care needs following discharge   - Consider OT consult to assist with ADL evaluation and planning for discharge  - Provide patient education as appropriate  Outcome: Progressing  Goal: Maintains/Returns to pre admission functional level  Description: INTERVENTIONS:  - Perform BMAT or MOVE assessment daily.   - Set and communicate daily mobility goal to care team and patient/family/caregiver.    - Collaborate with rehabilitation services on mobility goals if consulted  - Perform Range of Motion 4 times a day. - Reposition patient every 2 hours.   - Dangle patient 4 times a day  - Stand patient 4 times a day  - Ambulate patient 4 times a day  - Out of bed to chair 4 times a day   - Out of bed for meals 3 times a day  - Out of bed for toileting  - Record patient progress and toleration of activity level   Outcome: Progressing     Problem: PAIN - ADULT  Goal: Verbalizes/displays adequate comfort level or baseline comfort level  Description: Interventions:  - Encourage patient to monitor pain and request assistance  - Assess pain using appropriate pain scale  - Administer analgesics based on type and severity of pain and evaluate response  - Implement non-pharmacological measures as appropriate and evaluate response  - Consider cultural and social influences on pain and pain management  - Notify physician/advanced practitioner if interventions unsuccessful or patient reports new pain  Outcome: Progressing     Problem: INFECTION - ADULT  Goal: Absence or prevention of progression during hospitalization  Description: INTERVENTIONS:  - Assess and monitor for signs and symptoms of infection  - Monitor lab/diagnostic results  - Monitor all insertion sites, i.e. indwelling lines, tubes, and drains  - Monitor endotracheal if appropriate and nasal secretions for changes in amount and color  - [place] appropriate cooling/warming therapies per order  - Administer medications as ordered  - Instruct and encourage patient and family to use good hand hygiene technique  - Identify and instruct in appropriate isolation precautions for identified infection/condition  Outcome: Progressing     Problem: DISCHARGE PLANNING  Goal: Discharge to home or other facility with appropriate resources  Description: INTERVENTIONS:  - Identify barriers to discharge w/patient and caregiver  - Arrange for needed discharge resources and transportation as appropriate  - Identify discharge learning needs (meds, wound care, etc.)  - Arrange for interpretive services to assist at discharge as needed  - Refer to Case Management Department for coordinating discharge planning if the patient needs post-hospital services based on physician/advanced practitioner order or complex needs related to functional status, cognitive ability, or social support system  Outcome: Progressing     Problem: RESPIRATORY - ADULT  Goal: Achieves optimal ventilation and oxygenation  Description: INTERVENTIONS:  - Assess for changes in respiratory status  - Assess for changes in mentation and behavior  - Position to facilitate oxygenation and minimize respiratory effort  - Oxygen administered by appropriate delivery if ordered  - Initiate smoking cessation education as indicated  - Encourage broncho-pulmonary hygiene including cough, deep breathe, Incentive Spirometry  - Assess the need for suctioning and aspirate as needed  - Assess and instruct to report SOB or any respiratory difficulty  - Respiratory Therapy support as indicated  Outcome: Progressing     Problem: GENITOURINARY - ADULT  Goal: Maintains or returns to baseline urinary function  Description: INTERVENTIONS:  - Assess urinary function  - Encourage oral fluids to ensure adequate hydration if ordered  - Administer IV fluids as ordered to ensure adequate hydration  - Administer ordered medications as needed  - Offer frequent toileting  - Follow urinary retention protocol if ordered  Outcome: Progressing     Problem: METABOLIC, FLUID AND ELECTROLYTES - ADULT  Goal: Electrolytes maintained within normal limits  Description: INTERVENTIONS:  - Monitor labs and assess patient for signs and symptoms of electrolyte imbalances  - Administer electrolyte replacement as ordered  - Monitor response to electrolyte replacements, including repeat lab results as appropriate  - Instruct patient on fluid and nutrition as appropriate  Outcome: Progressing  Goal: Fluid balance maintained  Description: INTERVENTIONS:  - Monitor labs   - Monitor I/O and WT  - Instruct patient on fluid and nutrition as appropriate  - Assess for signs & symptoms of volume excess or deficit  Outcome: Progressing  Goal: Glucose maintained within target range  Description: INTERVENTIONS:  - Monitor Blood Glucose as ordered  - Assess for signs and symptoms of hyperglycemia and hypoglycemia  - Administer ordered medications to maintain glucose within target range  - Assess nutritional intake and initiate nutrition service referral as needed  Outcome: Progressing     Problem: Nutrition/Hydration-ADULT  Goal: Nutrient/Hydration intake appropriate for improving, restoring or maintaining nutritional needs  Description: Monitor and assess patient's nutrition/hydration status for malnutrition. Collaborate with interdisciplinary team and initiate plan and interventions as ordered. Monitor patient's weight and dietary intake as ordered or per policy. Utilize nutrition screening tool and intervene as necessary. Determine patient's food preferences and provide high-protein, high-caloric foods as appropriate.      INTERVENTIONS:  - Monitor oral intake, urinary output, labs, and treatment plans  - Assess nutrition and hydration status and recommend course of action  - Evaluate amount of meals eaten  - Assist patient with eating if necessary   - Allow adequate time for meals  - Recommend/ encourage appropriate diets, oral nutritional supplements, and vitamin/mineral supplements  - Order, calculate, and assess calorie counts as needed  - Recommend, monitor, and adjust tube feedings and TPN/PPN based on assessed needs  - Assess need for intravenous fluids  - Provide specific nutrition/hydration education as appropriate  - Include patient/family/caregiver in decisions related to nutrition  Outcome: Progressing

## 2023-07-09 NOTE — PLAN OF CARE
Problem: Potential for Falls  Goal: Patient will remain free of falls  Description: INTERVENTIONS:  - Educate patient/family on patient safety including physical limitations  - Instruct patient to call for assistance with activity   - Consult OT/PT to assist with strengthening/mobility   - Keep Call bell within reach  - Keep bed low and locked with side rails adjusted as appropriate  - Keep care items and personal belongings within reach  - Initiate and maintain comfort rounds  - Make Fall Risk Sign visible to staff  - Offer Toileting every 2 Hours, in advance of need  - Initiate/Maintain bed alarm  - Obtain necessary fall risk management equipment: alarms  - Apply yellow socks and bracelet for high fall risk patients  - Consider moving patient to room near nurses station  7/9/2023 1100 by Quinn Best RN  Outcome: Adequate for Discharge  7/9/2023 1039 by Quinn Best RN  Outcome: Progressing     Problem: MOBILITY - ADULT  Goal: Maintain or return to baseline ADL function  Description: INTERVENTIONS:  -  Assess patient's ability to carry out ADLs; assess patient's baseline for ADL function and identify physical deficits which impact ability to perform ADLs (bathing, care of mouth/teeth, toileting, grooming, dressing, etc.)  - Assess/evaluate cause of self-care deficits   - Assess range of motion  - Assess patient's mobility; develop plan if impaired  - Assess patient's need for assistive devices and provide as appropriate  - Encourage maximum independence but intervene and supervise when necessary  - Involve family in performance of ADLs  - Assess for home care needs following discharge   - Consider OT consult to assist with ADL evaluation and planning for discharge  - Provide patient education as appropriate  7/9/2023 1100 by Quinn Best RN  Outcome: Adequate for Discharge  7/9/2023 1039 by Quinn Best RN  Outcome: Progressing  Goal: Maintains/Returns to pre admission functional level  Description: INTERVENTIONS:  - Perform BMAT or MOVE assessment daily.   - Set and communicate daily mobility goal to care team and patient/family/caregiver. - Collaborate with rehabilitation services on mobility goals if consulted  - Perform Range of Motion 4 times a day. - Reposition patient every 2 hours.   - Dangle patient 4 times a day  - Stand patient 4 times a day  - Ambulate patient 4 times a day  - Out of bed to chair 4 times a day   - Out of bed for meals 3 times a day  - Out of bed for toileting  - Record patient progress and toleration of activity level   7/9/2023 1100 by Edie Bajwa RN  Outcome: Adequate for Discharge  7/9/2023 1039 by Edie Bajwa RN  Outcome: Progressing     Problem: PAIN - ADULT  Goal: Verbalizes/displays adequate comfort level or baseline comfort level  Description: Interventions:  - Encourage patient to monitor pain and request assistance  - Assess pain using appropriate pain scale  - Administer analgesics based on type and severity of pain and evaluate response  - Implement non-pharmacological measures as appropriate and evaluate response  - Consider cultural and social influences on pain and pain management  - Notify physician/advanced practitioner if interventions unsuccessful or patient reports new pain  7/9/2023 1100 by Edie Bajwa RN  Outcome: Adequate for Discharge  7/9/2023 1039 by Edie Bajwa RN  Outcome: Progressing     Problem: INFECTION - ADULT  Goal: Absence or prevention of progression during hospitalization  Description: INTERVENTIONS:  - Assess and monitor for signs and symptoms of infection  - Monitor lab/diagnostic results  - Monitor all insertion sites, i.e. indwelling lines, tubes, and drains  - Monitor endotracheal if appropriate and nasal secretions for changes in amount and color  - [place] appropriate cooling/warming therapies per order  - Administer medications as ordered  - Instruct and encourage patient and family to use good hand hygiene technique  - Identify and instruct in appropriate isolation precautions for identified infection/condition  7/9/2023 1100 by Ivonne Shepherd RN  Outcome: Adequate for Discharge  7/9/2023 1039 by Ivonne Shepherd RN  Outcome: Progressing     Problem: DISCHARGE PLANNING  Goal: Discharge to home or other facility with appropriate resources  Description: INTERVENTIONS:  - Identify barriers to discharge w/patient and caregiver  - Arrange for needed discharge resources and transportation as appropriate  - Identify discharge learning needs (meds, wound care, etc.)  - Arrange for interpretive services to assist at discharge as needed  - Refer to Case Management Department for coordinating discharge planning if the patient needs post-hospital services based on physician/advanced practitioner order or complex needs related to functional status, cognitive ability, or social support system  7/9/2023 1100 by Ivonne Shepherd RN  Outcome: Adequate for Discharge  7/9/2023 1039 by Ivonne Shepherd RN  Outcome: Progressing     Problem: RESPIRATORY - ADULT  Goal: Achieves optimal ventilation and oxygenation  Description: INTERVENTIONS:  - Assess for changes in respiratory status  - Assess for changes in mentation and behavior  - Position to facilitate oxygenation and minimize respiratory effort  - Oxygen administered by appropriate delivery if ordered  - Initiate smoking cessation education as indicated  - Encourage broncho-pulmonary hygiene including cough, deep breathe, Incentive Spirometry  - Assess the need for suctioning and aspirate as needed  - Assess and instruct to report SOB or any respiratory difficulty  - Respiratory Therapy support as indicated  7/9/2023 1100 by Ivonne Shepherd RN  Outcome: Adequate for Discharge  7/9/2023 1039 by Ivonne Shepherd RN  Outcome: Progressing     Problem: GENITOURINARY - ADULT  Goal: Maintains or returns to baseline urinary function  Description: INTERVENTIONS:  - Assess urinary function  - Encourage oral fluids to ensure adequate hydration if ordered  - Administer IV fluids as ordered to ensure adequate hydration  - Administer ordered medications as needed  - Offer frequent toileting  - Follow urinary retention protocol if ordered  7/9/2023 1100 by Sheri Corea RN  Outcome: Adequate for Discharge  7/9/2023 1039 by Sheri Corea RN  Outcome: Progressing     Problem: METABOLIC, FLUID AND ELECTROLYTES - ADULT  Goal: Electrolytes maintained within normal limits  Description: INTERVENTIONS:  - Monitor labs and assess patient for signs and symptoms of electrolyte imbalances  - Administer electrolyte replacement as ordered  - Monitor response to electrolyte replacements, including repeat lab results as appropriate  - Instruct patient on fluid and nutrition as appropriate  7/9/2023 1100 by Sheri Corea RN  Outcome: Adequate for Discharge  7/9/2023 1039 by Sheri Corea RN  Outcome: Progressing  Goal: Fluid balance maintained  Description: INTERVENTIONS:  - Monitor labs   - Monitor I/O and WT  - Instruct patient on fluid and nutrition as appropriate  - Assess for signs & symptoms of volume excess or deficit  7/9/2023 1100 by Sheri Corea RN  Outcome: Adequate for Discharge  7/9/2023 1039 by Sheri Corea RN  Outcome: Progressing  Goal: Glucose maintained within target range  Description: INTERVENTIONS:  - Monitor Blood Glucose as ordered  - Assess for signs and symptoms of hyperglycemia and hypoglycemia  - Administer ordered medications to maintain glucose within target range  - Assess nutritional intake and initiate nutrition service referral as needed  7/9/2023 1100 by Sheri Corea RN  Outcome: Adequate for Discharge  7/9/2023 1039 by Sheri Corea RN  Outcome: Progressing     Problem: Nutrition/Hydration-ADULT  Goal: Nutrient/Hydration intake appropriate for improving, restoring or maintaining nutritional needs  Description: Monitor and assess patient's nutrition/hydration status for malnutrition.  Collaborate with interdisciplinary team and initiate plan and interventions as ordered. Monitor patient's weight and dietary intake as ordered or per policy. Utilize nutrition screening tool and intervene as necessary. Determine patient's food preferences and provide high-protein, high-caloric foods as appropriate.      INTERVENTIONS:  - Monitor oral intake, urinary output, labs, and treatment plans  - Assess nutrition and hydration status and recommend course of action  - Evaluate amount of meals eaten  - Assist patient with eating if necessary   - Allow adequate time for meals  - Recommend/ encourage appropriate diets, oral nutritional supplements, and vitamin/mineral supplements  - Order, calculate, and assess calorie counts as needed  - Recommend, monitor, and adjust tube feedings and TPN/PPN based on assessed needs  - Assess need for intravenous fluids  - Provide specific nutrition/hydration education as appropriate  - Include patient/family/caregiver in decisions related to nutrition  7/9/2023 1100 by Ranjan Antonio RN  Outcome: Adequate for Discharge  7/9/2023 1039 by Ranjan Antonio RN  Outcome: Progressing

## 2023-07-09 NOTE — DISCHARGE INSTR - AVS FIRST PAGE
Please remember to take all medications as directed on your medication list and follow-up with your primary care provider in 1-2 weeks. You are encouraged to keep your med list on your person (in your wallet or purse) and please take your medication list provided in this After Visit Summary to your PCP visit following discharge. Please ask your nurse to show you the medication list and explain when to take your medications. Additionally, we encourage all patient's to take their actual medications with them to their primary care visit! This is to verify you have the proper medications and the proper dosages. Remember, while medications are often listed in your computer record; that may not always be right as mistakes can occur at the pharmacy or in the computer and sometimes old medication bottles can be mistaken for newer medications.     (Note to nursing: please place patient's medication list on top of the AVS.)

## 2023-07-10 ENCOUNTER — TELEPHONE (OUTPATIENT)
Dept: FAMILY MEDICINE CLINIC | Facility: CLINIC | Age: 78
End: 2023-07-10

## 2023-07-10 ENCOUNTER — OFFICE VISIT (OUTPATIENT)
Dept: HEMATOLOGY ONCOLOGY | Facility: CLINIC | Age: 78
End: 2023-07-10

## 2023-07-10 VITALS
TEMPERATURE: 97.3 F | SYSTOLIC BLOOD PRESSURE: 128 MMHG | WEIGHT: 141 LBS | HEIGHT: 60 IN | HEART RATE: 68 BPM | RESPIRATION RATE: 18 BRPM | OXYGEN SATURATION: 98 % | DIASTOLIC BLOOD PRESSURE: 70 MMHG | BODY MASS INDEX: 27.68 KG/M2

## 2023-07-10 DIAGNOSIS — C83.31 DIFFUSE LARGE B-CELL LYMPHOMA OF LYMPH NODES OF NECK (HCC): Primary | ICD-10-CM

## 2023-07-10 DIAGNOSIS — D64.9 NORMOCYTIC ANEMIA: ICD-10-CM

## 2023-07-10 DIAGNOSIS — J18.1 CONSOLIDATION OF RIGHT LOWER LOBE OF LUNG (HCC): ICD-10-CM

## 2023-07-10 NOTE — PROGRESS NOTES
Hematology/Oncology Outpatient Follow-up  Radha Sandoval 69 y.o. female 1945 [id number]    Date:  7/10/2023      Assessment and Plan:  1. Diffuse large 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 [facility])  Patient completed her chemotherapy for her diffuse large B-cell lymphoma March 2019. There is no obvious hint of recurrence based off of today's physical examination and recent imaging studies which were done while she was hospitalized. We will continue to monitor her according to the NCCN guidelines. Did ask her to follow-up in a shorter interval about 4 months to review findings of follow-up CT scan and additional anemia work-up- or should the need arise.    - CBC and differential; Future  - Comprehensive metabolic panel; Future  - LD,Blood; Future  - C-reactive protein; Future  - Sedimentation rate, automated; Future  - CT chest w contrast; Future    2. Normocytic anemia  Patient's baseline hemoglobin tends to run around 11 to 12g; her my occasional anemia is likely due to anemia of chronic disease. Did notice some worsening of her anemia/hemoglobin while she was hospitalized recently however this is likely secondary to sepsis/pneumonia. She denies any obvious bleeding from any site and recently underwent upper and lower endoscopy May 2023. We will continue to monitor closely-additional anemia work-up ordered before her next office visit for completeness sake. - CBC and differential; Future  - Comprehensive metabolic panel; Future  - LD,Blood; Future  - C-reactive protein; Future  - Sedimentation rate, automated; Future  - Iron Panel (Includes Ferritin, Iron Sat%, Iron, and TIBC); Future  - Ferritin; Future  - Folate; Future  - Vitamin B12; Future  - IgG, IgA, IgM; Future  - Protein electrophoresis, serum; Future  - Reticulocytes; Future  - Direct antiglobulin test; Future  - Haptoglobin; Future    3.  Consolidation of right lower lobe of lung [facility])  Was recently hospitalized after she was noted to have findings consistent with pneumonia/right lower lobe consolidation. Radiology recommended 3-month follow-up CT scan to document improvement/resolution which we will order and schedule today.    - CT chest w contrast; Future    HPI:  Patient presents today for a follow-up visit accompanied by her granddaughter who kindly assisted with Cambodian translation per patient's request.  Her granddaughter states that she recently developed worsening low back pain and seemed off therefore she called EMS and had her evaluated in the hospital.  She was admitted 7/5 through 7/9 after she was found to have sepsis due to UTI and pneumonia. She was just discharged yesterday and has 3 days of oral antibiotics remaining. Patient's granddaughter states that she continues to have ongoing left back pain however overall her condition has improved after discharge from the hospital.  She still has mild cough. She did recently undergo upper and lower endoscopy 5/8/2023 which showed mild gastritis and 1 polyp was removed from colonoscopy. Her most recent laboratory studies during the hospital admission 7/8/2023 showed normal white cells and platelets, slightly worsening normocytic anemia from her baseline H&H 10.9/34, MCV 90. Sodium 134, glucose 182, her liver enzymes were slightly elevated AST 65, ALT 71 remaining metabolic panel is appropriate. Magnesium 1.8. CTA dissection protocol chest abdomen pelvis w wo contrast 7/5/23  IMPRESSION:  No thoracic aortic dissection  No pulmonary embolism  Consolidation right lower lobe with associated bronchiectatic changes, evaluate for pneumonia  Follow-up chest CT at 3 months suggested to demonstrate resolution    XR hip/pelv 2-3 vws left if performed 7/8/23  IMPRESSION:  No acute osseous abnormality. Degenerative changes as described.     Oncology History Overview Note   The patient complained about significant sore throat in May which was treated with antibiotics by her PCP in [place] which did not improve her sore throat. She then started to notice significant odynophagia and dysphagia for liquids it is and solid nutrition. Eventually, she had a CT scan of the neck on the 20th of September which showed soft tissue lesion in the left palatine tonsil with abnormal lymph nodes bilaterally in the neck compatible with neoplastic process. She then had a biopsy of the left tonsil/left tonsillectomy on the 25th of September 2018 which was compatible with diffuse large B-cell lymphoma germinal center B phenotype. The immunohistochemical staining came back positive for BCL2, BCL 6 and myc with Ki 67 around 70%. No FISH rearrangements gene study was done for BCL2, BCL 6 are myc translocation. 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 During the hospital course the patient had another biopsy of the left tonsil to better understand the exact aggressiveness of her large B-cell lymphoma. The biopsy on the 20th of November showed large B-cell lymphoma with BCL -2 and C myc level expressed surface a type without the myc or BCL gene rearrangement. Her bone marrow biopsy on the 9th of November was negative for B-cell lymphoma involvement with normal bone marrow trilineage maturation. She was also evaluated with an MRI of the brain during the hospital course which was negative for any hint of lymphoma involvement. PET scan on the 14th of November showed IMPRESSION:  1.  FDG avid left posterior oropharyngeal lesion compatible with malignancy. 2.  FDG avid lymph nodes in the neck and left axilla compatible with malignancy. 3. No FDG avid lymph nodes in the abdomen or pelvis suspicious for malignancy.   4.  Tiny focus of FDG uptake along the skin of the right upper quadrant anterior abdominal wall with mild skin thickening suggested here on CT    Her post treatment PET-CT 3/18/19 was read:  IMPRESSION:  1. No evidence of hypermetabolic malignancy. Deauville score of 1.  2.  Mild patchy FDG uptake in the right upper lobe corresponding to patchy  consolidation. This may be infectious or inflammatory. This has partially improved from the 2/25/2019 chest x-ray. 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 Dose adjusted R-EPOCH 11/21/18 (1 cycle)- switched to RCHOP after repeat biopsy/pathology reviewed  2. R-CHOP started 12/12/18 completed 3/7/19 (5 cycles)             Interval history:    ROS: Review of Systems   Constitutional: Positive for activity change. HENT: Positive for trouble swallowing. Respiratory: Positive for cough. Musculoskeletal: Positive for arthralgias, back pain, gait problem and myalgias. Psychiatric/Behavioral: Positive for confusion, dysphoric mood and sleep disturbance. All other systems reviewed and are negative. Past Medical History:   Diagnosis Date   • Cancer [facility])     Throat   • Chronic pain disorder    • Diabetes mellitus (HCC)    • Diffuse large B cell lymphoma (HCC)    • Dysphagia    • GERD without esophagitis    • HTN (hypertension)    • Hyperlipidemia    • Hypertension    • MI (myocardial infarction) ([address])    • Port-A-Cath in place 07/29/2019   • Thyroid cancer ([address]) 2018       Past Surgical History:   Procedure Laterality Date   • BONE MARROW BIOPSY     • BREAST BIOPSY Left    • CARDIAC CATHETERIZATION N/A 12/9/2022    Procedure: Cardiac catheterization;  Surgeon: Levi Wang MD;  Location: [facility];   Service: Cardiology   • CARDIAC CATHETERIZATION N/A 12/9/2022    Procedure: Cardiac Coronary Angiogram;  Surgeon: Levi Wang MD;  Location: [facility]; Service: Cardiology   • CARDIAC CATHETERIZATION Left 12/9/2022    Procedure: Cardiac Left Heart Cath;  Surgeon: Marco Antonio Sinha MD;  Location: [facility]; Service: Cardiology   • CHOLECYSTECTOMY     • IR PORT PLACEMENT  11/16/2018   • IR PORT REMOVAL  3/22/2021   • OTHER SURGICAL HISTORY      tendor tear repair to right shoulder   • SHOULDER ARTHROSCOPY         Social History     Socioeconomic History   • Marital status:      Spouse name: None   • Number of children: None   • Years of education: None   • Highest education level: None   Occupational History   • None   Tobacco Use   • Smoking status: Never     Passive exposure: Never   • Smokeless tobacco: Never   Vaping Use   • Vaping Use: Never used   Substance and Sexual Activity   • Alcohol use: Never     Comment: 0   • Drug use: No   • Sexual activity: Not Currently     Partners: Male   Other Topics Concern   • None   Social History Narrative   • None     Social Determinants of Health     Financial Resource Strain: Low Risk  (11/17/2021)    Overall Financial Resource Strain (CARDIA)    • Difficulty of Paying Living Expenses: Not very hard   Food Insecurity: No Food Insecurity (12/8/2022)    Hunger Vital Sign    • Worried About Running Out of Food in the Last Year: Never true    • Ran Out of Food in the Last Year: Never true   Transportation Needs: Unknown (12/8/2022)    PRAPARE - Transportation    • Lack of Transportation (Medical): Not on file    • Lack of Transportation (Non-Medical): No   Physical Activity: Not on file   Stress: Stress Concern Present (3/21/2022)    [facility]    • Feeling of Stress :  To some extent   Social Connections: Not on file   Intimate Partner Violence: Not on file   Housing Stability: Low Risk  (12/8/2022)    Housing Stability Vital Sign    • Unable to Pay for Housing in the Last Year: No    • Number of Places Lived in the Last Year: 1 • Unstable Housing in the Last Year: No       Family History   Problem Relation Age of Onset   • Diabetes Mother    • Heart disease Sister    • Hypertension Brother    • Uterine cancer Maternal Grandmother    • Prostate cancer Paternal Grandfather        No Known Allergies      Current Outpatient Medications:   •  Accu-Chek FastClix Lancets MISC, USAR PARA PROBAR AZUCAR EN LA TWAN MANISHA VECES AL JEFFREY, Disp: 102 each, Rfl: 10  •  albuterol (PROVENTIL HFA,VENTOLIN HFA) 90 mcg/act inhaler, Inhale 1 puff every 4 (four) hours as needed for wheezing, Disp: 18 g, Rfl: 10  •  ALPRAZolam (XANAX) 0.25 mg tablet, Take 1 tablet (0.25 mg total) by mouth daily at bedtime as needed for anxiety, Disp: 30 tablet, Rfl: 0  •  amoxicillin-clavulanate (AUGMENTIN) 875-125 mg per tablet, Take 1 tablet by mouth every 12 (twelve) hours for 3 days, Disp: 6 tablet, Rfl: 0  •  aspirin (Aspirin Low Dose) 81 mg chewable tablet, Chew 1 tablet (81 mg total) daily, Disp: 90 tablet, Rfl: 0  •  atorvastatin (LIPITOR) 40 mg tablet, Take 1 tablet (40 mg total) by mouth daily, Disp: 90 tablet, Rfl: 0  •  Blood Glucose Monitoring Suppl (OneTouch Verio) w/Device KIT, Use per  guidelines, Disp: 1 kit, Rfl: 0  •  Calcium Carb-Cholecalciferol 600-10 MG-MCG TABS, TOME JOHNNY TABLETA DOS VECES AL JEFFREY, Disp: 180 tablet, Rfl: 0  •  carvedilol (COREG) 12.5 mg tablet, TOME JOHNNY TABLETA DOS VECES AL JEFFREY CON ALIMENTO, Disp: 180 tablet, Rfl: 4  •  cyanocobalamin (VITAMIN B-12) 500 MCG tablet, Take 1,000 mcg by mouth daily, Disp: , Rfl:   •  Empagliflozin (Jardiance) 10 MG TABS tablet, Take 1 tablet (10 mg total) by mouth every morning, Disp: 90 tablet, Rfl: 1  •  ezetimibe (ZETIA) 10 mg tablet, TOME JOHNNY TABLETA TODOS LOS ROTH, Disp: 90 tablet, Rfl: 4  •  ferrous sulfate 325 (65 Fe) mg tablet, Take 325 mg by mouth daily with breakfast, Disp: , Rfl:   •  fluticasone (FLONASE) 50 mcg/act nasal spray, 1 spray into each nostril daily, Disp: 16 g, Rfl: 10  • furosemide (LASIX) 20 mg tablet, Take 1 tablet (20 mg total) by mouth daily, Disp: 90 tablet, Rfl: 3  •  gabapentin (NEURONTIN) 100 mg capsule, Take 1 capsule (100 mg total) by mouth 2 (two) times a day, Disp: 60 capsule, Rfl: 5  •  glucose blood (Accu-Chek Guide) test strip, USAR PARA EXAMINAR AZUCAR EN LA TWAN MANISHA VECES AL JEFFREY, Disp: 100 strip, Rfl: 10  •  Insulin Glargine Solostar (Basaglar KwikPen) 100 UNIT/ML SOPN, Inject 0.09 mL (9 Units total) under the skin in the morning (Patient taking differently: Inject 9 Units under the skin in the morning Pt g'dtr reports taking 1xdaily at night.), Disp: 12 mL, Rfl: 1  •  Insulin Pen Needle (BD Pen Needle Micro U/F) 32G X 6 MM MISC, Use daily, Disp: 100 each, Rfl: 1  •  lidocaine (Lidoderm) 5 %, Apply 1 patch topically over 12 hours daily Remove & Discard patch within 12 hours or as directed by MD, Disp: 15 patch, Rfl: 0  •  loperamide (IMODIUM) 2 mg capsule, MAR JOHNNY (1) CAPSULA POR VIA ORAL ALFREDO SEA NECESARIO PARA LA DIARREA, Disp: 30 capsule, Rfl: 10  •  losartan (COZAAR) 25 mg tablet, Take 1 tablet (25 mg total) by mouth daily, Disp: 90 tablet, Rfl: 0  •  Melatonin 5 MG TABS, Take by mouth as needed, Disp: , Rfl:   •  meloxicam (MOBIC) 7.5 mg tablet, Take 1 tablet (7.5 mg total) by mouth daily, Disp: 90 tablet, Rfl: 0  •  memantine (NAMENDA) 10 mg tablet, Take 1 tablet (10 mg total) by mouth 2 (two) times a day, Disp: 60 tablet, Rfl: 3  •  mirtazapine (REMERON) 15 mg tablet, MAR JHONNY (1) TABLETA POR VIA ORAL CADA NOCHE A LA HORA DE ACOSTARSE, Disp: 90 tablet, Rfl: 3  •  omeprazole (PriLOSEC) 40 MG capsule, MAR 1 CAPSULA POR VIA ORAL JOHNNY VEZ AL JEFFREY, Disp: 30 capsule, Rfl: 10  •  ondansetron (ZOFRAN) 4 mg tablet, Take 1 tablet (4 mg total) by mouth every 8 (eight) hours as needed for nausea or vomiting, Disp: 20 tablet, Rfl: 0  •  sitaGLIPtin-metFORMIN (JANUMET)  MG per tablet, Take 1 tablet by mouth 2 (two) times a day with meals, Disp: 60 tablet, Rfl: 5  No current facility-administered medications for this visit. Physical Exam:  /70 (BP Location: Left arm, Patient Position: Sitting, Cuff Size: Adult)   Pulse 68   Temp (!) 97.3 °F (36.3 °C) (Temporal)   Resp 18   Ht 5' (1.524 m)   Wt 64 kg (141 lb)   SpO2 98%   BMI 27.54 kg/m²     Physical Exam  Vitals reviewed. Constitutional:       General: She is not in acute distress. Appearance: She is well-developed. She is not diaphoretic. HENT:      Head: Normocephalic and atraumatic. Eyes:      General: No scleral icterus. Conjunctiva/sclera: Conjunctivae normal.      Pupils: Pupils are equal, round, and reactive to light. Neck:      Thyroid: No thyromegaly. Cardiovascular:      Rate and Rhythm: Normal rate and regular rhythm. Heart sounds: Normal heart sounds. No murmur heard. Pulmonary:      Effort: Pulmonary effort is normal. No respiratory distress. Breath sounds: Examination of the right-lower field reveals decreased breath sounds. Decreased breath sounds present. Abdominal:      General: There is no distension. Palpations: Abdomen is soft. There is no hepatomegaly or splenomegaly. Tenderness: There is no abdominal tenderness. Musculoskeletal:         General: No swelling. Normal range of motion. Cervical back: Normal range of motion and neck supple. Lymphadenopathy:      Cervical: No cervical adenopathy. Upper Body:      Right upper body: No axillary adenopathy. Left upper body: No axillary adenopathy. Skin:     General: Skin is warm and dry. Findings: No erythema or rash. Neurological:      General: No focal deficit present. Mental Status: She is alert and oriented to person, place, and time. Psychiatric:         Mood and Affect: Mood normal. Affect is blunt. Behavior: Behavior normal. Behavior is cooperative. Cognition and Memory: Cognition is impaired.            Labs:  Lab Results   Component Value Date    WBC 8.35 07/08/2023    HGB 10.9 (L) 07/08/2023    HCT 34.0 (L) 07/08/2023    MCV 90 07/08/2023     07/08/2023         Patient voiced understanding and agreement in the above discussion. Aware to contact our office with questions/symptoms in the interim. This note has been generated by voice recognition software system. Therefore, there may be spelling, grammar, and or syntax errors. Please contact if questions arise.

## 2023-07-10 NOTE — TELEPHONE ENCOUNTER
Please schedule TCM appointment. Verify how patient is feeling and if they are in need of anything before their visit. Please send appt date and patient questions/concerns to Sunday Ro to complete TCM.

## 2023-07-10 NOTE — TELEPHONE ENCOUNTER
first attempt to contact patient. left message to return my call on answering machine   If pt returns phone call, please assist with scheduling TCM appt. Thank you.

## 2023-07-10 NOTE — UTILIZATION REVIEW
NOTIFICATION OF ADMISSION DISCHARGE   This is a Notification of Discharge from [address] [place] [address]. Please be advised that this patient has been discharge from our facility. Below you will find the admission and discharge date and time including the patient’s disposition. UTILIZATION REVIEW CONTACT:  Marylin Barthel, MA  Utilization   Network Utilization Review Department  Phone: [phone] x carefully listen to the prompts. All voicemails are confidential.  Email: [email or web address]     ADMISSION INFORMATION  PRESENTATION DATE: 7/5/2023  1:17 PM  OBERVATION ADMISSION DATE:   INPATIENT ADMISSION DATE: 7/5/23  6:18 PM   DISCHARGE DATE: 7/9/2023  1:56 PM   DISPOSITION:Home/Self Care    IMPORTANT INFORMATION:  Send all requests for admission clinical reviews, approved or denied determinations and any other requests to dedicated fax number below belonging to the campus where the patient is receiving treatment.  List of dedicated fax numbers:  [place] DENIALS (Administrative/Medical Necessity) [phone] [address] [facility] [phone]   [facility] [phone]   [facility] [phone] [address] [facility] [phone]   [address] [place] [address] [phone]   [facility] [phone]   [address] [place] [address] [address] [place] [address] [phone]   [address] [facility] [phone] [address] [facility] [phone] [address] [facility] [address] [facility] [phone]

## 2023-07-11 ENCOUNTER — PATIENT OUTREACH (OUTPATIENT)
Dept: FAMILY MEDICINE CLINIC | Facility: CLINIC | Age: 78
End: 2023-07-11

## 2023-07-11 DIAGNOSIS — Z92.21 STATUS POST CHEMOTHERAPY: ICD-10-CM

## 2023-07-11 DIAGNOSIS — T45.1X5A CHEMOTHERAPY-INDUCED PERIPHERAL NEUROPATHY (HCC): Chronic | ICD-10-CM

## 2023-07-11 DIAGNOSIS — F41.8 ANXIETY ABOUT HEALTH: ICD-10-CM

## 2023-07-11 DIAGNOSIS — G62.0 CHEMOTHERAPY-INDUCED PERIPHERAL NEUROPATHY (HCC): Chronic | ICD-10-CM

## 2023-07-11 RX ORDER — GABAPENTIN 100 MG/1
100 CAPSULE ORAL 2 TIMES DAILY
Qty: 60 CAPSULE | Refills: 5 | Status: CANCELLED | OUTPATIENT
Start: 2023-07-11

## 2023-07-11 NOTE — PROGRESS NOTES
I called Shawn Jay to follow up on the patient after her recent 4 day hospitalization for hyponatremia and pneumonia. Shawn Jay states the patient is feeling better and happy to be home. Shawn Jay reports the patient continues with slight pain of her right hip area. She has been giving the patient Tylenol q8h prn along with a pain patch providing some relief and notes the patient has been walking. Shawn Jay states the patient continues with a slight cough and notes today is the patient's last day of antibiotics. Shawn Jay notes the patient has been eating and drinking without issue. She reports the patient's weight is stable, 140, and that her blood sugars have remained stable. Holyokelizabeth Jay is requesting med refills; submitted to PCP. Shawn Jay is aware of the patient's Sleep Med appointment 7/13. I will continue to follow. Chart reviewed.

## 2023-07-12 ENCOUNTER — OFFICE VISIT (OUTPATIENT)
Dept: FAMILY MEDICINE CLINIC | Facility: CLINIC | Age: 78
End: 2023-07-12

## 2023-07-12 ENCOUNTER — TELEPHONE (OUTPATIENT)
Dept: FAMILY MEDICINE CLINIC | Facility: CLINIC | Age: 78
End: 2023-07-12

## 2023-07-12 ENCOUNTER — TRANSITIONAL CARE MANAGEMENT (OUTPATIENT)
Dept: FAMILY MEDICINE CLINIC | Facility: CLINIC | Age: 78
End: 2023-07-12

## 2023-07-12 VITALS
TEMPERATURE: 97.5 F | SYSTOLIC BLOOD PRESSURE: 152 MMHG | WEIGHT: 140 LBS | HEIGHT: 60 IN | OXYGEN SATURATION: 96 % | BODY MASS INDEX: 27.48 KG/M2 | HEART RATE: 75 BPM | DIASTOLIC BLOOD PRESSURE: 74 MMHG | RESPIRATION RATE: 17 BRPM

## 2023-07-12 DIAGNOSIS — M19.90 ARTHRITIS: ICD-10-CM

## 2023-07-12 DIAGNOSIS — J18.9 PNEUMONIA OF RIGHT LOWER LOBE DUE TO INFECTIOUS ORGANISM: Primary | ICD-10-CM

## 2023-07-12 DIAGNOSIS — R11.0 NAUSEA: ICD-10-CM

## 2023-07-12 DIAGNOSIS — F03.90 DEMENTIA WITHOUT BEHAVIORAL DISTURBANCE (HCC): ICD-10-CM

## 2023-07-12 PROCEDURE — 99496 TRANSJ CARE MGMT HIGH F2F 7D: CPT | Performed by: FAMILY MEDICINE

## 2023-07-12 RX ORDER — GUAIFENESIN 600 MG/1
1200 TABLET, EXTENDED RELEASE ORAL EVERY 12 HOURS SCHEDULED
Qty: 30 TABLET | Refills: 1 | Status: SHIPPED | OUTPATIENT
Start: 2023-07-12

## 2023-07-12 RX ORDER — MEMANTINE HYDROCHLORIDE 10 MG/1
TABLET ORAL
Qty: 60 TABLET | Refills: 10 | Status: SHIPPED | OUTPATIENT
Start: 2023-07-12

## 2023-07-12 RX ORDER — LIDOCAINE 50 MG/G
1 PATCH TOPICAL DAILY
Qty: 15 PATCH | Refills: 0 | Status: SHIPPED | OUTPATIENT
Start: 2023-07-12

## 2023-07-12 RX ORDER — ONDANSETRON 4 MG/1
4 TABLET, FILM COATED ORAL EVERY 8 HOURS PRN
Qty: 20 TABLET | Refills: 0 | Status: SHIPPED | OUTPATIENT
Start: 2023-07-12

## 2023-07-12 NOTE — PATIENT INSTRUCTIONS
Cómo leer la presión arterial   LO QUE NECESITA SABER:   La presión arterial es la fuerza de la ainsley empujando contra las ansari de las arterias. Los resultados de la presión arterial se escriben yoni 2 números. El micheal, o número de Pending [facility], se llama presión arterial sistólica. Esta es la presión causada por el corazón que empuja la ainsley hacia el cuerpo. El [facility], o número de [place], es la presión arterial diastólica. Esta es la presión cuando el corazón se relaja y se llena de [place]. Pregúntele a wu médico cuál debe ser wu presión arterial. En la mayoría de las personas, shamar buena meta de presión arterial es menos de 140/90. INSTRUCCIONES SOBRE EL NATALIE HOSPITALARIA:   Llame a wu médico si:  Wu presión arterial está más elevada o más baja de lo que se le johnson indicado que debe estar. Usted tiene preguntas o inquietudes acerca de wu condición o cuidado. Por qué debe medirse la presión arterial: Es posible que usted no tenga ningún signo o síntoma de presión arterial natalie. Es posible que necesite medirse la presión arterial regularmente para saber con qué frecuencia es natalie. La presión arterial natalie aumenta wu riesgo de derrame cerebral, ataque cardíaco o enfermedad renal. Es posible que necesite joshua un medicamento para mantener la presión arterial a un nivel normal. Anote y lleve un registro de las lecturas de wu presión arterial. Wu médico puede utilizar los DORROUGHBY para marylu si diane medicamentos para la presión arterial están funcionando. Frecuencia que debe medir wu presión arterial: Wu médico le puede recomendar que usted se mida la presión arterial al menos 2 veces al día. Reuben la medición a la misma hora todos los días, por ejemplo shamar en la mañana y la otra en la noche. Cómo joshua las lecturas de la presión arterial: Usted puede medirse la presión arterial en casa con un monitor digital para la presión arterial. Se recomienda leer las instrucciones que vienen con el tensiómetro.  El monitor viene con un brazalete ajustable. Pregunte a zuniga médico si el brazalete es del tamaño correcto. No coma, yannick, fume o kendra ejercicio kyung 30 minutos antes de medirse la presión arterial.    Descanse por 5 minutos antes de comenzar. No hable mientras se mide la presión arterial.    Siéntese con los pies planos en el piso y la espalda contra shamar silla. Extienda zuniga brazo y apóyelo en shamar superficie plana. El brazo debe estar al nivel del pecho. No mueva el brazo mientras se mide la presión arterial.    Asegúrese de que el manguito esté totalmente desinflado. Coloque el manguito de presión arterial contra la piel desnuda a aproximadamente 1 pulgada (2.5 cm) por encima del codo. Coloque la jhaveri alrededor del brazo de modo que [place]. La lectura de la presión arterial podría ser incorrecta si la abrazadera está muy floja o suelta. Si está usando shamar Srinivas, envuelva el brazalete cómodamente alrededor de la [place]. Mantenga la [place] en el mismo nivel que zuniga corazón. Encienda el monitor de la presión arterial y [address] instrucciones. Anote el valor de zuniga presión arterial, la fecha, la hora y en qué brazo se midió la presión. Mídase la presión arterial 2 veces y escriba ambas lecturas. Use el mismo brazo cada vez. Estas lecturas pueden ser tomadas con 1 minuto de diferencia. Renzo Arriaga necesita saber:  No tome la presión arterial en un brazo lesionado, o si tiene shamar sonda intravenosa o shamar derivación. Es probable que la lectura no sea correcta. No deje de joshua diane medicamentos si zuniga presión arterial está en zuniga valor objetivo. Si tiene la presión arterial en el valor objetivo significa que el medicamento está funcionando correctamente. Tómese diane medicamentos para la presión arterial yoni se le indique.     Acuda a la consulta de control con zuniga médico según las indicaciones: Lleve el registro de diane lecturas de presión arterial. Lleve también el aparato para medir la presión arterial. Los médicos pueden comprobar que usted está usando el aparato correctamente. Anote diane preguntas para que se acuerde de hacerlas kyung diane visitas. © Copyright Lenny Orozco 2022 Information is for End User's use only and may not be sold, redistributed or otherwise used for commercial purposes. Esta información es sólo para uso en educación. Zuniga intención no es darle un consejo médico sobre enfermedades o tratamientos. Colsulte con zuniga Ivonne [facility] farmacéutico antes de seguir cualquier régimen médico para saber si es seguro y efectivo para usted.

## 2023-07-12 NOTE — PROGRESS NOTES
.  Transition of Care  Follow-up After Hospitalization    Kitty Fabry SantiagoFeliciano 69 y.o. female   Date:  7/12/2023    TCM Call     Date and time call was made  7/12/2023  2:59 PM    Hospital care reviewed  Records reviewed    Patient was hospitialized at  [facility] [place] - [facility]OS[facility]    Date of Admission  07/05/23    Date of discharge  07/09/23    Diagnosis  hyponatremia    Disposition  Home    Were the patients medications reviewed and updated  No    Current Symptoms  None    Neausea severity  Moderate      TCM Call     Should patient be enrolled in anticoag monitoring? No    Scheduled for follow up? Yes    Patients specialists  Other (comment)    Other specialists names  [facility] Noreen PEGUERO SCHEDULE FOR 12/6/18 11AM    Did you obtain your prescribed medications  Yes    Do you need help managing your prescriptions or medications  No    Is transportation to your appointment needed  No    I have advised the patient to call PCP with any new or worsening symptoms  emily Gomez    Living Arrangements  Family members    Counseling  Patient    Comments  I SPOKE WITH PT'S [address] records were reviewed. Medications upon discharge reviewed/updated. Discharge Disposition:  stable  Follow up visits with other specialists: none    Patient presents with daughter who requests to provide translation. Assessment and Plan:    Kitty Fabry was seen today for transition of care management. Diagnoses and all orders for this visit:    Pneumonia of right lower lobe due to infectious organism  -     guaiFENesin (MUCINEX) 600 mg 12 hr tablet; Take 2 tablets (1,200 mg total) by mouth every 12 (twelve) hours    Nausea  -     ondansetron (ZOFRAN) 4 mg tablet; Take 1 tablet (4 mg total) by mouth every 8 (eight) hours as needed for nausea or vomiting    Arthritis  -     lidocaine (Lidoderm) 5 %;  Apply 1 patch topically over 12 hours daily Remove & Discard patch within 12 hours or as directed by MD        Hyponatremia  Patient presented with sodium of 122, unclear etiology. SiADH suspected in the setting of pneumonia. Sodium levels improved prior to discharge to 133. Repeat CMP ordered, patient/caregiver reminded to have lab work completed. Patient remains on 1.5 L water restriction. Right lower lobe pneumonia  Patient presented with 2 week duration of cough; subsequent CT scan revealed consolidation of right lower lobe. Labs significant for leukocytosis, treated with Rocephin x 4 days. Currently denies fever however continues to have cough. Discharged home on 3 days worth of Augmentin - reports compliance. Recommendation for repeat CT scan in 3-6 months - patient and patient's daughter aware. Advised Mucinex and hot tea with honey. Anemia  Baseline hemoglobin ~ 11-12. Hgb prior to discharge stable at 10.9. Repeat CBC ordered by hospitalist - have reminded patient to complete lab work. Acute cystitis without hematuria  Patient presented to hospital with LLQ and suprapubic pain and increased urinary frequency. Culture positive for Group B strep. Patient treated with Rocephin. Currently denies any urinary symptoms. Dementia with behavioral disturbance  Continue memantine. Patient is following with geriatric provider, last visit in May, visits occur annually. Have advised daughter to book appointment sooner if she observes worsening cognitive decline (memory deficits, AMS). Obstructive sleep apnea  Patient has appointment with sleep medicine tomorrow for adjustment of CPAP     Current mild episode of major depressive disorder without prior episode [facility])  Patient denies any plan to hurt herself - has ample support at home. Continue mirtazapine. 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 Followed by heme/onc. Continue monitoring.      Gastroesophageal reflux disease  Symptoms well controlled on PPI - continue. Essential hypertension  BP today slightly above goal at 152/74. Goal is <150/90. Patient's daughter reports she takes her blood pressure at home and it is within goal. Have advised continued home monitoring. Continue home regimen: losartan, carvedilol. 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 Continue Janumet BID. HPI:  This is a very pleasant 69 y.o. female who presents to the clinic  for transition of care management. She was admitted to hospital on 7/6/23 for hyponatremia and pneumonia and discharged on 7/9/23. Patient was treated with IV abx for urinary symptoms and pneumonia. Nephrology consulted for hyponatremia of unknown origin. Patient was treated with normal saline and D5 with improvement of sodium levels. Patient currently denies dizziness, chest pain, dyspnea, fever, chills, n/v/d/c.        ROS: Review of Systems   Constitutional: Negative for chills and fever. HENT: Negative for congestion, ear pain, rhinorrhea and sinus pain. Eyes: Negative for visual disturbance. Respiratory: Negative for chest tightness, shortness of breath and wheezing. Cardiovascular: Negative for chest pain and palpitations. Gastrointestinal: Negative for abdominal pain, constipation, diarrhea and vomiting. Endocrine: Negative for polyuria. Genitourinary: Negative for dysuria. Musculoskeletal: Negative for arthralgias and myalgias. Neurological: Negative for dizziness, syncope and light-headedness. Psychiatric/Behavioral: Negative for hallucinations, self-injury and suicidal ideas.        Past Medical History:   Diagnosis Date   • Cancer ([address])     Throat   • Chronic pain disorder    • Diabetes mellitus (HCC)    • Diffuse large B cell lymphoma (HCC)    • Dysphagia    • GERD without esophagitis    • HTN (hypertension)    • Hyperlipidemia    • Hypertension    • MI (myocardial infarction) ([address])    • Port-A-Cath in place 07/29/2019   • Thyroid cancer ([address]) 2018       Past Surgical History:   Procedure Laterality Date   • BONE MARROW BIOPSY     • BREAST BIOPSY Left    • CARDIAC CATHETERIZATION N/A 12/9/2022    Procedure: Cardiac catheterization;  Surgeon: Levi Wang MD;  Location: [facility]; Service: Cardiology   • CARDIAC CATHETERIZATION N/A 12/9/2022    Procedure: Cardiac Coronary Angiogram;  Surgeon: Levi Wang MD;  Location: [facility]; Service: Cardiology   • CARDIAC CATHETERIZATION Left 12/9/2022    Procedure: Cardiac Left Heart Cath;  Surgeon: Levi Wang MD;  Location: [facility]; Service: Cardiology   • CHOLECYSTECTOMY     • IR PORT PLACEMENT  11/16/2018   • IR PORT REMOVAL  3/22/2021   • OTHER SURGICAL HISTORY      tendor tear repair to right shoulder   • SHOULDER ARTHROSCOPY         Social History     Socioeconomic History   • Marital status:      Spouse name: None   • Number of children: None   • Years of education: None   • Highest education level: None   Occupational History   • None   Tobacco Use   • Smoking status: Never     Passive exposure: Never   • Smokeless tobacco: Never   Vaping Use   • Vaping Use: Never used   Substance and Sexual Activity   • Alcohol use: Never     Comment: 0   • Drug use: No   • Sexual activity: Not Currently     Partners: Male   Other Topics Concern   • None   Social History Narrative   • None     Social Determinants of Health     Financial Resource Strain: Low Risk  (11/17/2021)    Overall Financial Resource Strain (CARDIA)    • Difficulty of Paying Living Expenses: Not very hard   Food Insecurity: No Food Insecurity (12/8/2022)    Hunger Vital Sign    • Worried About Running Out of Food in the Last Year: Never true    • Ran Out of Food in the Last Year: Never true   Transportation Needs: Unknown (12/8/2022)    PRAPARE - Transportation    • Lack of Transportation (Medical):  Not on file    • Lack of Transportation (Non-Medical): No   Physical Activity: Not on file   Stress: Stress Concern Present (3/21/2022)    [address]    • Feeling of Stress :  To some extent   Social Connections: Not on file   Intimate Partner Violence: Not on file   Housing Stability: Low Risk  (12/8/2022)    Housing Stability Vital Sign    • Unable to Pay for Housing in the Last Year: No    • Number of Places Lived in the Last Year: 1    • Unstable Housing in the Last Year: No       Family History   Problem Relation Age of Onset   • Diabetes Mother    • Heart disease Sister    • Hypertension Brother    • Uterine cancer Maternal Grandmother    • Prostate cancer Paternal Grandfather        No Known Allergies      Current Outpatient Medications:   •  guaiFENesin (MUCINEX) 600 mg 12 hr tablet, Take 2 tablets (1,200 mg total) by mouth every 12 (twelve) hours, Disp: 30 tablet, Rfl: 1  •  lidocaine (Lidoderm) 5 %, Apply 1 patch topically over 12 hours daily Remove & Discard patch within 12 hours or as directed by MD, Disp: 15 patch, Rfl: 0  •  ondansetron (ZOFRAN) 4 mg tablet, Take 1 tablet (4 mg total) by mouth every 8 (eight) hours as needed for nausea or vomiting, Disp: 20 tablet, Rfl: 0  •  Accu-Chek FastClix Lancets MISC, USAR PARA PROBAR AZUCAR EN LA TWAN MANISHA VECES AL JEFFREY, Disp: 102 each, Rfl: 10  •  albuterol (PROVENTIL HFA,VENTOLIN HFA) 90 mcg/act inhaler, Inhale 1 puff every 4 (four) hours as needed for wheezing, Disp: 18 g, Rfl: 10  •  ALPRAZolam (XANAX) 0.25 mg tablet, Take 1 tablet (0.25 mg total) by mouth daily at bedtime as needed for anxiety, Disp: 30 tablet, Rfl: 0  •  amoxicillin-clavulanate (AUGMENTIN) 875-125 mg per tablet, Take 1 tablet by mouth every 12 (twelve) hours for 3 days, Disp: 6 tablet, Rfl: 0  •  aspirin (Aspirin Low Dose) 81 mg chewable tablet, Chew 1 tablet (81 mg total) daily, Disp: 90 tablet, Rfl: 0  •  atorvastatin (LIPITOR) 40 mg tablet, Take 1 tablet (40 mg total) by mouth daily, Disp: 90 tablet, Rfl: 0  •  Blood Glucose Monitoring Suppl (OneTouch Verio) w/Device KIT, Use per  guidelines, Disp: 1 kit, Rfl: 0  •  Calcium Carb-Cholecalciferol 600-10 MG-MCG TABS, TOME JOHNNY TABLETA DOS VECES AL JEFFREY, Disp: 180 tablet, Rfl: 0  •  carvedilol (COREG) 12.5 mg tablet, TOME JOHNNY TABLETA DOS VECES AL JEFFREY CON ALIMENTO, Disp: 180 tablet, Rfl: 4  •  cyanocobalamin (VITAMIN B-12) 500 MCG tablet, Take 1,000 mcg by mouth daily, Disp: , Rfl:   •  Empagliflozin (Jardiance) 10 MG TABS tablet, Take 1 tablet (10 mg total) by mouth every morning, Disp: 90 tablet, Rfl: 1  •  ezetimibe (ZETIA) 10 mg tablet, TOME JOHNNY TABLETA TODOS LOS ROTH, Disp: 90 tablet, Rfl: 4  •  ferrous sulfate 325 (65 Fe) mg tablet, Take 325 mg by mouth daily with breakfast, Disp: , Rfl:   •  fluticasone (FLONASE) 50 mcg/act nasal spray, 1 spray into each nostril daily, Disp: 16 g, Rfl: 10  •  furosemide (LASIX) 20 mg tablet, Take 1 tablet (20 mg total) by mouth daily, Disp: 90 tablet, Rfl: 3  •  gabapentin (NEURONTIN) 100 mg capsule, Take 1 capsule (100 mg total) by mouth 2 (two) times a day, Disp: 60 capsule, Rfl: 5  •  glucose blood (Accu-Chek Guide) test strip, USAR PARA EXAMINAR AZUCAR EN LA TWAN MANISHA VECES AL JEFFREY, Disp: 100 strip, Rfl: 10  •  Insulin Glargine Solostar (Basaglar KwikPen) 100 UNIT/ML SOPN, Inject 0.09 mL (9 Units total) under the skin in the morning (Patient taking differently: Inject 9 Units under the skin in the morning Pt g'dtr reports taking 1xdaily at night.), Disp: 12 mL, Rfl: 1  •  Insulin Pen Needle (BD Pen Needle Micro U/F) 32G X 6 MM MISC, Use daily, Disp: 100 each, Rfl: 1  •  loperamide (IMODIUM) 2 mg capsule, MAR JOHNNY (1) CAPSULA POR VIA ORAL ALFREDO SEA NECESARIO PARA LA DIARREA, Disp: 30 capsule, Rfl: 10  •  losartan (COZAAR) 25 mg tablet, Take 1 tablet (25 mg total) by mouth daily, Disp: 90 tablet, Rfl: 0  •  Melatonin 5 MG TABS, Take by mouth as needed, Disp: , Rfl:   •  meloxicam (MOBIC) 7.5 mg tablet, Take 1 tablet (7.5 mg total) by mouth daily, Disp: 90 tablet, Rfl: 0  •  memantine (NAMENDA) 10 mg tablet, MAR 1 TABLETA POR VIA ORAL DOS VECES AL JEFFREY, Disp: 60 tablet, Rfl: 10  •  mirtazapine (REMERON) 15 mg tablet, MAR JOHNNY (1) TABLETA POR VIA ORAL CADA NOCHE A LA HORA DE ACOSTARSE, Disp: 90 tablet, Rfl: 3  •  omeprazole (PriLOSEC) 40 MG capsule, MAR 1 CAPSULA POR VIA ORAL JOHNNY VEZ AL JEFFREY, Disp: 30 capsule, Rfl: 10  •  sitaGLIPtin-metFORMIN (JANUMET)  MG per tablet, Take 1 tablet by mouth 2 (two) times a day with meals, Disp: 60 tablet, Rfl: 5      Physical Exam:  /74 (BP Location: Left arm, Patient Position: Sitting, Cuff Size: Standard)   Pulse 75   Temp 97.5 °F (36.4 °C) (Temporal)   Resp 17   Ht 5' (1.524 m)   Wt 63.5 kg (140 lb)   SpO2 96%   BMI 27.34 kg/m²     Physical Exam  Constitutional:       Appearance: Normal appearance. HENT:      Head: Normocephalic and atraumatic. Nose: Nose normal.   Eyes:      Conjunctiva/sclera: Conjunctivae normal.   Cardiovascular:      Rate and Rhythm: Normal rate. Pulmonary:      Effort: Pulmonary effort is normal.   Musculoskeletal:         General: Normal range of motion. Cervical back: Normal range of motion. Skin:     General: Skin is warm and dry. Neurological:      Mental Status: She is alert and oriented to person, place, and time. Mental status is at baseline.    Psychiatric:         Behavior: Behavior normal.         Labs:  Lab Results   Component Value Date    WBC 8.35 07/08/2023    HGB 10.9 (L) 07/08/2023    HCT 34.0 (L) 07/08/2023    MCV 90 07/08/2023     07/08/2023     Lab Results   Component Value Date    K 4.3 07/08/2023     07/08/2023    CO2 27 07/08/2023    BUN 19 07/08/2023    CREATININE 0.61 07/08/2023    GLUF 134 (H) 12/20/2022    CALCIUM 9.2 07/08/2023    CORRECTEDCA 8.5 07/06/2023    AST 65 (H) 07/08/2023    ALT 71 (H) 07/08/2023    ALKPHOS 82 07/08/2023    EGFR 88 07/08/2023

## 2023-07-13 ENCOUNTER — OFFICE VISIT (OUTPATIENT)
Dept: SLEEP CENTER | Facility: CLINIC | Age: 78
End: 2023-07-13
Payer: COMMERCIAL

## 2023-07-13 VITALS
SYSTOLIC BLOOD PRESSURE: 105 MMHG | DIASTOLIC BLOOD PRESSURE: 62 MMHG | BODY MASS INDEX: 27.52 KG/M2 | WEIGHT: 140.2 LBS | HEIGHT: 60 IN | HEART RATE: 75 BPM

## 2023-07-13 DIAGNOSIS — Z51.5 PALLIATIVE CARE PATIENT: ICD-10-CM

## 2023-07-13 DIAGNOSIS — T45.1X5A CHEMOTHERAPY-INDUCED PERIPHERAL NEUROPATHY: Chronic | ICD-10-CM

## 2023-07-13 DIAGNOSIS — Z92.21 STATUS POST CHEMOTHERAPY: ICD-10-CM

## 2023-07-13 DIAGNOSIS — G47.33 OBSTRUCTIVE SLEEP APNEA: Primary | ICD-10-CM

## 2023-07-13 DIAGNOSIS — G62.0 CHEMOTHERAPY-INDUCED PERIPHERAL NEUROPATHY: Chronic | ICD-10-CM

## 2023-07-13 PROCEDURE — 99214 OFFICE O/P EST MOD 30 MIN: CPT | Performed by: PHYSICIAN ASSISTANT

## 2023-07-13 RX ORDER — GABAPENTIN 100 MG/1
100 CAPSULE ORAL 2 TIMES DAILY
Qty: 60 CAPSULE | Refills: 5 | Status: SHIPPED | OUTPATIENT
Start: 2023-07-13 | End: 2023-09-08

## 2023-07-13 RX ORDER — MELOXICAM 7.5 MG/1
TABLET ORAL
Qty: 30 TABLET | Refills: 10 | OUTPATIENT
Start: 2023-07-13

## 2023-07-13 NOTE — ASSESSMENT & PLAN NOTE
Has a history of moderate obstructive sleep apnea. She did not begin using her CPAP again. [facility] reprogrammed her CPAP to a setting of 5 which was recommended based on a CPAP study. She is agreeable to restarting her CPAP tonight. We will see her back in 2 to 3 months to review compliance and effectiveness of treatment. I did advise her granddaughter to still sign up for the [place] [facility] recall as her machine is recalled. I did advise it is likely her machine is safe for use as they do not use ozone . Patient and her granddaughter are fine with using her old machine until she receives a new one through [employer].

## 2023-07-13 NOTE — PATIENT INSTRUCTIONS
Were going to reset your CPAP machine to a setting of 5, which is where you were at previously. I would like you to start using your CPAP nightly. I would still recommend getting a replacement machine through [employer] because your machine is recalled. Since she did not use ozone  and only use water and vinegar to clean your machine it is likely safe for use. I will follow-up with you in 3 months to review compliance and effectiveness of treatment. Nursing Support:  When: Monday through Friday 7A-5PM except holidays  Where: Our direct line is [phone]. If you are having a true emergency please call 911. In the event that the line is busy or it is after hours please leave a voice message and we will return your call. Please speak clearly, leaving your full name, birth date, best number to reach you and the reason for your call. Medication refills: We will need the name of the medication, the dosage, the ordering provider, whether you get a 30 or 90 day refill, and the pharmacy name and address. Medications will be ordered by the provider only. Nurses cannot call in prescriptions. Please allow 7 days for medication refills. Physician requested updates: If your provider requested that you call with an update after starting medication, please be ready to provide us the medication and dosage, what time you take your medication, the time you attempt to fall asleep, time you fall asleep, when you wake up, and what time you get out of bed. Sleep Study Results: We will contact you with sleep study results and/or next steps after the physician has reviewed your testing.

## 2023-07-13 NOTE — PROGRESS NOTES
Progress Note - [address] [facility] 69 y.o. female   :1945, MRN: [id number]  2023          Follow Up Evaluation / Problem:     Obstructive Sleep Apnea  PLMD    Thank you for the opportunity of participating in the evaluation and care of this patient in the Sleep Clinic at [address] [place] [address]. HPI: Haley Walker is a 69y.o. year old female presents with her granddaughter today who is interpreting. She presents for follow up of obstructive sleep apnea. She completed a diagnostic sleep study in , which confirmed moderate obstructive sleep apnea. Jacqulyne Lipschutz was 27.1, worsening to 42.9 in REM sleep.  Oxygen delfina was 80%, with 10 minutes of the study spent at oxygen levels less than 90%. She began use of auto-titrating CPAP. She then stopped using auto CPAP due to the fact she did not feel it was working well for her. She then had a CPAP study completed on 2022 which showed 5 cm of H2O to be an effective treatment for sleep apnea. She was also noted to have periodic limb movement disorder. An iron panel, vitamin B12 and vitamin D were ordered. Ferritin was noted to be 44, B12 and vitamin D were normal.  She presents today to review compliance and effectiveness of treatment after resuming CPAP therapy.         Current Outpatient Medications:   •  Accu-Chek FastClix Lancets MISC, USAR PARA PROBAR AZUCAR EN LA TWAN MANISHA VECES AL JEFFREY, Disp: 102 each, Rfl: 10  •  albuterol (PROVENTIL HFA,VENTOLIN HFA) 90 mcg/act inhaler, Inhale 1 puff every 4 (four) hours as needed for wheezing, Disp: 18 g, Rfl: 10  •  ALPRAZolam (XANAX) 0.25 mg tablet, Take 1 tablet (0.25 mg total) by mouth daily at bedtime as needed for anxiety, Disp: 30 tablet, Rfl: 0  •  aspirin (Aspirin Low Dose) 81 mg chewable tablet, Chew 1 tablet (81 mg total) daily, Disp: 90 tablet, Rfl: 0  •  atorvastatin (LIPITOR) 40 mg tablet, Take 1 tablet (40 mg total) by mouth daily, Disp: 90 tablet, Rfl: 0  •  Blood Glucose Monitoring Suppl (OneTouch Verio) w/Device KIT, Use per  guidelines, Disp: 1 kit, Rfl: 0  •  Calcium Carb-Cholecalciferol 600-10 MG-MCG TABS, TOME JOHNNY TABLETA DOS VECES AL JEFFREY, Disp: 180 tablet, Rfl: 0  •  carvedilol (COREG) 12.5 mg tablet, TOME JOHNNY TABLETA DOS VECES AL JEFFREY CON ALIMENTO, Disp: 180 tablet, Rfl: 4  •  cyanocobalamin (VITAMIN B-12) 500 MCG tablet, Take 1,000 mcg by mouth daily, Disp: , Rfl:   •  Empagliflozin (Jardiance) 10 MG TABS tablet, Take 1 tablet (10 mg total) by mouth every morning, Disp: 90 tablet, Rfl: 1  •  ezetimibe (ZETIA) 10 mg tablet, TOME JOHNNY TABLETA TODOS LOS ROTH, Disp: 90 tablet, Rfl: 4  •  ferrous sulfate 325 (65 Fe) mg tablet, Take 325 mg by mouth daily with breakfast, Disp: , Rfl:   •  fluticasone (FLONASE) 50 mcg/act nasal spray, 1 spray into each nostril daily, Disp: 16 g, Rfl: 10  •  furosemide (LASIX) 20 mg tablet, Take 1 tablet (20 mg total) by mouth daily, Disp: 90 tablet, Rfl: 3  •  gabapentin (NEURONTIN) 100 mg capsule, Take 1 capsule (100 mg total) by mouth 2 (two) times a day, Disp: 60 capsule, Rfl: 5  •  glucose blood (Accu-Chek Guide) test strip, USAR PARA EXAMINAR AZUCAR EN LA TWAN MANISHA VECES AL JEFFREY, Disp: 100 strip, Rfl: 10  •  guaiFENesin (MUCINEX) 600 mg 12 hr tablet, Take 2 tablets (1,200 mg total) by mouth every 12 (twelve) hours, Disp: 30 tablet, Rfl: 1  •  Insulin Glargine Solostar (Basaglar KwikPen) 100 UNIT/ML SOPN, Inject 0.09 mL (9 Units total) under the skin in the morning (Patient taking differently: Inject 9 Units under the skin in the morning Pt g'dtr reports taking 1xdaily at night.), Disp: 12 mL, Rfl: 1  •  Insulin Pen Needle (BD Pen Needle Micro U/F) 32G X 6 MM MISC, Use daily, Disp: 100 each, Rfl: 1  •  lidocaine (Lidoderm) 5 %, Apply 1 patch topically over 12 hours daily Remove & Discard patch within 12 hours or as directed by MD, Disp: 15 patch, Rfl: 0  •  loperamide (IMODIUM) 2 mg capsule, MAR JOHNNY (1) CAPSULA POR VIA ORAL ALFREDO SEA NECESARIO PARA LA DIARREA, Disp: 30 capsule, Rfl: 10  •  losartan (COZAAR) 25 mg tablet, Take 1 tablet (25 mg total) by mouth daily, Disp: 90 tablet, Rfl: 0  •  Melatonin 5 MG TABS, Take by mouth as needed, Disp: , Rfl:   •  meloxicam (MOBIC) 7.5 mg tablet, Take 1 tablet (7.5 mg total) by mouth daily, Disp: 90 tablet, Rfl: 0  •  memantine (NAMENDA) 10 mg tablet, MAR 1 TABLETA POR VIA ORAL DOS VECES AL JEFFREY, Disp: 60 tablet, Rfl: 10  •  mirtazapine (REMERON) 15 mg tablet, MAR JOHNNY (1) TABLETA POR VIA ORAL CADA NOCHE A LA HORA DE ACOSTARSE, Disp: 90 tablet, Rfl: 3  •  omeprazole (PriLOSEC) 40 MG capsule, MAR 1 CAPSULA POR VIA ORAL JOHNNY VEZ AL JEFFREY, Disp: 30 capsule, Rfl: 10  •  ondansetron (ZOFRAN) 4 mg tablet, Take 1 tablet (4 mg total) by mouth every 8 (eight) hours as needed for nausea or vomiting, Disp: 20 tablet, Rfl: 0  •  sitaGLIPtin-metFORMIN (JANUMET)  MG per tablet, Take 1 tablet by mouth 2 (two) times a day with meals, Disp: 60 tablet, Rfl: 5    How likely are you to doze off or fall asleep in the following situations, in contrast to feeling just tired? Sitting and reading: Moderate chance of dozing  Watching TV: Moderate chance of dozing  Sitting, inactive in a public place (e.g. a theatre or a meeting): Slight chance of dozing  As a passenger in a car for an hour without a break:  Moderate chance of dozing  Lying down to rest in the afternoon when circumstances permit: Slight chance of dozing  Sitting and talking to someone: Would never doze  Sitting quietly after a lunch without alcohol: Slight chance of dozing  In a car, while stopped for a few minutes in traffic: Slight chance of dozing  Total score: 10              Vitals:    07/13/23 1145   BP: 105/62   BP Location: Left arm   Patient Position: Sitting   Cuff Size: Adult   Pulse: 75   Weight: 63.6 kg (140 lb 3.2 oz)   Height: 5' (1.524 m)       Body mass index is 27.38 kg/m².            EPWORTH SLEEPINESS SCORE  Total score: 10      Past History Since Last Sleep Center Visit:     Did not began using her CPAP machine as she was pushing buttons and was afraid that she change the settings. She was hospitalized on July 5 for 4 days for pneumonia and hyponatremia. She used CPAP while she was in the hospital and slept well with it. The patient reports that she cleans the equipment appropriately and changes supplies on a regular basis. The review of systems and following portions of the patient's history were reviewed and updated as appropriate: allergies, current medications, past family history, past medical history, past social history, past surgical history, and problem list.        OBJECTIVE  Equipment set up date: June 2020, [employer] Dream[employer] 1 which was recalled  PAP Pressure: Nasal CPAP set to deliver 5 cm of water pressure  Type of mask used: full face  DME Provider: [employer]      Physical Exam:     General Appearance:   Alert, cooperative, no distress, appears stated age     Head:   Normocephalic, without obvious abnormality, atraumatic     Eyes:  conjunctiva/corneas clear          Nose:  Nares normal, septum midline, no drainage or sinus tenderness               Lungs:      Clear to auscultation bilaterally, respirations unlabored     Heart:   Regular rate and rhythm, S1 and S2 normal, no murmur, rub or gallop       Extremities:  Extremities normal, atraumatic, no cyanosis or edema       Skin:  Skin color, texture, turgor normal, no rashes or lesions               ASSESSMENT / PLAN    1. Obstructive sleep apnea  Assessment & Plan:  Has a history of moderate obstructive sleep apnea. She did not begin using her CPAP again. [facility] reprogrammed her CPAP to a setting of 5 which was recommended based on a CPAP study. She is agreeable to restarting her CPAP tonight. We will see her back in 2 to 3 months to review compliance and effectiveness of treatment.   I did advise her granddaughter to still sign up for the [place] [employer] recall as her machine is recalled. I did advise it is likely her machine is safe for use as they do not use ozone . Patient and her granddaughter are fine with using her old machine until she receives a new one through [employer]. Counseling / Coordination of Care  I have spent a total time of 30 minutes on 07/13/23 in caring for this patient including Instructions for management, Patient and family education, Importance of tx compliance, Risk factor reductions, Impressions, Counseling / Coordination of care, Documenting in the medical record, Reviewing / ordering tests, medicine, procedures   and Obtaining or reviewing history  . The following instructions have been given to the patient today:    Patient Instructions   Were going to reset your CPAP machine to a setting of 5, which is where you were at previously. I would like you to start using your CPAP nightly. I would still recommend getting a replacement machine through [facility] because your machine is recalled. Since she did not use ozone  and only use water and vinegar to clean your machine it is likely safe for use. I will follow-up with you in 3 months to review compliance and effectiveness of treatment. Nursing Support:  When: Monday through Friday 7A-5PM except holidays  Where: Our direct line is [phone]. If you are having a true emergency please call 911. In the event that the line is busy or it is after hours please leave a voice message and we will return your call. Please speak clearly, leaving your full name, birth date, best number to reach you and the reason for your call. Medication refills: We will need the name of the medication, the dosage, the ordering provider, whether you get a 30 or 90 day refill, and the pharmacy name and address. Medications will be ordered by the provider only. Nurses cannot call in prescriptions.   Please allow 7 days for medication refills. Physician requested updates: If your provider requested that you call with an update after starting medication, please be ready to provide us the medication and dosage, what time you take your medication, the time you attempt to fall asleep, time you fall asleep, when you wake up, and what time you get out of bed. Sleep Study Results: We will contact you with sleep study results and/or next steps after the physician has reviewed your testing.            Debora Soliman PA-C  [address]

## 2023-07-14 RX ORDER — ALPRAZOLAM 0.25 MG/1
0.25 TABLET ORAL
Qty: 30 TABLET | Refills: 0 | Status: SHIPPED | OUTPATIENT
Start: 2023-07-14

## 2023-07-15 DIAGNOSIS — I42.9 CARDIOMYOPATHY (HCC): ICD-10-CM

## 2023-07-16 RX ORDER — ATORVASTATIN CALCIUM 40 MG/1
TABLET, FILM COATED ORAL
Qty: 90 TABLET | Refills: 0 | Status: SHIPPED | OUTPATIENT
Start: 2023-07-16

## 2023-07-16 RX ORDER — LOSARTAN POTASSIUM 25 MG/1
TABLET ORAL
Qty: 90 TABLET | Refills: 0 | Status: SHIPPED | OUTPATIENT
Start: 2023-07-16

## 2023-07-19 DIAGNOSIS — Z51.5 PALLIATIVE CARE PATIENT: ICD-10-CM

## 2023-07-20 RX ORDER — MELOXICAM 7.5 MG/1
TABLET ORAL
Qty: 30 TABLET | Refills: 10 | OUTPATIENT
Start: 2023-07-20

## 2023-07-20 NOTE — TELEPHONE ENCOUNTER
Pt has been dismissed from our practice. PCP to please re-write a new order for meloxicam if appropriate.

## 2023-07-25 DIAGNOSIS — Z51.5 PALLIATIVE CARE PATIENT: ICD-10-CM

## 2023-07-26 RX ORDER — MELOXICAM 7.5 MG/1
TABLET ORAL
Qty: 30 TABLET | Refills: 10 | OUTPATIENT
Start: 2023-07-26

## 2023-07-26 NOTE — TELEPHONE ENCOUNTER
Pt dismissed from Palliative practice. I have already requested of pharmacy to stop sending this refill to our office. When possible, request that PCP write a completely new RX to remove previous prescribing association with Palliative office from remote pharmacy's records.

## 2023-07-31 DIAGNOSIS — Z51.5 PALLIATIVE CARE PATIENT: ICD-10-CM

## 2023-08-01 RX ORDER — MELOXICAM 7.5 MG/1
TABLET ORAL
Qty: 30 TABLET | Refills: 10 | OUTPATIENT
Start: 2023-08-01

## 2023-08-06 DIAGNOSIS — Z51.5 PALLIATIVE CARE PATIENT: ICD-10-CM

## 2023-08-07 RX ORDER — MELOXICAM 7.5 MG/1
TABLET ORAL
Qty: 30 TABLET | Refills: 10 | OUTPATIENT
Start: 2023-08-07

## 2023-08-14 ENCOUNTER — OFFICE VISIT (OUTPATIENT)
Age: 78
End: 2023-08-14
Payer: COMMERCIAL

## 2023-08-14 VITALS
BODY MASS INDEX: 27.15 KG/M2 | HEART RATE: 75 BPM | HEIGHT: 61 IN | WEIGHT: 143.8 LBS | OXYGEN SATURATION: 98 % | DIASTOLIC BLOOD PRESSURE: 62 MMHG | TEMPERATURE: 97.9 F | SYSTOLIC BLOOD PRESSURE: 120 MMHG

## 2023-08-14 DIAGNOSIS — R26.81 GAIT INSTABILITY: ICD-10-CM

## 2023-08-14 DIAGNOSIS — I10 ESSENTIAL HYPERTENSION: ICD-10-CM

## 2023-08-14 DIAGNOSIS — Z51.5 PALLIATIVE CARE PATIENT: ICD-10-CM

## 2023-08-14 DIAGNOSIS — E11.65 UNCONTROLLED TYPE 2 DIABETES MELLITUS WITH HYPERGLYCEMIA (HCC): ICD-10-CM

## 2023-08-14 DIAGNOSIS — F41.8 ANXIETY ABOUT HEALTH: ICD-10-CM

## 2023-08-14 DIAGNOSIS — M19.90 ARTHRITIS: ICD-10-CM

## 2023-08-14 DIAGNOSIS — I42.9 CARDIOMYOPATHY (HCC): ICD-10-CM

## 2023-08-14 DIAGNOSIS — F03.918 DEMENTIA WITH BEHAVIORAL DISTURBANCE (HCC): Primary | ICD-10-CM

## 2023-08-14 DIAGNOSIS — F03.90 DEMENTIA WITHOUT BEHAVIORAL DISTURBANCE (HCC): ICD-10-CM

## 2023-08-14 DIAGNOSIS — K21.9 GASTROESOPHAGEAL REFLUX DISEASE: ICD-10-CM

## 2023-08-14 DIAGNOSIS — F41.9 ANXIETY: ICD-10-CM

## 2023-08-14 DIAGNOSIS — E11.9 TYPE 2 DIABETES MELLITUS WITHOUT COMPLICATION, WITHOUT LONG-TERM CURRENT USE OF INSULIN (HCC): ICD-10-CM

## 2023-08-14 PROCEDURE — 99214 OFFICE O/P EST MOD 30 MIN: CPT | Performed by: NURSE PRACTITIONER

## 2023-08-14 RX ORDER — MEMANTINE HYDROCHLORIDE 10 MG/1
TABLET ORAL
Qty: 60 TABLET | Refills: 0 | OUTPATIENT
Start: 2023-08-14

## 2023-08-14 RX ORDER — MELOXICAM 7.5 MG/1
TABLET ORAL
Qty: 30 TABLET | Refills: 10 | OUTPATIENT
Start: 2023-08-14

## 2023-08-14 NOTE — TELEPHONE ENCOUNTER
It looks like last month we sent this script to a [employer] company. This month it is to [facility] .. Is it overlapping?

## 2023-08-14 NOTE — PATIENT INSTRUCTIONS
Recommend trial of increased gabapentin 100mg q8 hours to help with pain and increased agitation. Use from stock you have, then let us know if it is working.   Referral placed to [facility] (OUTPATIENT CAMP[facility]) PT/OT for balance and cognitive therapy

## 2023-08-14 NOTE — LETTER
2023    Patient: Taya Grady  :       To whom it may concern:    My patient Taya Grady ( ) would benefit from an increase in her Waiver hours due to her diagnosis of dementia with behavioral disturbance. Please call my office at the above phone number with any questions or concerns.               Sincerely,              MOIRA Vang

## 2023-08-14 NOTE — ASSESSMENT & PLAN NOTE
· Patient having increased anxiety and agitation since hospitalization. She also recently travelled to see family. · Hospitalization, travel, change in routine may all be contributing to increased agitation. It may take some time for patient to settle back to regular routine. Or this may be progression of dementia. · Recommend checking in with pcp if this continues as could be abnormal lab or UTI  · Recommend increasing gabapentin to 1 tab every 8 hours to see if this helps pt be more comfortable and less anxious  · If this helps, will notify pcp of change in frequency for refills. · Do not want to add SSRI at this time since patient is taking mirtazapine and recently had low serum Na in the hospital.  Will hold on starting depakote d/t elevated LFT's on last lab check. Will hold on antipsychotic - no hallucinations reported and has prolonged QTc. · Continue emotional support, relaxation techniques.

## 2023-08-14 NOTE — PROGRESS NOTES
Assessment & Plan:   1. Dementia with behavioral disturbance  Assessment & Plan:  · Memory loss stable, behaviors increasing  · Can't r/o that pain is contributing factor to increased agitations  · Did recommend trying to increase gabapentin to tid to see if it helps with pain and anxiety. Monitor for increased sedation/dizziness. Notify pcp if increase is successful as she is ordering provider of medication. · Cont to keep active during the day. Consider day center for positive socialization and activity. · Euna Dubin feels increased home assistance will help with resistance to care and assistance with adl/safety. Will write letter of recommendation. · Monitor for acute change in behavior as this may be significant for underlying concern that needs evaluation. · Monitor nutrition and hydration  · Ensure adequate rest periods and good sleep. 2. Anxiety  Assessment & Plan:  · Patient having increased anxiety and agitation since hospitalization. She also recently travelled to see family. · Hospitalization, travel, change in routine may all be contributing to increased agitation. It may take some time for patient to settle back to regular routine. Or this may be progression of dementia. · Recommend checking in with pcp if this continues as could be abnormal lab or UTI  · Recommend increasing gabapentin to 1 tab every 8 hours to see if this helps pt be more comfortable and less anxious  · If this helps, will notify pcp of change in frequency for refills. · Do not want to add SSRI at this time since patient is taking mirtazapine and recently had low serum Na in the hospital.  Will hold on starting depakote d/t elevated LFT's on last lab check. Will hold on antipsychotic - no hallucinations reported and has prolonged QTc. · Continue emotional support, relaxation techniques. 3. Gait instability  Assessment & Plan:  · Stable at present. · Cont fall precautions      4.  Palliative care patient  Assessment & Plan:  · May benefit from re establishing care      HPI:  We had the pleasure of evaluating Dequan Renteria who is a 69 y.o. femalefor worsening of agitation and confusion since hospitalization per granddgt. .  Previous MOCA:  12/30. Comorbidities include dementia, htn, anxiety  She lives with granddgt  Ms. Sandoval is in the office with her grandt    Memory update per patient:  (language barrier, rickiracr  [id number] assisted). Pt feels she is doing better since she left the hospital.  She denies pain. She states she is eating well. She has chronic insomnia, sleeps little. She reports family doesn't let her do much d/t balance concerns. She feels memory is about the same. No real concerns. She has difficulty finding the right word while speaking: No  Patient requires repeat information or ask the same question repeatedly: Yes  Do you do your own bathing, dressing, feeding yourself Yes  Can you make your own meals and do light cleaning/chores No  Do you take care of your own medications No  Do you drive: No       Do you handle your own financial affairs such as balancing your checkbook, paying bills, investments: No  Have you or your family noted any change in your mood or personality:Yes  Are you currently or have you been treated in the past for depression or anxiety: Yes  Have you noticed any gait or balance disorder: Yes  Any hallucination or delusion: No  Sleep Issues: Yes  Hearing and vision issue: No  ADL/IADL:  Assist/dependent  Appetite/swallow:  Good/good  Pain:  Feet pain    Memory update per family:  Patient having increased confusions since hospitalization. She doesn't want to eat right. She stays awake til 3-4pm then wakes up around 11-12pm.  She is talking much more loudly. Getting very angry at daughter. She changes behaviors very quickily. Did have recent lab work, she is on fluid restriction.   Is getting mad at [facility] for following fluid restrictions. Would like increased hours to help with hours. Needs to get extra help to get kids to school (20 hours 8-12pm, then she will take 12-8pm). She needs help when she isn't home. Lost extra help. Walks around undressed sometimes. She did have a fall on Saturday. Were able to , no injury. Doesn't always recognize her great granddgts. Much nicer with other family members. Family Review of Behavior [facility]:    pacing. No    agressive/combative behavior. Yes - verbal   agitated. Yes   wandering. No   resistance to care. Yes   hoarding/hiding objects. Yes  - collecting bottle water and toliet paper rolls. Suspicious:  No  withdrawn Yes - refuses to go to day center. misplacing/losing objects Yes  personal hygiene problems. No  forgetfulness of actions Yes    ROS: Review of Systems   Constitutional: Negative for activity change, appetite change, chills and fatigue. HENT: Negative for congestion, hearing loss and trouble swallowing. Respiratory: Negative for cough and shortness of breath. Cardiovascular: Negative for chest pain. Gastrointestinal: Negative for abdominal pain, constipation, diarrhea, nausea and vomiting. Genitourinary: Negative for difficulty urinating. Musculoskeletal: Positive for arthralgias (bilat feet) and gait problem. Negative for back pain. Neurological: Negative for dizziness and light-headedness. Psychiatric/Behavioral: Positive for decreased concentration (forgetful) and sleep disturbance. Negative for dysphoric mood. The patient is not nervous/anxious. Past Medical, surgical, social, medication and allergy history and patients previous records reviewed.   Allergies:   No Known Allergies    Medications:      Current Outpatient Medications:   •  Accu-Chek FastClix Lancets MISC, USAR PARA PROBAR AZUCAR EN LA TWAN MANISHA VECES AL JEFFREY, Disp: 102 each, Rfl: 10  •  albuterol (PROVENTIL HFA,VENTOLIN HFA) 90 mcg/act inhaler, Inhale 1 puff every 4 (four) hours as needed for wheezing, Disp: 18 g, Rfl: 10  •  aspirin (Aspirin Low Dose) 81 mg chewable tablet, Chew 1 tablet (81 mg total) daily, Disp: 90 tablet, Rfl: 0  •  Blood Glucose Monitoring Suppl (OneTouch Verio) w/Device KIT, Use per  guidelines, Disp: 1 kit, Rfl: 0  •  Calcium Carb-Cholecalciferol 600-10 MG-MCG TABS, TOME JONHNY TABLETA DOS VECES AL JEFFREY, Disp: 180 tablet, Rfl: 0  •  cyanocobalamin (VITAMIN B-12) 500 MCG tablet, Take 1,000 mcg by mouth daily, Disp: , Rfl:   •  Empagliflozin (Jardiance) 10 MG TABS tablet, Take 1 tablet (10 mg total) by mouth every morning, Disp: 90 tablet, Rfl: 1  •  ferrous sulfate 325 (65 Fe) mg tablet, Take 325 mg by mouth daily with breakfast, Disp: , Rfl:   •  fluticasone (FLONASE) 50 mcg/act nasal spray, 1 spray into each nostril daily, Disp: 16 g, Rfl: 10  •  gabapentin (NEURONTIN) 100 mg capsule, Take 1 capsule (100 mg total) by mouth 2 (two) times a day, Disp: 60 capsule, Rfl: 5  •  glucose blood (Accu-Chek Guide) test strip, USAR PARA EXAMINAR AZUCAR EN LA TWAN MANISHA VECES AL JEFFREY, Disp: 100 strip, Rfl: 10  •  guaiFENesin (MUCINEX) 600 mg 12 hr tablet, Take 2 tablets (1,200 mg total) by mouth every 12 (twelve) hours, Disp: 30 tablet, Rfl: 1  •  Insulin Glargine Solostar (Basaglar KwikPen) 100 UNIT/ML SOPN, Inject 0.09 mL (9 Units total) under the skin in the morning (Patient taking differently: Inject 9 Units under the skin in the morning Pt g'dtr reports taking 1xdaily at night.), Disp: 12 mL, Rfl: 1  •  Insulin Pen Needle (BD Pen Needle Micro U/F) 32G X 6 MM MISC, Use daily, Disp: 100 each, Rfl: 1  •  lidocaine (Lidoderm) 5 %, Apply 1 patch topically over 12 hours daily Remove & Discard patch within 12 hours or as directed by MD, Disp: 15 patch, Rfl: 0  •  loperamide (IMODIUM) 2 mg capsule, MAR JOHNNY (1) CAPSULA POR VIA ORAL ALFREDO SEA NECESARIO PARA LA DIARREA, Disp: 30 capsule, Rfl: 10  •  Melatonin 5 MG TABS, Take by mouth as needed, Disp: , Rfl:   •  memantine (NAMENDA) 10 mg tablet, MAR 1 TABLETA POR VIA ORAL DOS VECES AL JEFFREY, Disp: 60 tablet, Rfl: 10  •  mirtazapine (REMERON) 15 mg tablet, MAR JOHNNY (1) TABLETA POR VIA ORAL CADA NOCHE A LA HORA DE ACOSTARSE, Disp: 90 tablet, Rfl: 3  •  ondansetron (ZOFRAN) 4 mg tablet, Take 1 tablet (4 mg total) by mouth every 8 (eight) hours as needed for nausea or vomiting, Disp: 20 tablet, Rfl: 0  •  ALPRAZolam (XANAX) 0.25 mg tablet, Take 1 tablet (0.25 mg total) by mouth daily at bedtime as needed for anxiety, Disp: 30 tablet, Rfl: 0  •  atorvastatin (LIPITOR) 40 mg tablet, Take 1 tablet (40 mg total) by mouth daily, Disp: 90 tablet, Rfl: 0  •  carvedilol (COREG) 12.5 mg tablet, Take 1 tablet (12.5 mg total) by mouth 2 (two) times a day with meals, Disp: 180 tablet, Rfl: 0  •  ezetimibe (ZETIA) 10 mg tablet, Take 1 tablet (10 mg total) by mouth daily, Disp: 90 tablet, Rfl: 0  •  furosemide (LASIX) 20 mg tablet, Take 1 tablet (20 mg total) by mouth daily, Disp: 90 tablet, Rfl: 3  •  losartan (COZAAR) 25 mg tablet, Take 1 tablet (25 mg total) by mouth daily, Disp: 90 tablet, Rfl: 0  •  omeprazole (PriLOSEC) 40 MG capsule, Take 1 capsule (40 mg total) by mouth daily, Disp: 30 capsule, Rfl: 0  •  sitaGLIPtin-metFORMIN (JANUMET)  MG per tablet, Take 1 tablet by mouth 2 (two) times a day with meals, Disp: 60 tablet, Rfl: 0    Vitals:  Vitals:    08/14/23 1329   BP: 120/62   Pulse: 75   Temp: 97.9 °F (36.6 °C)   SpO2: 98%       History:  Past Medical History:   Diagnosis Date   • Cancer (HCC)     Throat   • Chronic pain disorder    • Diabetes mellitus (HCC)    • Diffuse large B cell lymphoma (HCC)    • Dysphagia    • GERD without esophagitis    • HTN (hypertension)    • Hyperlipidemia    • Hypertension    • MI (myocardial infarction) ([address])    • Port-A-Cath in place 07/29/2019   • Thyroid cancer ([address]) 2018     Past Surgical History:   Procedure Laterality Date   • BONE MARROW BIOPSY     • BREAST BIOPSY Left • CARDIAC CATHETERIZATION N/A 12/9/2022    Procedure: Cardiac catheterization;  Surgeon: Lucy Escamilla MD;  Location: [facility]; Service: Cardiology   • CARDIAC CATHETERIZATION N/A 12/9/2022    Procedure: Cardiac Coronary Angiogram;  Surgeon: Lucy Escamilla MD;  [facility]: [facility]; Service: Cardiology   • CARDIAC CATHETERIZATION Left 12/9/2022    Procedure: Cardiac Left Heart Cath;  Surgeon: Lucy Escamilla MD;  [facility]; Service: Cardiology   • CHOLECYSTECTOMY     • IR PORT PLACEMENT  11/16/2018   • IR PORT REMOVAL  3/22/2021   • OTHER SURGICAL HISTORY      tendor tear repair to right shoulder   • SHOULDER ARTHROSCOPY       Family History   Problem Relation Age of Onset   • Diabetes Mother    • Heart disease Sister    • Hypertension Brother    • Uterine cancer Maternal Grandmother    • Prostate cancer Paternal Grandfather      Social History     Socioeconomic History   • Marital status:       Spouse name: Not on file   • Number of children: Not on file   • Years of education: Not on file   • Highest education level: Not on file   Occupational History   • Not on file   Tobacco Use   • Smoking status: Never     Passive exposure: Never   • Smokeless tobacco: Never   Vaping Use   • Vaping Use: Never used   Substance and Sexual Activity   • Alcohol use: Never     Comment: 0   • Drug use: No   • Sexual activity: Not Currently     Partners: Male   Other Topics Concern   • Not on file   Social History Narrative   • Not on file     Social Determinants of Health     Financial Resource Strain: Low Risk  (11/17/2021)    Overall Financial Resource Strain (CARDIA)    • Difficulty of Paying Living Expenses: Not very hard   Food Insecurity: No Food Insecurity (12/8/2022)    Hunger Vital Sign    • Worried About Running Out of Food in the Last Year: Never true    • Ran Out of Food in the Last Year: Never true   Transportation Needs: Unknown (12/8/2022)    [facility] [employer] [facility]    • Lack of Transportation (Medical): Not on file    • Lack of Transportation (Non-Medical): No   Physical Activity: Not on file   Stress: Stress Concern Present (3/21/2022)    [address]    • Feeling of Stress : To some extent   Social Connections: Not on file   Intimate Partner Violence: Not on file   Housing Stability: Low Risk  (12/8/2022)    Housing Stability Vital Sign    • Unable to Pay for Housing in the Last Year: No    • Number of Places Lived in the Last Year: 1    • Unstable Housing in the Last Year: No     Past Surgical History:   Procedure Laterality Date   • BONE MARROW BIOPSY     • BREAST BIOPSY Left    • CARDIAC CATHETERIZATION N/A 12/9/2022    Procedure: Cardiac catheterization;  Surgeon: Azeb Hdz MD;  [facility]; Service: Cardiology   • CARDIAC CATHETERIZATION N/A 12/9/2022    Procedure: Cardiac Coronary Angiogram;  Surgeon: Azeb Hdz MD;  [facility]; Service: Cardiology   • CARDIAC CATHETERIZATION Left 12/9/2022    Procedure: Cardiac Left Heart Cath;  Surgeon: Azeb Hdz MD;  [facility]: [facility]; Service: Cardiology   • CHOLECYSTECTOMY     • IR PORT PLACEMENT  11/16/2018   • IR PORT REMOVAL  3/22/2021   • OTHER SURGICAL HISTORY      tendor tear repair to right shoulder   • SHOULDER ARTHROSCOPY           Physical Exam:  Physical Exam  Vitals and nursing note reviewed. Constitutional:       General: She is not in acute distress. Appearance: Normal appearance. She is well-developed. She is not diaphoretic. HENT:      Head: Normocephalic. Cardiovascular:      Rate and Rhythm: Normal rate. Heart sounds: No friction rub. No gallop. Pulmonary:      Effort: Pulmonary effort is normal. No respiratory distress. Breath sounds: Normal breath sounds. No wheezing or rales. Abdominal:      General: Bowel sounds are normal. There is no distension.       Palpations: Abdomen is soft. Tenderness: There is no abdominal tenderness. There is no rebound. Musculoskeletal:         General: Normal range of motion. Skin:     General: Skin is warm and dry. Neurological:      General: No focal deficit present. Mental Status: She is alert. Mental status is at baseline. Comments: "Monday, August 1922".   "[facility]"  ID 3/3, Recall 3/3  Pt oriented to person, partial tps  Pleasant, cooperative, forgetful   Psychiatric:         Mood and Affect: Mood normal.         Behavior: Behavior normal.

## 2023-08-16 RX ORDER — FUROSEMIDE 20 MG/1
20 TABLET ORAL DAILY
Qty: 90 TABLET | Refills: 3 | Status: SHIPPED | OUTPATIENT
Start: 2023-08-16

## 2023-08-17 RX ORDER — LOSARTAN POTASSIUM 25 MG/1
25 TABLET ORAL DAILY
Qty: 90 TABLET | Refills: 0 | Status: SHIPPED | OUTPATIENT
Start: 2023-08-17

## 2023-08-17 RX ORDER — CARVEDILOL 12.5 MG/1
12.5 TABLET ORAL 2 TIMES DAILY WITH MEALS
Qty: 180 TABLET | Refills: 0 | Status: SHIPPED | OUTPATIENT
Start: 2023-08-17

## 2023-08-17 RX ORDER — ALPRAZOLAM 0.25 MG/1
0.25 TABLET ORAL
Qty: 30 TABLET | Refills: 0 | Status: SHIPPED | OUTPATIENT
Start: 2023-08-17

## 2023-08-17 RX ORDER — OMEPRAZOLE 40 MG/1
40 CAPSULE, DELAYED RELEASE ORAL DAILY
Qty: 30 CAPSULE | Refills: 0 | Status: SHIPPED | OUTPATIENT
Start: 2023-08-17

## 2023-08-17 RX ORDER — EZETIMIBE 10 MG/1
10 TABLET ORAL DAILY
Qty: 90 TABLET | Refills: 0 | Status: SHIPPED | OUTPATIENT
Start: 2023-08-17

## 2023-08-17 RX ORDER — ATORVASTATIN CALCIUM 40 MG/1
40 TABLET, FILM COATED ORAL DAILY
Qty: 90 TABLET | Refills: 0 | Status: SHIPPED | OUTPATIENT
Start: 2023-08-17

## 2023-08-17 NOTE — ASSESSMENT & PLAN NOTE
· Memory loss stable, behaviors increasing  · Can't r/o that pain is contributing factor to increased agitations  · Did recommend trying to increase gabapentin to tid to see if it helps with pain and anxiety. Monitor for increased sedation/dizziness. Notify pcp if increase is successful as she is ordering provider of medication. · Cont to keep active during the day. Consider day center for positive socialization and activity. · Amari Bro feels increased home assistance will help with resistance to care and assistance with adl/safety. Will write letter of recommendation. · Monitor for acute change in behavior as this may be significant for underlying concern that needs evaluation. · Monitor nutrition and hydration  · Ensure adequate rest periods and good sleep.

## 2023-08-21 NOTE — PROGRESS NOTES
Composite Filling    Jenifer Aguirre presents for composite filling  PMH reviewed, no changes  Pt present with granddaughter  Applied topical benzocaine, administered 1 carps 2% lido 1:100k epi and 0 5 carps 4% articaine 1:100k epi via infiltration around teeth #2 and #7    Prepped tooth #2 MO and 7 DL with 245 carbide on high speed  Caries removed with round carbide on slow speed  Isolation with cotton rolls and dri-angles    Etch with 37% H2PO4, rinse, dry  Applied Adhese with 20 second scrub once, gentle air dry and light cured for 10s  Restored with Tetric bulk renée shade A2 and light cured  Refined with finishing burs, polished with enhance point  Verified contacts, no opposing teeth to check occlusion where applicable  Pt left satisfied        NV: trey !0 L and prelim for RPD Offered, feeding was successful

## 2023-08-28 ENCOUNTER — OFFICE VISIT (OUTPATIENT)
Dept: PODIATRY | Facility: CLINIC | Age: 78
End: 2023-08-28
Payer: COMMERCIAL

## 2023-08-28 VITALS
DIASTOLIC BLOOD PRESSURE: 76 MMHG | WEIGHT: 143 LBS | HEIGHT: 61 IN | SYSTOLIC BLOOD PRESSURE: 126 MMHG | BODY MASS INDEX: 27 KG/M2

## 2023-08-28 DIAGNOSIS — M79.676 PAIN DUE TO ONYCHOMYCOSIS OF TOENAIL: ICD-10-CM

## 2023-08-28 DIAGNOSIS — T45.1X5A CHEMOTHERAPY-INDUCED PERIPHERAL NEUROPATHY (HCC): ICD-10-CM

## 2023-08-28 DIAGNOSIS — B35.1 PAIN DUE TO ONYCHOMYCOSIS OF TOENAIL: ICD-10-CM

## 2023-08-28 DIAGNOSIS — R26.2 AMBULATORY DYSFUNCTION: ICD-10-CM

## 2023-08-28 DIAGNOSIS — G62.0 CHEMOTHERAPY-INDUCED PERIPHERAL NEUROPATHY (HCC): ICD-10-CM

## 2023-08-28 DIAGNOSIS — E11.9 TYPE 2 DIABETES MELLITUS WITHOUT COMPLICATION, WITHOUT LONG-TERM CURRENT USE OF INSULIN (HCC): Primary | ICD-10-CM

## 2023-08-28 PROCEDURE — 11720 DEBRIDE NAIL 1-5: CPT | Performed by: PODIATRIST

## 2023-08-28 NOTE — PROGRESS NOTES
Kailee Sandoval  [birth year]  AT RISK FOOT CARE    1. Type 2 diabetes mellitus without complication, without long-term current use of insulin ([facility])        2. Pain due to onychomycosis of toenail        3. Chemotherapy-induced peripheral neuropathy ([address])        4. Ambulatory dysfunction            Patient presents for at-risk foot care. Patient has no acute concerns today. Patient has significant lower extremity risk due to neuropathy, parasthesia, edema, and trophic skin changes to the lower extremity. On exam patient has thickened, hypertrophic, discolored, brittle toenails with subungual debris and tenderness x2 (hallux nails)  Callus: none today  Patient has lower extremity edema  PAtients skin is atrophic, thickened nails, and decreased pedal hair  Patient has decreased pinprick and vibratory sensation to his feet and parasthesia    Today's treatment includes:  Debridement of toenails. Using nail nipper, oscar, and curette, nails were sharply debrided, reduced in thickness and length. Devitalized nail tissue and fungal debris excised and removed. Patient tolerated well. Discussed proper shoe gear, daily inspections of feet, and general foot health with patient. Patient has Q9  findings and is recommended for at risk foot care every 9-10 weeks.     Patients most recent complete clinical foot exam was on: 2/20/2023

## 2023-08-29 ENCOUNTER — PATIENT OUTREACH (OUTPATIENT)
Dept: FAMILY MEDICINE CLINIC | Facility: CLINIC | Age: 78
End: 2023-08-29

## 2023-08-29 NOTE — PROGRESS NOTES
I called Michelle Samuels to follow up on the patient. She states the patient is doing well and currently eating breakfast. She reports a fasting blood sugar of 147. The patient had a Podiatry appointment yesterday and a follow up is scheduled in 3 months. Michelle Samuels reports the patient's weight is 142 and ranges 139-142. She denies the patient complaining of chest pain or shortness of breath. She denies any extremity edema. Michelle Samuels notes the patient recently saw Maxcine Kehr who recommended an increase of gabapentin to tid; note sent to PCP. Michelle Samuels states she is having trouble with the pharmacy filling gabapentin. Michelle Samuels denies the patient having any recent falls. Michelle Samuels requests a reminder call for the patient's Endo appointment in a few weeks. Michelle Samuels will call with any questions or concerns. I will continue to follow.

## 2023-09-03 PROBLEM — N30.00 ACUTE CYSTITIS WITHOUT HEMATURIA: Status: RESOLVED | Noted: 2023-07-05 | Resolved: 2023-09-03

## 2023-09-03 PROBLEM — J18.9 RIGHT LOWER LOBE PNEUMONIA: Status: RESOLVED | Noted: 2019-04-08 | Resolved: 2023-09-03

## 2023-09-06 PROBLEM — A41.9 SEPSIS (HCC): Status: RESOLVED | Noted: 2019-02-25 | Resolved: 2023-09-06

## 2023-09-08 DIAGNOSIS — G62.0 CHEMOTHERAPY-INDUCED PERIPHERAL NEUROPATHY: Chronic | ICD-10-CM

## 2023-09-08 DIAGNOSIS — R19.7 DIARRHEA, UNSPECIFIED TYPE: ICD-10-CM

## 2023-09-08 DIAGNOSIS — T45.1X5A CHEMOTHERAPY-INDUCED PERIPHERAL NEUROPATHY: Chronic | ICD-10-CM

## 2023-09-08 DIAGNOSIS — Z92.21 STATUS POST CHEMOTHERAPY: ICD-10-CM

## 2023-09-08 RX ORDER — GABAPENTIN 100 MG/1
100 CAPSULE ORAL 3 TIMES DAILY
Qty: 90 CAPSULE | Refills: 5 | Status: SHIPPED | OUTPATIENT
Start: 2023-09-08

## 2023-09-14 DIAGNOSIS — R19.7 DIARRHEA, UNSPECIFIED TYPE: ICD-10-CM

## 2023-09-14 RX ORDER — LOPERAMIDE HYDROCHLORIDE 2 MG/1
CAPSULE ORAL
Qty: 30 CAPSULE | Refills: 10 | Status: SHIPPED | OUTPATIENT
Start: 2023-09-14

## 2023-09-15 RX ORDER — LOPERAMIDE HYDROCHLORIDE 2 MG/1
CAPSULE ORAL
Qty: 30 CAPSULE | Refills: 10 | OUTPATIENT
Start: 2023-09-15

## 2023-09-18 ENCOUNTER — PATIENT OUTREACH (OUTPATIENT)
Dept: FAMILY MEDICINE CLINIC | Facility: CLINIC | Age: 78
End: 2023-09-18

## 2023-09-18 DIAGNOSIS — E11.65 UNCONTROLLED TYPE 2 DIABETES MELLITUS WITH HYPERGLYCEMIA (HCC): ICD-10-CM

## 2023-09-18 DIAGNOSIS — F41.8 ANXIETY ABOUT HEALTH: ICD-10-CM

## 2023-09-20 RX ORDER — SITAGLIPTIN AND METFORMIN HYDROCHLORIDE 1000; 50 MG/1; MG/1
1 TABLET, FILM COATED ORAL 2 TIMES DAILY WITH MEALS
Qty: 60 TABLET | Refills: 1 | Status: SHIPPED | OUTPATIENT
Start: 2023-09-20

## 2023-09-20 RX ORDER — ALPRAZOLAM 0.25 MG/1
0.25 TABLET ORAL
Qty: 30 TABLET | Refills: 0 | Status: SHIPPED | OUTPATIENT
Start: 2023-09-20

## 2023-10-09 ENCOUNTER — PATIENT OUTREACH (OUTPATIENT)
Dept: FAMILY MEDICINE CLINIC | Facility: CLINIC | Age: 78
End: 2023-10-09

## 2023-10-09 DIAGNOSIS — I42.9 CARDIOMYOPATHY (HCC): ICD-10-CM

## 2023-10-09 NOTE — PROGRESS NOTES
I called Saleem Lindquist to remind her of the patient's CT tomorrow at 1000. She notes the patient has been doing well but has had a slight cough recently. Saleem Lindquist states there are others in the home with the same symptom. I asked her to call me back if the patient develops new symptoms or worsens and she agreed. Saleem Lindquist states the patient has only 3 days of lasix remaining. I see the order was sent to [facility] but they did not sign off yet. On further review, the patient was prescribed 90d in August with 3RF remaining. I called Saleem Lindquist back to inform her; she will check with the local pharmacy. I will call Saleem Lindquist in a few weeks to remind her of the patient's PCP appointment.

## 2023-10-10 ENCOUNTER — HOSPITAL ENCOUNTER (OUTPATIENT)
Dept: CT IMAGING | Facility: HOSPITAL | Age: 78
Discharge: HOME | End: 2023-10-10
Payer: COMMERCIAL

## 2023-10-10 DIAGNOSIS — J18.1 CONSOLIDATION OF RIGHT LOWER LOBE OF LUNG (HCC): ICD-10-CM

## 2023-10-10 DIAGNOSIS — C83.31 DIFFUSE LARGE B-CELL LYMPHOMA OF LYMPH NODES OF NECK (HCC): ICD-10-CM

## 2023-10-10 PROCEDURE — 71260 CT THORAX DX C+: CPT

## 2023-10-10 PROCEDURE — G1004 CDSM NDSC: HCPCS

## 2023-10-10 RX ORDER — FUROSEMIDE 20 MG/1
20 TABLET ORAL DAILY
Qty: 90 TABLET | Refills: 3 | Status: SHIPPED | OUTPATIENT
Start: 2023-10-10

## 2023-10-10 RX ADMIN — IOHEXOL 80 ML: 350 INJECTION, SOLUTION INTRAVENOUS at 10:28

## 2023-10-19 ENCOUNTER — PATIENT OUTREACH (OUTPATIENT)
Dept: FAMILY MEDICINE CLINIC | Facility: CLINIC | Age: 78
End: 2023-10-19

## 2023-10-19 NOTE — PROGRESS NOTES
I received a call from GAIL DELGADO [facility] stating she reviewed the patient's CT results and had questions regarding it. I encouraged her to contact the Oncology office as they were the ordering physician; she agreed to call.

## 2023-10-23 NOTE — ED NOTES
Patient was seen and discharged by Dr Linda Chacko before this nurse who was coming on shift could assess patient  Vital signs were taken by same    - same charted       Carlos Gomez RN  11/14/18 [id number] Wilder Martinez RN  11/14/18 2175 2days

## 2023-10-25 ENCOUNTER — PATIENT OUTREACH (OUTPATIENT)
Dept: FAMILY MEDICINE CLINIC | Facility: CLINIC | Age: 78
End: 2023-10-25

## 2023-10-25 NOTE — PROGRESS NOTES
I called Ramez Reed to remind her of the patient's PCP appointment for tomorrow at 1000; she plans on the patient attending. I will continue to follow.

## 2023-10-26 ENCOUNTER — OFFICE VISIT (OUTPATIENT)
Dept: FAMILY MEDICINE CLINIC | Facility: CLINIC | Age: 78
End: 2023-10-26

## 2023-10-26 VITALS
HEIGHT: 61 IN | TEMPERATURE: 97 F | DIASTOLIC BLOOD PRESSURE: 80 MMHG | RESPIRATION RATE: 14 BRPM | HEART RATE: 79 BPM | BODY MASS INDEX: 27.19 KG/M2 | WEIGHT: 144 LBS | OXYGEN SATURATION: 98 % | SYSTOLIC BLOOD PRESSURE: 132 MMHG

## 2023-10-26 DIAGNOSIS — I42.9 CARDIOMYOPATHY (HCC): ICD-10-CM

## 2023-10-26 DIAGNOSIS — C83.31 DIFFUSE LARGE B-CELL LYMPHOMA OF LYMPH NODES OF NECK (HCC): ICD-10-CM

## 2023-10-26 DIAGNOSIS — M85.80 AGE-RELATED BONE LOSS: ICD-10-CM

## 2023-10-26 DIAGNOSIS — F03.90 DEMENTIA WITHOUT BEHAVIORAL DISTURBANCE (HCC): ICD-10-CM

## 2023-10-26 DIAGNOSIS — G62.0 CHEMOTHERAPY-INDUCED PERIPHERAL NEUROPATHY: Chronic | ICD-10-CM

## 2023-10-26 DIAGNOSIS — Z92.21 STATUS POST CHEMOTHERAPY: ICD-10-CM

## 2023-10-26 DIAGNOSIS — F41.9 ANXIETY: ICD-10-CM

## 2023-10-26 DIAGNOSIS — T45.1X5A CHEMOTHERAPY-INDUCED PERIPHERAL NEUROPATHY: Chronic | ICD-10-CM

## 2023-10-26 DIAGNOSIS — M19.90 ARTHRITIS: ICD-10-CM

## 2023-10-26 DIAGNOSIS — F41.8 ANXIETY ABOUT HEALTH: ICD-10-CM

## 2023-10-26 DIAGNOSIS — E78.5 HYPERLIPIDEMIA, UNSPECIFIED HYPERLIPIDEMIA TYPE: ICD-10-CM

## 2023-10-26 DIAGNOSIS — I10 ESSENTIAL HYPERTENSION: ICD-10-CM

## 2023-10-26 DIAGNOSIS — D50.9 IRON DEFICIENCY ANEMIA, UNSPECIFIED IRON DEFICIENCY ANEMIA TYPE: ICD-10-CM

## 2023-10-26 DIAGNOSIS — K21.9 GASTROESOPHAGEAL REFLUX DISEASE: ICD-10-CM

## 2023-10-26 DIAGNOSIS — E11.9 TYPE 2 DIABETES MELLITUS WITHOUT COMPLICATION, WITHOUT LONG-TERM CURRENT USE OF INSULIN (HCC): ICD-10-CM

## 2023-10-26 DIAGNOSIS — J06.9 URI (UPPER RESPIRATORY INFECTION): ICD-10-CM

## 2023-10-26 DIAGNOSIS — Z23 ENCOUNTER FOR IMMUNIZATION: ICD-10-CM

## 2023-10-26 DIAGNOSIS — R05.9 COUGH, UNSPECIFIED TYPE: ICD-10-CM

## 2023-10-26 DIAGNOSIS — Z79.4 TYPE 2 DIABETES MELLITUS WITH BOTH EYES AFFECTED BY MILD NONPROLIFERATIVE RETINOPATHY WITHOUT MACULAR EDEMA, WITH LONG-TERM CURRENT USE OF INSULIN (HCC): ICD-10-CM

## 2023-10-26 DIAGNOSIS — R11.0 NAUSEA: ICD-10-CM

## 2023-10-26 DIAGNOSIS — E11.3293 TYPE 2 DIABETES MELLITUS WITH BOTH EYES AFFECTED BY MILD NONPROLIFERATIVE RETINOPATHY WITHOUT MACULAR EDEMA, WITH LONG-TERM CURRENT USE OF INSULIN (HCC): ICD-10-CM

## 2023-10-26 DIAGNOSIS — J30.9 ALLERGIC RHINITIS, UNSPECIFIED SEASONALITY, UNSPECIFIED TRIGGER: ICD-10-CM

## 2023-10-26 DIAGNOSIS — J18.9 PNEUMONIA OF RIGHT LOWER LOBE DUE TO INFECTIOUS ORGANISM: ICD-10-CM

## 2023-10-26 DIAGNOSIS — E11.65 UNCONTROLLED TYPE 2 DIABETES MELLITUS WITH HYPERGLYCEMIA (HCC): Primary | ICD-10-CM

## 2023-10-26 DIAGNOSIS — I50.20 ACC/AHA STAGE C HEART FAILURE WITH REDUCED EJECTION FRACTION (HCC): ICD-10-CM

## 2023-10-26 DIAGNOSIS — Z51.5 PALLIATIVE CARE PATIENT: ICD-10-CM

## 2023-10-26 PROBLEM — R07.9 CHEST PAIN: Status: RESOLVED | Noted: 2022-12-07 | Resolved: 2023-10-26

## 2023-10-26 PROBLEM — G47.09 OTHER INSOMNIA: Status: RESOLVED | Noted: 2019-02-06 | Resolved: 2023-10-26

## 2023-10-26 PROBLEM — E83.42 HYPOMAGNESEMIA: Status: RESOLVED | Noted: 2023-07-06 | Resolved: 2023-10-26

## 2023-10-26 PROBLEM — Z71.89 COUNSELING REGARDING ADVANCED CARE PLANNING AND GOALS OF CARE: Status: RESOLVED | Noted: 2021-04-05 | Resolved: 2023-10-26

## 2023-10-26 PROBLEM — Z45.2 ENCOUNTER FOR CENTRAL LINE CARE: Status: RESOLVED | Noted: 2020-12-09 | Resolved: 2023-10-26

## 2023-10-26 PROBLEM — E87.1 HYPONATREMIA: Status: RESOLVED | Noted: 2018-11-19 | Resolved: 2023-10-26

## 2023-10-26 LAB — SL AMB POCT HEMOGLOBIN AIC: 8.9 (ref ?–6.5)

## 2023-10-26 PROCEDURE — 90677 PCV20 VACCINE IM: CPT | Performed by: NURSE PRACTITIONER

## 2023-10-26 PROCEDURE — 3075F SYST BP GE 130 - 139MM HG: CPT | Performed by: NURSE PRACTITIONER

## 2023-10-26 PROCEDURE — 83036 HEMOGLOBIN GLYCOSYLATED A1C: CPT | Performed by: NURSE PRACTITIONER

## 2023-10-26 PROCEDURE — 90472 IMMUNIZATION ADMIN EACH ADD: CPT | Performed by: NURSE PRACTITIONER

## 2023-10-26 PROCEDURE — 99215 OFFICE O/P EST HI 40 MIN: CPT | Performed by: NURSE PRACTITIONER

## 2023-10-26 PROCEDURE — 90471 IMMUNIZATION ADMIN: CPT | Performed by: NURSE PRACTITIONER

## 2023-10-26 PROCEDURE — 3079F DIAST BP 80-89 MM HG: CPT | Performed by: NURSE PRACTITIONER

## 2023-10-26 PROCEDURE — 90662 IIV NO PRSV INCREASED AG IM: CPT | Performed by: NURSE PRACTITIONER

## 2023-10-26 RX ORDER — ONDANSETRON 4 MG/1
4 TABLET, FILM COATED ORAL EVERY 8 HOURS PRN
Qty: 20 TABLET | Refills: 0 | Status: SHIPPED | OUTPATIENT
Start: 2023-10-26

## 2023-10-26 RX ORDER — GUAIFENESIN 600 MG/1
1200 TABLET, EXTENDED RELEASE ORAL EVERY 12 HOURS SCHEDULED
Qty: 30 TABLET | Refills: 1 | Status: SHIPPED | OUTPATIENT
Start: 2023-10-26

## 2023-10-26 RX ORDER — LIDOCAINE 50 MG/G
1 PATCH TOPICAL DAILY
Qty: 15 PATCH | Refills: 0 | Status: SHIPPED | OUTPATIENT
Start: 2023-10-26

## 2023-10-26 RX ORDER — ALBUTEROL SULFATE 90 UG/1
1 AEROSOL, METERED RESPIRATORY (INHALATION) EVERY 4 HOURS PRN
Qty: 18 G | Refills: 10 | Status: SHIPPED | OUTPATIENT
Start: 2023-10-26

## 2023-10-26 RX ORDER — FUROSEMIDE 20 MG/1
20 TABLET ORAL DAILY
Qty: 90 TABLET | Refills: 3 | Status: SHIPPED | OUTPATIENT
Start: 2023-10-26

## 2023-10-26 RX ORDER — PEN NEEDLE, DIABETIC 32 GX 1/4"
NEEDLE, DISPOSABLE MISCELLANEOUS DAILY
Qty: 100 EACH | Refills: 1 | Status: SHIPPED | OUTPATIENT
Start: 2023-10-26

## 2023-10-26 RX ORDER — MEMANTINE HYDROCHLORIDE 10 MG/1
10 TABLET ORAL DAILY
Qty: 60 TABLET | Refills: 10 | Status: SHIPPED | OUTPATIENT
Start: 2023-10-26

## 2023-10-26 RX ORDER — SITAGLIPTIN AND METFORMIN HYDROCHLORIDE 1000; 50 MG/1; MG/1
1 TABLET, FILM COATED ORAL 2 TIMES DAILY WITH MEALS
Qty: 60 TABLET | Refills: 1 | Status: SHIPPED | OUTPATIENT
Start: 2023-10-26

## 2023-10-26 RX ORDER — ALPRAZOLAM 0.25 MG/1
0.25 TABLET ORAL
Qty: 30 TABLET | Refills: 0 | Status: SHIPPED | OUTPATIENT
Start: 2023-10-26

## 2023-10-26 RX ORDER — ATORVASTATIN CALCIUM 40 MG/1
40 TABLET, FILM COATED ORAL DAILY
Qty: 90 TABLET | Refills: 0 | Status: SHIPPED | OUTPATIENT
Start: 2023-10-26

## 2023-10-26 RX ORDER — MIRTAZAPINE 15 MG/1
15 TABLET, FILM COATED ORAL
Qty: 90 TABLET | Refills: 3 | Status: SHIPPED | OUTPATIENT
Start: 2023-10-26

## 2023-10-26 RX ORDER — LOSARTAN POTASSIUM 25 MG/1
25 TABLET ORAL DAILY
Qty: 90 TABLET | Refills: 0 | Status: SHIPPED | OUTPATIENT
Start: 2023-10-26

## 2023-10-26 RX ORDER — EZETIMIBE 10 MG/1
10 TABLET ORAL DAILY
Qty: 90 TABLET | Refills: 0 | Status: SHIPPED | OUTPATIENT
Start: 2023-10-26

## 2023-10-26 RX ORDER — INSULIN GLARGINE 100 [IU]/ML
11 INJECTION, SOLUTION SUBCUTANEOUS DAILY
Qty: 12 ML | Refills: 1 | Status: SHIPPED | OUTPATIENT
Start: 2023-10-26

## 2023-10-26 RX ORDER — LANCETS 30 GAUGE
EACH MISCELLANEOUS
Qty: 1 KIT | Refills: 0 | Status: SHIPPED | OUTPATIENT
Start: 2023-10-26

## 2023-10-26 RX ORDER — OMEPRAZOLE 20 MG/1
20 CAPSULE, DELAYED RELEASE ORAL 2 TIMES DAILY
Qty: 180 CAPSULE | Refills: 3 | Status: SHIPPED | OUTPATIENT
Start: 2023-10-26 | End: 2023-10-26 | Stop reason: SDUPTHER

## 2023-10-26 RX ORDER — GABAPENTIN 100 MG/1
100 CAPSULE ORAL 3 TIMES DAILY
Qty: 90 CAPSULE | Refills: 5 | Status: SHIPPED | OUTPATIENT
Start: 2023-10-26

## 2023-10-26 RX ORDER — BLOOD-GLUCOSE METER
EACH MISCELLANEOUS
Qty: 100 EACH | Refills: 3 | Status: SHIPPED | OUTPATIENT
Start: 2023-10-26

## 2023-10-26 RX ORDER — FLUTICASONE PROPIONATE 50 MCG
1 SPRAY, SUSPENSION (ML) NASAL DAILY
Qty: 16 G | Refills: 10 | Status: SHIPPED | OUTPATIENT
Start: 2023-10-26

## 2023-10-26 RX ORDER — ASPIRIN 81 MG/1
81 TABLET, CHEWABLE ORAL DAILY
Qty: 90 TABLET | Refills: 0 | Status: SHIPPED | OUTPATIENT
Start: 2023-10-26

## 2023-10-26 RX ORDER — FERROUS SULFATE 325(65) MG
325 TABLET ORAL
Qty: 90 TABLET | Refills: 2 | Status: SHIPPED | OUTPATIENT
Start: 2023-10-26

## 2023-10-26 RX ORDER — OMEPRAZOLE 20 MG/1
20 CAPSULE, DELAYED RELEASE ORAL 2 TIMES DAILY
Qty: 180 CAPSULE | Refills: 3 | Status: SHIPPED | OUTPATIENT
Start: 2023-10-26

## 2023-10-26 RX ORDER — LANCETS 33 GAUGE
EACH MISCELLANEOUS
Qty: 100 EACH | Refills: 3 | Status: SHIPPED | OUTPATIENT
Start: 2023-10-26

## 2023-10-26 RX ORDER — CARVEDILOL 12.5 MG/1
12.5 TABLET ORAL 2 TIMES DAILY WITH MEALS
Qty: 180 TABLET | Refills: 0 | Status: SHIPPED | OUTPATIENT
Start: 2023-10-26

## 2023-10-26 NOTE — ASSESSMENT & PLAN NOTE
Lab Results   Component Value Date    CHOLESTEROL 140 12/03/2022    CHOLESTEROL 144 12/31/2021    CHOLESTEROL 132 05/06/2016     Lab Results   Component Value Date    HDL 50 12/03/2022    HDL 35 (L) 12/31/2021    HDL 41 05/06/2016     Lab Results   Component Value Date    TRIG 94 12/03/2022    TRIG 154 (H) 12/31/2021    TRIG 85 05/06/2016     Lab Results   Component Value Date    [address] 12/03/2022     Lab Results   Component Value Date    LDLCALC 71 12/03/2022     Continue atorvastatin 40 mg daily, ezetimibe 10 mg daily

## 2023-10-26 NOTE — ASSESSMENT & PLAN NOTE
Continue PPI  -Discussed diet and lifestyle interventions to improve sx including: avoidance of common triggers (chocolate, caffeine, tomatoes, citrus), eat small meals frequently, remain upright after meals

## 2023-10-26 NOTE — ASSESSMENT & PLAN NOTE
Wt Readings from Last 3 Encounters:   10/26/23 65.3 kg (144 lb)   08/28/23 64.9 kg (143 lb)   08/14/23 65.2 kg (143 lb 12.8 oz)     Weights stable, euvolemic on exam

## 2023-10-26 NOTE — PATIENT INSTRUCTIONS
Planificación alimenticia con el método del plato   CUIDADO AMBULATORIO:   La planificación de las comidas con el método del plato es shamar manera de planificar los alimentos para las personas con diabetes. El plato puede ayudar a comer la cantidad correcta de carbohidratos y [employer] niveles de azúcar en la ainsley bajo control. Los carbohidratos elevan naturalmente los niveles de azúcar en la ainsley. Zuniga nivel de azúcar en la ainsley puede subir demasiado alto si usted come muchos carbohidratos al [employer]. Los carbohidratos se encuentran en los almidones (pan, cereal, verduras con almidón y frijoles), Audrea Buff, yogur y Campbell. Cómo [employer] del plato para planear los alimentos:  Dibuje shamar línea imaginaria en medio de un plato de comida de 9 pulgadas. En un lado, dibuje otra línea para dividir jose sección a la mitad. El plato tendrá 3 secciones. Llene la sección más vida del plato con verduras sin almidón. Estos incluyen al brócoli, espinacas, pepino, pimientos, coliflor y tomates. Agregue un carbohidrato a 1 sección pequeña del plato. Los ejemplos son pasta, arroz, panes de gardenia entero, tortillas, maíz, calvin y frijoles. Zuniga plan puede permitir shamar ración de lácteos bajos en grasa o fruta yoni carbohidrato. Agregue carne u otra abby de proteína a la otra sección pequeña de zuniga plato. Por ejemplo, tre o pavo sin piel, pescado, carne de res o de [place], queso bajo en grasa, tofu o huevos. Añada shamar bebida baja en calorías o sin calorías. Por ejemplo, agua o café o té sin azúcar.        Tamaño de las porciones de los alimentos:  Verduras sin almidón:     ½ de taza de verdura cocida o 1 taza de verdura cruda    ½ taza de jugo de verduras    Almidones:     1 onza de pan de gardenia entero o 1 tortilla pequeña (6 pulgadas) de harina o maíz    1 panqué (4 pulgadas) pequeño (de aproximadamente ¼ de pulgada de grosor)    ¾ de taza de cereal seco, sin endulzar, de grano entero y listo para comer, o ¼ de taza de granola baja en grasa    ½ taza de cereal o justino cocidos    ? de taza de arroz o pasta    ½ de taza de maíz, chícharos verdes, camote o puré de papa    ½ taza de frijoles y legumbres (garbanzo, frijol tijerina, tipo riñón, chambers, partido, babita) cocidos    Carne y otras howard de proteínas:     De 3 a 4 onzas de cualquier carne magra, pescado o carne de ave    ½ taza de tofu o tempeh    1 huevo vida    1½ onzas (alrededor de 2 cucharadas) de nueces o 2 cucharadas de crema de cacahuate    Frutas:     1 pedazo pequeño de fruta fresca    ½ taza de fruta enlatada o fresca, o jugo de fruta sin azúcar    ¼ de taza de fruta seca    Leche y yogur:     1 taza (8 onzas) de [place] sin grasa o del 1%    ¾ de taza (6 onzas) de yogur natural y sin grasa    Otros consejos de nutrición saludable:  Limite el consumo de sal y azúcar. Seleccione y prepare alimentos y bebidas con menos sal y azúcares agregadas. Utilice la información nutricional en las etiquetas de los alimentos para ayudarle a hacer shamar elección más saludable. El valor del porcentaje diario escrito en las etiquetas de los alimentos, le informa si un alimento es bajo o alto en ciertos nutrientes. Un valor de 5% o menos en el porcentaje diario, significa que el alimento es bajo en el nutriente. Un valor de 20% o más en el porcentaje diario, significa que el alimento es alto en un nutriente. Seleccione grasas saludables. Elija grasas saludables yoni la poliinsaturada y la monoinsaturada en lugar de las grasas malas para la mane. Las grasas saludables se encuentran en los aceites vegetales tales yoni el frijol de soya, maíz, canola, olmos y [place]. Las grasas que no son saludables son las grasas [address] grasas transgénicas y el colesterol. Las grasas no saludables se encuentran en la [place], la Magda neck, la margarina de candida y la grasa animal.         Pregúntele a zuniga médico si usted puede joshua alcohol.  En general, los hombres de 72 años o más y las mujeres deben limitar el consumo de alcohol a 1 bebida en 24 horas y a 7 en 1 semana. Los hombres de 21 a [address] de alcohol a 2 tragos al día y a 14 en 1 semana. Zuniga médico puede decirle cuántas bebidas puede joshua en 24 horas o en 1 semana. Un trago equivale a 12 onzas de cerveza, 5 onzas de vino o 1 onza y ½ de licor. Siempre que yannick alcohol, acompáñelo con alimentos. Zuniga nivel de azúcar en ainsley puede descender a un nivel bajo si adalid cuando zuniga estómago está vacío. © Copyright Escobar Bishop 2023 Information is for End User's use only and may not be sold, redistributed or otherwise used for commercial purposes. Esta información es sólo para uso en educación. Zuniga intención no es darle un consejo médico sobre enfermedades o tratamientos. Colsulte con zuniga Gifty Come farmacéutico antes de seguir cualquier régimen médico para saber si es seguro y efectivo para usted.

## 2023-10-26 NOTE — ASSESSMENT & PLAN NOTE
Lab Results   Component Value Date    HGBA1C 8.9 (A) 10/26/2023    HGBA1C 6.9 (A) 04/06/2023    HGBA1C 6.9 (H) 12/03/2022     Patient and granddaughter admit that patient has not been adherent to diet and has been eating a lot of sweets   Will increase insulin slightly to 11 units bid and granddaughter will ensure better compliance with diet

## 2023-10-26 NOTE — ASSESSMENT & PLAN NOTE
Patient has occasional cough with eating, discussed with granddaughter if coughing increases may need swallow study, GI referral, and speech therapy

## 2023-10-30 ENCOUNTER — PATIENT OUTREACH (OUTPATIENT)
Dept: FAMILY MEDICINE CLINIC | Facility: CLINIC | Age: 78
End: 2023-10-30

## 2023-10-30 NOTE — PROGRESS NOTES
I received a call from [facility] stating the PCP increased the patient's insulin but she was unsure when to give it to the patient. Order reviewed; it is to be give in the morning. I asked [facility] what the patient's blood sugar was today but she stated the patient hid the glucometer and BP kit. [facility] stated the patient received all her meds from the pharmacy but did not receive the glucometer. I called the pharmacy who informed me they had to order the glucometer and will contact [facility] once it arrives; [facility] informed. I will continue to follow.

## 2023-11-06 ENCOUNTER — PATIENT OUTREACH (OUTPATIENT)
Dept: FAMILY MEDICINE CLINIC | Facility: CLINIC | Age: 78
End: 2023-11-06

## 2023-11-06 NOTE — PROGRESS NOTES
I called Padmini Barksdale but received VM. Message was left reminding her the patient needs to have lab draw prior to her H/O appointment 11/10. I also reminded her of the patient's Endo appointment 11/9; times provided for both appointments. I will continue further outreach.

## 2023-11-09 ENCOUNTER — APPOINTMENT (OUTPATIENT)
Dept: LAB | Facility: HOSPITAL | Age: 78
End: 2023-11-09
Payer: COMMERCIAL

## 2023-11-09 DIAGNOSIS — M85.80 AGE-RELATED BONE LOSS: ICD-10-CM

## 2023-11-09 DIAGNOSIS — E11.65 UNCONTROLLED TYPE 2 DIABETES MELLITUS WITH HYPERGLYCEMIA (HCC): ICD-10-CM

## 2023-11-09 DIAGNOSIS — C83.31 DIFFUSE LARGE B-CELL LYMPHOMA OF LYMPH NODES OF NECK (HCC): ICD-10-CM

## 2023-11-09 DIAGNOSIS — D64.9 NORMOCYTIC ANEMIA: ICD-10-CM

## 2023-11-09 LAB
25(OH)D3 SERPL-MCNC: 53.3 NG/ML (ref 30–100)
ALBUMIN SERPL BCP-MCNC: 4.2 G/DL (ref 3.5–5)
ALP SERPL-CCNC: 92 U/L (ref 34–104)
ALT SERPL W P-5'-P-CCNC: 29 U/L (ref 7–52)
ANION GAP SERPL CALCULATED.3IONS-SCNC: 5 MMOL/L
AST SERPL W P-5'-P-CCNC: 21 U/L (ref 13–39)
BASOPHILS # BLD AUTO: 0.05 THOUSANDS/ÂΜL (ref 0–0.1)
BASOPHILS NFR BLD AUTO: 1 % (ref 0–1)
BILIRUB SERPL-MCNC: 0.42 MG/DL (ref 0.2–1)
BUN SERPL-MCNC: 13 MG/DL (ref 5–25)
CALCIUM SERPL-MCNC: 9.4 MG/DL (ref 8.4–10.2)
CHLORIDE SERPL-SCNC: 100 MMOL/L (ref 96–108)
CHOLEST SERPL-MCNC: 58 MG/DL
CO2 SERPL-SCNC: 30 MMOL/L (ref 21–32)
CREAT SERPL-MCNC: 0.74 MG/DL (ref 0.6–1.3)
CRP SERPL QL: 4 MG/L
DAT POLY-SP REAG RBC QL: NEGATIVE
EOSINOPHIL # BLD AUTO: 0.24 THOUSAND/ÂΜL (ref 0–0.61)
EOSINOPHIL NFR BLD AUTO: 3 % (ref 0–6)
ERYTHROCYTE [DISTWIDTH] IN BLOOD BY AUTOMATED COUNT: 13.5 % (ref 11.6–15.1)
ERYTHROCYTE [SEDIMENTATION RATE] IN BLOOD: 64 MM/HOUR (ref 0–29)
FERRITIN SERPL-MCNC: 45 NG/ML (ref 11–307)
FOLATE SERPL-MCNC: 13.1 NG/ML
GFR SERPL CREATININE-BSD FRML MDRD: 78 ML/MIN/1.73SQ M
GLUCOSE P FAST SERPL-MCNC: 131 MG/DL (ref 65–99)
HCT VFR BLD AUTO: 39.3 % (ref 34.8–46.1)
HDLC SERPL-MCNC: 27 MG/DL
HGB BLD-MCNC: 12.5 G/DL (ref 11.5–15.4)
IGA SERPL-MCNC: 293 MG/DL (ref 66–433)
IGG SERPL-MCNC: 1506 MG/DL (ref 635–1741)
IGM SERPL-MCNC: 57 MG/DL (ref 45–281)
IMM GRANULOCYTES # BLD AUTO: 0.04 THOUSAND/UL (ref 0–0.2)
IMM GRANULOCYTES NFR BLD AUTO: 0 % (ref 0–2)
IRON SATN MFR SERPL: 20 % (ref 15–50)
IRON SERPL-MCNC: 66 UG/DL (ref 50–212)
LDH SERPL-CCNC: 132 U/L (ref 140–271)
LDLC SERPL CALC-MCNC: 11 MG/DL (ref 0–100)
LYMPHOCYTES # BLD AUTO: 3 THOUSANDS/ÂΜL (ref 0.6–4.47)
LYMPHOCYTES NFR BLD AUTO: 32 % (ref 14–44)
MCH RBC QN AUTO: 28.3 PG (ref 26.8–34.3)
MCHC RBC AUTO-ENTMCNC: 31.8 G/DL (ref 31.4–37.4)
MCV RBC AUTO: 89 FL (ref 82–98)
MONOCYTES # BLD AUTO: 0.68 THOUSAND/ÂΜL (ref 0.17–1.22)
MONOCYTES NFR BLD AUTO: 7 % (ref 4–12)
NEUTROPHILS # BLD AUTO: 5.28 THOUSANDS/ÂΜL (ref 1.85–7.62)
NEUTS SEG NFR BLD AUTO: 57 % (ref 43–75)
NONHDLC SERPL-MCNC: 31 MG/DL
NRBC BLD AUTO-RTO: 0 /100 WBCS
PLATELET # BLD AUTO: 292 THOUSANDS/UL (ref 149–390)
PMV BLD AUTO: 9.7 FL (ref 8.9–12.7)
POTASSIUM SERPL-SCNC: 4.2 MMOL/L (ref 3.5–5.3)
PROT SERPL-MCNC: 8 G/DL (ref 6.4–8.4)
RBC # BLD AUTO: 4.42 MILLION/UL (ref 3.81–5.12)
RETICS # AUTO: NORMAL 10*3/UL (ref 14097–95744)
RETICS # CALC: 0.98 % (ref 0.37–1.87)
SODIUM SERPL-SCNC: 135 MMOL/L (ref 135–147)
TIBC SERPL-MCNC: 336 UG/DL (ref 250–450)
TRIGL SERPL-MCNC: 99 MG/DL
UIBC SERPL-MCNC: 270 UG/DL (ref 155–355)
VIT B12 SERPL-MCNC: 268 PG/ML (ref 180–914)
WBC # BLD AUTO: 9.29 THOUSAND/UL (ref 4.31–10.16)

## 2023-11-09 PROCEDURE — 83615 LACTATE (LD) (LDH) ENZYME: CPT

## 2023-11-09 PROCEDURE — 80053 COMPREHEN METABOLIC PANEL: CPT

## 2023-11-09 PROCEDURE — 82728 ASSAY OF FERRITIN: CPT

## 2023-11-09 PROCEDURE — 82746 ASSAY OF FOLIC ACID SERUM: CPT

## 2023-11-09 PROCEDURE — 83540 ASSAY OF IRON: CPT

## 2023-11-09 PROCEDURE — 83010 ASSAY OF HAPTOGLOBIN QUANT: CPT

## 2023-11-09 PROCEDURE — 86880 COOMBS TEST DIRECT: CPT

## 2023-11-09 PROCEDURE — 84165 PROTEIN E-PHORESIS SERUM: CPT

## 2023-11-09 PROCEDURE — 82306 VITAMIN D 25 HYDROXY: CPT

## 2023-11-09 PROCEDURE — 80061 LIPID PANEL: CPT

## 2023-11-09 PROCEDURE — 86334 IMMUNOFIX E-PHORESIS SERUM: CPT

## 2023-11-09 PROCEDURE — 86140 C-REACTIVE PROTEIN: CPT

## 2023-11-09 PROCEDURE — 85045 AUTOMATED RETICULOCYTE COUNT: CPT

## 2023-11-09 PROCEDURE — 85025 COMPLETE CBC W/AUTO DIFF WBC: CPT

## 2023-11-09 PROCEDURE — 85652 RBC SED RATE AUTOMATED: CPT

## 2023-11-09 PROCEDURE — 83550 IRON BINDING TEST: CPT

## 2023-11-09 PROCEDURE — 36415 COLL VENOUS BLD VENIPUNCTURE: CPT

## 2023-11-09 PROCEDURE — 82607 VITAMIN B-12: CPT

## 2023-11-09 PROCEDURE — 82784 ASSAY IGA/IGD/IGG/IGM EACH: CPT

## 2023-11-10 ENCOUNTER — OFFICE VISIT (OUTPATIENT)
Dept: HEMATOLOGY ONCOLOGY | Facility: CLINIC | Age: 78
End: 2023-11-10
Payer: COMMERCIAL

## 2023-11-10 VITALS
RESPIRATION RATE: 20 BRPM | DIASTOLIC BLOOD PRESSURE: 78 MMHG | BODY MASS INDEX: 27.88 KG/M2 | OXYGEN SATURATION: 94 % | SYSTOLIC BLOOD PRESSURE: 118 MMHG | HEIGHT: 60 IN | HEART RATE: 84 BPM | WEIGHT: 142 LBS | TEMPERATURE: 97.4 F

## 2023-11-10 DIAGNOSIS — D53.9 NUTRITIONAL ANEMIA: ICD-10-CM

## 2023-11-10 DIAGNOSIS — R77.8 ABNORMAL SPEP: ICD-10-CM

## 2023-11-10 DIAGNOSIS — R13.19 OTHER DYSPHAGIA: ICD-10-CM

## 2023-11-10 DIAGNOSIS — E53.8 B12 DEFICIENCY: ICD-10-CM

## 2023-11-10 DIAGNOSIS — D64.9 NORMOCYTIC ANEMIA: ICD-10-CM

## 2023-11-10 DIAGNOSIS — R59.0 LAD (LYMPHADENOPATHY), HILAR: ICD-10-CM

## 2023-11-10 DIAGNOSIS — M54.2 NECK PAIN: ICD-10-CM

## 2023-11-10 DIAGNOSIS — J18.1 CONSOLIDATION OF RIGHT LOWER LOBE OF LUNG (HCC): ICD-10-CM

## 2023-11-10 DIAGNOSIS — C83.31 DIFFUSE LARGE B-CELL LYMPHOMA OF LYMPH NODES OF NECK (HCC): Primary | ICD-10-CM

## 2023-11-10 LAB — HAPTOGLOB SERPL-MCNC: 248 MG/DL (ref 42–346)

## 2023-11-10 PROCEDURE — 99214 OFFICE O/P EST MOD 30 MIN: CPT | Performed by: NURSE PRACTITIONER

## 2023-11-10 NOTE — PROGRESS NOTES
Hematology/Oncology Outpatient Follow-up  Ponce Gaspar 69 y.o. female 1945 [id number]    Date:  11/10/2023      Assessment and Plan:  1. 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 Did note mildly enlarged right hilar nodes which are likely reactive however given patient's history of diffuse large B-cell lymphoma we will continue to monitor closely. Recommended pursuing 4-month follow-up CT scan of the chest.    Her granddaughter states that she is currently fighting viral illness which is going around the house with some loose stools and is also complaining about some neck pain/dysphagia. On physical exam she seems to be extremely tender to the left side of her neck with light palpation. Given her history may be prudent to pursue imaging CT of the neck to rule out any recurrence of her lymphoma/malignant process. Was told that we will contact her if there is any significant findings on her imaging. Otherwise we will schedule follow-up appointment in about 4 months from now with repeat labs or definitely sooner should there be any need. - CBC and differential; Future  - Comprehensive metabolic panel; Future  - LD,Blood; Future  - C-reactive protein; Future  - Sedimentation rate, automated; Future  - CT chest w contrast; Future    2. Consolidation of right lower lobe of lung ([address])  - CT chest w contrast; Future    3. LAD (lymphadenopathy), hilar  - CT chest w contrast; Future    4. Neck pain  - CT soft tissue neck w contrast; Future    5. Other dysphagia  - CT soft tissue neck w contrast; Future    6. B12 deficiency  Start oral B12 supplements 1000 mcg daily. - cyanocobalamin (VITAMIN B-12) 1000 MCG tablet;  Take 1 tablet (1,000 mcg total) by mouth daily  Dispense: 90 tablet; Refill: 3    7. Normocytic anemia  Patient is no longer anemic hemoglobin 12.5. Her iron stores continue to be borderline/low normal ferritin 45 however this has been rather stable. We will continue her oral iron supplements for now. She does have pending SPEP. - Iron Panel (Includes Ferritin, Iron Sat%, Iron, and TIBC); Future  - Ferritin; Future      HPI:  Patient presents today for a follow-up visit accompanied by her granddaughter who kindly assisted with Indian translation. Her granddaughter states that she has been having loose stools for the past 2 to 3 days however a viral illness has been going through her household which is the likely reason. Her weight and appetite has been stable. She has been complaining of some difficulty swallowing and pain in her throat/neck region. Her granddaughter states that she was found to have significant reflux and was felt that this was some of the reason for her sore throat-improved after starting PPI. However the dysphagia and neck pain seems to be fairly new over the past 2 weeks or so. She did have upper and lower endoscopy earlier this year 5/8/2023. Upper endoscopy showed mild gastritis. Small polyp was removed in the cecum otherwise normal colonoscopy-no further screening colonoscopy was recommended given her age. Her most recent laboratory studies from 11/9/2023 showed normal CBC hemoglobin 12.5. Glucose 131 otherwise normal CMP. Inflammatory markers elevated C-reactive protein 4.0/sed rate 64. . B12 is low 268. Folic acid is appropriate 13.1. Iron saturation 20%, ferritin borderline 45 however this is stable in comparison to prior studies. SPEP and haptoglobin are still pending. She had a follow-up/repeat CT scan of the chest with contrast recently 10/10/2023:  IMPRESSION:  Improved right lower lobe consolidation and bronchiectasis with mild residual scarring and persistent mild bronchiectasis.   Mildly enlarged right hilar lymph nodes, likely reactive. Oncology History Overview Note   The patient complained about significant sore throat in May which was treated with antibiotics by her PCP in [place] which did not improve her sore throat. She then started to notice significant odynophagia and dysphagia for liquids it is and solid nutrition. Eventually, she had a CT scan of the neck on the 20th of September which showed soft tissue lesion in the left palatine tonsil with abnormal lymph nodes bilaterally in the neck compatible with neoplastic process. She then had a biopsy of the left tonsil/left tonsillectomy on the 25th of September 2018 which was compatible with diffuse large B-cell lymphoma germinal center B phenotype. The immunohistochemical staining came back positive for BCL2, BCL 6 and myc with Ki 67 around 70%. No FISH rearrangements gene study was done for BCL2, BCL 6 are myc translocation. 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 with pending gene rearrangement studies. During the hospital course the patient had another biopsy of the left tonsil to better understand the exact aggressiveness of her large B-cell lymphoma. The biopsy on the 20th of November showed large B-cell lymphoma with BCL -2 and C myc level expressed surface a type without the myc or BCL gene rearrangement. Her bone marrow biopsy on the 9th of November was negative for B-cell lymphoma involvement with normal bone marrow trilineage maturation. She was also evaluated with an MRI of the brain during the hospital course which was negative for any hint of lymphoma involvement. PET scan on the 14th of November showed IMPRESSION:  1.  FDG avid left posterior oropharyngeal lesion compatible with malignancy.   2.  FDG avid lymph nodes in the neck and left axilla compatible with malignancy. 3. No FDG avid lymph nodes in the abdomen or pelvis suspicious for malignancy. 4.  Tiny focus of FDG uptake along the skin of the right upper quadrant anterior abdominal wall with mild skin thickening suggested here on CT    Her post treatment PET-CT 3/18/19 was read:  IMPRESSION:  1. No evidence of hypermetabolic malignancy. Deauville score of 1.  2.  Mild patchy FDG uptake in the right upper lobe corresponding to patchy  consolidation. This may be infectious or inflammatory. This has partially improved from the 2/25/2019 chest x-ray. 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 Dose adjusted R-EPOCH 11/21/18 (1 cycle)- switched to RCHOP after repeat biopsy/pathology reviewed  2. R-CHOP started 12/12/18 completed 3/7/19 (5 cycles)             ECOG performance status: 2    ROS: Review of Systems   Constitutional:  Positive for activity change and fatigue. HENT:  Positive for trouble swallowing. Respiratory:  Positive for cough and shortness of breath. Gastrointestinal:  Positive for diarrhea. Musculoskeletal:  Positive for arthralgias, back pain, gait problem, myalgias and neck pain. Psychiatric/Behavioral:  Positive for confusion, dysphoric mood and sleep disturbance. All other systems reviewed and are negative.       Past Medical History:   Diagnosis Date    Cancer ([address])     Throat    Chronic pain disorder     Diabetes mellitus ([address])     Diffuse large B cell lymphoma ([address])     Dysphagia     GERD without esophagitis     HTN (hypertension)     Hyperlipidemia     Hypertension     MI (myocardial infarction) ([address])     Port-A-Cath in place 07/29/2019    Thyroid cancer ([address]) 2018       Past Surgical History:   Procedure Laterality Date    BONE MARROW BIOPSY      BREAST BIOPSY Left     CARDIAC CATHETERIZATION N/A 12/9/2022    Procedure: Cardiac catheterization;  Surgeon: Josette Nieto MD;  Location: [facility]; Service: Cardiology    CARDIAC CATHETERIZATION N/A 12/9/2022    Procedure: Cardiac Coronary Angiogram;  Surgeon: Josette Nieto MD;  Location: [facility]; Service: Cardiology    CARDIAC CATHETERIZATION Left 12/9/2022    Procedure: Cardiac Left Heart Cath;  Surgeon: Josette Nieto MD;  Location: [facility]; Service: Cardiology    CHOLECYSTECTOMY      IR PORT PLACEMENT  11/16/2018    IR PORT REMOVAL  3/22/2021    OTHER SURGICAL HISTORY      tendor tear repair to right shoulder    SHOULDER ARTHROSCOPY         Social History     Socioeconomic History    Marital status:      Spouse name: Not on file    Number of children: Not on file    Years of education: Not on file    Highest education level: Not on file   Occupational History    Not on file   Tobacco Use    Smoking status: Never     Passive exposure: Never    Smokeless tobacco: Never   Vaping Use    Vaping Use: Never used   Substance and Sexual Activity    Alcohol use: Never     Comment: 0    Drug use: No    Sexual activity: Not Currently     Partners: Male   Other Topics Concern    Not on file   Social History Narrative    Not on file     Social Determinants of Health     Financial Resource Strain: Low Risk  (11/17/2021)    Overall Financial Resource Strain (CARDIA)     Difficulty of Paying Living Expenses: Not very hard   Food Insecurity: No Food Insecurity (12/8/2022)    Hunger Vital Sign     Worried About Running Out of Food in the Last Year: Never true     Ran Out of Food in the Last Year: Never true   Transportation Needs: Unknown (12/8/2022)    PRAPARE - Transportation     Lack of Transportation (Medical): Not on file     Lack of Transportation (Non-Medical):  No   Physical Activity: Not on file   Stress: Stress Concern Present (3/21/2022)    [address] [place] [address] Feeling of Stress : To some extent   Social Connections: Not on file   Intimate Partner Violence: Not on file   Housing Stability: Low Risk  (12/8/2022)    Housing Stability Vital Sign     Unable to Pay for Housing in the Last Year: No     Number of Places Lived in the Last Year: 1     Unstable Housing in the Last Year: No       Family History   Problem Relation Age of Onset    Diabetes Mother     Heart disease Sister     Hypertension Brother     Uterine cancer Maternal Grandmother     Prostate cancer Paternal Grandfather        No Known Allergies      Current Outpatient Medications:     Accu-Chek FastClix Lancets MISC, USAR PARA PROBAR AZUCAR EN LA TWAN MANISHA VECES AL JEFFREY, Disp: 102 each, Rfl: 10    albuterol (PROVENTIL HFA,VENTOLIN HFA) 90 mcg/act inhaler, Inhale 1 puff every 4 (four) hours as needed for wheezing, Disp: 18 g, Rfl: 10    ALPRAZolam (XANAX) 0.25 mg tablet, Take 1 tablet (0.25 mg total) by mouth daily at bedtime as needed for anxiety, Disp: 30 tablet, Rfl: 0    aspirin (Aspirin Low Dose) 81 mg chewable tablet, Chew 1 tablet (81 mg total) daily, Disp: 90 tablet, Rfl: 0    atorvastatin (LIPITOR) 40 mg tablet, Take 1 tablet (40 mg total) by mouth daily, Disp: 90 tablet, Rfl: 0    Blood Glucose Monitoring Suppl (OneTouch Verio Reflect) w/Device KIT, Check blood sugars once daily. Please substitute with appropriate alternative as covered by patient's insurance.  Dx: E11.65, Disp: 1 kit, Rfl: 0    Blood Glucose Monitoring Suppl (OneTouch Verio) w/Device KIT, Use per  guidelines, Disp: 1 kit, Rfl: 0    Calcium Carb-Cholecalciferol 600-10 MG-MCG TABS, TOME JOHNNY TABLETA DOS VECES AL JEFFREY, Disp: 180 tablet, Rfl: 0    carvedilol (COREG) 12.5 mg tablet, Take 1 tablet (12.5 mg total) by mouth 2 (two) times a day with meals, Disp: 180 tablet, Rfl: 0    cyanocobalamin (VITAMIN B-12) 500 MCG tablet, Take 2 tablets (1,000 mcg total) by mouth daily, Disp: 90 tablet, Rfl: 2    Empagliflozin (Jardiance) 10 MG TABS tablet, Take 1 tablet (10 mg total) by mouth every morning, Disp: 90 tablet, Rfl: 1    ezetimibe (ZETIA) 10 mg tablet, Take 1 tablet (10 mg total) by mouth daily, Disp: 90 tablet, Rfl: 0    ferrous sulfate 325 (65 Fe) mg tablet, Take 1 tablet (325 mg total) by mouth daily with breakfast, Disp: 90 tablet, Rfl: 2    fluticasone (FLONASE) 50 mcg/act nasal spray, 1 spray into each nostril daily, Disp: 16 g, Rfl: 10    furosemide (LASIX) 20 mg tablet, Take 1 tablet (20 mg total) by mouth daily, Disp: 90 tablet, Rfl: 3    gabapentin (NEURONTIN) 100 mg capsule, Take 1 capsule (100 mg total) by mouth 3 (three) times a day, Disp: 90 capsule, Rfl: 5    glucose blood (Accu-Chek Guide) test strip, USAR PARA EXAMINAR AZUCAR EN LA TWAN MANISHA VECES AL JEFRFEY, Disp: 100 strip, Rfl: 10    glucose blood (OneTouch Verio) test strip, Check blood sugars once daily. Please substitute with appropriate alternative as covered by patient's insurance.  Dx: E11.65, Disp: 100 each, Rfl: 3    guaiFENesin (MUCINEX) 600 mg 12 hr tablet, Take 2 tablets (1,200 mg total) by mouth every 12 (twelve) hours, Disp: 30 tablet, Rfl: 1    Insulin Glargine Solostar (Basaglar KwikPen) 100 UNIT/ML SOPN, Inject 0.11 mL (11 Units total) under the skin in the morning, Disp: 12 mL, Rfl: 1    Insulin Pen Needle (BD Pen Needle Micro U/F) 32G X 6 MM MISC, Use daily, Disp: 100 each, Rfl: 1    lidocaine (Lidoderm) 5 %, Apply 1 patch topically over 12 hours daily Remove & Discard patch within 12 hours or as directed by MD, Disp: 15 patch, Rfl: 0    loperamide (IMODIUM) 2 mg capsule, MAR JOHNNY (1) CAPSULA POR VIA ORAL ALFREDO SEA NECESARIO PARA LA DIARREA, Disp: 30 capsule, Rfl: 10    losartan (COZAAR) 25 mg tablet, Take 1 tablet (25 mg total) by mouth daily, Disp: 90 tablet, Rfl: 0    Melatonin 5 MG TABS, Take by mouth as needed, Disp: , Rfl:     memantine (NAMENDA) 10 mg tablet, Take 1 tablet (10 mg total) by mouth daily, Disp: 60 tablet, Rfl: 10    mirtazapine (REMERON) 15 mg tablet, Take 1 tablet (15 mg total) by mouth daily at bedtime, Disp: 90 tablet, Rfl: 3    omeprazole (PriLOSEC) 20 mg delayed release capsule, Take 1 capsule (20 mg total) by mouth 2 (two) times a day, Disp: 180 capsule, Rfl: 3    ondansetron (ZOFRAN) 4 mg tablet, Take 1 tablet (4 mg total) by mouth every 8 (eight) hours as needed for nausea or vomiting, Disp: 20 tablet, Rfl: 0    OneTouch Delica Lancets 75L MISC, Check blood sugars once daily. Please substitute with appropriate alternative as covered by patient's insurance. Dx: E11.65, Disp: 100 each, Rfl: 3    sitaGLIPtin-metFORMIN (Janumet)  MG per tablet, Take 1 tablet by mouth 2 (two) times a day with meals, Disp: 60 tablet, Rfl: 1      Physical Exam:  /78 (BP Location: Left arm)   Pulse 84   Temp (!) 97.4 °F (36.3 °C) (Tympanic)   Resp 20   Ht 5' (1.524 m)   Wt 64.4 kg (142 lb)   SpO2 94%   BMI 27.73 kg/m²     Physical Exam  Vitals reviewed. Constitutional:       General: She is not in acute distress. Appearance: She is well-developed. She is not diaphoretic. HENT:      Head: Normocephalic and atraumatic. Eyes:      General: No scleral icterus. Conjunctiva/sclera: Conjunctivae normal.      Pupils: Pupils are equal, round, and reactive to light. Neck:      Thyroid: No thyromegaly. Comments: Left side of neck is very tender to light touch  Cardiovascular:      Rate and Rhythm: Normal rate and regular rhythm. Heart sounds: Normal heart sounds. No murmur heard. Pulmonary:      Effort: Pulmonary effort is normal. No respiratory distress. Breath sounds: Normal breath sounds. Abdominal:      General: There is no distension. Palpations: Abdomen is soft. There is no hepatomegaly or splenomegaly. Tenderness: There is no abdominal tenderness. Musculoskeletal:         General: No swelling. Normal range of motion. Cervical back: Normal range of motion and neck supple.    Lymphadenopathy: Cervical: No cervical adenopathy (no obvious palpable LAD). Upper Body:      Right upper body: No axillary adenopathy. Left upper body: No axillary adenopathy. Skin:     General: Skin is warm and dry. Findings: No erythema or rash. Neurological:      General: No focal deficit present. Mental Status: She is alert and oriented to person, place, and time. Psychiatric:         Mood and Affect: Mood normal. Affect is blunt. Behavior: Behavior normal. Behavior is cooperative. Cognition and Memory: Cognition is impaired. Labs:  Lab Results   Component Value Date    WBC 9.29 11/09/2023    HGB 12.5 11/09/2023    HCT 39.3 11/09/2023    MCV 89 11/09/2023     11/09/2023          Patient voiced understanding and agreement in the above discussion. Aware to contact our office with questions/symptoms in the interim. This note has been generated by voice recognition software system. Therefore, there may be spelling, grammar, and or syntax errors. Please contact if questions arise.

## 2023-11-11 LAB
ALBUMIN SERPL ELPH-MCNC: 3.94 G/DL (ref 3.2–5.1)
ALBUMIN SERPL ELPH-MCNC: 51.8 % (ref 48–70)
ALPHA1 GLOB SERPL ELPH-MCNC: 0.28 G/DL (ref 0.15–0.47)
ALPHA1 GLOB SERPL ELPH-MCNC: 3.7 % (ref 1.8–7)
ALPHA2 GLOB SERPL ELPH-MCNC: 1.02 G/DL (ref 0.42–1.04)
ALPHA2 GLOB SERPL ELPH-MCNC: 13.4 % (ref 5.9–14.9)
BETA GLOB ABNORMAL SERPL ELPH-MCNC: 0.44 G/DL (ref 0.31–0.57)
BETA1 GLOB SERPL ELPH-MCNC: 5.8 % (ref 4.7–7.7)
BETA2 GLOB SERPL ELPH-MCNC: 5.1 % (ref 3.1–7.9)
BETA2+GAMMA GLOB SERPL ELPH-MCNC: 0.39 G/DL (ref 0.2–0.58)
GAMMA GLOB ABNORMAL SERPL ELPH-MCNC: 1.54 G/DL (ref 0.4–1.66)
GAMMA GLOB SERPL ELPH-MCNC: 20.2 % (ref 6.9–22.3)
IGG/ALB SER: 1.07 {RATIO} (ref 1.1–1.8)
INTERPRETATION UR IFE-IMP: NORMAL
PROT PATTERN SERPL ELPH-IMP: ABNORMAL
PROT SERPL-MCNC: 7.6 G/DL (ref 6.4–8.2)

## 2023-11-11 PROCEDURE — 84165 PROTEIN E-PHORESIS SERUM: CPT | Performed by: STUDENT IN AN ORGANIZED HEALTH CARE EDUCATION/TRAINING PROGRAM

## 2023-11-11 PROCEDURE — 86334 IMMUNOFIX E-PHORESIS SERUM: CPT | Performed by: STUDENT IN AN ORGANIZED HEALTH CARE EDUCATION/TRAINING PROGRAM

## 2023-11-13 NOTE — RESULT ENCOUNTER NOTE
Reviewed pending SPEP which was not available at patient's visit. Showed abnormal distribution in the gamma region-immunofixation showed a faint possible band recommended repeating testing in 4 to 6 months. We will order repeat studies to be done before her next office visit in 4 months from now.

## 2023-11-16 ENCOUNTER — OFFICE VISIT (OUTPATIENT)
Age: 78
End: 2023-11-16
Payer: COMMERCIAL

## 2023-11-16 VITALS
BODY MASS INDEX: 27.23 KG/M2 | HEART RATE: 80 BPM | TEMPERATURE: 97.8 F | SYSTOLIC BLOOD PRESSURE: 136 MMHG | OXYGEN SATURATION: 98 % | HEIGHT: 61 IN | WEIGHT: 144.2 LBS | DIASTOLIC BLOOD PRESSURE: 70 MMHG

## 2023-11-16 DIAGNOSIS — I10 ESSENTIAL HYPERTENSION: ICD-10-CM

## 2023-11-16 DIAGNOSIS — F32.0 CURRENT MILD EPISODE OF MAJOR DEPRESSIVE DISORDER WITHOUT PRIOR EPISODE (HCC): ICD-10-CM

## 2023-11-16 DIAGNOSIS — R26.81 GAIT INSTABILITY: ICD-10-CM

## 2023-11-16 DIAGNOSIS — F03.90 DEMENTIA WITHOUT BEHAVIORAL DISTURBANCE (HCC): ICD-10-CM

## 2023-11-16 DIAGNOSIS — F03.918 DEMENTIA WITH BEHAVIORAL DISTURBANCE (HCC): Primary | ICD-10-CM

## 2023-11-16 DIAGNOSIS — M19.90 ARTHRITIS: ICD-10-CM

## 2023-11-16 PROCEDURE — 99214 OFFICE O/P EST MOD 30 MIN: CPT | Performed by: NURSE PRACTITIONER

## 2023-11-16 RX ORDER — MEMANTINE HYDROCHLORIDE 10 MG/1
10 TABLET ORAL 2 TIMES DAILY
Qty: 60 TABLET | Refills: 3 | Status: SHIPPED | OUTPATIENT
Start: 2023-11-16

## 2023-11-16 NOTE — PROGRESS NOTES
Assessment & Plan:   1. Dementia with behavioral disturbance  Assessment & Plan:  MOCA:  7/29. Deficits in:  all domains  Grandmedhatt notes progressive memory and cognitive loss. Pt is reoriented, still participates in family activities. Pt is assist adl/dependent iadls. Lives with marco and her family  Pt's current cognitive level is consistent with moderate dementia  Memory medications: namenda (did increase back to bid dosing today - pcp had ordered nightly when she reordered meds for pharm change, darciet would like bid)  Mobility:  no AD, no recent fall  Continue to stay active. Current activity level:  fair. Participates in some social, cognitive, physical activity. Monitor for changes in mood, sleep, pain control. Notify provider if any concerns  Medication review:  seems appropriate for current conditions. Monitor use of xanax as this is not recommended in geriatric patients  Check with pharmacist/provider before starting any new OTC/prescription medications for potential cognitive side effects  Optimize all acute and chronic conditions. Continue to follow-up with PCP and specialist as directed for management  Safety concerns:  none per granddgt  Caregiver stress:  none per granddgt- she tries to get family to help out more, sometimes they do, sometimes not. Her BF helps   Ensure adequate hydration, good nutrition  Goal: to keep patient at home, safely      2. Current mild episode of major depressive disorder without prior episode [facility])  Assessment & Plan:  Mood stable per granddgt  Pt remains on mirazapine, prn alprazolam  Would utilize benzo's sparingly and lowest dose possible  Cont with emotional support, relaxation techniques      3. Gait instability  Assessment & Plan:  No recent falls, doesn't like cane  Fall precautions      4. Essential hypertension  Assessment & Plan:   Bp stable, denies ha/dizziness  Contineus on coreg, losartan, diuretic  Cont dietary and lifestyle interventions  Cont reg f/u with pcp for chronic managmement      5. Arthritis  Assessment & Plan:  No c/o pain today   Pt remains on gabapentin, prn tylenol/topical analgesics  Notify pcp if new or changes in pain        6. Dementia without behavioral disturbance (HCC)  -     memantine (NAMENDA) 10 mg tablet; Take 1 tablet (10 mg total) by mouth 2 (two) times a day    HPI:  We had the pleasure of evaluating Jarrell Gale who is a 69 y.o. female   in Geriatric follow up today. Previous MOCA:  13/30. Comorbidities include anxiety, dementia, gait instability  She lives with her granddgt and family  Ms. Sandoval is in the office with her granddgt (her primary caregiver)    Memory update per patient and granddgt:  Language barrier, granddgt translates. Pt engages in some convo, follows commands. Pt stays with her granddgt, 24/7. She is doing ok. Her memory seems to have some minor progression, but she is able to participate in family activities. She will be going to son's for a break, but granddgt will be doing errands while son patient is away. Patient helps to watch her granddgt's little ones. No worries about wandering. Goal is to keep at home. Appetite is on and off. A1C is high because she is eating too much junk food. She sleeps late in am, but goes to bed late. She has difficulty finding the right word while speaking: Yes  Patient requires repeat information or ask the same question repeatedly: Yes  Do you do your own bathing, dressing, feeding yourself Yes - granddgt helps. Gives hard time for bathing. She does frabricate.     Can you make your own meals and do light cleaning/chores Yes - helps  Do you take care of your own medications No - granddgt   Do you drive: No       Do you handle your own financial affairs such as balancing your checkbook, paying bills, investments: No  Have you or your family noted any change in your mood or personality:No - does get sad sometiems  Are you currently or have you been treated in the past for depression or anxiety: Yes  Have you noticed any gait or balance disorder: No  Uses : doesn't want to use AD, no recent falls  Sleep Issues: Yes -   Urinary/Stool Incontinence: No  Hearing and vision issue: No  ADL/IADL:  assist/dependent  Appetite:  too much sweets  Pain:  legs and back    Family Review of Behavior [facility]:    pacing. No    agressive/combative behavior. No    agitated. No   wandering. No   resistance to care. Yes - occ to showers, but will eventually take  hoarding/hiding objects. No    suspicious  No  withdrawn No  misplacing/losing objects Yes - same  personal hygiene problems. No  forgetfulness of actions Yes - same    ROS: Review of Systems   Constitutional:  Negative for activity change, appetite change, chills and fatigue. HENT:  Negative for congestion and hearing loss. Eyes:  Negative for visual disturbance. Respiratory:  Negative for cough and shortness of breath. Cardiovascular:  Negative for chest pain. Gastrointestinal:  Negative for abdominal pain, constipation, diarrhea, nausea and vomiting. Genitourinary:  Negative for difficulty urinating. Musculoskeletal:  Positive for arthralgias (legs sometimes). Negative for back pain and gait problem. Neurological:  Negative for dizziness and light-headedness. Psychiatric/Behavioral:  Positive for decreased concentration (forgetful) and sleep disturbance. Negative for dysphoric mood. The patient is not nervous/anxious. Past Medical, surgical, social, medication and allergy history and patients previous records reviewed.   Allergies:   No Known Allergies    Medications:      Current Outpatient Medications:     Accu-Chek FastClix Lancets MISC, USAR PARA PROBAR AZUCAR EN LA TWAN MNAISHA VECES AL JEFFREY, Disp: 102 each, Rfl: 10    albuterol (PROVENTIL HFA,VENTOLIN HFA) 90 mcg/act inhaler, Inhale 1 puff every 4 (four) hours as needed for wheezing, Disp: 18 g, Rfl: 10    ALPRAZolam (XANAX) 0.25 mg tablet, Take 1 tablet (0.25 mg total) by mouth daily at bedtime as needed for anxiety, Disp: 30 tablet, Rfl: 0    aspirin (Aspirin Low Dose) 81 mg chewable tablet, Chew 1 tablet (81 mg total) daily, Disp: 90 tablet, Rfl: 0    atorvastatin (LIPITOR) 40 mg tablet, Take 1 tablet (40 mg total) by mouth daily, Disp: 90 tablet, Rfl: 0    Blood Glucose Monitoring Suppl (OneTouch Verio Reflect) w/Device KIT, Check blood sugars once daily. Please substitute with appropriate alternative as covered by patient's insurance.  Dx: E11.65, Disp: 1 kit, Rfl: 0    Blood Glucose Monitoring Suppl (OneTouch Verio) w/Device KIT, Use per  guidelines, Disp: 1 kit, Rfl: 0    Calcium Carb-Cholecalciferol 600-10 MG-MCG TABS, TOME JOHNNY TABLETA DOS VECES AL JEFFREY, Disp: 180 tablet, Rfl: 0    carvedilol (COREG) 12.5 mg tablet, Take 1 tablet (12.5 mg total) by mouth 2 (two) times a day with meals, Disp: 180 tablet, Rfl: 0    cyanocobalamin (VITAMIN B-12) 1000 MCG tablet, Take 1 tablet (1,000 mcg total) by mouth daily, Disp: 90 tablet, Rfl: 3    cyanocobalamin (VITAMIN B-12) 500 MCG tablet, Take 2 tablets (1,000 mcg total) by mouth daily, Disp: 90 tablet, Rfl: 2    Empagliflozin (Jardiance) 10 MG TABS tablet, Take 1 tablet (10 mg total) by mouth every morning, Disp: 90 tablet, Rfl: 1    ezetimibe (ZETIA) 10 mg tablet, Take 1 tablet (10 mg total) by mouth daily, Disp: 90 tablet, Rfl: 0    ferrous sulfate 325 (65 Fe) mg tablet, Take 1 tablet (325 mg total) by mouth daily with breakfast, Disp: 90 tablet, Rfl: 2    fluticasone (FLONASE) 50 mcg/act nasal spray, 1 spray into each nostril daily, Disp: 16 g, Rfl: 10    furosemide (LASIX) 20 mg tablet, Take 1 tablet (20 mg total) by mouth daily, Disp: 90 tablet, Rfl: 3    gabapentin (NEURONTIN) 100 mg capsule, Take 1 capsule (100 mg total) by mouth 3 (three) times a day, Disp: 90 capsule, Rfl: 5    glucose blood (Accu-Chek Guide) test strip, USAR PARA EXAMINAR AZUCAR EN LA TWAN MANISHA VECES AL JEFFREY, Disp: 100 strip, Rfl: 10    glucose blood (OneTouch Verio) test strip, Check blood sugars once daily. Please substitute with appropriate alternative as covered by patient's insurance. Dx: E11.65, Disp: 100 each, Rfl: 3    guaiFENesin (MUCINEX) 600 mg 12 hr tablet, Take 2 tablets (1,200 mg total) by mouth every 12 (twelve) hours, Disp: 30 tablet, Rfl: 1    Insulin Glargine Solostar (Basaglar KwikPen) 100 UNIT/ML SOPN, Inject 0.11 mL (11 Units total) under the skin in the morning, Disp: 12 mL, Rfl: 1    Insulin Pen Needle (BD Pen Needle Micro U/F) 32G X 6 MM MISC, Use daily, Disp: 100 each, Rfl: 1    lidocaine (Lidoderm) 5 %, Apply 1 patch topically over 12 hours daily Remove & Discard patch within 12 hours or as directed by MD, Disp: 15 patch, Rfl: 0    loperamide (IMODIUM) 2 mg capsule, MAR JOHNNY (1) CAPSULA POR VIA ORAL ALFREDO SEA NECESARIO PARA LA DIARREA, Disp: 30 capsule, Rfl: 10    losartan (COZAAR) 25 mg tablet, Take 1 tablet (25 mg total) by mouth daily, Disp: 90 tablet, Rfl: 0    Melatonin 5 MG TABS, Take by mouth as needed, Disp: , Rfl:     memantine (NAMENDA) 10 mg tablet, Take 1 tablet (10 mg total) by mouth 2 (two) times a day, Disp: 60 tablet, Rfl: 3    mirtazapine (REMERON) 15 mg tablet, Take 1 tablet (15 mg total) by mouth daily at bedtime, Disp: 90 tablet, Rfl: 3    omeprazole (PriLOSEC) 20 mg delayed release capsule, Take 1 capsule (20 mg total) by mouth 2 (two) times a day, Disp: 180 capsule, Rfl: 3    ondansetron (ZOFRAN) 4 mg tablet, Take 1 tablet (4 mg total) by mouth every 8 (eight) hours as needed for nausea or vomiting, Disp: 20 tablet, Rfl: 0    OneTouch Delica Lancets 24M MISC, Check blood sugars once daily. Please substitute with appropriate alternative as covered by patient's insurance.  Dx: E11.65, Disp: 100 each, Rfl: 3    sitaGLIPtin-metFORMIN (Janumet)  MG per tablet, Take 1 tablet by mouth 2 (two) times a day with meals, Disp: 60 tablet, Rfl: 1    Vitals:  Vitals:    11/16/23 1137   BP: 136/70   Pulse: 80   Temp: 97.8 °F (36.6 °C)   SpO2: 98%       History:  Past Medical History:   Diagnosis Date    Cancer ([address])     Throat    Chronic pain disorder     Diabetes mellitus ([address])     Diffuse large B cell lymphoma (HCC)     Dysphagia     GERD without esophagitis     HTN (hypertension)     Hyperlipidemia     Hypertension     MI (myocardial infarction) ([address])     Port-A-Cath in place 07/29/2019    Thyroid cancer ([address]) 2018     Past Surgical History:   Procedure Laterality Date    BONE MARROW BIOPSY      BREAST BIOPSY Left     CARDIAC CATHETERIZATION N/A 12/9/2022    Procedure: Cardiac catheterization;  Surgeon: Shon Wagner MD;  Location: [facility]; Service: Cardiology    CARDIAC CATHETERIZATION N/A 12/9/2022    Procedure: Cardiac Coronary Angiogram;  Surgeon: Shon Wagner MD;  [facility]; Service: Cardiology    CARDIAC CATHETERIZATION Left 12/9/2022    Procedure: Cardiac Left Heart Cath;  Surgeon: Shon Wagner MD;  [facility]; Service: Cardiology    CHOLECYSTECTOMY      IR PORT PLACEMENT  11/16/2018    IR PORT REMOVAL  3/22/2021    OTHER SURGICAL HISTORY      tendor tear repair to right shoulder    SHOULDER ARTHROSCOPY       Family History   Problem Relation Age of Onset    Diabetes Mother     Heart disease Sister     Hypertension Brother     Uterine cancer Maternal Grandmother     Prostate cancer Paternal Grandfather      Social History     Socioeconomic History    Marital status:       Spouse name: Not on file    Number of children: Not on file    Years of education: Not on file    Highest education level: Not on file   Occupational History    Not on file   Tobacco Use    Smoking status: Never     Passive exposure: Never    Smokeless tobacco: Never   Vaping Use    Vaping Use: Never used   Substance and Sexual Activity    Alcohol use: Never     Comment: 0    Drug use: No    Sexual activity: Not Currently     Partners: Male   Other Topics Concern    Not on file   Social History Narrative    Not on file     Social Determinants of Health     Financial Resource Strain: Low Risk  (11/17/2021)    Overall Financial Resource Strain (CARDIA)     Difficulty of Paying Living Expenses: Not very hard   Food Insecurity: No Food Insecurity (12/8/2022)    Hunger Vital Sign     Worried About Running Out of Food in the Last Year: Never true     Ran Out of Food in the Last Year: Never true   Transportation Needs: Unknown (12/8/2022)    PRAPARE - Transportation     Lack of Transportation (Medical): Not on file     Lack of Transportation (Non-Medical): No   Physical Activity: Not on file   Stress: Stress Concern Present (3/21/2022)    [facility]     Feeling of Stress : To some extent   Social Connections: Not on file   Intimate Partner Violence: Not on file   Housing Stability: Low Risk  (12/8/2022)    Housing Stability Vital Sign     Unable to Pay for Housing in the Last Year: No     Number of Places Lived in the Last Year: 1     Unstable Housing in the Last Year: No     Past Surgical History:   Procedure Laterality Date    BONE MARROW BIOPSY      BREAST BIOPSY Left     CARDIAC CATHETERIZATION N/A 12/9/2022    Procedure: Cardiac catheterization;  Surgeon: Elyse Muse MD;  [facility]: [facility]; Service: Cardiology    CARDIAC CATHETERIZATION N/A 12/9/2022    Procedure: Cardiac Coronary Angiogram;  Surgeon: Elyse Muse MD;  [facility]: [facility]; Service: Cardiology    CARDIAC CATHETERIZATION Left 12/9/2022    Procedure: Cardiac Left Heart Cath;  Surgeon: Elyse Muse MD;  Location: [facility];   Service: Cardiology    CHOLECYSTECTOMY      IR PORT PLACEMENT  11/16/2018    IR PORT REMOVAL  3/22/2021    OTHER SURGICAL HISTORY      tendor tear repair to right shoulder    SHOULDER ARTHROSCOPY           Physical Exam:   Physical Exam  Vitals and nursing note reviewed. Constitutional:       General: She is not in acute distress. Appearance: Normal appearance. She is well-developed. She is not diaphoretic. HENT:      Head: Normocephalic. Cardiovascular:      Rate and Rhythm: Normal rate. Heart sounds: No friction rub. No gallop. Pulmonary:      Effort: Pulmonary effort is normal. No respiratory distress. Breath sounds: Normal breath sounds. No wheezing or rales. Abdominal:      General: Bowel sounds are normal. There is no distension. Palpations: Abdomen is soft. Tenderness: There is no abdominal tenderness. There is no rebound. Musculoskeletal:         General: Normal range of motion. Skin:     General: Skin is warm and dry. Neurological:      General: No focal deficit present. Mental Status: She is alert. Mental status is at baseline.       Comments: Oriented to person, partial tps  Pleasant, cooperative, follows direction   Psychiatric:         Mood and Affect: Mood normal.         Behavior: Behavior normal.

## 2023-11-16 NOTE — ASSESSMENT & PLAN NOTE
MOCA:  7/29. Deficits in:  all domains  Granddgt notes progressive memory and cognitive loss. Pt is reoriented, still participates in family activities. Pt is assist adl/dependent iadls. Lives with marco and her family  Pt's current cognitive level is consistent with moderate dementia  Memory medications: namenda (did increase back to bid dosing today - pcp had ordered nightly when she reordered meds for pharm change, darciet would like bid)  Mobility:  no AD, no recent fall  Continue to stay active. Current activity level:  fair. Participates in some social, cognitive, physical activity. Monitor for changes in mood, sleep, pain control. Notify provider if any concerns  Medication review:  seems appropriate for current conditions. Monitor use of xanax as this is not recommended in geriatric patients  Check with pharmacist/provider before starting any new OTC/prescription medications for potential cognitive side effects  Optimize all acute and chronic conditions. Continue to follow-up with PCP and specialist as directed for management  Safety concerns:  none per granddgt  Caregiver stress:  none per granddgt- she tries to get family to help out more, sometimes they do, sometimes not.   Her BF helps   Ensure adequate hydration, good nutrition  Goal: to keep patient at home, safely

## 2023-11-16 NOTE — PROGRESS NOTES
Ell[facility]  [address] [facility] [place], [address] [place] [address] [facility]  [phone]    Social Work Follow-Up    LSW met with Margie Weaver for follow up visit. Completed Kavon Cognitive Assessment, score 7/29 (previously 12/29 in 5/5/23), and Geriatric Depression Screen, 4/15 (previously 4/15 in 5/5/23). Kavon Cognitive Assessment (MoCA) Version 8.1  Education: Associate's    Points Earned POSSIBLE Points   Visuospatial/Executive   Alternating [place] Making 0 1   Visuoconstructional skills 0 1   Visuoconstructional skills (clock) 1 3   Naming   Naming Animals 1 3   Attention   Digit Span 0 2   Vigilance (letters) x 1   Serial 7 subtraction 1 3   Language   Sentence Repetition 1 2   Verbal fluency 0 1   Abstraction   Abstraction (word pairings) 1 2   Delayed recall   Delayed recall 0 5   Memory index score: 5/15   Orientation   Orientation 2 6   TOTAL SCORE: 7/29  (Normal ?26/30)   Additional notes: pt's granddaughter Linda Soto provided Welsh translation for pt. Vigilance section skipped for ease of administration. LSW to remain available as needed.

## 2023-11-16 NOTE — ASSESSMENT & PLAN NOTE
Bp stable, denies ha/dizziness  Contineus on coreg, losartan, diuretic  Cont dietary and lifestyle interventions  Cont reg f/u with pcp for chronic managmement

## 2023-11-16 NOTE — ASSESSMENT & PLAN NOTE
No c/o pain today   Pt remains on gabapentin, prn tylenol/topical analgesics  Notify pcp if new or changes in pain

## 2023-11-16 NOTE — ASSESSMENT & PLAN NOTE
Mood stable per granddgt  Pt remains on mirazapine, prn alprazolam  Would utilize benzo's sparingly and lowest dose possible  Cont with emotional support, relaxation techniques

## 2023-11-17 ENCOUNTER — OFFICE VISIT (OUTPATIENT)
Dept: ENDOCRINOLOGY | Facility: CLINIC | Age: 78
End: 2023-11-17
Payer: COMMERCIAL

## 2023-11-17 ENCOUNTER — HOSPITAL ENCOUNTER (OUTPATIENT)
Dept: CT IMAGING | Facility: HOSPITAL | Age: 78
End: 2023-11-17
Payer: COMMERCIAL

## 2023-11-17 VITALS — DIASTOLIC BLOOD PRESSURE: 72 MMHG | SYSTOLIC BLOOD PRESSURE: 134 MMHG | WEIGHT: 145.2 LBS | BODY MASS INDEX: 27.44 KG/M2

## 2023-11-17 DIAGNOSIS — I10 ESSENTIAL HYPERTENSION: ICD-10-CM

## 2023-11-17 DIAGNOSIS — E11.3293 TYPE 2 DIABETES MELLITUS WITH BOTH EYES AFFECTED BY MILD NONPROLIFERATIVE RETINOPATHY WITHOUT MACULAR EDEMA, WITH LONG-TERM CURRENT USE OF INSULIN (HCC): Primary | ICD-10-CM

## 2023-11-17 DIAGNOSIS — R13.19 OTHER DYSPHAGIA: ICD-10-CM

## 2023-11-17 DIAGNOSIS — Z79.4 TYPE 2 DIABETES MELLITUS WITH BOTH EYES AFFECTED BY MILD NONPROLIFERATIVE RETINOPATHY WITHOUT MACULAR EDEMA, WITH LONG-TERM CURRENT USE OF INSULIN (HCC): Primary | ICD-10-CM

## 2023-11-17 DIAGNOSIS — M54.2 NECK PAIN: ICD-10-CM

## 2023-11-17 DIAGNOSIS — E78.5 HYPERLIPIDEMIA, UNSPECIFIED HYPERLIPIDEMIA TYPE: ICD-10-CM

## 2023-11-17 PROCEDURE — G1004 CDSM NDSC: HCPCS

## 2023-11-17 PROCEDURE — 70491 CT SOFT TISSUE NECK W/DYE: CPT

## 2023-11-17 PROCEDURE — 99214 OFFICE O/P EST MOD 30 MIN: CPT

## 2023-11-17 RX ADMIN — IOHEXOL 100 ML: 350 INJECTION, SOLUTION INTRAVENOUS at 09:48

## 2023-11-17 NOTE — ASSESSMENT & PLAN NOTE
A1C has worsened from prior. Granddaughter reports she stopped taking her Jardiance 2-3 months ago for good control of diabetes. She denies any history of UTI's, yeast infections or incontinence.  Recommend:  - resume Jardiance at prior dose to maintain cardiac, renal and glycemic benefit  - repeat A1C in 3 months  - notify for BG < 70 or > 250  - request BG log in 1 week    Lab Results   Component Value Date    HGBA1C 8.9 (A) 10/26/2023

## 2023-11-17 NOTE — PROGRESS NOTES
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 Granddaughter reports she stopped taking her Jardiance 2-3 months ago for good control of diabetes. She denies any history of UTI's, yeast infections or incontinence. Recommend:  - resume Jardiance at prior dose to maintain cardiac, renal and glycemic benefit  - repeat A1C in 3 months  - notify for BG < 70 or > 250  - request BG log in 1 week    Lab Results   Component Value Date    HGBA1C 8.9 (A) 10/26/2023          Relevant Orders    Albumin / creatinine urine ratio    HEMOGLOBIN A1C W/ EAG ESTIMATION Lab Collect       Cardiovascular and Mediastinum    Essential hypertension     Stable in office  - continue current regimen            Other    Hyperlipidemia     Well controlled  - continue current regimen            Orders Placed This Encounter   Procedures    Albumin / creatinine urine ratio     Standing Status:   Future     Standing Expiration Date:   11/17/2024    HEMOGLOBIN A1C W/ EAG ESTIMATION Lab Collect     Standing Status:   Future     Standing Expiration Date:   11/17/2024       History of Present Illness:   Jamison Brandt is a 69 y.o. female with a history of type 2 diabetes, hypertension and hyperlipidemia. Reports complications of neuropathy, microalbuminuria and diabetic retinopathy. Last A1C was 8.9. Home glucose monitoring: performed twice daily using home glucose meter    Patient's granddaughter accompanies her to the visit. She is her primary caretaker. She assists with medication administration. She reports no skipped doses. She reports increased snacking and eating spoonfuls of sugar in the morning.  She also reports the patient stopped taking Jardiance 2-3 months ago for good diabetic control. 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, with long-term current use of insulin (HCC)    Essential hypertension    Gastroesophageal reflux disease    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)    Anxiety    Palliative care patient    Current mild episode of major depressive disorder without prior episode ([address])    Status post chemotherapy    Allergic rhinitis    Arthritis    Obstructive sleep apnea    Chemotherapy-induced peripheral neuropathy     Near syncope    Dementia with behavioral disturbance    Gait instability    Polypharmacy    Frequent falls    Hyperlipidemia    ACC/AHA stage C heart failure with reduced ejection fraction (HCC)    Nonischemic cardiomyopathy (HCC)    Abnormal skin growth    PLMD (periodic limb movement disorder)    Anemia    Cough      Past Medical History:   Diagnosis Date    Cancer ([address])     Throat    Chronic pain disorder     Diabetes mellitus ([address])     Diffuse large B cell lymphoma (HCC)     Dysphagia     GERD without esophagitis     HTN (hypertension)     Hyperlipidemia     Hypertension     MI (myocardial infarction) ([address])     Port-A-Cath in place 07/29/2019    Thyroid cancer ([address]) 2018      Past Surgical History:   Procedure Laterality Date    BONE MARROW BIOPSY      BREAST BIOPSY Left     CARDIAC CATHETERIZATION N/A 12/9/2022    Procedure: Cardiac catheterization;  Surgeon: Narcisa Day MD;  Location: [facility];   Service: Cardiology CARDIAC CATHETERIZATION N/A 12/9/2022    Procedure: Cardiac Coronary Angiogram;  Surgeon: Imelda Lucas MD;  Location: [facility]; Service: Cardiology    CARDIAC CATHETERIZATION Left 12/9/2022    Procedure: Cardiac Left Heart Cath;  Surgeon: Imelda Lucas MD;  Location: [facility]; Service: Cardiology    CHOLECYSTECTOMY      IR PORT PLACEMENT  11/16/2018    IR PORT REMOVAL  3/22/2021    OTHER SURGICAL HISTORY      tendor tear repair to right shoulder    SHOULDER ARTHROSCOPY        Family History   Problem Relation Age of Onset    Diabetes Mother     Heart disease Sister     Hypertension Brother     Uterine cancer Maternal Grandmother     Prostate cancer Paternal Grandfather      Social History     Tobacco Use    Smoking status: Never     Passive exposure: Never    Smokeless tobacco: Never   Substance Use Topics    Alcohol use: Never     Comment: 0     No Known Allergies      Current Outpatient Medications:     Accu-Chek FastClix Lancets MISC, USAR PARA PROBAR AZUCAR EN LA TWAN MANISHA VECES AL JEFFREY, Disp: 102 each, Rfl: 10    albuterol (PROVENTIL HFA,VENTOLIN HFA) 90 mcg/act inhaler, Inhale 1 puff every 4 (four) hours as needed for wheezing, Disp: 18 g, Rfl: 10    ALPRAZolam (XANAX) 0.25 mg tablet, Take 1 tablet (0.25 mg total) by mouth daily at bedtime as needed for anxiety, Disp: 30 tablet, Rfl: 0    aspirin (Aspirin Low Dose) 81 mg chewable tablet, Chew 1 tablet (81 mg total) daily, Disp: 90 tablet, Rfl: 0    atorvastatin (LIPITOR) 40 mg tablet, Take 1 tablet (40 mg total) by mouth daily, Disp: 90 tablet, Rfl: 0    Blood Glucose Monitoring Suppl (OneTouch Verio Reflect) w/Device KIT, Check blood sugars once daily. Please substitute with appropriate alternative as covered by patient's insurance.  Dx: E11.65, Disp: 1 kit, Rfl: 0    Blood Glucose Monitoring Suppl (OneTouch Verio) w/Device KIT, Use per  guidelines, Disp: 1 kit, Rfl: 0    Calcium Carb-Cholecalciferol 600-10 MG-MCG TABS, MIRYAM JOHNNY TABLETA DOS VECES AL JEFFREY, Disp: 180 tablet, Rfl: 0    carvedilol (COREG) 12.5 mg tablet, Take 1 tablet (12.5 mg total) by mouth 2 (two) times a day with meals, Disp: 180 tablet, Rfl: 0    cyanocobalamin (VITAMIN B-12) 1000 MCG tablet, Take 1 tablet (1,000 mcg total) by mouth daily, Disp: 90 tablet, Rfl: 3    cyanocobalamin (VITAMIN B-12) 500 MCG tablet, Take 2 tablets (1,000 mcg total) by mouth daily, Disp: 90 tablet, Rfl: 2    Empagliflozin (Jardiance) 10 MG TABS tablet, Take 1 tablet (10 mg total) by mouth every morning, Disp: 90 tablet, Rfl: 1    ezetimibe (ZETIA) 10 mg tablet, Take 1 tablet (10 mg total) by mouth daily, Disp: 90 tablet, Rfl: 0    ferrous sulfate 325 (65 Fe) mg tablet, Take 1 tablet (325 mg total) by mouth daily with breakfast, Disp: 90 tablet, Rfl: 2    fluticasone (FLONASE) 50 mcg/act nasal spray, 1 spray into each nostril daily, Disp: 16 g, Rfl: 10    furosemide (LASIX) 20 mg tablet, Take 1 tablet (20 mg total) by mouth daily, Disp: 90 tablet, Rfl: 3    gabapentin (NEURONTIN) 100 mg capsule, Take 1 capsule (100 mg total) by mouth 3 (three) times a day, Disp: 90 capsule, Rfl: 5    glucose blood (Accu-Chek Guide) test strip, USAR PARA EXAMINAR AZUCAR EN LA TWAN MANISHA VECES AL JEFFREY, Disp: 100 strip, Rfl: 10    glucose blood (OneTouch Verio) test strip, Check blood sugars once daily. Please substitute with appropriate alternative as covered by patient's insurance.  Dx: E11.65, Disp: 100 each, Rfl: 3    guaiFENesin (MUCINEX) 600 mg 12 hr tablet, Take 2 tablets (1,200 mg total) by mouth every 12 (twelve) hours, Disp: 30 tablet, Rfl: 1    Insulin Glargine Solostar (Basaglar KwikPen) 100 UNIT/ML SOPN, Inject 0.11 mL (11 Units total) under the skin in the morning, Disp: 12 mL, Rfl: 1    Insulin Pen Needle (BD Pen Needle Micro U/F) 32G X 6 MM MISC, Use daily, Disp: 100 each, Rfl: 1    lidocaine (Lidoderm) 5 %, Apply 1 patch topically over 12 hours daily Remove & Discard patch within 12 hours or as directed by MD, Disp: 15 patch, Rfl: 0    loperamide (IMODIUM) 2 mg capsule, MAR JOHNNY (1) CAPSULA POR VIA ORAL ALFREDO SEA NECESARIO PARA LA DIARREA, Disp: 30 capsule, Rfl: 10    losartan (COZAAR) 25 mg tablet, Take 1 tablet (25 mg total) by mouth daily, Disp: 90 tablet, Rfl: 0    Melatonin 5 MG TABS, Take by mouth as needed, Disp: , Rfl:     memantine (NAMENDA) 10 mg tablet, Take 1 tablet (10 mg total) by mouth 2 (two) times a day, Disp: 60 tablet, Rfl: 3    mirtazapine (REMERON) 15 mg tablet, Take 1 tablet (15 mg total) by mouth daily at bedtime, Disp: 90 tablet, Rfl: 3    omeprazole (PriLOSEC) 20 mg delayed release capsule, Take 1 capsule (20 mg total) by mouth 2 (two) times a day, Disp: 180 capsule, Rfl: 3    ondansetron (ZOFRAN) 4 mg tablet, Take 1 tablet (4 mg total) by mouth every 8 (eight) hours as needed for nausea or vomiting, Disp: 20 tablet, Rfl: 0    OneTouch Delica Lancets 99V MISC, Check blood sugars once daily. Please substitute with appropriate alternative as covered by patient's insurance. Dx: E11.65, Disp: 100 each, Rfl: 3    sitaGLIPtin-metFORMIN (Janumet)  MG per tablet, Take 1 tablet by mouth 2 (two) times a day with meals, Disp: 60 tablet, Rfl: 1  No current facility-administered medications for this visit. Review of Systems   Constitutional:  Negative for chills and fever. HENT:  Negative for ear pain and sore throat. Eyes:  Negative for pain and visual disturbance. Respiratory:  Negative for cough and shortness of breath. Cardiovascular:  Negative for chest pain and palpitations. Gastrointestinal:  Negative for abdominal pain and vomiting. Genitourinary:  Negative for dysuria and hematuria. Musculoskeletal:  Negative for arthralgias and back pain. Skin:  Negative for color change and rash. Neurological:  Negative for seizures and syncope. All other systems reviewed and are negative. Physical Exam:  Body mass index is 27.44 kg/m².   /72 (BP Location: Left arm, Patient Position: Sitting, Cuff Size: Adult)   Wt 65.9 kg (145 lb 3.2 oz)   BMI 27.44 kg/m²    Wt Readings from Last 3 Encounters:   11/17/23 65.9 kg (145 lb 3.2 oz)   11/16/23 65.4 kg (144 lb 3.2 oz)   11/10/23 64.4 kg (142 lb)       Physical Exam  Vitals reviewed. Constitutional:       Appearance: Normal appearance. HENT:      Head: Normocephalic and atraumatic. Cardiovascular:      Rate and Rhythm: Normal rate. Heart sounds: Normal heart sounds. Pulmonary:      Effort: Pulmonary effort is normal.      Breath sounds: Normal breath sounds. Neurological:      Mental Status: She is alert and oriented to person, place, and time. Psychiatric:         Mood and Affect: Mood normal.         Behavior: Behavior normal.       Diabetic Foot Exam    Labs:   Lab Results   Component Value Date    HGBA1C 8.9 (A) 10/26/2023    HGBA1C 6.9 (A) 04/06/2023    HGBA1C 6.9 (H) 12/03/2022     Lab Results   Component Value Date    CREATININE 0.74 11/09/2023    CREATININE 0.61 07/08/2023    CREATININE 0.62 07/07/2023    BUN 13 11/09/2023    K 4.2 11/09/2023     11/09/2023    CO2 30 11/09/2023     eGFR   Date Value Ref Range Status   11/09/2023 78 ml/min/1.73sq m Final     Lab Results   Component Value Date    HDL 27 (L) 11/09/2023    TRIG 99 11/09/2023     Lab Results   Component Value Date    ALT 29 11/09/2023    AST 21 11/09/2023    ALKPHOS 92 11/09/2023     Lab Results   Component Value Date    WTB5GWSFIPDH 2.036 07/06/2023    WMR3RZGAODJZ 3.450 02/22/2021    [id number] 1.330 02/11/2019     No results found for: "John Galvez", "YULIA"      There are no Patient Instructions on file for this visit. Discussed with the patient and all questioned fully answered. She will call me if any problems arise.

## 2023-11-28 ENCOUNTER — PATIENT OUTREACH (OUTPATIENT)
Dept: FAMILY MEDICINE CLINIC | Facility: CLINIC | Age: 78
End: 2023-11-28

## 2023-12-06 DIAGNOSIS — C83.31 DIFFUSE LARGE B-CELL LYMPHOMA OF LYMPH NODES OF NECK (HCC): ICD-10-CM

## 2023-12-06 DIAGNOSIS — R59.0 LAD (LYMPHADENOPATHY), CERVICAL: Primary | ICD-10-CM

## 2023-12-06 NOTE — PROGRESS NOTES
Reviewed recent CT imaging with Dr. Quinn Medellin. CT chest w contrast 10/10/23  IMPRESSION:  Improved right lower lobe consolidation and bronchiectasis with mild residual scarring and persistent mild bronchiectasis. Mildly enlarged right hilar lymph nodes, likely reactive. CT soft tissue neck w contrast (11/17/23)  IMPRESSION:  1. Nonspecific increased number and size of not pathologically enlarged prominent lymph nodes in bilateral cervical level IIb. 2.  Sinus disease worse in bilateral maxillary sinuses    Patient is already scheduled for a follow-up CT scan of the chest 3/11/2024. We will also order a follow-up CT scan of the neck to be done at the same time.

## 2023-12-06 NOTE — Clinical Note
Patient is already scheduled for a CT scan of the chest 3/11/2024. If you could please assist with adding a CT scan of the neck to be done at the same time. Order was placed.   Thanks Zakiya Bravo

## 2023-12-07 ENCOUNTER — PATIENT OUTREACH (OUTPATIENT)
Dept: FAMILY MEDICINE CLINIC | Facility: CLINIC | Age: 78
End: 2023-12-07

## 2023-12-07 NOTE — LETTER
Fecha: 12/07/23    Estimado/a Marci Sandoval:  Mi nombre es Ethyl Grow. Soy un enfermera registrado administrador de [facility]. No pude comunicarme con usted y me gustaría programar un horario para que hablemos por teléfono. Me dedico a ayudar a los pacientes que tienen afecciones médicas complejas a obtener la atención que necesitan. [place] comprende a pacientes que pueden estar en el hospital o en [facility] de emergencias. Si tiene preguntas, no dude en llamarme. Atentamente.   Julia Mayers  [address]

## 2023-12-07 NOTE — PROGRESS NOTES
I called Siva Amin to follow up on the patient but received VM. Message was left asking for a return call. I will send the 'unable to reach' letter and await response.

## 2023-12-21 ENCOUNTER — PATIENT OUTREACH (OUTPATIENT)
Dept: FAMILY MEDICINE CLINIC | Facility: CLINIC | Age: 78
End: 2023-12-21

## 2023-12-25 PROBLEM — R05.9 COUGH: Status: RESOLVED | Noted: 2023-10-26 | Resolved: 2023-12-25

## 2023-12-29 DIAGNOSIS — Z51.5 PALLIATIVE CARE PATIENT: ICD-10-CM

## 2023-12-29 DIAGNOSIS — C83.31 DIFFUSE LARGE B-CELL LYMPHOMA OF LYMPH NODES OF NECK (HCC): ICD-10-CM

## 2023-12-29 RX ORDER — FLUTICASONE PROPIONATE 50 MCG
SPRAY, SUSPENSION (ML) NASAL
Qty: 24 ML | Refills: 8 | Status: SHIPPED | OUTPATIENT
Start: 2023-12-29

## 2023-12-30 DIAGNOSIS — M85.80 AGE-RELATED BONE LOSS: ICD-10-CM

## 2023-12-30 DIAGNOSIS — E11.9 TYPE 2 DIABETES MELLITUS WITHOUT COMPLICATION, WITHOUT LONG-TERM CURRENT USE OF INSULIN (HCC): ICD-10-CM

## 2023-12-30 DIAGNOSIS — G62.0 CHEMOTHERAPY-INDUCED PERIPHERAL NEUROPATHY: Chronic | ICD-10-CM

## 2023-12-30 DIAGNOSIS — Z92.21 STATUS POST CHEMOTHERAPY: ICD-10-CM

## 2023-12-30 DIAGNOSIS — T45.1X5A CHEMOTHERAPY-INDUCED PERIPHERAL NEUROPATHY: Chronic | ICD-10-CM

## 2023-12-30 DIAGNOSIS — F03.90 DEMENTIA WITHOUT BEHAVIORAL DISTURBANCE (HCC): ICD-10-CM

## 2023-12-30 DIAGNOSIS — I42.9 CARDIOMYOPATHY (HCC): ICD-10-CM

## 2023-12-30 DIAGNOSIS — I50.20 ACC/AHA STAGE C HEART FAILURE WITH REDUCED EJECTION FRACTION (HCC): ICD-10-CM

## 2023-12-30 DIAGNOSIS — F41.8 ANXIETY ABOUT HEALTH: ICD-10-CM

## 2024-01-02 RX ORDER — MEMANTINE HYDROCHLORIDE 10 MG/1
10 TABLET ORAL 2 TIMES DAILY
Qty: 60 TABLET | Refills: 3 | Status: SHIPPED | OUTPATIENT
Start: 2024-01-02

## 2024-01-04 RX ORDER — EZETIMIBE 10 MG/1
10 TABLET ORAL DAILY
Qty: 90 TABLET | Refills: 0 | Status: SHIPPED | OUTPATIENT
Start: 2024-01-04

## 2024-01-04 RX ORDER — CALCIUM CARBONATE/VITAMIN D3 600 MG-10
1 TABLET ORAL DAILY
Qty: 180 TABLET | Refills: 0 | Status: SHIPPED | OUTPATIENT
Start: 2024-01-04

## 2024-01-04 RX ORDER — ALPRAZOLAM 0.25 MG/1
0.25 TABLET ORAL
Qty: 30 TABLET | Refills: 0 | Status: SHIPPED | OUTPATIENT
Start: 2024-01-04

## 2024-01-04 RX ORDER — ASPIRIN 81 MG/1
81 TABLET, CHEWABLE ORAL DAILY
Qty: 90 TABLET | Refills: 0 | Status: SHIPPED | OUTPATIENT
Start: 2024-01-04

## 2024-01-04 RX ORDER — GABAPENTIN 100 MG/1
100 CAPSULE ORAL 3 TIMES DAILY
Qty: 90 CAPSULE | Refills: 0 | Status: SHIPPED | OUTPATIENT
Start: 2024-01-04

## 2024-01-24 DIAGNOSIS — E11.65 UNCONTROLLED TYPE 2 DIABETES MELLITUS WITH HYPERGLYCEMIA (HCC): ICD-10-CM

## 2024-01-25 RX ORDER — SITAGLIPTIN AND METFORMIN HYDROCHLORIDE 1000; 50 MG/1; MG/1
1 TABLET, FILM COATED ORAL 2 TIMES DAILY WITH MEALS
Qty: 60 TABLET | Refills: 1 | Status: SHIPPED | OUTPATIENT
Start: 2024-01-25

## 2024-02-08 DIAGNOSIS — F41.8 ANXIETY ABOUT HEALTH: ICD-10-CM

## 2024-02-08 RX ORDER — ALPRAZOLAM 0.25 MG/1
0.25 TABLET ORAL
Qty: 30 TABLET | Refills: 0 | Status: SHIPPED | OUTPATIENT
Start: 2024-02-08

## 2024-02-17 DIAGNOSIS — F03.90 DEMENTIA WITHOUT BEHAVIORAL DISTURBANCE (HCC): ICD-10-CM

## 2024-02-19 ENCOUNTER — OFFICE VISIT (OUTPATIENT)
Dept: PODIATRY | Facility: CLINIC | Age: 79
End: 2024-02-19
Payer: COMMERCIAL

## 2024-02-19 VITALS — DIASTOLIC BLOOD PRESSURE: 52 MMHG | HEART RATE: 80 BPM | SYSTOLIC BLOOD PRESSURE: 108 MMHG

## 2024-02-19 DIAGNOSIS — E11.9 TYPE 2 DIABETES MELLITUS WITHOUT COMPLICATION, WITHOUT LONG-TERM CURRENT USE OF INSULIN (HCC): Primary | ICD-10-CM

## 2024-02-19 DIAGNOSIS — G62.0 CHEMOTHERAPY-INDUCED PERIPHERAL NEUROPATHY: ICD-10-CM

## 2024-02-19 DIAGNOSIS — T45.1X5A CHEMOTHERAPY-INDUCED PERIPHERAL NEUROPATHY: ICD-10-CM

## 2024-02-19 DIAGNOSIS — B35.1 PAIN DUE TO ONYCHOMYCOSIS OF TOENAIL: ICD-10-CM

## 2024-02-19 DIAGNOSIS — R26.2 AMBULATORY DYSFUNCTION: ICD-10-CM

## 2024-02-19 DIAGNOSIS — M79.676 PAIN DUE TO ONYCHOMYCOSIS OF TOENAIL: ICD-10-CM

## 2024-02-19 PROCEDURE — 99213 OFFICE O/P EST LOW 20 MIN: CPT | Performed by: PODIATRIST

## 2024-02-19 RX ORDER — MEMANTINE HYDROCHLORIDE 10 MG/1
10 TABLET ORAL 2 TIMES DAILY
Qty: 180 TABLET | Refills: 1 | Status: SHIPPED | OUTPATIENT
Start: 2024-02-19 | End: 2024-02-21 | Stop reason: SDUPTHER

## 2024-02-19 NOTE — PROGRESS NOTES
Assessment/Plan:    No problem-specific Assessment & Plan notes found for this encounter.       Diagnoses and all orders for this visit:    Type 2 diabetes mellitus without complication, without long-term current use of insulin (HCC)    Pain due to onychomycosis of toenail    Chemotherapy-induced peripheral neuropathy     Ambulatory dysfunction        Diagnosis and options discussed with patient  Patient agreeable to the plan as stated below    -DM foot risk is moderate. Recommend at risk foot care (Q9)  -Discussed DM risk to lower extremities, proper shoe gear, and daily monitoring of feet.   -Discussed weight loss and suitable exercise regiment.   -Reviewed most recent PCP visit on 11/16/2023  -Educated on A1C and the risks of poorly controlled Diabetes. Reviewed recent A1C:  Lab Results   Component Value Date    HGBA1C 8.9 (A) 10/26/2023    HGBA1C 6.9 (H) 12/03/2022    HGBA1C 8.5 08/03/2016   .       Subjective:      Patient ID: Marci Sandoval is a 78 y.o. female.    Annual DM foot exam. She has neuropathy. The nails are thick. She has difficulty controlling her BG, her grand daughter states she eats everything and doesn't control her diet. She is translating. No acute pedal concerns.         The following portions of the patient's history were reviewed and updated as appropriate: allergies, current medications, past family history, past medical history, past social history, past surgical history, and problem list.    Review of Systems    As stated in HPI, otherwise normal      Objective:      /52 (BP Location: Right arm, Patient Position: Sitting, Cuff Size: Standard)   Pulse 80          Physical Exam  Vitals reviewed.   Cardiovascular:      Pulses: no weak pulses.           Dorsalis pedis pulses are 2+ on the right side and 2+ on the left side.        Posterior tibial pulses are 1+ on the right side and 1+ on the left side.   Musculoskeletal:         General: Deformity present.      Right  foot: Deformity present.      Left foot: Deformity present.   Feet:      Right foot:      Protective Sensation: 10 sites tested.  4 sites sensed.      Skin integrity: Dry skin present. No skin breakdown or erythema.      Toenail Condition: Right toenails are abnormally thick. Fungal disease present.     Left foot:      Protective Sensation: 10 sites tested.  4 sites sensed.      Skin integrity: Dry skin present. No skin breakdown or erythema.      Toenail Condition: Left toenails are abnormally thick. Fungal disease present.  Skin:     Capillary Refill: Capillary refill takes less than 2 seconds.   Neurological:      Mental Status: She is alert.      Sensory: Sensory deficit present.      Motor: Weakness present.       Diabetic Foot Exam    Patient's shoes and socks removed.    Right Foot/Ankle   Right Foot Inspection  Skin Exam: skin intact and dry skin. No erythema, no maceration and no abnormal color.     Toe Exam: swelling and right toe deformity.     Sensory   Vibration: absent  Proprioception: diminished  Monofilament testing: diminished    Vascular  Capillary refills: < 3 seconds  The right DP pulse is 2+. The right PT pulse is 1+.     Left Foot/Ankle  Left Foot Inspection  Skin Exam: skin intact and dry skin. No erythema, no maceration and normal color.     Toe Exam: swelling and left toe deformity.     Sensory   Vibration: absent  Proprioception: diminished  Monofilament testing: diminished    Vascular  Capillary refills: < 3 seconds  The left DP pulse is 2+. The left PT pulse is 1+.     Assign Risk Category  Deformity present  Loss of protective sensation  No weak pulses  Risk: 2       no

## 2024-02-21 ENCOUNTER — OFFICE VISIT (OUTPATIENT)
Dept: FAMILY MEDICINE CLINIC | Facility: CLINIC | Age: 79
End: 2024-02-21

## 2024-02-21 VITALS
DIASTOLIC BLOOD PRESSURE: 60 MMHG | HEIGHT: 61 IN | SYSTOLIC BLOOD PRESSURE: 124 MMHG | WEIGHT: 137 LBS | RESPIRATION RATE: 18 BRPM | BODY MASS INDEX: 25.86 KG/M2

## 2024-02-21 DIAGNOSIS — C83.31 DIFFUSE LARGE B-CELL LYMPHOMA OF LYMPH NODES OF NECK (HCC): ICD-10-CM

## 2024-02-21 DIAGNOSIS — E11.65 UNCONTROLLED TYPE 2 DIABETES MELLITUS WITH HYPERGLYCEMIA (HCC): ICD-10-CM

## 2024-02-21 DIAGNOSIS — G62.0 CHEMOTHERAPY-INDUCED PERIPHERAL NEUROPATHY: Chronic | ICD-10-CM

## 2024-02-21 DIAGNOSIS — Z51.5 PALLIATIVE CARE PATIENT: ICD-10-CM

## 2024-02-21 DIAGNOSIS — F41.9 ANXIETY: ICD-10-CM

## 2024-02-21 DIAGNOSIS — Z92.21 STATUS POST CHEMOTHERAPY: ICD-10-CM

## 2024-02-21 DIAGNOSIS — E11.9 TYPE 2 DIABETES MELLITUS WITHOUT COMPLICATION, WITHOUT LONG-TERM CURRENT USE OF INSULIN (HCC): ICD-10-CM

## 2024-02-21 DIAGNOSIS — Z23 ENCOUNTER FOR IMMUNIZATION: ICD-10-CM

## 2024-02-21 DIAGNOSIS — M19.90 ARTHRITIS: ICD-10-CM

## 2024-02-21 DIAGNOSIS — I10 ESSENTIAL HYPERTENSION: Primary | ICD-10-CM

## 2024-02-21 DIAGNOSIS — I50.20 ACC/AHA STAGE C HEART FAILURE WITH REDUCED EJECTION FRACTION (HCC): ICD-10-CM

## 2024-02-21 DIAGNOSIS — E11.3293 TYPE 2 DIABETES MELLITUS WITH BOTH EYES AFFECTED BY MILD NONPROLIFERATIVE RETINOPATHY WITHOUT MACULAR EDEMA, WITH LONG-TERM CURRENT USE OF INSULIN (HCC): ICD-10-CM

## 2024-02-21 DIAGNOSIS — Z79.4 TYPE 2 DIABETES MELLITUS WITH BOTH EYES AFFECTED BY MILD NONPROLIFERATIVE RETINOPATHY WITHOUT MACULAR EDEMA, WITH LONG-TERM CURRENT USE OF INSULIN (HCC): ICD-10-CM

## 2024-02-21 DIAGNOSIS — M85.80 AGE-RELATED BONE LOSS: ICD-10-CM

## 2024-02-21 DIAGNOSIS — T45.1X5A CHEMOTHERAPY-INDUCED PERIPHERAL NEUROPATHY: Chronic | ICD-10-CM

## 2024-02-21 DIAGNOSIS — K21.9 GASTROESOPHAGEAL REFLUX DISEASE: ICD-10-CM

## 2024-02-21 DIAGNOSIS — I42.9 CARDIOMYOPATHY (HCC): ICD-10-CM

## 2024-02-21 DIAGNOSIS — R11.0 NAUSEA: ICD-10-CM

## 2024-02-21 DIAGNOSIS — F03.90 DEMENTIA WITHOUT BEHAVIORAL DISTURBANCE (HCC): ICD-10-CM

## 2024-02-21 DIAGNOSIS — D50.9 IRON DEFICIENCY ANEMIA, UNSPECIFIED IRON DEFICIENCY ANEMIA TYPE: ICD-10-CM

## 2024-02-21 DIAGNOSIS — K21.00 GASTROESOPHAGEAL REFLUX DISEASE WITH ESOPHAGITIS WITHOUT HEMORRHAGE: ICD-10-CM

## 2024-02-21 DIAGNOSIS — E53.8 B12 DEFICIENCY: ICD-10-CM

## 2024-02-21 PROBLEM — R63.4 UNINTENDED WEIGHT LOSS: Status: ACTIVE | Noted: 2024-02-21

## 2024-02-21 LAB — SL AMB POCT HEMOGLOBIN AIC: 8.2 (ref ?–6.5)

## 2024-02-21 PROCEDURE — 83036 HEMOGLOBIN GLYCOSYLATED A1C: CPT | Performed by: NURSE PRACTITIONER

## 2024-02-21 PROCEDURE — 3078F DIAST BP <80 MM HG: CPT | Performed by: NURSE PRACTITIONER

## 2024-02-21 PROCEDURE — 99214 OFFICE O/P EST MOD 30 MIN: CPT | Performed by: NURSE PRACTITIONER

## 2024-02-21 PROCEDURE — 3074F SYST BP LT 130 MM HG: CPT | Performed by: NURSE PRACTITIONER

## 2024-02-21 RX ORDER — ATORVASTATIN CALCIUM 40 MG/1
40 TABLET, FILM COATED ORAL DAILY
Qty: 90 TABLET | Refills: 0 | Status: SHIPPED | OUTPATIENT
Start: 2024-02-21

## 2024-02-21 RX ORDER — LOSARTAN POTASSIUM 25 MG/1
25 TABLET ORAL DAILY
Qty: 90 TABLET | Refills: 0 | Status: SHIPPED | OUTPATIENT
Start: 2024-02-21

## 2024-02-21 RX ORDER — GABAPENTIN 100 MG/1
100 CAPSULE ORAL 3 TIMES DAILY
Qty: 90 CAPSULE | Refills: 0 | Status: SHIPPED | OUTPATIENT
Start: 2024-02-21

## 2024-02-21 RX ORDER — ASPIRIN 81 MG/1
81 TABLET, CHEWABLE ORAL DAILY
Qty: 90 TABLET | Refills: 0 | Status: SHIPPED | OUTPATIENT
Start: 2024-02-21

## 2024-02-21 RX ORDER — FLUTICASONE PROPIONATE 50 MCG
1 SPRAY, SUSPENSION (ML) NASAL DAILY
Qty: 24 ML | Refills: 8 | Status: SHIPPED | OUTPATIENT
Start: 2024-02-21

## 2024-02-21 RX ORDER — FUROSEMIDE 20 MG/1
20 TABLET ORAL DAILY
Qty: 90 TABLET | Refills: 3 | Status: SHIPPED | OUTPATIENT
Start: 2024-02-21

## 2024-02-21 RX ORDER — CARVEDILOL 12.5 MG/1
12.5 TABLET ORAL 2 TIMES DAILY WITH MEALS
Qty: 180 TABLET | Refills: 0 | Status: SHIPPED | OUTPATIENT
Start: 2024-02-21

## 2024-02-21 RX ORDER — ZOSTER VACCINE RECOMBINANT, ADJUVANTED 50 MCG/0.5
0.5 KIT INTRAMUSCULAR ONCE
Qty: 1 EACH | Refills: 1 | Status: SHIPPED | OUTPATIENT
Start: 2024-02-21 | End: 2024-02-21

## 2024-02-21 RX ORDER — MEMANTINE HYDROCHLORIDE 10 MG/1
10 TABLET ORAL 2 TIMES DAILY
Qty: 180 TABLET | Refills: 1 | Status: SHIPPED | OUTPATIENT
Start: 2024-02-21

## 2024-02-21 RX ORDER — EZETIMIBE 10 MG/1
10 TABLET ORAL DAILY
Qty: 90 TABLET | Refills: 0 | Status: SHIPPED | OUTPATIENT
Start: 2024-02-21

## 2024-02-21 RX ORDER — LIDOCAINE 50 MG/G
1 PATCH TOPICAL DAILY
Qty: 15 PATCH | Refills: 0 | Status: SHIPPED | OUTPATIENT
Start: 2024-02-21

## 2024-02-21 RX ORDER — ONDANSETRON 4 MG/1
4 TABLET, FILM COATED ORAL EVERY 8 HOURS PRN
Qty: 20 TABLET | Refills: 0 | Status: SHIPPED | OUTPATIENT
Start: 2024-02-21

## 2024-02-21 RX ORDER — MIRTAZAPINE 15 MG/1
15 TABLET, FILM COATED ORAL
Qty: 90 TABLET | Refills: 3 | Status: SHIPPED | OUTPATIENT
Start: 2024-02-21

## 2024-02-21 RX ORDER — FERROUS SULFATE 325(65) MG
325 TABLET ORAL
Qty: 90 TABLET | Refills: 2 | Status: SHIPPED | OUTPATIENT
Start: 2024-02-21

## 2024-02-21 RX ORDER — SITAGLIPTIN AND METFORMIN HYDROCHLORIDE 1000; 50 MG/1; MG/1
1 TABLET, FILM COATED ORAL 2 TIMES DAILY WITH MEALS
Qty: 60 TABLET | Refills: 1 | Status: SHIPPED | OUTPATIENT
Start: 2024-02-21

## 2024-02-21 RX ORDER — CALCIUM CARBONATE/VITAMIN D3 600 MG-10
1 TABLET ORAL DAILY
Qty: 180 TABLET | Refills: 0 | Status: SHIPPED | OUTPATIENT
Start: 2024-02-21

## 2024-02-21 RX ORDER — OMEPRAZOLE 20 MG/1
20 CAPSULE, DELAYED RELEASE ORAL 2 TIMES DAILY
Qty: 180 CAPSULE | Refills: 3 | Status: SHIPPED | OUTPATIENT
Start: 2024-02-21

## 2024-02-21 NOTE — PROGRESS NOTES
Name: Marci Sandoval      : 1945      MRN: [id number]  Encounter Provider: MOIRA Gan  Encounter Date: 2024   Encounter department: [facility]    Assessment & Plan     1. Essential hypertension  Assessment & Plan:  BP is well controlled, no reports of dizziness or falls  continue current regimen at this time - carvedilol 12.5 mg bid, furosemide 20 mg daily, losartan 25 mg daily     Orders:  -     carvedilol (COREG) 12.5 mg tablet; Take 1 tablet (12.5 mg total) by mouth 2 (two) times a day with meals    2. Type 2 diabetes mellitus with both eyes affected by mild nonproliferative retinopathy without macular edema, with long-term current use of insulin ([facility])  Assessment & Plan:    Lab Results   Component Value Date    HGBA1C 8.2 (A) 2024    HGBA1C 8.9 (A) 10/26/2023    HGBA1C 6.9 (A) 2023     Following with endocrinology, appointment next week   Granddaughter reports compliance with medications   Discussed diet changes   Continue with Janumet  mg bid and Basaglar 11 units daily         Orders:  -     POCT hemoglobin A1c    3. Gastroesophageal reflux disease with esophagitis without hemorrhage  Assessment & Plan:  Continue PPI  -Discussed diet and lifestyle interventions to improve sx including: avoidance of common triggers (chocolate, caffeine, tomatoes, citrus), eat small meals frequently, remain upright after meals         4. Encounter for immunization  -     Zoster Vac Recomb Adjuvanted (Shingrix) 50 MCG/0.5ML SUSR; Inject 0.5 mL into a muscle once for 1 dose Repeat dose in 2 to 6 months    5. Cardiomyopathy (HCC)  -     aspirin (Aspirin Low Dose) 81 mg chewable tablet; Chew 1 tablet (81 mg total) daily  -     atorvastatin (LIPITOR) 40 mg tablet; Take 1 tablet (40 mg total) by mouth daily  -     furosemide (LASIX) 20 mg tablet; Take 1 tablet (20 mg total) by mouth daily  -     losartan (COZAAR) 25 mg tablet; Take 1  tablet (25 mg total) by mouth daily    6. Age-related bone loss  -     Calcium Carb-Cholecalciferol 600-10 MG-MCG TABS; Take 1 tablet by mouth in the morning    7. B12 deficiency  -     cyanocobalamin (VITAMIN B-12) 1000 MCG tablet; Take 1 tablet (1,000 mcg total) by mouth daily    8. Chemotherapy-induced peripheral neuropathy   -     gabapentin (NEURONTIN) 100 mg capsule; Take 1 capsule (100 mg total) by mouth 3 (three) times a day    9. ACC/AHA stage C heart failure with reduced ejection fraction (HCC)  -     Empagliflozin (Jardiance) 10 MG TABS tablet; Take 1 tablet (10 mg total) by mouth every morning    10. Type 2 diabetes mellitus without complication, without long-term current use of insulin ([facility])  -     ezetimibe (ZETIA) 10 mg tablet; Take 1 tablet (10 mg total) by mouth daily    11. Iron deficiency anemia, unspecified iron deficiency anemia type  -     ferrous sulfate 325 (65 Fe) mg tablet; Take 1 tablet (325 mg total) by mouth daily with breakfast    12. Palliative care patient  -     fluticasone (FLONASE) 50 mcg/act nasal spray; 1 spray into each nostril daily    13. 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)  -     fluticasone (FLONASE) 50 mcg/act nasal spray; 1 spray into each nostril daily    14. Status post chemotherapy  -     gabapentin (NEURONTIN) 100 mg capsule; Take 1 capsule (100 mg total) by mouth 3 (three) times a day    15. Uncontrolled type 2 diabetes mellitus with hyperglycemia (HCC)  -     sitaGLIPtin-metFORMIN (Janumet)  MG per tablet; Take 1 tablet by mouth 2 (two) times a day with meals    16. Arthritis  -     lidocaine (Lidoderm) 5 %; Apply 1 patch topically over 12 hours daily Remove & Discard patch within 12 hours or as directed by MD    17. Dementia without behavioral disturbance (HCC)  -     memantine (NAMENDA) 10 mg tablet; Take 1 tablet (10 mg total) by mouth 2 (two) times a day    18. Anxiety  -     mirtazapine (REMERON) 15 mg tablet; Take 1 tablet (15 mg  total) by mouth daily at bedtime    19. Gastroesophageal reflux disease  Assessment & Plan:  Continue PPI  -Discussed diet and lifestyle interventions to improve sx including: avoidance of common triggers (chocolate, caffeine, tomatoes, citrus), eat small meals frequently, remain upright after meals       Orders:  -     omeprazole (PriLOSEC) 20 mg delayed release capsule; Take 1 capsule (20 mg total) by mouth 2 (two) times a day    20. Nausea  -     ondansetron (ZOFRAN) 4 mg tablet; Take 1 tablet (4 mg total) by mouth every 8 (eight) hours as needed for nausea or vomiting      Freestyle     BMI Counseling: Body mass index is 25.89 kg/m². The BMI is above normal. Nutrition recommendations include consuming healthier snacks. Rationale for BMI follow-up plan is due to patient being overweight or obese.         Bg Sandoval is a 78 y.o. female who  has a past medical history of Cancer (HCC), Chronic pain disorder, Diabetes mellitus (HCC), Diffuse large B cell lymphoma (HCC), Dysphagia, GERD without esophagitis, HTN (hypertension), Hyperlipidemia, Hypertension, MI (myocardial infarction) (HCC), Port-A-Cath in place, and Thyroid cancer (HCC). who presented to the office today for follow up. She is accompanied by her granddaughter who is her caretaker. Granddaughter denies any falls, difficulty with bowel/bladder. She does note that the patient has been coughing sometimes with eating. Appetite is decreased as well.      The following portions of the patient's history were reviewed and updated as appropriate: allergies, current medications, past family history, past medical history, past social history, past surgical history and problem list.        Review of Systems   Constitutional:  Positive for appetite change. Negative for chills and fever.   HENT:  Negative for ear pain and sore throat.    Eyes:  Negative for pain and visual disturbance.   Respiratory:  Positive for cough. Negative for  shortness of breath.    Cardiovascular:  Negative for chest pain and palpitations.   Gastrointestinal:  Negative for abdominal pain and vomiting.   Genitourinary:  Negative for dysuria and hematuria.   Musculoskeletal:  Negative for arthralgias and back pain.   Skin:  Negative for color change and rash.   Neurological:  Negative for seizures and syncope.   All other systems reviewed and are negative.      Past Medical History:   Diagnosis Date    Cancer (HCC)     Throat    Chronic pain disorder     Diabetes mellitus (HCC)     Diffuse large B cell lymphoma (HCC)     Dysphagia     GERD without esophagitis     HTN (hypertension)     Hyperlipidemia     Hypertension     MI (myocardial infarction) (HCC)     Port-A-Cath in place 07/29/2019    Thyroid cancer (HCC) 2018     Past Surgical History:   Procedure Laterality Date    BONE MARROW BIOPSY      BREAST BIOPSY Left     CARDIAC CATHETERIZATION N/A 12/9/2022    Procedure: Cardiac catheterization;  Surgeon: Jhonny Rain MD;  Location: [facility];  Service: Cardiology    CARDIAC CATHETERIZATION N/A 12/9/2022    Procedure: Cardiac Coronary Angiogram;  Surgeon: Jhonny Rain MD;  Location: [facility];  Service: Cardiology    CARDIAC CATHETERIZATION Left 12/9/2022    Procedure: Cardiac Left Heart Cath;  Surgeon: Jhonny Rani MD;  Location: [facility];  Service: Cardiology    CHOLECYSTECTOMY      IR PORT PLACEMENT  11/16/2018    IR PORT REMOVAL  3/22/2021    OTHER SURGICAL HISTORY      tendor tear repair to right shoulder    SHOULDER ARTHROSCOPY       Family History   Problem Relation Age of Onset    Diabetes Mother     Heart disease Sister     Hypertension Brother     Uterine cancer Maternal Grandmother     Prostate cancer Paternal Grandfather      Social History     Socioeconomic History    Marital status:      Spouse name: None    Number of children: None    Years of education: None    Highest education level: None   Occupational History     None   Tobacco Use    Smoking status: Never     Passive exposure: Never    Smokeless tobacco: Never   Vaping Use    Vaping status: Never Used   Substance and Sexual Activity    Alcohol use: Never     Comment: 0    Drug use: No    Sexual activity: Not Currently     Partners: Male   Other Topics Concern    None   Social History Narrative    None     Social Determinants of Health     Financial Resource Strain: Low Risk  (2/21/2024)    Overall Financial Resource Strain (CARDIA)     Difficulty of Paying Living Expenses: Not hard at all   Food Insecurity: No Food Insecurity (2/21/2024)    Hunger Vital Sign     Worried About Running Out of Food in the Last Year: Never true     Ran Out of Food in the Last Year: Never true   Transportation Needs: No Transportation Needs (2/21/2024)    PRAPARE - Transportation     Lack of Transportation (Medical): No     Lack of Transportation (Non-Medical): No   Physical Activity: Not on file   Stress: Stress Concern Present (3/21/2022)    [employer] - Occupational Stress Questionnaire     Feeling of Stress : To some extent   Social Connections: Not on file   Intimate Partner Violence: Not on file   Housing Stability: Low Risk  (12/8/2022)    Housing Stability Vital Sign     Unable to Pay for Housing in the Last Year: No     Number of Places Lived in the Last Year: 1     Unstable Housing in the Last Year: No     Current Outpatient Medications on File Prior to Visit   Medication Sig    Accu-Chek FastClix Lancets MISC USAR PARA PROBAR AZUCAR EN LA TWAN MANISHA VECES AL JEFFREY    albuterol (PROVENTIL HFA,VENTOLIN HFA) 90 mcg/act inhaler Inhale 1 puff every 4 (four) hours as needed for wheezing    ALPRAZolam (XANAX) 0.25 mg tablet Take 1 tablet (0.25 mg total) by mouth daily at bedtime as needed for anxiety    Blood Glucose Monitoring Suppl (OneTouch Verio Reflect) w/Device KIT Check blood sugars once daily. Please substitute with appropriate alternative as covered by  patient's insurance. Dx: E11.65    Blood Glucose Monitoring Suppl (OneTouch Verio) w/Device KIT Use per  guidelines    glucose blood (Accu-Chek Guide) test strip USAR PARA EXAMINAR AZUCAR EN LA TWAN MANISHA VECES AL JEFFREY    glucose blood (OneTouch Verio) test strip Check blood sugars once daily. Please substitute with appropriate alternative as covered by patient's insurance. Dx: E11.65    guaiFENesin (MUCINEX) 600 mg 12 hr tablet Take 2 tablets (1,200 mg total) by mouth every 12 (twelve) hours    Insulin Glargine Solostar (Basaglar KwikPen) 100 UNIT/ML SOPN Inject 0.11 mL (11 Units total) under the skin in the morning    Insulin Pen Needle (BD Pen Needle Micro U/F) 32G X 6 MM MISC Use daily    loperamide (IMODIUM) 2 mg capsule MAR JOHNNY (1) CAPSULA POR VIA ORAL ALFREDO SEA NECESARIO PARA LA DIARREA    Melatonin 5 MG TABS Take by mouth as needed    OneTouch Delica Lancets 33G MISC Check blood sugars once daily. Please substitute with appropriate alternative as covered by patient's insurance. Dx: E11.65    [DISCONTINUED] aspirin (Aspirin Low Dose) 81 mg chewable tablet Chew 1 tablet (81 mg total) daily    [DISCONTINUED] atorvastatin (LIPITOR) 40 mg tablet Take 1 tablet (40 mg total) by mouth daily    [DISCONTINUED] Calcium Carb-Cholecalciferol 600-10 MG-MCG TABS Take 1 tablet by mouth in the morning    [DISCONTINUED] carvedilol (COREG) 12.5 mg tablet Take 1 tablet (12.5 mg total) by mouth 2 (two) times a day with meals    [DISCONTINUED] cyanocobalamin (VITAMIN B-12) 1000 MCG tablet Take 1 tablet (1,000 mcg total) by mouth daily    [DISCONTINUED] cyanocobalamin (VITAMIN B-12) 500 MCG tablet Take 2 tablets (1,000 mcg total) by mouth daily    [DISCONTINUED] Empagliflozin (Jardiance) 10 MG TABS tablet Take 1 tablet (10 mg total) by mouth every morning    [DISCONTINUED] ezetimibe (ZETIA) 10 mg tablet Take 1 tablet (10 mg total) by mouth daily    [DISCONTINUED] ferrous sulfate 325 (65 Fe) mg tablet Take 1 tablet  "(325 mg total) by mouth daily with breakfast    [DISCONTINUED] fluticasone (FLONASE) 50 mcg/act nasal spray SPRAY 1 SPRAY INTO EACH NOSTRIL EVERY DAY    [DISCONTINUED] furosemide (LASIX) 20 mg tablet Take 1 tablet (20 mg total) by mouth daily    [DISCONTINUED] gabapentin (NEURONTIN) 100 mg capsule Take 1 capsule (100 mg total) by mouth 3 (three) times a day    [DISCONTINUED] Janumet  MG per tablet TAKE 1 TABLET BY MOUTH TWICE A DAY WITH MEALS    [DISCONTINUED] lidocaine (Lidoderm) 5 % Apply 1 patch topically over 12 hours daily Remove & Discard patch within 12 hours or as directed by MD    [DISCONTINUED] losartan (COZAAR) 25 mg tablet Take 1 tablet (25 mg total) by mouth daily    [DISCONTINUED] memantine (NAMENDA) 10 mg tablet TAKE 1 TABLET BY MOUTH TWICE A DAY    [DISCONTINUED] mirtazapine (REMERON) 15 mg tablet Take 1 tablet (15 mg total) by mouth daily at bedtime    [DISCONTINUED] omeprazole (PriLOSEC) 20 mg delayed release capsule Take 1 capsule (20 mg total) by mouth 2 (two) times a day    [DISCONTINUED] ondansetron (ZOFRAN) 4 mg tablet Take 1 tablet (4 mg total) by mouth every 8 (eight) hours as needed for nausea or vomiting     No Known Allergies  Immunization History   Administered Date(s) Administered    COVID-19 MODERNA VACC 0.25 ML IM BOOSTER 12/22/2021    COVID-19 MODERNA VACC 0.5 ML IM 01/31/2021, 02/28/2021, 12/22/2021    COVID-19 Pfizer Vac BIVALENT Corey-sucrose 12 Yr+ IM 04/26/2023    INFLUENZA 10/21/2022    Influenza, high dose seasonal 0.7 mL 10/08/2019, 10/13/2020, 11/17/2021, 10/21/2022, 10/26/2023    Influenza, seasonal, injectable 12/15/2015    Pneumococcal Conjugate 13-Valent 06/14/2021    Pneumococcal Conjugate Vaccine 20-valent (Pcv20), Polysace 10/26/2023    Pneumococcal Polysaccharide PPV23 11/17/2021    Tdap 04/26/2023       Objective     /60 (BP Location: Right arm, Patient Position: Sitting, Cuff Size: Standard)   Resp 18   Ht 5' 1\" (1.549 m)   Wt 62.1 kg (137 lb)   BMI " 25.89 kg/m²     Physical Exam  Vitals and nursing note reviewed.   Constitutional:       General: She is not in acute distress.     Appearance: She is well-developed. She is not diaphoretic.      Comments: Elderly appearing   HENT:      Head: Normocephalic and atraumatic.      Right Ear: External ear normal.      Left Ear: External ear normal.   Eyes:      General:         Right eye: No discharge.         Left eye: No discharge.      Pupils: Pupils are equal, round, and reactive to light.   Cardiovascular:      Rate and Rhythm: Normal rate and regular rhythm.   Pulmonary:      Effort: Pulmonary effort is normal. No respiratory distress.      Breath sounds: No wheezing.   Abdominal:      General: Bowel sounds are normal. There is no distension.      Palpations: Abdomen is soft.      Tenderness: There is no abdominal tenderness.   Musculoskeletal:      Cervical back: Normal range of motion and neck supple.      Right lower leg: No edema.      Left lower leg: No edema.   Lymphadenopathy:      Cervical: No cervical adenopathy.   Skin:     General: Skin is warm and dry.      Capillary Refill: Capillary refill takes less than 2 seconds.      Findings: No rash.   Neurological:      Mental Status: She is alert. Mental status is at baseline.   Psychiatric:         Behavior: Behavior normal.       MOIRA Gan

## 2024-02-21 NOTE — ASSESSMENT & PLAN NOTE
Lab Results   Component Value Date    HGBA1C 8.2 (A) 02/21/2024    HGBA1C 8.9 (A) 10/26/2023    HGBA1C 6.9 (A) 04/06/2023     Following with endocrinology, appointment next week   Granddaughter reports compliance with medications   Discussed diet changes   Continue with Janumet  mg bid and Basaglar 11 units daily

## 2024-02-21 NOTE — ASSESSMENT & PLAN NOTE
BP is well controlled, no reports of dizziness or falls  continue current regimen at this time - carvedilol 12.5 mg bid, furosemide 20 mg daily, losartan 25 mg daily

## 2024-02-21 NOTE — ASSESSMENT & PLAN NOTE
Wt Readings from Last 3 Encounters:   02/21/24 62.1 kg (137 lb)   11/17/23 65.9 kg (145 lb 3.2 oz)   11/16/23 65.4 kg (144 lb 3.2 oz)     Discussed with granddaughter that patient appears to have lost quite a bit of weight since last visit, appetite is decreased   Encouraged granddaughter to provide high caloric foods, consider Glucerna supplement   RTC for weight check in 4 weeks

## 2024-02-21 NOTE — PATIENT INSTRUCTIONS
Meal Planning with the Plate Method   AMBULATORY CARE:   Meal planning with the plate method  is a way for people with diabetes to plan meals. The plate method can help you eat the right amount of carbohydrates and keep your blood sugar levels under control. Carbohydrates naturally raise your blood sugar level. Your blood sugar level can rise too high if you eat too much carbohydrate at one time. Carbohydrates are found in starches (bread, cereal, starchy vegetables, and beans), fruit, milk, yogurt, and sweets.  How to use the plate method to plan meals:   Draw an imaginary line down the middle of a 9-inch dinner plate.  On one side, draw another line to divide that section in half. Your plate will have 3 sections.    Fill the largest section of your plate with non-starchy vegetables.  These include broccoli, spinach, cucumbers, peppers, cauliflower, and tomatoes.    Add a carbohydrate to 1 of the small sections of your plate.  Examples are pasta, rice, whole-grain bread, tortillas, corn, potatoes, and beans. Your plan may allow a serving of low-fat dairy or fruit for a carbohydrate.    Add meat or another source of protein to the other small section of your plate.  Examples include chicken or turkey without skin, fish, lean beef or pork, low-fat cheese, tofu, or eggs.         Add a low-calorie or calorie-free drink.  Examples include water or unsweetened tea or coffee.       Serving sizes of foods:   Non-starchy vegetables:      ½ cup of cooked vegetables or 1 cup of raw vegetables    ½ cup of vegetable juice    Starches:      1 ounce of whole-wheat bread or 1 small (6 inch) flour or corn tortilla    1 small (4 inch) pancake (about ¼ inch thick)    ¾ cup of dry, unsweetened, whole-grain ready-to-eat cereal or ¼ cup of low-fat granola    ½ cup of cooked cereal or oatmeal    ? cup of rice or pasta    ½ cup of corn, green peas, sweet potatoes, or mashed potatoes    ½ cup of cooked beans and peas (lilliana fischer,  kidney, white, split, black-eyed)    Meat and other protein sources:      3 to 4 ounces of any lean meat, fish, or poultry    ½ cup of tofu or tempeh    1 large egg    1½ ounces (about 2 tablespoons) of nuts or 2 tablespoons of peanut butter    Fruit:      1 small piece of fresh fruit     ½ cup of canned or fresh fruit or unsweetened fruit juice    ¼ cup of dried fruit    Milk and yogurt:      1 cup (8 ounces) of skim or 1% milk    ¾ cup (6 ounces) of plain, non-fat yogurt    Other healthy nutrition tips:   Limit salt and sugar.  Choose and prepare foods and drinks with less salt and added sugars. Use the nutrition information on food labels to help you make healthy choices. The percent daily value listed on the food label tells you whether a food is low or high in certain nutrients. A percent daily value of 5% or less means that the food is low in a nutrient. A percent daily value of 20% or more means that the food is high in a nutrient.         Choose healthy fats.  Choose healthy fats such as polyunsaturated and monounsaturated fats in place of unhealthy fats. Healthy fats are found in vegetable oils such as soybean, corn, canola, olive, and sunflower oil. Unhealthy fats are saturated fats, trans fats, and cholesterol. Unhealthy fats are found in shortening, butter, stick margarine, and animal fat.         Ask your healthcare provider if alcohol is okay for you.  Generally, men 65 or older and women should limit alcohol to 1 drink within 24 hours and 7 within 1 week. Men 21 to 64 years should limit alcohol to 2 drinks a day and 14 within 1 week. Your provider can tell you how many drinks are okay for you within 24 hours or within 1 week. A drink of alcohol is 12 ounces of beer, 5 ounces of wine, or 1½ ounces of liquor. Always have food when you drink alcohol. Your blood sugar may fall to a low level if you drink when your stomach is empty.    © Copyright [employer] 2023 Information is for End User's use only and  may not be sold, redistributed or otherwise used for commercial purposes.  The above information is an  only. It is not intended as medical advice for individual conditions or treatments. Talk to your doctor, nurse or pharmacist before following any medical regimen to see if it is safe and effective for you.

## 2024-03-08 DIAGNOSIS — Z79.4 CURRENT USE OF INSULIN (HCC): ICD-10-CM

## 2024-03-08 DIAGNOSIS — E11.9 TYPE 2 DIABETES MELLITUS WITHOUT COMPLICATION, WITHOUT LONG-TERM CURRENT USE OF INSULIN (HCC): ICD-10-CM

## 2024-03-12 RX ORDER — LANCETS
EACH MISCELLANEOUS
Qty: 100 EACH | Refills: 5 | Status: SHIPPED | OUTPATIENT
Start: 2024-03-12

## 2024-03-14 ENCOUNTER — HOSPITAL ENCOUNTER (OUTPATIENT)
Dept: CT IMAGING | Facility: HOSPITAL | Age: 79
End: 2024-03-14
Payer: COMMERCIAL

## 2024-03-14 DIAGNOSIS — C83.31 DIFFUSE LARGE B-CELL LYMPHOMA OF LYMPH NODES OF NECK (HCC): ICD-10-CM

## 2024-03-14 DIAGNOSIS — J18.1 CONSOLIDATION OF RIGHT LOWER LOBE OF LUNG (HCC): ICD-10-CM

## 2024-03-14 DIAGNOSIS — R59.0 LAD (LYMPHADENOPATHY), HILAR: ICD-10-CM

## 2024-03-14 DIAGNOSIS — R59.0 LAD (LYMPHADENOPATHY), CERVICAL: ICD-10-CM

## 2024-03-14 PROCEDURE — 71260 CT THORAX DX C+: CPT

## 2024-03-14 PROCEDURE — 70491 CT SOFT TISSUE NECK W/DYE: CPT

## 2024-03-14 PROCEDURE — G1004 CDSM NDSC: HCPCS

## 2024-03-14 RX ADMIN — IOHEXOL 85 ML: 350 INJECTION, SOLUTION INTRAVENOUS at 08:55

## 2024-03-21 ENCOUNTER — TELEPHONE (OUTPATIENT)
Dept: HEMATOLOGY ONCOLOGY | Facility: CLINIC | Age: 79
End: 2024-03-21

## 2024-03-21 NOTE — TELEPHONE ENCOUNTER
Received call from radiology.  CT results available with significant findings.  Patient with office visit with Dr. Gordon to review.

## 2024-03-28 ENCOUNTER — APPOINTMENT (OUTPATIENT)
Dept: LAB | Facility: HOSPITAL | Age: 79
End: 2024-03-28
Payer: COMMERCIAL

## 2024-03-28 DIAGNOSIS — D53.9 NUTRITIONAL ANEMIA: ICD-10-CM

## 2024-03-28 DIAGNOSIS — R77.8 ABNORMAL SPEP: ICD-10-CM

## 2024-03-28 DIAGNOSIS — E11.3293 TYPE 2 DIABETES MELLITUS WITH BOTH EYES AFFECTED BY MILD NONPROLIFERATIVE RETINOPATHY WITHOUT MACULAR EDEMA, WITH LONG-TERM CURRENT USE OF INSULIN (HCC): ICD-10-CM

## 2024-03-28 DIAGNOSIS — D64.9 NORMOCYTIC ANEMIA: ICD-10-CM

## 2024-03-28 DIAGNOSIS — Z79.4 TYPE 2 DIABETES MELLITUS WITH BOTH EYES AFFECTED BY MILD NONPROLIFERATIVE RETINOPATHY WITHOUT MACULAR EDEMA, WITH LONG-TERM CURRENT USE OF INSULIN (HCC): ICD-10-CM

## 2024-03-28 DIAGNOSIS — R45.89 ANXIETY ABOUT HEALTH: ICD-10-CM

## 2024-03-28 DIAGNOSIS — C83.31 DIFFUSE LARGE B-CELL LYMPHOMA OF LYMPH NODES OF NECK (HCC): ICD-10-CM

## 2024-03-28 LAB
ALBUMIN SERPL BCP-MCNC: 4 G/DL (ref 3.5–5)
ALP SERPL-CCNC: 82 U/L (ref 34–104)
ALT SERPL W P-5'-P-CCNC: 31 U/L (ref 7–52)
ANION GAP SERPL CALCULATED.3IONS-SCNC: 8 MMOL/L (ref 4–13)
AST SERPL W P-5'-P-CCNC: 20 U/L (ref 13–39)
BASOPHILS # BLD AUTO: 0.07 THOUSANDS/ÂΜL (ref 0–0.1)
BASOPHILS NFR BLD AUTO: 1 % (ref 0–1)
BILIRUB SERPL-MCNC: 0.4 MG/DL (ref 0.2–1)
BUN SERPL-MCNC: 21 MG/DL (ref 5–25)
CALCIUM SERPL-MCNC: 9.5 MG/DL (ref 8.4–10.2)
CHLORIDE SERPL-SCNC: 99 MMOL/L (ref 96–108)
CO2 SERPL-SCNC: 28 MMOL/L (ref 21–32)
CREAT SERPL-MCNC: 0.93 MG/DL (ref 0.6–1.3)
CREAT UR-MCNC: 57.6 MG/DL
CRP SERPL QL: <1 MG/L
EOSINOPHIL # BLD AUTO: 0.27 THOUSAND/ÂΜL (ref 0–0.61)
EOSINOPHIL NFR BLD AUTO: 3 % (ref 0–6)
ERYTHROCYTE [DISTWIDTH] IN BLOOD BY AUTOMATED COUNT: 14.4 % (ref 11.6–15.1)
ERYTHROCYTE [SEDIMENTATION RATE] IN BLOOD: 66 MM/HOUR (ref 0–29)
EST. AVERAGE GLUCOSE BLD GHB EST-MCNC: 186 MG/DL
FERRITIN SERPL-MCNC: 50 NG/ML (ref 11–307)
GFR SERPL CREATININE-BSD FRML MDRD: 59 ML/MIN/1.73SQ M
GLUCOSE P FAST SERPL-MCNC: 115 MG/DL (ref 65–99)
HBA1C MFR BLD: 8.1 %
HCT VFR BLD AUTO: 38.5 % (ref 34.8–46.1)
HGB BLD-MCNC: 12.1 G/DL (ref 11.5–15.4)
IGA SERPL-MCNC: 355 MG/DL (ref 66–433)
IGG SERPL-MCNC: 1774 MG/DL (ref 635–1741)
IGM SERPL-MCNC: 62 MG/DL (ref 45–281)
IMM GRANULOCYTES # BLD AUTO: 0.04 THOUSAND/UL (ref 0–0.2)
IMM GRANULOCYTES NFR BLD AUTO: 0 % (ref 0–2)
IRON SATN MFR SERPL: 24 % (ref 15–50)
IRON SERPL-MCNC: 79 UG/DL (ref 50–212)
LDH SERPL-CCNC: 143 U/L (ref 140–271)
LYMPHOCYTES # BLD AUTO: 3.22 THOUSANDS/ÂΜL (ref 0.6–4.47)
LYMPHOCYTES NFR BLD AUTO: 35 % (ref 14–44)
MCH RBC QN AUTO: 28.6 PG (ref 26.8–34.3)
MCHC RBC AUTO-ENTMCNC: 31.4 G/DL (ref 31.4–37.4)
MCV RBC AUTO: 91 FL (ref 82–98)
MICROALBUMIN UR-MCNC: 14.5 MG/L
MICROALBUMIN/CREAT 24H UR: 25 MG/G CREATININE (ref 0–30)
MONOCYTES # BLD AUTO: 1.1 THOUSAND/ÂΜL (ref 0.17–1.22)
MONOCYTES NFR BLD AUTO: 12 % (ref 4–12)
NEUTROPHILS # BLD AUTO: 4.44 THOUSANDS/ÂΜL (ref 1.85–7.62)
NEUTS SEG NFR BLD AUTO: 49 % (ref 43–75)
NRBC BLD AUTO-RTO: 0 /100 WBCS
PLATELET # BLD AUTO: 366 THOUSANDS/UL (ref 149–390)
PMV BLD AUTO: 9.7 FL (ref 8.9–12.7)
POTASSIUM SERPL-SCNC: 4.7 MMOL/L (ref 3.5–5.3)
PROT SERPL-MCNC: 7.3 G/DL (ref 6.4–8.4)
RBC # BLD AUTO: 4.23 MILLION/UL (ref 3.81–5.12)
SODIUM SERPL-SCNC: 135 MMOL/L (ref 135–147)
TIBC SERPL-MCNC: 330 UG/DL (ref 250–450)
UIBC SERPL-MCNC: 251 UG/DL (ref 155–355)
WBC # BLD AUTO: 9.14 THOUSAND/UL (ref 4.31–10.16)

## 2024-03-28 PROCEDURE — 80053 COMPREHEN METABOLIC PANEL: CPT

## 2024-03-28 PROCEDURE — 85652 RBC SED RATE AUTOMATED: CPT

## 2024-03-28 PROCEDURE — 36415 COLL VENOUS BLD VENIPUNCTURE: CPT

## 2024-03-28 PROCEDURE — 83615 LACTATE (LD) (LDH) ENZYME: CPT

## 2024-03-28 PROCEDURE — 86334 IMMUNOFIX E-PHORESIS SERUM: CPT

## 2024-03-28 PROCEDURE — 83550 IRON BINDING TEST: CPT

## 2024-03-28 PROCEDURE — 84165 PROTEIN E-PHORESIS SERUM: CPT

## 2024-03-28 PROCEDURE — 83521 IG LIGHT CHAINS FREE EACH: CPT

## 2024-03-28 PROCEDURE — 82728 ASSAY OF FERRITIN: CPT

## 2024-03-28 PROCEDURE — 82043 UR ALBUMIN QUANTITATIVE: CPT

## 2024-03-28 PROCEDURE — 86140 C-REACTIVE PROTEIN: CPT

## 2024-03-28 PROCEDURE — 83540 ASSAY OF IRON: CPT

## 2024-03-28 PROCEDURE — 82570 ASSAY OF URINE CREATININE: CPT

## 2024-03-28 PROCEDURE — 83036 HEMOGLOBIN GLYCOSYLATED A1C: CPT

## 2024-03-28 PROCEDURE — 85025 COMPLETE CBC W/AUTO DIFF WBC: CPT

## 2024-03-28 PROCEDURE — 82784 ASSAY IGA/IGD/IGG/IGM EACH: CPT

## 2024-03-29 LAB
ALBUMIN SERPL ELPH-MCNC: 3.66 G/DL (ref 3.2–5.1)
ALBUMIN SERPL ELPH-MCNC: 50.2 % (ref 48–70)
ALPHA1 GLOB SERPL ELPH-MCNC: 0.24 G/DL (ref 0.15–0.47)
ALPHA1 GLOB SERPL ELPH-MCNC: 3.3 % (ref 1.8–7)
ALPHA2 GLOB SERPL ELPH-MCNC: 0.85 G/DL (ref 0.42–1.04)
ALPHA2 GLOB SERPL ELPH-MCNC: 11.6 % (ref 5.9–14.9)
BETA GLOB ABNORMAL SERPL ELPH-MCNC: 0.44 G/DL (ref 0.31–0.57)
BETA1 GLOB SERPL ELPH-MCNC: 6 % (ref 4.7–7.7)
BETA2 GLOB SERPL ELPH-MCNC: 4.7 % (ref 3.1–7.9)
BETA2+GAMMA GLOB SERPL ELPH-MCNC: 0.34 G/DL (ref 0.2–0.58)
GAMMA GLOB ABNORMAL SERPL ELPH-MCNC: 1.77 G/DL (ref 0.4–1.66)
GAMMA GLOB SERPL ELPH-MCNC: 24.2 % (ref 6.9–22.3)
IGG/ALB SER: 1.01 {RATIO} (ref 1.1–1.8)
INTERPRETATION UR IFE-IMP: NORMAL
KAPPA LC FREE SER-MCNC: 423.3 MG/L (ref 3.3–19.4)
KAPPA LC FREE/LAMBDA FREE SER: 4.38 {RATIO} (ref 0.26–1.65)
LAMBDA LC FREE SERPL-MCNC: 96.7 MG/L (ref 5.7–26.3)
PROT PATTERN SERPL ELPH-IMP: ABNORMAL
PROT SERPL-MCNC: 7.3 G/DL (ref 6.4–8.2)

## 2024-03-30 RX ORDER — ALPRAZOLAM 0.25 MG/1
0.25 TABLET ORAL
Qty: 30 TABLET | Refills: 0 | Status: SHIPPED | OUTPATIENT
Start: 2024-03-30

## 2024-04-01 ENCOUNTER — OFFICE VISIT (OUTPATIENT)
Dept: HEMATOLOGY ONCOLOGY | Facility: CLINIC | Age: 79
End: 2024-04-01
Payer: COMMERCIAL

## 2024-04-01 VITALS
BODY MASS INDEX: 25.49 KG/M2 | SYSTOLIC BLOOD PRESSURE: 126 MMHG | DIASTOLIC BLOOD PRESSURE: 64 MMHG | OXYGEN SATURATION: 96 % | TEMPERATURE: 97.7 F | HEIGHT: 61 IN | WEIGHT: 135 LBS | HEART RATE: 69 BPM | RESPIRATION RATE: 17 BRPM

## 2024-04-01 DIAGNOSIS — E53.8 B12 DEFICIENCY: ICD-10-CM

## 2024-04-01 DIAGNOSIS — R79.0 LOW FERRITIN: ICD-10-CM

## 2024-04-01 DIAGNOSIS — C83.31 DIFFUSE LARGE B-CELL LYMPHOMA OF LYMPH NODES OF NECK (HCC): Primary | ICD-10-CM

## 2024-04-01 DIAGNOSIS — D47.2 MONOCLONAL GAMMOPATHY: ICD-10-CM

## 2024-04-01 PROCEDURE — G2211 COMPLEX E/M VISIT ADD ON: HCPCS | Performed by: INTERNAL MEDICINE

## 2024-04-01 PROCEDURE — 99214 OFFICE O/P EST MOD 30 MIN: CPT | Performed by: INTERNAL MEDICINE

## 2024-04-01 NOTE — PROGRESS NOTES
Hematology/Oncology Outpatient Follow-up  Marci Sandoval 78 y.o. female 1945 [id number]    Date:  4/1/2024        Assessment and Plan:  1. 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  The patient was educated through her granddaughter about the recent imaging which showed very mild progressive the increase in the size of small bilateral axillary lymph nodes of unknown clinical significance.  The rest of her imaging seem to be fine.  The patient was asked to repeat the CT scan prior to her next visit in 6 months for close follow-up.  - CBC and differential; Future  - Comprehensive metabolic panel; Future  - C-reactive protein; Future  - LD,Blood; Future  - Sedimentation rate, automated; Future  - CT chest abdomen pelvis w contrast; Future  - CT soft tissue neck w contrast; Future    2. Monoclonal gammopathy  The patient seems to have high IgG and elevated serum light chain consideration for kappa and lambda with normal ratio.  The serum immunofixation was not able to rule out small band of gammopathy.  We will repeat the monoclonal gammopathy workup in 6 months prior to her next visit.  - IgG, IgA, IgM; Future  - Beta 2 microglobulin, serum; Future  - Immunoglobulin free LT chains blood; Future  - Kappa/Lambda Light Chains Free With Ratio,Urine; Future  - Protein, Total and Protein Electrophoresis with Immunofixation; Future  - Protein electrophoresis, serum; Future    3. B12 deficiency  Level will be checked prior to her next visit.  Her vitamin B12 level was very low in November 2023.  I did ask her to consider supplements daily.  - Vitamin B12; Future    4. Low ferritin  Level will be checked.  - Ferritin; Future  - Iron Panel (Includes Ferritin, Iron Sat%, Iron, and TIBC); Future        HPI:  The patient came there for a follow-up visit accompanied by her granddaughter.  She did complain about  fatigue.  She had a CT scan of the soft tissue neck on 3/14/2024 which showed:  IMPRESSION:     Stable mildly prominent bilateral cervical lymph nodes as described above. No new suspicious adenopathy identified.     Paranasal sinus disease, as described above.    She also had a CT scan of the chest with contrast on the same day which showed  IMPRESSION:     1.  No acute abnormalities or suspicious pulmonary nodules. Stable bronchiectasis and scarring at the right lung base.  2.  Stable mediastinal and hilar lymph nodes.  3.  Very mild progressive increase in size of small bilateral axillary lymph nodes which are otherwise similar in number and distribution. Given at risk patient, suggest continued follow-up in 6 months is suggested.  Blood work on 3/28/2024 showed hemoglobin of 12.1 with normal white cells and platelets.  Creatinine 0.9 with normal calcium and liver enzymes.  .  Sed rate 66.  IgG was 1774.  Serum light chain concentration for kappa was 423 and for lambda 96.7 with kappa to lambda ratio 4.  SPEP was abnormal.  The iron panel showed saturation of 24% with ferritin of 50.  Serum immunofixation cannot rule out a small monoclonal gammopathy.  Oncology History Overview Note   The patient complained about significant sore throat in May which was treated with antibiotics by her PCP in [place] which did not improve her sore throat.  She then started to notice significant odynophagia and dysphagia for liquids it is and solid nutrition.  Eventually, she had a CT scan of the neck on the 20th of September which showed soft tissue lesion in the left palatine tonsil with abnormal lymph nodes bilaterally in the neck compatible with neoplastic process.  She then had a biopsy of the left tonsil/left tonsillectomy on the 25th of September 2018 which was compatible with diffuse large B-cell lymphoma germinal center B phenotype.  The immunohistochemical staining came back positive for BCL2, BCL 6 and myc with  Ki 67 around 70%.  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  During the hospital course the patient had another biopsy of the left tonsil to better understand the exact aggressiveness of her large B-cell lymphoma.  The biopsy on the 20th of November showed large B-cell lymphoma with BCL -2 and C myc level expressed surface a type without the myc or BCL gene rearrangement.  Her bone marrow biopsy on the 9th of November was negative for B-cell lymphoma involvement with normal bone marrow trilineage maturation.  She was also evaluated with an MRI of the brain during the hospital course which was negative for any hint of lymphoma involvement.    PET scan on the 14th of November showed IMPRESSION:  1.  FDG avid left posterior oropharyngeal lesion compatible with malignancy.  2.  FDG avid lymph nodes in the neck and left axilla compatible with malignancy.  3.  No FDG avid lymph nodes in the abdomen or pelvis suspicious for malignancy.  4.  Tiny focus of FDG uptake along the skin of the right upper quadrant anterior abdominal wall with mild skin thickening suggested here on CT    Her post treatment PET-CT 3/18/19 was read:  IMPRESSION:  1.  No evidence of hypermetabolic malignancy.  Deauville score of 1.  2.  Mild patchy FDG uptake in the right upper lobe corresponding to patchy  consolidation.  This may be infectious or inflammatory.  This has partially improved from the 2/25/2019 chest x-ray.     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 Dose adjusted  R-EPOCH 11/21/18 (1 cycle)- switched to RCHOP after repeat biopsy/pathology reviewed  2. R-CHOP started 12/12/18 completed 3/7/19 (5 cycles)         11/20/2018 -  Cancer Staged    Staging form: Hodgkin and Non-Hodgkin Lymphoma, AJCC 8th Edition  - Clinical stage from 11/20/2018: Stage II (Diffuse large B-cell lymphoma) - Signed by Chencho Gordon MD on 4/1/2024  Stage prefix: Initial diagnosis           Interval history:    ROS: Review of Systems   Constitutional:  Positive for fatigue. Negative for chills and fever.   HENT:  Positive for mouth sores. Negative for ear pain and sore throat.    Eyes:  Negative for pain and visual disturbance.   Respiratory:  Positive for cough. Negative for shortness of breath.    Cardiovascular:  Negative for chest pain and palpitations.   Gastrointestinal:  Negative for abdominal pain and vomiting.   Genitourinary:  Negative for dysuria and hematuria.   Musculoskeletal:  Negative for arthralgias and back pain.   Skin:  Negative for color change and rash.   Neurological:  Positive for dizziness. Negative for seizures and syncope.   Psychiatric/Behavioral:  Positive for sleep disturbance.    All other systems reviewed and are negative.      Past Medical History:   Diagnosis Date    Cancer (HCC)     Throat    Chronic pain disorder     Diabetes mellitus (HCC)     Diffuse large B cell lymphoma (HCC)     Dysphagia     GERD without esophagitis     HTN (hypertension)     Hyperlipidemia     Hypertension     MI (myocardial infarction) (HCC)     Port-A-Cath in place 07/29/2019    Thyroid cancer (HCC) 2018       Past Surgical History:   Procedure Laterality Date    BONE MARROW BIOPSY      BREAST BIOPSY Left     CARDIAC CATHETERIZATION N/A 12/9/2022    Procedure: Cardiac catheterization;  Surgeon: Jhonny Rain MD;  Location: [facility];  Service: Cardiology    CARDIAC CATHETERIZATION N/A 12/9/2022    Procedure: Cardiac Coronary Angiogram;  Surgeon: Jhonny Rain MD;  Location: [facility];  Service: Cardiology    CARDIAC CATHETERIZATION Left 12/9/2022    Procedure: Cardiac Left Heart Cath;  Surgeon: Jhonny Rain MD;  Location: [facility];  Service: Cardiology    CHOLECYSTECTOMY      IR PORT PLACEMENT  11/16/2018    IR PORT REMOVAL  3/22/2021    OTHER SURGICAL HISTORY      tendor tear repair to right shoulder    SHOULDER ARTHROSCOPY         Social History     Socioeconomic History    Marital status:      Spouse name: None    Number of children: None    Years of education: None    Highest education level: None   Occupational History    None   Tobacco Use    Smoking status: Never     Passive exposure: Never    Smokeless tobacco: Never   Vaping Use    Vaping status: Never Used   Substance and Sexual Activity    Alcohol use: Never     Comment: 0    Drug use: No    Sexual activity: Not Currently     Partners: Male   Other Topics Concern    None   Social History Narrative    None     Social Determinants of Health     Financial Resource Strain: Low Risk  (2/21/2024)    Overall Financial Resource Strain (CARDIA)     Difficulty of Paying Living Expenses: Not hard at all   Food Insecurity: No Food Insecurity (2/21/2024)    Hunger Vital Sign     Worried About Running Out of Food in the Last Year: Never true     Ran Out of Food in the Last Year: Never true   Transportation Needs: No Transportation Needs (2/21/2024)    PRAPARE - Transportation     Lack of Transportation (Medical): No     Lack of Transportation (Non-Medical): No   Physical Activity: Not on file   Stress: Stress Concern Present (3/21/2022)    [employer] - Occupational Stress Questionnaire     Feeling of Stress : To some extent   Social Connections: Not on file   Intimate Partner Violence: Not on file   Housing Stability: Low Risk  (12/8/2022)    Housing Stability Vital Sign     Unable to Pay for Housing in the Last Year: No     Number of Places Lived in the Last Year: 1     Unstable Housing in  the Last Year: No       Family History   Problem Relation Age of Onset    Diabetes Mother     Heart disease Sister     Hypertension Brother     Uterine cancer Maternal Grandmother     Prostate cancer Paternal Grandfather        No Known Allergies      Current Outpatient Medications:     Accu-Chek FastClix Lancets MISC, USAR PARA PROBAR AZUCAR EN LA TWAN MANISHA VECES AL JEFFREY, Disp: 100 each, Rfl: 5    albuterol (PROVENTIL HFA,VENTOLIN HFA) 90 mcg/act inhaler, Inhale 1 puff every 4 (four) hours as needed for wheezing, Disp: 18 g, Rfl: 10    ALPRAZolam (XANAX) 0.25 mg tablet, Take 1 tablet (0.25 mg total) by mouth daily at bedtime as needed for anxiety, Disp: 30 tablet, Rfl: 0    aspirin (Aspirin Low Dose) 81 mg chewable tablet, Chew 1 tablet (81 mg total) daily, Disp: 90 tablet, Rfl: 0    atorvastatin (LIPITOR) 40 mg tablet, Take 1 tablet (40 mg total) by mouth daily, Disp: 90 tablet, Rfl: 0    Blood Glucose Monitoring Suppl (OneTouch Verio Reflect) w/Device KIT, Check blood sugars once daily. Please substitute with appropriate alternative as covered by patient's insurance. Dx: E11.65, Disp: 1 kit, Rfl: 0    Blood Glucose Monitoring Suppl (OneTouch Verio) w/Device KIT, Use per  guidelines, Disp: 1 kit, Rfl: 0    Calcium Carb-Cholecalciferol 600-10 MG-MCG TABS, Take 1 tablet by mouth in the morning, Disp: 180 tablet, Rfl: 0    carvedilol (COREG) 12.5 mg tablet, Take 1 tablet (12.5 mg total) by mouth 2 (two) times a day with meals, Disp: 180 tablet, Rfl: 0    cyanocobalamin (VITAMIN B-12) 1000 MCG tablet, Take 1 tablet (1,000 mcg total) by mouth daily, Disp: 90 tablet, Rfl: 3    Empagliflozin (Jardiance) 10 MG TABS tablet, Take 1 tablet (10 mg total) by mouth every morning, Disp: 90 tablet, Rfl: 0    ezetimibe (ZETIA) 10 mg tablet, Take 1 tablet (10 mg total) by mouth daily, Disp: 90 tablet, Rfl: 0    ferrous sulfate 325 (65 Fe) mg tablet, Take 1 tablet (325 mg total) by mouth daily with breakfast, Disp: 90  tablet, Rfl: 2    fluticasone (FLONASE) 50 mcg/act nasal spray, 1 spray into each nostril daily, Disp: 24 mL, Rfl: 8    furosemide (LASIX) 20 mg tablet, Take 1 tablet (20 mg total) by mouth daily, Disp: 90 tablet, Rfl: 3    gabapentin (NEURONTIN) 100 mg capsule, Take 1 capsule (100 mg total) by mouth 3 (three) times a day, Disp: 90 capsule, Rfl: 0    glucose blood (Accu-Chek Guide) test strip, USAR PARA EXAMINAR AZUCAR EN LA TWAN MANISHA VECES AL JEFFREY, Disp: 100 strip, Rfl: 10    glucose blood (OneTouch Verio) test strip, Check blood sugars once daily. Please substitute with appropriate alternative as covered by patient's insurance. Dx: E11.65, Disp: 100 each, Rfl: 3    guaiFENesin (MUCINEX) 600 mg 12 hr tablet, Take 2 tablets (1,200 mg total) by mouth every 12 (twelve) hours, Disp: 30 tablet, Rfl: 1    Insulin Glargine Solostar (Basaglar KwikPen) 100 UNIT/ML SOPN, Inject 0.11 mL (11 Units total) under the skin in the morning, Disp: 12 mL, Rfl: 1    Insulin Pen Needle (BD Pen Needle Micro U/F) 32G X 6 MM MISC, Use daily, Disp: 100 each, Rfl: 1    lidocaine (Lidoderm) 5 %, Apply 1 patch topically over 12 hours daily Remove & Discard patch within 12 hours or as directed by MD, Disp: 15 patch, Rfl: 0    loperamide (IMODIUM) 2 mg capsule, MAR JOHNNY (1) CAPSULA POR VIA ORAL ALFREDO SEA NECESARIO PARA LA DIARREA, Disp: 30 capsule, Rfl: 10    losartan (COZAAR) 25 mg tablet, Take 1 tablet (25 mg total) by mouth daily, Disp: 90 tablet, Rfl: 0    Melatonin 5 MG TABS, Take by mouth as needed, Disp: , Rfl:     memantine (NAMENDA) 10 mg tablet, Take 1 tablet (10 mg total) by mouth 2 (two) times a day, Disp: 180 tablet, Rfl: 1    mirtazapine (REMERON) 15 mg tablet, Take 1 tablet (15 mg total) by mouth daily at bedtime, Disp: 90 tablet, Rfl: 3    omeprazole (PriLOSEC) 20 mg delayed release capsule, Take 1 capsule (20 mg total) by mouth 2 (two) times a day, Disp: 180 capsule, Rfl: 3    ondansetron (ZOFRAN) 4 mg tablet, Take 1 tablet (4  "mg total) by mouth every 8 (eight) hours as needed for nausea or vomiting, Disp: 20 tablet, Rfl: 0    OneTouch Delica Lancets 33G MISC, Check blood sugars once daily. Please substitute with appropriate alternative as covered by patient's insurance. Dx: E11.65, Disp: 100 each, Rfl: 3    sitaGLIPtin-metFORMIN (Janumet)  MG per tablet, Take 1 tablet by mouth 2 (two) times a day with meals, Disp: 60 tablet, Rfl: 1      Physical Exam:  /64 (BP Location: Left arm, Patient Position: Sitting, Cuff Size: Adult)   Pulse 69   Temp 97.7 °F (36.5 °C)   Resp 17   Ht 5' 1\" (1.549 m)   Wt 61.2 kg (135 lb)   SpO2 96%   BMI 25.51 kg/m²     Physical Exam  Constitutional:       General: She is not in acute distress.     Appearance: She is well-developed. She is not diaphoretic.   HENT:      Head: Normocephalic and atraumatic.      Nose: Nose normal.   Eyes:      General: No scleral icterus.        Right eye: No discharge.         Left eye: No discharge.      Conjunctiva/sclera: Conjunctivae normal.      Pupils: Pupils are equal, round, and reactive to light.   Neck:      Thyroid: No thyromegaly.      Vascular: No JVD.      Trachea: No tracheal deviation.   Cardiovascular:      Rate and Rhythm: Normal rate and regular rhythm.      Heart sounds: Normal heart sounds. No murmur heard.     No friction rub.   Pulmonary:      Effort: Pulmonary effort is normal. No respiratory distress.      Breath sounds: Normal breath sounds. No stridor. No wheezing or rales.   Chest:      Chest wall: No tenderness.   Abdominal:      General: There is no distension.      Palpations: Abdomen is soft. There is no hepatomegaly or splenomegaly.      Tenderness: There is no abdominal tenderness. There is no guarding or rebound.   Musculoskeletal:         General: No tenderness or deformity. Normal range of motion.      Cervical back: Normal range of motion and neck supple.   Lymphadenopathy:      Cervical: No cervical adenopathy.   Skin:     " "General: Skin is warm and dry.      Coloration: Skin is not pale.      Findings: No erythema or rash.   Neurological:      Mental Status: She is alert and oriented to person, place, and time.      Cranial Nerves: No cranial nerve deficit.      Coordination: Coordination normal.      Deep Tendon Reflexes: Reflexes are normal and symmetric.   Psychiatric:         Behavior: Behavior normal.         Thought Content: Thought content normal.         Judgment: Judgment normal.           Labs:  Lab Results   Component Value Date    WBC 9.14 03/28/2024    HGB 12.1 03/28/2024    HCT 38.5 03/28/2024    MCV 91 03/28/2024     03/28/2024     Lab Results   Component Value Date    K 4.7 03/28/2024    CL 99 03/28/2024    CO2 28 03/28/2024    BUN 21 03/28/2024    CREATININE 0.93 03/28/2024    GLUF 115 (H) 03/28/2024    CALCIUM 9.5 03/28/2024    CORRECTEDCA 8.5 07/06/2023    AST 20 03/28/2024    ALT 31 03/28/2024    ALKPHOS 82 03/28/2024    EGFR 59 03/28/2024     No results found for: \"TSH\"    Patient voiced understanding and agreement in the above discussion. Aware to contact our office with questions/symptoms in the interim.   "

## 2024-04-02 ENCOUNTER — OFFICE VISIT (OUTPATIENT)
Dept: ENDOCRINOLOGY | Facility: CLINIC | Age: 79
End: 2024-04-02
Payer: COMMERCIAL

## 2024-04-02 VITALS
BODY MASS INDEX: 25.37 KG/M2 | HEIGHT: 61 IN | WEIGHT: 134.4 LBS | HEART RATE: 65 BPM | OXYGEN SATURATION: 97 % | SYSTOLIC BLOOD PRESSURE: 100 MMHG | DIASTOLIC BLOOD PRESSURE: 60 MMHG

## 2024-04-02 DIAGNOSIS — E78.5 HYPERLIPIDEMIA, UNSPECIFIED HYPERLIPIDEMIA TYPE: ICD-10-CM

## 2024-04-02 DIAGNOSIS — I10 ESSENTIAL HYPERTENSION: ICD-10-CM

## 2024-04-02 DIAGNOSIS — E11.65 UNCONTROLLED TYPE 2 DIABETES MELLITUS WITH HYPERGLYCEMIA (HCC): ICD-10-CM

## 2024-04-02 DIAGNOSIS — E11.3293 TYPE 2 DIABETES MELLITUS WITH BOTH EYES AFFECTED BY MILD NONPROLIFERATIVE RETINOPATHY WITHOUT MACULAR EDEMA, WITH LONG-TERM CURRENT USE OF INSULIN (HCC): Primary | ICD-10-CM

## 2024-04-02 DIAGNOSIS — Z79.4 TYPE 2 DIABETES MELLITUS WITH BOTH EYES AFFECTED BY MILD NONPROLIFERATIVE RETINOPATHY WITHOUT MACULAR EDEMA, WITH LONG-TERM CURRENT USE OF INSULIN (HCC): Primary | ICD-10-CM

## 2024-04-02 PROCEDURE — 99214 OFFICE O/P EST MOD 30 MIN: CPT

## 2024-04-02 RX ORDER — INSULIN GLARGINE 100 [IU]/ML
13 INJECTION, SOLUTION SUBCUTANEOUS DAILY
Qty: 12 ML | Refills: 1 | Status: SHIPPED | OUTPATIENT
Start: 2024-04-02

## 2024-04-02 NOTE — PROGRESS NOTES
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 She reports checking blood sugars approx. 2 hours after lunch and blood sugars are in the 200-300 range. In lieu of adding additional medications to regimen, we will try to increase the current medication doses first and see how she does. Goal hgbA1C < 8%  - increase Jardiance to 25 mg daily  - increase lantus to 13 units daily  - notify for BG < 70  - send BG log in 1-2 weeks for review    Lab Results   Component Value Date    HGBA1C 8.1 (H) 03/28/2024            Relevant Medications    Insulin Glargine Solostar (Basaglar KwikPen) 100 UNIT/ML SOPN    Empagliflozin (Jardiance) 25 MG TABS    Essential hypertension     Stable in office. Continue current regimen          Hyperlipidemia     Due for updated level. Order given         Relevant Orders    Lipid panel     Other Visit Diagnoses       Uncontrolled type 2 diabetes mellitus with hyperglycemia (HCC)        Relevant Medications    Insulin Glargine Solostar (Basaglar KwikPen) 100 UNIT/ML SOPN    Empagliflozin (Jardiance) 25 MG TABS    Other Relevant Orders    Lipid panel    Hemoglobin A1C            Orders Placed This Encounter   Procedures    Lipid panel     This is a patient instruction: This test requires patient fasting for 10-12 hours or longer. Drinking of black coffee or black tea is acceptable.     Standing Status:   Future     Standing Expiration Date:   4/2/2025    Hemoglobin A1C     Standing Status:   Future     Standing Expiration Date:   4/2/2025       History of Present Illness:   Marci Sandoval is a 78 y.o. female with a history of type 2 diabetes,  hypertension, hyperlipidemia, CHF, RONAK, GERD. Reports complications of neuropathy, microalbuminuria and diabetic retinopathy. Last A1C was 8.1. Home glucose monitoring: performed twice daily using home glucose meter     Patient's granddaughter accompanies her to the visit who assists with history and translation.      Home blood glucose readings: reports blood sugars in the morning 170's to 180's. Reports after lunch readings are in 200-300's. Eats lots of fruits and will have sweets here and there.      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, with long-term current use of insulin (HCC)    Essential hypertension    Gastroesophageal reflux disease    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)    Anxiety    Palliative care patient    Current mild episode of major depressive disorder without prior episode (HCC)    Xerosis of skin    Status post chemotherapy    Allergic rhinitis    Arthritis    Obstructive sleep apnea    Chemotherapy-induced peripheral neuropathy (HCC)    Near syncope    Dementia with behavioral disturbance    Gait instability    Polypharmacy    Frequent falls    Hyperlipidemia    ACC/AHA stage C heart failure with reduced ejection fraction (HCC)    Nonischemic cardiomyopathy (HCC)    Abnormal skin growth    PLMD (periodic limb movement disorder)    Anemia    Unintended weight loss      Past Medical History:   Diagnosis Date    Cancer (HCC)     Throat    Chronic pain disorder     Diabetes mellitus (HCC)     Diffuse large B cell lymphoma (HCC)     Dysphagia     GERD without esophagitis     HTN (hypertension)      Hyperlipidemia     Hypertension     MI (myocardial infarction) (HCC)     Port-A-Cath in place 07/29/2019    Thyroid cancer (HCC) 2018      Past Surgical History:   Procedure Laterality Date    BONE MARROW BIOPSY      BREAST BIOPSY Left     CARDIAC CATHETERIZATION N/A 12/9/2022    Procedure: Cardiac catheterization;  Surgeon: Jhonny Rain MD;  Location: [facility];  Service: Cardiology    CARDIAC CATHETERIZATION N/A 12/9/2022    Procedure: Cardiac Coronary Angiogram;  Surgeon: Jhonny Rain MD;  Location: [facility];  Service: Cardiology    CARDIAC CATHETERIZATION Left 12/9/2022    Procedure: Cardiac Left Heart Cath;  Surgeon: Jhonny Rain MD;  Location: [facility];  Service: Cardiology    CHOLECYSTECTOMY      IR PORT PLACEMENT  11/16/2018    IR PORT REMOVAL  3/22/2021    OTHER SURGICAL HISTORY      tendor tear repair to right shoulder    SHOULDER ARTHROSCOPY        Family History   Problem Relation Age of Onset    Diabetes Mother     Heart disease Sister     Hypertension Brother     Uterine cancer Maternal Grandmother     Prostate cancer Paternal Grandfather      Social History     Tobacco Use    Smoking status: Never     Passive exposure: Never    Smokeless tobacco: Never   Substance Use Topics    Alcohol use: Never     Comment: 0     No Known Allergies      Current Outpatient Medications:     Accu-Chek FastClix Lancets MISC, USAR PARA PROBAR AZUCAR EN LA TWAN MANISHA VECES AL JEFFREY, Disp: 100 each, Rfl: 5    albuterol (PROVENTIL HFA,VENTOLIN HFA) 90 mcg/act inhaler, Inhale 1 puff every 4 (four) hours as needed for wheezing, Disp: 18 g, Rfl: 10    ALPRAZolam (XANAX) 0.25 mg tablet, Take 1 tablet (0.25 mg total) by mouth daily at bedtime as needed for anxiety, Disp: 30 tablet, Rfl: 0    aspirin (Aspirin Low Dose) 81 mg chewable tablet, Chew 1 tablet (81 mg total) daily, Disp: 90 tablet, Rfl: 0    atorvastatin (LIPITOR) 40 mg tablet, Take 1 tablet (40 mg total) by mouth daily, Disp: 90  tablet, Rfl: 0    Blood Glucose Monitoring Suppl (OneTouch Verio Reflect) w/Device KIT, Check blood sugars once daily. Please substitute with appropriate alternative as covered by patient's insurance. Dx: E11.65, Disp: 1 kit, Rfl: 0    Blood Glucose Monitoring Suppl (OneTouch Verio) w/Device KIT, Use per  guidelines, Disp: 1 kit, Rfl: 0    Calcium Carb-Cholecalciferol 600-10 MG-MCG TABS, Take 1 tablet by mouth in the morning, Disp: 180 tablet, Rfl: 0    carvedilol (COREG) 12.5 mg tablet, Take 1 tablet (12.5 mg total) by mouth 2 (two) times a day with meals, Disp: 180 tablet, Rfl: 0    cyanocobalamin (VITAMIN B-12) 1000 MCG tablet, Take 1 tablet (1,000 mcg total) by mouth daily, Disp: 90 tablet, Rfl: 3    Empagliflozin (Jardiance) 25 MG TABS, Take 1 tablet (25 mg total) by mouth every morning, Disp: 90 tablet, Rfl: 0    ezetimibe (ZETIA) 10 mg tablet, Take 1 tablet (10 mg total) by mouth daily, Disp: 90 tablet, Rfl: 0    ferrous sulfate 325 (65 Fe) mg tablet, Take 1 tablet (325 mg total) by mouth daily with breakfast, Disp: 90 tablet, Rfl: 2    fluticasone (FLONASE) 50 mcg/act nasal spray, 1 spray into each nostril daily, Disp: 24 mL, Rfl: 8    furosemide (LASIX) 20 mg tablet, Take 1 tablet (20 mg total) by mouth daily, Disp: 90 tablet, Rfl: 3    gabapentin (NEURONTIN) 100 mg capsule, Take 1 capsule (100 mg total) by mouth 3 (three) times a day, Disp: 90 capsule, Rfl: 0    glucose blood (Accu-Chek Guide) test strip, USAR PARA EXAMINAR AZUCAR EN LA TWAN MANISHA VECES AL JEFFREY, Disp: 100 strip, Rfl: 10    glucose blood (OneTouch Verio) test strip, Check blood sugars once daily. Please substitute with appropriate alternative as covered by patient's insurance. Dx: E11.65, Disp: 100 each, Rfl: 3    guaiFENesin (MUCINEX) 600 mg 12 hr tablet, Take 2 tablets (1,200 mg total) by mouth every 12 (twelve) hours, Disp: 30 tablet, Rfl: 1    Insulin Glargine Solostar (Basaglar KwikPen) 100 UNIT/ML SOPN, Inject 0.13 mL (13  Units total) under the skin in the morning, Disp: 12 mL, Rfl: 1    Insulin Pen Needle (BD Pen Needle Micro U/F) 32G X 6 MM MISC, Use daily, Disp: 100 each, Rfl: 1    lidocaine (Lidoderm) 5 %, Apply 1 patch topically over 12 hours daily Remove & Discard patch within 12 hours or as directed by MD, Disp: 15 patch, Rfl: 0    loperamide (IMODIUM) 2 mg capsule, MAR JOHNNY (1) CAPSULA POR VIA ORAL ALFREDO SEA NECESARIO PARA LA DIARREA, Disp: 30 capsule, Rfl: 10    losartan (COZAAR) 25 mg tablet, Take 1 tablet (25 mg total) by mouth daily, Disp: 90 tablet, Rfl: 0    Melatonin 5 MG TABS, Take by mouth as needed, Disp: , Rfl:     memantine (NAMENDA) 10 mg tablet, Take 1 tablet (10 mg total) by mouth 2 (two) times a day, Disp: 180 tablet, Rfl: 1    mirtazapine (REMERON) 15 mg tablet, Take 1 tablet (15 mg total) by mouth daily at bedtime, Disp: 90 tablet, Rfl: 3    omeprazole (PriLOSEC) 20 mg delayed release capsule, Take 1 capsule (20 mg total) by mouth 2 (two) times a day, Disp: 180 capsule, Rfl: 3    ondansetron (ZOFRAN) 4 mg tablet, Take 1 tablet (4 mg total) by mouth every 8 (eight) hours as needed for nausea or vomiting, Disp: 20 tablet, Rfl: 0    OneTouch Delica Lancets 33G MISC, Check blood sugars once daily. Please substitute with appropriate alternative as covered by patient's insurance. Dx: E11.65, Disp: 100 each, Rfl: 3    sitaGLIPtin-metFORMIN (Janumet)  MG per tablet, Take 1 tablet by mouth 2 (two) times a day with meals, Disp: 60 tablet, Rfl: 1    Review of Systems   Constitutional:  Negative for chills and fever.   HENT:  Negative for ear pain and sore throat.    Eyes:  Negative for pain and visual disturbance.   Respiratory:  Negative for cough and shortness of breath.    Cardiovascular:  Negative for chest pain and palpitations.   Gastrointestinal:  Negative for abdominal pain and vomiting.   Genitourinary:  Negative for dysuria and hematuria.   Musculoskeletal:  Negative for arthralgias and back pain.  "  Skin:  Negative for color change and rash.   Neurological:  Negative for seizures and syncope.   All other systems reviewed and are negative.      Physical Exam:  Body mass index is 25.39 kg/m².  /60   Pulse 65   Ht 5' 1\" (1.549 m)   Wt 61 kg (134 lb 6.4 oz)   SpO2 97%   BMI 25.39 kg/m²    Wt Readings from Last 3 Encounters:   04/02/24 61 kg (134 lb 6.4 oz)   04/01/24 61.2 kg (135 lb)   02/21/24 62.1 kg (137 lb)       Physical Exam  Vitals reviewed.   Constitutional:       Appearance: Normal appearance.   HENT:      Head: Normocephalic and atraumatic.   Cardiovascular:      Rate and Rhythm: Normal rate.   Pulmonary:      Effort: Pulmonary effort is normal.   Musculoskeletal:      Cervical back: Neck supple. No tenderness.   Lymphadenopathy:      Cervical: No cervical adenopathy.   Neurological:      Mental Status: She is alert and oriented to person, place, and time.   Psychiatric:         Mood and Affect: Mood normal.         Behavior: Behavior normal.       Diabetic Foot Exam    Labs:   Lab Results   Component Value Date    HGBA1C 8.1 (H) 03/28/2024    HGBA1C 8.2 (A) 02/21/2024    HGBA1C 8.9 (A) 10/26/2023     Lab Results   Component Value Date    CREATININE 0.93 03/28/2024    CREATININE 0.74 11/09/2023    CREATININE 0.61 07/08/2023    BUN 21 03/28/2024    K 4.7 03/28/2024    CL 99 03/28/2024    CO2 28 03/28/2024     eGFR   Date Value Ref Range Status   03/28/2024 59 ml/min/1.73sq m Final     Lab Results   Component Value Date    HDL 27 (L) 11/09/2023    TRIG 99 11/09/2023     Lab Results   Component Value Date    ALT 31 03/28/2024    AST 20 03/28/2024    ALKPHOS 82 03/28/2024     Lab Results   Component Value Date    TPD4VYZZKRMO 2.036 07/06/2023    KMC0AKFPPLQS 3.450 02/22/2021    ZXK0JAXGJYZJ 1.330 02/11/2019     No results found for: \"FREET4\", \"TSI\"      There are no Patient Instructions on file for this visit.      Discussed with the patient and all questioned fully answered. She will call me if " any problems arise.

## 2024-04-02 NOTE — ASSESSMENT & PLAN NOTE
Patient is most likely experiencing post-prandial hyperglycemia based on information today provided by granddaughter. She reports checking blood sugars approx. 2 hours after lunch and blood sugars are in the 200-300 range. In lieu of adding additional medications to regimen, we will try to increase the current medication doses first and see how she does. Goal hgbA1C < 8%  - increase Jardiance to 25 mg daily  - increase lantus to 13 units daily  - notify for BG < 70  - send BG log in 1-2 weeks for review    Lab Results   Component Value Date    HGBA1C 8.1 (H) 03/28/2024

## 2024-04-05 DIAGNOSIS — E11.9 TYPE 2 DIABETES MELLITUS WITHOUT COMPLICATION, WITHOUT LONG-TERM CURRENT USE OF INSULIN (HCC): ICD-10-CM

## 2024-04-05 DIAGNOSIS — Z79.4 CURRENT USE OF INSULIN (HCC): ICD-10-CM

## 2024-04-08 RX ORDER — BLOOD SUGAR DIAGNOSTIC
STRIP MISCELLANEOUS
Qty: 100 STRIP | Refills: 5 | Status: SHIPPED | OUTPATIENT
Start: 2024-04-08

## 2024-04-21 DIAGNOSIS — Z51.5 PALLIATIVE CARE PATIENT: ICD-10-CM

## 2024-04-21 DIAGNOSIS — C83.31 DIFFUSE LARGE B-CELL LYMPHOMA OF LYMPH NODES OF NECK (HCC): ICD-10-CM

## 2024-04-22 DIAGNOSIS — R45.89 ANXIETY ABOUT HEALTH: ICD-10-CM

## 2024-04-22 RX ORDER — FLUTICASONE PROPIONATE 50 MCG
SPRAY, SUSPENSION (ML) NASAL
Qty: 24 ML | Refills: 6 | Status: SHIPPED | OUTPATIENT
Start: 2024-04-22

## 2024-04-25 ENCOUNTER — TELEPHONE (OUTPATIENT)
Age: 79
End: 2024-04-25

## 2024-04-25 ENCOUNTER — PATIENT OUTREACH (OUTPATIENT)
Dept: FAMILY MEDICINE CLINIC | Facility: CLINIC | Age: 79
End: 2024-04-25

## 2024-04-25 RX ORDER — ALPRAZOLAM 0.25 MG/1
0.25 TABLET ORAL
Qty: 30 TABLET | Refills: 0 | Status: SHIPPED | OUTPATIENT
Start: 2024-04-25

## 2024-04-25 NOTE — PROGRESS NOTES
I received a call from Clair stating she and the patient need to leave to [place] for an emergency.  She is requesting a refill of xanax but states there might be an issue with the insurance.  I called the pharmacy but had to leave a message as they were on break.  I provided a detailed message and asked for a return call.

## 2024-04-26 ENCOUNTER — PATIENT OUTREACH (OUTPATIENT)
Dept: FAMILY MEDICINE CLINIC | Facility: CLINIC | Age: 79
End: 2024-04-26

## 2024-04-26 NOTE — PROGRESS NOTES
I called Cl[facility] to ask if she received the requested medication for the patient.  She stated she received a call from the pharmacy and it is ready to be picked up.  Clair noted she and the patient are going to [place] 4/29-5/8.

## 2024-05-26 DIAGNOSIS — I42.9 CARDIOMYOPATHY (HCC): ICD-10-CM

## 2024-05-26 DIAGNOSIS — I10 ESSENTIAL HYPERTENSION: ICD-10-CM

## 2024-05-28 RX ORDER — ASPIRIN 81 MG
81 TABLET,CHEWABLE ORAL DAILY
Qty: 90 TABLET | Refills: 0 | Status: SHIPPED | OUTPATIENT
Start: 2024-05-28

## 2024-05-28 RX ORDER — CARVEDILOL 12.5 MG/1
12.5 TABLET ORAL 2 TIMES DAILY WITH MEALS
Qty: 180 TABLET | Refills: 0 | Status: SHIPPED | OUTPATIENT
Start: 2024-05-28

## 2024-06-03 ENCOUNTER — OFFICE VISIT (OUTPATIENT)
Dept: PODIATRY | Facility: CLINIC | Age: 79
End: 2024-06-03
Payer: COMMERCIAL

## 2024-06-03 VITALS — DIASTOLIC BLOOD PRESSURE: 64 MMHG | HEART RATE: 64 BPM | SYSTOLIC BLOOD PRESSURE: 104 MMHG

## 2024-06-03 DIAGNOSIS — T45.1X5A CHEMOTHERAPY-INDUCED PERIPHERAL NEUROPATHY (HCC): ICD-10-CM

## 2024-06-03 DIAGNOSIS — M79.676 PAIN DUE TO ONYCHOMYCOSIS OF TOENAIL: ICD-10-CM

## 2024-06-03 DIAGNOSIS — E11.9 TYPE 2 DIABETES MELLITUS WITHOUT COMPLICATION, WITHOUT LONG-TERM CURRENT USE OF INSULIN (HCC): Primary | ICD-10-CM

## 2024-06-03 DIAGNOSIS — R26.2 AMBULATORY DYSFUNCTION: ICD-10-CM

## 2024-06-03 DIAGNOSIS — G62.0 CHEMOTHERAPY-INDUCED PERIPHERAL NEUROPATHY (HCC): ICD-10-CM

## 2024-06-03 DIAGNOSIS — B35.1 PAIN DUE TO ONYCHOMYCOSIS OF TOENAIL: ICD-10-CM

## 2024-06-03 PROCEDURE — 11721 DEBRIDE NAIL 6 OR MORE: CPT | Performed by: PODIATRIST

## 2024-06-03 NOTE — PROGRESS NOTES
Marci Sandoval  1945  AT RISK FOOT CARE    1. Type 2 diabetes mellitus without complication, without long-term current use of insulin (HCC)        2. Pain due to onychomycosis of toenail        3. Chemotherapy-induced peripheral neuropathy (HCC)        4. Ambulatory dysfunction            Patient presents for at-risk foot care.  Patient has no acute concerns today.  Patient has significant lower extremity risk due to neuropathy, parasthesia, edema, and trophic skin changes to the lower extremity.    On exam patient has thickened, hypertrophic, discolored, brittle toenails with subungual debris and tenderness x10   Callus: absent  Patient has lower extremity edema  PAtients skin is atrophic, thickened nails, and decreased pedal hair  Patient has decreased pinprick and vibratory sensation to his feet and parasthesia    Today's treatment includes:  Debridement of toenails. Using nail nipper, oscar, and curette, nails were sharply debrided, reduced in thickness and length. Devitalized nail tissue and fungal debris excised and removed. Patient tolerated well.        Discussed proper shoe gear, daily inspections of feet, and general foot health with patient. Patient has Q9  findings and is recommended for at risk foot care every 9-10 weeks.    Patients most recent complete clinical foot exam was on: 2/19/2024

## 2024-06-14 DIAGNOSIS — G62.0 CHEMOTHERAPY-INDUCED PERIPHERAL NEUROPATHY (HCC): Chronic | ICD-10-CM

## 2024-06-14 DIAGNOSIS — F41.9 ANXIETY: ICD-10-CM

## 2024-06-14 DIAGNOSIS — Z92.21 STATUS POST CHEMOTHERAPY: ICD-10-CM

## 2024-06-14 DIAGNOSIS — T45.1X5A CHEMOTHERAPY-INDUCED PERIPHERAL NEUROPATHY (HCC): Chronic | ICD-10-CM

## 2024-06-14 DIAGNOSIS — Z51.5 PALLIATIVE CARE PATIENT: ICD-10-CM

## 2024-06-14 DIAGNOSIS — C83.31 DIFFUSE LARGE B-CELL LYMPHOMA OF LYMPH NODES OF NECK (HCC): ICD-10-CM

## 2024-06-14 DIAGNOSIS — R45.89 ANXIETY ABOUT HEALTH: ICD-10-CM

## 2024-06-14 RX ORDER — MIRTAZAPINE 15 MG/1
15 TABLET, FILM COATED ORAL
Qty: 90 TABLET | Refills: 0 | Status: SHIPPED | OUTPATIENT
Start: 2024-06-14

## 2024-06-14 RX ORDER — GABAPENTIN 100 MG/1
100 CAPSULE ORAL 3 TIMES DAILY
Qty: 90 CAPSULE | Refills: 0 | Status: SHIPPED | OUTPATIENT
Start: 2024-06-14

## 2024-06-14 RX ORDER — FLUTICASONE PROPIONATE 50 MCG
SPRAY, SUSPENSION (ML) NASAL
Qty: 24 ML | Refills: 5 | Status: SHIPPED | OUTPATIENT
Start: 2024-06-14

## 2024-06-14 RX ORDER — ALPRAZOLAM 0.25 MG/1
0.25 TABLET ORAL
Qty: 30 TABLET | Refills: 0 | Status: SHIPPED | OUTPATIENT
Start: 2024-06-14

## 2024-06-22 DIAGNOSIS — I42.9 CARDIOMYOPATHY (HCC): ICD-10-CM

## 2024-06-23 DIAGNOSIS — E11.65 UNCONTROLLED TYPE 2 DIABETES MELLITUS WITH HYPERGLYCEMIA (HCC): ICD-10-CM

## 2024-06-24 RX ORDER — SITAGLIPTIN AND METFORMIN HYDROCHLORIDE 1000; 50 MG/1; MG/1
1 TABLET, FILM COATED ORAL 2 TIMES DAILY WITH MEALS
Qty: 60 TABLET | Refills: 1 | Status: SHIPPED | OUTPATIENT
Start: 2024-06-24

## 2024-06-24 RX ORDER — ATORVASTATIN CALCIUM 40 MG/1
40 TABLET, FILM COATED ORAL DAILY
Qty: 90 TABLET | Refills: 0 | Status: SHIPPED | OUTPATIENT
Start: 2024-06-24

## 2024-06-24 RX ORDER — LOSARTAN POTASSIUM 25 MG/1
25 TABLET ORAL DAILY
Qty: 90 TABLET | Refills: 0 | Status: SHIPPED | OUTPATIENT
Start: 2024-06-24

## 2024-06-30 DIAGNOSIS — E11.65 UNCONTROLLED TYPE 2 DIABETES MELLITUS WITH HYPERGLYCEMIA (HCC): ICD-10-CM

## 2024-06-30 RX ORDER — EMPAGLIFLOZIN 25 MG/1
25 TABLET, FILM COATED ORAL EVERY MORNING
Qty: 90 TABLET | Refills: 1 | Status: SHIPPED | OUTPATIENT
Start: 2024-06-30

## 2024-07-18 DIAGNOSIS — E11.9 TYPE 2 DIABETES MELLITUS WITHOUT COMPLICATION, WITHOUT LONG-TERM CURRENT USE OF INSULIN (HCC): ICD-10-CM

## 2024-07-18 RX ORDER — EZETIMIBE 10 MG/1
10 TABLET ORAL DAILY
Qty: 90 TABLET | Refills: 0 | Status: SHIPPED | OUTPATIENT
Start: 2024-07-18

## 2024-07-22 PROBLEM — I11.0 HYPERTENSIVE HEART DISEASE WITH HEART FAILURE (HCC): Status: ACTIVE | Noted: 2024-07-22

## 2024-07-24 ENCOUNTER — OFFICE VISIT (OUTPATIENT)
Dept: ENDOCRINOLOGY | Facility: CLINIC | Age: 79
End: 2024-07-24
Payer: COMMERCIAL

## 2024-07-24 VITALS
SYSTOLIC BLOOD PRESSURE: 100 MMHG | HEART RATE: 82 BPM | BODY MASS INDEX: 24.81 KG/M2 | OXYGEN SATURATION: 98 % | HEIGHT: 61 IN | WEIGHT: 131.4 LBS | DIASTOLIC BLOOD PRESSURE: 70 MMHG

## 2024-07-24 DIAGNOSIS — I10 ESSENTIAL HYPERTENSION: ICD-10-CM

## 2024-07-24 DIAGNOSIS — Z79.4 TYPE 2 DIABETES MELLITUS WITH BOTH EYES AFFECTED BY MILD NONPROLIFERATIVE RETINOPATHY WITHOUT MACULAR EDEMA, WITH LONG-TERM CURRENT USE OF INSULIN (HCC): Primary | ICD-10-CM

## 2024-07-24 DIAGNOSIS — E78.5 HYPERLIPIDEMIA, UNSPECIFIED HYPERLIPIDEMIA TYPE: ICD-10-CM

## 2024-07-24 DIAGNOSIS — E11.9 TYPE 2 DIABETES MELLITUS WITHOUT COMPLICATION, WITHOUT LONG-TERM CURRENT USE OF INSULIN (HCC): ICD-10-CM

## 2024-07-24 DIAGNOSIS — E11.3293 TYPE 2 DIABETES MELLITUS WITH BOTH EYES AFFECTED BY MILD NONPROLIFERATIVE RETINOPATHY WITHOUT MACULAR EDEMA, WITH LONG-TERM CURRENT USE OF INSULIN (HCC): Primary | ICD-10-CM

## 2024-07-24 LAB — SL AMB POCT HEMOGLOBIN AIC: 7 (ref ?–6.5)

## 2024-07-24 PROCEDURE — 83036 HEMOGLOBIN GLYCOSYLATED A1C: CPT

## 2024-07-24 PROCEDURE — 99214 OFFICE O/P EST MOD 30 MIN: CPT

## 2024-07-24 NOTE — ASSESSMENT & PLAN NOTE
Controlled based on hgbA1C without hypoglycemia  - continue current regimen    For reports of poor oral intake, advise patient follow up with PCP for further management    Lab Results   Component Value Date    HGBA1C 7.0 (A) 07/24/2024

## 2024-07-24 NOTE — PROGRESS NOTES
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, with long-term current use of insulin (HCC) - Primary     Controlled based on hgbA1C without hypoglycemia  - continue current regimen    For reports of poor oral intake, advise patient follow up with PCP for further management    Lab Results   Component Value Date    HGBA1C 7.0 (A) 07/24/2024            Essential hypertension     Stable in office  - continue current regimen         Hyperlipidemia     Due for updated lab work which has been ordered  - continue with statin for now          Other Visit Diagnoses       Type 2 diabetes mellitus without complication, without long-term current use of insulin (HCC)        Relevant Orders    POCT hemoglobin A1c (Completed)            Orders Placed This Encounter   Procedures    POCT hemoglobin A1c       History of Present Illness:     Marci Sandoval is a 79 y.o. female with a history of type 2 diabetes, hypertension, hyperlipidemia, CHF, RONAK, GERD. Reports complications of neuropathy, microalbuminuria and diabetic retinopathy. Last A1C was 7.0. Home glucose monitoring: performed twice daily using home glucose meter     Patient's granddaughter accompanies her to the visit who assists with history and translation. She reports 6 pound weight loss secondary to poor oral intake and dementia.      Home blood glucose readings: none provided. 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)    Anxiety    Palliative care patient    Current mild episode of major depressive disorder without prior episode (HCC)    Xerosis of skin    Status post chemotherapy    Allergic rhinitis    Arthritis    Obstructive sleep apnea    Chemotherapy-induced peripheral neuropathy (HCC)    Near syncope    Dementia with behavioral disturbance    Gait instability    Polypharmacy    Frequent falls    Hyperlipidemia    ACC/AHA stage C heart failure with reduced ejection fraction (HCC)    Nonischemic cardiomyopathy (HCC)    Abnormal skin growth    PLMD (periodic limb movement disorder)    Anemia    Unintended weight loss    Hypertensive heart disease with heart failure (HCC)      Past Medical History:   Diagnosis Date    Cancer (HCC)     Throat    Chronic pain disorder     Diabetes mellitus (HCC)     Diffuse large B cell lymphoma (HCC)     Dysphagia     GERD without esophagitis     HTN (hypertension)     Hyperlipidemia     Hypertension     MI (myocardial infarction) (HCC)     Port-A-Cath in place 07/29/2019    Thyroid cancer (HCC) 2018      Past Surgical History:   Procedure Laterality Date    BONE MARROW BIOPSY      BREAST BIOPSY Left     CARDIAC CATHETERIZATION N/A 12/9/2022    Procedure: Cardiac catheterization;  Surgeon: Jhonny Rain MD;  Location: [facility];  Service: Cardiology    CARDIAC CATHETERIZATION N/A 12/9/2022    Procedure: Cardiac Coronary Angiogram;  Surgeon: Jhonny Rain MD;  Location: [facility];  Service: Cardiology    CARDIAC CATHETERIZATION Left 12/9/2022    Procedure: Cardiac Left Heart Cath;  Surgeon: Jhonny Rain MD;  Location: [facility];  Service: Cardiology    CHOLECYSTECTOMY      IR PORT PLACEMENT  11/16/2018    IR PORT REMOVAL  3/22/2021    OTHER SURGICAL HISTORY      tendor tear  repair to right shoulder    SHOULDER ARTHROSCOPY        Family History   Problem Relation Age of Onset    Diabetes Mother     Heart disease Sister     Hypertension Brother     Uterine cancer Maternal Grandmother     Prostate cancer Paternal Grandfather      Social History     Tobacco Use    Smoking status: Never     Passive exposure: Never    Smokeless tobacco: Never   Substance Use Topics    Alcohol use: Never     Comment: 0     No Known Allergies      Current Outpatient Medications:     Accu-Chek FastClix Lancets MISC, USAR PARA PROBAR AZUCAR EN LA TWAN MANISHA VECES AL JEFFREY, Disp: 100 each, Rfl: 5    albuterol (PROVENTIL HFA,VENTOLIN HFA) 90 mcg/act inhaler, Inhale 1 puff every 4 (four) hours as needed for wheezing, Disp: 18 g, Rfl: 10    ALPRAZolam (XANAX) 0.25 mg tablet, Take 1 tablet (0.25 mg total) by mouth daily at bedtime as needed for anxiety, Disp: 30 tablet, Rfl: 0    atorvastatin (LIPITOR) 40 mg tablet, TAKE 1 TABLET BY MOUTH EVERY DAY, Disp: 90 tablet, Rfl: 0    Mary Jane Low Dose 81 MG chewable tablet, CHEW 1 TABLET BY MOUTH DAILY, Disp: 90 tablet, Rfl: 0    Blood Glucose Monitoring Suppl (OneTouch Verio Reflect) w/Device KIT, Check blood sugars once daily. Please substitute with appropriate alternative as covered by patient's insurance. Dx: E11.65, Disp: 1 kit, Rfl: 0    Blood Glucose Monitoring Suppl (OneTouch Verio) w/Device KIT, Use per  guidelines, Disp: 1 kit, Rfl: 0    Calcium Carb-Cholecalciferol 600-10 MG-MCG TABS, Take 1 tablet by mouth in the morning, Disp: 180 tablet, Rfl: 0    carvedilol (COREG) 12.5 mg tablet, TAKE 1 TABLET BY MOUTH TWICE A DAY WITH FOOD, Disp: 180 tablet, Rfl: 0    cyanocobalamin (VITAMIN B-12) 1000 MCG tablet, Take 1 tablet (1,000 mcg total) by mouth daily, Disp: 90 tablet, Rfl: 3    Empagliflozin (Jardiance) 25 MG TABS, TAKE 1 TABLET BY MOUTH EVERY DAY IN THE MORNING, Disp: 90 tablet, Rfl: 1    ezetimibe (ZETIA) 10 mg tablet, TAKE 1 TABLET BY MOUTH EVERY DAY,  Disp: 90 tablet, Rfl: 0    ferrous sulfate 325 (65 Fe) mg tablet, Take 1 tablet (325 mg total) by mouth daily with breakfast, Disp: 90 tablet, Rfl: 2    fluticasone (FLONASE) 50 mcg/act nasal spray, SPRAY 1 SPRAY INTO EACH NOSTRIL EVERY DAY, Disp: 24 mL, Rfl: 5    furosemide (LASIX) 20 mg tablet, Take 1 tablet (20 mg total) by mouth daily, Disp: 90 tablet, Rfl: 3    gabapentin (NEURONTIN) 100 mg capsule, Take 1 capsule (100 mg total) by mouth 3 (three) times a day, Disp: 90 capsule, Rfl: 0    glucose blood (Accu-Chek Guide) test strip, USAR PARA EXAMINAR AZUCAR EN LA TWAN MANISHA VECES AL JEFFREY, Disp: 100 strip, Rfl: 5    glucose blood (OneTouch Verio) test strip, Check blood sugars once daily. Please substitute with appropriate alternative as covered by patient's insurance. Dx: E11.65, Disp: 100 each, Rfl: 3    guaiFENesin (MUCINEX) 600 mg 12 hr tablet, Take 2 tablets (1,200 mg total) by mouth every 12 (twelve) hours, Disp: 30 tablet, Rfl: 1    Insulin Glargine Solostar (Basaglar KwikPen) 100 UNIT/ML SOPN, Inject 0.13 mL (13 Units total) under the skin in the morning, Disp: 12 mL, Rfl: 1    Insulin Pen Needle (BD Pen Needle Micro U/F) 32G X 6 MM MISC, Use daily, Disp: 100 each, Rfl: 1    Janumet  MG per tablet, TAKE 1 TABLET BY MOUTH TWICE A DAY WITH MEALS, Disp: 60 tablet, Rfl: 1    lidocaine (Lidoderm) 5 %, Apply 1 patch topically over 12 hours daily Remove & Discard patch within 12 hours or as directed by MD, Disp: 15 patch, Rfl: 0    loperamide (IMODIUM) 2 mg capsule, MAR JOHNNY (1) CAPSULA POR VIA ORAL ALFREDO SEA NECESARIO PARA LA DIARREA, Disp: 30 capsule, Rfl: 10    losartan (COZAAR) 25 mg tablet, TAKE 1 TABLET (25 MG TOTAL) BY MOUTH DAILY., Disp: 90 tablet, Rfl: 0    Melatonin 5 MG TABS, Take by mouth as needed, Disp: , Rfl:     memantine (NAMENDA) 10 mg tablet, Take 1 tablet (10 mg total) by mouth 2 (two) times a day, Disp: 180 tablet, Rfl: 1    mirtazapine (REMERON) 15 mg tablet, Take 1 tablet (15 mg  "total) by mouth daily at bedtime, Disp: 90 tablet, Rfl: 0    omeprazole (PriLOSEC) 20 mg delayed release capsule, Take 1 capsule (20 mg total) by mouth 2 (two) times a day, Disp: 180 capsule, Rfl: 3    ondansetron (ZOFRAN) 4 mg tablet, Take 1 tablet (4 mg total) by mouth every 8 (eight) hours as needed for nausea or vomiting, Disp: 20 tablet, Rfl: 0    OneTouch Delica Lancets 33G MISC, Check blood sugars once daily. Please substitute with appropriate alternative as covered by patient's insurance. Dx: E11.65, Disp: 100 each, Rfl: 3    Review of Systems   Constitutional:  Positive for unexpected weight change. Negative for chills and fever.        Denies nighttime sweating or other accompanying symptoms   HENT:  Negative for ear pain and sore throat.    Eyes:  Negative for pain and visual disturbance.   Respiratory:  Negative for cough and shortness of breath.    Cardiovascular:  Negative for chest pain and palpitations.   Gastrointestinal:  Negative for abdominal pain and vomiting.   Genitourinary:  Negative for dysuria and hematuria.   Musculoskeletal:  Negative for arthralgias and back pain.   Skin:  Negative for color change and rash.   Neurological:  Negative for seizures and syncope.   All other systems reviewed and are negative.      Physical Exam:  Body mass index is 24.83 kg/m².  /70   Pulse 82   Ht 5' 1\" (1.549 m)   Wt 59.6 kg (131 lb 6.4 oz)   SpO2 98%   BMI 24.83 kg/m²    Wt Readings from Last 3 Encounters:   07/24/24 59.6 kg (131 lb 6.4 oz)   04/02/24 61 kg (134 lb 6.4 oz)   04/01/24 61.2 kg (135 lb)       Physical Exam  Vitals reviewed.   Constitutional:       Appearance: Normal appearance.   HENT:      Head: Normocephalic and atraumatic.   Cardiovascular:      Rate and Rhythm: Normal rate.   Pulmonary:      Effort: Pulmonary effort is normal.   Neurological:      Mental Status: She is alert and oriented to person, place, and time.   Psychiatric:         Mood and Affect: Mood normal.         " "Behavior: Behavior normal.       Diabetic Foot Exam    Labs:   Lab Results   Component Value Date    HGBA1C 7.0 (A) 07/24/2024    HGBA1C 8.1 (H) 03/28/2024    HGBA1C 8.2 (A) 02/21/2024     Lab Results   Component Value Date    CREATININE 0.93 03/28/2024    CREATININE 0.74 11/09/2023    CREATININE 0.61 07/08/2023    BUN 21 03/28/2024    K 4.7 03/28/2024    CL 99 03/28/2024    CO2 28 03/28/2024     eGFR   Date Value Ref Range Status   03/28/2024 59 ml/min/1.73sq m Final     Lab Results   Component Value Date    HDL 27 (L) 11/09/2023    TRIG 99 11/09/2023     Lab Results   Component Value Date    ALT 31 03/28/2024    AST 20 03/28/2024    ALKPHOS 82 03/28/2024     Lab Results   Component Value Date    SFM4BFEYIUQU 2.036 07/06/2023    MBD0CRRYQQIB 3.450 02/22/2021    PLW9VETRNEME 1.330 02/11/2019     No results found for: \"FREET4\", \"TSI\"      There are no Patient Instructions on file for this visit.      Discussed with the patient and all questioned fully answered. She will call me if any problems arise.        "

## 2024-07-26 DIAGNOSIS — R45.89 ANXIETY ABOUT HEALTH: ICD-10-CM

## 2024-07-26 RX ORDER — ALPRAZOLAM 0.25 MG/1
0.25 TABLET ORAL
Qty: 30 TABLET | Refills: 0 | Status: SHIPPED | OUTPATIENT
Start: 2024-07-26

## 2024-08-03 ENCOUNTER — HOSPITAL ENCOUNTER (EMERGENCY)
Facility: HOSPITAL | Age: 79
Discharge: HOME | End: 2024-08-03
Attending: INTERNAL MEDICINE
Payer: COMMERCIAL

## 2024-08-03 VITALS
RESPIRATION RATE: 18 BRPM | OXYGEN SATURATION: 94 % | HEART RATE: 70 BPM | SYSTOLIC BLOOD PRESSURE: 128 MMHG | DIASTOLIC BLOOD PRESSURE: 60 MMHG | TEMPERATURE: 98 F

## 2024-08-03 DIAGNOSIS — Z23 NEED FOR RABIES VACCINATION: ICD-10-CM
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
## 2024-08-03 PROCEDURE — 96372 THER/PROPH/DIAG INJ SC/IM: CPT

## 2024-08-03 PROCEDURE — 90375 RABIES IG IM/SC: CPT | Performed by: INTERNAL MEDICINE

## 2024-08-03 PROCEDURE — 90675 RABIES VACCINE IM: CPT | Performed by: INTERNAL MEDICINE

## 2024-08-03 PROCEDURE — 99284 EMERGENCY DEPT VISIT MOD MDM: CPT | Performed by: INTERNAL MEDICINE

## 2024-08-03 PROCEDURE — 90471 IMMUNIZATION ADMIN: CPT

## 2024-08-03 PROCEDURE — 99283 EMERGENCY DEPT VISIT LOW MDM: CPT

## 2024-08-03 RX ORDER — SENNOSIDES 8.6 MG
650 CAPSULE ORAL EVERY 8 HOURS PRN
Qty: 30 TABLET | Refills: 0 | Status: SHIPPED | OUTPATIENT
Start: 2024-08-03

## 2024-08-03 RX ORDER — AMOXICILLIN AND CLAVULANATE POTASSIUM 875; 125 MG/1; MG/1
1 TABLET, FILM COATED ORAL ONCE
Status: COMPLETED | OUTPATIENT
Start: 2024-08-03 | End: 2024-08-03

## 2024-08-03 RX ORDER — ACETAMINOPHEN 325 MG/1
975 TABLET ORAL ONCE
Status: COMPLETED | OUTPATIENT
Start: 2024-08-03 | End: 2024-08-03

## 2024-08-03 RX ORDER — AMOXICILLIN AND CLAVULANATE POTASSIUM 875; 125 MG/1; MG/1
1 TABLET, FILM COATED ORAL EVERY 12 HOURS
Qty: 14 TABLET | Refills: 0 | Status: SHIPPED | OUTPATIENT
Start: 2024-08-03 | End: 2024-08-10

## 2024-08-03 RX ADMIN — Medication 1 ML: at 22:08

## 2024-08-03 RX ADMIN — RABIES IMMUNE GLOBULIN (HUMAN) 1200 UNITS: 300 INJECTION, SOLUTION INFILTRATION; INTRAMUSCULAR at 22:13

## 2024-08-03 RX ADMIN — AMOXICILLIN AND CLAVULANATE POTASSIUM 1 TABLET: 875; 125 TABLET, FILM COATED ORAL at 22:00

## 2024-08-03 RX ADMIN — ACETAMINOPHEN 975 MG: 325 TABLET, FILM COATED ORAL at 22:12

## 2024-08-04 NOTE — ED PROVIDER NOTES
History  Chief Complaint   Patient presents with    Dog Bite     Per granddaughter, dog bit patient on right arm. Bleeding controlled. Dog not utd on vaccinations.       Dog Bite    79-year-old woman presents to ED with her granddaughter for evaluation of dog bite to the right forearm.    Prior to Admission Medications   Prescriptions Last Dose Informant Patient Reported? Taking?   ALPRAZolam (XANAX) 0.25 mg tablet   No No   Sig: Take 1 tablet (0.25 mg total) by mouth daily at bedtime as needed for anxiety   Accu-Chek FastClix Lancets MISC   No No   Sig: USAR PARA PROBAR AZUCAR EN LA TWAN MANISHA VECES AL JEFFREY   Mary Jane Low Dose 81 MG chewable tablet   No No   Sig: CHEW 1 TABLET BY MOUTH DAILY   Blood Glucose Monitoring Suppl (OneTouch Verio Reflect) w/Device KIT  Family Member No No   Sig: Check blood sugars once daily. Please substitute with appropriate alternative as covered by patient's insurance. Dx: E11.65   Blood Glucose Monitoring Suppl (OneTouch Verio) w/Device KIT  Family Member No No   Sig: Use per  guidelines   Calcium Carb-Cholecalciferol 600-10 MG-MCG TABS   No No   Sig: Take 1 tablet by mouth in the morning   Empagliflozin (Jardiance) 25 MG TABS   No No   Sig: TAKE 1 TABLET BY MOUTH EVERY DAY IN THE MORNING   Insulin Glargine Solostar (Basaglar KwikPen) 100 UNIT/ML SOPN   No No   Sig: Inject 0.13 mL (13 Units total) under the skin in the morning   Insulin Pen Needle (BD Pen Needle Micro U/F) 32G X 6 MM MISC  Family Member No No   Sig: Use daily   Janumet  MG per tablet   No No   Sig: TAKE 1 TABLET BY MOUTH TWICE A DAY WITH MEALS   Melatonin 5 MG TABS  Family Member Yes No   Sig: Take by mouth as needed   OneTouch Delica Lancets 33G MISC  Family Member No No   Sig: Check blood sugars once daily. Please substitute with appropriate alternative as covered by patient's insurance. Dx: E11.65   albuterol (PROVENTIL HFA,VENTOLIN HFA) 90 mcg/act inhaler  Family Member No No   Sig: Inhale 1 puff  every 4 (four) hours as needed for wheezing   atorvastatin (LIPITOR) 40 mg tablet   No No   Sig: TAKE 1 TABLET BY MOUTH EVERY DAY   carvedilol (COREG) 12.5 mg tablet   No No   Sig: TAKE 1 TABLET BY MOUTH TWICE A DAY WITH FOOD   cyanocobalamin (VITAMIN B-12) 1000 MCG tablet   No No   Sig: Take 1 tablet (1,000 mcg total) by mouth daily   ezetimibe (ZETIA) 10 mg tablet   No No   Sig: TAKE 1 TABLET BY MOUTH EVERY DAY   ferrous sulfate 325 (65 Fe) mg tablet   No No   Sig: Take 1 tablet (325 mg total) by mouth daily with breakfast   fluticasone (FLONASE) 50 mcg/act nasal spray   No No   Sig: SPRAY 1 SPRAY INTO EACH NOSTRIL EVERY DAY   furosemide (LASIX) 20 mg tablet   No No   Sig: Take 1 tablet (20 mg total) by mouth daily   gabapentin (NEURONTIN) 100 mg capsule   No No   Sig: Take 1 capsule (100 mg total) by mouth 3 (three) times a day   glucose blood (Accu-Chek Guide) test strip   No No   Sig: USAR PARA EXAMINAR AZUCAR EN LA TWAN MANISHA VECES AL JEFFREY   glucose blood (OneTouch Verio) test strip  Family Member No No   Sig: Check blood sugars once daily. Please substitute with appropriate alternative as covered by patient's insurance. Dx: E11.65   guaiFENesin (MUCINEX) 600 mg 12 hr tablet  Family Member No No   Sig: Take 2 tablets (1,200 mg total) by mouth every 12 (twelve) hours   lidocaine (Lidoderm) 5 %   No No   Sig: Apply 1 patch topically over 12 hours daily Remove & Discard patch within 12 hours or as directed by MD   loperamide (IMODIUM) 2 mg capsule  Family Member No No   Sig: MAR JOHNNY (1) CAPSULA POR VIA ORAL ALFREDO SEA NECESARIO PARA LA DIARREA   losartan (COZAAR) 25 mg tablet   No No   Sig: TAKE 1 TABLET (25 MG TOTAL) BY MOUTH DAILY.   memantine (NAMENDA) 10 mg tablet   No No   Sig: Take 1 tablet (10 mg total) by mouth 2 (two) times a day   mirtazapine (REMERON) 15 mg tablet   No No   Sig: Take 1 tablet (15 mg total) by mouth daily at bedtime   omeprazole (PriLOSEC) 20 mg delayed release capsule   No No   Sig:  Take 1 capsule (20 mg total) by mouth 2 (two) times a day   ondansetron (ZOFRAN) 4 mg tablet   No No   Sig: Take 1 tablet (4 mg total) by mouth every 8 (eight) hours as needed for nausea or vomiting      Facility-Administered Medications: None       Past Medical History:   Diagnosis Date    Cancer (HCC)     Throat    Chronic pain disorder     Diabetes mellitus (HCC)     Diffuse large B cell lymphoma (HCC)     Dysphagia     GERD without esophagitis     HTN (hypertension)     Hyperlipidemia     Hypertension     MI (myocardial infarction) (HCC)     Port-A-Cath in place 07/29/2019    Thyroid cancer (HCC) 2018       Past Surgical History:   Procedure Laterality Date    BONE MARROW BIOPSY      BREAST BIOPSY Left     CARDIAC CATHETERIZATION N/A 12/9/2022    Procedure: Cardiac catheterization;  Surgeon: Jhonny Rain MD;  Location: [facility];  Service: Cardiology    CARDIAC CATHETERIZATION N/A 12/9/2022    Procedure: Cardiac Coronary Angiogram;  Surgeon: Jhonny Rain MD;  [facility];  Service: Cardiology    CARDIAC CATHETERIZATION Left 12/9/2022    Procedure: Cardiac Left Heart Cath;  Surgeon: Jhonny Rain MD;  Location: [facility];  Service: Cardiology    CHOLECYSTECTOMY      IR PORT PLACEMENT  11/16/2018    IR PORT REMOVAL  3/22/2021    OTHER SURGICAL HISTORY      tendor tear repair to right shoulder    SHOULDER ARTHROSCOPY         Family History   Problem Relation Age of Onset    Diabetes Mother     Heart disease Sister     Hypertension Brother     Uterine cancer Maternal Grandmother     Prostate cancer Paternal Grandfather      I have reviewed and agree with the history as documented.    E-Cigarette/Vaping    E-Cigarette Use Never User      E-Cigarette/Vaping Substances    Nicotine No     THC No     CBD No     Flavoring No     Other No     Unknown No      Social History     Tobacco Use    Smoking status: Never     Passive exposure: Never    Smokeless tobacco: Never   Vaping Use     Vaping status: Never Used   Substance Use Topics    Alcohol use: Never     Comment: 0    Drug use: No       Review of Systems   All other systems reviewed and are negative.      Physical Exam  Physical Exam  Constitutional:  Well developed, no acute distress  HEENT:  Conjunctiva normal. Oropharynx moist  Respiratory:  No respiratory distress  Cardiovascular:  Normal rate  GI:  Soft, nondistended, nontender  :  No costovertebral angle tenderness   Musculoskeletal:  Right forearm with multiple puncture wounds, no bleeding/drainage  Integument:  Well hydrated, no rash   Lymphatic:  No lymphadenopathy noted   Neurologic:  Alert & oriented x 3, normal motor function, no focal deficits noted   Psychiatric:  Speech and behavior appropriate       Vital Signs  ED Triage Vitals   Temperature Pulse Respirations Blood Pressure SpO2   08/03/24 2129 08/03/24 2128 08/03/24 2128 08/03/24 2128 08/03/24 2128   98 °F (36.7 °C) 70 18 128/60 94 %      Temp Source Heart Rate Source Patient Position - Orthostatic VS BP Location FiO2 (%)   08/03/24 2129 08/03/24 2128 08/03/24 2128 08/03/24 2128 --   Oral Monitor Sitting Left arm       Pain Score       08/03/24 2212       8           Vitals:    08/03/24 2128   BP: 128/60   Pulse: 70   Patient Position - Orthostatic VS: Sitting         Visual Acuity      ED Medications  Medications   amoxicillin-clavulanate (AUGMENTIN) 875-125 mg per tablet 1 tablet (1 tablet Oral Given 8/3/24 2200)   rabies vaccine, human diploid IM injection 1 mL (1 mL Intramuscular Given 8/3/24 2208)   rabies immune globulin, human (HyperRAB) injection 1,200 Units (1,200 Units Infiltration Given by Other 8/3/24 2213)   acetaminophen (TYLENOL) tablet 975 mg (975 mg Oral Given 8/3/24 2212)       Diagnostic Studies  Results Reviewed       None                   No orders to display              Procedures  Procedures         ED Course  ED Course as of 08/03/24 2228   Sat Aug 03, 2024   2153 Tdap updated 2023                                  SBIRT 22yo+      Flowsheet Row Most Recent Value   Initial Alcohol Screen: US AUDIT-C     1. How often do you have a drink containing alcohol? 0 Filed at: 08/03/2024 2137   2. How many drinks containing alcohol do you have on a typical day you are drinking?  0 Filed at: 08/03/2024 2137   3b. FEMALE Any Age, or MALE 65+: How often do you have 4 or more drinks on one occassion? 0 Filed at: 08/03/2024 2137   Audit-C Score 0 Filed at: 08/03/2024 2137   JHONY: How many times in the past year have you...    Used an illegal drug or used a prescription medication for non-medical reasons? Never Filed at: 08/03/2024 2137                      Medical Decision Making  - The patient has been exposed to rabies and has never been vaccinated against rabies. Therefore the patient should get 4 doses of rabies vaccine - one dose right away, and additional doses on the 3rd, 7th, and 14th days. (Rabavert, 1 Kit). Four doses (1 mL each) of either HDCV or PCECV vaccine should be administered on days 0, 3, 7, and 14. The first dose should be administered as soon as possible after exposure. It should be given intramuscularly into the deltoid muscle of adults. In children, it should be administered into either the deltoid muscle or the anterolateral aspect of the thigh. Will not use the gluteal region, because this could result in a decreased immunologic response.   - They should also get Rabies Immune Globulin at the same time as the first dose (Rabies immunoglobulin; 20 IU/kg). If not immediately available, the HRIG should be administered as soon as it becomes available up until and including day 7 of treatment. Concentrate as much of the dose as possible in and around the wound (if wound location allows). The remaining HRIG should be administered intramuscularly at a site distance from the vaccine administration.   - There's minimal absorption when comparing SQ/IM injection of the immunoglobulin into  systemic circulation. So the most important one is into the wound edge.       Problems Addressed:  Dog bite, initial encounter: acute illness or injury  Need for rabies vaccination: acute illness or injury    Risk  OTC drugs.  Prescription drug management.                 Disposition  Final diagnoses:   Dog bite, initial encounter   Need for rabies vaccination     Time reflects when diagnosis was documented in both MDM as applicable and the Disposition within this note       Time User Action Codes Description Comment    8/3/2024  9:52 PM Fabby Cifuentes [W54.0XXA] Dog bite, initial encounter     8/3/2024  9:52 PM Fabby Cifuentes [[id number]] Need for rabies vaccination           ED Disposition       ED Disposition   Discharge    Condition   Stable    Date/Time   Sat Aug 3, 2024 2152    Comment   Marci Sandoval discharge to home/self care.                   Follow-up Information       Follow up With Specialties Details Why Contact Info    [facility], Nurse Practitioner Call  As needed [address] [place] [address]  [phone]              Patient's Medications   Discharge Prescriptions    ACETAMINOPHEN (TYLENOL) 650 MG CR TABLET    Take 1 tablet (650 mg total) by mouth every 8 (eight) hours as needed for mild pain       Start Date: 8/3/2024  End Date: --       Order Dose: 650 mg       Quantity: 30 tablet    Refills: 0    AMOXICILLIN-CLAVULANATE (AUGMENTIN) 875-125 MG PER TABLET    Take 1 tablet by mouth every 12 (twelve) hours for 7 days       Start Date: 8/3/2024  End Date: 8/10/2024       Order Dose: 1 tablet       Quantity: 14 tablet    Refills: 0       No discharge procedures on file.    PDMP Review         Value Time User    PDMP Reviewed  Yes 7/26/2024  4:09 PM MOIRA Gan            ED Provider  Electronically Signed by             Fabby Cifuentes MD  08/03/24 4221

## 2024-08-04 NOTE — DISCHARGE INSTRUCTIONS
- The patient has been exposed to rabies and has never been vaccinated against rabies. Therefore the patient should get 4 doses of rabies vaccine - one dose right away, and additional doses on the 3rd, 7th, and 14th days. (Rabavert, 1 Kit). Four doses (1 mL each) of either HDCV or PCECV vaccine should be administered on days 0, 3, 7, and 14.

## 2024-08-06 ENCOUNTER — HOSPITAL ENCOUNTER (EMERGENCY)
Facility: HOSPITAL | Age: 79
Discharge: HOME | End: 2024-08-06
Attending: EMERGENCY MEDICINE
Payer: COMMERCIAL

## 2024-08-06 VITALS
DIASTOLIC BLOOD PRESSURE: 65 MMHG | WEIGHT: 131.17 LBS | HEART RATE: 80 BPM | BODY MASS INDEX: 24.79 KG/M2 | TEMPERATURE: 97.6 F | RESPIRATION RATE: 17 BRPM | OXYGEN SATURATION: 96 % | SYSTOLIC BLOOD PRESSURE: 145 MMHG

## 2024-08-06 DIAGNOSIS — Z23 ENCOUNTER FOR REPEAT ADMINISTRATION OF RABIES VACCINATION: Primary | ICD-10-CM

## 2024-08-06 DIAGNOSIS — R19.7 DIARRHEA, UNSPECIFIED TYPE: ICD-10-CM

## 2024-08-06 PROCEDURE — 90675 RABIES VACCINE IM: CPT | Performed by: PHYSICIAN ASSISTANT

## 2024-08-06 PROCEDURE — 99284 EMERGENCY DEPT VISIT MOD MDM: CPT | Performed by: PHYSICIAN ASSISTANT

## 2024-08-06 PROCEDURE — 90471 IMMUNIZATION ADMIN: CPT

## 2024-08-06 RX ORDER — LOPERAMIDE HYDROCHLORIDE 2 MG/1
CAPSULE ORAL
Qty: 30 CAPSULE | Refills: 10 | Status: SHIPPED | OUTPATIENT
Start: 2024-08-06

## 2024-08-06 RX ADMIN — RABIES VIRUS STRAIN PM-1503-3M ANTIGEN (PROPIOLACTONE INACTIVATED) AND WATER 1 ML: KIT at 10:13

## 2024-08-06 NOTE — DISCHARGE INSTRUCTIONS
Please refer to the attached information for strict return instructions. If symptoms worsen or new symptoms develop please return to the ER. Please follow up at urgent care for repeat rabies vaccination on 8/10 and 8/17.

## 2024-08-06 NOTE — ED PROVIDER NOTES
History  Chief Complaint   Patient presents with    Medical Problem     Pt here for follow up rabies shot. Pt was bit x 4 days ago. Pt denies any other concerns     Marci is a 80 yo F presenting for repeat rabies vaccination after dog bite on 8/3. She has been taking Augmentin as prescribed. Currently asymptomatic.       History provided by:  Patient   used: No        Prior to Admission Medications   Prescriptions Last Dose Informant Patient Reported? Taking?   ALPRAZolam (XANAX) 0.25 mg tablet   No No   Sig: Take 1 tablet (0.25 mg total) by mouth daily at bedtime as needed for anxiety   Accu-Chek FastClix Lancets MISC   No No   Sig: USAR PARA PROBAR AZUCAR EN LA TWAN MANISHA VECES AL JEFFREY   Mary Jane Low Dose 81 MG chewable tablet   No No   Sig: CHEW 1 TABLET BY MOUTH DAILY   Blood Glucose Monitoring Suppl (OneTouch Verio Reflect) w/Device KIT  Family Member No No   Sig: Check blood sugars once daily. Please substitute with appropriate alternative as covered by patient's insurance. Dx: E11.65   Blood Glucose Monitoring Suppl (OneTouch Verio) w/Device KIT  Family Member No No   Sig: Use per  guidelines   Calcium Carb-Cholecalciferol 600-10 MG-MCG TABS   No No   Sig: Take 1 tablet by mouth in the morning   Empagliflozin (Jardiance) 25 MG TABS   No No   Sig: TAKE 1 TABLET BY MOUTH EVERY DAY IN THE MORNING   Insulin Glargine Solostar (Basaglar KwikPen) 100 UNIT/ML SOPN   No No   Sig: Inject 0.13 mL (13 Units total) under the skin in the morning   Insulin Pen Needle (BD Pen Needle Micro U/F) 32G X 6 MM MISC  Family Member No No   Sig: Use daily   Janumet  MG per tablet   No No   Sig: TAKE 1 TABLET BY MOUTH TWICE A DAY WITH MEALS   Melatonin 5 MG TABS  Family Member Yes No   Sig: Take by mouth as needed   OneTouch Delica Lancets 33G MISC  Family Member No No   Sig: Check blood sugars once daily. Please substitute with appropriate alternative as covered by patient's insurance. Dx:  E11.65   acetaminophen (TYLENOL) 650 mg CR tablet   No No   Sig: Take 1 tablet (650 mg total) by mouth every 8 (eight) hours as needed for mild pain   albuterol (PROVENTIL HFA,VENTOLIN HFA) 90 mcg/act inhaler  Family Member No No   Sig: Inhale 1 puff every 4 (four) hours as needed for wheezing   amoxicillin-clavulanate (AUGMENTIN) 875-125 mg per tablet   No No   Sig: Take 1 tablet by mouth every 12 (twelve) hours for 7 days   atorvastatin (LIPITOR) 40 mg tablet   No No   Sig: TAKE 1 TABLET BY MOUTH EVERY DAY   carvedilol (COREG) 12.5 mg tablet   No No   Sig: TAKE 1 TABLET BY MOUTH TWICE A DAY WITH FOOD   cyanocobalamin (VITAMIN B-12) 1000 MCG tablet   No No   Sig: Take 1 tablet (1,000 mcg total) by mouth daily   ezetimibe (ZETIA) 10 mg tablet   No No   Sig: TAKE 1 TABLET BY MOUTH EVERY DAY   ferrous sulfate 325 (65 Fe) mg tablet   No No   Sig: Take 1 tablet (325 mg total) by mouth daily with breakfast   fluticasone (FLONASE) 50 mcg/act nasal spray   No No   Sig: SPRAY 1 SPRAY INTO EACH NOSTRIL EVERY DAY   furosemide (LASIX) 20 mg tablet   No No   Sig: Take 1 tablet (20 mg total) by mouth daily   gabapentin (NEURONTIN) 100 mg capsule   No No   Sig: Take 1 capsule (100 mg total) by mouth 3 (three) times a day   glucose blood (Accu-Chek Guide) test strip   No No   Sig: USAR PARA EXAMINAR AZUCAR EN LA TWAN MANISHA VECES AL JEFFREY   glucose blood (OneTouch Verio) test strip  Family Member No No   Sig: Check blood sugars once daily. Please substitute with appropriate alternative as covered by patient's insurance. Dx: E11.65   guaiFENesin (MUCINEX) 600 mg 12 hr tablet  Family Member No No   Sig: Take 2 tablets (1,200 mg total) by mouth every 12 (twelve) hours   lidocaine (Lidoderm) 5 %   No No   Sig: Apply 1 patch topically over 12 hours daily Remove & Discard patch within 12 hours or as directed by MD   loperamide (IMODIUM) 2 mg capsule   No No   Sig: MAR JOHNNY (1) CAPSULA POR VIA ORAL ALFREDO SEA NECESARIO PARA LA DIARREA    losartan (COZAAR) 25 mg tablet   No No   Sig: TAKE 1 TABLET (25 MG TOTAL) BY MOUTH DAILY.   memantine (NAMENDA) 10 mg tablet   No No   Sig: Take 1 tablet (10 mg total) by mouth 2 (two) times a day   mirtazapine (REMERON) 15 mg tablet   No No   Sig: Take 1 tablet (15 mg total) by mouth daily at bedtime   omeprazole (PriLOSEC) 20 mg delayed release capsule   No No   Sig: Take 1 capsule (20 mg total) by mouth 2 (two) times a day   ondansetron (ZOFRAN) 4 mg tablet   No No   Sig: Take 1 tablet (4 mg total) by mouth every 8 (eight) hours as needed for nausea or vomiting      Facility-Administered Medications: None       Past Medical History:   Diagnosis Date    Cancer (HCC)     Throat    Chronic pain disorder     Diabetes mellitus (HCC)     Diffuse large B cell lymphoma (HCC)     Dysphagia     GERD without esophagitis     HTN (hypertension)     Hyperlipidemia     Hypertension     MI (myocardial infarction) (HCC)     Port-A-Cath in place 07/29/2019    Thyroid cancer (HCC) 2018       Past Surgical History:   Procedure Laterality Date    BONE MARROW BIOPSY      BREAST BIOPSY Left     CARDIAC CATHETERIZATION N/A 12/9/2022    Procedure: Cardiac catheterization;  Surgeon: Jhonny Rain MD;  Location: [facility];  Service: Cardiology    CARDIAC CATHETERIZATION N/A 12/9/2022    Procedure: Cardiac Coronary Angiogram;  Surgeon: Jhonny Rain MD;  Location: [facility];  Service: Cardiology    CARDIAC CATHETERIZATION Left 12/9/2022    Procedure: Cardiac Left Heart Cath;  Surgeon: Jhonny Rain MD;  Location: [facility];  Service: Cardiology    CHOLECYSTECTOMY      IR PORT PLACEMENT  11/16/2018    IR PORT REMOVAL  3/22/2021    OTHER SURGICAL HISTORY      tendor tear repair to right shoulder    SHOULDER ARTHROSCOPY         Family History   Problem Relation Age of Onset    Diabetes Mother     Heart disease Sister     Hypertension Brother     Uterine cancer Maternal Grandmother     Prostate cancer Paternal  Grandfather      I have reviewed and agree with the history as documented.    E-Cigarette/Vaping    E-Cigarette Use Never User      E-Cigarette/Vaping Substances    Nicotine No     THC No     CBD No     Flavoring No     Other No     Unknown No      Social History     Tobacco Use    Smoking status: Never     Passive exposure: Never    Smokeless tobacco: Never   Vaping Use    Vaping status: Never Used   Substance Use Topics    Alcohol use: Never     Comment: 0    Drug use: No       Review of Systems   Constitutional:  Negative for chills and fever.   HENT:  Negative for congestion, rhinorrhea and sore throat.    Eyes:  Negative for pain and visual disturbance.   Respiratory:  Negative for cough, shortness of breath and wheezing.    Cardiovascular:  Negative for chest pain and palpitations.   Gastrointestinal:  Negative for abdominal pain, nausea and vomiting.   Musculoskeletal:  Negative for back pain, neck pain and neck stiffness.   Skin:  Negative for rash and wound.   Neurological:  Negative for dizziness, weakness, light-headedness and numbness.       Physical Exam  Physical Exam  Constitutional:       General: She is not in acute distress.     Appearance: She is well-developed. She is not diaphoretic.   HENT:      Head: Normocephalic and atraumatic.      Right Ear: External ear normal.      Left Ear: External ear normal.   Eyes:      Extraocular Movements:      Right eye: Normal extraocular motion.      Left eye: Normal extraocular motion.      Conjunctiva/sclera: Conjunctivae normal.      Pupils: Pupils are equal, round, and reactive to light.   Cardiovascular:      Rate and Rhythm: Normal rate and regular rhythm.   Pulmonary:      Effort: Pulmonary effort is normal. No accessory muscle usage or respiratory distress.   Abdominal:      General: Abdomen is flat. There is no distension.   Musculoskeletal:      Cervical back: Normal range of motion. No rigidity.   Skin:     General: Skin is warm and dry.       Capillary Refill: Capillary refill takes less than 2 seconds.      Findings: No erythema or rash.   Neurological:      Mental Status: She is alert and oriented to person, place, and time.      Motor: No abnormal muscle tone.      Coordination: Coordination normal.   Psychiatric:         Behavior: Behavior normal.         Thought Content: Thought content normal.         Judgment: Judgment normal.         Vital Signs  ED Triage Vitals [08/06/24 0957]   Temperature Pulse Respirations Blood Pressure SpO2   97.6 °F (36.4 °C) 80 17 145/65 96 %      Temp Source Heart Rate Source Patient Position - Orthostatic VS BP Location FiO2 (%)   Oral Monitor Sitting Left arm --      Pain Score       No Pain           Vitals:    08/06/24 0957   BP: 145/65   Pulse: 80   Patient Position - Orthostatic VS: Sitting         Visual Acuity      ED Medications  Medications   rabies vaccine, human diploid IM injection 1 mL (1 mL Intramuscular Given 8/6/24 1013)       Diagnostic Studies  Results Reviewed       None                   No orders to display              Procedures  Procedures         ED Course                                               Medical Decision Making  Rabies vaccine provided - follow up timing for further rabies vaccination on days 7/14 discussed. Return indications reviewed.     Risk  Prescription drug management.                 Disposition  Final diagnoses:   Encounter for repeat administration of rabies vaccination     Time reflects when diagnosis was documented in both MDM as applicable and the Disposition within this note       Time User Action Codes Description Comment    8/6/2024 10:03 AM Sy Godwin Add [Z23] Encounter for repeat administration of rabies vaccination           ED Disposition       ED Disposition   Discharge    Condition   Stable    Date/Time   Tue Aug 6, 2024 10:03 AM    Comment   Marci Sandoval discharge to home/self care.                   Follow-up Information       Follow  up With Specialties Details Why Contact Info Additional Information    [facility]  For repeat rabies vaccination on 8/10 and 8/17 [address], [address]  [place] [address]  [phone] [facility], [phone]     Via the [address] [place] [address] (North/South) Take [address] toward [place]. Take the [facility] [address]. Keep right and follow signs for [address]/ [place]. Merge onto [address]. In a half mile, take the exit for [address] toward [place]. In 0.7 miles take the [facility] Exit. Merge onto [facility]. In 500 feet, turn left on [address] and drive 0.3 miles. [facility] will be on your left.    [address]ia [address] ([address]) Take [address] toward [place]. Take the [facility] Exit. Merge onto [facility]. In 500 feet, turn left on [address] and drive 0.3 miles. [facility] will be on your left.  [address]ia [address] ([address]) [address] towards [place]. In 0.7 miles take the [address] [facility] [address] Exit. Merge onto [facility]. In 500 feet, turn left on [address] and drive 0.3 miles. [facility] will be on your left.            Discharge Medication List as of 8/6/2024 10:06 AM        CONTINUE these medications which have NOT CHANGED    Details   !! Accu-Chek FastClix Lancets MISC USAR PARA PROBAR AZUCAR EN LA TWAN MANISHA VECES AL JEFFREY, Normal      acetaminophen (TYLENOL) 650 mg CR tablet Take 1 tablet (650 mg total) by mouth every 8 (eight) hours as needed for mild pain, Starting Sat 8/3/2024, Normal      albuterol (PROVENTIL HFA,VENTOLIN HFA) 90 mcg/act inhaler Inhale 1 puff every 4 (four) hours as needed for wheezing, Starting Thu 10/26/2023, Normal      ALPRAZolam (XANAX) 0.25 mg tablet Take 1 tablet (0.25 mg total) by mouth daily at bedtime as needed for anxiety, Starting Fri  7/26/2024, Normal      amoxicillin-clavulanate (AUGMENTIN) 875-125 mg per tablet Take 1 tablet by mouth every 12 (twelve) hours for 7 days, Starting Sat 8/3/2024, Until Sat 8/10/2024, Normal      atorvastatin (LIPITOR) 40 mg tablet TAKE 1 TABLET BY MOUTH EVERY DAY, Starting Mon 6/24/2024, Normal      Mary Jane Low Dose 81 MG chewable tablet CHEW 1 TABLET BY MOUTH DAILY, Starting Tue 5/28/2024, Normal      !! Blood Glucose Monitoring Suppl (OneTouch Verio Reflect) w/Device KIT Check blood sugars once daily. Please substitute with appropriate alternative as covered by patient's insurance. Dx: E11.65, Normal      !! Blood Glucose Monitoring Suppl (OneTouch Verio) w/Device KIT Use per  guidelines, Normal      Calcium Carb-Cholecalciferol 600-10 MG-MCG TABS Take 1 tablet by mouth in the morning, Starting Wed 2/21/2024, Normal      carvedilol (COREG) 12.5 mg tablet TAKE 1 TABLET BY MOUTH TWICE A DAY WITH FOOD, Starting Tue 5/28/2024, Normal      cyanocobalamin (VITAMIN B-12) 1000 MCG tablet Take 1 tablet (1,000 mcg total) by mouth daily, Starting Wed 2/21/2024, Normal      Empagliflozin (Jardiance) 25 MG TABS TAKE 1 TABLET BY MOUTH EVERY DAY IN THE MORNING, Starting Sun 6/30/2024, Normal      ezetimibe (ZETIA) 10 mg tablet TAKE 1 TABLET BY MOUTH EVERY DAY, Starting Thu 7/18/2024, Normal      ferrous sulfate 325 (65 Fe) mg tablet Take 1 tablet (325 mg total) by mouth daily with breakfast, Starting Wed 2/21/2024, Normal      fluticasone (FLONASE) 50 mcg/act nasal spray SPRAY 1 SPRAY INTO EACH NOSTRIL EVERY DAY, Normal      furosemide (LASIX) 20 mg tablet Take 1 tablet (20 mg total) by mouth daily, Starting Wed 2/21/2024, Normal      gabapentin (NEURONTIN) 100 mg capsule Take 1 capsule (100 mg total) by mouth 3 (three) times a day, Starting Fri 6/14/2024, Normal      !! glucose blood (Accu-Chek Guide) test strip USAR PARA EXAMINAR AZUCAR EN LA TWAN MANISHA VECES AL JEFFREY, Normal      !! glucose blood (OneTouch Verio)  test strip Check blood sugars once daily. Please substitute with appropriate alternative as covered by patient's insurance. Dx: E11.65, Normal      guaiFENesin (MUCINEX) 600 mg 12 hr tablet Take 2 tablets (1,200 mg total) by mouth every 12 (twelve) hours, Starting Thu 10/26/2023, Normal      Insulin Glargine Solostar (Basaglar KwikPen) 100 UNIT/ML SOPN Inject 0.13 mL (13 Units total) under the skin in the morning, Starting Tue 4/2/2024, Fill Later      Insulin Pen Needle (BD Pen Needle Micro U/F) 32G X 6 MM MISC Use daily, Starting Thu 10/26/2023, Normal      Janumet  MG per tablet TAKE 1 TABLET BY MOUTH TWICE A DAY WITH MEALS, Starting Mon 6/24/2024, Normal      lidocaine (Lidoderm) 5 % Apply 1 patch topically over 12 hours daily Remove & Discard patch within 12 hours or as directed by MD, Starting Wed 2/21/2024, Normal      losartan (COZAAR) 25 mg tablet TAKE 1 TABLET (25 MG TOTAL) BY MOUTH DAILY., Starting Mon 6/24/2024, Normal      Melatonin 5 MG TABS Take by mouth as needed, Historical Med      memantine (NAMENDA) 10 mg tablet Take 1 tablet (10 mg total) by mouth 2 (two) times a day, Starting Wed 2/21/2024, Normal      mirtazapine (REMERON) 15 mg tablet Take 1 tablet (15 mg total) by mouth daily at bedtime, Starting Fri 6/14/2024, Normal      omeprazole (PriLOSEC) 20 mg delayed release capsule Take 1 capsule (20 mg total) by mouth 2 (two) times a day, Starting Wed 2/21/2024, Normal      ondansetron (ZOFRAN) 4 mg tablet Take 1 tablet (4 mg total) by mouth every 8 (eight) hours as needed for nausea or vomiting, Starting Wed 2/21/2024, Normal      !! OneTouch Delica Lancets 33G MISC Check blood sugars once daily. Please substitute with appropriate alternative as covered by patient's insurance. Dx: E11.65, Normal      loperamide (IMODIUM) 2 mg capsule MAR JOHNNY (1) CAPSULA POR VIA ORAL ALFREDO SEA NECESARIO PARA LA DIARREA, Normal       !! - Potential duplicate medications found. Please discuss with provider.           No discharge procedures on file.    PDMP Review         Value Time User    PDMP Reviewed  Yes 7/26/2024  4:09 PM MOIRA Gan            ED Provider  Electronically Signed by             Sy Godwin PA-C  08/06/24 1042

## 2024-08-10 ENCOUNTER — CLINICAL SUPPORT (OUTPATIENT)
Dept: URGENT CARE | Facility: MEDICAL CENTER | Age: 79
End: 2024-08-10
Payer: COMMERCIAL

## 2024-08-10 DIAGNOSIS — Z20.3 RABIES EXPOSURE: Primary | ICD-10-CM

## 2024-08-10 PROCEDURE — 90471 IMMUNIZATION ADMIN: CPT

## 2024-08-10 PROCEDURE — 90675 RABIES VACCINE IM: CPT

## 2024-08-12 ENCOUNTER — OFFICE VISIT (OUTPATIENT)
Dept: PODIATRY | Facility: CLINIC | Age: 79
End: 2024-08-12
Payer: COMMERCIAL

## 2024-08-12 VITALS
BODY MASS INDEX: 24.35 KG/M2 | DIASTOLIC BLOOD PRESSURE: 70 MMHG | SYSTOLIC BLOOD PRESSURE: 126 MMHG | HEIGHT: 61 IN | WEIGHT: 129 LBS

## 2024-08-12 DIAGNOSIS — R26.2 AMBULATORY DYSFUNCTION: ICD-10-CM

## 2024-08-12 DIAGNOSIS — M79.676 PAIN DUE TO ONYCHOMYCOSIS OF TOENAIL: ICD-10-CM

## 2024-08-12 DIAGNOSIS — T45.1X5A CHEMOTHERAPY-INDUCED PERIPHERAL NEUROPATHY (HCC): ICD-10-CM

## 2024-08-12 DIAGNOSIS — G62.0 CHEMOTHERAPY-INDUCED PERIPHERAL NEUROPATHY (HCC): ICD-10-CM

## 2024-08-12 DIAGNOSIS — E11.9 TYPE 2 DIABETES MELLITUS WITHOUT COMPLICATION, WITHOUT LONG-TERM CURRENT USE OF INSULIN (HCC): Primary | ICD-10-CM

## 2024-08-12 DIAGNOSIS — B35.1 PAIN DUE TO ONYCHOMYCOSIS OF TOENAIL: ICD-10-CM

## 2024-08-12 PROCEDURE — RECHECK: Performed by: PODIATRIST

## 2024-08-12 PROCEDURE — 11721 DEBRIDE NAIL 6 OR MORE: CPT | Performed by: PODIATRIST

## 2024-08-12 NOTE — PROGRESS NOTES
Marci Sandoval  1945  AT RISK FOOT CARE    1. Type 2 diabetes mellitus without complication, without long-term current use of insulin (HCC)        2. Chemotherapy-induced peripheral neuropathy (HCC)        3. Pain due to onychomycosis of toenail        4. Ambulatory dysfunction            Patient presents for at-risk foot care.  Patient has no acute concerns today.  Patient has significant lower extremity risk due to neuropathy, parasthesia, edema, and trophic skin changes to the lower extremity.    On exam patient has thickened, hypertrophic, discolored, brittle toenails with subungual debris and tenderness x10   Callus: none  Patient has lower extremity edema  PAtients skin is atrophic, thickened nails, and decreased pedal hair  Patient has decreased pinprick and vibratory sensation to his feet and parasthesia    Today's treatment includes:  Debridement of toenails. Using nail nipper, oscar, and curette, nails were sharply debrided, reduced in thickness and length. Devitalized nail tissue and fungal debris excised and removed. Patient tolerated well.        Discussed proper shoe gear, daily inspections of feet, and general foot health with patient. Patient has Q9  findings and is recommended for at risk foot care every 9-10 weeks.    Patients most recent complete clinical foot exam was on: 2/19/2024

## 2024-08-17 ENCOUNTER — CLINICAL SUPPORT (OUTPATIENT)
Dept: URGENT CARE | Facility: MEDICAL CENTER | Age: 79
End: 2024-08-17
Payer: COMMERCIAL

## 2024-08-17 DIAGNOSIS — Z51.5 PALLIATIVE CARE PATIENT: ICD-10-CM

## 2024-08-17 DIAGNOSIS — C83.31 DIFFUSE LARGE B-CELL LYMPHOMA OF LYMPH NODES OF NECK (HCC): ICD-10-CM

## 2024-08-17 DIAGNOSIS — Z20.3 RABIES EXPOSURE: Primary | ICD-10-CM

## 2024-08-17 PROCEDURE — 90471 IMMUNIZATION ADMIN: CPT

## 2024-08-17 PROCEDURE — 90675 RABIES VACCINE IM: CPT

## 2024-08-19 RX ORDER — FLUTICASONE PROPIONATE 50 MCG
SPRAY, SUSPENSION (ML) NASAL
Qty: 24 ML | Refills: 4 | Status: SHIPPED | OUTPATIENT
Start: 2024-08-19

## 2024-08-21 ENCOUNTER — OFFICE VISIT (OUTPATIENT)
Dept: FAMILY MEDICINE CLINIC | Facility: CLINIC | Age: 79
End: 2024-08-21

## 2024-08-21 VITALS
SYSTOLIC BLOOD PRESSURE: 120 MMHG | DIASTOLIC BLOOD PRESSURE: 60 MMHG | WEIGHT: 124 LBS | RESPIRATION RATE: 16 BRPM | TEMPERATURE: 97.8 F | HEIGHT: 61 IN | HEART RATE: 61 BPM | OXYGEN SATURATION: 99 % | BODY MASS INDEX: 23.41 KG/M2

## 2024-08-21 DIAGNOSIS — Z23 ENCOUNTER FOR IMMUNIZATION: ICD-10-CM

## 2024-08-21 DIAGNOSIS — Z00.00 ENCOUNTER FOR SUBSEQUENT ANNUAL WELLNESS VISIT (AWV) IN MEDICARE PATIENT: Primary | ICD-10-CM

## 2024-08-21 DIAGNOSIS — Z91.81 AT HIGH RISK FOR FALLS: ICD-10-CM

## 2024-08-21 DIAGNOSIS — Z00.00 MEDICARE ANNUAL WELLNESS VISIT, SUBSEQUENT: ICD-10-CM

## 2024-08-21 DIAGNOSIS — Z12.31 ENCOUNTER FOR SCREENING MAMMOGRAM FOR BREAST CANCER: ICD-10-CM

## 2024-08-21 DIAGNOSIS — R26.2 AMBULATORY DYSFUNCTION: ICD-10-CM

## 2024-08-21 LAB
DME PARACHUTE DELIVERY DATE REQUESTED: NORMAL
DME PARACHUTE ITEM DESCRIPTION: NORMAL
DME PARACHUTE ORDER STATUS: NORMAL
DME PARACHUTE SUPPLIER NAME: NORMAL
DME PARACHUTE SUPPLIER PHONE: NORMAL

## 2024-08-21 PROCEDURE — 90471 IMMUNIZATION ADMIN: CPT

## 2024-08-21 PROCEDURE — 3078F DIAST BP <80 MM HG: CPT | Performed by: FAMILY MEDICINE

## 2024-08-21 PROCEDURE — 3074F SYST BP LT 130 MM HG: CPT | Performed by: FAMILY MEDICINE

## 2024-08-21 PROCEDURE — G0439 PPPS, SUBSEQ VISIT: HCPCS | Performed by: FAMILY MEDICINE

## 2024-08-21 PROCEDURE — 90750 HZV VACC RECOMBINANT IM: CPT

## 2024-08-21 NOTE — PROGRESS NOTES
Ambulatory Visit  Name: Marci Sandoval      : 1945      MRN: [id number]  Encounter Provider: Estefany Ugarte MD  Encounter Date: 2024   Encounter department: [facility]    Assessment & Plan   1. Encounter for subsequent annual wellness visit (AWV) in Medicare patient  Assessment & Plan:  Immunizations Reviewed  Grand daughter reports Zoster # 1 given last year-no record in chart  Zoster #2 today    Preventive screening reviewed  Need yearly eye exam    Shower chair ordered  PT referral placed  High fall risk  2. At high risk for falls  -     Ambulatory Referral to Physical Therapy; Future  3. Ambulatory dysfunction  -     Ambulatory Referral to Physical Therapy; Future  4. Encounter for screening mammogram for breast cancer  -     Mammo screening bilateral w 3d & cad; Future  5. Encounter for immunization  -     Zoster Vaccine Recombinant (SHINGRIX) - IN OFFICE       Preventive health issues were discussed with patient, and age appropriate screening tests were ordered as noted in patient's After Visit Summary. Personalized health advice and appropriate referrals for health education or preventive services given if needed, as noted in patient's After Visit Summary.    History of Present Illness     HPI   Marci Sandoval is a 79 y.o. female  has a past medical history of Cancer (HCC), Chronic pain disorder, Diabetes mellitus (HCC), Diffuse large B cell lymphoma (HCC), Dysphagia, GERD without esophagitis, HTN (hypertension), Hyperlipidemia, Hypertension, MI (myocardial infarction) (HCC), Port-A-Cath in place, and Thyroid cancer (HCC). who presented to the office today for a Medicare Annual Physical.    She is accompanied by her grand daughter.  She is concerned her grandmother is losing weight.  Pt is eating less, decreased appetite.  + urinary incontinence  + falls at home, out of her bed and in the bathroom.    The following portions of the  patient's history were reviewed and updated as appropriate: allergies, current medications, past family history, past medical history, past social history, past surgical history and problem list.    Patient Care Team:  MOIRA Gan as PCP - General (Family Medicine)  Estefany Ugarte MD as PCP - PCP-[facility] ([facility])  Liset Meehan MD as PCP - Endocrinology (Endocrinology)  Edwina Frances MD as PCP - PCP-[facility] ([facility])  MD Ngozi Ott MD Rati Jessica Bal, CRNP as Nurse Practitioner (Endocrinology)    Review of Systems   Constitutional:  Negative for chills and fever.   HENT:  Negative for congestion, rhinorrhea and sore throat.    Respiratory:  Negative for cough and shortness of breath.    Cardiovascular:  Negative for chest pain.   Gastrointestinal:  Negative for diarrhea, nausea and vomiting.   Musculoskeletal:  Positive for arthralgias, back pain and gait problem.   Skin:  Negative for rash.   Neurological:  Negative for dizziness and headaches.     Medical History Reviewed by provider this encounter:  Tobacco  Allergies  Meds  Problems  Med Hx  Surg Hx  Fam Hx       Annual Wellness Visit Questionnaire   Marci is here for her Welcome to Medicare visit.     Health Risk Assessment:   Patient rates overall health as fair. Patient feels that their physical health rating is slightly worse. Patient is very satisfied with their life. Eyesight was rated as slightly worse. Hearing was rated as slightly worse. Patient feels that their emotional and mental health rating is same. Patients states they are never, rarely angry. Patient states they are sometimes unusually tired/fatigued. Pain experienced in the last 7 days has been none. Patient states that she has experienced weight loss or gain in last 6 months.     Depression Screening:   PHQ-9 Score: 0      Fall Risk Screening:   In the past year, patient has experienced: history of falling in past  year    Number of falls: 2 or more  Injured during fall?: No    Feels unsteady when standing or walking?: Yes    Worried about falling?: Yes      Urinary Incontinence Screening:   Patient has leaked urine accidently in the last six months. Multiple falls in the past year, no ER visit, no injuries    Pt leaks urina occasionally, grand-daughter states pt does not want to wear pads or diapers    Home Safety:  Patient has trouble with stairs inside or outside of their home. Patient has working smoke alarms and has working carbon monoxide detector. Home safety hazards include: none. Pt needs shower chair    Nutrition:   Current diet is No Added Salt, Low Saturated Fat and Low Cholesterol.     Medications:   Patient is currently taking over-the-counter supplements. OTC medications include: melatonin. Patient is not able to manage medications.     Activities of Daily Living (ADLs)/Instrumental Activities of Daily Living (IADLs):   Walk and transfer into and out of bed and chair?: No  Dress and groom yourself?: Yes    Bathe or shower yourself?: No    Feed yourself? No  Do your laundry/housekeeping?: No  Manage your money, pay your bills and track your expenses?: No  Make your own meals?: No    Do your own shopping?: No    Previous Hospitalizations:   Any hospitalizations or ED visits within the last 12 months?: No      Advance Care Planning:   Living will: Yes    Advanced directive: Yes      Cognitive Screening:   Provider or family/friend/caregiver concerned regarding cognition?: Yes    Cognition Comments: Has h/o Alzh dementia    PREVENTIVE SCREENINGS      Cardiovascular Screening:    General: Screening Not Indicated and History Lipid Disorder      Diabetes Screening:     General: Screening Not Indicated and History Diabetes      Cervical Cancer Screening:    General: Screening Not Indicated      Lung Cancer Screening:     General: Screening Not Indicated      Hepatitis C Screening:    General: Screening  "Current    Screening, Brief Intervention, and Referral to Treatment (SBIRT)    Screening  Typical number of drinks in a day: 0  Typical number of drinks in a week: 0  Interpretation: Low risk drinking behavior.    Single Item Drug Screening:  How often have you used an illegal drug (including marijuana) or a prescription medication for non-medical reasons in the past year? never    Single Item Drug Screen Score: 0  Interpretation: Negative screen for possible drug use disorder    Social Determinants of Health     Financial Resource Strain: Low Risk  (8/21/2024)    Overall Financial Resource Strain (CARDIA)     Difficulty of Paying Living Expenses: Not hard at all   Food Insecurity: No Food Insecurity (8/21/2024)    Hunger Vital Sign     Worried About Running Out of Food in the Last Year: Never true     Ran Out of Food in the Last Year: Never true   Transportation Needs: No Transportation Needs (8/21/2024)    PRAPARE - Transportation     Lack of Transportation (Medical): No     Lack of Transportation (Non-Medical): No   Housing Stability: Unknown (8/21/2024)    Housing Stability Vital Sign     Unable to Pay for Housing in the Last Year: No     Homeless in the Last Year: No   Utilities: Not At Risk (8/21/2024)    [facility] Utilities     Threatened with loss of utilities: No     No results found.    Objective     /60 (BP Location: Left arm, Patient Position: Sitting, Cuff Size: Standard)   Pulse 61   Temp 97.8 °F (36.6 °C) (Temporal)   Resp 16   Ht 5' 1\" (1.549 m)   Wt 56.2 kg (124 lb)   SpO2 99%   BMI 23.43 kg/m²     Physical Exam  Vitals and nursing note reviewed.   Constitutional:       Appearance: Normal appearance.   HENT:      Head: Normocephalic and atraumatic.      Right Ear: External ear normal.      Left Ear: External ear normal.      Mouth/Throat:      Mouth: Mucous membranes are moist.   Eyes:      Conjunctiva/sclera: Conjunctivae normal.   Cardiovascular:      Rate and Rhythm: Normal rate and " regular rhythm.      Heart sounds: Normal heart sounds.   Pulmonary:      Effort: Pulmonary effort is normal.   Musculoskeletal:      Right lower leg: No edema.      Left lower leg: No edema.   Neurological:      Mental Status: She is alert and oriented to person, place, and time.   Psychiatric:         Mood and Affect: Mood normal.         Behavior: Behavior is cooperative.

## 2024-08-21 NOTE — PATIENT INSTRUCTIONS
Medicare Preventive Visit Patient Instructions  Thank you for completing your Welcome to Medicare Visit or Medicare Annual Wellness Visit today. Your next wellness visit will be due in one year (9/15/2025).  The screening/preventive services that you may require over the next 5-10 years are detailed below. Some tests may not apply to you based off risk factors and/or age. Screening tests ordered at today's visit but not completed yet may show as past due. Also, please note that scanned in results may not display below.  Preventive Screenings:  Service Recommendations Previous Testing/Comments   Colorectal Cancer Screening  * Colonoscopy    * Fecal Occult Blood Test (FOBT)/Fecal Immunochemical Test (FIT)  * Fecal DNA/Cologuard Test  * Flexible Sigmoidoscopy Age: 45-75 years old   Colonoscopy: every 10 years (may be performed more frequently if at higher risk)  OR  FOBT/FIT: every 1 year  OR  Cologuard: every 3 years  OR  Sigmoidoscopy: every 5 years  Screening may be recommended earlier than age 45 if at higher risk for colorectal cancer. Also, an individualized decision between you and your healthcare provider will decide whether screening between the ages of 76-85 would be appropriate. Colonoscopy: 05/08/2023  FOBT/FIT: Not on file  Cologuard: Not on file  Sigmoidoscopy: Not on file          Breast Cancer Screening Age: 40+ years old  Frequency: every 1-2 years  Not required if history of left and right mastectomy Mammogram: 08/26/2024        Cervical Cancer Screening Between the ages of 21-29, pap smear recommended once every 3 years.   Between the ages of 30-65, can perform pap smear with HPV co-testing every 5 years.   Recommendations may differ for women with a history of total hysterectomy, cervical cancer, or abnormal pap smears in past. Pap Smear: Not on file    Screening Not Indicated   Hepatitis C Screening Once for adults born between 1945 and 1965  More frequently in patients at high risk for Hepatitis C  Hep C Antibody: Not on file    Screening Current   Diabetes Screening 1-2 times per year if you're at risk for diabetes or have pre-diabetes Fasting glucose: 115 mg/dL (3/28/2024)  A1C: 7.0 (7/24/2024)  Screening Not Indicated  History Diabetes   Cholesterol Screening Once every 5 years if you don't have a lipid disorder. May order more often based on risk factors. Lipid panel: 11/09/2023    Screening Not Indicated  History Lipid Disorder     Other Preventive Screenings Covered by Medicare:  Abdominal Aortic Aneurysm (AAA) Screening: covered once if your at risk. You're considered to be at risk if you have a family history of AAA.  Lung Cancer Screening: covers low dose CT scan once per year if you meet all of the following conditions: (1) Age 55-77; (2) No signs or symptoms of lung cancer; (3) Current smoker or have quit smoking within the last 15 years; (4) You have a tobacco smoking history of at least 20 pack years (packs per day multiplied by number of years you smoked); (5) You get a written order from a healthcare provider.  Glaucoma Screening: covered annually if you're considered high risk: (1) You have diabetes OR (2) Family history of glaucoma OR (3)  aged 50 and older OR (4)  American aged 65 and older  Osteoporosis Screening: covered every 2 years if you meet one of the following conditions: (1) You're estrogen deficient and at risk for osteoporosis based off medical history and other findings; (2) Have a vertebral abnormality; (3) On glucocorticoid therapy for more than 3 months; (4) Have primary hyperparathyroidism; (5) On osteoporosis medications and need to assess response to drug therapy.   Last bone density test (DXA Scan): 05/12/2023.  HIV Screening: covered annually if you're between the age of 15-65. Also covered annually if you are younger than 15 and older than 65 with risk factors for HIV infection. For pregnant patients, it is covered up to 3 times per  pregnancy.    Immunizations:  Immunization Recommendations   Influenza Vaccine Annual influenza vaccination during flu season is recommended for all persons aged >= 6 months who do not have contraindications   Pneumococcal Vaccine   * Pneumococcal conjugate vaccine = PCV13 (Prevnar 13), PCV15 (Vaxneuvance), PCV20 (Prevnar 20)  * Pneumococcal polysaccharide vaccine = PPSV23 (Pneumovax) Adults 19-63 yo with certain risk factors or if 65+ yo  If never received any pneumonia vaccine: recommend Prevnar 20 (PCV20)  Give PCV20 if previously received 1 dose of PCV13 or PPSV23   Hepatitis B Vaccine 3 dose series if at intermediate or high risk (ex: diabetes, end stage renal disease, liver disease)   Respiratory syncytial virus (RSV) Vaccine - COVERED BY MEDICARE PART D  * RSVPreF3 (Arexvy) CDC recommends that adults 60 years of age and older may receive a single dose of RSV vaccine using shared clinical decision-making (SCDM)   Tetanus (Td) Vaccine - COST NOT COVERED BY MEDICARE PART B Following completion of primary series, a booster dose should be given every 10 years to maintain immunity against tetanus. Td may also be given as tetanus wound prophylaxis.   Tdap Vaccine - COST NOT COVERED BY MEDICARE PART B Recommended at least once for all adults. For pregnant patients, recommended with each pregnancy.   Shingles Vaccine (Shingrix) - COST NOT COVERED BY MEDICARE PART B  2 shot series recommended in those 19 years and older who have or will have weakened immune systems or those 50 years and older     Health Maintenance Due:      Topic Date Due   • Breast Cancer Screening: Mammogram  08/26/2026   • Hepatitis C Screening  Completed   • Colorectal Cancer Screening  Discontinued     Immunizations Due:      Topic Date Due   • COVID-19 Vaccine (4 - 2023-24 season) 09/01/2024   • Influenza Vaccine (1) 09/01/2024     Advance Directives   What are advance directives?  Advance directives are legal documents that state your wishes  and plans for medical care. These plans are made ahead of time in case you lose your ability to make decisions for yourself. Advance directives can apply to any medical decision, such as the treatments you want, and if you want to donate organs.   What are the types of advance directives?  There are many types of advance directives, and each state has rules about how to use them. You may choose a combination of any of the following:  Living will:  This is a written record of the treatment you want. You can also choose which treatments you do not want, which to limit, and which to stop at a certain time. This includes surgery, medicine, IV fluid, and tube feedings.   Durable power of  for healthcare (DPAHC):  This is a written record that states who you want to make healthcare choices for you when you are unable to make them for yourself. This person, called a proxy, is usually a family member or a friend. You may choose more than 1 proxy.  Do not resuscitate (DNR) order:  A DNR order is used in case your heart stops beating or you stop breathing. It is a request not to have certain forms of treatment, such as CPR. A DNR order may be included in other types of advance directives.  Medical directive:  This covers the care that you want if you are in a coma, near death, or unable to make decisions for yourself. You can list the treatments you want for each condition. Treatment may include pain medicine, surgery, blood transfusions, dialysis, IV or tube feedings, and a ventilator (breathing machine).  Values history:  This document has questions about your views, beliefs, and how you feel and think about life. This information can help others choose the care that you would choose.  Why are advance directives important?  An advance directive helps you control your care. Although spoken wishes may be used, it is better to have your wishes written down. Spoken wishes can be misunderstood, or not followed.  Treatments may be given even if you do not want them. An advance directive may make it easier for your family to make difficult choices about your care.   Fall Prevention    Fall prevention  includes ways to make your home and other areas safer. It also includes ways you can move more carefully to prevent a fall. Health conditions that cause changes in your blood pressure, vision, or muscle strength and coordination may increase your risk for falls. Medicines may also increase your risk for falls if they make you dizzy, weak, or sleepy.   Fall prevention tips:   Stand or sit up slowly.    Use assistive devices as directed.    Wear shoes that fit well and have soles that .    Wear a personal alarm.    Stay active.    Manage your medical conditions.    Home Safety Tips:  Add items to prevent falls in the bathroom.    Keep paths clear.    Install bright lights in your home.    Keep items you use often on shelves within reach.    Paint or place reflective tape on the edges of your stairs.    Urinary Incontinence   Urinary incontinence (UI)  is when you lose control of your bladder. UI develops because your bladder cannot store or empty urine properly. The 3 most common types of UI are stress incontinence, urge incontinence, or both.  Medicines:   May be given to help strengthen your bladder control. Report any side effects of medication to your healthcare provider.  Do pelvic muscle exercises often:  Your pelvic muscles help you stop urinating. Squeeze these muscles tight for 5 seconds, then relax for 5 seconds. Gradually work up to squeezing for 10 seconds. Do 3 sets of 15 repetitions a day, or as directed. This will help strengthen your pelvic muscles and improve bladder control.  Train your bladder:  Go to the bathroom at set times, such as every 2 hours, even if you do not feel the urge to go. You can also try to hold your urine when you feel the urge to go. For example, hold your urine for 5 minutes when you  feel the urge to go. As that becomes easier, hold your urine for 10 minutes.   Self-care:   Keep a UI record.  Write down how often you leak urine and how much you leak. Make a note of what you were doing when you leaked urine.  Drink liquids as directed. You may need to limit the amount of liquid you drink to help control your urine leakage. Do not drink any liquid right before you go to bed. Limit or do not have drinks that contain caffeine or alcohol.   Prevent constipation.  Eat a variety of high-fiber foods. Good examples are high-fiber cereals, beans, vegetables, and whole-grain breads. Walking is the best way to trigger your intestines to have a bowel movement.  Exercise regularly and maintain a healthy weight.  Weight loss and exercise will decrease pressure on your bladder and help you control your leakage.   Use a catheter as directed  to help empty your bladder. A catheter is a tiny, plastic tube that is put into your bladder to drain your urine.   Go to behavior therapy as directed.  Behavior therapy may be used to help you learn to control your urge to urinate.     © Copyright [employer] 2018 Information is for End User's use only and may not be sold, redistributed or otherwise used for commercial purposes. All illustrations and images included in CareNotes® are the copyrighted property of [employer] or [employer]      Medicare Preventive Visit Patient Instructions  Thank you for completing your Welcome to Medicare Visit or Medicare Annual Wellness Visit today. Your next wellness visit will be due in one year (9/15/2025).  The screening/preventive services that you may require over the next 5-10 years are detailed below. Some tests may not apply to you based off risk factors and/or age. Screening tests ordered at today's visit but not completed yet may show as past due. Also, please note that scanned in results may not display below.  Preventive Screenings:  Service Recommendations  Previous Testing/Comments   Colorectal Cancer Screening  * Colonoscopy    * Fecal Occult Blood Test (FOBT)/Fecal Immunochemical Test (FIT)  * Fecal DNA/Cologuard Test  * Flexible Sigmoidoscopy Age: 45-75 years old   Colonoscopy: every 10 years (may be performed more frequently if at higher risk)  OR  FOBT/FIT: every 1 year  OR  Cologuard: every 3 years  OR  Sigmoidoscopy: every 5 years  Screening may be recommended earlier than age 45 if at higher risk for colorectal cancer. Also, an individualized decision between you and your healthcare provider will decide whether screening between the ages of 76-85 would be appropriate. Colonoscopy: 05/08/2023  FOBT/FIT: Not on file  Cologuard: Not on file  Sigmoidoscopy: Not on file          Breast Cancer Screening Age: 40+ years old  Frequency: every 1-2 years  Not required if history of left and right mastectomy Mammogram: 08/26/2024        Cervical Cancer Screening Between the ages of 21-29, pap smear recommended once every 3 years.   Between the ages of 30-65, can perform pap smear with HPV co-testing every 5 years.   Recommendations may differ for women with a history of total hysterectomy, cervical cancer, or abnormal pap smears in past. Pap Smear: Not on file    Screening Not Indicated   Hepatitis C Screening Once for adults born between 1945 and 1965  More frequently in patients at high risk for Hepatitis C Hep C Antibody: Not on file    Screening Current   Diabetes Screening 1-2 times per year if you're at risk for diabetes or have pre-diabetes Fasting glucose: 115 mg/dL (3/28/2024)  A1C: 7.0 (7/24/2024)  Screening Not Indicated  History Diabetes   Cholesterol Screening Once every 5 years if you don't have a lipid disorder. May order more often based on risk factors. Lipid panel: 11/09/2023    Screening Not Indicated  History Lipid Disorder     Other Preventive Screenings Covered by Medicare:  Abdominal Aortic Aneurysm (AAA) Screening: covered once if your at risk.  You're considered to be at risk if you have a family history of AAA.  Lung Cancer Screening: covers low dose CT scan once per year if you meet all of the following conditions: (1) Age 55-77; (2) No signs or symptoms of lung cancer; (3) Current smoker or have quit smoking within the last 15 years; (4) You have a tobacco smoking history of at least 20 pack years (packs per day multiplied by number of years you smoked); (5) You get a written order from a healthcare provider.  Glaucoma Screening: covered annually if you're considered high risk: (1) You have diabetes OR (2) Family history of glaucoma OR (3)  aged 50 and older OR (4)  American aged 65 and older  Osteoporosis Screening: covered every 2 years if you meet one of the following conditions: (1) You're estrogen deficient and at risk for osteoporosis based off medical history and other findings; (2) Have a vertebral abnormality; (3) On glucocorticoid therapy for more than 3 months; (4) Have primary hyperparathyroidism; (5) On osteoporosis medications and need to assess response to drug therapy.   Last bone density test (DXA Scan): 05/12/2023.  HIV Screening: covered annually if you're between the age of 15-65. Also covered annually if you are younger than 15 and older than 65 with risk factors for HIV infection. For pregnant patients, it is covered up to 3 times per pregnancy.    Immunizations:  Immunization Recommendations   Influenza Vaccine Annual influenza vaccination during flu season is recommended for all persons aged >= 6 months who do not have contraindications   Pneumococcal Vaccine   * Pneumococcal conjugate vaccine = PCV13 (Prevnar 13), PCV15 (Vaxneuvance), PCV20 (Prevnar 20)  * Pneumococcal polysaccharide vaccine = PPSV23 (Pneumovax) Adults 19-65 yo with certain risk factors or if 65+ yo  If never received any pneumonia vaccine: recommend Prevnar 20 (PCV20)  Give PCV20 if previously received 1 dose of PCV13 or PPSV23    Hepatitis B Vaccine 3 dose series if at intermediate or high risk (ex: diabetes, end stage renal disease, liver disease)   Respiratory syncytial virus (RSV) Vaccine - COVERED BY MEDICARE PART D  * RSVPreF3 (Arexvy) CDC recommends that adults 60 years of age and older may receive a single dose of RSV vaccine using shared clinical decision-making (SCDM)   Tetanus (Td) Vaccine - COST NOT COVERED BY MEDICARE PART B Following completion of primary series, a booster dose should be given every 10 years to maintain immunity against tetanus. Td may also be given as tetanus wound prophylaxis.   Tdap Vaccine - COST NOT COVERED BY MEDICARE PART B Recommended at least once for all adults. For pregnant patients, recommended with each pregnancy.   Shingles Vaccine (Shingrix) - COST NOT COVERED BY MEDICARE PART B  2 shot series recommended in those 19 years and older who have or will have weakened immune systems or those 50 years and older     Health Maintenance Due:      Topic Date Due   • Breast Cancer Screening: Mammogram  08/26/2026   • Hepatitis C Screening  Completed   • Colorectal Cancer Screening  Discontinued     Immunizations Due:      Topic Date Due   • COVID-19 Vaccine (4 - 2023-24 season) 09/01/2024   • Influenza Vaccine (1) 09/01/2024     Advance Directives   What are advance directives?  Advance directives are legal documents that state your wishes and plans for medical care. These plans are made ahead of time in case you lose your ability to make decisions for yourself. Advance directives can apply to any medical decision, such as the treatments you want, and if you want to donate organs.   What are the types of advance directives?  There are many types of advance directives, and each state has rules about how to use them. You may choose a combination of any of the following:  Living will:  This is a written record of the treatment you want. You can also choose which treatments you do not want, which to limit, and  which to stop at a certain time. This includes surgery, medicine, IV fluid, and tube feedings.   Durable power of  for healthcare (DPAHC):  This is a written record that states who you want to make healthcare choices for you when you are unable to make them for yourself. This person, called a proxy, is usually a family member or a friend. You may choose more than 1 proxy.  Do not resuscitate (DNR) order:  A DNR order is used in case your heart stops beating or you stop breathing. It is a request not to have certain forms of treatment, such as CPR. A DNR order may be included in other types of advance directives.  Medical directive:  This covers the care that you want if you are in a coma, near death, or unable to make decisions for yourself. You can list the treatments you want for each condition. Treatment may include pain medicine, surgery, blood transfusions, dialysis, IV or tube feedings, and a ventilator (breathing machine).  Values history:  This document has questions about your views, beliefs, and how you feel and think about life. This information can help others choose the care that you would choose.  Why are advance directives important?  An advance directive helps you control your care. Although spoken wishes may be used, it is better to have your wishes written down. Spoken wishes can be misunderstood, or not followed. Treatments may be given even if you do not want them. An advance directive may make it easier for your family to make difficult choices about your care.   Fall Prevention    Fall prevention  includes ways to make your home and other areas safer. It also includes ways you can move more carefully to prevent a fall. Health conditions that cause changes in your blood pressure, vision, or muscle strength and coordination may increase your risk for falls. Medicines may also increase your risk for falls if they make you dizzy, weak, or sleepy.   Fall prevention tips:   Stand or sit up  slowly.    Use assistive devices as directed.    Wear shoes that fit well and have soles that .    Wear a personal alarm.    Stay active.    Manage your medical conditions.    Home Safety Tips:  Add items to prevent falls in the bathroom.    Keep paths clear.    Install bright lights in your home.    Keep items you use often on shelves within reach.    Paint or place reflective tape on the edges of your stairs.    Urinary Incontinence   Urinary incontinence (UI)  is when you lose control of your bladder. UI develops because your bladder cannot store or empty urine properly. The 3 most common types of UI are stress incontinence, urge incontinence, or both.  Medicines:   May be given to help strengthen your bladder control. Report any side effects of medication to your healthcare provider.  Do pelvic muscle exercises often:  Your pelvic muscles help you stop urinating. Squeeze these muscles tight for 5 seconds, then relax for 5 seconds. Gradually work up to squeezing for 10 seconds. Do 3 sets of 15 repetitions a day, or as directed. This will help strengthen your pelvic muscles and improve bladder control.  Train your bladder:  Go to the bathroom at set times, such as every 2 hours, even if you do not feel the urge to go. You can also try to hold your urine when you feel the urge to go. For example, hold your urine for 5 minutes when you feel the urge to go. As that becomes easier, hold your urine for 10 minutes.   Self-care:   Keep a UI record.  Write down how often you leak urine and how much you leak. Make a note of what you were doing when you leaked urine.  Drink liquids as directed. You may need to limit the amount of liquid you drink to help control your urine leakage. Do not drink any liquid right before you go to bed. Limit or do not have drinks that contain caffeine or alcohol.   Prevent constipation.  Eat a variety of high-fiber foods. Good examples are high-fiber cereals, beans, vegetables, and  whole-grain breads. Walking is the best way to trigger your intestines to have a bowel movement.  Exercise regularly and maintain a healthy weight.  Weight loss and exercise will decrease pressure on your bladder and help you control your leakage.   Use a catheter as directed  to help empty your bladder. A catheter is a tiny, plastic tube that is put into your bladder to drain your urine.   Go to behavior therapy as directed.  Behavior therapy may be used to help you learn to control your urge to urinate.     © Copyright [employer] 2018 Information is for End User's use only and may not be sold, redistributed or otherwise used for commercial purposes. All illustrations and images included in CareNotes® are the copyrighted property of [employer] or [employer]      Medicare Preventive Visit Patient Instructions  Thank you for completing your Welcome to Medicare Visit or Medicare Annual Wellness Visit today. Your next wellness visit will be due in one year (9/15/2025).  The screening/preventive services that you may require over the next 5-10 years are detailed below. Some tests may not apply to you based off risk factors and/or age. Screening tests ordered at today's visit but not completed yet may show as past due. Also, please note that scanned in results may not display below.  Preventive Screenings:  Service Recommendations Previous Testing/Comments   Colorectal Cancer Screening  * Colonoscopy    * Fecal Occult Blood Test (FOBT)/Fecal Immunochemical Test (FIT)  * Fecal DNA/Cologuard Test  * Flexible Sigmoidoscopy Age: 45-75 years old   Colonoscopy: every 10 years (may be performed more frequently if at higher risk)  OR  FOBT/FIT: every 1 year  OR  Cologuard: every 3 years  OR  Sigmoidoscopy: every 5 years  Screening may be recommended earlier than age 45 if at higher risk for colorectal cancer. Also, an individualized decision between you and your healthcare provider will decide whether screening  between the ages of 76-85 would be appropriate. Colonoscopy: 05/08/2023  FOBT/FIT: Not on file  Cologuard: Not on file  Sigmoidoscopy: Not on file          Breast Cancer Screening Age: 40+ years old  Frequency: every 1-2 years  Not required if history of left and right mastectomy Mammogram: 08/26/2024        Cervical Cancer Screening Between the ages of 21-29, pap smear recommended once every 3 years.   Between the ages of 30-65, can perform pap smear with HPV co-testing every 5 years.   Recommendations may differ for women with a history of total hysterectomy, cervical cancer, or abnormal pap smears in past. Pap Smear: Not on file    Screening Not Indicated   Hepatitis C Screening Once for adults born between 1945 and 1965  More frequently in patients at high risk for Hepatitis C Hep C Antibody: Not on file    Screening Current   Diabetes Screening 1-2 times per year if you're at risk for diabetes or have pre-diabetes Fasting glucose: 115 mg/dL (3/28/2024)  A1C: 7.0 (7/24/2024)  Screening Not Indicated  History Diabetes   Cholesterol Screening Once every 5 years if you don't have a lipid disorder. May order more often based on risk factors. Lipid panel: 11/09/2023    Screening Not Indicated  History Lipid Disorder     Other Preventive Screenings Covered by Medicare:  Abdominal Aortic Aneurysm (AAA) Screening: covered once if your at risk. You're considered to be at risk if you have a family history of AAA.  Lung Cancer Screening: covers low dose CT scan once per year if you meet all of the following conditions: (1) Age 55-77; (2) No signs or symptoms of lung cancer; (3) Current smoker or have quit smoking within the last 15 years; (4) You have a tobacco smoking history of at least 20 pack years (packs per day multiplied by number of years you smoked); (5) You get a written order from a healthcare provider.  Glaucoma Screening: covered annually if you're considered high risk: (1) You have diabetes OR (2) Family  history of glaucoma OR (3)  aged 50 and older OR (4)  American aged 65 and older  Osteoporosis Screening: covered every 2 years if you meet one of the following conditions: (1) You're estrogen deficient and at risk for osteoporosis based off medical history and other findings; (2) Have a vertebral abnormality; (3) On glucocorticoid therapy for more than 3 months; (4) Have primary hyperparathyroidism; (5) On osteoporosis medications and need to assess response to drug therapy.   Last bone density test (DXA Scan): 05/12/2023.  HIV Screening: covered annually if you're between the age of 15-65. Also covered annually if you are younger than 15 and older than 65 with risk factors for HIV infection. For pregnant patients, it is covered up to 3 times per pregnancy.    Immunizations:  Immunization Recommendations   Influenza Vaccine Annual influenza vaccination during flu season is recommended for all persons aged >= 6 months who do not have contraindications   Pneumococcal Vaccine   * Pneumococcal conjugate vaccine = PCV13 (Prevnar 13), PCV15 (Vaxneuvance), PCV20 (Prevnar 20)  * Pneumococcal polysaccharide vaccine = PPSV23 (Pneumovax) Adults 19-65 yo with certain risk factors or if 65+ yo  If never received any pneumonia vaccine: recommend Prevnar 20 (PCV20)  Give PCV20 if previously received 1 dose of PCV13 or PPSV23   Hepatitis B Vaccine 3 dose series if at intermediate or high risk (ex: diabetes, end stage renal disease, liver disease)   Respiratory syncytial virus (RSV) Vaccine - COVERED BY MEDICARE PART D  * RSVPreF3 (Arexvy) CDC recommends that adults 60 years of age and older may receive a single dose of RSV vaccine using shared clinical decision-making (SCDM)   Tetanus (Td) Vaccine - COST NOT COVERED BY MEDICARE PART B Following completion of primary series, a booster dose should be given every 10 years to maintain immunity against tetanus. Td may also be given as tetanus wound prophylaxis.    Tdap Vaccine - COST NOT COVERED BY MEDICARE PART B Recommended at least once for all adults. For pregnant patients, recommended with each pregnancy.   Shingles Vaccine (Shingrix) - COST NOT COVERED BY MEDICARE PART B  2 shot series recommended in those 19 years and older who have or will have weakened immune systems or those 50 years and older     Health Maintenance Due:      Topic Date Due   • Breast Cancer Screening: Mammogram  08/26/2026   • Hepatitis C Screening  Completed   • Colorectal Cancer Screening  Discontinued     Immunizations Due:      Topic Date Due   • COVID-19 Vaccine (4 - 2023-24 season) 09/01/2024   • Influenza Vaccine (1) 09/01/2024     Advance Directives   What are advance directives?  Advance directives are legal documents that state your wishes and plans for medical care. These plans are made ahead of time in case you lose your ability to make decisions for yourself. Advance directives can apply to any medical decision, such as the treatments you want, and if you want to donate organs.   What are the types of advance directives?  There are many types of advance directives, and each state has rules about how to use them. You may choose a combination of any of the following:  Living will:  This is a written record of the treatment you want. You can also choose which treatments you do not want, which to limit, and which to stop at a certain time. This includes surgery, medicine, IV fluid, and tube feedings.   Durable power of  for healthcare (DPAHC):  This is a written record that states who you want to make healthcare choices for you when you are unable to make them for yourself. This person, called a proxy, is usually a family member or a friend. You may choose more than 1 proxy.  Do not resuscitate (DNR) order:  A DNR order is used in case your heart stops beating or you stop breathing. It is a request not to have certain forms of treatment, such as CPR. A DNR order may be included  in other types of advance directives.  Medical directive:  This covers the care that you want if you are in a coma, near death, or unable to make decisions for yourself. You can list the treatments you want for each condition. Treatment may include pain medicine, surgery, blood transfusions, dialysis, IV or tube feedings, and a ventilator (breathing machine).  Values history:  This document has questions about your views, beliefs, and how you feel and think about life. This information can help others choose the care that you would choose.  Why are advance directives important?  An advance directive helps you control your care. Although spoken wishes may be used, it is better to have your wishes written down. Spoken wishes can be misunderstood, or not followed. Treatments may be given even if you do not want them. An advance directive may make it easier for your family to make difficult choices about your care.   Fall Prevention    Fall prevention  includes ways to make your home and other areas safer. It also includes ways you can move more carefully to prevent a fall. Health conditions that cause changes in your blood pressure, vision, or muscle strength and coordination may increase your risk for falls. Medicines may also increase your risk for falls if they make you dizzy, weak, or sleepy.   Fall prevention tips:   Stand or sit up slowly.    Use assistive devices as directed.    Wear shoes that fit well and have soles that .    Wear a personal alarm.    Stay active.    Manage your medical conditions.    Home Safety Tips:  Add items to prevent falls in the bathroom.    Keep paths clear.    Install bright lights in your home.    Keep items you use often on shelves within reach.    Paint or place reflective tape on the edges of your stairs.    Urinary Incontinence   Urinary incontinence (UI)  is when you lose control of your bladder. UI develops because your bladder cannot store or empty urine properly. The 3  most common types of UI are stress incontinence, urge incontinence, or both.  Medicines:   May be given to help strengthen your bladder control. Report any side effects of medication to your healthcare provider.  Do pelvic muscle exercises often:  Your pelvic muscles help you stop urinating. Squeeze these muscles tight for 5 seconds, then relax for 5 seconds. Gradually work up to squeezing for 10 seconds. Do 3 sets of 15 repetitions a day, or as directed. This will help strengthen your pelvic muscles and improve bladder control.  Train your bladder:  Go to the bathroom at set times, such as every 2 hours, even if you do not feel the urge to go. You can also try to hold your urine when you feel the urge to go. For example, hold your urine for 5 minutes when you feel the urge to go. As that becomes easier, hold your urine for 10 minutes.   Self-care:   Keep a UI record.  Write down how often you leak urine and how much you leak. Make a note of what you were doing when you leaked urine.  Drink liquids as directed. You may need to limit the amount of liquid you drink to help control your urine leakage. Do not drink any liquid right before you go to bed. Limit or do not have drinks that contain caffeine or alcohol.   Prevent constipation.  Eat a variety of high-fiber foods. Good examples are high-fiber cereals, beans, vegetables, and whole-grain breads. Walking is the best way to trigger your intestines to have a bowel movement.  Exercise regularly and maintain a healthy weight.  Weight loss and exercise will decrease pressure on your bladder and help you control your leakage.   Use a catheter as directed  to help empty your bladder. A catheter is a tiny, plastic tube that is put into your bladder to drain your urine.   Go to behavior therapy as directed.  Behavior therapy may be used to help you learn to control your urge to urinate.     © Copyright [employer] 2018 Information is for End User's use only and may not  be sold, redistributed or otherwise used for commercial purposes. All illustrations and images included in CareNotes® are the copyrighted property of [employer] or [employer]

## 2024-08-21 NOTE — ASSESSMENT & PLAN NOTE
Immunizations Reviewed  Grand daughter reports Zoster # 1 given last year-no record in chart  Zoster #2 today    Preventive screening reviewed  Need yearly eye exam    Shower chair ordered  PT referral placed  High fall risk

## 2024-08-22 DIAGNOSIS — M85.80 AGE-RELATED BONE LOSS: ICD-10-CM

## 2024-08-22 RX ORDER — CALCIUM CARBONATE/VITAMIN D3 600 MG-10
1 TABLET ORAL EVERY MORNING
Qty: 90 TABLET | Refills: 1 | Status: SHIPPED | OUTPATIENT
Start: 2024-08-22

## 2024-08-25 DIAGNOSIS — E11.65 UNCONTROLLED TYPE 2 DIABETES MELLITUS WITH HYPERGLYCEMIA (HCC): ICD-10-CM

## 2024-08-26 ENCOUNTER — HOSPITAL ENCOUNTER (OUTPATIENT)
Dept: MAMMOGRAPHY | Facility: MEDICAL CENTER | Age: 79
Discharge: HOME | End: 2024-08-26
Payer: COMMERCIAL

## 2024-08-26 VITALS — HEIGHT: 61 IN | WEIGHT: 124 LBS | BODY MASS INDEX: 23.41 KG/M2

## 2024-08-26 DIAGNOSIS — Z12.31 ENCOUNTER FOR SCREENING MAMMOGRAM FOR BREAST CANCER: ICD-10-CM

## 2024-08-26 DIAGNOSIS — R45.89 ANXIETY ABOUT HEALTH: ICD-10-CM

## 2024-08-26 PROCEDURE — 77067 SCR MAMMO BI INCL CAD: CPT

## 2024-08-26 PROCEDURE — 77063 BREAST TOMOSYNTHESIS BI: CPT

## 2024-08-26 RX ORDER — SITAGLIPTIN AND METFORMIN HYDROCHLORIDE 1000; 50 MG/1; MG/1
1 TABLET, FILM COATED ORAL 2 TIMES DAILY WITH MEALS
Qty: 60 TABLET | Refills: 1 | Status: SHIPPED | OUTPATIENT
Start: 2024-08-26

## 2024-09-04 DIAGNOSIS — Z79.4 CURRENT USE OF INSULIN (HCC): ICD-10-CM

## 2024-09-04 DIAGNOSIS — E11.9 TYPE 2 DIABETES MELLITUS WITHOUT COMPLICATION, WITHOUT LONG-TERM CURRENT USE OF INSULIN (HCC): ICD-10-CM

## 2024-09-05 ENCOUNTER — PATIENT OUTREACH (OUTPATIENT)
Dept: FAMILY MEDICINE CLINIC | Facility: CLINIC | Age: 79
End: 2024-09-05

## 2024-09-05 RX ORDER — LANCETS
EACH MISCELLANEOUS
Qty: 100 EACH | Refills: 5 | Status: SHIPPED | OUTPATIENT
Start: 2024-09-05

## 2024-09-05 RX ORDER — ALPRAZOLAM 0.25 MG
0.25 TABLET ORAL
Qty: 30 TABLET | Refills: 0 | Status: SHIPPED | OUTPATIENT
Start: 2024-09-05

## 2024-09-05 RX ORDER — BLOOD SUGAR DIAGNOSTIC
STRIP MISCELLANEOUS
Qty: 100 STRIP | Refills: 5 | Status: SHIPPED | OUTPATIENT
Start: 2024-09-05

## 2024-09-05 NOTE — PROGRESS NOTES
I received a call from Nadja asking for med refill of alprazolam; waiting to be signed off.  Nadja states the patient has been without this for 9 days and has not slept more than 1-2 hours per night which causes Nadja to lose sleep as well.    Nadja reports the patient has lost a lot of weight. She notes the patient's weight is 127-129 (weight at last appointment was 124).  Nadja is concerned with the weight loss.  She notes the patient has a decreased appetite and is wondering if this is due to worsening dementia.  I encouraged supplementing with glucose control Ensure or Boost.    Nadja was unhappy with the patient's last office visit (not with PCP).  She is requesting a sooner appointment than November; note sent to clerical.

## 2024-09-10 DIAGNOSIS — E11.9 TYPE 2 DIABETES MELLITUS WITHOUT COMPLICATION, WITHOUT LONG-TERM CURRENT USE OF INSULIN (HCC): ICD-10-CM

## 2024-09-10 DIAGNOSIS — Z79.4 CURRENT USE OF INSULIN (HCC): ICD-10-CM

## 2024-09-11 RX ORDER — LANCETS
EACH MISCELLANEOUS
Qty: 102 EACH | Refills: 10 | OUTPATIENT
Start: 2024-09-11

## 2024-09-20 PROBLEM — Z00.00 ENCOUNTER FOR SUBSEQUENT ANNUAL WELLNESS VISIT (AWV) IN MEDICARE PATIENT: Status: RESOLVED | Noted: 2024-08-21 | Resolved: 2024-09-20

## 2024-09-21 DIAGNOSIS — I42.9 CARDIOMYOPATHY (HCC): ICD-10-CM

## 2024-09-21 RX ORDER — ATORVASTATIN CALCIUM 40 MG/1
40 TABLET, FILM COATED ORAL DAILY
Qty: 90 TABLET | Refills: 0 | Status: SHIPPED | OUTPATIENT
Start: 2024-09-21

## 2024-09-23 RX ORDER — LOSARTAN POTASSIUM 25 MG/1
25 TABLET ORAL DAILY
Qty: 90 TABLET | Refills: 0 | Status: SHIPPED | OUTPATIENT
Start: 2024-09-23

## 2024-09-26 ENCOUNTER — TELEPHONE (OUTPATIENT)
Dept: FAMILY MEDICINE CLINIC | Facility: CLINIC | Age: 79
End: 2024-09-26

## 2024-09-26 NOTE — TELEPHONE ENCOUNTER
Patient message: Hi, I'm calling from Anil Mesa, YOB: 1945. She has appointment tomorrow September 27th at 8:20 but we need cancel and reschedule the appointment. Can you please call me back at [phone]? Thank you and have a good day    Tiffany reschedule by Gena

## 2024-09-26 NOTE — TELEPHONE ENCOUNTER
Marci Mesa July 20, 1945 we are calling to cancel tomorrow's appointment, Friday. It was 8:20 a.m. please contact us at [phone].      Appointment rescheduled.

## 2024-09-28 ENCOUNTER — APPOINTMENT (OUTPATIENT)
Dept: LAB | Facility: HOSPITAL | Age: 79
End: 2024-09-28
Payer: COMMERCIAL

## 2024-09-28 DIAGNOSIS — E78.5 HYPERLIPIDEMIA, UNSPECIFIED HYPERLIPIDEMIA TYPE: ICD-10-CM

## 2024-09-28 DIAGNOSIS — E11.65 UNCONTROLLED TYPE 2 DIABETES MELLITUS WITH HYPERGLYCEMIA (HCC): ICD-10-CM

## 2024-09-28 DIAGNOSIS — C83.31 DIFFUSE LARGE B-CELL LYMPHOMA OF LYMPH NODES OF NECK (HCC): ICD-10-CM

## 2024-09-28 DIAGNOSIS — E53.8 B12 DEFICIENCY: ICD-10-CM

## 2024-09-28 DIAGNOSIS — D47.2 MONOCLONAL GAMMOPATHY: ICD-10-CM

## 2024-09-28 DIAGNOSIS — R79.0 LOW FERRITIN: ICD-10-CM

## 2024-09-28 LAB
ALBUMIN SERPL BCG-MCNC: 4.1 G/DL (ref 3.5–5)
ALP SERPL-CCNC: 79 U/L (ref 34–104)
ALT SERPL W P-5'-P-CCNC: 42 U/L (ref 7–52)
ANION GAP SERPL CALCULATED.3IONS-SCNC: 6 MMOL/L (ref 4–13)
AST SERPL W P-5'-P-CCNC: 27 U/L (ref 13–39)
BASOPHILS # BLD AUTO: 0.07 THOUSANDS/ΜL (ref 0–0.1)
BASOPHILS NFR BLD AUTO: 1 % (ref 0–1)
BILIRUB SERPL-MCNC: 0.34 MG/DL (ref 0.2–1)
BUN SERPL-MCNC: 22 MG/DL (ref 5–25)
CALCIUM SERPL-MCNC: 10 MG/DL (ref 8.4–10.2)
CHLORIDE SERPL-SCNC: 96 MMOL/L (ref 96–108)
CHOLEST SERPL-MCNC: 69 MG/DL
CO2 SERPL-SCNC: 32 MMOL/L (ref 21–32)
CREAT SERPL-MCNC: 0.87 MG/DL (ref 0.6–1.3)
CRP SERPL QL: <1 MG/L
EOSINOPHIL # BLD AUTO: 0.17 THOUSAND/ΜL (ref 0–0.61)
EOSINOPHIL NFR BLD AUTO: 2 % (ref 0–6)
ERYTHROCYTE [DISTWIDTH] IN BLOOD BY AUTOMATED COUNT: 14.3 % (ref 11.6–15.1)
ERYTHROCYTE [SEDIMENTATION RATE] IN BLOOD: 46 MM/HOUR (ref 0–29)
EST. AVERAGE GLUCOSE BLD GHB EST-MCNC: 163 MG/DL
FERRITIN SERPL-MCNC: 47 NG/ML (ref 11–307)
GFR SERPL CREATININE-BSD FRML MDRD: 64 ML/MIN/1.73SQ M
GLUCOSE P FAST SERPL-MCNC: 103 MG/DL (ref 65–99)
HBA1C MFR BLD: 7.3 %
HCT VFR BLD AUTO: 38.8 % (ref 34.8–46.1)
HDLC SERPL-MCNC: 31 MG/DL
HGB BLD-MCNC: 12.2 G/DL (ref 11.5–15.4)
IGA SERPL-MCNC: 334 MG/DL (ref 66–433)
IGG SERPL-MCNC: 1560 MG/DL (ref 635–1741)
IGM SERPL-MCNC: 32 MG/DL (ref 45–281)
IMM GRANULOCYTES # BLD AUTO: 0.05 THOUSAND/UL (ref 0–0.2)
IMM GRANULOCYTES NFR BLD AUTO: 1 % (ref 0–2)
IRON SATN MFR SERPL: 18 % (ref 15–50)
IRON SERPL-MCNC: 56 UG/DL (ref 50–212)
LDH SERPL-CCNC: 126 U/L (ref 140–271)
LDLC SERPL CALC-MCNC: 19 MG/DL (ref 0–100)
LYMPHOCYTES # BLD AUTO: 3.85 THOUSANDS/ΜL (ref 0.6–4.47)
LYMPHOCYTES NFR BLD AUTO: 40 % (ref 14–44)
MCH RBC QN AUTO: 29.5 PG (ref 26.8–34.3)
MCHC RBC AUTO-ENTMCNC: 31.4 G/DL (ref 31.4–37.4)
MCV RBC AUTO: 94 FL (ref 82–98)
MONOCYTES # BLD AUTO: 0.91 THOUSAND/ΜL (ref 0.17–1.22)
MONOCYTES NFR BLD AUTO: 9 % (ref 4–12)
NEUTROPHILS # BLD AUTO: 4.59 THOUSANDS/ΜL (ref 1.85–7.62)
NEUTS SEG NFR BLD AUTO: 47 % (ref 43–75)
NONHDLC SERPL-MCNC: 38 MG/DL
NRBC BLD AUTO-RTO: 0 /100 WBCS
PLATELET # BLD AUTO: 302 THOUSANDS/UL (ref 149–390)
PMV BLD AUTO: 9.4 FL (ref 8.9–12.7)
POTASSIUM SERPL-SCNC: 4.1 MMOL/L (ref 3.5–5.3)
PROT SERPL-MCNC: 7.8 G/DL (ref 6.4–8.4)
RBC # BLD AUTO: 4.14 MILLION/UL (ref 3.81–5.12)
SODIUM SERPL-SCNC: 134 MMOL/L (ref 135–147)
TIBC SERPL-MCNC: 313 UG/DL (ref 250–450)
TRIGL SERPL-MCNC: 96 MG/DL
UIBC SERPL-MCNC: 257 UG/DL (ref 155–355)
VIT B12 SERPL-MCNC: 257 PG/ML (ref 180–914)
WBC # BLD AUTO: 9.64 THOUSAND/UL (ref 4.31–10.16)

## 2024-09-28 PROCEDURE — 85025 COMPLETE CBC W/AUTO DIFF WBC: CPT

## 2024-09-28 PROCEDURE — 84165 PROTEIN E-PHORESIS SERUM: CPT

## 2024-09-28 PROCEDURE — 82784 ASSAY IGA/IGD/IGG/IGM EACH: CPT

## 2024-09-28 PROCEDURE — 83521 IG LIGHT CHAINS FREE EACH: CPT

## 2024-09-28 PROCEDURE — 82232 ASSAY OF BETA-2 PROTEIN: CPT

## 2024-09-28 PROCEDURE — 83036 HEMOGLOBIN GLYCOSYLATED A1C: CPT

## 2024-09-28 PROCEDURE — 83550 IRON BINDING TEST: CPT

## 2024-09-28 PROCEDURE — 80053 COMPREHEN METABOLIC PANEL: CPT

## 2024-09-28 PROCEDURE — 80061 LIPID PANEL: CPT

## 2024-09-28 PROCEDURE — 36415 COLL VENOUS BLD VENIPUNCTURE: CPT

## 2024-09-28 PROCEDURE — 86140 C-REACTIVE PROTEIN: CPT

## 2024-09-28 PROCEDURE — 83615 LACTATE (LD) (LDH) ENZYME: CPT

## 2024-09-28 PROCEDURE — 82728 ASSAY OF FERRITIN: CPT

## 2024-09-28 PROCEDURE — 82607 VITAMIN B-12: CPT

## 2024-09-28 PROCEDURE — 86334 IMMUNOFIX E-PHORESIS SERUM: CPT

## 2024-09-28 PROCEDURE — 85652 RBC SED RATE AUTOMATED: CPT

## 2024-09-28 PROCEDURE — 83540 ASSAY OF IRON: CPT

## 2024-09-30 ENCOUNTER — HOSPITAL ENCOUNTER (OUTPATIENT)
Dept: CT IMAGING | Facility: HOSPITAL | Age: 79
Discharge: HOME | End: 2024-09-30
Attending: INTERNAL MEDICINE
Payer: COMMERCIAL

## 2024-09-30 DIAGNOSIS — T45.1X5A CHEMOTHERAPY-INDUCED PERIPHERAL NEUROPATHY (HCC): Chronic | ICD-10-CM

## 2024-09-30 DIAGNOSIS — G62.0 CHEMOTHERAPY-INDUCED PERIPHERAL NEUROPATHY (HCC): Chronic | ICD-10-CM

## 2024-09-30 DIAGNOSIS — C83.31 DIFFUSE LARGE B-CELL LYMPHOMA OF LYMPH NODES OF NECK (HCC): ICD-10-CM

## 2024-09-30 DIAGNOSIS — Z92.21 STATUS POST CHEMOTHERAPY: ICD-10-CM

## 2024-09-30 LAB — B2 MICROGLOB SERPL-MCNC: 3.7 MG/L (ref 0.6–2.4)

## 2024-09-30 PROCEDURE — 70491 CT SOFT TISSUE NECK W/DYE: CPT

## 2024-09-30 PROCEDURE — 74177 CT ABD & PELVIS W/CONTRAST: CPT

## 2024-09-30 PROCEDURE — 71260 CT THORAX DX C+: CPT

## 2024-09-30 RX ORDER — GABAPENTIN 100 MG/1
100 CAPSULE ORAL 3 TIMES DAILY
Qty: 90 CAPSULE | Refills: 5 | Status: SHIPPED | OUTPATIENT
Start: 2024-09-30

## 2024-09-30 RX ADMIN — IOHEXOL 85 ML: 350 INJECTION, SOLUTION INTRAVENOUS at 11:15

## 2024-10-01 LAB
KAPPA LC FREE SER-MCNC: 357.4 MG/L (ref 3.3–19.4)
KAPPA LC FREE/LAMBDA FREE SER: 5.43 {RATIO} (ref 0.26–1.65)
KAPPA LC UR-MCNC: 245.38 MG/L (ref 1.17–86.46)
KAPPA LC/LAMBDA UR: 13.09 {RATIO} (ref 1.83–14.26)
LAMBDA LC FREE SERPL-MCNC: 65.8 MG/L (ref 5.7–26.3)
LAMBDA LC UR-MCNC: 18.75 MG/L (ref 0.27–15.21)

## 2024-10-01 PROCEDURE — 86334 IMMUNOFIX E-PHORESIS SERUM: CPT | Performed by: STUDENT IN AN ORGANIZED HEALTH CARE EDUCATION/TRAINING PROGRAM

## 2024-10-01 PROCEDURE — 84165 PROTEIN E-PHORESIS SERUM: CPT | Performed by: STUDENT IN AN ORGANIZED HEALTH CARE EDUCATION/TRAINING PROGRAM

## 2024-10-02 LAB
ALBUMIN SERPL ELPH-MCNC: 3.6 G/DL (ref 3.2–5.1)
ALBUMIN SERPL ELPH-MCNC: 50.7 % (ref 48–70)
ALPHA1 GLOB SERPL ELPH-MCNC: 0.22 G/DL (ref 0.15–0.47)
ALPHA1 GLOB SERPL ELPH-MCNC: 3.1 % (ref 1.8–7)
ALPHA2 GLOB SERPL ELPH-MCNC: 0.85 G/DL (ref 0.42–1.04)
ALPHA2 GLOB SERPL ELPH-MCNC: 12 % (ref 5.9–14.9)
BETA GLOB ABNORMAL SERPL ELPH-MCNC: 0.45 G/DL (ref 0.31–0.57)
BETA1 GLOB SERPL ELPH-MCNC: 6.3 % (ref 4.7–7.7)
BETA2 GLOB SERPL ELPH-MCNC: 5.1 % (ref 3.1–7.9)
BETA2+GAMMA GLOB SERPL ELPH-MCNC: 0.36 G/DL (ref 0.2–0.58)
GAMMA GLOB ABNORMAL SERPL ELPH-MCNC: 1.62 G/DL (ref 0.4–1.66)
GAMMA GLOB SERPL ELPH-MCNC: 22.8 % (ref 6.9–22.3)
IGG/ALB SER: 1.03 {RATIO} (ref 1.1–1.8)
INTERPRETATION UR IFE-IMP: NORMAL
M PROTEIN 1 MFR SERPL ELPH: 4.1 %
M PROTEIN 1 SERPL ELPH-MCNC: 0.29 G/DL
PROT PATTERN SERPL ELPH-IMP: ABNORMAL
PROT SERPL-MCNC: 7.1 G/DL (ref 6.4–8.2)

## 2024-10-03 ENCOUNTER — TELEPHONE (OUTPATIENT)
Dept: ENDOCRINOLOGY | Facility: CLINIC | Age: 79
End: 2024-10-03

## 2024-10-03 NOTE — TELEPHONE ENCOUNTER
----- Message from MOIRA Yung sent at 10/3/2024 12:26 PM EDT -----  A1C stable for age. May continue with current regimen as long as no hypoglycemia

## 2024-10-06 DIAGNOSIS — E11.65 UNCONTROLLED TYPE 2 DIABETES MELLITUS WITH HYPERGLYCEMIA (HCC): ICD-10-CM

## 2024-10-07 ENCOUNTER — TELEPHONE (OUTPATIENT)
Age: 79
End: 2024-10-07

## 2024-10-07 ENCOUNTER — OFFICE VISIT (OUTPATIENT)
Dept: HEMATOLOGY ONCOLOGY | Facility: CLINIC | Age: 79
End: 2024-10-07
Payer: COMMERCIAL

## 2024-10-07 VITALS
TEMPERATURE: 97.9 F | HEART RATE: 74 BPM | RESPIRATION RATE: 16 BRPM | BODY MASS INDEX: 23.45 KG/M2 | WEIGHT: 124.2 LBS | HEIGHT: 61 IN | SYSTOLIC BLOOD PRESSURE: 134 MMHG | DIASTOLIC BLOOD PRESSURE: 86 MMHG | OXYGEN SATURATION: 97 %

## 2024-10-07 DIAGNOSIS — C83.31 DIFFUSE LARGE B-CELL LYMPHOMA OF LYMPH NODES OF NECK (HCC): Primary | ICD-10-CM

## 2024-10-07 DIAGNOSIS — R93.89 ABNORMAL CT SCAN, NECK: ICD-10-CM

## 2024-10-07 DIAGNOSIS — E53.8 B12 DEFICIENCY: ICD-10-CM

## 2024-10-07 DIAGNOSIS — D47.2 MONOCLONAL GAMMOPATHY: ICD-10-CM

## 2024-10-07 DIAGNOSIS — R79.0 LOW FERRITIN: ICD-10-CM

## 2024-10-07 PROCEDURE — 99214 OFFICE O/P EST MOD 30 MIN: CPT | Performed by: INTERNAL MEDICINE

## 2024-10-07 PROCEDURE — G2211 COMPLEX E/M VISIT ADD ON: HCPCS | Performed by: INTERNAL MEDICINE

## 2024-10-07 RX ORDER — INSULIN GLARGINE 100 [IU]/ML
INJECTION, SOLUTION SUBCUTANEOUS
Qty: 15 ML | Refills: 1 | Status: SHIPPED | OUTPATIENT
Start: 2024-10-07

## 2024-10-07 NOTE — TELEPHONE ENCOUNTER
Pt's grand daughter calling to schedule per referral. Pt realized the referral was for OTOLARYNGOLOGY

## 2024-10-07 NOTE — TELEPHONE ENCOUNTER
Trice from Dr Gordon's office called in to see if ENT could get this patient in before August.  She had an abnormal CT and the referral was ASAP.  She was asking to please keep her in mind for any upcoming cancellations.  She has state insurance so SPA wasn't a possibility.

## 2024-10-07 NOTE — PROGRESS NOTES
Hematology/Oncology Outpatient Follow-up  Marci Sandoval 79 y.o. female 1945 [id number]    Date:  10/7/2024        Assessment and Plan:  1. 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   The neck adenopathy according to the recent CT neck seems to be stable and nonpathological.    2. Abnormal CT scan, neck  She was educated about the asymmetry of the base of the tongue which may be contributing factor for her dysphagia.  She was offered to be seen by the ENT team for direct visualization and biopsy if needed.  The granddaughter who is the decision-maker seems to be in agreement.  - Ambulatory Referral to Otolaryngology; Future    3. Monoclonal gammopathy  She has IgG kappa gammopathy without obvious endorgan damage.  Will continue to monitor.    4. B12 deficiency  Her vitamin B12 level is low normal.  I did ask her to consider taking vitamin B12 supplements  - CBC and differential; Future  - Comprehensive metabolic panel; Future  - Iron Panel (Includes Ferritin, Iron Sat%, Iron, and TIBC); Future  - Magnesium; Future  - Ferritin; Future  - Vitamin B12; Future    5. Low ferritin  Her ferritin was around 47 which is borderline low.  She is not anemic.  - CBC and differential; Future  - Comprehensive metabolic panel; Future  - Iron Panel (Includes Ferritin, Iron Sat%, Iron, and TIBC); Future  - Magnesium; Future  - Ferritin; Future  - Vitamin B12; Future        HPI:  The patient came in today for a follow-up visit accompanied by her grand daughter who stated that the patient has worsening of her Alzheimer's dementia.  She did complain about significant dysphagia which is getting worse.    She had a CT scan of the soft tissue neck along with the chest abdomen pelvis on 9/30/2024 which showed:  IMPRESSION:  1. New superior endplate fractures at C7-T3, likely recent. There is minimal height loss without  retropulsion or definite underlying osseous lesion.  2. New, asymmetric enhancement at the base of tongue on the left. Recommend direct visualization.  3. Unchanged, chronic maxillary sinusitis.  4. Stable lymph nodes in the neck. There is no suspicious adenopathy by size or morphologic criteria.  IMPRESSION:     No acute soft tissue pathology and specifically, no lymphadenopathy in the chest, abdomen or pelvis.     Scarring and bronchiectasis in the right lower lobe, unchanged.     Mild anterior superior endplate wedge compression fractures at C7, T1, T2, and T3, probably subacute.    Recent blood work on 9/28/2024 showed normal CBC with hemoglobin of 12.2.  Creatinine 0.8 with normal calcium and liver enzymes.  LDH was normal.  Sed rate 46.  Immunoglobulins showed low IgM.  Serum light chain concentration for kappa and lambda are elevated with ratio of 5.4.  SPEP shows monoclonal gammopathy.  Serum immunofixation was compatible with IgG kappa gammopathy.  Ferritin 47 with saturation of 18%.  Vitamin B12 was 257.    Oncology History Overview Note   The patient complained about significant sore throat in May which was treated with antibiotics by her PCP in [place] which did not improve her sore throat.  She then started to notice significant odynophagia and dysphagia for liquids it is and solid nutrition.  Eventually, she had a CT scan of the neck on the 20th of September which showed soft tissue lesion in the left palatine tonsil with abnormal lymph nodes bilaterally in the neck compatible with neoplastic process.  She then had a biopsy of the left tonsil/left tonsillectomy on the 25th of September 2018 which was compatible with diffuse large B-cell lymphoma germinal center B phenotype.  The immunohistochemical staining came back positive for BCL2, BCL 6 and myc with Ki 67 around 70%.  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  During the hospital course the patient had another biopsy of the left tonsil to better understand the exact aggressiveness of her large B-cell lymphoma.  The biopsy on the 20th of November showed large B-cell lymphoma with BCL -2 and C myc level expressed surface a type without the myc or BCL gene rearrangement.  Her bone marrow biopsy on the 9th of November was negative for B-cell lymphoma involvement with normal bone marrow trilineage maturation.  She was also evaluated with an MRI of the brain during the hospital course which was negative for any hint of lymphoma involvement.    PET scan on the 14th of November showed IMPRESSION:  1.  FDG avid left posterior oropharyngeal lesion compatible with malignancy.  2.  FDG avid lymph nodes in the neck and left axilla compatible with malignancy.  3.  No FDG avid lymph nodes in the abdomen or pelvis suspicious for malignancy.  4.  Tiny focus of FDG uptake along the skin of the right upper quadrant anterior abdominal wall with mild skin thickening suggested here on CT    Her post treatment PET-CT 3/18/19 was read:  IMPRESSION:  1.  No evidence of hypermetabolic malignancy.  Deauville score of 1.  2.  Mild patchy FDG uptake in the right upper lobe corresponding to patchy  consolidation.  This may be infectious or inflammatory.  This has partially improved from the 2/25/2019 chest x-ray.     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 Dose adjusted R-EPOCH 11/21/18 (1 cycle)- switched to RCHOP after repeat biopsy/pathology reviewed  2. R-CHOP started 12/12/18 completed 3/7/19 (5  cycles)         11/20/2018 -  Cancer Staged    Staging form: Hodgkin and Non-Hodgkin Lymphoma, AJCC 8th Edition  - Clinical stage from 11/20/2018: Stage II (Diffuse large B-cell lymphoma) - Signed by Chencho Gordon MD on 4/1/2024  Stage prefix: Initial diagnosis           Interval history:    ROS: Review of Systems   Constitutional:  Positive for activity change, appetite change, fatigue and unexpected weight change. Negative for chills and fever.   HENT:  Positive for trouble swallowing. Negative for ear pain and sore throat.    Eyes:  Negative for pain and visual disturbance.   Respiratory:  Positive for cough and shortness of breath.    Cardiovascular:  Negative for chest pain and palpitations.   Gastrointestinal:  Positive for constipation. Negative for abdominal pain and vomiting.   Genitourinary:  Negative for dysuria and hematuria.   Musculoskeletal:  Negative for arthralgias and back pain.   Skin:  Negative for color change and rash.   Neurological:  Positive for dizziness. Negative for seizures and syncope.   Psychiatric/Behavioral:  Positive for sleep disturbance.    All other systems reviewed and are negative.      Past Medical History:   Diagnosis Date    Cancer (HCC)     Throat    Chronic pain disorder     Diabetes mellitus (HCC)     Diffuse large B cell lymphoma (HCC)     Dysphagia     GERD without esophagitis     HTN (hypertension)     Hyperlipidemia     Hypertension     MI (myocardial infarction) (HCC)     Port-A-Cath in place 07/29/2019    Thyroid cancer (HCC) 2018       Past Surgical History:   Procedure Laterality Date    BONE MARROW BIOPSY      BREAST BIOPSY Left     CARDIAC CATHETERIZATION N/A 12/9/2022    Procedure: Cardiac catheterization;  Surgeon: Jhonny Rain MD;  Location: [facility];  Service: Cardiology    CARDIAC CATHETERIZATION N/A 12/9/2022    Procedure: Cardiac Coronary Angiogram;  Surgeon: Jhonny Rain MD;  Location: [facility];  Service: Cardiology    CARDIAC  CATHETERIZATION Left 12/9/2022    Procedure: Cardiac Left Heart Cath;  Surgeon: Jhonny Rain MD;  Location: [facility];  Service: Cardiology    CHOLECYSTECTOMY      IR PORT PLACEMENT  11/16/2018    IR PORT REMOVAL  3/22/2021    OTHER SURGICAL HISTORY      tendor tear repair to right shoulder    SHOULDER ARTHROSCOPY         Social History     Socioeconomic History    Marital status:      Spouse name: None    Number of children: None    Years of education: None    Highest education level: None   Occupational History    None   Tobacco Use    Smoking status: Never     Passive exposure: Never    Smokeless tobacco: Never   Vaping Use    Vaping status: Never Used   Substance and Sexual Activity    Alcohol use: Never     Comment: 0    Drug use: No    Sexual activity: Not Currently     Partners: Male   Other Topics Concern    None   Social History Narrative    None     Social Determinants of Health     Financial Resource Strain: Low Risk  (8/21/2024)    Overall Financial Resource Strain (CARDIA)     Difficulty of Paying Living Expenses: Not hard at all   Food Insecurity: No Food Insecurity (8/21/2024)    Hunger Vital Sign     Worried About Running Out of Food in the Last Year: Never true     Ran Out of Food in the Last Year: Never true   Transportation Needs: No Transportation Needs (8/21/2024)    PRAPARE - Transportation     Lack of Transportation (Medical): No     Lack of Transportation (Non-Medical): No   Physical Activity: Not on file   Stress: Stress Concern Present (3/21/2022)    Citizen of [place] [employer] - Occupational Stress Questionnaire     Feeling of Stress : To some extent   Social Connections: Not on file   Intimate Partner Violence: Not on file   Housing Stability: Unknown (8/21/2024)    Housing Stability Vital Sign     Unable to Pay for Housing in the Last Year: No     Number of Times Moved in the Last Year: Not on file     Homeless in the Last Year: No       Family History    Problem Relation Age of Onset    Diabetes Mother     Heart disease Sister     Uterine cancer Maternal Grandmother     Prostate cancer Paternal Grandfather     Hypertension Brother     Breast cancer Neg Hx        No Known Allergies      Current Outpatient Medications:     Accu-Chek FastClix Lancets MISC, USAR PARA PROBAR AZUCAR EN LA TWAN MANISHA VECES AL JEFFREY, Disp: 100 each, Rfl: 5    acetaminophen (TYLENOL) 650 mg CR tablet, Take 1 tablet (650 mg total) by mouth every 8 (eight) hours as needed for mild pain, Disp: 30 tablet, Rfl: 0    albuterol (PROVENTIL HFA,VENTOLIN HFA) 90 mcg/act inhaler, Inhale 1 puff every 4 (four) hours as needed for wheezing, Disp: 18 g, Rfl: 10    ALPRAZolam (XANAX) 0.25 mg tablet, Take 1 tablet (0.25 mg total) by mouth daily at bedtime as needed for anxiety, Disp: 30 tablet, Rfl: 0    atorvastatin (LIPITOR) 40 mg tablet, TAKE 1 TABLET BY MOUTH EVERY DAY, Disp: 90 tablet, Rfl: 0    Mary Jane Low Dose 81 MG chewable tablet, CHEW 1 TABLET BY MOUTH DAILY, Disp: 90 tablet, Rfl: 0    Blood Glucose Monitoring Suppl (OneTouch Verio Reflect) w/Device KIT, Check blood sugars once daily. Please substitute with appropriate alternative as covered by patient's insurance. Dx: E11.65, Disp: 1 kit, Rfl: 0    Blood Glucose Monitoring Suppl (OneTouch Verio) w/Device KIT, Use per  guidelines, Disp: 1 kit, Rfl: 0    Calcium Carb-Cholecalciferol 600-10 MG-MCG TABS, TAKE 1 TABLET BY MOUTH EVERY DAY IN THE MORNING, Disp: 90 tablet, Rfl: 1    carvedilol (COREG) 12.5 mg tablet, TAKE 1 TABLET BY MOUTH TWICE A DAY WITH FOOD, Disp: 180 tablet, Rfl: 0    cyanocobalamin (VITAMIN B-12) 1000 MCG tablet, Take 1 tablet (1,000 mcg total) by mouth daily, Disp: 90 tablet, Rfl: 3    Empagliflozin (Jardiance) 25 MG TABS, TAKE 1 TABLET BY MOUTH EVERY DAY IN THE MORNING, Disp: 90 tablet, Rfl: 1    ferrous sulfate 325 (65 Fe) mg tablet, Take 1 tablet (325 mg total) by mouth daily with breakfast, Disp: 90 tablet, Rfl: 2     fluticasone (FLONASE) 50 mcg/act nasal spray, SPRAY 1 SPRAY INTO EACH NOSTRIL EVERY DAY, Disp: 24 mL, Rfl: 4    furosemide (LASIX) 20 mg tablet, Take 1 tablet (20 mg total) by mouth daily, Disp: 90 tablet, Rfl: 3    gabapentin (NEURONTIN) 100 mg capsule, TAKE 1 CAPSULE (100 MG TOTAL) BY MOUTH 3 (THREE) TIMES A DAY, Disp: 90 capsule, Rfl: 5    glucose blood (Accu-Chek Guide) test strip, USAR PARA EXAMINAR AZUCAR EN LA TWAN MANISHA VECES AL JEFFREY, Disp: 100 strip, Rfl: 5    glucose blood (OneTouch Verio) test strip, Check blood sugars once daily. Please substitute with appropriate alternative as covered by patient's insurance. Dx: E11.65, Disp: 100 each, Rfl: 3    guaiFENesin (MUCINEX) 600 mg 12 hr tablet, Take 2 tablets (1,200 mg total) by mouth every 12 (twelve) hours, Disp: 30 tablet, Rfl: 1    Insulin Glargine Solostar (Lantus SoloStar) 100 UNIT/ML SOPN, INJECT 0.11 ML (11 UNITS TOTAL) UNDER THE SKIN IN THE MORNING, Disp: 15 mL, Rfl: 1    Insulin Pen Needle (BD Pen Needle Micro U/F) 32G X 6 MM MISC, Use daily, Disp: 100 each, Rfl: 1    Janumet  MG per tablet, TAKE 1 TABLET BY MOUTH TWICE A DAY WITH FOOD, Disp: 60 tablet, Rfl: 1    lidocaine (Lidoderm) 5 %, Apply 1 patch topically over 12 hours daily Remove & Discard patch within 12 hours or as directed by MD, Disp: 15 patch, Rfl: 0    loperamide (IMODIUM) 2 mg capsule, MAR JOHNNY (1) CAPSULA POR VIA ORAL ALFREDO SEA NECESARIO PARA LA DIARREA, Disp: 30 capsule, Rfl: 10    losartan (COZAAR) 25 mg tablet, TAKE 1 TABLET (25 MG TOTAL) BY MOUTH DAILY., Disp: 90 tablet, Rfl: 0    Melatonin 5 MG TABS, Take by mouth as needed, Disp: , Rfl:     memantine (NAMENDA) 10 mg tablet, Take 1 tablet (10 mg total) by mouth 2 (two) times a day, Disp: 180 tablet, Rfl: 1    mirtazapine (REMERON) 15 mg tablet, Take 1 tablet (15 mg total) by mouth daily at bedtime, Disp: 90 tablet, Rfl: 0    omeprazole (PriLOSEC) 20 mg delayed release capsule, Take 1 capsule (20 mg total) by mouth 2  "(two) times a day, Disp: 180 capsule, Rfl: 3    ondansetron (ZOFRAN) 4 mg tablet, Take 1 tablet (4 mg total) by mouth every 8 (eight) hours as needed for nausea or vomiting, Disp: 20 tablet, Rfl: 0    OneTouch Delica Lancets 33G MISC, Check blood sugars once daily. Please substitute with appropriate alternative as covered by patient's insurance. Dx: E11.65, Disp: 100 each, Rfl: 3      Physical Exam:  /86 (BP Location: Left arm, Patient Position: Sitting, Cuff Size: Adult)   Pulse 74   Temp 97.9 °F (36.6 °C) (Temporal)   Resp 16   Ht 5' 1\" (1.549 m)   Wt 56.3 kg (124 lb 3.2 oz)   SpO2 97%   BMI 23.47 kg/m²     Physical Exam  Constitutional:       General: She is not in acute distress.     Appearance: She is well-developed. She is ill-appearing. She is not diaphoretic.   HENT:      Head: Normocephalic and atraumatic.      Nose: Nose normal.   Eyes:      General: No scleral icterus.        Right eye: No discharge.         Left eye: No discharge.      Conjunctiva/sclera: Conjunctivae normal.      Pupils: Pupils are equal, round, and reactive to light.   Neck:      Thyroid: No thyromegaly.      Vascular: No JVD.      Trachea: No tracheal deviation.   Cardiovascular:      Rate and Rhythm: Normal rate and regular rhythm.      Heart sounds: Normal heart sounds. No murmur heard.     No friction rub.   Pulmonary:      Effort: Pulmonary effort is normal. No respiratory distress.      Breath sounds: Normal breath sounds. No stridor. No wheezing or rales.   Chest:      Chest wall: No tenderness.   Abdominal:      General: There is no distension.      Palpations: Abdomen is soft. There is no hepatomegaly or splenomegaly.      Tenderness: There is no abdominal tenderness. There is no guarding or rebound.   Musculoskeletal:         General: No tenderness or deformity. Normal range of motion.      Cervical back: Normal range of motion and neck supple.   Lymphadenopathy:      Cervical: No cervical adenopathy.   Skin:     " "General: Skin is warm and dry.      Coloration: Skin is not pale.      Findings: No erythema or rash.   Neurological:      Mental Status: She is alert.      Cranial Nerves: No cranial nerve deficit.      Coordination: Coordination normal.      Deep Tendon Reflexes: Reflexes are normal and symmetric.           Labs:  Lab Results   Component Value Date    WBC 9.64 09/28/2024    HGB 12.2 09/28/2024    HCT 38.8 09/28/2024    MCV 94 09/28/2024     09/28/2024     Lab Results   Component Value Date    K 4.1 09/28/2024    CL 96 09/28/2024    CO2 32 09/28/2024    BUN 22 09/28/2024    CREATININE 0.87 09/28/2024    GLUF 103 (H) 09/28/2024    CALCIUM 10.0 09/28/2024    CORRECTEDCA 8.5 07/06/2023    AST 27 09/28/2024    ALT 42 09/28/2024    ALKPHOS 79 09/28/2024    EGFR 64 09/28/2024     No results found for: \"TSH\"    Patient voiced understanding and agreement in the above discussion. Aware to contact our office with questions/symptoms in the interim.   "

## 2024-10-07 NOTE — TELEPHONE ENCOUNTER
Pt granddaughter called stating that  placed order for ENT and when she called to schedule it they gave her 8/2025 and placed on waiting list. I called over to office inquiring about it and due to pt having state insurance she can only be seen at 2 locations per Lilli at clinic. She stated to let family know they can try [facility] to see about a sooner apt. Was able to transfer pt over to [facility] to check and call back the hopeline if needed

## 2024-10-12 DIAGNOSIS — Z51.5 PALLIATIVE CARE PATIENT: ICD-10-CM

## 2024-10-12 DIAGNOSIS — C83.31 DIFFUSE LARGE B-CELL LYMPHOMA OF LYMPH NODES OF NECK (HCC): ICD-10-CM

## 2024-10-15 RX ORDER — FLUTICASONE PROPIONATE 50 MCG
SPRAY, SUSPENSION (ML) NASAL
Qty: 24 ML | Refills: 4 | Status: SHIPPED | OUTPATIENT
Start: 2024-10-15

## 2024-10-17 DIAGNOSIS — R45.89 ANXIETY ABOUT HEALTH: ICD-10-CM

## 2024-10-21 ENCOUNTER — OFFICE VISIT (OUTPATIENT)
Dept: PODIATRY | Facility: CLINIC | Age: 79
End: 2024-10-21
Payer: COMMERCIAL

## 2024-10-21 DIAGNOSIS — B35.1 PAIN DUE TO ONYCHOMYCOSIS OF TOENAIL: ICD-10-CM

## 2024-10-21 DIAGNOSIS — M79.676 PAIN DUE TO ONYCHOMYCOSIS OF TOENAIL: ICD-10-CM

## 2024-10-21 DIAGNOSIS — E11.9 TYPE 2 DIABETES MELLITUS WITHOUT COMPLICATION, WITHOUT LONG-TERM CURRENT USE OF INSULIN (HCC): Primary | ICD-10-CM

## 2024-10-21 DIAGNOSIS — T45.1X5A CHEMOTHERAPY-INDUCED PERIPHERAL NEUROPATHY (HCC): ICD-10-CM

## 2024-10-21 DIAGNOSIS — R26.2 AMBULATORY DYSFUNCTION: ICD-10-CM

## 2024-10-21 DIAGNOSIS — G62.0 CHEMOTHERAPY-INDUCED PERIPHERAL NEUROPATHY (HCC): ICD-10-CM

## 2024-10-21 PROCEDURE — 11721 DEBRIDE NAIL 6 OR MORE: CPT | Performed by: PODIATRIST

## 2024-10-21 PROCEDURE — RECHECK: Performed by: PODIATRIST

## 2024-10-22 RX ORDER — ALPRAZOLAM 0.25 MG/1
0.25 TABLET ORAL
Qty: 30 TABLET | Refills: 0 | Status: SHIPPED | OUTPATIENT
Start: 2024-10-22

## 2024-10-24 ENCOUNTER — OFFICE VISIT (OUTPATIENT)
Dept: FAMILY MEDICINE CLINIC | Facility: CLINIC | Age: 79
End: 2024-10-24

## 2024-10-24 VITALS
SYSTOLIC BLOOD PRESSURE: 130 MMHG | RESPIRATION RATE: 16 BRPM | HEIGHT: 61 IN | TEMPERATURE: 97.5 F | OXYGEN SATURATION: 96 % | HEART RATE: 71 BPM | DIASTOLIC BLOOD PRESSURE: 60 MMHG | WEIGHT: 126 LBS | BODY MASS INDEX: 23.79 KG/M2

## 2024-10-24 DIAGNOSIS — G30.1 LATE ONSET ALZHEIMER'S DEMENTIA WITH OTHER BEHAVIORAL DISTURBANCE, UNSPECIFIED DEMENTIA SEVERITY (HCC): ICD-10-CM

## 2024-10-24 DIAGNOSIS — F03.90 DEMENTIA WITHOUT BEHAVIORAL DISTURBANCE (HCC): ICD-10-CM

## 2024-10-24 DIAGNOSIS — Z51.5 PALLIATIVE CARE PATIENT: Primary | ICD-10-CM

## 2024-10-24 DIAGNOSIS — I10 ESSENTIAL HYPERTENSION: ICD-10-CM

## 2024-10-24 DIAGNOSIS — Z23 ENCOUNTER FOR IMMUNIZATION: ICD-10-CM

## 2024-10-24 DIAGNOSIS — F02.818 LATE ONSET ALZHEIMER'S DEMENTIA WITH OTHER BEHAVIORAL DISTURBANCE, UNSPECIFIED DEMENTIA SEVERITY (HCC): ICD-10-CM

## 2024-10-24 PROCEDURE — 99214 OFFICE O/P EST MOD 30 MIN: CPT | Performed by: NURSE PRACTITIONER

## 2024-10-24 PROCEDURE — 90662 IIV NO PRSV INCREASED AG IM: CPT | Performed by: NURSE PRACTITIONER

## 2024-10-24 PROCEDURE — 3078F DIAST BP <80 MM HG: CPT | Performed by: NURSE PRACTITIONER

## 2024-10-24 PROCEDURE — G0008 ADMIN INFLUENZA VIRUS VAC: HCPCS | Performed by: NURSE PRACTITIONER

## 2024-10-24 PROCEDURE — 3075F SYST BP GE 130 - 139MM HG: CPT | Performed by: NURSE PRACTITIONER

## 2024-10-24 NOTE — PROGRESS NOTES
Ambulatory Visit  Name: Marci Sandoval      : 1945      MRN: [id number]  Encounter Provider: MOIRA Gan  Encounter Date: 10/24/2024   Encounter department: [facility]    Assessment & Plan  Late onset Alzheimer's dementia with other behavioral disturbance, unspecified dementia severity (HCC)    Orders:    Ambulatory Referral to Senior Care; Future    Encounter for immunization    Orders:    influenza vaccine, high-dose, PF 0.5 mL (Fluzone High Dose)    Essential hypertension  BP is well controlled, no reports of dizziness or falls  continue current regimen at this time - carvedilol 12.5 mg bid, furosemide 20 mg daily, losartan 25 mg daily            Falls Plan of Care: Medications that increase falls were reviewed.       History of Present Illness     Marci Sandoval is a 79 y.o. female who  has a past medical history of Cancer (HCC), Chronic pain disorder, Diabetes mellitus (HCC), Diffuse large B cell lymphoma (HCC), Dysphagia, GERD without esophagitis, HTN (hypertension), Hyperlipidemia, Hypertension, MI (myocardial infarction) (HCC), Port-A-Cath in place, and Thyroid cancer (HCC). who presented to the office today for follow up. She is accompanied by her granddaughter who is her caretaker. Granddaughter reports that patient has had increasing agitated behavior. She is also having decreased sleep and appetite.      The following portions of the patient's history were reviewed and updated as appropriate: allergies, current medications, past family history, past medical history, past social history, past surgical history and problem list.          Shower chair  Glucercheyenne - vanilla          Review of Systems   Constitutional:  Positive for appetite change. Negative for chills and fever.   HENT:  Negative for congestion, rhinorrhea and sore throat.    Respiratory:  Negative for cough and shortness of breath.    Cardiovascular:  Negative  "for chest pain.   Gastrointestinal:  Negative for diarrhea, nausea and vomiting.   Musculoskeletal:  Negative for back pain.   Skin:  Negative for rash.   Neurological:  Negative for dizziness and headaches.   Psychiatric/Behavioral:  Positive for behavioral problems and sleep disturbance.            Objective     /60 (BP Location: Right arm, Patient Position: Sitting, Cuff Size: Standard)   Pulse 71   Temp 97.5 °F (36.4 °C) (Temporal)   Resp 16   Ht 5' 1\" (1.549 m)   Wt 57.2 kg (126 lb)   SpO2 96%   BMI 23.81 kg/m²     Physical Exam  Vitals and nursing note reviewed.   Constitutional:       Appearance: Normal appearance.   HENT:      Head: Normocephalic and atraumatic.      Right Ear: External ear normal.      Left Ear: External ear normal.   Eyes:      Conjunctiva/sclera: Conjunctivae normal.   Cardiovascular:      Rate and Rhythm: Normal rate and regular rhythm.   Pulmonary:      Effort: Pulmonary effort is normal.   Musculoskeletal:      Right lower leg: No edema.      Left lower leg: No edema.   Neurological:      Mental Status: She is alert. Mental status is at baseline.   Psychiatric:         Mood and Affect: Mood normal.         Behavior: Behavior is cooperative.         "

## 2024-10-25 DIAGNOSIS — F41.9 ANXIETY: ICD-10-CM

## 2024-10-25 RX ORDER — MIRTAZAPINE 15 MG/1
15 TABLET, FILM COATED ORAL
Qty: 90 TABLET | Refills: 0 | Status: SHIPPED | OUTPATIENT
Start: 2024-10-25

## 2024-10-28 DIAGNOSIS — E11.65 UNCONTROLLED TYPE 2 DIABETES MELLITUS WITH HYPERGLYCEMIA (HCC): ICD-10-CM

## 2024-10-28 RX ORDER — SITAGLIPTIN AND METFORMIN HYDROCHLORIDE 1000; 50 MG/1; MG/1
1 TABLET, FILM COATED ORAL 2 TIMES DAILY WITH MEALS
Qty: 60 TABLET | Refills: 1 | Status: SHIPPED | OUTPATIENT
Start: 2024-10-28

## 2024-10-28 NOTE — ASSESSMENT & PLAN NOTE
Baseline hemoglobin appears to be approximately 11-12  Currently stable at 10.9, monitor and transfuse as needed  We will hold iron in the setting of active infection
Baseline hemoglobin appears to be approximately 11-12  Currently stable at 10.9, monitor and transfuse as needed  We will hold iron in the setting of active infection
Baseline hemoglobin appears to be approximately 11-12  Currently stable at 9.6, monitor and transfuse as needed  We will hold iron in the setting of active infection
Baseline hemoglobin appears to be approximately 11-12  Currently stable at 9.9, monitor and transfuse as needed  We will hold iron in the setting of active infection
Lab Results   Component Value Date    HGBA1C 6.9 (A) 04/06/2023       No results for input(s): "POCGLU" in the last 72 hours.     Blood Sugar Average: Last 72 hrs:    · Continue PTA glargine 9un qhs  · IASS + FS monitoring  · Hypoglycemia protocol  · Hold home janumet
Lab Results   Component Value Date    HGBA1C 6.9 (A) 04/06/2023       Recent Labs     07/05/23 2037 07/06/23 0717 07/06/23  1114   POCGLU 104 115 294*       Blood Sugar Average: Last 72 hrs:    · Continue PTA glargine 9un qhs  · IASS + FS monitoring  · Hypoglycemia protocol  · Hold home janumet  (P) 171
Lab Results   Component Value Date    HGBA1C 6.9 (A) 04/06/2023       Recent Labs     07/06/23  1637 07/06/23  2100 07/07/23  0717 07/07/23  1126   POCGLU 174* 180* 165* 148*       Blood Sugar Average: Last 72 hrs:    · Continue PTA glargine 9un qhs  · IASS + FS monitoring  · Hypoglycemia protocol  · Hold home janumet  (P) 171
Lab Results   Component Value Date    HGBA1C 6.9 (A) 04/06/2023       Recent Labs     07/07/23  1613 07/07/23  2056 07/08/23  0804 07/08/23  1118   POCGLU 211* 207* 167* 177*       Blood Sugar Average: Last 72 hrs:    · Continue PTA glargine 9un qhs  · IASS + FS monitoring  · Hypoglycemia protocol  · Hold home janumet  (P) 177.5
Lab Results   Component Value Date    HGBA1C 6.9 (A) 04/06/2023       Recent Labs     07/08/23  1613 07/08/23  2156 07/08/23  2223 07/09/23  0714   POCGLU 177* 228* 200* 146*       Blood Sugar Average: Last 72 hrs:    · Continue PTA glargine 9un qhs  · IASS + FS monitoring  · Hypoglycemia protocol  · Hold home janumet  (P) [id number]
Sepsis - POA - a/e/b leukocytosis (WBC 18.71) and tachycardia (104)  -  in the setting of PNA - requiring IV fluids and IV Ceftriaxone.
· BP wnl on admission  · Continue PTA regimen: losartan, carvedilol
· Continue CPAP qhs
· Continue PPI
· Continue memantine
· Continue mirtazapine  · Continue social support at home
· Continue mirtazapine, alprazolam prn
· In remission for 4 years  · Followed by heme/onc as outpatient
· Patient presenting with significant left-lower quadrant and suprapubic pain with increased frequency in urination over the last 2 days  · UA showing trace leukocytes and 4-10 WBC  · UCx pending  · Started on Rocephin in the ED, will continue and monitor
· Patient presenting with significant left-lower quadrant and suprapubic pain with increased frequency in urination over the last 2 days  · UA showing trace leukocytes and 4-10 WBC  · UCx pending  · Started on Rocephin in the ED, will continue and monitor
· Patient presenting with significant left-lower quadrant and suprapubic pain with increased frequency in urination over the last 2 days  · UA showing trace leukocytes and 4-10 WBC  · UCx shows beta-hemolytic strep; Continue present therapy and await sensitivities  · Started on Rocephin in the ED, will continue and monitor
· Patient with ongoing coughing over the last 2 weeks, denies any fevers or chills  · CT scan showing "Consolidation right lower lobe with associated bronchiectatic changes"  · Patient also with leukocytosis  · Patient was started on Ceftriaxone in the ED, will continue  · procalcitonin negative x 2; however white count trended down sharply on antibiotics  · Monitor fever curve  · Respiratory protocol  · F/u Sputum culture
· Patient with ongoing coughing over the last 2 weeks, denies any fevers or chills  · CT scan showing "Consolidation right lower lobe with associated bronchiectatic changes"  · Patient also with leukocytosis  · Patient was started on Ceftriaxone in the ED, will continue  · procalcitonin ordered, trend  · Monitor fever curve  · Respiratory protocol  · F/u Sputum culture
· Presents with low sodium to 122  · Unclear etiology at this time as patient with recent poor po intake however patient also with possible PNA and may have SIADH  · Will trial patient on gentle IVF hydration and continue to monitor Na q8h  · Nephrology consulted, recommendations appreciated
· Presents with low sodium to 122  · Unclear etiology at this time as patient with recent poor po intake however patient also with possible PNA and may have SIADH  · Will trial patient on gentle IVF hydration and continue to monitor Na q8h  · Nephrology consulted, recommendations appreciated  · Improved
.

## 2024-11-14 ENCOUNTER — CONSULT (OUTPATIENT)
Dept: OTOLARYNGOLOGY | Facility: CLINIC | Age: 79
End: 2024-11-14
Payer: COMMERCIAL

## 2024-11-14 DIAGNOSIS — C83.31 DIFFUSE LARGE B-CELL LYMPHOMA OF LYMPH NODES OF NECK (HCC): ICD-10-CM

## 2024-11-14 DIAGNOSIS — K21.00 GASTROESOPHAGEAL REFLUX DISEASE WITH ESOPHAGITIS WITHOUT HEMORRHAGE: Primary | ICD-10-CM

## 2024-11-14 DIAGNOSIS — F03.918 DEMENTIA WITH BEHAVIORAL DISTURBANCE (HCC): ICD-10-CM

## 2024-11-14 DIAGNOSIS — R93.89 ABNORMAL CT SCAN, NECK: ICD-10-CM

## 2024-11-14 DIAGNOSIS — K21.9 LARYNGOPHARYNGEAL REFLUX (LPR): ICD-10-CM

## 2024-11-14 DIAGNOSIS — J32.9 RHINOSINUSITIS: ICD-10-CM

## 2024-11-14 PROCEDURE — 99204 OFFICE O/P NEW MOD 45 MIN: CPT | Performed by: OTOLARYNGOLOGY

## 2024-11-14 PROCEDURE — 31575 DIAGNOSTIC LARYNGOSCOPY: CPT | Performed by: OTOLARYNGOLOGY

## 2024-11-14 RX ORDER — PANTOPRAZOLE SODIUM 40 MG/1
40 TABLET, DELAYED RELEASE ORAL DAILY
Qty: 30 TABLET | Refills: 2 | Status: SHIPPED | OUTPATIENT
Start: 2024-11-14 | End: 2024-12-09

## 2024-11-14 NOTE — PROGRESS NOTES
[facility]  Marci Sandoval 79 y.o. female MRN: [id number]  Encounter: [id number]  Nikhil Soriano MD  Office : [phone]  Also available on [employer]    Thank you for referring Marci Sandoval for an evaluation. My recommendations are included. Please do not hesitate to contact me with any questions you may have.       ASSESSMENT AND PLAN:      1. Gastroesophageal reflux disease with esophagitis without hemorrhage  pantoprazole (PROTONIX) 40 mg tablet      2. Abnormal CT scan, neck  Ambulatory Referral to Otolaryngology      3. Dementia with behavioral disturbance        4. 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 Rhinosinusitis  amoxicillin-clavulanate (AUGMENTIN) 875-125 mg per tablet        Patient with history of B-cell lymphoma, no evidence of disease on the laryngoscopy.  She does have significant purulent rhinorrhea and postnasal purulent discharge.  In addition has a very notorious gastroesophageal reflux history with persistent symptomatology despite Prilosec at twice daily dose.  I changed her to pantoprazole 40 mg in the morning.  In addition reviewed with her and her granddaughter the modifications of the diet.  The CT scan has changes that have been relatively stable over time, does not appear to have an evident area that needs to be biopsied at this point.  ______________________________________________________________________    Reason for consultation : Asymmetry on CT scan    HPI: Marci Sandoval is a 79 y.o. female with history significant for B-cell lymphoma, initially had a left tonsillectomy done in [place],  According to the pathology report 9/25/2018 left tonsil with diffuse large B-cell lymphoma.  A second procedure was done and admission to [facility] on 11/20/2018 ([facility]).  Review of chart reveals that she additionally had a very large oropharyngeal antral  hypopharyngeal mass, she underwent  1 cycle of R-EPOCH and 5 cycles of R-CHOP, with remission.  Comes with her grand daughter who is her caregiver, she reports significant deterioration of her mental abilities, but currently patient complaining of pain in her throat and difficulty swallowing, and sensation of going to choke.  Granddaughter reports that she has constant cough, primarily clearing her throat.  Most recent CT scans with contrast revealed an area with enhancement on the left base of tongue that has not changed over time.  I personally reviewed CT scan neck from 2022 all the way to the most recent CT scan of the neck on 9/30/2024 and initial mass is not present, the enhancing base of tongue area on the left side has been very stable with no change in size, also no significant change in pattern of enhancement.  She does have a history of very significant reflux and continues to be symptomatic despite being on Prilosec twice daily.    PRIOR VISIT, ENDOSCOPIC EVALUATION :     REVIEW OF SYSTEMS:    Review of systems:  10 Point ROS was performed and negative except as above or otherwise noted in the medical record.    Historical Information   Past Medical History:   Diagnosis Date    Cancer (HCC)     Throat    Chronic pain disorder     Diabetes mellitus (HCC)     Diffuse large B cell lymphoma (HCC)     Dysphagia     GERD without esophagitis     HTN (hypertension)     Hyperlipidemia     Hypertension     MI (myocardial infarction) (HCC)     Port-A-Cath in place 07/29/2019    Thyroid cancer (HCC) 2018     Past Surgical History:   Procedure Laterality Date    BONE MARROW BIOPSY      BREAST BIOPSY Left     CARDIAC CATHETERIZATION N/A 12/9/2022    Procedure: Cardiac catheterization;  Surgeon: Jhonny Rain MD;  Location: [facility];  Service: Cardiology    CARDIAC CATHETERIZATION N/A 12/9/2022    Procedure: Cardiac Coronary Angiogram;  Surgeon: Jhonny Rain MD;  Location: [facility];  Service:  Cardiology    CARDIAC CATHETERIZATION Left 12/9/2022    Procedure: Cardiac Left Heart Cath;  Surgeon: Jhonny Rain MD;  Location: [facility];  Service: Cardiology    CHOLECYSTECTOMY      IR PORT PLACEMENT  11/16/2018    IR PORT REMOVAL  3/22/2021    OTHER SURGICAL HISTORY      tendor tear repair to right shoulder    SHOULDER ARTHROSCOPY       Social History   Social History     Substance and Sexual Activity   Alcohol Use Never    Comment: 0     Social History     Substance and Sexual Activity   Drug Use No     Social History     Tobacco Use   Smoking Status Never    Passive exposure: Never   Smokeless Tobacco Never     Family History   Problem Relation Age of Onset    Diabetes Mother     Heart disease Sister     Uterine cancer Maternal Grandmother     Prostate cancer Paternal Grandfather     Hypertension Brother     Breast cancer Neg Hx        Meds/Allergies       Current Outpatient Medications:     Accu-Chek FastClix Lancets MISC    acetaminophen (TYLENOL) 650 mg CR tablet    albuterol (PROVENTIL HFA,VENTOLIN HFA) 90 mcg/act inhaler    ALPRAZolam (XANAX) 0.25 mg tablet    amoxicillin-clavulanate (AUGMENTIN) 875-125 mg per tablet    atorvastatin (LIPITOR) 40 mg tablet    Mary Jane Low Dose 81 MG chewable tablet    Blood Glucose Monitoring Suppl (OneTouch Verio Reflect) w/Device KIT    Blood Glucose Monitoring Suppl (OneTouch Verio) w/Device KIT    Calcium Carb-Cholecalciferol 600-10 MG-MCG TABS    carvedilol (COREG) 12.5 mg tablet    cyanocobalamin (VITAMIN B-12) 1000 MCG tablet    Empagliflozin (Jardiance) 25 MG TABS    ferrous sulfate 325 (65 Fe) mg tablet    fluticasone (FLONASE) 50 mcg/act nasal spray    furosemide (LASIX) 20 mg tablet    gabapentin (NEURONTIN) 100 mg capsule    glucose blood (Accu-Chek Guide) test strip    glucose blood (OneTouch Verio) test strip    guaiFENesin (MUCINEX) 600 mg 12 hr tablet    Insulin Glargine Solostar (Lantus SoloStar) 100 UNIT/ML SOPN    Insulin Pen Needle (BD Pen  Needle Micro U/F) 32G X 6 MM MISC    Janumet  MG per tablet    loperamide (IMODIUM) 2 mg capsule    losartan (COZAAR) 25 mg tablet    mirtazapine (REMERON) 15 mg tablet    omeprazole (PriLOSEC) 20 mg delayed release capsule    ondansetron (ZOFRAN) 4 mg tablet    OneTouch Delica Lancets 33G MISC    pantoprazole (PROTONIX) 40 mg tablet    lidocaine (Lidoderm) 5 %    Melatonin 5 MG TABS    memantine (NAMENDA) 10 mg tablet    No Known Allergies      PHYSICAL EXAM:    not currently breastfeeding. There is no height or weight on file to calculate BMI.  Constitutional: Oriented to person, place, and time. Well-developed and well-nourished, no apparent distress, non-toxic appearance. Cooperative, able to hear and answer questions without difficulty.    Voice: Normal voice quality.  Head: Normocephalic, atraumatic.  No scars, masses or lesions.  Face: Symmetric, no edema, no sinus tenderness.  Eyes: Vision grossly intact, extra-ocular movement intact.  Right Ear: External ear normal.  Auditory canal clear.  Tympanic membrane well-appearing, without retraction or scarring.  No fluid present. No post-auricular erythema or tenderness  Left Ear: External ear normal.  Auditory canal clear.  Tympanic membrane well-appearing, without retraction or scarring.  No fluid present.  No post-auricular erythema or tenderness.  Nose: Septum midline, nares clear.  Mucosa dry, green crusting noticed towards the posterior nose, turbinates well appearing.  No polyps or discharge evident on anterior rhinoscopy.  Oral cavity: Partially edentulous.  Mucosa moist, lips normal.  Tongue mobile, floor of mouth normal.  Hard palate unremarkable.  No masses or lesions.   Oropharynx: Uvula is midline, soft palate normal.  Unremarkable oropharyngeal inlet.  Left tonsil absent, scar tissue noticed on the area, no evident mass. Posterior pharyngeal wall with abundant mucopurulent postnasal discharge trailing along the left side. No masses or  lesions.  Salivary glands:  Parotid glands and submandibular glands symmetric, no enlargement or tenderness.  Neck: Normal laryngeal elevation with swallow.  Trachea midline.  No masses or lesions. No palpable adenopathy.  Thyroid: normal in size, unremarkable without tenderness or palpable nodules.  Pulmonary/Chest: Normal effort and rate. No respiratory distress.   Musculoskeletal: Normal range of motion.   Neurological: Cranial nerves 2-12 intact.  Skin: Skin is warm and dry.   Psychiatric: Normal mood and affect.      Imaging Studies: I have personally reviewed images on the PACS system and : As described on H&P   MBS:1/30/20:   Oral stage was WFL/WNL despite minimal dentition. Pt was able to masticate items given today WFL. Bolus formation, control, and transfer were WNL. Pharyngeal stage was also WNL. Epiglottic inversion, pharyngeal constriction, and hyoid excursion were WNL. There was minimal to no residue. No penetration or aspiration observed this study. No coughing. Per gross esophageal screen a small amt of reflux was observed.       Procedure: Flexible fiberoptic laryngoscopy     Indications: Evaluate areas of the upper aerodigestive tract not seen on physical exam     Procedure in detail: After informed verbal consent was obtained the nose was anesthetized using 4% lidocaine and neosynephrine spray. After adequate time for anesthesia the scope was guided easily along the nasal cavity floor and into the nasopharynx. The nasopharynx, oropharynx, hypopharynx and larynx were evaluated with the below listed findings.  Hypopharynx with significant amount of secretions and some remnants of the postnasal drip is also noticed on the left side.  Base of tongue with no evidence of any mass lesion, no ulceration.  Mucosa on the surface has overall a very benign appearance, larynx with normal appearing vocal cords and normal anatomy, she does have some pooled salivary type secretions with bubbles on the piriform  sinuses     The scope was removed without difficulty and the patient tolerated the procedure well.

## 2024-11-15 ENCOUNTER — PATIENT OUTREACH (OUTPATIENT)
Dept: FAMILY MEDICINE CLINIC | Facility: CLINIC | Age: 79
End: 2024-11-15

## 2024-11-15 DIAGNOSIS — Z79.4 TYPE 2 DIABETES MELLITUS WITH BOTH EYES AFFECTED BY MILD NONPROLIFERATIVE RETINOPATHY WITHOUT MACULAR EDEMA, WITH LONG-TERM CURRENT USE OF INSULIN (HCC): ICD-10-CM

## 2024-11-15 DIAGNOSIS — I42.8 NONISCHEMIC CARDIOMYOPATHY (HCC): ICD-10-CM

## 2024-11-15 DIAGNOSIS — L85.3 XEROSIS OF SKIN: ICD-10-CM

## 2024-11-15 DIAGNOSIS — R63.4 UNINTENDED WEIGHT LOSS: ICD-10-CM

## 2024-11-15 DIAGNOSIS — F03.918 DEMENTIA WITH BEHAVIORAL DISTURBANCE (HCC): ICD-10-CM

## 2024-11-15 DIAGNOSIS — M19.90 ARTHRITIS: ICD-10-CM

## 2024-11-15 DIAGNOSIS — C83.31 DIFFUSE LARGE B-CELL LYMPHOMA OF LYMPH NODES OF NECK (HCC): ICD-10-CM

## 2024-11-15 DIAGNOSIS — G47.33 OBSTRUCTIVE SLEEP APNEA: ICD-10-CM

## 2024-11-15 DIAGNOSIS — G62.0 CHEMOTHERAPY-INDUCED PERIPHERAL NEUROPATHY (HCC): Chronic | ICD-10-CM

## 2024-11-15 DIAGNOSIS — Z92.21 STATUS POST CHEMOTHERAPY: ICD-10-CM

## 2024-11-15 DIAGNOSIS — I50.20 ACC/AHA STAGE C HEART FAILURE WITH REDUCED EJECTION FRACTION (HCC): Primary | ICD-10-CM

## 2024-11-15 DIAGNOSIS — I11.0 HYPERTENSIVE HEART DISEASE WITH HEART FAILURE (HCC): ICD-10-CM

## 2024-11-15 DIAGNOSIS — T45.1X5A CHEMOTHERAPY-INDUCED PERIPHERAL NEUROPATHY (HCC): Chronic | ICD-10-CM

## 2024-11-15 DIAGNOSIS — Z51.5 PALLIATIVE CARE PATIENT: ICD-10-CM

## 2024-11-15 DIAGNOSIS — K21.00 GASTROESOPHAGEAL REFLUX DISEASE WITH ESOPHAGITIS WITHOUT HEMORRHAGE: ICD-10-CM

## 2024-11-15 DIAGNOSIS — E11.3293 TYPE 2 DIABETES MELLITUS WITH BOTH EYES AFFECTED BY MILD NONPROLIFERATIVE RETINOPATHY WITHOUT MACULAR EDEMA, WITH LONG-TERM CURRENT USE OF INSULIN (HCC): ICD-10-CM

## 2024-11-15 LAB
DME PARACHUTE DELIVERY DATE ACTUAL: NORMAL
DME PARACHUTE DELIVERY DATE REQUESTED: NORMAL
DME PARACHUTE ITEM DESCRIPTION: NORMAL
DME PARACHUTE ORDER STATUS: NORMAL
DME PARACHUTE SUPPLIER NAME: NORMAL
DME PARACHUTE SUPPLIER PHONE: NORMAL

## 2024-11-15 NOTE — PROGRESS NOTES
I received a call from [facility] stating the order went through the insurance.  They will be calling the patient/Nadja to schedule a delivery time.    I called Nadja to inform her of the above.  She was thankful for the help.

## 2024-11-15 NOTE — PROGRESS NOTES
I received a call from Nadja with 2 concerns.  She stated PCP ordered Glucerna but she has not received it.  Chart reviewed. I do not see any order for this; note sent to PCP.  I informed Nadja this is an OTC product but she stated the patient's insurance will cover it.    Nadja stated a shower chair was ordered for the patient but it has not been received. I called [facility] and was told they needed insurance information which I provided.  I was told they will submit this to the patient's insurance and if there are any issues, they will call me back.

## 2024-11-18 ENCOUNTER — TELEPHONE (OUTPATIENT)
Dept: FAMILY MEDICINE CLINIC | Facility: CLINIC | Age: 79
End: 2024-11-18

## 2024-11-18 NOTE — TELEPHONE ENCOUNTER
Glucerna nutritional supplement    FAXED ON 11/18/24 TO [facility] at [phone]. FAX CONFIRMATION RECEIVED.    Scanned into pt chart.

## 2024-11-21 ENCOUNTER — PATIENT OUTREACH (OUTPATIENT)
Dept: FAMILY MEDICINE CLINIC | Facility: CLINIC | Age: 79
End: 2024-11-21

## 2024-11-21 DIAGNOSIS — F03.90 DEMENTIA WITHOUT BEHAVIORAL DISTURBANCE (HCC): ICD-10-CM

## 2024-11-21 DIAGNOSIS — D50.9 IRON DEFICIENCY ANEMIA, UNSPECIFIED IRON DEFICIENCY ANEMIA TYPE: ICD-10-CM

## 2024-11-21 RX ORDER — FERROUS SULFATE 325(65) MG
1 TABLET ORAL
Qty: 90 TABLET | Refills: 2 | Status: SHIPPED | OUTPATIENT
Start: 2024-11-21

## 2024-11-21 RX ORDER — MEMANTINE HYDROCHLORIDE 10 MG/1
10 TABLET ORAL 2 TIMES DAILY
Qty: 180 TABLET | Refills: 1 | Status: SHIPPED | OUTPATIENT
Start: 2024-11-21

## 2024-11-21 NOTE — PROGRESS NOTES
I received a call from Nadja asking for a medication dispenser for the patient.  She states she believes it is electronic and not a regular pill box.  She asked that I call the patient's , Teressa.  I called Teressa but received voicemail.  Message was left asking for a return call.

## 2024-11-22 ENCOUNTER — PATIENT OUTREACH (OUTPATIENT)
Dept: FAMILY MEDICINE CLINIC | Facility: CLINIC | Age: 79
End: 2024-11-22

## 2024-11-22 DIAGNOSIS — F02.818 LATE ONSET ALZHEIMER'S DEMENTIA WITH OTHER BEHAVIORAL DISTURBANCE, UNSPECIFIED DEMENTIA SEVERITY (HCC): ICD-10-CM

## 2024-11-22 DIAGNOSIS — G30.1 LATE ONSET ALZHEIMER'S DEMENTIA WITH OTHER BEHAVIORAL DISTURBANCE, UNSPECIFIED DEMENTIA SEVERITY (HCC): ICD-10-CM

## 2024-11-22 DIAGNOSIS — F03.90 DEMENTIA WITHOUT BEHAVIORAL DISTURBANCE (HCC): Primary | ICD-10-CM

## 2024-11-22 NOTE — PROGRESS NOTES
I received a return call from Teressa, patient's .  She stated the patient would benefit from having a medication dispenser.  She noted the patient has been double dosing medications in part due to her dementia.  Teressa stated the device would be covered under the patient's insurance.  She is requesting an order from the PCP; note sent to PCP.

## 2024-11-25 ENCOUNTER — PATIENT OUTREACH (OUTPATIENT)
Dept: FAMILY MEDICINE CLINIC | Facility: CLINIC | Age: 79
End: 2024-11-25

## 2024-11-25 NOTE — PROGRESS NOTES
I called Teressa, patient's , but received voicemail.  Message was left asking if she received the order I emailed to her last week.  I requested a call back.        No return call from Teressa.  I faxed the order multiple times; all times have been busy.  I await a return call from Teressa.        
Left arm;

## 2024-12-02 ENCOUNTER — PATIENT OUTREACH (OUTPATIENT)
Dept: FAMILY MEDICINE CLINIC | Facility: CLINIC | Age: 79
End: 2024-12-02

## 2024-12-02 NOTE — PROGRESS NOTES
I called Teressa to confirm if she received the order via email which I sent 11/22.  She states she did receive it and thanked me for assisting.

## 2024-12-06 ENCOUNTER — PATIENT OUTREACH (OUTPATIENT)
Dept: FAMILY MEDICINE CLINIC | Facility: CLINIC | Age: 79
End: 2024-12-06

## 2024-12-06 DIAGNOSIS — R45.89 ANXIETY ABOUT HEALTH: ICD-10-CM

## 2024-12-06 DIAGNOSIS — I10 ESSENTIAL HYPERTENSION: ICD-10-CM

## 2024-12-06 DIAGNOSIS — E11.65 UNCONTROLLED TYPE 2 DIABETES MELLITUS WITH HYPERGLYCEMIA (HCC): ICD-10-CM

## 2024-12-06 RX ORDER — CARVEDILOL 12.5 MG/1
12.5 TABLET ORAL 2 TIMES DAILY WITH MEALS
Qty: 180 TABLET | Refills: 0 | Status: ON HOLD | OUTPATIENT
Start: 2024-12-06

## 2024-12-06 RX ORDER — ALPRAZOLAM 0.25 MG/1
0.25 TABLET ORAL
Qty: 30 TABLET | Refills: 0 | Status: ON HOLD | OUTPATIENT
Start: 2024-12-06

## 2024-12-06 RX ORDER — SITAGLIPTIN AND METFORMIN HYDROCHLORIDE 1000; 50 MG/1; MG/1
1 TABLET, FILM COATED ORAL 2 TIMES DAILY WITH MEALS
Qty: 60 TABLET | Refills: 1 | Status: ON HOLD | OUTPATIENT
Start: 2024-12-06

## 2024-12-07 DIAGNOSIS — K21.00 GASTROESOPHAGEAL REFLUX DISEASE WITH ESOPHAGITIS WITHOUT HEMORRHAGE: ICD-10-CM

## 2024-12-08 DIAGNOSIS — E11.65 UNCONTROLLED TYPE 2 DIABETES MELLITUS WITH HYPERGLYCEMIA (HCC): ICD-10-CM

## 2024-12-08 DIAGNOSIS — E11.3293 TYPE 2 DIABETES MELLITUS WITH BOTH EYES AFFECTED BY MILD NONPROLIFERATIVE RETINOPATHY WITHOUT MACULAR EDEMA, WITH LONG-TERM CURRENT USE OF INSULIN (HCC): ICD-10-CM

## 2024-12-08 DIAGNOSIS — Z79.4 TYPE 2 DIABETES MELLITUS WITH BOTH EYES AFFECTED BY MILD NONPROLIFERATIVE RETINOPATHY WITHOUT MACULAR EDEMA, WITH LONG-TERM CURRENT USE OF INSULIN (HCC): ICD-10-CM

## 2024-12-09 RX ORDER — PANTOPRAZOLE SODIUM 40 MG/1
40 TABLET, DELAYED RELEASE ORAL DAILY
Qty: 90 TABLET | Refills: 1 | Status: SHIPPED | OUTPATIENT
Start: 2024-12-09 | End: 2024-12-18

## 2024-12-09 RX ORDER — LANCETS 33 GAUGE
EACH MISCELLANEOUS DAILY
Qty: 100 EACH | Refills: 3 | Status: SHIPPED | OUTPATIENT
Start: 2024-12-09

## 2024-12-09 RX ORDER — PEN NEEDLE, DIABETIC 32 GX 1/4"
NEEDLE, DISPOSABLE MISCELLANEOUS DAILY
Qty: 100 EACH | Refills: 1 | Status: SHIPPED | OUTPATIENT
Start: 2024-12-09

## 2024-12-10 ENCOUNTER — APPOINTMENT (EMERGENCY)
Dept: CT IMAGING | Facility: HOSPITAL | Age: 79
DRG: 386 | End: 2024-12-10
Payer: COMMERCIAL

## 2024-12-10 ENCOUNTER — HOSPITAL ENCOUNTER (INPATIENT)
Facility: HOSPITAL | Age: 79
LOS: 1 days | Discharge: HOME | DRG: 386 | End: 2024-12-12
Attending: EMERGENCY MEDICINE | Admitting: STUDENT IN AN ORGANIZED HEALTH CARE EDUCATION/TRAINING PROGRAM
Payer: COMMERCIAL

## 2024-12-10 DIAGNOSIS — K59.00 CONSTIPATION, UNSPECIFIED CONSTIPATION TYPE: ICD-10-CM

## 2024-12-10 DIAGNOSIS — D72.829 LEUKOCYTOSIS: ICD-10-CM

## 2024-12-10 DIAGNOSIS — K52.9 PROCTOCOLITIS: Primary | ICD-10-CM

## 2024-12-10 DIAGNOSIS — K92.1 BLOOD IN STOOL: ICD-10-CM

## 2024-12-10 LAB
ALBUMIN SERPL BCG-MCNC: 4.3 G/DL (ref 3.5–5)
ALP SERPL-CCNC: 86 U/L (ref 34–104)
ALT SERPL W P-5'-P-CCNC: 97 U/L (ref 7–52)
AMORPH URATE CRY URNS QL MICRO: ABNORMAL
ANION GAP SERPL CALCULATED.3IONS-SCNC: 6 MMOL/L (ref 4–13)
AST SERPL W P-5'-P-CCNC: 43 U/L (ref 13–39)
BACTERIA UR QL AUTO: ABNORMAL /HPF
BASOPHILS # BLD AUTO: 0.08 THOUSANDS/ÂΜL (ref 0–0.1)
BASOPHILS NFR BLD AUTO: 0 % (ref 0–1)
BILIRUB SERPL-MCNC: 0.45 MG/DL (ref 0.2–1)
BILIRUB UR QL STRIP: NEGATIVE
BUN SERPL-MCNC: 28 MG/DL (ref 5–25)
CALCIUM SERPL-MCNC: 9.5 MG/DL (ref 8.4–10.2)
CHLORIDE SERPL-SCNC: 99 MMOL/L (ref 96–108)
CLARITY UR: ABNORMAL
CO2 SERPL-SCNC: 29 MMOL/L (ref 21–32)
COLOR UR: COLORLESS
CREAT SERPL-MCNC: 0.98 MG/DL (ref 0.6–1.3)
EOSINOPHIL # BLD AUTO: 0.05 THOUSAND/ÂΜL (ref 0–0.61)
EOSINOPHIL NFR BLD AUTO: 0 % (ref 0–6)
ERYTHROCYTE [DISTWIDTH] IN BLOOD BY AUTOMATED COUNT: 13.5 % (ref 11.6–15.1)
GFR SERPL CREATININE-BSD FRML MDRD: 55 ML/MIN/1.73SQ M
GLUCOSE SERPL-MCNC: 297 MG/DL (ref 65–140)
GLUCOSE UR STRIP-MCNC: ABNORMAL MG/DL
HCT VFR BLD AUTO: 41.4 % (ref 34.8–46.1)
HGB BLD-MCNC: 13.4 G/DL (ref 11.5–15.4)
HGB UR QL STRIP.AUTO: ABNORMAL
HYALINE CASTS #/AREA URNS LPF: ABNORMAL /LPF
IMM GRANULOCYTES # BLD AUTO: 0.1 THOUSAND/UL (ref 0–0.2)
IMM GRANULOCYTES NFR BLD AUTO: 1 % (ref 0–2)
KETONES UR STRIP-MCNC: NEGATIVE MG/DL
LEUKOCYTE ESTERASE UR QL STRIP: ABNORMAL
LIPASE SERPL-CCNC: 14 U/L (ref 11–82)
LYMPHOCYTES # BLD AUTO: 2.99 THOUSANDS/ÂΜL (ref 0.6–4.47)
LYMPHOCYTES NFR BLD AUTO: 17 % (ref 14–44)
MAGNESIUM SERPL-MCNC: 2.2 MG/DL (ref 1.9–2.7)
MCH RBC QN AUTO: 30.7 PG (ref 26.8–34.3)
MCHC RBC AUTO-ENTMCNC: 32.4 G/DL (ref 31.4–37.4)
MCV RBC AUTO: 95 FL (ref 82–98)
MONOCYTES # BLD AUTO: 0.79 THOUSAND/ÂΜL (ref 0.17–1.22)
MONOCYTES NFR BLD AUTO: 4 % (ref 4–12)
NEUTROPHILS # BLD AUTO: 14.14 THOUSANDS/ÂΜL (ref 1.85–7.62)
NEUTS SEG NFR BLD AUTO: 78 % (ref 43–75)
NITRITE UR QL STRIP: NEGATIVE
NON-SQ EPI CELLS URNS QL MICRO: ABNORMAL /HPF
NRBC BLD AUTO-RTO: 0 /100 WBCS
PH UR STRIP.AUTO: 5 [PH]
PHOSPHATE SERPL-MCNC: 3.8 MG/DL (ref 2.3–4.1)
PLATELET # BLD AUTO: 292 THOUSANDS/UL (ref 149–390)
PMV BLD AUTO: 9.7 FL (ref 8.9–12.7)
POTASSIUM SERPL-SCNC: 4 MMOL/L (ref 3.5–5.3)
PROT SERPL-MCNC: 9 G/DL (ref 6.4–8.4)
PROT UR STRIP-MCNC: NEGATIVE MG/DL
RBC # BLD AUTO: 4.37 MILLION/UL (ref 3.81–5.12)
RBC #/AREA URNS AUTO: ABNORMAL /HPF
SODIUM SERPL-SCNC: 134 MMOL/L (ref 135–147)
SP GR UR STRIP.AUTO: 1.01 (ref 1–1.03)
UROBILINOGEN UR STRIP-ACNC: <2 MG/DL
WBC # BLD AUTO: 18.15 THOUSAND/UL (ref 4.31–10.16)
WBC #/AREA URNS AUTO: ABNORMAL /HPF

## 2024-12-10 PROCEDURE — 83735 ASSAY OF MAGNESIUM: CPT | Performed by: EMERGENCY MEDICINE

## 2024-12-10 PROCEDURE — 99285 EMERGENCY DEPT VISIT HI MDM: CPT | Performed by: EMERGENCY MEDICINE

## 2024-12-10 PROCEDURE — 99284 EMERGENCY DEPT VISIT MOD MDM: CPT

## 2024-12-10 PROCEDURE — 96361 HYDRATE IV INFUSION ADD-ON: CPT

## 2024-12-10 PROCEDURE — 85025 COMPLETE CBC W/AUTO DIFF WBC: CPT | Performed by: EMERGENCY MEDICINE

## 2024-12-10 PROCEDURE — 80053 COMPREHEN METABOLIC PANEL: CPT | Performed by: EMERGENCY MEDICINE

## 2024-12-10 PROCEDURE — 36415 COLL VENOUS BLD VENIPUNCTURE: CPT | Performed by: EMERGENCY MEDICINE

## 2024-12-10 PROCEDURE — 87154 CUL TYP ID BLD PTHGN 6+ TRGT: CPT | Performed by: EMERGENCY MEDICINE

## 2024-12-10 PROCEDURE — 84100 ASSAY OF PHOSPHORUS: CPT | Performed by: EMERGENCY MEDICINE

## 2024-12-10 PROCEDURE — 87040 BLOOD CULTURE FOR BACTERIA: CPT | Performed by: EMERGENCY MEDICINE

## 2024-12-10 PROCEDURE — 96374 THER/PROPH/DIAG INJ IV PUSH: CPT

## 2024-12-10 PROCEDURE — 81001 URINALYSIS AUTO W/SCOPE: CPT | Performed by: EMERGENCY MEDICINE

## 2024-12-10 PROCEDURE — 83690 ASSAY OF LIPASE: CPT | Performed by: EMERGENCY MEDICINE

## 2024-12-10 PROCEDURE — 74177 CT ABD & PELVIS W/CONTRAST: CPT

## 2024-12-10 RX ORDER — KETOROLAC TROMETHAMINE 30 MG/ML
15 INJECTION, SOLUTION INTRAMUSCULAR; INTRAVENOUS ONCE
Status: COMPLETED | OUTPATIENT
Start: 2024-12-10 | End: 2024-12-10

## 2024-12-10 RX ORDER — FENTANYL CITRATE 50 UG/ML
25 INJECTION, SOLUTION INTRAMUSCULAR; INTRAVENOUS ONCE
Refills: 0 | Status: COMPLETED | OUTPATIENT
Start: 2024-12-10 | End: 2024-12-10

## 2024-12-10 RX ADMIN — SODIUM CHLORIDE 1000 ML: 0.9 INJECTION, SOLUTION INTRAVENOUS at 17:37

## 2024-12-10 RX ADMIN — IOHEXOL 100 ML: 350 INJECTION, SOLUTION INTRAVENOUS at 19:02

## 2024-12-10 RX ADMIN — PIPERACILLIN AND TAZOBACTAM 4.5 G: 36; 4.5 INJECTION, POWDER, LYOPHILIZED, FOR SOLUTION INTRAVENOUS at 21:59

## 2024-12-10 RX ADMIN — KETOROLAC TROMETHAMINE 15 MG: 30 INJECTION, SOLUTION INTRAMUSCULAR; INTRAVENOUS at 17:40

## 2024-12-10 RX ADMIN — FENTANYL CITRATE 25 MCG: 50 INJECTION, SOLUTION INTRAMUSCULAR; INTRAVENOUS at 21:57

## 2024-12-10 NOTE — ED PROVIDER NOTES
Time reflects when diagnosis was documented in both MDM as applicable and the Disposition within this note       Time User Action Codes Description Comment    12/10/2024  8:57 PM WasGregorio hodges Add [K59.00] Constipation, unspecified constipation type     12/10/2024  8:57 PM WasGregorio hodges Add [K52.9] Proctocolitis     12/10/2024  8:57 PM WasGregorio hodges Modify [K59.00] Constipation, unspecified constipation type     12/10/2024  8:57 PM WasGregorio hodges Modify [K52.9] Proctocolitis     12/10/2024  8:57 PM WasGregorio hodges Add [D72.829] Leukocytosis     12/11/2024  2:33 AM Dima Ocampo Add [K92.1] Blood in stool           ED Disposition       ED Disposition   Admit    Condition   Stable    Date/Time   Tue Dec 10, 2024  8:57 PM    Comment   Case was discussed with JOSE L and the patient's admission status was agreed to be Admission Status: observation status to the service of Dr. Larson.               Assessment & Plan       Medical Decision Making    Amount and/or Complexity of Data Reviewed  Clinical lab tests: ordered and reviewed yes  Reviewed past medical records: yes     History Provided by patient     Differential considered infectious or inflammatory etiology  Lower abd pain: At risk for diverticulitis, colitis, appendicitis, obstruction, incarcerated / strangulated hernia  UTI     Labs looking for any acute changes to wbc count or any acute electrolyte abnormalities. LFTs for acute liver pathology, lipase for pancreatitis. Imaging for any acute pathology.   UA for ketones / infection.    Symptomatic treatments provided.     Labs show leukocytosis. Imaging shows proctocolitis. Given symptoms, leukocytosis and imaging findings, will admit for abx (started zosyn) and GI consult. Admitted to medicine for further management.        ED Course as of 12/12/24 2345   Tue Dec 10, 2024   1705 Patient just had a large BM   2011 Sodium(!): 134  At baseline   2011 WBC(!): 18.15  Elevated compared to prior   2033 Patient on re evaluation  continues to have abd pain, will admit for pain control and GI eval       Medications   sodium chloride 0.9 % bolus 1,000 mL (0 mL Intravenous Stopped 12/10/24 2041)   ketorolac (TORADOL) injection 15 mg (15 mg Intravenous Given 12/10/24 1740)   iohexol (OMNIPAQUE) 350 MG/ML injection (MULTI-DOSE) 100 mL (100 mL Intravenous Given 12/10/24 1902)   fentaNYL injection 25 mcg (25 mcg Intravenous Given 12/10/24 2157)   piperacillin-tazobactam (ZOSYN) IVPB 4.5 g (4.5 g Intravenous New Bag 12/10/24 2159)       ED Risk Strat Scores                           SBIRT 22yo+      Flowsheet Row Most Recent Value   Initial Alcohol Screen: US AUDIT-C     1. How often do you have a drink containing alcohol? 0 Filed at: 12/10/2024 1636   2. How many drinks containing alcohol do you have on a typical day you are drinking?  0 Filed at: 12/10/2024 1636   3b. FEMALE Any Age, or MALE 65+: How often do you have 4 or more drinks on one occassion? 0 Filed at: 12/10/2024 1636   Audit-C Score 0 Filed at: 12/10/2024 1636   JHONY: How many times in the past year have you...    Used an illegal drug or used a prescription medication for non-medical reasons? Never Filed at: 12/10/2024 1636                            History of Present Illness         Chief Complaint   Patient presents with    Constipation     Pt came in via EMS, per pt unable to have a BM or urinate for a while, complaining of lower abd pain, distended.        80 yo F hx of DM, HTN, HLD MI, thyroid cancer, diffuse B cell lymphoma, ambulatory dysfunction, chronic pain, decreased appetite, presents to ed for eval of constipation. Unable to go to bathroom for past several days. Feels distended. No specific pain. Given miralax pta.  Prior cholecystectomy    Past Medical History:   Diagnosis Date    Cancer (HCC)     Throat    Chronic pain disorder     Diabetes mellitus (HCC)     Diffuse large B cell lymphoma (HCC)     Dysphagia     GERD without esophagitis     HTN (hypertension)      Hyperlipidemia     Hypertension     MI (myocardial infarction) (HCC)     Port-A-Cath in place 07/29/2019    Thyroid cancer (HCC) 2018      Past Surgical History:   Procedure Laterality Date    BONE MARROW BIOPSY      BREAST BIOPSY Left     CARDIAC CATHETERIZATION N/A 12/9/2022    Procedure: Cardiac catheterization;  Surgeon: Jhonny Rain MD;  Location: [facility];  Service: Cardiology    CARDIAC CATHETERIZATION N/A 12/9/2022    Procedure: Cardiac Coronary Angiogram;  Surgeon: Jhonny Rain MD;  Location: [facility];  Service: Cardiology    CARDIAC CATHETERIZATION Left 12/9/2022    Procedure: Cardiac Left Heart Cath;  Surgeon: Jhonny Rain MD;  Location: [facility];  Service: Cardiology    CHOLECYSTECTOMY      IR PORT PLACEMENT  11/16/2018    IR PORT REMOVAL  3/22/2021    OTHER SURGICAL HISTORY      tendor tear repair to right shoulder    SHOULDER ARTHROSCOPY        Family History   Problem Relation Age of Onset    Diabetes Mother     Heart disease Sister     Uterine cancer Maternal Grandmother     Prostate cancer Paternal Grandfather     Hypertension Brother     Breast cancer Neg Hx       Social History     Tobacco Use    Smoking status: Never     Passive exposure: Never    Smokeless tobacco: Never   Vaping Use    Vaping status: Never Used   Substance Use Topics    Alcohol use: Never     Comment: 0    Drug use: No      E-Cigarette/Vaping    E-Cigarette Use Never User       E-Cigarette/Vaping Substances    Nicotine No     THC No     CBD No     Flavoring No     Other No     Unknown No       I have reviewed and agree with the history as documented.     HPI    Review of Systems   Constitutional:  Negative for chills, fatigue and fever.   HENT:  Negative for sore throat.    Eyes:  Negative for redness and visual disturbance.   Respiratory:  Negative for cough and shortness of breath.    Cardiovascular:  Negative for chest pain.   Gastrointestinal:  Positive for abdominal distention, abdominal  pain and constipation. Negative for diarrhea and nausea.   Genitourinary:  Negative for difficulty urinating, dysuria and pelvic pain.   Musculoskeletal:  Negative for back pain.   Skin:  Negative for rash.   Neurological:  Negative for syncope, weakness and headaches.   All other systems reviewed and are negative.          Objective       ED Triage Vitals [12/10/24 1634]   Temperature Pulse Blood Pressure Respirations SpO2 Patient Position - Orthostatic VS   97.9 °F (36.6 °C) 60 160/76 16 98 % Sitting      Temp Source Heart Rate Source BP Location FiO2 (%) Pain Score    Oral Monitor Right arm -- 10 - Worst Possible Pain      Vitals      Date and Time Temp Pulse SpO2 Resp BP Pain Score FACES Pain Rating User   12/12/24 0924 -- -- -- -- 118/64 -- -- DII   12/12/24 0900 -- -- -- -- -- No Pain -- SB   12/12/24 0750 -- -- -- 18 -- -- -- HJ   12/12/24 0750 98.6 °F (37 °C) 87 93 % -- 100/59 -- -- DII   12/11/24 2043 99 °F (37.2 °C) 76 96 % 20 123/57 -- -- DII   12/11/24 1915 -- -- -- -- -- No Pain -- AB   12/11/24 1810 -- 78 94 % -- 112/62 -- -- DII   12/11/24 1503 99.1 °F (37.3 °C) 80 96 % 14 118/63 -- -- DII   12/11/24 0900 -- -- -- -- -- No Pain -- MB   12/11/24 0724 98.7 °F (37.1 °C) 90 92 % 16 151/82 -- -- DII   12/11/24 0723 98.7 °F (37.1 °C) -- -- 2 151/82 -- -- DII   12/10/24 2235 -- -- -- -- -- 10 - Worst Possible Pain -- CS   12/10/24 2227 -- -- -- -- -- 10 - Worst Possible Pain -- CS   12/10/24 2227 98.9 °F (37.2 °C) 92 95 % 20 156/84 -- -- DII   12/10/24 2157 -- -- -- -- -- 8 -- MA   12/10/24 1830 -- 72 97 % 16 136/102 -- -- CO   12/10/24 1737 -- -- -- -- -- 10 - Worst Possible Pain -- CO   12/10/24 1634 97.9 °F (36.6 °C) 60 98 % 16 160/76 10 - Worst Possible Pain -- CO            Physical Exam  Vitals and nursing note reviewed.   Constitutional:       General: She is not in acute distress.  HENT:      Head: Normocephalic and atraumatic.      Right Ear: External ear normal.      Left Ear: External ear normal.    Eyes:      Extraocular Movements: Extraocular movements intact.      Conjunctiva/sclera: Conjunctivae normal.   Cardiovascular:      Rate and Rhythm: Normal rate and regular rhythm.      Heart sounds: Normal heart sounds.   Pulmonary:      Effort: Pulmonary effort is normal. No respiratory distress.      Breath sounds: Normal breath sounds.   Abdominal:      General: Abdomen is flat.      Tenderness: There is generalized abdominal tenderness.   Musculoskeletal:         General: Normal range of motion.      Cervical back: Normal range of motion.   Skin:     General: Skin is warm and dry.   Neurological:      Mental Status: She is alert and oriented to person, place, and time.      Cranial Nerves: No cranial nerve deficit.      Motor: No abnormal muscle tone.      Coordination: Coordination normal.         Results Reviewed       Procedure Component Value Units Date/Time    Blood culture #1 [[id number]]  (Abnormal) Collected: 12/10/24 2157    Lab Status: Preliminary result Specimen: Blood from Arm, Right Updated: 12/12/24 1300     Gram Stain Result Gram positive cocci in clusters      Gram positive rods    Narrative:      Too young-will reincubate      Blood Culture Identification Panel [[id number]]  (Abnormal) Collected: 12/10/24 2157    Lab Status: Preliminary result Specimen: Blood from Arm, Right Updated: 12/12/24 0728     Staphylococcus epidermidis Detected     mecA/C (Methicillin-resistance gene) Detected    Narrative:      Routine culture and susceptiblity to follow for confirmation.    Film Array panel tests for 11 gram positive organisms, 15 gram negative organisms, 7 yeast species and 10 resistance genes.     Blood culture #2 [[id number]] Collected: 12/10/24 2156    Lab Status: Preliminary result Specimen: Blood from Arm, Left Updated: 12/12/24 0701     Blood Culture No Growth at 24 hrs.    Comprehensive metabolic panel [[id number]]  (Abnormal) Collected: 12/10/24 1734    Lab Status: Final result Specimen:  Blood from Arm, Right Updated: 12/10/24 1817     Sodium 134 mmol/L      Potassium 4.0 mmol/L      Chloride 99 mmol/L      CO2 29 mmol/L      ANION GAP 6 mmol/L      BUN 28 mg/dL      Creatinine 0.98 mg/dL      Glucose 297 mg/dL      Calcium 9.5 mg/dL      AST 43 U/L      ALT 97 U/L      Alkaline Phosphatase 86 U/L      Total Protein 9.0 g/dL      Albumin 4.3 g/dL      Total Bilirubin 0.45 mg/dL      eGFR 55 ml/min/1.73sq m     Narrative:      National Kid[employer] guidelines for Chronic Kidney Disease (CKD):     Stage 1 with normal or high GFR (GFR > 90 mL/min/1.73 square meters)    Stage 2 Mild CKD (GFR = 60-89 mL/min/1.73 square meters)    Stage 3A Moderate CKD (GFR = 45-59 mL/min/1.73 square meters)    Stage 3B Moderate CKD (GFR = 30-44 mL/min/1.73 square meters)    Stage 4 Severe CKD (GFR = 15-29 mL/min/1.73 square meters)    Stage 5 End Stage CKD (GFR <15 mL/min/1.73 square meters)  Note: GFR calculation is accurate only with a steady state creatinine    Lipase [[id number]]  (Normal) Collected: 12/10/24 1734    Lab Status: Final result Specimen: Blood from Arm, Right Updated: 12/10/24 1817     Lipase 14 u/L     Magnesium [[id number]]  (Normal) Collected: 12/10/24 1734    Lab Status: Final result Specimen: Blood from Arm, Right Updated: 12/10/24 1817     Magnesium 2.2 mg/dL     Phosphorus [[id number]]  (Normal) Collected: 12/10/24 1734    Lab Status: Final result Specimen: Blood from Arm, Right Updated: 12/10/24 1817     Phosphorus 3.8 mg/dL     Urine Microscopic [[id number]]  (Abnormal) Collected: 12/10/24 1734    Lab Status: Final result Specimen: Urine, Other Updated: 12/10/24 1806     RBC, UA 2-4 /hpf      WBC, UA 4-10 /hpf      Epithelial Cells Occasional /hpf      Bacteria, UA Occasional /hpf      Hyaline Casts, UA 0-3 /lpf      Amorphous Crystals, UA Occasional    UA (URINE) with reflex to Scope [[id number]]  (Abnormal) Collected: 12/10/24 1734    Lab Status: Final result Specimen: Urine, Other  Updated: 12/10/24 1805     Color, UA Colorless     Clarity, UA Turbid     Specific Gravity, UA 1.013     pH, UA 5.0     Leukocytes, UA Small     Nitrite, UA Negative     Protein, UA Negative mg/dl      Glucose, UA >=1000 (1%) mg/dl      Ketones, UA Negative mg/dl      Urobilinogen, UA <2.0 mg/dl      Bilirubin, UA Negative     Occult Blood, UA Small    CBC and differential [[id number]]  (Abnormal) Collected: 12/10/24 1734    Lab Status: Final result Specimen: Blood from Arm, Right Updated: 12/10/24 1750     WBC 18.15 Thousand/uL      RBC 4.37 Million/uL      Hemoglobin 13.4 g/dL      Hematocrit 41.4 %      MCV 95 fL      MCH 30.7 pg      MCHC 32.4 g/dL      RDW 13.5 %      MPV 9.7 fL      Platelets 292 Thousands/uL      nRBC 0 /100 WBCs      Segmented % 78 %      Immature Grans % 1 %      Lymphocytes % 17 %      Monocytes % 4 %      Eosinophils Relative 0 %      Basophils Relative 0 %      Absolute Neutrophils 14.14 Thousands/µL      Absolute Immature Grans 0.10 Thousand/uL      Absolute Lymphocytes 2.99 Thousands/µL      Absolute Monocytes 0.79 Thousand/µL      Eosinophils Absolute 0.05 Thousand/µL      Basophils Absolute 0.08 Thousands/µL             US right upper quadrant   Final Interpretation by Keanu Amezquita MD (12/11 1926)      The common duct is not dilated.      Workstation performed: [id number]         CT abdomen pelvis with contrast   Final Interpretation by Norbert Mills MD (12/10 2005)      1.  Diffuse wall thickening and mucosal hyperemia throughout the rectosigmoid colon, consistent with a nonspecific proctocolitis.      2.  Please refer to the report body for description of other incidental, chronic and/or benign findings.         Workstation performed: [id number]             Procedures    ED Medication and Procedure Management   Prior to Admission Medications   Prescriptions Last Dose Informant Patient Reported? Taking?   ALPRAZolam (XANAX) 0.25 mg tablet   No No   Sig: Take 1 tablet (0.25  mg total) by mouth daily at bedtime as needed for anxiety   Accu-Chek FastClix Lancets MISC   No No   Sig: USAR PARA PROBAR AZUCAR EN LA TWAN MANISHA VECES AL JEFFREY   BD Pen Needle Micro U/F 32G X 6 MM MISC   No No   Sig: USE DAILY AS DIRECTED   Mary Jane Low Dose 81 MG chewable tablet   No No   Sig: CHEW 1 TABLET BY MOUTH DAILY   Blood Glucose Monitoring Suppl (OneTouch Verio Reflect) w/Device KIT  Family Member No No   Sig: Check blood sugars once daily. Please substitute with appropriate alternative as covered by patient's insurance. Dx: E11.65   Blood Glucose Monitoring Suppl (OneTouch Verio) w/Device KIT  Family Member No No   Sig: Use per  guidelines   Calcium Carb-Cholecalciferol 600-10 MG-MCG TABS   No No   Sig: TAKE 1 TABLET BY MOUTH EVERY DAY IN THE MORNING   Empagliflozin (Jardiance) 25 MG TABS   No No   Sig: TAKE 1 TABLET BY MOUTH EVERY DAY IN THE MORNING   Insulin Glargine Solostar (Lantus SoloStar) 100 UNIT/ML SOPN   No No   Sig: INJECT 0.11 ML (11 UNITS TOTAL) UNDER THE SKIN IN THE MORNING   Lancets (OneTouch Delica Plus [id number]) MISC   No No   Sig: TEST DAILY   Melatonin 5 MG TABS  Family Member Yes No   Sig: Take by mouth as needed   Nutritional Supplements (Glucerna) LIQD   No No   Sig: Take 1 Bottle by mouth in the morning   acetaminophen (TYLENOL) 650 mg CR tablet   No No   Sig: Take 1 tablet (650 mg total) by mouth every 8 (eight) hours as needed for mild pain   albuterol (PROVENTIL HFA,VENTOLIN HFA) 90 mcg/act inhaler  Family Member No No   Sig: Inhale 1 puff every 4 (four) hours as needed for wheezing   atorvastatin (LIPITOR) 40 mg tablet   No No   Sig: TAKE 1 TABLET BY MOUTH EVERY DAY   carvedilol (COREG) 12.5 mg tablet   No No   Sig: Take 1 tablet (12.5 mg total) by mouth 2 (two) times a day with meals   cyanocobalamin (VITAMIN B-12) 1000 MCG tablet   No No   Sig: Take 1 tablet (1,000 mcg total) by mouth daily   ferrous sulfate 325 (65 Fe) mg tablet   No No   Sig: TAKE 1 TABLET BY  MOUTH EVERY DAY WITH BREAKFAST   fluticasone (FLONASE) 50 mcg/act nasal spray   No No   Sig: SPRAY 1 SPRAY INTO EACH NOSTRIL EVERY DAY   furosemide (LASIX) 20 mg tablet   No No   Sig: Take 1 tablet (20 mg total) by mouth daily   gabapentin (NEURONTIN) 100 mg capsule   No No   Sig: TAKE 1 CAPSULE (100 MG TOTAL) BY MOUTH 3 (THREE) TIMES A DAY   glucose blood (Accu-Chek Guide) test strip   No No   Sig: USAR PARA EXAMINAR AZUCAR EN LA TWAN MANISHA VECES AL JEFFREY   glucose blood (OneTouch Verio) test strip  Family Member No No   Sig: Check blood sugars once daily. Please substitute with appropriate alternative as covered by patient's insurance. Dx: E11.65   guaiFENesin (MUCINEX) 600 mg 12 hr tablet  Family Member No No   Sig: Take 2 tablets (1,200 mg total) by mouth every 12 (twelve) hours   lidocaine (Lidoderm) 5 %   No No   Sig: Apply 1 patch topically over 12 hours daily Remove & Discard patch within 12 hours or as directed by MD   loperamide (IMODIUM) 2 mg capsule   No No   Sig: MAR JOHNNY (1) CAPSULA POR VIA ORAL ALFREDO SEA NECESARIO PARA LA DIARREA   losartan (COZAAR) 25 mg tablet   No No   Sig: TAKE 1 TABLET (25 MG TOTAL) BY MOUTH DAILY.   memantine (NAMENDA) 10 mg tablet   No No   Sig: TAKE 1 TABLET BY MOUTH TWICE A DAY   mirtazapine (REMERON) 15 mg tablet   No No   Sig: TAKE 1 TABLET BY MOUTH DAILY AT BEDTIME   omeprazole (PriLOSEC) 20 mg delayed release capsule   No No   Sig: Take 1 capsule (20 mg total) by mouth 2 (two) times a day   ondansetron (ZOFRAN) 4 mg tablet   No No   Sig: Take 1 tablet (4 mg total) by mouth every 8 (eight) hours as needed for nausea or vomiting   pantoprazole (PROTONIX) 40 mg tablet   No No   Sig: TAKE 1 TABLET BY MOUTH EVERY DAY   sitaGLIPtin-metFORMIN (Janumet)  MG per tablet   No No   Sig: Take 1 tablet by mouth 2 (two) times a day with meals      Facility-Administered Medications: None     Discharge Medication List as of 12/12/2024 11:51 AM        START taking these medications     Details   cefpodoxime (VANTIN) 200 mg tablet Take 1 tablet (200 mg total) by mouth 2 (two) times a day for 3 days, Starting Thu 12/12/2024, Until Sun 12/15/2024, Normal      lubiprostone (AMITIZA) 8 mcg capsule Take 1 capsule (8 mcg total) by mouth 2 (two) times a day with meals, Starting Thu 12/12/2024, Until Mon 2/10/2025, Normal      metroNIDAZOLE (FLAGYL) 500 mg tablet Take 1 tablet (500 mg total) by mouth every 8 (eight) hours for 3 days, Starting Thu 12/12/2024, Until Sun 12/15/2024, Normal      polyethylene glycol (MIRALAX) 17 g packet Take 17 g by mouth 2 (two) times a day for 7 days, Starting Thu 12/12/2024, Until Thu 12/19/2024, Normal           CONTINUE these medications which have NOT CHANGED    Details   !! Accu-Chek FastClix Lancets MISC USAR PARA PROBAR AZUCAR EN LA TWAN MANISHA VECES AL JEFFREY, Normal      acetaminophen (TYLENOL) 650 mg CR tablet Take 1 tablet (650 mg total) by mouth every 8 (eight) hours as needed for mild pain, Starting Sat 8/3/2024, Normal      albuterol (PROVENTIL HFA,VENTOLIN HFA) 90 mcg/act inhaler Inhale 1 puff every 4 (four) hours as needed for wheezing, Starting Thu 10/26/2023, Normal      ALPRAZolam (XANAX) 0.25 mg tablet Take 1 tablet (0.25 mg total) by mouth daily at bedtime as needed for anxiety, Starting Fri 12/6/2024, Normal      atorvastatin (LIPITOR) 40 mg tablet TAKE 1 TABLET BY MOUTH EVERY DAY, Starting Sat 9/21/2024, Normal      Mary Jane Low Dose 81 MG chewable tablet CHEW 1 TABLET BY MOUTH DAILY, Starting Tue 5/28/2024, Normal      BD Pen Needle Micro U/F 32G X 6 MM MISC USE DAILY AS DIRECTED, Starting Mon 12/9/2024, Normal      !! Blood Glucose Monitoring Suppl (OneTouch Verio Reflect) w/Device KIT Check blood sugars once daily. Please substitute with appropriate alternative as covered by patient's insurance. Dx: E11.65, Normal      !! Blood Glucose Monitoring Suppl (OneTouch Verio) w/Device KIT Use per  guidelines, Normal      Calcium  Carb-Cholecalciferol 600-10 MG-MCG TABS TAKE 1 TABLET BY MOUTH EVERY DAY IN THE MORNING, Starting Thu 8/22/2024, Normal      carvedilol (COREG) 12.5 mg tablet Take 1 tablet (12.5 mg total) by mouth 2 (two) times a day with meals, Starting Fri 12/6/2024, Normal      cyanocobalamin (VITAMIN B-12) 1000 MCG tablet Take 1 tablet (1,000 mcg total) by mouth daily, Starting Wed 2/21/2024, Normal      Empagliflozin (Jardiance) 25 MG TABS TAKE 1 TABLET BY MOUTH EVERY DAY IN THE MORNING, Starting Sun 6/30/2024, Normal      ferrous sulfate 325 (65 Fe) mg tablet TAKE 1 TABLET BY MOUTH EVERY DAY WITH BREAKFAST, Starting Thu 11/21/2024, Normal      fluticasone (FLONASE) 50 mcg/act nasal spray SPRAY 1 SPRAY INTO EACH NOSTRIL EVERY DAY, Normal      furosemide (LASIX) 20 mg tablet Take 1 tablet (20 mg total) by mouth daily, Starting Wed 2/21/2024, Normal      gabapentin (NEURONTIN) 100 mg capsule TAKE 1 CAPSULE (100 MG TOTAL) BY MOUTH 3 (THREE) TIMES A DAY, Starting Mon 9/30/2024, Normal      !! glucose blood (Accu-Chek Guide) test strip USAR PARA EXAMINAR AZUCAR EN LA TWAN MANISHA VECES AL JEFFREY, Normal      !! glucose blood (OneTouch Verio) test strip Check blood sugars once daily. Please substitute with appropriate alternative as covered by patient's insurance. Dx: E11.65, Normal      guaiFENesin (MUCINEX) 600 mg 12 hr tablet Take 2 tablets (1,200 mg total) by mouth every 12 (twelve) hours, Starting Thu 10/26/2023, Normal      Insulin Glargine Solostar (Lantus SoloStar) 100 UNIT/ML SOPN INJECT 0.11 ML (11 UNITS TOTAL) UNDER THE SKIN IN THE MORNING, Normal      !! Lancets (OneTouch Delica Plus [id number]) MISC TEST DAILY, Starting Mon 12/9/2024, Normal      lidocaine (Lidoderm) 5 % Apply 1 patch topically over 12 hours daily Remove & Discard patch within 12 hours or as directed by MD, Starting Wed 2/21/2024, Normal      loperamide (IMODIUM) 2 mg capsule MAR JOHNNY (1) CAPSULA POR VIA ORAL ALFREDO SEA NECESARIO PARA LA DIARREA, Normal       losartan (COZAAR) 25 mg tablet TAKE 1 TABLET (25 MG TOTAL) BY MOUTH DAILY., Starting Mon 9/23/2024, Normal      Melatonin 5 MG TABS Take by mouth as needed, Historical Med      memantine (NAMENDA) 10 mg tablet TAKE 1 TABLET BY MOUTH TWICE A DAY, Starting Thu 11/21/2024, Normal      mirtazapine (REMERON) 15 mg tablet TAKE 1 TABLET BY MOUTH DAILY AT BEDTIME, Starting Fri 10/25/2024, Normal      Nutritional Supplements (Glucerna) LIQD Take 1 Bottle by mouth in the morning, Starting Fri 11/15/2024, Print      omeprazole (PriLOSEC) 20 mg delayed release capsule Take 1 capsule (20 mg total) by mouth 2 (two) times a day, Starting Wed 2/21/2024, Normal      ondansetron (ZOFRAN) 4 mg tablet Take 1 tablet (4 mg total) by mouth every 8 (eight) hours as needed for nausea or vomiting, Starting Wed 2/21/2024, Normal      pantoprazole (PROTONIX) 40 mg tablet TAKE 1 TABLET BY MOUTH EVERY DAY, Starting Mon 12/9/2024, Normal      sitaGLIPtin-metFORMIN (Janumet)  MG per tablet Take 1 tablet by mouth 2 (two) times a day with meals, Starting Fri 12/6/2024, Normal       !! - Potential duplicate medications found. Please discuss with provider.        Outpatient Discharge Orders   EXTERNAL: Ambulatory Referral to Home Health   Standing Status: Future Standing Exp. Date: 12/12/25      Activity as tolerated     Call provider for:  persistent nausea or vomiting     Call provider for:  severe uncontrolled pain     Call provider for:  persistent dizziness or light-headedness     ED SEPSIS DOCUMENTATION   Time reflects when diagnosis was documented in both MDM as applicable and the Disposition within this note       Time User Action Codes Description Comment    12/10/2024  8:57 PM Gregorio Piper Add [K59.00] Constipation, unspecified constipation type     12/10/2024  8:57 PM Gregorio Piper Add [K52.9] Proctocolitis     12/10/2024  8:57 PM Gregorio Piper Modify [K59.00] Constipation, unspecified constipation type     12/10/2024  8:57 PM Feliz  Gregorio Modify [K52.9] Proctocolitis     12/10/2024  8:57 PM Gregorio Piper Add [D72.829] Leukocytosis     12/11/2024  2:33 AM Dima Ocampo Add [K92.1] Blood in stool                  Gregorio Piper MD  12/12/24 5348

## 2024-12-11 ENCOUNTER — TELEPHONE (OUTPATIENT)
Dept: FAMILY MEDICINE CLINIC | Facility: CLINIC | Age: 79
End: 2024-12-11

## 2024-12-11 ENCOUNTER — APPOINTMENT (OUTPATIENT)
Dept: ULTRASOUND IMAGING | Facility: HOSPITAL | Age: 79
DRG: 386 | End: 2024-12-11
Payer: COMMERCIAL

## 2024-12-11 PROBLEM — K59.09 OTHER CONSTIPATION: Status: ACTIVE | Noted: 2024-12-11

## 2024-12-11 PROBLEM — R74.01 TRANSAMINITIS: Status: ACTIVE | Noted: 2024-12-11

## 2024-12-11 PROBLEM — R79.89 ELEVATED LFTS: Status: ACTIVE | Noted: 2024-12-11

## 2024-12-11 PROBLEM — K92.1 BLOOD IN STOOL: Status: ACTIVE | Noted: 2024-12-11

## 2024-12-11 LAB
ALBUMIN SERPL BCG-MCNC: 3.8 G/DL (ref 3.5–5)
ALP SERPL-CCNC: 92 U/L (ref 34–104)
ALT SERPL W P-5'-P-CCNC: 264 U/L (ref 7–52)
ANION GAP SERPL CALCULATED.3IONS-SCNC: 11 MMOL/L (ref 4–13)
AST SERPL W P-5'-P-CCNC: 217 U/L (ref 13–39)
BASOPHILS # BLD AUTO: 0.04 THOUSANDS/ÂΜL (ref 0–0.1)
BASOPHILS NFR BLD AUTO: 0 % (ref 0–1)
BILIRUB SERPL-MCNC: 0.61 MG/DL (ref 0.2–1)
BUN SERPL-MCNC: 25 MG/DL (ref 5–25)
CALCIUM SERPL-MCNC: 8.8 MG/DL (ref 8.4–10.2)
CHLORIDE SERPL-SCNC: 101 MMOL/L (ref 96–108)
CO2 SERPL-SCNC: 23 MMOL/L (ref 21–32)
CREAT SERPL-MCNC: 1.06 MG/DL (ref 0.6–1.3)
EOSINOPHIL # BLD AUTO: 0 THOUSAND/ÂΜL (ref 0–0.61)
EOSINOPHIL NFR BLD AUTO: 0 % (ref 0–6)
ERYTHROCYTE [DISTWIDTH] IN BLOOD BY AUTOMATED COUNT: 13.9 % (ref 11.6–15.1)
GFR SERPL CREATININE-BSD FRML MDRD: 50 ML/MIN/1.73SQ M
GLUCOSE SERPL-MCNC: 146 MG/DL (ref 65–140)
GLUCOSE SERPL-MCNC: 191 MG/DL (ref 65–140)
GLUCOSE SERPL-MCNC: 236 MG/DL (ref 65–140)
GLUCOSE SERPL-MCNC: 288 MG/DL (ref 65–140)
GLUCOSE SERPL-MCNC: 359 MG/DL (ref 65–140)
GLUCOSE SERPL-MCNC: 460 MG/DL (ref 65–140)
HAV IGM SER QL: NORMAL
HBV CORE IGM SER QL: NORMAL
HBV SURFACE AG SER QL: NORMAL
HCT VFR BLD AUTO: 39.1 % (ref 34.8–46.1)
HCV AB SER QL: NORMAL
HGB BLD-MCNC: 12.1 G/DL (ref 11.5–15.4)
HGB BLD-MCNC: 12.4 G/DL (ref 11.5–15.4)
IMM GRANULOCYTES # BLD AUTO: 0.04 THOUSAND/UL (ref 0–0.2)
IMM GRANULOCYTES NFR BLD AUTO: 0 % (ref 0–2)
LYMPHOCYTES # BLD AUTO: 1.92 THOUSANDS/ÂΜL (ref 0.6–4.47)
LYMPHOCYTES NFR BLD AUTO: 14 % (ref 14–44)
MAGNESIUM SERPL-MCNC: 2.2 MG/DL (ref 1.9–2.7)
MCH RBC QN AUTO: 29.3 PG (ref 26.8–34.3)
MCHC RBC AUTO-ENTMCNC: 31.7 G/DL (ref 31.4–37.4)
MCV RBC AUTO: 92 FL (ref 82–98)
MONOCYTES # BLD AUTO: 0.81 THOUSAND/ÂΜL (ref 0.17–1.22)
MONOCYTES NFR BLD AUTO: 6 % (ref 4–12)
NEUTROPHILS # BLD AUTO: 10.67 THOUSANDS/ÂΜL (ref 1.85–7.62)
NEUTS SEG NFR BLD AUTO: 80 % (ref 43–75)
NRBC BLD AUTO-RTO: 0 /100 WBCS
PLATELET # BLD AUTO: 257 THOUSANDS/UL (ref 149–390)
PMV BLD AUTO: 10.5 FL (ref 8.9–12.7)
POTASSIUM SERPL-SCNC: 3.9 MMOL/L (ref 3.5–5.3)
PROCALCITONIN SERPL-MCNC: 1 NG/ML
PROT SERPL-MCNC: 7.7 G/DL (ref 6.4–8.4)
RBC # BLD AUTO: 4.23 MILLION/UL (ref 3.81–5.12)
SODIUM SERPL-SCNC: 135 MMOL/L (ref 135–147)
WBC # BLD AUTO: 13.48 THOUSAND/UL (ref 4.31–10.16)

## 2024-12-11 PROCEDURE — 99222 1ST HOSP IP/OBS MODERATE 55: CPT | Performed by: STUDENT IN AN ORGANIZED HEALTH CARE EDUCATION/TRAINING PROGRAM

## 2024-12-11 PROCEDURE — 83735 ASSAY OF MAGNESIUM: CPT

## 2024-12-11 PROCEDURE — 82948 REAGENT STRIP/BLOOD GLUCOSE: CPT

## 2024-12-11 PROCEDURE — 80074 ACUTE HEPATITIS PANEL: CPT | Performed by: STUDENT IN AN ORGANIZED HEALTH CARE EDUCATION/TRAINING PROGRAM

## 2024-12-11 PROCEDURE — 80053 COMPREHEN METABOLIC PANEL: CPT

## 2024-12-11 PROCEDURE — 76705 ECHO EXAM OF ABDOMEN: CPT

## 2024-12-11 PROCEDURE — 97163 PT EVAL HIGH COMPLEX 45 MIN: CPT

## 2024-12-11 PROCEDURE — 85018 HEMOGLOBIN: CPT

## 2024-12-11 PROCEDURE — 85025 COMPLETE CBC W/AUTO DIFF WBC: CPT

## 2024-12-11 PROCEDURE — 84145 PROCALCITONIN (PCT): CPT

## 2024-12-11 RX ORDER — INSULIN LISPRO 100 [IU]/ML
1-5 INJECTION, SOLUTION INTRAVENOUS; SUBCUTANEOUS
Status: DISCONTINUED | OUTPATIENT
Start: 2024-12-11 | End: 2024-12-12 | Stop reason: HOSPADM

## 2024-12-11 RX ORDER — ACETAMINOPHEN 325 MG/1
650 TABLET ORAL EVERY 6 HOURS PRN
Status: DISCONTINUED | OUTPATIENT
Start: 2024-12-11 | End: 2024-12-12 | Stop reason: HOSPADM

## 2024-12-11 RX ORDER — LOSARTAN POTASSIUM 25 MG/1
25 TABLET ORAL DAILY
Status: DISCONTINUED | OUTPATIENT
Start: 2024-12-11 | End: 2024-12-12 | Stop reason: HOSPADM

## 2024-12-11 RX ORDER — INSULIN GLARGINE 100 [IU]/ML
11 INJECTION, SOLUTION SUBCUTANEOUS EVERY MORNING
Status: DISCONTINUED | OUTPATIENT
Start: 2024-12-11 | End: 2024-12-12 | Stop reason: HOSPADM

## 2024-12-11 RX ORDER — MIRTAZAPINE 15 MG/1
15 TABLET, FILM COATED ORAL
Status: DISCONTINUED | OUTPATIENT
Start: 2024-12-11 | End: 2024-12-12 | Stop reason: HOSPADM

## 2024-12-11 RX ORDER — GUAIFENESIN 600 MG/1
600 TABLET, EXTENDED RELEASE ORAL EVERY 12 HOURS SCHEDULED
Status: DISCONTINUED | OUTPATIENT
Start: 2024-12-11 | End: 2024-12-12 | Stop reason: HOSPADM

## 2024-12-11 RX ORDER — LUBIPROSTONE 8 UG/1
8 CAPSULE ORAL 2 TIMES DAILY WITH MEALS
Status: DISCONTINUED | OUTPATIENT
Start: 2024-12-11 | End: 2024-12-12 | Stop reason: HOSPADM

## 2024-12-11 RX ORDER — FUROSEMIDE 20 MG/1
20 TABLET ORAL DAILY
Status: DISCONTINUED | OUTPATIENT
Start: 2024-12-11 | End: 2024-12-12 | Stop reason: HOSPADM

## 2024-12-11 RX ORDER — GUAIFENESIN 600 MG/1
1200 TABLET, EXTENDED RELEASE ORAL EVERY 12 HOURS SCHEDULED
Status: DISCONTINUED | OUTPATIENT
Start: 2024-12-11 | End: 2024-12-11

## 2024-12-11 RX ORDER — SODIUM CHLORIDE, SODIUM GLUCONATE, SODIUM ACETATE, POTASSIUM CHLORIDE, MAGNESIUM CHLORIDE, SODIUM PHOSPHATE, DIBASIC, AND POTASSIUM PHOSPHATE .53; .5; .37; .037; .03; .012; .00082 G/100ML; G/100ML; G/100ML; G/100ML; G/100ML; G/100ML; G/100ML
50 INJECTION, SOLUTION INTRAVENOUS CONTINUOUS
Status: DISPENSED | OUTPATIENT
Start: 2024-12-11 | End: 2024-12-11

## 2024-12-11 RX ORDER — ALBUTEROL SULFATE 90 UG/1
1 INHALANT RESPIRATORY (INHALATION) EVERY 4 HOURS PRN
Status: DISCONTINUED | OUTPATIENT
Start: 2024-12-11 | End: 2024-12-12 | Stop reason: HOSPADM

## 2024-12-11 RX ORDER — FUROSEMIDE 20 MG/1
20 TABLET ORAL DAILY
Status: DISCONTINUED | OUTPATIENT
Start: 2024-12-11 | End: 2024-12-11

## 2024-12-11 RX ORDER — ATORVASTATIN CALCIUM 40 MG/1
40 TABLET, FILM COATED ORAL
Status: DISCONTINUED | OUTPATIENT
Start: 2024-12-11 | End: 2024-12-12 | Stop reason: HOSPADM

## 2024-12-11 RX ORDER — GABAPENTIN 100 MG/1
100 CAPSULE ORAL 3 TIMES DAILY
Status: DISCONTINUED | OUTPATIENT
Start: 2024-12-11 | End: 2024-12-12 | Stop reason: HOSPADM

## 2024-12-11 RX ORDER — POLYETHYLENE GLYCOL 3350 17 G/17G
238 POWDER, FOR SOLUTION ORAL ONCE
Status: COMPLETED | OUTPATIENT
Start: 2024-12-11 | End: 2024-12-11

## 2024-12-11 RX ORDER — ASPIRIN 81 MG/1
81 TABLET, CHEWABLE ORAL DAILY
Status: DISCONTINUED | OUTPATIENT
Start: 2024-12-11 | End: 2024-12-12 | Stop reason: HOSPADM

## 2024-12-11 RX ORDER — PANTOPRAZOLE SODIUM 40 MG/10ML
40 INJECTION, POWDER, LYOPHILIZED, FOR SOLUTION INTRAVENOUS EVERY 12 HOURS
Status: DISCONTINUED | OUTPATIENT
Start: 2024-12-11 | End: 2024-12-11

## 2024-12-11 RX ORDER — FERROUS SULFATE 325(65) MG
325 TABLET ORAL
Status: DISCONTINUED | OUTPATIENT
Start: 2024-12-11 | End: 2024-12-11

## 2024-12-11 RX ORDER — CARVEDILOL 12.5 MG/1
12.5 TABLET ORAL 2 TIMES DAILY WITH MEALS
Status: DISCONTINUED | OUTPATIENT
Start: 2024-12-11 | End: 2024-12-12 | Stop reason: HOSPADM

## 2024-12-11 RX ORDER — MEMANTINE HYDROCHLORIDE 5 MG/1
10 TABLET ORAL 2 TIMES DAILY
Status: DISCONTINUED | OUTPATIENT
Start: 2024-12-11 | End: 2024-12-12 | Stop reason: HOSPADM

## 2024-12-11 RX ADMIN — GABAPENTIN 100 MG: 100 CAPSULE ORAL at 16:28

## 2024-12-11 RX ADMIN — ASPIRIN 81 MG CHEWABLE TABLET 81 MG: 81 TABLET CHEWABLE at 08:51

## 2024-12-11 RX ADMIN — FUROSEMIDE 20 MG: 20 TABLET ORAL at 18:30

## 2024-12-11 RX ADMIN — PIPERACILLIN AND TAZOBACTAM 4.5 G: 36; 4.5 INJECTION, POWDER, LYOPHILIZED, FOR SOLUTION INTRAVENOUS at 03:26

## 2024-12-11 RX ADMIN — GABAPENTIN 100 MG: 100 CAPSULE ORAL at 21:27

## 2024-12-11 RX ADMIN — PIPERACILLIN AND TAZOBACTAM 4.5 G: 36; 4.5 INJECTION, POWDER, LYOPHILIZED, FOR SOLUTION INTRAVENOUS at 17:52

## 2024-12-11 RX ADMIN — INSULIN LISPRO 1 UNITS: 100 INJECTION, SOLUTION INTRAVENOUS; SUBCUTANEOUS at 13:13

## 2024-12-11 RX ADMIN — ATORVASTATIN CALCIUM 40 MG: 40 TABLET, FILM COATED ORAL at 16:28

## 2024-12-11 RX ADMIN — LUBIPROSTONE 8 MCG: 8 CAPSULE, GELATIN COATED ORAL at 08:51

## 2024-12-11 RX ADMIN — MIRTAZAPINE 15 MG: 15 TABLET, FILM COATED ORAL at 21:27

## 2024-12-11 RX ADMIN — CARVEDILOL 12.5 MG: 12.5 TABLET, FILM COATED ORAL at 08:51

## 2024-12-11 RX ADMIN — SODIUM CHLORIDE, SODIUM GLUCONATE, SODIUM ACETATE, POTASSIUM CHLORIDE, MAGNESIUM CHLORIDE, SODIUM PHOSPHATE, DIBASIC, AND POTASSIUM PHOSPHATE 50 ML/HR: .53; .5; .37; .037; .03; .012; .00082 INJECTION, SOLUTION INTRAVENOUS at 03:26

## 2024-12-11 RX ADMIN — INSULIN LISPRO 4 UNITS: 100 INJECTION, SOLUTION INTRAVENOUS; SUBCUTANEOUS at 08:52

## 2024-12-11 RX ADMIN — PIPERACILLIN AND TAZOBACTAM 4.5 G: 36; 4.5 INJECTION, POWDER, LYOPHILIZED, FOR SOLUTION INTRAVENOUS at 10:53

## 2024-12-11 RX ADMIN — CARVEDILOL 12.5 MG: 12.5 TABLET, FILM COATED ORAL at 16:28

## 2024-12-11 RX ADMIN — GABAPENTIN 100 MG: 100 CAPSULE ORAL at 08:51

## 2024-12-11 RX ADMIN — POLYETHYLENE GLYCOL 3350 238 G: 17 POWDER, FOR SOLUTION ORAL at 08:53

## 2024-12-11 RX ADMIN — GUAIFENESIN 600 MG: 600 TABLET ORAL at 21:27

## 2024-12-11 RX ADMIN — MEMANTINE 10 MG: 5 TABLET ORAL at 16:30

## 2024-12-11 RX ADMIN — INSULIN GLARGINE 11 UNITS: 100 INJECTION, SOLUTION SUBCUTANEOUS at 08:52

## 2024-12-11 RX ADMIN — LOSARTAN POTASSIUM 25 MG: 25 TABLET, FILM COATED ORAL at 08:51

## 2024-12-11 RX ADMIN — INSULIN LISPRO 2 UNITS: 100 INJECTION, SOLUTION INTRAVENOUS; SUBCUTANEOUS at 21:31

## 2024-12-11 RX ADMIN — GUAIFENESIN 1200 MG: 600 TABLET ORAL at 03:27

## 2024-12-11 RX ADMIN — PANTOPRAZOLE SODIUM 40 MG: 40 INJECTION, POWDER, LYOPHILIZED, FOR SOLUTION INTRAVENOUS at 03:23

## 2024-12-11 RX ADMIN — MEMANTINE 10 MG: 5 TABLET ORAL at 08:51

## 2024-12-11 RX ADMIN — LUBIPROSTONE 8 MCG: 8 CAPSULE, GELATIN COATED ORAL at 16:28

## 2024-12-11 NOTE — ASSESSMENT & PLAN NOTE
When pt arrived to the floor, it was noted small quantity of maroon to bright blood mixed with bowel movements, noted on tissue with wiping  Hgb 13.4, approximately baseline  continue to monitor H&H closely  GI consult pending

## 2024-12-11 NOTE — ASSESSMENT & PLAN NOTE
"Lab Results   Component Value Date    HGBA1C 7.3 (H) 09/28/2024       No results for input(s): \"POCGLU\" in the last 72 hours.    Blood Sugar Average: Last 72 hrs:  Continue 11 units of Lantus daily  SSI with accucheks    "

## 2024-12-11 NOTE — PHYSICAL THERAPY NOTE
PT EVALUATION    Pt. Name: Marci Sandoval  Pt. Age: 79 y.o.  MRN: [id number]  LENGTH OF STAY: 0      Admitting Diagnoses:   Proctocolitis [K52.9]  Leukocytosis [D72.829]  Constipation [K59.00]  Constipation, unspecified constipation type [K59.00]    Past Medical History:   Diagnosis Date    Cancer (HCC)     Throat    Chronic pain disorder     Diabetes mellitus (HCC)     Diffuse large B cell lymphoma (HCC)     Dysphagia     GERD without esophagitis     HTN (hypertension)     Hyperlipidemia     Hypertension     MI (myocardial infarction) (HCC)     Port-A-Cath in place 07/29/2019    Thyroid cancer (HCC) 2018       Past Surgical History:   Procedure Laterality Date    BONE MARROW BIOPSY      BREAST BIOPSY Left     CARDIAC CATHETERIZATION N/A 12/9/2022    Procedure: Cardiac catheterization;  Surgeon: Jhonny Rain MD;  Location: [facility];  Service: Cardiology    CARDIAC CATHETERIZATION N/A 12/9/2022    Procedure: Cardiac Coronary Angiogram;  Surgeon: Jhonny Rain MD;  Location: [facility];  Service: Cardiology    CARDIAC CATHETERIZATION Left 12/9/2022    Procedure: Cardiac Left Heart Cath;  Surgeon: Jhonny Rain MD;  Location: [facility];  Service: Cardiology    CHOLECYSTECTOMY      IR PORT PLACEMENT  11/16/2018    IR PORT REMOVAL  3/22/2021    OTHER SURGICAL HISTORY      tendor tear repair to right shoulder    SHOULDER ARTHROSCOPY         Imaging Studies:  CT abdomen pelvis with contrast   Final Result by Norbert Mills MD (12/10 2005)      1.  Diffuse wall thickening and mucosal hyperemia throughout the rectosigmoid colon, consistent with a nonspecific proctocolitis.      2.  Please refer to the report body for description of other incidental, chronic and/or benign findings.         Workstation performed: [id number]          right upper quadrant    (Results Pending)         12/11/24 0930   PT Last Visit   PT Visit Date 12/11/24   Note Type   Note type Evaluation    Pain Assessment   Pain Assessment Tool FLACC   Pain Location/Orientation Location: Abdomen   Hospital Pain Intervention(s) Repositioned;Ambulation/increased activity;Emotional support;Rest  (RN made aware)   Pain Rating: FLACC (Rest) - Face 0   Pain Rating: FLACC (Rest) - Legs 0   Pain Rating: FLACC (Rest) - Activity 0   Pain Rating: FLACC (Rest) - Cry 0   Pain Rating: FLACC (Rest) - Consolability 0   Score: FLACC (Rest) 0   Pain Rating: FLACC (Activity) - Face 1   Pain Rating: FLACC (Activity) - Legs 0   Pain Rating: FLACC (Activity) - Activity 1   Pain Rating: FLACC (Activity) - Cry 1   Pain Rating: FLACC (Activity) - Consolability 1   Score: FLACC (Activity) 4   Restrictions/Precautions   Weight Bearing Precautions Per Order No   Other Precautions Cognitive;Chair Alarm;Bed Alarm;Fall Risk;Pain   Home Living   Type of Home House   Home Layout Two level;Stairs to enter with rails;Bed/bath upstairs   Bathroom Shower/Tub Tub/shower unit   Bathroom Toilet Standard   Bathroom Equipment Grab bars in shower;Shower chair;Commode   Home Equipment Walker   Prior Function   Level of [place] Independent with functional mobility  (w/ RW)   Lives With Family   Receives Help From Family   Falls in the last 6 months 0  (pt denies)   Vocational Retired   Comments (-)    General   Family/Caregiver Present No   Cognition   Overall Cognitive Status Impaired   Arousal/Participation Alert   Orientation Level Oriented to person;Oriented to place;Disoriented to time;Disoriented to situation   Following Commands Follows one step commands without difficulty   Comments pleasant & cooperative; (+) language barrier, pt [place] speaking   Subjective   Subjective Pt agreeable to PT reanna.   RUE Assessment   RUE Assessment WFL   LUE Assessment   LUE Assessment WFL   RLE Assessment   RLE Assessment WFL   LLE Assessment   LLE Assessment WFL   Bed Mobility   Supine to Sit 5  Supervision   Additional items HOB elevated;Bedrails;Increased  time required   Transfers   Sit to Stand 4  Minimal assistance   Additional items Assist x 1;Increased time required;Verbal cues   Stand to Sit 4  Minimal assistance   Additional items Assist x 1;Increased time required;Verbal cues   Toilet transfer 4  Minimal assistance   Additional items Assist x 1;Increased time required;Verbal cues;Standard toilet;Other  (grab bar)   Additional Comments cues for techniques & safety   Ambulation/Elevation   Gait pattern Wide TRESA;Decreased foot clearance;Short stride;Excessively slow   Gait Assistance 4  Minimal assist   Additional items Assist x 1;Verbal cues;Tactile cues   Assistive Device Rolling walker   Distance 60'x1 + 15'x1   Ambulation/Elevation Additional Comments unsteady gait but no gross LOB noted   Balance   Static Sitting Fair +   Dynamic Sitting Fair   Static Standing Fair -  (w/ RW)   Dynamic Standing Poor +  (w/ RW)   Ambulatory Poor +  (w/ RW)   Endurance Deficit   Endurance Deficit Yes   Endurance Deficit Description pain   Activity Tolerance   Activity Tolerance Patient limited by pain;Treatment limited secondary to medical complications (Comment)   Nurse Made Aware yes   Assessment   Prognosis Good   Problem List Decreased endurance;Impaired balance;Decreased mobility;Decreased cognition;Impaired judgement;Decreased safety awareness;Pain   Assessment Pt. 79 y.o.female presented w/ lower abdominal pain. Pt admitted for Proctocolitis w/ blood in stool & transaminitis.  Pt referred to PT for mobility assessment & D/C planning w/ orders of Activity as tolerated. Please see above for information re: home set-up & PLOF as well as objective findings during PT assessment. PTA, pt reports being fairly I w/ RW. Pt limited historian + language barrier hence limited PLOF info. On eval, pt functioning below baseline hence will continue skilled PT to improve function & safety. Pt require S for bed mobility & minAx1 for transfers & amb w/ RW + cues for techniques & safety.  "Gait deviations as above but no gross LOB noted.  Dec amb tolerance 2* to abdominal pain. Pt attempted to have a bowel movement but unsuccessful however noted blood in toilet. RN made aware. The patient's AM-PAC Basic Mobility Inpatient Short Form Raw Score is 19. A Raw score of greater than 16 suggests the patient may benefit from discharge to home. Please also refer to the recommendation of the Physical Therapist for safe discharge planning. From PT standpoint, will recommend Level III (minimum resource intensity) rehab services and inc family support at D/C. Pt could also benefit from OT consult for cognitive & ADL  assessment. No SOB & dizziness reported t/o session. Nsg staff most recent vital signs as follows: /82 (BP Location: Left arm)   Pulse 90   Temp 98.7 °F (37.1 °C) (Oral)   Resp 16   Ht 5' 1\" (1.549 m)   Wt 57.2 kg (126 lb 1.7 oz)   SpO2 92%   BMI 23.83 kg/m² . At end of session, pt OOB in chair in stable condition, call bell & phone in reach, chair alarm activated. Fall precautions reinforced w/ good understanding. CM to follow. Nsg staff to continue to mobilized pt (OOB in chair for all meals & ambulate in room/unit) as tolerated to prevent further decline in function. Will also recommend Restorative for daily amb &/or daily OOB in chair as appropriate. Nsg notified. Co-eval was necessary to complete this PT eval for the pt's best interest given pt's medical acuity & complexity.   Goals   Patient Goals to have less pain   STG Expiration Date 12/18/24   Short Term Goal #1 Goals to be met in 7 days; pt will be able to: 1) inc strength & balance by 1/2 grade to improve overall functional mobility & dec fall risk; 2) inc bed mobility to modified I for pt to be able to get in/OOB safely w/ proper techniques 100% of the time, to dec caregiver burden & safely function at home; 3) inc transfers to modified I for pt to transition safely from one surface to another w/o % of the time, to dec " caregiver burden & safely function at home; 4) inc amb w/ RW approx. >250' w/ S for pt to ambulate community distances w/o any % of the time, to dec caregiver burden & safely function at home; 5) negotiate stairs w/ S for inc safety during stair mgt inside/outside of home & dec caregiver burden; 6) pt/caregiver ed   PT Treatment Day 0   Plan   Treatment/Interventions Functional transfer training;LE strengthening/ROM;Elevations;Therapeutic exercise;Endurance training;Patient/family training;Bed mobility;Gait training;Spoke to nursing   PT Frequency 3-5x/wk   Discharge Recommendation   Rehab Resource Intensity Level, PT III (Minimum Resource Intensity)   Equipment Recommended Walker  (pt has)   Additional Comments restorative for daily amb   AM-PAC Basic Mobility Inpatient   Turning in Flat Bed Without Bedrails 4   Lying on Back to Sitting on Edge of Flat Bed Without Bedrails 3   Moving Bed to Chair 3   Standing Up From Chair Using Arms 3   Walk in Room 3   Climb 3-5 Stairs With Railing 3   Basic Mobility Inpatient Raw Score 19   Basic Mobility Standardized Score 42.48   [facility] Highest Level Of Mobility   -HLM Goal 6: Walk 10 steps or more   -HLM Achieved 7: Walk 25 feet or more   End of Consult   Patient Position at End of Consult Bedside chair;Bed/Chair alarm activated;All needs within reach   End of Consult Comments Pt in stable condition. All needs in reach. Chair alarm activated.   Hx/personal factors: co-morbidities, inaccessible home, advanced age, use of AD, dec cognition, pain, fall risk, and assist w/ ADL's  Examination: dec mobility, dec balance, dec endurance, dec amb, risk for falls, pain, dec cognition  Clinical: unpredictable (ongoing medical status, abnormal lab values, risk for falls, and pain mgt)  Complexity: high    Fritz Wasserman

## 2024-12-11 NOTE — ASSESSMENT & PLAN NOTE
Pt currently alert and oriented to person, place, situation, month, and year. Not day, stated day was Monday. Some confusion at times during conversation.   Continue namenda  Continue supportive care

## 2024-12-11 NOTE — ASSESSMENT & PLAN NOTE
- hepatocellular pattern. Likely ischemia related   - check viral hep panel and RUQ US   - trend LFTs

## 2024-12-11 NOTE — ASSESSMENT & PLAN NOTE
Mild  CT a/p liver/biliary tree unremarkable s/p cholecystectomy  Continue to monitor CMP  If transaminitis continues to increase, would consider RUQ US

## 2024-12-11 NOTE — PLAN OF CARE
Problem: PAIN - ADULT  Goal: Verbalizes/displays adequate comfort level or baseline comfort level  Description: Interventions:  - Encourage patient to monitor pain and request assistance  - Assess pain using appropriate pain scale  - Administer analgesics based on type and severity of pain and evaluate response  - Implement non-pharmacological measures as appropriate and evaluate response  - Consider cultural and social influences on pain and pain management  - Notify physician/advanced practitioner if interventions unsuccessful or patient reports new pain  Outcome: Progressing     Problem: INFECTION - ADULT  Goal: Absence or prevention of progression during hospitalization  Description: INTERVENTIONS:  - Assess and monitor for signs and symptoms of infection  - Monitor lab/diagnostic results  - Monitor all insertion sites, i.e. indwelling lines, tubes, and drains  - Monitor endotracheal if appropriate and nasal secretions for changes in amount and color  - [place] appropriate cooling/warming therapies per order  - Administer medications as ordered  - Instruct and encourage patient and family to use good hand hygiene technique  - Identify and instruct in appropriate isolation precautions for identified infection/condition  Outcome: Progressing  Goal: Absence of fever/infection during neutropenic period  Description: INTERVENTIONS:  - Monitor WBC    Outcome: Progressing     Problem: SAFETY ADULT  Goal: Patient will remain free of falls  Description: INTERVENTIONS:  - Educate patient/family on patient safety including physical limitations  - Instruct patient to call for assistance with activity   - Consult OT/PT to assist with strengthening/mobility   - Keep Call bell within reach  - Keep bed low and locked with side rails adjusted as appropriate  - Keep care items and personal belongings within reach  - Initiate and maintain comfort rounds  - Make Fall Risk Sign visible to staff  - Offer Toileting every 2 Hours,  in advance of need  - Initiate/Maintain bed alarm  - Obtain necessary fall risk management equipment: walker.  - Apply yellow socks and bracelet for high fall risk patients  - Consider moving patient to room near nurses station  Outcome: Progressing  Goal: Maintain or return to baseline ADL function  Description: INTERVENTIONS:  -  Assess patient's ability to carry out ADLs; assess patient's baseline for ADL function and identify physical deficits which impact ability to perform ADLs (bathing, care of mouth/teeth, toileting, grooming, dressing, etc.)  - Assess/evaluate cause of self-care deficits   - Assess range of motion  - Assess patient's mobility; develop plan if impaired  - Assess patient's need for assistive devices and provide as appropriate  - Encourage maximum independence but intervene and supervise when necessary  - Involve family in performance of ADLs  - Assess for home care needs following discharge   - Consider OT consult to assist with ADL evaluation and planning for discharge  - Provide patient education as appropriate  Outcome: Progressing  Goal: Maintains/Returns to pre admission functional level  Description: INTERVENTIONS:  - Perform AM-PAC 6 Click Basic Mobility/ Daily Activity assessment daily.  - Set and communicate daily mobility goal to care team and patient/family/caregiver.   - Collaborate with rehabilitation services on mobility goals if consulted  - Perform Range of Motion 3 times a day.  - Reposition patient every 2 hours.  - Dangle patient 3 times a day  - Stand patient 3 times a day  - Ambulate patient 3 times a day  - Out of bed to chair 3 times a day   - Out of bed for meals 3 times a day  - Out of bed for toileting  - Record patient progress and toleration of activity level   Outcome: Progressing     Problem: DISCHARGE PLANNING  Goal: Discharge to home or other facility with appropriate resources  Description: INTERVENTIONS:  - Identify barriers to discharge w/patient and  caregiver  - Arrange for needed discharge resources and transportation as appropriate  - Identify discharge learning needs (meds, wound care, etc.)  - Arrange for interpretive services to assist at discharge as needed  - Refer to Case Management Department for coordinating discharge planning if the patient needs post-hospital services based on physician/advanced practitioner order or complex needs related to functional status, cognitive ability, or social support system  Outcome: Progressing     Problem: Knowledge Deficit  Goal: Patient/family/caregiver demonstrates understanding of disease process, treatment plan, medications, and discharge instructions  Description: Complete learning assessment and assess knowledge base.  Interventions:  - Provide teaching at level of understanding  - Provide teaching via preferred learning methods  Outcome: Progressing     Problem: GASTROINTESTINAL - ADULT  Goal: Minimal or absence of nausea and/or vomiting  Description: INTERVENTIONS:  - Administer IV fluids if ordered to ensure adequate hydration  - Maintain NPO status until nausea and vomiting are resolved  - Nasogastric tube if ordered  - Administer ordered antiemetic medications as needed  - Provide nonpharmacologic comfort measures as appropriate  - Advance diet as tolerated, if ordered  - Consider nutrition services referral to assist patient with adequate nutrition and appropriate food choices  Outcome: Progressing  Goal: Maintains or returns to baseline bowel function  Description: INTERVENTIONS:  - Assess bowel function  - Encourage oral fluids to ensure adequate hydration  - Administer IV fluids if ordered to ensure adequate hydration  - Administer ordered medications as needed  - Encourage mobilization and activity  - Consider nutritional services referral to assist patient with adequate nutrition and appropriate food choices  Outcome: Progressing  Goal: Maintains adequate nutritional intake  Description:  INTERVENTIONS:  - Monitor percentage of each meal consumed  - Identify factors contributing to decreased intake, treat as appropriate  - Assist with meals as needed  - Monitor I&O, weight, and lab values if indicated  - Obtain nutrition services referral as needed  Outcome: Progressing  Goal: Oral mucous membranes remain intact  Description: INTERVENTIONS  - Assess oral mucosa and hygiene practices  - Implement preventative oral hygiene regimen  - Implement oral medicated treatments as ordered  - Initiate Nutrition services referral as needed  Outcome: Progressing     Problem: METABOLIC, FLUID AND ELECTROLYTES - ADULT  Goal: Electrolytes maintained within normal limits  Description: INTERVENTIONS:  - Monitor labs and assess patient for signs and symptoms of electrolyte imbalances  - Administer electrolyte replacement as ordered  - Monitor response to electrolyte replacements, including repeat lab results as appropriate  - Instruct patient on fluid and nutrition as appropriate  Outcome: Progressing  Goal: Fluid balance maintained  Description: INTERVENTIONS:  - Monitor labs   - Monitor I/O and WT  - Instruct patient on fluid and nutrition as appropriate  - Assess for signs & symptoms of volume excess or deficit  Outcome: Progressing  Goal: Glucose maintained within target range  Description: INTERVENTIONS:  - Monitor Blood Glucose as ordered  - Assess for signs and symptoms of hyperglycemia and hypoglycemia  - Administer ordered medications to maintain glucose within target range  - Assess nutritional intake and initiate nutrition service referral as needed  Outcome: Progressing     Problem: HEMATOLOGIC - ADULT  Goal: Maintains hematologic stability  Description: INTERVENTIONS  - Assess for signs and symptoms of bleeding or hemorrhage  - Monitor labs  - Administer supportive blood products/factors as ordered and appropriate  Outcome: Progressing

## 2024-12-11 NOTE — ASSESSMENT & PLAN NOTE
"Pt presented to the ED secondary to constipation x several days with lower abdominal pain.  Pt manually disimpacted in the ED with large bowel movement following and has been having bowel movements since that time.   CT a/p demonstrating \"Diffuse wall thickening and mucosal hyperemia throughout the rectosigmoid colon, consistent with a nonspecific proctocolitis.\"  WBC 18.15, not fulfilling SIRS  Procalc pending  Blood cultures pending  Zosyn initiated in the ED, continue for now  GI consult pending   "

## 2024-12-11 NOTE — PLAN OF CARE
Problem: PHYSICAL THERAPY ADULT  Goal: Performs mobility at highest level of function for planned discharge setting.  See evaluation for individualized goals.  Description: Treatment/Interventions: Functional transfer training, LE strengthening/ROM, Elevations, Therapeutic exercise, Endurance training, Patient/family training, Bed mobility, Gait training, Spoke to nursing  Equipment Recommended: Walker (pt has)       See flowsheet documentation for full assessment, interventions and recommendations.  Note: Prognosis: Good  Problem List: Decreased endurance, Impaired balance, Decreased mobility, Decreased cognition, Impaired judgement, Decreased safety awareness, Pain  Assessment: Pt. 79 y.o.female presented w/ lower abdominal pain. Pt admitted for Proctocolitis w/ blood in stool & transaminitis.  Pt referred to PT for mobility assessment & D/C planning w/ orders of Activity as tolerated. Please see above for information re: home set-up & PLOF as well as objective findings during PT assessment. PTA, pt reports being fairly I w/ RW. Pt limited historian + language barrier hence limited PLOF info. On eval, pt functioning below baseline hence will continue skilled PT to improve function & safety. Pt require S for bed mobility & minAx1 for transfers & amb w/ RW + cues for techniques & safety. Gait deviations as above but no gross LOB noted.  Dec amb tolerance 2* to abdominal pain. Pt attempted to have a bowel movement but unsuccessful however noted blood in toilet. RN made aware. The patient's AM-PAC Basic Mobility Inpatient Short Form Raw Score is 19. A Raw score of greater than 16 suggests the patient may benefit from discharge to home. Please also refer to the recommendation of the Physical Therapist for safe discharge planning. From PT standpoint, will recommend Level III (minimum resource intensity) rehab services and inc family support at D/C. Pt could also benefit from OT consult for cognitive & ADL  assessment.  "No SOB & dizziness reported t/o session. Nsg staff most recent vital signs as follows: /82 (BP Location: Left arm)   Pulse 90   Temp 98.7 °F (37.1 °C) (Oral)   Resp 16   Ht 5' 1\" (1.549 m)   Wt 57.2 kg (126 lb 1.7 oz)   SpO2 92%   BMI 23.83 kg/m² . At end of session, pt OOB in chair in stable condition, call bell & phone in reach, chair alarm activated. Fall precautions reinforced w/ good understanding. CM to follow. Nsg staff to continue to mobilized pt (OOB in chair for all meals & ambulate in room/unit) as tolerated to prevent further decline in function. Will also recommend Restorative for daily amb &/or daily OOB in chair as appropriate. Nsg notified. Co-eval was necessary to complete this PT eval for the pt's best interest given pt's medical acuity & complexity.        Rehab Resource Intensity Level, PT: III (Minimum Resource Intensity)    See flowsheet documentation for full assessment.        "

## 2024-12-11 NOTE — ASSESSMENT & PLAN NOTE
- likely stercoral in nature vs ischemia. Other DDx includes SCAD, IBD, less likely malignancy   - she will benefit from aggressive bowel regimen to prevent fecal stasis. Miralax prep ordered.   - she would also likely benefit from bladder decompression and pelvic PT    - on zosyn. BCx pending. Procal and WBC elevated. Maybe can de-escalate to CTX + flagyl   - supportive care.

## 2024-12-11 NOTE — UTILIZATION REVIEW
Initial Clinical Review      WAS OBSERVATION 12/10   CONVERTED TO INPATIENT  12/11    Admission: Date/Time/Statement:   Admission Orders (From admission, onward)       Ordered        12/11/24 1504  INPATIENT ADMISSION  Once            12/10/24 2057  Place in Observation  Once                          Orders Placed This Encounter   Procedures    INPATIENT ADMISSION     Standing Status:   Standing     Number of Occurrences:   1     Level of Care:   Med Surg [16]     Estimated length of stay:   More than 2 Midnights     Certification:   I certify that inpatient services are medically necessary for this patient for a duration of greater than two midnights. See H&P and MD Progress Notes for additional information about the patient's course of treatment.     ED Arrival Information       Expected   -    Arrival   12/10/2024 16:30    Acuity   Urgent              Means of arrival   Ambulance    Escorted by   [facility] ([facility])    Service   Hospitalist    Admission type   Emergency              Arrival complaint   Constipation             Chief Complaint   Patient presents with    Constipation     Pt came in via EMS, per pt unable to have a BM or urinate for a while, complaining of lower abd pain, distended.        Initial Presentation: 79 y.o. female  to ER From home via EMS for evaluation of worsening abd pain and  reports bright red blood per rectum  h/o diffuse large B-cell lymphoma, hyperlipidemia, Alzheimer's dementia, HTN.    IN ER  CTAP showed diffuse wall thickening and mucosal hyperemia c/w nonspecific proctocolitis.   microscopic hematuria,   Wbc  18.15  transaminitis       Pt currently alert and oriented to person, place, situation, month, and year. Not day, stated day was Monday. Some confusion at times during conversation   Abd distended, normoactive BS, general tenderness.      12/10  Admit OBS status,  MS  Level of care  for workup of abd symptoms - management of  abd pain.  Consult GI.  Cont  IVF isolyte @  50 cc/hr,  IV zosyn for now.  Cont infectious r/o  (trend wbc, temp, procal and blood cx)    accucks qid w/SSI.  Serial HGB q12H.  Routine VS.  Daily wgt.  I/O q shift.  Reg diet.  OOB to chair.    manually disimpacted in the ED with large bowel movement following and has been having bowel movements since that time  (stool record at bedside)   Trend CMP    HOLD home lasix for now     Date: 12/11     Day 2: CHANGED TO INPATIENT STATUS,  MS  LEVEL OF CARE  for ongoing supportive care/ pain management,   PT/OT evals and diagnostics.  Given PO glycolax bowel prep @  0900.  IVF dc'ed    Continues to have frequent, small amount black stools.      12/11   GI CONSULT:   found to have fecal impaction and urinary retention s/p manual disimpaction in the ER with abundant stool removed.   moderate LLQ tenderness. Started on Zosyn.   LFTs are acutely elevated which may be due to ischemia. order a RUQ US and continue to trend LFT's.       ED Treatment-Medication Administration from 12/10/2024 1630 to 12/10/2024 2223         Date/Time Order Dose Route Action     12/10/2024 1737 sodium chloride 0.9 % bolus 1,000 mL 1,000 mL Intravenous New Bag     12/10/2024 1740 ketorolac (TORADOL) injection 15 mg 15 mg Intravenous Given     12/10/2024 2157 fentaNYL injection 25 mcg 25 mcg Intravenous Given     12/10/2024 2159 piperacillin-tazobactam (ZOSYN) IVPB 4.5 g 4.5 g Intravenous New Bag       Scheduled Medications:  aspirin, 81 mg, Oral, Daily  atorvastatin, 40 mg, Oral, Daily With Dinner  carvedilol, 12.5 mg, Oral, BID With Meals  [Held by provider] furosemide, 20 mg, Oral, Daily  gabapentin, 100 mg, Oral, TID  guaiFENesin, 600 mg, Oral, Q12H CAROLINE  insulin glargine, 11 Units, Subcutaneous, QAM  insulin lispro, 1-5 Units, Subcutaneous, TID AC  insulin lispro, 1-5 Units, Subcutaneous, HS  losartan, 25 mg, Oral, Daily  lubiprostone, 8 mcg, Oral, BID With Meals  memantine, 10 mg, Oral, BID  mirtazapine, 15 mg, Oral,  HS  piperacillin-tazobactam, 4.5 g, Intravenous, Q8H      Continuous IV Infusions:     PRN Meds:  acetaminophen, 650 mg, Oral, Q6H PRN  albuterol, 1 puff, Inhalation, Q4H PRN  melatonin, 6 mg, Oral, HS PRN      ED Triage Vitals [12/10/24 1634]   Temperature Pulse Respirations Blood Pressure SpO2 Pain Score   97.9 °F (36.6 °C) 60 16 160/76 98 % 10 - Worst Possible Pain     Weight (last 2 days)       Date/Time Weight    12/11/24 0545 57.2 (126.1)    12/11/24 0237 57.2 (126.1)    12/10/24 22:27:29 56.6 (124.78)    12/10/24 1634 59.6 (131.39)            Vital Signs (last 3 days)       Date/Time Temp Pulse Resp BP SpO2 O2 Device Godwin Coma Scale Score Pain    12/11/24 15:03:45 99.1 °F (37.3 °C) 80 14 118/63 96 % -- -- --    12/11/24 07:24:05 98.7 °F (37.1 °C) 90 16 151/82 92 % -- -- --    12/11/24 07:23:45 98.7 °F (37.1 °C) -- 2 151/82 -- -- -- --    12/10/24 2235 -- -- -- -- -- -- -- 10 - Worst Possible Pain    12/10/24 22:27:29 98.9 °F (37.2 °C) 92 20 156/84 95 % None (Room air) 15 10 - Worst Possible Pain    12/10/24 2157 -- -- -- -- -- -- -- 8    12/10/24 1830 -- 72 16 136/102 97 % None (Room air) -- --    12/10/24 1737 -- -- -- -- -- -- -- 10 - Worst Possible Pain    12/10/24 1642 -- -- -- -- -- None (Room air) -- --    12/10/24 1634 97.9 °F (36.6 °C) 60 16 160/76 98 % None (Room air) -- 10 - Worst Possible Pain              Pertinent Labs/Diagnostic Test Results:   Radiology:  CT abdomen pelvis with contrast   Final Interpretation by Norbert Mills MD (12/10 2005)      1.  Diffuse wall thickening and mucosal hyperemia throughout the rectosigmoid colon, consistent with a nonspecific proctocolitis.      2.  Please refer to the report body for description of other incidental, chronic and/or benign findings.         Workstation performed: [id number]         US right upper quadrant    (Results Pending)     Cardiology:  No orders to display     GI:  No orders to display           Results from last 7 days   Lab  Units 12/11/24  0452 12/10/24  1734   WBC Thousand/uL 13.48* 18.15*   HEMOGLOBIN g/dL 12.4 13.4   HEMATOCRIT % 39.1 41.4   PLATELETS Thousands/uL 257 292   TOTAL NEUT ABS Thousands/µL 10.67* 14.14*         Results from last 7 days   Lab Units 12/11/24  0452 12/10/24  1734   SODIUM mmol/L 135 134*   POTASSIUM mmol/L 3.9 4.0   CHLORIDE mmol/L 101 99   CO2 mmol/L 23 29   ANION GAP mmol/L 11 6   BUN mg/dL 25 28*   CREATININE mg/dL 1.06 0.98   EGFR ml/min/1.73sq m 50 55   CALCIUM mg/dL 8.8 9.5   MAGNESIUM mg/dL 2.2 2.2   PHOSPHORUS mg/dL  --  3.8     Results from last 7 days   Lab Units 12/11/24  0452 12/10/24  1734   AST U/L 217* 43*   ALT U/L 264* 97*   ALK PHOS U/L 92 86   TOTAL PROTEIN g/dL 7.7 9.0*   ALBUMIN g/dL 3.8 4.3   TOTAL BILIRUBIN mg/dL 0.61 0.45     Results from last 7 days   Lab Units 12/11/24  1130 12/11/24  0721   POC GLUCOSE mg/dl 191* 359*     Results from last 7 days   Lab Units 12/11/24  0452 12/10/24  1734   GLUCOSE RANDOM mg/dL 460* 297*             BETA-HYDROXYBUTYRATE   Date Value Ref Range Status   12/09/2020 0.1 <0.6 mmol/L Final        Results from last 7 days   Lab Units 12/11/24  0452   PROCALCITONIN ng/ml 1.00*       Results from last 7 days   Lab Units 12/10/24  1734   LIPASE u/L 14       Results from last 7 days   Lab Units 12/10/24  1734   CLARITY UA  Turbid   COLOR UA  Colorless   SPEC GRAV UA  1.013   PH UA  5.0   GLUCOSE UA mg/dl >=1000 (1%)*   KETONES UA mg/dl Negative   BLOOD UA  Small*   PROTEIN UA mg/dl Negative   NITRITE UA  Negative   BILIRUBIN UA  Negative   UROBILINOGEN UA (BE) mg/dl <2.0   LEUKOCYTES UA  Small*   WBC UA /hpf 4-10*   RBC UA /hpf 2-4*   BACTERIA UA /hpf Occasional   EPITHELIAL CELLS WET PREP /hpf Occasional       Results from last 7 days   Lab Units 12/10/24  2157 12/10/24  2156   BLOOD CULTURE  Received in Microbiology Lab. Culture in Progress. Received in Microbiology Lab. Culture in Progress.                   Past Medical History:   Diagnosis Date    Cancer  (HCC)     Throat    Chronic pain disorder     Diabetes mellitus (HCC)     Diffuse large B cell lymphoma (HCC)     Dysphagia     GERD without esophagitis     HTN (hypertension)     Hyperlipidemia     Hypertension     MI (myocardial infarction) (HCC)     Port-A-Cath in place 07/29/2019    Thyroid cancer (HCC) 2018     Present on Admission:   Essential hypertension   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)   Late onset Alzheimer's dementia with other behavioral disturbance, unspecified dementia severity (HCC)   Hyperlipidemia      Admitting Diagnosis: Proctocolitis [K52.9]  Leukocytosis [D72.829]  Constipation [K59.00]  Constipation, unspecified constipation type [K59.00]  Age/Sex: 79 y.o. female    Network Utilization Review Department  ATTENTION: Please call with any questions or concerns to [phone] and carefully listen to the prompts so that you are directed to the right person. All voicemails are confidential.   For Discharge needs, contact Care Management DC Support Team at [phone] opt. 2  Send all requests for admission clinical reviews, approved or denied determinations and any other requests to dedicated fax number below belonging to the [facility] where the patient is receiving treatment. List of dedicated fax numbers for the Facilities:  FACILITY NAME UR FAX NUMBER   ADMISSION DENIALS (Administrative/Medical Necessity) [phone]   DISCHARGE SUPPORT TEAM (NETWORK) [phone]   PARENT CHILD HEALTH (Maternity/NICU/Pediatrics) [phone]   [facility] [phone]   [facility] [phone]   [facility] [phone]   [facility] [phone]   Critical access hospital [phone]   [facility] [phone]   [facility] [phone]   [facility] [phone]   Formerly [facility]  [facility] [phone]   Formerly [facility] [phone]   [facility] [phone]   [facility] [phone]

## 2024-12-11 NOTE — H&P
"H&P - Hospitalist   Name: Marci Sandoval 79 y.o. female I MRN: [id number]  Unit/Bed#: [place] [address] -01 I Date of Admission: 12/10/2024   Date of Service: 12/11/2024 I Hospital Day: 0     Assessment & Plan  Proctocolitis  Pt presented to the ED secondary to constipation x several days with lower abdominal pain.  Pt manually disimpacted in the ED with large bowel movement following and has been having bowel movements since that time.   CT a/p demonstrating \"Diffuse wall thickening and mucosal hyperemia throughout the rectosigmoid colon, consistent with a nonspecific proctocolitis.\"  WBC 18.15, not fulfilling SIRS  Procalc pending  Blood cultures pending  Zosyn initiated in the ED, continue for now  GI consult pending   Blood in stool  When pt arrived to the floor, it was noted small quantity of maroon to bright blood mixed with bowel movements, noted on tissue with wiping  Hgb 13.4, approximately baseline  continue to monitor H&H closely  GI consult pending     Transaminitis  Mild  CT a/p liver/biliary tree unremarkable s/p cholecystectomy  Continue to monitor CMP  If transaminitis continues to increase, would consider RUQ US  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    Blood Sugar Average: Last 72 hrs:  Continue 11 units of Lantus daily  SSI with accucheks    Essential hypertension  Continue losartan and coreg  Hold lasix for now given infection/bloody stools  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)  Diagnosed 11/2018  Followed outpatient by hem/onc, last seen 10/07/2024  Hyperlipidemia  Continue statin  Late onset Alzheimer's dementia with other behavioral disturbance, unspecified dementia severity (HCC)  Pt currently alert and oriented to person, place, situation, month, and year. Not day, " "stated day was Monday. Some confusion at times during conversation.   Continue namenda  Continue supportive care      VTE Pharmacologic Prophylaxis: VTE Score: 4 Moderate Risk (Score 3-4) - Pharmacological DVT Prophylaxis Contraindicated. Sequential Compression Devices Ordered.  Code Status: Level 1 - Full Code   Discussion with family: Update in AM    Anticipated Length of Stay: Patient will be admitted on an observation basis with an anticipated length of stay of less than 2 midnights secondary to proctocolitis.    History of Present Illness   Chief Complaint: \"I have been having lower abdominal pain\"    Marci Sandoval is a 79 y.o. female who presents with proctocolitis. Pt presented to the ED secondary to lower abdominal pain and constipation x several days. Following disimpaction in the ED, pt was able to have large bowel movement and has had several bowel movements overnight. It was noted by nursing when patient came to the floor that patient now having blood in stools which is maroon or bright red streaking. At first, noticed it on tissue when patient wiped, then noted it in patient stools. Pt reports she did not note any bloody stools while she was at home. She also endorses anorexia over this time period. She denies any fevers or chills.    Review of Systems   Constitutional:  Positive for appetite change (anorexia) and fatigue. Negative for chills, diaphoresis and fever.   HENT:  Negative for congestion, rhinorrhea and sore throat.    Eyes:  Negative for photophobia and visual disturbance.   Respiratory:  Negative for cough, shortness of breath and wheezing.    Cardiovascular:  Negative for chest pain, palpitations and leg swelling.   Gastrointestinal:  Positive for abdominal pain (lower) and constipation. Negative for abdominal distention, blood in stool, diarrhea, nausea and vomiting.   Genitourinary:  Negative for dysuria and hematuria.   Musculoskeletal:  Negative for arthralgias, back " pain, myalgias and neck stiffness.   Skin:  Negative for color change and rash.   Neurological:  Negative for dizziness, seizures, syncope, weakness, light-headedness and headaches.   Psychiatric/Behavioral:  Negative for agitation, behavioral problems and confusion. The patient is not nervous/anxious.    All other systems reviewed and are negative.      Historical Information   Past Medical History:   Diagnosis Date    Cancer (HCC)     Throat    Chronic pain disorder     Diabetes mellitus (HCC)     Diffuse large B cell lymphoma (HCC)     Dysphagia     GERD without esophagitis     HTN (hypertension)     Hyperlipidemia     Hypertension     MI (myocardial infarction) (HCC)     Port-A-Cath in place 07/29/2019    Thyroid cancer (HCC) 2018     Past Surgical History:   Procedure Laterality Date    BONE MARROW BIOPSY      BREAST BIOPSY Left     CARDIAC CATHETERIZATION N/A 12/9/2022    Procedure: Cardiac catheterization;  Surgeon: Jhonny Rain MD;  Location: [facility];  Service: Cardiology    CARDIAC CATHETERIZATION N/A 12/9/2022    Procedure: Cardiac Coronary Angiogram;  Surgeon: Jhonny Rain MD;  Location: [facility];  Service: Cardiology    CARDIAC CATHETERIZATION Left 12/9/2022    Procedure: Cardiac Left Heart Cath;  Surgeon: Jhonny Rain MD;  Location: [facility];  Service: Cardiology    CHOLECYSTECTOMY      IR PORT PLACEMENT  11/16/2018    IR PORT REMOVAL  3/22/2021    OTHER SURGICAL HISTORY      tendor tear repair to right shoulder    SHOULDER ARTHROSCOPY       Social History     Tobacco Use    Smoking status: Never     Passive exposure: Never    Smokeless tobacco: Never   Vaping Use    Vaping status: Never Used   Substance and Sexual Activity    Alcohol use: Never     Comment: 0    Drug use: No    Sexual activity: Not Currently     Partners: Male     E-Cigarette/Vaping    E-Cigarette Use Never User      E-Cigarette/Vaping Substances    Nicotine No     THC No     CBD No     Flavoring No      Other No     Unknown No      Family history non-contributory  Social History:  Marital Status:    Patient Pre-hospital Living Situation: Home  Patient Pre-hospital Level of Mobility: walks with walker  Patient Pre-hospital Diet Restrictions: diabetic    Meds/Allergies   I have reviewed home medications using recent Epic encounter.  Prior to Admission medications    Medication Sig Start Date End Date Taking? Authorizing Provider   Accu-Chek FastClix Lancets MISC USAR PARA PROBAR AZUCAR EN LA TWAN MANISHA VECES AL JEFFREY 9/5/24   Liset Meehan MD   acetaminophen (TYLENOL) 650 mg CR tablet Take 1 tablet (650 mg total) by mouth every 8 (eight) hours as needed for mild pain 8/3/24   Fabby Cifuentes MD   albuterol (PROVENTIL HFA,VENTOLIN HFA) 90 mcg/act inhaler Inhale 1 puff every 4 (four) hours as needed for wheezing 10/26/23   MOIRA Gan   ALPRAZolam (XANAX) 0.25 mg tablet Take 1 tablet (0.25 mg total) by mouth daily at bedtime as needed for anxiety 12/6/24   MOIRA Gan   atorvastatin (LIPITOR) 40 mg tablet TAKE 1 TABLET BY MOUTH EVERY DAY 9/21/24   MOIRA Gan   Mary Jane Low Dose 81 MG chewable tablet CHEW 1 TABLET BY MOUTH DAILY 5/28/24   MOIRA Gan   BD Pen Needle Micro U/F 32G X 6 MM MISC USE DAILY AS DIRECTED 12/9/24   MOIRA Gan   Blood Glucose Monitoring Suppl (OneTouch Verio Reflect) w/Device KIT Check blood sugars once daily. Please substitute with appropriate alternative as covered by patient's insurance. Dx: E11.65 10/26/23   MOIRA Gan   Blood Glucose Monitoring Suppl (OneTouch Verio) w/Device KIT Use per  guidelines 4/6/23   MOIRA Hendrickson   Calcium Carb-Cholecalciferol 600-10 MG-MCG TABS TAKE 1 TABLET BY MOUTH EVERY DAY IN THE MORNING 8/22/24   MOIRA Gan   carvedilol (COREG) 12.5 mg tablet Take 1 tablet (12.5 mg total) by mouth 2 (two) times a day with meals 12/6/24    MOIRA Gan   cyanocobalamin (VITAMIN B-12) 1000 MCG tablet Take 1 tablet (1,000 mcg total) by mouth daily 2/21/24   MOIRA Gan   Empagliflozin (Jardiance) 25 MG TABS TAKE 1 TABLET BY MOUTH EVERY DAY IN THE MORNING 6/30/24   MOIRA Hendrickson   ferrous sulfate 325 (65 Fe) mg tablet TAKE 1 TABLET BY MOUTH EVERY DAY WITH BREAKFAST 11/21/24   MOIRA Gan   fluticasone (FLONASE) 50 mcg/act nasal spray SPRAY 1 SPRAY INTO EACH NOSTRIL EVERY DAY 10/15/24   MOIRA Gan   furosemide (LASIX) 20 mg tablet Take 1 tablet (20 mg total) by mouth daily 2/21/24   MOIRA Gan   gabapentin (NEURONTIN) 100 mg capsule TAKE 1 CAPSULE (100 MG TOTAL) BY MOUTH 3 (THREE) TIMES A DAY 9/30/24   MOIRA Gan   glucose blood (Accu-Chek Guide) test strip USAR PARA EXAMINAR AZUCAR EN LA TWAN MANISHA VECES AL JEFFREY 9/5/24   Liset Meehan MD   glucose blood (OneTouch Verio) test strip Check blood sugars once daily. Please substitute with appropriate alternative as covered by patient's insurance. Dx: E11.65 10/26/23   MOIRA Gan   guaiFENesin (MUCINEX) 600 mg 12 hr tablet Take 2 tablets (1,200 mg total) by mouth every 12 (twelve) hours 10/26/23   MOIRA Gan   Insulin Glargine Solostar (Lantus SoloStar) 100 UNIT/ML SOPN INJECT 0.11 ML (11 UNITS TOTAL) UNDER THE SKIN IN THE MORNING 10/7/24   MOIRA Gan   Lancets (OneTouch Delica Plus [id number]) MISC TEST DAILY 12/9/24   MOIRA Gan   lidocaine (Lidoderm) 5 % Apply 1 patch topically over 12 hours daily Remove & Discard patch within 12 hours or as directed by MD 2/21/24   Denise Cleo Rober, CRNP   loperamide (IMODIUM) 2 mg capsule MAR JOHNNY (1) CAPSULA POR VIA ORAL ALFREDO SEA NECESARIO PARA LA DIARREA 8/6/24   MOIRA Gan   losartan (COZAAR) 25 mg tablet TAKE 1 TABLET (25 MG TOTAL) BY MOUTH DAILY. 9/23/24   MOIRA Gan    Melatonin 5 MG TABS Take by mouth as needed    Historical Provider, MD   memantine (NAMENDA) 10 mg tablet TAKE 1 TABLET BY MOUTH TWICE A DAY 11/21/24   MOIRA Gan   mirtazapine (REMERON) 15 mg tablet TAKE 1 TABLET BY MOUTH DAILY AT BEDTIME 10/25/24   MOIRA Gan   Nutritional Supplements (Glucerna) LIQD Take 1 Bottle by mouth in the morning 11/15/24   MOIRA Gan   omeprazole (PriLOSEC) 20 mg delayed release capsule Take 1 capsule (20 mg total) by mouth 2 (two) times a day 2/21/24   MOIRA Gan   ondansetron (ZOFRAN) 4 mg tablet Take 1 tablet (4 mg total) by mouth every 8 (eight) hours as needed for nausea or vomiting 2/21/24   MOIRA Gan   pantoprazole (PROTONIX) 40 mg tablet TAKE 1 TABLET BY MOUTH EVERY DAY 12/9/24   Nikhil Soriano MD   sitaGLIPtin-metFORMIN (Janumet)  MG per tablet Take 1 tablet by mouth 2 (two) times a day with meals 12/6/24   MOIRA Gan     No Known Allergies    Objective :  Temp:  [97.9 °F (36.6 °C)-98.9 °F (37.2 °C)] 98.9 °F (37.2 °C)  HR:  [60-92] 92  BP: (136-160)/() 156/84  Resp:  [16-20] 20  SpO2:  [95 %-98 %] 95 %  O2 Device: None (Room air)    Physical Exam  Vitals and nursing note reviewed.   Constitutional:       General: She is not in acute distress.     Appearance: She is well-developed.   HENT:      Head: Normocephalic and atraumatic.      Nose: Nose normal. No congestion.      Mouth/Throat:      Mouth: Mucous membranes are moist.      Pharynx: Oropharynx is clear.   Eyes:      Conjunctiva/sclera: Conjunctivae normal.   Cardiovascular:      Rate and Rhythm: Normal rate and regular rhythm.      Heart sounds: Normal heart sounds. No murmur heard.     No friction rub. No gallop.   Pulmonary:      Effort: Pulmonary effort is normal. No respiratory distress.      Breath sounds: Normal breath sounds. No wheezing, rhonchi or rales.   Abdominal:      General: Bowel sounds are normal.  There is no distension.      Palpations: Abdomen is soft.      Tenderness: There is abdominal tenderness (L and RLQ).   Musculoskeletal:      Cervical back: Neck supple.      Right lower leg: No edema.      Left lower leg: No edema.   Skin:     General: Skin is warm and dry.      Capillary Refill: Capillary refill takes less than 2 seconds.   Neurological:      General: No focal deficit present.      Mental Status: She is alert and oriented to person, place, and time. Mental status is at baseline.   Psychiatric:         Mood and Affect: Mood normal.         Behavior: Behavior normal.          Lines/Drains:            Lab Results: I have reviewed the following results:  Results from last 7 days   Lab Units 12/10/24  1734   WBC Thousand/uL 18.15*   HEMOGLOBIN g/dL 13.4   HEMATOCRIT % 41.4   PLATELETS Thousands/uL 292   SEGS PCT % 78*   LYMPHO PCT % 17   MONO PCT % 4   EOS PCT % 0     Results from last 7 days   Lab Units 12/10/24  1734   SODIUM mmol/L 134*   POTASSIUM mmol/L 4.0   CHLORIDE mmol/L 99   CO2 mmol/L 29   BUN mg/dL 28*   CREATININE mg/dL 0.98   ANION GAP mmol/L 6   CALCIUM mg/dL 9.5   ALBUMIN g/dL 4.3   TOTAL BILIRUBIN mg/dL 0.45   ALK PHOS U/L 86   ALT U/L 97*   AST U/L 43*   GLUCOSE RANDOM mg/dL 297*             Lab Results   Component Value Date    HGBA1C 7.3 (H) 09/28/2024    HGBA1C 7.0 (A) 07/24/2024    HGBA1C 8.1 (H) 03/28/2024           Imaging Results Review: I reviewed radiology reports from this admission including: CT abdomen/pelvis.  Other Study Results Review: No additional pertinent studies reviewed.    Administrative Statements   I have spent a total time of 65 minutes in caring for this patient on the day of the visit/encounter including Diagnostic results, Impressions, Counseling / Coordination of care, Documenting in the medical record, Reviewing / ordering tests, medicine, procedures  , Obtaining or reviewing history  , and Communicating with other healthcare professionals .    ** Please  Note: This note has been constructed using a voice recognition system. **

## 2024-12-11 NOTE — CONSULTS
Consultation - Gastroenterology   Name: Marci Sandoval 79 y.o. female I MRN: [id number]  Unit/Bed#: [place] [address] -01 I Date of Admission: 12/10/2024   Date of Service: 12/11/2024 I Hospital Day: 0       Inpatient consult to gastroenterology     Date/Time  12/11/2024 7:59 AM     Performed by  Elmer Barrera MD   Authorized by  Dima Ocampo PA-C         Physician Requesting Evaluation: Dhruv Wylie MD   Reason for Evaluation / Principal Problem: proctocolitis    Assessment & Plan  Proctocolitis   - likely stercoral in nature vs ischemia. Other DDx includes SCAD, IBD, less likely malignancy   - she will benefit from aggressive bowel regimen to prevent fecal stasis. Miralax prep ordered.   - she would also likely benefit from bladder decompression and pelvic PT    - on zosyn. BCx pending. Procal and WBC elevated. Maybe can de-escalate to CTX + flagyl   - supportive care.  Elevated LFTs   - hepatocellular pattern. Likely ischemia related   - check viral hep panel and RUQ US   - trend LFTs  Blood in stool   - monitor BMs. Should resolve spontaneously  Other constipation   - bowel regimen for above    History of Present Illness   HPI:  Marci Sandoval is a 79 y.o. female with a PMH of HTN, HLD, DM2, CAD, DLBCL, GERD, MGUS who presents with lower abdominal pain.    She did not have a BM for several days and was unable to urinate. She was disimpacted in the ED and then afterwards had clots and maroon blood afterwards.    She is unable to provide much meaningful history this morning.    She usually goes every 2-3 days. She reports significant straining and pushing when stooling. She has issues with voiding in general.    No other GI complaints    Review of Systems   Constitutional:  Negative for appetite change, chills, fatigue and fever.   Eyes:  Negative for photophobia.   Respiratory:  Negative for cough and shortness of breath.    Cardiovascular:  Negative for chest pain.   Gastrointestinal:   Positive for abdominal pain and constipation. Negative for diarrhea, nausea and vomiting.   Endocrine: Negative.    Genitourinary:  Positive for difficulty urinating. Negative for dysuria and frequency.   Musculoskeletal:  Negative for myalgias.   Skin:  Negative for pallor.   Neurological:  Negative for weakness.   Hematological: Negative.    Psychiatric/Behavioral: Negative.       I have reviewed the patient's PMH, PSH, Social History, Family History, Meds, and Allergies    Objective :  Temp:  [97.9 °F (36.6 °C)-98.9 °F (37.2 °C)] 98.7 °F (37.1 °C)  HR:  [60-92] 90  BP: (136-160)/() 151/82  Resp:  [2-20] 16  SpO2:  [92 %-98 %] 92 %  O2 Device: None (Room air)    Physical Exam  Constitutional:       General: She is not in acute distress.     Appearance: Normal appearance.   HENT:      Head: Normocephalic and atraumatic.      Nose: Nose normal.      Mouth/Throat:      Mouth: Mucous membranes are moist.   Eyes:      General: No scleral icterus.     Extraocular Movements: Extraocular movements intact.      Conjunctiva/sclera: Conjunctivae normal.      Pupils: Pupils are equal, round, and reactive to light.   Cardiovascular:      Rate and Rhythm: Normal rate and regular rhythm.   Pulmonary:      Effort: Pulmonary effort is normal.   Abdominal:      General: Abdomen is flat. There is no distension.      Palpations: There is no mass.      Tenderness: There is abdominal tenderness.   Musculoskeletal:         General: No swelling. Normal range of motion.   Skin:     General: Skin is warm and dry.      Capillary Refill: Capillary refill takes less than 2 seconds.   Neurological:      General: No focal deficit present.      Mental Status: She is alert.   Psychiatric:         Mood and Affect: Mood normal.         Lab Results: I have reviewed all relevant labs and imaging.

## 2024-12-12 ENCOUNTER — TRANSITIONAL CARE MANAGEMENT (OUTPATIENT)
Dept: FAMILY MEDICINE CLINIC | Facility: CLINIC | Age: 79
End: 2024-12-12

## 2024-12-12 ENCOUNTER — HOME HEALTH ADMISSION (OUTPATIENT)
Dept: HOME HEALTH SERVICES | Facility: HOME HEALTHCARE | Age: 79
End: 2024-12-12
Payer: COMMERCIAL

## 2024-12-12 ENCOUNTER — RESULTS FOLLOW-UP (OUTPATIENT)
Dept: EMERGENCY DEPT | Facility: HOSPITAL | Age: 79
End: 2024-12-12

## 2024-12-12 ENCOUNTER — HOME CARE VISIT (OUTPATIENT)
Dept: HOME HEALTH SERVICES | Facility: HOME HEALTHCARE | Age: 79
End: 2024-12-12

## 2024-12-12 VITALS
OXYGEN SATURATION: 93 % | BODY MASS INDEX: 24.27 KG/M2 | HEIGHT: 61 IN | SYSTOLIC BLOOD PRESSURE: 118 MMHG | TEMPERATURE: 98.6 F | HEART RATE: 87 BPM | WEIGHT: 128.53 LBS | DIASTOLIC BLOOD PRESSURE: 64 MMHG | RESPIRATION RATE: 18 BRPM

## 2024-12-12 LAB
ALBUMIN SERPL BCG-MCNC: 3.3 G/DL (ref 3.5–5)
ALP SERPL-CCNC: 68 U/L (ref 34–104)
ALT SERPL W P-5'-P-CCNC: 138 U/L (ref 7–52)
ANION GAP SERPL CALCULATED.3IONS-SCNC: 7 MMOL/L (ref 4–13)
AST SERPL W P-5'-P-CCNC: 61 U/L (ref 13–39)
BASOPHILS # BLD AUTO: 0.06 THOUSANDS/ÂΜL (ref 0–0.1)
BASOPHILS NFR BLD AUTO: 0 % (ref 0–1)
BILIRUB SERPL-MCNC: 0.82 MG/DL (ref 0.2–1)
BUN SERPL-MCNC: 20 MG/DL (ref 5–25)
CALCIUM ALBUM COR SERPL-MCNC: 8.9 MG/DL (ref 8.3–10.1)
CALCIUM SERPL-MCNC: 8.3 MG/DL (ref 8.4–10.2)
CHLORIDE SERPL-SCNC: 101 MMOL/L (ref 96–108)
CO2 SERPL-SCNC: 25 MMOL/L (ref 21–32)
CREAT SERPL-MCNC: 0.93 MG/DL (ref 0.6–1.3)
EOSINOPHIL # BLD AUTO: 0.05 THOUSAND/ÂΜL (ref 0–0.61)
EOSINOPHIL NFR BLD AUTO: 0 % (ref 0–6)
ERYTHROCYTE [DISTWIDTH] IN BLOOD BY AUTOMATED COUNT: 13.9 % (ref 11.6–15.1)
GFR SERPL CREATININE-BSD FRML MDRD: 59 ML/MIN/1.73SQ M
GLUCOSE SERPL-MCNC: 121 MG/DL (ref 65–140)
GLUCOSE SERPL-MCNC: 137 MG/DL (ref 65–140)
GLUCOSE SERPL-MCNC: 275 MG/DL (ref 65–140)
HCT VFR BLD AUTO: 33.9 % (ref 34.8–46.1)
HGB BLD-MCNC: 11.1 G/DL (ref 11.5–15.4)
IMM GRANULOCYTES # BLD AUTO: 0.05 THOUSAND/UL (ref 0–0.2)
IMM GRANULOCYTES NFR BLD AUTO: 0 % (ref 0–2)
LYMPHOCYTES # BLD AUTO: 4.28 THOUSANDS/ÂΜL (ref 0.6–4.47)
LYMPHOCYTES NFR BLD AUTO: 29 % (ref 14–44)
MAGNESIUM SERPL-MCNC: 2.1 MG/DL (ref 1.9–2.7)
MCH RBC QN AUTO: 30.2 PG (ref 26.8–34.3)
MCHC RBC AUTO-ENTMCNC: 32.7 G/DL (ref 31.4–37.4)
MCV RBC AUTO: 92 FL (ref 82–98)
MONOCYTES # BLD AUTO: 1.13 THOUSAND/ÂΜL (ref 0.17–1.22)
MONOCYTES NFR BLD AUTO: 8 % (ref 4–12)
NEUTROPHILS # BLD AUTO: 9.46 THOUSANDS/ÂΜL (ref 1.85–7.62)
NEUTS SEG NFR BLD AUTO: 63 % (ref 43–75)
NRBC BLD AUTO-RTO: 0 /100 WBCS
PLATELET # BLD AUTO: 234 THOUSANDS/UL (ref 149–390)
PMV BLD AUTO: 10.1 FL (ref 8.9–12.7)
POTASSIUM SERPL-SCNC: 3.3 MMOL/L (ref 3.5–5.3)
PROT SERPL-MCNC: 6.8 G/DL (ref 6.4–8.4)
RBC # BLD AUTO: 3.67 MILLION/UL (ref 3.81–5.12)
SODIUM SERPL-SCNC: 133 MMOL/L (ref 135–147)
WBC # BLD AUTO: 15.03 THOUSAND/UL (ref 4.31–10.16)

## 2024-12-12 PROCEDURE — 80053 COMPREHEN METABOLIC PANEL: CPT | Performed by: STUDENT IN AN ORGANIZED HEALTH CARE EDUCATION/TRAINING PROGRAM

## 2024-12-12 PROCEDURE — 99239 HOSP IP/OBS DSCHRG MGMT >30: CPT | Performed by: STUDENT IN AN ORGANIZED HEALTH CARE EDUCATION/TRAINING PROGRAM

## 2024-12-12 PROCEDURE — 83735 ASSAY OF MAGNESIUM: CPT | Performed by: STUDENT IN AN ORGANIZED HEALTH CARE EDUCATION/TRAINING PROGRAM

## 2024-12-12 PROCEDURE — 85025 COMPLETE CBC W/AUTO DIFF WBC: CPT | Performed by: STUDENT IN AN ORGANIZED HEALTH CARE EDUCATION/TRAINING PROGRAM

## 2024-12-12 PROCEDURE — 82948 REAGENT STRIP/BLOOD GLUCOSE: CPT

## 2024-12-12 RX ORDER — POLYETHYLENE GLYCOL 3350 17 G/17G
17 POWDER, FOR SOLUTION ORAL 2 TIMES DAILY
Status: DISCONTINUED | OUTPATIENT
Start: 2024-12-12 | End: 2024-12-12 | Stop reason: HOSPADM

## 2024-12-12 RX ORDER — CEFPODOXIME PROXETIL 200 MG/1
200 TABLET, FILM COATED ORAL 2 TIMES DAILY
Qty: 6 TABLET | Refills: 0 | Status: SHIPPED | OUTPATIENT
Start: 2024-12-12 | End: 2024-12-18

## 2024-12-12 RX ORDER — POLYETHYLENE GLYCOL 3350 17 G/17G
17 POWDER, FOR SOLUTION ORAL 2 TIMES DAILY
Qty: 238 G | Refills: 0 | Status: SHIPPED | OUTPATIENT
Start: 2024-12-12 | End: 2024-12-19

## 2024-12-12 RX ORDER — POTASSIUM CHLORIDE 1500 MG/1
40 TABLET, EXTENDED RELEASE ORAL ONCE
Status: COMPLETED | OUTPATIENT
Start: 2024-12-12 | End: 2024-12-12

## 2024-12-12 RX ORDER — METRONIDAZOLE 500 MG/1
500 TABLET ORAL EVERY 8 HOURS SCHEDULED
Qty: 9 TABLET | Refills: 0 | Status: SHIPPED | OUTPATIENT
Start: 2024-12-12 | End: 2024-12-18

## 2024-12-12 RX ORDER — LUBIPROSTONE 8 UG/1
8 CAPSULE ORAL 2 TIMES DAILY WITH MEALS
Qty: 120 CAPSULE | Refills: 0 | Status: SHIPPED | OUTPATIENT
Start: 2024-12-12 | End: 2025-02-10

## 2024-12-12 RX ADMIN — POLYETHYLENE GLYCOL 3350 17 G: 17 POWDER, FOR SOLUTION ORAL at 09:30

## 2024-12-12 RX ADMIN — PIPERACILLIN AND TAZOBACTAM 4.5 G: 36; 4.5 INJECTION, POWDER, LYOPHILIZED, FOR SOLUTION INTRAVENOUS at 03:22

## 2024-12-12 RX ADMIN — GUAIFENESIN 600 MG: 600 TABLET ORAL at 09:25

## 2024-12-12 RX ADMIN — MEMANTINE 10 MG: 5 TABLET ORAL at 09:25

## 2024-12-12 RX ADMIN — POTASSIUM CHLORIDE 40 MEQ: 1500 TABLET, EXTENDED RELEASE ORAL at 09:27

## 2024-12-12 RX ADMIN — FUROSEMIDE 20 MG: 20 TABLET ORAL at 09:24

## 2024-12-12 RX ADMIN — GABAPENTIN 100 MG: 100 CAPSULE ORAL at 09:26

## 2024-12-12 RX ADMIN — INSULIN GLARGINE 11 UNITS: 100 INJECTION, SOLUTION SUBCUTANEOUS at 09:26

## 2024-12-12 RX ADMIN — CARVEDILOL 12.5 MG: 12.5 TABLET, FILM COATED ORAL at 09:25

## 2024-12-12 RX ADMIN — LOSARTAN POTASSIUM 25 MG: 25 TABLET, FILM COATED ORAL at 09:26

## 2024-12-12 RX ADMIN — LUBIPROSTONE 8 MCG: 8 CAPSULE, GELATIN COATED ORAL at 09:30

## 2024-12-12 RX ADMIN — ASPIRIN 81 MG CHEWABLE TABLET 81 MG: 81 TABLET CHEWABLE at 09:25

## 2024-12-12 NOTE — RESTORATIVE TECHNICIAN NOTE
Restorative Technician Note      Patient Name: Marci Sandoval     Restorative Tech Visit Date: 12/12/24  Note Type: Mobility  Patient Position Upon Consult: Supine  Activity Performed: Ambulated; Range of motion  Assistive Device: Roller walker  Patient Position at End of Consult: All needs within reach; Bedside chair

## 2024-12-12 NOTE — DISCHARGE SUMMARY
Discharge Summary - Hospitalist   Name: Marci Sandoval 79 y.o. female I MRN: [id number]  Unit/Bed#: [place] [address] -01 I Date of Admission: 12/10/2024   Date of Service: 12/12/2024 I Hospital Day: 1     Assessment & Plan  Proctocolitis  79-year-old female presented with constipation and abdominal pain.  CT showing diffuse wall thickening and mucosal hyperemia throughout the rectosigmoid colon consistent with nonspecific proctocolitis.  Her constipation are also likely playing a role.  GI evaluated her and cleared her for discharge today with 3 more days of antibiotic therapy.  Blood in stool  Fortunately, hemoglobin levels remained stable.  GI stating that there is no indication for intervention at this time and cleared her for discharge.    Transaminitis  CT abdomen and pelvis showed unremarkable liver and status post cholecystectomy.  Ultrasound right upper quadrant showed status post cholecystectomy, but no intrahepatic biliary dilatation.  GI suspects the possibility that this might be ischemic in etiology secondary to her constipation.  Given LFT improvement.,  They cleared her for discharge.      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, with long-term current use of insulin (HCC)  Lab Results   Component Value Date    HGBA1C 7.3 (H) 09/28/2024       Recent Labs     12/11/24  1559 12/11/24  2042 12/11/24  2129 12/12/24  0750   POCGLU 146* 288* 236* 137       Blood Sugar Average: Last 72 hrs:  (P) 226.5617791226134116  Continue home regimen  Essential hypertension  Controlled  Continue home regimen on discharge  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)  Diagnosed 11/2018  Followed outpatient by hem/onc, last seen 10/07/2024  Hyperlipidemia  Continue statin  Late onset Alzheimer's dementia with other behavioral  disturbance, unspecified dementia severity (HCC)  Continue namenda and supportive care    Discharging Physician / Practitioner: Dhruv Wylie MD  PCP: MOIRA Gan  Admission Date:   Admission Orders (From admission, onward)       Ordered        12/11/24 1504  INPATIENT ADMISSION  Once            12/10/24 2057  Place in Observation  Once                          Discharge Date: 12/12/24    Medical Problems       Resolved Problems  Date Reviewed: 10/7/2024   None         Consultations During Hospital Stay:  gastroenterology    Procedures Performed:   none    Significant Findings / Test Results:   Imaging  CT Abdomen pelvis      ABDOMEN     LOWER CHEST: Unchanged right lower lobe pleural-parenchymal scarring and cylindrical/borderline varicose bronchiectasis. This is likely due to prior infection or aspiration.     LIVER/BILIARY TREE: Unremarkable.     GALLBLADDER: Post cholecystectomy.     SPLEEN: Unremarkable.     PANCREAS: Unremarkable.     ADRENAL GLANDS: Unremarkable.     KIDNEYS/URETERS: Unremarkable. No hydronephrosis.     STOMACH AND BOWEL: The stomach is unremarkable. There are a few borderline dilated loops of small bowel, without a clearly defined transition point. The majority of the colon is unremarkable, with the exception of the sigmoid colon and rectum. There is   new diffuse wall thickening and mucosal hyperemia throughout these segments, with scattered diverticula.     APPENDIX: No findings to suggest appendicitis.     ABDOMINOPELVIC CAVITY: No ascites. No pneumoperitoneum. No lymphadenopathy.     VESSELS: Unremarkable for patient's age.     PELVIS     REPRODUCTIVE ORGANS: Unremarkable for patient's age.     URINARY BLADDER: Unremarkable.     ABDOMINAL WALL/INGUINAL REGIONS: Tiny bilateral fat-containing inguinal hernias.     BONES: No acute fracture or suspicious osseous lesion.     IMPRESSION:     1.  Diffuse wall thickening and mucosal hyperemia throughout the rectosigmoid colon,  consistent with a nonspecific proctocolitis.     2.  Please refer to the report body for description of other incidental, chronic and/or benign findings.    US RUQ:    The common duct is not dilated.     Incidental Findings:   As written above       Outpatient Tests Requested:  PCP, CBC, CMP    Complications:  none    Reason for Admission: rectal bleeding    Hospital Course:     Marci Sandoval is a 79 y.o. female patient who originally presented to the hospital on 12/10/2024 due to abdominal pain.    Patient 79-year-old female with a history of dementia, hyperlipidemia, diffuse large B-cell lymphoma, hypertension, and type 2 diabetes mellitus who was brought in here due to abdominal plain and blood in her stool.  CT abdomen and pelvis showed diffuse wall thickening and mucosal hyperemia throughout the rectosigmoid colon consistent with nonspecific proctocolitis.  She was given a bowel regimen and GI was consulted because there were concerns for blood in her stool.  Fortunately, her hemoglobin levels continue to remain stable.  She had elevated liver enzymes for which CT abdomen and pelvis and right upper quadrant ultrasound did not show any evidence of any source.  GI suspected that this could have been due to ischemia secondary to her constipation and given her downtrending levels recommended outpatient follow-up for this.  Today, patient noted clinical improvement with her symptoms and was cleared by GI for discharge.      Patient agrees to follow-up with her providers as scheduled and to take her medications as prescribed. All questions were addressed.    she understood the need to seek immediate medical attention should she develop any chest pain, shortness of breath, severe pain, fever, chills, or any other concerning symptoms.    [employer]  services were utilized [phone]    Please see above list of diagnoses and related plan for additional information.     Condition at Discharge: fair  "    Discharge Day Visit / Exam:     Subjective:  patient seen and examined at bedside. Reports clinical improvement today.   Vitals: Blood Pressure: 118/64 (12/12/24 0924)  Pulse: 87 (12/12/24 0750)  Temperature: 98.6 °F (37 °C) (12/12/24 0750)  Temp Source: Oral (12/12/24 0750)  Respirations: 18 (12/12/24 0750)  Height: 5' 1\" (154.9 cm) (12/10/24 2227)  Weight - Scale: 58.3 kg (128 lb 8.5 oz) (12/12/24 0534)  SpO2: 93 % (12/12/24 0750)  Exam:   Physical Exam  Vitals reviewed.   Constitutional:       General: She is not in acute distress.  HENT:      Head: Normocephalic.      Nose: Nose normal.      Mouth/Throat:      Mouth: Mucous membranes are moist.   Eyes:      General: No scleral icterus.  Cardiovascular:      Rate and Rhythm: Normal rate and regular rhythm.   Pulmonary:      Effort: Pulmonary effort is normal. No respiratory distress.      Breath sounds: No wheezing.   Abdominal:      General: There is no distension.      Palpations: Abdomen is soft.      Tenderness: There is abdominal tenderness.   Skin:     General: Skin is warm.   Neurological:      Mental Status: She is alert.   Psychiatric:         Mood and Affect: Mood normal.         Behavior: Behavior normal.       Discussion with Family: Clair    Discharge instructions/Information to patient and family:   See after visit summary for information provided to patient and family.      Provisions for Follow-Up Care:  See after visit summary for information related to follow-up care and any pertinent home health orders.      Disposition:     Home    Planned Readmission: No     Discharge Statement:  I spent 40 minutes discharging the patient. This time was spent on the day of discharge. I had direct contact with the patient on the day of discharge. Greater than 50% of the total time was spent examining patient, answering all patient questions, arranging and discussing plan of care with patient as well as directly providing post-discharge instructions.  " Additional time then spent on discharge activities.    Discharge Medications:  See after visit summary for reconciled discharge medications provided to patient and family.      ** Please Note: This note has been constructed using a voice recognition system **

## 2024-12-12 NOTE — PLAN OF CARE
Problem: PAIN - ADULT  Goal: Verbalizes/displays adequate comfort level or baseline comfort level  Description: Interventions:  - Encourage patient to monitor pain and request assistance  - Assess pain using appropriate pain scale  - Administer analgesics based on type and severity of pain and evaluate response  - Implement non-pharmacological measures as appropriate and evaluate response  - Consider cultural and social influences on pain and pain management  - Notify physician/advanced practitioner if interventions unsuccessful or patient reports new pain  Outcome: Progressing     Problem: INFECTION - ADULT  Goal: Absence or prevention of progression during hospitalization  Description: INTERVENTIONS:  - Assess and monitor for signs and symptoms of infection  - Monitor lab/diagnostic results  - Monitor all insertion sites, i.e. indwelling lines, tubes, and drains  - Monitor endotracheal if appropriate and nasal secretions for changes in amount and color  - [place] appropriate cooling/warming therapies per order  - Administer medications as ordered  - Instruct and encourage patient and family to use good hand hygiene technique  - Identify and instruct in appropriate isolation precautions for identified infection/condition  Outcome: Progressing  Goal: Absence of fever/infection during neutropenic period  Description: INTERVENTIONS:  - Monitor WBC    Outcome: Progressing     Problem: SAFETY ADULT  Goal: Patient will remain free of falls  Description: INTERVENTIONS:  - Educate patient/family on patient safety including physical limitations  - Instruct patient to call for assistance with activity   - Consult OT/PT to assist with strengthening/mobility   - Keep Call bell within reach  - Keep bed low and locked with side rails adjusted as appropriate  - Keep care items and personal belongings within reach  - Initiate and maintain comfort rounds  - Make Fall Risk Sign visible to staff  - Offer Toileting every 2 Hours,  in advance of need  - Initiate/Maintain bed alarm  - Obtain necessary fall risk management equipment: alarms   - Apply yellow socks and bracelet for high fall risk patients  - Consider moving patient to room near nurses station  Outcome: Progressing  Goal: Maintain or return to baseline ADL function  Description: INTERVENTIONS:  -  Assess patient's ability to carry out ADLs; assess patient's baseline for ADL function and identify physical deficits which impact ability to perform ADLs (bathing, care of mouth/teeth, toileting, grooming, dressing, etc.)  - Assess/evaluate cause of self-care deficits   - Assess range of motion  - Assess patient's mobility; develop plan if impaired  - Assess patient's need for assistive devices and provide as appropriate  - Encourage maximum independence but intervene and supervise when necessary  - Involve family in performance of ADLs  - Assess for home care needs following discharge   - Consider OT consult to assist with ADL evaluation and planning for discharge  - Provide patient education as appropriate  Outcome: Progressing  Goal: Maintains/Returns to pre admission functional level  Description: INTERVENTIONS:  - Perform AM-PAC 6 Click Basic Mobility/ Daily Activity assessment daily.  - Set and communicate daily mobility goal to care team and patient/family/caregiver.   - Collaborate with rehabilitation services on mobility goals if consulted  - Perform Range of Motion 4 times a day.  - Reposition patient every 2 hours.  - Dangle patient 3 times a day  - Stand patient 3 times a day  - Ambulate patient 3 times a day  - Out of bed to chair 3 times a day   - Out of bed for meals 3 times a day  - Out of bed for toileting  - Record patient progress and toleration of activity level   Outcome: Progressing     Problem: DISCHARGE PLANNING  Goal: Discharge to home or other facility with appropriate resources  Description: INTERVENTIONS:  - Identify barriers to discharge w/patient and  caregiver  - Arrange for needed discharge resources and transportation as appropriate  - Identify discharge learning needs (meds, wound care, etc.)  - Arrange for interpretive services to assist at discharge as needed  - Refer to Case Management Department for coordinating discharge planning if the patient needs post-hospital services based on physician/advanced practitioner order or complex needs related to functional status, cognitive ability, or social support system  Outcome: Progressing     Problem: Knowledge Deficit  Goal: Patient/family/caregiver demonstrates understanding of disease process, treatment plan, medications, and discharge instructions  Description: Complete learning assessment and assess knowledge base.  Interventions:  - Provide teaching at level of understanding  - Provide teaching via preferred learning methods  Outcome: Progressing     Problem: GASTROINTESTINAL - ADULT  Goal: Minimal or absence of nausea and/or vomiting  Description: INTERVENTIONS:  - Administer IV fluids if ordered to ensure adequate hydration  - Maintain NPO status until nausea and vomiting are resolved  - Nasogastric tube if ordered  - Administer ordered antiemetic medications as needed  - Provide nonpharmacologic comfort measures as appropriate  - Advance diet as tolerated, if ordered  - Consider nutrition services referral to assist patient with adequate nutrition and appropriate food choices  Outcome: Progressing  Goal: Maintains or returns to baseline bowel function  Description: INTERVENTIONS:  - Assess bowel function  - Encourage oral fluids to ensure adequate hydration  - Administer IV fluids if ordered to ensure adequate hydration  - Administer ordered medications as needed  - Encourage mobilization and activity  - Consider nutritional services referral to assist patient with adequate nutrition and appropriate food choices  Outcome: Progressing  Goal: Maintains adequate nutritional intake  Description:  INTERVENTIONS:  - Monitor percentage of each meal consumed  - Identify factors contributing to decreased intake, treat as appropriate  - Assist with meals as needed  - Monitor I&O, weight, and lab values if indicated  - Obtain nutrition services referral as needed  Outcome: Progressing  Goal: Oral mucous membranes remain intact  Description: INTERVENTIONS  - Assess oral mucosa and hygiene practices  - Implement preventative oral hygiene regimen  - Implement oral medicated treatments as ordered  - Initiate Nutrition services referral as needed  Outcome: Progressing     Problem: METABOLIC, FLUID AND ELECTROLYTES - ADULT  Goal: Electrolytes maintained within normal limits  Description: INTERVENTIONS:  - Monitor labs and assess patient for signs and symptoms of electrolyte imbalances  - Administer electrolyte replacement as ordered  - Monitor response to electrolyte replacements, including repeat lab results as appropriate  - Instruct patient on fluid and nutrition as appropriate  Outcome: Progressing  Goal: Fluid balance maintained  Description: INTERVENTIONS:  - Monitor labs   - Monitor I/O and WT  - Instruct patient on fluid and nutrition as appropriate  - Assess for signs & symptoms of volume excess or deficit  Outcome: Progressing  Goal: Glucose maintained within target range  Description: INTERVENTIONS:  - Monitor Blood Glucose as ordered  - Assess for signs and symptoms of hyperglycemia and hypoglycemia  - Administer ordered medications to maintain glucose within target range  - Assess nutritional intake and initiate nutrition service referral as needed  Outcome: Progressing

## 2024-12-12 NOTE — CASE MANAGEMENT
Case Management Discharge Planning Note    Patient name Marci Sandoval  Location [facility] M* MRN [id number]  : 1945 Date 2024       Current Admission Date: 12/10/2024  Current Admission Diagnosis:Proctocolitis   Patient Active Problem List    Diagnosis Date Noted Date Diagnosed    Transaminitis 2024     Blood in stool 2024     Other constipation 2024     Elevated LFTs 2024     Proctocolitis 12/10/2024     Late onset Alzheimer's dementia with other behavioral disturbance, unspecified dementia severity (HCC) 2024     Hypertensive heart disease with heart failure (HCC) 2024     Unintended weight loss 2024     Anemia 2023     PLMD (periodic limb movement disorder) 2023     Abnormal skin growth 2023     Nonischemic cardiomyopathy (HCC) 2023     ACC/AHA stage C heart failure with reduced ejection fraction (HCC) 2022     Hyperlipidemia      Gait instability 2021     Polypharmacy 2021     Frequent falls 2021     Dementia with behavioral disturbance 2021     Near syncope 2021     Chemotherapy-induced peripheral neuropathy ([facility]) 10/13/2020     Obstructive sleep apnea      Arthritis 2020     Allergic rhinitis 10/08/2019     Status post chemotherapy 2019     Current mild episode of major depressive disorder without prior episode ([facility]) 2019     Palliative care patient 2019     Anxiety 2018     Diffuse large 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 ([facility]) 2018     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, with long-term current use of insulin ([facility]) 10/31/2018     Essential hypertension 10/31/2018     Gastroesophageal reflux disease 10/31/2018     Xerosis of skin 10/03/2016       LOS (days): 1  Geometric Mean LOS (GMLOS) (days): 3.4  Days to GMLOS:2.6     OBJECTIVE:  Risk of Unplanned Readmission Score: 21.14         Current  admission status: Inpatient   Preferred Pharmacy:   [id number] - [place] - [address]  [address] [address]  Phone: [phone] Fax: [phone]    [facility] - [place], [place] - [address]  [address] [place] [address]  Phone: [phone] Fax: [phone]    Primary Care Provider: MOIRA Gan    Primary Insurance: [facility]  Secondary Insurance:     DISCHARGE DETAILS:       Freedom of Choice: Yes  Comments - Freedom of Choice: PT is recommending home therapy. Made blanket referrals and will f/u with patient/family with choice list.       Requested Home Health Care         Is the patient interested in HHC at discharge?: Yes  Home Health Discipline requested:: Physical Therapy  Home Health Agency Name:: Other  Home Health Follow-Up Provider:: PCP  Home Health Services Needed:: Evaluate Functional Status and Safety, Gait/ADL Training, Strengthening/Theraputic Exercises to Improve Function  Homebound Criteria Met:: Uses an Assist Device (i.e. cane, walker, etc), Requires the Assistance of Another Person for Safe Ambulation or to Leave the Home  Supporting Clincal Findings:: Fatigues Easliy in Short Distances, Limited Endurance        Treatment Team Recommendation: Home with Home Health Care  Discharge Destination Plan:: Home with Home Health Care        Additional Comments: LSW covering case today. MD planning on discharging pt home. PT is recommending home PT. Made blanket referrals and will f/u with pt/family with choice list.

## 2024-12-12 NOTE — QUICK NOTE
Gastroenterology Quick Note    Pt having brown watery stool. She is now moving her bowels appropriately after miralax. She should continue to have a standing bowel regimen on discharge. LFTs otherwise downtrending with benign workup. We will sign off for now. Please call back with any additional questions.    Elmer Barrera, PGY-5

## 2024-12-12 NOTE — UTILIZATION REVIEW
SEE INITIAL REVIEW AT BOTTOM    Date: 12/12  Day 3: Has surpassed a 2nd midnight with active treatments and services.    Continued Stay Review    Date: 12/12                          Current Patient Class: Inpatient  Current Level of Care: MS    HPI:79 y.o. female initially admitted on 12/10     Assessment/Plan:   Abd pain, proctocolitis, blood in stool, transaminitis - Pt is having normal BM currently and needs to continue on a bowel regimen post-discharge.  LFTs trending down.  H/H stable.  Increased leukocytosis noted today.  She is d/c home today with [facility] services.  VS stable.      Medications:   Scheduled Medications:  aspirin, 81 mg, Oral, Daily  atorvastatin, 40 mg, Oral, Daily With Dinner  carvedilol, 12.5 mg, Oral, BID With Meals  furosemide, 20 mg, Oral, Daily  gabapentin, 100 mg, Oral, TID  guaiFENesin, 600 mg, Oral, Q12H CAROLINE  insulin glargine, 11 Units, Subcutaneous, QAM  insulin lispro, 1-5 Units, Subcutaneous, TID AC  insulin lispro, 1-5 Units, Subcutaneous, HS  losartan, 25 mg, Oral, Daily  lubiprostone, 8 mcg, Oral, BID With Meals  memantine, 10 mg, Oral, BID  mirtazapine, 15 mg, Oral, HS  piperacillin-tazobactam, 4.5 g, Intravenous, Q8H  polyethylene glycol, 17 g, Oral, BID      Continuous IV Infusions:    IV fluids @ 50 cc/hr - d/c 12/11      PRN Meds:  acetaminophen, 650 mg, Oral, Q6H PRN  albuterol, 1 puff, Inhalation, Q4H PRN  melatonin, 6 mg, Oral, HS PRN      Discharge Plan: home with [facility].     Vital Signs (last 3 days)       Date/Time Temp Pulse Resp BP MAP (mmHg) SpO2 O2 Device Patient Position - Orthostatic VS [place]win Coma Scale Score Pain    12/12/24 09:24:23 -- -- -- 118/64 82 -- -- -- -- --    12/12/24 0900 -- -- -- -- -- -- None (Room air) -- -- No Pain    12/12/24 07:50:14 98.6 °F (37 °C) 87 18 100/59 73 93 % None (Room air) Lying -- --    12/11/24 20:43:54 99 °F (37.2 °C) 76 20 123/57 79 96 % -- -- -- --    12/11/24 1915 -- -- -- -- -- -- -- -- 15 No Pain    12/11/24 18:10:20 -- 78 --  112/62 79 94 % -- -- -- --    12/11/24 15:03:45 99.1 °F (37.3 °C) 80 14 118/63 81 96 % -- -- -- --    12/11/24 0900 -- -- -- -- -- -- None (Room air) -- 15 No Pain    12/11/24 07:24:05 98.7 °F (37.1 °C) 90 16 151/82 105 92 % -- Lying -- --    12/11/24 07:23:45 98.7 °F (37.1 °C) -- 2 151/82 105 -- -- -- -- --    12/10/24 2235 -- -- -- -- -- -- -- -- -- 10 - Worst Possible Pain    12/10/24 22:27:29 98.9 °F (37.2 °C) 92 20 156/84 108 95 % None (Room air) Lying 15 10 - Worst Possible Pain    12/10/24 2157 -- -- -- -- -- -- -- -- -- 8    12/10/24 1830 -- 72 16 136/102 116 97 % None (Room air) Sitting -- --    12/10/24 1737 -- -- -- -- -- -- -- -- -- 10 - Worst Possible Pain    12/10/24 1642 -- -- -- -- -- -- None (Room air) -- -- --    12/10/24 1634 97.9 °F (36.6 °C) 60 16 160/76 -- 98 % None (Room air) Sitting -- 10 - Worst Possible Pain          Weight (last 2 days)       Date/Time Weight    12/12/24 0534 58.3 (128.53)    12/11/24 0545 57.2 (126.1)    12/11/24 0237 57.2 (126.1)    12/10/24 22:27:29 56.6 (124.78)    12/10/24 1634 59.6 (131.39)            Pertinent Labs/Diagnostic Results:   Radiology:  US right upper quadrant   Final Interpretation by Keanu Amezquita MD (12/11 1926)      The common duct is not dilated.      Workstation performed: [id number]         CT abdomen pelvis with contrast   Final Interpretation by Norbert Mills MD (12/10 2005)      1.  Diffuse wall thickening and mucosal hyperemia throughout the rectosigmoid colon, consistent with a nonspecific proctocolitis.      2.  Please refer to the report body for description of other incidental, chronic and/or benign findings.         Workstation performed: [id number]           Cardiology:  No orders to display     GI:  No orders to display           Results from last 7 days   Lab Units 12/12/24  0436 12/11/24  1705 12/11/24  0452 12/10/24  1734   WBC Thousand/uL 15.03*  --  13.48* 18.15*   HEMOGLOBIN g/dL 11.1* 12.1 12.4 13.4   HEMATOCRIT % 33.9*   --  39.1 41.4   PLATELETS Thousands/uL 234  --  257 292   TOTAL NEUT ABS Thousands/µL 9.46*  --  10.67* 14.14*         Results from last 7 days   Lab Units 12/12/24  0436 12/11/24  0452 12/10/24  1734   SODIUM mmol/L 133* 135 134*   POTASSIUM mmol/L 3.3* 3.9 4.0   CHLORIDE mmol/L 101 101 99   CO2 mmol/L 25 23 29   ANION GAP mmol/L 7 11 6   BUN mg/dL 20 25 28*   CREATININE mg/dL 0.93 1.06 0.98   EGFR ml/min/1.73sq m 59 50 55   CALCIUM mg/dL 8.3* 8.8 9.5   MAGNESIUM mg/dL 2.1 2.2 2.2   PHOSPHORUS mg/dL  --   --  3.8     Results from last 7 days   Lab Units 12/12/24  0436 12/11/24  0452 12/10/24  1734   AST U/L 61* 217* 43*   ALT U/L 138* 264* 97*   ALK PHOS U/L 68 92 86   TOTAL PROTEIN g/dL 6.8 7.7 9.0*   ALBUMIN g/dL 3.3* 3.8 4.3   TOTAL BILIRUBIN mg/dL 0.82 0.61 0.45     Results from last 7 days   Lab Units 12/12/24  1055 12/12/24  0750 12/11/24  2129 12/11/24  2042 12/11/24  1559 12/11/24  1130 12/11/24  0721   POC GLUCOSE mg/dl 275* 137 236* 288* 146* 191* 359*     Results from last 7 days   Lab Units 12/12/24  0436 12/11/24  0452 12/10/24  1734   GLUCOSE RANDOM mg/dL 121 460* 297*             BETA-HYDROXYBUTYRATE   Date Value Ref Range Status   12/09/2020 0.1 <0.6 mmol/L Final      Results from last 7 days   Lab Units 12/11/24  0452   PROCALCITONIN ng/ml 1.00*       Results from last 7 days   Lab Units 12/11/24  1142   HEP B S AG  Non-reactive   HEP C AB  Non-reactive   HEP B C IGM  Non-reactive     Results from last 7 days   Lab Units 12/10/24  1734   LIPASE u/L 14                 Results from last 7 days   Lab Units 12/10/24  1734   CLARITY UA  Turbid   COLOR UA  Colorless   SPEC GRAV UA  1.013   PH UA  5.0   GLUCOSE UA mg/dl >=1000 (1%)*   KETONES UA mg/dl Negative   BLOOD UA  Small*   PROTEIN UA mg/dl Negative   NITRITE UA  Negative   BILIRUBIN UA  Negative   UROBILINOGEN UA (BE) mg/dl <2.0   LEUKOCYTES UA  Small*   WBC UA /hpf 4-10*   RBC UA /hpf 2-4*   BACTERIA UA /hpf Occasional   EPITHELIAL CELLS WET PREP  /hpf Occasional       Results from last 7 days   Lab Units 12/10/24  2157 12/10/24  2156   BLOOD CULTURE  Received in Microbiology Lab. Culture in Progress. No Growth at 24 hrs.       Network Utilization Review Department  ATTENTION: Please call with any questions or concerns to [phone] and carefully listen to the prompts so that you are directed to the right person. All voicemails are confidential.   For Discharge needs, contact Care Management DC Support Team at [phone] opt. 2  Send all requests for admission clinical reviews, approved or denied determinations and any other requests to dedicated fax number below belonging to the campus where the patient is receiving treatment. List of dedicated fax numbers for the Facilities:  FACILITY NAME UR FAX NUMBER   ADMISSION DENIALS (Administrative/Medical Necessity) [phone]   DISCHARGE SUPPORT TEAM (NETWORK) [phone]   PARENT CHILD HEALTH (Maternity/NICU/Pediatrics) [phone]   [facility] [phone]   [facility] [phone]   [facility] [phone]   [facility] [phone]   [facility] [phone]   [facility] [phone]   [facility] [phone]   [facility] [phone]   [facility] [phone]   [facility] [phone]   [facility] [phone]   [facility] [phone]

## 2024-12-12 NOTE — ASSESSMENT & PLAN NOTE
Lab Results   Component Value Date    HGBA1C 7.3 (H) 09/28/2024       Recent Labs     12/11/24  1559 12/11/24  2042 12/11/24 2129 12/12/24  0750   POCGLU 146* 288* 236* 137       Blood Sugar Average: Last 72 hrs:  (P) [id number]  Continue home regimen

## 2024-12-12 NOTE — PLAN OF CARE
Problem: PAIN - ADULT  Goal: Verbalizes/displays adequate comfort level or baseline comfort level  Description: Interventions:  - Encourage patient to monitor pain and request assistance  - Assess pain using appropriate pain scale  - Administer analgesics based on type and severity of pain and evaluate response  - Implement non-pharmacological measures as appropriate and evaluate response  - Consider cultural and social influences on pain and pain management  - Notify physician/advanced practitioner if interventions unsuccessful or patient reports new pain  12/12/2024 1207 by Anuja Gentile RN  Outcome: Adequate for Discharge  12/12/2024 1007 by Anuja Gentile RN  Outcome: Progressing     Problem: INFECTION - ADULT  Goal: Absence or prevention of progression during hospitalization  Description: INTERVENTIONS:  - Assess and monitor for signs and symptoms of infection  - Monitor lab/diagnostic results  - Monitor all insertion sites, i.e. indwelling lines, tubes, and drains  - Monitor endotracheal if appropriate and nasal secretions for changes in amount and color  - [place] appropriate cooling/warming therapies per order  - Administer medications as ordered  - Instruct and encourage patient and family to use good hand hygiene technique  - Identify and instruct in appropriate isolation precautions for identified infection/condition  12/12/2024 1207 by Anuja Gentile RN  Outcome: Adequate for Discharge  12/12/2024 1007 by Anuja Gentile RN  Outcome: Progressing  Goal: Absence of fever/infection during neutropenic period  Description: INTERVENTIONS:  - Monitor WBC    12/12/2024 1207 by Anuja Gentile RN  Outcome: Adequate for Discharge  12/12/2024 1007 by Anuja Gentile RN  Outcome: Progressing     Problem: SAFETY ADULT  Goal: Patient will remain free of falls  Description: INTERVENTIONS:  - Educate patient/family on patient safety including physical limitations  - Instruct patient to call for  assistance with activity   - Consult OT/PT to assist with strengthening/mobility   - Keep Call bell within reach  - Keep bed low and locked with side rails adjusted as appropriate  - Keep care items and personal belongings within reach  - Initiate and maintain comfort rounds  - Make Fall Risk Sign visible to staff  - Offer Toileting every 2 Hours, in advance of need  - Initiate/Maintain bed alarm  - Obtain necessary fall risk management equipment: alarms   - Apply yellow socks and bracelet for high fall risk patients  - Consider moving patient to room near nurses station  12/12/2024 1207 by Anuja Gentile RN  Outcome: Adequate for Discharge  12/12/2024 1007 by Anuja Gentile RN  Outcome: Progressing  Goal: Maintain or return to baseline ADL function  Description: INTERVENTIONS:  -  Assess patient's ability to carry out ADLs; assess patient's baseline for ADL function and identify physical deficits which impact ability to perform ADLs (bathing, care of mouth/teeth, toileting, grooming, dressing, etc.)  - Assess/evaluate cause of self-care deficits   - Assess range of motion  - Assess patient's mobility; develop plan if impaired  - Assess patient's need for assistive devices and provide as appropriate  - Encourage maximum independence but intervene and supervise when necessary  - Involve family in performance of ADLs  - Assess for home care needs following discharge   - Consider OT consult to assist with ADL evaluation and planning for discharge  - Provide patient education as appropriate  12/12/2024 1207 by Anuja Gentile RN  Outcome: Adequate for Discharge  12/12/2024 1007 by Anuja Gentile RN  Outcome: Progressing  Goal: Maintains/Returns to pre admission functional level  Description: INTERVENTIONS:  - Perform AM-PAC 6 Click Basic Mobility/ Daily Activity assessment daily.  - Set and communicate daily mobility goal to care team and patient/family/caregiver.   - Collaborate with rehabilitation services  on mobility goals if consulted  - Perform Range of Motion 4 times a day.  - Reposition patient every 2 hours.  - Dangle patient 3 times a day  - Stand patient 3 times a day  - Ambulate patient 3 times a day  - Out of bed to chair 3 times a day   - Out of bed for meals 3 times a day  - Out of bed for toileting  - Record patient progress and toleration of activity level   12/12/2024 1207 by Anuja Gentile RN  Outcome: Adequate for Discharge  12/12/2024 1007 by Anuja Gentile RN  Outcome: Progressing     Problem: DISCHARGE PLANNING  Goal: Discharge to home or other facility with appropriate resources  Description: INTERVENTIONS:  - Identify barriers to discharge w/patient and caregiver  - Arrange for needed discharge resources and transportation as appropriate  - Identify discharge learning needs (meds, wound care, etc.)  - Arrange for interpretive services to assist at discharge as needed  - Refer to Case Management Department for coordinating discharge planning if the patient needs post-hospital services based on physician/advanced practitioner order or complex needs related to functional status, cognitive ability, or social support system  12/12/2024 1207 by Anuja Gentile RN  Outcome: Adequate for Discharge  12/12/2024 1007 by Anuja Gentile RN  Outcome: Progressing     Problem: Knowledge Deficit  Goal: Patient/family/caregiver demonstrates understanding of disease process, treatment plan, medications, and discharge instructions  Description: Complete learning assessment and assess knowledge base.  Interventions:  - Provide teaching at level of understanding  - Provide teaching via preferred learning methods  12/12/2024 1207 by Anuja Gentile RN  Outcome: Adequate for Discharge  12/12/2024 1007 by Anuja Gentile RN  Outcome: Progressing

## 2024-12-12 NOTE — NURSING NOTE
Patient was d/c'd to home in no distress. No c/o pain, able to make needs known. Instructions were reviewed with the daughter who was at the bedside. Daughter made aware that prescriptions were sent to the pharmacy of choice and ready for . Patient was transferred to the front entrance via wheelchair and transferred into the car with all safety precautions taken. All belongings left with the patient.

## 2024-12-12 NOTE — PLAN OF CARE
Problem: PAIN - ADULT  Goal: Verbalizes/displays adequate comfort level or baseline comfort level  Description: Interventions:  - Encourage patient to monitor pain and request assistance  - Assess pain using appropriate pain scale  - Administer analgesics based on type and severity of pain and evaluate response  - Implement non-pharmacological measures as appropriate and evaluate response  - Consider cultural and social influences on pain and pain management  - Notify physician/advanced practitioner if interventions unsuccessful or patient reports new pain  Outcome: Progressing     Problem: INFECTION - ADULT  Goal: Absence or prevention of progression during hospitalization  Description: INTERVENTIONS:  - Assess and monitor for signs and symptoms of infection  - Monitor lab/diagnostic results  - Monitor all insertion sites, i.e. indwelling lines, tubes, and drains  - Monitor endotracheal if appropriate and nasal secretions for changes in amount and color  - [place] appropriate cooling/warming therapies per order  - Administer medications as ordered  - Instruct and encourage patient and family to use good hand hygiene technique  - Identify and instruct in appropriate isolation precautions for identified infection/condition  Outcome: Progressing  Goal: Absence of fever/infection during neutropenic period  Description: INTERVENTIONS:  - Monitor WBC    Outcome: Progressing     Problem: SAFETY ADULT  Goal: Patient will remain free of falls  Description: INTERVENTIONS:  - Educate patient/family on patient safety including physical limitations  - Instruct patient to call for assistance with activity   - Consult OT/PT to assist with strengthening/mobility   - Keep Call bell within reach  - Keep bed low and locked with side rails adjusted as appropriate  - Keep care items and personal belongings within reach  - Initiate and maintain comfort rounds  - Make Fall Risk Sign visible to staff  - Offer Toileting every 2 Hours,  in advance of need  - Initiate/Maintain bed alarm  - Obtain necessary fall risk management equipment: alarms   - Apply yellow socks and bracelet for high fall risk patients  - Consider moving patient to room near nurses station  Outcome: Progressing  Goal: Maintain or return to baseline ADL function  Description: INTERVENTIONS:  -  Assess patient's ability to carry out ADLs; assess patient's baseline for ADL function and identify physical deficits which impact ability to perform ADLs (bathing, care of mouth/teeth, toileting, grooming, dressing, etc.)  - Assess/evaluate cause of self-care deficits   - Assess range of motion  - Assess patient's mobility; develop plan if impaired  - Assess patient's need for assistive devices and provide as appropriate  - Encourage maximum independence but intervene and supervise when necessary  - Involve family in performance of ADLs  - Assess for home care needs following discharge   - Consider OT consult to assist with ADL evaluation and planning for discharge  - Provide patient education as appropriate  Outcome: Progressing  Goal: Maintains/Returns to pre admission functional level  Description: INTERVENTIONS:  - Perform AM-PAC 6 Click Basic Mobility/ Daily Activity assessment daily.  - Set and communicate daily mobility goal to care team and patient/family/caregiver.   - Collaborate with rehabilitation services on mobility goals if consulted  - Perform Range of Motion 4 times a day.  - Reposition patient every 2 hours.  - Dangle patient 3 times a day  - Stand patient 3 times a day  - Ambulate patient 3 times a day  - Out of bed to chair 3 times a day   - Out of bed for meals 3 times a day  - Out of bed for toileting  - Record patient progress and toleration of activity level   Outcome: Progressing     Problem: DISCHARGE PLANNING  Goal: Discharge to home or other facility with appropriate resources  Description: INTERVENTIONS:  - Identify barriers to discharge w/patient and  caregiver  - Arrange for needed discharge resources and transportation as appropriate  - Identify discharge learning needs (meds, wound care, etc.)  - Arrange for interpretive services to assist at discharge as needed  - Refer to Case Management Department for coordinating discharge planning if the patient needs post-hospital services based on physician/advanced practitioner order or complex needs related to functional status, cognitive ability, or social support system  Outcome: Progressing     Problem: Knowledge Deficit  Goal: Patient/family/caregiver demonstrates understanding of disease process, treatment plan, medications, and discharge instructions  Description: Complete learning assessment and assess knowledge base.  Interventions:  - Provide teaching at level of understanding  - Provide teaching via preferred learning methods  Outcome: Progressing     Problem: GASTROINTESTINAL - ADULT  Goal: Minimal or absence of nausea and/or vomiting  Description: INTERVENTIONS:  - Administer IV fluids if ordered to ensure adequate hydration  - Maintain NPO status until nausea and vomiting are resolved  - Nasogastric tube if ordered  - Administer ordered antiemetic medications as needed  - Provide nonpharmacologic comfort measures as appropriate  - Advance diet as tolerated, if ordered  - Consider nutrition services referral to assist patient with adequate nutrition and appropriate food choices  Outcome: Progressing  Goal: Maintains or returns to baseline bowel function  Description: INTERVENTIONS:  - Assess bowel function  - Encourage oral fluids to ensure adequate hydration  - Administer IV fluids if ordered to ensure adequate hydration  - Administer ordered medications as needed  - Encourage mobilization and activity  - Consider nutritional services referral to assist patient with adequate nutrition and appropriate food choices  Outcome: Progressing  Goal: Maintains adequate nutritional intake  Description:  INTERVENTIONS:  - Monitor percentage of each meal consumed  - Identify factors contributing to decreased intake, treat as appropriate  - Assist with meals as needed  - Monitor I&O, weight, and lab values if indicated  - Obtain nutrition services referral as needed  Outcome: Progressing  Goal: Oral mucous membranes remain intact  Description: INTERVENTIONS  - Assess oral mucosa and hygiene practices  - Implement preventative oral hygiene regimen  - Implement oral medicated treatments as ordered  - Initiate Nutrition services referral as needed  Outcome: Progressing     Problem: METABOLIC, FLUID AND ELECTROLYTES - ADULT  Goal: Electrolytes maintained within normal limits  Description: INTERVENTIONS:  - Monitor labs and assess patient for signs and symptoms of electrolyte imbalances  - Administer electrolyte replacement as ordered  - Monitor response to electrolyte replacements, including repeat lab results as appropriate  - Instruct patient on fluid and nutrition as appropriate  Outcome: Progressing  Goal: Fluid balance maintained  Description: INTERVENTIONS:  - Monitor labs   - Monitor I/O and WT  - Instruct patient on fluid and nutrition as appropriate  - Assess for signs & symptoms of volume excess or deficit  Outcome: Progressing  Goal: Glucose maintained within target range  Description: INTERVENTIONS:  - Monitor Blood Glucose as ordered  - Assess for signs and symptoms of hyperglycemia and hypoglycemia  - Administer ordered medications to maintain glucose within target range  - Assess nutritional intake and initiate nutrition service referral as needed  Outcome: Progressing     Problem: HEMATOLOGIC - ADULT  Goal: Maintains hematologic stability  Description: INTERVENTIONS  - Assess for signs and symptoms of bleeding or hemorrhage  - Monitor labs  - Administer supportive blood products/factors as ordered and appropriate  Outcome: Progressing     Problem: Prexisting or High Potential for Compromised Skin  Integrity  Goal: Skin integrity is maintained or improved  Description: INTERVENTIONS:  - Identify patients at risk for skin breakdown  - Assess and monitor skin integrity  - Assess and monitor nutrition and hydration status  - Monitor labs   - Assess for incontinence   - Turn and reposition patient  - Assist with mobility/ambulation  - Relieve pressure over bony prominences  - Avoid friction and shearing  - Provide appropriate hygiene as needed including keeping skin clean and dry  - Evaluate need for skin moisturizer/barrier cream  - Collaborate with interdisciplinary team   - Patient/family teaching  - Consider wound care consult   Outcome: Progressing     Problem: Nutrition/Hydration-ADULT  Goal: Nutrient/Hydration intake appropriate for improving, restoring or maintaining nutritional needs  Description: Monitor and assess patient's nutrition/hydration status for malnutrition. Collaborate with interdisciplinary team and initiate plan and interventions as ordered.  Monitor patient's weight and dietary intake as ordered or per policy. Utilize nutrition screening tool and intervene as necessary. Determine patient's food preferences and provide high-protein, high-caloric foods as appropriate.     INTERVENTIONS:  - Monitor oral intake, urinary output, labs, and treatment plans  - Assess nutrition and hydration status and recommend course of action  - Evaluate amount of meals eaten  - Assist patient with eating if necessary   - Allow adequate time for meals  - Recommend/ encourage appropriate diets, oral nutritional supplements, and vitamin/mineral supplements  - Order, calculate, and assess calorie counts as needed  - Recommend, monitor, and adjust tube feedings and TPN/PPN based on assessed needs  - Assess need for intravenous fluids  - Provide specific nutrition/hydration education as appropriate  - Include patient/family/caregiver in decisions related to nutrition  Outcome: Progressing

## 2024-12-12 NOTE — ASSESSMENT & PLAN NOTE
CT abdomen and pelvis showed unremarkable liver and status post cholecystectomy.  Ultrasound right upper quadrant showed status post cholecystectomy, but no intrahepatic biliary dilatation.  GI suspects the possibility that this might be ischemic in etiology secondary to her constipation.  Given LFT improvement.,  They cleared her for discharge.      Recent Labs     12/10/24  1734 12/11/24  0452 12/12/24  0436   AST 43* 217* 61*   ALT 97* 264* 138*   TBILI 0.45 0.61 0.82   ALKPHOS 86 92 68

## 2024-12-12 NOTE — CASE MANAGEMENT
Case Management Discharge Planning Note    Patient name Marci Sandoval  Location [facility] 2 /[facility] 2 M* MRN [id number]  : 1945 Date 2024       Current Admission Date: 12/10/2024  Current Admission Diagnosis:Proctocolitis   Patient Active Problem List    Diagnosis Date Noted Date Diagnosed    Transaminitis 2024     Blood in stool 2024     Other constipation 2024     Elevated LFTs 2024     Proctocolitis 12/10/2024     Late onset Alzheimer's dementia with other behavioral disturbance, unspecified dementia severity (HCC) 2024     Hypertensive heart disease with heart failure (HCC) 2024     Unintended weight loss 2024     Anemia 2023     PLMD (periodic limb movement disorder) 2023     Abnormal skin growth 2023     Nonischemic cardiomyopathy (HCC) 2023     ACC/AHA stage C heart failure with reduced ejection fraction (HCC) 2022     Hyperlipidemia      Gait instability 2021     Polypharmacy 2021     Frequent falls 2021     Dementia with behavioral disturbance 2021     Near syncope 2021     Chemotherapy-induced peripheral neuropathy ([facility]) 10/13/2020     Obstructive sleep apnea      Arthritis 2020     Allergic rhinitis 10/08/2019     Status post chemotherapy 2019     Current mild episode of major depressive disorder without prior episode ([facility]) 2019     Palliative care patient 2019     Anxiety 2018     Diffuse large 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 ([facility]) 2018     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, with long-term current use of insulin (HCC) 10/31/2018     Essential hypertension 10/31/2018     Gastroesophageal reflux disease 10/31/2018     Xerosis of skin 10/03/2016       LOS (days): 1  Geometric Mean LOS (GMLOS) (days): 3.4  Days to GMLOS:2.5     OBJECTIVE:  Risk of Unplanned Readmission Score: 21.14         Current  admission status: Inpatient   Preferred Pharmacy:   [id number] - [place] - [address]  [address] [place] [address]  Phone: [phone] Fax: [phone]    [facility] [place] [place] - [address]  [address] [place] [address]  Phone: [phone] Fax: [phone]    Primary Care Provider: MOIRA Gan    Primary Insurance: [facility]  Secondary Insurance:     DISCHARGE DETAILS:    Discharge planning discussed with:: Grand- dtr and Caregiver        CM contacted family/caregiver?: Yes  Were Treatment Team discharge recommendations reviewed with patient/caregiver?: Yes  Did patient/caregiver verbalize understanding of patient care needs?: Yes  Were patient/caregiver advised of the risks associated with not following Treatment Team discharge recommendations?: Yes         Requested Home Health Care         Home Health Agency Name:: [facility]         Additional Comments: Met with grand-dtr and caregiver. They have selected  [facility].

## 2024-12-12 NOTE — ASSESSMENT & PLAN NOTE
Fortunately, hemoglobin levels remained stable.  GI stating that there is no indication for intervention at this time and cleared her for discharge.

## 2024-12-12 NOTE — ASSESSMENT & PLAN NOTE
79-year-old female presented with constipation and abdominal pain.  CT showing diffuse wall thickening and mucosal hyperemia throughout the rectosigmoid colon consistent with nonspecific proctocolitis.  Her constipation are also likely playing a role.  GI evaluated her and cleared her for discharge today with 3 more days of antibiotic therapy.

## 2024-12-13 ENCOUNTER — PATIENT OUTREACH (OUTPATIENT)
Dept: FAMILY MEDICINE CLINIC | Facility: CLINIC | Age: 79
End: 2024-12-13

## 2024-12-13 ENCOUNTER — TELEPHONE (OUTPATIENT)
Dept: GASTROENTEROLOGY | Facility: MEDICAL CENTER | Age: 79
End: 2024-12-13

## 2024-12-13 ENCOUNTER — HOME CARE VISIT (OUTPATIENT)
Dept: HOME HEALTH SERVICES | Facility: HOME HEALTHCARE | Age: 79
End: 2024-12-13
Payer: COMMERCIAL

## 2024-12-13 VITALS — DIASTOLIC BLOOD PRESSURE: 62 MMHG | HEART RATE: 70 BPM | SYSTOLIC BLOOD PRESSURE: 118 MMHG | OXYGEN SATURATION: 93 %

## 2024-12-13 DIAGNOSIS — Z71.89 COMPLEX CARE COORDINATION: Primary | ICD-10-CM

## 2024-12-13 PROCEDURE — 400013 VN SOC

## 2024-12-13 PROCEDURE — G0151 HHCP-SERV OF PT,EA 15 MIN: HCPCS

## 2024-12-13 NOTE — TELEPHONE ENCOUNTER
Called patient to schedule follow up after hospital stay, granddaughter has scheduled follow up at [facility] on Monday 12/16.      constipation f/u  Received: Yesterday  Elmer Barrera MD  [facility] [place] [facility]  Hey can we schedule a f/u appt for her to discuss constipation? She can bring family with her too. Thx

## 2024-12-13 NOTE — PROGRESS NOTES
ADT alert received re HRR.    I called Nadja to follow up on the patient who is well known to me.  She states the patient is moving her bowels and currently has diarrhea.  Nadja notes the patient is eating and drinking without issues.  She states the patient will tell her she is not voiding but Nadja notes the pull-ups are wet with clear urine.  Nadja denies the patient having any symptoms or concerns.    Nadja states PT is arriving at the home today which will be beneficial for the patient.  She states the pharmacy will be delivering the patient's meds by 6pm today.  I encouraged her to give the meds/antibiotics as instructed as soon as they arrive.    Nadja will call with any questions or concerns.  She is aware of the patient's GI appointment 12/16 and PCP appointment 12/17 and plans on attending both.    Chart reviewed.  Case is being closed.

## 2024-12-13 NOTE — UTILIZATION REVIEW
NOTIFICATION OF ADMISSION DISCHARGE   This is a Notification of Discharge from [facility]. Please be advised that this patient has been discharge from our facility. Below you will find the admission and discharge date and time including the patient’s disposition.   UTILIZATION REVIEW CONTACT:  Kyung Márquez  Utilization   Network Utilization Review Department  Phone: [phone] x carefully listen to the prompts. All voicemails are confidential.  Email: [email or web address]     ADMISSION INFORMATION  PRESENTATION DATE: 12/10/2024  4:30 PM  OBERVATION ADMISSION DATE: 12/10/2024 2057  INPATIENT ADMISSION DATE: 12/11/24  3:04 PM   DISCHARGE DATE: 12/12/2024 12:10 PM   DISPOSITION:Home/Self Care    Network Utilization Review Department  ATTENTION: Please call with any questions or concerns to [phone] and carefully listen to the prompts so that you are directed to the right person. All voicemails are confidential.   For Discharge needs, contact Care Management DC Support Team at [phone] opt. 2  Send all requests for admission clinical reviews, approved or denied determinations and any other requests to dedicated fax number below belonging to the campus where the patient is receiving treatment. List of dedicated fax numbers for the Facilities:  FACILITY NAME UR FAX NUMBER   ADMISSION DENIALS (Administrative/Medical Necessity) [phone]   DISCHARGE SUPPORT TEAM ([facility]) [phone]   PARENT CHILD HEALTH (Maternity/NICU/Pediatrics) [phone]   [facility] [phone]   [facility] [phone]   [facility] [phone]   [facility] [phone]   [facility] [phone]   [facility] [phone]   [facility] [phone]   [facility]  [phone]   [facility] [phone]   [facility] [phone]   [facility] [phone]   [facility] [phone]

## 2024-12-14 ENCOUNTER — HOSPITAL ENCOUNTER (INPATIENT)
Facility: HOSPITAL | Age: 79
LOS: 4 days | Discharge: HOME HEALTH | End: 2024-12-18
Attending: EMERGENCY MEDICINE | Admitting: INTERNAL MEDICINE
Payer: COMMERCIAL

## 2024-12-14 ENCOUNTER — HOME CARE VISIT (OUTPATIENT)
Dept: HOME HEALTH SERVICES | Facility: HOME HEALTHCARE | Age: 79
End: 2024-12-14
Payer: COMMERCIAL

## 2024-12-14 ENCOUNTER — APPOINTMENT (EMERGENCY)
Dept: CT IMAGING | Facility: HOSPITAL | Age: 79
End: 2024-12-14
Payer: COMMERCIAL

## 2024-12-14 DIAGNOSIS — F02.818 LATE ONSET ALZHEIMER'S DEMENTIA WITH OTHER BEHAVIORAL DISTURBANCE, UNSPECIFIED DEMENTIA SEVERITY (HCC): ICD-10-CM

## 2024-12-14 DIAGNOSIS — R33.9 URINARY RETENTION: ICD-10-CM

## 2024-12-14 DIAGNOSIS — G30.1 LATE ONSET ALZHEIMER'S DEMENTIA WITH OTHER BEHAVIORAL DISTURBANCE, UNSPECIFIED DEMENTIA SEVERITY (HCC): ICD-10-CM

## 2024-12-14 DIAGNOSIS — K59.00 CONSTIPATION: ICD-10-CM

## 2024-12-14 DIAGNOSIS — R10.9 ABDOMINAL PAIN: Primary | ICD-10-CM

## 2024-12-14 DIAGNOSIS — K52.9 PROCTOCOLITIS: ICD-10-CM

## 2024-12-14 PROBLEM — R33.8 ACUTE URINARY RETENTION: Status: ACTIVE | Noted: 2024-12-14

## 2024-12-14 PROBLEM — R30.0 DYSURIA: Status: ACTIVE | Noted: 2024-12-14

## 2024-12-14 LAB
ALBUMIN SERPL BCG-MCNC: 3.5 G/DL (ref 3.5–5)
ALP SERPL-CCNC: 80 U/L (ref 34–104)
ALT SERPL W P-5'-P-CCNC: 121 U/L (ref 7–52)
ANION GAP SERPL CALCULATED.3IONS-SCNC: 5 MMOL/L (ref 4–13)
AST SERPL W P-5'-P-CCNC: 102 U/L (ref 13–39)
BACTERIA BLD CULT: ABNORMAL
BACTERIA UR QL AUTO: ABNORMAL /HPF
BASOPHILS # BLD AUTO: 0.05 THOUSANDS/ΜL (ref 0–0.1)
BASOPHILS NFR BLD AUTO: 1 % (ref 0–1)
BILIRUB SERPL-MCNC: 0.42 MG/DL (ref 0.2–1)
BILIRUB UR QL STRIP: NEGATIVE
BUN SERPL-MCNC: 14 MG/DL (ref 5–25)
CALCIUM SERPL-MCNC: 8.8 MG/DL (ref 8.4–10.2)
CHLORIDE SERPL-SCNC: 104 MMOL/L (ref 96–108)
CLARITY UR: CLEAR
CO2 SERPL-SCNC: 28 MMOL/L (ref 21–32)
COLOR UR: COLORLESS
CREAT SERPL-MCNC: 1.11 MG/DL (ref 0.6–1.3)
EOSINOPHIL # BLD AUTO: 0.21 THOUSAND/ΜL (ref 0–0.61)
EOSINOPHIL NFR BLD AUTO: 3 % (ref 0–6)
ERYTHROCYTE [DISTWIDTH] IN BLOOD BY AUTOMATED COUNT: 13.7 % (ref 11.6–15.1)
GFR SERPL CREATININE-BSD FRML MDRD: 47 ML/MIN/1.73SQ M
GLUCOSE SERPL-MCNC: 275 MG/DL (ref 65–140)
GLUCOSE UR STRIP-MCNC: ABNORMAL MG/DL
GRAM STN SPEC: ABNORMAL
GRAM STN SPEC: ABNORMAL
HCT VFR BLD AUTO: 35.5 % (ref 34.8–46.1)
HCT VFR BLD AUTO: 36 % (ref 34.8–46.1)
HGB BLD-MCNC: 11.4 G/DL (ref 11.5–15.4)
HGB BLD-MCNC: 11.7 G/DL (ref 11.5–15.4)
HGB UR QL STRIP.AUTO: NEGATIVE
IMM GRANULOCYTES # BLD AUTO: 0.04 THOUSAND/UL (ref 0–0.2)
IMM GRANULOCYTES NFR BLD AUTO: 1 % (ref 0–2)
KETONES UR STRIP-MCNC: NEGATIVE MG/DL
LACTATE SERPL-SCNC: 0.7 MMOL/L (ref 0.5–2)
LEUKOCYTE ESTERASE UR QL STRIP: ABNORMAL
LIPASE SERPL-CCNC: 26 U/L (ref 11–82)
LYMPHOCYTES # BLD AUTO: 2.25 THOUSANDS/ΜL (ref 0.6–4.47)
LYMPHOCYTES NFR BLD AUTO: 27 % (ref 14–44)
MCH RBC QN AUTO: 29.6 PG (ref 26.8–34.3)
MCHC RBC AUTO-ENTMCNC: 32.1 G/DL (ref 31.4–37.4)
MCV RBC AUTO: 92 FL (ref 82–98)
MECA+MECC ISLT/SPM QL: DETECTED
MONOCYTES # BLD AUTO: 0.53 THOUSAND/ΜL (ref 0.17–1.22)
MONOCYTES NFR BLD AUTO: 6 % (ref 4–12)
NEUTROPHILS # BLD AUTO: 5.2 THOUSANDS/ΜL (ref 1.85–7.62)
NEUTS SEG NFR BLD AUTO: 62 % (ref 43–75)
NITRITE UR QL STRIP: NEGATIVE
NON-SQ EPI CELLS URNS QL MICRO: ABNORMAL /HPF
NRBC BLD AUTO-RTO: 0 /100 WBCS
PH UR STRIP.AUTO: 6 [PH]
PLATELET # BLD AUTO: 301 THOUSANDS/UL (ref 149–390)
PMV BLD AUTO: 9.3 FL (ref 8.9–12.7)
POTASSIUM SERPL-SCNC: 4 MMOL/L (ref 3.5–5.3)
PROT SERPL-MCNC: 7.3 G/DL (ref 6.4–8.4)
PROT UR STRIP-MCNC: NEGATIVE MG/DL
RBC # BLD AUTO: 3.85 MILLION/UL (ref 3.81–5.12)
RBC #/AREA URNS AUTO: ABNORMAL /HPF
S EPIDERMIDIS DNA BLD POS QL NAA+NON-PRB: DETECTED
SODIUM SERPL-SCNC: 137 MMOL/L (ref 135–147)
SP GR UR STRIP.AUTO: 1.02 (ref 1–1.03)
UROBILINOGEN UR STRIP-ACNC: <2 MG/DL
WBC # BLD AUTO: 8.28 THOUSAND/UL (ref 4.31–10.16)
WBC #/AREA URNS AUTO: ABNORMAL /HPF

## 2024-12-14 PROCEDURE — 99284 EMERGENCY DEPT VISIT MOD MDM: CPT

## 2024-12-14 PROCEDURE — 99223 1ST HOSP IP/OBS HIGH 75: CPT | Performed by: INTERNAL MEDICINE

## 2024-12-14 PROCEDURE — 85018 HEMOGLOBIN: CPT | Performed by: EMERGENCY MEDICINE

## 2024-12-14 PROCEDURE — 85025 COMPLETE CBC W/AUTO DIFF WBC: CPT | Performed by: EMERGENCY MEDICINE

## 2024-12-14 PROCEDURE — 96374 THER/PROPH/DIAG INJ IV PUSH: CPT

## 2024-12-14 PROCEDURE — 85014 HEMATOCRIT: CPT | Performed by: EMERGENCY MEDICINE

## 2024-12-14 PROCEDURE — 99285 EMERGENCY DEPT VISIT HI MDM: CPT | Performed by: EMERGENCY MEDICINE

## 2024-12-14 PROCEDURE — 36415 COLL VENOUS BLD VENIPUNCTURE: CPT | Performed by: EMERGENCY MEDICINE

## 2024-12-14 PROCEDURE — 74177 CT ABD & PELVIS W/CONTRAST: CPT

## 2024-12-14 PROCEDURE — 81001 URINALYSIS AUTO W/SCOPE: CPT | Performed by: EMERGENCY MEDICINE

## 2024-12-14 PROCEDURE — 83605 ASSAY OF LACTIC ACID: CPT | Performed by: EMERGENCY MEDICINE

## 2024-12-14 PROCEDURE — 83690 ASSAY OF LIPASE: CPT | Performed by: EMERGENCY MEDICINE

## 2024-12-14 PROCEDURE — 80053 COMPREHEN METABOLIC PANEL: CPT | Performed by: EMERGENCY MEDICINE

## 2024-12-14 RX ORDER — ACETAMINOPHEN 325 MG/1
650 TABLET ORAL EVERY 6 HOURS PRN
Status: DISCONTINUED | OUTPATIENT
Start: 2024-12-14 | End: 2024-12-18 | Stop reason: HOSPADM

## 2024-12-14 RX ORDER — ALBUTEROL SULFATE 90 UG/1
1 INHALANT RESPIRATORY (INHALATION) EVERY 4 HOURS PRN
Status: DISCONTINUED | OUTPATIENT
Start: 2024-12-14 | End: 2024-12-18 | Stop reason: HOSPADM

## 2024-12-14 RX ORDER — MIRTAZAPINE 15 MG/1
15 TABLET, FILM COATED ORAL
Status: DISCONTINUED | OUTPATIENT
Start: 2024-12-14 | End: 2024-12-18 | Stop reason: HOSPADM

## 2024-12-14 RX ORDER — MEMANTINE HYDROCHLORIDE 5 MG/1
10 TABLET ORAL 2 TIMES DAILY
Status: DISCONTINUED | OUTPATIENT
Start: 2024-12-14 | End: 2024-12-18 | Stop reason: HOSPADM

## 2024-12-14 RX ORDER — ALPRAZOLAM 0.25 MG/1
0.25 TABLET ORAL
Status: DISCONTINUED | OUTPATIENT
Start: 2024-12-14 | End: 2024-12-18 | Stop reason: HOSPADM

## 2024-12-14 RX ORDER — INSULIN LISPRO 100 [IU]/ML
1-5 INJECTION, SOLUTION INTRAVENOUS; SUBCUTANEOUS
Status: DISCONTINUED | OUTPATIENT
Start: 2024-12-14 | End: 2024-12-18 | Stop reason: HOSPADM

## 2024-12-14 RX ORDER — ATORVASTATIN CALCIUM 40 MG/1
40 TABLET, FILM COATED ORAL
Status: DISCONTINUED | OUTPATIENT
Start: 2024-12-14 | End: 2024-12-18 | Stop reason: HOSPADM

## 2024-12-14 RX ORDER — HEPARIN SODIUM 5000 [USP'U]/ML
5000 INJECTION, SOLUTION INTRAVENOUS; SUBCUTANEOUS EVERY 8 HOURS SCHEDULED
Status: DISCONTINUED | OUTPATIENT
Start: 2024-12-14 | End: 2024-12-18 | Stop reason: HOSPADM

## 2024-12-14 RX ORDER — CEFPODOXIME PROXETIL 200 MG/1
200 TABLET, FILM COATED ORAL 2 TIMES DAILY
Status: COMPLETED | OUTPATIENT
Start: 2024-12-14 | End: 2024-12-15

## 2024-12-14 RX ORDER — FENTANYL CITRATE 50 UG/ML
25 INJECTION, SOLUTION INTRAMUSCULAR; INTRAVENOUS ONCE
Refills: 0 | Status: COMPLETED | OUTPATIENT
Start: 2024-12-14 | End: 2024-12-14

## 2024-12-14 RX ORDER — INSULIN GLARGINE 100 [IU]/ML
15 INJECTION, SOLUTION SUBCUTANEOUS
Status: DISCONTINUED | OUTPATIENT
Start: 2024-12-14 | End: 2024-12-18 | Stop reason: HOSPADM

## 2024-12-14 RX ORDER — PANTOPRAZOLE SODIUM 40 MG/1
40 TABLET, DELAYED RELEASE ORAL
Status: DISCONTINUED | OUTPATIENT
Start: 2024-12-15 | End: 2024-12-18 | Stop reason: HOSPADM

## 2024-12-14 RX ORDER — ASPIRIN 81 MG/1
81 TABLET, CHEWABLE ORAL DAILY
Status: DISCONTINUED | OUTPATIENT
Start: 2024-12-15 | End: 2024-12-18 | Stop reason: HOSPADM

## 2024-12-14 RX ORDER — METRONIDAZOLE 500 MG/1
500 TABLET ORAL EVERY 8 HOURS SCHEDULED
Status: COMPLETED | OUTPATIENT
Start: 2024-12-14 | End: 2024-12-15

## 2024-12-14 RX ORDER — GABAPENTIN 100 MG/1
100 CAPSULE ORAL 3 TIMES DAILY
Status: DISCONTINUED | OUTPATIENT
Start: 2024-12-14 | End: 2024-12-18 | Stop reason: HOSPADM

## 2024-12-14 RX ORDER — LOSARTAN POTASSIUM 25 MG/1
25 TABLET ORAL DAILY
Status: DISCONTINUED | OUTPATIENT
Start: 2024-12-15 | End: 2024-12-18 | Stop reason: HOSPADM

## 2024-12-14 RX ORDER — POLYETHYLENE GLYCOL 3350 17 G/17G
238 POWDER, FOR SOLUTION ORAL ONCE
Status: COMPLETED | OUTPATIENT
Start: 2024-12-14 | End: 2024-12-14

## 2024-12-14 RX ORDER — CARVEDILOL 12.5 MG/1
12.5 TABLET ORAL 2 TIMES DAILY WITH MEALS
Status: DISCONTINUED | OUTPATIENT
Start: 2024-12-14 | End: 2024-12-18 | Stop reason: HOSPADM

## 2024-12-14 RX ADMIN — METRONIDAZOLE 500 MG: 500 TABLET ORAL at 21:28

## 2024-12-14 RX ADMIN — MEMANTINE 10 MG: 5 TABLET ORAL at 21:27

## 2024-12-14 RX ADMIN — CEFPODOXIME PROXETIL 200 MG: 200 TABLET, FILM COATED ORAL at 21:34

## 2024-12-14 RX ADMIN — INSULIN GLARGINE 15 UNITS: 100 INJECTION, SOLUTION SUBCUTANEOUS at 21:27

## 2024-12-14 RX ADMIN — MIRTAZAPINE 15 MG: 15 TABLET, FILM COATED ORAL at 21:36

## 2024-12-14 RX ADMIN — GABAPENTIN 100 MG: 100 CAPSULE ORAL at 21:28

## 2024-12-14 RX ADMIN — POLYETHYLENE GLYCOL 3350 238 G: 17 POWDER, FOR SOLUTION ORAL at 18:31

## 2024-12-14 RX ADMIN — HEPARIN SODIUM 5000 UNITS: 5000 INJECTION INTRAVENOUS; SUBCUTANEOUS at 21:28

## 2024-12-14 RX ADMIN — CARVEDILOL 12.5 MG: 12.5 TABLET, FILM COATED ORAL at 18:29

## 2024-12-14 RX ADMIN — ATORVASTATIN CALCIUM 40 MG: 40 TABLET, FILM COATED ORAL at 18:29

## 2024-12-14 RX ADMIN — IOHEXOL 85 ML: 350 INJECTION, SOLUTION INTRAVENOUS at 13:52

## 2024-12-14 RX ADMIN — PHENYLEPHRINE HCL 1 ENEMA: 10 TABLET, COATED ORAL at 18:31

## 2024-12-14 RX ADMIN — FENTANYL CITRATE 25 MCG: 50 INJECTION INTRAMUSCULAR; INTRAVENOUS at 15:38

## 2024-12-14 NOTE — ASSESSMENT & PLAN NOTE
Suspect this is referred pain from her proctocolitis.  Her bladder looked abnormal on CT due to under distention  Urine cultures have been obtained  We will continue her cefpodoxime and metronidazole

## 2024-12-14 NOTE — ED PROVIDER NOTES
Time reflects when diagnosis was documented in both MDM as applicable and the Disposition within this note       Time User Action Codes Description Comment    12/14/2024 12:32 PM Alam, Mu Add [R10.9] Abdominal pain     12/14/2024 12:32 PM Alam, Mu Add [K59.00] Constipation     12/14/2024 12:56 PM Alam, Mu Add [R33.9] Urinary retention     12/14/2024  5:09 PM Alam, Mu Add [K52.9] Proctocolitis           ED Disposition       ED Disposition   Admit    Condition   Stable    Date/Time   Sat Dec 14, 2024  5:08 PM    Comment   Case was discussed with Dr. Rodriguez and the patient's admission status was agreed to be Admission Status: inpatient status to the service of Dr. Rodriguez.               Assessment & Plan       Medical Decision Making  Patient is a 73-year-old male, history of diabetes, lymphoma, hypertension, hyperlipidemia, thyroid cancer; recently diagnosed with proctocolitis, discharged from the hospital on 12/12/2024, with complaints of recurrent, persistent lower abdominal pain, unable to have a bowel movement or urinate, denies fever, vomiting, chest pain, dyspnea on exam, patient is conscious, alert, vital signs are stable, abdomen is soft, there is tenderness in lower abdomen.  Differential diagnosis: Constipation, urinary tension, persistent or worsening proctocolitis, rule out abscess, we will check labs, bladder scan, urine, get CT scan abdomen pelvis.    Problems Addressed:  Abdominal pain: acute illness or injury  Constipation: acute illness or injury  Proctocolitis: acute illness or injury  Urinary retention: acute illness or injury    Amount and/or Complexity of Data Reviewed  Labs: ordered. Decision-making details documented in ED Course.  Radiology: ordered. Decision-making details documented in ED Course.    Risk  Prescription drug management.  Decision regarding hospitalization.        ED Course as of 12/14/24 1827   Sat Dec 14, 2024   1250 Bladder scan 551 ml, pt uncomfortable, not  able to urinate according to pt, we will put Yen.   1401 WBC: 8.28   1402 Hemoglobin(!): 11.4   1402 Platelet Count: 301   1402 Sodium: 137   1402 Potassium: 4.0   1402 BUN: 14   1402 Creatinine: 1.11   1402 GLUCOSE(!): 275   1402 LIPASE: 26  CBC, CMP, Lipase reviewed.   1442 About 1500 mL urine emptied from the bag as per RN.   1442 Patient had a bowel movement.   1443 Urine Microscopic(!)   1443 RBC Urine(!): 4-10   1443 WBC, UA: 1-2   1443 Bacteria, UA: Occasional  Urine micro without signs of infection.   1538 Patient noted blood on wipe, we will repeat H and H, also send lactic acid.   1547 Hemoglobin: 11.7  Hb remains stable.   1604 LACTIC ACID: 0.7  Lactic normal.   1620 CT abdomen pelvis with contrast  IMPRESSION:     1. Mild interval increased extent of nonspecific rectosigmoid proctocolitis compared to CT abdomen pelvis 12/10/2024.     2. Mild bladder wall thickening may be due in part to underdistention however there is mild urothelial hyperenhancement. Correlate with urinalysis for cystitis.         Medications   polyethylene glycol (GLYCOLAX) bowel prep 238 g (has no administration in time range)   mineral oil enema 1 enema (has no administration in time range)   iohexol (OMNIPAQUE) 350 MG/ML injection (SINGLE-DOSE) 85 mL (85 mL Intravenous Given 12/14/24 1352)   fentaNYL injection 25 mcg (25 mcg Intravenous Given 12/14/24 1538)       ED Risk Strat Scores                                              History of Present Illness       Chief Complaint   Patient presents with    Abdominal Pain     Pt reports abd pain.  Pt reports unabe to urinate a have BM, but per EMS pt Is having diarrhea.         Past Medical History:   Diagnosis Date    Cancer (HCC)     Throat    Chronic pain disorder     Diabetes mellitus (HCC)     Diffuse large B cell lymphoma (HCC)     Dysphagia     GERD without esophagitis     HTN (hypertension)     Hyperlipidemia     Hypertension     MI (myocardial infarction) (HCC)      Port-A-Cath in place 07/29/2019    Thyroid cancer (HCC) 2018      Past Surgical History:   Procedure Laterality Date    BONE MARROW BIOPSY      BREAST BIOPSY Left     CARDIAC CATHETERIZATION N/A 12/9/2022    Procedure: Cardiac catheterization;  Surgeon: Jhonny Rain MD;  Location: [facility];  Service: Cardiology    CARDIAC CATHETERIZATION N/A 12/9/2022    Procedure: Cardiac Coronary Angiogram;  Surgeon: Jhonny Rain MD;  Location: [facility];  Service: Cardiology    CARDIAC CATHETERIZATION Left 12/9/2022    Procedure: Cardiac Left Heart Cath;  Surgeon: Jhonny Rain MD;  Location: [facility];  Service: Cardiology    CHOLECYSTECTOMY      IR PORT PLACEMENT  11/16/2018    IR PORT REMOVAL  3/22/2021    OTHER SURGICAL HISTORY      tendor tear repair to right shoulder    SHOULDER ARTHROSCOPY        Family History   Problem Relation Age of Onset    Diabetes Mother     Heart disease Sister     Uterine cancer Maternal Grandmother     Prostate cancer Paternal Grandfather     Hypertension Brother     Breast cancer Neg Hx       Social History     Tobacco Use    Smoking status: Never     Passive exposure: Never    Smokeless tobacco: Never   Vaping Use    Vaping status: Never Used   Substance Use Topics    Alcohol use: Never     Comment: 0    Drug use: No      E-Cigarette/Vaping    E-Cigarette Use Never User       E-Cigarette/Vaping Substances    Nicotine No     THC No     CBD No     Flavoring No     Other No     Unknown No       I have reviewed and agree with the history as documented.       History provided by:  Patient   used: Yes (iPad # [id number])    Abdominal Pain  Pain location: Lower abdomen.  Pain quality: aching    Pain radiates to:  Does not radiate  Pain severity:  Moderate  Onset quality:  Gradual  Duration:  1 day  Timing:  Intermittent  Progression:  Waxing and waning  Chronicity:  New  Context comment:  Patient recently diagnosed with proctocolitis, comes in with  complaints of recurrent worsening lower abdominal pain, unable to have a bowel movement or urinate discharged from the hospital  Relieved by:  Nothing  Worsened by:  Nothing  Ineffective treatments:  None tried  Associated symptoms: constipation    Associated symptoms: no chest pain, no chills, no cough, no diarrhea, no dysuria, no fever, no nausea, no shortness of breath, no sore throat and no vomiting    Risk factors: being elderly    Risk factors comment:  Diabetes, lymphoma, hypertension, hyperlipidemia, thyroid cancer      Review of Systems   Constitutional:  Negative for chills and fever.   HENT:  Negative for facial swelling, sore throat and trouble swallowing.    Eyes:  Negative for pain and visual disturbance.   Respiratory:  Negative for cough, chest tightness and shortness of breath.    Cardiovascular:  Negative for chest pain and leg swelling.   Gastrointestinal:  Positive for abdominal pain and constipation. Negative for blood in stool, diarrhea, nausea and vomiting.   Genitourinary:  Positive for difficulty urinating. Negative for dysuria and flank pain.   Musculoskeletal:  Negative for back pain, neck pain and neck stiffness.   Skin:  Negative for pallor and rash.   Allergic/Immunologic: Negative for environmental allergies and immunocompromised state.   Neurological:  Negative for dizziness and headaches.   Hematological:  Negative for adenopathy. Does not bruise/bleed easily.   Psychiatric/Behavioral:  Negative for agitation and behavioral problems.    All other systems reviewed and are negative.          Objective       ED Triage Vitals   Temperature Pulse Blood Pressure Respirations SpO2 Patient Position - Orthostatic VS   12/14/24 1215 12/14/24 1215 12/14/24 1215 12/14/24 1215 12/14/24 1215 12/14/24 1215   98.2 °F (36.8 °C) 84 157/71 20 99 % Lying      Temp Source Heart Rate Source BP Location FiO2 (%) Pain Score    12/14/24 1215 12/14/24 1430 -- -- 12/14/24 1807    Oral Monitor   5      Vitals       Date and Time Temp Pulse SpO2 Resp BP Pain Score FACES Pain Rating User   12/14/24 1807 -- -- -- -- -- 5 --    12/14/24 1651 -- -- -- -- 144/97 -- --    12/14/24 1650 -- 94 96 % -- -- -- --    12/14/24 1430 -- 90 96 % 16 151/72 -- --    12/14/24 1215 98.2 °F (36.8 °C) 84 99 % 20 157/71 -- --             Physical Exam  Vitals and nursing note reviewed.   Constitutional:       General: She is not in acute distress.     Appearance: She is well-developed.   HENT:      Head: Normocephalic and atraumatic.   Eyes:      Extraocular Movements: Extraocular movements intact.   Cardiovascular:      Rate and Rhythm: Normal rate and regular rhythm.      Heart sounds: Normal heart sounds.   Pulmonary:      Effort: Pulmonary effort is normal.      Breath sounds: Normal breath sounds.   Abdominal:      Palpations: Abdomen is soft.      Tenderness: There is abdominal tenderness in the suprapubic area and left lower quadrant. There is no guarding or rebound.   Musculoskeletal:         General: Normal range of motion.      Cervical back: Normal range of motion and neck supple.   Skin:     General: Skin is warm and dry.   Neurological:      General: No focal deficit present.      Mental Status: She is alert and oriented to person, place, and time.   Psychiatric:         Mood and Affect: Mood normal.         Behavior: Behavior normal.         Results Reviewed       Procedure Component Value Units Date/Time    Lactic acid, plasma (w/reflex if result > 2.0) [[id number]]  (Normal) Collected: 12/14/24 1538    Lab Status: Final result Specimen: Blood from Arm, Right Updated: 12/14/24 1604     LACTIC ACID 0.7 mmol/L     Narrative:      Result may be elevated if tourniquet was used during collection.    Hemoglobin and hematocrit, blood [[id number]]  (Normal) Collected: 12/14/24 1538    Lab Status: Final result Specimen: Blood from Arm, Right Updated: 12/14/24 1546     Hemoglobin 11.7 g/dL      Hematocrit 36.0 %     Comprehensive  metabolic panel [[id number]]  (Abnormal) Collected: 12/14/24 1301    Lab Status: Final result Specimen: Blood from Arm, Right Updated: 12/14/24 1342     Sodium 137 mmol/L      Potassium 4.0 mmol/L      Chloride 104 mmol/L      CO2 28 mmol/L      ANION GAP 5 mmol/L      BUN 14 mg/dL      Creatinine 1.11 mg/dL      Glucose 275 mg/dL      Calcium 8.8 mg/dL       U/L       U/L      Alkaline Phosphatase 80 U/L      Total Protein 7.3 g/dL      Albumin 3.5 g/dL      Total Bilirubin 0.42 mg/dL      eGFR 47 ml/min/1.73sq m     Narrative:      National Kid[employer] guidelines for Chronic Kidney Disease (CKD):     Stage 1 with normal or high GFR (GFR > 90 mL/min/1.73 square meters)    Stage 2 Mild CKD (GFR = 60-89 mL/min/1.73 square meters)    Stage 3A Moderate CKD (GFR = 45-59 mL/min/1.73 square meters)    Stage 3B Moderate CKD (GFR = 30-44 mL/min/1.73 square meters)    Stage 4 Severe CKD (GFR = 15-29 mL/min/1.73 square meters)    Stage 5 End Stage CKD (GFR <15 mL/min/1.73 square meters)  Note: GFR calculation is accurate only with a steady state creatinine    Urine Microscopic [[id number]]  (Abnormal) Collected: 12/14/24 1302    Lab Status: Final result Specimen: Urine, Indwelling Yen Catheter Updated: 12/14/24 1332     RBC, UA 4-10 /hpf      WBC, UA 1-2 /hpf      Epithelial Cells None Seen /hpf      Bacteria, UA Occasional /hpf     UA w Reflex to Microscopic w Reflex to Culture [[id number]]  (Abnormal) Collected: 12/14/24 1302    Lab Status: Final result Specimen: Urine, Indwelling Yen Catheter Updated: 12/14/24 1331     Color, UA Colorless     Clarity, UA Clear     Specific Gravity, UA 1.023     pH, UA 6.0     Leukocytes, UA Elevated glucose may cause decreased leukocyte values. See urine microscopic for UWBC result     Nitrite, UA Negative     Protein, UA Negative mg/dl      Glucose, UA >=1000 (1%) mg/dl      Ketones, UA Negative mg/dl      Urobilinogen, UA <2.0 mg/dl      Bilirubin, UA  Negative     Occult Blood, UA Negative    Lipase [[id number]]  (Normal) Collected: 12/14/24 1301    Lab Status: Final result Specimen: Blood from Arm, Right Updated: 12/14/24 1329     Lipase 26 u/L     CBC and differential [[id number]]  (Abnormal) Collected: 12/14/24 1301    Lab Status: Final result Specimen: Blood from Arm, Right Updated: 12/14/24 1308     WBC 8.28 Thousand/uL      RBC 3.85 Million/uL      Hemoglobin 11.4 g/dL      Hematocrit 35.5 %      MCV 92 fL      MCH 29.6 pg      MCHC 32.1 g/dL      RDW 13.7 %      MPV 9.3 fL      Platelets 301 Thousands/uL      nRBC 0 /100 WBCs      Segmented % 62 %      Immature Grans % 1 %      Lymphocytes % 27 %      Monocytes % 6 %      Eosinophils Relative 3 %      Basophils Relative 1 %      Absolute Neutrophils 5.20 Thousands/µL      Absolute Immature Grans 0.04 Thousand/uL      Absolute Lymphocytes 2.25 Thousands/µL      Absolute Monocytes 0.53 Thousand/µL      Eosinophils Absolute 0.21 Thousand/µL      Basophils Absolute 0.05 Thousands/µL             CT abdomen pelvis with contrast   Final Interpretation by Guillermo Cullen MD (12/14 1609)      1. Mild interval increased extent of nonspecific rectosigmoid proctocolitis compared to CT abdomen pelvis 12/10/2024.      2. Mild bladder wall thickening may be due in part to underdistention however there is mild urothelial hyperenhancement. Correlate with urinalysis for cystitis.         The study was marked in EPIC for immediate notification.      Resident: DILIA MCCLOUD I, the attending radiologist, have reviewed the images and agree with the final report above.      Workstation performed: [id number]             Procedures    ED Medication and Procedure Management   Prior to Admission Medications   Prescriptions Last Dose Informant Patient Reported? Taking?   ALPRAZolam (XANAX) 0.25 mg tablet   No No   Sig: Take 1 tablet (0.25 mg total) by mouth daily at bedtime as needed for anxiety   Accu-Chek FastClix Lancets  MISC   No No   Sig: USAR PARA PROBAR AZUCAR EN LA TWAN MANISHA VECES AL JEFFREY   BD Pen Needle Micro U/F 32G X 6 MM MISC   No No   Sig: USE DAILY AS DIRECTED   Mary Jane Low Dose 81 MG chewable tablet   No No   Sig: CHEW 1 TABLET BY MOUTH DAILY   Blood Glucose Monitoring Suppl (OneTouch Verio Reflect) w/Device KIT  Family Member No No   Sig: Check blood sugars once daily. Please substitute with appropriate alternative as covered by patient's insurance. Dx: E11.65   Blood Glucose Monitoring Suppl (OneTouch Verio) w/Device KIT  Family Member No No   Sig: Use per  guidelines   Calcium Carb-Cholecalciferol 600-10 MG-MCG TABS   No No   Sig: TAKE 1 TABLET BY MOUTH EVERY DAY IN THE MORNING   Empagliflozin (Jardiance) 25 MG TABS   No No   Sig: TAKE 1 TABLET BY MOUTH EVERY DAY IN THE MORNING   Insulin Glargine Solostar (Lantus SoloStar) 100 UNIT/ML SOPN   No No   Sig: INJECT 0.11 ML (11 UNITS TOTAL) UNDER THE SKIN IN THE MORNING   Patient taking differently: (Family reports taking 13 units at night.)   Lancets (OneTouch Delica Plus [id number]) MISC   No No   Sig: TEST DAILY   Melatonin 5 MG TABS  Family Member Yes No   Sig: Take by mouth as needed   Nutritional Supplements (Glucerna) LIQD   No No   Sig: Take 1 Bottle by mouth in the morning   acetaminophen (TYLENOL) 650 mg CR tablet   No No   Sig: Take 1 tablet (650 mg total) by mouth every 8 (eight) hours as needed for mild pain   albuterol (PROVENTIL HFA,VENTOLIN HFA) 90 mcg/act inhaler  Family Member No No   Sig: Inhale 1 puff every 4 (four) hours as needed for wheezing   atorvastatin (LIPITOR) 40 mg tablet   No No   Sig: TAKE 1 TABLET BY MOUTH EVERY DAY   carvedilol (COREG) 12.5 mg tablet   No No   Sig: Take 1 tablet (12.5 mg total) by mouth 2 (two) times a day with meals   cefpodoxime (VANTIN) 200 mg tablet   No No   Sig: Take 1 tablet (200 mg total) by mouth 2 (two) times a day for 3 days   cyanocobalamin (VITAMIN B-12) 1000 MCG tablet   No No   Sig: Take 1 tablet  (1,000 mcg total) by mouth daily   ferrous sulfate 325 (65 Fe) mg tablet   No No   Sig: TAKE 1 TABLET BY MOUTH EVERY DAY WITH BREAKFAST   fluticasone (FLONASE) 50 mcg/act nasal spray   No No   Sig: SPRAY 1 SPRAY INTO EACH NOSTRIL EVERY DAY   furosemide (LASIX) 20 mg tablet   No No   Sig: Take 1 tablet (20 mg total) by mouth daily   gabapentin (NEURONTIN) 100 mg capsule   No No   Sig: TAKE 1 CAPSULE (100 MG TOTAL) BY MOUTH 3 (THREE) TIMES A DAY   glucose blood (Accu-Chek Guide) test strip   No No   Sig: USAR PARA EXAMINAR AZUCAR EN LA TWAN MANISHA VECES AL JEFFREY   glucose blood (OneTouch Verio) test strip  Family Member No No   Sig: Check blood sugars once daily. Please substitute with appropriate alternative as covered by patient's insurance. Dx: E11.65   guaiFENesin (MUCINEX) 600 mg 12 hr tablet  Family Member No No   Sig: Take 2 tablets (1,200 mg total) by mouth every 12 (twelve) hours   lidocaine (Lidoderm) 5 %   No No   Sig: Apply 1 patch topically over 12 hours daily Remove & Discard patch within 12 hours or as directed by MD   loperamide (IMODIUM) 2 mg capsule   No No   Sig: MAR JOHNNY (1) CAPSULA POR VIA ORAL ALFREDO SEA NECESARIO PARA LA DIARREA   losartan (COZAAR) 25 mg tablet   No No   Sig: TAKE 1 TABLET (25 MG TOTAL) BY MOUTH DAILY.   lubiprostone (AMITIZA) 8 mcg capsule   No No   Sig: Take 1 capsule (8 mcg total) by mouth 2 (two) times a day with meals   memantine (NAMENDA) 10 mg tablet   No No   Sig: TAKE 1 TABLET BY MOUTH TWICE A DAY   metroNIDAZOLE (FLAGYL) 500 mg tablet   No No   Sig: Take 1 tablet (500 mg total) by mouth every 8 (eight) hours for 3 days   mirtazapine (REMERON) 15 mg tablet   No No   Sig: TAKE 1 TABLET BY MOUTH DAILY AT BEDTIME   omeprazole (PriLOSEC) 20 mg delayed release capsule   No No   Sig: Take 1 capsule (20 mg total) by mouth 2 (two) times a day   Patient not taking: Reported on 12/13/2024   ondansetron (ZOFRAN) 4 mg tablet   No No   Sig: Take 1 tablet (4 mg total) by mouth every 8  (eight) hours as needed for nausea or vomiting   pantoprazole (PROTONIX) 40 mg tablet   No No   Sig: TAKE 1 TABLET BY MOUTH EVERY DAY   polyethylene glycol (MIRALAX) 17 g packet   No No   Sig: Take 17 g by mouth 2 (two) times a day for 7 days   sitaGLIPtin-metFORMIN (Janumet)  MG per tablet   No No   Sig: Take 1 tablet by mouth 2 (two) times a day with meals      Facility-Administered Medications: None     Current Discharge Medication List        CONTINUE these medications which have NOT CHANGED    Details   !! Accu-Chek FastClix Lancets MISC USAR PARA PROBAR AZUCAR EN LA TWAN MANISHA VECES AL JEFFREY  Qty: 100 each, Refills: 5    Associated Diagnoses: Current use of insulin ([facility]); Type 2 diabetes mellitus without complication, without long-term current use of insulin ([facility])      acetaminophen (TYLENOL) 650 mg CR tablet Take 1 tablet (650 mg total) by mouth every 8 (eight) hours as needed for mild pain  Qty: 30 tablet, Refills: 0    Associated Diagnoses: Dog bite, initial encounter      albuterol (PROVENTIL HFA,VENTOLIN HFA) 90 mcg/act inhaler Inhale 1 puff every 4 (four) hours as needed for wheezing  Qty: 18 g, Refills: 10    Comments: Substitution to a formulary equivalent within the same pharmaceutical class is authorized.  Associated Diagnoses: URI (upper respiratory infection)      ALPRAZolam (XANAX) 0.25 mg tablet Take 1 tablet (0.25 mg total) by mouth daily at bedtime as needed for anxiety  Qty: 30 tablet, Refills: 0    Associated Diagnoses: Anxiety about health      atorvastatin (LIPITOR) 40 mg tablet TAKE 1 TABLET BY MOUTH EVERY DAY  Qty: 90 tablet, Refills: 0    Associated Diagnoses: Cardiomyopathy (HCC)      Mary Jane Low Dose 81 MG chewable tablet CHEW 1 TABLET BY MOUTH DAILY  Qty: 90 tablet, Refills: 0    Associated Diagnoses: Cardiomyopathy (HCC)      BD Pen Needle Micro U/F 32G X 6 MM MISC USE DAILY AS DIRECTED  Qty: 100 each, Refills: 1    Associated Diagnoses: Uncontrolled type 2 diabetes mellitus with  hyperglycemia ([facility])      !! Blood Glucose Monitoring Suppl (OneTouch Verio Reflect) w/Device KIT Check blood sugars once daily. Please substitute with appropriate alternative as covered by patient's insurance. Dx: E11.65  Qty: 1 kit, Refills: 0    Associated Diagnoses: Type 2 diabetes mellitus with both eyes affected by mild nonproliferative retinopathy without macular edema, with long-term current use of insulin ([facility])      !! 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, with long-term current use of insulin, macular edema presence unspecified, unspecified laterality ([facility])      Calcium Carb-Cholecalciferol 600-10 MG-MCG TABS TAKE 1 TABLET BY MOUTH EVERY DAY IN THE MORNING  Qty: 90 tablet, Refills: 1    Associated Diagnoses: Age-related bone loss      carvedilol (COREG) 12.5 mg tablet Take 1 tablet (12.5 mg total) by mouth 2 (two) times a day with meals  Qty: 180 tablet, Refills: 0    Associated Diagnoses: Essential hypertension      cefpodoxime (VANTIN) 200 mg tablet Take 1 tablet (200 mg total) by mouth 2 (two) times a day for 3 days  Qty: 6 tablet, Refills: 0    Associated Diagnoses: Proctocolitis      cyanocobalamin (VITAMIN B-12) 1000 MCG tablet Take 1 tablet (1,000 mcg total) by mouth daily  Qty: 90 tablet, Refills: 3    Associated Diagnoses: B12 deficiency      Empagliflozin (Jardiance) 25 MG TABS TAKE 1 TABLET BY MOUTH EVERY DAY IN THE MORNING  Qty: 90 tablet, Refills: 1    Associated Diagnoses: Uncontrolled type 2 diabetes mellitus with hyperglycemia ([facility])      ferrous sulfate 325 (65 Fe) mg tablet TAKE 1 TABLET BY MOUTH EVERY DAY WITH BREAKFAST  Qty: 90 tablet, Refills: 2    Associated Diagnoses: Iron deficiency anemia, unspecified iron deficiency anemia type      fluticasone (FLONASE) 50 mcg/act nasal spray SPRAY 1 SPRAY INTO EACH NOSTRIL EVERY  DAY  Qty: 24 mL, Refills: 4    Associated Diagnoses: Palliative care patient; 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 glucose blood (Accu-Chek Guide) test strip USAR PARA EXAMINAR AZUCAR EN LA TWAN MANISHA VECES AL JEFFREY  Qty: 100 strip, Refills: 5    Associated Diagnoses: Current use of insulin ([facility]); Type 2 diabetes mellitus without complication, without long-term current use of insulin ([facility])      !! glucose blood (OneTouch Verio) test strip Check blood sugars once daily. Please substitute with appropriate alternative as covered by patient's insurance. Dx: E11.65  Qty: 100 each, Refills: 3    Associated Diagnoses: Type 2 diabetes mellitus with both eyes affected by mild nonproliferative retinopathy without macular edema, with long-term current use of insulin ([facility])      guaiFENesin (MUCINEX) 600 mg 12 hr tablet Take 2 tablets (1,200 mg total) by mouth every 12 (twelve) hours  Qty: 30 tablet, Refills: 1    Associated Diagnoses: Pneumonia of right lower lobe due to infectious organism      Insulin Glargine Solostar (Lantus SoloStar) 100 UNIT/ML SOPN INJECT 0.11 ML (11 UNITS TOTAL) UNDER THE SKIN IN THE MORNING  Qty: 15 mL, Refills: 1    Associated Diagnoses: Uncontrolled type 2 diabetes mellitus with hyperglycemia ([facility])      !! Lancets (OneTouch Delica Plus [id number]) MISC TEST DAILY  Qty: 100 each, Refills: 3    Associated Diagnoses: Type 2 diabetes mellitus with both eyes affected by mild nonproliferative retinopathy without macular edema, with long-term current use of insulin ([facility])      lidocaine (Lidoderm) 5 %  Apply 1 patch topically over 12 hours daily Remove & Discard patch within 12 hours or as directed by MD  Qty: 15 patch, Refills: 0    Associated Diagnoses: Arthritis      loperamide (IMODIUM) 2 mg capsule MAR JOHNNY (1) CAPSULA POR VIA ORAL ALFREDO SEA NECESARIO PARA LA DIARREA  Qty: 30 capsule, Refills: 10    Associated Diagnoses: Diarrhea, unspecified type      losartan (COZAAR) 25 mg tablet TAKE 1 TABLET (25 MG TOTAL) BY MOUTH DAILY.  Qty: 90 tablet, Refills: 0    Associated Diagnoses: Cardiomyopathy (HCC)      lubiprostone (AMITIZA) 8 mcg capsule Take 1 capsule (8 mcg total) by mouth 2 (two) times a day with meals  Qty: 120 capsule, Refills: 0    Associated Diagnoses: Constipation, unspecified constipation type; Proctocolitis      Melatonin 5 MG TABS Take by mouth as needed      memantine (NAMENDA) 10 mg tablet TAKE 1 TABLET BY MOUTH TWICE A DAY  Qty: 180 tablet, Refills: 1    Associated Diagnoses: Dementia without behavioral disturbance (HCC)      metroNIDAZOLE (FLAGYL) 500 mg tablet Take 1 tablet (500 mg total) by mouth every 8 (eight) hours for 3 days  Qty: 9 tablet, Refills: 0    Associated Diagnoses: Proctocolitis      mirtazapine (REMERON) 15 mg tablet TAKE 1 TABLET BY MOUTH DAILY AT BEDTIME  Qty: 90 tablet, Refills: 0    Associated Diagnoses: Anxiety      Nutritional Supplements (Glucerna) LIQD Take 1 Bottle by mouth in the morning  Qty: 3500 mL, Refills: 11    Associated Diagnoses: ACC/AHA stage C heart failure with reduced ejection fraction (HCC); Nonischemic cardiomyopathy (HCC); Hypertensive heart disease with heart failure (HCC); Obstructive sleep apnea; Gastroesophageal reflux disease with esophagitis without hemorrhage; Type 2 diabetes mellitus with both eyes affected by mild nonproliferative retinopathy without macular edema, with long-term current use of insulin (HCC); Dementia with behavioral disturbance (HCC); Chemotherapy-induced peripheral neuropathy (HCC); Xerosis of skin; Arthritis; 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); Palliative care patient; Status post chemotherapy; Unintended weight loss      omeprazole (PriLOSEC) 20 mg delayed release capsule Take 1 capsule (20 mg total) by mouth 2 (two) times a day  Qty: 180 capsule, Refills: 3    Associated Diagnoses: Gastroesophageal reflux disease      ondansetron (ZOFRAN) 4 mg tablet Take 1 tablet (4 mg total) by mouth every 8 (eight) hours as needed for nausea or vomiting  Qty: 20 tablet, Refills: 0    Associated Diagnoses: Nausea      pantoprazole (PROTONIX) 40 mg tablet TAKE 1 TABLET BY MOUTH EVERY DAY  Qty: 90 tablet, Refills: 1    Associated Diagnoses: Gastroesophageal reflux disease with esophagitis without hemorrhage      polyethylene glycol (MIRALAX) 17 g packet Take 17 g by mouth 2 (two) times a day for 7 days  Qty: 238 g, Refills: 0    Associated Diagnoses: Constipation, unspecified constipation type; Proctocolitis      sitaGLIPtin-metFORMIN (Janumet)  MG per tablet Take 1 tablet by mouth 2 (two) times a day with meals  Qty: 60 tablet, Refills: 1    Associated Diagnoses: Uncontrolled type 2 diabetes mellitus with hyperglycemia (HCC)       !! - Potential duplicate medications found. Please discuss with provider.        No discharge procedures on file.  ED SEPSIS DOCUMENTATION   Time reflects when diagnosis was documented in both MDM as applicable and the Disposition within this note       Time User Action Codes Description Comment    12/14/2024 12:32 PM Mu Rdz Add [R10.9] Abdominal pain     12/14/2024 12:32 PM Mu Rdz Add [K59.00] Constipation     12/14/2024 12:56 PM Mu Rdz Add [R33.9] Urinary retention     12/14/2024  5:09 PM Mu Rdz Add [K52.9] Proctocolitis                  Mu Rdz MD  12/14/24 2288

## 2024-12-14 NOTE — PLAN OF CARE
Problem: PAIN - ADULT  Goal: Verbalizes/displays adequate comfort level or baseline comfort level  Description: Interventions:  - Encourage patient to monitor pain and request assistance  - Assess pain using appropriate pain scale  - Administer analgesics based on type and severity of pain and evaluate response  - Implement non-pharmacological measures as appropriate and evaluate response  - Consider cultural and social influences on pain and pain management  - Notify physician/advanced practitioner if interventions unsuccessful or patient reports new pain  Outcome: Progressing     Problem: GASTROINTESTINAL - ADULT  Goal: Maintains or returns to baseline bowel function  Description: INTERVENTIONS:  - Assess bowel function  - Encourage oral fluids to ensure adequate hydration  - Administer IV fluids if ordered to ensure adequate hydration  - Administer ordered medications as needed  - Encourage mobilization and activity  - Consider nutritional services referral to assist patient with adequate nutrition and appropriate food choices  Outcome: Progressing     Problem: GASTROINTESTINAL - ADULT  Goal: Maintains adequate nutritional intake  Description: INTERVENTIONS:  - Monitor percentage of each meal consumed  - Identify factors contributing to decreased intake, treat as appropriate  - Assist with meals as needed  - Monitor I&O, weight, and lab values if indicated  - Obtain nutrition services referral as needed  Outcome: Progressing

## 2024-12-14 NOTE — ASSESSMENT & PLAN NOTE
Diagnosed rectosigmoid colitis on previous admission, felt to be due to stercoral colitis or ischemic colitis or possible infectious. ?  Late onset inflammatory bowel disease  She will be admitted due to persistent pain and worsening findings on CT  Will continue empiric antibiotics to complete her course  Consult GI.  Discussed with Dr. Parker regarding her case. She will be given MiraLAX bowel prep and mineral oil enemas    we will try to control her pain however her regimen will be limited to low-dose narcotics since she has rectal bleeding and  nonischemic cardiomyopathy

## 2024-12-14 NOTE — ASSESSMENT & PLAN NOTE
Lab Results   Component Value Date    HGBA1C 7.3 (H) 09/28/2024     Hold oral hypoglycemic agents  Place on sliding scale insulin and carbohydrate controlled diet

## 2024-12-14 NOTE — H&P
H&P - Hospitalist   Name: Marci Sandoval 79 y.o. female I MRN: [id number]  Unit/Bed#: ED-13 I Date of Admission: 12/14/2024   Date of Service: 12/14/2024 I Hospital Day: 0     Assessment & Plan  Proctocolitis  Diagnosed rectosigmoid colitis on previous admission, felt to be due to stercoral colitis or ischemic colitis or possible infectious. ?  Late onset inflammatory bowel disease  She will be admitted due to persistent pain and worsening findings on CT  Will continue empiric antibiotics to complete her course  Consult GI.  Discussed with Dr. Parker regarding her case. She will be given MiraLAX bowel prep and mineral oil enemas    we will try to control her pain however her regimen will be limited to low-dose narcotics since she has rectal bleeding and  nonischemic cardiomyopathy  Dysuria  Suspect this is referred pain from her proctocolitis.  Her bladder looked abnormal on CT due to under distention  Urine cultures have been obtained  We will continue her cefpodoxime and metronidazole  Acute urinary retention  Related to proctocolitis  Status post Yen catheter placement  Nonischemic cardiomyopathy (HCC)  Stable.   Euvolemic on exam.  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  Continue Lipitor 40 mg daily  Late onset Alzheimer's dementia with other behavioral disturbance, unspecified dementia severity ([facility])  Continue Namenda  Watch for confusion/delirium in the hospital  Fall precaution      VTE Pharmacologic Prophylaxis:   Heparin  Code  "Status: Full code  Anticipated Length of Stay: Patient will be admitted on an inpatient basis with an anticipated length of stay of greater than 2 midnights secondary to proctocolitis.    History of Present Illness   Chief Complaint: Zoie Sandoval is a 79 y.o. female with a PMH of diabetes, hypertension, hyperlipidemia, nonischemic cardiomyopathy, dementia who presents with persistent hypogastric pain, dysuria, difficulty urinating, anal leaking.  She was admitted here this week on December 10- December 12, 2024 and was found to have proctocolitis on CT.  She was evaluated by GI who thought that this was related to constipation.   they also felt it was reasonable to continue a course of antibiotics.  She felt better on the day of discharge and was sent home on Flagyl and cefpodoxime for 3 more days.  Since she has been at home she has had persistent hypogastric pain, urgency and difficulty urinating.  She also reports \" leaking\" and bleeding from her rectum.  The pain was severe  enough that she was not able to sleep.  She denied fever or chills  When she arrived in the ED she had urinary retention and a Yen catheter was placed.  I spoke to her via  [id number]    Review of Systems   Constitutional: Negative.    HENT: Negative.     Eyes: Negative.    Respiratory: Negative.     Cardiovascular: Negative.    Gastrointestinal:  Positive for abdominal pain and anal bleeding.   Genitourinary:  Positive for difficulty urinating, dysuria, pelvic pain and urgency.   Musculoskeletal: Negative.    Skin: Negative.    Neurological: Negative.    Psychiatric/Behavioral: Negative.     All other systems reviewed and are negative.      Historical Information   Past Medical History:   Diagnosis Date    Cancer (HCC)     Throat    Chronic pain disorder     Diabetes mellitus (HCC)     Diffuse large B cell lymphoma (HCC)     Dysphagia     GERD without esophagitis     HTN (hypertension)     " Hyperlipidemia     Hypertension     MI (myocardial infarction) (HCC)     Port-A-Cath in place 07/29/2019    Thyroid cancer (HCC) 2018     Past Surgical History:   Procedure Laterality Date    BONE MARROW BIOPSY      BREAST BIOPSY Left     CARDIAC CATHETERIZATION N/A 12/9/2022    Procedure: Cardiac catheterization;  Surgeon: Jhonny Rain MD;  Location: [facility];  Service: Cardiology    CARDIAC CATHETERIZATION N/A 12/9/2022    Procedure: Cardiac Coronary Angiogram;  Surgeon: hJonny Rain MD;  Location: [facility];  Service: Cardiology    CARDIAC CATHETERIZATION Left 12/9/2022    Procedure: Cardiac Left Heart Cath;  Surgeon: Jhonny Rain MD;  Location: [facility];  Service: Cardiology    CHOLECYSTECTOMY      IR PORT PLACEMENT  11/16/2018    IR PORT REMOVAL  3/22/2021    OTHER SURGICAL HISTORY      tendor tear repair to right shoulder    SHOULDER ARTHROSCOPY       Social History     Tobacco Use    Smoking status: Never     Passive exposure: Never    Smokeless tobacco: Never   Vaping Use    Vaping status: Never Used   Substance and Sexual Activity    Alcohol use: Never     Comment: 0    Drug use: No    Sexual activity: Not Currently     Partners: Male     E-Cigarette/Vaping    E-Cigarette Use Never User      E-Cigarette/Vaping Substances    Nicotine No     THC No     CBD No     Flavoring No     Other No     Unknown No      Family History   Problem Relation Age of Onset    Diabetes Mother     Heart disease Sister     Uterine cancer Maternal Grandmother     Prostate cancer Paternal Grandfather     Hypertension Brother     Breast cancer Neg Hx      Social History:  Marital Status:    Occupation: none  Patient Pre-hospital Living Situation: Home  Patient Pre-hospital Level of Mobility: She has walker and wheelchair  Patient Pre-hospital Diet Restrictions: none    Meds/Allergies   I have reviewed home medications using recent Epic encounter.  Prior to Admission medications    Medication Sig  Start Date End Date Taking? Authorizing Provider   Accu-Chek FastClix Lancets MISC USAR PARA PROBAR AZUCAR EN LA TWAN MANISHA VECES AL JEFFREY 9/5/24   Liset Meehan MD   acetaminophen (TYLENOL) 650 mg CR tablet Take 1 tablet (650 mg total) by mouth every 8 (eight) hours as needed for mild pain 8/3/24   Fabby Cifuentes MD   albuterol (PROVENTIL HFA,VENTOLIN HFA) 90 mcg/act inhaler Inhale 1 puff every 4 (four) hours as needed for wheezing 10/26/23   MOIRA Gan   ALPRAZolam (XANAX) 0.25 mg tablet Take 1 tablet (0.25 mg total) by mouth daily at bedtime as needed for anxiety 12/6/24   MOIRA Gan   atorvastatin (LIPITOR) 40 mg tablet TAKE 1 TABLET BY MOUTH EVERY DAY 9/21/24   MOIRA Gan   Mary Jane Low Dose 81 MG chewable tablet CHEW 1 TABLET BY MOUTH DAILY 5/28/24   MOIRA Gan   BD Pen Needle Micro U/F 32G X 6 MM MISC USE DAILY AS DIRECTED 12/9/24   MOIRA Gan   Blood Glucose Monitoring Suppl (OneTouch Verio Reflect) w/Device KIT Check blood sugars once daily. Please substitute with appropriate alternative as covered by patient's insurance. Dx: E11.65 10/26/23   MOIRA Gan   Blood Glucose Monitoring Suppl (OneTouch Verio) w/Device KIT Use per  guidelines 4/6/23   MOIRA Hendrickson   Calcium Carb-Cholecalciferol 600-10 MG-MCG TABS TAKE 1 TABLET BY MOUTH EVERY DAY IN THE MORNING 8/22/24   MOIRA Gan   carvedilol (COREG) 12.5 mg tablet Take 1 tablet (12.5 mg total) by mouth 2 (two) times a day with meals 12/6/24   MOIRA Gan   cefpodoxime (VANTIN) 200 mg tablet Take 1 tablet (200 mg total) by mouth 2 (two) times a day for 3 days 12/12/24 12/15/24  Dhruv Wylie MD   cyanocobalamin (VITAMIN B-12) 1000 MCG tablet Take 1 tablet (1,000 mcg total) by mouth daily 2/21/24   MOIRA Gan   Empagliflozin (Jardiance) 25 MG TABS TAKE 1 TABLET BY MOUTH EVERY DAY IN THE MORNING 6/30/24    MOIRA Hendrickson   ferrous sulfate 325 (65 Fe) mg tablet TAKE 1 TABLET BY MOUTH EVERY DAY WITH BREAKFAST 11/21/24   MOIRA Gan   fluticasone (FLONASE) 50 mcg/act nasal spray SPRAY 1 SPRAY INTO EACH NOSTRIL EVERY DAY 10/15/24   MOIRA Gan   furosemide (LASIX) 20 mg tablet Take 1 tablet (20 mg total) by mouth daily 2/21/24   MOIRA Gan   gabapentin (NEURONTIN) 100 mg capsule TAKE 1 CAPSULE (100 MG TOTAL) BY MOUTH 3 (THREE) TIMES A DAY 9/30/24   MOIRA Gan   glucose blood (Accu-Chek Guide) test strip USAR PARA EXAMINAR AZUCAR EN LA TWAN MANISHA VECES AL JEFFREY 9/5/24   Liset Meehan MD   glucose blood (OneTouch Verio) test strip Check blood sugars once daily. Please substitute with appropriate alternative as covered by patient's insurance. Dx: E11.65 10/26/23   MOIRA Gan   guaiFENesin (MUCINEX) 600 mg 12 hr tablet Take 2 tablets (1,200 mg total) by mouth every 12 (twelve) hours 10/26/23   MOIRA Gan   Insulin Glargine Solostar (Lantus SoloStar) 100 UNIT/ML SOPN INJECT 0.11 ML (11 UNITS TOTAL) UNDER THE SKIN IN THE MORNING  Patient taking differently: (Family reports taking 13 units at night.) 10/7/24   MOIRA Gan   Lancets (OneTouch Delica Plus [id number]) MISC TEST DAILY 12/9/24   MOIRA Gan   lidocaine (Lidoderm) 5 % Apply 1 patch topically over 12 hours daily Remove & Discard patch within 12 hours or as directed by MD 2/21/24   MOIRA Gan   loperamide (IMODIUM) 2 mg capsule MAR JOHNNY (1) CAPSULA POR VIA ORAL ALFREDO SEA NECESARIO PARA LA DIARREA 8/6/24   MOIRA Gan   losartan (COZAAR) 25 mg tablet TAKE 1 TABLET (25 MG TOTAL) BY MOUTH DAILY. 9/23/24   MOIRA Gan   lubiprostone (AMITIZA) 8 mcg capsule Take 1 capsule (8 mcg total) by mouth 2 (two) times a day with meals 12/12/24 2/10/25  Dhruv Wylie MD   Melatonin 5 MG TABS Take by mouth as  needed    Historical Provider, MD   memantine (NAMENDA) 10 mg tablet TAKE 1 TABLET BY MOUTH TWICE A DAY 11/21/24   MOIRA Gan   metroNIDAZOLE (FLAGYL) 500 mg tablet Take 1 tablet (500 mg total) by mouth every 8 (eight) hours for 3 days 12/12/24 12/15/24  Dhruv Wylie MD   mirtazapine (REMERON) 15 mg tablet TAKE 1 TABLET BY MOUTH DAILY AT BEDTIME 10/25/24   MOIRA Gan   Nutritional Supplements (Glucerna) LIQD Take 1 Bottle by mouth in the morning 11/15/24   MOIRA Gan   omeprazole (PriLOSEC) 20 mg delayed release capsule Take 1 capsule (20 mg total) by mouth 2 (two) times a day  Patient not taking: Reported on 12/13/2024 2/21/24   MOIRA Gan   ondansetron (ZOFRAN) 4 mg tablet Take 1 tablet (4 mg total) by mouth every 8 (eight) hours as needed for nausea or vomiting 2/21/24   MOIRA Gan   pantoprazole (PROTONIX) 40 mg tablet TAKE 1 TABLET BY MOUTH EVERY DAY 12/9/24   Nikhil Soriano MD   polyethylene glycol (MIRALAX) 17 g packet Take 17 g by mouth 2 (two) times a day for 7 days 12/12/24 12/19/24  Dhruv Wylie MD   sitaGLIPtin-metFORMIN (Janumet)  MG per tablet Take 1 tablet by mouth 2 (two) times a day with meals 12/6/24   MOIRA Gan     No Known Allergies    Objective :  Temp:  [98.2 °F (36.8 °C)] 98.2 °F (36.8 °C)  HR:  [84-94] 94  BP: (144-157)/(71-97) 144/97  Resp:  [16-20] 16  SpO2:  [96 %-99 %] 96 %  O2 Device: None (Room air)    Physical Exam  Vitals reviewed.   Constitutional:       Appearance: She is not ill-appearing.   HENT:      Head: Normocephalic and atraumatic.      Nose: No congestion or rhinorrhea.   Eyes:      General: No scleral icterus.  Cardiovascular:      Rate and Rhythm: Normal rate and regular rhythm.   Pulmonary:      Breath sounds: No wheezing or rhonchi.   Abdominal:      Tenderness: There is abdominal tenderness. There is no guarding.      Comments: Direct tenderness in hypogastric area    Musculoskeletal:      Right lower leg: No edema.      Left lower leg: No edema.   Skin:     General: Skin is warm and dry.   Neurological:      Comments: Awake alert cooperative   Psychiatric:         Mood and Affect: Mood normal.         Behavior: Behavior normal.          Lines/Drains:  Lines/Drains/Airways       Active Status       Name Placement date Placement time Site Days    Urethral Catheter Non-latex 12/14/24  1250  Non-latex  less than 1                             Lab Results: I have reviewed the following results:  Results from last 7 days   Lab Units 12/14/24  1538 12/14/24  1301   WBC Thousand/uL  --  8.28   HEMOGLOBIN g/dL 11.7 11.4*   HEMATOCRIT % 36.0 35.5   PLATELETS Thousands/uL  --  301   SEGS PCT %  --  62   LYMPHO PCT %  --  27   MONO PCT %  --  6   EOS PCT %  --  3     Results from last 7 days   Lab Units 12/14/24  1301   SODIUM mmol/L 137   POTASSIUM mmol/L 4.0   CHLORIDE mmol/L 104   CO2 mmol/L 28   BUN mg/dL 14   CREATININE mg/dL 1.11   ANION GAP mmol/L 5   CALCIUM mg/dL 8.8   ALBUMIN g/dL 3.5   TOTAL BILIRUBIN mg/dL 0.42   ALK PHOS U/L 80   ALT U/L 121*   AST U/L 102*   GLUCOSE RANDOM mg/dL 275*         Results from last 7 days   Lab Units 12/12/24  1055 12/12/24  0750 12/11/24  2129 12/11/24  2042 12/11/24  1559 12/11/24  1130 12/11/24  0721   POC GLUCOSE mg/dl 275* 137 236* 288* 146* 191* 359*     Lab Results   Component Value Date    HGBA1C 7.3 (H) 09/28/2024    HGBA1C 7.0 (A) 07/24/2024    HGBA1C 8.1 (H) 03/28/2024     Results from last 7 days   Lab Units 12/14/24  1538 12/11/24  0452   LACTIC ACID mmol/L 0.7  --    PROCALCITONIN ng/ml  --  1.00*       Imaging Results Review: I reviewed radiology reports from this admission including: CT abdomen/pelvis.    I reviewed records from her recent admission including discharge summary, GI consult and progress notes.      Administrative Statements       ** Please Note: This note has been constructed using a voice recognition system. **

## 2024-12-14 NOTE — ASSESSMENT & PLAN NOTE
Stable.   Euvolemic on exam.  Continue Coreg 12.5 mg twice daily, losartan 25 mg daily   Hold Lasix in anticipation of GI losses from bowel prep

## 2024-12-15 DIAGNOSIS — I42.9 CARDIOMYOPATHY (HCC): ICD-10-CM

## 2024-12-15 LAB
ANION GAP SERPL CALCULATED.3IONS-SCNC: 5 MMOL/L (ref 4–13)
BACTERIA BLD CULT: NORMAL
BASOPHILS # BLD AUTO: 0.07 THOUSANDS/ΜL (ref 0–0.1)
BASOPHILS NFR BLD AUTO: 1 % (ref 0–1)
BUN SERPL-MCNC: 11 MG/DL (ref 5–25)
CALCIUM SERPL-MCNC: 8.8 MG/DL (ref 8.4–10.2)
CHLORIDE SERPL-SCNC: 104 MMOL/L (ref 96–108)
CO2 SERPL-SCNC: 29 MMOL/L (ref 21–32)
CREAT SERPL-MCNC: 0.71 MG/DL (ref 0.6–1.3)
EOSINOPHIL # BLD AUTO: 0.27 THOUSAND/ΜL (ref 0–0.61)
EOSINOPHIL NFR BLD AUTO: 2 % (ref 0–6)
ERYTHROCYTE [DISTWIDTH] IN BLOOD BY AUTOMATED COUNT: 13.6 % (ref 11.6–15.1)
GFR SERPL CREATININE-BSD FRML MDRD: 81 ML/MIN/1.73SQ M
GLUCOSE SERPL-MCNC: 150 MG/DL (ref 65–140)
GLUCOSE SERPL-MCNC: 194 MG/DL (ref 65–140)
GLUCOSE SERPL-MCNC: 244 MG/DL (ref 65–140)
GLUCOSE SERPL-MCNC: 92 MG/DL (ref 65–140)
GLUCOSE SERPL-MCNC: 94 MG/DL (ref 65–140)
HCT VFR BLD AUTO: 35.6 % (ref 34.8–46.1)
HGB BLD-MCNC: 11.6 G/DL (ref 11.5–15.4)
IMM GRANULOCYTES # BLD AUTO: 0.08 THOUSAND/UL (ref 0–0.2)
IMM GRANULOCYTES NFR BLD AUTO: 1 % (ref 0–2)
LYMPHOCYTES # BLD AUTO: 4.45 THOUSANDS/ΜL (ref 0.6–4.47)
LYMPHOCYTES NFR BLD AUTO: 37 % (ref 14–44)
MCH RBC QN AUTO: 29.5 PG (ref 26.8–34.3)
MCHC RBC AUTO-ENTMCNC: 32.6 G/DL (ref 31.4–37.4)
MCV RBC AUTO: 91 FL (ref 82–98)
MONOCYTES # BLD AUTO: 1 THOUSAND/ΜL (ref 0.17–1.22)
MONOCYTES NFR BLD AUTO: 8 % (ref 4–12)
NEUTROPHILS # BLD AUTO: 6.07 THOUSANDS/ΜL (ref 1.85–7.62)
NEUTS SEG NFR BLD AUTO: 51 % (ref 43–75)
NRBC BLD AUTO-RTO: 0 /100 WBCS
PLATELET # BLD AUTO: 297 THOUSANDS/UL (ref 149–390)
PMV BLD AUTO: 9.3 FL (ref 8.9–12.7)
POTASSIUM SERPL-SCNC: 3.4 MMOL/L (ref 3.5–5.3)
RBC # BLD AUTO: 3.93 MILLION/UL (ref 3.81–5.12)
SODIUM SERPL-SCNC: 138 MMOL/L (ref 135–147)
WBC # BLD AUTO: 11.94 THOUSAND/UL (ref 4.31–10.16)

## 2024-12-15 PROCEDURE — 84443 ASSAY THYROID STIM HORMONE: CPT | Performed by: INTERNAL MEDICINE

## 2024-12-15 PROCEDURE — 85025 COMPLETE CBC W/AUTO DIFF WBC: CPT | Performed by: INTERNAL MEDICINE

## 2024-12-15 PROCEDURE — 99222 1ST HOSP IP/OBS MODERATE 55: CPT | Performed by: INTERNAL MEDICINE

## 2024-12-15 PROCEDURE — 82948 REAGENT STRIP/BLOOD GLUCOSE: CPT

## 2024-12-15 PROCEDURE — 80048 BASIC METABOLIC PNL TOTAL CA: CPT | Performed by: INTERNAL MEDICINE

## 2024-12-15 PROCEDURE — 99232 SBSQ HOSP IP/OBS MODERATE 35: CPT | Performed by: FAMILY MEDICINE

## 2024-12-15 RX ORDER — POTASSIUM CHLORIDE 1500 MG/1
40 TABLET, EXTENDED RELEASE ORAL 2 TIMES DAILY
Status: COMPLETED | OUTPATIENT
Start: 2024-12-15 | End: 2024-12-15

## 2024-12-15 RX ADMIN — HEPARIN SODIUM 5000 UNITS: 5000 INJECTION INTRAVENOUS; SUBCUTANEOUS at 13:43

## 2024-12-15 RX ADMIN — MIRTAZAPINE 15 MG: 15 TABLET, FILM COATED ORAL at 21:35

## 2024-12-15 RX ADMIN — PANTOPRAZOLE SODIUM 40 MG: 40 TABLET, DELAYED RELEASE ORAL at 06:15

## 2024-12-15 RX ADMIN — METRONIDAZOLE 500 MG: 500 TABLET ORAL at 06:15

## 2024-12-15 RX ADMIN — ACETAMINOPHEN 650 MG: 325 TABLET, FILM COATED ORAL at 09:50

## 2024-12-15 RX ADMIN — POTASSIUM CHLORIDE 40 MEQ: 1500 TABLET, EXTENDED RELEASE ORAL at 08:49

## 2024-12-15 RX ADMIN — CARVEDILOL 12.5 MG: 12.5 TABLET, FILM COATED ORAL at 16:46

## 2024-12-15 RX ADMIN — MELATONIN 3 MG: 3 TAB ORAL at 22:47

## 2024-12-15 RX ADMIN — CEFPODOXIME PROXETIL 200 MG: 200 TABLET, FILM COATED ORAL at 08:49

## 2024-12-15 RX ADMIN — MEMANTINE 10 MG: 5 TABLET ORAL at 21:35

## 2024-12-15 RX ADMIN — METRONIDAZOLE 500 MG: 500 TABLET ORAL at 13:43

## 2024-12-15 RX ADMIN — PHENYLEPHRINE HCL 1 ENEMA: 10 TABLET, COATED ORAL at 06:18

## 2024-12-15 RX ADMIN — INSULIN GLARGINE 15 UNITS: 100 INJECTION, SOLUTION SUBCUTANEOUS at 21:36

## 2024-12-15 RX ADMIN — ACETAMINOPHEN 650 MG: 325 TABLET, FILM COATED ORAL at 23:06

## 2024-12-15 RX ADMIN — HEPARIN SODIUM 5000 UNITS: 5000 INJECTION INTRAVENOUS; SUBCUTANEOUS at 06:15

## 2024-12-15 RX ADMIN — GABAPENTIN 100 MG: 100 CAPSULE ORAL at 08:49

## 2024-12-15 RX ADMIN — MEMANTINE 10 MG: 5 TABLET ORAL at 08:50

## 2024-12-15 RX ADMIN — GABAPENTIN 100 MG: 100 CAPSULE ORAL at 21:35

## 2024-12-15 RX ADMIN — HEPARIN SODIUM 5000 UNITS: 5000 INJECTION INTRAVENOUS; SUBCUTANEOUS at 21:36

## 2024-12-15 RX ADMIN — ATORVASTATIN CALCIUM 40 MG: 40 TABLET, FILM COATED ORAL at 16:46

## 2024-12-15 RX ADMIN — POTASSIUM CHLORIDE 40 MEQ: 1500 TABLET, EXTENDED RELEASE ORAL at 16:47

## 2024-12-15 RX ADMIN — ASPIRIN 81 MG CHEWABLE TABLET 81 MG: 81 TABLET CHEWABLE at 08:50

## 2024-12-15 RX ADMIN — LOSARTAN POTASSIUM 25 MG: 25 TABLET, FILM COATED ORAL at 08:49

## 2024-12-15 RX ADMIN — CARVEDILOL 12.5 MG: 12.5 TABLET, FILM COATED ORAL at 08:49

## 2024-12-15 RX ADMIN — INSULIN LISPRO 2 UNITS: 100 INJECTION, SOLUTION INTRAVENOUS; SUBCUTANEOUS at 12:04

## 2024-12-15 RX ADMIN — GABAPENTIN 100 MG: 100 CAPSULE ORAL at 16:47

## 2024-12-15 NOTE — CASE COMMUNICATION
Medication discrepancies or Major drug interactions: Awaiting these prescriptions to be filled: cefpodoxime, lubiprostone, and metronidazole. Doesnt have guafinesin or lidocaine. Not taking omeprazole  Abnormal clinical findings: Cog deficits impacting safety and judgement. Family reports pt sneaks into kitchen for treats as well as occasional medicine. Family reports pt previously ate multiple vitamin gummies. Family has installed came louise to monitor pt activity. PT advised placing all medications in secure, locked location.  This report is informational only, no response is needed  [facility] has Admitted your patient to Home Health service with the following disciplines: PT with referral to OT and MSW  Patient stated goals of care: Family goals are for pt to be more steady on her feet and have less falls  Potential barriers to goal achievement: cog deficits and  impaired judgement  Primary focus of home health care:Digestive  Anticipated visit pattern and next visit date: 1wk1, 2wk1, 1wk1, 2wk1  Thank you for allowing us to participate in the care of your patient.      Sandra VASQUEZT

## 2024-12-15 NOTE — ASSESSMENT & PLAN NOTE
Diagnosed rectosigmoid colitis on previous admission, felt to be due to stercoral colitis or ischemic colitis or possible infectious. ?  Late onset inflammatory bowel disease  She will be admitted due to persistent pain and worsening findings on CT  Will continue empiric antibiotics to complete her course  Consult GI.  Discussed with Dr. Parker regarding her case. She will be given MiraLAX bowel prep and mineral oil enemas    we will try to control her pain however her regimen will be limited to low-dose narcotics since she has rectal bleeding and  nonischemic cardiomyopathy   If patient with continued symptoms on bowel regiment or if she is having bright red blood per rectum can consider flexible sigmoidoscopy for evaluation of rectum and possible stercoral colitis if line with goals of care.  Patient states she is agreeable    Patient is a 75y old  Male who presents with a chief complaint of s/p cardiac arrest, hypoglycemia (17 Mar 2023 09:03)      INTERVAL HPI/OVERNIGHT EVENTS:    MEDICATIONS  (STANDING):  apixaban 5 milliGRAM(s) Oral every 12 hours  aspirin  chewable 81 milliGRAM(s) Oral daily  atorvastatin 20 milliGRAM(s) Oral at bedtime  bacitracin   Ointment 1 Application(s) Topical two times a day  budesonide 160 MICROgram(s)/formoterol 4.5 MICROgram(s) Inhaler 2 Puff(s) Inhalation two times a day  chlorhexidine 2% Cloths 1 Application(s) Topical <User Schedule>  cyanocobalamin 1000 MICROGram(s) Oral daily  dextrose 5%. 1000 milliLiter(s) (50 mL/Hr) IV Continuous <Continuous>  dextrose 5%. 1000 milliLiter(s) (100 mL/Hr) IV Continuous <Continuous>  dextrose 50% Injectable 25 Gram(s) IV Push once  dextrose 50% Injectable 12.5 Gram(s) IV Push once  dextrose 50% Injectable 25 Gram(s) IV Push once  glucagon  Injectable 1 milliGRAM(s) IntraMuscular once  insulin glargine Injectable (LANTUS) 10 Unit(s) SubCutaneous at bedtime  insulin lispro (ADMELOG) corrective regimen sliding scale   SubCutaneous three times a day before meals  insulin lispro (ADMELOG) corrective regimen sliding scale   SubCutaneous at bedtime  levETIRAcetam  Solution 1500 milliGRAM(s) Oral two times a day  metoprolol tartrate 25 milliGRAM(s) Oral two times a day  multivitamin/minerals/iron Oral Solution (CENTRUM) 15 milliLiter(s) Enteral Tube daily  pantoprazole   Suspension 40 milliGRAM(s) Oral before breakfast  polyethylene glycol 3350 17 Gram(s) Oral daily  senna 2 Tablet(s) Oral at bedtime  valproic  acid Syrup 1000 milliGRAM(s) Oral <User Schedule>  valproic  acid Syrup 750 milliGRAM(s) Oral <User Schedule>  vitamin A &amp; D Ointment 1 Application(s) Topical two times a day    MEDICATIONS  (PRN):  acetaminophen     Tablet .. 650 milliGRAM(s) Oral every 6 hours PRN Temp greater or equal to 38C (100.4F), Mild Pain (1 - 3)  albuterol/ipratropium for Nebulization 3 milliLiter(s) Nebulizer every 6 hours PRN Shortness of Breath and/or Wheezing  dextrose Oral Gel 15 Gram(s) Oral once PRN Blood Glucose LESS THAN 70 milliGRAM(s)/deciliter  melatonin 3 milliGRAM(s) Oral at bedtime PRN Insomnia      Allergies    No Known Allergies    Intolerances        Vital Signs Last 24 Hrs  T(C): 36.4 (17 Mar 2023 16:05), Max: 37 (17 Mar 2023 00:04)  T(F): 97.5 (17 Mar 2023 16:05), Max: 98.6 (17 Mar 2023 00:04)  HR: 86 (17 Mar 2023 16:05) (67 - 86)  BP: 136/84 (17 Mar 2023 16:05) (136/84 - 166/85)  BP(mean): --  RR: 18 (17 Mar 2023 16:05) (18 - 18)  SpO2: 97% (17 Mar 2023 16:05) (96% - 99%)    Parameters below as of 17 Mar 2023 16:05  Patient On (Oxygen Delivery Method): room air        PHYSICAL EXAM:  GENERAL: NAD, well-groomed, well-developed  HEAD:  Atraumatic, Normocephalic  EYES: EOMI, PERRLA, conjunctiva and sclera clear  ENMT: No tonsillar erythema, exudates, or enlargement; Moist mucous membranes, Good dentition, No lesions  NECK: Supple, No JVD, Normal thyroid  NERVOUS SYSTEM:  Alert & Oriented X3, Good concentration; Motor Strength 5/5 B/L upper and lower extremities; DTRs 2+ intact and symmetric  CHEST/LUNG: Clear to auscultation bilaterally; No rales, rhonchi, wheezing, or rubs  HEART: Regular rate and rhythm; No murmurs, rubs, or gallops  ABDOMEN: Soft, Nontender, Nondistended; Bowel sounds present  EXTREMITIES:  2+ Peripheral Pulses, No clubbing, cyanosis, or edema  LYMPH: No lymphadenopathy noted  SKIN: No rashes or lesions    LABS:                        12.0   8.70  )-----------( 304      ( 16 Mar 2023 07:38 )             37.1     03-17    143  |  110<H>  |  12  ----------------------------<  200<H>  4.1   |  28  |  0.82    Ca    8.7      17 Mar 2023 12:17  Phos  2.2     03-17  Mg     2.2     03-17    TPro  5.8<L>  /  Alb  2.1<L>  /  TBili  0.2  /  DBili  x   /  AST  30  /  ALT  23  /  AlkPhos  54  03-16        CAPILLARY BLOOD GLUCOSE      POCT Blood Glucose.: 228 mg/dL (17 Mar 2023 16:52)  POCT Blood Glucose.: 127 mg/dL (17 Mar 2023 11:00)  POCT Blood Glucose.: 164 mg/dL (17 Mar 2023 07:34)  POCT Blood Glucose.: 140 mg/dL (17 Mar 2023 02:12)  POCT Blood Glucose.: 87 mg/dL (16 Mar 2023 21:09)      RADIOLOGY & ADDITIONAL TESTS:    03-16-23 @ 07:01  -  03-17-23 @ 07:00  --------------------------------------------------------  IN:    Oral Fluid: 600 mL  Total IN: 600 mL    OUT:    Voided (mL): 120 mL  Total OUT: 120 mL    Total NET: 480 mL       74 yo male w/ history of COPD, CAD sp PCI w/ stent, CABG 2014, HF w/ PeF, 2nd degree heart block, s/p PPM, HTN, DM, CKD Stage 3, CVA- lacunar infarcts admitted to ICU originally after he was brought to the ED for unresponsiveness and had a cardiac arrest in the ED on 11/14. ACLS for PEA arrest and ROSC returned after 5 minutes. Cardiac arrest possibly secondary to severe hypoglycemia from eating less and not tracking blood sugar carefully. Pt had tonic clonic seizures shortly post arrest, likely due to hypoglycemia vs anoxic encephalopathy. Hospital course complicated by prolonged hypoxemic respiratory failure. Difficult extubation requiring s/p trach and peg on 12/5, ATN & shock liver on admission, left upper extremity/left subclavian DVT on 12/11 and placed on Eliquis, multiple infections (Enterococcal faecalis cellulitis on 12/12, infected left hand hematoma status post I&D by plastics on 12/13, tracheobronchitis secondary to citrobacter and fever of 102 on 1/19. He is lying in bed in NAD.    MEDICATIONS  (STANDING):  apixaban 5 milliGRAM(s) Oral every 12 hours  aspirin  chewable 81 milliGRAM(s) Oral daily  atorvastatin 20 milliGRAM(s) Oral at bedtime  bacitracin   Ointment 1 Application(s) Topical two times a day  budesonide 160 MICROgram(s)/formoterol 4.5 MICROgram(s) Inhaler 2 Puff(s) Inhalation two times a day  chlorhexidine 2% Cloths 1 Application(s) Topical <User Schedule>  cyanocobalamin 1000 MICROGram(s) Oral daily  dextrose 5%. 1000 milliLiter(s) (50 mL/Hr) IV Continuous <Continuous>  dextrose 5%. 1000 milliLiter(s) (100 mL/Hr) IV Continuous <Continuous>  dextrose 50% Injectable 25 Gram(s) IV Push once  dextrose 50% Injectable 12.5 Gram(s) IV Push once  dextrose 50% Injectable 25 Gram(s) IV Push once  glucagon  Injectable 1 milliGRAM(s) IntraMuscular once  insulin glargine Injectable (LANTUS) 10 Unit(s) SubCutaneous at bedtime  insulin lispro (ADMELOG) corrective regimen sliding scale   SubCutaneous three times a day before meals  insulin lispro (ADMELOG) corrective regimen sliding scale   SubCutaneous at bedtime  levETIRAcetam  Solution 1500 milliGRAM(s) Oral two times a day  metoprolol tartrate 25 milliGRAM(s) Oral two times a day  multivitamin/minerals/iron Oral Solution (CENTRUM) 15 milliLiter(s) Enteral Tube daily  pantoprazole   Suspension 40 milliGRAM(s) Oral before breakfast  polyethylene glycol 3350 17 Gram(s) Oral daily  senna 2 Tablet(s) Oral at bedtime  valproic  acid Syrup 1000 milliGRAM(s) Oral <User Schedule>  valproic  acid Syrup 750 milliGRAM(s) Oral <User Schedule>  vitamin A &amp; D Ointment 1 Application(s) Topical two times a day    MEDICATIONS  (PRN):  acetaminophen     Tablet .. 650 milliGRAM(s) Oral every 6 hours PRN Temp greater or equal to 38C (100.4F), Mild Pain (1 - 3)  albuterol/ipratropium for Nebulization 3 milliLiter(s) Nebulizer every 6 hours PRN Shortness of Breath and/or Wheezing  dextrose Oral Gel 15 Gram(s) Oral once PRN Blood Glucose LESS THAN 70 milliGRAM(s)/deciliter  melatonin 3 milliGRAM(s) Oral at bedtime PRN Insomnia      Allergies    No Known Allergies    Intolerances        Vital Signs Last 24 Hrs  T(C): 36.4 (17 Mar 2023 16:05), Max: 37 (17 Mar 2023 00:04)  T(F): 97.5 (17 Mar 2023 16:05), Max: 98.6 (17 Mar 2023 00:04)  HR: 86 (17 Mar 2023 16:05) (67 - 86)  BP: 136/84 (17 Mar 2023 16:05) (136/84 - 166/85)  BP(mean): --  RR: 18 (17 Mar 2023 16:05) (18 - 18)  SpO2: 97% (17 Mar 2023 16:05) (96% - 99%)    Parameters below as of 17 Mar 2023 16:05  Patient On (Oxygen Delivery Method): room air        PHYSICAL EXAM:  GENERAL: NAD, well-groomed, well-developed  HEAD:  Atraumatic, Normocephalic  EYES: EOMI, PERRLA   NECK: Supple   NERVOUS SYSTEM: Alert    CHEST/LUNG: Clear to auscultation bilaterally; No rales, rhonchi, wheezing, or rubs  HEART: Regular rate and rhythm; No murmurs, rubs, or gallops  ABDOMEN: Soft, Nontender, Nondistended; Bowel sounds present  EXTREMITIES: No clubbing, cyanosis, or edema       LABS:                        12.0   8.70  )-----------( 304      ( 16 Mar 2023 07:38 )             37.1     03-17    143  |  110<H>  |  12  ----------------------------<  200<H>  4.1   |  28  |  0.82    Ca    8.7      17 Mar 2023 12:17  Phos  2.2     03-17  Mg     2.2     03-17    TPro  5.8<L>  /  Alb  2.1<L>  /  TBili  0.2  /  DBili  x   /  AST  30  /  ALT  23  /  AlkPhos  54  03-16        CAPILLARY BLOOD GLUCOSE      POCT Blood Glucose.: 228 mg/dL (17 Mar 2023 16:52)  POCT Blood Glucose.: 127 mg/dL (17 Mar 2023 11:00)  POCT Blood Glucose.: 164 mg/dL (17 Mar 2023 07:34)  POCT Blood Glucose.: 140 mg/dL (17 Mar 2023 02:12)  POCT Blood Glucose.: 87 mg/dL (16 Mar 2023 21:09)      RADIOLOGY & ADDITIONAL TESTS:    03-16-23 @ 07:01  -  03-17-23 @ 07:00  --------------------------------------------------------  IN:    Oral Fluid: 600 mL  Total IN: 600 mL    OUT:    Voided (mL): 120 mL  Total OUT: 120 mL    Total NET: 480 mL

## 2024-12-15 NOTE — ASSESSMENT & PLAN NOTE
Suspect this is referred pain from her proctocolitis.  Her bladder looked abnormal on CT due to under distention  Urine cultures have been obtained, negative.  We will continue her cefpodoxime and metronidazole, ending 12/15/24.

## 2024-12-15 NOTE — UTILIZATION REVIEW
NOTIFICATION OF INPATIENT ADMISSION   AUTHORIZATION REQUEST   SERVICING FACILITY:   [place] [address]  Tax ID: [id number]  NPI: [id number] ATTENDING PROVIDER:  Attending Name and NPI#: Janice Chahal Do [[id number]]  Address: [address] [place] [address]  Phone: [phone]     ADMISSION INFORMATION:  Place of Service: Inpatient [facility]  Place of Service Code: 21  Inpatient Admission Date/Time: 12/14/24  5:10 PM  Discharge Date/Time: No discharge date for patient encounter.  Admitting Diagnosis Code/Description:  Proctocolitis [K52.9]  Urinary retention [R33.9]  Abdominal pain [R10.9]  Constipation [K59.00]     UTILIZATION REVIEW CONTACT:  Yenni Layne, Utilization   Network Utilization Review Department  Phone: [phone]  Fax [phone]  Email: [email or web address]  Contact for approvals/pending authorizations, clinical reviews, and discharge.     PHYSICIAN ADVISORY SERVICES:  Medical Necessity Denial & [id number] Review  Phone: [phone]  Fax: [phone]  Email: [email or web address]     DISCHARGE SUPPORT TEAM:  For Patients Discharge Needs & Updates  Phone: [phone] opt. 2 Fax: [phone]  Email: [email or web address]

## 2024-12-15 NOTE — CASE MANAGEMENT
Case Management Assessment & Discharge Planning Note    Patient name Marci Arias  Location [facility] 5 /E5 -* MRN [id number]  : 1945 Date 12/15/2024       Current Admission Date: 2024  Current Admission Diagnosis:Proctocolitis   Patient Active Problem List    Diagnosis Date Noted Date Diagnosed    Acute urinary retention 2024     Dysuria 2024     Transaminitis 2024     Blood in stool 2024     Other constipation 2024     Elevated LFTs 2024     Proctocolitis 12/10/2024     Late onset Alzheimer's dementia with other behavioral disturbance, unspecified dementia severity (HCC) 2024     Hypertensive heart disease with heart failure ([facility]) 2024     Unintended weight loss 2024     Anemia 2023     PLMD (periodic limb movement disorder) 2023     Abnormal skin growth 2023     Nonischemic cardiomyopathy (HCC) 2023     ACC/AHA stage C heart failure with reduced ejection fraction (HCC) 2022     Hyperlipidemia      Gait instability 2021     Polypharmacy 2021     Frequent falls 2021     Dementia with behavioral disturbance 2021     Near syncope 2021     Chemotherapy-induced peripheral neuropathy ([facility]) 10/13/2020     Obstructive sleep apnea      Arthritis 2020     Allergic rhinitis 10/08/2019     Status post chemotherapy 2019     Current mild episode of major depressive disorder without prior episode ([facility]) 2019     Palliative care patient 2019     Anxiety 2018     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, with long-term current use of insulin (HCC) 10/31/2018     Essential hypertension 10/31/2018     Gastroesophageal reflux disease 10/31/2018     Xerosis of skin 10/03/2016       LOS (days): 1  Geometric Mean LOS (GMLOS) (days):   Days to GMLOS:      OBJECTIVE:  PATIENT READMITTED TO HOSPITAL  Risk of Unplanned Readmission Score: 20.05         Current admission status: Inpatient       Preferred Pharmacy:   [id number] - [place] - [address]  [address]  [place] [address]  Phone: [phone] Fax: [phone]    Primary Care Provider: MOIRA Gan    Primary Insurance: [facility]  Secondary Insurance:     ASSESSMENT:  Active Health Care Proxies       Clair Sanz Health Care Representative - Grandchild   Primary Phone: [phone] (Mobile)  Home Phone: [phone]                                Patient Information  Admitted from:: Home  Mental Status: Alert, Confused  During Assessment patient was accompanied by: Not accompanied during assessment  Assessment information provided by:: Other - please comment (Clair Vasquez)  Primary Caregiver: Family  Caregiver's Name:: Clair Sanz (Grandchild)  [phone] (Mobile)  Caregiver's Relationship to Patient:: Family Member  Caregiver's Telephone Number:: Clair Sanz (Grandchild)  [phone] (Mobile)  Support Systems: Family members, Private Caregivers  County of Residence: [place]  What city do you live in?: [place]  Home entry access options. Select all that apply.: Stairs  Number of steps to enter home.: 5  Do the steps have railings?: Yes  Type of Current Residence: [address] [place] home  Upon entering residence, is there a bedroom on the main floor (no further steps)?: No  A bedroom is located on the following floor levels of residence (select all that apply):: 2nd Floor  Upon entering residence, is there a bathroom on the main floor (no further steps)?: No  Indicate which floors of current residence have a bathroom (select all the apply):: 2nd Floor  Number of steps to 2nd floor from main floor: One Flight  Living Arrangements: Lives w/ Family members  Is patient a ?: No    Activities of Daily Living Prior to Admission  Functional Status:  Independent  Completes ADLs independently?: No  Level of ADL dependence: Assistance  Ambulates independently?: Yes  Does patient use assisted devices?: Yes  Assisted Devices (DME) used: Walker, Straight Cane, Bedside Commode  Does patient currently own DME?: Yes  What DME does the patient currently own?: Bedside Commode, Straight Cane, Walker  Does patient have a history of Outpatient Therapy (PT/OT)?: No  Does the patient have a history of Short-Term Rehab?: No  Does patient have a history of HHC?: Yes ( HHC)  Does patient currently have HHC?: Yes    Current Home Health Care  Type of Current Home Care Services: Nurse visit  Home Health Agency Name:: [facility]  Current Home Health Follow-Up Provider:: PCP    Patient Information Continued  Does patient have prescription coverage?: Yes  Does patient receive dialysis treatments?: No         Means of Transportation  Means of Transport to Appts:: Family transport          DISCHARGE DETAILS:    Discharge planning discussed with:: Clair  [place] of Choice: Yes     CM contacted family/caregiver?: Yes             Contacts  Patient Contacts: Clair  Relationship to Patient:: Family  Contact Method: Phone  Phone Number: [phone] (Mobile)  Reason/Outcome: Continuity of Care, Emergency Contact, Discharge Planning    Requested Home Health Care         Is the patient interested in HHC at discharge?: Yes  Home Health Discipline requested:: Nursing  Home Health Agency Name:: [facility]  Home Health Follow-Up Provider:: MARY  Home Health Services Needed:: Evaluate Functional Status and Safety, Diabetes Management  Homebound Criteria Met:: Requires the Assistance of Another Person for Safe Ambulation or to Leave the Home, Uses an Assist Device (i.e. cane, walker, etc)  Supporting Clincal Findings:: Cognitive Deficit Requiring the Assistance of Others, Fatigues Easliy in Short Distances, Limited Endurance                                                                 Additional Comments: Initial CM assessment completed with pt family member, Clair. Pt resides with Clair. Pt is approved 81 hrs per week of caregiver services through [employer]. M-F c/g 9am-4pm, Clair takes over when she gets home from work at 4pm through overnight. Clair provides transportation for pt. No additional questions for CM at this time. SIMÓN referral to [facility] to be sent via [facility].

## 2024-12-15 NOTE — ASSESSMENT & PLAN NOTE
79 y.o. female with history of Alzheimer's dementia, type 2 diabetes mellitus, diffuse large B-cell lymphoma, GERD, hypertension, hyperlipidemia and thyroid cancer who presented on 12/14 due to abdominal pain and difficulty urinating found to have continued stool burden on CT likely in the setting of chronic constipation due to dementia and decreased mobility leading to GI consult.    - CT abdomen showing continued stool burden, now appears more so affecting right colon  - Received mineral oil enemas overnight  - Continue MiraLAX bowel prep for bowel cleansing  - Out of bed and ambulating as able  - Avoid opioids and anticholinergics  - Discussed with granddaughter Clair patient lives with.  She stated patient was taking MiraLAX but continued to have significant abdominal pain  - If patient with continued symptoms on bowel regiment or if she is having bright red blood per rectum can consider flexible sigmoidoscopy for evaluation of rectum and possible stercoral colitis if line with goals of care.  Patient states she is agreeable  - Will need to ensure patient has good bowel regiment upon discharge

## 2024-12-15 NOTE — PROGRESS NOTES
Progress Note - Hospitalist   Name: Marci Arias 79 y.o. female I MRN: [id number]  Unit/Bed#: E5 -01 I Date of Admission: 12/14/2024   Date of Service: 12/15/2024 I Hospital Day: 1    Assessment & Plan  Proctocolitis  Diagnosed rectosigmoid colitis on previous admission, felt to be due to stercoral colitis or ischemic colitis or possible infectious. ?  Late onset inflammatory bowel disease  She will be admitted due to persistent pain and worsening findings on CT  Will continue empiric antibiotics to complete her course  Consult GI.  Discussed with Dr. Parker regarding her case. She will be given MiraLAX bowel prep and mineral oil enemas    we will try to control her pain however her regimen will be limited to low-dose narcotics since she has rectal bleeding and  nonischemic cardiomyopathy   If patient with continued symptoms on bowel regiment or if she is having bright red blood per rectum can consider flexible sigmoidoscopy for evaluation of rectum and possible stercoral colitis if line with goals of care.  Patient states she is agreeable   Dysuria  Suspect this is referred pain from her proctocolitis.  Her bladder looked abnormal on CT due to under distention  Urine cultures have been obtained, negative.  We will continue her cefpodoxime and metronidazole, ending 12/15/24.  Acute urinary retention  Related to proctocolitis  Status post Yen catheter placement  UA negative for infection  Nonischemic cardiomyopathy (HCC)  Stable.   Euvolemic on exam.  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  Continue Lipitor 40 mg daily   Late onset Alzheimer's dementia with other behavioral disturbance, unspecified dementia severity (HCC)  Continue Namenda  Watch for confusion/delirium in the hospital  Fall precaution  Needs to schedule senior care visit, has referral from PCP    VTE Pharmacologic Prophylaxis: VTE Score: 4 Moderate Risk (Score 3-4) - Pharmacological DVT Prophylaxis Ordered: heparin.    Mobility:   Basic Mobility Inpatient Raw Score: 18  JH-HLM Goal: 6: Walk 10 steps or more  JH-HLM Achieved: 7: Walk 25 feet or more  JH-HLM Goal achieved. Continue to encourage appropriate mobility.    Patient Centered Rounds: I performed bedside rounds with nursing staff today.   Discussions with Specialists or Other Care Team Provider: nursing, GI.    Education and Discussions with Family / Patient: Updated  (cristhian) at bedside.    Current Length of Stay: 1 day(s)  Current Patient Status: Inpatient   Certification Statement: The patient will continue to require additional inpatient hospital stay due to proctocolitis, evaluate once bowel prep has worked. May need sigmoidscope or lower endo.   Discharge Plan: Anticipate discharge in 24-48 hrs to home.    Code Status: Level 1 - Full Code    Subjective   Patient was seen and examined at bedside this morning.  Patient offers no acute complaints at this time besides some mild abdominal pain, diffuse and vague.  He was completing her breakfast and full.  Finished bowel prep yesterday, no bowel movement as of yet.  Evaluated by GI, recommend continuing bowel prep and if no improvement in abdominal symptoms we will do further evaluation with scope.    Finished abx today.    Objective :  Temp:  [97.3 °F (36.3 °C)-98 °F (36.7 °C)] 98 °F (36.7 °C)  HR:  [67-94] 67  BP: (118-165)/(61-97) 118/61  Resp:  [16-18] 16  SpO2:  [96 %-98 %] 96 %  O2 Device: None (Room air)    Body mass index is 24.29 kg/m².      Input and Output Summary (last 24 hours):     Intake/Output Summary (Last 24 hours) at 12/15/2024 1331  Last data filed at 12/15/2024 0901  Gross per 24 hour   Intake 500 ml   Output 3650 ml   Net -3150 ml       Physical Exam  Vitals and nursing note reviewed.   Constitutional:       Appearance: Normal appearance. She is not ill-appearing.   HENT:      Head: Normocephalic and atraumatic.      Nose: No congestion or rhinorrhea.   Eyes:      General: No scleral icterus.  Cardiovascular:      Rate and Rhythm: Normal rate and regular rhythm.      Pulses: Normal pulses.      Heart sounds: Normal heart sounds. No murmur heard.  Pulmonary:      Breath sounds: No wheezing or rhonchi.   Abdominal:      Palpations: Abdomen is soft.      Tenderness: There is abdominal tenderness. There is no guarding.      Comments: Direct tenderness in hypogastric area   Musculoskeletal:         General: No swelling or tenderness.      Right lower leg: No edema.      Left lower leg: No edema.   Skin:     General: Skin is warm and dry.   Neurological:      Mental Status: She is alert.      Comments: Awake alert cooperative   Psychiatric:         Mood and Affect: Mood normal.         Behavior: Behavior normal.           Lines/Drains:  Lines/Drains/Airways       Active Status       Name Placement date Placement time Site Days    Urethral Catheter Non-latex 12/14/24  1250  Non-latex  1                  Urinary Catheter:  Goal for removal: Voiding trial when ambulation improves                 Lab Results: I have reviewed the following results:   Results from last 7 days   Lab Units 12/15/24  0536   WBC Thousand/uL 11.94*   HEMOGLOBIN g/dL 11.6   HEMATOCRIT % 35.6   PLATELETS Thousands/uL 297   SEGS PCT % 51   LYMPHO PCT % 37   MONO PCT % 8   EOS PCT % 2     Results from last 7 days   Lab Units 12/15/24  0531 12/14/24  1301   SODIUM mmol/L 138 137   POTASSIUM mmol/L 3.4* 4.0   CHLORIDE mmol/L 104 104   CO2 mmol/L 29 28   BUN mg/dL 11 14    CREATININE mg/dL 0.71 1.11   ANION GAP mmol/L 5 5   CALCIUM mg/dL 8.8 8.8   ALBUMIN g/dL  --  3.5   TOTAL BILIRUBIN mg/dL  --  0.42   ALK PHOS U/L  --  80   ALT U/L  --  121*   AST U/L  --  102*   GLUCOSE RANDOM mg/dL 92 275*         Results from last 7 days   Lab Units 12/15/24  1053 12/15/24  0701 12/12/24  1055 12/12/24  0750 12/11/24  2129 12/11/24  2042 12/11/24  1559 12/11/24  1130 12/11/24  0721   POC GLUCOSE mg/dl 244* 94 275* 137 236* 288* 146* 191* 359*         Results from last 7 days   Lab Units 12/14/24  1538 12/11/24  0452   LACTIC ACID mmol/L 0.7  --    PROCALCITONIN ng/ml  --  1.00*       Recent Cultures (last 7 days):   Results from last 7 days   Lab Units 12/10/24  2157 12/10/24  2156   BLOOD CULTURE  Staphylococcus hominis*  Staphylococcus epidermidis*  Staphylococcus coagulase negative*  Gram-positive dion* No Growth After 4 Days.   GRAM STAIN RESULT  Gram positive cocci in clusters*  Gram positive rods*  --        Imaging Results Review: No pertinent imaging studies reviewed.  Other Study Results Review: No additional pertinent studies reviewed.    Last 24 Hours Medication List:     Current Facility-Administered Medications:     acetaminophen (TYLENOL) tablet 650 mg, Q6H PRN    albuterol (PROVENTIL HFA,VENTOLIN HFA) inhaler 1 puff, Q4H PRN    ALPRAZolam (XANAX) tablet 0.25 mg, HS PRN    aspirin chewable tablet 81 mg, Daily    atorvastatin (LIPITOR) tablet 40 mg, Daily With Dinner    carvedilol (COREG) tablet 12.5 mg, BID With Meals    gabapentin (NEURONTIN) capsule 100 mg, TID    heparin (porcine) subcutaneous injection 5,000 Units, Q8H CAROLINE **AND** [CANCELED] Platelet count, Once    insulin glargine (LANTUS) subcutaneous injection 15 Units 0.15 mL, HS    insulin lispro (HumALOG/ADMELOG) 100 units/mL subcutaneous injection 1-5 Units, TID AC **AND** Fingerstick Glucose (POCT), TID AC    losartan (COZAAR) tablet 25 mg, Daily    memantine (NAMENDA) tablet 10 mg, BID    metroNIDAZOLE (FLAGYL)  tablet 500 mg, Q8H CAROLINE    mirtazapine (REMERON) tablet 15 mg, HS    pantoprazole (PROTONIX) EC tablet 40 mg, Early Morning    potassium chloride (Klor-Con M20) CR tablet 40 mEq, BID    Administrative Statements   Today, Patient Was Seen By: Janice Chahal DO      **Please Note: This note may have been constructed using a voice recognition system.**

## 2024-12-15 NOTE — ASSESSMENT & PLAN NOTE
Lab Results   Component Value Date    HGBA1C 7.3 (H) 09/28/2024       Recent Labs     12/12/24  0750 12/12/24  1055 12/15/24  0701   POCGLU 137 275* 94       Blood Sugar Average: Last 72 hrs:  (P) 94

## 2024-12-15 NOTE — QUICK NOTE
Called and left caregiver/granddaughter a voicemail regarding current status of the patient.  Case management did reach out to me regarding a conversation she just had with the patient's caregiver.  Had further questions regarding patient's memory.  Of note did see on upon chart review that PCP did refer the patient approximately 2-3 months ago to Senior care.  This will likely need to be followed up outpatient as it is a chronic problem at this point.    Patient's grandmother will be coming in in the mid afternoon, will speak to her in person about this.    Janice Chahal DO  Family Medicine  11:18 AM

## 2024-12-15 NOTE — ASSESSMENT & PLAN NOTE
Continue Namenda  Watch for confusion/delirium in the hospital  Fall precaution  Needs to schedule senior care visit, has referral from PCP

## 2024-12-15 NOTE — CONSULTS
Consultation - Gastroenterology   Name: Marci Arias 79 y.o. female I MRN: [id number]  Unit/Bed#: E5 -01 I Date of Admission: 12/14/2024   Date of Service: 12/15/2024 I Hospital Day: 1       Inpatient consult to gastroenterology     Date/Time  12/15/2024 7:37 AM     Performed by  Marv Parker MD   Authorized by  Quinn Rodriguez MD           Physician Requesting Evaluation: Janice Chahal DO   Reason for Evaluation / Principal Problem: Proctocolitis    Assessment & Plan  Proctocolitis  79 y.o. female with history of Alzheimer's dementia, type 2 diabetes mellitus, diffuse large B-cell lymphoma, GERD, hypertension, hyperlipidemia and thyroid cancer who presented on 12/14 due to abdominal pain and difficulty urinating found to have continued stool burden on CT likely in the setting of chronic constipation due to dementia and decreased mobility leading to GI consult.    - CT abdomen showing continued stool burden, now appears more so affecting right colon  - Received mineral oil enemas overnight  - Continue MiraLAX bowel prep for bowel cleansing  - Out of bed and ambulating as able  - Avoid opioids and anticholinergics  - Discussed with granddaughter Clair patient lives with.  She stated patient was taking MiraLAX but continued to have significant abdominal pain  - If patient with continued symptoms on bowel regiment or if she is having bright red blood per rectum can consider flexible sigmoidoscopy for evaluation of rectum and possible stercoral colitis if line with goals of care.  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     History of Present Illness   HPI:  Marci Arias is a 79 y.o. female with history of Alzheimer's dementia, type 2 diabetes mellitus, diffuse large B-cell lymphoma, GERD, hypertension, hyperlipidemia and thyroid cancer who presented on 12/14 due to abdominal pain and difficulty urinating.  She was recently discharged on 12/12 after presenting for similar complaints found to have proctocolitis.  She was disimpacted in the ED during recent hospitalization.  She completed a short course of antibiotics given leukocytosis.  Also found to have elevated liver enzymes as high as ,  with alkaline phosphatase 92, total bilirubin 0.61 on 12/11.  She reports at home she did not start taking any laxatives.  It appears she has MiraLAX and Amitiza in her med list.  Denies nausea, vomiting, melena.  She states sometimes there is blood.    EGD/colonoscopy 5/8/2023 showing mild gastritis and subcentimeter cecal polyp with plan to defer further screening given age    Review of Systems   Constitutional:  Negative for fever.   Cardiovascular:  Negative for leg swelling.   Gastrointestinal:  Negative for abdominal pain, blood in stool, constipation, diarrhea, nausea, rectal pain and vomiting.   All other systems reviewed and are negative.    I have reviewed the patient's PMH, PSH, Social History, Family History, Meds, and Allergies    Objective :  Temp:  [97.3 °F (36.3 °C)-98.2 °F (36.8 °C)] 98 °F (36.7 °C)  HR:  [67-94] 67  BP: (118-165)/(61-97) 118/61  Resp:  [16-20] 16  SpO2:  [96 %-99 %] 96 %  O2 Device: None (Room air)    Physical Exam  Vitals and nursing note reviewed.   Constitutional:       General: She is not in acute distress.     Appearance: She is well-developed and normal weight.   Abdominal:      General:  Abdomen is flat. There is no distension.      Palpations: Abdomen is soft.      Tenderness: There is no abdominal tenderness.   Musculoskeletal:         General: No swelling.   Neurological:      Mental Status: She is alert.           Lab Results: I have reviewed the following results:none    Imaging Results Review: No pertinent imaging studies reviewed.  Other Study Results Review: No additional pertinent studies reviewed.    Administrative Statements   I have spent a total time of 32 minutes in caring for this patient on the day of the visit/encounter including Diagnostic results, Prognosis, Risks and benefits of tx options, Instructions for management, and Patient and family education.

## 2024-12-15 NOTE — PLAN OF CARE
Problem: Prexisting or High Potential for Compromised Skin Integrity  Goal: Skin integrity is maintained or improved  Description: INTERVENTIONS:  - Identify patients at risk for skin breakdown  - Assess and monitor skin integrity  - Assess and monitor nutrition and hydration status  - Monitor labs   - Assess for incontinence   - Turn and reposition patient  - Assist with mobility/ambulation  - Relieve pressure over bony prominences  - Avoid friction and shearing  - Provide appropriate hygiene as needed including keeping skin clean and dry  - Evaluate need for skin moisturizer/barrier cream  - Collaborate with interdisciplinary team   - Patient/family teaching  - Consider wound care consult   Outcome: Progressing     Problem: GASTROINTESTINAL - ADULT  Goal: Minimal or absence of nausea and/or vomiting  Description: INTERVENTIONS:  - Administer IV fluids if ordered to ensure adequate hydration  - Maintain NPO status until nausea and vomiting are resolved  - Nasogastric tube if ordered  - Administer ordered antiemetic medications as needed  - Provide nonpharmacologic comfort measures as appropriate  - Advance diet as tolerated, if ordered  - Consider nutrition services referral to assist patient with adequate nutrition and appropriate food choices  Outcome: Progressing  Goal: Maintains or returns to baseline bowel function  Description: INTERVENTIONS:  - Assess bowel function  - Encourage oral fluids to ensure adequate hydration  - Administer IV fluids if ordered to ensure adequate hydration  - Administer ordered medications as needed  - Encourage mobilization and activity  - Consider nutritional services referral to assist patient with adequate nutrition and appropriate food choices  Outcome: Progressing  Goal: Maintains adequate nutritional intake  Description: INTERVENTIONS:  - Monitor percentage of each meal consumed  - Identify factors contributing to decreased intake, treat as appropriate  - Assist with meals  as needed  - Monitor I&O, weight, and lab values if indicated  - Obtain nutrition services referral as needed  Outcome: Progressing  Goal: Establish and maintain optimal ostomy function  Description: INTERVENTIONS:  - Assess bowel function  - Encourage oral fluids to ensure adequate hydration  - Administer IV fluids if ordered to ensure adequate hydration   - Administer ordered medications as needed  - Encourage mobilization and activity  - Nutrition services referral to assist patient with appropriate food choices  - Assess stoma site  - Consider wound care consult   Outcome: Progressing  Goal: Oral mucous membranes remain intact  Description: INTERVENTIONS  - Assess oral mucosa and hygiene practices  - Implement preventative oral hygiene regimen  - Implement oral medicated treatments as ordered  - Initiate Nutrition services referral as needed  Outcome: Progressing     Problem: PAIN - ADULT  Goal: Verbalizes/displays adequate comfort level or baseline comfort level  Description: Interventions:  - Encourage patient to monitor pain and request assistance  - Assess pain using appropriate pain scale  - Administer analgesics based on type and severity of pain and evaluate response  - Implement non-pharmacological measures as appropriate and evaluate response  - Consider cultural and social influences on pain and pain management  - Notify physician/advanced practitioner if interventions unsuccessful or patient reports new pain  Outcome: Progressing

## 2024-12-16 ENCOUNTER — APPOINTMENT (INPATIENT)
Dept: RADIOLOGY | Facility: HOSPITAL | Age: 79
End: 2024-12-16
Payer: COMMERCIAL

## 2024-12-16 PROBLEM — B95.7 BACTEREMIA DUE TO COAGULASE-NEGATIVE STAPHYLOCOCCUS: Status: ACTIVE | Noted: 2024-12-16

## 2024-12-16 PROBLEM — R78.81 BACTEREMIA DUE TO COAGULASE-NEGATIVE STAPHYLOCOCCUS: Status: ACTIVE | Noted: 2024-12-16

## 2024-12-16 LAB
BACTERIA BLD CULT: NORMAL
GLUCOSE SERPL-MCNC: 140 MG/DL (ref 65–140)
GLUCOSE SERPL-MCNC: 174 MG/DL (ref 65–140)
GLUCOSE SERPL-MCNC: 215 MG/DL (ref 65–140)
GLUCOSE SERPL-MCNC: 285 MG/DL (ref 65–140)
TSH SERPL DL<=0.05 MIU/L-ACNC: 1.12 UIU/ML (ref 0.45–4.5)

## 2024-12-16 PROCEDURE — 99233 SBSQ HOSP IP/OBS HIGH 50: CPT | Performed by: INTERNAL MEDICINE

## 2024-12-16 PROCEDURE — 82948 REAGENT STRIP/BLOOD GLUCOSE: CPT

## 2024-12-16 PROCEDURE — 74018 RADEX ABDOMEN 1 VIEW: CPT

## 2024-12-16 PROCEDURE — 99232 SBSQ HOSP IP/OBS MODERATE 35: CPT | Performed by: INTERNAL MEDICINE

## 2024-12-16 RX ORDER — ASPIRIN 81 MG
81 TABLET,CHEWABLE ORAL DAILY
Qty: 90 TABLET | Refills: 0 | Status: SHIPPED | OUTPATIENT
Start: 2024-12-16

## 2024-12-16 RX ORDER — FUROSEMIDE 20 MG/1
20 TABLET ORAL DAILY
Status: DISCONTINUED | OUTPATIENT
Start: 2024-12-17 | End: 2024-12-18 | Stop reason: HOSPADM

## 2024-12-16 RX ORDER — OLANZAPINE 10 MG/2ML
2.5 INJECTION, POWDER, FOR SOLUTION INTRAMUSCULAR EVERY 4 HOURS PRN
Status: DISCONTINUED | OUTPATIENT
Start: 2024-12-16 | End: 2024-12-18 | Stop reason: HOSPADM

## 2024-12-16 RX ADMIN — PANTOPRAZOLE SODIUM 40 MG: 40 TABLET, DELAYED RELEASE ORAL at 06:35

## 2024-12-16 RX ADMIN — ATORVASTATIN CALCIUM 40 MG: 40 TABLET, FILM COATED ORAL at 15:48

## 2024-12-16 RX ADMIN — HEPARIN SODIUM 5000 UNITS: 5000 INJECTION INTRAVENOUS; SUBCUTANEOUS at 15:48

## 2024-12-16 RX ADMIN — OLANZAPINE 2.5 MG: 10 INJECTION, POWDER, FOR SOLUTION INTRAMUSCULAR at 11:53

## 2024-12-16 RX ADMIN — POLYETHYLENE GLYCOL 3350, SODIUM SULFATE ANHYDROUS, SODIUM BICARBONATE, SODIUM CHLORIDE, POTASSIUM CHLORIDE 4000 ML: 236; 22.74; 6.74; 5.86; 2.97 POWDER, FOR SOLUTION ORAL at 15:48

## 2024-12-16 RX ADMIN — MEMANTINE 10 MG: 5 TABLET ORAL at 21:20

## 2024-12-16 RX ADMIN — HEPARIN SODIUM 5000 UNITS: 5000 INJECTION INTRAVENOUS; SUBCUTANEOUS at 06:35

## 2024-12-16 RX ADMIN — HEPARIN SODIUM 5000 UNITS: 5000 INJECTION INTRAVENOUS; SUBCUTANEOUS at 21:28

## 2024-12-16 RX ADMIN — ASPIRIN 81 MG CHEWABLE TABLET 81 MG: 81 TABLET CHEWABLE at 08:47

## 2024-12-16 RX ADMIN — INSULIN LISPRO 1 UNITS: 100 INJECTION, SOLUTION INTRAVENOUS; SUBCUTANEOUS at 15:52

## 2024-12-16 RX ADMIN — CARVEDILOL 12.5 MG: 12.5 TABLET, FILM COATED ORAL at 08:47

## 2024-12-16 RX ADMIN — GABAPENTIN 100 MG: 100 CAPSULE ORAL at 08:48

## 2024-12-16 RX ADMIN — INSULIN GLARGINE 15 UNITS: 100 INJECTION, SOLUTION SUBCUTANEOUS at 21:20

## 2024-12-16 RX ADMIN — GABAPENTIN 100 MG: 100 CAPSULE ORAL at 21:20

## 2024-12-16 RX ADMIN — MELATONIN 3 MG: 3 TAB ORAL at 21:20

## 2024-12-16 RX ADMIN — MEMANTINE 10 MG: 5 TABLET ORAL at 08:47

## 2024-12-16 RX ADMIN — ACETAMINOPHEN 650 MG: 325 TABLET, FILM COATED ORAL at 10:50

## 2024-12-16 RX ADMIN — INSULIN LISPRO 2 UNITS: 100 INJECTION, SOLUTION INTRAVENOUS; SUBCUTANEOUS at 11:54

## 2024-12-16 RX ADMIN — LOSARTAN POTASSIUM 25 MG: 25 TABLET, FILM COATED ORAL at 08:47

## 2024-12-16 RX ADMIN — MIRTAZAPINE 15 MG: 15 TABLET, FILM COATED ORAL at 21:20

## 2024-12-16 RX ADMIN — GABAPENTIN 100 MG: 100 CAPSULE ORAL at 15:48

## 2024-12-16 RX ADMIN — CARVEDILOL 12.5 MG: 12.5 TABLET, FILM COATED ORAL at 15:48

## 2024-12-16 NOTE — PROGRESS NOTES
Progress Note - Gastroenterology   Name: Marci Arias 79 y.o. female I MRN: [id number]  Unit/Bed#: E5 -01 I Date of Admission: 12/14/2024   Date of Service: 12/16/2024 I Hospital Day: 2    Assessment & Plan  Proctocolitis  79 y.o. female with history of Alzheimer's dementia, type 2 diabetes mellitus, diffuse large B-cell lymphoma, GERD, hypertension, hyperlipidemia and thyroid cancer who presented on 12/14 due to abdominal pain and difficulty urinating found to have continued stool burden on CT likely in the setting of chronic constipation due to dementia and decreased mobility leading to GI consult.    - CT abdomen showing continued stool burden, now appears more so affecting right colon  - Received mineral oil enemas overnight  - Continue MiraLAX bowel prep for bowel cleansing  - Out of bed and ambulating as able  - Avoid opioids and anticholinergics  - Discussed with granddaughter Clair patient lives with.  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    Objective :  Temp:  [98.2 °F (36.8 °C)-99 °F (37.2 °C)] 98.3 °F (36.8 °C)  HR:   [63-78] 63  BP: (108-140)/(56-68) 108/56  Resp:  [15-16] 15  SpO2:  [95 %-97 %] 97 %  O2 Device: None (Room air)    Physical Exam  Vitals and nursing note reviewed.   Constitutional:       General: She is not in acute distress.     Appearance: Normal appearance. She is not ill-appearing.   HENT:      Head: Normocephalic and atraumatic.      Nose: Nose normal.      Mouth/Throat:      Mouth: Mucous membranes are moist.   Eyes:      General: No scleral icterus.     Conjunctiva/sclera: Conjunctivae normal.   Cardiovascular:      Rate and Rhythm: Normal rate and regular rhythm.   Pulmonary:      Effort: Pulmonary effort is normal. No respiratory distress.   Abdominal:      General: Bowel sounds are normal. There is no distension.      Palpations: Abdomen is soft. There is no mass.      Tenderness: There is no abdominal tenderness. There is no guarding or rebound.      Hernia: No hernia is present.   Musculoskeletal:         General: Normal range of motion.      Cervical back: Normal range of motion.      Right lower leg: No edema.      Left lower leg: No edema.   Skin:     General: Skin is warm and dry.      Coloration: Skin is not jaundiced.      Findings: No bruising.   Neurological:      General: No focal deficit present.      Mental Status: She is alert. She is disoriented.      Motor: No weakness.   Psychiatric:         Mood and Affect: Mood normal.         Behavior: Behavior normal.         Cognition and Memory: Cognition is impaired. Memory is impaired.         Judgment: Judgment is impulsive.           Lab Results: I have reviewed the following results:

## 2024-12-16 NOTE — ASSESSMENT & PLAN NOTE
79 y.o. female with history of Alzheimer's dementia, type 2 diabetes mellitus, diffuse large B-cell lymphoma, GERD, hypertension, hyperlipidemia and thyroid cancer who presented on 12/14 due to abdominal pain and difficulty urinating found to have continued stool burden on CT likely in the setting of chronic constipation due to dementia and decreased mobility leading to GI consult.    - CT abdomen showing continued stool burden, now appears more so affecting right colon  - Received mineral oil enemas overnight  - Continue MiraLAX bowel prep for bowel cleansing  - Out of bed and ambulating as able  - Avoid opioids and anticholinergics  - Discussed with granddaughter Clair patient lives with.  She stated patient was taking MiraLAX but continued to have significant abdominal pain  - Having small amounts of mucus mixed with blood, suspect in setting of constipation with stercoral colitis - fortunately hemoglobin remains stable  - Enema today and recommend continuing with miralax bowel prep as she refused overnight last night  - Monitor abdominal exam and stool output

## 2024-12-16 NOTE — PROGRESS NOTES
Progress Note - Hospitalist   Name: Marci Arias 79 y.o. female I MRN: [id number]  Unit/Bed#: E5 -01 I Date of Admission: 12/14/2024   Date of Service: 12/16/2024 I Hospital Day: 2    Assessment & Plan  Proctocolitis  Diagnosed rectosigmoid colitis on previous admission, felt to be due to stercoral colitis or ischemic colitis or possible infectious  Etiology is constipation due to immobility and Alzheimer's dementia  Patient did not take bowel prep as ordered yesterday, will order GoLytely prep  Continue enemas per GI recommendation  TSH pending  Dysuria  Suspect this is referred pain from her proctocolitis.  Her bladder looked abnormal on CT due to under distention  Urine cultures have been obtained, negative.  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 Hold Metformin if applicable.  Start SSI and Basal bolus protocol  Essential hypertension  Continue Coreg and losartan with hold parameters   Hyperlipidemia  LFTs have  improved overall.  Continue Lipitor 40 mg daily   Late onset Alzheimer's dementia with other behavioral disturbance, unspecified dementia severity (HCC)  Continue Namenda  Watch for confusion/delirium in the hospital  Fall precautions  Geriatrics consultation placed  Needs to schedule senior care visit, has referral from PCP  Bacteremia due to coagulase-negative Staphylococcus  Coag negative staph bacteremia  1 out of 2 blood cultures positive                Hospital Course:     71-year-old female  patient presenting with abdominal pain, found to have sterile coral proctitis secondary to constipation.  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  Okay to restart furosemide  Geriatrics consultation  Check TSH and B12       VTE Pharmacologic Prophylaxis:   Pharmacologic: Heparin  Mechanical VTE Prophylaxis in Place: Yes    AM-PAC Basic Mobility:  Basic Mobility Inpatient Raw Score: 18    -HLM Achieved: 4: Move to chair/commode  -HLM Goal: 6: Walk 10 steps or more    HLM Goal listed above. Continue with multidisciplinary rounding and encourage appropriate mobility to improve upon HLM goals.         Patient Centered Rounds: Case discussed and reviewed with nursing    Discussions with Specialists or Other Care Team Provider: Case management    Education and Discussions with Family / Patient: GI updated patient family on care plan today    Time Spent for Care: 80 minutes.  More than 50% of total time spent on counseling and coordination of care as described above.    Current Length of Stay: 2 day(s)    Current Patient Status: Inpatient   Certification Statement: The patient will continue to require additional inpatient hospital stay due to stercoral proctitis, bowel movement    Discharge Plan / Estimated Discharge Date: Hopefully within 24    Code Status: Level 1 - Full Code      Subjective:   Seen and examined, patient is pleasantly demented.  Reports abdominal pain.  No  nausea or vomiting, passing mucousy stools    A complete and comprehensive 14 point organ system review has been performed and all other systems are negative other than stated above.    Objective:     Vitals:   Temp (24hrs), Av.2 °F (36.8 °C), Min:97 °F (36.1 °C), Max:99 °F (37.2 °C)    Temp:  [97 °F (36.1 °C)-99 °F (37.2 °C)] 97 °F (36.1 °C)  HR:  [63-78] 68  Resp:  [15-16] 16  BP: (108-140)/(56-68) 125/57  SpO2:  [94 %-97 %] 94 %  Body mass index is 24.29 kg/m².     Input and Output Summary (last 24 hours):       Intake/Output Summary (Last 24 hours) at 2024 1731  Last data filed at 2024 1223  Gross per 24 hour   Intake 360 ml   Output 1950 ml   Net -1590 ml       Physical Exam:     General: well appearing, no acute distress  HEENT: atraumatic, PERRLA, moist mucosa, normal pharynx, normal tonsils and adenoids, normal tongue, no fluid in sinuses  Neck: Trachea midline, no carotid bruit, no masses  Respiratory: normal chest wall expansion, CTA B, no r/r/w, no rubs  Cardiovascular: RRR, no m/r/g, Normal S1 and S2  Abdomen: Soft, non-tender, non-distended, normal bowel sounds in all quadrants, no hepatosplenomegaly, no tympany  Rectal: deferred  Musculoskeletal: normal ROM in upper and lower extremities  Integumentary: warm, dry, and pink, with no rash, purpura, or petechia  Heme/Lymph: no lymphadenopathy, no bruises  Neurological: Cranial Nerves II-XII grossly intact  Psychiatric: cooperative with normal mood, affect, and cognition      Additional Data:     Labs:    Results from last 7 days   Lab Units 12/15/24  0536   WBC Thousand/uL 11.94*   HEMOGLOBIN g/dL 11.6   HEMATOCRIT % 35.6   PLATELETS Thousands/uL 297   SEGS PCT % 51   LYMPHO PCT % 37   MONO PCT % 8   EOS PCT % 2     Results from last 7 days   Lab Units 12/15/24  0531 24  1301   POTASSIUM mmol/L 3.4* 4.0   CHLORIDE mmol/L 104 104   CO2 mmol/L 29 28   BUN mg/dL 11 14   CREATININE mg/dL 0.71 1.11   CALCIUM mg/dL 8.8 8.8   ALK PHOS U/L   --  80   ALT U/L  --  121*   AST U/L  --  102*           * I Have Reviewed All Lab Data Listed Above.  * Additional Pertinent Lab Tests Reviewed: All Labs For Current Hospital Admission Reviewed    Imaging:    Imaging Reports Reviewed Today Include: KUB personally reviewed by me demonstrates stool burden   Imaging Personally Reviewed by Myself Includes: Moderate stool burden without evidence of obstruction    Recent Cultures (last 7 days):     Results from last 7 days   Lab Units 12/10/24  2157 12/10/24  2156   BLOOD CULTURE  Staphylococcus hominis*  Staphylococcus epidermidis*  Staphylococcus coagulase negative*  Gram-positive dion* No Growth After 5 Days.   GRAM STAIN RESULT  Gram positive cocci in clusters*  Gram positive rods*  --        Last 24 Hours Medication List:   Current Facility-Administered Medications   Medication Dose Route Frequency Provider Last Rate    acetaminophen  650 mg Oral Q6H PRN Quinn Rodriguez MD      albuterol  1 puff Inhalation Q4H PRN Quinn Rodriguez MD      ALPRAZolam  0.25 mg Oral HS PRN Quinn Rodriguez MD      aspirin  81 mg Oral Daily Quinn Rodriguez MD      atorvastatin  40 mg Oral Daily With Dinner Quinn Rodriguez MD      carvedilol  12.5 mg Oral BID With Meals Quinn Rodriguez MD      [START ON 12/17/2024] furosemide  20 mg Oral Daily Clarence Myrick DO      gabapentin  100 mg Oral TID Quinn Rodriguez MD      heparin (porcine)  5,000 Units Subcutaneous Q8H CAROLINE Quinn Rodriguez MD      insulin glargine  15 Units Subcutaneous HS Quinn Rodriguez MD      insulin lispro  1-5 Units Subcutaneous TID AC Quinn Rodriguez MD      losartan  25 mg Oral Daily Quinn Rodriguez MD      melatonin  3 mg Oral HS MOIRA Grimaldo      memantine  10 mg Oral BID Quinn Rodriguez MD      mirtazapine  15 mg Oral HS Quinn Asher  MD Michael      OLANZapine  2.5 mg Intramuscular Q4H PRN Clarence Myrick DO      pantoprazole  40 mg Oral Early Morning Quinn Rodriguez MD         AM-PAC Basic Mobility:  Basic Mobility Inpatient Raw Score: 18    JH-HLM Achieved: 4: Move to chair/commode  JH-HLM Goal: 6: Walk 10 steps or more    HLM Goal listed above. Continue with multidisciplinary rounding and encourage appropriate mobility to improve upon HLM goals.     Today, Patient Was Seen By: Clarence Myrick DO    ** Please Note: This note was completed in part utilizing [employer] One [employer] software dictation.  Grammatical errors, random word insertions, spelling mistakes, and incomplete sentences may be an occasional consequence of this system secondary to software limitations, ambient noise, and hardware issues.  If you have any questions or concerns about the content, text, or information contained within the body of this dictation, please contact the provider for clarification. **

## 2024-12-16 NOTE — ASSESSMENT & PLAN NOTE
Lab Results   Component Value Date    HGBA1C 7.3 (H) 09/28/2024   Home regimen reviewed. Hold Metformin if applicable.  Start SSI and Basal bolus protocol

## 2024-12-16 NOTE — UTILIZATION REVIEW
Initial Clinical Review    Admission: Date/Time/Statement:   Admission Orders (From admission, onward)       Ordered        12/14/24 1709  INPATIENT ADMISSION  Once                          Orders Placed This Encounter   Procedures    INPATIENT ADMISSION     Standing Status:   Standing     Number of Occurrences:   1     Level of Care:   Med Surg [16]     Estimated length of stay:   More than 2 Midnights     Certification:   I certify that inpatient services are medically necessary for this patient for a duration of greater than two midnights. See H&P and MD Progress Notes for additional information about the patient's course of treatment.     ED Arrival Information       Expected   -    Arrival   12/14/2024 12:08    Acuity   Urgent              Means of arrival   Stretcher    Escorted by   [facility] ([facility])    Service   Hospitalist    Admission type   Emergency              Arrival complaint   abd pain             Chief Complaint   Patient presents with    Abdominal Pain     Pt reports abd pain.  Pt reports unabe to urinate a have BM, but per EMS pt Is having diarrhea.         Initial Presentation: 79 y.o. female   PMH  DM, HTN, lymphoma, HLD, thyroid Ca.    recently hospitalized  12/10 - 12/12/2024  for  proctocolitis suspected 2/2  constipation - dc on abx and flagyl.  She reports at home she did not continue laxatives as instructed  (MiraLAX and Amitiza on her med list)        12/14  to ER via EMS 2/2 recurrent, persistent lower abdominal pain, unable to have a bowel movement or urinate .  IN ER,  urinary retention noted  - Yen catheter placed.   CTAP  showing continued stool burden, now appears more so affecting right colon .      EXAM:   conscious, alert, vital signs are stable, abdomen is soft, there is tenderness in lower abdomen. No peripheral edema.  Breath sounds clear.  Euvolemic on exam.      12/14    Admit  IP status,  MS  Level of care  for  workup of abd pain  (? persistent or  worsening proctocolitis, r/o abscess )  management of urinary retention.  Cont empiric abx.  Consult GI.  FUP Urine cx - cont cefpodoxime and metronidazole /  maintain loredo catheter.  Accucks qid w/SSI.  Initiate aggressive bowel regimen.  Pain management while Avoiding  opioids if possible.   Mineral oil enemas overnight.  OOB .  Ambulate.  Serial abdominal exam and stool output.     12/14    Anticipated Length of Stay/Certification Statement: Patient will be admitted on an inpatient basis with an anticipated length of stay of greater than 2 midnights secondary to proctocolitis.     Date: 12/15      Day 2:  tolerating diet. Finished bowel prep yesterday, no bowel movement as of yet. Evaluated by GI, recommend continuing bowel prep and if no improvement in abdominal symptoms we will do further evaluation with scope.    c/o dysuria -  urine cx neg.  continue her cefpodoxime and metronidazole, ending 12/15/24.     Need to schedule senior care visit (outpatient followup)  has referral from PCP     12/15  GI CONSULT :  cont miralax bowel prep. If patient with continued symptoms on bowel regiment or if she is having bright red blood per rectum can consider flexible sigmoidoscopy for evaluation of rectum and possible stercoral colitis if in line with goals of care.     Date: 12/16  Day 3: Has surpassed a 2nd midnight with active treatments and services.  endorses ongoing abdominal pain with urgency but unable to pass stool.  Having small amounts of mucus mixed with blood, suspect in setting of constipation with stercoral colitis - fortunately hemoglobin remains stable.  Enema today and recommend continuing with miralax bowel prep as she refused overnight last night.        ED Treatment-Medication Administration from 12/14/2024 1208 to 12/14/2024 1754         Date/Time Order Dose Route Action     12/14/2024 1538 fentaNYL injection 25 mcg 25 mcg Intravenous Given           Scheduled Medications:  aspirin, 81 mg, Oral,  Daily  atorvastatin, 40 mg, Oral, Daily With Dinner  carvedilol, 12.5 mg, Oral, BID With Meals  gabapentin, 100 mg, Oral, TID  heparin (porcine), 5,000 Units, Subcutaneous, Q8H CAROLINE  insulin glargine, 15 Units, Subcutaneous, HS  insulin lispro, 1-5 Units, Subcutaneous, TID AC  losartan, 25 mg, Oral, Daily  melatonin, 3 mg, Oral, HS  memantine, 10 mg, Oral, BID  mirtazapine, 15 mg, Oral, HS  pantoprazole, 40 mg, Oral, Early Morning      Continuous IV Infusions:     PRN Meds:  acetaminophen, 650 mg, Oral, Q6H PRN  albuterol, 1 puff, Inhalation, Q4H PRN  ALPRAZolam, 0.25 mg, Oral, HS PRN  OLANZapine, 2.5 mg, Intramuscular, Q4H PRN      ED Triage Vitals   Temperature Pulse Respirations Blood Pressure SpO2 Pain Score   12/14/24 1215 12/14/24 1215 12/14/24 1215 12/14/24 1215 12/14/24 1215 12/14/24 1807   98.2 °F (36.8 °C) 84 20 157/71 99 % 5     Weight (last 2 days)       Date/Time Weight    12/14/24 1807 58.3 (128.53)            Vital Signs (last 3 days)       Date/Time Temp Pulse Resp BP SpO2 O2 Device Patient Position - Orthostatic VS Godwin Coma Scale Score Pain    12/16/24 15:14:14 97 °F (36.1 °C) 68 16 125/57 94 % None (Room air) Lying -- --    12/16/24 1050 -- -- -- -- -- -- -- -- 4    12/16/24 08:43:56 -- 77 -- 129/60 95 % -- -- -- --    12/16/24 06:56:53 98.3 °F (36.8 °C) 63 15 108/56 97 % None (Room air) Lying -- --    12/15/24 23:08:42 98.5 °F (36.9 °C) 71 -- 131/56 95 % -- -- -- --    12/15/24 2306 -- -- -- -- -- -- -- -- 10 - Worst Possible Pain    12/15/24 21:48:48 99 °F (37.2 °C) 78 -- 140/68 97 % None (Room air) -- -- --    12/15/24 15:10:08 98.2 °F (36.8 °C) 70 16 124/62 96 % None (Room air) Lying -- --    12/15/24 0950 -- -- -- -- -- -- -- -- 6    12/15/24 0900 -- -- -- -- -- -- -- 15 --    12/15/24 07:00:20 98 °F (36.7 °C) 67 16 118/61 96 % None (Room air) Lying -- --    12/14/24 23:28:37 97.3 °F (36.3 °C) -- 18 154/78 -- -- -- -- --    12/14/24 2006 -- -- -- -- -- -- -- -- No Pain    12/14/24 1900 --  -- -- -- -- -- -- -- No Pain    12/14/24 1831 97.3 °F (36.3 °C) 92 17 165/87 98 % None (Room air) Lying -- --    12/14/24 1807 -- -- -- -- -- -- -- -- 5    12/14/24 1651 -- -- -- 144/97 -- -- -- -- --    12/14/24 1650 -- 94 -- -- 96 % None (Room air) -- -- --    12/14/24 1430 -- 90 16 151/72 96 % -- -- -- --    12/14/24 1315 -- -- -- -- -- -- -- 14 --    12/14/24 1215 98.2 °F (36.8 °C) 84 20 157/71 99 % None (Room air) Lying -- --              Pertinent Labs/Diagnostic Test Results:   Radiology:  CT abdomen pelvis with contrast   Final Interpretation by Guillermo Cullen MD (12/14 1609)      1. Mild interval increased extent of nonspecific rectosigmoid proctocolitis compared to CT abdomen pelvis 12/10/2024.      2. Mild bladder wall thickening may be due in part to underdistention however there is mild urothelial hyperenhancement. Correlate with urinalysis for cystitis.         The study was marked in EPIC for immediate notification.      Resident: DILIA MCCLOUD I, the attending radiologist, have reviewed the images and agree with the final report above.      Workstation performed: [id number]         XR abdomen 1 view kub    (Results Pending)     Cardiology:  No orders to display     GI:  No orders to display           Results from last 7 days   Lab Units 12/15/24  0536 12/14/24  1538 12/14/24  1301 12/12/24  0436 12/11/24  1705 12/11/24  0452   WBC Thousand/uL 11.94*  --  8.28 15.03*  --  13.48*   HEMOGLOBIN g/dL 11.6 11.7 11.4* 11.1* 12.1 12.4   HEMATOCRIT % 35.6 36.0 35.5 33.9*  --  39.1   PLATELETS Thousands/uL 297  --  301 234  --  257   TOTAL NEUT ABS Thousands/µL 6.07  --  5.20 9.46*  --  10.67*         Results from last 7 days   Lab Units 12/15/24  0531 12/14/24  1301 12/12/24  0436 12/11/24  0452 12/10/24  1734   SODIUM mmol/L 138 137 133* 135 134*   POTASSIUM mmol/L 3.4* 4.0 3.3* 3.9 4.0   CHLORIDE mmol/L 104 104 101 101 99   CO2 mmol/L 29 28 25 23 29   ANION GAP mmol/L 5 5 7 11 6   BUN mg/dL 11 14  20 25 28*   CREATININE mg/dL 0.71 1.11 0.93 1.06 0.98   EGFR ml/min/1.73sq m 81 47 59 50 55   CALCIUM mg/dL 8.8 8.8 8.3* 8.8 9.5   MAGNESIUM mg/dL  --   --  2.1 2.2 2.2   PHOSPHORUS mg/dL  --   --   --   --  3.8     Results from last 7 days   Lab Units 12/14/24  1301 12/12/24  0436 12/11/24  0452 12/10/24  1734   AST U/L 102* 61* 217* 43*   ALT U/L 121* 138* 264* 97*   ALK PHOS U/L 80 68 92 86   TOTAL PROTEIN g/dL 7.3 6.8 7.7 9.0*   ALBUMIN g/dL 3.5 3.3* 3.8 4.3   TOTAL BILIRUBIN mg/dL 0.42 0.82 0.61 0.45     Results from last 7 days   Lab Units 12/16/24  1533 12/16/24  1053 12/16/24  0658 12/15/24  2057 12/15/24  1614 12/15/24  1053 12/15/24  0701 12/12/24  1055 12/12/24  0750 12/11/24  2129 12/11/24  2042 12/11/24  1559   POC GLUCOSE mg/dl 174* 285* 140 194* 150* 244* 94 275* 137 236* 288* 146*     Results from last 7 days   Lab Units 12/15/24  0531 12/14/24  1301 12/12/24  0436 12/11/24  0452 12/10/24  1734   GLUCOSE RANDOM mg/dL 92 275* 121 460* 297*             BETA-HYDROXYBUTYRATE   Date Value Ref Range Status   12/09/2020 0.1 <0.6 mmol/L Final      Results from last 7 days   Lab Units 12/11/24  0452   PROCALCITONIN ng/ml 1.00*     Results from last 7 days   Lab Units 12/14/24  1538   LACTIC ACID mmol/L 0.7     Results from last 7 days   Lab Units 12/11/24  1142   HEP B S AG  Non-reactive   HEP C AB  Non-reactive   HEP B C IGM  Non-reactive     Results from last 7 days   Lab Units 12/14/24  1301 12/10/24  1734   LIPASE u/L 26 14     Results from last 7 days   Lab Units 12/14/24  1302 12/10/24  1734   CLARITY UA  Clear Turbid   COLOR UA  Colorless Colorless   SPEC GRAV UA  1.023 1.013   PH UA  6.0 5.0   GLUCOSE UA mg/dl >=1000 (1%)* >=1000 (1%)*   KETONES UA mg/dl Negative Negative   BLOOD UA  Negative Small*   PROTEIN UA mg/dl Negative Negative   NITRITE UA  Negative Negative   BILIRUBIN UA  Negative Negative   UROBILINOGEN UA (BE) mg/dl <2.0 <2.0   LEUKOCYTES UA  Elevated glucose may cause decreased leukocyte  values. See urine microscopic for UWBC result* Small*   WBC UA /hpf 1-2 4-10*   RBC UA /hpf 4-10* 2-4*   BACTERIA UA /hpf Occasional Occasional   EPITHELIAL CELLS WET PREP /hpf None Seen Occasional     Results from last 7 days   Lab Units 12/10/24  2157 12/10/24  2156   BLOOD CULTURE  Staphylococcus hominis*  Staphylococcus epidermidis*  Staphylococcus coagulase negative*  Gram-positive dion* No Growth After 5 Days.   GRAM STAIN RESULT  Gram positive cocci in clusters*  Gram positive rods*  --                    Past Medical History:   Diagnosis Date    Cancer (HCC)     Throat    Chronic pain disorder     Diabetes mellitus (HCC)     Diffuse large B cell lymphoma (HCC)     Dysphagia     GERD without esophagitis     HTN (hypertension)     Hyperlipidemia     Hypertension     MI (myocardial infarction) (HCC)     Port-A-Cath in place 07/29/2019    Thyroid cancer (HCC) 2018     Present on Admission:   Proctocolitis   Essential hypertension   Hyperlipidemia   Late onset Alzheimer's dementia with other behavioral disturbance, unspecified dementia severity (HCC)   Nonischemic cardiomyopathy (HCC)      Admitting Diagnosis: Proctocolitis [K52.9]  Urinary retention [R33.9]  Abdominal pain [R10.9]  Constipation [K59.00]  Age/Sex: 79 y.o. female    Network Utilization Review Department  ATTENTION: Please call with any questions or concerns to [phone] and carefully listen to the prompts so that you are directed to the right person. All voicemails are confidential.   For Discharge needs, contact Care Management DC Support Team at [phone] opt. [phone]  Send all requests for admission clinical reviews, approved or denied determinations and any other requests to dedicated fax number below belonging to the campus where the patient is receiving treatment. List of dedicated fax numbers for the Facilities:  FACILITY NAME UR FAX NUMBER   ADMISSION DENIALS (Administrative/Medical Necessity) [phone]   DISCHARGE SUPPORT TEAM  (NETWORK) [phone]   PARENT CHILD HEALTH (Maternity/NICU/Pediatrics) [phone]   [facility] [phone]   [facility] [phone]   [facility] [phone]   [facility] [phone]   [facility] [phone]   [facility] [phone]   [facility] [phone]   [facility] [phone]   [facility] [phone]   [facility] [phone]   [facility] [phone]   [facility] [phone]

## 2024-12-16 NOTE — CASE MANAGEMENT
Case Management Discharge Planning Note    Patient name Marci Arias  Location [facility] /E5 -* MRN [id number]  : 1945 Date 2024       Current Admission Date: 2024  Current Admission Diagnosis:Proctocolitis   Patient Active Problem List    Diagnosis Date Noted Date Diagnosed    Acute urinary retention 2024     Dysuria 2024     Transaminitis 2024     Blood in stool 2024     Other constipation 2024     Elevated LFTs 2024     Proctocolitis 12/10/2024     Late onset Alzheimer's dementia with other behavioral disturbance, unspecified dementia severity (HCC) 2024     Hypertensive heart disease with heart failure (HCC) 2024     Unintended weight loss 2024     Anemia 2023     PLMD (periodic limb movement disorder) 2023     Abnormal skin growth 2023     Nonischemic cardiomyopathy (HCC) 2023     ACC/AHA stage C heart failure with reduced ejection fraction (HCC) 2022     Hyperlipidemia      Gait instability 2021     Polypharmacy 2021     Frequent falls 2021     Dementia with behavioral disturbance 2021     Near syncope 2021     Chemotherapy-induced peripheral neuropathy ([facility]) 10/13/2020     Obstructive sleep apnea      Arthritis 2020     Allergic rhinitis 10/08/2019     Status post chemotherapy 2019     Current mild episode of major depressive disorder without prior episode ([facility]) 2019     Palliative care patient 2019     Anxiety 2018     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, with long-term current use of insulin (HCC) 10/31/2018     Essential hypertension 10/31/2018     Gastroesophageal reflux disease 10/31/2018     Xerosis of skin 10/03/2016       LOS (days): 2  Geometric Mean LOS (GMLOS) (days):   Days to GMLOS:     OBJECTIVE:  Risk of  Unplanned Readmission Score: 19.93      Current admission status: Inpatient   Preferred Pharmacy:   [id number] [place] - [address]  [address]  [facility] [address]  Phone: [phone] Fax: [phone]    [id number] - [place] - [address]  [address] [facility] [address]  Phone: [phone] Fax: [phone]    Primary Care Provider: MOIRA Gan    Primary Insurance: [employer] [facility]  Secondary Insurance:     DISCHARGE DETAILS:    Requested Home Health Care         Is the patient interested in HHC at discharge?: Yes  Home Health Discipline requested:: Nursing, Physical Therapy  Home Health Agency Name:: [facility]  HHA External Referral Reason (only applicable if external HHA name selected): Patient has established relationship with provider  Home Health Follow-Up Provider:: PCP  Home Health Services Needed:: Evaluate Functional Status and Safety, Gait/ADL Training, Diabetes Management  Homebound Criteria Met:: Requires the Assistance of Another Person for Safe Ambulation or to Leave the Home, Uses an Assist Device (i.e. cane, walker, etc)  Supporting Clincal Findings:: Cognitive Deficit Requiring the Assistance of Others, Fatigues Easliy in Short Distances, Limited Endurance

## 2024-12-16 NOTE — RESTORATIVE TECHNICIAN NOTE
Restorative Technician Note      Patient Name: Marci Arias     Restorative Tech Visit Date: 12/16/24  Note Type: Mobility  Patient Position Upon Consult: Supine  Activity Performed: Ambulated  Assistive Device: Roller walker  Patient Position at End of Consult: All needs within reach; Supine; Bed/Chair alarm activated            ns

## 2024-12-16 NOTE — ASSESSMENT & PLAN NOTE
Suspect this is referred pain from her proctocolitis.  Her bladder looked abnormal on CT due to under distention  Urine cultures have been obtained, negative.  Completed a course of cefpodoxime and metronidazole

## 2024-12-16 NOTE — ASSESSMENT & PLAN NOTE
Diagnosed rectosigmoid colitis on previous admission, felt to be due to stercoral colitis or ischemic colitis or possible infectious  Etiology is constipation due to immobility and Alzheimer's dementia  Patient did not take bowel prep as ordered yesterday, will order GoLytely prep  Continue enemas per GI recommendation  TSH pending

## 2024-12-16 NOTE — ASSESSMENT & PLAN NOTE
Related to proctocolitis  Status post Yen catheter placement, trial void once having bowel movement  UA negative for infection

## 2024-12-16 NOTE — ASSESSMENT & PLAN NOTE
Continue Namenda  Watch for confusion/delirium in the hospital  Fall precautions  Geriatrics consultation placed  Needs to schedule senior care visit, has referral from PCP

## 2024-12-16 NOTE — NURSING NOTE
Continual observation discontinued, patient moved close to front nurse's station. Patient seemingly more calm and redirectable after zyprexa administration. Commode at bedside, call bell and belongings within reach, bed alarm activated.

## 2024-12-17 ENCOUNTER — PATIENT OUTREACH (OUTPATIENT)
Dept: FAMILY MEDICINE CLINIC | Facility: CLINIC | Age: 79
End: 2024-12-17

## 2024-12-17 ENCOUNTER — APPOINTMENT (INPATIENT)
Dept: RADIOLOGY | Facility: HOSPITAL | Age: 79
End: 2024-12-17
Payer: COMMERCIAL

## 2024-12-17 LAB
ANION GAP SERPL CALCULATED.3IONS-SCNC: 7 MMOL/L (ref 4–13)
BUN SERPL-MCNC: 16 MG/DL (ref 5–25)
CALCIUM SERPL-MCNC: 8.7 MG/DL (ref 8.4–10.2)
CHLORIDE SERPL-SCNC: 102 MMOL/L (ref 96–108)
CO2 SERPL-SCNC: 27 MMOL/L (ref 21–32)
CREAT SERPL-MCNC: 0.78 MG/DL (ref 0.6–1.3)
ERYTHROCYTE [DISTWIDTH] IN BLOOD BY AUTOMATED COUNT: 13.7 % (ref 11.6–15.1)
GFR SERPL CREATININE-BSD FRML MDRD: 73 ML/MIN/1.73SQ M
GLUCOSE SERPL-MCNC: 143 MG/DL (ref 65–140)
GLUCOSE SERPL-MCNC: 163 MG/DL (ref 65–140)
GLUCOSE SERPL-MCNC: 248 MG/DL (ref 65–140)
GLUCOSE SERPL-MCNC: 251 MG/DL (ref 65–140)
GLUCOSE SERPL-MCNC: 273 MG/DL (ref 65–140)
HCT VFR BLD AUTO: 32.6 % (ref 34.8–46.1)
HGB BLD-MCNC: 10.9 G/DL (ref 11.5–15.4)
MCH RBC QN AUTO: 30.7 PG (ref 26.8–34.3)
MCHC RBC AUTO-ENTMCNC: 33.4 G/DL (ref 31.4–37.4)
MCV RBC AUTO: 92 FL (ref 82–98)
PLATELET # BLD AUTO: 329 THOUSANDS/UL (ref 149–390)
PMV BLD AUTO: 9.2 FL (ref 8.9–12.7)
POTASSIUM SERPL-SCNC: 4.1 MMOL/L (ref 3.5–5.3)
RBC # BLD AUTO: 3.55 MILLION/UL (ref 3.81–5.12)
SODIUM SERPL-SCNC: 136 MMOL/L (ref 135–147)
VIT B12 SERPL-MCNC: 226 PG/ML (ref 180–914)
WBC # BLD AUTO: 11.93 THOUSAND/UL (ref 4.31–10.16)

## 2024-12-17 PROCEDURE — 82607 VITAMIN B-12: CPT | Performed by: INTERNAL MEDICINE

## 2024-12-17 PROCEDURE — 85027 COMPLETE CBC AUTOMATED: CPT | Performed by: INTERNAL MEDICINE

## 2024-12-17 PROCEDURE — 80048 BASIC METABOLIC PNL TOTAL CA: CPT | Performed by: INTERNAL MEDICINE

## 2024-12-17 PROCEDURE — 99233 SBSQ HOSP IP/OBS HIGH 50: CPT | Performed by: INTERNAL MEDICINE

## 2024-12-17 PROCEDURE — 99222 1ST HOSP IP/OBS MODERATE 55: CPT

## 2024-12-17 PROCEDURE — 74018 RADEX ABDOMEN 1 VIEW: CPT

## 2024-12-17 PROCEDURE — 82948 REAGENT STRIP/BLOOD GLUCOSE: CPT

## 2024-12-17 PROCEDURE — 99232 SBSQ HOSP IP/OBS MODERATE 35: CPT | Performed by: INTERNAL MEDICINE

## 2024-12-17 RX ORDER — SENNOSIDES 8.6 MG
2 TABLET ORAL
Status: DISCONTINUED | OUTPATIENT
Start: 2024-12-17 | End: 2024-12-18 | Stop reason: HOSPADM

## 2024-12-17 RX ORDER — POLYETHYLENE GLYCOL 3350 17 G/17G
17 POWDER, FOR SOLUTION ORAL DAILY
Status: DISCONTINUED | OUTPATIENT
Start: 2024-12-18 | End: 2024-12-18 | Stop reason: HOSPADM

## 2024-12-17 RX ORDER — DOCUSATE SODIUM 100 MG/1
100 CAPSULE, LIQUID FILLED ORAL 2 TIMES DAILY
Status: DISCONTINUED | OUTPATIENT
Start: 2024-12-17 | End: 2024-12-18 | Stop reason: HOSPADM

## 2024-12-17 RX ADMIN — MIRTAZAPINE 15 MG: 15 TABLET, FILM COATED ORAL at 21:37

## 2024-12-17 RX ADMIN — ASPIRIN 81 MG CHEWABLE TABLET 81 MG: 81 TABLET CHEWABLE at 09:43

## 2024-12-17 RX ADMIN — ALPRAZOLAM 0.25 MG: 0.25 TABLET ORAL at 21:37

## 2024-12-17 RX ADMIN — Medication 1000 MCG: at 17:55

## 2024-12-17 RX ADMIN — HEPARIN SODIUM 5000 UNITS: 5000 INJECTION INTRAVENOUS; SUBCUTANEOUS at 05:03

## 2024-12-17 RX ADMIN — INSULIN LISPRO 2 UNITS: 100 INJECTION, SOLUTION INTRAVENOUS; SUBCUTANEOUS at 11:27

## 2024-12-17 RX ADMIN — INSULIN GLARGINE 15 UNITS: 100 INJECTION, SOLUTION SUBCUTANEOUS at 21:37

## 2024-12-17 RX ADMIN — GABAPENTIN 100 MG: 100 CAPSULE ORAL at 16:07

## 2024-12-17 RX ADMIN — FUROSEMIDE 20 MG: 20 TABLET ORAL at 09:43

## 2024-12-17 RX ADMIN — MEMANTINE 10 MG: 5 TABLET ORAL at 21:37

## 2024-12-17 RX ADMIN — MELATONIN 3 MG: 3 TAB ORAL at 21:37

## 2024-12-17 RX ADMIN — LOSARTAN POTASSIUM 25 MG: 25 TABLET, FILM COATED ORAL at 09:43

## 2024-12-17 RX ADMIN — GABAPENTIN 100 MG: 100 CAPSULE ORAL at 21:37

## 2024-12-17 RX ADMIN — CARVEDILOL 12.5 MG: 12.5 TABLET, FILM COATED ORAL at 16:07

## 2024-12-17 RX ADMIN — PANTOPRAZOLE SODIUM 40 MG: 40 TABLET, DELAYED RELEASE ORAL at 05:00

## 2024-12-17 RX ADMIN — HEPARIN SODIUM 5000 UNITS: 5000 INJECTION INTRAVENOUS; SUBCUTANEOUS at 21:55

## 2024-12-17 RX ADMIN — HEPARIN SODIUM 5000 UNITS: 5000 INJECTION INTRAVENOUS; SUBCUTANEOUS at 14:03

## 2024-12-17 RX ADMIN — ATORVASTATIN CALCIUM 40 MG: 40 TABLET, FILM COATED ORAL at 16:07

## 2024-12-17 RX ADMIN — CARVEDILOL 12.5 MG: 12.5 TABLET, FILM COATED ORAL at 09:43

## 2024-12-17 RX ADMIN — GABAPENTIN 100 MG: 100 CAPSULE ORAL at 09:43

## 2024-12-17 RX ADMIN — MEMANTINE 10 MG: 5 TABLET ORAL at 09:43

## 2024-12-17 RX ADMIN — SENNOSIDES 17.2 MG: 8.6 TABLET, FILM COATED ORAL at 21:37

## 2024-12-17 RX ADMIN — INSULIN LISPRO 2 UNITS: 100 INJECTION, SOLUTION INTRAVENOUS; SUBCUTANEOUS at 16:07

## 2024-12-17 NOTE — PROGRESS NOTES
Progress Note - Gastroenterology   Name: Marci Arias 79 y.o. female I MRN: [id number]  Unit/Bed#: E5 -01 I Date of Admission: 12/14/2024   Date of Service: 12/17/2024 I Hospital Day: 3    Assessment & Plan  Proctocolitis  79 y.o. female with history of Alzheimer's dementia, type 2 diabetes mellitus, diffuse large B-cell lymphoma, GERD, hypertension, hyperlipidemia and thyroid cancer who presented on 12/14 due to abdominal pain and difficulty urinating found to have continued stool burden on CT likely in the setting of chronic constipation due to dementia and decreased mobility leading to GI consult.    - CT abdomen showing continued stool burden, now appears more so affecting right colon  - Had multiple bowel movements with bowl prep/enema - can discontinue prep  - Continue MiraLAX twice daily dosing with senna for ongoing management of constipation  - Out of bed and ambulating as able  - Avoid opioids and anticholinergics  - Monitor abdominal exam and stool output  - Pain has improved with bowel movements. No plans for endoscopic intervention at this time.   - GI will sign off but remain available for questions. 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    Objective :  Temp:  [97 °F (36.1 °C)-99.6 °F (37.6 °C)] 99.6 °F (37.6 °C)  HR:  [68-79] 79  BP: (120-127)/(47-57) 120/47  Resp:  [16]  16  SpO2:  [94 %-95 %] 95 %  O2 Device: None (Room air)    Physical Exam  Vitals and nursing note reviewed.   Constitutional:       General: She is not in acute distress.     Appearance: Normal appearance. She is not ill-appearing.   HENT:      Head: Normocephalic and atraumatic.      Nose: Nose normal.      Mouth/Throat:      Mouth: Mucous membranes are moist.   Eyes:      General: No scleral icterus.     Conjunctiva/sclera: Conjunctivae normal.   Cardiovascular:      Rate and Rhythm: Normal rate and regular rhythm.   Pulmonary:      Effort: Pulmonary effort is normal. No respiratory distress.   Abdominal:      General: Bowel sounds are normal. There is no distension.      Palpations: Abdomen is soft. There is no mass.      Tenderness: There is no abdominal tenderness. There is no guarding or rebound.      Hernia: No hernia is present.   Musculoskeletal:         General: Normal range of motion.      Cervical back: Normal range of motion.      Right lower leg: No edema.      Left lower leg: No edema.   Skin:     General: Skin is warm and dry.      Coloration: Skin is not jaundiced.      Findings: No bruising.   Neurological:      General: No focal deficit present.      Mental Status: She is alert and oriented to person, place, and time.      Motor: No weakness.   Psychiatric:         Cognition and Memory: Cognition is impaired. Memory is impaired.         Judgment: Judgment is impulsive.           Lab Results: I have reviewed the following results:CBC/BMP:   .     12/17/24  0451   WBC 11.93*   HGB 10.9*   HCT 32.6*      SODIUM 136   K 4.1      CO2 27   BUN 16   CREATININE 0.78   GLUC 163*    , LFTs: No new results in last 24 hours. , PTT/INR:No new results in last 24 hours.

## 2024-12-17 NOTE — CONSULTS
Consultation - Geriatrics   Marci Santiarobertabryankirit 79 y.o. female MRN: [id number]  Unit/Bed#: E5 -01 Encounter: [id number]      Assessment/Plan    Dementia with Behavioral Disturbances   Patient was seen by [facility] for evaluation on 9/3/2021  Last office visit was on 11/16/2023 and noted the following   Patient scored 7/29 on MoCA with deficits in all domains (previous MoCA had been 13/30)  Granddaughter noted progressive memory and cognitive loss   She had been residing with her granddaughter at that time  She was requiring assistance with ADLs and needed assistance with IADLs   Cognitive level was consistent with moderate dementia at that time  Namenda had been increased to BID dosing at that visit   Per chart review she does have caregiver services through [employer] ([employer] M-F from [id number] and her granddaughter takes over care from [phone]  Per chart review, granddaughter had noted back in October that the patient was having periods of agitation   Referral to senior [facility] has been placed though it does not appear that there are any appointments scheduled   Patient would be a follow-up since she has been evaluated by us in the past (last visit was last year)  Our office is currently at a 6-7 month wait list for new and follow-up visits so would recommend calling the office to schedule an appointment   She appears to be continuing to take Namenda   She did receive a dose of zyprexa yesterday   Please see recommendations below for acute agitation and behaviors   She is alert and oriented x 4 on exam today   She is pleasant, calm, and cooperative  Most recent TSH on 12/15/2024 noted to be 1.118  Most recent vitamin B 12 level on 9/28/2024 noted to be 257  Recommend supplementing with a goal vitamin B 12 level > 400  MRI of the brain with NeuroQuant on 9/20/2021 revealed mild microangiopathic changes   NeuroQuant was inadequate for volumetric analysis bur repeat was recommended   Repeat  NeuroQuant on 9/29/2021 revealed low hippocampal volume with findings suggestive of medial temporal lobe focused neurodegenerative etiology   Maintain delirium precautions as discussed below   Redirect and reorient as needed   Keep physically, mentally, and socially active     Delirium Precautions   Current mentation: alert and oriented x 4  Patient is at high risk secondary to age, dementia, proctocolitis, urinary retention, abdominal pain, vision impairment, environmental changes (patient was admitted to the hospital from 12/10-12/12, was discharged home, and is now re-hospitalized), and hospitalization   Maintain delirium precautions   Provide redirection, reorientation, and distraction techniques  Maintain fall and safety precautions   Assist with ADLs/IADLs  Avoid deliriogenic medications such as tramadol, benzodiazepines, anticholinergics, benadryl  Treat pain using geriatric pain protocol   Encourage oral hydration and nutrition   Monitor for constipation and urinary retention   Implement sleep hygiene and limit night time interuptions   Maintain sleep-wake cycle   Encourage early and frequent mobilization   Most recent EKG on 7/25/2023 revealed a QTc interval of 522  Would recommend repeat EKG if considering antipsychotics for acute agitation and behaviors   If all other interventions are unsuccessful for acute agitation and behaviors and QTc interval is < 500, can consider Zyprexa 2.5 mg Q 8 hours prn   If all other interventions are unsuccessful for acute agitation and behaviors and QTc interval is > 500, can consider Depakote 125 mg once (may use IV Depacon if patient is not taking oral medication)  Would monitor LFTs as recent AST and ALT were elevated  Would avoid benzodiazepines such as Ativan as these can worsen delirium     Deconditioning   Patient is at increased risk for deconditioning secondary to dementia, proctocolitis, urinary retention, abdominal pain, vision impairment, environmental changes  (patient was admitted to the hospital from 12/10-12/12, was discharged home, and is now re-hospitalized), weakness, gait dysfunction, and hospitalization   Continue to optimize diet, hydration, and mobility for healing   GFR 73 on labs today   Patient has no documented history of CKD   Keep hydrated   Type II Diabetes with Long Term Insulin Use  Blood sugar log reviewed and blood sugars have been acceptable   Most recent hemoglobin A1C on 9/28/2024 noted to be 7.3 which is acceptable for her age  Her current regimen includes   Humalog SSI with meals   Lantus 15 units at bedtime  Continue insulin and diet control   Avoid hypoglycemia   Anemia   Baseline hemoglobin appears to be around 12   Hemoglobin on labs today noted to be 10.9  Continue to monitor CBC   Transfuse for hemoglobin < 7   Monitor for signs and symptoms of infection, dehydration, DVT, and skin breakdown    Frailty   Clinical Frail Scale: 6- Moderately Frail   Need help with all outside activities  Need help with stairs and bathing  May need assistance with dressing  Most recent albumin on 12/14/2024 noted to be 3.5  Consider nutrition consult  Encourage protein supplementation     Ambulatory Dysfunction/Falls  Patient denies any recent falls at home   She denies the use of a roller walker though per chart review she has been ambulating with a roller walker at baseline   PT/OT consulted to assist with strengthening/mobility and assist with discharge planning to appropriate level of care  Assess patient frequently for physical needs, encourage use of assistant devices as needed and directed by PT/OT  Identify cognitive and physical deficits and behaviors that affect risk of falls  Consider moving patient closer to nursing station to monitor more closely for impulsive behavior which may increase risk of falls  [place] fall and safety precautions   Educate patient/family on patient safety including physical limitations and importance of using call bell  for assistance   Modify environment to reduce risk of injury including disconnecting from pole when not in use, ensuring adequate lighting in room and restroom, ensuring that path to restroom is clear and free of trip hazards  Out of bed as tolerated    Impaired Vision   Patient does have vision impairment  She does wear eyeglasses   Recommend use of corrective lenses at all appropriate times  Encourage adequate lighting and encourage use of assistance with ambulation  Keep personal belongings close to avoid reaching  Encourage appropriate footwear at all times  Recommend large font for printed materials provided to patient    Impaired Hearing   Patient denies hearing impairment   She denies the use of hearing aids   Hearing impairment strongly correlated with depression, cognitive impairment, delirium and falls in the older adult  Use hearing aids or sound amplifier  Speak face to face  Use clear dictation and enunciation of words    Dentition/Appetite   Patient does not wear dentures   She notes that her appetite is improving   Per chart review, she has been consuming 100% of meals here   Encourage use of dentures at all appropriate times  Ensure meal consistency is appropriate for all abilities   Consider nutrition consult   Continue aspiration precautions     Elimination   Patient appears to be incontinent at baseline per chart review   She was noted to have urinary retention on admission suspected to be secondary to constipation   She now has a loredo catheter intact   Maintain good loredo catheter care   She has been admitted to the hospital x 2 recently for constipation   GI is consulted and following   Would defer to GI for recommendations to bowel regimen at this time   Monitor for constipation and urinary retention     Insomnia   Patient notes she needs to take medication to help her sleep   Per chart review it appears she has alprazolam prn for bedtime   She has not required any of this medication here    First line is behavioral therapy   Avoid sedative hypnotics including benzodiazepines and benadryl  Encourage staying awake during the day   Encourage daytime activities and morning exercise   Decrease or eliminate daytime naps   Avoid caffeine especially during late afternoon and evening hours  Establish a nighttime routine  Implement sleep hygiene and limit nighttime interruptions  Can consider melatonin 3 mg daily at bedtime for sleep if needed     Anxiety/Depression  Patient has a documented history of anxiety and depression   Patient is on alprazolam 0.25 mg daily at bedtime as needed   PDMP reviewed   Would use benzodiazepines cautiously as these medications can increase confusion/cause delirium  Mood appears stable on exam today   Continue current medication regimen   Continue supportive care     Proctocolitis   Patient underwent CT imaging on last admission which revealed diffuse wall thickening and mucosal hyperemia throughout the rectosigmoid colon consistent with non-specific proctocolitis   GI was consulted on last admission and suspected stercoral colitis vs ischemic colitis   She was started on antibiotics which were just recently completed   Patient presented to the hospital with constipation   GI is following again   Would defer to GI for bowel regimen   Management per GI     Home Safety  Patient resides at home with her granddaughter   Patient notes she is independent with ADLs though she appears to need assistance with this per chart review   She does require assistance with IADLs     Advanced Care Planning  Level 1 Full Code    Home Medication Review   [facility] ([phone])  Attempted to contact patients pharmacy but was on hold for > 15 minutes. Will attempt to call back tomorrow.       History of Present Illness   Physician Requesting Consult: Clarence Myrick DO  Reason for Consult / Principal Problem: Dementia with Behavioral Disturbances   Hx and PE limited by: Confusion at  "times    HPI: Marci Arias is a 79 y.o. year old female who has anemia, hypertension, GERD, hyperlipidemia, nonischemic cardiomyopathy, diabetes, dementia, and depression and presented to the hospital with abdominal pain, dysuria, difficulty voiding, and anal leaking. She was recently admitted to the hospital from 12/10-12/12 for similar complaints. She was noted to have proctocolitis on CT. She was evaluated by GI and this was thought to be related to constipation. She was started on IV antibiotics and her symptoms improved. She was discharged home on oral antibiotics. She presented to the hospital this admission with persistent abdominal pain and urgency/difficulty voiding. She had also been complaining of \"leaking\" and bleeding from her rectum. She was noted to have urinary retention in the ER and a loredo catheter was placed. GI is consulted and following. They are currently managing her bowel regimen. At this time they are deferring flexible sigmoidoscopy at this time. Geriatrics is being consulted for dementia.     The patient states that she resides at home with her granddaughter who is her caregiver. She notes that she does not require any assistance with bathing or dressing. She notes no recent falls. She states she does have a wheelchair to use at home when she is tired but states she does not ambulate with any assistive devices at baseline. She does have vision impairment and wears glasses. She denies hearing impairment. She notes that her appetite is improving. She states she has been having difficulty with constipation but the regimen that she is currently on is helping. She notes that she does need to take medication to sleep.     The patient was seen and evaluated today at the bedside for geriatric consult. She is noted to be lying in bed comfortably in no acute distress. She is alert and oriented x 4 on my exam today. She states that she had been having abdominal pain but notes that her " pain is improving since having a bowel movement this morning. She denies chest pain and shortness of breath. She states she is eating and sleeping okay here.      Care was coordinated with patients nurse Breanna. She notes no acute issues or events overnight.     Care was also coordinated with Dr. Myrick with internal medicine.     Patient was evaluated using  services.  #[id number] assisted in todays exam.     Inpatient consult to Gerontology  Consult performed by: MOIRA Stover  Consult ordered by: Clarence Myrick DO        Review of Systems   Constitutional:  Negative for activity change, appetite change (patinet notes appetite is improving) and fatigue.   HENT:  Negative for hearing loss.    Eyes:  Positive for visual disturbance (glasses).   Respiratory:  Negative for cough and shortness of breath.    Cardiovascular:  Negative for chest pain and leg swelling.   Gastrointestinal:  Positive for abdominal pain (improving since bowel movement this morning). Negative for abdominal distention.   Genitourinary:         Currently with loredo catheter   Musculoskeletal:  Positive for gait problem. Negative for arthralgias.   Skin:  Negative for color change and pallor.   Neurological:  Positive for weakness. Negative for speech difficulty.   Psychiatric/Behavioral:  Positive for confusion (at times). Negative for agitation and sleep disturbance. The patient is not nervous/anxious.        Historical Information   Past Medical History:   Diagnosis Date    Cancer (HCC)     Throat    Chronic pain disorder     Diabetes mellitus (HCC)     Diffuse large B cell lymphoma (HCC)     Dysphagia     GERD without esophagitis     HTN (hypertension)     Hyperlipidemia     Hypertension     MI (myocardial infarction) (HCC)     Port-A-Cath in place 07/29/2019    Thyroid cancer (HCC) 2018     Past Surgical History:   Procedure Laterality Date    BONE MARROW BIOPSY      BREAST BIOPSY Left     CARDIAC CATHETERIZATION N/A  "12/9/2022    Procedure: Cardiac catheterization;  Surgeon: Jhonny Rain MD;  Location: [facility];  Service: Cardiology    CARDIAC CATHETERIZATION N/A 12/9/2022    Procedure: Cardiac Coronary Angiogram;  Surgeon: Jhonny Rain MD;  Location: [facility];  Service: Cardiology    CARDIAC CATHETERIZATION Left 12/9/2022    Procedure: Cardiac Left Heart Cath;  Surgeon: Jhonny Rain MD;  Location: [facility];  Service: Cardiology    CHOLECYSTECTOMY      IR PORT PLACEMENT  11/16/2018    IR PORT REMOVAL  3/22/2021    OTHER SURGICAL HISTORY      tendor tear repair to right shoulder    SHOULDER ARTHROSCOPY       Social History   Social History     Substance and Sexual Activity   Alcohol Use Never    Comment: 0     Social History     Substance and Sexual Activity   Drug Use No     Social History     Tobacco Use   Smoking Status Never    Passive exposure: Never   Smokeless Tobacco Never       Family History:   Family History   Problem Relation Age of Onset    Diabetes Mother     Heart disease Sister     Uterine cancer Maternal Grandmother     Prostate cancer Paternal Grandfather     Hypertension Brother     Breast cancer Neg Hx          No Known Allergies    Objective   Vitals: Blood pressure 127/56, pulse 79, temperature 99.6 °F (37.6 °C), temperature source Oral, resp. rate 16, height 5' 1\" (1.549 m), weight 58.3 kg (128 lb 8.5 oz), SpO2 95%, not currently breastfeeding.,Body mass index is 24.29 kg/m².      Physical Exam  Vitals and nursing note reviewed.   Constitutional:       General: She is not in acute distress.     Appearance: She is not ill-appearing.   HENT:      Head: Normocephalic.      Mouth/Throat:      Mouth: Mucous membranes are dry.   Eyes:      General: No scleral icterus.     Conjunctiva/sclera: Conjunctivae normal.   Cardiovascular:      Rate and Rhythm: Normal rate and regular rhythm.   Pulmonary:      Effort: Pulmonary effort is normal. No respiratory distress.   Abdominal:      " "General: Bowel sounds are normal. There is no distension.      Palpations: Abdomen is soft.      Tenderness: There is abdominal tenderness (improving per patient).   Musculoskeletal:         General: No swelling or tenderness.   Skin:     General: Skin is warm and dry.   Neurological:      General: No focal deficit present.      Mental Status: She is alert and oriented to person, place, and time. Mental status is at baseline.         Lab Results:   Results from last 7 days   Lab Units 12/17/24  0451   WBC Thousand/uL 11.93*   HEMOGLOBIN g/dL 10.9*   HEMATOCRIT % 32.6*   PLATELETS Thousands/uL 329        Results from last 7 days   Lab Units 12/17/24  0451 12/15/24  0531 12/14/24  1301   POTASSIUM mmol/L 4.1   < > 4.0   CHLORIDE mmol/L 102   < > 104   CO2 mmol/L 27   < > 28   BUN mg/dL 16   < > 14   CREATININE mg/dL 0.78   < > 1.11   CALCIUM mg/dL 8.7   < > 8.8   ALK PHOS U/L  --   --  80   ALT U/L  --   --  121*   AST U/L  --   --  102*    < > = values in this interval not displayed.       Imaging Studies: Results Review Statement: I reviewed radiology reports from this admission including: CT abdomen/pelvis and xray(s).  EKG, Pathology, and Other Studies: Results Review Statement: I reviewed AM labs and EKGs from 7/5/2023.  VTE Prophylaxis: VTE covered by:  heparin (porcine), Subcutaneous, 5,000 Units at 12/17/24 0503       Code Status: Level 1 - Full Code      Please note:  Voice-recognition software may have been used in the preparation of this document.  Occasional wrong word or \"sound-alike\" substitutions may have occurred due to the inherent limitations of voice recognition software.  Interpretation should be guided by context.    "

## 2024-12-17 NOTE — ASSESSMENT & PLAN NOTE
Continue Namenda  Watch for confusion/delirium in the hospital  Fall precautions  Geriatrics consultation, appreciate recommendations  Needs to schedule senior care visit, has referral from PCP

## 2024-12-17 NOTE — ASSESSMENT & PLAN NOTE
Lab Results   Component Value Date    HGBA1C 7.3 (H) 09/28/2024       Recent Labs     12/16/24  1533 12/16/24  2046 12/17/24  0726 12/17/24  1104   POCGLU 174* 215* 143* 251*       Blood Sugar Average: Last 72 hrs:  (P) 189

## 2024-12-17 NOTE — PROGRESS NOTES
Progress Note - Hospitalist   Name: Marci Arias 79 y.o. female I MRN: [id number]  Unit/Bed#: E5 -01 I Date of Admission: 12/14/2024   Date of Service: 12/17/2024 I Hospital Day: 3    Assessment & Plan  Proctocolitis  Diagnosed rectosigmoid colitis on previous admission, felt to be due to stercoral colitis or ischemic colitis or possible infectious  Etiology is constipation due to immobility and Alzheimer's dementia  TSH within normal limit  Patient having multiple bowel movements today.  Abdominal pain improved  Repeat KUB pending to evaluate stool burden  No indication for flexible sigmoidoscopy at this time  Continue bowel regimen  Dysuria  Suspect this is referred pain from her proctocolitis.  Her bladder looked abnormal on CT due to under distention  Urine cultures have been obtained, negative.  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 Hold Metformin if applicable.  Start SSI and Basal bolus protocol  Essential hypertension  Continue Coreg and losartan with hold parameters   Hyperlipidemia  LFTs have  improved overall.  Continue Lipitor 40 mg daily   Late onset Alzheimer's dementia with other behavioral disturbance, unspecified dementia severity (HCC)  Continue Namenda  Watch for confusion/delirium in the hospital  Fall precautions  Geriatrics consultation, appreciate recommendations  Needs to schedule senior care visit, has referral from PCP  Bacteremia due to coagulase-negative Staphylococcus  Coag negative staph bacteremia  1 out of 2 blood cultures  positive            Hospital Course:     79-year-old female patient admitted with proctocolitis, history of Alzheimer's dementia.  Evaluated by GI.  Overnight with multiple bowel movements and abdominal pain improved.    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, with long-term current use of insulin (HCC)    Essential hypertension    Hyperlipidemia    Nonischemic cardiomyopathy (HCC)    Late onset Alzheimer's dementia with other behavioral disturbance, unspecified dementia severity (HCC)    Acute urinary retention    Dysuria    Bacteremia due to coagulase-negative Staphylococcus      Plan:    Continue bowel regimen while admitted  Obtain KUB to evaluate stool burden  Remove Yen catheter  Anticipate discharge tomorrow       VTE Pharmacologic Prophylaxis:   Pharmacologic: Heparin  Mechanical VTE Prophylaxis in Place: Yes    AM-PAC Basic Mobility:  Basic Mobility Inpatient Raw Score: 18    JH-HLM Achieved: 6: Walk 10 steps or more  JH-HLM Goal: 6: Walk 10 steps or more    HLM Goal listed above. Continue with multidisciplinary rounding and encourage appropriate mobility to improve upon HLM goals.         Patient Centered Rounds: Case discussed and reviewed with nursing    Discussions with Specialists or Other Care Team Provider: Case management, reviewed GI note    Education and Discussions with Family / Patient: Cussed with patient granddaughter    Time Spent for Care: 80 minutes.  More than 50% of total time spent on counseling and coordination of care as described above.    Current Length of Stay: 3 day(s)    Current Patient Status: Inpatient   Certification Statement: The patient will continue to require additional inpatient hospital stay due to need for monitoring of bowel movements    Discharge Plan / Estimated Discharge Date: 24 hours    Code Status: Level 1 - Full Code      Subjective:   Seen and examined, no acute  complaints.  No nausea no vomiting.  Abdominal pain is improved.  Had multiple bowel movements overnight.    A complete and comprehensive 14 point organ system review has been performed and all other systems are negative other than stated above.    Objective:     Vitals:   Temp (24hrs), Av.4 °F (37.4 °C), Min:99.1 °F (37.3 °C), Max:99.6 °F (37.6 °C)    Temp:  [99.1 °F (37.3 °C)-99.6 °F (37.6 °C)] 99.1 °F (37.3 °C)  HR:  [78-79] 78  Resp:  [16-18] 18  BP: (120-145)/(47-72) 145/72  SpO2:  [95 %-97 %] 97 %  Body mass index is 24.29 kg/m².     Input and Output Summary (last 24 hours):       Intake/Output Summary (Last 24 hours) at 2024 1725  Last data filed at 2024 1626  Gross per 24 hour   Intake 720 ml   Output 2200 ml   Net -1480 ml       Physical Exam:     General: well appearing, no acute distress  HEENT: atraumatic, PERRLA, moist mucosa, normal pharynx, normal tonsils and adenoids, normal tongue, no fluid in sinuses  Neck: Trachea midline, no carotid bruit, no masses  Respiratory: normal chest wall expansion, CTA B, no r/r/w, no rubs  Cardiovascular: RRR, no m/r/g, Normal S1 and S2  Abdomen: Soft, non-tender, non-distended, normal bowel sounds in all quadrants, no hepatosplenomegaly, no tympany  Rectal: deferred  Musculoskeletal: moves all  Integumentary: warm, dry, and pink, with no rash, purpura, or petechia  Heme/Lymph: no lymphadenopathy, no bruises  Neurological: Cranial Nerves II-XII grossly intact  Psychiatric: Apparent dementia    Additional Data:     Labs:    Results from last 7 days   Lab Units 24  0451 12/15/24  0536   WBC Thousand/uL 11.93* 11.94*   HEMOGLOBIN g/dL 10.9* 11.6   HEMATOCRIT % 32.6* 35.6   PLATELETS Thousands/uL 329 297   SEGS PCT %  --  51   LYMPHO PCT %  --  37   MONO PCT %  --  8   EOS PCT %  --  2     Results from last 7 days   Lab Units 24  0451 12/15/24  0531 24  1301   POTASSIUM mmol/L 4.1   < > 4.0   CHLORIDE mmol/L 102   < > 104   CO2 mmol/L 27    < > 28   BUN mg/dL 16   < > 14   CREATININE mg/dL 0.78   < > 1.11   CALCIUM mg/dL 8.7   < > 8.8   ALK PHOS U/L  --   --  80   ALT U/L  --   --  121*   AST U/L  --   --  102*    < > = values in this interval not displayed.           * I Have Reviewed All Lab Data Listed Above.  * Additional Pertinent Lab Tests Reviewed: All Labs For Current Hospital Admission Reviewed    Imaging:    Imaging Reports Reviewed Today Include: KUB pending   Imaging Personally Reviewed by Myself Includes:  KUB pending    Recent Cultures (last 7 days):     Results from last 7 days   Lab Units 12/10/24  2157 12/10/24  2156   BLOOD CULTURE  Staphylococcus hominis*  Staphylococcus epidermidis*  Staphylococcus coagulase negative*  Gram-positive dion* No Growth After 5 Days.   GRAM STAIN RESULT  Gram positive cocci in clusters*  Gram positive rods*  --        Last 24 Hours Medication List:   Current Facility-Administered Medications   Medication Dose Route Frequency Provider Last Rate    acetaminophen  650 mg Oral Q6H PRN Quinn Rodriguez MD      albuterol  1 puff Inhalation Q4H PRN Quinn Rodriguez MD      ALPRAZolam  0.25 mg Oral HS PRN Quinn Rodriguez MD      aspirin  81 mg Oral Daily Quinn Rodriguez MD      atorvastatin  40 mg Oral Daily With Dinner Quinn Rodriguez MD      carvedilol  12.5 mg Oral BID With Meals Quinn Rodriguez MD      cyanocobalamin  1,000 mcg Oral Daily Clarence Myrick DO      docusate sodium  100 mg Oral BID Clarence Myrick DO      furosemide  20 mg Oral Daily Clarence Myrick DO      gabapentin  100 mg Oral TID Quinn Rodriguez MD      heparin (porcine)  5,000 Units Subcutaneous Q8H CAROLINE Quinn Rodriguez MD      insulin glargine  15 Units Subcutaneous HS Quinn Rodriguez MD      insulin lispro  1-5 Units Subcutaneous TID AC Quinn Rodriguez MD      losartan  25 mg Oral Daily Quinn La  Lakeshia Rodriguez MD      melatonin  3 mg Oral HS MOIRA Grimaldo      memantine  10 mg Oral BID Quinn Rodriguez MD      mirtazapine  15 mg Oral HS Quinn Rodriguez MD      OLANZapine  2.5 mg Intramuscular Q4H PRN Clarence Myrick DO      pantoprazole  40 mg Oral Early Morning Quinn Rodriguez MD      [START ON 12/18/2024] polyethylene glycol  17 g Oral Daily Clarence Myrick DO      senna  2 tablet Oral HS Clarence Myrick DO         AM-PAC Basic Mobility:  Basic Mobility Inpatient Raw Score: 18    JH-HLM Achieved: 6: Walk 10 steps or more  JH-HLM Goal: 6: Walk 10 steps or more    HLM Goal listed above. Continue with multidisciplinary rounding and encourage appropriate mobility to improve upon HLM goals.     Today, Patient Was Seen By: Clarence Myrick DO    ** Please Note: This note was completed in part utilizing Nuance Dragon One Medical software dictation.  Grammatical errors, random word insertions, spelling mistakes, and incomplete sentences may be an occasional consequence of this system secondary to software limitations, ambient noise, and hardware issues.  If you have any questions or concerns about the content, text, or information contained within the body of this dictation, please contact the provider for clarification. **

## 2024-12-17 NOTE — PROGRESS NOTES
I received a call from Clair stating the patient was scheduled for an appointment this morning but she is in the hospital again; note sent to clerical.     Clair states the patient was in a lot of pain and was unable to urinate or have bowel movements.  She also noted the hospital recommended increasing her dementia meds (ordered by [facility]).    I informed Clair once the patient is discharged, she will be rescheduled.

## 2024-12-17 NOTE — ASSESSMENT & PLAN NOTE
Diagnosed rectosigmoid colitis on previous admission, felt to be due to stercoral colitis or ischemic colitis or possible infectious  Etiology is constipation due to immobility and Alzheimer's dementia  TSH within normal limit  Patient having multiple bowel movements today.  Abdominal pain improved  Repeat KUB pending to evaluate stool burden  No indication for flexible sigmoidoscopy at this time  Continue bowel regimen

## 2024-12-18 ENCOUNTER — PATIENT OUTREACH (OUTPATIENT)
Dept: FAMILY MEDICINE CLINIC | Facility: CLINIC | Age: 79
End: 2024-12-18

## 2024-12-18 ENCOUNTER — TRANSITIONAL CARE MANAGEMENT (OUTPATIENT)
Dept: FAMILY MEDICINE CLINIC | Facility: CLINIC | Age: 79
End: 2024-12-18

## 2024-12-18 VITALS
OXYGEN SATURATION: 97 % | WEIGHT: 128.53 LBS | DIASTOLIC BLOOD PRESSURE: 61 MMHG | SYSTOLIC BLOOD PRESSURE: 130 MMHG | HEIGHT: 61 IN | BODY MASS INDEX: 24.27 KG/M2 | HEART RATE: 78 BPM | RESPIRATION RATE: 18 BRPM | TEMPERATURE: 98 F

## 2024-12-18 LAB
ERYTHROCYTE [DISTWIDTH] IN BLOOD BY AUTOMATED COUNT: 14.1 % (ref 11.6–15.1)
GLUCOSE SERPL-MCNC: 170 MG/DL (ref 65–140)
HCT VFR BLD AUTO: 32.1 % (ref 34.8–46.1)
HGB BLD-MCNC: 10.5 G/DL (ref 11.5–15.4)
MCH RBC QN AUTO: 30.5 PG (ref 26.8–34.3)
MCHC RBC AUTO-ENTMCNC: 32.7 G/DL (ref 31.4–37.4)
MCV RBC AUTO: 93 FL (ref 82–98)
PLATELET # BLD AUTO: 341 THOUSANDS/UL (ref 149–390)
PMV BLD AUTO: 9.1 FL (ref 8.9–12.7)
RBC # BLD AUTO: 3.44 MILLION/UL (ref 3.81–5.12)
WBC # BLD AUTO: 8.93 THOUSAND/UL (ref 4.31–10.16)

## 2024-12-18 PROCEDURE — 99239 HOSP IP/OBS DSCHRG MGMT >30: CPT | Performed by: INTERNAL MEDICINE

## 2024-12-18 PROCEDURE — 85027 COMPLETE CBC AUTOMATED: CPT | Performed by: INTERNAL MEDICINE

## 2024-12-18 PROCEDURE — 82948 REAGENT STRIP/BLOOD GLUCOSE: CPT

## 2024-12-18 RX ORDER — SENNOSIDES 8.6 MG
17.2 TABLET ORAL
Qty: 60 TABLET | Refills: 0 | Status: SHIPPED | OUTPATIENT
Start: 2024-12-18

## 2024-12-18 RX ORDER — DOCUSATE SODIUM 100 MG/1
100 CAPSULE, LIQUID FILLED ORAL 2 TIMES DAILY
Qty: 60 CAPSULE | Refills: 0 | Status: SHIPPED | OUTPATIENT
Start: 2024-12-18

## 2024-12-18 RX ADMIN — POLYETHYLENE GLYCOL 3350 17 G: 17 POWDER, FOR SOLUTION ORAL at 08:42

## 2024-12-18 RX ADMIN — MEMANTINE 10 MG: 5 TABLET ORAL at 08:44

## 2024-12-18 RX ADMIN — GABAPENTIN 100 MG: 100 CAPSULE ORAL at 08:44

## 2024-12-18 RX ADMIN — CARVEDILOL 12.5 MG: 12.5 TABLET, FILM COATED ORAL at 08:47

## 2024-12-18 RX ADMIN — INSULIN LISPRO 1 UNITS: 100 INJECTION, SOLUTION INTRAVENOUS; SUBCUTANEOUS at 08:43

## 2024-12-18 RX ADMIN — ASPIRIN 81 MG CHEWABLE TABLET 81 MG: 81 TABLET CHEWABLE at 08:43

## 2024-12-18 RX ADMIN — Medication 1000 MCG: at 08:47

## 2024-12-18 RX ADMIN — HEPARIN SODIUM 5000 UNITS: 5000 INJECTION INTRAVENOUS; SUBCUTANEOUS at 05:48

## 2024-12-18 RX ADMIN — DOCUSATE SODIUM 100 MG: 100 CAPSULE, LIQUID FILLED ORAL at 08:43

## 2024-12-18 RX ADMIN — LOSARTAN POTASSIUM 25 MG: 25 TABLET, FILM COATED ORAL at 08:48

## 2024-12-18 RX ADMIN — FUROSEMIDE 20 MG: 20 TABLET ORAL at 08:48

## 2024-12-18 RX ADMIN — PANTOPRAZOLE SODIUM 40 MG: 40 TABLET, DELAYED RELEASE ORAL at 05:48

## 2024-12-18 NOTE — DISCHARGE SUMMARY
Discharge Summary - Hospitalist   Name: Marci Arias 79 y.o. female I MRN: [id number]  Unit/Bed#: E5 -01 I Date of Admission: 12/14/2024   Date of Service: 12/18/2024 I Hospital Day: 4         Admitting Provider:  Quinn Rodriguez MD  Discharge Provider:  Clarence Myrick DO  Admission Date: 12/14/2024       Discharge Date: 12/18/24   LOS: 4  Primary Care Physician at Discharge: MOIRA Gan [phone]    HOSPITAL COURSE:  Marci Arias is a 79 y.o. female who presented abdominal pain as well as dysuria and urinary retention.  The patient has a past medical history of dementia, diabetes mellitus as well as iron deficiency anemia on ferrous sulfate.  The patient was found to have proctocolitis.  She was evaluated urgently in consultation by the GI service.  This was felt to be secondary to constipation.    The patient was monitored closely on the general medical floor and started on bowel prep with multiple bowel movements.  No urinary retention resolved and she had Yen catheter removed.    Repeat KUB showed improving constipation and she was started on bowel regimen with plans for outpatient follow-up with PCP.    She was discontinued on ferrous sulfate and there was no indication for colonoscopy.    Patient was cleared for discharge by the GI service, and will establish outpatient care with geriatrics.    At the time of discharge the patient was tolerating oral diet they were without acute complaint and they were medically cleared for discharge.  All questions were answered the patient's satisfaction and they were in agreement with the discharge plan.    DISCHARGE DIAGNOSES  * Proctocolitis  Assessment & Plan  Diagnosed rectosigmoid colitis on previous admission, felt to be due to stercoral colitis or ischemic colitis or possible infectious  Etiology is constipation due to immobility and Alzheimer's dementia  TSH within normal limit  Patient having  multiple bowel movements   Abdominal pain improved  Repeat KUB reviewed post bowel regimen with mild stool burden.  Should improve with bowel regimen  No indication for flexible sigmoidoscopy at this time  Discontinue ferrous sulfate at discharge  Will plan for discharge on MiraLAX, Colace and senna    Bacteremia due to coagulase-negative Staphylococcus  Assessment & Plan  Coag negative staph bacteremia  1 out of 2 blood cultures positive      Dysuria  Assessment & Plan  Suspect this is referred pain from her proctocolitis.  Her bladder looked abnormal on CT due to under distention  Urine cultures have been obtained, negative.  Completed a course of cefpodoxime and metronidazole    Acute urinary retention  Assessment & Plan  Related to proctocolitis  Remove Yen catheter  UA negative for infection    Late onset Alzheimer's dementia with other behavioral disturbance, unspecified dementia severity (HCC)  Assessment & Plan  Continue Namenda  Watch for confusion/delirium in the hospital  Fall precautions  Geriatrics consultation, appreciate recommendations  Needs to schedule senior care visit, has referral from PCP    Nonischemic cardiomyopathy (HCC)  Assessment & Plan  Continue Coreg 12.5 mg twice daily, losartan 25 mg daily   Furosemide resumed    Hyperlipidemia  Assessment & Plan  LFTs have  improved overall.  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 Mild to  moderate stool burden. Resident: Miah Hui I, the attending radiologist, have reviewed the images and agree with the final report above. Workstation performed: [id number]     XR abdomen 1 view kub  Result Date: 12/17/2024  Impression: No acute findings. Mild to moderate stool burden. Workstation performed: [id number]     CT abdomen pelvis with contrast  Result Date: 12/14/2024  Impression: 1. Mild interval increased extent of nonspecific rectosigmoid proctocolitis compared to CT abdomen pelvis 12/10/2024. 2. Mild bladder wall thickening may be due in part to underdistention however there is mild urothelial hyperenhancement. Correlate with urinalysis for cystitis. The study was marked in EPIC for immediate notification. Resident: DILIA MCCLOUD I, the attending radiologist, have reviewed the images and agree with the final report above. Workstation performed: [id number]       LABS  Results from last 7 days   Lab Units 12/18/24  0548 12/17/24  0451 12/15/24  0536 12/14/24  1538 12/14/24  1301 12/12/24  0436 12/11/24  1705   WBC Thousand/uL 8.93 11.93* 11.94*  --  8.28 15.03*  --    HEMOGLOBIN g/dL 10.5* 10.9* 11.6 11.7 11.4* 11.1* 12.1   HEMATOCRIT % 32.1* 32.6* 35.6 36.0 35.5 33.9*  --    MCV fL 93 92 91  --  92 92  --    PLATELETS Thousands/uL 341 329 297  --  301 234  --      Results from last 7 days   Lab Units 12/17/24  0451 12/15/24  0531 12/14/24  1301 12/12/24  0436   SODIUM mmol/L 136 138 137 133*   POTASSIUM mmol/L 4.1 3.4* 4.0 3.3*   CHLORIDE mmol/L 102 104 104 101   CO2 mmol/L 27 29 28 25   BUN mg/dL 16 11 14 20   CREATININE mg/dL 0.78 0.71 1.11 0.93   CALCIUM mg/dL 8.7 8.8 8.8 8.3*   ALBUMIN g/dL  --   --  3.5 3.3*   TOTAL BILIRUBIN mg/dL  --   --  0.42 0.82   ALK PHOS U/L  --   --  80 68   ALT U/L  --   --  121* 138*   AST U/L  --   --  102* 61*   EGFR ml/min/1.73sq m 73 81 47 59   GLUCOSE RANDOM mg/dL 163* 92 275* 121                  Results from last 7 days   Lab Units 12/18/24  0787  "12/17/24  2128 12/17/24  1555 12/17/24  1104 12/17/24  0726 12/16/24  2046 12/16/24  1533 12/16/24  1053 12/16/24  0658 12/15/24  2057   POC GLUCOSE mg/dl 170* 248* 273* 251* 143* 215* 174* 285* 140 194*         Results from last 7 days   Lab Units 12/15/24  0531   TSH 3RD GENERATON uIU/mL 1.118     Results from last 7 days   Lab Units 12/14/24  1538   LACTIC ACID mmol/L 0.7           Cultures:   Results from last 7 days   Lab Units 12/14/24  1302   COLOR UA  Colorless   CLARITY UA  Clear   SPEC GRAV UA  1.023   PH UA  6.0   LEUKOCYTES UA  Elevated glucose may cause decreased leukocyte values. See urine microscopic for UWBC result*   NITRITE UA  Negative   GLUCOSE UA mg/dl >=1000 (1%)*   KETONES UA mg/dl Negative   BILIRUBIN UA  Negative   BLOOD UA  Negative      Results from last 7 days   Lab Units 12/14/24  1302   RBC UA /hpf 4-10*   WBC UA /hpf 1-2   EPITHELIAL CELLS WET PREP /hpf None Seen   BACTERIA UA /hpf Occasional                  PHYSICAL EXAM:  Vitals:   Blood Pressure: 130/61 (12/18/24 0841)  Pulse: 78 (12/17/24 1537)  Temperature: 98 °F (36.7 °C) (12/18/24 0737)  Temp Source: Oral (12/18/24 0737)  Respirations: 18 (12/18/24 0737)  Height: 5' 1\" (154.9 cm) (12/14/24 1807)  Weight - Scale: 58.3 kg (128 lb 8.5 oz) (12/14/24 1807)  SpO2: 97 % (12/17/24 1537)      General: well appearing, no acute distress  HEENT: atraumatic, PERRLA, moist mucosa, normal pharynx, normal tonsils and adenoids, normal tongue, no fluid in sinuses  Neck: Trachea midline, no carotid bruit, no masses  Respiratory: normal chest wall expansion, CTA B  Cardiovascular: RRR, no m/r/g, Normal S1 and S2  Abdomen: Soft, non-tender, non-distended, normal bowel sounds in all quadrants, no hepatosplenomegaly, no tympany  Rectal: deferred  Musculoskeletal: Moves all  Integumentary: warm, dry, and pink, with no visible rash, purpura, or petechia  Heme/Lymph: no lymphadenopathy, no bruises  Neurological: Cranial Nerves II-XII grossly " intact  Psychiatric: Apparent dementia      Discharge Disposition: Home/Self Care    AM-PAC Basic Mobility:  Basic Mobility Inpatient Raw Score: 18    JH-HLM Achieved: 6: Walk 10 steps or more  JH-HLM Goal: 6: Walk 10 steps or more    HLM Goal listed above. Continue with ongoing physical therapy and encourage appropriate mobility to improve upon HLM goals.      Test Results Pending at Discharge:           Medications   Summary of Medication Adjustments made as a result of this hospitalization: See discharge summary and AVS for medication changes  Medication Dosing Tapers - Please refer to Discharge Medication List for details on any medication dosing tapers (if applicable to patient).  Discharge Medication List: See after visit summary for reconciled discharge medications.     Diet restrictions:         Diet Orders   (From admission, onward)                 Start     Ordered    12/14/24 1755  Diet Fadi/CHO Controlled; Consistent Carbohydrate Diet Level 2 (5 carb servings/75 grams CHO/meal)  Diet effective now        References:    Adult Nutrition Support Algorithm    RD Therapeutic Diet Order Protocol   Question Answer Comment   Diet Type Fadi/CHO Controlled    Fadi/CHO Controlled Consistent Carbohydrate Diet Level 2 (5 carb servings/75 grams CHO/meal)    RD to adjust diet per protocol? Yes        12/14/24 1754                  Activity restrictions: No strenuous activity  Discharge Condition: good    Outpatient Follow-Up and Discharge Instructions  See after visit summary section titled Discharge Instructions for information provided to patient and family.      Code Status: Level 1 - Full Code  Discharge Statement   I spent 86 minutes discharging the patient. This time was spent on the day of discharge. Greater than 50% of total time was spent with the patient and / or family counseling and / or coordination of care.    ** Please Note: This note was completed in part utilizing Nuance Dragon Medical One Software.   Grammatical errors, random word insertions, spelling mistakes, and incomplete sentences may be an occasional consequence of this system secondary to software limitations, ambient noise, and hardware issues.  If you have any questions or concerns about the content, text, or information contained within the body of this dictation, please contact the provider for clarification.**

## 2024-12-18 NOTE — ASSESSMENT & PLAN NOTE
Lab Results   Component Value Date    HGBA1C 7.3 (H) 09/28/2024   Resume home regimen at discharge

## 2024-12-18 NOTE — ASSESSMENT & PLAN NOTE
Diagnosed rectosigmoid colitis on previous admission, felt to be due to stercoral colitis or ischemic colitis or possible infectious  Etiology is constipation due to immobility and Alzheimer's dementia  TSH within normal limit  Patient having multiple bowel movements   Abdominal pain improved  Repeat KUB reviewed post bowel regimen with mild stool burden.  Should improve with bowel regimen  No indication for flexible sigmoidoscopy at this time  Discontinue ferrous sulfate at discharge  Will plan for discharge on MiraLAX, Colace and senna

## 2024-12-18 NOTE — DISCHARGE INSTR - AVS FIRST PAGE
Dear Marci Arias,     It was our pleasure to care for you here at [facility].  It is our hope that we were always able to exceed the expected standards for your care during your stay.  You were hospitalized due to constipation.  You were cared for on the fifth floor by Clarence Myrick DO with the [facility] who covers for your primary care physician (PCP), MOIRA Gan, while you were hospitalized.  If you have any questions or concerns related to this hospitalization, you may contact us at .  For follow up as well as any medication refills, we recommend that you follow up with your primary care physician.  A registered nurse will reach out to you by phone within a few days after your discharge to answer any additional questions that you may have after going home.  However, at this time we provide for you here, the most important instructions / recommendations at discharge:     Notable Medication Adjustments -   Resume MiraLAX once a day  Discontinue ferrous sulfate-iron tablets  Take Colace twice a day  Testing Required after Discharge -   None  Important follow up information -   PCP follow-up in 1 week, discussed iron infusions if you require iron therapy  Other Instructions -   None  Please review this entire after visit summary as additional general instructions including medication list, appointments, activity, diet, any pertinent wound care, and other additional recommendations from your care team that may be provided for you.      Sincerely,     Clarence Myrick DO and Nurse Breanna Sifuentes

## 2024-12-18 NOTE — PROGRESS NOTES
I received a call from Clair stating the patient was discharged yesterday and she is so relieved she is back home.  She noted the patient's iron supplement was discontinued because it may have been causing constipation.  Clair stated the patient was ordered 2 meds for constipation.    Clair is requesting a hospital follow up appointment; note sent to staff.

## 2024-12-18 NOTE — PROGRESS NOTES
Discharge instructions reviewed with granddaughter. All questions answered. IV & álvaro removed. Pt wheeled downstairs with all belongings.

## 2024-12-19 ENCOUNTER — TELEPHONE (OUTPATIENT)
Dept: FAMILY MEDICINE CLINIC | Facility: CLINIC | Age: 79
End: 2024-12-19

## 2024-12-19 ENCOUNTER — HOME CARE VISIT (OUTPATIENT)
Dept: HOME HEALTH SERVICES | Facility: HOME HEALTHCARE | Age: 79
End: 2024-12-19
Payer: COMMERCIAL

## 2024-12-19 VITALS
HEART RATE: 66 BPM | TEMPERATURE: 97.8 F | OXYGEN SATURATION: 95 % | DIASTOLIC BLOOD PRESSURE: 62 MMHG | SYSTOLIC BLOOD PRESSURE: 122 MMHG

## 2024-12-19 PROCEDURE — G0299 HHS/HOSPICE OF RN EA 15 MIN: HCPCS

## 2024-12-19 NOTE — TELEPHONE ENCOUNTER
Patient granddaughter Clair called office today requesting to be contacted by pcp or MA about pt having questions/ concerns with abdominal medications. Please contact pt. Thanks!

## 2024-12-20 NOTE — UTILIZATION REVIEW
NOTIFICATION OF ADMISSION DISCHARGE   This is a Notification of Discharge from [facility]. Please be advised that this patient has been discharge from our facility. Below you will find the admission and discharge date and time including the patient’s disposition.   UTILIZATION REVIEW CONTACT:  Kyung Márquez  Utilization   Network Utilization Review Department  Phone: [phone] x carefully listen to the prompts. All voicemails are confidential.  Email: [email or web address]     ADMISSION INFORMATION  PRESENTATION DATE: 12/14/2024 12:08 PM  OBERVATION ADMISSION DATE: N/A  INPATIENT ADMISSION DATE: 12/14/24  5:10 PM   DISCHARGE DATE: 12/18/2024 10:11 AM   DISPOSITION:Home with Home Health Care    Network Utilization Review Department  ATTENTION: Please call with any questions or concerns to [phone] and carefully listen to the prompts so that you are directed to the right person. All voicemails are confidential.   For Discharge needs, contact Care Management DC Support Team at [phone] opt. 2  Send all requests for admission clinical reviews, approved or denied determinations and any other requests to dedicated fax number below belonging to the campus where the patient is receiving treatment. List of dedicated fax numbers for the Facilities:  FACILITY NAME UR FAX NUMBER   ADMISSION DENIALS (Administrative/Medical Necessity) [phone]   DISCHARGE SUPPORT TEAM ([facility]) [phone]   PARENT CHILD HEALTH (Maternity/NICU/Pediatrics) [phone]   [facility] [phone]   [facility] [phone]   Counts include 234 beds at the [facility] [phone]   [facility] [phone]   [facility] [phone]   [facility] [phone]   [facility] [phone]   [facility]  [phone]   [facility] [phone]   [facility] [phone]   [facility] [phone]   [facility] [phone]

## 2024-12-23 ENCOUNTER — OFFICE VISIT (OUTPATIENT)
Dept: FAMILY MEDICINE CLINIC | Facility: CLINIC | Age: 79
End: 2024-12-23

## 2024-12-23 VITALS
WEIGHT: 127.8 LBS | RESPIRATION RATE: 18 BRPM | HEART RATE: 47 BPM | SYSTOLIC BLOOD PRESSURE: 98 MMHG | HEIGHT: 61 IN | DIASTOLIC BLOOD PRESSURE: 56 MMHG | OXYGEN SATURATION: 99 % | TEMPERATURE: 97 F | BODY MASS INDEX: 24.13 KG/M2

## 2024-12-23 DIAGNOSIS — C83.31 DIFFUSE LARGE B-CELL LYMPHOMA OF LYMPH NODES OF NECK (HCC): ICD-10-CM

## 2024-12-23 DIAGNOSIS — R32 INCONTINENCE IN FEMALE: ICD-10-CM

## 2024-12-23 DIAGNOSIS — R26.2 AMBULATORY DYSFUNCTION: ICD-10-CM

## 2024-12-23 DIAGNOSIS — R10.9 ABDOMINAL PAIN, UNSPECIFIED ABDOMINAL LOCATION: Primary | ICD-10-CM

## 2024-12-23 DIAGNOSIS — F32.0 CURRENT MILD EPISODE OF MAJOR DEPRESSIVE DISORDER WITHOUT PRIOR EPISODE (HCC): ICD-10-CM

## 2024-12-23 DIAGNOSIS — Z51.5 PALLIATIVE CARE PATIENT: ICD-10-CM

## 2024-12-23 DIAGNOSIS — F03.918 DEMENTIA WITH BEHAVIORAL DISTURBANCE (HCC): ICD-10-CM

## 2024-12-23 DIAGNOSIS — K64.9 HEMORRHOIDS, UNSPECIFIED HEMORRHOID TYPE: ICD-10-CM

## 2024-12-23 NOTE — PROGRESS NOTES
Transition of Care Visit  Name: Marci Arias      : 1945      MRN: [id number]  Encounter Provider: MOIRA Oreilly  Encounter Date: 2024   Encounter department: [facility]    Assessment & Plan  Abdominal pain, unspecified abdominal location  - Continue with bowel regiment and Omeprazole   - Family reports Pt only complaint when is setting up and when she sees them  - Family denies constipation, reports multiple BM daily  - If symptoms persist, can consider GI f/u  - F/u with Geriatric medicine        Dementia with behavioral disturbance  - F/u with senior care  - Continue with Memantine 10 mg BID, Xanax 0.25 mg PRN and Melatonin 5 mg        Hemorrhoids, unspecified hemorrhoid type  - Family denies bleeding  - Trial TUCK for symptoms management   Orders:    witch hazel-glycerin (TUCKS) topical pad; Insert 1 Pad into the rectum as needed for irritation    Incontinence in female  - Daughter requests adult briefs  Orders:    Incontinence Supply Disposable (Briefs Overnight Medium) MISC; Use 4 (four) times a day (before meals and at bedtime)    Incontinence Supplies MISC; Use 4 (four) times a day (before meals and at bedtime)    Ambulatory dysfunction  - Due to dementia  - Rotator (walker) ordered today  - Home aid and family assistance              History of Present Illness     Transitional Care Management Review:   Marci Arias is a 79 y.o. female here for TCM follow up.     During the TCM phone call patient stated:  TCM Call       Date and time call was made  2024  1:22 PM    Hospital care reviewed  Records reviewed    Patient was hospitialized at  [facility]    Date of Admission  24    Date of discharge  24    Diagnosis  Proctocolitis Principal problem    Disposition  Home    Were the patients medications reviewed and updated  No    Current Symptoms  None    Neausea severity  Moderate          TCM Call        Post hospital issues  None    Should patient be enrolled in anticoag monitoring?  No    Scheduled for follow up?  Yes    Patients specialists  Other (comment)    Other specialists names  [facility] SCHEDULE FOR 12/6/18 11AM    Did you obtain your prescribed medications  Yes    Do you need help managing your prescriptions or medications  No    Is transportation to your appointment needed  No    I have advised the patient to call PCP with any new or worsening symptoms  Griselda Valerio MA    Living Arrangements  Family members    Counseling  Patient    Comments  I SPOKE WITH PT'S DAUGHTER SHELLEY REDMOND          Patient is a 79 y.o. female whom  has a past medical history of Cancer (HCC), Chronic pain disorder, Diabetes mellitus (HCC), Diffuse large B cell lymphoma (HCC), Dysphagia, GERD without esophagitis, HTN (hypertension), Hyperlipidemia, Hypertension, MI (myocardial infarction) (HCC), Port-A-Cath in place (07/29/2019), and Thyroid cancer (HCC) (2018). who is seen today in office for TCM visit. Pt accompany by granddaughter and care giver.    Pt was admitted 2 times for abdominal pain. She was initially treated with abx for Proctocolitis during the first admission, and bowel program for constipation during last admission. At this visit family reports Pt is having regular BM, sometimes 3-4 times daily, non-diarrhea stools. They also reports that Pt still complaints of ABD pain, and they noticed hemorrhoid since last hospitalization. Denies any bleeding. Granddaughter reports Pt is taking the bowel regiment daily. She also reports that Pt complaint of not being able to urinate but daughter reports Pt is urinating. Pt completed abx for dysuria while admitted. Family reports compliance with all medications, and also notes that while Pt was admitted, the SLIM recommended increasing her dementia medication out Pt. Geriatric did see Pt in patient and Pt is following with Geriatric medicine next  "week.     Daughter also expresses concern about Pt falling, and reports that Pt fell in her bed twice last week. She is also requesting walker, hospital bed, adult briefs, and blue pad bed protector. She want to apply for disability placard for Pt transport to appointments.          Review of Systems   Constitutional:  Negative for activity change, appetite change, chills, diaphoresis, fatigue, fever and unexpected weight change.   HENT:  Negative for congestion, dental problem and trouble swallowing.    Respiratory:  Negative for cough, chest tightness and shortness of breath.    Cardiovascular:  Negative for chest pain, palpitations and leg swelling.   Gastrointestinal:  Positive for abdominal pain. Negative for abdominal distention, anal bleeding, blood in stool, constipation, diarrhea and nausea.   Genitourinary:  Positive for difficulty urinating and urgency. Negative for flank pain and hematuria.   Musculoskeletal:  Positive for gait problem. Negative for arthralgias, back pain, joint swelling and myalgias.   Skin: Negative.    Neurological:  Negative for dizziness, seizures, syncope, weakness and light-headedness.   Psychiatric/Behavioral:  Positive for confusion. Negative for agitation and sleep disturbance. The patient is not nervous/anxious.      Objective   BP 98/56 (BP Location: Left arm, Patient Position: Sitting, Cuff Size: Standard)   Pulse (!) 47   Temp (!) 97 °F (36.1 °C) (Temporal)   Resp 18   Ht 5' 1\" (1.549 m)   Wt 58 kg (127 lb 12.8 oz)   SpO2 99%   BMI 24.15 kg/m²     Physical Exam  Constitutional:       General: She is not in acute distress.     Appearance: Normal appearance. She is not ill-appearing.   HENT:      Head: Normocephalic and atraumatic.      Right Ear: Tympanic membrane and ear canal normal.      Left Ear: Tympanic membrane and ear canal normal.      Mouth/Throat:      Mouth: Mucous membranes are moist.   Eyes:      Extraocular Movements: Extraocular movements intact.      " Pupils: Pupils are equal, round, and reactive to light.   Cardiovascular:      Rate and Rhythm: Normal rate.      Pulses: Normal pulses.      Heart sounds: Normal heart sounds.   Pulmonary:      Effort: Pulmonary effort is normal.      Breath sounds: Normal breath sounds.   Abdominal:      General: Bowel sounds are normal. There is no distension.      Palpations: Abdomen is soft. There is no mass.      Tenderness: There is abdominal tenderness. There is guarding.   Musculoskeletal:         General: Normal range of motion.      Cervical back: Normal range of motion.   Skin:     General: Skin is warm and dry.   Neurological:      Mental Status: She is alert and oriented to person, place, and time.   Psychiatric:         Behavior: Behavior normal.       Medications have been reviewed by provider in current encounter

## 2024-12-24 ENCOUNTER — HOME CARE VISIT (OUTPATIENT)
Dept: HOME HEALTH SERVICES | Facility: HOME HEALTHCARE | Age: 79
End: 2024-12-24
Payer: COMMERCIAL

## 2024-12-24 PROCEDURE — G0299 HHS/HOSPICE OF RN EA 15 MIN: HCPCS

## 2024-12-24 RX ORDER — FLUTICASONE PROPIONATE 50 MCG
SPRAY, SUSPENSION (ML) NASAL
Qty: 24 ML | Refills: 4 | Status: SHIPPED | OUTPATIENT
Start: 2024-12-24

## 2024-12-25 VITALS
SYSTOLIC BLOOD PRESSURE: 112 MMHG | OXYGEN SATURATION: 100 % | HEART RATE: 90 BPM | DIASTOLIC BLOOD PRESSURE: 60 MMHG | TEMPERATURE: 97.6 F

## 2024-12-25 RX ORDER — BROMPHENIRAMIN/PSEUDOEPHEDRINE 1-15MG/5ML
LIQUID (ML) ORAL
Qty: 100 EACH | Refills: 4 | Status: SHIPPED | OUTPATIENT
Start: 2024-12-25

## 2024-12-26 ENCOUNTER — HOME CARE VISIT (OUTPATIENT)
Dept: HOME HEALTH SERVICES | Facility: HOME HEALTHCARE | Age: 79
End: 2024-12-26
Payer: COMMERCIAL

## 2024-12-26 VITALS — DIASTOLIC BLOOD PRESSURE: 68 MMHG | OXYGEN SATURATION: 97 % | SYSTOLIC BLOOD PRESSURE: 138 MMHG | HEART RATE: 95 BPM

## 2024-12-26 DIAGNOSIS — E11.65 UNCONTROLLED TYPE 2 DIABETES MELLITUS WITH HYPERGLYCEMIA (HCC): ICD-10-CM

## 2024-12-26 LAB
DME PARACHUTE DELIVERY DATE ACTUAL: NORMAL
DME PARACHUTE DELIVERY DATE REQUESTED: NORMAL
DME PARACHUTE DELIVERY DATE REQUESTED: NORMAL
DME PARACHUTE ITEM DESCRIPTION: NORMAL
DME PARACHUTE ORDER STATUS: NORMAL
DME PARACHUTE ORDER STATUS: NORMAL
DME PARACHUTE SUPPLIER NAME: NORMAL
DME PARACHUTE SUPPLIER NAME: NORMAL
DME PARACHUTE SUPPLIER PHONE: NORMAL
DME PARACHUTE SUPPLIER PHONE: NORMAL

## 2024-12-26 PROCEDURE — G0151 HHCP-SERV OF PT,EA 15 MIN: HCPCS

## 2024-12-26 PROCEDURE — G0180 MD CERTIFICATION HHA PATIENT: HCPCS | Performed by: STUDENT IN AN ORGANIZED HEALTH CARE EDUCATION/TRAINING PROGRAM

## 2024-12-26 RX ORDER — EMPAGLIFLOZIN 25 MG/1
25 TABLET, FILM COATED ORAL EVERY MORNING
Qty: 90 TABLET | Refills: 1 | Status: SHIPPED | OUTPATIENT
Start: 2024-12-26

## 2024-12-26 NOTE — ASSESSMENT & PLAN NOTE
- F/u with senior care  - Continue with Memantine 10 mg BID, Xanax 0.25 mg PRN and Melatonin 5 mg

## 2024-12-27 ENCOUNTER — PATIENT OUTREACH (OUTPATIENT)
Dept: FAMILY MEDICINE CLINIC | Facility: CLINIC | Age: 79
End: 2024-12-27

## 2024-12-27 ENCOUNTER — HOME CARE VISIT (OUTPATIENT)
Dept: HOME HEALTH SERVICES | Facility: HOME HEALTHCARE | Age: 79
End: 2024-12-27
Payer: COMMERCIAL

## 2024-12-27 DIAGNOSIS — I42.9 CARDIOMYOPATHY (HCC): ICD-10-CM

## 2024-12-27 RX ORDER — ATORVASTATIN CALCIUM 40 MG/1
40 TABLET, FILM COATED ORAL DAILY
Qty: 90 TABLET | Refills: 0 | Status: SHIPPED | OUTPATIENT
Start: 2024-12-27

## 2024-12-27 RX ORDER — LOSARTAN POTASSIUM 25 MG/1
25 TABLET ORAL DAILY
Qty: 90 TABLET | Refills: 0 | Status: SHIPPED | OUTPATIENT
Start: 2024-12-27

## 2024-12-27 NOTE — PROGRESS NOTES
"I received a call from Nadja stating she was not happy with the patient's appointment 12/23.  Nadja stated she requesting several items for the patient but was told \"she doesn't need them\".   Nadja is requesting hospital bed as she notes the patient fell out of bed twice; note sent to PCP.  She is also requesting a transport chair as the patient gets very SOB walking long distances.    Nadja states she requested Glucerna but the patient has not received it.  Chart reviewed.  This was ordered 11/15 but was sent to print; note sent to Marly to assist.  The patient is also in need of Depends pull-ups (large) and chux; note sent.       "

## 2024-12-30 ENCOUNTER — HOME CARE VISIT (OUTPATIENT)
Dept: HOME HEALTH SERVICES | Facility: HOME HEALTHCARE | Age: 79
End: 2024-12-30
Payer: COMMERCIAL

## 2024-12-31 ENCOUNTER — OFFICE VISIT (OUTPATIENT)
Age: 79
End: 2024-12-31
Payer: COMMERCIAL

## 2024-12-31 VITALS
TEMPERATURE: 97.1 F | HEART RATE: 88 BPM | SYSTOLIC BLOOD PRESSURE: 114 MMHG | OXYGEN SATURATION: 96 % | HEIGHT: 61 IN | DIASTOLIC BLOOD PRESSURE: 68 MMHG | BODY MASS INDEX: 24.17 KG/M2 | WEIGHT: 128 LBS

## 2024-12-31 DIAGNOSIS — R26.81 GAIT INSTABILITY: ICD-10-CM

## 2024-12-31 DIAGNOSIS — Z79.899 POLYPHARMACY: ICD-10-CM

## 2024-12-31 DIAGNOSIS — R63.4 UNINTENDED WEIGHT LOSS: ICD-10-CM

## 2024-12-31 DIAGNOSIS — F03.918 DEMENTIA WITH BEHAVIORAL DISTURBANCE (HCC): Primary | ICD-10-CM

## 2024-12-31 DIAGNOSIS — F32.0 CURRENT MILD EPISODE OF MAJOR DEPRESSIVE DISORDER WITHOUT PRIOR EPISODE (HCC): ICD-10-CM

## 2024-12-31 DIAGNOSIS — F41.9 ANXIETY: ICD-10-CM

## 2024-12-31 PROCEDURE — 99214 OFFICE O/P EST MOD 30 MIN: CPT | Performed by: NURSE PRACTITIONER

## 2024-12-31 PROCEDURE — G2211 COMPLEX E/M VISIT ADD ON: HCPCS | Performed by: NURSE PRACTITIONER

## 2024-12-31 NOTE — PROGRESS NOTES
[facility]  [address], [address]  [place] [address]  [phone]    Social Work Follow-Up    MSW met with patient's granddaughter, Clair, to provide any additional support and resources.  MSW provided the following resources:   -Alzheimer's Association: Behaviors  -Alzheimer[employer]: [place]minaing  -Therapeutic Fibbing article    MSW will remain available as needed.

## 2024-12-31 NOTE — PROGRESS NOTES
Assessment & Plan:   Geriatric Evaluation  Memory  MOCA:  previously <10, not repeated today.  ID/recall:  3/0   Cognition update: pt having more forgetfulness and more irritablility.  Sleeping pattern worse, getting more easily agitated  Discussed progression of AD, these are common symptoms.  Resources given by ALDEN on how to help patient.    Pt is assist adl/dependent iadls.  Lives with nathaniel, has HHA 81 hours (recently increased in November)  Imaging/labs within last 6 mo:  no imaging.  B12 226 (12/17/24), TSH 1.118  Taking B12 supplement per med review  Pt's current cognitive level is consistent with moderate-severe AD  Memory medications: namenda 10mg bid  Discussed with pt's dgt - at this point, would not increase dose namenda - 10mg bid is therapeutic dose.  With pt's progression of memory loss and gi complaints, would consider backing dose down a little.  No changes made at this time.  Weight change in 6 mo:  up and down.  Overall down 6 months since 4/2024, however, struggling with GI issues and up 4 pounds in last 3 months.  Continue to stay active.  Current activity level:  decreased.  Participates in some social, cognitive, physical activity.  Safety concerns:  none, pt is cared for 24/7  Level of care:  24/7 cares required for safety and assist with adls.  Nathaniel provides at present.  Receives 81 hours from waiver.  Caregiver stress:  ongoing memory loss.  Monitor for changes in mood, sleep, pain control.  Notify provider if any concerns  Optimize all acute and chronic conditions.  Continue to follow-up with PCP and specialist as directed for management  Vascular risk factors:  DM2, RONAK, hld, htn   Geriatric syndromes:  dementia, anxiety/depression, gait instability w/h/o falls, polypharmacy, constipation, unintended weight loss.  Changes in chronic conditions:  stable.  Ongoing gi issues, following with pcp  Ensure adequate hydration, good nutrition  Recommended next follow up:  6  months    Mobility  Baseline ambulation: hand held assist- has walker/cane, won't use.  Fall type: fall oob x2  Granddgt requesting parking placard for pt as it is difficult for her to ambulate long distances and she can not be left alone for family to park car.  Will complete and mail to patient  Nathaniel request hospital bed - per notes pcp working on at present.  PT OT needs:   currently on home care services.  Fall precautions    Mood  Baseline mood:  can get more irritable  Do not recommend further medication at this time.  Cont to monitor, cont reorient, redirect, reassure.  Try not to confront pt when she is angry unless safety is an issue.  Pharmalogical interventions:  pt currently takes prn xanax and remeron, takes gabapentin   Non pharmalogical interventions:  Encourage relaxation techniques, emotional support.      Medication Review  Medications seem appropriate for current conditions  Patient is at risk for increased side effects secondary to polypharmacy;  Cont with GDR of medication list as able.  Patient is taking the following medications that meet Beer's Criteria to monitor/avoid:  xanax- pt using regularly- monitor for cognitive and ambulatory risk.  Gabapentin - low dose - can also cause some cog fog and ataxia- monitor  Check with pharmacist/provider before starting any new OTC/prescription medications for potential cognitive side effects    5.  Other Geriatric Syndromes:  Weight loss:  up and down, having gi issues.  Has glucerna ordered, appetite recently doing well.  Nathaniel took all medications away from pt (even otc) as she is concerned that pt may take too much, worsening symptoms.  Family cont with sm frequent meals and snacks that pt enjoys.  Cont to socialize during meals.  Chronic pain:  stable at present.  Cont w/gabapentin.  Avoid nsaids.  Recommend prn tylenol/topical analgesic if needed.  Insomnia:  pt taking mirtazapine, xanax, and melatonin for sleep.  Could try increase of  melatonin to max dose 10mg on bad sleep nights.  Cont good sleep hygiene techniques - avoid caffeine and sugar use, keep busy during the day, provide quiet sleep environment at night.    HPI:  We had the pleasure of evaluating Marci Arias who is a 79 y.o. female in Geriatric follow up today.  Previous MOCA:  <10 previously.  Comorbidities include dementia, ambulatory dysfunction, insomnia.  She lives with her granddgt  Ms. Arias is in the office with her granddgt (Clair) and caregiver (Taya)    Memory update per patient and granddgt (d/t memory loss and language barrier):  Doctor said to increase the dose of namenda d/t more forgetfulness.  She is asking about relatives that passed away, then she gets very depressed all over again.  She says she can't go to [facility] - starts again with complaints each time with provider or with someone new.  She was asking about richard when richard just happened.  Bowels are moving ok.  She is not sleeping well at night.  She gets angry when she can't have coffee.  SHe is going to trash getting things out.  Has camera in kitchen.  SHe is eating sugar and sweets (granddgt is now putting all away).  She sometimes is awake all day and all night.  Granddgt removed all sugar.  She has all meds hidden.  She is taking all the mail (even if it isn't hers).  Now incont- sometimes incont urine/stool.  She fell twice oob, difficult to change incont pads.  Dgt would like hospital bed, she did request from pcp.  She wont use cane, can't figure out how to use rolator.  PT/OT is coming in right now.  When they were there last, she had upset stomach.  Using melatonin, xanax, and mirtazapine for sleep.  She falls asleep 1am-0730.  She dozes all day.  She won't do day center.  She will play games with kids, but not much physical.   Just recently increased hours.  Sometimes convo good, but more word finding issues.  She enjoys the babies, not too much chores.    Current  activities:  Cognitive:  not too much  Physical:  walks around the house  Social:  goes out occ      She has difficulty finding the right word while speaking: Yes  Patient requires repeat information or ask the same question repeatedly: Yes    Orientation questions:    Name: Marci Arias  : 1945  Age: 80yo  Orientation:  Hospital ([facility]), no town.  No day, .   Care partner's name and relationship:  ones that take care of.  Taya Self  ID:  3/3  Recall:  0/3.     Function update:  Do you do your own bathing assist  Dressing dependent  Feeding yourself assist  Tolieting needs help with toliet  Continence both  Transferring hand hold  Uses : doesn't like can't, can't use rollator correctly    Can you make your own light meals No   Do light cleaning/chores No  Do you take care of your own medications No  Do you do your own shopping No  Do you handle your own financial affairs such as balancing your checkbook, paying bills, investments: No  Do you use a cell phone   Do you drive: No         Psychosocial update:  Have you or your family noted any change in your mood or personality:Yes  Are you currently or have you been treated in the past for depression or anxiety: Yes  Any hallucination or delusion: Yes  Sleep Issues: Yes  Hearing and vision issue: No - glasses, hearing good  ADL/IADL:  assist/dependent  Appetite/swallow:  good/good  Pain:  belly    Family Review of Behavior [facility]:    pacing. Yes    agressive/combative behavior. No   agitated. Yes   wandering. Yes   resistance to care. Yes   hoarding/hiding objects.Yes    suspicious    withdrawn No  rummaging/pillaging.Yes    misplacing/losing objects Yes  personal hygiene problems.Yes  forgetfulness of actions Yes    ROS: Review of Systems   Constitutional:  Negative for activity change, appetite change, chills and fatigue.   HENT:  Positive for hearing loss. Negative for congestion.    Eyes:  Negative for visual disturbance.    Respiratory:  Negative for cough and shortness of breath.    Cardiovascular:  Negative for chest pain.   Gastrointestinal:  Negative for abdominal pain, constipation, diarrhea, nausea and vomiting.   Genitourinary:  Negative for difficulty urinating.   Musculoskeletal:  Positive for gait problem. Negative for arthralgias and back pain.   Neurological:  Negative for dizziness and light-headedness.   Psychiatric/Behavioral:  Positive for agitation (occasional), decreased concentration (forgetful) and sleep disturbance (sometimes). Negative for dysphoric mood. The patient is not nervous/anxious.        Past Medical, surgical, social, medication and allergy history and patients previous records reviewed.  Allergies:   No Known Allergies    Medications:      Current Outpatient Medications:     Accu-Chek FastClix Lancets MISC, USAR PARA PROBAR AZUCAR EN LA TWAN MANISHA VECES AL JEFFREY, Disp: 100 each, Rfl: 5    acetaminophen (TYLENOL) 650 mg CR tablet, Take 1 tablet (650 mg total) by mouth every 8 (eight) hours as needed for mild pain, Disp: 30 tablet, Rfl: 0    albuterol (PROVENTIL HFA,VENTOLIN HFA) 90 mcg/act inhaler, Inhale 1 puff every 4 (four) hours as needed for wheezing, Disp: 18 g, Rfl: 10    ALPRAZolam (XANAX) 0.25 mg tablet, Take 1 tablet (0.25 mg total) by mouth daily at bedtime as needed for anxiety, Disp: 30 tablet, Rfl: 0    Aspirin Low Dose 81 MG chewable tablet, CHEW 1 TABLET BY MOUTH EVERY DAY, Disp: 90 tablet, Rfl: 0    atorvastatin (LIPITOR) 40 mg tablet, TAKE 1 TABLET BY MOUTH EVERY DAY, Disp: 90 tablet, Rfl: 0    BD Pen Needle Micro U/F 32G X 6 MM MISC, USE DAILY AS DIRECTED, Disp: 100 each, Rfl: 1    Blood Glucose Monitoring Suppl (OneTouch Verio Reflect) w/Device KIT, Check blood sugars once daily. Please substitute with appropriate alternative as covered by patient's insurance. Dx: E11.65, Disp: 1 kit, Rfl: 0    Blood Glucose Monitoring Suppl (OneTouch Verio) w/Device KIT, Use per   guidelines, Disp: 1 kit, Rfl: 0    Calcium Carb-Cholecalciferol 600-10 MG-MCG TABS, TAKE 1 TABLET BY MOUTH EVERY DAY IN THE MORNING, Disp: 90 tablet, Rfl: 1    carvedilol (COREG) 12.5 mg tablet, Take 1 tablet (12.5 mg total) by mouth 2 (two) times a day with meals, Disp: 180 tablet, Rfl: 0    cyanocobalamin (VITAMIN B-12) 1000 MCG tablet, Take 1 tablet (1,000 mcg total) by mouth daily, Disp: 90 tablet, Rfl: 3    docusate sodium (COLACE) 100 mg capsule, Take 1 capsule (100 mg total) by mouth 2 (two) times a day, Disp: 60 capsule, Rfl: 0    fluticasone (FLONASE) 50 mcg/act nasal spray, SPRAY 1 SPRAY INTO EACH NOSTRIL EVERY DAY, Disp: 24 mL, Rfl: 4    furosemide (LASIX) 20 mg tablet, Take 1 tablet (20 mg total) by mouth daily, Disp: 90 tablet, Rfl: 3    gabapentin (NEURONTIN) 100 mg capsule, TAKE 1 CAPSULE (100 MG TOTAL) BY MOUTH 3 (THREE) TIMES A DAY, Disp: 90 capsule, Rfl: 5    glucose blood (Accu-Chek Guide) test strip, USAR PARA EXAMINAR AZUCAR EN LA TWAN MANISHA VECES AL JEFFREY, Disp: 100 strip, Rfl: 5    glucose blood (OneTouch Verio) test strip, Check blood sugars once daily. Please substitute with appropriate alternative as covered by patient's insurance. Dx: E11.65, Disp: 100 each, Rfl: 3    guaiFENesin (MUCINEX) 600 mg 12 hr tablet, Take 2 tablets (1,200 mg total) by mouth every 12 (twelve) hours, Disp: 30 tablet, Rfl: 1    Incontinence Supplies MISC, Use 4 (four) times a day (before meals and at bedtime), Disp: 100 each, Rfl: 4    Incontinence Supply Disposable (Briefs Overnight Medium) MISC, Use 4 (four) times a day (before meals and at bedtime), Disp: 100 each, Rfl: 4    Insulin Glargine Solostar (Lantus SoloStar) 100 UNIT/ML SOPN, INJECT 0.11 ML (11 UNITS TOTAL) UNDER THE SKIN IN THE MORNING, Disp: 15 mL, Rfl: 1    Jardiance 25 MG TABS, TAKE 1 TABLET BY MOUTH EVERY DAY IN THE MORNING, Disp: 90 tablet, Rfl: 1    Lancets (OneTouch Delica Plus [id number]) MISC, TEST DAILY, Disp: 100 each, Rfl: 3    lidocaine  (Lidoderm) 5 %, Apply 1 patch topically over 12 hours daily Remove & Discard patch within 12 hours or as directed by MD, Disp: 15 patch, Rfl: 0    losartan (COZAAR) 25 mg tablet, TAKE 1 TABLET (25 MG TOTAL) BY MOUTH DAILY., Disp: 90 tablet, Rfl: 0    lubiprostone (AMITIZA) 8 mcg capsule, Take 1 capsule (8 mcg total) by mouth 2 (two) times a day with meals, Disp: 120 capsule, Rfl: 0    Melatonin 5 MG TABS, Take 5 mg by mouth daily at bedtime as needed for sleep, Disp: , Rfl:     memantine (NAMENDA) 10 mg tablet, TAKE 1 TABLET BY MOUTH TWICE A DAY, Disp: 180 tablet, Rfl: 1    mirtazapine (REMERON) 15 mg tablet, TAKE 1 TABLET BY MOUTH DAILY AT BEDTIME, Disp: 90 tablet, Rfl: 0    Nutritional Supplements (Glucerna) LIQD, Take 1 Bottle by mouth in the morning, Disp: 3500 mL, Rfl: 11    omeprazole (PriLOSEC) 20 mg delayed release capsule, Take 1 capsule (20 mg total) by mouth 2 (two) times a day, Disp: 180 capsule, Rfl: 3    ondansetron (ZOFRAN) 4 mg tablet, Take 1 tablet (4 mg total) by mouth every 8 (eight) hours as needed for nausea or vomiting, Disp: 20 tablet, Rfl: 0    senna (SENOKOT) 8.6 mg, Take 2 tablets (17.2 mg total) by mouth daily at bedtime, Disp: 60 tablet, Rfl: 0    sitaGLIPtin-metFORMIN (Janumet)  MG per tablet, Take 1 tablet by mouth 2 (two) times a day with meals, Disp: 60 tablet, Rfl: 1    witch hazel-glycerin (TUCKS) topical pad, Insert 1 Pad into the rectum as needed for irritation, Disp: 100 Pad, Rfl: 2    polyethylene glycol (MIRALAX) 17 g packet, Take 17 g by mouth 2 (two) times a day for 7 days (Patient taking differently: Take 17 g by mouth 2 (two) times a day. dissolve in 8 oz liquid of choice), Disp: 238 g, Rfl: 0    Vitals:  Vitals:    12/31/24 1428   BP: 114/68   Pulse: 88   Temp: (!) 97.1 °F (36.2 °C)   SpO2: 96%       History:  Past Medical History:   Diagnosis Date    Cancer (HCC)     Throat    Chronic pain disorder     Diabetes mellitus (HCC)     Diffuse large B cell lymphoma (HCC)      Dysphagia     GERD without esophagitis     HTN (hypertension)     Hyperlipidemia     Hypertension     MI (myocardial infarction) (HCC)     Port-A-Cath in place 07/29/2019    Thyroid cancer (HCC) 2018     Past Surgical History:   Procedure Laterality Date    BONE MARROW BIOPSY      BREAST BIOPSY Left     CARDIAC CATHETERIZATION N/A 12/9/2022    Procedure: Cardiac catheterization;  Surgeon: Jhonny Rain MD;  Location: [facility];  Service: Cardiology    CARDIAC CATHETERIZATION N/A 12/9/2022    Procedure: Cardiac Coronary Angiogram;  Surgeon: Jhonny Rain MD;  Location: [facility];  Service: Cardiology    CARDIAC CATHETERIZATION Left 12/9/2022    Procedure: Cardiac Left Heart Cath;  Surgeon: Jhonny Rain MD;  Location: [facility];  Service: Cardiology    CHOLECYSTECTOMY      IR PORT PLACEMENT  11/16/2018    IR PORT REMOVAL  3/22/2021    OTHER SURGICAL HISTORY      tendor tear repair to right shoulder    SHOULDER ARTHROSCOPY       Family History   Problem Relation Age of Onset    Diabetes Mother     Heart disease Sister     Uterine cancer Maternal Grandmother     Prostate cancer Paternal Grandfather     Hypertension Brother     Breast cancer Neg Hx      Social History     Socioeconomic History    Marital status:      Spouse name: Not on file    Number of children: Not on file    Years of education: Not on file    Highest education level: Not on file   Occupational History    Not on file   Tobacco Use    Smoking status: Never     Passive exposure: Never    Smokeless tobacco: Never   Vaping Use    Vaping status: Never Used   Substance and Sexual Activity    Alcohol use: Never     Comment: 0    Drug use: No    Sexual activity: Not Currently     Partners: Male   Other Topics Concern    Not on file   Social History Narrative    Not on file     Social Drivers of Health     Financial Resource Strain: Low Risk  (8/21/2024)    Overall Financial Resource Strain (CARDIA)     Difficulty of  Paying Living Expenses: Not hard at all   Food Insecurity: No Food Insecurity (2024)    Nursing - Inadequate Food Risk Classification     Worried About Running Out of Food in the Last Year: Never true     Ran Out of Food in the Last Year: Never true     Ran Out of Food in the Last Year: 1   Transportation Needs: No Transportation Needs (2024)    [place] : Transportation     Lack of Transportation (Medical): No     Lack of Transportation (Non-Medical): No     Patient Unable or Declines to Respond: No   Physical Activity: Not on file   Stress: Stress Concern Present (3/21/2022)    Citizen of [place] [employer] - Occupational Stress Questionnaire     Feeling of Stress : To some extent   Social Connections: Not on file   Intimate Partner Violence: Unknown (2024)    Nursing IPS     Feels Physically and Emotionally Safe: Not on file     Physically Hurt by Someone: Not on file     Humiliated or Emotionally Abused by Someone: Not on file     Physically Hurt by Someone: 2     Hurt or Threatened by Someone: 2   Housing Stability: Unknown (2024)    Nursing: Inadequate Housing Risk Classification     Has Housing: Not on file     Worried About Losing Housing: Not on file     Unable to Get Utilities: Not on file     Unable to Pay for Housing in the Last Year: 2     Has Housin     Past Surgical History:   Procedure Laterality Date    BONE MARROW BIOPSY      BREAST BIOPSY Left     CARDIAC CATHETERIZATION N/A 2022    Procedure: Cardiac catheterization;  Surgeon: Jhonny Rain MD;  Location: [facility];  Service: Cardiology    CARDIAC CATHETERIZATION N/A 2022    Procedure: Cardiac Coronary Angiogram;  Surgeon: Jhonny Rain MD;  Location: [facility];  Service: Cardiology    CARDIAC CATHETERIZATION Left 2022    Procedure: Cardiac Left Heart Cath;  Surgeon: Jhonny Rain MD;  Location: [facility];  Service: Cardiology    CHOLECYSTECTOMY      IR PORT  PLACEMENT  11/16/2018    IR PORT REMOVAL  3/22/2021    OTHER SURGICAL HISTORY      tendor tear repair to right shoulder    SHOULDER ARTHROSCOPY           Physical Exam:  Observed ambulation:  hand hold, slower, fairly steady   Physical Exam  Vitals and nursing note reviewed.   Constitutional:       General: She is not in acute distress.     Appearance: Normal appearance. She is well-developed. She is not diaphoretic.   HENT:      Head: Normocephalic.   Cardiovascular:      Rate and Rhythm: Normal rate.      Heart sounds: No murmur heard.     No friction rub. No gallop.   Pulmonary:      Effort: Pulmonary effort is normal. No respiratory distress.      Breath sounds: Normal breath sounds. No wheezing or rales.   Abdominal:      General: Bowel sounds are normal. There is no distension.      Palpations: Abdomen is soft.      Tenderness: There is no abdominal tenderness. There is no rebound.   Musculoskeletal:         General: Normal range of motion.   Skin:     General: Skin is warm and dry.   Neurological:      General: No focal deficit present.      Mental Status: She is alert. Mental status is at baseline.      Comments: Oriented to person, partial tps  Pleasant, cooperative, forgetful   Psychiatric:         Mood and Affect: Mood normal.         Behavior: Behavior normal.

## 2025-01-02 ENCOUNTER — HOME CARE VISIT (OUTPATIENT)
Dept: HOME HEALTH SERVICES | Facility: HOME HEALTHCARE | Age: 80
End: 2025-01-02
Payer: COMMERCIAL

## 2025-01-02 VITALS — HEART RATE: 96 BPM | SYSTOLIC BLOOD PRESSURE: 122 MMHG | DIASTOLIC BLOOD PRESSURE: 76 MMHG | OXYGEN SATURATION: 96 %

## 2025-01-02 PROCEDURE — G0151 HHCP-SERV OF PT,EA 15 MIN: HCPCS

## 2025-01-02 NOTE — CASE COMMUNICATION
Agency requires notification of falls.  Family reports fall at home in which pt was attempting to stand and dress self without having assist. Pt fell without headstrike or injury. Continued education provided to family regarding fall prevention strategies. Family was agreeable to purchase gait belt today for increased security with gait and stair negotiation.

## 2025-01-03 ENCOUNTER — HOME CARE VISIT (OUTPATIENT)
Dept: HOME HEALTH SERVICES | Facility: HOME HEALTHCARE | Age: 80
End: 2025-01-03
Payer: COMMERCIAL

## 2025-01-03 VITALS
DIASTOLIC BLOOD PRESSURE: 60 MMHG | SYSTOLIC BLOOD PRESSURE: 122 MMHG | TEMPERATURE: 97.9 F | OXYGEN SATURATION: 93 % | HEART RATE: 88 BPM

## 2025-01-03 PROCEDURE — G0299 HHS/HOSPICE OF RN EA 15 MIN: HCPCS

## 2025-01-06 ENCOUNTER — HOME CARE VISIT (OUTPATIENT)
Dept: HOME HEALTH SERVICES | Facility: HOME HEALTHCARE | Age: 80
End: 2025-01-06
Payer: COMMERCIAL

## 2025-01-06 VITALS — SYSTOLIC BLOOD PRESSURE: 128 MMHG | OXYGEN SATURATION: 94 % | HEART RATE: 66 BPM | DIASTOLIC BLOOD PRESSURE: 68 MMHG

## 2025-01-06 PROCEDURE — G0151 HHCP-SERV OF PT,EA 15 MIN: HCPCS

## 2025-01-08 ENCOUNTER — HOME CARE VISIT (OUTPATIENT)
Dept: HOME HEALTH SERVICES | Facility: HOME HEALTHCARE | Age: 80
End: 2025-01-08
Payer: COMMERCIAL

## 2025-01-08 VITALS — SYSTOLIC BLOOD PRESSURE: 120 MMHG | DIASTOLIC BLOOD PRESSURE: 62 MMHG | OXYGEN SATURATION: 94 % | HEART RATE: 75 BPM

## 2025-01-08 PROCEDURE — G0151 HHCP-SERV OF PT,EA 15 MIN: HCPCS

## 2025-01-10 DIAGNOSIS — T45.1X5A CHEMOTHERAPY-INDUCED PERIPHERAL NEUROPATHY (HCC): Chronic | ICD-10-CM

## 2025-01-10 DIAGNOSIS — Z92.21 STATUS POST CHEMOTHERAPY: ICD-10-CM

## 2025-01-10 DIAGNOSIS — F41.9 ANXIETY: ICD-10-CM

## 2025-01-10 DIAGNOSIS — F03.90 DEMENTIA WITHOUT BEHAVIORAL DISTURBANCE (HCC): ICD-10-CM

## 2025-01-10 DIAGNOSIS — I10 ESSENTIAL HYPERTENSION: ICD-10-CM

## 2025-01-10 DIAGNOSIS — G62.0 CHEMOTHERAPY-INDUCED PERIPHERAL NEUROPATHY (HCC): Chronic | ICD-10-CM

## 2025-01-10 DIAGNOSIS — I42.9 CARDIOMYOPATHY (HCC): ICD-10-CM

## 2025-01-10 DIAGNOSIS — Z51.5 PALLIATIVE CARE PATIENT: ICD-10-CM

## 2025-01-10 DIAGNOSIS — E11.65 UNCONTROLLED TYPE 2 DIABETES MELLITUS WITH HYPERGLYCEMIA (HCC): ICD-10-CM

## 2025-01-10 DIAGNOSIS — C83.31 DIFFUSE LARGE B-CELL LYMPHOMA OF LYMPH NODES OF NECK (HCC): ICD-10-CM

## 2025-01-10 DIAGNOSIS — R45.89 ANXIETY ABOUT HEALTH: ICD-10-CM

## 2025-01-10 LAB
DME PARACHUTE DELIVERY DATE EXPECTED: NORMAL
DME PARACHUTE DELIVERY DATE REQUESTED: NORMAL
DME PARACHUTE ITEM DESCRIPTION: NORMAL
DME PARACHUTE ORDER STATUS: NORMAL
DME PARACHUTE SUPPLIER NAME: NORMAL
DME PARACHUTE SUPPLIER PHONE: NORMAL

## 2025-01-10 RX ORDER — FLUTICASONE PROPIONATE 50 MCG
1 SPRAY, SUSPENSION (ML) NASAL DAILY
Qty: 24 ML | Refills: 0 | Status: SHIPPED | OUTPATIENT
Start: 2025-01-10

## 2025-01-10 RX ORDER — GABAPENTIN 100 MG/1
100 CAPSULE ORAL 3 TIMES DAILY
Qty: 90 CAPSULE | Refills: 0 | Status: SHIPPED | OUTPATIENT
Start: 2025-01-10

## 2025-01-10 RX ORDER — ASPIRIN 81 MG/1
81 TABLET, CHEWABLE ORAL DAILY
Qty: 90 TABLET | Refills: 0 | Status: SHIPPED | OUTPATIENT
Start: 2025-01-10

## 2025-01-10 RX ORDER — SITAGLIPTIN AND METFORMIN HYDROCHLORIDE 1000; 50 MG/1; MG/1
1 TABLET, FILM COATED ORAL 2 TIMES DAILY WITH MEALS
Qty: 60 TABLET | Refills: 0 | Status: SHIPPED | OUTPATIENT
Start: 2025-01-10

## 2025-01-10 RX ORDER — INSULIN GLARGINE 100 [IU]/ML
11 INJECTION, SOLUTION SUBCUTANEOUS DAILY
Qty: 15 ML | Refills: 0 | Status: SHIPPED | OUTPATIENT
Start: 2025-01-10

## 2025-01-10 RX ORDER — ALPRAZOLAM 0.25 MG/1
0.25 TABLET ORAL
Qty: 30 TABLET | Refills: 0 | Status: SHIPPED | OUTPATIENT
Start: 2025-01-10

## 2025-01-10 RX ORDER — CARVEDILOL 12.5 MG/1
12.5 TABLET ORAL 2 TIMES DAILY WITH MEALS
Qty: 180 TABLET | Refills: 0 | Status: SHIPPED | OUTPATIENT
Start: 2025-01-10

## 2025-01-10 RX ORDER — FUROSEMIDE 20 MG/1
20 TABLET ORAL DAILY
Qty: 90 TABLET | Refills: 0 | Status: SHIPPED | OUTPATIENT
Start: 2025-01-10

## 2025-01-10 RX ORDER — MIRTAZAPINE 15 MG/1
15 TABLET, FILM COATED ORAL
Qty: 90 TABLET | Refills: 0 | Status: SHIPPED | OUTPATIENT
Start: 2025-01-10

## 2025-01-10 RX ORDER — LOSARTAN POTASSIUM 25 MG/1
25 TABLET ORAL DAILY
Qty: 90 TABLET | Refills: 0 | Status: SHIPPED | OUTPATIENT
Start: 2025-01-10

## 2025-01-10 RX ORDER — MEMANTINE HYDROCHLORIDE 10 MG/1
10 TABLET ORAL 2 TIMES DAILY
Qty: 180 TABLET | Refills: 0 | Status: SHIPPED | OUTPATIENT
Start: 2025-01-10

## 2025-01-10 RX ORDER — ATORVASTATIN CALCIUM 40 MG/1
40 TABLET, FILM COATED ORAL DAILY
Qty: 90 TABLET | Refills: 0 | Status: SHIPPED | OUTPATIENT
Start: 2025-01-10

## 2025-01-13 LAB
DME PARACHUTE DELIVERY DATE ACTUAL: NORMAL
DME PARACHUTE DELIVERY DATE EXPECTED: NORMAL
DME PARACHUTE DELIVERY DATE REQUESTED: NORMAL
DME PARACHUTE ITEM DESCRIPTION: NORMAL
DME PARACHUTE ORDER STATUS: NORMAL
DME PARACHUTE SUPPLIER NAME: NORMAL
DME PARACHUTE SUPPLIER PHONE: NORMAL

## 2025-01-14 ENCOUNTER — OFFICE VISIT (OUTPATIENT)
Dept: FAMILY MEDICINE CLINIC | Facility: CLINIC | Age: 80
End: 2025-01-14

## 2025-01-14 VITALS
DIASTOLIC BLOOD PRESSURE: 60 MMHG | HEIGHT: 61 IN | HEART RATE: 84 BPM | BODY MASS INDEX: 23.77 KG/M2 | SYSTOLIC BLOOD PRESSURE: 112 MMHG | RESPIRATION RATE: 18 BRPM | TEMPERATURE: 97.6 F | WEIGHT: 125.9 LBS | OXYGEN SATURATION: 97 %

## 2025-01-14 DIAGNOSIS — Z51.5 PALLIATIVE CARE PATIENT: ICD-10-CM

## 2025-01-14 DIAGNOSIS — T45.1X5A CHEMOTHERAPY-INDUCED PERIPHERAL NEUROPATHY (HCC): Chronic | ICD-10-CM

## 2025-01-14 DIAGNOSIS — I10 ESSENTIAL HYPERTENSION: Primary | ICD-10-CM

## 2025-01-14 DIAGNOSIS — R63.4 UNINTENDED WEIGHT LOSS: ICD-10-CM

## 2025-01-14 DIAGNOSIS — L85.3 XEROSIS OF SKIN: ICD-10-CM

## 2025-01-14 DIAGNOSIS — E78.5 HYPERLIPIDEMIA, UNSPECIFIED HYPERLIPIDEMIA TYPE: ICD-10-CM

## 2025-01-14 DIAGNOSIS — F32.0 CURRENT MILD EPISODE OF MAJOR DEPRESSIVE DISORDER WITHOUT PRIOR EPISODE (HCC): ICD-10-CM

## 2025-01-14 DIAGNOSIS — R79.89 ELEVATED LFTS: ICD-10-CM

## 2025-01-14 DIAGNOSIS — G62.0 CHEMOTHERAPY-INDUCED PERIPHERAL NEUROPATHY (HCC): Chronic | ICD-10-CM

## 2025-01-14 DIAGNOSIS — Z79.4 TYPE 2 DIABETES MELLITUS WITH BOTH EYES AFFECTED BY MILD NONPROLIFERATIVE RETINOPATHY WITHOUT MACULAR EDEMA, WITH LONG-TERM CURRENT USE OF INSULIN (HCC): ICD-10-CM

## 2025-01-14 DIAGNOSIS — I50.20 ACC/AHA STAGE C HEART FAILURE WITH REDUCED EJECTION FRACTION (HCC): ICD-10-CM

## 2025-01-14 DIAGNOSIS — F03.918 DEMENTIA WITH BEHAVIORAL DISTURBANCE (HCC): ICD-10-CM

## 2025-01-14 DIAGNOSIS — K21.00 GASTROESOPHAGEAL REFLUX DISEASE WITH ESOPHAGITIS WITHOUT HEMORRHAGE: ICD-10-CM

## 2025-01-14 DIAGNOSIS — E11.3293 TYPE 2 DIABETES MELLITUS WITH BOTH EYES AFFECTED BY MILD NONPROLIFERATIVE RETINOPATHY WITHOUT MACULAR EDEMA, WITH LONG-TERM CURRENT USE OF INSULIN (HCC): ICD-10-CM

## 2025-01-14 DIAGNOSIS — G47.33 OBSTRUCTIVE SLEEP APNEA: ICD-10-CM

## 2025-01-14 DIAGNOSIS — M19.90 ARTHRITIS: ICD-10-CM

## 2025-01-14 DIAGNOSIS — I42.8 NONISCHEMIC CARDIOMYOPATHY (HCC): ICD-10-CM

## 2025-01-14 DIAGNOSIS — I11.0 HYPERTENSIVE HEART DISEASE WITH HEART FAILURE (HCC): ICD-10-CM

## 2025-01-14 DIAGNOSIS — Z92.21 STATUS POST CHEMOTHERAPY: ICD-10-CM

## 2025-01-14 DIAGNOSIS — R32 INCONTINENCE IN FEMALE: ICD-10-CM

## 2025-01-14 DIAGNOSIS — C83.31 DIFFUSE LARGE B-CELL LYMPHOMA OF LYMPH NODES OF NECK (HCC): ICD-10-CM

## 2025-01-14 PROBLEM — G30.1: Status: RESOLVED | Noted: 2024-11-22 | Resolved: 2025-01-14

## 2025-01-14 PROBLEM — F02.818: Status: RESOLVED | Noted: 2024-11-22 | Resolved: 2025-01-14

## 2025-01-14 LAB — SL AMB POCT HEMOGLOBIN AIC: 7.5 (ref ?–6.5)

## 2025-01-14 PROCEDURE — 3078F DIAST BP <80 MM HG: CPT | Performed by: NURSE PRACTITIONER

## 2025-01-14 PROCEDURE — 83036 HEMOGLOBIN GLYCOSYLATED A1C: CPT | Performed by: NURSE PRACTITIONER

## 2025-01-14 PROCEDURE — 3074F SYST BP LT 130 MM HG: CPT | Performed by: NURSE PRACTITIONER

## 2025-01-14 PROCEDURE — 99214 OFFICE O/P EST MOD 30 MIN: CPT | Performed by: NURSE PRACTITIONER

## 2025-01-14 RX ORDER — BROMPHENIRAMIN/PSEUDOEPHEDRINE 1-15MG/5ML
LIQUID (ML) ORAL
Qty: 100 EACH | Refills: 4 | Status: SHIPPED | OUTPATIENT
Start: 2025-01-14

## 2025-01-14 NOTE — ASSESSMENT & PLAN NOTE
Wt Readings from Last 3 Encounters:   01/14/25 57.1 kg (125 lb 14.4 oz)   12/31/24 58.1 kg (128 lb)   12/23/24 58 kg (127 lb 12.8 oz)     She has had ~20 pound weight loss over this year   Start supplement daily  Referral to dietician     Orders:    Nutritional Supplements (Glucerna) LIQD; Take 1 Bottle by mouth in the morning    Ambulatory Referral to Nutrition Services; Future

## 2025-01-14 NOTE — PROGRESS NOTES
Name: Marci Arias      : 1945      MRN: [id number]  Encounter Provider: MOIRA Gan  Encounter Date: 2025   Encounter department: [facility]  :  Assessment & Plan  Essential hypertension  BP is well controlled, no reports of dizziness or falls  continue current regimen at this time - carvedilol 12.5 mg bid, furosemide 20 mg daily, losartan 25 mg daily            Type 2 diabetes mellitus with both eyes affected by mild nonproliferative retinopathy without macular edema, with long-term current use of insulin (HCC)    Lab Results   Component Value Date    HGBA1C 7.5 (A) 2025    HGBA1C 7.3 (H) 2024    HGBA1C 7.0 (A) 2024     Following with endocrinology  Granddaughter reports compliance with medications   Discussed diet changes   Continue with Janumet  mg bid and Lantus 11 units daily         Orders:    POCT hemoglobin A1c    Nutritional Supplements (Glucerna) LIQD; Take 1 Bottle by mouth in the morning    Hyperlipidemia, unspecified hyperlipidemia type  Lab Results   Component Value Date    CHOLESTEROL 69 2024    CHOLESTEROL 58 2023    CHOLESTEROL 140 2022     Lab Results   Component Value Date    HDL 31 (L) 2024    HDL 27 (L) 2023    HDL 50 2022     Lab Results   Component Value Date    TRIG 96 2024    TRIG 99 2023    TRIG 94 2022     Lab Results   Component Value Date    NONHDLC 38 2024    NONHDLC 31 2023    NONHDLC 90 2022     Lab Results   Component Value Date    LDLCALC 19 2024     Continue atorvastatin 40 mg daily, ezetimibe 10 mg was stopped during hospitalization due to elevated LFT's, will repeat and restart if improved          Dementia with behavioral disturbance  Continue with Makayla, continue following with senior care   Continue alprazolam 0.25 mg HS     Orders:    Nutritional Supplements (Glucerna) LIQD; Take 1 Bottle by  mouth in the morning    Incontinence in female    Orders:    Incontinence Supplies MISC; Use 4 (four) times a day (before meals and at bedtime)    Incontinence Supply Disposable (Briefs Overnight Medium) MISC; Use 4 (four) times a day (before meals and at bedtime)    ACC/AHA stage C heart failure with reduced ejection fraction (HCC)  Wt Readings from Last 3 Encounters:   01/14/25 57.1 kg (125 lb 14.4 oz)   12/31/24 58.1 kg (128 lb)   12/23/24 58 kg (127 lb 12.8 oz)     Orders:    Nutritional Supplements (Glucerna) LIQD; Take 1 Bottle by mouth in the morning    Nonischemic cardiomyopathy (HCC)    Orders:    Nutritional Supplements (Glucerna) LIQD; Take 1 Bottle by mouth in the morning    Hypertensive heart disease with heart failure (HCC)  Wt Readings from Last 3 Encounters:   01/14/25 57.1 kg (125 lb 14.4 oz)   12/31/24 58.1 kg (128 lb)   12/23/24 58 kg (127 lb 12.8 oz)         Orders:    Nutritional Supplements (Glucerna) LIQD; Take 1 Bottle by mouth in the morning    Obstructive sleep apnea    Orders:    Nutritional Supplements (Glucerna) LIQD; Take 1 Bottle by mouth in the morning    Gastroesophageal reflux disease with esophagitis without hemorrhage    Orders:    Nutritional Supplements (Glucerna) LIQD; Take 1 Bottle by mouth in the morning    Chemotherapy-induced peripheral neuropathy (HCC)    Orders:    Nutritional Supplements (Glucerna) LIQD; Take 1 Bottle by mouth in the morning    Xerosis of skin    Orders:    Nutritional Supplements (Glucerna) LIQD; Take 1 Bottle by mouth in the morning    Arthritis    Orders:    Nutritional Supplements (Glucerna) LIQD; Take 1 Bottle by mouth in the morning    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)    Orders:    Nutritional Supplements (Glucerna) LIQD; Take 1 Bottle by mouth in the morning    Palliative care patient    Orders:    Nutritional Supplements (Glucerna) LIQD; Take 1 Bottle by mouth in the morning    Status post chemotherapy    Orders:     Nutritional Supplements (Glucerna) LIQD; Take 1 Bottle by mouth in the morning    Unintended weight loss  Wt Readings from Last 3 Encounters:   01/14/25 57.1 kg (125 lb 14.4 oz)   12/31/24 58.1 kg (128 lb)   12/23/24 58 kg (127 lb 12.8 oz)     She has had ~20 pound weight loss over this year   Start supplement daily  Referral to dietician     Orders:    Nutritional Supplements (Glucerna) LIQD; Take 1 Bottle by mouth in the morning    Ambulatory Referral to Nutrition Services; Future    Elevated LFTs  Lab Results   Component Value Date     (H) 12/14/2024     (H) 12/14/2024    ALKPHOS 80 12/14/2024     Repeat levels     Orders:    Comprehensive metabolic panel; Future    Lipid panel; Future          Falls Plan of Care: Medications that increase falls were reviewed.     Urinary Incontinence Plan of Care: counseling topics discussed: limiting fluid intake 3-4 hours before bed.       History of Present Illness     Patient is a 79 y.o. female whom  has a past medical history of Cancer (HCC), Chronic pain disorder, Diabetes mellitus (HCC), Diffuse large B cell lymphoma (HCC), Dysphagia, GERD without esophagitis, HTN (hypertension), Hyperlipidemia, Hypertension, MI (myocardial infarction) (HCC), Port-A-Cath in place (07/29/2019), and Thyroid cancer (HCC) (2018). who is seen today in office for follow up. She is accompanied by her granddaughter who is her caretaker.     The following portions of the patient's history were reviewed and updated as appropriate: allergies, current medications, past family history, past medical history, past social history, past surgical history and problem list.      Hypertension  This is a chronic problem. The current episode started more than 1 year ago. Pertinent negatives include no chest pain, palpitations or shortness of breath.     Review of Systems   Constitutional:  Positive for activity change, appetite change and unexpected weight change. Negative for chills and fever.  "  HENT:  Negative for ear pain and sore throat.    Eyes:  Negative for pain and visual disturbance.   Respiratory:  Negative for cough and shortness of breath.    Cardiovascular:  Negative for chest pain and palpitations.   Gastrointestinal:  Negative for abdominal pain and vomiting.   Genitourinary:  Negative for dysuria and hematuria.   Musculoskeletal:  Negative for arthralgias and back pain.   Skin:  Negative for color change and rash.   Neurological:  Negative for seizures and syncope.   All other systems reviewed and are negative.      Objective   /60 (BP Location: Right arm, Patient Position: Sitting, Cuff Size: Standard)   Pulse 84   Temp 97.6 °F (36.4 °C) (Temporal)   Resp 18   Ht 5' 1\" (1.549 m)   Wt 57.1 kg (125 lb 14.4 oz)   SpO2 97%   BMI 23.79 kg/m²      Physical Exam  Vitals and nursing note reviewed.   Constitutional:       General: She is not in acute distress.     Appearance: She is well-developed.   HENT:      Head: Normocephalic and atraumatic.      Right Ear: External ear normal.      Left Ear: External ear normal.   Eyes:      Conjunctiva/sclera: Conjunctivae normal.      Comments: glasses   Cardiovascular:      Rate and Rhythm: Normal rate and regular rhythm.   Pulmonary:      Effort: Pulmonary effort is normal. No respiratory distress.      Breath sounds: Normal breath sounds.   Abdominal:      Palpations: Abdomen is soft.      Tenderness: There is no abdominal tenderness.   Musculoskeletal:         General: No swelling.      Cervical back: Neck supple.   Skin:     General: Skin is warm and dry.      Capillary Refill: Capillary refill takes less than 2 seconds.   Neurological:      Mental Status: She is alert. Mental status is at baseline.   Psychiatric:         Mood and Affect: Mood normal.         "

## 2025-01-14 NOTE — ASSESSMENT & PLAN NOTE
Wt Readings from Last 3 Encounters:   01/14/25 57.1 kg (125 lb 14.4 oz)   12/31/24 58.1 kg (128 lb)   12/23/24 58 kg (127 lb 12.8 oz)     Orders:    Nutritional Supplements (Glucerna) LIQD; Take 1 Bottle by mouth in the morning

## 2025-01-14 NOTE — ASSESSMENT & PLAN NOTE
Lab Results   Component Value Date    CHOLESTEROL 69 09/28/2024    CHOLESTEROL 58 11/09/2023    CHOLESTEROL 140 12/03/2022     Lab Results   Component Value Date    HDL 31 (L) 09/28/2024    HDL 27 (L) 11/09/2023    HDL 50 12/03/2022     Lab Results   Component Value Date    TRIG 96 09/28/2024    TRIG 99 11/09/2023    TRIG 94 12/03/2022     Lab Results   Component Value Date    NONHDLC 38 09/28/2024    NONHDLC 31 11/09/2023    NONHDLC 90 12/03/2022     Lab Results   Component Value Date    LDLCALC 19 09/28/2024     Continue atorvastatin 40 mg daily, ezetimibe 10 mg was stopped during hospitalization due to elevated LFT's, will repeat and restart if improved

## 2025-01-14 NOTE — ASSESSMENT & PLAN NOTE
Orders:    Nutritional Supplements (Glucerna) LIQD; Take 1 Bottle by mouth in the morning     "Patient: Juan James    Procedure Summary     Date:  10/03/19 Room / Location:  [facility] ENDOSCOPY 9 /  [facility]    Anesthesia Start:  1342 Anesthesia Stop:  1418    Procedure:  COLONOSCOPY to cecum with cold polypectomy and hot snare polypectomies, with 5cc marker injection (N/A ) Diagnosis:       History of colon polyps      (History of colon polyps [Z86.010])    Surgeon:  Naldo Alves MD Provider:  Ralph Blanton MD    Anesthesia Type:  MAC ASA Status:  3          Anesthesia Type: MAC  Last vitals  BP   117/75 (10/03/19 1430)   Temp   36.7 °C (98.1 °F) (10/03/19 1250)   Pulse   72 (10/03/19 1430)   Resp   18 (10/03/19 1430)     SpO2   95 % (10/03/19 1430)     Post Anesthesia Care and Evaluation    Patient location during evaluation: PACU  Patient participation: complete - patient participated  Level of consciousness: awake  Pain score: 0  Pain management: adequate  Airway patency: patent  Anesthetic complications: No anesthetic complications  PONV Status: none  Cardiovascular status: acceptable  Respiratory status: acceptable  Hydration status: acceptable    Comments: /75 (BP Location: Left arm, Patient Position: Lying)   Pulse 72   Temp 36.7 °C (98.1 °F) (Oral)   Resp 18   Ht 185.4 cm (73\")   Wt 113 kg (249 lb 4 oz)   SpO2 95%   BMI 32.88 kg/m²       "

## 2025-01-14 NOTE — ASSESSMENT & PLAN NOTE
Lab Results   Component Value Date     (H) 12/14/2024     (H) 12/14/2024    ALKPHOS 80 12/14/2024     Repeat levels     Orders:    Comprehensive metabolic panel; Future    Lipid panel; Future

## 2025-01-14 NOTE — ASSESSMENT & PLAN NOTE
Continue with Namenda, continue following with senior care   Continue alprazolam 0.25 mg HS     Orders:    Nutritional Supplements (Glucerna) LIQD; Take 1 Bottle by mouth in the morning

## 2025-01-14 NOTE — ASSESSMENT & PLAN NOTE
Lab Results   Component Value Date    HGBA1C 7.5 (A) 01/14/2025    HGBA1C 7.3 (H) 09/28/2024    HGBA1C 7.0 (A) 07/24/2024     Following with endocrinology  Granddaughter reports compliance with medications   Discussed diet changes   Continue with Janumet  mg bid and Lantus 11 units daily         Orders:    POCT hemoglobin A1c    Nutritional Supplements (Glucerna) LIQD; Take 1 Bottle by mouth in the morning

## 2025-01-27 ENCOUNTER — PATIENT OUTREACH (OUTPATIENT)
Dept: FAMILY MEDICINE CLINIC | Facility: CLINIC | Age: 80
End: 2025-01-27

## 2025-01-27 NOTE — PROGRESS NOTES
I received a call from Nadja requesting a handicapped parking form be completed for the patient; note sent to PCP.

## 2025-01-31 ENCOUNTER — TELEPHONE (OUTPATIENT)
Age: 80
End: 2025-01-31

## 2025-01-31 NOTE — TELEPHONE ENCOUNTER
Parking placecard document completed by Yanni Pacheco & mailed to patient/family to sign & mail to [facility].    LSW remains available as needed.

## 2025-02-15 ENCOUNTER — APPOINTMENT (OUTPATIENT)
Dept: LAB | Facility: HOSPITAL | Age: 80
End: 2025-02-15
Payer: COMMERCIAL

## 2025-02-15 DIAGNOSIS — R79.0 LOW FERRITIN: ICD-10-CM

## 2025-02-15 DIAGNOSIS — E53.8 B12 DEFICIENCY: ICD-10-CM

## 2025-02-15 DIAGNOSIS — R79.89 ELEVATED LFTS: ICD-10-CM

## 2025-02-15 LAB
ALBUMIN SERPL BCG-MCNC: 4 G/DL (ref 3.5–5)
ALP SERPL-CCNC: 120 U/L (ref 34–104)
ALT SERPL W P-5'-P-CCNC: 34 U/L (ref 7–52)
ANION GAP SERPL CALCULATED.3IONS-SCNC: 6 MMOL/L (ref 4–13)
AST SERPL W P-5'-P-CCNC: 22 U/L (ref 13–39)
BASOPHILS # BLD AUTO: 0.09 THOUSANDS/ΜL (ref 0–0.1)
BASOPHILS NFR BLD AUTO: 1 % (ref 0–1)
BILIRUB SERPL-MCNC: 0.25 MG/DL (ref 0.2–1)
BUN SERPL-MCNC: 28 MG/DL (ref 5–25)
CALCIUM SERPL-MCNC: 9.5 MG/DL (ref 8.4–10.2)
CHLORIDE SERPL-SCNC: 102 MMOL/L (ref 96–108)
CHOLEST SERPL-MCNC: 83 MG/DL (ref ?–200)
CO2 SERPL-SCNC: 28 MMOL/L (ref 21–32)
CREAT SERPL-MCNC: 0.97 MG/DL (ref 0.6–1.3)
EOSINOPHIL # BLD AUTO: 0.27 THOUSAND/ΜL (ref 0–0.61)
EOSINOPHIL NFR BLD AUTO: 2 % (ref 0–6)
ERYTHROCYTE [DISTWIDTH] IN BLOOD BY AUTOMATED COUNT: 13.7 % (ref 11.6–15.1)
FERRITIN SERPL-MCNC: 44 NG/ML (ref 11–307)
GFR SERPL CREATININE-BSD FRML MDRD: 56 ML/MIN/1.73SQ M
GLUCOSE P FAST SERPL-MCNC: 125 MG/DL (ref 65–99)
HCT VFR BLD AUTO: 37.5 % (ref 34.8–46.1)
HDLC SERPL-MCNC: 38 MG/DL
HGB BLD-MCNC: 11.8 G/DL (ref 11.5–15.4)
IMM GRANULOCYTES # BLD AUTO: 0.09 THOUSAND/UL (ref 0–0.2)
IMM GRANULOCYTES NFR BLD AUTO: 1 % (ref 0–2)
IRON SATN MFR SERPL: 16 % (ref 15–50)
IRON SERPL-MCNC: 53 UG/DL (ref 50–212)
LDLC SERPL CALC-MCNC: 25 MG/DL (ref 0–100)
LYMPHOCYTES # BLD AUTO: 4.92 THOUSANDS/ΜL (ref 0.6–4.47)
LYMPHOCYTES NFR BLD AUTO: 43 % (ref 14–44)
MAGNESIUM SERPL-MCNC: 1.5 MG/DL (ref 1.9–2.7)
MCH RBC QN AUTO: 28.9 PG (ref 26.8–34.3)
MCHC RBC AUTO-ENTMCNC: 31.5 G/DL (ref 31.4–37.4)
MCV RBC AUTO: 92 FL (ref 82–98)
MONOCYTES # BLD AUTO: 0.89 THOUSAND/ΜL (ref 0.17–1.22)
MONOCYTES NFR BLD AUTO: 8 % (ref 4–12)
NEUTROPHILS # BLD AUTO: 5.18 THOUSANDS/ΜL (ref 1.85–7.62)
NEUTS SEG NFR BLD AUTO: 45 % (ref 43–75)
NONHDLC SERPL-MCNC: 45 MG/DL
NRBC BLD AUTO-RTO: 0 /100 WBCS
PLATELET # BLD AUTO: 286 THOUSANDS/UL (ref 149–390)
PMV BLD AUTO: 9.9 FL (ref 8.9–12.7)
POTASSIUM SERPL-SCNC: 4.1 MMOL/L (ref 3.5–5.3)
PROT SERPL-MCNC: 8.2 G/DL (ref 6.4–8.4)
RBC # BLD AUTO: 4.09 MILLION/UL (ref 3.81–5.12)
SODIUM SERPL-SCNC: 136 MMOL/L (ref 135–147)
TIBC SERPL-MCNC: 323.4 UG/DL (ref 250–450)
TRANSFERRIN SERPL-MCNC: 231 MG/DL (ref 203–362)
TRIGL SERPL-MCNC: 98 MG/DL (ref ?–150)
UIBC SERPL-MCNC: 270 UG/DL (ref 155–355)
VIT B12 SERPL-MCNC: 283 PG/ML (ref 180–914)
WBC # BLD AUTO: 11.44 THOUSAND/UL (ref 4.31–10.16)

## 2025-02-15 PROCEDURE — 83540 ASSAY OF IRON: CPT

## 2025-02-15 PROCEDURE — 85025 COMPLETE CBC W/AUTO DIFF WBC: CPT

## 2025-02-15 PROCEDURE — 83735 ASSAY OF MAGNESIUM: CPT

## 2025-02-15 PROCEDURE — 36415 COLL VENOUS BLD VENIPUNCTURE: CPT

## 2025-02-15 PROCEDURE — 82607 VITAMIN B-12: CPT

## 2025-02-15 PROCEDURE — 80061 LIPID PANEL: CPT

## 2025-02-15 PROCEDURE — 82728 ASSAY OF FERRITIN: CPT

## 2025-02-15 PROCEDURE — 80053 COMPREHEN METABOLIC PANEL: CPT

## 2025-02-15 PROCEDURE — 83550 IRON BINDING TEST: CPT

## 2025-02-16 DIAGNOSIS — E11.65 UNCONTROLLED TYPE 2 DIABETES MELLITUS WITH HYPERGLYCEMIA (HCC): ICD-10-CM

## 2025-02-16 DIAGNOSIS — R45.89 ANXIETY ABOUT HEALTH: ICD-10-CM

## 2025-02-17 ENCOUNTER — RESULTS FOLLOW-UP (OUTPATIENT)
Dept: FAMILY MEDICINE CLINIC | Facility: CLINIC | Age: 80
End: 2025-02-17

## 2025-02-17 ENCOUNTER — OFFICE VISIT (OUTPATIENT)
Dept: HEMATOLOGY ONCOLOGY | Facility: CLINIC | Age: 80
End: 2025-02-17
Payer: COMMERCIAL

## 2025-02-17 VITALS
HEIGHT: 61 IN | DIASTOLIC BLOOD PRESSURE: 86 MMHG | SYSTOLIC BLOOD PRESSURE: 134 MMHG | BODY MASS INDEX: 23.79 KG/M2 | TEMPERATURE: 97.6 F | OXYGEN SATURATION: 99 % | RESPIRATION RATE: 16 BRPM | HEART RATE: 80 BPM

## 2025-02-17 DIAGNOSIS — C83.31 DIFFUSE LARGE B-CELL LYMPHOMA OF LYMPH NODES OF NECK (HCC): Primary | ICD-10-CM

## 2025-02-17 DIAGNOSIS — R79.0 LOW FERRITIN: ICD-10-CM

## 2025-02-17 DIAGNOSIS — D47.2 MONOCLONAL GAMMOPATHY: ICD-10-CM

## 2025-02-17 DIAGNOSIS — E53.8 B12 DEFICIENCY: ICD-10-CM

## 2025-02-17 PROCEDURE — G2211 COMPLEX E/M VISIT ADD ON: HCPCS | Performed by: INTERNAL MEDICINE

## 2025-02-17 PROCEDURE — 99214 OFFICE O/P EST MOD 30 MIN: CPT | Performed by: INTERNAL MEDICINE

## 2025-02-17 NOTE — PROGRESS NOTES
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  The patient does not seem to have any obvious hint of recurrence of her lymphoma.  She did complain about sore throat and right-sided neck pain.  I did offer her an ultrasound of the neck soft tissue.  She was recently also evaluated by the ENT team which was negative for any obvious hint of malignant process.  Orders:    CBC and differential; Future    Comprehensive metabolic panel; Future    US head neck soft tissue; Future    Monoclonal gammopathy    Orders:    CBC and differential; Future    Comprehensive metabolic panel; Future    Iron Panel (Includes Ferritin, Iron Sat%, Iron, and TIBC); Future    Ferritin; Future    C-reactive protein; Future    Sedimentation rate, automated; Future    Magnesium; Future    LD,Blood; Future    IgG, IgA, IgM; Future    Immunoglobulin free LT chains blood; Future    Protein, Total and Protein Electrophoresis with Immunofixation; Future    B12 deficiency    Orders:    CBC and differential; Future    Comprehensive metabolic panel; Future    Vitamin B12; Future    Low ferritin    Orders:    Iron Panel (Includes Ferritin, Iron Sat%, Iron, and TIBC); Future    Ferritin; Future        Return in about 17 weeks (around 2025) for Office Visit 20 min, Labs.    History of Present Illness   Chief Complaint   Patient presents with    Follow-up   The patient was brought to the office by her family and caregiver via wheelchair.  She did complain about nonproductive cough.  Blood work from 2/15/2025 showed white cell count of 11.4 with normal hemoglobin of 11.8 and normal platelets.  Creatinine 0.9 with normal calcium  and liver enzymes.  Magnesium 1.5.  Vitamin B12 283.  Iron panel showed saturation of 16% with ferritin of 44.          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  She then started to notice significant odynophagia and dysphagia for liquids it is and solid nutrition.  Eventually, she had a CT scan of the neck on the 20th of September which showed soft tissue lesion in the left palatine tonsil with abnormal lymph nodes bilaterally in the neck compatible with neoplastic process.  She then had a biopsy of the left tonsil/left tonsillectomy on the 25th of September 2018 which was compatible with diffuse large B-cell lymphoma germinal center B phenotype.  The immunohistochemical staining came back positive for BCL2, BCL 6 and myc with Ki 67 around 70%.  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  During the hospital course the patient had another biopsy of the left tonsil to better understand the exact aggressiveness of her large B-cell lymphoma.  The biopsy on the 20th of November showed large B-cell lymphoma with BCL -2 and C myc level expressed surface a type without the myc or BCL gene rearrangement.  Her bone marrow biopsy on the  9th of November was negative for B-cell lymphoma involvement with normal bone marrow trilineage maturation.  She was also evaluated with an MRI of the brain during the hospital course which was negative for any hint of lymphoma involvement.    PET scan on the 14th of November showed IMPRESSION:  1.  FDG avid left posterior oropharyngeal lesion compatible with malignancy.  2.  FDG avid lymph nodes in the neck and left axilla compatible with malignancy.  3.  No FDG avid lymph nodes in the abdomen or pelvis suspicious for malignancy.  4.  Tiny focus of FDG uptake along the skin of the right upper quadrant anterior abdominal wall with mild skin thickening suggested here on CT    Her post treatment PET-CT 3/18/19 was read:  IMPRESSION:  1.  No evidence of hypermetabolic malignancy.  Deauville score of 1.  2.  Mild patchy FDG uptake in the right upper lobe corresponding to patchy  consolidation.  This may be infectious or inflammatory.  This has partially improved from the 2/25/2019 chest x-ray.     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 Dose adjusted R-EPOCH 11/21/18 (1 cycle)- switched to RCHOP after repeat biopsy/pathology reviewed  2. R-CHOP started 12/12/18 completed 3/7/19 (5 cycles)         11/20/2018 -  Cancer Staged    Staging form: Hodgkin and Non-Hodgkin Lymphoma, AJCC 8th Edition  - Clinical stage from 11/20/2018: Stage II (Diffuse large B-cell lymphoma) - Signed by Chencho Gordon MD on 4/1/2024  Stage prefix: Initial diagnosis               Review of Systems   Constitutional:  Positive for fatigue. Negative for chills and fever.   HENT:  Negative for ear pain and sore throat.    Eyes:  Negative for pain and visual disturbance.   Respiratory:  Positive for cough and shortness of breath.    Cardiovascular:  Negative for chest pain and palpitations.   Gastrointestinal:  Negative  for abdominal pain and vomiting.   Genitourinary:  Negative for dysuria and hematuria.   Musculoskeletal:  Negative for arthralgias and back pain.   Skin:  Negative for color change and rash.   Neurological:  Negative for seizures and syncope.   Psychiatric/Behavioral:  Positive for sleep disturbance.    All other systems reviewed and are negative.    Medical History Reviewed by provider this encounter:  Tobacco  Allergies  Meds  Problems  Med Hx  Surg Hx  Fam Hx     .  Current Outpatient Medications on File Prior to Visit   Medication Sig Dispense Refill    Accu-Chek FastClix Lancets MISC USAR PARA PROBAR AZUCAR EN LA TWAN MANISHA VECES AL JEFFREY 100 each 5    acetaminophen (TYLENOL) 650 mg CR tablet Take 1 tablet (650 mg total) by mouth every 8 (eight) hours as needed for mild pain 30 tablet 0    albuterol (PROVENTIL HFA,VENTOLIN HFA) 90 mcg/act inhaler Inhale 1 puff every 4 (four) hours as needed for wheezing 18 g 10    ALPRAZolam (XANAX) 0.25 mg tablet Take 1 tablet (0.25 mg total) by mouth daily at bedtime as needed for anxiety 30 tablet 0    aspirin (Aspirin Low Dose) 81 mg chewable tablet Chew 1 tablet (81 mg total) daily Chew 90 tablet 0    atorvastatin (LIPITOR) 40 mg tablet Take 1 tablet (40 mg total) by mouth daily 90 tablet 0    BD Pen Needle Micro U/F 32G X 6 MM MISC USE DAILY AS DIRECTED 100 each 1    Blood Glucose Monitoring Suppl (OneTouch Verio Reflect) w/Device KIT Check blood sugars once daily. Please substitute with appropriate alternative as covered by patient's insurance. Dx: E11.65 1 kit 0    Blood Glucose Monitoring Suppl (OneTouch Verio) w/Device KIT Use per  guidelines 1 kit 0    Calcium Carb-Cholecalciferol 600-10 MG-MCG TABS TAKE 1 TABLET BY MOUTH EVERY DAY IN THE MORNING 90 tablet 1    carvedilol (COREG) 12.5 mg tablet Take 1 tablet (12.5 mg total) by mouth 2 (two) times a day with meals 180 tablet 0    cyanocobalamin (VITAMIN B-12) 1000 MCG tablet Take 1 tablet (1,000 mcg  total) by mouth daily 90 tablet 3    docusate sodium (COLACE) 100 mg capsule Take 1 capsule (100 mg total) by mouth 2 (two) times a day 60 capsule 0    Empagliflozin (Jardiance) 25 MG TABS Take 1 tablet (25 mg total) by mouth every morning 90 tablet 1    fluticasone (FLONASE) 50 mcg/act nasal spray 1 spray into each nostril daily 24 mL 0    furosemide (LASIX) 20 mg tablet Take 1 tablet (20 mg total) by mouth daily 90 tablet 0    gabapentin (NEURONTIN) 100 mg capsule Take 1 capsule (100 mg total) by mouth 3 (three) times a day 90 capsule 0    glucose blood (Accu-Chek Guide) test strip USAR PARA EXAMINAR AZUCAR EN LA TWAN MANISHA VECES AL JEFFREY 100 strip 5    glucose blood (OneTouch Verio) test strip Check blood sugars once daily. Please substitute with appropriate alternative as covered by patient's insurance. Dx: E11.65 100 each 3    guaiFENesin (MUCINEX) 600 mg 12 hr tablet Take 2 tablets (1,200 mg total) by mouth every 12 (twelve) hours 30 tablet 1    Incontinence Supplies MISC Use 4 (four) times a day (before meals and at bedtime) 100 each 4    Incontinence Supply Disposable (Briefs Overnight Medium) MISC Use 4 (four) times a day (before meals and at bedtime) 100 each 4    Insulin Glargine Solostar (Lantus SoloStar) 100 UNIT/ML SOPN Inject 0.11 mL (11 Units total) under the skin in the morning 15 mL 0    Lancets (OneTouch Delica Plus [id number]) MISC TEST DAILY 100 each 3    lidocaine (Lidoderm) 5 % Apply 1 patch topically over 12 hours daily Remove & Discard patch within 12 hours or as directed by MD 15 patch 0    losartan (COZAAR) 25 mg tablet Take 1 tablet (25 mg total) by mouth daily 90 tablet 0    lubiprostone (AMITIZA) 8 mcg capsule Take 1 capsule (8 mcg total) by mouth 2 (two) times a day with meals 120 capsule 0    Melatonin 5 MG TABS Take 10 mg by mouth daily at bedtime as needed for sleep      memantine (NAMENDA) 10 mg tablet Take 1 tablet (10 mg total) by mouth 2 (two) times a day 180 tablet 0     "mirtazapine (REMERON) 15 mg tablet Take 1 tablet (15 mg total) by mouth daily at bedtime 90 tablet 0    Nutritional Supplements (Glucerna) LIQD Take 1 Bottle by mouth in the morning 3500 mL 11    omeprazole (PriLOSEC) 20 mg delayed release capsule Take 1 capsule (20 mg total) by mouth 2 (two) times a day 180 capsule 3    ondansetron (ZOFRAN) 4 mg tablet Take 1 tablet (4 mg total) by mouth every 8 (eight) hours as needed for nausea or vomiting 20 tablet 0    polyethylene glycol (MIRALAX) 17 g packet Take 17 g by mouth 2 (two) times a day for 7 days (Patient taking differently: Take 17 g by mouth 2 (two) times a day. dissolve in 8 oz liquid of choice) 238 g 0    senna (SENOKOT) 8.6 mg Take 2 tablets (17.2 mg total) by mouth daily at bedtime 60 tablet 0    sitaGLIPtin-metFORMIN (Janumet)  MG per tablet Take 1 tablet by mouth 2 (two) times a day with meals 60 tablet 0    witch hazel-glycerin (TUCKS) topical pad Insert 1 Pad into the rectum as needed for irritation 100 Pad 2     No current facility-administered medications on file prior to visit.      Social History     Tobacco Use    Smoking status: Never     Passive exposure: Never    Smokeless tobacco: Never   Vaping Use    Vaping status: Never Used   Substance and Sexual Activity    Alcohol use: Never     Comment: 0    Drug use: No    Sexual activity: Not Currently     Partners: Male         Objective   /86 (BP Location: Left arm, Patient Position: Sitting, Cuff Size: Adult)   Pulse 80   Temp 97.6 °F (36.4 °C) (Temporal)   Resp 16   Ht 5' 1\" (1.549 m)   SpO2 99%   BMI 23.79 kg/m²     Pain Screening:  Pain Score:  (sore)  ECOG   3  Physical Exam  Constitutional:       General: She is not in acute distress.     Appearance: She is well-developed. She is ill-appearing. She is not diaphoretic.   HENT:      Head: Normocephalic and atraumatic.      Nose: Nose normal.   Eyes:      General: No scleral icterus.        Right eye: No discharge.         Left " eye: No discharge.      Conjunctiva/sclera: Conjunctivae normal.      Pupils: Pupils are equal, round, and reactive to light.   Neck:      Thyroid: No thyromegaly.      Vascular: No JVD.      Trachea: No tracheal deviation.   Cardiovascular:      Rate and Rhythm: Normal rate and regular rhythm.      Heart sounds: Normal heart sounds. No murmur heard.     No friction rub.   Pulmonary:      Effort: Pulmonary effort is normal. No respiratory distress.      Breath sounds: Normal breath sounds. No stridor. No wheezing or rales.   Chest:      Chest wall: No tenderness.   Abdominal:      General: There is no distension.      Palpations: Abdomen is soft. There is no hepatomegaly or splenomegaly.      Tenderness: There is no abdominal tenderness. There is no guarding or rebound.   Musculoskeletal:         General: No tenderness or deformity. Normal range of motion.      Cervical back: Normal range of motion and neck supple.   Lymphadenopathy:      Cervical: No cervical adenopathy.   Skin:     General: Skin is warm and dry.      Coloration: Skin is not pale.      Findings: No erythema or rash.   Neurological:      Mental Status: She is alert and oriented to person, place, and time.      Cranial Nerves: No cranial nerve deficit.      Coordination: Coordination normal.      Deep Tendon Reflexes: Reflexes are normal and symmetric.   Psychiatric:         Behavior: Behavior normal.         Thought Content: Thought content normal.         Judgment: Judgment normal.         Labs: I have reviewed the following labs:  Lab Results   Component Value Date/Time    WBC 11.44 (H) 02/15/2025 09:49 AM    RBC 4.09 02/15/2025 09:49 AM    Hemoglobin 11.8 02/15/2025 09:49 AM    Hematocrit 37.5 02/15/2025 09:49 AM    MCV 92 02/15/2025 09:49 AM    MCH 28.9 02/15/2025 09:49 AM    RDW 13.7 02/15/2025 09:49 AM    Platelets 286 02/15/2025 09:49 AM    Segmented % 45 02/15/2025 09:49 AM    Lymphocytes % 43 02/15/2025 09:49 AM    Monocytes % 8 02/15/2025  09:49 AM    Eosinophils Relative 2 02/15/2025 09:49 AM    Basophils Relative 1 02/15/2025 09:49 AM    Immature Grans % 1 02/15/2025 09:49 AM    Absolute Neutrophils 5.18 02/15/2025 09:49 AM     Lab Results   Component Value Date/Time    Potassium 4.1 02/15/2025 09:49 AM    Chloride 102 02/15/2025 09:49 AM    CO2 28 02/15/2025 09:49 AM    BUN 28 (H) 02/15/2025 09:49 AM    Creatinine 0.97 02/15/2025 09:49 AM    Glucose, Fasting 125 (H) 02/15/2025 09:49 AM    Calcium 9.5 02/15/2025 09:49 AM    Corrected Calcium 8.9 12/12/2024 04:36 AM    AST 22 02/15/2025 09:49 AM    ALT 34 02/15/2025 09:49 AM    Alkaline Phosphatase 120 (H) 02/15/2025 09:49 AM    Total Protein 8.2 02/15/2025 09:49 AM    Albumin 4.0 02/15/2025 09:49 AM    Total Bilirubin 0.25 02/15/2025 09:49 AM    eGFR 56 02/15/2025 09:49 AM     Lab Results   Component Value Date/Time    WBC 11.44 (H) 02/15/2025 09:49 AM    RBC 4.09 02/15/2025 09:49 AM    Hemoglobin 11.8 02/15/2025 09:49 AM    Hematocrit 37.5 02/15/2025 09:49 AM    MCV 92 02/15/2025 09:49 AM    MCH 28.9 02/15/2025 09:49 AM    RDW 13.7 02/15/2025 09:49 AM    Platelets 286 02/15/2025 09:49 AM    Segmented % 45 02/15/2025 09:49 AM    Lymphocytes % 43 02/15/2025 09:49 AM    Monocytes % 8 02/15/2025 09:49 AM    Eosinophils Relative 2 02/15/2025 09:49 AM    Basophils Relative 1 02/15/2025 09:49 AM    Immature Grans % 1 02/15/2025 09:49 AM    Absolute Neutrophils 5.18 02/15/2025 09:49 AM      Lab Results   Component Value Date/Time    Sodium 136 02/15/2025 09:49 AM    Potassium 4.1 02/15/2025 09:49 AM    Chloride 102 02/15/2025 09:49 AM    CO2 28 02/15/2025 09:49 AM    ANION GAP 6 02/15/2025 09:49 AM    BUN 28 (H) 02/15/2025 09:49 AM    Creatinine 0.97 02/15/2025 09:49 AM    Glucose 163 (H) 12/17/2024 04:51 AM    Glucose, Fasting 125 (H) 02/15/2025 09:49 AM    Calcium 9.5 02/15/2025 09:49 AM    Corrected Calcium 8.9 12/12/2024 04:36 AM    AST 22 02/15/2025 09:49 AM    ALT 34 02/15/2025 09:49 AM    Alkaline  Phosphatase 120 (H) 02/15/2025 09:49 AM    Total Protein 8.2 02/15/2025 09:49 AM    Albumin 4.0 02/15/2025 09:49 AM    Total Bilirubin 0.25 02/15/2025 09:49 AM    eGFR 56 02/15/2025 09:49 AM      Lab Results   Component Value Date/Time    SPEP Interpretation See Comment 09/28/2024 09:38 AM    Beta-2 5.1 09/28/2024 09:38 AM     (L) 09/28/2024 09:38 AM    Ig Merom Free Light Chain 357.4 (H) 09/28/2024 09:38 AM    Ig Lambda Free Light Chain 65.8 (H) 09/28/2024 09:38 AM    Gamma Globulin 22.8 (H) 09/28/2024 09:38 AM    IGG 1,560 09/28/2024 09:38 AM     09/28/2024 09:38 AM    IGM 32 (L) 09/28/2024 09:38 AM      Lab Results   Component Value Date/Time    Iron 53 02/15/2025 09:49 AM    Iron Saturation 16 02/15/2025 09:49 AM    Ferritin 44 02/15/2025 09:49 AM    Vitamin B-12 283 02/15/2025 09:49 AM    Sed Rate 46 (H) 09/28/2024 09:38 AM    CRP <1.0 09/28/2024 09:38 AM

## 2025-02-17 NOTE — ASSESSMENT & PLAN NOTE
Diagnosed in November 2018, status post 1 cycle of R-EPOCH and 5 cycles of R-CHOP.  The patient does not seem to have any obvious hint of recurrence of her lymphoma.  She did complain about sore throat and right-sided neck pain.  I did offer her an ultrasound of the neck soft tissue.  She was recently also evaluated by the ENT team which was negative for any obvious hint of malignant process.  Orders:    CBC and differential; Future    Comprehensive metabolic panel; Future    US head neck soft tissue; Future

## 2025-02-18 ENCOUNTER — OFFICE VISIT (OUTPATIENT)
Dept: PODIATRY | Facility: CLINIC | Age: 80
End: 2025-02-18
Payer: COMMERCIAL

## 2025-02-18 DIAGNOSIS — F41.9 ANXIETY: ICD-10-CM

## 2025-02-18 DIAGNOSIS — G47.33 OBSTRUCTIVE SLEEP APNEA: ICD-10-CM

## 2025-02-18 DIAGNOSIS — I42.9 CARDIOMYOPATHY (HCC): ICD-10-CM

## 2025-02-18 DIAGNOSIS — R11.0 NAUSEA: ICD-10-CM

## 2025-02-18 DIAGNOSIS — R26.2 AMBULATORY DYSFUNCTION: ICD-10-CM

## 2025-02-18 DIAGNOSIS — B35.1 PAIN DUE TO ONYCHOMYCOSIS OF TOENAIL: ICD-10-CM

## 2025-02-18 DIAGNOSIS — G62.0 CHEMOTHERAPY-INDUCED PERIPHERAL NEUROPATHY (HCC): ICD-10-CM

## 2025-02-18 DIAGNOSIS — I42.8 NONISCHEMIC CARDIOMYOPATHY (HCC): ICD-10-CM

## 2025-02-18 DIAGNOSIS — E11.65 UNCONTROLLED TYPE 2 DIABETES MELLITUS WITH HYPERGLYCEMIA (HCC): ICD-10-CM

## 2025-02-18 DIAGNOSIS — L85.3 XEROSIS OF SKIN: ICD-10-CM

## 2025-02-18 DIAGNOSIS — E11.9 TYPE 2 DIABETES MELLITUS WITHOUT COMPLICATION, WITHOUT LONG-TERM CURRENT USE OF INSULIN (HCC): Primary | ICD-10-CM

## 2025-02-18 DIAGNOSIS — F03.90 DEMENTIA WITHOUT BEHAVIORAL DISTURBANCE (HCC): ICD-10-CM

## 2025-02-18 DIAGNOSIS — K21.00 GASTROESOPHAGEAL REFLUX DISEASE WITH ESOPHAGITIS WITHOUT HEMORRHAGE: ICD-10-CM

## 2025-02-18 DIAGNOSIS — T45.1X5A CHEMOTHERAPY-INDUCED PERIPHERAL NEUROPATHY (HCC): Chronic | ICD-10-CM

## 2025-02-18 DIAGNOSIS — M79.676 PAIN DUE TO ONYCHOMYCOSIS OF TOENAIL: ICD-10-CM

## 2025-02-18 DIAGNOSIS — R63.4 UNINTENDED WEIGHT LOSS: ICD-10-CM

## 2025-02-18 DIAGNOSIS — I11.0 HYPERTENSIVE HEART DISEASE WITH HEART FAILURE (HCC): ICD-10-CM

## 2025-02-18 DIAGNOSIS — M19.90 ARTHRITIS: ICD-10-CM

## 2025-02-18 DIAGNOSIS — T45.1X5A CHEMOTHERAPY-INDUCED PERIPHERAL NEUROPATHY (HCC): ICD-10-CM

## 2025-02-18 DIAGNOSIS — Z92.21 STATUS POST CHEMOTHERAPY: ICD-10-CM

## 2025-02-18 DIAGNOSIS — F03.918 DEMENTIA WITH BEHAVIORAL DISTURBANCE (HCC): ICD-10-CM

## 2025-02-18 DIAGNOSIS — C83.31 DIFFUSE LARGE B-CELL LYMPHOMA OF LYMPH NODES OF NECK (HCC): ICD-10-CM

## 2025-02-18 DIAGNOSIS — E11.3293 TYPE 2 DIABETES MELLITUS WITH BOTH EYES AFFECTED BY MILD NONPROLIFERATIVE RETINOPATHY WITHOUT MACULAR EDEMA, WITH LONG-TERM CURRENT USE OF INSULIN (HCC): ICD-10-CM

## 2025-02-18 DIAGNOSIS — J18.9 PNEUMONIA OF RIGHT LOWER LOBE DUE TO INFECTIOUS ORGANISM: ICD-10-CM

## 2025-02-18 DIAGNOSIS — I50.20 ACC/AHA STAGE C HEART FAILURE WITH REDUCED EJECTION FRACTION (HCC): ICD-10-CM

## 2025-02-18 DIAGNOSIS — R45.89 ANXIETY ABOUT HEALTH: ICD-10-CM

## 2025-02-18 DIAGNOSIS — I10 ESSENTIAL HYPERTENSION: ICD-10-CM

## 2025-02-18 DIAGNOSIS — Z79.4 TYPE 2 DIABETES MELLITUS WITH BOTH EYES AFFECTED BY MILD NONPROLIFERATIVE RETINOPATHY WITHOUT MACULAR EDEMA, WITH LONG-TERM CURRENT USE OF INSULIN (HCC): ICD-10-CM

## 2025-02-18 DIAGNOSIS — G62.0 CHEMOTHERAPY-INDUCED PERIPHERAL NEUROPATHY (HCC): Chronic | ICD-10-CM

## 2025-02-18 DIAGNOSIS — Z51.5 PALLIATIVE CARE PATIENT: ICD-10-CM

## 2025-02-18 PROCEDURE — 11721 DEBRIDE NAIL 6 OR MORE: CPT | Performed by: PODIATRIST

## 2025-02-18 PROCEDURE — 99213 OFFICE O/P EST LOW 20 MIN: CPT | Performed by: PODIATRIST

## 2025-02-18 RX ORDER — SITAGLIPTIN AND METFORMIN HYDROCHLORIDE 1000; 50 MG/1; MG/1
1 TABLET, FILM COATED ORAL 2 TIMES DAILY WITH MEALS
Qty: 60 TABLET | Refills: 0 | OUTPATIENT
Start: 2025-02-18

## 2025-02-18 RX ORDER — SITAGLIPTIN AND METFORMIN HYDROCHLORIDE 1000; 50 MG/1; MG/1
1 TABLET, FILM COATED ORAL 2 TIMES DAILY WITH MEALS
Qty: 60 TABLET | Refills: 0 | Status: SHIPPED | OUTPATIENT
Start: 2025-02-18

## 2025-02-18 RX ORDER — ONDANSETRON 4 MG/1
4 TABLET, FILM COATED ORAL EVERY 8 HOURS PRN
Qty: 20 TABLET | Refills: 0 | Status: SHIPPED | OUTPATIENT
Start: 2025-02-18

## 2025-02-18 RX ORDER — ALPRAZOLAM 0.25 MG/1
0.25 TABLET ORAL
Qty: 30 TABLET | Refills: 0 | OUTPATIENT
Start: 2025-02-18

## 2025-02-18 RX ORDER — ATORVASTATIN CALCIUM 40 MG/1
40 TABLET, FILM COATED ORAL DAILY
Qty: 90 TABLET | Refills: 0 | OUTPATIENT
Start: 2025-02-18

## 2025-02-18 RX ORDER — LOSARTAN POTASSIUM 25 MG/1
25 TABLET ORAL DAILY
Qty: 90 TABLET | Refills: 0 | OUTPATIENT
Start: 2025-02-18

## 2025-02-18 RX ORDER — GUAIFENESIN 600 MG/1
1200 TABLET, EXTENDED RELEASE ORAL EVERY 12 HOURS SCHEDULED
Qty: 30 TABLET | Refills: 0 | Status: SHIPPED | OUTPATIENT
Start: 2025-02-18

## 2025-02-18 RX ORDER — ALPRAZOLAM 0.25 MG/1
0.25 TABLET ORAL
Qty: 30 TABLET | Refills: 0 | Status: SHIPPED | OUTPATIENT
Start: 2025-02-18

## 2025-02-18 RX ORDER — MIRTAZAPINE 15 MG/1
15 TABLET, FILM COATED ORAL
Qty: 90 TABLET | Refills: 0 | OUTPATIENT
Start: 2025-02-18

## 2025-02-18 RX ORDER — CARVEDILOL 12.5 MG/1
12.5 TABLET ORAL 2 TIMES DAILY WITH MEALS
Qty: 180 TABLET | Refills: 0 | OUTPATIENT
Start: 2025-02-18

## 2025-02-18 RX ORDER — FUROSEMIDE 20 MG/1
20 TABLET ORAL DAILY
Qty: 90 TABLET | Refills: 0 | OUTPATIENT
Start: 2025-02-18

## 2025-02-18 RX ORDER — MEMANTINE HYDROCHLORIDE 10 MG/1
10 TABLET ORAL 2 TIMES DAILY
Qty: 180 TABLET | Refills: 0 | OUTPATIENT
Start: 2025-02-18

## 2025-02-18 RX ORDER — GABAPENTIN 100 MG/1
100 CAPSULE ORAL 3 TIMES DAILY
Qty: 90 CAPSULE | Refills: 0 | Status: SHIPPED | OUTPATIENT
Start: 2025-02-18

## 2025-02-18 NOTE — PROGRESS NOTES
Name: Marci Arias      : 1945      MRN: [id number]  Encounter Provider: Edwin Awan DPM  Encounter Date: 2025   Encounter department: [facility]  :  Assessment & Plan  Type 2 diabetes mellitus without complication, without long-term current use of insulin (HCC)  Diagnosis and options discussed with patient  Patient agreeable to the plan as stated below    -DM foot risk is moderate. Recommend continuing at risk foot care (Q9)  -Discussed DM risk to lower extremities, proper shoe gear, and daily monitoring of feet.   -Discussed weight loss and suitable exercise regiment.   -Reviewed most recent PCP visit on 2025  -Educated on A1C and the risks of poorly controlled Diabetes. Reviewed recent A1C:  Lab Results   Component Value Date    HGBA1C 7.5 (A) 2025    HGBA1C 7.3 (H) 2024    HGBA1C 8.5 2016   .     Lab Results   Component Value Date    HGBA1C 7.5 (A) 2025            Chemotherapy-induced peripheral neuropathy (HCC)  Check her shoes and feet daily.        Pain due to onychomycosis of toenail  Using nail nipper, oscar, and curette, nails were sharply debrided, reduced in thickness and length. Devitalized tissue removed. Patient tolerated well. Patient has Q9  findings and is recommended for at risk foot care every 9-10 weeks.          Ambulatory dysfunction             History of Present Illness   HPI  Marci Arias is a 79 y.o. female who presents for annual DM foot exam, she has known neuropathy. No acute issues today.       Review of Systems  As stated in HPI, otherwise normal    Medical History Reviewed by provider this encounter:  Tobacco  Allergies  Meds  Problems  Med Hx  Surg Hx  Fam Hx           Objective   There were no vitals taken for this visit.     Physical Exam  Vitals reviewed.   Cardiovascular:      Pulses: Normal pulses. no weak pulses.           Dorsalis pedis pulses are 2+ on the right side  and 2+ on the left side.        Posterior tibial pulses are 2+ on the right side and 2+ on the left side.   Pulmonary:      Effort: No respiratory distress.   Musculoskeletal:         General: Deformity (hammertoe) present. No tenderness.      Right foot: Decreased range of motion. Deformity present. No prominent metatarsal heads.      Left foot: Decreased range of motion. Deformity present. No prominent metatarsal heads.   Feet:      Right foot:      Protective Sensation: 10 sites tested.  5 sites sensed.      Skin integrity: Dry skin present. No ulcer, blister, skin breakdown or callus.      Toenail Condition: Right toenails are abnormally thick. Fungal disease present.     Left foot:      Protective Sensation: 10 sites tested.  5 sites sensed.      Skin integrity: Dry skin present. No ulcer, blister, skin breakdown or callus.      Toenail Condition: Left toenails are abnormally thick. Fungal disease present.  Skin:     General: Skin is dry.      Capillary Refill: Capillary refill takes less than 2 seconds.      Findings: No erythema.   Neurological:      Mental Status: She is alert and oriented to person, place, and time.      Sensory: Sensory deficit present.     Diabetic Foot Exam    Patient's shoes and socks removed.    Right Foot/Ankle   Right Foot Inspection  Skin Exam: dry skin. No blister, no callus, no maceration, no ulcer and no callus.     Toe Exam: swelling and right toe deformity.     Sensory   Vibration: absent  Monofilament testing: diminished    Vascular  Capillary refills: < 3 seconds  The right DP pulse is 2+. The right PT pulse is 2+.     Left Foot/Ankle  Left Foot Inspection  Skin Exam: dry skin. No maceration, no ulcer and no callus.     Toe Exam: swelling and left toe deformity.     Sensory   Vibration: absent  Monofilament testing: diminished    Vascular  Capillary refills: < 3 seconds  The left DP pulse is 2+. The left PT pulse is 2+.     Assign Risk Category  Deformity present  Loss of  protective sensation  No weak pulses  Risk: 2

## 2025-02-19 ENCOUNTER — HOSPITAL ENCOUNTER (OUTPATIENT)
Dept: ULTRASOUND IMAGING | Facility: HOSPITAL | Age: 80
Discharge: HOME | End: 2025-02-19
Attending: INTERNAL MEDICINE
Payer: COMMERCIAL

## 2025-02-19 DIAGNOSIS — C83.31 DIFFUSE LARGE B-CELL LYMPHOMA OF LYMPH NODES OF NECK (HCC): ICD-10-CM

## 2025-02-19 PROCEDURE — 76536 US EXAM OF HEAD AND NECK: CPT

## 2025-02-28 DIAGNOSIS — M85.80 AGE-RELATED BONE LOSS: ICD-10-CM

## 2025-02-28 RX ORDER — CALCIUM CARBONATE/VITAMIN D3 600 MG-10
1 TABLET ORAL EVERY MORNING
Qty: 90 TABLET | Refills: 1 | Status: SHIPPED | OUTPATIENT
Start: 2025-02-28

## 2025-03-04 DIAGNOSIS — E11.9 TYPE 2 DIABETES MELLITUS WITHOUT COMPLICATION, WITHOUT LONG-TERM CURRENT USE OF INSULIN (HCC): ICD-10-CM

## 2025-03-04 DIAGNOSIS — Z79.4 CURRENT USE OF INSULIN (HCC): ICD-10-CM

## 2025-03-05 RX ORDER — LANCETS
EACH MISCELLANEOUS
Qty: 102 EACH | Refills: 10 | Status: SHIPPED | OUTPATIENT
Start: 2025-03-05

## 2025-03-05 RX ORDER — BLOOD SUGAR DIAGNOSTIC
STRIP MISCELLANEOUS
Qty: 100 STRIP | Refills: 10 | Status: SHIPPED | OUTPATIENT
Start: 2025-03-05

## 2025-03-19 DIAGNOSIS — Z92.21 STATUS POST CHEMOTHERAPY: ICD-10-CM

## 2025-03-19 DIAGNOSIS — G62.0 CHEMOTHERAPY-INDUCED PERIPHERAL NEUROPATHY (HCC): Chronic | ICD-10-CM

## 2025-03-19 DIAGNOSIS — R45.89 ANXIETY ABOUT HEALTH: ICD-10-CM

## 2025-03-19 DIAGNOSIS — T45.1X5A CHEMOTHERAPY-INDUCED PERIPHERAL NEUROPATHY (HCC): Chronic | ICD-10-CM

## 2025-03-19 DIAGNOSIS — F41.9 ANXIETY: ICD-10-CM

## 2025-03-19 RX ORDER — GABAPENTIN 100 MG/1
100 CAPSULE ORAL 3 TIMES DAILY
Qty: 90 CAPSULE | Refills: 0 | Status: SHIPPED | OUTPATIENT
Start: 2025-03-19

## 2025-03-19 RX ORDER — ALPRAZOLAM 0.25 MG
TABLET ORAL
Qty: 30 TABLET | Refills: 0 | Status: SHIPPED | OUTPATIENT
Start: 2025-03-19

## 2025-03-20 RX ORDER — MIRTAZAPINE 15 MG/1
15 TABLET, FILM COATED ORAL
Qty: 90 TABLET | Refills: 0 | OUTPATIENT
Start: 2025-03-20

## 2025-03-20 RX ORDER — GABAPENTIN 100 MG/1
100 CAPSULE ORAL 3 TIMES DAILY
Qty: 90 CAPSULE | Refills: 0 | OUTPATIENT
Start: 2025-03-20

## 2025-03-20 RX ORDER — ALPRAZOLAM 0.25 MG
TABLET ORAL
Qty: 30 TABLET | Refills: 0 | OUTPATIENT
Start: 2025-03-20

## 2025-04-07 ENCOUNTER — PATIENT OUTREACH (OUTPATIENT)
Dept: FAMILY MEDICINE CLINIC | Facility: CLINIC | Age: 80
End: 2025-04-07

## 2025-04-07 NOTE — PROGRESS NOTES
I received a call from Clair stating the patient needs a new prescription for her pull-ups as she received a call from the supplier.  She notes the patient needs size medium pull-ups; note sent to Marly to assist.

## 2025-04-08 ENCOUNTER — TELEPHONE (OUTPATIENT)
Dept: FAMILY MEDICINE CLINIC | Facility: CLINIC | Age: 80
End: 2025-04-08

## 2025-04-08 DIAGNOSIS — I42.9 CARDIOMYOPATHY (HCC): ICD-10-CM

## 2025-04-08 DIAGNOSIS — F41.9 ANXIETY: ICD-10-CM

## 2025-04-08 RX ORDER — FUROSEMIDE 20 MG/1
20 TABLET ORAL DAILY
Qty: 90 TABLET | Refills: 0 | Status: SHIPPED | OUTPATIENT
Start: 2025-04-08

## 2025-04-08 RX ORDER — MIRTAZAPINE 15 MG/1
15 TABLET, FILM COATED ORAL
Qty: 90 TABLET | Refills: 0 | Status: SHIPPED | OUTPATIENT
Start: 2025-04-08

## 2025-04-08 NOTE — TELEPHONE ENCOUNTER
PCP SIGNATURE NEEDED FOR [employer] FORM RECEIVED VIA FAX AND PLACED IN PCP FOLDER TO BE DELIVERED AT ASSIGNED TIMES.    Standard Written Order.

## 2025-04-09 ENCOUNTER — TELEPHONE (OUTPATIENT)
Dept: OTOLARYNGOLOGY | Facility: CLINIC | Age: 80
End: 2025-04-09

## 2025-04-09 DIAGNOSIS — I42.9 CARDIOMYOPATHY (HCC): ICD-10-CM

## 2025-04-09 DIAGNOSIS — R32 INCONTINENCE IN FEMALE: Primary | ICD-10-CM

## 2025-04-09 RX ORDER — ASPIRIN 81 MG
81 TABLET,CHEWABLE ORAL DAILY
Qty: 90 TABLET | Refills: 0 | Status: SHIPPED | OUTPATIENT
Start: 2025-04-09

## 2025-04-09 NOTE — PROGRESS NOTES
Denise, I am going to send you the request. I did take notice you received a form from [employer] as well.

## 2025-04-09 NOTE — TELEPHONE ENCOUNTER
Pt requesting refill of Protonix 40 mg to [facility]/pharmacy #[id number] - [place]REED - [phone] not on med list please advise.

## 2025-04-11 RX ORDER — DIAPER,BRIEF,ADULT, DISPOSABLE
EACH MISCELLANEOUS 4 TIMES DAILY
Qty: 400 EACH | Refills: 11 | Status: SHIPPED | OUTPATIENT
Start: 2025-04-11

## 2025-04-21 ENCOUNTER — PATIENT OUTREACH (OUTPATIENT)
Dept: FAMILY MEDICINE CLINIC | Facility: CLINIC | Age: 80
End: 2025-04-21

## 2025-04-21 DIAGNOSIS — E11.65 UNCONTROLLED TYPE 2 DIABETES MELLITUS WITH HYPERGLYCEMIA (HCC): ICD-10-CM

## 2025-04-21 DIAGNOSIS — R45.89 ANXIETY ABOUT HEALTH: ICD-10-CM

## 2025-04-22 DIAGNOSIS — Z51.5 PALLIATIVE CARE PATIENT: ICD-10-CM

## 2025-04-22 DIAGNOSIS — C83.31 DIFFUSE LARGE B-CELL LYMPHOMA OF LYMPH NODES OF NECK (HCC): ICD-10-CM

## 2025-04-22 RX ORDER — FLUTICASONE PROPIONATE 50 MCG
SPRAY, SUSPENSION (ML) NASAL
Qty: 24 ML | Refills: 1 | Status: SHIPPED | OUTPATIENT
Start: 2025-04-22

## 2025-04-23 RX ORDER — ALPRAZOLAM 0.25 MG
0.25 TABLET ORAL
Qty: 30 TABLET | Refills: 0 | Status: SHIPPED | OUTPATIENT
Start: 2025-04-23

## 2025-04-23 RX ORDER — SITAGLIPTIN AND METFORMIN HYDROCHLORIDE 1000; 50 MG/1; MG/1
1 TABLET, FILM COATED ORAL 2 TIMES DAILY WITH MEALS
Qty: 60 TABLET | Refills: 0 | Status: SHIPPED | OUTPATIENT
Start: 2025-04-23

## 2025-04-28 ENCOUNTER — PROCEDURE VISIT (OUTPATIENT)
Dept: PODIATRY | Facility: CLINIC | Age: 80
End: 2025-04-28
Payer: COMMERCIAL

## 2025-04-28 VITALS — BODY MASS INDEX: 23.6 KG/M2 | WEIGHT: 125 LBS | HEIGHT: 61 IN

## 2025-04-28 DIAGNOSIS — B35.1 PAIN DUE TO ONYCHOMYCOSIS OF TOENAIL: ICD-10-CM

## 2025-04-28 DIAGNOSIS — G62.0 CHEMOTHERAPY-INDUCED PERIPHERAL NEUROPATHY (HCC): Chronic | ICD-10-CM

## 2025-04-28 DIAGNOSIS — T45.1X5A CHEMOTHERAPY-INDUCED PERIPHERAL NEUROPATHY (HCC): Chronic | ICD-10-CM

## 2025-04-28 DIAGNOSIS — T45.1X5A CHEMOTHERAPY-INDUCED PERIPHERAL NEUROPATHY (HCC): ICD-10-CM

## 2025-04-28 DIAGNOSIS — Z92.21 STATUS POST CHEMOTHERAPY: ICD-10-CM

## 2025-04-28 DIAGNOSIS — E11.9 TYPE 2 DIABETES MELLITUS WITHOUT COMPLICATION, WITHOUT LONG-TERM CURRENT USE OF INSULIN (HCC): Primary | ICD-10-CM

## 2025-04-28 DIAGNOSIS — G62.0 CHEMOTHERAPY-INDUCED PERIPHERAL NEUROPATHY (HCC): ICD-10-CM

## 2025-04-28 DIAGNOSIS — M79.676 PAIN DUE TO ONYCHOMYCOSIS OF TOENAIL: ICD-10-CM

## 2025-04-28 DIAGNOSIS — R26.2 AMBULATORY DYSFUNCTION: ICD-10-CM

## 2025-04-28 PROCEDURE — 11721 DEBRIDE NAIL 6 OR MORE: CPT | Performed by: PODIATRIST

## 2025-04-28 RX ORDER — GABAPENTIN 100 MG/1
100 CAPSULE ORAL 3 TIMES DAILY
Qty: 90 CAPSULE | Refills: 0 | OUTPATIENT
Start: 2025-04-28

## 2025-04-28 RX ORDER — GABAPENTIN 100 MG/1
100 CAPSULE ORAL 3 TIMES DAILY
Qty: 90 CAPSULE | Refills: 0 | Status: SHIPPED | OUTPATIENT
Start: 2025-04-28

## 2025-04-28 NOTE — PROGRESS NOTES
Marci Arias  1945  AT RISK FOOT CARE    1. Type 2 diabetes mellitus without complication, without long-term current use of insulin (HCC)        2. Chemotherapy-induced peripheral neuropathy (HCC)        3. Ambulatory dysfunction        4. Pain due to onychomycosis of toenail            Patient presents for at-risk foot care.  Patient has no acute concerns today.  Patient has significant lower extremity risk due to neuropathy, parasthesia, edema, and trophic skin changes to the lower extremity.    On exam patient has thickened, hypertrophic, discolored, brittle toenails with subungual debris and tenderness x10   Callus: none  Patient has lower extremity edema  PAtients skin is atrophic, thickened nails, and decreased pedal hair  Patient has decreased pinprick and vibratory sensation to his feet and parasthesia    Today's treatment includes:  Debridement of toenails. Using nail nipper, oscar, and curette, nails were sharply debrided, reduced in thickness and length. Devitalized nail tissue and fungal debris excised and removed. Patient tolerated well.        Discussed proper shoe gear, daily inspections of feet, and general foot health with patient. Patient has Q9  findings and is recommended for at risk foot care every 9-10 weeks.    Patients most recent complete clinical foot exam was on: 2/18/2025

## 2025-05-21 ENCOUNTER — PATIENT OUTREACH (OUTPATIENT)
Dept: FAMILY MEDICINE CLINIC | Facility: CLINIC | Age: 80
End: 2025-05-21

## 2025-05-21 DIAGNOSIS — E11.65 UNCONTROLLED TYPE 2 DIABETES MELLITUS WITH HYPERGLYCEMIA (HCC): ICD-10-CM

## 2025-05-21 DIAGNOSIS — R45.89 ANXIETY ABOUT HEALTH: ICD-10-CM

## 2025-05-21 RX ORDER — SITAGLIPTIN AND METFORMIN HYDROCHLORIDE 1000; 50 MG/1; MG/1
1 TABLET, FILM COATED ORAL 2 TIMES DAILY WITH MEALS
Qty: 60 TABLET | Refills: 0 | Status: SHIPPED | OUTPATIENT
Start: 2025-05-21

## 2025-05-21 RX ORDER — ALPRAZOLAM 0.25 MG
0.25 TABLET ORAL
Qty: 30 TABLET | Refills: 0 | Status: SHIPPED | OUTPATIENT
Start: 2025-05-21

## 2025-05-21 NOTE — PROGRESS NOTES
I received a message on my VM from Clair requesting a med refill for the patient; submitted to PCP.

## 2025-05-23 ENCOUNTER — OFFICE VISIT (OUTPATIENT)
Dept: FAMILY MEDICINE CLINIC | Facility: CLINIC | Age: 80
End: 2025-05-23

## 2025-05-23 ENCOUNTER — NURSE TRIAGE (OUTPATIENT)
Age: 80
End: 2025-05-23

## 2025-05-23 ENCOUNTER — HOSPITAL ENCOUNTER (OUTPATIENT)
Dept: RADIOLOGY | Facility: HOSPITAL | Age: 80
End: 2025-05-23
Payer: COMMERCIAL

## 2025-05-23 VITALS
RESPIRATION RATE: 16 BRPM | BODY MASS INDEX: 25.37 KG/M2 | HEIGHT: 61 IN | TEMPERATURE: 96 F | SYSTOLIC BLOOD PRESSURE: 130 MMHG | WEIGHT: 134.4 LBS | HEART RATE: 77 BPM | OXYGEN SATURATION: 93 % | DIASTOLIC BLOOD PRESSURE: 70 MMHG

## 2025-05-23 DIAGNOSIS — J06.9 VIRAL UPPER RESPIRATORY TRACT INFECTION: ICD-10-CM

## 2025-05-23 DIAGNOSIS — J06.9 VIRAL UPPER RESPIRATORY TRACT INFECTION: Primary | ICD-10-CM

## 2025-05-23 LAB
SARS-COV-2 AG UPPER RESP QL IA: NEGATIVE
SL AMB POCT RAPID FLU A: NEGATIVE
SL AMB POCT RAPID FLU B: NEGATIVE
VALID CONTROL: NORMAL

## 2025-05-23 PROCEDURE — 71046 X-RAY EXAM CHEST 2 VIEWS: CPT

## 2025-05-23 RX ORDER — PHENYLEPHRINE HCL 10 MG/1
10 TABLET, FILM COATED ORAL EVERY 4 HOURS PRN
Qty: 30 TABLET | Refills: 0 | Status: SHIPPED | OUTPATIENT
Start: 2025-05-23 | End: 2025-05-23

## 2025-05-23 RX ORDER — PSEUDOEPHEDRINE HCL 30 MG/1
30 TABLET, FILM COATED ORAL EVERY 8 HOURS PRN
Qty: 30 TABLET | Refills: 0 | Status: SHIPPED | OUTPATIENT
Start: 2025-05-23

## 2025-05-23 RX ORDER — FLUTICASONE PROPIONATE 50 MCG
1 SPRAY, SUSPENSION (ML) NASAL DAILY
Qty: 24 ML | Refills: 1 | Status: SHIPPED | OUTPATIENT
Start: 2025-05-23

## 2025-05-23 RX ORDER — BENZONATATE 100 MG/1
100 CAPSULE ORAL 3 TIMES DAILY PRN
Qty: 20 CAPSULE | Refills: 0 | Status: SHIPPED | OUTPATIENT
Start: 2025-05-23

## 2025-05-23 NOTE — PROGRESS NOTES
Name: Marci Arias      : 1945      MRN: [id number]  Encounter Provider: MOIRA Garcia  Encounter Date: 2025   Encounter department: [facility]  :  Assessment & Plan  Viral upper respiratory tract infection  -Viral URI symptoms  -COVID/FLU test negative  -Recommends using nasal irrigation and flonase daily  -Manage symptoms by using humidifier to increase air moisture in your home.   -Continue sudafed PRN   -Encourage increase fluid intake, may use honey tea with lemon for cough, may use cepacol PRN and sleep with head elevated.  -Return to office if symptoms persist/worsen.     Orders:    XR chest pa and lateral; Future    phenylephrine (SUDAFED PE) 10 MG TABS; Take 1 tablet (10 mg total) by mouth every 4 (four) hours as needed for congestion    benzonatate (TESSALON PERLES) 100 mg capsule; Take 1 capsule (100 mg total) by mouth 3 (three) times a day as needed for cough           History of Present Illness   Marci Arias is a 79 y.o. with  has a past medical history of Cancer (HCC), Chronic pain disorder, Diabetes mellitus (HCC), Diffuse large B cell lymphoma (HCC), Dysphagia, GERD without esophagitis, HTN (hypertension), Hyperlipidemia, Hypertension, MI (myocardial infarction) (HCC), Port-A-Cath in place, and Thyroid cancer (HCC).     Patient is accompanied by granddaughter    Upper Respiratory Infection  Patient complains of symptoms of a URI. Symptoms include congestion, non productive cough, and chest tightness. Onset of symptoms was 1 week ago, and has been unchanged since that time. Treatment to date: mucinex not effective.                      Review of Systems   Constitutional: Negative.  Negative for chills, fatigue and fever.   HENT:  Positive for congestion. Negative for ear pain and sore throat.    Eyes: Negative.  Negative for pain and visual disturbance.   Respiratory:  Positive for cough and chest tightness. Negative  "for shortness of breath.    Cardiovascular: Negative.  Negative for chest pain and palpitations.   Gastrointestinal: Negative.  Negative for abdominal pain and vomiting.   Genitourinary: Negative.  Negative for dysuria and hematuria.   Musculoskeletal: Negative.  Negative for arthralgias and back pain.   Skin: Negative.  Negative for color change and rash.   Neurological: Negative.  Negative for seizures and syncope.   Hematological: Negative.    Psychiatric/Behavioral: Negative.  Negative for behavioral problems.    All other systems reviewed and are negative.      Objective   /70 (BP Location: Right arm, Patient Position: Sitting, Cuff Size: Standard)   Pulse 77   Temp (!) 96 °F (35.6 °C) (Temporal)   Resp 16   Ht 5' 1\" (1.549 m)   Wt 61 kg (134 lb 6.4 oz)   SpO2 93%   BMI 25.39 kg/m²      Physical Exam  Vitals and nursing note reviewed.   Constitutional:       General: She is not in acute distress.     Appearance: Normal appearance. She is well-developed and normal weight.   HENT:      Head: Normocephalic and atraumatic.      Right Ear: Tympanic membrane normal.      Left Ear: Tympanic membrane normal.      Nose: Nose normal.      Mouth/Throat:      Mouth: Mucous membranes are moist.     Eyes:      Conjunctiva/sclera: Conjunctivae normal.       Cardiovascular:      Rate and Rhythm: Normal rate and regular rhythm.      Pulses: Normal pulses.      Heart sounds: Normal heart sounds. No murmur heard.  Pulmonary:      Effort: Pulmonary effort is normal. No respiratory distress.      Breath sounds: Normal breath sounds.   Abdominal:      General: Abdomen is flat. Bowel sounds are normal.      Palpations: Abdomen is soft.      Tenderness: There is no abdominal tenderness.     Musculoskeletal:         General: No swelling. Normal range of motion.      Cervical back: Normal range of motion and neck supple.     Skin:     General: Skin is warm and dry.      Capillary Refill: Capillary refill takes less than 2 " seconds.     Neurological:      General: No focal deficit present.      Mental Status: She is alert and oriented to person, place, and time. Mental status is at baseline.     Psychiatric:         Mood and Affect: Mood normal.         Behavior: Behavior normal.         Thought Content: Thought content normal.         Judgment: Judgment normal.

## 2025-05-23 NOTE — TELEPHONE ENCOUNTER
"Pt had to cancel her appt today.  She has a bad cough.   Reason for Disposition   MILD difficulty breathing (e.g., minimal/no SOB at rest, SOB with walking, pulse < 100) and still present when not coughing  (Exception: No change from usual, chronic shortness of breath.)    Additional Information   Negative: SEVERE difficulty breathing (e.g., struggling for each breath, speaks in single words)   Negative: Lips or face are bluish now and persists when not coughing   Negative: Sounds like a life-threatening emergency to the triager    Answer Assessment - Initial Assessment Questions  1. ONSET: \"When did the cough begin?\"       Last night  2. SEVERITY: \"How bad is the cough today?\"       Coughing all night long.   3. SPUTUM: \"Describe the color of your sputum\" (e.g., none, dry cough; clear, white, yellow, green)      no  4. HEMOPTYSIS: \"Are you coughing up any blood?\" If so ask: \"How much?\" (e.g., flecks, streaks, tablespoons, etc.)      no  5. DIFFICULTY BREATHING: \"Are you having difficulty breathing?\" If Yes, ask: \"How bad is it?\" (e.g., mild, moderate, severe)       Only when she coughs.   6. FEVER: \"Do you have a fever?\" If Yes, ask: \"What is your temperature, how was it measured, and when did it start?\"      no  7. CARDIAC HISTORY: \"Do you have any history of heart disease?\" (e.g., heart attack, congestive heart failure)       no  8. LUNG HISTORY: \"Do you have any history of lung disease?\"  (e.g., pulmonary embolus, asthma, emphysema)      Lymphoma in the past.   9. PE RISK FACTORS: \"Do you have a history of blood clots?\" (or: recent major surgery, recent prolonged travel, bedridden)      no  10. OTHER SYMPTOMS: \"Do you have any other symptoms?\" (e.g., runny nose, wheezing, chest pain)        no    12. TRAVEL: \"Have you traveled out of the country in the last month?\" (e.g., travel history, exposures)        no    Protocols used: Cough - Chronic-Adult-[place]    "
FOLLOW UP: coughing all night long    REASON FOR CONVERSATION: No chief complaint on file.    SYMPTOMS: Coughing, started last night.  Will take her Grandmother to urgent care.     OTHER: Please put new labs in for August appt.     DISPOSITION: Go to Office Now/Urgent Care    
Yes

## 2025-05-26 ENCOUNTER — RESULTS FOLLOW-UP (OUTPATIENT)
Dept: FAMILY MEDICINE CLINIC | Facility: CLINIC | Age: 80
End: 2025-05-26

## 2025-05-26 DIAGNOSIS — Z92.21 STATUS POST CHEMOTHERAPY: ICD-10-CM

## 2025-05-26 DIAGNOSIS — T45.1X5A CHEMOTHERAPY-INDUCED PERIPHERAL NEUROPATHY (HCC): Chronic | ICD-10-CM

## 2025-05-26 DIAGNOSIS — G62.0 CHEMOTHERAPY-INDUCED PERIPHERAL NEUROPATHY (HCC): Chronic | ICD-10-CM

## 2025-05-27 DIAGNOSIS — F03.90 DEMENTIA WITHOUT BEHAVIORAL DISTURBANCE (HCC): ICD-10-CM

## 2025-05-27 RX ORDER — MEMANTINE HYDROCHLORIDE 10 MG/1
10 TABLET ORAL 2 TIMES DAILY
Qty: 180 TABLET | Refills: 0 | Status: SHIPPED | OUTPATIENT
Start: 2025-05-27

## 2025-05-27 RX ORDER — GABAPENTIN 100 MG/1
100 CAPSULE ORAL 3 TIMES DAILY
Qty: 90 CAPSULE | Refills: 0 | Status: SHIPPED | OUTPATIENT
Start: 2025-05-27

## 2025-06-05 ENCOUNTER — OFFICE VISIT (OUTPATIENT)
Dept: OTOLARYNGOLOGY | Facility: CLINIC | Age: 80
End: 2025-06-05
Payer: COMMERCIAL

## 2025-06-05 VITALS — BODY MASS INDEX: 25.3 KG/M2 | HEIGHT: 61 IN | WEIGHT: 134 LBS

## 2025-06-05 DIAGNOSIS — F03.918 DEMENTIA WITH BEHAVIORAL DISTURBANCE (HCC): ICD-10-CM

## 2025-06-05 DIAGNOSIS — J32.9 RHINOSINUSITIS: ICD-10-CM

## 2025-06-05 DIAGNOSIS — R93.89 ABNORMAL CT SCAN, NECK: Primary | ICD-10-CM

## 2025-06-05 DIAGNOSIS — C83.31 DIFFUSE LARGE B-CELL LYMPHOMA OF LYMPH NODES OF NECK (HCC): ICD-10-CM

## 2025-06-05 DIAGNOSIS — K21.9 LARYNGOPHARYNGEAL REFLUX (LPR): ICD-10-CM

## 2025-06-05 DIAGNOSIS — K21.00 GASTROESOPHAGEAL REFLUX DISEASE WITH ESOPHAGITIS WITHOUT HEMORRHAGE: ICD-10-CM

## 2025-06-05 PROCEDURE — 31575 DIAGNOSTIC LARYNGOSCOPY: CPT | Performed by: OTOLARYNGOLOGY

## 2025-06-05 PROCEDURE — 99214 OFFICE O/P EST MOD 30 MIN: CPT | Performed by: OTOLARYNGOLOGY

## 2025-06-05 RX ORDER — PANTOPRAZOLE SODIUM 40 MG/1
40 TABLET, DELAYED RELEASE ORAL DAILY
Qty: 90 TABLET | Refills: 1 | Status: SHIPPED | OUTPATIENT
Start: 2025-06-05

## 2025-06-05 NOTE — PROGRESS NOTES
[facility]  Marci Arias 79 y.o. female MRN: [id number]  Encounter: [id number]  Nikhil Soriano MD  Office : [phone]  Also available on [employer]    Thank you for referring Marci Arias for an evaluation. My recommendations are included. Please do not hesitate to contact me with any questions you may have.       ASSESSMENT AND PLAN:      1. Abnormal CT scan, neck        2. Gastroesophageal reflux disease with esophagitis without hemorrhage  pantoprazole (PROTONIX) 40 mg tablet      3. Dementia with behavioral disturbance (HCC)        4. 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 Rhinosinusitis        6. Laryngopharyngeal reflux (LPR)  pantoprazole (PROTONIX) 40 mg tablet          Patient with history of B-cell lymphoma, no evidence of disease on the laryngoscopy.  Resolution of the rhinorrhea and postnasal purulent discharge.  Better on the chronic cough and throat mucus with Protonix and diet. Still drinking coffee multiple times daily.   Again  reviewed with her and her granddaughter the modifications of the diet.  The CT scan with changes that have been relatively stable over time, does not appear to have an evident area that needs to be biopsied at this point.  ______________________________________________________________________    Reason for consultation : Asymmetry on CT scan    HPI: Marci Arias is a 79 y.o. female with history significant for B-cell lymphoma, initially had a left tonsillectomy done in [place], path 9/25/2018 left tonsil with diffuse large B-cell lymphoma. Lesion recurred and A second procedure was done and admission to [facility] on 11/20/2018 ([facility] Y[facility]).  Review of chart reveals that she additionally had a very large oropharyngeal antral hypopharyngeal mass, she underwent  1 cycle of R-EPOCH and 5 cycles of R-CHOP, with remission. Seen on 11/14/24  with her grand  daughter who is her caregiver, reported deterioration of her mental abilities, pain in her throat and difficulty swallowing, and sensation of going to choke.  Granddaughter reported constant cough, constantly clearing her throat.  Most recent CT scans with contrast revealed an area with enhancement on the left base of tongue that has not changed over time.  I personally reviewed CT scan neck from 2022 all the way to the most recent CT scan of the neck on 9/30/2024 and initial mass is not present, the enhancing base of tongue area on the left side has been very stable with no change in size, also no significant change in pattern of enhancement.  She does have a history of very significant reflux and continues to be symptomatic despite being on Prilosec twice daily.    PRIOR VISIT, ENDOSCOPIC EVALUATION :     REVIEW OF SYSTEMS:    Review of systems:  10 Point ROS was performed and negative except as above or otherwise noted in the medical record.    Historical Information   Past Medical History:   Diagnosis Date    Cancer (HCC)     Throat    Chronic pain disorder     Diabetes mellitus (HCC)     Diffuse large B cell lymphoma (HCC)     Dysphagia     GERD without esophagitis     HTN (hypertension)     Hyperlipidemia     Hypertension     MI (myocardial infarction) (HCC)     Port-A-Cath in place 07/29/2019    Thyroid cancer (HCC) 2018     Past Surgical History:   Procedure Laterality Date    BONE MARROW BIOPSY      BREAST BIOPSY Left     CARDIAC CATHETERIZATION N/A 12/9/2022    Procedure: Cardiac catheterization;  Surgeon: Jhonny Rain MD;  Location: [facility];  Service: Cardiology    CARDIAC CATHETERIZATION N/A 12/9/2022    Procedure: Cardiac Coronary Angiogram;  Surgeon: Jhonny Rain MD;  Location: [facility];  Service: Cardiology    CARDIAC CATHETERIZATION Left 12/9/2022    Procedure: Cardiac Left Heart Cath;  Surgeon: Jhonny Rain MD;  Location: [facility];  Service: Cardiology     CHOLECYSTECTOMY      IR PORT PLACEMENT  11/16/2018    IR PORT REMOVAL  3/22/2021    OTHER SURGICAL HISTORY      tendor tear repair to right shoulder    SHOULDER ARTHROSCOPY       Social History   Social History     Substance and Sexual Activity   Alcohol Use Never    Comment: 0     Social History     Substance and Sexual Activity   Drug Use No     Social History     Tobacco Use   Smoking Status Never    Passive exposure: Never   Smokeless Tobacco Never     Family History   Problem Relation Name Age of Onset    Diabetes Mother      Heart disease Sister      Uterine cancer Maternal Grandmother      Prostate cancer Paternal Grandfather      Hypertension Brother      Breast cancer Neg Hx         Meds/Allergies       Current Outpatient Medications:     Accu-Chek FastClix Lancets MISC    Accu-Chek Guide Test test strip    acetaminophen (TYLENOL) 650 mg CR tablet    albuterol (PROVENTIL HFA,VENTOLIN HFA) 90 mcg/act inhaler    ALPRAZolam (XANAX) 0.25 mg tablet    Aspirin Low Dose 81 MG chewable tablet    atorvastatin (LIPITOR) 40 mg tablet    BD Pen Needle Micro U/F 32G X 6 MM MISC    benzonatate (TESSALON PERLES) 100 mg capsule    Blood Glucose Monitoring Suppl (OneTouch Verio Reflect) w/Device KIT    Blood Glucose Monitoring Suppl (OneTouch Verio) w/Device KIT    Calcium Carb-Cholecalciferol 600-10 MG-MCG TABS    carvedilol (COREG) 12.5 mg tablet    cyanocobalamin (VITAMIN B-12) 1000 MCG tablet    docusate sodium (COLACE) 100 mg capsule    Empagliflozin (Jardiance) 25 MG TABS    fluticasone (FLONASE) 50 mcg/act nasal spray    furosemide (LASIX) 20 mg tablet    gabapentin (NEURONTIN) 100 mg capsule    glucose blood (OneTouch Verio) test strip    guaiFENesin (MUCINEX) 600 mg 12 hr tablet    Incontinence Supplies MISC    Incontinence Supply Disposable (Briefs Overnight Medium) MISC    Incontinence Supply Disposable (PROCare Adult Briefs Medium) MISC    Insulin Glargine Solostar (Lantus SoloStar) 100 UNIT/ML SOPN    Janumet  " MG per tablet    Lancets (OneTouch Delica Plus [id number]) MISC    lidocaine (Lidoderm) 5 %    losartan (COZAAR) 25 mg tablet    Melatonin 5 MG TABS    memantine (NAMENDA) 10 mg tablet    mirtazapine (REMERON) 15 mg tablet    Nutritional Supplements (Glucerna) LIQD    ondansetron (ZOFRAN) 4 mg tablet    pantoprazole (PROTONIX) 40 mg tablet    polyethylene glycol (MIRALAX) 17 g packet    pseudoephedrine (SUDAFED) 30 mg tablet    senna (SENOKOT) 8.6 mg    witch hazel-glycerin (TUCKS) topical pad    lubiprostone (AMITIZA) 8 mcg capsule    No Known Allergies      PHYSICAL EXAM:    Height 5' 1\" (1.549 m), weight 60.8 kg (134 lb), not currently breastfeeding. Body mass index is 25.32 kg/m².  Constitutional: Oriented to person, place, and time. Well-developed and well-nourished, no apparent distress, non-toxic appearance. Cooperative, able to hear and answer questions without difficulty.    Voice: Normal voice quality.  Head: Normocephalic, atraumatic.  No scars, masses or lesions.  Face: Symmetric, no edema, no sinus tenderness.  Eyes: Vision grossly intact, extra-ocular movement intact.  Right Ear: External ear normal.  Auditory canal clear.  Tympanic membrane well-appearing, without retraction or scarring.  No fluid present. No post-auricular erythema or tenderness  Left Ear: External ear normal.  Auditory canal clear.  Tympanic membrane well-appearing, without retraction or scarring.  No fluid present.  No post-auricular erythema or tenderness.  Nose: Septum midline, nares clear.  Mucosa dry, green crusting noticed towards the posterior nose, turbinates well appearing.  No polyps or discharge evident on anterior rhinoscopy.  Oral cavity: Partially edentulous.  Mucosa moist, lips normal.  Tongue mobile, floor of mouth normal.  Hard palate unremarkable.  No masses or lesions.   Oropharynx: Uvula is midline, soft palate normal.  Unremarkable oropharyngeal inlet.  Left tonsil absent, scar tissue noticed on the area, " no evident mass. Posterior pharyngeal wall with abundant mucopurulent postnasal discharge trailing along the left side. No masses or lesions.  Salivary glands:  Parotid glands and submandibular glands symmetric, no enlargement or tenderness.  Neck: Normal laryngeal elevation with swallow.  Trachea midline.  No masses or lesions. No palpable adenopathy.  Thyroid: normal in size, unremarkable without tenderness or palpable nodules.  Pulmonary/Chest: Normal effort and rate. No respiratory distress.   Musculoskeletal: Normal range of motion.   Neurological: Cranial nerves 2-12 intact.  Skin: Skin is warm and dry.   Psychiatric: Normal mood and affect.      Imaging Studies: I have personally reviewed images on the PACS system and : As described on H&P   MBS:1/30/20:   Oral stage was WFL/WNL despite minimal dentition. Pt was able to masticate items given today WFL. Bolus formation, control, and transfer were WNL. Pharyngeal stage was also WNL. Epiglottic inversion, pharyngeal constriction, and hyoid excursion were WNL. There was minimal to no residue. No penetration or aspiration observed this study. No coughing. Per gross esophageal screen a small amt of reflux was observed.       Procedure: Flexible fiberoptic laryngoscopy     Indications: Evaluate areas of the upper aerodigestive tract not seen on physical exam     Procedure in detail: After informed verbal consent was obtained the nose was anesthetized using 4% lidocaine and neosynephrine spray. After adequate time for anesthesia the scope was guided easily along the nasal cavity floor and into the nasopharynx. The nasopharynx, oropharynx, hypopharynx and larynx were evaluated with the below listed findings.  Hypopharynx with significant amount of secretions and some remnants of the postnasal drip is also noticed on the left side.  Base of tongue with no evidence of any mass lesion, no ulceration.  Mucosa on the surface has overall a very benign appearance, larynx  with normal appearing vocal cords and normal anatomy, she does have some pooled salivary type secretions with bubbles on the piriform sinuses     The scope was removed without difficulty and the patient tolerated the procedure well.

## 2025-06-09 DIAGNOSIS — E11.65 UNCONTROLLED TYPE 2 DIABETES MELLITUS WITH HYPERGLYCEMIA (HCC): ICD-10-CM

## 2025-06-09 RX ORDER — INSULIN GLARGINE 100 [IU]/ML
INJECTION, SOLUTION SUBCUTANEOUS
Qty: 15 ML | Refills: 1 | Status: SHIPPED | OUTPATIENT
Start: 2025-06-09

## 2025-06-23 DIAGNOSIS — I10 ESSENTIAL HYPERTENSION: ICD-10-CM

## 2025-06-24 DIAGNOSIS — G62.0 CHEMOTHERAPY-INDUCED PERIPHERAL NEUROPATHY (HCC): Chronic | ICD-10-CM

## 2025-06-24 DIAGNOSIS — T45.1X5A CHEMOTHERAPY-INDUCED PERIPHERAL NEUROPATHY (HCC): Chronic | ICD-10-CM

## 2025-06-24 DIAGNOSIS — Z92.21 STATUS POST CHEMOTHERAPY: ICD-10-CM

## 2025-06-24 RX ORDER — GABAPENTIN 100 MG/1
100 CAPSULE ORAL 3 TIMES DAILY
Qty: 90 CAPSULE | Refills: 0 | Status: SHIPPED | OUTPATIENT
Start: 2025-06-24

## 2025-06-24 RX ORDER — CARVEDILOL 12.5 MG/1
12.5 TABLET ORAL 2 TIMES DAILY WITH MEALS
Qty: 180 TABLET | Refills: 0 | Status: SHIPPED | OUTPATIENT
Start: 2025-06-24

## 2025-06-25 DIAGNOSIS — R45.89 ANXIETY ABOUT HEALTH: ICD-10-CM

## 2025-06-25 DIAGNOSIS — E11.65 UNCONTROLLED TYPE 2 DIABETES MELLITUS WITH HYPERGLYCEMIA (HCC): ICD-10-CM

## 2025-06-25 RX ORDER — SITAGLIPTIN AND METFORMIN HYDROCHLORIDE 1000; 50 MG/1; MG/1
1 TABLET, FILM COATED ORAL 2 TIMES DAILY WITH MEALS
Qty: 60 TABLET | Refills: 0 | Status: SHIPPED | OUTPATIENT
Start: 2025-06-25

## 2025-06-25 RX ORDER — ALPRAZOLAM 0.25 MG
0.25 TABLET ORAL
Qty: 30 TABLET | Refills: 0 | Status: SHIPPED | OUTPATIENT
Start: 2025-06-25

## 2025-06-26 DIAGNOSIS — E11.65 UNCONTROLLED TYPE 2 DIABETES MELLITUS WITH HYPERGLYCEMIA (HCC): ICD-10-CM

## 2025-06-26 RX ORDER — PEN NEEDLE, DIABETIC 32 GX 1/4"
NEEDLE, DISPOSABLE MISCELLANEOUS DAILY
Qty: 100 EACH | Refills: 1 | Status: SHIPPED | OUTPATIENT
Start: 2025-06-26

## 2025-06-30 DIAGNOSIS — I42.9 CARDIOMYOPATHY (HCC): ICD-10-CM

## 2025-06-30 RX ORDER — ASPIRIN 81 MG
TABLET,CHEWABLE ORAL
Qty: 90 TABLET | Refills: 0 | Status: SHIPPED | OUTPATIENT
Start: 2025-06-30

## 2025-07-04 DIAGNOSIS — I42.9 CARDIOMYOPATHY (HCC): ICD-10-CM

## 2025-07-05 DIAGNOSIS — I42.9 CARDIOMYOPATHY (HCC): ICD-10-CM

## 2025-07-05 DIAGNOSIS — F41.9 ANXIETY: ICD-10-CM

## 2025-07-07 ENCOUNTER — OFFICE VISIT (OUTPATIENT)
Dept: PODIATRY | Facility: CLINIC | Age: 80
End: 2025-07-07
Payer: COMMERCIAL

## 2025-07-07 VITALS — HEIGHT: 61 IN | BODY MASS INDEX: 25.3 KG/M2 | WEIGHT: 134 LBS

## 2025-07-07 DIAGNOSIS — E11.9 TYPE 2 DIABETES MELLITUS WITHOUT COMPLICATION, WITHOUT LONG-TERM CURRENT USE OF INSULIN (HCC): Primary | ICD-10-CM

## 2025-07-07 DIAGNOSIS — G62.0 CHEMOTHERAPY-INDUCED PERIPHERAL NEUROPATHY (HCC): ICD-10-CM

## 2025-07-07 DIAGNOSIS — T45.1X5A CHEMOTHERAPY-INDUCED PERIPHERAL NEUROPATHY (HCC): ICD-10-CM

## 2025-07-07 DIAGNOSIS — B35.1 PAIN DUE TO ONYCHOMYCOSIS OF TOENAIL: ICD-10-CM

## 2025-07-07 DIAGNOSIS — R26.2 AMBULATORY DYSFUNCTION: ICD-10-CM

## 2025-07-07 DIAGNOSIS — M79.676 PAIN DUE TO ONYCHOMYCOSIS OF TOENAIL: ICD-10-CM

## 2025-07-07 DIAGNOSIS — I42.9 CARDIOMYOPATHY (HCC): ICD-10-CM

## 2025-07-07 PROCEDURE — 11721 DEBRIDE NAIL 6 OR MORE: CPT | Performed by: PODIATRIST

## 2025-07-07 PROCEDURE — RECHECK: Performed by: PODIATRIST

## 2025-07-07 NOTE — PROGRESS NOTES
Marci Arias  1945  AT RISK FOOT CARE    1. Type 2 diabetes mellitus without complication, without long-term current use of insulin (HCC)        2. Chemotherapy-induced peripheral neuropathy (HCC)        3. Pain due to onychomycosis of toenail        4. Ambulatory dysfunction            Patient presents for at-risk foot care.  Patient has no acute concerns today.  Patient has significant lower extremity risk due to neuropathy, parasthesia, edema, and trophic skin changes to the lower extremity.    On exam patient has thickened, hypertrophic, discolored, brittle toenails with subungual debris and tenderness x10   Callus: none on exam  Patient has lower extremity edema  PAtients skin is atrophic, thickened nails, and decreased pedal hair  Patient has decreased pinprick and vibratory sensation to his feet and parasthesia    Today's treatment includes:  Debridement of toenails. Using nail nipper, oscar, and curette, nails were sharply debrided, reduced in thickness and length. Devitalized nail tissue and fungal debris excised and removed. Patient tolerated well.        Discussed proper shoe gear, daily inspections of feet, and general foot health with patient. Patient has Q9  findings and is recommended for at risk foot care every 9-10 weeks.    Patients most recent complete clinical foot exam was on: 2/18/2025

## 2025-07-09 ENCOUNTER — PATIENT OUTREACH (OUTPATIENT)
Dept: FAMILY MEDICINE CLINIC | Facility: CLINIC | Age: 80
End: 2025-07-09

## 2025-07-09 RX ORDER — MIRTAZAPINE 15 MG/1
15 TABLET, FILM COATED ORAL
Qty: 90 TABLET | Refills: 0 | Status: SHIPPED | OUTPATIENT
Start: 2025-07-09

## 2025-07-09 RX ORDER — FUROSEMIDE 20 MG/1
20 TABLET ORAL DAILY
Qty: 90 TABLET | Refills: 0 | OUTPATIENT
Start: 2025-07-09

## 2025-07-09 RX ORDER — ATORVASTATIN CALCIUM 40 MG/1
40 TABLET, FILM COATED ORAL DAILY
Qty: 90 TABLET | Refills: 0 | OUTPATIENT
Start: 2025-07-09

## 2025-07-09 RX ORDER — FUROSEMIDE 20 MG/1
20 TABLET ORAL DAILY
Qty: 90 TABLET | Refills: 0 | Status: SHIPPED | OUTPATIENT
Start: 2025-07-09

## 2025-07-09 RX ORDER — LOSARTAN POTASSIUM 25 MG/1
25 TABLET ORAL DAILY
Qty: 90 TABLET | Refills: 0 | OUTPATIENT
Start: 2025-07-09

## 2025-07-09 RX ORDER — ATORVASTATIN CALCIUM 40 MG/1
40 TABLET, FILM COATED ORAL DAILY
Qty: 90 TABLET | Refills: 0 | Status: SHIPPED | OUTPATIENT
Start: 2025-07-09

## 2025-07-09 RX ORDER — LOSARTAN POTASSIUM 25 MG/1
25 TABLET ORAL DAILY
Qty: 90 TABLET | Refills: 0 | Status: SHIPPED | OUTPATIENT
Start: 2025-07-09

## 2025-07-09 NOTE — PROGRESS NOTES
I received a message on my VM from Clair stating she requested med RF via [employer] but they have not been approved; note sent to PCP.

## 2025-07-25 DIAGNOSIS — R45.89 ANXIETY ABOUT HEALTH: ICD-10-CM

## 2025-07-25 DIAGNOSIS — E11.65 UNCONTROLLED TYPE 2 DIABETES MELLITUS WITH HYPERGLYCEMIA (HCC): ICD-10-CM

## 2025-07-26 DIAGNOSIS — E11.65 UNCONTROLLED TYPE 2 DIABETES MELLITUS WITH HYPERGLYCEMIA (HCC): ICD-10-CM

## 2025-07-27 DIAGNOSIS — T45.1X5A CHEMOTHERAPY-INDUCED PERIPHERAL NEUROPATHY (HCC): Chronic | ICD-10-CM

## 2025-07-27 DIAGNOSIS — Z92.21 STATUS POST CHEMOTHERAPY: ICD-10-CM

## 2025-07-27 DIAGNOSIS — G62.0 CHEMOTHERAPY-INDUCED PERIPHERAL NEUROPATHY (HCC): Chronic | ICD-10-CM

## 2025-07-29 RX ORDER — SITAGLIPTIN AND METFORMIN HYDROCHLORIDE 1000; 50 MG/1; MG/1
1 TABLET, FILM COATED ORAL 2 TIMES DAILY WITH MEALS
Qty: 60 TABLET | Refills: 0 | OUTPATIENT
Start: 2025-07-29

## 2025-07-29 RX ORDER — GABAPENTIN 100 MG/1
100 CAPSULE ORAL 3 TIMES DAILY
Qty: 90 CAPSULE | Refills: 0 | Status: SHIPPED | OUTPATIENT
Start: 2025-07-29

## 2025-07-29 RX ORDER — SITAGLIPTIN AND METFORMIN HYDROCHLORIDE 1000; 50 MG/1; MG/1
1 TABLET, FILM COATED ORAL 2 TIMES DAILY WITH MEALS
Qty: 60 TABLET | Refills: 0 | Status: SHIPPED | OUTPATIENT
Start: 2025-07-29

## 2025-07-30 RX ORDER — ALPRAZOLAM 0.25 MG
0.25 TABLET ORAL
Qty: 30 TABLET | Refills: 0 | Status: SHIPPED | OUTPATIENT
Start: 2025-07-30

## 2025-08-09 ENCOUNTER — APPOINTMENT (OUTPATIENT)
Dept: LAB | Facility: HOSPITAL | Age: 80
End: 2025-08-09
Payer: COMMERCIAL

## 2025-08-11 ENCOUNTER — OFFICE VISIT (OUTPATIENT)
Dept: HEMATOLOGY ONCOLOGY | Facility: CLINIC | Age: 80
End: 2025-08-11
Payer: COMMERCIAL

## 2025-08-18 DIAGNOSIS — F03.90 DEMENTIA WITHOUT BEHAVIORAL DISTURBANCE (HCC): ICD-10-CM

## 2025-08-18 RX ORDER — MEMANTINE HYDROCHLORIDE 10 MG/1
10 TABLET ORAL 2 TIMES DAILY
Qty: 180 TABLET | Refills: 0 | Status: SHIPPED | OUTPATIENT
Start: 2025-08-18

## 2025-08-19 ENCOUNTER — TELEPHONE (OUTPATIENT)
Age: 80
End: 2025-08-19

## (undated) DEVICE — GUIDEWIRE WHOLEY .035 145 CM FLOP STR TIP

## (undated) DEVICE — RADIFOCUS OPTITORQUE ANGIOGRAPHIC CATHETER: Brand: OPTITORQUE

## (undated) DEVICE — GLIDESHEATH SLENDER STAINLESS STEEL KIT: Brand: GLIDESHEATH SLENDER

## (undated) DEVICE — DGW .035 FC J3MM 260CM TEF: Brand: EMERALD

## (undated) DEVICE — TR BAND RADIAL ARTERY COMPRESSION DEVICE: Brand: TR BAND